# Patient Record
Sex: FEMALE | Employment: UNEMPLOYED | URBAN - METROPOLITAN AREA
[De-identification: names, ages, dates, MRNs, and addresses within clinical notes are randomized per-mention and may not be internally consistent; named-entity substitution may affect disease eponyms.]

---

## 2017-09-08 ENCOUNTER — HOSPITAL ENCOUNTER (INPATIENT)
Age: 57
LOS: 5 days | Discharge: HOME HEALTH | DRG: 673 | End: 2017-09-13
Attending: EMERGENCY MEDICINE | Admitting: INTERNAL MEDICINE
Payer: COMMERCIAL

## 2017-09-08 ENCOUNTER — APPOINTMENT (OUTPATIENT)
Dept: CT IMAGING | Age: 57
DRG: 673 | End: 2017-09-08
Attending: EMERGENCY MEDICINE
Payer: COMMERCIAL

## 2017-09-08 ENCOUNTER — APPOINTMENT (OUTPATIENT)
Dept: GENERAL RADIOLOGY | Age: 57
DRG: 673 | End: 2017-09-08
Attending: EMERGENCY MEDICINE
Payer: COMMERCIAL

## 2017-09-08 DIAGNOSIS — E87.5 ACUTE HYPERKALEMIA: Primary | ICD-10-CM

## 2017-09-08 DIAGNOSIS — N17.9 ACUTE RENAL FAILURE, UNSPECIFIED ACUTE RENAL FAILURE TYPE (HCC): ICD-10-CM

## 2017-09-08 PROBLEM — N19 RENAL FAILURE: Status: ACTIVE | Noted: 2017-09-08

## 2017-09-08 LAB
ANION GAP SERPL CALC-SCNC: 13 MMOL/L (ref 3–18)
ANION GAP SERPL CALC-SCNC: 9 MMOL/L (ref 3–18)
APPEARANCE UR: CLEAR
BACTERIA URNS QL MICRO: NEGATIVE /HPF
BASOPHILS # BLD: 0 K/UL (ref 0–0.06)
BASOPHILS NFR BLD: 0 % (ref 0–2)
BILIRUB UR QL: NEGATIVE
BUN SERPL-MCNC: 113 MG/DL (ref 7–18)
BUN SERPL-MCNC: 117 MG/DL (ref 7–18)
BUN/CREAT SERPL: 8 (ref 12–20)
BUN/CREAT SERPL: 8 (ref 12–20)
CALCIUM SERPL-MCNC: 9 MG/DL (ref 8.5–10.1)
CALCIUM SERPL-MCNC: 9.6 MG/DL (ref 8.5–10.1)
CHLORIDE SERPL-SCNC: 103 MMOL/L (ref 100–108)
CHLORIDE SERPL-SCNC: 106 MMOL/L (ref 100–108)
CO2 SERPL-SCNC: 19 MMOL/L (ref 21–32)
CO2 SERPL-SCNC: 25 MMOL/L (ref 21–32)
COLOR UR: YELLOW
CREAT SERPL-MCNC: 13.6 MG/DL (ref 0.6–1.3)
CREAT SERPL-MCNC: 13.83 MG/DL (ref 0.6–1.3)
DIFFERENTIAL METHOD BLD: ABNORMAL
EOSINOPHIL # BLD: 0 K/UL (ref 0–0.4)
EOSINOPHIL NFR BLD: 0 % (ref 0–5)
EPITH CASTS URNS QL MICRO: NORMAL /LPF (ref 0–5)
ERYTHROCYTE [DISTWIDTH] IN BLOOD BY AUTOMATED COUNT: 13.3 % (ref 11.6–14.5)
GLUCOSE BLD STRIP.AUTO-MCNC: 133 MG/DL (ref 70–110)
GLUCOSE BLD STRIP.AUTO-MCNC: 182 MG/DL (ref 70–110)
GLUCOSE BLD STRIP.AUTO-MCNC: 56 MG/DL (ref 70–110)
GLUCOSE SERPL-MCNC: 193 MG/DL (ref 74–99)
GLUCOSE SERPL-MCNC: 69 MG/DL (ref 74–99)
GLUCOSE UR STRIP.AUTO-MCNC: 100 MG/DL
HCT VFR BLD AUTO: 28.9 % (ref 35–45)
HGB BLD-MCNC: 9.6 G/DL (ref 12–16)
HGB UR QL STRIP: ABNORMAL
KETONES UR QL STRIP.AUTO: NEGATIVE MG/DL
LEUKOCYTE ESTERASE UR QL STRIP.AUTO: NEGATIVE
LYMPHOCYTES # BLD: 1.2 K/UL (ref 0.9–3.6)
LYMPHOCYTES NFR BLD: 21 % (ref 21–52)
MAGNESIUM SERPL-MCNC: 2.9 MG/DL (ref 1.6–2.6)
MCH RBC QN AUTO: 28.2 PG (ref 24–34)
MCHC RBC AUTO-ENTMCNC: 33.2 G/DL (ref 31–37)
MCV RBC AUTO: 84.8 FL (ref 74–97)
MONOCYTES # BLD: 0.5 K/UL (ref 0.05–1.2)
MONOCYTES NFR BLD: 8 % (ref 3–10)
NEUTS SEG # BLD: 3.9 K/UL (ref 1.8–8)
NEUTS SEG NFR BLD: 71 % (ref 40–73)
NITRITE UR QL STRIP.AUTO: NEGATIVE
PH UR STRIP: 7.5 [PH] (ref 5–8)
PLATELET # BLD AUTO: 196 K/UL (ref 135–420)
PMV BLD AUTO: 9.1 FL (ref 9.2–11.8)
POTASSIUM SERPL-SCNC: 5.5 MMOL/L (ref 3.5–5.5)
POTASSIUM SERPL-SCNC: 6.5 MMOL/L (ref 3.5–5.5)
PROT UR STRIP-MCNC: 300 MG/DL
RBC # BLD AUTO: 3.41 M/UL (ref 4.2–5.3)
RBC #/AREA URNS HPF: NORMAL /HPF (ref 0–5)
SODIUM SERPL-SCNC: 135 MMOL/L (ref 136–145)
SODIUM SERPL-SCNC: 140 MMOL/L (ref 136–145)
SP GR UR REFRACTOMETRY: 1.01 (ref 1–1.03)
UROBILINOGEN UR QL STRIP.AUTO: 0.2 EU/DL (ref 0.2–1)
WBC # BLD AUTO: 5.5 K/UL (ref 4.6–13.2)
WBC URNS QL MICRO: NORMAL /HPF (ref 0–4)

## 2017-09-08 PROCEDURE — 96375 TX/PRO/DX INJ NEW DRUG ADDON: CPT

## 2017-09-08 PROCEDURE — 83735 ASSAY OF MAGNESIUM: CPT | Performed by: EMERGENCY MEDICINE

## 2017-09-08 PROCEDURE — 74011636637 HC RX REV CODE- 636/637: Performed by: EMERGENCY MEDICINE

## 2017-09-08 PROCEDURE — 74176 CT ABD & PELVIS W/O CONTRAST: CPT

## 2017-09-08 PROCEDURE — 74011000250 HC RX REV CODE- 250: Performed by: EMERGENCY MEDICINE

## 2017-09-08 PROCEDURE — 85025 COMPLETE CBC W/AUTO DIFF WBC: CPT | Performed by: EMERGENCY MEDICINE

## 2017-09-08 PROCEDURE — 74011250636 HC RX REV CODE- 250/636: Performed by: INTERNAL MEDICINE

## 2017-09-08 PROCEDURE — 74011250636 HC RX REV CODE- 250/636: Performed by: EMERGENCY MEDICINE

## 2017-09-08 PROCEDURE — 81001 URINALYSIS AUTO W/SCOPE: CPT | Performed by: EMERGENCY MEDICINE

## 2017-09-08 PROCEDURE — 99285 EMERGENCY DEPT VISIT HI MDM: CPT

## 2017-09-08 PROCEDURE — 96374 THER/PROPH/DIAG INJ IV PUSH: CPT

## 2017-09-08 PROCEDURE — 80048 BASIC METABOLIC PNL TOTAL CA: CPT | Performed by: EMERGENCY MEDICINE

## 2017-09-08 PROCEDURE — 93005 ELECTROCARDIOGRAM TRACING: CPT

## 2017-09-08 PROCEDURE — 82962 GLUCOSE BLOOD TEST: CPT

## 2017-09-08 PROCEDURE — 74011250637 HC RX REV CODE- 250/637: Performed by: EMERGENCY MEDICINE

## 2017-09-08 PROCEDURE — 65270000029 HC RM PRIVATE

## 2017-09-08 PROCEDURE — 71020 XR CHEST AP LAT: CPT

## 2017-09-08 RX ORDER — SODIUM BICARBONATE 1 MEQ/ML
50 SYRINGE (ML) INTRAVENOUS
Status: COMPLETED | OUTPATIENT
Start: 2017-09-08 | End: 2017-09-08

## 2017-09-08 RX ORDER — MULTIVITAMIN WITH IRON
1 TABLET ORAL DAILY
COMMUNITY
End: 2018-01-01

## 2017-09-08 RX ORDER — CLONIDINE HYDROCHLORIDE 0.1 MG/1
0.3 TABLET ORAL 3 TIMES DAILY
Status: ON HOLD | COMMUNITY
End: 2018-01-01

## 2017-09-08 RX ORDER — SODIUM CHLORIDE 9 MG/ML
40 INJECTION, SOLUTION INTRAVENOUS CONTINUOUS
Status: DISCONTINUED | OUTPATIENT
Start: 2017-09-08 | End: 2017-09-09

## 2017-09-08 RX ORDER — NIFEDIPINE 30 MG/1
20 TABLET, FILM COATED, EXTENDED RELEASE ORAL DAILY
COMMUNITY
End: 2018-01-01

## 2017-09-08 RX ORDER — CALCIUM GLUCONATE 94 MG/ML
1 INJECTION, SOLUTION INTRAVENOUS ONCE
Status: COMPLETED | OUTPATIENT
Start: 2017-09-08 | End: 2017-09-08

## 2017-09-08 RX ORDER — SODIUM POLYSTYRENE SULFONATE 15 G/60ML
15 SUSPENSION ORAL; RECTAL
Status: COMPLETED | OUTPATIENT
Start: 2017-09-08 | End: 2017-09-08

## 2017-09-08 RX ORDER — FUROSEMIDE 40 MG/1
TABLET ORAL DAILY
COMMUNITY
End: 2017-09-13

## 2017-09-08 RX ORDER — DEXTROSE 50 % IN WATER (D50W) INTRAVENOUS SYRINGE
25
Status: COMPLETED | OUTPATIENT
Start: 2017-09-08 | End: 2017-09-08

## 2017-09-08 RX ORDER — HEPARIN SODIUM 5000 [USP'U]/ML
5000 INJECTION, SOLUTION INTRAVENOUS; SUBCUTANEOUS EVERY 8 HOURS
Status: DISCONTINUED | OUTPATIENT
Start: 2017-09-08 | End: 2017-09-12

## 2017-09-08 RX ORDER — PREGABALIN 50 MG/1
CAPSULE ORAL
COMMUNITY
End: 2017-09-13

## 2017-09-08 RX ADMIN — SODIUM BICARBONATE 50 MEQ: 84 INJECTION, SOLUTION INTRAVENOUS at 17:31

## 2017-09-08 RX ADMIN — HEPARIN SODIUM 5000 UNITS: 5000 INJECTION, SOLUTION INTRAVENOUS; SUBCUTANEOUS at 23:09

## 2017-09-08 RX ADMIN — DEXTROSE MONOHYDRATE 25 G: 25 INJECTION, SOLUTION INTRAVENOUS at 18:19

## 2017-09-08 RX ADMIN — CALCIUM GLUCONATE 1 G: 94 INJECTION, SOLUTION INTRAVENOUS at 17:23

## 2017-09-08 RX ADMIN — SODIUM CHLORIDE 100 ML/HR: 900 INJECTION, SOLUTION INTRAVENOUS at 23:07

## 2017-09-08 RX ADMIN — SODIUM POLYSTYRENE SULFONATE 15 G: 15 SUSPENSION ORAL; RECTAL at 17:31

## 2017-09-08 RX ADMIN — INSULIN HUMAN 10 UNITS: 100 INJECTION, SOLUTION PARENTERAL at 17:27

## 2017-09-08 NOTE — ED TRIAGE NOTES
Reports dizziness and bilat leg numbness x2 days. Perr pts son he states pt saw PCP yesterday for yesterday and given medication for dizziness that is \"not working\".

## 2017-09-08 NOTE — ED PROVIDER NOTES
HPI Comments: 4:06 PM Judeen Severs is a 49 y.o. female with a history of kidney disease, ESRD, HTN, and hypercholesteremia who presents to ED for the evaluation of intermittent dizziness that began two days ago. Associated Sx include bilateral numbness to her hands and feet and HA. He also explains that the pt's Sx increase when she is standing up. Pt saw her PCP four or five days ago and received medication but states that it is not working. Pt is a native of [facility] and has been in the [facility] for a little over a year. Pt's son states she was out of the country visiting about a month ago. No other complaints, associated symptoms or modifying factors at this time. PCP: PROVIDER UNKNOWN      History provided by: , son. A  was used. Past Medical History:   Diagnosis Date    Kidney disease        No past surgical history on file. No family history on file. Social History     Social History    Marital status:      Spouse name: N/A    Number of children: N/A    Years of education: N/A     Occupational History    Not on file. Social History Main Topics    Smoking status: Not on file    Smokeless tobacco: Not on file    Alcohol use Not on file    Drug use: Not on file    Sexual activity: Not on file     Other Topics Concern    Not on file     Social History Narrative    No narrative on file         ALLERGIES: Review of patient's allergies indicates not on file. Review of Systems   Neurological: Positive for dizziness, numbness (bilateral) and headaches. Vitals:    09/08/17 1603   Pulse: 95   Resp: (!) 95   Temp: (!) 46.2 °F (7.9 °C)   SpO2: 100%            Physical Exam   Constitutional: She is oriented to person, place, and time. She appears well-developed and well-nourished. HENT:   Head: Normocephalic and atraumatic.    Mouth/Throat: Oropharynx is clear and moist.   Eyes: Conjunctivae are normal. Pupils are equal, round, and reactive to light. No scleral icterus. Neck: Normal range of motion. Neck supple. No JVD present. No tracheal deviation present. Cardiovascular: Normal rate, regular rhythm and normal heart sounds. Pulmonary/Chest: Effort normal and breath sounds normal. No respiratory distress. She has no wheezes. Abdominal: Soft. Bowel sounds are normal.   Musculoskeletal: Normal range of motion. Neurological: She is alert and oriented to person, place, and time. She has normal strength. Gait normal. GCS eye subscore is 4. GCS verbal subscore is 5. GCS motor subscore is 6. Skin: Skin is warm and dry. Psychiatric: She has a normal mood and affect. Nursing note and vitals reviewed. MDM  Number of Diagnoses or Management Options  Acute hyperkalemia:   Acute renal failure, unspecified acute renal failure type [facility]):   Diagnosis management comments: Pt presents with very little past history other than a history of \"kidney problems\". Returned from [address] [place] [address] [address] 6 weeks ago where she was treated for \"kidney problems\", but no further specifics available. Pt saw a \"Dr Baer\" in [place] last week, no blood work done at that time. Pt presents to ED with hyperkalemic renal failure. Have arranged admit to [facility] for further care. Urbano Franco MD  5:44 PM      ED Course       Procedures    Vitals:  Patient Vitals for the past 12 hrs:   Temp Pulse Resp SpO2   09/08/17 1603 (!) 46.2 °F (7.9 °C) 95 (!) 95 100 %         Medications ordered:   Medications - No data to display      Lab findings:  No results found for this or any previous visit (from the past 12 hour(s)). EKG interpretation by ED Physician:  4:05 PM As read by provider, NSR 91 with no acute changes. X-Ray, CT or other radiology findings or impressions:  No results found.     Progress notes, Consult notes or additional Procedure notes:   5:24 PM Consult:  Discussed care with Dr. Reina Castellanos, Hospitalist Standard discussion; including history of patients chief complaint, available diagnostic results, and treatment course. Agrees with admission. 5:36 PM Consult:  Discussed care with Dr. Silvestre Wilkes, Nephrology Standard discussion; including history of patients chief complaint, available diagnostic results, and treatment course. Will consult on patient when she is at [facility]. Disposition:  Diagnosis: No diagnosis found. Disposition: Admitted    Follow-up Information     None           Patient's Medications    No medications on file       Scribe Attestation:   Migue Felder acting as a scribe for and in the presence of Jennifer Logan MD September 08, 2017 at 4:05 PM     Signed by: Jeb Quispe, September 08, 2017 at 4:05 PM     Provider Attestation:   I personally performed the services described in the documentation, reviewed the documentation, as recorded by the scribe in my presence, and it accurately and completely records my words and actions.      Reviewed and signed by:  Jennifer Logan MD      .          Contact info for pt's son, he is able to translate for her: Federico Arina, cell: [phone]

## 2017-09-08 NOTE — ED NOTES
Bedside shift change report given to Danie Aguayo (oncoming nurse) by Adrian Cooley (offgoing nurse). Report included the following information SBAR.

## 2017-09-08 NOTE — PROGRESS NOTES
Discussed with Dr. Stefanie Winter. Pt relatively asymp, he is treating the elevated K and will be repeating in a few hours. . I have asked to get U/A and renal imaging. [facility] Have spoken to pt's son Radha Chang on the phone [phone]. His mom ( she does not speak [place]) was in [facility] for a while and was seeing a nephrologist there who told her she had \"very bad kidneys\" and needs to start treatment in the hospital 2-3 x a week. She came back to the [address] a few weeks ago and has complained of feeling weak. He brought some medical info and list of meds with him which he gave to the ER doctor. I told him I will see his mom in the morning, will arrange for dialysis cath and dialysis tomorrow. He says his mom is agreeable.

## 2017-09-08 NOTE — Clinical Note
Status[de-identified] Inpatient [101] Type of Bed: Remote Telemetry [29] Inpatient Hospitalization Certified Necessary for the Following Reasons: 3. Patient receiving treatment that can only be provided in an inpatient setting (further clarification in H&P documentation) Admitting Diagnosis: Hyperkalemia [935144] Admitting Physician: Colette Zepeda Attending Physician: Colette Zepeda Estimated Length of Stay: 2 Midnights Discharge Plan[de-identified] [facility]

## 2017-09-08 NOTE — IP AVS SNAPSHOT
[address] Patient: Mirlande Berumen MRN: [id number] :1960 You are allergic to the following Allergen Reactions Banana Other (comments) Recent Documentation Height Weight Breastfeeding? BMI Smoking Status 1.575 m 57 kg No 22.97 kg/m2 Never Smoker Unresulted Labs Order Current Status CULTURE, ANAEROBIC Preliminary result CULTURE, BLOOD Preliminary result CULTURE, BODY FLUID W GRAM STAIN Preliminary result TYPE + CROSSMATCH Preliminary result About your hospitalization You were admitted on:  2017 You last received care in the:  [facility] [address] You were discharged on:  2017 Unit phone number:  [phone] Why you were hospitalized Your primary diagnosis was:  Not on File Your diagnoses also included:  Hyperkalemia, Renal Failure, Facundo (Acute Kidney Injury) (Hcc) Providers Seen During Your Hospitalizations Provider Role Specialty Primary office phone Jonathan Plunkett MD Attending Provider Emergency Medicine [phone] Ros Osuna MD Attending Provider Internal Medicine [phone] Dillon Millan MD Attending Provider [facility] [phone] Your Primary Care Physician (PCP) Primary Care Physician Office Phone Office Fax [address] [address] [phone] Follow-up Information Follow up With Details Comments Contact Info Shauna Pruitt MD On 2017 @11:00AM [address] [address] 
[phone] Current Discharge Medication List  
  
START taking these medications Dose & Instructions Dispensing Information Comments Morning Noon Evening Bedtime  
 calcium acetate 667 mg Cap Commonly known as:  PHOSLO Your last dose was: Your next dose is:    
   
   
 Dose:  1 Cap Take 1 Cap by mouth three (3) times daily (with meals). Quantity:  90 Cap Refills:  1  
     
   
   
   
  
 gabapentin 100 mg capsule Commonly known as:  NEURONTIN Your last dose was: Your next dose is:    
   
   
 Dose:  100 mg Take 1 Cap by mouth nightly. Quantity:  30 Cap Refills:  1 CONTINUE these medications which have NOT CHANGED Dose & Instructions Dispensing Information Comments Morning Noon Evening Bedtime  
 cloNIDine HCl 0.1 mg tablet Commonly known as:  CATAPRES Your last dose was: Your next dose is:    
   
   
 Dose:  0.1 mg Take 0.1 mg by mouth two (2) times a day. Refills:  0  
     
   
   
   
  
 multivitamin with iron tablet Your last dose was: Your next dose is:    
   
   
 Dose:  1 Tab Take 1 Tab by mouth daily. Refills:  0  
     
   
   
   
  
 NIFEdipine ER 30 mg ER tablet Commonly known as:  ADALAT CC Your last dose was: Your next dose is:    
   
   
 Dose:  20 mg Take 20 mg by mouth daily. Refills:  0 STOP taking these medications CEFIXIME PO  
   
  
 furosemide 40 mg tablet Commonly known as:  LASIX LYRICA 50 mg capsule Generic drug:  pregabalin OTHER Where to Get Your Medications Information on where to get these meds will be given to you by the nurse or doctor. ! Ask your nurse or doctor about these medications  
  calcium acetate 667 mg Cap  
 gabapentin 100 mg capsule Discharge Instructions Acute Kidney Injury: Care Instructions Your Care Instructions Acute kidney injury is the sudden loss of kidney function that happens when the kidneys stop working over a period of hours, days or, in some cases, weeks. It is also known as acute renal failure. Common causes of acute kidney injury are dehydration, blood loss, and medicines. When acute kidney injury happens, the kidneys cannot remove waste and excess fluids from the body. The waste and fluids build up and become harmful. Kidney function may return to normal if the cause of acute kidney injury is treated quickly. Your chance of a full recovery depends on what caused the problem, how quickly the cause was treated, and what other medical problems you have. A machine may be used to help your kidneys remove waste and fluids for a short period of time. This is called dialysis. Follow-up care is a key part of your treatment and safety. Be sure to make and go to all appointments, and call your doctor if you are having problems. It's also a good idea to know your test results and keep a list of the medicines you take. How can you care for yourself at home? · Talk to your doctor about how much fluid you should drink. · Eat a balanced diet. Talk to your doctor or a dietitian about what type of diet may be best for you. · Follow the instructions and schedule for dialysis that your doctor gives you. · Do not smoke. Smoking can make your condition worse. If you need help quitting, talk to your doctor about stop-smoking programs and medicines. These can increase your chances of quitting for good. · Do not drink alcohol. · Review all of your medicines with your doctor. Do not take any medicines, including nonsteroidal anti-inflammatory drugs (NSAIDs) such as ibuprofen (Advil, Motrin) or naproxen (Aleve), unless your doctor says it is safe for you to do so. · Make sure that anyone treating you for any health problem knows that you have had acute kidney injury. When should you call for help? Call 911 anytime you think you may need emergency care. For example, call if: 
· You passed out (lost consciousness). · You have severe trouble breathing. Call your doctor now or seek immediate medical care if: 
· You have less urine than normal or no urine. · You have trouble urinating or can urinate only very small amounts. · You are confused or have trouble thinking clearly. · You feel weaker or more tired than usual. 
· You are very thirsty, lightheaded, or dizzy. · You have nausea and vomiting. · You have new swelling of your arms or feet, or your swelling is worse. · You have blood in your urine. · Your body weight goes up every day. · You have new or worse trouble breathing. Watch closely for changes in your health, and be sure to contact your doctor if: 
· You do not get better as expected. Where can you learn more? Go to http://cristino-anne.info/. Enter S555 in the search box to learn more about \"Acute Kidney Injury: Care Instructions. \" Current as of: April 3, 2017 Content Version: 11.3 © 0073-1360 [employer]. Care instructions adapted under license by [employer] (which disclaims liability or warranty for this information). If you have questions about a medical condition or this instruction, always ask your healthcare professional. [address] any warranty or liability for your use of this information. Hyperkalemia: Care Instructions Your Care Instructions Hyperkalemia is too much potassium in the blood. Potassium helps keep the right mix of fluids in your body. It also helps your nerves and muscles work as they should. And it keeps your heartbeat in a normal rhythm. Some things can raise potassium levels. These include some health problems, medicines, and kidney problems. (Normally, your kidneys remove extra potassium.) Too much potassium can cause nausea. It also can cause a heartbeat that isn't normal. But you may not have any symptoms. Too much potassium can be dangerous. That's why it's important to treat it.  If you are taking any of the medicines that can raise your levels, your doctor will ask you to stop. You may get medicines to lower your levels. And you may have to limit or not eat foods that have a lot of potassium. Follow-up care is a key part of your treatment and safety. Be sure to make and go to all appointments, and call your doctor if you are having problems. It's also a good idea to know your test results and keep a list of the medicines you take. How can you care for yourself at home? · Take your medicines exactly as prescribed. Call your doctor if you think you are having a problem with your medicine. · Stop taking certain medicines if your doctor asks you to. They may be causing your high potassium levels. If you have concerns about stopping medicine, talk with your doctor. · If you have kidney, heart, or liver disease and have to limit fluids, talk with your doctor before you increase the amount of fluids you drink. If the doctor says it's okay, drink plenty of fluids. This means drinking enough so that your urine is light yellow or clear like water. · Avoid strenuous exercise until your doctor tells you it is okay. · Potassium is in many foods, including vegetables, fruits, and milk products. Foods high in potassium include bananas, cantaloupe, broccoli, milk, potatoes, and tomatoes. · Low potassium foods include blueberries, raspberries, cucumber, white or brown rice, spaghetti, and macaroni. · Do not use a salt substitute without talking to your doctor first. Most of these are very high in potassium. · Be sure to tell your doctor about any prescription, over-the-counter, or herbal medicines you take. Some of these can raise potassium. When should you call for help? Call 911 anytime you think you may need emergency care. For example, call if: 
· You passed out (lost consciousness). · You have trouble breathing. · You have an unusual heartbeat. Your heart may beat fast or skip beats. Call your doctor now or seek immediate medical care if: · You have trouble urinating or can urinate only very small amounts. · Your nausea does not get better. Watch closely for changes in your health, and be sure to contact your doctor if: 
· You do not get better as expected. · You want more help planning meals. Where can you learn more? Go to http://cristino-anne.info/. Enter L705 in the search box to learn more about \"Hyperkalemia: Care Instructions. \" Current as of: 2016 Content Version: 11.3 © 6268-3688 [employer]. Care instructions adapted under license by [employer] (which disclaims liability or warranty for this information). If you have questions about a medical condition or this instruction, always ask your healthcare professional. [address] any warranty or liability for your use of this information. Patient armband removed and shredded MyChart Activation Thank you for requesting access to DGIT. Please follow the instructions below to securely access and download your online medical record. DGIT allows you to send messages to your doctor, view your test results, renew your prescriptions, schedule appointments, and more. How Do I Sign Up? 1. In your internet browser, go to [email or web address] 
2. Click on the First Time User? Click Here link in the Sign In box. You will be redirect to the New Member Sign Up page. 3. Enter your DGIT Access Code exactly as it appears below. You will not need to use this code after youve completed the sign-up process. If you do not sign up before the expiration date, you must request a new code. DGIT Access Code: [email or web address] Expires: 2017  1:43 PM (This is the date your DGIT access code will ) 4. Enter the last four digits of your Social Security Number (xxxx) and Date of Birth (mm/dd/yyyy) as indicated and click Submit. You will be taken to the next sign-up page. 5. Create a Bee [employer]ret ID. This will be your [employer] login ID and cannot be changed, so think of one that is secure and easy to remember. 6. Create a [employer] password. You can change your password at any time. 7. Enter your Password Reset Question and Answer. This can be used at a later time if you forget your password. 8. Enter your e-mail address. You will receive e-mail notification when new information is available in [address] 9. Click Sign Up. You can now view and download portions of your medical record. 10. Click the Download Summary menu link to download a portable copy of your medical information. Additional Information If you have questions, please visit the Frequently Asked Questions section of the [employer] website at [email or web address] Remember, [employer] is NOT to be used for urgent needs. For medical emergencies, dial 911. DISCHARGE SUMMARY from Nurse The following personal items are in your possession at time of discharge: 
 
Dental Appliances: None Visual Aid: None Home Medications: None Jewelry: Earrings, With patient Clothing: Footwear, Pants, Shirt, Undergarments Other Valuables: None PATIENT INSTRUCTIONS: 
 
 
F-face looks uneven A-arms unable to move or move unevenly S-speech slurred or non-existent T-time-call 911 as soon as signs and symptoms begin-DO NOT go Back to bed or wait to see if you get better-TIME IS BRAIN. Warning Signs of HEART ATTACK Call 911 if you have these symptoms: 
? Chest discomfort.  Most heart attacks involve discomfort in the center of the chest that lasts more than a few minutes, or that goes away and comes back. It can feel like uncomfortable pressure, squeezing, fullness, or pain. ? Discomfort in other areas of the upper body. Symptoms can include pain or discomfort in one or both arms, the back, neck, jaw, or stomach. ? Shortness of breath with or without chest discomfort. ? Other signs may include breaking out in a cold sweat, nausea, or lightheadedness. Don't wait more than five minutes to call [address]! Fast action can save your life. Calling 911 is almost always the fastest way to get lifesaving treatment. Emergency Medical Services staff can begin treatment when they arrive  up to an hour sooner than if someone gets to the hospital by car. The discharge information has been reviewed with the patient. The patient verbalized understanding. Discharge medications reviewed with the patient and appropriate educational materials and side effects teaching were provided. Discharge Orders None Introducing [facility] SERVICES! Christian Toth introduces [employer] patient portal. Now you can access parts of your medical record, email your doctor's office, and request medication refills online. 1. In your internet browser, go to [email or web address] 2. Click on the First Time User? Click Here link in the Sign In box. You will see the New Member Sign Up page. 3. Enter your [employer] Access Code exactly as it appears below. You will not need to use this code after youve completed the sign-up process. If you do not sign up before the expiration date, you must request a new code. · [employer] Access Code: [email or web address] Expires: 12/12/2017  1:43 PM 
 
4. Enter the last four digits of your Social Security Number (xxxx) and Date of Birth (mm/dd/yyyy) as indicated and click Submit. You will be taken to the next sign-up page. 5. Create a [employer] ID. This will be your [employer] login ID and cannot be changed, so think of one that is secure and easy to remember. 6. Create a [employer] password. You can change your password at any time. 7. Enter your Password Reset Question and Answer. This can be used at a later time if you forget your password. 8. Enter your e-mail address. You will receive e-mail notification when new information is available in [address] 9. Click Sign Up. You can now view and download portions of your medical record. 10. Click the Download Summary menu link to download a portable copy of your medical information. If you have questions, please visit the Frequently Asked Questions section of the [employer] website. Remember, [employer] is NOT to be used for urgent needs. For medical emergencies, dial 911. Now available from your iPhone and Android! General Information Please provide this summary of care documentation to your next provider. Patient Signature:  ____________________________________________________________ Date:  ____________________________________________________________  
  
Earnstine Edwin Provider Signature:  ____________________________________________________________ Date:  ____________________________________________________________

## 2017-09-08 NOTE — ED NOTES
TRANSFER - OUT REPORT:    Verbal report given to Lloyd Brooks RN (name) on Vivian Griffiths  being transferred to  [facility], [address] [facility], Room 451(unit) for routine progression of care       Report consisted of patients Situation, Background, Assessment and   Recommendations(SBAR). Information from the following report(s) SBAR, Kardex, [facility] and Recent Results was reviewed with the receiving nurse. Lines:   Peripheral IV 09/08/17 Right Wrist (Active)   Site Assessment Clean, dry, & intact 9/8/2017  4:18 PM   Phlebitis Assessment 0 9/8/2017  4:18 PM   Infiltration Assessment 0 9/8/2017  4:18 PM   Dressing Status Clean, dry, & intact 9/8/2017  4:18 PM   Dressing Type Transparent 9/8/2017  4:18 PM   Hub Color/Line Status Blue 9/8/2017  4:18 PM        Opportunity for questions and clarification was provided.       Patient transported with:   Monitor

## 2017-09-08 NOTE — IP AVS SNAPSHOT
Alex Drake 
 
 
 [address] Patient: Trista Orellana MRN: [id number] :1960 Current Discharge Medication List  
  
START taking these medications Dose & Instructions Dispensing Information Comments Morning Noon Evening Bedtime  
 calcium acetate 667 mg Cap Commonly known as:  PHOSLO Your last dose was: Your next dose is:    
   
   
 Dose:  1 Cap Take 1 Cap by mouth three (3) times daily (with meals). Quantity:  90 Cap Refills:  1  
     
   
   
   
  
 gabapentin 100 mg capsule Commonly known as:  NEURONTIN Your last dose was: Your next dose is:    
   
   
 Dose:  100 mg Take 1 Cap by mouth nightly. Quantity:  30 Cap Refills:  1 CONTINUE these medications which have NOT CHANGED Dose & Instructions Dispensing Information Comments Morning Noon Evening Bedtime  
 cloNIDine HCl 0.1 mg tablet Commonly known as:  CATAPRES Your last dose was: Your next dose is:    
   
   
 Dose:  0.1 mg Take 0.1 mg by mouth two (2) times a day. Refills:  0  
     
   
   
   
  
 multivitamin with iron tablet Your last dose was: Your next dose is:    
   
   
 Dose:  1 Tab Take 1 Tab by mouth daily. Refills:  0  
     
   
   
   
  
 NIFEdipine ER 30 mg ER tablet Commonly known as:  ADALAT CC Your last dose was: Your next dose is:    
   
   
 Dose:  20 mg Take 20 mg by mouth daily. Refills:  0 STOP taking these medications CEFIXIME PO  
   
  
 furosemide 40 mg tablet Commonly known as:  LASIX LYRICA 50 mg capsule Generic drug:  pregabalin OTHER Where to Get Your Medications Information on where to get these meds will be given to you by the nurse or doctor. ! Ask your nurse or doctor about these medications calcium acetate 667 mg Cap  
 gabapentin 100 mg capsule

## 2017-09-09 LAB
ALBUMIN SERPL-MCNC: 2.6 G/DL (ref 3.4–5)
ALBUMIN/GLOB SERPL: 0.8 {RATIO} (ref 0.8–1.7)
ALP SERPL-CCNC: 70 U/L (ref 45–117)
ALT SERPL-CCNC: 47 U/L (ref 13–56)
ANION GAP SERPL CALC-SCNC: 9 MMOL/L (ref 3–18)
AST SERPL-CCNC: 38 U/L (ref 15–37)
BILIRUB SERPL-MCNC: 0.2 MG/DL (ref 0.2–1)
BUN SERPL-MCNC: 123 MG/DL (ref 7–18)
BUN/CREAT SERPL: 9 (ref 12–20)
CALCIUM SERPL-MCNC: 8.5 MG/DL (ref 8.5–10.1)
CALCIUM SERPL-MCNC: 8.5 MG/DL (ref 8.5–10.1)
CHLORIDE SERPL-SCNC: 107 MMOL/L (ref 100–108)
CHOLEST SERPL-MCNC: 204 MG/DL
CO2 SERPL-SCNC: 21 MMOL/L (ref 21–32)
CREAT SERPL-MCNC: 13.1 MG/DL (ref 0.6–1.3)
CREAT UR-MCNC: 28.5 MG/DL (ref 30–125)
ERYTHROCYTE [DISTWIDTH] IN BLOOD BY AUTOMATED COUNT: 13.6 % (ref 11.6–14.5)
EST. AVERAGE GLUCOSE BLD GHB EST-MCNC: 97 MG/DL
FERRITIN SERPL-MCNC: 492 NG/ML (ref 8–388)
GLOBULIN SER CALC-MCNC: 3.1 G/DL (ref 2–4)
GLUCOSE SERPL-MCNC: 95 MG/DL (ref 74–99)
HBA1C MFR BLD: 5 % (ref 4.2–5.6)
HBV SURFACE AG SER QL: <0.1 INDEX
HBV SURFACE AG SER QL: NEGATIVE
HCT VFR BLD AUTO: 26 % (ref 35–45)
HDLC SERPL-MCNC: 51 MG/DL (ref 40–60)
HDLC SERPL: 4 {RATIO} (ref 0–5)
HGB BLD-MCNC: 8.8 G/DL (ref 12–16)
INR PPP: 1 (ref 0.8–1.2)
IRON SATN MFR SERPL: 16 %
IRON SERPL-MCNC: 33 UG/DL (ref 50–175)
LDLC SERPL CALC-MCNC: 116.8 MG/DL (ref 0–100)
LIPID PROFILE,FLP: ABNORMAL
MCH RBC QN AUTO: 28.4 PG (ref 24–34)
MCHC RBC AUTO-ENTMCNC: 33.8 G/DL (ref 31–37)
MCV RBC AUTO: 83.9 FL (ref 74–97)
PHOSPHATE SERPL-MCNC: 6.8 MG/DL (ref 2.5–4.9)
PLATELET # BLD AUTO: 173 K/UL (ref 135–420)
PMV BLD AUTO: 9.5 FL (ref 9.2–11.8)
POTASSIUM SERPL-SCNC: 5.7 MMOL/L (ref 3.5–5.5)
PROT SERPL-MCNC: 5.7 G/DL (ref 6.4–8.2)
PROT UR-MCNC: 263 MG/DL
PROT/CREAT UR-RTO: 9.2
PROTHROMBIN TIME: 12.4 SEC (ref 11.5–15.2)
PTH-INTACT SERPL-MCNC: 140.4 PG/ML (ref 14–72)
RBC # BLD AUTO: 3.1 M/UL (ref 4.2–5.3)
SODIUM SERPL-SCNC: 137 MMOL/L (ref 136–145)
TIBC SERPL-MCNC: 204 UG/DL (ref 250–450)
TRIGL SERPL-MCNC: 181 MG/DL (ref ?–150)
URATE SERPL-MCNC: 7.4 MG/DL (ref 2.6–7.2)
VLDLC SERPL CALC-MCNC: 36.2 MG/DL
WBC # BLD AUTO: 6.5 K/UL (ref 4.6–13.2)

## 2017-09-09 PROCEDURE — 85027 COMPLETE CBC AUTOMATED: CPT | Performed by: INTERNAL MEDICINE

## 2017-09-09 PROCEDURE — 84100 ASSAY OF PHOSPHORUS: CPT | Performed by: INTERNAL MEDICINE

## 2017-09-09 PROCEDURE — 80061 LIPID PANEL: CPT | Performed by: INTERNAL MEDICINE

## 2017-09-09 PROCEDURE — 74011250637 HC RX REV CODE- 250/637: Performed by: INTERNAL MEDICINE

## 2017-09-09 PROCEDURE — 74011250637 HC RX REV CODE- 250/637: Performed by: FAMILY MEDICINE

## 2017-09-09 PROCEDURE — 65270000029 HC RM PRIVATE

## 2017-09-09 PROCEDURE — 90935 HEMODIALYSIS ONE EVALUATION: CPT

## 2017-09-09 PROCEDURE — 83036 HEMOGLOBIN GLYCOSYLATED A1C: CPT | Performed by: INTERNAL MEDICINE

## 2017-09-09 PROCEDURE — 74011250636 HC RX REV CODE- 250/636: Performed by: INTERNAL MEDICINE

## 2017-09-09 PROCEDURE — 80053 COMPREHEN METABOLIC PANEL: CPT | Performed by: INTERNAL MEDICINE

## 2017-09-09 PROCEDURE — 74011000258 HC RX REV CODE- 258: Performed by: INTERNAL MEDICINE

## 2017-09-09 PROCEDURE — 86706 HEP B SURFACE ANTIBODY: CPT | Performed by: INTERNAL MEDICINE

## 2017-09-09 PROCEDURE — 83970 ASSAY OF PARATHORMONE: CPT | Performed by: INTERNAL MEDICINE

## 2017-09-09 PROCEDURE — 83540 ASSAY OF IRON: CPT | Performed by: INTERNAL MEDICINE

## 2017-09-09 PROCEDURE — 84550 ASSAY OF BLOOD/URIC ACID: CPT | Performed by: INTERNAL MEDICINE

## 2017-09-09 PROCEDURE — 87340 HEPATITIS B SURFACE AG IA: CPT | Performed by: INTERNAL MEDICINE

## 2017-09-09 PROCEDURE — 85610 PROTHROMBIN TIME: CPT | Performed by: INTERNAL MEDICINE

## 2017-09-09 PROCEDURE — 06HM33Z INSERTION OF INFUSION DEVICE INTO RIGHT FEMORAL VEIN, PERCUTANEOUS APPROACH: ICD-10-PCS | Performed by: SURGERY

## 2017-09-09 PROCEDURE — 5A1D60Z PERFORMANCE OF URINARY FILTRATION, MULTIPLE: ICD-10-PCS | Performed by: INTERNAL MEDICINE

## 2017-09-09 PROCEDURE — 84156 ASSAY OF PROTEIN URINE: CPT | Performed by: INTERNAL MEDICINE

## 2017-09-09 PROCEDURE — 86803 HEPATITIS C AB TEST: CPT | Performed by: INTERNAL MEDICINE

## 2017-09-09 PROCEDURE — 82728 ASSAY OF FERRITIN: CPT | Performed by: INTERNAL MEDICINE

## 2017-09-09 PROCEDURE — 36415 COLL VENOUS BLD VENIPUNCTURE: CPT | Performed by: INTERNAL MEDICINE

## 2017-09-09 PROCEDURE — 86705 HEP B CORE ANTIBODY IGM: CPT | Performed by: INTERNAL MEDICINE

## 2017-09-09 RX ORDER — HEPARIN SODIUM 1000 [USP'U]/ML
1000 INJECTION, SOLUTION INTRAVENOUS; SUBCUTANEOUS
Status: DISCONTINUED | OUTPATIENT
Start: 2017-09-09 | End: 2017-09-12

## 2017-09-09 RX ORDER — OXYCODONE AND ACETAMINOPHEN 5; 325 MG/1; MG/1
1 TABLET ORAL
Status: DISCONTINUED | OUTPATIENT
Start: 2017-09-09 | End: 2017-09-13 | Stop reason: HOSPADM

## 2017-09-09 RX ORDER — NIFEDIPINE 30 MG/1
30 TABLET, EXTENDED RELEASE ORAL DAILY
Status: DISCONTINUED | OUTPATIENT
Start: 2017-09-09 | End: 2017-09-13 | Stop reason: HOSPADM

## 2017-09-09 RX ORDER — CLONIDINE HYDROCHLORIDE 0.1 MG/1
0.1 TABLET ORAL 2 TIMES DAILY
Status: DISCONTINUED | OUTPATIENT
Start: 2017-09-09 | End: 2017-09-13 | Stop reason: HOSPADM

## 2017-09-09 RX ORDER — ALBUMIN HUMAN 250 G/1000ML
12.5 SOLUTION INTRAVENOUS
Status: DISCONTINUED | OUTPATIENT
Start: 2017-09-09 | End: 2017-09-13 | Stop reason: HOSPADM

## 2017-09-09 RX ORDER — SODIUM CHLORIDE 9 MG/ML
100 INJECTION, SOLUTION INTRAVENOUS
Status: DISCONTINUED | OUTPATIENT
Start: 2017-09-09 | End: 2017-09-13 | Stop reason: HOSPADM

## 2017-09-09 RX ORDER — ACETAMINOPHEN 325 MG/1
650 TABLET ORAL ONCE
Status: COMPLETED | OUTPATIENT
Start: 2017-09-09 | End: 2017-09-09

## 2017-09-09 RX ADMIN — IRON SUCROSE 100 MG: 20 INJECTION, SOLUTION INTRAVENOUS at 23:27

## 2017-09-09 RX ADMIN — SODIUM CHLORIDE 40 ML/HR: 900 INJECTION, SOLUTION INTRAVENOUS at 09:04

## 2017-09-09 RX ADMIN — CLONIDINE HYDROCHLORIDE 0.1 MG: 0.1 TABLET ORAL at 18:25

## 2017-09-09 RX ADMIN — ACETAMINOPHEN 650 MG: 325 TABLET ORAL at 12:31

## 2017-09-09 RX ADMIN — OXYCODONE HYDROCHLORIDE AND ACETAMINOPHEN 1 TABLET: 5; 325 TABLET ORAL at 18:29

## 2017-09-09 RX ADMIN — HEPARIN SODIUM 5000 UNITS: 5000 INJECTION, SOLUTION INTRAVENOUS; SUBCUTANEOUS at 23:26

## 2017-09-09 RX ADMIN — NIFEDIPINE 30 MG: 30 TABLET, FILM COATED, EXTENDED RELEASE ORAL at 18:25

## 2017-09-09 NOTE — CONSULTS
Surgery Consult      Patient: Betty Mejia MRN: [id number]  CSN: [id number]      YOB: 1960    Age: 49 y.o. Sex: female      DOA: 9/8/2017       HPI:     Betty Mejia is a 49 y.o. female who presents with ARF now in ESRD. Currently hyperkalemic. Discussion with patient had with family at bedside. No previous dialysis she is very afraid of medical procedures and dialysis. She has been putting this off for a long time. Currently feels very cold but no fever. No previous catheters. Past Medical History:   Diagnosis Date    Chronic kidney disease     [facility]     Hypercholesteremia     Hypertension     Kidney disease     Vitamin D deficiency        History reviewed. No pertinent surgical history. History reviewed. No pertinent family history. Social History     Social History    Marital status:      Spouse name: N/A    Number of children: N/A    Years of education: N/A     Social History Main Topics    Smoking status: Never Smoker    Smokeless tobacco: Never Used    Alcohol use No    Drug use: No    Sexual activity: Not Asked     Other Topics Concern    None     Social History Narrative    None       Prior to Admission medications    Medication Sig Start Date End Date Taking? Authorizing Provider   pregabalin (LYRICA) 50 mg capsule Take  by mouth. Brenda Blackman MD   furosemide (LASIX) 40 mg tablet Take  by mouth daily. Yes Brenda Blackman MD   cloNIDine HCl (CATAPRES) 0.1 mg tablet Take 0.1 mg by mouth two (2) times a day. Yes Brenda Blackman MD   NIFEdipine ER (ADALAT CC) 30 mg ER tablet Take 20 mg by mouth daily. Yes Brenda Blackman MD   multivitamin with iron tablet Take 1 Tab by mouth daily. Yes Brenda Blackman MD   OTHER Take 600 mg by mouth two (2) times a day. Yes Brenda Blackman MD   CEFIXIME PO Take 200 mg by mouth two (2) times a day.    Yes Brenda Blackman MD       Allergies   Allergen Reactions    Banana Other (comments)       Physical Exam: Visit Vitals    BP (!) 185/99 (BP 1 Location: Left arm, BP Patient Position: At rest)    Pulse 81    Temp 98.1 °F (36.7 °C)    Resp 18    Ht 5' 2\" (1.575 m)    Wt 105 lb (47.6 kg)    SpO2 98%    Breastfeeding No    BMI 19.2 kg/m2       GENERAL: alert, cooperative, no distress, appears stated age, THROAT & NECK: normal and no erythema or exudates noted. , LUNG: clear to auscultation bilaterally, HEART: regular rate and rhythm, S1, S2 normal, no murmur, click, rub or gallop, ABDOMEN: soft, non-tender. Bowel sounds normal. No masses,  no organomegaly, EXTREMITIES:  extremities normal, atraumatic, no cyanosis or edema    ROS:  Pertinent items are noted in HPI. Unless otherwise mentioned in the HPI. Data Review:    CBC:   Lab Results   Component Value Date/Time    WBC 6.5 09/09/2017 03:04 AM    RBC 3.10 09/09/2017 03:04 AM    HGB 8.8 09/09/2017 03:04 AM    HCT 26.0 09/09/2017 03:04 AM    PLATELET 977 83/55/0534 03:04 AM      BMP:   Lab Results   Component Value Date/Time    Glucose 95 09/09/2017 03:04 AM    Sodium 137 09/09/2017 03:04 AM    Potassium 5.7 09/09/2017 03:04 AM    Chloride 107 09/09/2017 03:04 AM    CO2 21 09/09/2017 03:04 AM     09/09/2017 03:04 AM    Creatinine 13.10 09/09/2017 03:04 AM    Calcium 8.5 09/09/2017 03:04 AM    Calcium 8.5 09/09/2017 03:04 AM     Coagulation:   Lab Results   Component Value Date/Time    Prothrombin time 12.4 09/09/2017 03:04 AM    INR 1.0 09/09/2017 03:04 AM         Assessment/Plan     50 y/o female now with ESRD    --will place temp cath today given no ability to sedate on the floor  --will place permcath Tuesday. --discussed with family and they are agreeable. --Please call with any further questions or concerns.     Active Problems:    Hyperkalemia (9/8/2017)      Renal failure (9/8/2017)      [facility] (9/8/2017)        Jase Ingram MD  September 9, 2017

## 2017-09-09 NOTE — ED NOTES
Blood sugar: 69 per CHEM, provided patient with 8 ounces of grape juice, patient consumed entire 8 ounces.

## 2017-09-09 NOTE — DIALYSIS
ACUTE HEMODIALYSIS FLOW SHEET    HEMODIALYSIS ORDERS: Physician: Liu Zamarripa. Dialyzer:  Revaclear        Duration: 2 hr  BFR: 250   DFR: 500   Dialysate:  Temp 36-37 K+   2    Ca+  2.5 Na 140 Bicarb 30   Weight:  47.6 kg    Bed Scale []     Unable to Obtain []      Dry weight/UF Goal: 0ml Access CVL  Needle Gauge NA    Heparin []  Bolus      Units    [] Hourly       Units    [x]None     Catheter locking solution Heparin   Pre BP:   209/121    Pulse:     85     Temperature:   98.0  Respirations: 16  Tx: NS       ml/Bolus  Other        [x] N/A   Labs: Pre        Post:        [x] N/A   Additional Orders(medications, blood products, hypotension management):       [x] N/A     [x] Time Out/Safety Check  [x] [facility] Consent Verified     CATHETER ACCESS: []N/A   [x]Right   []Left   []IJ     [x]Fem   [] First use X-ray verified     [x]Tunnel                [] Non Tunneled   [x]No S/S infection  []Redness  []Drainage []Cultured []Swelling []Pain   [x]Medical Aseptic Prep Utilized   []Dressing Changed  [] Biopatch  Date: 09/09/2017      []Clotted   [x]Patent   Flows: [x]Good  []Poor  []Reversed   If access problem,  notified: []Yes    []N/A  Date:           GRAFT/FISTULA ACCESS:  [x]N/A     []Right     []Left     []UE     []LE   []AVG   []AVF        []Buttonhole    []Medical Aseptic Prep Utilized   []No S/S infection  []Redness  []Drainage []Cultured []Swelling []Pain    Bruit:   [] Strong    [] Weak       Thrill :   [] Strong    [] Weak       Needle Gauge:    Length:     If access problem,  notified: []Yes     []N/A  Date:        Please describe access if present and not used:       GENERAL ASSESSMENT:    LUNGS:  Rate 16 SaO2%        [] N/A    [x] Clear  [] Coarse  [] Crackles  [] Wheezing        [] Diminished     Location : []RLL   []LLL    []RUL  []ELÍAS   Cough: []Productive  []Dry  [x]N/A   Respirations:  [x]Easy  []Labored   Therapy:  [x]RA  []NC  l/min    Mask: []NRB []Venti       O2% []Ventilator  []Intubated  [] Trach  [] BiPaP   CARDIAC: [x]Regular      [] Irregular   [] Pericardial Rub  [] JVD        []  Monitored  [] Bedside  [] Remotely monitored [] N/A  Rhythm:    EDEMA: [] None  [x]Generalized  [] Pitting [] 1    [] 2    [] 3    [] 4                 [] Facial  [] Pedal  []  UE  [] LE   SKIN:   [x] Warm  [] Hot     [] Cold   [x] Dry     [] Pale   [] Diaphoretic                  [] Flushed  [] Jaundiced  [] Cyanotic  [] Rash  [] Weeping   LOC:    [x] Alert      [x]Oriented:    [x] Person     [x] Place  [x]Time               [] Confused  [] Lethargic  [] Medicated  [] Non-responsive     GI / ABDOMEN:  [] Flat    [] Distended    [x] Soft    [] Firm   []  Obese                             [] Diarrhea  [x] Bowel Sounds  [] Nausea  [] Vomiting       / URINE ASSESSMENT:[x] Voiding   [] Oliguria  [] Anuria   []  Onofre     [] Incontinent    []  Incontinent Brief      []  Bathroom Privileges     PAIN: [x] 0 []1  []2   []3   []4   []5   []6   []7   []8   []9   []10            Scale 0-10  Action/Follow Up:    MOBILITY:  [] Amb    [] Amb/Assist    [x] Bed    [] Wheelchair  [] Stretcher      All Vitals and Treatment Details on Attached [address]: [facility]          Room # 451     [] 1st Time Acute  [] Stat  [x] Routine  [] Urgent     [x] Acute Room  []  Bedside  [] ICU/CCU  [] ER   Isolation Precautions:  [x] Dialysis   [] Airborne   [] Contact    [] Reverse   Special Considerations:         [] Blood Consent Verified [x]N/A     ALLERGIES:   [] NKA     Banana     Code Status:  [x] Full Code  [] DNR  [] Other           HBsAg ONLY: Date Drawn 09/09/2017         [x]Negative []Positive []Unknown   HBsAb: Date     [] Susceptible   [] [id number] []Not Drawn  [x] Drawn     Current Labs:    Date of Labs: Today [x]        Cut and paste current labs here. Results for Mayra Cantu (MRN [id number]) as of 9/9/2017 17:27   Ref.  Range 9/9/2017 03:04   WBC Latest Ref Range: 4.6 - 13.2 K/uL 6.5   RBC Latest Ref Range: 4.20 - 5.30 M/uL 3.10 (L)   HGB Latest Ref Range: 12.0 - 16.0 g/dL 8.8 (L)   HCT Latest Ref Range: 35.0 - 45.0 % 26.0 (L)   MCV Latest Ref Range: 74.0 - 97.0 FL 83.9   MCH Latest Ref Range: 24.0 - 34.0 PG 28.4   MCHC Latest Ref Range: 31.0 - 37.0 g/dL 33.8   RDW Latest Ref Range: 11.6 - 14.5 % 13.6   PLATELET Latest Ref Range: 135 - 420 K/uL 173   MPV Latest Ref Range: 9.2 - 11.8 FL 9.5   Results for Omega Sanders (MRN [id number]) as of 9/9/2017 17:27   Ref.  Range 9/9/2017 03:04   Sodium Latest Ref Range: 136 - 145 mmol/L 137   Potassium Latest Ref Range: 3.5 - 5.5 mmol/L 5.7 (H)   Chloride Latest Ref Range: 100 - 108 mmol/L 107   CO2 Latest Ref Range: 21 - 32 mmol/L 21   Anion gap Latest Ref Range: 3.0 - 18 mmol/L 9   Glucose Latest Ref Range: 74 - 99 mg/dL 95   BUN Latest Ref Range: 7.0 - 18 MG/ (H)   Creatinine Latest Ref Range: 0.6 - 1.3 MG/DL 13.10 (H)   BUN/Creatinine ratio Latest Ref Range: 12 - 20   9 (L)   Calcium Latest Ref Range: 8.5 - 10.1 MG/DL 8.5   Phosphorus Latest Ref Range: 2.5 - 4.9 MG/DL 6.8 (H)                                                                                                                                   DIET:  [x] Renal    [] Other     [] NPO     []  Diabetic      PRIMARY NURSE REPORT: First initial/Last name/Title      Pre Dialysis: Elder Shelby RN    Time: 80      EDUCATION:    [x] Patient [x] Other     Son    Knowledge Basis: [x]None []Minimal [] Substantial   Barriers to learning Does not speak english []N/A   [] Access Care     [] S&S of infection     [] Fluid Management     []K+     [x]Procedural    []Albumin     [] Medications     [] Tx Options     [] Transplant     [] Diet     [] Other   Teaching Tools:  [x] Explain  [] Demo  [] Handouts [] Video  Patient response:  [x] Verbalized understanding  [] Teach back  [] Return demonstration [x] Requires follow up   Inappropriate due to            [address] Before each treatment:     Machine Number:                   [facility]                                  [x] Unit Machine # 5 with centralized RO                                  [] Portable Machine #1/RO serial # [id number]                                  [] Portable Machine #2/RO serial # [id number]                                  [] Portable Machine #3/RO serial # [id number]                                                                                                       [address]                                  [] Portable Machine #11/RO serial # [id number]                                   [] Portable Machine #12/RO serial # [id number]                                  [] Portable Machine #13/RO serial #  [id number]      Alarm Test:  Pass time 0198         Other:         [x] RO/Machine Log Complete      Temp    36.4            [x]Extracorporeal Circuit Tested for integrity   Dialysate: pH  7.4 Conductivity: Meter   14.0     HD Machine   14.2                  TCD: 13.9  Dialyzer Lot # [id number]            Blood Tubing Lot # [id number]          Saline Lot #  [id number]     CHLORINE TESTING-Before each treatment and every 4 hours    Total Chlorine: [x] less than 0.1 ppm  Time: 1400 4 Hr/2nd Check Time: NA   (if greater than 0.1 ppm from Primary then every 30 minutes from Secondary)     TREATMENT INITIATION  with Dialysis Precautions:   [x] All Connections Secured                 [x] Saline Line Double Clamped   [x] Venous Parameters Set                  [x] Arterial Parameters Set    [x] Prime Given  250 ml                         [x]Air Foam Detector Engaged      Treatment Initiation Note: Pt arrived to unit via transport, accompanied by son as pt does not speak english. Blue telephone present with pt. Verbal consent obtained for dialysis. PT has right femoral TDC patent with good flow, tx initiated without complication.      Medication Dose Volume Route Initials Dialyzer Cleared: [x] Good [] Fair  [] Poor    Blood processed:  26.9 L  UF Removed  0 Ml    Post Wt: 47.6    kg  POst BP:   201/102       Pulse: 90      Respirations: 16  Temperature: 97.9                                   Post Tx Vascular Access: AVF/AVG: Bleeding stopped Art  min. Wei. Min   N/A                                   Catheter: Locking solution: Heparin 1:1000 Art. 1.7ml  Wei.  1.7ml                                   Post Assessment:                                    Skin:  [x] Warm  [x] Dry [] Diaphoretic    [] Flushed  [] Pale [] Cyanotic   [facility] Signatures Title Initials  Time Lungs: [x] Clear    [] Course  [] Crackles  [] Wheezing [] Diminished   Linnell Shoulder RN AP  Cardiac: [x] Regular   [] Irregular   [] Monitor  [] N/A  Rhythm:           Edema:  [] None    [x] General     [] Facial   [] Pedal    [] UE    [] LE       Pain: [x]0  []1  []2   []3  []4   []5   []6   []7   []8   []9   []10         Post Treatment Note: HD tx complete, patient tolerated procedure well, 0ml net UF removed     POST TREATMENT PRIMARY NURSE HANDOFF REPORT:     First initial/Last name/Title         Post Dialysis: Luca Buitrago RN Time:  80     Abbreviations: AVG-arterial venous graft, AVF-arterial venous fistula, IJ-Internal Jugular, Subcl-Subclavian, Fem-Femoral, Tx-treatment, AP/HR-apical heart rate, DFR-dialysate flow rate, BFR-blood flow rate, AP-arterial pressure, -venous pressure, UF-ultrafiltrate, TMP-transmembrane pressure, Wei-Venous, Art-Arterial, RO-Reverse Osmosis

## 2017-09-09 NOTE — ROUTINE PROCESS
Bedside and Verbal shift change report given to [id number] [place] [address] [address], RN (oncoming nurse) by Sinai Pena RN (offgoing nurse). Report included the following information SBAR, Kardex, MAR and Recent Results.     SITUATION:    Code Status: Full Code   Reason for Admission: Hyperkalemia   Hyperkalemia   Renal failure   [facility] ([facility]    [address] [facility] day: 1   Problem List:       Hospital Problems  Never Reviewed          Codes Class Noted POA    Hyperkalemia ICD-10-CM: E87.5  ICD-9-CM: 276.7  9/8/2017 Unknown        Renal failure ICD-10-CM: N19  ICD-9-CM: 674  9/8/2017 Unknown        SCOOTER (acute kidney injury) ([facility] ICD-10-CM: N17.9  ICD-9-CM: 584.9  9/8/2017 Unknown              BACKGROUND:    Past Medical History:   Past Medical History:   Diagnosis Date    Chronic kidney disease     [facility]     Hypercholesteremia     Hypertension     Kidney disease     Vitamin D deficiency          Patient taking anticoagulants yes     ASSESSMENT:    Changes in Assessment Throughout Shift: no     Patient has Central Line: yes Reasons if yes: dialysis TDC rt groin   Patient has Onofre Cath: no Reasons if yes: n/a      Last Vitals:     Vitals:    09/09/17 1630 09/09/17 1700 09/09/17 1705 09/09/17 1818   BP: (!) 192/118 (!) 197/119 (!) 201/102 (!) 180/107   Pulse: 73 81 90 84   Resp: 16 16 16    Temp:   97.9 °F (36.6 °C)    SpO2:       Weight:       Height:            IV and DRAINS (will only show if present)   [REMOVED] Peripheral IV 09/08/17 Right Wrist-Site Assessment: Bleeding, Painful  Peripheral IV 09/09/17 Right Arm-Site Assessment: Clean, dry, & intact     WOUND (if present)   Wound Type:  none   Dressing present     Wound Concerns/Notes:  none     PAIN    Pain Assessment    Pain Intensity 1: 0 (09/09/17 1911)    Pain Location 1: Groin    Pain Intervention(s) 1: Medication (see MAR)    Patient Stated Pain Goal: 0  o Interventions for Pain:  percocet  o Intervention effective: yes  o Time of last intervention: 1829   o Reassessment Completed: yes      Last 3 Weights:  Last 3 Recorded Weights in this Encounter    09/08/17 1603   Weight: 47.6 kg (105 lb)     Weight change:      INTAKE/OUPUT    Current Shift:      Last three shifts: 09/08 0701 - 09/09 1900  In: -   Out: 425 [Urine:425]     LAB RESULTS     Recent Labs      09/09/17   0304  09/08/17   1630   WBC  6.5  5.5   HGB  8.8*  9.6*   HCT  26.0*  28.9*   PLT  173  196        Recent Labs      09/09/17   0304  09/08/17   1940  09/08/17   1630   NA  137  140  135*   K  5.7*  5.5  6.5*   GLU  95  69*  193*   BUN  123*  113*  117*   CREA  13.10*  13.60*  13.83*   CA  8.5  8.5  9.6  9.0   MG   --    --   2.9*   INR  1.0   --    --        RECOMMENDATIONS AND DISCHARGE PLANNING     1. Pending tests/procedures/ Plan of Care or Other Needs: Discharge     2. Discharge plan for patient and Needs/Barriers: Home    3. Estimated Discharge Date: TBD Posted on Whiteboard in Patients Room: yes      4. The patient's care plan was reviewed with the oncoming nurse. \"HEALS\" SAFETY CHECK      Fall Risk    Total Score: 2    Safety Measures: Safety Measures: Bed/Chair alarm on, Bed/Chair-Wheels locked, Bed in low position, Call light within reach, Fall prevention (comment)    A safety check occurred in the patient's room between off going nurse and oncoming nurse listed above.     The safety check included the below items  Area Items   H  High Alert Medications - Verify all high alert medication drips (heparin, PCA, etc.)   E  Equipment - Suction is set up for ALL patients (with yanker)  - Red plugs utilized for all equipment (IV pumps, etc.)  - WOWs wiped down at end of shift.  - Room stocked with oxygen, suction, and other unit-specific supplies   A  Alarms - Bed alarm is set for fall risk patients  - Ensure chair alarm is in place and activated if patient is up in a chair   L  Lines - Check IV for any infiltration  - Onofre bag is empty if patient has a Onofre   - Tubing and IV bags are labeled   S  Safety   - Room is clean, patient is clean, and equipment is clean. - Hallways are clear from equipment besides carts. - Fall bracelet on for fall risk patients  - Ensure room is clear and free of clutter  - Suction is set up for ALL patients (with yanker)  - Hallways are clear from equipment besides carts.    - Isolation precautions followed, supplies available outside room, sign posted     Sinai Pena RN

## 2017-09-09 NOTE — CONSULTS
Consult Note  Consult requested by: Dr. Nahed Perla is a 49 y.o. female [place] who is being seen on consult for Hyperkalemia and ESRD  Chief Complaint   Patient presents with    Dizziness    Numbness     Admission diagnosis: <principal problem not specified>     HPI: 48 yo [place] female admitted for weakness. Son in law [place]) and pt's daughter in the room. Che acting as . Reports she has been sick for almost 35 years. Has HTN for a long time and was not taking medicines until a few years ago. She has been seen by nephrologist in [facility] and labs done in Feb 2017 already showed a crea of 8. She has no known hx of DM CVA or MI. She does have HTN which appears poorly controlled. She still makes some urine. She came back from [facility] a few weeks ago and has been c/o of weakness, poor appetite and numbness in her feet. No N/V/D/Fever or chills. Denies any CP or SOB. She apparently already spoke to her nephrologist in [facility] about dialysis and she is scared but she has agreed to do it. She was told to dec intake of fruits ( probably bec of K load)  No hx of SZ, has anemia but no BT. No major surgery, She does not smoke nor drink alcohol. Med list Paradol 500 mg BID  Cefixime 200 BID  ADALAT 20 MG od  Lasix 40 mg od  maltofer (iron) Ketosteril 600 mg BID   Febuxostat  Clonidine  Procoralan ( ivabradine)    Past Medical History:   Diagnosis Date    Chronic kidney disease     [facility]) [facility] [address]     Hypercholesteremia     Hypertension     Kidney disease     Vitamin D deficiency       History reviewed. No pertinent surgical history. Social History     Social History    Marital status:      Spouse name: N/A    Number of children: N/A    Years of education: N/A     Occupational History    Not on file.      Social History Main Topics    Smoking status: Never Smoker    Smokeless tobacco: Never Used    Alcohol use No    Drug use: No    Sexual activity: Not on file     Other Topics Concern    Not on file     Social History Narrative    No narrative on file       History reviewed. No pertinent family history. Allergies   Allergen Reactions    Banana Other (comments)        Home Medications:     Prior to Admission Medications   Prescriptions Last Dose Informant Patient Reported? Taking? CEFIXIME PO 9/6/2017  Yes Yes   Sig: Take 200 mg by mouth two (2) times a day. NIFEdipine ER (ADALAT CC) 30 mg ER tablet 9/6/2017  Yes Yes   Sig: Take 20 mg by mouth daily. OTHER 9/6/2017  Yes Yes   Sig: Take 600 mg by mouth two (2) times a day. cloNIDine HCl (CATAPRES) 0.1 mg tablet 9/6/2017  Yes Yes   Sig: Take 0.1 mg by mouth two (2) times a day. furosemide (LASIX) 40 mg tablet 9/6/2017  Yes Yes   Sig: Take  by mouth daily. multivitamin with iron tablet 9/6/2017  Yes Yes   Sig: Take 1 Tab by mouth daily. pregabalin (LYRICA) 50 mg capsule 9/6/2017  Yes No   Sig: Take  by mouth. Facility-Administered Medications: None       Current Facility-Administered Medications   Medication Dose Route Frequency    cloNIDine HCl (CATAPRES) tablet 0.1 mg  0.1 mg Oral BID    NIFEdipine ER (PROCARDIA XL) tablet 30 mg  30 mg Oral DAILY    0.9% sodium chloride infusion  100 mL/hr IntraVENous CONTINUOUS    heparin (porcine) injection 5,000 Units  5,000 Units SubCUTAneous Q8H       Review of Systems:   Pertinent items are noted in HPI.   Data Review:    Labs: Results:       Chemistry Recent Labs      09/09/17   0304 09/08/17   1940  09/08/17   1630   GLU  95  69*  193*   NA  137  140  135*   K  5.7*  5.5  6.5*   CL  107  106  103   CO2  21  25  19*   BUN  123*  113*  117*   CREA  13.10*  13.60*  13.83*   CA  8.5  9.6  9.0   AGAP  9  9  13   BUCR  9*  8*  8*   AP  70   --    --    TP  5.7*   --    --    ALB  2.6*   --    --    GLOB  3.1   --    --    AGRAT  0.8   --    --    phos 6.8   CBC w/Diff Recent Labs      09/09/17   0304  09/08/17   1630   WBC  6.5  5.5   RBC 3.10*  3.41*   HGB  8.8*  9.6*   HCT  26.0*  28.9*   PLT  173  196   GRANS   --   71   LYMPH   --   21   EOS   --   0      Coagulation Recent Labs      09/09/17   0304   PTP  12.4   INR  1.0       Iron/Ferritin Recent Labs      09/09/17 0304   IRON  33*      BNP No results for input(s): BNPP in the last 72 hours. Cardiac Enzymes No results for input(s): CPK, CKND1, WOLFGANG in the last 72 hours. No lab exists for component: CKRMB, TROIP   Liver Enzymes Recent Labs      09/09/17   0304   TP  5.7*   ALB  2.6*   AP  70   SGOT  38*      Thyroid Studies No results found for: T4, T3U, TSH, TSHEXT        IMAGES: CT scan abd pelvis small kidneys right 6.4 cm left 7.7 cm, cyst 2.1 cm on the left, no hydro or mass, UB unremarkable  CXR neg for any acute process  U/A + protein and glucose micro benign  Hep C Ab pend  A1C 5 Cl 204, , , HDL 51  Fe 33 sat 16  UA 7.4  Physical Assessment:     Visit Vitals    BP (!) 176/102 (BP 1 Location: Right arm, BP Patient Position: At rest)    Pulse 90    Temp 97.1 °F (36.2 °C)    Resp 20    Ht 5' 2\" (1.575 m)    Wt 47.6 kg (105 lb)    SpO2 98%    Breastfeeding No    BMI 19.2 kg/m2     Last 3 Recorded Weights in this Encounter    09/08/17 1603   Weight: 47.6 kg (105 lb)     No intake or output data in the 24 hours ending 09/09/17 0838    Physial Exam:  General appearance: alert, cooperative, no distress, appears stated age  Skin: normal coloration and turgor, no rashes, no suspicious skin lesions noted. HEENT: non icteric moist mucosa  Neck: No JVD  Lungs: clear to auscultation bilaterally  Heart: regular rate and rhythm, S1, S2 normal, no murmur, click, rub or gallop  Abdomen: soft, non-tender. Bowel sounds normal. No masses,  no organomegaly  Extremities: extremities normal, atraumatic, no cyanosis or edema    IMPRESSION AND PLAN:   ESRD most likely from HTN sive nephrosclerosis. Discussed at length with pt and family ( again [facility] acting as ).  Explained need to start dialysis to clean waste in her blood especially the potassium to avoid risk of cardiac complications. Risk of dialysis including but not limited to hypotension, cardiac arrhythmia and cardiac arrest were discussed. They understand and wants to proceed with dialysis. I have notified Dr. Allen Sanchez for HD cath placement and will proceed with HD today and tomorrow. Will arrange for outpt HD clinic next week. Hyperkalemia from ESRD, should come down with HD. Cont with low K diet  HTN cont with current meds adjust as needed. Dec IVF . ECHO  Anemia, most likely from CKD, low Fe stores  Hyperphosphatemia  Most likely from 2nd HPTH, start phos binder check IPTH  Hyperuricemia from ESRD will monitor on HD and resume XOI if needed. Hyperlipidemia diet and statins if she was already on it  Hypoalbuminemia from poor po intake and proteinuria, check baseline UPCR r/o NS     Thank you very much for your consult. Will follow with you.     Arturo Fischer MD  September 9, 2017

## 2017-09-09 NOTE — ROUTINE PROCESS
Bedside and Verbal shift change report given to Denia Beal RN (oncoming nurse) by Suyapa Vasquez (offgoing nurse). Report included the following information SBAR, Kardex, MAR and Recent Results.     SITUATION:    Code Status: Full Code   Reason for Admission: Hyperkalemia   Hyperkalemia   Renal failure   SCOOTER (acute kidney injury) ([facility] [address]    [address] [facility] day: 1   Problem List:       Hospital Problems  Never Reviewed          Codes Class Noted POA    Hyperkalemia ICD-10-CM: E87.5  ICD-9-CM: 276.7  9/8/2017 Unknown        Renal failure ICD-10-CM: N19  ICD-9-CM: 121  9/8/2017 Unknown        SCOOTER (acute kidney injury) ([facility] [address] ICD-10-CM: N17.9  ICD-9-CM: 584.9  9/8/2017 Unknown              BACKGROUND:    Past Medical History:   Past Medical History:   Diagnosis Date    Chronic kidney disease     ESRD (end stage renal disease) ([facility] [address]     Hypercholesteremia     Hypertension     Kidney disease     Vitamin D deficiency          Patient taking anticoagulants yes     ASSESSMENT:    Changes in Assessment Throughout Shift: no     Patient has Central Line: no Reasons if yes: no   Patient has Onofre Cath: no Reasons if yes: no      Last Vitals:     Vitals:    09/08/17 1930 09/08/17 2119 09/09/17 0000 09/09/17 0352   BP:  (!) 174/94 (!) 177/100 (!) 176/102   Pulse: 98 (!) 105 97 90   Resp: 20 20 21 20   Temp:  97.4 °F (36.3 °C) 96.9 °F (36.1 °C) 97.1 °F (36.2 °C)   SpO2: 97% 99% 95% 98%   Weight:       Height:            IV and DRAINS (will only show if present)   Peripheral IV 09/08/17 Right Wrist-Site Assessment: Clean, dry, & intact     WOUND (if present)   Wound Type:  none   Dressing present     Wound Concerns/Notes:  none     PAIN    Pain Assessment    Pain Intensity 1: 0 (09/09/17 0721)              Patient Stated Pain Goal: 0  o Interventions for Pain:  none  o Intervention effective: no  o Time of last intervention:    o Reassessment Completed: yes      Last 3 Weights:  Last 3 Recorded Weights in this Encounter 09/08/17 1603   Weight: 47.6 kg (105 lb)     Weight change:      INTAKE/OUPUT    Current Shift:      Last three shifts:       LAB RESULTS     Recent Labs      09/09/17   0304  09/08/17   1630   WBC  6.5  5.5   HGB  8.8*  9.6*   HCT  26.0*  28.9*   PLT  173  196        Recent Labs      09/09/17   0304  09/08/17   1940  09/08/17   1630   NA  137  140  135*   K  5.7*  5.5  6.5*   GLU  95  69*  193*   BUN  123*  113*  117*   CREA  13.10*  13.60*  13.83*   CA  8.5  9.6  9.0   MG   --    --   2.9*   INR  1.0   --    --        RECOMMENDATIONS AND DISCHARGE PLANNING     1. Pending tests/procedures/ Plan of Care or Other Needs: Dialysis Catheter insertion and dialysis today     2. Discharge plan for patient and Needs/Barriers: TBD    3. Estimated Discharge Date: TBD Posted on Whiteboard in Patients Room:       4. The patient's care plan was reviewed with the oncoming nurse. \"HEALS\" SAFETY CHECK      Fall Risk    Total Score: 2    Safety Measures: Safety Measures: Bed/Chair alarm on, Bed/Chair-Wheels locked, Bed in low position, Call light within reach, Fall prevention (comment)    A safety check occurred in the patient's room between off going nurse and oncoming nurse listed above. The safety check included the below items  Area Items   H  High Alert Medications - Verify all high alert medication drips (heparin, PCA, etc.)   E  Equipment - Suction is set up for ALL patients (with yanker)  - Red plugs utilized for all equipment (IV pumps, etc.)  - WOWs wiped down at end of shift.  - Room stocked with oxygen, suction, and other unit-specific supplies   A  Alarms - Bed alarm is set for fall risk patients  - Ensure chair alarm is in place and activated if patient is up in a chair   L  Lines - Check IV for any infiltration  - Onofre bag is empty if patient has a Onofre   - Tubing and IV bags are labeled   S  Safety   - Room is clean, patient is clean, and equipment is clean.   - Hallways are clear from equipment besides carts. - Fall bracelet on for fall risk patients  - Ensure room is clear and free of clutter  - Suction is set up for ALL patients (with jenni)  - Hallways are clear from equipment besides carts.    - Isolation precautions followed, supplies available outside room, sign posted     Tamara Vasquez

## 2017-09-09 NOTE — H&P
Hospitalist Admission Note    NAME: Bruno Morin   :  1960   MRN:  [id number]     Date/Time of admission:  2017 9:54 PM    Patient PCP: PROVIDER UNKNOWN  ________________________________________________________________________    My assessment of this patient's clinical condition and my plan of care is as follows. Assessment / Plan:  1. SCOOTER on CKD IV/V  2. Paresthesias likely d/t uremia  3. Hyperkalemia, partially treated  4. Anemia of chronic/renal disease  5. Benign essential htn  6. Mild proteinuria  7. hyperlipidemia    1. Admit to med/surg with need for urgent HD  2. Nephrology aware and appreciated; for access and HD in am  3. Will monitor for need to reassess potassium with am labs  4. Hydrate for now  5. CT abd/pelvis shows small kidneys without obstruction or other significant abnormalities - this appears more chronic than acute  6. For completion purposes, will check renal lytes, cmp, chronic hep labs, uric acid, and Hgb a1c. Doubt biopsy needed at this point, but will defer to nephrology  7. Hold nephrotoxic medications (including lasix) for now; avoid nsaids  8. Hold Ketosteril for now. Monitor calcium (currently normal, but can be elevated with Ketosteril). 9. Will need more interpretation from son when available. Code Status: full  Surrogate Decision Maker: patient through son, due to language barrier    DVT Prophylaxis: sc hep  GI Prophylaxis: not indicated          Subjective:   CHIEF COMPLAINT: fatigue and paresthesias    HISTORY OF PRESENT ILLNESS:     Bruno Morin is a 49 y.o. [place] female who presents with progressive fatigue and paresthesias in hands and feet worsened with exertion. Pt is a native of [facility] [facility] [place] and has moved to [address] [place] [address] over the last year. She was recently back in [place] [facility] [facility] [place] [facility] [place] visiting and had to be seen by a physician there who informed her that her renal function was considerably bad. She returned to the [address] [place] [address],[facility] Floor and has had progressively worsening symptoms.  She presented to [facility] ED on today d/t overall weakness and was found to have a creatinine of 13.8 and K of 6.5. She had no significant ekg abnormalities and her potassium was treated in the ED. Nephrology was called and agreed to see pt once admitted for need of HD. Pt's potassium improved from 6.5 to 5.5 with typical treatment and a CT abd/pelvis was performed which showed significantly small kidneys, but no obstruction or additional renal insults. Per med list, pt has been taking Ketosteril, which is suggested to be used to avoid dialysis. We were asked to admit for work up and evaluation of the above problems. Past Medical History:   Diagnosis Date    Chronic kidney disease     ESRD (end stage renal disease) ([facility] [address]     Hypercholesteremia     Hypertension     Kidney disease     Vitamin D deficiency         History reviewed. No pertinent surgical history. Social History   Substance Use Topics    Smoking status: Never Smoker    Smokeless tobacco: Never Used    Alcohol use No        History reviewed. No pertinent family history. Allergies   Allergen Reactions    Banana Other (comments)        Prior to Admission medications    Medication Sig Start Date End Date Taking? Authorizing Provider   furosemide (LASIX) 40 mg tablet Take  by mouth daily. Yes Brenda Blackman MD   cloNIDine HCl (CATAPRES) 0.1 mg tablet Take 0.1 mg by mouth two (2) times a day. Yes Brenda Blackman MD   NIFEdipine ER (ADALAT CC) 30 mg ER tablet Take 20 mg by mouth daily. Yes Brenda Blackman MD   multivitamin with iron tablet Take 1 Tab by mouth daily. Yes Brenda Blackman MD   OTHER Take 600 mg by mouth two (2) times a day. Yes Brenda Blackman MD   CEFIXIME PO Take 200 mg by mouth two (2) times a day. Yes Brenda Blackman MD   pregabalin (LYRICA) 50 mg capsule Take  by mouth. Brenda Blackman MD       REVIEW OF SYSTEMS:     I am not able to complete the review of systems because:    The patient is intubated and sedated    The patient has altered mental status due to his acute medical problems    The patient has baseline aphasia from prior stroke(s)    The patient has baseline dementia and is not reliable historian    The patient is in acute medical distress and unable to provide information           Total of 12 systems reviewed as follows:       POSITIVE= bolded text  Negative = text not underlined  General:  fever, chills, sweats, generalized weakness, weight loss/gain,      loss of appetite   Eyes:    blurred vision, eye pain, loss of vision, double vision  ENT:    rhinorrhea, pharyngitis   Respiratory:   cough, sputum production, SOB, MANZANO, wheezing, pleuritic pain   Cardiology:   chest pain, palpitations, orthopnea, PND, edema, syncope   Gastrointestinal:  abdominal pain , N/V, diarrhea, dysphagia, constipation, bleeding   Genitourinary:  frequency, urgency, dysuria, hematuria, incontinence   Muskuloskeletal :  arthralgia, myalgia, back pain  Hematology:  easy bruising, nose or gum bleeding, lymphadenopathy   Dermatological: rash, ulceration, pruritis, color change / jaundice  Endocrine:   hot flashes or polydipsia   Neurological:  headache, dizziness, confusion, focal weakness, paresthesia,     Speech difficulties, memory loss, gait difficulty  Psychological: Feelings of anxiety, depression, agitation    Objective:   VITALS:    Visit Vitals    BP (!) 174/94 (BP 1 Location: Right arm, BP Patient Position: At rest)    Pulse (!) 105    Temp 97.4 °F (36.3 °C)    Resp 20    Ht 5' 2\" (1.575 m)    Wt 47.6 kg (105 lb)    SpO2 99%    BMI 19.2 kg/m2       PHYSICAL EXAM:    General:    Alert, cooperative, no distress, appears stated age. HEENT: Atraumatic, anicteric sclerae, pink conjunctivae     No oral ulcers, mucosa moist, throat clear, dentition fair  Neck:  Supple, symmetrical,  thyroid: non tender  Lungs:   Clear to auscultation bilaterally. No Wheezing or Rhonchi. No rales. Chest wall:  No tenderness  No Accessory muscle use.   Heart:   Sinus tachycardia, No  murmur   No edema  Abdomen:   Soft, non-tender. Not distended. Bowel sounds normal  Extremities: No cyanosis. No clubbing,      Skin turgor normal, Capillary refill normal, Radial dial pulse 2+  Skin:     Not pale. Not Jaundiced  No rashes   Psych:  Good insight. Not depressed. Not anxious or agitated. Neurologic: EOMs intact. No facial asymmetry. No aphasia or slurred speech. Symmetrical strength, Sensation grossly intact. Alert and oriented X 4.     _______________________________________________________________________  Care Plan discussed with:    Comments   Patient x    Family  x    RN     Care Manager                    Consultant:      _______________________________________________________________________  Expected  Disposition:   Home with Family    HH/PT/OT/RN x   SNF/LTC    TRACEY    ________________________________________________________________________  TOTAL TIME:  36 Minutes    Critical Care Provided     Minutes non procedure based      Comments    x Reviewed previous records   >50% of visit spent in counseling and coordination of care x Discussion with patient and/or family and questions answered       ________________________________________________________________________      Procedures: see electronic medical records for all procedures/Xrays and details which were not copied into this note but were reviewed prior to creation of Plan.     LAB DATA REVIEWED:    Recent Results (from the past 24 hour(s))   EKG, 12 LEAD, INITIAL    Collection Time: 09/08/17  4:01 PM   Result Value Ref Range    Ventricular Rate 91 BPM    Atrial Rate 91 BPM    P-R Interval 168 ms    QRS Duration 88 ms    Q-T Interval 352 ms    QTC Calculation (Bezet) 432 ms    Calculated P Axis 56 degrees    Calculated R Axis 77 degrees    Calculated T Axis 46 degrees    Diagnosis       Normal sinus rhythm  Normal ECG  No previous ECGs available     GLUCOSE, POC    Collection Time: 09/08/17  4:05 PM   Result Value Ref Range Glucose (POC) 182 (H) 70 - 115 mg/dL   METABOLIC PANEL, BASIC    Collection Time: 09/08/17  4:30 PM   Result Value Ref Range    Sodium 135 (L) 136 - 145 mmol/L    Potassium 6.5 (HH) 3.5 - 5.5 mmol/L    Chloride 103 100 - 108 mmol/L    CO2 19 (L) 21 - 32 mmol/L    Anion gap 13 3.0 - 18 mmol/L    Glucose 193 (H) 74 - 99 mg/dL     (H) 7.0 - 18 MG/DL    Creatinine 13.83 (H) 0.6 - 1.3 MG/DL    BUN/Creatinine ratio 8 (L) 12 - 20      GFR est AA 3 (L) >60 ml/min/1.73m2    GFR est non-AA 3 (L) >60 ml/min/1.73m2    Calcium 9.0 8.5 - 10.1 MG/DL   CBC WITH AUTOMATED DIFF    Collection Time: 09/08/17  4:30 PM   Result Value Ref Range    WBC 5.5 4.6 - 13.2 K/uL    RBC 3.41 (L) 4.20 - 5.30 M/uL    HGB 9.6 (L) 12.0 - 16.0 g/dL    HCT 28.9 (L) 35.0 - 45.0 %    MCV 84.8 74.0 - 97.0 FL    MCH 28.2 24.0 - 34.0 PG    MCHC 33.2 31.0 - 37.0 g/dL    RDW 13.3 11.6 - 14.5 %    PLATELET 518 808 - 157 K/uL    MPV 9.1 (L) 9.2 - 11.8 FL    NEUTROPHILS 71 40 - 73 %    LYMPHOCYTES 21 21 - 52 %    MONOCYTES 8 3 - 10 %    EOSINOPHILS 0 0 - 5 %    BASOPHILS 0 0 - 2 %    ABS. NEUTROPHILS 3.9 1.8 - 8.0 K/UL    ABS. LYMPHOCYTES 1.2 0.9 - 3.6 K/UL    ABS. MONOCYTES 0.5 0.05 - 1.2 K/UL    ABS. EOSINOPHILS 0.0 0.0 - 0.4 K/UL    ABS.  BASOPHILS 0.0 0.0 - 0.06 K/UL    DF AUTOMATED     MAGNESIUM    Collection Time: 09/08/17  4:30 PM   Result Value Ref Range    Magnesium 2.9 (H) 1.6 - 2.6 mg/dL   GLUCOSE, POC    Collection Time: 09/08/17  6:08 PM   Result Value Ref Range    Glucose (POC) 56 (L) 70 - 110 mg/dL   URINALYSIS W/ RFLX MICROSCOPIC    Collection Time: 09/08/17  6:20 PM   Result Value Ref Range    Color YELLOW      Appearance CLEAR      Specific gravity 1.009 1.005 - 1.030      pH (UA) 7.5 5.0 - 8.0      Protein 300 (A) NEG mg/dL    Glucose 100 (A) NEG mg/dL    Ketone NEGATIVE  NEG mg/dL    Bilirubin NEGATIVE  NEG      Blood TRACE (A) NEG      Urobilinogen 0.2 0.2 - 1.0 EU/dL    Nitrites NEGATIVE  NEG      Leukocyte Esterase NEGATIVE  NEG URINE MICROSCOPIC ONLY    Collection Time: 09/08/17  6:20 PM   Result Value Ref Range    WBC 0 to 3 0 - 4 /hpf    RBC 0 to 3 0 - 5 /hpf    Epithelial cells FEW 0 - 5 /lpf    Bacteria NEGATIVE  NEG /hpf   GLUCOSE, POC    Collection Time: 09/08/17  6:50 PM   Result Value Ref Range    Glucose (POC) 133 (H) 70 - 150 mg/dL   METABOLIC PANEL, BASIC    Collection Time: 09/08/17  7:40 PM   Result Value Ref Range    Sodium 140 136 - 145 mmol/L    Potassium 5.5 3.5 - 5.5 mmol/L    Chloride 106 100 - 108 mmol/L    CO2 25 21 - 32 mmol/L    Anion gap 9 3.0 - 18 mmol/L    Glucose 69 (L) 74 - 99 mg/dL     (H) 7.0 - 18 MG/DL    Creatinine 13.60 (H) 0.6 - 1.3 MG/DL    BUN/Creatinine ratio 8 (L) 12 - 20      GFR est AA 3 (L) >60 ml/min/1.73m2    GFR est non-AA 3 (L) >60 ml/min/1.73m2    Calcium 9.6 8.5 - 10.1 MG/DL       Danny Loving MD  Internal Medicine  Hospitalist Division

## 2017-09-09 NOTE — PROGRESS NOTES
Hemodialysis Rounding Note      Patient: Yamila Mahajan               Sex: female          DOA: 2017  3:56 PM        YOB: 1960      Age:  49 y.o.        LOS:  LOS: 1 day     Subjective:     Yamila Mahajan is a 49 y.o.  who presents with Hyperkalemia  Hyperkalemia  Renal failure  SCOOTER (acute kidney injury) ([address] The patient is dialyzing utilizing the following method:Intermittent Hemodialysis        Complaints none. Current Facility-Administered Medications   Medication Dose Route Frequency    cloNIDine HCl (CATAPRES) tablet 0.1 mg  0.1 mg Oral BID    NIFEdipine ER (PROCARDIA XL) tablet 30 mg  30 mg Oral DAILY    0.9% sodium chloride infusion  100 mL/hr IntraVENous DIALYSIS PRN    albumin human 25% (BUMINATE) solution 12.5 g  12.5 g IntraVENous DIALYSIS PRN    alteplase (CATHFLO) 2 mg in sterile water (preservative free) 2 mL injection  2 mg InterCATHeter ONCE PRN    heparin (porcine) 1,000 unit/mL injection 1,000 Units  1,000 Units InterCATHeter DIALYSIS PRN    0.9% sodium chloride infusion  40 mL/hr IntraVENous CONTINUOUS    heparin (porcine) injection 5,000 Units  5,000 Units SubCUTAneous Q8H        0701 -  1900  In: -   Out: 200 [Urine:200]         Objective:     Blood pressure (!) 185/99, pulse 81, temperature 98.1 °F (36.7 °C), resp. rate 18, height 5' 2\" (1.575 m), weight 47.6 kg (105 lb), SpO2 98 %, not currently breastfeeding.   Temp (24hrs), Av.4 °F (36.3 °C), Min:96.9 °F (36.1 °C), Max:98.1 °F (36.7 °C)      Blood Pressure: BP: (!) 185/99  Pulse: Pulse (Heart Rate): 81  Temp:  Temp: 98.1 °F (36.7 °C)    Artificial Kidney     hours     Heparin Bolus    Blood flow rate     Dialysate rate     Arterial Access Pressure    Venous Return Pressure    Ultrafiltration Rate    Fluid Removal    Net Fluid Removal        PHYSICAL EXAM:   HEENT:  Non icteric  NECK:  No JVD  CHEST AND LUNGS:  clear  CVS:  Regular no rub  ABDOMEN:  Soft + bs  EXT:  Right femoral HD cath    DATA REVIEW:    Labs: Results:       Chemistry Recent Labs      09/09/17   0304  09/08/17   1940  09/08/17   1630   GLU  95  69*  193*   NA  137  140  135*   K  5.7*  5.5  6.5*   CL  107  106  103   CO2  21  25  19*   BUN  123*  113*  117*   CREA  13.10*  13.60*  13.83*   CA  8.5  8.5  9.6  9.0   AGAP  9  9  13   BUCR  9*  8*  8*   AP  70   --    --    TP  5.7*   --    --    ALB  2.6*   --    --    GLOB  3.1   --    --    AGRAT  0.8   --    --       CBC w/Diff Recent Labs      09/09/17   0304  09/08/17   1630   WBC  6.5  5.5   RBC  3.10*  3.41*   HGB  8.8*  9.6*   HCT  26.0*  28.9*   PLT  173  196   GRANS   --   71   LYMPH   --   21   EOS   --   0      Coagulation Recent Labs      09/09/17   0304   PTP  12.4   INR  1.0       Iron/Ferritin Recent Labs      09/09/17   0304   IRON  33*      BNP No results for input(s): BNPP in the last 72 hours. Cardiac Enzymes No results for input(s): CPK, CKND1, WOLFGANG in the last 72 hours. No lab exists for component: CKRMB, TROIP   Liver Enzymes Recent Labs      09/09/17   0304   TP  5.7*   ALB  2.6*   AP  70   SGOT  38*      Thyroid Studies No results found for: T4, T3U, TSH, TSHEXT       Images:  XR Results (maximum last 3): Results from Hospital Encounter encounter on 09/08/17   XR CHEST AP LAT   Narrative AP and lateral chest    History: Dizziness, bilateral leg numbness for two days and acute renal failure    Comparison: None    Findings: The cardiomediastinal silhouette is borderline in size considering technique. The pulmonary vasculature is normal. Left lower lobe linear subsegmental  atelectasis. There is no focal consolidation, pneumothorax or pleural effusion. The visualized bony structures are normal.         Impression Impression:     No radiographic evidence of acute cardiopulmonary disease. Thank you for this referral.        CT Results (maximum last 3):     Results from [place] encounter on 09/08/17   CT ABD PELV WO CONT   Narrative PROCEDURE: CT Abdomen, Pelvis without Contrast.    INDICATION:  Renal failure. COMPARISON:  None    TECHNIQUE:  Helical volumetric CT imaging of the abdomen and pelvis is performed  at 5 mm axial sections without IV or oral contrast administration. Coronal and  sagittal multiplanar reconstruction images are generated for improved anatomic  delineation.    - Radiation dose:   mGy-cm.  - All CT scans at this facility are performed using dose optimization technique  as appropriate to the performed exam, to include automated exposure control,  adjustment of the mA and/or kV according to patient's size (Including  appropriate matching for site-specific examinations), or use of iterative  reconstruction technique. FINDINGS:    ABDOMEN    Lung Bases:  Left posterior lung base scarring. Liver:  0.5 cm focal hypodensity in the left lobe of the liver, with fat  attenuation. Suggestive of a hepatic lipoma. Spleen:  Multiple punctate calcifications suggestive of old granulomatous  disease. Adrenal Glands, Gallbladder:  Unremarkable. Pancreas:  Subtle fat stranding around the pancreatic head region. Kidneys: The right kidney appears small in size (6.4 cm in length). The left  kidney appears decreased in size as well (7.7 cm in length). Small cystic  structure in the left kidney measuring 2.1 x 1.8 cm, suggestive of renal cysts. No postobstructive changes. No hydronephrosis. No convincing evidence for  ureteral stone. Gastrointestinal Tract, Abdominal Wall, Mesentery:      - Unremarkable stomach. - No acute findings along the small bowel. No small bowel obstruction.    - Normal appendix.    - No acute diverticulitis or colitis. - No mesenteric adenopathy. Retroperitoneum:  No adenopathy. Vascular:  Non-aneurysmal aorta. PELVIS    Urinary Bladder:  Unremarkable. Rectum:  Unremarkable. Uterus:  Atrophied. Adnexa:  No adnexal mass. Pelvic sidewall:  No adenopathy.   No free fluid. Bones:  No acute osseous findings. Impression IMPRESSION:    1. Fat stranding around the pancreatic head. Query pancreatitis. Recommend  correlation with lipase/amylase level. 2.  Small kidney size bilaterally. Exophytic cyst in the left kidney near the  lower pole. No postobstructive changes. No stones in the ureters. 3.  Hepatic lipoma. IPTH 140    CULTURE:   )No results for input(s): SDES, CULT in the last 72 hours. No results for input(s): CULT in the last 72 hours. Assessment/Plan:     End Stage Renal Disease:  Patient is tolerating dialysis treatment well. .  Additionally the patient has experienced normal dialysis treatment during dialysis. Dry weight   same. At 3:36 PM on 9/9/2017, I saw and examined patient during hemodialysis treatment. The patient was receiving hemodialysis for treatment of end stage renal disease. I have also reviewed vital signs, intake and output, lab results and recent events, and agreed with today's dialysis order. Anemia:  Will start IV  venofer with HD    Renal Metabolic Bone Disease:  Start phos binder                      Hypertension: uncontrolled - medication changes/orders.  Will adjust meds as needed and attempt UF next HD    Access: Temporary dialysis catheter adequate monitoring/no changes         Gopi Thompson MD  9/9/2017

## 2017-09-09 NOTE — PROGRESS NOTES
[facility] Hospitalist Group  Progress Note    Patient: Pratik Rapp Age: 49 y.o. : 1960 MR#: [id number] SSN: [id number]  Date: 2017     Subjective:     Seen with family @ bedside. Has some pain around HD access site. No F/C, N/V, CP, SOB. Assessment/Plan:   1. ESRD c HD - compelted HD today. 2. HyperK+ - repeat BMP in AM.   3. HTN - Procardia, Clonidine. 4. Anemia of chronic dz - noted. Venofer. 5. Dyslipidemia hx - on no meds as outpt. Additional Notes:      Case discussed with:  [x]Patient  [x]Family  []Nursing  []Case Management  DVT Prophylaxis:  []Lovenox  [x]Hep SQ  []SCDs  []Coumadin   []On Heparin gtt    Objective:   VS:   Visit Vitals    BP (!) 180/107    Pulse 84    Temp 97.9 °F (36.6 °C)    Resp 16    Ht 5' 2\" (1.575 m)    Wt 47.6 kg (105 lb)    SpO2 98%    Breastfeeding No    BMI 19.2 kg/m2      Tmax/24hrs: Temp (24hrs), Av.5 °F (36.4 °C), Min:96.9 °F (36.1 °C), Max:98.1 °F (36.7 °C)    Intake/Output Summary (Last 24 hours) at 17 1838  Last data filed at 17 1700   Gross per 24 hour   Intake                0 ml   Output              200 ml   Net             -200 ml       General:  Awake, alert, NAD. Cardiovascular:  RRR. Pulmonary:  CTA B.  GI:  Soft, NT/ND, NABS. Extremities:  No CT or edema.    Additional:      Labs:    Recent Results (from the past 24 hour(s))   GLUCOSE, POC    Collection Time: 17  6:50 PM   Result Value Ref Range    Glucose (POC) 133 (H) 70 - 343 mg/dL   METABOLIC PANEL, BASIC    Collection Time: 17  7:40 PM   Result Value Ref Range    Sodium 140 136 - 145 mmol/L    Potassium 5.5 3.5 - 5.5 mmol/L    Chloride 106 100 - 108 mmol/L    CO2 25 21 - 32 mmol/L    Anion gap 9 3.0 - 18 mmol/L    Glucose 69 (L) 74 - 99 mg/dL     (H) 7.0 - 18 MG/DL    Creatinine 13.60 (H) 0.6 - 1.3 MG/DL    BUN/Creatinine ratio 8 (L) 12 - 20      GFR est AA 3 (L) >60 ml/min/1.73m2    GFR est non-AA 3 (L) >60 ml/min/1.73m2    Calcium 9.6 8.5 - 04.8 MG/DL   METABOLIC PANEL, COMPREHENSIVE    Collection Time: 09/09/17  3:04 AM   Result Value Ref Range    Sodium 137 136 - 145 mmol/L    Potassium 5.7 (H) 3.5 - 5.5 mmol/L    Chloride 107 100 - 108 mmol/L    CO2 21 21 - 32 mmol/L    Anion gap 9 3.0 - 18 mmol/L    Glucose 95 74 - 99 mg/dL     (H) 7.0 - 18 MG/DL    Creatinine 13.10 (H) 0.6 - 1.3 MG/DL    BUN/Creatinine ratio 9 (L) 12 - 20      GFR est AA 4 (L) >60 ml/min/1.73m2    GFR est non-AA 3 (L) >60 ml/min/1.73m2    Calcium 8.5 8.5 - 10.1 MG/DL    Bilirubin, total 0.2 0.2 - 1.0 MG/DL    ALT (SGPT) 47 13 - 56 U/L    AST (SGOT) 38 (H) 15 - 37 U/L    Alk.  phosphatase 70 45 - 117 U/L    Protein, total 5.7 (L) 6.4 - 8.2 g/dL    Albumin 2.6 (L) 3.4 - 5.0 g/dL    Globulin 3.1 2.0 - 4.0 g/dL    A-G Ratio 0.8 0.8 - 1.7     HEMOGLOBIN A1C WITH EAG    Collection Time: 09/09/17  3:04 AM   Result Value Ref Range    Hemoglobin A1c 5.0 4.2 - 5.6 %    Est. average glucose 97 mg/dL   IRON PROFILE    Collection Time: 09/09/17  3:04 AM   Result Value Ref Range    Iron 33 (L) 50 - 175 ug/dL    TIBC 204 (L) 250 - 450 ug/dL    Iron % saturation 16 %   LIPID PANEL    Collection Time: 09/09/17  3:04 AM   Result Value Ref Range    LIPID PROFILE          Cholesterol, total 204 (H) <200 MG/DL    Triglyceride 181 (H) <150 MG/DL    HDL Cholesterol 51 40 - 60 MG/DL    LDL, calculated 116.8 (H) 0 - 100 MG/DL    VLDL, calculated 36.2 MG/DL    CHOL/HDL Ratio 4.0 0 - 5.0     PHOSPHORUS    Collection Time: 09/09/17  3:04 AM   Result Value Ref Range    Phosphorus 6.8 (H) 2.5 - 4.9 MG/DL   CBC W/O DIFF    Collection Time: 09/09/17  3:04 AM   Result Value Ref Range    WBC 6.5 4.6 - 13.2 K/uL    RBC 3.10 (L) 4.20 - 5.30 M/uL    HGB 8.8 (L) 12.0 - 16.0 g/dL    HCT 26.0 (L) 35.0 - 45.0 %    MCV 83.9 74.0 - 97.0 FL    MCH 28.4 24.0 - 34.0 PG    MCHC 33.8 31.0 - 37.0 g/dL    RDW 13.6 11.6 - 14.5 %    PLATELET 347 497 - 065 K/uL    MPV 9.5 9.2 - 11.8 FL PROTHROMBIN TIME + INR    Collection Time: 09/09/17  3:04 AM   Result Value Ref Range    Prothrombin time 12.4 11.5 - 15.2 sec    INR 1.0 0.8 - 1.2     URIC ACID    Collection Time: 09/09/17  3:04 AM   Result Value Ref Range    Uric acid 7.4 (H) 2.6 - 7.2 MG/DL   HEP B SURFACE AG    Collection Time: 09/09/17  3:04 AM   Result Value Ref Range    Hepatitis B surface Ag <0.10 <1.00 Index    Hep B surface Ag Interp. NEGATIVE  NEG     FERRITIN    Collection Time: 09/09/17  3:04 AM   Result Value Ref Range    Ferritin 492 (H) 8 - 388 NG/ML   PTH INTACT    Collection Time: 09/09/17  3:04 AM   Result Value Ref Range    Calcium 8.5 8.5 - 10.1 MG/DL    PTH, Intact 140.4 (H) 14.0 - 72.0 pg/mL   PROTEIN/CREATININE RATIO, URINE    Collection Time: 09/09/17 10:45 AM   Result Value Ref Range    Protein, urine random 263 (H) <11.9 mg/dL    Creatinine, urine 28.50 (L) 30 - 125 mg/dL    Protein/Creat.  urine Ratio 9.2         Signed By: Benjamin Rojas MD     September 9, 2017 6:38 PM

## 2017-09-09 NOTE — OP NOTES
[address]    Name:  Elba Gudino  MR#:  [id number]  :  1960  Account #:  [de-identified]  Date of Adm:  2017  Date of Surgery:  2017      ATTENDING: Meghan Mireles MD.    PREOPERATIVE DIAGNOSIS: Acute renal failure, now with end-stage  renal disease in need of dialysis access. POSTOPERATIVE DIAGNOSIS: Acute renal failure, now with end-  stage renal disease in need of dialysis access. PROCEDURES PERFORMED  1. Ultrasound-guided access of right common femoral vein. 2. Placement of 24 cm Mahurkar catheter through a right groin  approach. CULTURES: None. SPECIMENS REMOVED: None. DRAINS: None. ESTIMATED BLOOD LOSS: Less than 50 mL. INDICATIONS FOR PROCEDURE: The patient is a 62-year-old  female with renal failure in need of dialysis access. The patient was  given risks and benefits of the procedure including but not limited to  bleeding, infection, damage to adjacent structures, MI, stroke and  death as well as loss of lower extremity, DVT and PE. The patient was  understanding of all the risks and underwent the procedure. DESCRIPTION OF PROCEDURE: Ultrasound was used to visualize  the right common femoral vein. A picture was taken and kept with the  patient's chart. We then were able to prep and drape the patient in  normal sterile fashion according to CDC guidelines aseptic technique. Pre-incision timeout was performed prior to any incision; 5 mL of 1%  lidocaine was then used for a local anesthetic and a single wall entry  needle was then guided into the common femoral vein. Once the  needle tip was identified within the vein and there was positive blood  return, a wire was then placed. A small skin incision was created using  11 blade. The tract was dilated x2 and a 24 cm Mahurkar catheter was  placed. All 3 ports were easily flushed and aspirated and capped  appropriately.  A Biopatch and Tegaderm were placed at the catheter  insertion site and both butterfly holes were secured with a 2-0 nylon  suture. The patient tolerated the procedure without any issues. ANESTHESIA: Local anesthetic.         MD Tomi Aguillon / Sulema Laswon  D:  09/09/2017   13:24  T:  09/09/2017   13:43  Job #:  [id number]

## 2017-09-09 NOTE — PROGRESS NOTES
Nephrologist in to see. She is aware of pt's b/p. Pt going for [facility]  Today and HD. She  states to hold b/p meds until after dialysis.

## 2017-09-09 NOTE — PROGRESS NOTES
Influenza vaccine not available at this time. Spoke to Dr Bienvenido Christopher. Flu vaccine needs to be re-ordered by physician when available.

## 2017-09-10 LAB
ANION GAP SERPL CALC-SCNC: 9 MMOL/L (ref 3–18)
ATRIAL RATE: 91 BPM
BUN SERPL-MCNC: 55 MG/DL (ref 7–18)
BUN/CREAT SERPL: 7 (ref 12–20)
CALCIUM SERPL-MCNC: 7.8 MG/DL (ref 8.5–10.1)
CALCULATED P AXIS, ECG09: 56 DEGREES
CALCULATED R AXIS, ECG10: 77 DEGREES
CALCULATED T AXIS, ECG11: 46 DEGREES
CHLORIDE SERPL-SCNC: 107 MMOL/L (ref 100–108)
CO2 SERPL-SCNC: 25 MMOL/L (ref 21–32)
CREAT SERPL-MCNC: 8.28 MG/DL (ref 0.6–1.3)
DIAGNOSIS, 93000: NORMAL
GLUCOSE SERPL-MCNC: 96 MG/DL (ref 74–99)
P-R INTERVAL, ECG05: 168 MS
POTASSIUM SERPL-SCNC: 4.6 MMOL/L (ref 3.5–5.5)
Q-T INTERVAL, ECG07: 352 MS
QRS DURATION, ECG06: 88 MS
QTC CALCULATION (BEZET), ECG08: 432 MS
SODIUM SERPL-SCNC: 141 MMOL/L (ref 136–145)
VENTRICULAR RATE, ECG03: 91 BPM

## 2017-09-10 PROCEDURE — 74011000258 HC RX REV CODE- 258: Performed by: HOSPITALIST

## 2017-09-10 PROCEDURE — 36415 COLL VENOUS BLD VENIPUNCTURE: CPT | Performed by: FAMILY MEDICINE

## 2017-09-10 PROCEDURE — 74011250636 HC RX REV CODE- 250/636: Performed by: INTERNAL MEDICINE

## 2017-09-10 PROCEDURE — 74011250637 HC RX REV CODE- 250/637: Performed by: INTERNAL MEDICINE

## 2017-09-10 PROCEDURE — 74011250636 HC RX REV CODE- 250/636: Performed by: HOSPITALIST

## 2017-09-10 PROCEDURE — 80048 BASIC METABOLIC PNL TOTAL CA: CPT | Performed by: FAMILY MEDICINE

## 2017-09-10 PROCEDURE — 74011000258 HC RX REV CODE- 258: Performed by: INTERNAL MEDICINE

## 2017-09-10 PROCEDURE — 65270000029 HC RM PRIVATE

## 2017-09-10 PROCEDURE — 74011250637 HC RX REV CODE- 250/637: Performed by: FAMILY MEDICINE

## 2017-09-10 RX ORDER — CALCIUM ACETATE 667 MG/1
1 CAPSULE ORAL
Status: DISCONTINUED | OUTPATIENT
Start: 2017-09-10 | End: 2017-09-13 | Stop reason: HOSPADM

## 2017-09-10 RX ORDER — GABAPENTIN 100 MG/1
100 CAPSULE ORAL
Status: DISCONTINUED | OUTPATIENT
Start: 2017-09-10 | End: 2017-09-13 | Stop reason: HOSPADM

## 2017-09-10 RX ORDER — SODIUM CHLORIDE 900 MG/100ML
INJECTION INTRAVENOUS
Status: DISPENSED
Start: 2017-09-10 | End: 2017-09-10

## 2017-09-10 RX ADMIN — IRON SUCROSE 100 MG: 20 INJECTION, SOLUTION INTRAVENOUS at 14:19

## 2017-09-10 RX ADMIN — NIFEDIPINE 30 MG: 30 TABLET, FILM COATED, EXTENDED RELEASE ORAL at 08:16

## 2017-09-10 RX ADMIN — CLONIDINE HYDROCHLORIDE 0.1 MG: 0.1 TABLET ORAL at 18:02

## 2017-09-10 RX ADMIN — OXYCODONE HYDROCHLORIDE AND ACETAMINOPHEN 1 TABLET: 5; 325 TABLET ORAL at 16:04

## 2017-09-10 RX ADMIN — CLONIDINE HYDROCHLORIDE 0.1 MG: 0.1 TABLET ORAL at 08:16

## 2017-09-10 RX ADMIN — HEPARIN SODIUM 5000 UNITS: 5000 INJECTION, SOLUTION INTRAVENOUS; SUBCUTANEOUS at 06:55

## 2017-09-10 RX ADMIN — CALCIUM ACETATE 667 MG: 667 CAPSULE ORAL at 18:02

## 2017-09-10 RX ADMIN — PROMETHAZINE HYDROCHLORIDE 12.5 MG: 25 INJECTION INTRAMUSCULAR; INTRAVENOUS at 07:57

## 2017-09-10 RX ADMIN — HEPARIN SODIUM 5000 UNITS: 5000 INJECTION, SOLUTION INTRAVENOUS; SUBCUTANEOUS at 23:01

## 2017-09-10 RX ADMIN — CALCIUM ACETATE 667 MG: 667 CAPSULE ORAL at 12:15

## 2017-09-10 NOTE — ROUTINE PROCESS
Bedside and Verbal shift change report given to Du Stallworth RN (oncoming nurse) by lAfonzo Valencia RN (offgoing nurse). Report included the following information SBAR, Kardex, MAR and Recent Results.     SITUATION:  Code Status: Full Code  Reason for Admission: Hyperkalemia  Hyperkalemia  Renal failure  SCOOTER (acute kidney injury) [facility])  Hospital day: 2  Problem List:       Hospital Problems  Never Reviewed          Codes Class Noted POA    Hyperkalemia ICD-10-CM: E87.5  ICD-9-CM: 276.7  9/8/2017 Unknown        Renal failure ICD-10-CM: N19  ICD-9-CM: 320  9/8/2017 Unknown        SCOOTER (acute kidney injury) ([facility] ICD-10-CM: N17.9  ICD-9-CM: 584.9  9/8/2017 Unknown              BACKGROUND:   Past Medical History:   Past Medical History:   Diagnosis Date    Chronic kidney disease     ESRD (end stage renal disease) ([facility]     Hypercholesteremia     Hypertension     Kidney disease     Vitamin D deficiency       Patient taking anticoagulants yes    Patient has a defibrillator: no    History of shots YES for example, flu, pneumonia, tetanus   Isolation History NO for example, MRSA, CDiff    ASSESSMENT:  Changes in Assessment Throughout Shift: None  Significant Changes in 24 hours (for example, RR/code, fall)  Patient has Central Line: no Reasons if yes: N/A  Patient has Onofre Cath: no Reasons if yes: N/A   Mobility Issues  PT  IV Patency  OR Checklist  Pending Tests    Last Vitals:  Vitals w/ MEWS Score (last day)     Date/Time MEWS Score Pulse Resp Temp BP Level of Consciousness SpO2    09/10/17 0430 1 89 18 96.8 °F (36 °C) 156/88 Alert 97 %    09/10/17 0007 1 84 20 97.6 °F (36.4 °C) (!)  154/91 Alert 99 %    09/09/17 2100 1 79 16 97.5 °F (36.4 °C) (!)  189/104 Alert 98 %    09/09/17 1818 -- 84 -- -- (!)  180/107 -- --    09/09/17 1705 -- 90 16 97.9 °F (36.6 °C) (!)  201/102 -- --    09/09/17 1700 -- 81 16 -- (!)  197/119 -- --    09/09/17 1630 -- 73 16 -- (!)  192/118 -- --    09/09/17 1600 -- 76 16 -- (!)  192/116 -- --    09/09/17 1530 -- 76 16 -- (!)  194/116 -- --    09/09/17 1501 -- 84 16 -- (!)  207/116 -- --    09/09/17 1458 -- 85 16 98 °F (36.7 °C) (!)  209/121 -- --    09/09/17 1217 1 81 18 98.1 °F (36.7 °C) (!)  185/99 Alert 98 %    09/09/17 0843 1 86 18 97.1 °F (36.2 °C) (!)  192/101 Alert 99 %    09/09/17 0352 1 90 20 97.1 °F (36.2 °C) (!)  176/102 Alert 98 %    09/09/17 0000 2 97 21 96.9 °F (36.1 °C) (!)  177/100 Alert 95 %            PAIN    Pain Assessment    Pain Intensity 1: 0 (09/10/17 0430)    Pain Location 1: Groin    Pain Intervention(s) 1: Medication (see MAR)    Patient Stated Pain Goal: 0  Intervention effective: N/A  Time of last intervention: N/A Reassessment Completed: N/A  Other actions taken for pain: N/A    Last 3 Weights:  Last 3 Recorded Weights in this Encounter    09/08/17 1603   Weight: 47.6 kg (105 lb)   Weight change:     INTAKE/OUPUT    Current Shift:      Last three shifts: 09/08 0701 - 09/09 1900  In: -   Out: 425 [Urine:425]    RECOMMENDATIONS AND DISCHARGE PLANNING  Patient needs and requests: None    Pending tests/procedures: 2D Echo     Discharge plan for patient: TBD    Discharge planning Needs or Barriers: Language barrier    Estimated Discharge Date: TBD Posted on Whiteboard in Patients Room: no       \"HEALS\" SAFETY CHECK  A safety check occurred in the patient's room between off going nurse and oncoming nurse listed above. The safety check included the below items:    H  High Alert Medications Verify all high alert medication drips (heparin, PCA, etc.)  E  Equipment Suction is set up for ALL patients (with yanker)  Red plugs utilized for all equipment (IV pumps, etc.)  WOWs wiped down at end of shift.   Room stocked with oxygen, suction, and other unit-specific supplies  A  Alarms Bed alarm is set for fall risk patients  Ensure chair alarm is in place and activated if patient is up in a chair  L  Lines Check IV for any infiltration  Onofre bag is empty if patient has a Onofre Tubing and IV bags are labeled  S  Safety  Room is clean, patient is clean, and equipment is clean. Hallways are clear from equipment besides carts. Fall bracelet on for fall risk patients  Ensure room is clear and free of clutter  Suction is set up for ALL patients (with jenni)  Hallways are clear from equipment besides carts.    Isolation precautions followed, supplies available outside room, sign posted    Diallo Acevedo RN

## 2017-09-10 NOTE — PROGRESS NOTES
conducted an initial consultation and Spiritual Assessment for Don Perdue, who is a 49 y.o.,female. Patients Primary Language is: [place]. According to the patients EMR Congregational Affiliation is: Spiritism. The reason the Patient came to the hospital is:   Patient Active Problem List    Diagnosis Date Noted    Hyperkalemia 09/08/2017    Renal failure 09/08/2017    [facility] 09/08/2017        The  provided the following Interventions:  Initiated a relationship of care and support. Explored issues of prashanth, belief, spirituality and Rastafari/ritual needs while hospitalized. Listened empathically. Provided chaplaincy education. Provided information about Spiritual Care Services. Offered prayer and assurance of continued prayers on patient's behalf. Chart reviewed. The following outcomes where achieved:  Patient shared limited information about both their medical narrative and spiritual journey/beliefs.  confirmed Patient's Congregational Affiliation. Patient processed feeling about current hospitalization. Patient expressed gratitude for 's visit. Assessment:  Patient does not have any Rastafari/cultural needs that will affect patients preferences in health care. There are no spiritual or Rastafari issues which require intervention at this time. Plan:  Chaplains will continue to follow and will provide pastoral care on an as needed/requested basis.  recommends bedside caregivers page  on duty if patient shows signs of acute spiritual or emotional distress.     [facility]  [phone]

## 2017-09-10 NOTE — PROGRESS NOTES
Pt with ESRD on HD. Currently dialyzing with right groin temp cath. Plan for perm cath placement tomorrow AM.  Keep NPO after midnight.

## 2017-09-10 NOTE — PROGRESS NOTES
RENAL DAILY PROGRESS NOTE    Subjective:   Admitted for weakness, seen for ESRD and hyperkalemia    Complaint: Family at bedside, pt c/o leg numbness, ate better last night but did vomit 1 time before breakfast today.  no CP or SOB, tolerated 1st HD    Current Facility-Administered Medications   Medication Dose Route Frequency    0.9% sodium chloride (MBP/ADV) 0.9 % infusion        cloNIDine HCl (CATAPRES) tablet 0.1 mg  0.1 mg Oral BID    NIFEdipine ER (PROCARDIA XL) tablet 30 mg  30 mg Oral DAILY    0.9% sodium chloride infusion  100 mL/hr IntraVENous DIALYSIS PRN    albumin human 25% (BUMINATE) solution 12.5 g  12.5 g IntraVENous DIALYSIS PRN    alteplase (CATHFLO) 2 mg in sterile water (preservative free) 2 mL injection  2 mg InterCATHeter ONCE PRN    heparin (porcine) 1,000 unit/mL injection 1,000 Units  1,000 Units InterCATHeter DIALYSIS PRN    oxyCODONE-acetaminophen (PERCOCET) 5-325 mg per tablet 1 Tab  1 Tab Oral Q8H PRN    heparin (porcine) injection 5,000 Units  5,000 Units SubCUTAneous Q8H           Objective:   Patient Vitals for the past 24 hrs:   Temp Pulse Resp BP SpO2   09/10/17 0758 97.4 °F (36.3 °C) 92 - 148/85 99 %   09/10/17 0430 96.8 °F (36 °C) 89 18 156/88 97 %   09/10/17 0007 97.6 °F (36.4 °C) 84 20 (!) 154/91 99 %   09/09/17 2100 97.5 °F (36.4 °C) 79 16 (!) 189/104 98 %   09/09/17 1818 - 84 - (!) 180/107 -   09/09/17 1705 97.9 °F (36.6 °C) 90 16 (!) 201/102 -   09/09/17 1700 - 81 16 (!) 197/119 -   09/09/17 1630 - 73 16 (!) 192/118 -   09/09/17 1600 - 76 16 (!) 192/116 -   09/09/17 1530 - 76 16 (!) 194/116 -   09/09/17 1501 - 84 16 (!) 207/116 -   09/09/17 1458 98 °F (36.7 °C) 85 16 (!) 209/121 -   09/09/17 1217 98.1 °F (36.7 °C) 81 18 (!) 185/99 98 %        Weight change:      09/08 1901 - 09/10 0700  In: -   Out: 425 [Urine:425]    Intake/Output Summary (Last 24 hours) at 09/10/17 0931  Last data filed at 09/10/17 0757   Gross per 24 hour   Intake               50 ml   Output 425 ml   Net             -375 ml     Physical Exam:     HEENT: non icteric slightly pale conj   Neck: no JVD  Cardiovascular: regulars  C/L: clear ant/lat  Abdomen: soft + bs  Ext: no edema    Data Review:     LABS:   Hematology: Recent Labs      09/09/17   0304  09/08/17   1630   WBC  6.5  5.5   HGB  8.8*  9.6*   HCT  26.0*  28.9*     Chemistry: Recent Labs      09/10/17   0311  09/09/17   0304  09/08/17   1940  09/08/17   1630   BUN  55*  123*  113*  117*   CREA  8.28*  13.10*  13.60*  13.83*   CA  7.8*  8.5  8.5  9.6  9.0   ALB   --   2.6*   --    --    K  4.6  5.7*  5.5  6.5*   NA  141  137  140  135*   CL  107  107  106  103   CO2  25  21  25  19*   PHOS   --   6.8*   --    --    GLU  96  95  69*  193*    Iron 33 sat 16% raj 492        IMPRESSION AND PLAN:   ESRD most likely from HTN, sched 2nd HD today and hopefully home tomorrow. Will need perm cath and will arrange for outpt HD at [facility]. Also discussed PD. Will get PD nurse contact family in the next few days for education to see if she would prefer doing PD as oppose to HD.   Hyperkalemia resolved  HTN better cont same meds for now  Anemia from CKD and fe def, Venofer with HD today and start MILTON  2nd HPTH start phoslo with meals use Darryl 3 bath  Hypoalbuminemia start Nepro  Leg numbness most likely from neuropathy start low dose neurontin         Korey Cervantes MD  9/10/2017

## 2017-09-10 NOTE — PROGRESS NOTES
Pt  Up to br throwing up. HR up to 152. Called Dr. Vinayak Pugh. New order received for phenergan. Called rx to send. Dr Vinayak Pugh states to watch HR after pt gets over vomiting.

## 2017-09-10 NOTE — PROGRESS NOTES
Spoke with Dr. Gabbie Valdez. She said she spoke with dialysis nurse. Dialysis going be cancelled today and will be done tomorrow after her permacath placed. Made pt and family aware.

## 2017-09-10 NOTE — ROUTINE PROCESS
Bedside and Verbal shift change report given to Alicia Gunter (oncoming nurse) by Olayinka Ibrahim RN (offgoing nurse). Report included the following information SBAR, Kardex, MAR and Recent Results.     SITUATION:    Code Status: Full Code  Reason for Admission: Hyperkalemia  Hyperkalemia  Renal failure   SCOOTER (acute kidney injury) ([facility]    [address] [facility] day: 2   Problem List:       Hospital Problems  Never Reviewed          Codes Class Noted POA    Hyperkalemia ICD-10-CM: E87.5  ICD-9-CM: 276.7  9/8/2017 Unknown        Renal failure ICD-10-CM: N19  ICD-9-CM: 303  9/8/2017 Unknown        SCOOTER (acute kidney injury) ([facility] [address] ICD-10-CM: N17.9  ICD-9-CM: 584.9  9/8/2017 Unknown              BACKGROUND:    Past Medical History:   Past Medical History:   Diagnosis Date    Chronic kidney disease     ESRD (end stage renal disease) ([facility]     Hypercholesteremia     Hypertension     Kidney disease     Vitamin D deficiency          Patient taking anticoagulants yes     ASSESSMENT:    Changes in Assessment Throughout Shift: no     Patient has Central Line: yes Reasons if yes: dialysis TDC rt groin   Patient has Onofre Cath: no Reasons if yes: n/a      Last Vitals:     Vitals:    09/10/17 0723 09/10/17 0758 09/10/17 1158 09/10/17 1604   BP:  148/85 140/87 (!) 157/95   Pulse:  92 80 91   Resp:   18 18   Temp:  97.4 °F (36.3 °C) 98.2 °F (36.8 °C) 98.3 °F (36.8 °C)   SpO2:  99% 97% 96%   Weight: 57 kg (125 lb 9.6 oz)      Height:            IV and DRAINS (will only show if present)   [REMOVED] Peripheral IV 09/08/17 Right Wrist-Site Assessment: Bleeding, Painful  Peripheral IV 09/09/17 Right Arm-Site Assessment: Clean, dry, & intact     WOUND (if present)   Wound Type:  none   Dressing present Dressing Present : No   Wound Concerns/Notes:  none     PAIN    Pain Assessment    Pain Intensity 1: 0 (09/10/17 1705)    Pain Location 1: Head    Pain Intervention(s) 1: Medication (see MAR)    Patient Stated Pain Goal: 0  o Interventions for Pain:  percocet  o Intervention effective: yes  o Time of last intervention: 1605  o Reassessment Completed: yes      Last 3 Weights:  Last 3 Recorded Weights in this Encounter    09/08/17 1603 09/10/17 0723   Weight: 47.6 kg (105 lb) 57 kg (125 lb 9.6 oz)     Weight change:      INTAKE/OUPUT    Current Shift:      Last three shifts: 09/09 0701 - 09/10 1900  In: 150 [I.V.:150]  Out: 425 [Urine:425]     LAB RESULTS     Recent Labs      09/09/17   0304  09/08/17   1630   WBC  6.5  5.5   HGB  8.8*  9.6*   HCT  26.0*  28.9*   PLT  173  196        Recent Labs      09/10/17   0311  09/09/17   0304  09/08/17   1940  09/08/17   1630   NA  141  137  140  135*   K  4.6  5.7*  5.5  6.5*   GLU  96  95  69*  193*   BUN  55*  123*  113*  117*   CREA  8.28*  13.10*  13.60*  13.83*   CA  7.8*  8.5  8.5  9.6  9.0   MG   --    --    --   2.9*   INR   --   1.0   --    --        RECOMMENDATIONS AND DISCHARGE PLANNING     1. Pending tests/procedures/ Plan of Care or Other Needs: For Permacath tomorrow and HD. For 2D echo    2. Discharge plan for patient and Needs/Barriers: Home    3. Estimated Discharge Date: TBD Posted on Whiteboard in Patients Room: yes      4. The patient's care plan was reviewed with the oncoming nurse. \"HEALS\" SAFETY CHECK      Fall Risk    Total Score: 2    Safety Measures: Safety Measures: Bed/Chair-Wheels locked, Bed in low position, Caregiver at bedside, Call light within reach    A safety check occurred in the patient's room between off going nurse and oncoming nurse listed above.     The safety check included the below items  Area Items   H  High Alert Medications - Verify all high alert medication drips (heparin, PCA, etc.)   E  Equipment - Suction is set up for ALL patients (with yanker)  - Red plugs utilized for all equipment (IV pumps, etc.)  - WOWs wiped down at end of shift.  - Room stocked with oxygen, suction, and other unit-specific supplies   A  Alarms - Bed alarm is set for fall risk patients  - Ensure chair alarm is in place and activated if patient is up in a chair   L  Lines - Check IV for any infiltration  - Onofre bag is empty if patient has a Onofre   - Tubing and IV bags are labeled   S  Safety   - Room is clean, patient is clean, and equipment is clean. - Hallways are clear from equipment besides carts. - Fall bracelet on for fall risk patients  - Ensure room is clear and free of clutter  - Suction is set up for ALL patients (with yanker)  - Hallways are clear from equipment besides carts.    - Isolation precautions followed, supplies available outside room, sign posted     Jean Claude Case RN

## 2017-09-11 ENCOUNTER — APPOINTMENT (OUTPATIENT)
Dept: GENERAL RADIOLOGY | Age: 57
DRG: 673 | End: 2017-09-11
Attending: INTERNAL MEDICINE
Payer: COMMERCIAL

## 2017-09-11 LAB
HBV CORE IGM SER QL: NEGATIVE
HBV SURFACE AB SER QL IA: POSITIVE
HBV SURFACE AB SERPL IA-ACNC: 497.64 MIU/ML
HCV AB SER IA-ACNC: 0.06 INDEX
HCV AB SERPL QL IA: NEGATIVE
HCV COMMENT,HCGAC: NORMAL
HEP BS AB COMMENT,HBSAC: NORMAL

## 2017-09-11 PROCEDURE — 90935 HEMODIALYSIS ONE EVALUATION: CPT

## 2017-09-11 PROCEDURE — 74011250637 HC RX REV CODE- 250/637: Performed by: INTERNAL MEDICINE

## 2017-09-11 PROCEDURE — 74011250636 HC RX REV CODE- 250/636: Performed by: INTERNAL MEDICINE

## 2017-09-11 PROCEDURE — 74011000250 HC RX REV CODE- 250: Performed by: SURGERY

## 2017-09-11 PROCEDURE — 77030002933 HC SUT MCRYL J&J -A

## 2017-09-11 PROCEDURE — 02H633Z INSERTION OF INFUSION DEVICE INTO RIGHT ATRIUM, PERCUTANEOUS APPROACH: ICD-10-PCS | Performed by: SURGERY

## 2017-09-11 PROCEDURE — 0JH63XZ INSERTION OF TUNNELED VASCULAR ACCESS DEVICE INTO CHEST SUBCUTANEOUS TISSUE AND FASCIA, PERCUTANEOUS APPROACH: ICD-10-PCS | Performed by: SURGERY

## 2017-09-11 PROCEDURE — 74011250637 HC RX REV CODE- 250/637: Performed by: FAMILY MEDICINE

## 2017-09-11 PROCEDURE — 77030010507 HC ADH SKN DERMBND J&J -B

## 2017-09-11 PROCEDURE — 74011250636 HC RX REV CODE- 250/636: Performed by: SURGERY

## 2017-09-11 PROCEDURE — 76937 US GUIDE VASCULAR ACCESS: CPT

## 2017-09-11 PROCEDURE — 65270000029 HC RM PRIVATE

## 2017-09-11 PROCEDURE — C1750 CATH, HEMODIALYSIS,LONG-TERM: HCPCS

## 2017-09-11 PROCEDURE — 73560 X-RAY EXAM OF KNEE 1 OR 2: CPT

## 2017-09-11 PROCEDURE — 02PY33Z REMOVAL OF INFUSION DEVICE FROM GREAT VESSEL, PERCUTANEOUS APPROACH: ICD-10-PCS | Performed by: SURGERY

## 2017-09-11 PROCEDURE — 36558 INSERT TUNNELED CV CATH: CPT

## 2017-09-11 PROCEDURE — 77030002986 HC SUT PROL J&J -A

## 2017-09-11 PROCEDURE — 77030018719 HC DRSG PTCH ANTIMIC J&J -A

## 2017-09-11 RX ORDER — HEPARIN SODIUM 200 [USP'U]/100ML
500 INJECTION, SOLUTION INTRAVENOUS ONCE
Status: COMPLETED | OUTPATIENT
Start: 2017-09-11 | End: 2017-09-11

## 2017-09-11 RX ORDER — LIDOCAINE HYDROCHLORIDE 10 MG/ML
1-60 INJECTION, SOLUTION EPIDURAL; INFILTRATION; INTRACAUDAL; PERINEURAL
Status: DISCONTINUED | OUTPATIENT
Start: 2017-09-11 | End: 2017-09-11 | Stop reason: HOSPADM

## 2017-09-11 RX ORDER — DIPHENHYDRAMINE HCL 25 MG
25 CAPSULE ORAL
Status: DISCONTINUED | OUTPATIENT
Start: 2017-09-11 | End: 2017-09-13 | Stop reason: HOSPADM

## 2017-09-11 RX ORDER — FENTANYL CITRATE 50 UG/ML
12.5-1 INJECTION, SOLUTION INTRAMUSCULAR; INTRAVENOUS
Status: DISCONTINUED | OUTPATIENT
Start: 2017-09-11 | End: 2017-09-11 | Stop reason: HOSPADM

## 2017-09-11 RX ADMIN — NIFEDIPINE 30 MG: 30 TABLET, FILM COATED, EXTENDED RELEASE ORAL at 10:30

## 2017-09-11 RX ADMIN — DIPHENHYDRAMINE HYDROCHLORIDE 25 MG: 25 CAPSULE ORAL at 18:32

## 2017-09-11 RX ADMIN — FENTANYL CITRATE 25 MCG: 50 INJECTION INTRAMUSCULAR; INTRAVENOUS at 08:23

## 2017-09-11 RX ADMIN — CALCIUM ACETATE 667 MG: 667 CAPSULE ORAL at 17:30

## 2017-09-11 RX ADMIN — HEPARIN SODIUM 5000 UNITS: 5000 INJECTION, SOLUTION INTRAVENOUS; SUBCUTANEOUS at 06:47

## 2017-09-11 RX ADMIN — OXYCODONE HYDROCHLORIDE AND ACETAMINOPHEN 1 TABLET: 5; 325 TABLET ORAL at 18:07

## 2017-09-11 RX ADMIN — ERYTHROPOIETIN 4000 UNITS: 4000 INJECTION, SOLUTION INTRAVENOUS; SUBCUTANEOUS at 16:33

## 2017-09-11 RX ADMIN — CLONIDINE HYDROCHLORIDE 0.1 MG: 0.1 TABLET ORAL at 17:30

## 2017-09-11 RX ADMIN — GABAPENTIN 100 MG: 100 CAPSULE ORAL at 23:33

## 2017-09-11 RX ADMIN — HEPARIN SODIUM 10000 UNITS: 5000 INJECTION, SOLUTION INTRAVENOUS; SUBCUTANEOUS at 08:19

## 2017-09-11 RX ADMIN — CALCIUM ACETATE 667 MG: 667 CAPSULE ORAL at 12:15

## 2017-09-11 RX ADMIN — OXYCODONE HYDROCHLORIDE AND ACETAMINOPHEN 1 TABLET: 5; 325 TABLET ORAL at 10:24

## 2017-09-11 RX ADMIN — HEPARIN SODIUM 1000 UNITS: 200 INJECTION, SOLUTION INTRAVENOUS at 08:17

## 2017-09-11 RX ADMIN — FENTANYL CITRATE 25 MCG: 50 INJECTION INTRAMUSCULAR; INTRAVENOUS at 08:10

## 2017-09-11 RX ADMIN — HEPARIN SODIUM 5000 UNITS: 5000 INJECTION, SOLUTION INTRAVENOUS; SUBCUTANEOUS at 23:34

## 2017-09-11 RX ADMIN — LIDOCAINE HYDROCHLORIDE 10 ML: 10 INJECTION, SOLUTION EPIDURAL; INFILTRATION; INTRACAUDAL; PERINEURAL at 08:18

## 2017-09-11 RX ADMIN — CLONIDINE HYDROCHLORIDE 0.1 MG: 0.1 TABLET ORAL at 10:30

## 2017-09-11 RX ADMIN — FENTANYL CITRATE 25 MCG: 50 INJECTION INTRAMUSCULAR; INTRAVENOUS at 08:17

## 2017-09-11 NOTE — H&P (VIEW-ONLY)
Surgery Consult      Patient: Carissa Wahl MRN: [id number]  CSN: [id number]      YOB: 1960    Age: 49 y.o. Sex: female      DOA: 9/8/2017       HPI:     Carissa Wahl is a 49 y.o. female who presents with ARF now in ESRD. Currently hyperkalemic. Discussion with patient had with family at bedside. No previous dialysis she is very afraid of medical procedures and dialysis. She has been putting this off for a long time. Currently feels very cold but no fever. No previous catheters. Past Medical History:   Diagnosis Date    Chronic kidney disease     [facility]) [facility] [address] [facility]     Hypercholesteremia     Hypertension     Kidney disease     Vitamin D deficiency        History reviewed. No pertinent surgical history. History reviewed. No pertinent family history. Social History     Social History    Marital status:      Spouse name: N/A    Number of children: N/A    Years of education: N/A     Social History Main Topics    Smoking status: Never Smoker    Smokeless tobacco: Never Used    Alcohol use No    Drug use: No    Sexual activity: Not Asked     Other Topics Concern    None     Social History Narrative    None       Prior to Admission medications    Medication Sig Start Date End Date Taking? Authorizing Provider   pregabalin (LYRICA) 50 mg capsule Take  by mouth. Brenda Blackman MD   furosemide (LASIX) 40 mg tablet Take  by mouth daily. Yes Brenda Blackman MD   cloNIDine HCl (CATAPRES) 0.1 mg tablet Take 0.1 mg by mouth two (2) times a day. Yes Brenda Blackman MD   NIFEdipine ER (ADALAT CC) 30 mg ER tablet Take 20 mg by mouth daily. Yes Brenda Blackman MD   multivitamin with iron tablet Take 1 Tab by mouth daily. Yes Brenda Blackman MD   OTHER Take 600 mg by mouth two (2) times a day. Yes Brenda Blackman MD   CEFIXIME PO Take 200 mg by mouth two (2) times a day.    Yes Brenda Blackman MD       Allergies   Allergen Reactions    Banana Other (comments)       Physical Exam: Visit Vitals    BP (!) 185/99 (BP 1 Location: Left arm, BP Patient Position: At rest)    Pulse 81    Temp 98.1 °F (36.7 °C)    Resp 18    Ht 5' 2\" (1.575 m)    Wt 105 lb (47.6 kg)    SpO2 98%    Breastfeeding No    BMI 19.2 kg/m2       GENERAL: alert, cooperative, no distress, appears stated age, THROAT & NECK: normal and no erythema or exudates noted. , LUNG: clear to auscultation bilaterally, HEART: regular rate and rhythm, S1, S2 normal, no murmur, click, rub or gallop, ABDOMEN: soft, non-tender. Bowel sounds normal. No masses,  no organomegaly, EXTREMITIES:  extremities normal, atraumatic, no cyanosis or edema    ROS:  Pertinent items are noted in HPI. Unless otherwise mentioned in the HPI. Data Review:    CBC:   Lab Results   Component Value Date/Time    WBC 6.5 09/09/2017 03:04 AM    RBC 3.10 09/09/2017 03:04 AM    HGB 8.8 09/09/2017 03:04 AM    HCT 26.0 09/09/2017 03:04 AM    PLATELET 565 71/23/9308 03:04 AM      BMP:   Lab Results   Component Value Date/Time    Glucose 95 09/09/2017 03:04 AM    Sodium 137 09/09/2017 03:04 AM    Potassium 5.7 09/09/2017 03:04 AM    Chloride 107 09/09/2017 03:04 AM    CO2 21 09/09/2017 03:04 AM     09/09/2017 03:04 AM    Creatinine 13.10 09/09/2017 03:04 AM    Calcium 8.5 09/09/2017 03:04 AM    Calcium 8.5 09/09/2017 03:04 AM     Coagulation:   Lab Results   Component Value Date/Time    Prothrombin time 12.4 09/09/2017 03:04 AM    INR 1.0 09/09/2017 03:04 AM         Assessment/Plan     48 y/o female now with ESRD    --will place temp cath today given no ability to sedate on the floor  --will place permcath Tuesday. --discussed with family and they are agreeable. --Please call with any further questions or concerns.     Active Problems:    Hyperkalemia (9/8/2017)      Renal failure (9/8/2017)      [facility] (9/8/2017)        Flora De Jesus MD  September 9, 2017

## 2017-09-11 NOTE — ROUTINE PROCESS
NO STATUS STATUS CHANGE;GOWN/LINEN CHANGE;NEW PAD PLACED;DSG CHANGED TO RT GROIN AREA WHERE BLEEDING OCCURRED;FAMILY [email or web address] TREVER W/IN REACH

## 2017-09-11 NOTE — ROUTINE PROCESS
TRANSFER - OUT REPORT:    Verbal report given to Jessa Ash RN(name) on Brian Oswald  being transferred to [address] [place] [address](unit) for routine progression of care       Report consisted of patients Situation, Background, Assessment and   Recommendations(SBAR). Information from the following report(s) SBAR, Procedure Summary and MAR was reviewed with the receiving nurse. Lines:   Peripheral IV 09/09/17 Right Arm (Active)   Site Assessment Clean, dry, & intact 9/10/2017  8:25 PM   Phlebitis Assessment 0 9/10/2017  8:25 PM   Infiltration Assessment 0 9/10/2017  8:25 PM   Dressing Status Clean, dry, & intact 9/10/2017  8:25 PM   Dressing Type Transparent;Tape 9/10/2017  8:25 PM   Hub Color/Line Status Blue;Flushed;Patent 9/10/2017  8:25 PM        Opportunity for questions and clarification was provided.       Patient transported with:   [employer]

## 2017-09-11 NOTE — OP NOTES
[address]    Name:  Andre Pimentel  MR#:  [id number]  :  1960  Account #:  [de-identified]  Date of Adm:  2017  Date of Surgery:  2017      PREOPERATIVE DIAGNOSIS: End-stage renal disease in need of  dialysis access. POSTOPERATIVE DIAGNOSIS: End-stage renal disease in need of  dialysis access. PROCEDURES PERFORMED:  1. Ultrasound-guided access of right internal jugular vein. 2. Permanent dialysis catheter placement tunneled through a right IJ  approach 19 cm Palindrome catheter. 3. Removal of previous right common femoral vein temporary dialysis  catheter. SURGEON: Stephy Hilario MD.    CULTURES: None. SPECIMENS REMOVED: None. DRAINS: None. ESTIMATED BLOOD LOSS: Less than 50 mL. ANESTHESIA: Local anesthetic only. INDICATIONS FOR PROCEDURE: The patient is a 61-year-old  female with end-stage renal disease in need of dialysis access. The  patient was given risks and benefits of the procedure including, but not  limited to bleeding, infection, damage to adjacent structures, MI,  stroke, and death, as well as loss of access. The patient was  understanding of all the risks and underwent the procedure. DESCRIPTION OF PROCEDURE: The patient was correctly identified  in the precath area and taken to the cath lab in stable condition. The  patient had a pre-incision time-out prior to any incision. The patient  was prepped and draped in normal sterile fashion according to CDC  guidelines aseptic technique. We then were able to use an ultrasound  to visualize the right internal jugular vein. A picture was taken and kept  with the patient's chart. We then were able to numb her up  appropriately using 1% lidocaine and take a single wall entry needle  and gain access into the internal jugular vein. Once the needle tip was  identified within the vein and there was positive blood return, a wire  was then placed.  A small skin incision was created using 11 blade and  the tract was then dilated x2 and then a peel-away sheath was then  placed. We then were able to pick out an area that was 2  fingerbreadths below the clavicle, numbed this area up, numbed up  the tract, make a small skin incision on the chest and then a tunneler  Palindrome catheter. Wire and dilator removed. The catheter was then  placed down the peel-away sheath. The peel-away sheath was then  removed. The tip of the catheter was in the sinoatrial tract. There was  good curvature of the catheter without any kinking. No pneumothorax  was identified and the catheter was good for use. We then were able to  easily flush and aspirate both ports and place 1.6 mL hot heparin into  each port. We then were able to secure the catheter with 2-0 Prolene  sutures at both butterfly holes and the catheter insertion site Biopatch  and Tegaderm were placed, 4-0 Monocryl subcuticular stitch was used  at the IJ incision and Dermabond. We then wrapped the ports with a 4  x 4 and tape. We then removed the previous sutures holding in a  temporary catheter within the right groin, removed the catheter in its  entirety and held pressure for 5 minutes with good hemostasis. The  patient tolerated the procedure well without any issues.         MD EVE Mims / Kira Marino  D:  09/11/2017   08:29  T:  09/11/2017   09:02  Job #:  [id number]

## 2017-09-11 NOTE — PROGRESS NOTES
Dr. Svetlana Cohen was informed of the patiet's c/o pain on legs but refuses to take the Gabapentin as ordered and instead wants SCD \"pumps for the legs\". Dr. Svetlana Cohen said the patient is already on Heparin and does not need SCD's. Patient with family was informed  But remains to refuse Gabapentin.

## 2017-09-11 NOTE — PROGRESS NOTES
[facility] Hospitalist Group  Progress Note    Patient: Judeen Severs Age: 49 y.o. : 1960 MR#: [id number] SSN: [id number]  Date: 9/10/2017     Subjective:     Seen with family @ bedside. No complaints. Assessment/Plan:   1. ESRD c HD - compelted HD today. Pt to get perm cath placement tomorrow. 2. HyperK+ resolved. 3. HTN - Procardia, Clonidine. 4. Anemia of chronic dz - noted. Venofer. 5. Dyslipidemia hx - on no meds as outpt. Additional Notes:      Case discussed with:  [x]Patient  [x]Family  [x]Nursing  []Case Management  DVT Prophylaxis:  []Lovenox  [x]Hep SQ  []SCDs  []Coumadin   []On Heparin gtt    Objective:   VS:   Visit Vitals    BP (!) 157/95    Pulse 91    Temp 98.3 °F (36.8 °C)    Resp 18    Ht 5' 2\" (1.575 m)    Wt 57 kg (125 lb 9.6 oz)    SpO2 96%    Breastfeeding No    BMI 22.97 kg/m2      Tmax/24hrs: Temp (24hrs), Av.7 °F (36.5 °C), Min:96.8 °F (36 °C), Max:98.3 °F (36.8 °C)      Intake/Output Summary (Last 24 hours) at 09/10/17 2222  Last data filed at 09/10/17 1419   Gross per 24 hour   Intake              150 ml   Output                0 ml   Net              150 ml       General:  Awake, alert, NAD. Cardiovascular:  RRR. Pulmonary:  CTA B.  GI:  Soft, NT/ND, NABS. Extremities:  No CT or edema.    Additional:      Labs:    Recent Results (from the past 24 hour(s))   METABOLIC PANEL, BASIC    Collection Time: 09/10/17  3:11 AM   Result Value Ref Range    Sodium 141 136 - 145 mmol/L    Potassium 4.6 3.5 - 5.5 mmol/L    Chloride 107 100 - 108 mmol/L    CO2 25 21 - 32 mmol/L    Anion gap 9 3.0 - 18 mmol/L    Glucose 96 74 - 99 mg/dL    BUN 55 (H) 7.0 - 18 MG/DL    Creatinine 8.28 (H) 0.6 - 1.3 MG/DL    BUN/Creatinine ratio 7 (L) 12 - 20      GFR est AA 6 (L) >60 ml/min/1.73m2    GFR est non-AA 5 (L) >60 ml/min/1.73m2    Calcium 7.8 (L) 8.5 - 10.1 MG/DL       Signed By: Justine Rose MD     September 10, 2017 6:38 PM

## 2017-09-11 NOTE — INTERDISCIPLINARY ROUNDS
IDR/SLIDR Summary          Patient: Brooks Simpson MRN: [id number]    Age: 49 y.o. YOB: 1960 Room/Bed: VASC/PL   Admit Diagnosis: Hyperkalemia  Hyperkalemia  Renal failure  SCOOTER (acute kidney injury) ([facility]  Principal Diagnosis: <principal problem not specified>   Goals: Will resolve SCOOTER. Readmission: NO  Quality Measure: Not applicable  VTE Prophylaxis: Chemical  Influenza Vaccine screening completed? NO  Pneumococcal Vaccine screening completed? NO  Mobility needs: Yes   Nutrition plan:Yes  Consults:Case Management    Financial concerns:No  Escalated to CM? YES  RRAT Score: 3   Interventions:Palliative Care   Testing due for pt today?  YES  LOS: 3 days Expected length of stay TBD days  Discharge plan: Home   PCP: PROVIDER UNKNOWN  Transportation needs: No    Days before discharge:discharge pending  Discharge disposition: Home    Signed:     Pratik Tomas RN  9/11/2017  10:22 AM

## 2017-09-11 NOTE — INTERVAL H&P NOTE
H&P Update:  Luis Ureña was seen and examined. History and physical has been reviewed. The patient has been examined.  There have been no significant clinical changes since the completion of the originally dated History and Physical.    Signed By: Cookie Carcamo MD     September 11, 2017 7:37 AM

## 2017-09-11 NOTE — ROUTINE PROCESS
BLEEDING TO RT GROIN OLD DIALYSIS SITE;DSG REENFORCED BY NIGHT SHIFT NURSES;FAMILY [email or web address] TREVER [email or web address]/IN REACH

## 2017-09-11 NOTE — PROGRESS NOTES
Hemodialysis Rounding Note      Patient: Brooks Simpson               Sex: female          DOA: 2017  3:56 PM        YOB: 1960      Age:  49 y.o.        LOS:  LOS: 3 days     Subjective:     Brooks Simpson is a 49 y.o.  who presents with Hyperkalemia  Hyperkalemia  Renal failure  SCOOTER (acute kidney injury) ([address] The patient is dialyzing utilizing the following method:Intermittent Hemodialysis        Complaints: sore from recent HD cath placement. Some bleeding earlier in right groin post removal of temp HD cath. Current Facility-Administered Medications   Medication Dose Route Frequency    diphenhydrAMINE (BENADRYL) capsule 25 mg  25 mg Oral Q6H PRN    gabapentin (NEURONTIN) capsule 100 mg  100 mg Oral QHS    calcium acetate (PHOSLO) capsule 667 mg  1 Cap Oral TID WITH MEALS    epoetin pilar (EPOGEN;PROCRIT) injection 4,000 Units  4,000 Units IntraVENous Q TUE, THU & SAT    cloNIDine HCl (CATAPRES) tablet 0.1 mg  0.1 mg Oral BID    NIFEdipine ER (PROCARDIA XL) tablet 30 mg  30 mg Oral DAILY    0.9% sodium chloride infusion  100 mL/hr IntraVENous DIALYSIS PRN    albumin human 25% (BUMINATE) solution 12.5 g  12.5 g IntraVENous DIALYSIS PRN    alteplase (CATHFLO) 2 mg in sterile water (preservative free) 2 mL injection  2 mg InterCATHeter ONCE PRN    heparin (porcine) 1,000 unit/mL injection 1,000 Units  1,000 Units InterCATHeter DIALYSIS PRN    oxyCODONE-acetaminophen (PERCOCET) 5-325 mg per tablet 1 Tab  1 Tab Oral Q8H PRN    heparin (porcine) injection 5,000 Units  5,000 Units SubCUTAneous Q8H           1901 -  0700  In: 150 [I.V.:150]  Out: -       Objective:     Blood pressure (!) 169/95, pulse 95, temperature 98.4 °F (36.9 °C), resp. rate 18, height 5' 2\" (1.575 m), weight 57 kg (125 lb 9.6 oz), SpO2 96 %, not currently breastfeeding.   Temp (24hrs), Av.2 °F (36.8 °C), Min:97.7 °F (36.5 °C), Max:98.4 °F (36.9 °C)      Blood Pressure: BP: (!) 169/95  Pulse: Pulse (Heart Rate): 95  Temp:  Temp: 98.4 °F (36.9 °C)    Artificial Kidney Dialyzer/Set Up Inspection: Revaclear   hours Duration of Treatment (hours): 2 hours   Heparin Bolus    Blood flow rate Blood Flow Rate (ml/min): 300 ml/min   Dialysate rate     Arterial Access Pressure Arterial Access Pressure (mmHg): -110  Venous Return Pressure Venous Return Pressure (mmHg): 100  Ultrafiltration Rate Goal/Amount of Fluid to Remove (mL): 0 mL  Fluid Removal Fluid Removed (mL): 266  Net Fluid Removal NET Fluid Removed (mL): 0 ml      PHYSICAL EXAM: alert, not in any distress  HEENT:  Non icteric  NECK:  No JVD, right IJ TDC  CHEST AND LUNGS:  Clear ant/lat  CVS:  Regular no rub  ABDOMEN:  Soft + bs  EXT:  No edema,     DATA REVIEW:    Labs: Results:       Chemistry Recent Labs      09/10/17   0311  09/09/17   0304  09/08/17   1940   GLU  96  95  69*   NA  141  137  140   K  4.6  5.7*  5.5   CL  107  107  106   CO2  25  21  25   BUN  55*  123*  113*   CREA  8.28*  13.10*  13.60*   CA  7.8*  8.5  8.5  9.6   AGAP  9  9  9   BUCR  7*  9*  8*   AP   --   70   --    TP   --   5.7*   --    ALB   --   2.6*   --    GLOB   --   3.1   --    AGRAT   --   0.8   --       CBC w/Diff Recent Labs      09/09/17 0304 09/08/17   1630   WBC  6.5  5.5   RBC  3.10*  3.41*   HGB  8.8*  9.6*   HCT  26.0*  28.9*   PLT  173  196   GRANS   --   71   LYMPH   --   21   EOS   --   0      Coagulation Recent Labs      09/09/17 0304   PTP  12.4   INR  1.0       Iron/Ferritin Recent Labs      09/09/17 0304   IRON  33*      BNP No results for input(s): BNPP in the last 72 hours. Cardiac Enzymes No results for input(s): CPK, CKND1, WOLFGANG in the last 72 hours. No lab exists for component: CKRMB, TROIP   Liver Enzymes Recent Labs      09/09/17 0304   TP  5.7*   ALB  2.6*   AP  70   SGOT  38*      Thyroid Studies No results found for: T4, T3U, TSH, TSHEXT, TSHEXT       Images:  XR Results (maximum last 3):     Results from [place] encounter on 09/08/17   XR CHEST AP LAT   Narrative AP and lateral chest    History: Dizziness, bilateral leg numbness for two days and acute renal failure    Comparison: None    Findings: The cardiomediastinal silhouette is borderline in size considering technique. The pulmonary vasculature is normal. Left lower lobe linear subsegmental  atelectasis. There is no focal consolidation, pneumothorax or pleural effusion. The visualized bony structures are normal.         Impression Impression:     No radiographic evidence of acute cardiopulmonary disease. Thank you for this referral.        CT Results (maximum last 3): Results from [place] encounter on 09/08/17   CT ABD PELV WO CONT   Narrative PROCEDURE:  CT Abdomen, Pelvis without Contrast.    INDICATION:  Renal failure. COMPARISON:  None    TECHNIQUE:  Helical volumetric CT imaging of the abdomen and pelvis is performed  at 5 mm axial sections without IV or oral contrast administration. Coronal and  sagittal multiplanar reconstruction images are generated for improved anatomic  delineation.    - Radiation dose:   mGy-cm.  - All CT scans at this facility are performed using dose optimization technique  as appropriate to the performed exam, to include automated exposure control,  adjustment of the mA and/or kV according to patient's size (Including  appropriate matching for site-specific examinations), or use of iterative  reconstruction technique. FINDINGS:    ABDOMEN    Lung Bases:  Left posterior lung base scarring. Liver:  0.5 cm focal hypodensity in the left lobe of the liver, with fat  attenuation. Suggestive of a hepatic lipoma. Spleen:  Multiple punctate calcifications suggestive of old granulomatous  disease. Adrenal Glands, Gallbladder:  Unremarkable. Pancreas:  Subtle fat stranding around the pancreatic head region. Kidneys: The right kidney appears small in size (6.4 cm in length).   The left  kidney appears decreased in size as well (7.7 cm in length). Small cystic  structure in the left kidney measuring 2.1 x 1.8 cm, suggestive of renal cysts. No postobstructive changes. No hydronephrosis. No convincing evidence for  ureteral stone. Gastrointestinal Tract, Abdominal Wall, Mesentery:      - Unremarkable stomach. - No acute findings along the small bowel. No small bowel obstruction.    - Normal appendix.    - No acute diverticulitis or colitis. - No mesenteric adenopathy. Retroperitoneum:  No adenopathy. Vascular:  Non-aneurysmal aorta. PELVIS    Urinary Bladder:  Unremarkable. Rectum:  Unremarkable. Uterus:  Atrophied. Adnexa:  No adnexal mass. Pelvic sidewall:  No adenopathy. No free fluid. Bones:  No acute osseous findings. Impression IMPRESSION:    1. Fat stranding around the pancreatic head. Query pancreatitis. Recommend  correlation with lipase/amylase level. 2.  Small kidney size bilaterally. Exophytic cyst in the left kidney near the  lower pole. No postobstructive changes. No stones in the ureters. 3.  Hepatic lipoma. IPTH 140    CULTURE:   )No results for input(s): SDES, CULT in the last 72 hours. No results for input(s): CULT in the last 72 hours. Assessment/Plan:     End Stage Renal Disease:  Patient is tolerating dialysis treatment well. (. 2ND hd). Additionally the patient has experienced normal dialysis treatment during dialysis. Dry weight   same. Ok to d/c from renal standpoint. Outpt HD [facility] 3rd shift ( 3:30 PM). Patient can start on Thursday. At 4:22  PM on 9/11/2017, I saw and examined patient during hemodialysis treatment. The patient was receiving hemodialysis for treatment of end stage renal disease. I have also reviewed vital signs, intake and output, lab results and recent events, and agreed with today's dialysis order.       Anemia:  IV  EPO    Renal Metabolic Bone Disease:  Cont phos binder                      Hypertension: better    Access: Right IJ perm cath         Preston Brannon MD  9/11/2017

## 2017-09-11 NOTE — ROUTINE PROCESS
Bedside and Verbal shift change report given to Micaela DIAZ (oncoming nurse) by Tomeka Spivey RN (offgoing nurse). Report included the following information SBAR, Kardex, MAR and Recent Results.     SITUATION:  Code Status: Full Code  Reason for Admission: Hyperkalemia  Hyperkalemia  Renal failure  SCOOTER (acute kidney injury) [facility])  Hospital day: 3  Problem List:       Hospital Problems  Never Reviewed          Codes Class Noted POA    Hyperkalemia ICD-10-CM: E87.5  ICD-9-CM: 276.7  9/8/2017 Unknown        Renal failure ICD-10-CM: N19  ICD-9-CM: 248  9/8/2017 Unknown        SCOOTER (acute kidney injury) [facility]) ICD-10-CM: N17.9  ICD-9-CM: 584.9  9/8/2017 Unknown              BACKGROUND:   Past Medical History:   Past Medical History:   Diagnosis Date    Chronic kidney disease     ESRD (end stage renal disease) [address] [address]     Hypercholesteremia     Hypertension     Kidney disease     Vitamin D deficiency       Patient taking anticoagulants yes    Patient has a defibrillator: no    History of shots NO for example, flu, pneumonia, tetanus   Isolation History NO for example, MRSA, CDiff    ASSESSMENT:  Changes in Assessment Throughout Shift: none  Significant Changes in 24 hours (for example, RR/code, fall)  Patient has Central Line: yes (temporary dialysis catheter to right groin) Reasons if yes: dialysis  Patient has Onofre Cath: no   Mobility Issues  PT  IV Patency  OR Checklist  Pending Tests    Last Vitals:  Vitals w/ MEWS Score (last day)     Date/Time MEWS Score Pulse Resp Temp BP Level of Consciousness SpO2    09/11/17 0727 1 99 18 98.1 °F (36.7 °C) (!)  167/96 Alert 96 %    09/11/17 0651 1 99 18 98.3 °F (36.8 °C) (!)  159/95 Alert 95 %    09/11/17 0000 1 93 18 98.2 °F (36.8 °C) 145/84 Alert 96 %    09/10/17 2027 1 84 18 97.7 °F (36.5 °C) 140/87 Alert 94 %    09/10/17 1604 1 91 18 98.3 °F (36.8 °C) (!)  157/95 Alert 96 %    09/10/17 1158 1 80 18 98.2 °F (36.8 °C) 140/87 Alert 97 %    09/10/17 0758 -- 92 -- 97.4 °F (36.3 °C) 148/85 -- 99 %    09/10/17 0430 1 89 18 96.8 °F (36 °C) 156/88 Alert 97 %    09/10/17 0007 1 84 20 97.6 °F (36.4 °C) (!)  154/91 Alert 99 %            PAIN    Pain Assessment    Pain Intensity 1: 10 (09/11/17 0727)    Pain Location 1: Leg    Pain Intervention(s) 1: Refused (Refused to take pain meds)    Patient Stated Pain Goal: 0  Intervention effective: no  Time of last intervention: Patient refused. Reassessment Completed: yes   Other actions taken for pain: Distraction    Last 3 Weights:  Last 3 Recorded Weights in this Encounter    09/08/17 1603 09/10/17 0723   Weight: 47.6 kg (105 lb) 57 kg (125 lb 9.6 oz)   Weight change:     INTAKE/OUPUT    Current Shift:      Last three shifts: 09/09 1901 - 09/11 0700  In: 150 [I.V.:150]  Out: -     RECOMMENDATIONS AND DISCHARGE PLANNING  Patient needs and requests: Pain control    Pending tests/procedures: labs     Discharge plan for patient: Home    Discharge planning Needs or Barriers: none    Estimated Discharge Date: TBD Posted on Whiteboard in Patients Room: yes       \"HEALS\" SAFETY CHECK  A safety check occurred in the patient's room between off going nurse and oncoming nurse listed above. The safety check included the below items:    H  High Alert Medications Verify all high alert medication drips (heparin, PCA, etc.)  E  Equipment Suction is set up for ALL patients (with yanker)  Red plugs utilized for all equipment (IV pumps, etc.)  WOWs wiped down at end of shift. Room stocked with oxygen, suction, and other unit-specific supplies  A  Alarms Bed alarm is set for fall risk patients  Ensure chair alarm is in place and activated if patient is up in a chair  L  Lines Check IV for any infiltration  Onofre bag is empty if patient has a Onofre   Tubing and IV bags are labeled  S  Safety  Room is clean, patient is clean, and equipment is clean. Hallways are clear from equipment besides carts.    Fall bracelet on for fall risk patients  Ensure room is clear and free of clutter  Suction is set up for ALL patients (with jenni)  Hallways are clear from equipment besides carts.    Isolation precautions followed, supplies available outside room, sign posted    Na Good RN

## 2017-09-11 NOTE — ROUTINE PROCESS
UP TO BSC W/ASSIST FROM CNA;ASSIST X2 BACK TO BED;C/O PAIN TO RT KNEE;RT KNEE APPEARS SWOLLEN;MD PAGED FOR NEW PAIN TO KNEE

## 2017-09-11 NOTE — PROGRESS NOTES
Problem: Falls - Risk of  Goal: *Absence of Falls  Document Asha Fall Risk and appropriate interventions in the flowsheet.    Outcome: Progressing Towards Goal  Fall Risk Interventions:  Mobility Interventions: Patient to call before getting OOB           Medication Interventions: Patient to call before getting OOB     Elimination Interventions: Call light in reach, Patient to call for help with toileting needs, Toilet paper/wipes in reach     History of Falls Interventions: Door open when patient unattended, Room close to nurse's station

## 2017-09-11 NOTE — DIALYSIS
ACUTE HEMODIALYSIS FLOW SHEET    HEMODIALYSIS ORDERS: Physician: Sonny Morales      Dialyzer: revaclear         Duration: 2.5 hr  BFR: 300   DFR: 600   Dialysate:  Temp *37 K+   3    Ca+  3 Na 140 Bicarb 35   Weight:  57 kg    Bed Scale [x]     Unable to Obtain []      Dry weight/UF Goal: 0 Access CVL  Needle Gauge     Heparin []  Bolus      Units    [] Hourly       Units    [x]None     Catheter locking solution heparin   Pre BP:   160/95    Pulse:     99     Temperature:   97.4  Respirations: 16  Tx: NS       ml/Bolus  Other        [x] N/A   Labs: Pre        Post:        [x] N/A   Additional Orders(medications, blood products, hypotension management):       [x] N/A     [x] Time Out/Safety Check  [x] [facility] Consent Verified     CATHETER ACCESS: []N/A   [x]Right   []Left   [x]IJ     []Fem   [] First use X-ray verified     [x]Tunnel                [] Non Tunneled   [x]No S/S infection  []Redness  []Drainage []Cultured []Swelling []Pain   [x]Medical Aseptic Prep Utilized   []Dressing Changed  [] Biopatch  Date: 09/11/17      []Clotted   [x]Patent   Flows: [x]Good  []Poor  []Reversed   If access problem,  notified: []Yes    []N/A  Date:           GRAFT/FISTULA ACCESS:  [x]N/A     []Right     []Left     []UE     []LE   []AVG   []AVF        []Buttonhole    []Medical Aseptic Prep Utilized   []No S/S infection  []Redness  []Drainage []Cultured []Swelling []Pain    Bruit:   [] Strong    [] Weak       Thrill :   [] Strong    [] Weak       Needle Gauge:    Length:     If access problem,  notified: []Yes     []N/A  Date:        Please describe access if present and not used:       GENERAL ASSESSMENT:    LUNGS:  Rate  SaO2%        [x] N/A    [x] Clear  [] Coarse  [] Crackles  [] Wheezing        [] Diminished     Location : []RLL   []LLL    []RUL  []ELÍAS   Cough: []Productive  []Dry  [x]N/A   Respirations:  [x]Easy  []Labored   Therapy:  [x]RA  []NC  l/min    Mask: []NRB []Venti       O2%                  []Ventilator []Intubated  [] Trach  [] BiPaP   CARDIAC: [x]Regular      [] Irregular   [] Pericardial Rub  [] JVD        []  Monitored  [] Bedside  [] Remotely monitored [] N/A  Rhythm:    EDEMA: [x] None  []Generalized  [] Pitting [] 1    [] 2    [] 3    [] 4                 [] Facial  [] Pedal  []  UE  [] LE   SKIN:   [x] Warm  [] Hot     [] Cold   [x] Dry     [] Pale   [] Diaphoretic                  [] Flushed  [] Jaundiced  [] Cyanotic  [] Rash  [] Weeping   LOC:    [x] Alert      [x]Oriented:    [] Person     [] Place  []Time               [] Confused  [] Lethargic  [] Medicated  [] Non-responsive     GI / ABDOMEN:  [] Flat    [] Distended    [x] Soft    [] Firm   []  Obese                             [] Diarrhea  [x] Bowel Sounds  [] Nausea  [] Vomiting       / URINE ASSESSMENT:[] Voiding   [x] Oliguria  [] Anuria   []  Onofre     [] Incontinent    []  Incontinent Brief      []  Bathroom Privileges     PAIN: [x] 0 []1  []2   []3   []4   []5   []6   []7   []8   []9   []10            Scale 0-10  Action/Follow Up:    MOBILITY:  [] Amb    [] Amb/Assist    [x] Bed    [] Wheelchair  [] Stretcher      All Vitals and Treatment Details on Attached [address]: [facility]          Room # 451     [] 1st Time Acute  [] Stat  [x] Routine  [] Urgent     [x] Acute Room  []  Bedside  [] ICU/CCU  [] ER   Isolation Precautions:  [x] Dialysis   [] Airborne   [] Contact    [] Reverse   Special Considerations:         [] Blood Consent Verified [x]N/A     ALLERGIES: banana  [] NKA          Code Status:  [x] Full Code  [] DNR  [] Other           HBsAg ONLY: Date Drawn 09/09/17         [x]Negative []Positive []Unknown   HBsAb: Date 09/09/17    [] Susceptible   [x] [id number] []Not Drawn  [] Drawn     Current Labs:    Date of Labs: Today [x]        Cut and paste current labs here.                                                                                                                                   DIET:  [x] Renal    [] Other     [] NPO     []  Diabetic      PRIMARY NURSE REPORT: First initial/Last name/Title      Pre Dialysis: George Casillas RN    Time: 1330      EDUCATION:    [x] Patient [] Other         Knowledge Basis: []None [x]Minimal [] Substantial   Barriers to learning  [x]N/A   [] Access Care     [] S&S of infection     [] Fluid Management     []K+     [x]Procedural    []Albumin     [] Medications     [] Tx Options     [] Transplant     [] Diet     [] Other   Teaching Tools:  [x] Explain  [] Demo  [] Handouts [] Video  Patient response:   [x] Verbalized understanding  [] Teach back  [] Return demonstration [] Requires follow up   Inappropriate due to            [address] Before each treatment:     Machine Number:                   [facility]                                  [x] Unit Machine # 8 with centralized RO                                  [] Portable Machine #1/RO serial # [id number]                                  [] Portable Machine #2/RO serial # [id number]                                  [] Portable Machine #3/RO serial # [id number]                                                                                                       [facility]                                  [] Portable Machine #11/RO serial # [id number]                                   [] Portable Machine #12/RO serial # [id number]                                  [] Portable Machine #13/RO serial #  [id number]      Alarm Test:  Pass time 1400         Other:         [x] RO/Machine Log Complete      Temp    37            [x]Extracorporeal Circuit Tested for integrity   Dialysate: pH  7.4 Conductivity: Meter   14     HD Machine   14                  TCD: 14  Dialyzer Lot # [id number]         Blood Tubing Lot # [id number]      Saline Lot #       CHLORINE TESTING-Before each treatment and every 4 hours    Total Chlorine: [x] less than 0.1 ppm  Time: 1400 4 Hr/2nd Check Time: 1700   (if greater than 0.1 ppm from Primary then every 30 minutes from Secondary)     TREATMENT INITIATION  with Dialysis Precautions:   [x] All Connections Secured                 [x] Saline Line Double Clamped   [x] Venous Parameters Set                  [x] Arterial Parameters Set    [x] Prime Given  250 ml               [x]Air Foam Detector Engaged      Treatment Initiation Note: pt arrived in stable condition via bed CVL accessed and treatment initiated without difficulty. Dr Mayank Greenwood at bedside     Medication Dose Volume Route Initials Dialyzer Cleared: [] Good [x] Fair  [] Poor    Blood processed:  43.0 L  UF Removed  0 Ml    Post Wt: 47    kg  POst BP:   174/83       Pulse: 100      Respirations: 16  Temperature: 98.5        epogen   4000u      1mL                  Post Tx Vascular Access: AVF/AVG: Bleeding stopped Art  min. Wei. Min   N/A                                   Catheter: Locking solution: Heparin 1:1000 Art. 1.6  Wei. 1.6                                   Post Assessment:                                    Skin:  [x] Warm  [x] Dry [] Diaphoretic    [] Flushed  [] Pale [] Cyanotic   [facility] Signatures Title Initials  Time Lungs: [x] Clear    [] Course  [] Crackles  [] Wheezing [] Diminished   Ayala Mcgarry RN    Cardiac: [x] Regular   [] Irregular   [] Monitor  [] N/A  Rhythm:           Edema:  [x] None    [] General     [] Facial   [] Pedal    [] UE    [] LE       Pain: [x]0  []1  []2   []3  []4   []5   []6   []7   []8   []9   []10         Post Treatment Note: HD well tolerated. 0L UF removed. No acute distress noted during or post HD treatment.      POST TREATMENT PRIMARY NURSE HANDOFF REPORT:     First initial/Last name/Title         Post Dialysis: Quinn Nieves RN Time:  1700     Abbreviations: AVG-arterial venous graft, AVF-arterial venous fistula, IJ-Internal Jugular, Subcl-Subclavian, Fem-Femoral, Tx-treatment, AP/HR-apical heart rate, DFR-dialysate flow rate, BFR-blood flow rate, AP-arterial pressure, -venous pressure, UF-ultrafiltrate, TMP-transmembrane pressure, Wei-Venous, Art-Arterial, RO-Reverse Osmosis

## 2017-09-12 LAB
APPEARANCE FLD: ABNORMAL
BASOPHILS # BLD: 0 K/UL (ref 0–0.06)
BASOPHILS NFR BLD: 0 % (ref 0–2)
BODY FLD TYPE: NORMAL
COLOR FLD: YELLOW
CRYSTALS FLD MICRO: NORMAL
DIFFERENTIAL METHOD BLD: ABNORMAL
EOSINOPHIL # BLD: 0 K/UL (ref 0–0.4)
EOSINOPHIL NFR BLD: 0 % (ref 0–5)
EOSINOPHIL NFR FLD MANUAL: 0 %
ERYTHROCYTE [DISTWIDTH] IN BLOOD BY AUTOMATED COUNT: 13.6 % (ref 11.6–14.5)
HCT VFR BLD AUTO: 22.9 % (ref 35–45)
HGB BLD-MCNC: 7.5 G/DL (ref 12–16)
LYMPHOCYTES # BLD: 0.9 K/UL (ref 0.9–3.6)
LYMPHOCYTES NFR BLD: 11 % (ref 21–52)
LYMPHOCYTES NFR FLD: 2 %
MCH RBC QN AUTO: 28.2 PG (ref 24–34)
MCHC RBC AUTO-ENTMCNC: 32.8 G/DL (ref 31–37)
MCV RBC AUTO: 86.1 FL (ref 74–97)
MONOCYTES # BLD: 0.9 K/UL (ref 0.05–1.2)
MONOCYTES NFR BLD: 10 % (ref 3–10)
MONOCYTES NFR FLD: 2 %
NEUTROPHILS NFR FLD: 96 %
NEUTS BAND # FLD: 0 %
NEUTS SEG # BLD: 6.7 K/UL (ref 1.8–8)
NEUTS SEG NFR BLD: 79 % (ref 40–73)
NUC CELL # FLD: 1455 /CU MM
PLATELET # BLD AUTO: 89 K/UL (ref 135–420)
PMV BLD AUTO: 9.3 FL (ref 9.2–11.8)
RBC # BLD AUTO: 2.66 M/UL (ref 4.2–5.3)
RBC # FLD: 240 /CU MM
SPECIMEN SOURCE FLD: ABNORMAL
WBC # BLD AUTO: 8.5 K/UL (ref 4.6–13.2)

## 2017-09-12 PROCEDURE — 87075 CULTR BACTERIA EXCEPT BLOOD: CPT | Performed by: PHYSICIAN ASSISTANT

## 2017-09-12 PROCEDURE — 85025 COMPLETE CBC W/AUTO DIFF WBC: CPT | Performed by: INTERNAL MEDICINE

## 2017-09-12 PROCEDURE — 87040 BLOOD CULTURE FOR BACTERIA: CPT | Performed by: INTERNAL MEDICINE

## 2017-09-12 PROCEDURE — 65270000029 HC RM PRIVATE

## 2017-09-12 PROCEDURE — 87070 CULTURE OTHR SPECIMN AEROBIC: CPT | Performed by: PHYSICIAN ASSISTANT

## 2017-09-12 PROCEDURE — 74011000258 HC RX REV CODE- 258: Performed by: INTERNAL MEDICINE

## 2017-09-12 PROCEDURE — 89051 BODY FLUID CELL COUNT: CPT | Performed by: PHYSICIAN ASSISTANT

## 2017-09-12 PROCEDURE — 74011250636 HC RX REV CODE- 250/636: Performed by: INTERNAL MEDICINE

## 2017-09-12 PROCEDURE — 74011250637 HC RX REV CODE- 250/637: Performed by: INTERNAL MEDICINE

## 2017-09-12 PROCEDURE — 93306 TTE W/DOPPLER COMPLETE: CPT

## 2017-09-12 PROCEDURE — 89060 EXAM SYNOVIAL FLUID CRYSTALS: CPT | Performed by: PHYSICIAN ASSISTANT

## 2017-09-12 PROCEDURE — 74011250637 HC RX REV CODE- 250/637: Performed by: FAMILY MEDICINE

## 2017-09-12 PROCEDURE — 36415 COLL VENOUS BLD VENIPUNCTURE: CPT | Performed by: INTERNAL MEDICINE

## 2017-09-12 RX ORDER — ACETAMINOPHEN 325 MG/1
650 TABLET ORAL
Status: DISCONTINUED | OUTPATIENT
Start: 2017-09-12 | End: 2017-09-13 | Stop reason: HOSPADM

## 2017-09-12 RX ADMIN — CEFTRIAXONE 1 G: 1 INJECTION, POWDER, FOR SOLUTION INTRAMUSCULAR; INTRAVENOUS at 22:35

## 2017-09-12 RX ADMIN — GABAPENTIN 100 MG: 100 CAPSULE ORAL at 22:35

## 2017-09-12 RX ADMIN — CLONIDINE HYDROCHLORIDE 0.1 MG: 0.1 TABLET ORAL at 17:01

## 2017-09-12 RX ADMIN — CALCIUM ACETATE 667 MG: 667 CAPSULE ORAL at 09:23

## 2017-09-12 RX ADMIN — HEPARIN SODIUM 5000 UNITS: 5000 INJECTION, SOLUTION INTRAVENOUS; SUBCUTANEOUS at 07:09

## 2017-09-12 RX ADMIN — OXYCODONE HYDROCHLORIDE AND ACETAMINOPHEN 1 TABLET: 5; 325 TABLET ORAL at 03:00

## 2017-09-12 RX ADMIN — CLONIDINE HYDROCHLORIDE 0.1 MG: 0.1 TABLET ORAL at 09:23

## 2017-09-12 RX ADMIN — ACETAMINOPHEN 650 MG: 325 TABLET ORAL at 11:38

## 2017-09-12 RX ADMIN — CALCIUM ACETATE 667 MG: 667 CAPSULE ORAL at 11:38

## 2017-09-12 RX ADMIN — NIFEDIPINE 30 MG: 30 TABLET, FILM COATED, EXTENDED RELEASE ORAL at 09:22

## 2017-09-12 RX ADMIN — CALCIUM ACETATE 667 MG: 667 CAPSULE ORAL at 17:01

## 2017-09-12 NOTE — PROGRESS NOTES
Bedside and Verbal shift change report given to Tamara (oncoming nurse) by Jacinto Alvarez (offgoing nurse). Report included the following information SBAR, Kardex, MAR and Recent Results. SITUATION:  Code Status: Full Code  Reason for Admission: Hyperkalemia  Hyperkalemia  Renal failure  SCOOTER (acute kidney injury) [facility])  Hospital day: 4  Problem List:       Hospital Problems  Never Reviewed          Codes Class Noted POA    Hyperkalemia ICD-10-CM: E87.5  ICD-9-CM: 276.7  9/8/2017 Unknown        Renal failure ICD-10-CM: N19  ICD-9-CM: 660  9/8/2017 Unknown        SCOOTER (acute kidney injury) ([facility] ICD-10-CM: N17.9  ICD-9-CM: 584.9  9/8/2017 Unknown              BACKGROUND:   Past Medical History:   Past Medical History:   Diagnosis Date    Chronic kidney disease     ESRD (end stage renal disease) ([facility] [address]     Hypercholesteremia     Hypertension     Kidney disease     Vitamin D deficiency       Patient taking anticoagulants yes    Patient has a defibrillator: no     ASSESSMENT:  Changes in Assessment Throughout Shift:   Patient alert and oriented x's 4. Vitals stable, on RA, in NSR/ST, fever this morning (tylenol given). Percocet given for pain. Swollen left knee, fluid sent to lab (probable gout). Eating well. Voiding adequately. No BM today. Plans for dialysis tomorrow. Will continue to monitor.      Significant Changes in 24 hours (for example, RR/code, fall)  Patient has Central Line: yes - dialysis   Patient has Onofre Cath: no   Mobility Issues  PT  IV Patency  OR Checklist  Pending Tests    Last Vitals:  Vitals w/ MEWS Score (last day)     Date/Time MEWS Score Pulse Resp Temp BP Level of Consciousness SpO2    09/12/17 1509 1 98 18 98.6 °F (37 °C) 132/86 Alert 98 %    09/12/17 1121 1 97 18 (!)  100.6 °F (38.1 °C) 149/85 Alert 95 %    09/12/17 0718 2 (!)  103 18 (!)  100.9 °F (38.3 °C) (!)  148/92 Alert 93 %    09/12/17 0351 1 100 18 99.2 °F (37.3 °C) 137/87 Alert 94 %    09/11/17 2000 1 98 16 98.9 °F (37.2 °C) 167/82 Alert --    09/11/17 1700 -- 100 16 99.1 °F (37.3 °C) 174/83 -- --    09/11/17 1630 -- (!)  112 18 -- (!)  166/93 -- --    09/11/17 1600 -- 100 18 -- (!)  170/110 -- --    09/11/17 1530 -- 95 18 -- (!)  169/95 -- --    09/11/17 1500 -- 92 17 -- 151/87 -- --    09/11/17 1430 -- 96 16 -- (!)  163/92 -- --    09/11/17 1415 -- 99 16 98.9 °F (37.2 °C) (!)  160/95 -- --    09/11/17 1128 1 98 18 98.4 °F (36.9 °C) (!)  160/95 Alert 96 %    09/11/17 0727 1 99 18 98.1 °F (36.7 °C) (!)  167/96 Alert 96 %    09/11/17 0651 1 99 18 98.3 °F (36.8 °C) (!)  159/95 Alert 95 %    09/11/17 0000 1 93 18 98.2 °F (36.8 °C) 145/84 Alert 96 %            PAIN    Pain Assessment    Pain Intensity 1: 3 (09/12/17 0351)    Pain Location 1: Knee    Pain Intervention(s) 1: Refused (Refused to take pain meds)    Patient Stated Pain Goal: 0  Intervention effective: yes    Last 3 Weights:  Last 3 Recorded Weights in this Encounter    09/08/17 1603 09/10/17 0723   Weight: 47.6 kg (105 lb) 57 kg (125 lb 9.6 oz)   Weight change:     INTAKE/OUPUT    Current Shift: 09/12 0701 - 09/12 1900  In: 420 [P.O.:420]  Out: -     Last three shifts: 09/10 1901 - 09/12 0700  In: -   Out: 300 [Urine:300]    RECOMMENDATIONS AND DISCHARGE PLANNING  Patient needs and requests: NA    Pending tests/procedures: NA     Discharge plan for patient: to be determined     Discharge planning Needs or Barriers: NA    Estimated Discharge Date: TO BE DETERMINED Posted on Whiteboard in Patients Room: no       \"HEALS\" SAFETY CHECK  A safety check occurred in the patient's room between off going nurse and oncoming nurse listed above. The safety check included the below items:    H  High Alert Medications Verify all high alert medication drips (heparin, PCA, etc.)  E  Equipment Suction is set up for ALL patients (with yanker)  Red plugs utilized for all equipment (IV pumps, etc.)  WOWs wiped down at end of shift.   Room stocked with oxygen, suction, and other unit-specific supplies  A  Alarms Bed alarm is set for fall risk patients  Ensure chair alarm is in place and activated if patient is up in a chair  L  Lines Check IV for any infiltration  Onofre bag is empty if patient has a Onofre   Tubing and IV bags are labeled  S  Safety  Room is clean, patient is clean, and equipment is clean. Hallways are clear from equipment besides carts. Fall bracelet on for fall risk patients  Ensure room is clear and free of clutter  Suction is set up for ALL patients (with jenni)  Hallways are clear from equipment besides carts.    Isolation precautions followed, supplies available outside room, sign posted    Yvette Cuevas

## 2017-09-12 NOTE — PROGRESS NOTES
RENAL DAILY PROGRESS NOTE    Subjective:   Admitted for weakness, seen for ESRD and hyperkalemia    Complaint: Daughter at bedside,  C/O of acute right knee pain since yesterday, no trauma, also bruises noted in her legs. Temp elevation earlier today, no N/V.  No further bleeding from right femoral.    Current Facility-Administered Medications   Medication Dose Route Frequency    acetaminophen (TYLENOL) tablet 650 mg  650 mg Oral Q6H PRN    diphenhydrAMINE (BENADRYL) capsule 25 mg  25 mg Oral Q6H PRN    epoetin pilar (EPOGEN;PROCRIT) injection 4,000 Units  4,000 Units IntraVENous DIALYSIS MON, WED & FRI    gabapentin (NEURONTIN) capsule 100 mg  100 mg Oral QHS    calcium acetate (PHOSLO) capsule 667 mg  1 Cap Oral TID WITH MEALS    cloNIDine HCl (CATAPRES) tablet 0.1 mg  0.1 mg Oral BID    NIFEdipine ER (PROCARDIA XL) tablet 30 mg  30 mg Oral DAILY    0.9% sodium chloride infusion  100 mL/hr IntraVENous DIALYSIS PRN    albumin human 25% (BUMINATE) solution 12.5 g  12.5 g IntraVENous DIALYSIS PRN    alteplase (CATHFLO) 2 mg in sterile water (preservative free) 2 mL injection  2 mg InterCATHeter ONCE PRN    heparin (porcine) 1,000 unit/mL injection 1,000 Units  1,000 Units InterCATHeter DIALYSIS PRN    oxyCODONE-acetaminophen (PERCOCET) 5-325 mg per tablet 1 Tab  1 Tab Oral Q8H PRN    heparin (porcine) injection 5,000 Units  5,000 Units SubCUTAneous Q8H           Objective:     Patient Vitals for the past 24 hrs:   Temp Pulse Resp BP SpO2   09/12/17 0718 (!) 100.9 °F (38.3 °C) (!) 103 18 (!) 148/92 93 %   09/12/17 0351 99.2 °F (37.3 °C) 100 18 137/87 94 %   09/11/17 2000 98.9 °F (37.2 °C) 98 16 167/82 -   09/11/17 1700 99.1 °F (37.3 °C) 100 16 174/83 -   09/11/17 1630 - (!) 112 18 (!) 166/93 -   09/11/17 1600 - 100 18 (!) 170/110 -   09/11/17 1530 - 95 18 (!) 169/95 -   09/11/17 1500 - 92 17 151/87 -   09/11/17 1430 - 96 16 (!) 163/92 -   09/11/17 1415 98.9 °F (37.2 °C) 99 16 (!) 160/95 -   09/11/17 1128 98.4 °F (36.9 °C) 98 18 (!) 160/95 96 %        Weight change:      09/10 1901 - 09/12 0700  In: -   Out: 300 [Urine:300]    Intake/Output Summary (Last 24 hours) at 09/12/17 1049  Last data filed at 09/12/17 0939   Gross per 24 hour   Intake              100 ml   Output              300 ml   Net             -200 ml     Physical Exam:     HEENT: non icteric, slightly pale conj   Neck: no JVD, Right IJ TDC dressing clean, no tenderness or redness  Cardiovascular: regular, no rub  C/L: clear ant/lat  Abdomen: soft + bs  Ext: no edema, right knee swollen warm, acutely tender to touch. Bruises noted right and left legs    Data Review:     LABS:   Hematology: No results for input(s): WBC, HGB, HCT, HGBEXT, HCTEXT, HGBEXT, HCTEXT in the last 72 hours. Chemistry:   Recent Labs      09/10/17   0311   BUN  55*   CREA  8.28*   CA  7.8*   K  4.6   NA  141   CL  107   CO2  25   GLU  96    Iron 33 sat 16% raj 492  UPCR 9.2 grams/day  Xray right knee, mod to large effusion vs soft tissue swelling    IMPRESSION AND PLAN:   ESRD most likely from HTN, sched  HD on Thursday  and hopefully home tomorrow Has outpt HD chair at [facility] TTS 3rd shift. Also discussed PD. PD  Nurse will contact family in the next few days for education to see if she would prefer doing PD as oppose to HD. Acute right knee swelling suspect acute gout but need to r/o infectious cause given fever and recent cath placement. Discussed with Dr. Renee Couch will get ortho eval. If confirmed gout start po prednisone or IA steroids. CBC and 1 set of Blood c/s today.   HTN better cont same meds for now  Anemia from CKD and fe def, Venofer/MILTON with HD  2nd HPTH cont phoslo with meals use Darryl 3 bath  Hypoalbuminemia start Nepro  Leg numbness most likely from neuropathy start low dose neurontin         Ivett Pat MD  9/12/2017

## 2017-09-12 NOTE — PROGRESS NOTES
Pt s/p perm cath placement yesterday. Catheter functioning well. Will need outpt follow up to discuss permanent dialysis access. Please call with questions or concerns. Will remain available as needed.

## 2017-09-12 NOTE — CONSULTS
[facility]    Consult Note    Patient: Salvatore Gonzalez               Sex: female          DOA: 9/8/2017         YOB: 1960      Age:  49 y.o.        LOS:  LOS: 4 days              HPI:     Salvatore Gonzalez is a 49 y.o. female who has been seen for acute right knee pain. States that her knee started hurting yesterday and the pain is now severe. Does have a history of gout. Patient history is limited secondary to language barrier. Denies an injury. Visit translated by family. Past Medical History:   Diagnosis Date    Chronic kidney disease     [facility] (end stage [facility] disease) ([facility] [address] [address]     Hypercholesteremia     Hypertension     Kidney disease     Vitamin D deficiency        History reviewed. No pertinent surgical history. History reviewed. No pertinent family history. Social History     Social History    Marital status:      Spouse name: N/A    Number of children: N/A    Years of education: N/A     Social History Main Topics    Smoking status: Never Smoker    Smokeless tobacco: Never Used    Alcohol use No    Drug use: No    Sexual activity: Not Asked     Other Topics Concern    None     Social History Narrative    None       Prior to Admission medications    Medication Sig Start Date End Date Taking? Authorizing Provider   pregabalin (LYRICA) 50 mg capsule Take  by mouth. Brenda Blackman MD   furosemide (LASIX) 40 mg tablet Take  by mouth daily. Yes Brenda Blackman MD   cloNIDine HCl (CATAPRES) 0.1 mg tablet Take 0.1 mg by mouth two (2) times a day. Yes Brenda Blackman MD   NIFEdipine ER (ADALAT CC) 30 mg ER tablet Take 20 mg by mouth daily. Yes Brenda Blackman MD   multivitamin with iron tablet Take 1 Tab by mouth daily. Yes Brenda Blackman MD   OTHER Take 600 mg by mouth two (2) times a day. Yes Brenda Blackman MD   CEFIXIME PO Take 200 mg by mouth two (2) times a day.    Yes Brenda Blackman MD       Allergies   Allergen Reactions    Banana Other (comments)       Review of Systems  Negative for any fever, chills, sweats, headache, blurred vision, cough, congestion, SOB, abdominal pain, bleeding, bruising, numbness, or tingling. 12 point ROS otherwise negative other than noted in the HPI      Physical Exam:      Visit Vitals    /86 (BP 1 Location: Left arm, BP Patient Position: At rest)    Pulse 98    Temp 98.6 °F (37 °C)    Resp 18    Ht 5' 2\" (1.575 m)    Wt 125 lb 9.6 oz (57 kg)    SpO2 98%    Breastfeeding No    BMI 22.97 kg/m2       Physical Exam:  General: Well developed, well nourished, 49 y.o. female in  NAD   Vitals: Blood pressure 132/86, pulse 98, temperature 98.6 °F (37 °C), resp. rate 18, height 5' 2\" (1.575 m), weight 125 lb 9.6 oz (57 kg), SpO2 98 %, not currently breastfeeding. Skin: No rashs, ulcers, icterus, or other obvious lesions/ lacerations  Eyes: EOM intact, PERRLA   ENM: Without obvious lesions. Nares patent. Moist mucous membranes. Neck: Supple, FROM   Lungs: CTAB   Heart: RRR, normal S1, S2. No murmurs, rubs, or gallops  Abdomen: Soft, NT, bowel sounds present all 4 quadrants   Musculoskeletal:   Inspection of knee  Skin: no erythema  Effusion present  Diffuse ttp  -5 degrees of extension-90 degrees of flexion with extreme pain  No instability  Neuro: AAOx3. Without obvious deficits     Imaging: Two views obtained. The joint spaces are maintained. There is no fracture nor  dislocation. There is focal increased density superior to the patella likely a  moderate to large joint effusion. Mineralization is normal. There is no  chondrocalcinosis.   Labs Reviewed:  BMP: No results found for: NA, K, CL, CO2, AGAP, GLU, BUN, CREA, GFRAA, GFRNA  CMP: No results found for: NA, K, CL, CO2, AGAP, GLU, BUN, CREA, GFRAA, GFRNA, CA, MG, PHOS, ALB, TBIL, TP, ALB, GLOB, AGRAT, SGOT, ALT, GPT  CBC:   Lab Results   Component Value Date/Time    WBC 8.5 09/12/2017 12:44 PM    HGB 7.5 (L) 09/12/2017 12:44 PM    HCT 22.9 (L) 09/12/2017 12:44 PM    PLT 89 (L) 09/12/2017 12:44 PM        Procedure:  After sterile prep, right knee was aspirated. 50 cc of serous fluid with crystalswere obtained. No ingrid pus aspirated. The fluid was sent to lab for culture. After sterile prep, 4 cc of Xylocaine and 1 cc of Kenalog were injected into the right knee. [facility] - PROCEDURE PROGRESS NOTE        Chart reviewed for the following:  Gina DELEON PA-C, have reviewed the History, Physical and updated the Allergic reactions for [address] performed immediately prior to start of procedure:  Georgiana DELEON PA-C, have performed the following reviews on Jing Isaacs prior to the start of the procedure:            * Patient was identified by name and date of birth   * Agreement on procedure being performed was verified  * Risks and Benefits explained to the patient  * Procedure site verified and marked as necessary  * Patient was positioned for comfort  * Consent was signed and verified     Time: 11:14 AM     Date of procedure: 9/12/2017    Procedure performed by:  No name on file. Georgiana Langley PA-C    Provider assisted by: (see medication administration)    How tolerated by patient: tolerated the procedure well with no complications    Comments: none        Assessment/Plan   [unfilled]  Active Problems:    Hyperkalemia (9/8/2017)      Renal failure (9/8/2017)      SCOOTER (acute kidney injury) ([facility] [address] (9/8/2017)        Patient with acute pain in right knee, tmax 100.9 ddx gout flare  1. Knee aspirated today - fluid appears to be gout. Aspirate sent for cell count, crystals, culture with gram stain  2. Injection of cortisone given following aspiration  3. Recommend PT for range of motion. wbat  4. Will follow up on tomorrow to eval labs from aspirate. No surgical indications at this time.      Georgiana Langley PA-C   [facility]   [phone]

## 2017-09-13 VITALS
SYSTOLIC BLOOD PRESSURE: 156 MMHG | OXYGEN SATURATION: 100 % | WEIGHT: 125.6 LBS | HEIGHT: 62 IN | RESPIRATION RATE: 18 BRPM | DIASTOLIC BLOOD PRESSURE: 71 MMHG | BODY MASS INDEX: 23.11 KG/M2 | HEART RATE: 97 BPM | TEMPERATURE: 98.2 F

## 2017-09-13 LAB
ANION GAP SERPL CALC-SCNC: 5 MMOL/L (ref 3–18)
BASOPHILS # BLD: 0 K/UL (ref 0–0.1)
BASOPHILS NFR BLD: 0 % (ref 0–2)
BUN SERPL-MCNC: 44 MG/DL (ref 7–18)
BUN/CREAT SERPL: 5 (ref 12–20)
CALCIUM SERPL-MCNC: 8.5 MG/DL (ref 8.5–10.1)
CHLORIDE SERPL-SCNC: 103 MMOL/L (ref 100–108)
CO2 SERPL-SCNC: 28 MMOL/L (ref 21–32)
CREAT SERPL-MCNC: 8.95 MG/DL (ref 0.6–1.3)
DIFFERENTIAL METHOD BLD: ABNORMAL
EOSINOPHIL # BLD: 0 K/UL (ref 0–0.4)
EOSINOPHIL NFR BLD: 0 % (ref 0–5)
ERYTHROCYTE [DISTWIDTH] IN BLOOD BY AUTOMATED COUNT: 13.4 % (ref 11.6–14.5)
GLUCOSE SERPL-MCNC: 200 MG/DL (ref 74–99)
HCT VFR BLD AUTO: 23.7 % (ref 35–45)
HGB BLD-MCNC: 7.8 G/DL (ref 12–16)
LYMPHOCYTES # BLD: 0.4 K/UL (ref 0.9–3.6)
LYMPHOCYTES NFR BLD: 7 % (ref 21–52)
MCH RBC QN AUTO: 28.1 PG (ref 24–34)
MCHC RBC AUTO-ENTMCNC: 32.9 G/DL (ref 31–37)
MCV RBC AUTO: 85.3 FL (ref 74–97)
MONOCYTES # BLD: 0.3 K/UL (ref 0.05–1.2)
MONOCYTES NFR BLD: 5 % (ref 3–10)
NEUTS SEG # BLD: 5.7 K/UL (ref 1.8–8)
NEUTS SEG NFR BLD: 88 % (ref 40–73)
PHOSPHATE SERPL-MCNC: 2.1 MG/DL (ref 2.5–4.9)
PLATELET # BLD AUTO: 125 K/UL (ref 135–420)
PMV BLD AUTO: 10.2 FL (ref 9.2–11.8)
POTASSIUM SERPL-SCNC: 4.9 MMOL/L (ref 3.5–5.5)
RBC # BLD AUTO: 2.78 M/UL (ref 4.2–5.3)
SODIUM SERPL-SCNC: 136 MMOL/L (ref 136–145)
WBC # BLD AUTO: 6.4 K/UL (ref 4.6–13.2)

## 2017-09-13 PROCEDURE — 90935 HEMODIALYSIS ONE EVALUATION: CPT

## 2017-09-13 PROCEDURE — 84100 ASSAY OF PHOSPHORUS: CPT | Performed by: INTERNAL MEDICINE

## 2017-09-13 PROCEDURE — 74011250636 HC RX REV CODE- 250/636: Performed by: INTERNAL MEDICINE

## 2017-09-13 PROCEDURE — 74011000258 HC RX REV CODE- 258: Performed by: INTERNAL MEDICINE

## 2017-09-13 PROCEDURE — 86900 BLOOD TYPING SEROLOGIC ABO: CPT | Performed by: INTERNAL MEDICINE

## 2017-09-13 PROCEDURE — P9016 RBC LEUKOCYTES REDUCED: HCPCS | Performed by: INTERNAL MEDICINE

## 2017-09-13 PROCEDURE — 85025 COMPLETE CBC W/AUTO DIFF WBC: CPT | Performed by: INTERNAL MEDICINE

## 2017-09-13 PROCEDURE — 86920 COMPATIBILITY TEST SPIN: CPT | Performed by: INTERNAL MEDICINE

## 2017-09-13 PROCEDURE — 74011250637 HC RX REV CODE- 250/637: Performed by: FAMILY MEDICINE

## 2017-09-13 PROCEDURE — 80048 BASIC METABOLIC PNL TOTAL CA: CPT | Performed by: FAMILY MEDICINE

## 2017-09-13 PROCEDURE — 36415 COLL VENOUS BLD VENIPUNCTURE: CPT | Performed by: INTERNAL MEDICINE

## 2017-09-13 PROCEDURE — 86022 PLATELET ANTIBODIES: CPT | Performed by: INTERNAL MEDICINE

## 2017-09-13 PROCEDURE — 74011250637 HC RX REV CODE- 250/637: Performed by: INTERNAL MEDICINE

## 2017-09-13 RX ORDER — SODIUM CHLORIDE 9 MG/ML
250 INJECTION, SOLUTION INTRAVENOUS AS NEEDED
Status: DISCONTINUED | OUTPATIENT
Start: 2017-09-13 | End: 2017-09-13 | Stop reason: HOSPADM

## 2017-09-13 RX ORDER — CALCIUM ACETATE 667 MG/1
1 CAPSULE ORAL
Qty: 90 CAP | Refills: 1 | Status: SHIPPED | OUTPATIENT
Start: 2017-09-13 | End: 2018-01-01

## 2017-09-13 RX ORDER — GABAPENTIN 100 MG/1
100 CAPSULE ORAL
Qty: 30 CAP | Refills: 1 | Status: SHIPPED | OUTPATIENT
Start: 2017-09-13 | End: 2018-01-01 | Stop reason: CLARIF

## 2017-09-13 RX ADMIN — CALCIUM ACETATE 667 MG: 667 CAPSULE ORAL at 08:41

## 2017-09-13 RX ADMIN — OXYCODONE HYDROCHLORIDE AND ACETAMINOPHEN 1 TABLET: 5; 325 TABLET ORAL at 05:02

## 2017-09-13 RX ADMIN — CLONIDINE HYDROCHLORIDE 0.1 MG: 0.1 TABLET ORAL at 17:05

## 2017-09-13 RX ADMIN — ERYTHROPOIETIN 4000 UNITS: 4000 INJECTION, SOLUTION INTRAVENOUS; SUBCUTANEOUS at 14:13

## 2017-09-13 RX ADMIN — IRON SUCROSE 50 MG: 20 INJECTION, SOLUTION INTRAVENOUS at 14:13

## 2017-09-13 RX ADMIN — CLONIDINE HYDROCHLORIDE 0.1 MG: 0.1 TABLET ORAL at 08:41

## 2017-09-13 RX ADMIN — OXYCODONE HYDROCHLORIDE AND ACETAMINOPHEN 1 TABLET: 5; 325 TABLET ORAL at 17:51

## 2017-09-13 RX ADMIN — CALCIUM ACETATE 667 MG: 667 CAPSULE ORAL at 17:05

## 2017-09-13 RX ADMIN — NIFEDIPINE 30 MG: 30 TABLET, FILM COATED, EXTENDED RELEASE ORAL at 08:41

## 2017-09-13 NOTE — PROGRESS NOTES
[facility]  Progress Note    Patient: Mirlande Berumen Age: 49 y.o. : 1960 MR#: [id number] SSN: [id number]  Date: 2017     Subjective:     Seen with family @ bedside. Pt with extreme pain and significant swelling right knee, some pain in left knee. Per family at bedside, she does have h/o gout but has not had gout attack in right knee. Assessment/Plan:     -bilateral Knee pain, s/p ortho eval and aspiration of knee and injection of steroid into knee. Fluid studies show white count of around 14K mostly neutrophils, no crystals seen in fluid. Pt with fever and tachycardia and has skin lesions as described below, concern for septic arthritis. Will give ceftriaxone now, will consult ID. 1. ESRD c HD - s/p perm cath placement yesterday. 2. HyperK+ resolved. 3. HTN - Procardia, Clonidine. 4. Anemia of chronic dz - noted. Venofer. 5. Dyslipidemia hx - on no meds as outpt. Additional Notes:      Case discussed with:  [x]Patient  [x]Family  [x]Nursing  []Case Management  DVT Prophylaxis:  []Lovenox  [x]Hep SQ  []SCDs  []Coumadin   []On Heparin gtt    Objective:   VS:   Visit Vitals    /82 (BP 1 Location: Left arm, BP Patient Position: At rest)    Pulse 93    Temp 99.4 °F (37.4 °C)    Resp 18    Ht 5' 2\" (1.575 m)    Wt 57 kg (125 lb 9.6 oz)    SpO2 95%    Breastfeeding No    BMI 22.97 kg/m2      Tmax/24hrs: Temp (24hrs), Av.7 °F (37.6 °C), Min:98.6 °F (37 °C), Max:100.9 °F (38.3 °C)      Intake/Output Summary (Last 24 hours) at 17  Last data filed at 17 1524   Gross per 24 hour   Intake              420 ml   Output              300 ml   Net              120 ml       General:  Awake, alert, NAD. Cardiovascular:  RRR. Pulmonary:  CTA B.  GI:  Soft, NT/ND, NABS.    Extremities:  Right knee with apparent effusion presence, warm to touch, exquisitely tender, very limited ROM, left knee with no evidence of effusion, full ROM, with some tenderness with motion. Right leg, lateral aspect, proximal to knee there is a purpuric, non palpable, faint purple lesion, small with irregular borders, non tender, smaller area of involvement left leg symmetrically (also lateral aspect), on left arm just distal to antecubital fossa, there is a small similar lesion. Labs:    Recent Results (from the past 24 hour(s))   CBC WITH AUTOMATED DIFF    Collection Time: 09/12/17 12:44 PM   Result Value Ref Range    WBC 8.5 4.6 - 13.2 K/uL    RBC 2.66 (L) 4.20 - 5.30 M/uL    HGB 7.5 (L) 12.0 - 16.0 g/dL    HCT 22.9 (L) 35.0 - 45.0 %    MCV 86.1 74.0 - 97.0 FL    MCH 28.2 24.0 - 34.0 PG    MCHC 32.8 31.0 - 37.0 g/dL    RDW 13.6 11.6 - 14.5 %    PLATELET 89 (L) 283 - 420 K/uL    MPV 9.3 9.2 - 11.8 FL    NEUTROPHILS 79 (H) 40 - 73 %    LYMPHOCYTES 11 (L) 21 - 52 %    MONOCYTES 10 3 - 10 %    EOSINOPHILS 0 0 - 5 %    BASOPHILS 0 0 - 2 %    ABS. NEUTROPHILS 6.7 1.8 - 8.0 K/UL    ABS. LYMPHOCYTES 0.9 0.9 - 3.6 K/UL    ABS. MONOCYTES 0.9 0.05 - 1.2 K/UL    ABS. EOSINOPHILS 0.0 0.0 - 0.4 K/UL    ABS.  BASOPHILS 0.0 0.0 - 0.06 K/UL    DF AUTOMATED     CRYSTALS, SYNOVIAL FLUID    Collection Time: 09/12/17  4:15 PM   Result Value Ref Range    FLUID TYPE(7) KNEE FLUID      Crystals, body fluid NO CRYSTALS SEEN     CULTURE, BODY FLUID W GRAM STAIN    Collection Time: 09/12/17  4:15 PM   Result Value Ref Range    Special Requests: NO SPECIAL REQUESTS      GRAM STAIN MANY WBC'S      GRAM STAIN NO ORGANISMS SEEN      Culture result: PENDING    CELL COUNT AND DIFF, BODY FLUID    Collection Time: 09/12/17  4:15 PM   Result Value Ref Range    BODY FLUID TYPE KNEE FLUID      FLUID COLOR YELLOW      FLUID APPEARANCE TURBID      FLUID RBC COUNT 240 (A) NRRE /cu mm    FLUID NUCLEATED CELLS 1455 (A) NRRE /cu mm    FLD NEUTROPHILS 96 %    FLD BANDS 0 %    FLD LYMPHS 2 %    FLD MONOCYTES 2 %    FLD EOSINS 0 %       Signed By: Donavan Jackson MD     September 12, 2017 6:38 PM

## 2017-09-13 NOTE — PROGRESS NOTES
[facility]    Progress Note      Patient: Jing Isaacs               Sex: female          DOA: 9/8/2017         YOB: 1960      Age:  49 y.o.        LOS:  LOS: 5 days         S/P  * No surgery found *               Subjective:     Pain much better today. States she felt better about one hour after aspiration. Up ambulating with walker coming from restroom this am.     Denies cp, sob, abd pain   Objective:      Blood pressure 131/81, pulse 80, temperature 97.7 °F (36.5 °C), resp. rate 18, height 5' 2\" (1.575 m), weight 125 lb 9.6 oz (57 kg), SpO2 96 %, not currently breastfeeding. Well developed, Well Nourished alert, cooperative, no distress  Small effusion to right knee, no effusion to left knee  Right knee: full extension and flexion to 110 degrees, Left knee: full rom  Sensory is intact   Motor is intact  nv intact  2+distal pulses  Neg calf tenderness    Labs:  Cell count and crystals of aspirate:  Results for Mayra Cantu (MRN [id number]) as of 9/13/2017 11:39   Ref.  Range 9/12/2017 16:15   BODY FLUID TYPE Latest Units:   KNEE FLUID   FLUID APPEARANCE Latest Units:   TURBID   FLUID COLOR Latest Units:   YELLOW   FLUID RBC COUNT Latest Ref Range: NRRE /cu mm 240 (A)   FLUID NUCLEATED CELLS Latest Ref Range: NRRE /cu mm 1455 (A)   FLD NEUTROPHILS Latest Units: % 96   FLD BANDS Latest Units: % 0   FLD LYMPHS Latest Units: % 2   FLD MONOCYTES Latest Units: % 2   FLD EOSINS Latest Units: % 0   FLUID TYPE(7) Latest Units:   KNEE FLUID   Crystals, body fluid Latest Units:   NO CRYSTALS SEEN       Culture aspirate:    Special Requests: NO SPECIAL REQUESTS   Preliminary     GRAM STAIN MANY WBC'S   Preliminary     GRAM STAIN NO ORGANISMS SEEN   Preliminary     Culture result: PENDING         CBC  @  CBC:   Lab Results   Component Value Date/Time    WBC 6.4 09/13/2017 03:15 AM    RBC 2.78 09/13/2017 03:15 AM    HGB 7.8 09/13/2017 03:15 AM    HCT 23.7 09/13/2017 03:15 AM    PLATELET 125 09/13/2017 03:15 AM     BMP:   Lab Results   Component Value Date/Time    Glucose 200 09/13/2017 09:08 AM    Sodium 136 09/13/2017 09:08 AM    Potassium 4.9 09/13/2017 09:08 AM    Chloride 103 09/13/2017 09:08 AM    CO2 28 09/13/2017 09:08 AM    BUN 44 09/13/2017 09:08 AM    Creatinine 8.95 09/13/2017 09:08 AM    Calcium 8.5 09/13/2017 09:08 AM   @  Coagulation  Lab Results   Component Value Date    INR 1.0 09/09/2017      Basic Metabolic Profile  Lab Results   Component Value Date     09/13/2017    CO2 28 09/13/2017    BUN 44 (H) 09/13/2017       Medications Reviewed      Assessment/Plan     Active Problems:    Hyperkalemia (9/8/2017)      Renal failure (9/8/2017)      SCOOTER (acute kidney injury) ([facility] [address] (9/8/2017)        * No surgery found * :     1. PT and/or OT Consults: YES continue PT/OT: oob to chair, gentle rom bilateral knees, wbat                                                2. No signs of septic joint as pain is much improved, patient is afebrile, no growth on cultures  3. Will sign off on patient for now. Will see in outpatient clinic.  Please re-consult if symptoms worsen or change    NO surgical indications at this time         MALL[facility]  [phone]

## 2017-09-13 NOTE — PROGRESS NOTES
1207 - Pt unable to participate in PT due to:  []  Nausea/vomiting  []  Eating  []  Pain  []  Pt lethargic  [x]  Off Unit  Will f/u later as patient's schedule allows. Thank you. Lauren Duque, PT, DPT      2nd Attempt at [phone], pt still off floor at this time. 3rd Attempt at 1403, pt off floor at this time. PT will f/u later as patient's schedule allows. Thank you.  Demetri Jimenez, PT, DPT

## 2017-09-13 NOTE — DIALYSIS
ACUTE HEMODIALYSIS FLOW SHEET    HEMODIALYSIS ORDERS: Physician: Josh Chakraborty     Dialyzer: Revaclear         Duration: 3.5 hr  BFR: 300   DFR: 600   Dialysate:  K+   3    Ca+  3   Weight:    kg    Bed Scale []     Unable to Obtain []      UF Goal:  0       Heparin []  Bolus      Units    [] Hourly       Units    [x]None   Pre BP:   128/80    Pulse:     89        Temperature:   97.6  Respirations: 18  Tx: NS           ml/Bolus  Other        [x] N/A   Labs: Pre   Type and cross     Post:        [] N/A   Additional Orders:    1 unit blood       [x] Time Out/Safety Check  [x] [facility] Consent Verified     CATHETER ACCESS: []N/A   [x]Right   []Left   [x]IJ     []Fem   [] First use X-ray verified     [x]Tunnel                [] Non Tunneled   [x]No S/S infection  []Redness  []Drainage []Cultured []Swelling []Pain   [x]Medical Aseptic Prep Utilized   []Dressing Changed  [] Biopatch  Date:       []Clotted   [x]Patent   Flows: [x]Good  []Poor  []Reversed   If access problem,  notified: []Yes    [x]N/A  Date:           GRAFT/FISTULA ACCESS:  [x]N/A     []Right     []Left     []UE     []LE   []AVG   []AVF        []Buttonhole    []Medical Aseptic Prep Utilized   []No S/S infection  []Redness  []Drainage []Cultured []Swelling []Pain    Bruit:   [] Strong    [] Weak       Thrill :   [] Strong    [] Weak       Needle Gauge:    Length:       If access problem,  notified: []Yes     []N/A  Date:        Please describe access if present and not used:       GENERAL ASSESSMENT:    LUNGS: Sat%           [x] Clear  [] Coarse  [] Crackles  [] Wheezing        [] Diminished     Location : [x]RLL   [x]LLL    [x]RUL  [x]ELÍAS   Cough: []Productive  []Dry  [x]N/A   Respirations:  [x]Easy  []Labored   Therapy:  [x]RA  []NC         l/min    Mask: []NRB []Venti       O2%                  []Ventilator  []Intubated  []Trach   CARDIAC: [x]Regular      [] Irregular   [] Pericardial Rub  [] JVD        []  Monitored  [] N/A  Rhythm: EDEMA: [x] None  []Generalized  [] Pitting [] 1    [] 2    [] 3    [] 4                 [] Facial  [] Pedal  []  UE  [] LE   SKIN:   [x] Warm  [] Hot     [] Cold   [x] Dry     [] Pale   [] Diaphoretic                  [] Flushed  [] Jaundiced  [] Cyanotic  [] Rash  [] Weeping   LOC:    [x] Alert      [x]Oriented   [] Person     [] Place  []Time               [] Confused  [] Lethargic  [] Medicated  [] Non-responsive     GI / ABDOMEN:  [x] Flat    [] Distended    [x] Soft    [] Firm   []  Obese                             [] Diarrhea  [x] Bowel Sounds  [] Nausea  [] Vomiting       / URINE ASSESSMENT:[x] Voiding   [] Oliguria  [] Anuria   []  Onofre     [] Incontinent    []  Incontinent Brief      []  Bathroom Privileges     PAIN: [x] 0 []1  []2   []3   []4   []5   []6   []7   []8   []9   []10            Scale 0-10  Action/Follow Up:         MOBILITY:  [] Amb    [] Amb/Assist    [x] Bed    [] Wheelchair  [] Stretcher      All Vitals and Treatment Details on Attached [address]: [facility]          Room # 451     [] 1st Time Acute  [] Stat  [x] Routine  [] Urgent     [x] Acute Room  []  Bedside  [] ICU/CCU  [] ER   Isolation Precautions:  [x] Dialysis   [] Airborne   [] Contact    [] Reverse   Special Considerations:         [x] Blood Consent Verified []N/A     ALLERGIES: Banana  [] NKA          Code Status:  [x] Full Code  [] DNR  [] Other           HBsAg ONLY: Date Drawn 9/9/17               [x]Negative []Positive []Unknown   HBsAb: Date 9/9/17           [] Susceptible   [x] [id number] []Not Drawn      Current Labs:    Date of Labs: Today [x]     Results for Juaquin Olivia (MRN [id number]) as of 9/13/2017 12:02   Ref.  Range 9/13/2017 03:15   WBC Latest Ref Range: 4.6 - 13.2 K/uL 6.4   RBC Latest Ref Range: 4.20 - 5.30 M/uL 2.78 (L)   HGB Latest Ref Range: 12.0 - 16.0 g/dL 7.8 (L)   HCT Latest Ref Range: 35.0 - 45.0 % 23.7 (L)   MCV Latest Ref Range: 74.0 - 97.0 FL 85.3   MCH Latest Ref Range: 24.0 - 34.0 PG 28.1   MCHC Latest Ref Range: 31.0 - 37.0 g/dL 32.9   RDW Latest Ref Range: 11.6 - 14.5 % 13.4   PLATELET Latest Ref Range: 135 - 420 K/uL 125 (L)   MPV Latest Ref Range: 9.2 - 11.8 FL 10.2   NEUTROPHILS Latest Ref Range: 40 - 73 % 88 (H)   LYMPHOCYTES Latest Ref Range: 21 - 52 % 7 (L)   MONOCYTES Latest Ref Range: 3 - 10 % 5   EOSINOPHILS Latest Ref Range: 0 - 5 % 0   BASOPHILS Latest Ref Range: 0 - 2 % 0   DF Latest Units:   AUTOMATED   ABS. NEUTROPHILS Latest Ref Range: 1.8 - 8.0 K/UL 5.7   ABS. LYMPHOCYTES Latest Ref Range: 0.9 - 3.6 K/UL 0.4 (L)   ABS. MONOCYTES Latest Ref Range: 0.05 - 1.2 K/UL 0.3   ABS. EOSINOPHILS Latest Ref Range: 0.0 - 0.4 K/UL 0.0   ABS. BASOPHILS Latest Ref Range: 0.0 - 0.1 K/UL 0.0     Results for Anayeli Blanco (MRN [id number]) as of 9/13/2017 12:02   Ref.  Range 9/13/2017 09:08   Sodium Latest Ref Range: 136 - 145 mmol/L 136   Potassium Latest Ref Range: 3.5 - 5.5 mmol/L 4.9   Chloride Latest Ref Range: 100 - 108 mmol/L 103   CO2 Latest Ref Range: 21 - 32 mmol/L 28   Anion gap Latest Ref Range: 3.0 - 18 mmol/L 5   Glucose Latest Ref Range: 74 - 99 mg/dL 200 (H)   BUN Latest Ref Range: 7.0 - 18 MG/DL 44 (H)   Creatinine Latest Ref Range: 0.6 - 1.3 MG/DL 8.95 (H)   BUN/Creatinine ratio Latest Ref Range: 12 - 20   5 (L)   Calcium Latest Ref Range: 8.5 - 10.1 MG/DL 8.5   GFR est non-AA Latest Ref Range: >60 ml/min/1.73m2 5 (L)   GFR est AA Latest Ref Range: >60 ml/min/1.73m2 6 (L)                                                                                                                                 DIET:  [x] Renal    [] Other     [] NPO     []  Diabetic      PRIMARY NURSE REPORT: First initial/Last name/Title      Pre Shamika Angel RN     Time: 5470       EDUCATION:    [x] Patient [] Other         Knowledge Basis: []None [x]Minimal []Substantial   [x] Access Care     [] S&S of infection     [] Fluid Management     []K+     [x]Procedural    []Albumin     [] Medications [] Tx Options     [] Transplant     [] Diet     [] Other   Teaching Tools:  [x] Explain  [] Demo  [] Handouts [] Video   Inappropriate due to            [address] Before each treatment:     Machine Number:                   [facility]                                   [x] Unit Machine # 8 with centralized RO                                  [] Portable Machine #1/RO serial # [id number]                                  [] Portable Machine #2/RO serial # [id number]                                  [] Portable Machine #3/RO serial # [id number]                                                                                                       [address] [facility]                                  [] Portable Machine #11/RO serial # [id number]                                   [] Portable Machine #12/RO serial # [id number]                                  [] Portable Machine #13/RO serial #  [id number]      Alarm Test: [x]Pass           Other:         [x] RO/Machine Log Complete      Temp    36.4            [x]Extracorporeal Circuit Tested for integrity   Dialysate: pH  7.4 Conductivity: Meter   14.4     HD Machine   14.5                  TCD: 14.1  Dialyzer Lot # [id number]                             Blood Tubing Lot #[id number]                Saline Lot #  [id number]     CHLORINE TESTING-Before each treatment and every 4 hours    Total Chlorine: [x] less than 0.1 ppm  Time: 1030  4 Hr Check Time: 1430   (if greater than 0.1 ppm from Primary then every 30 minutes from Secondary)     TREATMENT INITIATION  with Dialysis Precautions:   [x] All Connections Secured                 [x] Saline Line Double Clamped   [x] Venous Parameters Set                  [x] Arterial Parameters Set    [x] Prime Given  ml  250             [x]Air Foam Detector Engaged      Treatment Initiation Note: Pt arrived to unit alert and oriented, VSS, in no acute distress.  1 unit of blood to be given on treatment, type and cross drawn pre-treatment. Treatment initiated via [address] CVC without complications. Will continue to monitor throughout treatment. Medication Dose Volume Route Initials Dialyzer Cleared: [] Good [x] Fair  [] Poor    Blood processed: 67   L  UF Removed 0    Ml    Post Wt:     kg  POst BP:   143/76       Pulse: 88      Respirations: 18  Temperature: 98.1     Epogen 4000 units 1 ml IV SN Post Tx Vascular Access: AVF/AVG: Bleeding stopped Art      min. Wei. Min   N/A     Venofer 50 mg 100 ml IV SN Catheter: Locking solution: Sodium citrate Art.   1.7   Wei.  1.7    N/A   PRBC 1 unit 310 ml IV SN Post Assessment:                                    Skin:  [x] Warm  [x] Dry [] Diaphoretic     [] Flushed  [] Pale [] Cyanotic   [facility] Signatures Title Initials  Time Lungs: [x] Clear    [] Course  [] Crackles  [] Wheezing   Alexander Puff RN SN  Cardiac: [x] Regular   [] Irregular   [] Monitor  [] N/A  Rhythm:           Edema:  [x] None    [] General     [] Facial   [] Pedal    [] UE    [] LE       Pain: [x]0  []1  []2   []3  []4   []5   []6   []7   []8   []9   []10         Post Treatment Note: Pt completed treatment without complication. Received 1 unit of blood on treatment today, tolerated well. All blood returned via CVC. VSS and in no acute distress at handoff.           POST TREATMENT PRIMARY NURSE HANDOFF REPORT:     First initial/Last name/Title         Post Dialysis:   Pierce Vivas RN    Time:  3322         Abbreviations: AVG-arterial venous graft, AVF-arterial venous fistula, IJ-Internal Jugular, Subcl-Subclavian, Fem-Femoral, Tx-treatment, AP/HR-apical heart rate, DFR-dialysate flow rate, BFR-blood flow rate, AP-arterial pressure, -venous pressure, UF-ultrafiltrate, TMP-transmembrane pressure, Wei-Venous, Art-Arterial, RO-Reverse Osmosis

## 2017-09-13 NOTE — PROGRESS NOTES
Hemodialysis Rounding Note      Patient: Carlos Fernandez               Sex: female          DOA: 9/8/2017  3:56 PM        YOB: 1960      Age:  49 y.o.        LOS:  LOS: 5 days     Subjective:     Carlos Fernandez is a 49 y.o.  who presents with Hyperkalemia  Hyperkalemia  Renal failure  SCOOTER (acute kidney injury) ([address]   The patient is dialyzing utilizing the following method:Intermittent Hemodialysis        Complaints: seen earlier, family at bedside, knee feels better post aspiration and IA steroid injection,  no CP or SOB,    Current Facility-Administered Medications   Medication Dose Route Frequency    0.9% sodium chloride infusion 250 mL  250 mL IntraVENous PRN    acetaminophen (TYLENOL) tablet 650 mg  650 mg Oral Q6H PRN    [START ON 9/14/2017] epoetin pilar (EPOGEN;PROCRIT) injection 4,000 Units  4,000 Units IntraVENous DIALYSIS TUE, THU & SAT    [START ON 9/14/2017] iron sucrose (VENOFER) 50 mg in 0.9% sodium chloride 100 mL IVPB  50 mg IntraVENous DIALYSIS TUE, THU & SAT    sodium citrate 4 gram/100 mL 120 mg  3 mL InterCATHeter DIALYSIS PRN    cefTRIAXone (ROCEPHIN) 1 g in 0.9% sodium chloride (MBP/ADV) 50 mL MBP  1 g IntraVENous Q24H    diphenhydrAMINE (BENADRYL) capsule 25 mg  25 mg Oral Q6H PRN    gabapentin (NEURONTIN) capsule 100 mg  100 mg Oral QHS    calcium acetate (PHOSLO) capsule 667 mg  1 Cap Oral TID WITH MEALS    cloNIDine HCl (CATAPRES) tablet 0.1 mg  0.1 mg Oral BID    NIFEdipine ER (PROCARDIA XL) tablet 30 mg  30 mg Oral DAILY    0.9% sodium chloride infusion  100 mL/hr IntraVENous DIALYSIS PRN    albumin human 25% (BUMINATE) solution 12.5 g  12.5 g IntraVENous DIALYSIS PRN    alteplase (CATHFLO) 2 mg in sterile water (preservative free) 2 mL injection  2 mg InterCATHeter ONCE PRN    oxyCODONE-acetaminophen (PERCOCET) 5-325 mg per tablet 1 Tab  1 Tab Oral Q8H PRN       09/13 0701 - 09/13 1900  In: 100 [P.O.:100]  Out: -   09/11 1901 - 09/13 0700  In: 420 [P.O.:420]  Out: 300 [Urine:300]      Objective:     Blood pressure 140/83, pulse 83, temperature 97.7 °F (36.5 °C), resp. rate 18, height 5' 2\" (1.575 m), weight 57 kg (125 lb 9.6 oz), SpO2 95 %, not currently breastfeeding. Temp (24hrs), Av.2 °F (36.8 °C), Min:97.6 °F (36.4 °C), Max:99.4 °F (37.4 °C)      Blood Pressure: BP: 140/83  Pulse: Pulse (Heart Rate): 83  Temp:  Temp: 97.7 °F (36.5 °C)    Artificial Kidney Dialyzer/Set Up Inspection: Revaclear   hours Duration of Treatment (hours): 2.5 hours   Heparin Bolus Heparin Bolus (units): 0 units  Blood flow rate Blood Flow Rate (ml/min): 300 ml/min   Dialysate rate     Arterial Access Pressure Arterial Access Pressure (mmHg): -120  Venous Return Pressure Venous Return Pressure (mmHg): 80  Ultrafiltration Rate Goal/Amount of Fluid to Remove (mL): 0 mL  Fluid Removal Fluid Removed (mL): 289  Net Fluid Removal NET Fluid Removed (mL): 0 ml      PHYSICAL EXAM: alert, not in any distress  HEENT:  Non icteric  NECK:  No JVD, right IJ TDC  CHEST AND LUNGS:  Clear ant/lat  CVS:  Regular no rub  ABDOMEN:  Soft + bs  EXT:  No edema, right knee sl swelling but no redness or tenderness, full range of motion. DATA REVIEW:    Labs: Results:       Chemistry Recent Labs      17   0908   GLU  200*   NA  136   K  4.9   CL  103   CO2  28   BUN  44*   CREA  8.95*   CA  8.5   AGAP  5   BUCR  5*      CBC w/Diff Recent Labs      17   0315  17   1244   WBC  6.4  8.5   RBC  2.78*  2.66*   HGB  7.8*  7.5*   HCT  23.7*  22.9*   PLT  125*  89*   GRANS  88*  79*   LYMPH  7*  11*   EOS  0  0      Coagulation No results for input(s): PTP, INR, APTT in the last 72 hours. No lab exists for component: INREXT, INREXT    Iron/Ferritin No results for input(s): IRON in the last 72 hours. No lab exists for component: TIBCCALC   BNP No results for input(s): BNPP in the last 72 hours. Cardiac Enzymes No results for input(s): CPK, CKND1, WOLFGANG in the last 72 hours.     No lab exists for component: CKRMB, TROIP   Liver Enzymes No results for input(s): TP, ALB, TBIL, AP, SGOT, GPT in the last 72 hours. No lab exists for component: DBIL   Thyroid Studies No results found for: T4, T3U, TSH, TSHEXT, TSHEXT       Images:  XR Results (maximum last 3): Results from [place] encounter on 09/08/17   XR KNEE RT MAX 2 VWS   Narrative Right Knee Limited    CPT CODE: 14633    HISTORY: Right knee pain and swelling. COMPARISON: None. FINDINGS:     Two views obtained. The joint spaces are maintained. There is no fracture nor  dislocation. There is focal increased density superior to the patella likely a  moderate to large joint effusion. Mineralization is normal. There is no  chondrocalcinosis. Impression IMPRESSION:    Probable moderate to large joint effusion vs. soft tissue swelling. .      XR CHEST AP LAT   Narrative AP and lateral chest    History: Dizziness, bilateral leg numbness for two days and acute renal failure    Comparison: None    Findings: The cardiomediastinal silhouette is borderline in size considering technique. The pulmonary vasculature is normal. Left lower lobe linear subsegmental  atelectasis. There is no focal consolidation, pneumothorax or pleural effusion. The visualized bony structures are normal.         Impression Impression:     No radiographic evidence of acute cardiopulmonary disease. Thank you for this referral.        CT Results (maximum last 3): Results from [place] encounter on 09/08/17   CT ABD PELV WO CONT   Narrative PROCEDURE:  CT Abdomen, Pelvis without Contrast.    INDICATION:  Renal failure. COMPARISON:  None    TECHNIQUE:  Helical volumetric CT imaging of the abdomen and pelvis is performed  at 5 mm axial sections without IV or oral contrast administration.   Coronal and  sagittal multiplanar reconstruction images are generated for improved anatomic  delineation.    - Radiation dose:   mGy-cm.  - All CT scans at this facility are performed using dose optimization technique  as appropriate to the performed exam, to include automated exposure control,  adjustment of the mA and/or kV according to patient's size (Including  appropriate matching for site-specific examinations), or use of iterative  reconstruction technique. FINDINGS:    ABDOMEN    Lung Bases:  Left posterior lung base scarring. Liver:  0.5 cm focal hypodensity in the left lobe of the liver, with fat  attenuation. Suggestive of a hepatic lipoma. Spleen:  Multiple punctate calcifications suggestive of old granulomatous  disease. Adrenal Glands, Gallbladder:  Unremarkable. Pancreas:  Subtle fat stranding around the pancreatic head region. Kidneys: The right kidney appears small in size (6.4 cm in length). The left  kidney appears decreased in size as well (7.7 cm in length). Small cystic  structure in the left kidney measuring 2.1 x 1.8 cm, suggestive of renal cysts. No postobstructive changes. No hydronephrosis. No convincing evidence for  ureteral stone. Gastrointestinal Tract, Abdominal Wall, Mesentery:      - Unremarkable stomach. - No acute findings along the small bowel. No small bowel obstruction.    - Normal appendix.    - No acute diverticulitis or colitis. - No mesenteric adenopathy. Retroperitoneum:  No adenopathy. Vascular:  Non-aneurysmal aorta. PELVIS    Urinary Bladder:  Unremarkable. Rectum:  Unremarkable. Uterus:  Atrophied. Adnexa:  No adnexal mass. Pelvic sidewall:  No adenopathy. No free fluid. Bones:  No acute osseous findings. Impression IMPRESSION:    1. Fat stranding around the pancreatic head. Query pancreatitis. Recommend  correlation with lipase/amylase level. 2.  Small kidney size bilaterally. Exophytic cyst in the left kidney near the  lower pole. No postobstructive changes. No stones in the ureters. 3.  Hepatic lipoma.     IPTH 140    CULTURE: )  Recent Labs      09/12/17   1615  09/12/17   1244   CULT  NO GROWTH AFTER 19 HOURS  CULTURE IN PROGRESS,FURTHER UPDATES TO FOLLOW  NO GROWTH AFTER 19 HOURS     Recent Labs      09/12/17   1615  09/12/17   1244   CULT  NO GROWTH AFTER 19 HOURS  CULTURE IN PROGRESS,FURTHER UPDATES TO FOLLOW  NO GROWTH AFTER 19 HOURS     Joint fluid no crystals and culture neg so far  HIT panel pending    Assessment/Plan:     End Stage Renal Disease:  Patient is tolerating dialysis treatment well. (. 2ND hd). Additionally the patient has experienced normal dialysis treatmentduring dialysis. Dry weight Estimated Weight: 57 kg (125 lb 10.6 oz) sameOk to d/c from renal standpoint. Outpt [facility] 3rd shift ( 3:30 PM). Patient can start this Friday. At  2:50 PM on 9/13/2017, I saw and examined patient during hemodialysis treatment. The patient was receiving hemodialysis for treatment of end stage renal disease. I have also reviewed vital signs, intake and output, lab results and recent events, and agreed with today's dialysis order. Anemia:  IV  EPO and Venofer. BT 1 unit PRBC    Renal Metabolic Bone Disease:  Cont phos binder                      Hypertension: better    Access: Right IJ perm cath    Low platelets  Off heparin, pl better,  HIT panel to be drawn today before d/c, use citrate lock HD cath pending results. Out pt unit notified. Acute gout right knee, post IA steroid injection. Follow up c/s, resume Uloric, if negative.  Discussed with Dr. Brien Durán MD  9/13/2017

## 2017-09-13 NOTE — PROGRESS NOTES
Care Management Interventions  PCP Verified by CM: Yes  Transition of Care Consult (CM Consult): [address]: No  Reason Outside [place]: Patient already serviced by other home care/hospice agency  Physical Therapy Consult: Yes  Occupational Therapy Consult: Yes  Current Support Network: Lives with Spouse, Other, Family Lives Nearby  Confirm Follow Up Transport: Family  Plan discussed with Pt/Family/Caregiver: Yes  Freedom of Choice Offered: Yes (For home health)  Discharge Location  Discharge Placement: Home with home health     Pt is a 49year old female admitted for hyperkalemia, SCOOTER. Pt is alert in room with her daughter and son in law Jeff Miller [phone]) at her bedside. Pt speaks [place] and her son in law assisted throughout visit with pt. Pt's son in law states that pt resides with him and his wife and plan is for pt to return home. Pt's spouse financially assist her. Pt's son in law indicates that pt is independent with ADLs and reports that she has been unsteady on her feet. DME- front wheel walker has been ordered and information given to DME rep. Pt is new on dialysis and is scheduled to received outpatient treatment at [address] in [place] on T-TH-S with chair time of 4:00pm. Family will be assisting pt with transportation.

## 2017-09-13 NOTE — DISCHARGE INSTRUCTIONS
Acute Kidney Injury: Care Instructions  Your Care Instructions  Acute kidney injury is the sudden loss of kidney function that happens when the kidneys stop working over a period of hours, days or, in some cases, weeks. It is also known as acute renal failure. Common causes of acute kidney injury are dehydration, blood loss, and medicines. When acute kidney injury happens, the kidneys cannot remove waste and excess fluids from the body. The waste and fluids build up and become harmful. Kidney function may return to normal if the cause of acute kidney injury is treated quickly. Your chance of a full recovery depends on what caused the problem, how quickly the cause was treated, and what other medical problems you have. A machine may be used to help your kidneys remove waste and fluids for a short period of time. This is called dialysis. Follow-up care is a key part of your treatment and safety. Be sure to make and go to all appointments, and call your doctor if you are having problems. It's also a good idea to know your test results and keep a list of the medicines you take. How can you care for yourself at home? · Talk to your doctor about how much fluid you should drink. · Eat a balanced diet. Talk to your doctor or a dietitian about what type of diet may be best for you. · Follow the instructions and schedule for dialysis that your doctor gives you. · Do not smoke. Smoking can make your condition worse. If you need help quitting, talk to your doctor about stop-smoking programs and medicines. These can increase your chances of quitting for good. · Do not drink alcohol. · Review all of your medicines with your doctor. Do not take any medicines, including nonsteroidal anti-inflammatory drugs (NSAIDs) such as ibuprofen (Advil, Motrin) or naproxen (Aleve), unless your doctor says it is safe for you to do so.   · Make sure that anyone treating you for any health problem knows that you have had acute kidney injury. When should you call for help? Call 911 anytime you think you may need emergency care. For example, call if:  · You passed out (lost consciousness). · You have severe trouble breathing. Call your doctor now or seek immediate medical care if:  · You have less urine than normal or no urine. · You have trouble urinating or can urinate only very small amounts. · You are confused or have trouble thinking clearly. · You feel weaker or more tired than usual.  · You are very thirsty, lightheaded, or dizzy. · You have nausea and vomiting. · You have new swelling of your arms or feet, or your swelling is worse. · You have blood in your urine. · Your body weight goes up every day. · You have new or worse trouble breathing. Watch closely for changes in your health, and be sure to contact your doctor if:  · You do not get better as expected. Where can you learn more? Go to http://cristinoSoicosanne.info/. Enter W084 in the search box to learn more about \"Acute Kidney Injury: Care Instructions. \"  Current as of: April 3, 2017  Content Version: 11.3  © 7493-6667 [employer]. Care instructions adapted under license by [employer] (which disclaims liability or warranty for this information). If you have questions about a medical condition or this instruction, always ask your healthcare professional. [address] any warranty or liability for your use of this information. Hyperkalemia: Care Instructions  Your Care Instructions  Hyperkalemia is too much potassium in the blood. Potassium helps keep the right mix of fluids in your body. It also helps your nerves and muscles work as they should. And it keeps your heartbeat in a normal rhythm. Some things can raise potassium levels. These include some health problems, medicines, and kidney problems. (Normally, your kidneys remove extra potassium.)  Too much potassium can cause nausea.  It also can cause a heartbeat that isn't normal. But you may not have any symptoms. Too much potassium can be dangerous. That's why it's important to treat it. If you are taking any of the medicines that can raise your levels, your doctor will ask you to stop. You may get medicines to lower your levels. And you may have to limit or not eat foods that have a lot of potassium. Follow-up care is a key part of your treatment and safety. Be sure to make and go to all appointments, and call your doctor if you are having problems. It's also a good idea to know your test results and keep a list of the medicines you take. How can you care for yourself at home? · Take your medicines exactly as prescribed. Call your doctor if you think you are having a problem with your medicine. · Stop taking certain medicines if your doctor asks you to. They may be causing your high potassium levels. If you have concerns about stopping medicine, talk with your doctor. · If you have kidney, heart, or liver disease and have to limit fluids, talk with your doctor before you increase the amount of fluids you drink. If the doctor says it's okay, drink plenty of fluids. This means drinking enough so that your urine is light yellow or clear like water. · Avoid strenuous exercise until your doctor tells you it is okay. · Potassium is in many foods, including vegetables, fruits, and milk products. Foods high in potassium include bananas, cantaloupe, broccoli, milk, potatoes, and tomatoes. · Low potassium foods include blueberries, raspberries, cucumber, white or brown rice, spaghetti, and macaroni. · Do not use a salt substitute without talking to your doctor first. Most of these are very high in potassium. · Be sure to tell your doctor about any prescription, over-the-counter, or herbal medicines you take. Some of these can raise potassium. When should you call for help? Call 911 anytime you think you may need emergency care.  For example, call if:  · You passed out (lost consciousness). · You have trouble breathing. · You have an unusual heartbeat. Your heart may beat fast or skip beats. Call your doctor now or seek immediate medical care if:  · You have trouble urinating or can urinate only very small amounts. · Your nausea does not get better. Watch closely for changes in your health, and be sure to contact your doctor if:  · You do not get better as expected. · You want more help planning meals. Where can you learn more? Go to http://cristino-anne.info/. Enter U816 in the search box to learn more about \"Hyperkalemia: Care Instructions. \"  Current as of: August 8, 2016  Content Version: 11.3  © 6433-9392 [employer]. Care instructions adapted under license by [employer] (which disclaims liability or warranty for this information). If you have questions about a medical condition or this instruction, always ask your healthcare professional. [place] [address] any warranty or liability for your use of this information. Patient armband removed and shredded    MyChart Activation    Thank you for requesting access to [employer]. Please follow the instructions below to securely access and download your online medical record. [employer] allows you to send messages to your doctor, view your test results, renew your prescriptions, schedule appointments, and more. How Do I Sign Up? 1. In your internet browser, go to [email or web address]  2. Click on the First Time User? Click Here link in the Sign In box. You will be redirect to the New Member Sign Up page. 3. Enter your [employer] Access Code exactly as it appears below. You will not need to use this code after youve completed the sign-up process. If you do not sign up before the expiration date, you must request a new code.     [employer] Access Code: [email or web address]  Expires: 12/12/2017  1:43 PM (This is the date your [employer] access code will )    4. Enter the last four digits of your Social Security Number (xxxx) and Date of Birth (mm/dd/yyyy) as indicated and click Submit. You will be taken to the next sign-up page. 5. Create a [employer] ID. This will be your [employer] login ID and cannot be changed, so think of one that is secure and easy to remember. 6. Create a [employer] password. You can change your password at any time. 7. Enter your Password Reset Question and Answer. This can be used at a later time if you forget your password. 8. Enter your e-mail address. You will receive e-mail notification when new information is available in [address] 9. Click Sign Up. You can now view and download portions of your medical record. 10. Click the Download Summary menu link to download a portable copy of your medical information. Additional Information    If you have questions, please visit the Frequently Asked Questions section of the [employer] website at [email or web address] Remember, [employer] is NOT to be used for urgent needs. For medical emergencies, dial 911. DISCHARGE SUMMARY from Nurse    The following personal items are in your possession at time of discharge:    Dental Appliances: None  Visual Aid: None     Home Medications: None  Jewelry: Earrings, With patient  Clothing: Footwear, Pants, Shirt, Undergarments  Other Valuables: None             PATIENT INSTRUCTIONS:    After general anesthesia or intravenous sedation, for 24 hours or while taking prescription Narcotics:  · Limit your activities  · Do not drive and operate hazardous machinery  · Do not make important personal or business decisions  · Do  not drink alcoholic beverages  · If you have not urinated within 8 hours after discharge, please contact your surgeon on call.     Report the following to your surgeon:  · Excessive pain, swelling, redness or odor of or around the surgical area  · Temperature over 100.5  · Nausea and vomiting lasting longer than 4 hours or if unable to take medications  · Any signs of decreased circulation or nerve impairment to extremity: change in color, persistent  numbness, tingling, coldness or increase pain  · Any questions        What to do at Home:  Recommended activity: Activity as tolerated    If you experience any of the following symptoms Nausea, vomiting, diarrhea, fever greater than 100.5, dizziness, severe headache, shortness of breath, chest pain, increased pain, please follow up with PCP. *  Please give a list of your current medications to your Primary Care Provider. *  Please update this list whenever your medications are discontinued, doses are      changed, or new medications (including over-the-counter products) are added. *  Please carry medication information at all times in case of emergency situations. These are general instructions for a healthy lifestyle:    No smoking/ No tobacco products/ Avoid exposure to second hand smoke    Surgeon General's Warning:  Quitting smoking now greatly reduces serious risk to your health. Obesity, smoking, and sedentary lifestyle greatly increases your risk for illness    A healthy diet, regular physical exercise & weight monitoring are important for maintaining a healthy lifestyle    You may be retaining fluid if you have a history of heart failure or if you experience any of the following symptoms:  Weight gain of 3 pounds or more overnight or 5 pounds in a week, increased swelling in our hands or feet or shortness of breath while lying flat in bed. Please call your doctor as soon as you notice any of these symptoms; do not wait until your next office visit. Recognize signs and symptoms of STROKE:    F-face looks uneven    A-arms unable to move or move unevenly    S-speech slurred or non-existent    T-time-call 911 as soon as signs and symptoms begin-DO NOT go       Back to bed or wait to see if you get better-TIME IS BRAIN.     Warning Signs of HEART ATTACK Call 911 if you have these symptoms:   Chest discomfort. Most heart attacks involve discomfort in the center of the chest that lasts more than a few minutes, or that goes away and comes back. It can feel like uncomfortable pressure, squeezing, fullness, or pain.  Discomfort in other areas of the upper body. Symptoms can include pain or discomfort in one or both arms, the back, neck, jaw, or stomach.  Shortness of breath with or without chest discomfort.  Other signs may include breaking out in a cold sweat, nausea, or lightheadedness. Don't wait more than five minutes to call 911 - MINUTES MATTER! Fast action can save your life. Calling 911 is almost always the fastest way to get lifesaving treatment. Emergency Medical Services staff can begin treatment when they arrive -- up to an hour sooner than if someone gets to the hospital by car. The discharge information has been reviewed with the patient. The patient verbalized understanding. Discharge medications reviewed with the patient and appropriate educational materials and side effects teaching were provided.

## 2017-09-13 NOTE — CONSULTS
Infectious Disease Consultation Note    Requested by: dr. Sandra Chance    Reason: evaluate for disseminated gonnorhea    Current abx Prior abx   Ceftriaxone since 9/12/17      Lines:       Assessment :    49 y.o. female with a history of kidney disease, ESRD, HTN, and hypercholesteremia who presents to ED on 9/8/17 for the evaluation of intermittent dizziness that began two days ago    Progression of esrd s/p HD this admission    Acute onset right knee pain    Clinical picture c/w non infectious inflammatory arthritis right knee. No clinical or lab evidence to suggest septic arthritis/gonococcal arthritis. Recommendations:    1. D/c ceftriaxone  2. F/u with ortho surgery for right knee pain    Advance Care planning: full code: discussed  with patient/surrogate decision maker: Zaria Dayhal: [phone]      Thank you for consultation request. Above plan was discussed in details with patient, family and dr Alvino Mello. Please call me if any further questions or concerns. Will continue to participate in the care of this patient. HPI:    49 y.o. female with a history of kidney disease, ESRD, HTN, and hypercholesteremia who presents to ED on 9/8/17 for the evaluation of intermittent dizziness that began two days ago. Associated Sx included bilateral numbness to her hands and feet and HA. Pt is a native of [facility] and has been in the [place] for a little over a year. Patient has been trying to delay hemodialysis for quite some time. This admission she was evaluated by nephrologist and required urgent HD. While in the hospital, she started complaining of bilateral knee pain starting 9/11/7. She was evaluated by orthopedic surgery - had aspiration of right knee on 9/12/17 which revealed 1455 nucleated cells - 96% neutrophils. She received intra articular steroids.  Apparently she had some purpuric, non palpable, faint purple lesion, small with irregular borders, non tender, smaller area of involvement left leg symmetrically (also lateral aspect), on left arm just distal to antecubital fossa. There was concern for gonorrhea. She was started on ceftriaxone iv. I have been consulted for further recommendations. Patient denies fever/chills, no vaginal discharge, no h/o STDs, no abdominal pain. still has some right knee pain. Past Medical History:   Diagnosis Date    Chronic kidney disease     [facility]end stage renal [facility]) [facility] [address]     Hypercholesteremia     Hypertension     Kidney disease     Vitamin D deficiency        History reviewed. No pertinent surgical history. home Medication List    Details   pregabalin (LYRICA) 50 mg capsule Take  by mouth. furosemide (LASIX) 40 mg tablet Take  by mouth daily. cloNIDine HCl (CATAPRES) 0.1 mg tablet Take 0.1 mg by mouth two (2) times a day. NIFEdipine ER (ADALAT CC) 30 mg ER tablet Take 20 mg by mouth daily. multivitamin with iron tablet Take 1 Tab by mouth daily. OTHER Take 600 mg by mouth two (2) times a day. CEFIXIME PO Take 200 mg by mouth two (2) times a day.              Current Facility-Administered Medications   Medication Dose Route Frequency    0.9% sodium chloride infusion 250 mL  250 mL IntraVENous PRN    acetaminophen (TYLENOL) tablet 650 mg  650 mg Oral Q6H PRN    [START ON 9/14/2017] epoetin pilar (EPOGEN;PROCRIT) injection 4,000 Units  4,000 Units IntraVENous DIALYSIS TUE, THU & SAT    [START ON 9/14/2017] iron sucrose (VENOFER) 50 mg in 0.9% sodium chloride 100 mL IVPB  50 mg IntraVENous DIALYSIS TUE, THU & SAT    sodium citrate 4 gram/100 mL 120 mg  3 mL InterCATHeter DIALYSIS PRN    cefTRIAXone (ROCEPHIN) 1 g in 0.9% sodium chloride (MBP/ADV) 50 mL MBP  1 g IntraVENous Q24H    diphenhydrAMINE (BENADRYL) capsule 25 mg  25 mg Oral Q6H PRN    gabapentin (NEURONTIN) capsule 100 mg  100 mg Oral QHS    calcium acetate (PHOSLO) capsule 667 mg  1 Cap Oral TID WITH MEALS    cloNIDine HCl (CATAPRES) tablet 0.1 mg  0.1 mg Oral BID    NIFEdipine ER (PROCARDIA XL) tablet 30 mg  30 mg Oral DAILY    0.9% sodium chloride infusion  100 mL/hr IntraVENous DIALYSIS PRN    albumin human 25% (BUMINATE) solution 12.5 g  12.5 g IntraVENous DIALYSIS PRN    alteplase (CATHFLO) 2 mg in sterile water (preservative free) 2 mL injection  2 mg InterCATHeter ONCE PRN    oxyCODONE-acetaminophen (PERCOCET) 5-325 mg per tablet 1 Tab  1 Tab Oral Q8H PRN       Allergies: Banana    History reviewed. No pertinent family history. Social History     Social History    Marital status:      Spouse name: N/A    Number of children: N/A    Years of education: N/A     Occupational History    Not on file. Social History Main Topics    Smoking status: Never Smoker    Smokeless tobacco: Never Used    Alcohol use No    Drug use: No    Sexual activity: Not on file     Other Topics Concern    Not on file     Social History Narrative    No narrative on file     History   Smoking Status    Never Smoker   Smokeless Tobacco    Never Used        Temp (24hrs), Av.8 °F (37.1 °C), Min:97.7 °F (36.5 °C), Max:100.6 °F (38.1 °C)    Visit Vitals    /81 (BP 1 Location: Left arm, BP Patient Position: At rest)    Pulse 80    Temp 97.7 °F (36.5 °C)    Resp 18    Ht 5' 2\" (1.575 m)    Wt 57 kg (125 lb 9.6 oz)    SpO2 96%    Breastfeeding No    BMI 22.97 kg/m2       ROS: 12 point ROS obtained in details. Pertinent positives as mentioned in HPI,   otherwise negative    Physical Exam:    General: Well developed, well nourished female laying on the bed AAOx3 in no acute distress. General:   awake alert and oriented   HEENT:  Normocephalic, atraumatic, PERRL, EOMI, no scleral icterus or pallor; no conjunctival hemmohage;  nasal and oral mucous are moist and without evidence of lesions. No thrush. Neck supple, no bruits.    Lymph Nodes:   no cervical, axillary or inguinal adenopathy   Lungs:   non-labored, bilaterally clear to auscultation- no crackles wheezes rales or rhonchi   Heart:  RRR, s1 and s2; no murmurs rubs or gallops, no edema, + pedal pulses   Abdomen:  soft, non-distended, active bowel sounds, no hepatomegaly, no splenomegaly. Non-tender   Genitourinary:  deferred   Extremities:   no clubbing, cyanosis; no joint effusions or swelling; ; muscle mass appropriate for age, no significant pain on movements right knee, no erythema right knee   Neurologic:  No gross focal sensory abnormalities; 5/5 muscle strength to upper and lower extremities. Speech appropriate.  Cranial nerves intact                        Skin:  No rash or ulcers noted   Back:  no spinal or paraspinal muscle tenderness or rigidity, no CVA tenderness     Psychiatric:  No suicidal or homicidal ideations, appropriate mood and affect         Labs: Results:   Chemistry Recent Labs      09/13/17   0908   GLU  200*   NA  136   K  4.9   CL  103   CO2  28   BUN  44*   CREA  8.95*   CA  8.5   AGAP  5   BUCR  5*      CBC w/Diff Recent Labs      09/13/17   0315  09/12/17   1244   WBC  6.4  8.5   RBC  2.78*  2.66*   HGB  7.8*  7.5*   HCT  23.7*  22.9*   PLT  125*  89*   GRANS  88*  79*   LYMPH  7*  11*   EOS  0  0      Microbiology Recent Labs      09/12/17   1615  09/12/17   1244   CULT  PENDING  NO GROWTH AFTER 19 HOURS          RADIOLOGY:    All available imaging studies/reports in [facility] for this admission were reviewed    Dr. Akash Moore, Infectious Disease Specialist  [phone]  September 13, 2017  10:46 AM

## 2017-09-13 NOTE — DISCHARGE SUMMARY
[facility]  [facility]       Patient: Samson Tracy Age: 49 y.o. : 1960 MR#: [id number] SSN: [id number]  PCP on record: Trish Disla MD  Admit date: 2017  Discharge date: 2017    Consults:  Reid Ware -vascular surgeon  Eli Andrews.- physician assistant  Donna Cooper. -ID      -   Procedures:17    1. Ultrasound-guided access of right common femoral vein. 2. Placement of 24 cm Mahurkar catheter through a right groin  approach.     17:    1. Ultrasound-guided access of right internal jugular vein. 2. Permanent dialysis catheter placement tunneled through a right IJ  approach 19 cm Palindrome catheter. 3. Removal of previous right common femoral vein temporary dialysis  Catheter.         -     Significant Diagnostic Studies: Abdomen 17:  1. Fat stranding around the pancreatic head. Query pancreatitis. Recommend  correlation with lipase/amylase level.     2.  Small kidney size bilaterally. Exophytic cyst in the left kidney near the  lower pole. No postobstructive changes. No stones in the ureters.     3. Hepatic lipoma.         X-ray 17:  Probable moderate to large joint effusion vs. soft tissue swelling.  -    Discharge Diagnoses:     -Acute on chronic renal failure  -Acute arthritis, right >left knee   --improved    Hyperkalemia-resolved      -Hyperphosphatemia-  On treatment  -ESRD-started on HD                           Patient Active Problem List   Diagnosis Code    Hyperkalemia E87.5    Renal failure N19    SCOOTER (acute kidney injury) [facility]) N17.9       Hospital Course by Problem   49year old female with history of  ESRD not on HD, HTN with poor med compliance came to ED withc/o weakness, decreased appetite and numbness in feet. Noted to have hyperkalemia and ESRD.      1. ESRD  s/p perm cath placement yesterday to continue HD in outpt setting. Discussed w/ son.  He states he is aware of next HD appointment in outpt setting. .  2. HyperK+ resolved. 3. HTN - Procardia, Clonidine. 4. Anemia of chronic dz - noted. Venofer. 5. Dyslipidemia hx - on no meds as outpt. 6-bilateral Knee pain onset  evening acute, s/p ortho eval and aspiration of knee and injection of steroid into knee. Fluid studies show white count of around 14K mostly neutrophils, no crystals seen in fluid. Pt with fever and tachycardia, culture both blood and synovial fluid remain negative. Initially treated with abx secondary to concern for septic arthritis. However, knee pain dramatically improved today, seen by ID who does not recommend abx.         Today's examination of the patient revealed:     Subjective:     Objective:   VS:   Visit Vitals    /76    Pulse 88    Temp 98.1 °F (36.7 °C) (Oral)    Resp 18    Ht 5' 2\" (1.575 m)    Wt 57 kg (125 lb 9.6 oz)    SpO2 95%    Breastfeeding No    BMI 22.97 kg/m2      Tmax/24hrs: Temp (24hrs), Av.1 °F (36.7 °C), Min:97.6 °F (36.4 °C), Max:99.4 °F (37.4 °C)     Input/Output:   Intake/Output Summary (Last 24 hours) at 17 1622  Last data filed at 17 1445   Gross per 24 hour   Intake            451.3 ml   Output                0 ml   Net            451.3 ml       General: alert, awake, in nad   Cardiovascular: rrr, no murmurs   Pulmonary:ctab    GI: soft, nt, nd   Extremities:  No edema  Additional:      Labs:    Recent Results (from the past 24 hour(s))   CBC WITH AUTOMATED DIFF    Collection Time: 17  3:15 AM   Result Value Ref Range    WBC 6.4 4.6 - 13.2 K/uL    RBC 2.78 (L) 4.20 - 5.30 M/uL    HGB 7.8 (L) 12.0 - 16.0 g/dL    HCT 23.7 (L) 35.0 - 45.0 %    MCV 85.3 74.0 - 97.0 FL    MCH 28.1 24.0 - 34.0 PG    MCHC 32.9 31.0 - 37.0 g/dL    RDW 13.4 11.6 - 14.5 %    PLATELET 638 (L) 493 - 420 K/uL    MPV 10.2 9.2 - 11.8 FL    NEUTROPHILS 88 (H) 40 - 73 %    LYMPHOCYTES 7 (L) 21 - 52 %    MONOCYTES 5 3 - 10 %    EOSINOPHILS 0 0 - 5 %    BASOPHILS 0 0 - 2 % ABS. NEUTROPHILS 5.7 1.8 - 8.0 K/UL    ABS. LYMPHOCYTES 0.4 (L) 0.9 - 3.6 K/UL    ABS. MONOCYTES 0.3 0.05 - 1.2 K/UL    ABS. EOSINOPHILS 0.0 0.0 - 0.4 K/UL    ABS. BASOPHILS 0.0 0.0 - 0.1 K/UL    DF AUTOMATED     METABOLIC PANEL, BASIC    Collection Time: 09/13/17  9:08 AM   Result Value Ref Range    Sodium 136 136 - 145 mmol/L    Potassium 4.9 3.5 - 5.5 mmol/L    Chloride 103 100 - 108 mmol/L    CO2 28 21 - 32 mmol/L    Anion gap 5 3.0 - 18 mmol/L    Glucose 200 (H) 74 - 99 mg/dL    BUN 44 (H) 7.0 - 18 MG/DL    Creatinine 8.95 (H) 0.6 - 1.3 MG/DL    BUN/Creatinine ratio 5 (L) 12 - 20      GFR est AA 6 (L) >60 ml/min/1.73m2    GFR est non-AA 5 (L) >60 ml/min/1.73m2    Calcium 8.5 8.5 - 10.1 MG/DL   TYPE + CROSSMATCH    Collection Time: 09/13/17 11:00 AM   Result Value Ref Range    Crossmatch Expiration 09/16/2017     ABO/Rh(D) O POSITIVE     Antibody screen NEG     CALLED TO: [facility] ON 09/13/2017 AT 1346 BY [facility]     Unit number [id number]     Blood component type RC LR AS1     Unit division 00     Status of unit ISSUED     Crossmatch result Compatible      Additional Data Reviewed:     Condition:   Disposition:    []Home   [x]Home with Home Health   []SNF/NH   []Rehab   []Home with family   []Alternate Facility:____________________      Discharge Medications:     Current Discharge Medication List      START taking these medications    Details   calcium acetate (PHOSLO) 667 mg cap Take 1 Cap by mouth three (3) times daily (with meals). Qty: 90 Cap, Refills: 1      gabapentin (NEURONTIN) 100 mg capsule Take 1 Cap by mouth nightly. Qty: 30 Cap, Refills: 1         CONTINUE these medications which have NOT CHANGED    Details   cloNIDine HCl (CATAPRES) 0.1 mg tablet Take 0.1 mg by mouth two (2) times a day. NIFEdipine ER (ADALAT CC) 30 mg ER tablet Take 20 mg by mouth daily. multivitamin with iron tablet Take 1 Tab by mouth daily.          STOP taking these medications       pregabalin (LYRICA) 50 mg capsule Comments:   Reason for Stopping:         furosemide (LASIX) 40 mg tablet Comments:   Reason for Stopping:         OTHER Comments:   Reason for Stopping:         CEFIXIME PO Comments:   Reason for Stopping: Follow-up Appointments:   1. Your PCP: Yanick Son MD, within 7-10days  2. Nephrology per renal recommendations        Please follow-up on tests/labs that are still pendin. Blood and synovial fluid cultures.     >30 minutes spent coordinating this discharge (review instructions/follow-up, prescriptions, preparing report for sign off)    Signed:  Radha Hoskins MD  2017  4:22 PM

## 2017-09-13 NOTE — PROGRESS NOTES
Problem: Falls - Risk of  Goal: *Absence of Falls  Document Asha Fall Risk and appropriate interventions in the flowsheet.    Outcome: Progressing Towards Goal  Fall Risk Interventions:  Mobility Interventions: Patient to call before getting OOB, PT Consult for assist device competence           Medication Interventions: Patient to call before getting OOB, Teach patient to arise slowly     Elimination Interventions: Call light in reach, Patient to call for help with toileting needs     History of Falls Interventions: Consult care management for discharge planning

## 2017-09-13 NOTE — PROGRESS NOTES
Patient discharged to home with family. Discharge instructions and prescriptions given to son and and discussed with patient and son. Denying questions at this time. Home care set up to arrive tomorrow. Was supposed to go home with walker but did not want to wait. Will  later tonight or tomorrow.

## 2017-09-14 LAB
ABO + RH BLD: NORMAL
BLD PROD TYP BPU: NORMAL
BLOOD GROUP ANTIBODIES SERPL: NORMAL
BPU ID: NORMAL
CALLED TO:,BCALL1: NORMAL
CROSSMATCH RESULT,%XM: NORMAL
SPECIMEN EXP DATE BLD: NORMAL
STATUS OF UNIT,%ST: NORMAL
UNIT DIVISION, %UDIV: 0

## 2017-09-15 LAB
HEPARIN AGGREGATION,XHEAGT: NEGATIVE
PLATELET FACTOR 4,XPF4T: NEGATIVE

## 2017-09-17 LAB
BACTERIA SPEC CULT: NORMAL
BACTERIA SPEC CULT: NORMAL
GRAM STN SPEC: NORMAL
GRAM STN SPEC: NORMAL
SERVICE CMNT-IMP: NORMAL
SERVICE CMNT-IMP: NORMAL

## 2017-09-18 LAB
BACTERIA SPEC CULT: NORMAL
SERVICE CMNT-IMP: NORMAL

## 2017-11-05 ENCOUNTER — HOSPITAL ENCOUNTER (EMERGENCY)
Age: 57
Discharge: HOME | End: 2017-11-06
Attending: EMERGENCY MEDICINE
Payer: COMMERCIAL

## 2017-11-05 ENCOUNTER — APPOINTMENT (OUTPATIENT)
Dept: GENERAL RADIOLOGY | Age: 57
End: 2017-11-05
Attending: PHYSICIAN ASSISTANT
Payer: COMMERCIAL

## 2017-11-05 ENCOUNTER — APPOINTMENT (OUTPATIENT)
Dept: CT IMAGING | Age: 57
End: 2017-11-05
Attending: PHYSICIAN ASSISTANT
Payer: COMMERCIAL

## 2017-11-05 DIAGNOSIS — R06.02 SOB (SHORTNESS OF BREATH): ICD-10-CM

## 2017-11-05 DIAGNOSIS — J18.9 COMMUNITY ACQUIRED PNEUMONIA OF RIGHT LUNG, UNSPECIFIED PART OF LUNG: ICD-10-CM

## 2017-11-05 DIAGNOSIS — I10 ESSENTIAL HYPERTENSION: Primary | ICD-10-CM

## 2017-11-05 DIAGNOSIS — R51.9 ACUTE NONINTRACTABLE HEADACHE, UNSPECIFIED HEADACHE TYPE: ICD-10-CM

## 2017-11-05 LAB
ALBUMIN SERPL-MCNC: 3 G/DL (ref 3.4–5)
ALBUMIN/GLOB SERPL: 0.8 {RATIO} (ref 0.8–1.7)
ALP SERPL-CCNC: 168 U/L (ref 45–117)
ALT SERPL-CCNC: 40 U/L (ref 13–56)
ANION GAP SERPL CALC-SCNC: 10 MMOL/L (ref 3–18)
AST SERPL-CCNC: 33 U/L (ref 15–37)
BASOPHILS # BLD: 0 K/UL (ref 0–0.06)
BASOPHILS NFR BLD: 0 % (ref 0–2)
BILIRUB SERPL-MCNC: 0.5 MG/DL (ref 0.2–1)
BUN SERPL-MCNC: 23 MG/DL (ref 7–18)
BUN/CREAT SERPL: 4 (ref 12–20)
CALCIUM SERPL-MCNC: 8.7 MG/DL (ref 8.5–10.1)
CHLORIDE SERPL-SCNC: 98 MMOL/L (ref 100–108)
CK MB CFR SERPL CALC: NORMAL % (ref 0–4)
CK MB SERPL-MCNC: <1 NG/ML (ref 5–25)
CK SERPL-CCNC: 38 U/L (ref 26–192)
CO2 SERPL-SCNC: 26 MMOL/L (ref 21–32)
CREAT SERPL-MCNC: 6.15 MG/DL (ref 0.6–1.3)
DIFFERENTIAL METHOD BLD: ABNORMAL
EOSINOPHIL # BLD: 0.1 K/UL (ref 0–0.4)
EOSINOPHIL NFR BLD: 1 % (ref 0–5)
ERYTHROCYTE [DISTWIDTH] IN BLOOD BY AUTOMATED COUNT: 17 % (ref 11.6–14.5)
GLOBULIN SER CALC-MCNC: 3.9 G/DL (ref 2–4)
GLUCOSE SERPL-MCNC: 98 MG/DL (ref 74–99)
HCT VFR BLD AUTO: 26.6 % (ref 35–45)
HGB BLD-MCNC: 8.5 G/DL (ref 12–16)
LIPASE SERPL-CCNC: 159 U/L (ref 73–393)
LYMPHOCYTES # BLD: 1.3 K/UL (ref 0.9–3.6)
LYMPHOCYTES NFR BLD: 10 % (ref 21–52)
MAGNESIUM SERPL-MCNC: 2.5 MG/DL (ref 1.6–2.6)
MAGNESIUM SERPL-MCNC: 2.5 MG/DL (ref 1.6–2.6)
MCH RBC QN AUTO: 27.8 PG (ref 24–34)
MCHC RBC AUTO-ENTMCNC: 32 G/DL (ref 31–37)
MCV RBC AUTO: 86.9 FL (ref 74–97)
MONOCYTES # BLD: 1.4 K/UL (ref 0.05–1.2)
MONOCYTES NFR BLD: 11 % (ref 3–10)
NEUTS SEG # BLD: 10.5 K/UL (ref 1.8–8)
NEUTS SEG NFR BLD: 78 % (ref 40–73)
PLATELET # BLD AUTO: 195 K/UL (ref 135–420)
PMV BLD AUTO: 10.3 FL (ref 9.2–11.8)
POTASSIUM SERPL-SCNC: 3.7 MMOL/L (ref 3.5–5.5)
PROT SERPL-MCNC: 6.9 G/DL (ref 6.4–8.2)
RBC # BLD AUTO: 3.06 M/UL (ref 4.2–5.3)
SODIUM SERPL-SCNC: 134 MMOL/L (ref 136–145)
TROPONIN I SERPL-MCNC: <0.02 NG/ML (ref 0–0.04)
WBC # BLD AUTO: 13.2 K/UL (ref 4.6–13.2)

## 2017-11-05 PROCEDURE — 85025 COMPLETE CBC W/AUTO DIFF WBC: CPT | Performed by: EMERGENCY MEDICINE

## 2017-11-05 PROCEDURE — 74011250637 HC RX REV CODE- 250/637: Performed by: PHYSICIAN ASSISTANT

## 2017-11-05 PROCEDURE — 93005 ELECTROCARDIOGRAM TRACING: CPT

## 2017-11-05 PROCEDURE — 85379 FIBRIN DEGRADATION QUANT: CPT | Performed by: PHYSICIAN ASSISTANT

## 2017-11-05 PROCEDURE — 74022 RADEX COMPL AQT ABD SERIES: CPT

## 2017-11-05 PROCEDURE — 80053 COMPREHEN METABOLIC PANEL: CPT | Performed by: EMERGENCY MEDICINE

## 2017-11-05 PROCEDURE — 99285 EMERGENCY DEPT VISIT HI MDM: CPT

## 2017-11-05 PROCEDURE — 77030029684 HC NEB SM VOL KT MONA -A

## 2017-11-05 PROCEDURE — 82550 ASSAY OF CK (CPK): CPT | Performed by: PHYSICIAN ASSISTANT

## 2017-11-05 PROCEDURE — 70450 CT HEAD/BRAIN W/O DYE: CPT

## 2017-11-05 PROCEDURE — 94640 AIRWAY INHALATION TREATMENT: CPT

## 2017-11-05 PROCEDURE — 83735 ASSAY OF MAGNESIUM: CPT | Performed by: PHYSICIAN ASSISTANT

## 2017-11-05 PROCEDURE — 83690 ASSAY OF LIPASE: CPT | Performed by: EMERGENCY MEDICINE

## 2017-11-05 PROCEDURE — 83735 ASSAY OF MAGNESIUM: CPT | Performed by: EMERGENCY MEDICINE

## 2017-11-05 RX ORDER — CLONIDINE HYDROCHLORIDE 0.1 MG/1
0.1 TABLET ORAL
Status: COMPLETED | OUTPATIENT
Start: 2017-11-05 | End: 2017-11-05

## 2017-11-05 RX ADMIN — CLONIDINE HYDROCHLORIDE 0.1 MG: 0.1 TABLET ORAL at 23:34

## 2017-11-06 ENCOUNTER — APPOINTMENT (OUTPATIENT)
Dept: NUCLEAR MEDICINE | Age: 57
End: 2017-11-06
Attending: PHYSICIAN ASSISTANT
Payer: COMMERCIAL

## 2017-11-06 VITALS
HEART RATE: 84 BPM | WEIGHT: 110 LBS | DIASTOLIC BLOOD PRESSURE: 93 MMHG | BODY MASS INDEX: 21.6 KG/M2 | OXYGEN SATURATION: 98 % | HEIGHT: 60 IN | SYSTOLIC BLOOD PRESSURE: 191 MMHG | TEMPERATURE: 99.5 F | RESPIRATION RATE: 16 BRPM

## 2017-11-06 LAB
APPEARANCE UR: CLEAR
BACTERIA URNS QL MICRO: NEGATIVE /HPF
BILIRUB UR QL: NEGATIVE
CK MB CFR SERPL CALC: NORMAL % (ref 0–4)
CK MB SERPL-MCNC: <1 NG/ML (ref 5–25)
CK SERPL-CCNC: 34 U/L (ref 26–192)
COLOR UR: YELLOW
D DIMER PPP FEU-MCNC: 1.5 UG/ML(FEU)
EPITH CASTS URNS QL MICRO: NORMAL /LPF (ref 0–5)
GLUCOSE UR STRIP.AUTO-MCNC: 100 MG/DL
HGB UR QL STRIP: ABNORMAL
KETONES UR QL STRIP.AUTO: NEGATIVE MG/DL
LEUKOCYTE ESTERASE UR QL STRIP.AUTO: ABNORMAL
NITRITE UR QL STRIP.AUTO: NEGATIVE
PH UR STRIP: >8.5 [PH] (ref 5–8)
PROT UR STRIP-MCNC: >1000 MG/DL
RBC #/AREA URNS HPF: NORMAL /HPF (ref 0–5)
SP GR UR REFRACTOMETRY: 1.01 (ref 1–1.03)
TROPONIN I SERPL-MCNC: <0.02 NG/ML (ref 0–0.04)
UROBILINOGEN UR QL STRIP.AUTO: 0.2 EU/DL (ref 0.2–1)
WBC URNS QL MICRO: NORMAL /HPF (ref 0–4)

## 2017-11-06 PROCEDURE — 74011000250 HC RX REV CODE- 250: Performed by: PHYSICIAN ASSISTANT

## 2017-11-06 PROCEDURE — 81001 URINALYSIS AUTO W/SCOPE: CPT | Performed by: EMERGENCY MEDICINE

## 2017-11-06 PROCEDURE — 94640 AIRWAY INHALATION TREATMENT: CPT

## 2017-11-06 PROCEDURE — 74011250637 HC RX REV CODE- 250/637: Performed by: PHYSICIAN ASSISTANT

## 2017-11-06 PROCEDURE — 82550 ASSAY OF CK (CPK): CPT | Performed by: PHYSICIAN ASSISTANT

## 2017-11-06 PROCEDURE — 96374 THER/PROPH/DIAG INJ IV PUSH: CPT

## 2017-11-06 PROCEDURE — A9567 TECHNETIUM TC-99M AEROSOL: HCPCS

## 2017-11-06 RX ORDER — ALBUTEROL SULFATE 90 UG/1
2 AEROSOL, METERED RESPIRATORY (INHALATION)
Qty: 1 INHALER | Refills: 0 | Status: ON HOLD | OUTPATIENT
Start: 2017-11-06 | End: 2018-01-01

## 2017-11-06 RX ORDER — LEVOFLOXACIN 750 MG/1
750 TABLET ORAL
Status: COMPLETED | OUTPATIENT
Start: 2017-11-06 | End: 2017-11-06

## 2017-11-06 RX ORDER — FAMOTIDINE 10 MG/ML
20 INJECTION INTRAVENOUS
Status: COMPLETED | OUTPATIENT
Start: 2017-11-06 | End: 2017-11-06

## 2017-11-06 RX ORDER — LEVOFLOXACIN 500 MG/1
500 TABLET, FILM COATED ORAL
Status: DISCONTINUED | OUTPATIENT
Start: 2017-11-08 | End: 2017-11-06 | Stop reason: HOSPADM

## 2017-11-06 RX ORDER — IPRATROPIUM BROMIDE AND ALBUTEROL SULFATE 2.5; .5 MG/3ML; MG/3ML
3 SOLUTION RESPIRATORY (INHALATION)
Status: COMPLETED | OUTPATIENT
Start: 2017-11-06 | End: 2017-11-06

## 2017-11-06 RX ADMIN — IPRATROPIUM BROMIDE AND ALBUTEROL SULFATE 3 ML: .5; 3 SOLUTION RESPIRATORY (INHALATION) at 04:59

## 2017-11-06 RX ADMIN — LEVOFLOXACIN 750 MG: 750 TABLET, FILM COATED ORAL at 04:59

## 2017-11-06 RX ADMIN — FAMOTIDINE 20 MG: 10 INJECTION, SOLUTION INTRAVENOUS at 00:26

## 2017-11-06 NOTE — ED PROVIDER NOTES
HPI Comments: 56 yr old female, hx CKD on dialysis, htn, hypercholesterolemia, and vitamin D deficiency presents to the ED complaining of elevated BP, headaches, light sensitivity, SOB, and heartburn since earlier this evening. Pt states the pain is located in the epigastric region. Denies N/V, chest pain, fever chills, diarrhea, constipation, and dizziness. Denies tx PTA. No other complaints. Patient is a 56 y.o. female presenting with abdominal pain. Abdominal Pain    Associated symptoms include headaches. Pertinent negatives include no fever, no diarrhea, no nausea, no vomiting, no constipation, no dysuria, no frequency, no hematuria, no myalgias, no chest pain and no back pain. Past Medical History:   Diagnosis Date    Chronic kidney disease     [facility]D [facility]end stage renal disease) ([facility] [address]     Hypercholesteremia     Hypertension     Kidney disease     Vitamin D deficiency        No past surgical history on file. No family history on file. Social History     Social History    Marital status:      Spouse name: N/A    Number of children: N/A    Years of education: N/A     Occupational History    Not on file. Social History Main Topics    Smoking status: Never Smoker    Smokeless tobacco: Never Used    Alcohol use No    Drug use: No    Sexual activity: Not on file     Other Topics Concern    Not on file     Social History Narrative    No narrative on file         ALLERGIES: Banana    Review of Systems   Constitutional: Negative. Negative for chills, diaphoresis, fatigue and fever. HENT: Negative. Negative for congestion, ear pain, rhinorrhea and sore throat. Eyes: Positive for photophobia. Negative for pain and redness. Respiratory: Positive for shortness of breath. Negative for cough, wheezing and stridor. Cardiovascular: Negative. Negative for chest pain and leg swelling. Gastrointestinal: Positive for abdominal pain.  Negative for constipation, diarrhea, nausea and vomiting. Endocrine: Negative. Genitourinary: Negative. Negative for dysuria, flank pain, frequency and hematuria. Musculoskeletal: Negative. Negative for back pain, myalgias, neck pain and neck stiffness. Skin: Negative. Negative for rash and wound. Allergic/Immunologic: Negative. Neurological: Positive for headaches. Negative for dizziness, seizures, syncope and weakness. Hematological: Negative. Psychiatric/Behavioral: Negative. All other systems reviewed and are negative. Vitals:    11/05/17 2345 11/06/17 0000 11/06/17 0030 11/06/17 0145   BP: (!) 172/98 (!) 186/97 (!) 196/99 (!) 191/102   Pulse: 91 94     Resp:       Temp:       SpO2: 98% 99% 99% 98%   Weight:       Height:                Physical Exam   Constitutional: She is oriented to person, place, and time. She appears well-developed and well-nourished. She appears distressed. HENT:   Head: Normocephalic and atraumatic. Eyes: Conjunctivae and EOM are normal. Pupils are equal, round, and reactive to light. Right eye exhibits no discharge. Left eye exhibits no discharge. No scleral icterus. Neck: Normal range of motion. Neck supple. Cardiovascular: Normal rate, regular rhythm and normal heart sounds. Exam reveals no gallop and no friction rub. No murmur heard. Pulmonary/Chest: Effort normal and breath sounds normal. No stridor. No respiratory distress. She has no wheezes. She has no rales. Abdominal: Soft. Bowel sounds are normal. She exhibits no distension and no mass. There is tenderness. There is no rebound and no guarding. Mild diffuse TTP noted on deep palpation, increased TTP noted over the epigastric region, no guarding or evidence of peritonitis noted on exam.    Musculoskeletal: Normal range of motion. Neurological: She is alert and oriented to person, place, and time. Coordination normal.   Gait is steady. Able to ambulate without difficulty. Skin: Skin is warm and dry. No rash noted. She is not diaphoretic. No erythema. Psychiatric: She has a normal mood and affect. Her behavior is normal. Thought content normal.   Nursing note and vitals reviewed. MDM  Number of Diagnoses or Management Options  Acute nonintractable headache, unspecified headache type:   Community acquired pneumonia of right lung, unspecified part of lung:   Essential hypertension:   SOB (shortness of breath):   Diagnosis management comments: Impression:  Htn, headache, SOB, CAP     IV inserted 0.1 mg clonidine given     EKG: normal sinus rhythm, rate 92, no STEMI, reviewed by myself and Dr. Maty Yoo     RBC 3.06, hgb 8.5, hct 26.6, RDW 17, grans 78, lymph 10, monos 11, ABG 10.5, ABM 1.4, Na 134, Cl 98, BUN 23, Cr 6.15, BUCR 4, GFRAA 9, , ALB 3, lipase unremarkable, Mg unremarkable,  Cardiac panel negative, d dimer 1.5  CT head: No acute intracranial abnormality.     Mild patchy periventricular white matter hypodensities which could represent  early burden of microvascular disease. Recommend clinical correlation and MR if  clinically indicated. X-ray abd w 1 v chest: Interval development of the normal right basal opacity likely combination of  infiltrate or subsegmental atelectasis with possible tiny effusion. Persistent scarring at the left medial lung base. Mild cardiomegaly. Repeat cardiac panel negative  VQ scan ordered    Progress: VQ scan: Low probability for pulmonary embolus. The perfusion is markedly better than the ventilation.     Neb tx and levaquin given in the ED. Consulted with Dr. Maty Yoo and the pharmacist. [place] and all other quinolones to tx CAP are renally dose. Recommendation per Dr. Maty Yoo is to give levaquin first dose in the ED and recommend the pt see her PCP/nephrologist for the remainder of this medication as we cannot adjust the dosage based on her renal function daily. Patient is stable for discharge at this time. Rx for albuterol given.  Rest and follow-up with PCP this week. Return to the ED immediately for any new or worsening sx.   Christal Langford PA-C 4:13 AM        Amount and/or Complexity of Data Reviewed  Clinical lab tests: reviewed and ordered  Tests in the radiology section of CPT®: ordered and reviewed    Risk of Complications, Morbidity, and/or Mortality  Presenting problems: moderate  Diagnostic procedures: moderate  Management options: moderate    Patient Progress  Patient progress: stable    ED Course       Procedures

## 2017-11-06 NOTE — ROUTINE PROCESS
I have reviewed discharge instructions with the patient. The patient verbalized understanding. Patient armband removed and shredded. Pt transferred to car via wheelchair.

## 2017-11-06 NOTE — DISCHARGE INSTRUCTIONS
Headache: Care Instructions  Your Care Instructions    Headaches have many possible causes. Most headaches aren't a sign of a more serious problem, and they will get better on their own. Home treatment may help you feel better faster. The doctor has checked you carefully, but problems can develop later. If you notice any problems or new symptoms, get medical treatment right away. Follow-up care is a key part of your treatment and safety. Be sure to make and go to all appointments, and call your doctor if you are having problems. It's also a good idea to know your test results and keep a list of the medicines you take. How can you care for yourself at home? · Do not drive if you have taken a prescription pain medicine. · Rest in a quiet, dark room until your headache is gone. Close your eyes and try to relax or go to sleep. Don't watch TV or read. · Put a cold, moist cloth or cold pack on the painful area for 10 to 20 minutes at a time. Put a thin cloth between the cold pack and your skin. · Use a warm, moist towel or a heating pad set on low to relax tight shoulder and neck muscles. · Have someone gently massage your neck and shoulders. · Take pain medicines exactly as directed. ¨ If the doctor gave you a prescription medicine for pain, take it as prescribed. ¨ If you are not taking a prescription pain medicine, ask your doctor if you can take an over-the-counter medicine. · Be careful not to take pain medicine more often than the instructions allow, because you may get worse or more frequent headaches when the medicine wears off. · Do not ignore new symptoms that occur with a headache, such as a fever, weakness or numbness, vision changes, or confusion. These may be signs of a more serious problem. To prevent headaches  · Keep a headache diary so you can figure out what triggers your headaches. Avoiding triggers may help you prevent headaches.  Record when each headache began, how long it lasted, and what the pain was like (throbbing, aching, stabbing, or dull). Write down any other symptoms you had with the headache, such as nausea, flashing lights or dark spots, or sensitivity to bright light or loud noise. Note if the headache occurred near your period. List anything that might have triggered the headache, such as certain foods (chocolate, cheese, wine) or odors, smoke, bright light, stress, or lack of sleep. · Find healthy ways to deal with stress. Headaches are most common during or right after stressful times. Take time to relax before and after you do something that has caused a headache in the past.  · Try to keep your muscles relaxed by keeping good posture. Check your jaw, face, neck, and shoulder muscles for tension, and try relaxing them. When sitting at a desk, change positions often, and stretch for 30 seconds each hour. · Get plenty of sleep and exercise. · Eat regularly and well. Long periods without food can trigger a headache. · Treat yourself to a massage. Some people find that regular massages are very helpful in relieving tension. · Limit caffeine by not drinking too much coffee, tea, or soda. But don't quit caffeine suddenly, because that can also give you headaches. · Reduce eyestrain from computers by blinking frequently and looking away from the computer screen every so often. Make sure you have proper eyewear and that your monitor is set up properly, about an arm's length away. · Seek help if you have depression or anxiety. Your headaches may be linked to these conditions. Treatment can both prevent headaches and help with symptoms of anxiety or depression. When should you call for help? Call 911 anytime you think you may need emergency care. For example, call if:  ? · You have signs of a stroke. These may include:  ¨ Sudden numbness, paralysis, or weakness in your face, arm, or leg, especially on only one side of your body. ¨ Sudden vision changes.   ¨ Sudden trouble speaking. ¨ Sudden confusion or trouble understanding simple statements. ¨ Sudden problems with walking or balance. ¨ A sudden, severe headache that is different from past headaches. ?Call your doctor now or seek immediate medical care if:  ? · You have a new or worse headache. ? · Your headache gets much worse. Where can you learn more? Go to http://cristino-anne.info/. Enter M271 in the search box to learn more about \"Headache: Care Instructions. \"  Current as of: 2016  Content Version: 11.4  © 8940-0353 [employer]. Care instructions adapted under license by [employer] (which disclaims liability or warranty for this information). If you have questions about a medical condition or this instruction, always ask your healthcare professional. [address] any warranty or liability for your use of this information. [employer] Activation    Thank you for requesting access to [employer]. Please follow the instructions below to securely access and download your online medical record. [employer] allows you to send messages to your doctor, view your test results, renew your prescriptions, schedule appointments, and more. How Do I Sign Up? 1. In your internet browser, go to [email or web address]  2. Click on the First Time User? Click Here link in the Sign In box. You will be redirect to the New Member Sign Up page. 3. Enter your [employer] Access Code exactly as it appears below. You will not need to use this code after youve completed the sign-up process. If you do not sign up before the expiration date, you must request a new code. [employer] Access Code: [email or web address]  Expires: 2017 12:43 PM (This is the date your [employer] access code will )    4. Enter the last four digits of your Social Security Number (xxxx) and Date of Birth (mm/dd/yyyy) as indicated and click Submit.  You will be taken to the next sign-up page.  5. Create a [employer] ID. This will be your [employer] login ID and cannot be changed, so think of one that is secure and easy to remember. 6. Create a [employer] password. You can change your password at any time. 7. Enter your Password Reset Question and Answer. This can be used at a later time if you forget your password. 8. Enter your e-mail address. You will receive e-mail notification when new information is available in [address]. 9. Click Sign Up. You can now view and download portions of your medical record. 10. Click the Download Summary menu link to download a portable copy of your medical information. Additional Information    If you have questions, please visit the Frequently Asked Questions section of the [employer] website at [email or web address] Remember, [employer] is NOT to be used for urgent needs. For medical emergencies, dial 911.

## 2017-11-08 LAB
ATRIAL RATE: 92 BPM
CALCULATED P AXIS, ECG09: 62 DEGREES
CALCULATED R AXIS, ECG10: 87 DEGREES
CALCULATED T AXIS, ECG11: 47 DEGREES
DIAGNOSIS, 93000: NORMAL
P-R INTERVAL, ECG05: 168 MS
Q-T INTERVAL, ECG07: 382 MS
QRS DURATION, ECG06: 84 MS
QTC CALCULATION (BEZET), ECG08: 472 MS
VENTRICULAR RATE, ECG03: 92 BPM

## 2017-11-14 ENCOUNTER — ANESTHESIA EVENT (OUTPATIENT)
Dept: SURGERY | Age: 57
End: 2017-11-14
Payer: COMMERCIAL

## 2017-11-15 ENCOUNTER — HOSPITAL ENCOUNTER (OUTPATIENT)
Age: 57
Setting detail: OUTPATIENT SURGERY
Discharge: HOME | End: 2017-11-15
Attending: SURGERY | Admitting: SURGERY
Payer: COMMERCIAL

## 2017-11-15 ENCOUNTER — ANESTHESIA (OUTPATIENT)
Dept: SURGERY | Age: 57
End: 2017-11-15
Payer: COMMERCIAL

## 2017-11-15 VITALS
TEMPERATURE: 98 F | DIASTOLIC BLOOD PRESSURE: 91 MMHG | HEART RATE: 74 BPM | WEIGHT: 107 LBS | SYSTOLIC BLOOD PRESSURE: 179 MMHG | HEIGHT: 61 IN | BODY MASS INDEX: 20.2 KG/M2 | OXYGEN SATURATION: 92 % | RESPIRATION RATE: 16 BRPM

## 2017-11-15 LAB
BUN BLD-MCNC: 19 MG/DL (ref 7–18)
CHLORIDE BLD-SCNC: 93 MMOL/L (ref 100–108)
GLUCOSE BLD STRIP.AUTO-MCNC: 102 MG/DL (ref 74–106)
HCT VFR BLD CALC: 26 % (ref 36–49)
HGB BLD-MCNC: 8.8 G/DL (ref 12–16)
POTASSIUM BLD-SCNC: 4.4 MMOL/L (ref 3.5–5.5)
SODIUM BLD-SCNC: 133 MMOL/L (ref 136–145)

## 2017-11-15 PROCEDURE — 77030018846 HC SOL IRR STRL H20 ICUM -A: Performed by: SURGERY

## 2017-11-15 PROCEDURE — 74011250636 HC RX REV CODE- 250/636: Performed by: SURGERY

## 2017-11-15 PROCEDURE — 74011250636 HC RX REV CODE- 250/636: Performed by: NURSE ANESTHETIST, CERTIFIED REGISTERED

## 2017-11-15 PROCEDURE — 77030031139 HC SUT VCRL2 J&J -A: Performed by: SURGERY

## 2017-11-15 PROCEDURE — 74011000250 HC RX REV CODE- 250: Performed by: ANESTHESIOLOGY

## 2017-11-15 PROCEDURE — 74011250637 HC RX REV CODE- 250/637: Performed by: NURSE ANESTHETIST, CERTIFIED REGISTERED

## 2017-11-15 PROCEDURE — 74011000250 HC RX REV CODE- 250

## 2017-11-15 PROCEDURE — 74011250636 HC RX REV CODE- 250/636

## 2017-11-15 PROCEDURE — 77030032490 HC SLV COMPR SCD KNE COVD -B: Performed by: SURGERY

## 2017-11-15 PROCEDURE — 76010000161 HC OR TIME 1 TO 1.5 HR INTENSV-TIER 1: Performed by: SURGERY

## 2017-11-15 PROCEDURE — 82947 ASSAY GLUCOSE BLOOD QUANT: CPT

## 2017-11-15 PROCEDURE — 77030013079 HC BLNKT BAIR HGGR 3M -A: Performed by: ANESTHESIOLOGY

## 2017-11-15 PROCEDURE — 77030010507 HC ADH SKN DERMBND J&J -B: Performed by: SURGERY

## 2017-11-15 PROCEDURE — 76210000026 HC REC RM PH II 1 TO 1.5 HR: Performed by: SURGERY

## 2017-11-15 PROCEDURE — 77030011640 HC PAD GRND REM COVD -A: Performed by: SURGERY

## 2017-11-15 PROCEDURE — 76210000006 HC OR PH I REC 0.5 TO 1 HR: Performed by: SURGERY

## 2017-11-15 PROCEDURE — 77030002987 HC SUT PROL J&J -B: Performed by: SURGERY

## 2017-11-15 PROCEDURE — 74011250637 HC RX REV CODE- 250/637: Performed by: ANESTHESIOLOGY

## 2017-11-15 PROCEDURE — 76060000033 HC ANESTHESIA 1 TO 1.5 HR: Performed by: SURGERY

## 2017-11-15 PROCEDURE — 77030002996 HC SUT SLK J&J -A: Performed by: SURGERY

## 2017-11-15 PROCEDURE — 77030012510 HC MSK AIRWY LMA TELE -B: Performed by: ANESTHESIOLOGY

## 2017-11-15 PROCEDURE — 77030020256 HC SOL INJ NACL 0.9%  500ML: Performed by: SURGERY

## 2017-11-15 PROCEDURE — 74011000272 HC RX REV CODE- 272: Performed by: SURGERY

## 2017-11-15 PROCEDURE — 77030011265 HC ELECTRD BLD HEX COVD -A: Performed by: SURGERY

## 2017-11-15 PROCEDURE — 77030018836 HC SOL IRR NACL ICUM -A: Performed by: SURGERY

## 2017-11-15 RX ORDER — FENTANYL CITRATE 50 UG/ML
50 INJECTION, SOLUTION INTRAMUSCULAR; INTRAVENOUS AS NEEDED
Status: DISCONTINUED | OUTPATIENT
Start: 2017-11-15 | End: 2017-11-15 | Stop reason: HOSPADM

## 2017-11-15 RX ORDER — HYDRALAZINE HYDROCHLORIDE 20 MG/ML
5 INJECTION INTRAMUSCULAR; INTRAVENOUS
Status: DISCONTINUED | OUTPATIENT
Start: 2017-11-15 | End: 2017-11-15 | Stop reason: HOSPADM

## 2017-11-15 RX ORDER — SODIUM CHLORIDE 9 MG/ML
25 INJECTION, SOLUTION INTRAVENOUS CONTINUOUS
Status: DISCONTINUED | OUTPATIENT
Start: 2017-11-15 | End: 2017-11-15 | Stop reason: HOSPADM

## 2017-11-15 RX ORDER — CEFAZOLIN SODIUM 2 G/50ML
2 SOLUTION INTRAVENOUS
Status: COMPLETED | OUTPATIENT
Start: 2017-11-15 | End: 2017-11-15

## 2017-11-15 RX ORDER — FENTANYL CITRATE 50 UG/ML
INJECTION, SOLUTION INTRAMUSCULAR; INTRAVENOUS AS NEEDED
Status: DISCONTINUED | OUTPATIENT
Start: 2017-11-15 | End: 2017-11-15 | Stop reason: HOSPADM

## 2017-11-15 RX ORDER — LIDOCAINE HYDROCHLORIDE 10 MG/ML
0.1 INJECTION, SOLUTION EPIDURAL; INFILTRATION; INTRACAUDAL; PERINEURAL AS NEEDED
Status: DISCONTINUED | OUTPATIENT
Start: 2017-11-15 | End: 2017-11-15 | Stop reason: HOSPADM

## 2017-11-15 RX ORDER — LABETALOL HYDROCHLORIDE 5 MG/ML
10 INJECTION, SOLUTION INTRAVENOUS
Status: DISCONTINUED | OUTPATIENT
Start: 2017-11-15 | End: 2017-11-15 | Stop reason: HOSPADM

## 2017-11-15 RX ORDER — ONDANSETRON 2 MG/ML
INJECTION INTRAMUSCULAR; INTRAVENOUS AS NEEDED
Status: DISCONTINUED | OUTPATIENT
Start: 2017-11-15 | End: 2017-11-15 | Stop reason: HOSPADM

## 2017-11-15 RX ORDER — MIDAZOLAM HYDROCHLORIDE 1 MG/ML
INJECTION, SOLUTION INTRAMUSCULAR; INTRAVENOUS AS NEEDED
Status: DISCONTINUED | OUTPATIENT
Start: 2017-11-15 | End: 2017-11-15 | Stop reason: HOSPADM

## 2017-11-15 RX ORDER — PROPOFOL 10 MG/ML
INJECTION, EMULSION INTRAVENOUS AS NEEDED
Status: DISCONTINUED | OUTPATIENT
Start: 2017-11-15 | End: 2017-11-15 | Stop reason: HOSPADM

## 2017-11-15 RX ORDER — OXYCODONE AND ACETAMINOPHEN 5; 325 MG/1; MG/1
1 TABLET ORAL
Status: COMPLETED | OUTPATIENT
Start: 2017-11-15 | End: 2017-11-15

## 2017-11-15 RX ORDER — HYDROMORPHONE HYDROCHLORIDE 2 MG/ML
0.5 INJECTION, SOLUTION INTRAMUSCULAR; INTRAVENOUS; SUBCUTANEOUS
Status: DISCONTINUED | OUTPATIENT
Start: 2017-11-15 | End: 2017-11-15 | Stop reason: HOSPADM

## 2017-11-15 RX ORDER — METOPROLOL TARTRATE 5 MG/5ML
INJECTION INTRAVENOUS AS NEEDED
Status: DISCONTINUED | OUTPATIENT
Start: 2017-11-15 | End: 2017-11-15 | Stop reason: HOSPADM

## 2017-11-15 RX ORDER — HYDRALAZINE HYDROCHLORIDE 20 MG/ML
INJECTION INTRAMUSCULAR; INTRAVENOUS
Status: COMPLETED
Start: 2017-11-15 | End: 2017-11-15

## 2017-11-15 RX ORDER — OXYCODONE AND ACETAMINOPHEN 5; 325 MG/1; MG/1
1 TABLET ORAL
Qty: 20 TAB | Refills: 0 | Status: SHIPPED | OUTPATIENT
Start: 2017-11-15 | End: 2017-12-02

## 2017-11-15 RX ORDER — SODIUM CHLORIDE, SODIUM LACTATE, POTASSIUM CHLORIDE, CALCIUM CHLORIDE 600; 310; 30; 20 MG/100ML; MG/100ML; MG/100ML; MG/100ML
125 INJECTION, SOLUTION INTRAVENOUS CONTINUOUS
Status: DISCONTINUED | OUTPATIENT
Start: 2017-11-15 | End: 2017-11-15 | Stop reason: HOSPADM

## 2017-11-15 RX ORDER — FAMOTIDINE 20 MG/1
20 TABLET, FILM COATED ORAL ONCE
Status: COMPLETED | OUTPATIENT
Start: 2017-11-15 | End: 2017-11-15

## 2017-11-15 RX ORDER — LIDOCAINE HYDROCHLORIDE 20 MG/ML
INJECTION, SOLUTION EPIDURAL; INFILTRATION; INTRACAUDAL; PERINEURAL AS NEEDED
Status: DISCONTINUED | OUTPATIENT
Start: 2017-11-15 | End: 2017-11-15 | Stop reason: HOSPADM

## 2017-11-15 RX ADMIN — PROPOFOL 100 MG: 10 INJECTION, EMULSION INTRAVENOUS at 10:21

## 2017-11-15 RX ADMIN — LIDOCAINE HYDROCHLORIDE 50 MG: 20 INJECTION, SOLUTION EPIDURAL; INFILTRATION; INTRACAUDAL; PERINEURAL at 10:21

## 2017-11-15 RX ADMIN — SODIUM CHLORIDE 25 ML/HR: 900 INJECTION, SOLUTION INTRAVENOUS at 08:44

## 2017-11-15 RX ADMIN — FAMOTIDINE 20 MG: 20 TABLET, FILM COATED ORAL at 09:02

## 2017-11-15 RX ADMIN — FENTANYL CITRATE 25 MCG: 50 INJECTION, SOLUTION INTRAMUSCULAR; INTRAVENOUS at 10:55

## 2017-11-15 RX ADMIN — CEFAZOLIN SODIUM 2 G: 2 SOLUTION INTRAVENOUS at 10:13

## 2017-11-15 RX ADMIN — MIDAZOLAM HYDROCHLORIDE 2 MG: 1 INJECTION, SOLUTION INTRAMUSCULAR; INTRAVENOUS at 10:13

## 2017-11-15 RX ADMIN — ONDANSETRON 4 MG: 2 INJECTION INTRAMUSCULAR; INTRAVENOUS at 10:55

## 2017-11-15 RX ADMIN — LABETALOL HYDROCHLORIDE 10 MG: 5 INJECTION, SOLUTION INTRAVENOUS at 09:04

## 2017-11-15 RX ADMIN — FENTANYL CITRATE 50 MCG: 50 INJECTION, SOLUTION INTRAMUSCULAR; INTRAVENOUS at 12:30

## 2017-11-15 RX ADMIN — METOPROLOL TARTRATE 2 MG: 5 INJECTION INTRAVENOUS at 10:25

## 2017-11-15 RX ADMIN — PROPOFOL 30 MG: 10 INJECTION, EMULSION INTRAVENOUS at 10:54

## 2017-11-15 RX ADMIN — FENTANYL CITRATE 25 MCG: 50 INJECTION, SOLUTION INTRAMUSCULAR; INTRAVENOUS at 10:54

## 2017-11-15 RX ADMIN — LABETALOL HYDROCHLORIDE 10 MG: 5 INJECTION, SOLUTION INTRAVENOUS at 09:34

## 2017-11-15 RX ADMIN — OXYCODONE HYDROCHLORIDE AND ACETAMINOPHEN 1 TABLET: 5; 325 TABLET ORAL at 13:12

## 2017-11-15 RX ADMIN — HYDRALAZINE HYDROCHLORIDE 5 MG: 20 INJECTION INTRAMUSCULAR; INTRAVENOUS at 09:56

## 2017-11-15 RX ADMIN — FENTANYL CITRATE 50 MCG: 50 INJECTION, SOLUTION INTRAMUSCULAR; INTRAVENOUS at 10:21

## 2017-11-15 RX ADMIN — LABETALOL HYDROCHLORIDE 10 MG: 5 INJECTION, SOLUTION INTRAVENOUS at 12:30

## 2017-11-15 NOTE — OP NOTES
BASILIC  VEIN A-V FISTULA  OP NOTE    11/15/2017    Hospital: [facility]   Surgeon(s): Kaitlin Sierra MD  Assistant(s): Galen Carranza PA-C  Pre-operative Diagnosis: n18.6  end stage renal disease  Post-operative Diagnosis: n18.6  end stage renal disease  Procedure(s) Performed:  Procedure(s):  left arm first stage basilic vein transporstion  Anesthesia:  general  Findings:  Good thrill  Complications: None  Estimated Blood Loss:  Minimal <100  Tubes and Drains:  none  Specimens: * No specimens in log *    The patient was placed in the supine position. The Left arm was prepped with chlorhexidine and draped in a sterile manner. A skin incision in the upper arm was performed, underlying soft tissue divided and the basilic vein exposed from the axillary fold to the antecubitum. The musculocutaneous nerve was preserved. The basilic vein was ligated and divided at the antecubital level, distended with heparin-saline, the vein orientation marked, and occluded with a bulldog clamp. The brachial artery was exposed at the antecubital level. The basilic vein was tunneled under the skin so as to be positioned over the biceps muscle in the subcutaneous plane. IV heparin was not administered. The brachial artery was clamped, opened with a #11 blade and the arterial-venous anastomosis performed in an end-to-side  manner utilizing 6-0 Prolene. The clamps were then removed in a manner to prevent debris or air embolism. A pulse over the newly created AV fistula was present. A Doppler signal was present in the runoff vein. The radial artery Doppler signal was present and the pulse was palpable. The heparin was not reversed. Suture line bleeding was controlled by digital pressure. The tissues were approximated with 3-0 Vicryl; the skin was closed with 4-0 Monocryl. The skin was dressed with Dermabond. The patient tolerated the procedure well without any complications.     Kaitlin Sierra MD , FACS     11/15/2017

## 2017-11-15 NOTE — H&P
Date of Surgery Update:  Vivian Griffiths was seen and examined. History and physical has been reviewed. The patient has been examined.  There have been no significant clinical changes since the completion of the originally dated History and Physical.    Signed By: Jamaica Pool MD     November 15, 2017 9:46 AM

## 2017-11-15 NOTE — IP AVS SNAPSHOT
[address] [place] [address] [address] 
[phone] Patient: Konrad Sicard MRN: [id number] :1960 About your hospitalization You were admitted on:  November 15, 2017 You last received care in the:  [facility] PACU You were discharged on:  November 15, 2017 Why you were hospitalized Your primary diagnosis was:  Not on File Things You Need To Do (next 8 weeks) Follow up with Naga Guzman MD  
  
Phone:  [phone] Where:  [address], [address] [facility] [address] [facility] Discharge Orders None A check catherine indicates which time of day the medication should be taken. My Medications TAKE these medications as instructed Instructions Each Dose to Equal  
 Morning Noon Evening Bedtime  
 albuterol 90 mcg/actuation inhaler Commonly known as:  PROVENTIL HFA, VENTOLIN HFA, PROAIR HFA Your last dose was: Your next dose is: Take 2 Puffs by inhalation every four (4) hours as needed for Wheezing. 2 Puff  
    
   
   
   
  
 calcium acetate 667 mg Cap Commonly known as:  PHOSLO Your last dose was: Your next dose is: Take 1 Cap by mouth three (3) times daily (with meals). 1 Cap  
    
   
   
   
  
 cloNIDine HCl 0.1 mg tablet Commonly known as:  CATAPRES Your last dose was: Your next dose is: Take 0.1 mg by mouth two (2) times a day. 0.1 mg  
    
   
   
   
  
 gabapentin 100 mg capsule Commonly known as:  NEURONTIN Your last dose was: Your next dose is: Take 1 Cap by mouth nightly. 100 mg  
    
   
   
   
  
 multivitamin with iron tablet Your last dose was: Your next dose is: Take 1 Tab by mouth daily. 1 Tab NIFEdipine ER 30 mg ER tablet Commonly known as:  ADALAT CC Your last dose was: Your next dose is: Take 20 mg by mouth daily. 20 mg  
    
   
   
   
  
 oxyCODONE-acetaminophen 5-325 mg per tablet Commonly known as:  PERCOCET Your last dose was: Your next dose is: Take 1 Tab by mouth every four (4) hours as needed for Pain. Max Daily Amount: 6 Tabs. 1 Tab Where to Get Your Medications Information on where to get these meds will be given to you by the nurse or doctor. ! Ask your nurse or doctor about these medications  
  oxyCODONE-acetaminophen 5-325 mg per tablet Discharge Instructions Your Hemodialysis Access: Care Instructions Your Care Instructions Hemodialysis, or dialysis, is the use of a machine to remove wastes from your blood. You need it if your kidneys are not able to remove wastes on their own. A dialysis access is the place in your arm, or sometimes in your leg, where a doctor creates a blood vessel that carries a large flow of blood. When you have dialysis, two needles are placed in this blood vessel and are connected to the dialysis machine. Your blood flows out of one needle and into the machine to be cleaned. Then your cleaned blood flows back into your body through the other needle. Sometimes, a doctor makes a short-term access through a tube, called a catheter, placed in your neck, upper chest, or groin. Your doctor creates an access during a minor surgery. You need to take care of your access to keep it working and to prevent infection. Follow-up care is a key part of your treatment and safety. Be sure to make and go to all appointments, and call your doctor if you are having problems. It's also a good idea to know your test results and keep a list of the medicines you take. How can you care for yourself at home? · After your doctor creates an access, keep it dry for at least 2 days.  
· Squeeze a soft ball or other object as instructed after the access is placed. This will help blood flow through the access and help prevent blood clots. · After you have dialysis, check to see whether the access bleeds or swells. Let your doctor know if your arm bleeds or swells. · Do not lift anything heavy with the arm that has the access. · Do not bump your arm. · Do not wear tight clothing or jewelry over the access. · Do not sleep with your access arm under your body. · Have blood drawn or blood pressure taken from your other arm. · Keep the access clean and dry. · Do not put cream or lotion on or near the access. When should you call for help? Call your doctor now or seek immediate medical care if: 
? · You have signs of infection, such as: 
¨ Increased pain, swelling, warmth, or redness around the access. ¨ Red streaks leading from the access. ¨ Pus draining from the access. ¨ A fever. ? · You do not feel a pulse in your access. ? Watch closely for changes in your health, and be sure to contact your doctor if: 
? · You do not get better as expected. Where can you learn more? Go to http://cristino-anne.info/. Enter L169 in the search box to learn more about \"Your Hemodialysis Access: Care Instructions. \" Current as of: May 12, 2017 Content Version: 11.4 © 1210-4516 [employer]. Care instructions adapted under license by [employer] (which disclaims liability or warranty for this information). If you have questions about a medical condition or this instruction, always ask your healthcare professional. [place] [address] any warranty or liability for your use of this information. DISCHARGE SUMMARY from Nurse PATIENT INSTRUCTIONS: 
 
After general anesthesia or intravenous sedation, for 24 hours or while taking prescription Narcotics: · Limit your activities · Do not drive and operate hazardous machinery · Do not make important personal or business decisions · Do  not drink alcoholic beverages · If you have not urinated within 8 hours after discharge, please contact your surgeon on call. Report the following to your surgeon: 
· Excessive pain, swelling, redness or odor of or around the surgical area · Temperature over 100.5 · Nausea and vomiting lasting longer than 4 hours or if unable to take medications · Any signs of decreased circulation or nerve impairment to extremity: change in color, persistent  numbness, tingling, coldness or increase pain · Any questions What to do at Home: These are general instructions for a healthy lifestyle: No smoking/ No tobacco products/ Avoid exposure to second hand smoke Surgeon General's Warning:  Quitting smoking now greatly reduces serious risk to your health. Obesity, smoking, and sedentary lifestyle greatly increases your risk for illness A healthy diet, regular physical exercise & weight monitoring are important for maintaining a healthy lifestyle You may be retaining fluid if you have a history of heart failure or if you experience any of the following symptoms:  Weight gain of 3 pounds or more overnight or 5 pounds in a week, increased swelling in our hands or feet or shortness of breath while lying flat in bed. Please call your doctor as soon as you notice any of these symptoms; do not wait until your next office visit. Recognize signs and symptoms of STROKE: 
 
F-face looks uneven A-arms unable to move or move unevenly S-speech slurred or non-existent T-time-call 911 as soon as signs and symptoms begin-DO NOT go Back to bed or wait to see if you get better-TIME IS BRAIN. Warning Signs of HEART ATTACK Call 911 if you have these symptoms: 
? Chest discomfort. Most heart attacks involve discomfort in the center of the chest that lasts more than a few minutes, or that goes away and comes back. It can feel like uncomfortable pressure, squeezing, fullness, or pain. ? Discomfort in other areas of the upper body. Symptoms can include pain or discomfort in one or both arms, the back, neck, jaw, or stomach. ? Shortness of breath with or without chest discomfort. ? Other signs may include breaking out in a cold sweat, nausea, or lightheadedness. Don't wait more than five minutes to call [address]! Fast action can save your life. Calling 911 is almost always the fastest way to get lifesaving treatment. Emergency Medical Services staff can begin treatment when they arrive  up to an hour sooner than if someone gets to the hospital by car. The discharge information has been reviewed with the patient. The patient verbalized understanding. Discharge medications reviewed with the patient and appropriate educational materials and side effects teaching were provided. ___________________________________________________________________________________________________________________________________ Patient armband removed and given to patient to take home. Patient was informed of the privacy risks if armband lost or stolen Introducing [facility]! Osei Galvez introduces [employer] patient portal. Now you can access parts of your medical record, email your doctor's office, and request medication refills online. 1. In your internet browser, go to [email or web address] 2. Click on the First Time User? Click Here link in the Sign In box. You will see the New Member Sign Up page. 3. Enter your [employer] Access Code exactly as it appears below. You will not need to use this code after youve completed the sign-up process. If you do not sign up before the expiration date, you must request a new code. · [employer] Access Code: [email or web address] Expires: 12/12/2017 12:43 PM 
 
4. Enter the last four digits of your Social Security Number (xxxx) and Date of Birth (mm/dd/yyyy) as indicated and click Submit.  You will be taken to the next sign-up page. 5. Create a Handpressions ID. This will be your Handpressions login ID and cannot be changed, so think of one that is secure and easy to remember. 6. Create a Handpressions password. You can change your password at any time. 7. Enter your Password Reset Question and Answer. This can be used at a later time if you forget your password. 8. Enter your e-mail address. You will receive e-mail notification when new information is available in [address]. 9. Click Sign Up. You can now view and download portions of your medical record. 10. Click the Download Summary menu link to download a portable copy of your medical information. If you have questions, please visit the Frequently Asked Questions section of the Handpressions website. Remember, Handpressions is NOT to be used for urgent needs. For medical emergencies, dial 911. Now available from your iPhone and Android! Providers Seen During Your Hospitalization Provider Specialty Primary office phone Frederick Johnston MD Vascular Surgery [phone] Your Primary Care Physician (PCP) Primary Care Physician Office Phone Office Fax [address] [place] [place] [address] [phone] You are allergic to the following Allergen Reactions Banana Other (comments) Recent Documentation Height Weight BMI OB Status Smoking Status 1.549 m 48.5 kg 20.22 kg/m2 Menopause Never Smoker Emergency Contacts Name Discharge Info Relation Home Work Mobile Shanelle Che DISCHARGE CAREGIVER [3] Other Relative [6] [phone] Patient Belongings The following personal items are in your possession at time of discharge: 
  Dental Appliances: None  Visual Aid: Glasses      Home Medications: None   Jewelry: None  Clothing: Socks, Footwear, Jacket/Coat, Undergarments    Other Valuables: None Please provide this summary of care documentation to your next provider. Signatures-by signing, you are acknowledging that this After Visit Summary has been reviewed with you and you have received a copy. Patient Signature:  ____________________________________________________________ Date:  ____________________________________________________________  
  
Aloma Snellen Provider Signature:  ____________________________________________________________ Date:  ____________________________________________________________

## 2017-11-15 NOTE — PERIOP NOTES
Rec'd care of pt from OR via stretcher. Resp even and unlabored. Attached to monitor. VSS. OR, MAR and anesthesia report acknowledged. Will cont to monitor. [phone] with Dr. Tommy Bullard regarding pt's elevated blood pressure despite getting labetalol 10mg and fentanyl 50 mcg for blood pressure and pain. Stated pt was elevated upon admission is at baseline. Let her go home but instruct to take blood pressure medication when she gets home.

## 2017-11-15 NOTE — ANESTHESIA PREPROCEDURE EVALUATION
Anesthetic History   No history of anesthetic complications            Review of Systems / Medical History  Patient summary reviewed and pertinent labs reviewed    Pulmonary            Asthma : well controlled       Neuro/Psych   Within defined limits           Cardiovascular    Hypertension: poorly controlled              Exercise tolerance: >4 METS     GI/Hepatic/Renal         Renal disease: ESRD and dialysis       Endo/Other  Within defined limits           Other Findings   Comments:   Risk Factors for Postoperative nausea/vomiting:       History of postoperative nausea/vomiting? NO       Female? YES       Motion sickness? NO       Intended opioid administration for postoperative analgesia? YES      Smoking Abstinence  Current Smoker? NO  Elective Surgery? YES  Seen preoperatively by anesthesiologist or proxy prior to day of surgery? YES  Pt abstained from smoking 24 hours prior to anesthesia?  N/A         Physical Exam    Airway  Mallampati: III  TM Distance: < 4 cm  Neck ROM: normal range of motion   Mouth opening: Normal     Cardiovascular  Regular rate and rhythm,  S1 and S2 normal,  no murmur, click, rub, or gallop  Rhythm: regular  Rate: normal         Dental  No notable dental hx       Pulmonary  Breath sounds clear to auscultation               Abdominal  GI exam deferred       Other Findings            Anesthetic Plan    ASA: 3  Anesthesia type: general          Induction: Intravenous  Anesthetic plan and risks discussed with: Patient and Son / Daughter      Son helping to translate

## 2017-11-15 NOTE — ANESTHESIA POSTPROCEDURE EVALUATION
Post-Anesthesia Evaluation & Assessment    Visit Vitals    /86    Pulse 73    Temp 36.7 °C (98 °F)    Resp 16    Ht 5' 1\" (1.549 m)    Wt 48.5 kg (107 lb)    SpO2 100%    BMI 20.22 kg/m2       Nausea/Vomiting: no nausea    Post-operative hydration adequate.     Pain score (VAS): 0    Mental status & Level of consciousness: alert and oriented x 3    Neurological status: moves all extremities, sensation grossly intact    Pulmonary status: airway patent, no supplemental oxygen required    Complications related to anesthesia: none    Additional comments:

## 2017-11-15 NOTE — DISCHARGE INSTRUCTIONS
Your Hemodialysis Access: Care Instructions  Your Care Instructions  Hemodialysis, or dialysis, is the use of a machine to remove wastes from your blood. You need it if your kidneys are not able to remove wastes on their own. A dialysis access is the place in your arm, or sometimes in your leg, where a doctor creates a blood vessel that carries a large flow of blood. When you have dialysis, two needles are placed in this blood vessel and are connected to the dialysis machine. Your blood flows out of one needle and into the machine to be cleaned. Then your cleaned blood flows back into your body through the other needle. Sometimes, a doctor makes a short-term access through a tube, called a catheter, placed in your neck, upper chest, or groin. Your doctor creates an access during a minor surgery. You need to take care of your access to keep it working and to prevent infection. Follow-up care is a key part of your treatment and safety. Be sure to make and go to all appointments, and call your doctor if you are having problems. It's also a good idea to know your test results and keep a list of the medicines you take. How can you care for yourself at home? · After your doctor creates an access, keep it dry for at least 2 days. · Squeeze a soft ball or other object as instructed after the access is placed. This will help blood flow through the access and help prevent blood clots. · After you have dialysis, check to see whether the access bleeds or swells. Let your doctor know if your arm bleeds or swells. · Do not lift anything heavy with the arm that has the access. · Do not bump your arm. · Do not wear tight clothing or jewelry over the access. · Do not sleep with your access arm under your body. · Have blood drawn or blood pressure taken from your other arm. · Keep the access clean and dry. · Do not put cream or lotion on or near the access. When should you call for help?   Call your doctor now or seek immediate medical care if:  ? · You have signs of infection, such as:  ¨ Increased pain, swelling, warmth, or redness around the access. ¨ Red streaks leading from the access. ¨ Pus draining from the access. ¨ A fever. ? · You do not feel a pulse in your access. ? Watch closely for changes in your health, and be sure to contact your doctor if:  ? · You do not get better as expected. Where can you learn more? Go to http://cristino-anne.info/. Enter L169 in the search box to learn more about \"Your Hemodialysis Access: Care Instructions. \"  Current as of: May 12, 2017  Content Version: 11.4  © 1994-0901 [employer]. Care instructions adapted under license by [employer] (which disclaims liability or warranty for this information). If you have questions about a medical condition or this instruction, always ask your healthcare professional. [place] [address] any warranty or liability for your use of this information. DISCHARGE SUMMARY from Nurse    PATIENT INSTRUCTIONS:    After general anesthesia or intravenous sedation, for 24 hours or while taking prescription Narcotics:  · Limit your activities  · Do not drive and operate hazardous machinery  · Do not make important personal or business decisions  · Do  not drink alcoholic beverages  · If you have not urinated within 8 hours after discharge, please contact your surgeon on call.     Report the following to your surgeon:  · Excessive pain, swelling, redness or odor of or around the surgical area  · Temperature over 100.5  · Nausea and vomiting lasting longer than 4 hours or if unable to take medications  · Any signs of decreased circulation or nerve impairment to extremity: change in color, persistent  numbness, tingling, coldness or increase pain  · Any questions    What to do at Home:  These are general instructions for a healthy lifestyle:    No smoking/ No tobacco products/ Avoid exposure to second hand smoke  Surgeon General's Warning:  Quitting smoking now greatly reduces serious risk to your health. Obesity, smoking, and sedentary lifestyle greatly increases your risk for illness    A healthy diet, regular physical exercise & weight monitoring are important for maintaining a healthy lifestyle    You may be retaining fluid if you have a history of heart failure or if you experience any of the following symptoms:  Weight gain of 3 pounds or more overnight or 5 pounds in a week, increased swelling in our hands or feet or shortness of breath while lying flat in bed. Please call your doctor as soon as you notice any of these symptoms; do not wait until your next office visit. Recognize signs and symptoms of STROKE:    F-face looks uneven    A-arms unable to move or move unevenly    S-speech slurred or non-existent    T-time-call 911 as soon as signs and symptoms begin-DO NOT go       Back to bed or wait to see if you get better-TIME IS BRAIN. Warning Signs of HEART ATTACK     Call 911 if you have these symptoms:   Chest discomfort. Most heart attacks involve discomfort in the center of the chest that lasts more than a few minutes, or that goes away and comes back. It can feel like uncomfortable pressure, squeezing, fullness, or pain.  Discomfort in other areas of the upper body. Symptoms can include pain or discomfort in one or both arms, the back, neck, jaw, or stomach.  Shortness of breath with or without chest discomfort.  Other signs may include breaking out in a cold sweat, nausea, or lightheadedness. Don't wait more than five minutes to call 911 - MINUTES MATTER! Fast action can save your life. Calling 911 is almost always the fastest way to get lifesaving treatment. Emergency Medical Services staff can begin treatment when they arrive -- up to an hour sooner than if someone gets to the hospital by car. The discharge information has been reviewed with the patient.   The patient verbalized understanding. Discharge medications reviewed with the patient and appropriate educational materials and side effects teaching were provided. ___________________________________________________________________________________________________________________________________  Patient armband removed and given to patient to take home.   Patient was informed of the privacy risks if armband lost or stolen

## 2017-12-02 ENCOUNTER — HOSPITAL ENCOUNTER (EMERGENCY)
Age: 57
Discharge: HOME | End: 2017-12-02
Attending: EMERGENCY MEDICINE
Payer: COMMERCIAL

## 2017-12-02 ENCOUNTER — APPOINTMENT (OUTPATIENT)
Dept: GENERAL RADIOLOGY | Age: 57
End: 2017-12-02
Attending: EMERGENCY MEDICINE
Payer: COMMERCIAL

## 2017-12-02 VITALS
OXYGEN SATURATION: 97 % | DIASTOLIC BLOOD PRESSURE: 112 MMHG | HEART RATE: 82 BPM | RESPIRATION RATE: 23 BRPM | TEMPERATURE: 98.3 F | WEIGHT: 106.92 LBS | SYSTOLIC BLOOD PRESSURE: 236 MMHG | BODY MASS INDEX: 20.19 KG/M2 | HEIGHT: 61 IN

## 2017-12-02 DIAGNOSIS — E87.70 HYPERVOLEMIA, UNSPECIFIED HYPERVOLEMIA TYPE: Primary | ICD-10-CM

## 2017-12-02 DIAGNOSIS — N28.89 HYPERTENSION SECONDARY TO OTHER RENAL DISORDERS: ICD-10-CM

## 2017-12-02 DIAGNOSIS — I15.1 HYPERTENSION SECONDARY TO OTHER RENAL DISORDERS: ICD-10-CM

## 2017-12-02 LAB
ANION GAP SERPL CALC-SCNC: 10 MMOL/L (ref 3–18)
ATRIAL RATE: 82 BPM
BASOPHILS # BLD: 0 K/UL (ref 0–0.06)
BASOPHILS NFR BLD: 0 % (ref 0–2)
BUN SERPL-MCNC: 25 MG/DL (ref 7–18)
BUN/CREAT SERPL: 4 (ref 12–20)
CALCIUM SERPL-MCNC: 9.2 MG/DL (ref 8.5–10.1)
CALCULATED P AXIS, ECG09: 66 DEGREES
CALCULATED R AXIS, ECG10: 79 DEGREES
CALCULATED T AXIS, ECG11: 45 DEGREES
CHLORIDE SERPL-SCNC: 98 MMOL/L (ref 100–108)
CO2 SERPL-SCNC: 30 MMOL/L (ref 21–32)
CREAT SERPL-MCNC: 6.1 MG/DL (ref 0.6–1.3)
DIAGNOSIS, 93000: NORMAL
DIFFERENTIAL METHOD BLD: ABNORMAL
EOSINOPHIL # BLD: 0.1 K/UL (ref 0–0.4)
EOSINOPHIL NFR BLD: 1 % (ref 0–5)
ERYTHROCYTE [DISTWIDTH] IN BLOOD BY AUTOMATED COUNT: 17.1 % (ref 11.6–14.5)
GLUCOSE SERPL-MCNC: 109 MG/DL (ref 74–99)
HCT VFR BLD AUTO: 28.2 % (ref 35–45)
HGB BLD-MCNC: 8.3 G/DL (ref 12–16)
LYMPHOCYTES # BLD: 0.9 K/UL (ref 0.9–3.6)
LYMPHOCYTES NFR BLD: 10 % (ref 21–52)
MCH RBC QN AUTO: 25.9 PG (ref 24–34)
MCHC RBC AUTO-ENTMCNC: 29.4 G/DL (ref 31–37)
MCV RBC AUTO: 88.1 FL (ref 74–97)
MONOCYTES # BLD: 1 K/UL (ref 0.05–1.2)
MONOCYTES NFR BLD: 11 % (ref 3–10)
NEUTS SEG # BLD: 7.3 K/UL (ref 1.8–8)
NEUTS SEG NFR BLD: 78 % (ref 40–73)
P-R INTERVAL, ECG05: 154 MS
PLATELET # BLD AUTO: 151 K/UL (ref 135–420)
PMV BLD AUTO: 11.3 FL (ref 9.2–11.8)
POTASSIUM SERPL-SCNC: 4.4 MMOL/L (ref 3.5–5.5)
Q-T INTERVAL, ECG07: 382 MS
QRS DURATION, ECG06: 78 MS
QTC CALCULATION (BEZET), ECG08: 446 MS
RBC # BLD AUTO: 3.2 M/UL (ref 4.2–5.3)
SODIUM SERPL-SCNC: 138 MMOL/L (ref 136–145)
VENTRICULAR RATE, ECG03: 82 BPM
WBC # BLD AUTO: 9.4 K/UL (ref 4.6–13.2)

## 2017-12-02 PROCEDURE — 80048 BASIC METABOLIC PNL TOTAL CA: CPT | Performed by: EMERGENCY MEDICINE

## 2017-12-02 PROCEDURE — 93005 ELECTROCARDIOGRAM TRACING: CPT

## 2017-12-02 PROCEDURE — 99285 EMERGENCY DEPT VISIT HI MDM: CPT

## 2017-12-02 PROCEDURE — 85025 COMPLETE CBC W/AUTO DIFF WBC: CPT | Performed by: EMERGENCY MEDICINE

## 2017-12-02 PROCEDURE — 74011250637 HC RX REV CODE- 250/637: Performed by: EMERGENCY MEDICINE

## 2017-12-02 PROCEDURE — 71020 XR CHEST AP LAT: CPT

## 2017-12-02 RX ADMIN — NITROGLYCERIN 1 INCH: 20 OINTMENT TOPICAL at 10:32

## 2017-12-02 NOTE — ED NOTES
I have reviewed discharge instructions with the patient and caregiver. The patient and caregiver verbalized understanding.  Patient armband removed and shredded

## 2017-12-02 NOTE — ED TRIAGE NOTES
Pt c/o SOB since last night. Has dialysis appt at 3pm today. Also c/o right foot pain.  Has hx of gout

## 2017-12-02 NOTE — ED PROVIDER NOTES
EMERGENCY DEPARTMENT HISTORY AND PHYSICAL EXAM    9:57 AM      Date: 12/2/2017  Patient Name: Yamila Mahajan    History of Presenting Illness     Chief Complaint   Patient presents with    Shortness of Breath         History Provided By: Patient and Patient's Son    Chief Complaint: Shortness of Breath  Duration:  Days  Timing:  Acute and Constant  Location: Chest  Quality: N/A  Severity: Moderate  Modifying Factors: None  Associated Symptoms: denies any other associated signs or symptoms      Additional History (Context): Yamila Mahajan is a 56 y.o. female with history of CKD (ESRD), HTN and HLD who presents to the ED with son at bedside acting as  c/o shortness of breath. Pt's son reports that pt began c/o worsening SOB last night. Pt's son notes that pt has had similar sx in the past, but not as bad as this episode. Sx are unchanged with exertion or laying still. Pt had dialysis 3 days ago and pt's son notes that pt was able to complete full course of dialysis but had elevated BP. Pt's BP has been elevated for several weeks (200s/190s), despite taking BP medications 3x every day per son. Pt is next scheduled to have dialysis today at 3 PM. Pt also c/o R knee pain that began 3 days ago. Pt denies recent injury, fever/chills, N/V/D or any other acute sx at this time. PCP: Adeola Bosch MD    Current Outpatient Prescriptions   Medication Sig Dispense Refill    albuterol (PROVENTIL HFA, VENTOLIN HFA, PROAIR HFA) 90 mcg/actuation inhaler Take 2 Puffs by inhalation every four (4) hours as needed for Wheezing. 1 Inhaler 0    calcium acetate (PHOSLO) 667 mg cap Take 1 Cap by mouth three (3) times daily (with meals). 90 Cap 1    gabapentin (NEURONTIN) 100 mg capsule Take 1 Cap by mouth nightly. 30 Cap 1    cloNIDine HCl (CATAPRES) 0.1 mg tablet Take 0.1 mg by mouth two (2) times a day.  NIFEdipine ER (ADALAT CC) 30 mg ER tablet Take 20 mg by mouth daily.       multivitamin with iron tablet Take 1 Tab by mouth daily. Past History     Past Medical History:  Past Medical History:   Diagnosis Date    Chronic kidney disease     ESRD (end stage renal disease) ([facility]     TUES-THURS-SAT    Hypercholesteremia     Hypertension     Kidney disease     Vitamin D deficiency        Past Surgical History:  History reviewed. No pertinent surgical history. Family History:  History reviewed. No pertinent family history. Social History:  Social History   Substance Use Topics    Smoking status: Never Smoker    Smokeless tobacco: Never Used    Alcohol use No       Allergies: Allergies   Allergen Reactions    Banana Other (comments)         Review of Systems     Review of Systems   Constitutional: Negative for fever. HENT: Negative for congestion. Respiratory: Positive for shortness of breath. Negative for cough. Cardiovascular: Negative for chest pain. Gastrointestinal: Negative for abdominal pain and vomiting. Musculoskeletal: Positive for arthralgias (R Knee Pain). Negative for back pain. Skin: Negative for rash. Neurological: Negative for light-headedness. All other systems reviewed and are negative. Physical Exam     Visit Vitals    BP (!) 236/112    Pulse 82    Temp 98.3 °F (36.8 °C)    Resp 23    Ht 5' 1\" (1.549 m)    Wt 48.5 kg (106 lb 14.8 oz)    SpO2 97%    BMI 20.2 kg/m2       Physical Exam   Constitutional: She is oriented to person, place, and time. She appears well-developed. HENT:   Head: Normocephalic. Mouth/Throat: Oropharynx is clear and moist.   Eyes: Pupils are equal, round, and reactive to light. Neck: Normal range of motion. Cardiovascular: Normal rate and normal heart sounds. No murmur heard. Pulmonary/Chest: Effort normal. She has no wheezes. She has no rales. Right subclavian dialysis catheter in place. Site is clean. No evidence of Infection. Abdominal: Soft. There is no tenderness. Musculoskeletal: Normal range of motion.  She exhibits no edema.   Right knee is mildly and diffusely sore with ROM. No effusion. No swelling. Neurological: She is alert and oriented to person, place, and time. Skin: Skin is warm and dry. Nursing note and vitals reviewed. Diagnostic Study Results     Labs -  Recent Results (from the past 12 hour(s))   EKG, 12 LEAD, INITIAL    Collection Time: 12/02/17  9:54 AM   Result Value Ref Range    Ventricular Rate 82 BPM    Atrial Rate 82 BPM    P-R Interval 154 ms    QRS Duration 78 ms    Q-T Interval 382 ms    QTC Calculation (Bezet) 446 ms    Calculated P Axis 66 degrees    Calculated R Axis 79 degrees    Calculated T Axis 45 degrees    Diagnosis       Normal sinus rhythm  Normal ECG  When compared with ECG of 05-NOV-2017 22:12,  No significant change was found     METABOLIC PANEL, BASIC    Collection Time: 12/02/17 10:05 AM   Result Value Ref Range    Sodium 138 136 - 145 mmol/L    Potassium 4.4 3.5 - 5.5 mmol/L    Chloride 98 (L) 100 - 108 mmol/L    CO2 30 21 - 32 mmol/L    Anion gap 10 3.0 - 18 mmol/L    Glucose 109 (H) 74 - 99 mg/dL    BUN 25 (H) 7.0 - 18 MG/DL    Creatinine 6.10 (H) 0.6 - 1.3 MG/DL    BUN/Creatinine ratio 4 (L) 12 - 20      GFR est AA 9 (L) >60 ml/min/1.73m2    GFR est non-AA 7 (L) >60 ml/min/1.73m2    Calcium 9.2 8.5 - 10.1 MG/DL   CBC WITH AUTOMATED DIFF    Collection Time: 12/02/17 10:05 AM   Result Value Ref Range    WBC 9.4 4.6 - 13.2 K/uL    RBC 3.20 (L) 4.20 - 5.30 M/uL    HGB 8.3 (L) 12.0 - 16.0 g/dL    HCT 28.2 (L) 35.0 - 45.0 %    MCV 88.1 74.0 - 97.0 FL    MCH 25.9 24.0 - 34.0 PG    MCHC 29.4 (L) 31.0 - 37.0 g/dL    RDW 17.1 (H) 11.6 - 14.5 %    PLATELET 233 174 - 768 K/uL    MPV 11.3 9.2 - 11.8 FL    NEUTROPHILS 78 (H) 40 - 73 %    LYMPHOCYTES 10 (L) 21 - 52 %    MONOCYTES 11 (H) 3 - 10 %    EOSINOPHILS 1 0 - 5 %    BASOPHILS 0 0 - 2 %    ABS. NEUTROPHILS 7.3 1.8 - 8.0 K/UL    ABS. LYMPHOCYTES 0.9 0.9 - 3.6 K/UL    ABS. MONOCYTES 1.0 0.05 - 1.2 K/UL    ABS.  EOSINOPHILS 0.1 0.0 - 0.4 K/UL    ABS. BASOPHILS 0.0 0.0 - 0.06 K/UL    DF AUTOMATED         Radiologic Studies -   XR CHEST AP LAT   Final Result   IMPRESSION:     Moderate cardiomegaly.     Mild congestive heart failure with mild interstitial pulmonary edema in lungs.     Mild atelectatic changes, and/or infiltrates at the lower lung fields  bilaterally.     Small pleural effusion versus pleural thickening at the CP angles bilaterally  but without any obvious posterior pleural effusion. Medical Decision Making   I am the first provider for this patient. I reviewed the vital signs, available nursing notes, past medical history, past surgical history, family history and social history. Vital Signs-Reviewed the patient's vital signs. Pulse Oximetry Analysis -  95% on room air (Normal)    Cardiac Monitor:  Rate: 82  Rhythm:  NSR    EKG: Interpreted by the EP. Time Interpreted: 9:54 AM   Rate: 82   Rhythm: NSR   Interpretation: No acute changes. Comparison: N/A    Records Reviewed: Nursing Notes and Old Medical Records (Time of Review: 9:57 AM)    ED Course: Progress Notes, Reevaluation, and Consults:      Provider Notes (Medical Decision Making): For Hospitalized Patients:    1. Hospitalization Decision Time:  The decision to hospitalize the patient was made by Dr. roland  on 12/2/2017    2. Aspirin:     Diagnosis     Clinical Impression: No diagnosis found. Disposition:     Follow-up Information     None           Patient's Medications   Start Taking    No medications on file   Continue Taking    ALBUTEROL (PROVENTIL HFA, VENTOLIN HFA, PROAIR HFA) 90 MCG/ACTUATION INHALER    Take 2 Puffs by inhalation every four (4) hours as needed for Wheezing. CALCIUM ACETATE (PHOSLO) 667 MG CAP    Take 1 Cap by mouth three (3) times daily (with meals). CLONIDINE HCL (CATAPRES) 0.1 MG TABLET    Take 0.1 mg by mouth two (2) times a day. GABAPENTIN (NEURONTIN) 100 MG CAPSULE    Take 1 Cap by mouth nightly.     MULTIVITAMIN WITH IRON TABLET    Take 1 Tab by mouth daily. NIFEDIPINE ER (ADALAT CC) 30 MG ER TABLET    Take 20 mg by mouth daily. These Medications have changed    No medications on file   Stop Taking    OXYCODONE-ACETAMINOPHEN (PERCOCET) 5-325 MG PER TABLET    Take 1 Tab by mouth every four (4) hours as needed for Pain. Max Daily Amount: 6 Tabs.     _______________________________    Attestations:  Scribe Attestation     Hung Go acting as a scribe for and in the presence of Marianne Sheffiled MD      December 02, 2017 at 9:57 AM       Provider Attestation:      I personally performed the services described in the documentation, reviewed the documentation, as recorded by the scribe in my presence, and it accurately and completely records my words and actions. December 02, 2017 at 9:57 AM - Marianne Sheffield MD    _______________________________  Reviewed results with pt and son. Pt needs to go to dialysis today as scheduled, son agrees to do this. Pt will then F/U with PCP next week for  eval of adjustment to her BP meds.   Marianne Sheffield MD  12:36 PM

## 2017-12-02 NOTE — ED NOTES
Patient BP is 256/124. Patient son in-law states that's normal for her. Patient has an area to the right fore arm raise skin that is [place] with a ink pen. Patient son in-law states that was done in dialysis. Dr. Dalia Higgins is aware of BP.

## 2018-01-01 ENCOUNTER — APPOINTMENT (OUTPATIENT)
Dept: GENERAL RADIOLOGY | Age: 58
DRG: 296 | End: 2018-01-01
Attending: NURSE PRACTITIONER
Payer: COMMERCIAL

## 2018-01-01 ENCOUNTER — HOME CARE VISIT (OUTPATIENT)
Dept: SCHEDULING | Facility: HOME HEALTH | Age: 58
End: 2018-01-01
Payer: COMMERCIAL

## 2018-01-01 ENCOUNTER — HOSPITAL ENCOUNTER (EMERGENCY)
Age: 58
Discharge: STILL ADMITTED OR ADMINISTRATIVE | End: 2018-05-29
Attending: EMERGENCY MEDICINE

## 2018-01-01 ENCOUNTER — APPOINTMENT (OUTPATIENT)
Dept: GENERAL RADIOLOGY | Age: 58
End: 2018-01-01
Attending: EMERGENCY MEDICINE
Payer: COMMERCIAL

## 2018-01-01 ENCOUNTER — HOME CARE VISIT (OUTPATIENT)
Dept: HOME HEALTH SERVICES | Facility: HOME HEALTH | Age: 58
End: 2018-01-01
Payer: COMMERCIAL

## 2018-01-01 ENCOUNTER — HOSPITAL ENCOUNTER (INPATIENT)
Age: 58
LOS: 2 days | DRG: 296 | End: 2019-01-02
Attending: EMERGENCY MEDICINE | Admitting: INTERNAL MEDICINE
Payer: COMMERCIAL

## 2018-01-01 ENCOUNTER — APPOINTMENT (OUTPATIENT)
Dept: GENERAL RADIOLOGY | Age: 58
DRG: 280 | End: 2018-01-01
Attending: NURSE PRACTITIONER
Payer: COMMERCIAL

## 2018-01-01 ENCOUNTER — APPOINTMENT (OUTPATIENT)
Dept: ULTRASOUND IMAGING | Age: 58
End: 2018-01-01
Attending: EMERGENCY MEDICINE
Payer: COMMERCIAL

## 2018-01-01 ENCOUNTER — HOME HEALTH ADMISSION (OUTPATIENT)
Dept: HOME HEALTH SERVICES | Facility: HOME HEALTH | Age: 58
End: 2018-01-01
Payer: COMMERCIAL

## 2018-01-01 ENCOUNTER — APPOINTMENT (OUTPATIENT)
Dept: GENERAL RADIOLOGY | Age: 58
DRG: 314 | End: 2018-01-01
Attending: ANESTHESIOLOGY
Payer: COMMERCIAL

## 2018-01-01 ENCOUNTER — APPOINTMENT (OUTPATIENT)
Dept: GENERAL RADIOLOGY | Age: 58
DRG: 304 | End: 2018-01-01
Attending: PHYSICIAN ASSISTANT
Payer: COMMERCIAL

## 2018-01-01 ENCOUNTER — HOSPITAL ENCOUNTER (INPATIENT)
Age: 58
LOS: 4 days | Discharge: HOME HEALTH | DRG: 304 | End: 2018-10-06
Attending: EMERGENCY MEDICINE | Admitting: INTERNAL MEDICINE
Payer: COMMERCIAL

## 2018-01-01 ENCOUNTER — HOSPITAL ENCOUNTER (OUTPATIENT)
Age: 58
Setting detail: OUTPATIENT SURGERY
Discharge: HOME | End: 2018-01-22
Attending: SURGERY | Admitting: SURGERY
Payer: COMMERCIAL

## 2018-01-01 ENCOUNTER — HOSPITAL ENCOUNTER (INPATIENT)
Age: 58
LOS: 12 days | Discharge: HOME HEALTH | DRG: 280 | End: 2018-03-09
Attending: EMERGENCY MEDICINE | Admitting: HOSPITALIST
Payer: COMMERCIAL

## 2018-01-01 ENCOUNTER — APPOINTMENT (OUTPATIENT)
Dept: GENERAL RADIOLOGY | Age: 58
DRG: 811 | End: 2018-01-01
Attending: EMERGENCY MEDICINE
Payer: COMMERCIAL

## 2018-01-01 ENCOUNTER — TELEPHONE (OUTPATIENT)
Dept: PULMONOLOGY | Age: 58
End: 2018-01-01

## 2018-01-01 ENCOUNTER — HOME CARE VISIT (OUTPATIENT)
Dept: SCHEDULING | Facility: HOME HEALTH | Age: 58
End: 2018-01-01

## 2018-01-01 ENCOUNTER — APPOINTMENT (OUTPATIENT)
Dept: GENERAL RADIOLOGY | Age: 58
DRG: 304 | End: 2018-01-01
Attending: INTERNAL MEDICINE
Payer: COMMERCIAL

## 2018-01-01 ENCOUNTER — APPOINTMENT (OUTPATIENT)
Dept: CT IMAGING | Age: 58
End: 2018-01-01
Attending: EMERGENCY MEDICINE
Payer: COMMERCIAL

## 2018-01-01 ENCOUNTER — APPOINTMENT (OUTPATIENT)
Dept: CT IMAGING | Age: 58
DRG: 811 | End: 2018-01-01
Attending: EMERGENCY MEDICINE
Payer: COMMERCIAL

## 2018-01-01 ENCOUNTER — APPOINTMENT (OUTPATIENT)
Dept: VASCULAR SURGERY | Age: 58
DRG: 314 | End: 2018-01-01
Attending: EMERGENCY MEDICINE
Payer: COMMERCIAL

## 2018-01-01 ENCOUNTER — APPOINTMENT (OUTPATIENT)
Dept: CT IMAGING | Age: 58
DRG: 304 | End: 2018-01-01
Attending: EMERGENCY MEDICINE
Payer: COMMERCIAL

## 2018-01-01 ENCOUNTER — HOSPITAL ENCOUNTER (EMERGENCY)
Age: 58
Discharge: HOME | End: 2018-05-31
Attending: EMERGENCY MEDICINE
Payer: COMMERCIAL

## 2018-01-01 ENCOUNTER — APPOINTMENT (OUTPATIENT)
Dept: GENERAL RADIOLOGY | Age: 58
DRG: 314 | End: 2018-01-01
Attending: SURGERY
Payer: COMMERCIAL

## 2018-01-01 ENCOUNTER — HOSPITAL ENCOUNTER (INPATIENT)
Age: 58
LOS: 5 days | Discharge: HOME HEALTH | DRG: 811 | End: 2018-07-21
Attending: EMERGENCY MEDICINE | Admitting: HOSPITALIST
Payer: COMMERCIAL

## 2018-01-01 ENCOUNTER — APPOINTMENT (OUTPATIENT)
Dept: GENERAL RADIOLOGY | Age: 58
DRG: 280 | End: 2018-01-01
Attending: PHYSICIAN ASSISTANT
Payer: COMMERCIAL

## 2018-01-01 ENCOUNTER — APPOINTMENT (OUTPATIENT)
Dept: CT IMAGING | Age: 58
DRG: 304 | End: 2018-01-01
Attending: PHYSICIAN ASSISTANT
Payer: COMMERCIAL

## 2018-01-01 ENCOUNTER — HOSPITAL ENCOUNTER (INPATIENT)
Age: 58
LOS: 9 days | Discharge: HOME | DRG: 304 | End: 2018-08-31
Attending: EMERGENCY MEDICINE | Admitting: INTERNAL MEDICINE
Payer: COMMERCIAL

## 2018-01-01 ENCOUNTER — APPOINTMENT (OUTPATIENT)
Dept: NON INVASIVE DIAGNOSTICS | Age: 58
DRG: 291 | End: 2018-01-01
Attending: INTERNAL MEDICINE
Payer: COMMERCIAL

## 2018-01-01 ENCOUNTER — APPOINTMENT (OUTPATIENT)
Dept: GENERAL RADIOLOGY | Age: 58
DRG: 296 | End: 2018-01-01
Attending: EMERGENCY MEDICINE
Payer: COMMERCIAL

## 2018-01-01 ENCOUNTER — HOSPITAL ENCOUNTER (EMERGENCY)
Age: 58
Discharge: HOME | End: 2018-06-07
Attending: EMERGENCY MEDICINE
Payer: COMMERCIAL

## 2018-01-01 ENCOUNTER — TELEPHONE (OUTPATIENT)
Dept: VASCULAR SURGERY | Age: 58
End: 2018-01-01

## 2018-01-01 ENCOUNTER — HOSPITAL ENCOUNTER (EMERGENCY)
Age: 58
Discharge: HOME | End: 2018-06-27
Attending: EMERGENCY MEDICINE
Payer: COMMERCIAL

## 2018-01-01 ENCOUNTER — ANESTHESIA (OUTPATIENT)
Dept: CARDIOTHORACIC SURGERY | Age: 58
DRG: 314 | End: 2018-01-01
Payer: COMMERCIAL

## 2018-01-01 ENCOUNTER — HOSPITAL ENCOUNTER (INPATIENT)
Dept: LAB | Age: 58
Discharge: HOME | DRG: 280 | End: 2018-02-25
Payer: COMMERCIAL

## 2018-01-01 ENCOUNTER — HOSPITAL ENCOUNTER (EMERGENCY)
Age: 58
Discharge: HOME | End: 2018-09-11
Attending: EMERGENCY MEDICINE
Payer: COMMERCIAL

## 2018-01-01 ENCOUNTER — HOSPITAL ENCOUNTER (OUTPATIENT)
Dept: LAB | Age: 58
Discharge: HOME | End: 2018-10-09

## 2018-01-01 ENCOUNTER — HOSPITAL ENCOUNTER (EMERGENCY)
Age: 58
Discharge: HOME | End: 2018-02-05
Attending: EMERGENCY MEDICINE
Payer: COMMERCIAL

## 2018-01-01 ENCOUNTER — APPOINTMENT (OUTPATIENT)
Dept: GENERAL RADIOLOGY | Age: 58
DRG: 304 | End: 2018-01-01
Attending: EMERGENCY MEDICINE
Payer: COMMERCIAL

## 2018-01-01 ENCOUNTER — APPOINTMENT (OUTPATIENT)
Dept: GENERAL RADIOLOGY | Age: 58
DRG: 291 | End: 2018-01-01
Attending: INTERNAL MEDICINE
Payer: COMMERCIAL

## 2018-01-01 ENCOUNTER — HOME CARE VISIT (OUTPATIENT)
Dept: HOME HEALTH SERVICES | Facility: HOME HEALTH | Age: 58
End: 2018-01-01

## 2018-01-01 ENCOUNTER — APPOINTMENT (OUTPATIENT)
Dept: ULTRASOUND IMAGING | Age: 58
DRG: 811 | End: 2018-01-01
Attending: PHYSICIAN ASSISTANT
Payer: COMMERCIAL

## 2018-01-01 ENCOUNTER — APPOINTMENT (OUTPATIENT)
Dept: GENERAL RADIOLOGY | Age: 58
DRG: 291 | End: 2018-01-01
Attending: EMERGENCY MEDICINE
Payer: COMMERCIAL

## 2018-01-01 ENCOUNTER — APPOINTMENT (OUTPATIENT)
Dept: VASCULAR SURGERY | Age: 58
DRG: 304 | End: 2018-01-01
Attending: INTERNAL MEDICINE
Payer: COMMERCIAL

## 2018-01-01 ENCOUNTER — ANESTHESIA EVENT (OUTPATIENT)
Dept: SURGERY | Age: 58
End: 2018-01-01
Payer: COMMERCIAL

## 2018-01-01 ENCOUNTER — APPOINTMENT (OUTPATIENT)
Dept: GENERAL RADIOLOGY | Age: 58
DRG: 314 | End: 2018-01-01
Attending: EMERGENCY MEDICINE
Payer: COMMERCIAL

## 2018-01-01 ENCOUNTER — APPOINTMENT (OUTPATIENT)
Dept: CT IMAGING | Age: 58
DRG: 291 | End: 2018-01-01
Attending: INTERNAL MEDICINE
Payer: COMMERCIAL

## 2018-01-01 ENCOUNTER — APPOINTMENT (OUTPATIENT)
Dept: NON INVASIVE DIAGNOSTICS | Age: 58
DRG: 304 | End: 2018-01-01
Attending: INTERNAL MEDICINE
Payer: COMMERCIAL

## 2018-01-01 ENCOUNTER — ANESTHESIA (OUTPATIENT)
Dept: SURGERY | Age: 58
End: 2018-01-01
Payer: COMMERCIAL

## 2018-01-01 ENCOUNTER — HOSPITAL ENCOUNTER (INPATIENT)
Age: 58
LOS: 5 days | Discharge: HOME | DRG: 291 | End: 2018-10-21
Attending: EMERGENCY MEDICINE | Admitting: INTERNAL MEDICINE
Payer: COMMERCIAL

## 2018-01-01 ENCOUNTER — APPOINTMENT (OUTPATIENT)
Dept: CT IMAGING | Age: 58
DRG: 296 | End: 2018-01-01
Attending: NURSE PRACTITIONER
Payer: COMMERCIAL

## 2018-01-01 ENCOUNTER — APPOINTMENT (OUTPATIENT)
Dept: ULTRASOUND IMAGING | Age: 58
DRG: 304 | End: 2018-01-01
Attending: NURSE PRACTITIONER
Payer: COMMERCIAL

## 2018-01-01 ENCOUNTER — ANESTHESIA EVENT (OUTPATIENT)
Dept: CARDIOTHORACIC SURGERY | Age: 58
DRG: 314 | End: 2018-01-01
Payer: COMMERCIAL

## 2018-01-01 ENCOUNTER — APPOINTMENT (OUTPATIENT)
Dept: MRI IMAGING | Age: 58
DRG: 811 | End: 2018-01-01
Attending: PSYCHIATRY & NEUROLOGY
Payer: COMMERCIAL

## 2018-01-01 ENCOUNTER — HOSPITAL ENCOUNTER (INPATIENT)
Age: 58
LOS: 6 days | Discharge: HOME | DRG: 314 | End: 2018-06-26
Attending: EMERGENCY MEDICINE | Admitting: SURGERY
Payer: COMMERCIAL

## 2018-01-01 ENCOUNTER — HOSPITAL ENCOUNTER (EMERGENCY)
Age: 58
Discharge: HOME | End: 2018-08-05
Attending: EMERGENCY MEDICINE
Payer: COMMERCIAL

## 2018-01-01 ENCOUNTER — APPOINTMENT (OUTPATIENT)
Dept: GENERAL RADIOLOGY | Age: 58
DRG: 280 | End: 2018-01-01
Attending: EMERGENCY MEDICINE
Payer: COMMERCIAL

## 2018-01-01 ENCOUNTER — HOSPITAL ENCOUNTER (INPATIENT)
Dept: LAB | Age: 58
DRG: 280 | End: 2018-01-01
Payer: COMMERCIAL

## 2018-01-01 ENCOUNTER — APPOINTMENT (OUTPATIENT)
Dept: CT IMAGING | Age: 58
DRG: 280 | End: 2018-01-01
Attending: EMERGENCY MEDICINE
Payer: COMMERCIAL

## 2018-01-01 ENCOUNTER — APPOINTMENT (OUTPATIENT)
Dept: CT IMAGING | Age: 58
DRG: 314 | End: 2018-01-01
Attending: SURGERY
Payer: COMMERCIAL

## 2018-01-01 ENCOUNTER — HOSPITAL ENCOUNTER (OUTPATIENT)
Dept: LAB | Age: 58
Discharge: HOME | DRG: 280 | End: 2018-02-25
Payer: COMMERCIAL

## 2018-01-01 VITALS
HEART RATE: 74 BPM | RESPIRATION RATE: 16 BRPM | OXYGEN SATURATION: 99 % | TEMPERATURE: 98.3 F | SYSTOLIC BLOOD PRESSURE: 198 MMHG | DIASTOLIC BLOOD PRESSURE: 111 MMHG

## 2018-01-01 VITALS
WEIGHT: 103.62 LBS | HEIGHT: 61 IN | DIASTOLIC BLOOD PRESSURE: 90 MMHG | SYSTOLIC BLOOD PRESSURE: 190 MMHG | TEMPERATURE: 98.4 F | OXYGEN SATURATION: 99 % | RESPIRATION RATE: 25 BRPM | BODY MASS INDEX: 19.56 KG/M2 | HEART RATE: 65 BPM

## 2018-01-01 VITALS
OXYGEN SATURATION: 98 % | HEART RATE: 77 BPM | RESPIRATION RATE: 18 BRPM | TEMPERATURE: 98.3 F | SYSTOLIC BLOOD PRESSURE: 160 MMHG | DIASTOLIC BLOOD PRESSURE: 80 MMHG

## 2018-01-01 VITALS
HEART RATE: 75 BPM | OXYGEN SATURATION: 99 % | TEMPERATURE: 98.4 F | RESPIRATION RATE: 37 BRPM | BODY MASS INDEX: 17.39 KG/M2 | WEIGHT: 92.1 LBS | SYSTOLIC BLOOD PRESSURE: 130 MMHG | DIASTOLIC BLOOD PRESSURE: 80 MMHG | HEIGHT: 61 IN

## 2018-01-01 VITALS
HEART RATE: 92 BPM | SYSTOLIC BLOOD PRESSURE: 180 MMHG | HEIGHT: 61 IN | OXYGEN SATURATION: 97 % | DIASTOLIC BLOOD PRESSURE: 100 MMHG | WEIGHT: 90 LBS | BODY MASS INDEX: 16.99 KG/M2 | RESPIRATION RATE: 20 BRPM | TEMPERATURE: 98.4 F

## 2018-01-01 VITALS
DIASTOLIC BLOOD PRESSURE: 64 MMHG | HEART RATE: 81 BPM | TEMPERATURE: 96.8 F | OXYGEN SATURATION: 100 % | BODY MASS INDEX: 18.73 KG/M2 | SYSTOLIC BLOOD PRESSURE: 141 MMHG | WEIGHT: 99.21 LBS | HEIGHT: 61 IN | RESPIRATION RATE: 25 BRPM

## 2018-01-01 VITALS
OXYGEN SATURATION: 98 % | SYSTOLIC BLOOD PRESSURE: 120 MMHG | DIASTOLIC BLOOD PRESSURE: 70 MMHG | TEMPERATURE: 98.9 F | HEART RATE: 71 BPM

## 2018-01-01 VITALS
RESPIRATION RATE: 16 BRPM | TEMPERATURE: 98.9 F | BODY MASS INDEX: 19.5 KG/M2 | DIASTOLIC BLOOD PRESSURE: 105 MMHG | WEIGHT: 103.19 LBS | SYSTOLIC BLOOD PRESSURE: 189 MMHG | HEART RATE: 83 BPM | OXYGEN SATURATION: 94 %

## 2018-01-01 VITALS
HEIGHT: 61 IN | WEIGHT: 90.39 LBS | TEMPERATURE: 98.2 F | RESPIRATION RATE: 16 BRPM | OXYGEN SATURATION: 95 % | HEART RATE: 70 BPM | BODY MASS INDEX: 17.07 KG/M2 | SYSTOLIC BLOOD PRESSURE: 116 MMHG | DIASTOLIC BLOOD PRESSURE: 66 MMHG

## 2018-01-01 VITALS
DIASTOLIC BLOOD PRESSURE: 84 MMHG | TEMPERATURE: 97.6 F | OXYGEN SATURATION: 99 % | RESPIRATION RATE: 24 BRPM | HEART RATE: 83 BPM | SYSTOLIC BLOOD PRESSURE: 168 MMHG | BODY MASS INDEX: 17.08 KG/M2 | WEIGHT: 90.39 LBS

## 2018-01-01 VITALS
TEMPERATURE: 98 F | HEART RATE: 98 BPM | RESPIRATION RATE: 20 BRPM | DIASTOLIC BLOOD PRESSURE: 96 MMHG | SYSTOLIC BLOOD PRESSURE: 198 MMHG | OXYGEN SATURATION: 100 % | WEIGHT: 112.21 LBS | BODY MASS INDEX: 21.19 KG/M2 | HEIGHT: 61 IN

## 2018-01-01 VITALS
SYSTOLIC BLOOD PRESSURE: 139 MMHG | RESPIRATION RATE: 18 BRPM | BODY MASS INDEX: 16.15 KG/M2 | DIASTOLIC BLOOD PRESSURE: 84 MMHG | WEIGHT: 85.54 LBS | TEMPERATURE: 97.2 F | OXYGEN SATURATION: 98 % | HEIGHT: 61 IN | HEART RATE: 87 BPM

## 2018-01-01 VITALS
SYSTOLIC BLOOD PRESSURE: 168 MMHG | OXYGEN SATURATION: 98 % | RESPIRATION RATE: 18 BRPM | TEMPERATURE: 97.8 F | DIASTOLIC BLOOD PRESSURE: 88 MMHG | HEART RATE: 58 BPM

## 2018-01-01 VITALS
SYSTOLIC BLOOD PRESSURE: 118 MMHG | RESPIRATION RATE: 18 BRPM | TEMPERATURE: 98.6 F | DIASTOLIC BLOOD PRESSURE: 76 MMHG | HEART RATE: 78 BPM | OXYGEN SATURATION: 99 %

## 2018-01-01 VITALS
OXYGEN SATURATION: 98 % | BODY MASS INDEX: 15.9 KG/M2 | DIASTOLIC BLOOD PRESSURE: 92 MMHG | HEIGHT: 61 IN | WEIGHT: 84.2 LBS | RESPIRATION RATE: 18 BRPM | TEMPERATURE: 98.1 F | HEART RATE: 99 BPM | SYSTOLIC BLOOD PRESSURE: 153 MMHG

## 2018-01-01 VITALS
DIASTOLIC BLOOD PRESSURE: 88 MMHG | RESPIRATION RATE: 40 BRPM | BODY MASS INDEX: 17.07 KG/M2 | TEMPERATURE: 97 F | OXYGEN SATURATION: 98 % | HEIGHT: 61 IN | HEART RATE: 106 BPM | WEIGHT: 90.39 LBS | SYSTOLIC BLOOD PRESSURE: 141 MMHG

## 2018-01-01 VITALS
DIASTOLIC BLOOD PRESSURE: 70 MMHG | TEMPERATURE: 99.6 F | SYSTOLIC BLOOD PRESSURE: 119 MMHG | HEART RATE: 72 BPM | OXYGEN SATURATION: 96 %

## 2018-01-01 VITALS
HEART RATE: 70 BPM | SYSTOLIC BLOOD PRESSURE: 111 MMHG | DIASTOLIC BLOOD PRESSURE: 65 MMHG | TEMPERATURE: 98.8 F | OXYGEN SATURATION: 98 %

## 2018-01-01 VITALS
TEMPERATURE: 97.4 F | SYSTOLIC BLOOD PRESSURE: 150 MMHG | DIASTOLIC BLOOD PRESSURE: 70 MMHG | RESPIRATION RATE: 18 BRPM | OXYGEN SATURATION: 98 % | HEART RATE: 69 BPM

## 2018-01-01 VITALS
TEMPERATURE: 99.7 F | HEART RATE: 72 BPM | OXYGEN SATURATION: 98 % | SYSTOLIC BLOOD PRESSURE: 131 MMHG | DIASTOLIC BLOOD PRESSURE: 71 MMHG

## 2018-01-01 VITALS
OXYGEN SATURATION: 97 % | HEART RATE: 71 BPM | RESPIRATION RATE: 18 BRPM | SYSTOLIC BLOOD PRESSURE: 117 MMHG | DIASTOLIC BLOOD PRESSURE: 70 MMHG | TEMPERATURE: 98.6 F

## 2018-01-01 VITALS
SYSTOLIC BLOOD PRESSURE: 107 MMHG | OXYGEN SATURATION: 99 % | DIASTOLIC BLOOD PRESSURE: 62 MMHG | HEART RATE: 73 BPM | TEMPERATURE: 99 F

## 2018-01-01 VITALS
DIASTOLIC BLOOD PRESSURE: 76 MMHG | OXYGEN SATURATION: 98 % | SYSTOLIC BLOOD PRESSURE: 133 MMHG | TEMPERATURE: 97.7 F | HEART RATE: 63 BPM | RESPIRATION RATE: 18 BRPM

## 2018-01-01 VITALS
DIASTOLIC BLOOD PRESSURE: 74 MMHG | TEMPERATURE: 97.8 F | OXYGEN SATURATION: 97 % | SYSTOLIC BLOOD PRESSURE: 145 MMHG | RESPIRATION RATE: 31 BRPM | HEART RATE: 85 BPM

## 2018-01-01 DIAGNOSIS — R56.9 SEIZURE (HCC): ICD-10-CM

## 2018-01-01 DIAGNOSIS — E87.5 ACUTE HYPERKALEMIA: ICD-10-CM

## 2018-01-01 DIAGNOSIS — N18.6 ESRD (END STAGE RENAL DISEASE) (HCC): ICD-10-CM

## 2018-01-01 DIAGNOSIS — A15.9 TB (TUBERCULOSIS): ICD-10-CM

## 2018-01-01 DIAGNOSIS — I10 ACCELERATED HYPERTENSION: Primary | ICD-10-CM

## 2018-01-01 DIAGNOSIS — I15.1 HYPERTENSION SECONDARY TO OTHER RENAL DISORDERS: ICD-10-CM

## 2018-01-01 DIAGNOSIS — N18.6 ESRD (END STAGE RENAL DISEASE) (HCC): Primary | ICD-10-CM

## 2018-01-01 DIAGNOSIS — D64.9 ANEMIA, UNSPECIFIED TYPE: Primary | ICD-10-CM

## 2018-01-01 DIAGNOSIS — I46.9 CARDIOPULMONARY ARREST WITH SUCCESSFUL RESUSCITATION (HCC): Primary | ICD-10-CM

## 2018-01-01 DIAGNOSIS — I10 MALIGNANT HYPERTENSION: Primary | ICD-10-CM

## 2018-01-01 DIAGNOSIS — R06.02 SOB (SHORTNESS OF BREATH): ICD-10-CM

## 2018-01-01 DIAGNOSIS — R06.00 DYSPNEA, UNSPECIFIED TYPE: ICD-10-CM

## 2018-01-01 DIAGNOSIS — R18.8 OTHER ASCITES: Primary | ICD-10-CM

## 2018-01-01 DIAGNOSIS — J18.9 PNEUMONIA OF LOWER LOBE DUE TO INFECTIOUS ORGANISM, UNSPECIFIED LATERALITY: ICD-10-CM

## 2018-01-01 DIAGNOSIS — N18.6 STAGE 5 CHRONIC KIDNEY DISEASE ON CHRONIC DIALYSIS (HCC): Primary | ICD-10-CM

## 2018-01-01 DIAGNOSIS — M79.89 ARM SWELLING: Primary | ICD-10-CM

## 2018-01-01 DIAGNOSIS — E87.1 HYPONATREMIA: ICD-10-CM

## 2018-01-01 DIAGNOSIS — Z99.2 STAGE 5 CHRONIC KIDNEY DISEASE ON CHRONIC DIALYSIS (HCC): Primary | ICD-10-CM

## 2018-01-01 DIAGNOSIS — I16.1 HYPERTENSIVE EMERGENCY: Primary | ICD-10-CM

## 2018-01-01 DIAGNOSIS — I21.4 NSTEMI (NON-ST ELEVATED MYOCARDIAL INFARCTION) (HCC): Primary | ICD-10-CM

## 2018-01-01 DIAGNOSIS — R09.02 HYPOXIA: Primary | ICD-10-CM

## 2018-01-01 DIAGNOSIS — R10.84 ABDOMINAL PAIN, GENERALIZED: ICD-10-CM

## 2018-01-01 DIAGNOSIS — K59.00 CONSTIPATION, UNSPECIFIED CONSTIPATION TYPE: ICD-10-CM

## 2018-01-01 DIAGNOSIS — I46.9 CARDIAC ARREST (HCC): ICD-10-CM

## 2018-01-01 DIAGNOSIS — A15.0 PULMONARY TB: ICD-10-CM

## 2018-01-01 DIAGNOSIS — T82.9XXA COMPLICATION OF VASCULAR DIALYSIS CATHETER, INITIAL ENCOUNTER: Primary | ICD-10-CM

## 2018-01-01 DIAGNOSIS — K59.01 SLOW TRANSIT CONSTIPATION: Primary | ICD-10-CM

## 2018-01-01 DIAGNOSIS — N28.89 HYPERTENSION SECONDARY TO OTHER RENAL DISORDERS: ICD-10-CM

## 2018-01-01 LAB
ABO + RH BLD: NORMAL
ACID FAST STN SPEC: NEGATIVE
AFP-TM SERPL-MCNC: 3.6 NG/ML (ref 0–8.3)
ALBUMIN FLD-MCNC: 1.7 G/DL
ALBUMIN SERPL-MCNC: 1.9 G/DL (ref 3.4–5)
ALBUMIN SERPL-MCNC: 2.1 G/DL (ref 3.4–5)
ALBUMIN SERPL-MCNC: 2.3 G/DL (ref 3.4–5)
ALBUMIN SERPL-MCNC: 2.4 G/DL (ref 3.4–5)
ALBUMIN SERPL-MCNC: 2.5 G/DL (ref 3.4–5)
ALBUMIN SERPL-MCNC: 2.6 G/DL (ref 3.4–5)
ALBUMIN SERPL-MCNC: 2.6 G/DL (ref 3.4–5)
ALBUMIN SERPL-MCNC: 2.7 G/DL (ref 3.4–5)
ALBUMIN SERPL-MCNC: 2.8 G/DL (ref 3.4–5)
ALBUMIN SERPL-MCNC: 2.9 G/DL (ref 3.4–5)
ALBUMIN SERPL-MCNC: 3 G/DL (ref 3.4–5)
ALBUMIN SERPL-MCNC: 3.1 G/DL (ref 3.4–5)
ALBUMIN SERPL-MCNC: 3.2 G/DL (ref 3.4–5)
ALBUMIN SERPL-MCNC: 3.2 G/DL (ref 3.4–5)
ALBUMIN SERPL-MCNC: 3.3 G/DL (ref 3.4–5)
ALBUMIN/GLOB SERPL: 0.5 {RATIO} (ref 0.8–1.7)
ALBUMIN/GLOB SERPL: 0.6 {RATIO} (ref 0.8–1.7)
ALBUMIN/GLOB SERPL: 0.7 {RATIO} (ref 0.8–1.7)
ALBUMIN/GLOB SERPL: 0.8 {RATIO} (ref 0.8–1.7)
ALBUMIN/GLOB SERPL: 0.9 {RATIO} (ref 0.8–1.7)
ALDOST SERPL-MCNC: <1 NG/DL (ref 0–30)
ALP SERPL-CCNC: 101 U/L (ref 45–117)
ALP SERPL-CCNC: 102 U/L (ref 45–117)
ALP SERPL-CCNC: 102 U/L (ref 45–117)
ALP SERPL-CCNC: 103 U/L (ref 45–117)
ALP SERPL-CCNC: 107 U/L (ref 45–117)
ALP SERPL-CCNC: 107 U/L (ref 45–117)
ALP SERPL-CCNC: 109 U/L (ref 45–117)
ALP SERPL-CCNC: 109 U/L (ref 45–117)
ALP SERPL-CCNC: 114 U/L (ref 45–117)
ALP SERPL-CCNC: 116 U/L (ref 45–117)
ALP SERPL-CCNC: 117 U/L (ref 45–117)
ALP SERPL-CCNC: 118 U/L (ref 45–117)
ALP SERPL-CCNC: 121 U/L (ref 45–117)
ALP SERPL-CCNC: 126 U/L (ref 45–117)
ALP SERPL-CCNC: 126 U/L (ref 45–117)
ALP SERPL-CCNC: 129 U/L (ref 45–117)
ALP SERPL-CCNC: 132 U/L (ref 45–117)
ALP SERPL-CCNC: 135 U/L (ref 45–117)
ALP SERPL-CCNC: 141 U/L (ref 45–117)
ALP SERPL-CCNC: 142 U/L (ref 45–117)
ALP SERPL-CCNC: 145 U/L (ref 45–117)
ALP SERPL-CCNC: 145 U/L (ref 45–117)
ALP SERPL-CCNC: 147 U/L (ref 45–117)
ALP SERPL-CCNC: 167 U/L (ref 45–117)
ALP SERPL-CCNC: 173 U/L (ref 45–117)
ALP SERPL-CCNC: 174 U/L (ref 45–117)
ALP SERPL-CCNC: 194 U/L (ref 45–117)
ALP SERPL-CCNC: 208 U/L (ref 45–117)
ALP SERPL-CCNC: 246 U/L (ref 45–117)
ALP SERPL-CCNC: 86 U/L (ref 45–117)
ALP SERPL-CCNC: 87 U/L (ref 45–117)
ALP SERPL-CCNC: 88 U/L (ref 45–117)
ALP SERPL-CCNC: 91 U/L (ref 45–117)
ALP SERPL-CCNC: 93 U/L (ref 45–117)
ALP SERPL-CCNC: 95 U/L (ref 45–117)
ALP SERPL-CCNC: 95 U/L (ref 45–117)
ALP SERPL-CCNC: 98 U/L (ref 45–117)
ALT SERPL-CCNC: 10 U/L (ref 13–56)
ALT SERPL-CCNC: 10 U/L (ref 13–56)
ALT SERPL-CCNC: 11 U/L (ref 13–56)
ALT SERPL-CCNC: 11 U/L (ref 13–56)
ALT SERPL-CCNC: 12 U/L (ref 13–56)
ALT SERPL-CCNC: 13 U/L (ref 13–56)
ALT SERPL-CCNC: 14 U/L (ref 13–56)
ALT SERPL-CCNC: 14 U/L (ref 13–56)
ALT SERPL-CCNC: 16 U/L (ref 13–56)
ALT SERPL-CCNC: 17 U/L (ref 13–56)
ALT SERPL-CCNC: 18 U/L (ref 13–56)
ALT SERPL-CCNC: 19 U/L (ref 13–56)
ALT SERPL-CCNC: 20 U/L (ref 13–56)
ALT SERPL-CCNC: 21 U/L (ref 13–56)
ALT SERPL-CCNC: 23 U/L (ref 13–56)
ALT SERPL-CCNC: 24 U/L (ref 13–56)
ALT SERPL-CCNC: 24 U/L (ref 13–56)
ALT SERPL-CCNC: 26 U/L (ref 13–56)
ALT SERPL-CCNC: 26 U/L (ref 13–56)
ALT SERPL-CCNC: 31 U/L (ref 13–56)
ALT SERPL-CCNC: 7 U/L (ref 13–56)
ALT SERPL-CCNC: 8 U/L (ref 13–56)
ALT SERPL-CCNC: 9 U/L (ref 13–56)
ALT SERPL-CCNC: <6 U/L (ref 13–56)
ALT SERPL-CCNC: <6 U/L (ref 13–56)
AMIKACIN ISLT MIC: ABNORMAL
AMMONIA PLAS-SCNC: 29 UMOL/L (ref 11–32)
ANION GAP SERPL CALC-SCNC: 10 MMOL/L (ref 3–18)
ANION GAP SERPL CALC-SCNC: 11 MMOL/L (ref 3–18)
ANION GAP SERPL CALC-SCNC: 12 MMOL/L (ref 3–18)
ANION GAP SERPL CALC-SCNC: 13 MMOL/L (ref 3–18)
ANION GAP SERPL CALC-SCNC: 14 MMOL/L (ref 3–18)
ANION GAP SERPL CALC-SCNC: 14 MMOL/L (ref 3–18)
ANION GAP SERPL CALC-SCNC: 5 MMOL/L (ref 3–18)
ANION GAP SERPL CALC-SCNC: 6 MMOL/L (ref 3–18)
ANION GAP SERPL CALC-SCNC: 6 MMOL/L (ref 3–18)
ANION GAP SERPL CALC-SCNC: 7 MMOL/L (ref 3–18)
ANION GAP SERPL CALC-SCNC: 8 MMOL/L (ref 3–18)
ANION GAP SERPL CALC-SCNC: 9 MMOL/L (ref 3–18)
APPEARANCE FLD: ABNORMAL
APPEARANCE UR: ABNORMAL
APPEARANCE UR: CLEAR
APPEARANCE UR: CLEAR
APTT PPP: 102.3 SEC (ref 23–36.4)
APTT PPP: 103.3 SEC (ref 23–36.4)
APTT PPP: 24.5 SEC (ref 23–36.4)
APTT PPP: 32.2 SEC (ref 23–36.4)
APTT PPP: 32.2 SEC (ref 23–36.4)
APTT PPP: 33.3 SEC (ref 23–36.4)
APTT PPP: 33.8 SEC (ref 23–36.4)
APTT PPP: 34.5 SEC (ref 23–36.4)
APTT PPP: 35.4 SEC (ref 23–36.4)
APTT PPP: 36.6 SEC (ref 23–36.4)
APTT PPP: 39 SEC (ref 23–36.4)
APTT PPP: 42.2 SEC (ref 23–36.4)
APTT PPP: 44.5 SEC (ref 23–36.4)
APTT PPP: 47.9 SEC (ref 23–36.4)
APTT PPP: 56.8 SEC (ref 23–36.4)
ARTERIAL PATENCY WRIST A: ABNORMAL
ARTERIAL PATENCY WRIST A: ABNORMAL
ARTERIAL PATENCY WRIST A: YES
AST SERPL-CCNC: 108 U/L (ref 15–37)
AST SERPL-CCNC: 18 U/L (ref 15–37)
AST SERPL-CCNC: 19 U/L (ref 15–37)
AST SERPL-CCNC: 20 U/L (ref 15–37)
AST SERPL-CCNC: 21 U/L (ref 15–37)
AST SERPL-CCNC: 22 U/L (ref 15–37)
AST SERPL-CCNC: 22 U/L (ref 15–37)
AST SERPL-CCNC: 23 U/L (ref 15–37)
AST SERPL-CCNC: 23 U/L (ref 15–37)
AST SERPL-CCNC: 25 U/L (ref 15–37)
AST SERPL-CCNC: 25 U/L (ref 15–37)
AST SERPL-CCNC: 26 U/L (ref 15–37)
AST SERPL-CCNC: 28 U/L (ref 15–37)
AST SERPL-CCNC: 29 U/L (ref 15–37)
AST SERPL-CCNC: 29 U/L (ref 15–37)
AST SERPL-CCNC: 30 U/L (ref 15–37)
AST SERPL-CCNC: 317 U/L (ref 15–37)
AST SERPL-CCNC: 33 U/L (ref 15–37)
AST SERPL-CCNC: 34 U/L (ref 15–37)
AST SERPL-CCNC: 34 U/L (ref 15–37)
AST SERPL-CCNC: 35 U/L (ref 15–37)
AST SERPL-CCNC: 36 U/L (ref 15–37)
AST SERPL-CCNC: 38 U/L (ref 15–37)
AST SERPL-CCNC: 40 U/L (ref 15–37)
AST SERPL-CCNC: 42 U/L (ref 15–37)
AST SERPL-CCNC: 42 U/L (ref 15–37)
AST SERPL-CCNC: 44 U/L (ref 15–37)
AST SERPL-CCNC: 46 U/L (ref 15–37)
AST SERPL-CCNC: 47 U/L (ref 15–37)
AST SERPL-CCNC: 50 U/L (ref 15–37)
AST SERPL-CCNC: 52 U/L (ref 15–37)
AST SERPL-CCNC: 65 U/L (ref 15–37)
AST SERPL-CCNC: 68 U/L (ref 15–37)
AST SERPL-CCNC: 82 U/L (ref 15–37)
AST SERPL-CCNC: 95 U/L (ref 15–37)
ATRIAL RATE: 102 BPM
ATRIAL RATE: 105 BPM
ATRIAL RATE: 106 BPM
ATRIAL RATE: 110 BPM
ATRIAL RATE: 114 BPM
ATRIAL RATE: 141 BPM
ATRIAL RATE: 234 BPM
ATRIAL RATE: 67 BPM
ATRIAL RATE: 68 BPM
ATRIAL RATE: 72 BPM
ATRIAL RATE: 75 BPM
ATRIAL RATE: 79 BPM
ATRIAL RATE: 79 BPM
ATRIAL RATE: 83 BPM
ATRIAL RATE: 87 BPM
ATRIAL RATE: 92 BPM
ATRIAL RATE: 94 BPM
ATTENDING PHYSICIAN, CST07: NORMAL
BACTERIA SPEC CULT: ABNORMAL
BACTERIA SPEC CULT: ABNORMAL
BACTERIA SPEC CULT: NORMAL
BACTERIA URNS QL MICRO: ABNORMAL /HPF
BACTERIA URNS QL MICRO: NEGATIVE /HPF
BASE DEFICIT BLD-SCNC: 2 MMOL/L
BASE EXCESS BLD CALC-SCNC: 1 MMOL/L
BASE EXCESS BLD CALC-SCNC: 2 MMOL/L
BASE EXCESS BLD CALC-SCNC: 2 MMOL/L
BASE EXCESS BLD CALC-SCNC: 3 MMOL/L
BASE EXCESS BLD CALC-SCNC: 3 MMOL/L
BASE EXCESS BLD CALC-SCNC: 5 MMOL/L
BASE EXCESS BLD CALC-SCNC: 6 MMOL/L
BASE EXCESS BLD CALC-SCNC: 7 MMOL/L
BASOPHILS # BLD: 0 K/UL (ref 0–0.06)
BASOPHILS # BLD: 0 K/UL (ref 0–0.1)
BASOPHILS # BLD: 0.1 K/UL (ref 0–0.06)
BASOPHILS # BLD: 0.1 K/UL (ref 0–0.1)
BASOPHILS NFR BLD: 0 % (ref 0–2)
BASOPHILS NFR BLD: 0 % (ref 0–3)
BASOPHILS NFR BLD: 1 % (ref 0–2)
BASOPHILS NFR BLD: 1 % (ref 0–3)
BASOPHILS NFR BLD: 1 % (ref 0–3)
BASOPHILS NFR BLD: 2 % (ref 0–2)
BASOPHILS NFR BLD: 2 % (ref 0–3)
BASOPHILS NFR BLD: 3 % (ref 0–2)
BDY SITE: ABNORMAL
BILIRUB DIRECT SERPL-MCNC: 0.1 MG/DL (ref 0–0.2)
BILIRUB DIRECT SERPL-MCNC: <0.1 MG/DL (ref 0–0.2)
BILIRUB SERPL-MCNC: 0.3 MG/DL (ref 0.2–1)
BILIRUB SERPL-MCNC: 0.4 MG/DL (ref 0.2–1)
BILIRUB SERPL-MCNC: 0.5 MG/DL (ref 0.2–1)
BILIRUB SERPL-MCNC: 0.6 MG/DL (ref 0.2–1)
BILIRUB SERPL-MCNC: 0.7 MG/DL (ref 0.2–1)
BILIRUB SERPL-MCNC: 0.7 MG/DL (ref 0.2–1)
BILIRUB SERPL-MCNC: 0.8 MG/DL (ref 0.2–1)
BILIRUB UR QL: NEGATIVE
BLD PROD TYP BPU: NORMAL
BLOOD GROUP ANTIBODIES SERPL: NORMAL
BNP SERPL-MCNC: ABNORMAL PG/ML (ref 0–900)
BODY FLD TYPE: NORMAL
BODY TEMPERATURE: 97.7
BODY TEMPERATURE: 98
BODY TEMPERATURE: 98.6
BPU ID: NORMAL
BUN BLD-MCNC: 24 MG/DL (ref 7–18)
BUN SERPL-MCNC: 11 MG/DL (ref 7–18)
BUN SERPL-MCNC: 12 MG/DL (ref 7–18)
BUN SERPL-MCNC: 12 MG/DL (ref 7–18)
BUN SERPL-MCNC: 13 MG/DL (ref 7–18)
BUN SERPL-MCNC: 17 MG/DL (ref 7–18)
BUN SERPL-MCNC: 18 MG/DL (ref 7–18)
BUN SERPL-MCNC: 18 MG/DL (ref 7–18)
BUN SERPL-MCNC: 19 MG/DL (ref 7–18)
BUN SERPL-MCNC: 20 MG/DL (ref 7–18)
BUN SERPL-MCNC: 20 MG/DL (ref 7–18)
BUN SERPL-MCNC: 21 MG/DL (ref 7–18)
BUN SERPL-MCNC: 22 MG/DL (ref 7–18)
BUN SERPL-MCNC: 22 MG/DL (ref 7–18)
BUN SERPL-MCNC: 23 MG/DL (ref 7–18)
BUN SERPL-MCNC: 24 MG/DL (ref 7–18)
BUN SERPL-MCNC: 25 MG/DL (ref 7–18)
BUN SERPL-MCNC: 25 MG/DL (ref 7–18)
BUN SERPL-MCNC: 28 MG/DL (ref 7–18)
BUN SERPL-MCNC: 29 MG/DL (ref 7–18)
BUN SERPL-MCNC: 30 MG/DL (ref 7–18)
BUN SERPL-MCNC: 31 MG/DL (ref 7–18)
BUN SERPL-MCNC: 32 MG/DL (ref 7–18)
BUN SERPL-MCNC: 33 MG/DL (ref 7–18)
BUN SERPL-MCNC: 33 MG/DL (ref 7–18)
BUN SERPL-MCNC: 34 MG/DL (ref 7–18)
BUN SERPL-MCNC: 36 MG/DL (ref 7–18)
BUN SERPL-MCNC: 37 MG/DL (ref 7–18)
BUN SERPL-MCNC: 38 MG/DL (ref 7–18)
BUN SERPL-MCNC: 39 MG/DL (ref 7–18)
BUN SERPL-MCNC: 40 MG/DL (ref 7–18)
BUN SERPL-MCNC: 41 MG/DL (ref 7–18)
BUN SERPL-MCNC: 43 MG/DL (ref 7–18)
BUN SERPL-MCNC: 45 MG/DL (ref 7–18)
BUN SERPL-MCNC: 46 MG/DL (ref 7–18)
BUN SERPL-MCNC: 46 MG/DL (ref 7–18)
BUN SERPL-MCNC: 48 MG/DL (ref 7–18)
BUN SERPL-MCNC: 53 MG/DL (ref 7–18)
BUN SERPL-MCNC: 53 MG/DL (ref 7–18)
BUN SERPL-MCNC: 58 MG/DL (ref 7–18)
BUN SERPL-MCNC: 59 MG/DL (ref 7–18)
BUN SERPL-MCNC: 71 MG/DL (ref 7–18)
BUN SERPL-MCNC: 72 MG/DL (ref 7–18)
BUN SERPL-MCNC: 78 MG/DL (ref 7–18)
BUN SERPL-MCNC: 78 MG/DL (ref 7–18)
BUN SERPL-MCNC: 94 MG/DL (ref 7–18)
BUN SERPL-MCNC: 98 MG/DL (ref 7–18)
BUN/CREAT SERPL: 10 (ref 12–20)
BUN/CREAT SERPL: 11 (ref 12–20)
BUN/CREAT SERPL: 12 (ref 12–20)
BUN/CREAT SERPL: 12 (ref 12–20)
BUN/CREAT SERPL: 13 (ref 12–20)
BUN/CREAT SERPL: 3 (ref 12–20)
BUN/CREAT SERPL: 4 (ref 12–20)
BUN/CREAT SERPL: 5 (ref 12–20)
BUN/CREAT SERPL: 6 (ref 12–20)
BUN/CREAT SERPL: 7 (ref 12–20)
BUN/CREAT SERPL: 8 (ref 12–20)
BUN/CREAT SERPL: 9 (ref 12–20)
CA-I SERPL-SCNC: 1.08 MMOL/L (ref 1.12–1.32)
CALCIUM SERPL-MCNC: 10 MG/DL (ref 8.5–10.1)
CALCIUM SERPL-MCNC: 10.2 MG/DL (ref 8.5–10.1)
CALCIUM SERPL-MCNC: 10.4 MG/DL (ref 8.5–10.1)
CALCIUM SERPL-MCNC: 10.5 MG/DL (ref 8.5–10.1)
CALCIUM SERPL-MCNC: 10.7 MG/DL (ref 8.5–10.1)
CALCIUM SERPL-MCNC: 10.7 MG/DL (ref 8.5–10.1)
CALCIUM SERPL-MCNC: 12.8 MG/DL (ref 8.5–10.1)
CALCIUM SERPL-MCNC: 12.8 MG/DL (ref 8.5–10.1)
CALCIUM SERPL-MCNC: 7.8 MG/DL (ref 8.5–10.1)
CALCIUM SERPL-MCNC: 7.9 MG/DL (ref 8.5–10.1)
CALCIUM SERPL-MCNC: 8 MG/DL (ref 8.5–10.1)
CALCIUM SERPL-MCNC: 8.1 MG/DL (ref 8.5–10.1)
CALCIUM SERPL-MCNC: 8.2 MG/DL (ref 8.5–10.1)
CALCIUM SERPL-MCNC: 8.2 MG/DL (ref 8.5–10.1)
CALCIUM SERPL-MCNC: 8.3 MG/DL (ref 8.5–10.1)
CALCIUM SERPL-MCNC: 8.4 MG/DL (ref 8.5–10.1)
CALCIUM SERPL-MCNC: 8.5 MG/DL (ref 8.5–10.1)
CALCIUM SERPL-MCNC: 8.6 MG/DL (ref 8.5–10.1)
CALCIUM SERPL-MCNC: 8.7 MG/DL (ref 8.5–10.1)
CALCIUM SERPL-MCNC: 8.8 MG/DL (ref 8.5–10.1)
CALCIUM SERPL-MCNC: 8.9 MG/DL (ref 8.5–10.1)
CALCIUM SERPL-MCNC: 9 MG/DL (ref 8.5–10.1)
CALCIUM SERPL-MCNC: 9 MG/DL (ref 8.5–10.1)
CALCIUM SERPL-MCNC: 9.1 MG/DL (ref 8.5–10.1)
CALCIUM SERPL-MCNC: 9.2 MG/DL (ref 8.5–10.1)
CALCIUM SERPL-MCNC: 9.4 MG/DL (ref 8.5–10.1)
CALCIUM SERPL-MCNC: 9.4 MG/DL (ref 8.5–10.1)
CALCIUM SERPL-MCNC: 9.5 MG/DL (ref 8.5–10.1)
CALCIUM SERPL-MCNC: 9.9 MG/DL (ref 8.5–10.1)
CALCIUM SERPL-MCNC: 9.9 MG/DL (ref 8.5–10.1)
CALCULATED P AXIS, ECG09: 52 DEGREES
CALCULATED P AXIS, ECG09: 54 DEGREES
CALCULATED P AXIS, ECG09: 56 DEGREES
CALCULATED P AXIS, ECG09: 58 DEGREES
CALCULATED P AXIS, ECG09: 61 DEGREES
CALCULATED P AXIS, ECG09: 62 DEGREES
CALCULATED P AXIS, ECG09: 63 DEGREES
CALCULATED P AXIS, ECG09: 63 DEGREES
CALCULATED P AXIS, ECG09: 66 DEGREES
CALCULATED P AXIS, ECG09: 66 DEGREES
CALCULATED P AXIS, ECG09: 72 DEGREES
CALCULATED P AXIS, ECG09: 72 DEGREES
CALCULATED P AXIS, ECG09: 73 DEGREES
CALCULATED P AXIS, ECG09: 74 DEGREES
CALCULATED P AXIS, ECG09: 79 DEGREES
CALCULATED P AXIS, ECG09: 90 DEGREES
CALCULATED R AXIS, ECG10: 100 DEGREES
CALCULATED R AXIS, ECG10: 101 DEGREES
CALCULATED R AXIS, ECG10: 103 DEGREES
CALCULATED R AXIS, ECG10: 113 DEGREES
CALCULATED R AXIS, ECG10: 116 DEGREES
CALCULATED R AXIS, ECG10: 67 DEGREES
CALCULATED R AXIS, ECG10: 72 DEGREES
CALCULATED R AXIS, ECG10: 77 DEGREES
CALCULATED R AXIS, ECG10: 78 DEGREES
CALCULATED R AXIS, ECG10: 79 DEGREES
CALCULATED R AXIS, ECG10: 79 DEGREES
CALCULATED R AXIS, ECG10: 80 DEGREES
CALCULATED R AXIS, ECG10: 88 DEGREES
CALCULATED R AXIS, ECG10: 92 DEGREES
CALCULATED R AXIS, ECG10: 92 DEGREES
CALCULATED T AXIS, ECG11: -90 DEGREES
CALCULATED T AXIS, ECG11: 135 DEGREES
CALCULATED T AXIS, ECG11: 20 DEGREES
CALCULATED T AXIS, ECG11: 27 DEGREES
CALCULATED T AXIS, ECG11: 31 DEGREES
CALCULATED T AXIS, ECG11: 52 DEGREES
CALCULATED T AXIS, ECG11: 52 DEGREES
CALCULATED T AXIS, ECG11: 57 DEGREES
CALCULATED T AXIS, ECG11: 60 DEGREES
CALCULATED T AXIS, ECG11: 63 DEGREES
CALCULATED T AXIS, ECG11: 64 DEGREES
CALCULATED T AXIS, ECG11: 64 DEGREES
CALCULATED T AXIS, ECG11: 72 DEGREES
CALCULATED T AXIS, ECG11: 73 DEGREES
CALCULATED T AXIS, ECG11: 76 DEGREES
CALCULATED T AXIS, ECG11: 81 DEGREES
CALCULATED T AXIS, ECG11: 87 DEGREES
CALLED TO:,BCALL1: NORMAL
CALLED TO:,BCALL1: NORMAL
CALLED TO:,BCALL2: NORMAL
CALLED TO:,BCALL2: NORMAL
CHLORIDE BLD-SCNC: 97 MMOL/L (ref 100–108)
CHLORIDE SERPL-SCNC: 100 MMOL/L (ref 100–108)
CHLORIDE SERPL-SCNC: 101 MMOL/L (ref 100–108)
CHLORIDE SERPL-SCNC: 102 MMOL/L (ref 100–108)
CHLORIDE SERPL-SCNC: 103 MMOL/L (ref 100–108)
CHLORIDE SERPL-SCNC: 104 MMOL/L (ref 100–108)
CHLORIDE SERPL-SCNC: 104 MMOL/L (ref 100–108)
CHLORIDE SERPL-SCNC: 106 MMOL/L (ref 100–108)
CHLORIDE SERPL-SCNC: 92 MMOL/L (ref 100–108)
CHLORIDE SERPL-SCNC: 92 MMOL/L (ref 100–108)
CHLORIDE SERPL-SCNC: 93 MMOL/L (ref 100–108)
CHLORIDE SERPL-SCNC: 93 MMOL/L (ref 100–108)
CHLORIDE SERPL-SCNC: 94 MMOL/L (ref 100–108)
CHLORIDE SERPL-SCNC: 95 MMOL/L (ref 100–108)
CHLORIDE SERPL-SCNC: 96 MMOL/L (ref 100–108)
CHLORIDE SERPL-SCNC: 97 MMOL/L (ref 100–108)
CHLORIDE SERPL-SCNC: 98 MMOL/L (ref 100–108)
CHLORIDE SERPL-SCNC: 99 MMOL/L (ref 100–108)
CHOLEST SERPL-MCNC: 128 MG/DL
CHOLEST SERPL-MCNC: 151 MG/DL
CIPROFLOXACIN ISLT MIC: ABNORMAL
CK MB CFR SERPL CALC: 0.7 % (ref 0–4)
CK MB CFR SERPL CALC: 0.7 % (ref 0–4)
CK MB CFR SERPL CALC: 0.8 % (ref 0–4)
CK MB CFR SERPL CALC: 1.2 % (ref 0–4)
CK MB CFR SERPL CALC: 1.6 % (ref 0–4)
CK MB CFR SERPL CALC: 1.9 % (ref 0–4)
CK MB CFR SERPL CALC: 2.2 % (ref 0–4)
CK MB CFR SERPL CALC: 2.3 % (ref 0–4)
CK MB CFR SERPL CALC: 2.6 % (ref 0–4)
CK MB CFR SERPL CALC: 2.9 % (ref 0–4)
CK MB CFR SERPL CALC: 3.2 % (ref 0–4)
CK MB CFR SERPL CALC: 3.7 % (ref 0–4)
CK MB CFR SERPL CALC: ABNORMAL % (ref 0–4)
CK MB CFR SERPL CALC: ABNORMAL % (ref 0–4)
CK MB CFR SERPL CALC: NORMAL % (ref 0–4)
CK MB SERPL-MCNC: 1 NG/ML (ref 5–25)
CK MB SERPL-MCNC: 1.2 NG/ML (ref 5–25)
CK MB SERPL-MCNC: 1.2 NG/ML (ref 5–25)
CK MB SERPL-MCNC: 1.3 NG/ML (ref 5–25)
CK MB SERPL-MCNC: 1.4 NG/ML (ref 5–25)
CK MB SERPL-MCNC: 11.1 NG/ML (ref 5–25)
CK MB SERPL-MCNC: 2.1 NG/ML (ref 5–25)
CK MB SERPL-MCNC: 2.3 NG/ML (ref 5–25)
CK MB SERPL-MCNC: 2.8 NG/ML (ref 5–25)
CK MB SERPL-MCNC: 3.8 NG/ML (ref 5–25)
CK MB SERPL-MCNC: 6.6 NG/ML (ref 5–25)
CK MB SERPL-MCNC: 8 NG/ML (ref 5–25)
CK MB SERPL-MCNC: <1 NG/ML (ref 5–25)
CK SERPL-CCNC: 14 U/L (ref 26–192)
CK SERPL-CCNC: 144 U/L (ref 26–192)
CK SERPL-CCNC: 147 U/L (ref 26–192)
CK SERPL-CCNC: 16 U/L (ref 26–192)
CK SERPL-CCNC: 184 U/L (ref 26–192)
CK SERPL-CCNC: 241 U/L (ref 26–192)
CK SERPL-CCNC: 245 U/L (ref 26–192)
CK SERPL-CCNC: 27 U/L (ref 26–192)
CK SERPL-CCNC: 276 U/L (ref 26–192)
CK SERPL-CCNC: 306 U/L (ref 26–192)
CK SERPL-CCNC: 432 U/L (ref 26–192)
CK SERPL-CCNC: 60 U/L (ref 26–192)
CK SERPL-CCNC: 63 U/L (ref 26–192)
CK SERPL-CCNC: 66 U/L (ref 26–192)
CK SERPL-CCNC: 70 U/L (ref 26–192)
CLARITHRO ISLT MIC: ABNORMAL
CO2 SERPL-SCNC: 22 MMOL/L (ref 21–32)
CO2 SERPL-SCNC: 22 MMOL/L (ref 21–32)
CO2 SERPL-SCNC: 23 MMOL/L (ref 21–32)
CO2 SERPL-SCNC: 24 MMOL/L (ref 21–32)
CO2 SERPL-SCNC: 25 MMOL/L (ref 21–32)
CO2 SERPL-SCNC: 26 MMOL/L (ref 21–32)
CO2 SERPL-SCNC: 27 MMOL/L (ref 21–32)
CO2 SERPL-SCNC: 28 MMOL/L (ref 21–32)
CO2 SERPL-SCNC: 29 MMOL/L (ref 21–32)
CO2 SERPL-SCNC: 30 MMOL/L (ref 21–32)
CO2 SERPL-SCNC: 31 MMOL/L (ref 21–32)
CO2 SERPL-SCNC: 31 MMOL/L (ref 21–32)
CO2 SERPL-SCNC: 33 MMOL/L (ref 21–32)
COLOR FLD: YELLOW
COLOR UR: YELLOW
CREAT SERPL-MCNC: 1.97 MG/DL (ref 0.6–1.3)
CREAT SERPL-MCNC: 2.01 MG/DL (ref 0.6–1.3)
CREAT SERPL-MCNC: 2.13 MG/DL (ref 0.6–1.3)
CREAT SERPL-MCNC: 2.35 MG/DL (ref 0.6–1.3)
CREAT SERPL-MCNC: 2.36 MG/DL (ref 0.6–1.3)
CREAT SERPL-MCNC: 2.45 MG/DL (ref 0.6–1.3)
CREAT SERPL-MCNC: 2.5 MG/DL (ref 0.6–1.3)
CREAT SERPL-MCNC: 2.53 MG/DL (ref 0.6–1.3)
CREAT SERPL-MCNC: 2.69 MG/DL (ref 0.6–1.3)
CREAT SERPL-MCNC: 2.74 MG/DL (ref 0.6–1.3)
CREAT SERPL-MCNC: 2.77 MG/DL (ref 0.6–1.3)
CREAT SERPL-MCNC: 2.86 MG/DL (ref 0.6–1.3)
CREAT SERPL-MCNC: 2.89 MG/DL (ref 0.6–1.3)
CREAT SERPL-MCNC: 2.96 MG/DL (ref 0.6–1.3)
CREAT SERPL-MCNC: 3.01 MG/DL (ref 0.6–1.3)
CREAT SERPL-MCNC: 3.17 MG/DL (ref 0.6–1.3)
CREAT SERPL-MCNC: 3.61 MG/DL (ref 0.6–1.3)
CREAT SERPL-MCNC: 3.66 MG/DL (ref 0.6–1.3)
CREAT SERPL-MCNC: 3.69 MG/DL (ref 0.6–1.3)
CREAT SERPL-MCNC: 3.86 MG/DL (ref 0.6–1.3)
CREAT SERPL-MCNC: 3.96 MG/DL (ref 0.6–1.3)
CREAT SERPL-MCNC: 3.96 MG/DL (ref 0.6–1.3)
CREAT SERPL-MCNC: 4.02 MG/DL (ref 0.6–1.3)
CREAT SERPL-MCNC: 4.06 MG/DL (ref 0.6–1.3)
CREAT SERPL-MCNC: 4.07 MG/DL (ref 0.6–1.3)
CREAT SERPL-MCNC: 4.09 MG/DL (ref 0.6–1.3)
CREAT SERPL-MCNC: 4.44 MG/DL (ref 0.6–1.3)
CREAT SERPL-MCNC: 4.47 MG/DL (ref 0.6–1.3)
CREAT SERPL-MCNC: 4.57 MG/DL (ref 0.6–1.3)
CREAT SERPL-MCNC: 4.71 MG/DL (ref 0.6–1.3)
CREAT SERPL-MCNC: 4.71 MG/DL (ref 0.6–1.3)
CREAT SERPL-MCNC: 4.8 MG/DL (ref 0.6–1.3)
CREAT SERPL-MCNC: 4.81 MG/DL (ref 0.6–1.3)
CREAT SERPL-MCNC: 4.83 MG/DL (ref 0.6–1.3)
CREAT SERPL-MCNC: 4.99 MG/DL (ref 0.6–1.3)
CREAT SERPL-MCNC: 5.27 MG/DL (ref 0.6–1.3)
CREAT SERPL-MCNC: 5.39 MG/DL (ref 0.6–1.3)
CREAT SERPL-MCNC: 5.56 MG/DL (ref 0.6–1.3)
CREAT SERPL-MCNC: 5.69 MG/DL (ref 0.6–1.3)
CREAT SERPL-MCNC: 5.8 MG/DL (ref 0.6–1.3)
CREAT SERPL-MCNC: 5.88 MG/DL (ref 0.6–1.3)
CREAT SERPL-MCNC: 5.98 MG/DL (ref 0.6–1.3)
CREAT SERPL-MCNC: 6.05 MG/DL (ref 0.6–1.3)
CREAT SERPL-MCNC: 6.23 MG/DL (ref 0.6–1.3)
CREAT SERPL-MCNC: 6.29 MG/DL (ref 0.6–1.3)
CREAT SERPL-MCNC: 6.5 MG/DL (ref 0.6–1.3)
CREAT SERPL-MCNC: 6.58 MG/DL (ref 0.6–1.3)
CREAT SERPL-MCNC: 6.59 MG/DL (ref 0.6–1.3)
CREAT SERPL-MCNC: 6.99 MG/DL (ref 0.6–1.3)
CREAT SERPL-MCNC: 7.32 MG/DL (ref 0.6–1.3)
CREAT SERPL-MCNC: 7.51 MG/DL (ref 0.6–1.3)
CREAT SERPL-MCNC: 7.61 MG/DL (ref 0.6–1.3)
CREAT SERPL-MCNC: 7.67 MG/DL (ref 0.6–1.3)
CREAT SERPL-MCNC: 7.79 MG/DL (ref 0.6–1.3)
CREAT SERPL-MCNC: 7.89 MG/DL (ref 0.6–1.3)
CREAT SERPL-MCNC: 8.24 MG/DL (ref 0.6–1.3)
CREAT SERPL-MCNC: 9.2 MG/DL (ref 0.6–1.3)
CREAT SERPL-MCNC: 9.32 MG/DL (ref 0.6–1.3)
CREAT SERPL-MCNC: 9.7 MG/DL (ref 0.6–1.3)
CROSSMATCH RESULT,%XM: NORMAL
DIAGNOSIS, 93000: NORMAL
DIFFERENTIAL METHOD BLD: ABNORMAL
DUKE TM SCORE RESULT, CST14: NORMAL
DUKE TREADMILL SCORE, CST13: NORMAL
ECG INTERP BEFORE EX, CST11: NORMAL
ECG INTERP DURING EX, CST12: NORMAL
ECHO IVC SNIFF: 1.11 CM
ECHO LV INTERNAL DIMENSION DIASTOLIC: 3.84 CM (ref 3.9–5.3)
ECHO LV INTERNAL DIMENSION DIASTOLIC: 4.25 CM (ref 3.9–5.3)
ECHO LV INTERNAL DIMENSION SYSTOLIC: 2.22 CM
ECHO LV INTERNAL DIMENSION SYSTOLIC: 2.6 CM
ECHO LV IVSD: 1.37 CM (ref 0.6–0.9)
ECHO LV IVSD: 1.51 CM (ref 0.6–0.9)
ECHO LV MASS 2D: 211.3 G (ref 67–162)
ECHO LV MASS 2D: 306.3 G (ref 67–162)
ECHO LV MASS INDEX 2D: 157 G/M2
ECHO LV MASS INDEX 2D: 225.5 G/M2 (ref 43–95)
ECHO LV POSTERIOR WALL DIASTOLIC: 1.28 CM (ref 0.6–0.9)
ECHO LV POSTERIOR WALL DIASTOLIC: 1.51 CM (ref 0.6–0.9)
EOSINOPHIL # BLD: 0 K/UL (ref 0–0.4)
EOSINOPHIL # BLD: 0.1 K/UL (ref 0–0.4)
EOSINOPHIL # BLD: 0.2 K/UL (ref 0–0.4)
EOSINOPHIL # BLD: 0.3 K/UL (ref 0–0.4)
EOSINOPHIL NFR BLD: 0 % (ref 0–5)
EOSINOPHIL NFR BLD: 1 % (ref 0–5)
EOSINOPHIL NFR BLD: 2 % (ref 0–5)
EOSINOPHIL NFR BLD: 3 % (ref 0–5)
EOSINOPHIL NFR BLD: 4 % (ref 0–5)
EOSINOPHIL NFR BLD: 5 % (ref 0–5)
EOSINOPHIL NFR BLD: 5 % (ref 0–5)
EOSINOPHIL NFR BLD: 6 % (ref 0–5)
EOSINOPHIL NFR BLD: 6 % (ref 0–5)
EOSINOPHIL NFR BLD: 7 % (ref 0–5)
EOSINOPHIL NFR BLD: 8 % (ref 0–5)
EOSINOPHIL NFR BLD: 9 % (ref 0–5)
EOSINOPHIL NFR FLD MANUAL: 0 %
EPITH CASTS URNS QL MICRO: ABNORMAL /LPF (ref 0–5)
EPITH CASTS URNS QL MICRO: NORMAL /LPF (ref 0–5)
ERYTHROCYTE [DISTWIDTH] IN BLOOD BY AUTOMATED COUNT: 11.9 % (ref 11.6–14.5)
ERYTHROCYTE [DISTWIDTH] IN BLOOD BY AUTOMATED COUNT: 12.2 % (ref 11.6–14.5)
ERYTHROCYTE [DISTWIDTH] IN BLOOD BY AUTOMATED COUNT: 12.2 % (ref 11.6–14.5)
ERYTHROCYTE [DISTWIDTH] IN BLOOD BY AUTOMATED COUNT: 12.3 % (ref 11.6–14.5)
ERYTHROCYTE [DISTWIDTH] IN BLOOD BY AUTOMATED COUNT: 12.4 % (ref 11.6–14.5)
ERYTHROCYTE [DISTWIDTH] IN BLOOD BY AUTOMATED COUNT: 12.5 % (ref 11.6–14.5)
ERYTHROCYTE [DISTWIDTH] IN BLOOD BY AUTOMATED COUNT: 12.5 % (ref 11.6–14.5)
ERYTHROCYTE [DISTWIDTH] IN BLOOD BY AUTOMATED COUNT: 12.7 % (ref 11.6–14.5)
ERYTHROCYTE [DISTWIDTH] IN BLOOD BY AUTOMATED COUNT: 12.8 % (ref 11.6–14.5)
ERYTHROCYTE [DISTWIDTH] IN BLOOD BY AUTOMATED COUNT: 12.8 % (ref 11.6–14.5)
ERYTHROCYTE [DISTWIDTH] IN BLOOD BY AUTOMATED COUNT: 13 % (ref 11.6–14.5)
ERYTHROCYTE [DISTWIDTH] IN BLOOD BY AUTOMATED COUNT: 13.6 % (ref 11.6–14.5)
ERYTHROCYTE [DISTWIDTH] IN BLOOD BY AUTOMATED COUNT: 13.6 % (ref 11.6–14.5)
ERYTHROCYTE [DISTWIDTH] IN BLOOD BY AUTOMATED COUNT: 13.8 % (ref 11.6–14.5)
ERYTHROCYTE [DISTWIDTH] IN BLOOD BY AUTOMATED COUNT: 13.8 % (ref 11.6–14.5)
ERYTHROCYTE [DISTWIDTH] IN BLOOD BY AUTOMATED COUNT: 14.4 % (ref 11.6–14.5)
ERYTHROCYTE [DISTWIDTH] IN BLOOD BY AUTOMATED COUNT: 14.5 % (ref 11.6–14.5)
ERYTHROCYTE [DISTWIDTH] IN BLOOD BY AUTOMATED COUNT: 14.8 % (ref 11.6–14.5)
ERYTHROCYTE [DISTWIDTH] IN BLOOD BY AUTOMATED COUNT: 14.9 % (ref 11.6–14.5)
ERYTHROCYTE [DISTWIDTH] IN BLOOD BY AUTOMATED COUNT: 15 % (ref 11.6–14.5)
ERYTHROCYTE [DISTWIDTH] IN BLOOD BY AUTOMATED COUNT: 15.1 % (ref 11.6–14.5)
ERYTHROCYTE [DISTWIDTH] IN BLOOD BY AUTOMATED COUNT: 15.2 % (ref 11.6–14.5)
ERYTHROCYTE [DISTWIDTH] IN BLOOD BY AUTOMATED COUNT: 15.3 % (ref 11.6–14.5)
ERYTHROCYTE [DISTWIDTH] IN BLOOD BY AUTOMATED COUNT: 15.4 % (ref 11.6–14.5)
ERYTHROCYTE [DISTWIDTH] IN BLOOD BY AUTOMATED COUNT: 15.6 % (ref 11.6–14.5)
ERYTHROCYTE [DISTWIDTH] IN BLOOD BY AUTOMATED COUNT: 16.1 % (ref 11.6–14.5)
ERYTHROCYTE [DISTWIDTH] IN BLOOD BY AUTOMATED COUNT: 16.3 % (ref 11.6–14.5)
ERYTHROCYTE [DISTWIDTH] IN BLOOD BY AUTOMATED COUNT: 16.4 % (ref 11.6–14.5)
ERYTHROCYTE [DISTWIDTH] IN BLOOD BY AUTOMATED COUNT: 16.4 % (ref 11.6–14.5)
ERYTHROCYTE [DISTWIDTH] IN BLOOD BY AUTOMATED COUNT: 16.5 % (ref 11.6–14.5)
ERYTHROCYTE [DISTWIDTH] IN BLOOD BY AUTOMATED COUNT: 16.9 % (ref 11.6–14.5)
ERYTHROCYTE [DISTWIDTH] IN BLOOD BY AUTOMATED COUNT: 16.9 % (ref 11.6–14.5)
ERYTHROCYTE [DISTWIDTH] IN BLOOD BY AUTOMATED COUNT: 17 % (ref 11.6–14.5)
ERYTHROCYTE [DISTWIDTH] IN BLOOD BY AUTOMATED COUNT: 17.4 % (ref 11.6–14.5)
ERYTHROCYTE [DISTWIDTH] IN BLOOD BY AUTOMATED COUNT: 17.9 % (ref 11.6–14.5)
ERYTHROCYTE [DISTWIDTH] IN BLOOD BY AUTOMATED COUNT: 18.4 % (ref 11.6–14.5)
ERYTHROCYTE [DISTWIDTH] IN BLOOD BY AUTOMATED COUNT: 18.8 % (ref 11.6–14.5)
ERYTHROCYTE [DISTWIDTH] IN BLOOD BY AUTOMATED COUNT: 18.9 % (ref 11.6–14.5)
ERYTHROCYTE [DISTWIDTH] IN BLOOD BY AUTOMATED COUNT: 19 % (ref 11.6–14.5)
ERYTHROCYTE [DISTWIDTH] IN BLOOD BY AUTOMATED COUNT: 19.1 % (ref 11.6–14.5)
EST. AVERAGE GLUCOSE BLD GHB EST-MCNC: ABNORMAL MG/DL
EST. AVERAGE GLUCOSE BLD GHB EST-MCNC: NORMAL MG/DL
ETHAMBUTOL ISLT MIC: ABNORMAL
FERRITIN SERPL-MCNC: 2584 NG/ML (ref 8–388)
FERRITIN SERPL-MCNC: 836 NG/ML (ref 8–388)
FIBRINOGEN PPP-MCNC: 287 MG/DL (ref 210–451)
FLUAV AG NPH QL IA: NEGATIVE
FLUAV RNA SPEC QL NAA+PROBE: NEGATIVE
FLUBV AG NOSE QL IA: NEGATIVE
FLUBV RNA SPEC QL NAA+PROBE: NEGATIVE
FOLATE SERPL-MCNC: 6.6 NG/ML (ref 3.1–17.5)
FUNCTIONAL CAPACITY, CST17: NORMAL
GAS FLOW.O2 O2 DELIVERY SYS: ABNORMAL L/MIN
GAS FLOW.O2 SETTING OXYMISER: 10 BPM
GAS FLOW.O2 SETTING OXYMISER: 10 L/M
GAS FLOW.O2 SETTING OXYMISER: 14 BPM
GAS FLOW.O2 SETTING OXYMISER: 14 BPM
GLOBULIN SER CALC-MCNC: 2.9 G/DL (ref 2–4)
GLOBULIN SER CALC-MCNC: 2.9 G/DL (ref 2–4)
GLOBULIN SER CALC-MCNC: 3 G/DL (ref 2–4)
GLOBULIN SER CALC-MCNC: 3.1 G/DL (ref 2–4)
GLOBULIN SER CALC-MCNC: 3.1 G/DL (ref 2–4)
GLOBULIN SER CALC-MCNC: 3.2 G/DL (ref 2–4)
GLOBULIN SER CALC-MCNC: 3.3 G/DL (ref 2–4)
GLOBULIN SER CALC-MCNC: 3.4 G/DL (ref 2–4)
GLOBULIN SER CALC-MCNC: 3.5 G/DL (ref 2–4)
GLOBULIN SER CALC-MCNC: 3.7 G/DL (ref 2–4)
GLOBULIN SER CALC-MCNC: 3.8 G/DL (ref 2–4)
GLOBULIN SER CALC-MCNC: 3.8 G/DL (ref 2–4)
GLOBULIN SER CALC-MCNC: 3.9 G/DL (ref 2–4)
GLOBULIN SER CALC-MCNC: 3.9 G/DL (ref 2–4)
GLOBULIN SER CALC-MCNC: 4 G/DL (ref 2–4)
GLOBULIN SER CALC-MCNC: 4.5 G/DL (ref 2–4)
GLOBULIN SER CALC-MCNC: 5 G/DL (ref 2–4)
GLUCOSE BLD STRIP.AUTO-MCNC: 100 MG/DL (ref 74–106)
GLUCOSE BLD STRIP.AUTO-MCNC: 101 MG/DL (ref 70–110)
GLUCOSE BLD STRIP.AUTO-MCNC: 102 MG/DL (ref 70–110)
GLUCOSE BLD STRIP.AUTO-MCNC: 104 MG/DL (ref 70–110)
GLUCOSE BLD STRIP.AUTO-MCNC: 105 MG/DL (ref 70–110)
GLUCOSE BLD STRIP.AUTO-MCNC: 107 MG/DL (ref 70–110)
GLUCOSE BLD STRIP.AUTO-MCNC: 107 MG/DL (ref 70–110)
GLUCOSE BLD STRIP.AUTO-MCNC: 108 MG/DL (ref 70–110)
GLUCOSE BLD STRIP.AUTO-MCNC: 109 MG/DL (ref 70–110)
GLUCOSE BLD STRIP.AUTO-MCNC: 110 MG/DL (ref 70–110)
GLUCOSE BLD STRIP.AUTO-MCNC: 111 MG/DL (ref 70–110)
GLUCOSE BLD STRIP.AUTO-MCNC: 112 MG/DL (ref 70–110)
GLUCOSE BLD STRIP.AUTO-MCNC: 113 MG/DL (ref 70–110)
GLUCOSE BLD STRIP.AUTO-MCNC: 115 MG/DL (ref 70–110)
GLUCOSE BLD STRIP.AUTO-MCNC: 117 MG/DL (ref 70–110)
GLUCOSE BLD STRIP.AUTO-MCNC: 120 MG/DL (ref 70–110)
GLUCOSE BLD STRIP.AUTO-MCNC: 121 MG/DL (ref 70–110)
GLUCOSE BLD STRIP.AUTO-MCNC: 121 MG/DL (ref 70–110)
GLUCOSE BLD STRIP.AUTO-MCNC: 123 MG/DL (ref 70–110)
GLUCOSE BLD STRIP.AUTO-MCNC: 123 MG/DL (ref 70–110)
GLUCOSE BLD STRIP.AUTO-MCNC: 124 MG/DL (ref 70–110)
GLUCOSE BLD STRIP.AUTO-MCNC: 125 MG/DL (ref 70–110)
GLUCOSE BLD STRIP.AUTO-MCNC: 130 MG/DL (ref 70–110)
GLUCOSE BLD STRIP.AUTO-MCNC: 131 MG/DL (ref 70–110)
GLUCOSE BLD STRIP.AUTO-MCNC: 134 MG/DL (ref 70–110)
GLUCOSE BLD STRIP.AUTO-MCNC: 135 MG/DL (ref 70–110)
GLUCOSE BLD STRIP.AUTO-MCNC: 136 MG/DL (ref 70–110)
GLUCOSE BLD STRIP.AUTO-MCNC: 137 MG/DL (ref 70–110)
GLUCOSE BLD STRIP.AUTO-MCNC: 139 MG/DL (ref 70–110)
GLUCOSE BLD STRIP.AUTO-MCNC: 141 MG/DL (ref 70–110)
GLUCOSE BLD STRIP.AUTO-MCNC: 147 MG/DL (ref 70–110)
GLUCOSE BLD STRIP.AUTO-MCNC: 147 MG/DL (ref 70–110)
GLUCOSE BLD STRIP.AUTO-MCNC: 152 MG/DL (ref 70–110)
GLUCOSE BLD STRIP.AUTO-MCNC: 153 MG/DL (ref 70–110)
GLUCOSE BLD STRIP.AUTO-MCNC: 156 MG/DL (ref 70–110)
GLUCOSE BLD STRIP.AUTO-MCNC: 158 MG/DL (ref 70–110)
GLUCOSE BLD STRIP.AUTO-MCNC: 159 MG/DL (ref 70–110)
GLUCOSE BLD STRIP.AUTO-MCNC: 165 MG/DL (ref 70–110)
GLUCOSE BLD STRIP.AUTO-MCNC: 166 MG/DL (ref 70–110)
GLUCOSE BLD STRIP.AUTO-MCNC: 167 MG/DL (ref 70–110)
GLUCOSE BLD STRIP.AUTO-MCNC: 183 MG/DL (ref 70–110)
GLUCOSE BLD STRIP.AUTO-MCNC: 216 MG/DL (ref 70–110)
GLUCOSE BLD STRIP.AUTO-MCNC: 218 MG/DL (ref 70–110)
GLUCOSE BLD STRIP.AUTO-MCNC: 72 MG/DL (ref 70–110)
GLUCOSE BLD STRIP.AUTO-MCNC: 77 MG/DL (ref 70–110)
GLUCOSE BLD STRIP.AUTO-MCNC: 78 MG/DL (ref 70–110)
GLUCOSE BLD STRIP.AUTO-MCNC: 80 MG/DL (ref 70–110)
GLUCOSE BLD STRIP.AUTO-MCNC: 80 MG/DL (ref 70–110)
GLUCOSE BLD STRIP.AUTO-MCNC: 87 MG/DL (ref 70–110)
GLUCOSE BLD STRIP.AUTO-MCNC: 88 MG/DL (ref 70–110)
GLUCOSE BLD STRIP.AUTO-MCNC: 88 MG/DL (ref 70–110)
GLUCOSE BLD STRIP.AUTO-MCNC: 90 MG/DL (ref 70–110)
GLUCOSE BLD STRIP.AUTO-MCNC: 92 MG/DL (ref 70–110)
GLUCOSE BLD STRIP.AUTO-MCNC: 93 MG/DL (ref 70–110)
GLUCOSE BLD STRIP.AUTO-MCNC: 94 MG/DL (ref 70–110)
GLUCOSE BLD STRIP.AUTO-MCNC: 94 MG/DL (ref 70–110)
GLUCOSE BLD STRIP.AUTO-MCNC: 95 MG/DL (ref 70–110)
GLUCOSE BLD STRIP.AUTO-MCNC: 95 MG/DL (ref 70–110)
GLUCOSE BLD STRIP.AUTO-MCNC: 96 MG/DL (ref 70–110)
GLUCOSE BLD STRIP.AUTO-MCNC: 97 MG/DL (ref 70–110)
GLUCOSE BLD STRIP.AUTO-MCNC: 98 MG/DL (ref 70–110)
GLUCOSE BLD STRIP.AUTO-MCNC: 99 MG/DL (ref 70–110)
GLUCOSE BLD STRIP.AUTO-MCNC: 99 MG/DL (ref 70–110)
GLUCOSE SERPL-MCNC: 101 MG/DL (ref 74–99)
GLUCOSE SERPL-MCNC: 102 MG/DL (ref 74–99)
GLUCOSE SERPL-MCNC: 103 MG/DL (ref 74–99)
GLUCOSE SERPL-MCNC: 105 MG/DL (ref 74–99)
GLUCOSE SERPL-MCNC: 105 MG/DL (ref 74–99)
GLUCOSE SERPL-MCNC: 106 MG/DL (ref 74–99)
GLUCOSE SERPL-MCNC: 107 MG/DL (ref 74–99)
GLUCOSE SERPL-MCNC: 107 MG/DL (ref 74–99)
GLUCOSE SERPL-MCNC: 108 MG/DL (ref 74–99)
GLUCOSE SERPL-MCNC: 109 MG/DL (ref 74–99)
GLUCOSE SERPL-MCNC: 111 MG/DL (ref 74–99)
GLUCOSE SERPL-MCNC: 111 MG/DL (ref 74–99)
GLUCOSE SERPL-MCNC: 112 MG/DL (ref 74–99)
GLUCOSE SERPL-MCNC: 112 MG/DL (ref 74–99)
GLUCOSE SERPL-MCNC: 114 MG/DL (ref 74–99)
GLUCOSE SERPL-MCNC: 115 MG/DL (ref 74–99)
GLUCOSE SERPL-MCNC: 115 MG/DL (ref 74–99)
GLUCOSE SERPL-MCNC: 120 MG/DL (ref 74–99)
GLUCOSE SERPL-MCNC: 121 MG/DL (ref 74–99)
GLUCOSE SERPL-MCNC: 121 MG/DL (ref 74–99)
GLUCOSE SERPL-MCNC: 122 MG/DL (ref 74–99)
GLUCOSE SERPL-MCNC: 122 MG/DL (ref 74–99)
GLUCOSE SERPL-MCNC: 127 MG/DL (ref 74–99)
GLUCOSE SERPL-MCNC: 130 MG/DL (ref 74–99)
GLUCOSE SERPL-MCNC: 131 MG/DL (ref 74–99)
GLUCOSE SERPL-MCNC: 134 MG/DL (ref 74–99)
GLUCOSE SERPL-MCNC: 138 MG/DL (ref 74–99)
GLUCOSE SERPL-MCNC: 149 MG/DL (ref 74–99)
GLUCOSE SERPL-MCNC: 150 MG/DL (ref 74–99)
GLUCOSE SERPL-MCNC: 155 MG/DL (ref 74–99)
GLUCOSE SERPL-MCNC: 169 MG/DL (ref 74–99)
GLUCOSE SERPL-MCNC: 78 MG/DL (ref 74–99)
GLUCOSE SERPL-MCNC: 81 MG/DL (ref 74–99)
GLUCOSE SERPL-MCNC: 81 MG/DL (ref 74–99)
GLUCOSE SERPL-MCNC: 82 MG/DL (ref 74–99)
GLUCOSE SERPL-MCNC: 84 MG/DL (ref 74–99)
GLUCOSE SERPL-MCNC: 84 MG/DL (ref 74–99)
GLUCOSE SERPL-MCNC: 85 MG/DL (ref 74–99)
GLUCOSE SERPL-MCNC: 87 MG/DL (ref 74–99)
GLUCOSE SERPL-MCNC: 87 MG/DL (ref 74–99)
GLUCOSE SERPL-MCNC: 88 MG/DL (ref 74–99)
GLUCOSE SERPL-MCNC: 89 MG/DL (ref 74–99)
GLUCOSE SERPL-MCNC: 90 MG/DL (ref 74–99)
GLUCOSE SERPL-MCNC: 91 MG/DL (ref 74–99)
GLUCOSE SERPL-MCNC: 92 MG/DL (ref 74–99)
GLUCOSE SERPL-MCNC: 92 MG/DL (ref 74–99)
GLUCOSE SERPL-MCNC: 94 MG/DL (ref 74–99)
GLUCOSE SERPL-MCNC: 94 MG/DL (ref 74–99)
GLUCOSE SERPL-MCNC: 95 MG/DL (ref 74–99)
GLUCOSE SERPL-MCNC: 95 MG/DL (ref 74–99)
GLUCOSE SERPL-MCNC: 96 MG/DL (ref 74–99)
GLUCOSE SERPL-MCNC: 96 MG/DL (ref 74–99)
GLUCOSE SERPL-MCNC: 98 MG/DL (ref 74–99)
GLUCOSE UR STRIP.AUTO-MCNC: 100 MG/DL
GLUCOSE UR STRIP.AUTO-MCNC: 100 MG/DL
GLUCOSE UR STRIP.AUTO-MCNC: NEGATIVE MG/DL
HBA1C MFR BLD: 4.7 % (ref 4.2–5.6)
HBA1C MFR BLD: <3.5 % (ref 4.2–5.6)
HBV SURFACE AB SER QL IA: POSITIVE
HBV SURFACE AB SERPL IA-ACNC: 555.26 MIU/ML
HBV SURFACE AG SER QL: <0.1 INDEX
HBV SURFACE AG SER QL: <0.1 INDEX
HBV SURFACE AG SER QL: NEGATIVE
HBV SURFACE AG SER QL: NEGATIVE
HCG SERPL QL: NEGATIVE
HCO3 BLD-SCNC: 23.8 MMOL/L (ref 22–26)
HCO3 BLD-SCNC: 23.9 MMOL/L (ref 22–26)
HCO3 BLD-SCNC: 24 MMOL/L (ref 22–26)
HCO3 BLD-SCNC: 24.6 MMOL/L (ref 22–26)
HCO3 BLD-SCNC: 25.3 MMOL/L (ref 22–26)
HCO3 BLD-SCNC: 26.2 MMOL/L (ref 22–26)
HCO3 BLD-SCNC: 28.2 MMOL/L (ref 22–26)
HCO3 BLD-SCNC: 30 MMOL/L (ref 22–26)
HCO3 BLD-SCNC: 31 MMOL/L (ref 22–26)
HCT VFR BLD AUTO: 17.1 % (ref 35–45)
HCT VFR BLD AUTO: 19.4 % (ref 35–45)
HCT VFR BLD AUTO: 20.2 % (ref 35–45)
HCT VFR BLD AUTO: 21.1 % (ref 35–45)
HCT VFR BLD AUTO: 21.8 % (ref 35–45)
HCT VFR BLD AUTO: 22.1 % (ref 35–45)
HCT VFR BLD AUTO: 23.3 % (ref 35–45)
HCT VFR BLD AUTO: 24.4 % (ref 35–45)
HCT VFR BLD AUTO: 25.2 % (ref 35–45)
HCT VFR BLD AUTO: 25.3 % (ref 35–45)
HCT VFR BLD AUTO: 25.4 % (ref 35–45)
HCT VFR BLD AUTO: 26 % (ref 35–45)
HCT VFR BLD AUTO: 26.1 % (ref 35–45)
HCT VFR BLD AUTO: 26.4 % (ref 35–45)
HCT VFR BLD AUTO: 27 % (ref 35–45)
HCT VFR BLD AUTO: 27.4 % (ref 35–45)
HCT VFR BLD AUTO: 27.4 % (ref 35–45)
HCT VFR BLD AUTO: 27.9 % (ref 35–45)
HCT VFR BLD AUTO: 28.5 % (ref 35–45)
HCT VFR BLD AUTO: 28.6 % (ref 35–45)
HCT VFR BLD AUTO: 29.3 % (ref 35–45)
HCT VFR BLD AUTO: 29.4 % (ref 35–45)
HCT VFR BLD AUTO: 29.6 % (ref 35–45)
HCT VFR BLD AUTO: 29.7 % (ref 35–45)
HCT VFR BLD AUTO: 29.7 % (ref 35–45)
HCT VFR BLD AUTO: 29.8 % (ref 35–45)
HCT VFR BLD AUTO: 30 % (ref 35–45)
HCT VFR BLD AUTO: 30.3 % (ref 35–45)
HCT VFR BLD AUTO: 30.4 % (ref 35–45)
HCT VFR BLD AUTO: 30.8 % (ref 35–45)
HCT VFR BLD AUTO: 30.8 % (ref 35–45)
HCT VFR BLD AUTO: 30.9 % (ref 35–45)
HCT VFR BLD AUTO: 30.9 % (ref 35–45)
HCT VFR BLD AUTO: 31.4 % (ref 35–45)
HCT VFR BLD AUTO: 31.5 % (ref 35–45)
HCT VFR BLD AUTO: 32.1 % (ref 35–45)
HCT VFR BLD AUTO: 32.3 % (ref 35–45)
HCT VFR BLD AUTO: 32.5 % (ref 35–45)
HCT VFR BLD AUTO: 32.9 % (ref 35–45)
HCT VFR BLD AUTO: 34 % (ref 35–45)
HCT VFR BLD AUTO: 34.3 % (ref 35–45)
HCT VFR BLD AUTO: 34.3 % (ref 35–45)
HCT VFR BLD AUTO: 34.5 % (ref 35–45)
HCT VFR BLD AUTO: 34.8 % (ref 35–45)
HCT VFR BLD AUTO: 35.1 % (ref 35–45)
HCT VFR BLD AUTO: 38.7 % (ref 35–45)
HCT VFR BLD AUTO: 39.8 % (ref 35–45)
HCT VFR BLD AUTO: 39.9 % (ref 35–45)
HCT VFR BLD AUTO: 40 % (ref 35–45)
HCT VFR BLD AUTO: 42 % (ref 35–45)
HCT VFR BLD AUTO: 44.4 % (ref 35–45)
HCT VFR BLD AUTO: 49.7 % (ref 35–45)
HCT VFR BLD CALC: 30 % (ref 36–49)
HCV AB SER IA-ACNC: 0.08 INDEX
HCV AB SERPL QL IA: NEGATIVE
HCV COMMENT,HCGAC: NORMAL
HDLC SERPL-MCNC: 37 MG/DL (ref 40–60)
HDLC SERPL-MCNC: 62 MG/DL (ref 40–60)
HDLC SERPL: 2.1 {RATIO} (ref 0–5)
HDLC SERPL: 4.1 {RATIO} (ref 0–5)
HEP BS AB COMMENT,HBSAC: NORMAL
HEPARIN AGGREGATION,XHEAGT: NEGATIVE
HGB BLD-MCNC: 10.1 G/DL (ref 12–16)
HGB BLD-MCNC: 10.2 G/DL (ref 12–16)
HGB BLD-MCNC: 10.3 G/DL (ref 12–16)
HGB BLD-MCNC: 10.4 G/DL (ref 12–16)
HGB BLD-MCNC: 10.4 G/DL (ref 12–16)
HGB BLD-MCNC: 10.5 G/DL (ref 12–16)
HGB BLD-MCNC: 10.7 G/DL (ref 12–16)
HGB BLD-MCNC: 10.9 G/DL (ref 12–16)
HGB BLD-MCNC: 11 G/DL (ref 12–16)
HGB BLD-MCNC: 11 G/DL (ref 12–16)
HGB BLD-MCNC: 11.1 G/DL (ref 12–16)
HGB BLD-MCNC: 11.2 G/DL (ref 12–16)
HGB BLD-MCNC: 11.3 G/DL (ref 12–16)
HGB BLD-MCNC: 11.3 G/DL (ref 12–16)
HGB BLD-MCNC: 11.5 G/DL (ref 12–16)
HGB BLD-MCNC: 12.6 G/DL (ref 12–16)
HGB BLD-MCNC: 12.9 G/DL (ref 12–16)
HGB BLD-MCNC: 12.9 G/DL (ref 12–16)
HGB BLD-MCNC: 13.7 G/DL (ref 12–16)
HGB BLD-MCNC: 14.1 G/DL (ref 12–16)
HGB BLD-MCNC: 15.8 G/DL (ref 12–16)
HGB BLD-MCNC: 16.1 G/DL (ref 12–16)
HGB BLD-MCNC: 5.4 G/DL (ref 12–16)
HGB BLD-MCNC: 6 G/DL (ref 12–16)
HGB BLD-MCNC: 6.3 G/DL (ref 12–16)
HGB BLD-MCNC: 7.3 G/DL (ref 12–16)
HGB BLD-MCNC: 7.5 G/DL (ref 12–16)
HGB BLD-MCNC: 7.6 G/DL (ref 12–16)
HGB BLD-MCNC: 7.7 G/DL (ref 12–16)
HGB BLD-MCNC: 7.8 G/DL (ref 12–16)
HGB BLD-MCNC: 7.9 G/DL (ref 12–16)
HGB BLD-MCNC: 8 G/DL (ref 12–16)
HGB BLD-MCNC: 8.1 G/DL (ref 12–16)
HGB BLD-MCNC: 8.1 G/DL (ref 12–16)
HGB BLD-MCNC: 8.3 G/DL (ref 12–16)
HGB BLD-MCNC: 8.5 G/DL (ref 12–16)
HGB BLD-MCNC: 8.5 G/DL (ref 12–16)
HGB BLD-MCNC: 8.7 G/DL (ref 12–16)
HGB BLD-MCNC: 9 G/DL (ref 12–16)
HGB BLD-MCNC: 9.1 G/DL (ref 12–16)
HGB BLD-MCNC: 9.2 G/DL (ref 12–16)
HGB BLD-MCNC: 9.3 G/DL (ref 12–16)
HGB BLD-MCNC: 9.4 G/DL (ref 12–16)
HGB BLD-MCNC: 9.5 G/DL (ref 12–16)
HGB BLD-MCNC: 9.5 G/DL (ref 12–16)
HGB BLD-MCNC: 9.6 G/DL (ref 12–16)
HGB BLD-MCNC: 9.6 G/DL (ref 12–16)
HGB BLD-MCNC: 9.7 G/DL (ref 12–16)
HGB BLD-MCNC: 9.7 G/DL (ref 12–16)
HGB BLD-MCNC: 9.8 G/DL (ref 12–16)
HGB BLD-MCNC: 9.9 G/DL (ref 12–16)
HGB UR QL STRIP: ABNORMAL
HGB UR QL STRIP: ABNORMAL
HGB UR QL STRIP: NEGATIVE
HIV 1+2 AB+HIV1 P24 AG SERPL QL IA: NONREACTIVE
HIV1 P24 AG SERPL QL IA: NONREACTIVE
HIV1+2 AB SERPL QL IA: NONREACTIVE
HIV12 RESULT COMMENT, HHIVC: NORMAL
INR PPP: 0.9 (ref 0.8–1.2)
INR PPP: 0.9 (ref 0.8–1.2)
INR PPP: 1 (ref 0.8–1.2)
INR PPP: 1 (ref 0.8–1.2)
INR PPP: 1.1 (ref 0.8–1.2)
INR PPP: 1.1 (ref 0.8–1.2)
INSPIRATION.DURATION SETTING TIME VENT: 0.9 SEC
INSPIRATION.DURATION SETTING TIME VENT: 1 SEC
IRON SATN MFR SERPL: 24 %
IRON SATN MFR SERPL: 40 %
IRON SERPL-MCNC: 28 UG/DL (ref 50–175)
IRON SERPL-MCNC: 49 UG/DL (ref 50–175)
KETONES UR QL STRIP.AUTO: NEGATIVE MG/DL
KNOWN CARDIAC CONDITION, CST08: NORMAL
LACTATE BLD-SCNC: 0.7 MMOL/L (ref 0.4–2)
LACTATE BLD-SCNC: 0.8 MMOL/L (ref 0.4–2)
LACTATE BLD-SCNC: 0.8 MMOL/L (ref 0.4–2)
LACTATE BLD-SCNC: 1.2 MMOL/L (ref 0.4–2)
LACTATE BLD-SCNC: 3.2 MMOL/L (ref 0.4–2)
LACTATE BLD-SCNC: 3.5 MMOL/L (ref 0.4–2)
LACTATE BLD-SCNC: 4 MMOL/L (ref 0.4–2)
LACTATE SERPL-SCNC: 0.5 MMOL/L (ref 0.4–2)
LACTATE SERPL-SCNC: 2 MMOL/L (ref 0.4–2)
LDH FLD L TO P-CCNC: 466 U/L
LDLC SERPL CALC-MCNC: 35 MG/DL (ref 0–100)
LDLC SERPL CALC-MCNC: 95.2 MG/DL (ref 0–100)
LEFT AVF AVG GRAFT NAME: NORMAL
LEFT CCA DIST DIAS: 14.37 CM/S
LEFT CCA DIST SYS: 67.66 CM/S
LEFT CCA MID DIAS: 17.6 CM/S
LEFT CCA MID SYS: 93.51 CM/S
LEFT CCA PROX DIAS: 15.99 CM/S
LEFT CCA PROX SYS: 91.9 CM/S
LEFT ECA DIAS: 9.76 CM/S
LEFT ECA SYS: 61.02 CM/S
LEFT ICA DIST DIAS: 18.67 CM/S
LEFT ICA DIST SYS: 50.47 CM/S
LEFT ICA MID DIAS: 14.56 CM/S
LEFT ICA MID SYS: 43.85 CM/S
LEFT ICA PROX DIAS: 17.6 CM/S
LEFT ICA PROX SYS: 59.59 CM/S
LEFT SUBCLAVIAN SYS: 106 CM/S
LEFT VERTEBRAL DIAS: 8.64 CM/S
LEFT VERTEBRAL SYS: 33.06 CM/S
LEUKOCYTE ESTERASE UR QL STRIP.AUTO: ABNORMAL
LEUKOCYTE ESTERASE UR QL STRIP.AUTO: ABNORMAL
LEUKOCYTE ESTERASE UR QL STRIP.AUTO: NEGATIVE
LINEZOLID ISLT MIC: ABNORMAL
LIPASE SERPL-CCNC: 127 U/L (ref 73–393)
LIPASE SERPL-CCNC: 201 U/L (ref 73–393)
LIPASE SERPL-CCNC: 335 U/L (ref 73–393)
LIPASE SERPL-CCNC: 400 U/L (ref 73–393)
LIPASE SERPL-CCNC: 544 U/L (ref 73–393)
LIPID PROFILE,FLP: ABNORMAL
LIPID PROFILE,FLP: ABNORMAL
LYMPHOCYTES # BLD: 0.2 K/UL (ref 0.9–3.6)
LYMPHOCYTES # BLD: 0.3 K/UL (ref 0.9–3.6)
LYMPHOCYTES # BLD: 0.3 K/UL (ref 0.9–3.6)
LYMPHOCYTES # BLD: 0.4 K/UL (ref 0.9–3.6)
LYMPHOCYTES # BLD: 0.5 K/UL (ref 0.8–3.5)
LYMPHOCYTES # BLD: 0.5 K/UL (ref 0.8–3.5)
LYMPHOCYTES # BLD: 0.5 K/UL (ref 0.9–3.6)
LYMPHOCYTES # BLD: 0.6 K/UL (ref 0.8–3.5)
LYMPHOCYTES # BLD: 0.6 K/UL (ref 0.8–3.5)
LYMPHOCYTES # BLD: 0.6 K/UL (ref 0.9–3.6)
LYMPHOCYTES # BLD: 0.7 K/UL (ref 0.8–3.5)
LYMPHOCYTES # BLD: 0.7 K/UL (ref 0.9–3.6)
LYMPHOCYTES # BLD: 0.8 K/UL (ref 0.8–3.5)
LYMPHOCYTES # BLD: 0.8 K/UL (ref 0.8–3.5)
LYMPHOCYTES # BLD: 0.8 K/UL (ref 0.9–3.6)
LYMPHOCYTES # BLD: 0.8 K/UL (ref 0.9–3.6)
LYMPHOCYTES # BLD: 0.9 K/UL (ref 0.8–3.5)
LYMPHOCYTES # BLD: 0.9 K/UL (ref 0.9–3.6)
LYMPHOCYTES # BLD: 0.9 K/UL (ref 0.9–3.6)
LYMPHOCYTES # BLD: 1 K/UL (ref 0.9–3.6)
LYMPHOCYTES # BLD: 1.1 K/UL (ref 0.9–3.6)
LYMPHOCYTES # BLD: 1.2 K/UL (ref 0.9–3.6)
LYMPHOCYTES # BLD: 1.9 K/UL (ref 0.9–3.6)
LYMPHOCYTES # BLD: 3.4 K/UL (ref 0.9–3.6)
LYMPHOCYTES NFR BLD: 10 % (ref 21–52)
LYMPHOCYTES NFR BLD: 10 % (ref 21–52)
LYMPHOCYTES NFR BLD: 11 % (ref 21–52)
LYMPHOCYTES NFR BLD: 11 % (ref 21–52)
LYMPHOCYTES NFR BLD: 12 % (ref 21–52)
LYMPHOCYTES NFR BLD: 13 % (ref 20–51)
LYMPHOCYTES NFR BLD: 13 % (ref 20–51)
LYMPHOCYTES NFR BLD: 14 % (ref 20–51)
LYMPHOCYTES NFR BLD: 14 % (ref 20–51)
LYMPHOCYTES NFR BLD: 14 % (ref 21–52)
LYMPHOCYTES NFR BLD: 15 % (ref 20–51)
LYMPHOCYTES NFR BLD: 15 % (ref 21–52)
LYMPHOCYTES NFR BLD: 15 % (ref 21–52)
LYMPHOCYTES NFR BLD: 16 % (ref 21–52)
LYMPHOCYTES NFR BLD: 18 % (ref 21–52)
LYMPHOCYTES NFR BLD: 20 % (ref 21–52)
LYMPHOCYTES NFR BLD: 21 % (ref 21–52)
LYMPHOCYTES NFR BLD: 23 % (ref 21–52)
LYMPHOCYTES NFR BLD: 24 % (ref 21–52)
LYMPHOCYTES NFR BLD: 26 % (ref 21–52)
LYMPHOCYTES NFR BLD: 3 % (ref 21–52)
LYMPHOCYTES NFR BLD: 30 % (ref 20–51)
LYMPHOCYTES NFR BLD: 30 % (ref 21–52)
LYMPHOCYTES NFR BLD: 34 % (ref 21–52)
LYMPHOCYTES NFR BLD: 6 % (ref 21–52)
LYMPHOCYTES NFR BLD: 6 % (ref 21–52)
LYMPHOCYTES NFR BLD: 61 % (ref 21–52)
LYMPHOCYTES NFR BLD: 7 % (ref 21–52)
LYMPHOCYTES NFR BLD: 8 % (ref 20–51)
LYMPHOCYTES NFR BLD: 8 % (ref 21–52)
LYMPHOCYTES NFR BLD: 9 % (ref 20–51)
LYMPHOCYTES NFR FLD: 47 %
M AVIUM CMPLX RRNA SPEC QL PROBE: POSITIVE
M AVIUM CMPLX RRNA SPEC QL PROBE: POSITIVE
M GORDONAE RRNA SPEC QL PROBE: ABNORMAL
M GORDONAE RRNA SPEC QL PROBE: ABNORMAL
M KANSASII RRNA SPEC QL PROBE: ABNORMAL
M KANSASII RRNA SPEC QL PROBE: ABNORMAL
M TB CMPLX RRNA SPEC QL PROBE: NEGATIVE
M TB CMPLX RRNA SPEC QL PROBE: NEGATIVE
MAGNESIUM SERPL-MCNC: 2.1 MG/DL (ref 1.6–2.6)
MAGNESIUM SERPL-MCNC: 2.1 MG/DL (ref 1.6–2.6)
MAGNESIUM SERPL-MCNC: 2.2 MG/DL (ref 1.6–2.6)
MAGNESIUM SERPL-MCNC: 2.3 MG/DL (ref 1.6–2.6)
MAGNESIUM SERPL-MCNC: 2.4 MG/DL (ref 1.6–2.6)
MAGNESIUM SERPL-MCNC: 2.5 MG/DL (ref 1.6–2.6)
MAGNESIUM SERPL-MCNC: 2.6 MG/DL (ref 1.6–2.6)
MAGNESIUM SERPL-MCNC: 2.6 MG/DL (ref 1.6–2.6)
MAGNESIUM SERPL-MCNC: 2.7 MG/DL (ref 1.6–2.6)
MAGNESIUM SERPL-MCNC: 2.7 MG/DL (ref 1.6–2.6)
MAGNESIUM SERPL-MCNC: 2.8 MG/DL (ref 1.6–2.6)
MAGNESIUM SERPL-MCNC: 2.8 MG/DL (ref 1.6–2.6)
MAGNESIUM SERPL-MCNC: 2.9 MG/DL (ref 1.6–2.6)
MAGNESIUM SERPL-MCNC: 3 MG/DL (ref 1.6–2.6)
MAGNESIUM SERPL-MCNC: 3.2 MG/DL (ref 1.6–2.6)
MAGNESIUM SERPL-MCNC: 3.2 MG/DL (ref 1.6–2.6)
MAX. DIASTOLIC BP, CST04: 71 MMHG
MAX. HEART RATE, CST05: 97 BPM
MAX. SYSTOLIC BP, CST03: 142 MMHG
MCH RBC QN AUTO: 24.4 PG (ref 24–34)
MCH RBC QN AUTO: 24.6 PG (ref 24–34)
MCH RBC QN AUTO: 24.8 PG (ref 24–34)
MCH RBC QN AUTO: 24.9 PG (ref 24–34)
MCH RBC QN AUTO: 24.9 PG (ref 24–34)
MCH RBC QN AUTO: 25 PG (ref 24–34)
MCH RBC QN AUTO: 25.1 PG (ref 24–34)
MCH RBC QN AUTO: 25.1 PG (ref 24–34)
MCH RBC QN AUTO: 25.2 PG (ref 24–34)
MCH RBC QN AUTO: 25.3 PG (ref 24–34)
MCH RBC QN AUTO: 25.4 PG (ref 24–34)
MCH RBC QN AUTO: 25.4 PG (ref 24–34)
MCH RBC QN AUTO: 25.5 PG (ref 24–34)
MCH RBC QN AUTO: 25.8 PG (ref 24–34)
MCH RBC QN AUTO: 27.2 PG (ref 24–34)
MCH RBC QN AUTO: 27.2 PG (ref 24–34)
MCH RBC QN AUTO: 27.3 PG (ref 24–34)
MCH RBC QN AUTO: 27.4 PG (ref 24–34)
MCH RBC QN AUTO: 27.4 PG (ref 24–34)
MCH RBC QN AUTO: 27.7 PG (ref 24–34)
MCH RBC QN AUTO: 29.7 PG (ref 24–34)
MCH RBC QN AUTO: 29.8 PG (ref 24–34)
MCH RBC QN AUTO: 30 PG (ref 24–34)
MCH RBC QN AUTO: 30.1 PG (ref 24–34)
MCH RBC QN AUTO: 30.3 PG (ref 24–34)
MCH RBC QN AUTO: 30.3 PG (ref 24–34)
MCH RBC QN AUTO: 31.3 PG (ref 24–34)
MCH RBC QN AUTO: 31.4 PG (ref 24–34)
MCH RBC QN AUTO: 31.4 PG (ref 24–34)
MCH RBC QN AUTO: 31.6 PG (ref 24–34)
MCH RBC QN AUTO: 31.7 PG (ref 24–34)
MCH RBC QN AUTO: 31.7 PG (ref 24–34)
MCH RBC QN AUTO: 31.8 PG (ref 24–34)
MCH RBC QN AUTO: 31.9 PG (ref 24–34)
MCH RBC QN AUTO: 32 PG (ref 24–34)
MCH RBC QN AUTO: 32.1 PG (ref 24–34)
MCH RBC QN AUTO: 32.2 PG (ref 24–34)
MCH RBC QN AUTO: 32.4 PG (ref 24–34)
MCH RBC QN AUTO: 32.5 PG (ref 24–34)
MCH RBC QN AUTO: 32.8 PG (ref 24–34)
MCHC RBC AUTO-ENTMCNC: 30.4 G/DL (ref 31–37)
MCHC RBC AUTO-ENTMCNC: 30.5 G/DL (ref 31–37)
MCHC RBC AUTO-ENTMCNC: 30.5 G/DL (ref 31–37)
MCHC RBC AUTO-ENTMCNC: 30.7 G/DL (ref 31–37)
MCHC RBC AUTO-ENTMCNC: 30.8 G/DL (ref 31–37)
MCHC RBC AUTO-ENTMCNC: 30.9 G/DL (ref 31–37)
MCHC RBC AUTO-ENTMCNC: 31 G/DL (ref 31–37)
MCHC RBC AUTO-ENTMCNC: 31 G/DL (ref 31–37)
MCHC RBC AUTO-ENTMCNC: 31.1 G/DL (ref 31–37)
MCHC RBC AUTO-ENTMCNC: 31.2 G/DL (ref 31–37)
MCHC RBC AUTO-ENTMCNC: 31.3 G/DL (ref 31–37)
MCHC RBC AUTO-ENTMCNC: 31.4 G/DL (ref 31–37)
MCHC RBC AUTO-ENTMCNC: 31.5 G/DL (ref 31–37)
MCHC RBC AUTO-ENTMCNC: 31.5 G/DL (ref 31–37)
MCHC RBC AUTO-ENTMCNC: 31.6 G/DL (ref 31–37)
MCHC RBC AUTO-ENTMCNC: 31.7 G/DL (ref 31–37)
MCHC RBC AUTO-ENTMCNC: 31.8 G/DL (ref 31–37)
MCHC RBC AUTO-ENTMCNC: 31.9 G/DL (ref 31–37)
MCHC RBC AUTO-ENTMCNC: 31.9 G/DL (ref 31–37)
MCHC RBC AUTO-ENTMCNC: 32.2 G/DL (ref 31–37)
MCHC RBC AUTO-ENTMCNC: 32.2 G/DL (ref 31–37)
MCHC RBC AUTO-ENTMCNC: 32.3 G/DL (ref 31–37)
MCHC RBC AUTO-ENTMCNC: 32.4 G/DL (ref 31–37)
MCHC RBC AUTO-ENTMCNC: 32.4 G/DL (ref 31–37)
MCHC RBC AUTO-ENTMCNC: 32.6 G/DL (ref 31–37)
MCHC RBC AUTO-ENTMCNC: 32.7 G/DL (ref 31–37)
MCHC RBC AUTO-ENTMCNC: 32.8 G/DL (ref 31–37)
MCHC RBC AUTO-ENTMCNC: 32.9 G/DL (ref 31–37)
MCHC RBC AUTO-ENTMCNC: 33 G/DL (ref 31–37)
MCHC RBC AUTO-ENTMCNC: 33.1 G/DL (ref 31–37)
MCHC RBC AUTO-ENTMCNC: 33.2 G/DL (ref 31–37)
MCHC RBC AUTO-ENTMCNC: 33.2 G/DL (ref 31–37)
MCHC RBC AUTO-ENTMCNC: 33.4 G/DL (ref 31–37)
MCHC RBC AUTO-ENTMCNC: 33.5 G/DL (ref 31–37)
MCHC RBC AUTO-ENTMCNC: 33.7 G/DL (ref 31–37)
MCHC RBC AUTO-ENTMCNC: 33.9 G/DL (ref 31–37)
MCHC RBC AUTO-ENTMCNC: 34 G/DL (ref 31–37)
MCHC RBC AUTO-ENTMCNC: 34 G/DL (ref 31–37)
MCHC RBC AUTO-ENTMCNC: 34.1 G/DL (ref 31–37)
MCHC RBC AUTO-ENTMCNC: 34.3 G/DL (ref 31–37)
MCHC RBC AUTO-ENTMCNC: 34.4 G/DL (ref 31–37)
MCHC RBC AUTO-ENTMCNC: 34.6 G/DL (ref 31–37)
MCHC RBC AUTO-ENTMCNC: 34.8 G/DL (ref 31–37)
MCHC RBC AUTO-ENTMCNC: 34.9 G/DL (ref 31–37)
MCHC RBC AUTO-ENTMCNC: 35.3 G/DL (ref 31–37)
MCHC RBC AUTO-ENTMCNC: 35.3 G/DL (ref 31–37)
MCV RBC AUTO: 100.8 FL (ref 74–97)
MCV RBC AUTO: 79 FL (ref 74–97)
MCV RBC AUTO: 79 FL (ref 74–97)
MCV RBC AUTO: 79.3 FL (ref 74–97)
MCV RBC AUTO: 79.9 FL (ref 74–97)
MCV RBC AUTO: 80 FL (ref 74–97)
MCV RBC AUTO: 80 FL (ref 74–97)
MCV RBC AUTO: 80.2 FL (ref 74–97)
MCV RBC AUTO: 80.3 FL (ref 74–97)
MCV RBC AUTO: 80.4 FL (ref 74–97)
MCV RBC AUTO: 80.5 FL (ref 74–97)
MCV RBC AUTO: 80.7 FL (ref 74–97)
MCV RBC AUTO: 80.8 FL (ref 74–97)
MCV RBC AUTO: 81.7 FL (ref 74–97)
MCV RBC AUTO: 81.8 FL (ref 74–97)
MCV RBC AUTO: 81.9 FL (ref 74–97)
MCV RBC AUTO: 82.1 FL (ref 74–97)
MCV RBC AUTO: 82.6 FL (ref 74–97)
MCV RBC AUTO: 83 FL (ref 74–97)
MCV RBC AUTO: 83.4 FL (ref 74–97)
MCV RBC AUTO: 83.5 FL (ref 74–97)
MCV RBC AUTO: 85.2 FL (ref 74–97)
MCV RBC AUTO: 85.8 FL (ref 74–97)
MCV RBC AUTO: 86 FL (ref 74–97)
MCV RBC AUTO: 86.3 FL (ref 74–97)
MCV RBC AUTO: 87.3 FL (ref 74–97)
MCV RBC AUTO: 90.3 FL (ref 74–97)
MCV RBC AUTO: 91.1 FL (ref 74–97)
MCV RBC AUTO: 91.2 FL (ref 74–97)
MCV RBC AUTO: 91.3 FL (ref 74–97)
MCV RBC AUTO: 91.4 FL (ref 74–97)
MCV RBC AUTO: 91.5 FL (ref 74–97)
MCV RBC AUTO: 91.6 FL (ref 74–97)
MCV RBC AUTO: 91.7 FL (ref 74–97)
MCV RBC AUTO: 91.9 FL (ref 74–97)
MCV RBC AUTO: 92 FL (ref 74–97)
MCV RBC AUTO: 92.8 FL (ref 74–97)
MCV RBC AUTO: 92.9 FL (ref 74–97)
MCV RBC AUTO: 92.9 FL (ref 74–97)
MCV RBC AUTO: 93.1 FL (ref 74–97)
MCV RBC AUTO: 93.3 FL (ref 74–97)
MCV RBC AUTO: 93.5 FL (ref 74–97)
MCV RBC AUTO: 93.9 FL (ref 74–97)
MCV RBC AUTO: 94 FL (ref 74–97)
MCV RBC AUTO: 94.4 FL (ref 74–97)
MCV RBC AUTO: 94.7 FL (ref 74–97)
MCV RBC AUTO: 95.2 FL (ref 74–97)
MCV RBC AUTO: 95.5 FL (ref 74–97)
MCV RBC AUTO: 95.9 FL (ref 74–97)
MCV RBC AUTO: 96.4 FL (ref 74–97)
MCV RBC AUTO: 96.5 FL (ref 74–97)
MCV RBC AUTO: 96.6 FL (ref 74–97)
MCV RBC AUTO: 98.6 FL (ref 74–97)
MCV RBC AUTO: 99.7 FL (ref 74–97)
MICROORGANISM/AGENT SPEC: ABNORMAL
MONOCYTES # BLD: 0.1 K/UL (ref 0.05–1.2)
MONOCYTES # BLD: 0.3 K/UL (ref 0.05–1.2)
MONOCYTES # BLD: 0.4 K/UL (ref 0.05–1.2)
MONOCYTES # BLD: 0.5 K/UL (ref 0.05–1.2)
MONOCYTES # BLD: 0.5 K/UL (ref 0–1)
MONOCYTES # BLD: 0.5 K/UL (ref 0–1)
MONOCYTES # BLD: 0.6 K/UL (ref 0.05–1.2)
MONOCYTES # BLD: 0.6 K/UL (ref 0–1)
MONOCYTES # BLD: 0.6 K/UL (ref 0–1)
MONOCYTES # BLD: 0.7 K/UL (ref 0.05–1.2)
MONOCYTES # BLD: 0.7 K/UL (ref 0–1)
MONOCYTES # BLD: 0.8 K/UL (ref 0.05–1.2)
MONOCYTES # BLD: 0.8 K/UL (ref 0–1)
MONOCYTES # BLD: 0.9 K/UL (ref 0.05–1.2)
MONOCYTES # BLD: 0.9 K/UL (ref 0–1)
MONOCYTES # BLD: 1 K/UL (ref 0.05–1.2)
MONOCYTES # BLD: 1 K/UL (ref 0.05–1.2)
MONOCYTES # BLD: 1.1 K/UL (ref 0.05–1.2)
MONOCYTES # BLD: 1.2 K/UL (ref 0–1)
MONOCYTES NFR BLD: 10 % (ref 2–9)
MONOCYTES NFR BLD: 10 % (ref 3–10)
MONOCYTES NFR BLD: 10 % (ref 3–10)
MONOCYTES NFR BLD: 11 % (ref 2–9)
MONOCYTES NFR BLD: 11 % (ref 3–10)
MONOCYTES NFR BLD: 12 % (ref 2–9)
MONOCYTES NFR BLD: 12 % (ref 3–10)
MONOCYTES NFR BLD: 13 % (ref 3–10)
MONOCYTES NFR BLD: 13 % (ref 3–10)
MONOCYTES NFR BLD: 14 % (ref 2–9)
MONOCYTES NFR BLD: 14 % (ref 3–10)
MONOCYTES NFR BLD: 15 % (ref 2–9)
MONOCYTES NFR BLD: 15 % (ref 2–9)
MONOCYTES NFR BLD: 15 % (ref 3–10)
MONOCYTES NFR BLD: 16 % (ref 3–10)
MONOCYTES NFR BLD: 17 % (ref 2–9)
MONOCYTES NFR BLD: 17 % (ref 3–10)
MONOCYTES NFR BLD: 18 % (ref 3–10)
MONOCYTES NFR BLD: 19 % (ref 3–10)
MONOCYTES NFR BLD: 19 % (ref 3–10)
MONOCYTES NFR BLD: 2 % (ref 3–10)
MONOCYTES NFR BLD: 2 % (ref 3–10)
MONOCYTES NFR BLD: 20 % (ref 3–10)
MONOCYTES NFR BLD: 21 % (ref 3–10)
MONOCYTES NFR BLD: 22 % (ref 3–10)
MONOCYTES NFR BLD: 24 % (ref 3–10)
MONOCYTES NFR BLD: 25 % (ref 3–10)
MONOCYTES NFR BLD: 26 % (ref 2–9)
MONOCYTES NFR BLD: 7 % (ref 3–10)
MONOCYTES NFR BLD: 8 % (ref 3–10)
MONOCYTES NFR BLD: 8 % (ref 3–10)
MONOCYTES NFR BLD: 9 % (ref 3–10)
MONOCYTES NFR FLD: 30 %
MOXIFLOXACIN ISLT MIC: ABNORMAL
MYCOBACTERIUM SPEC QL CULT: NEGATIVE
MYCOBACTERIUM SPEC QL CULT: POSITIVE
MYCOBACTERIUM SPEC QL CULT: POSITIVE
NEUTROPHILS NFR FLD: 23 %
NEUTS BAND # FLD: 0 %
NEUTS BAND NFR BLD MANUAL: 1 % (ref 0–5)
NEUTS BAND NFR BLD MANUAL: 2 % (ref 0–5)
NEUTS BAND NFR BLD MANUAL: 3 % (ref 0–5)
NEUTS BAND NFR BLD MANUAL: 6 % (ref 0–5)
NEUTS BAND NFR BLD MANUAL: 6 % (ref 0–5)
NEUTS SEG # BLD: 0.9 K/UL (ref 1.8–8)
NEUTS SEG # BLD: 1 K/UL (ref 1.8–8)
NEUTS SEG # BLD: 1 K/UL (ref 1.8–8)
NEUTS SEG # BLD: 1.5 K/UL (ref 1.8–8)
NEUTS SEG # BLD: 1.5 K/UL (ref 1.8–8)
NEUTS SEG # BLD: 1.7 K/UL (ref 1.8–8)
NEUTS SEG # BLD: 12.6 K/UL (ref 1.8–8)
NEUTS SEG # BLD: 2 K/UL (ref 1.8–8)
NEUTS SEG # BLD: 2.1 K/UL (ref 1.8–8)
NEUTS SEG # BLD: 2.2 K/UL (ref 1.8–8)
NEUTS SEG # BLD: 2.2 K/UL (ref 1.8–8)
NEUTS SEG # BLD: 2.4 K/UL (ref 1.8–8)
NEUTS SEG # BLD: 2.5 K/UL (ref 1.8–8)
NEUTS SEG # BLD: 2.6 K/UL (ref 1.8–8)
NEUTS SEG # BLD: 2.7 K/UL (ref 1.8–8)
NEUTS SEG # BLD: 2.7 K/UL (ref 1.8–8)
NEUTS SEG # BLD: 2.8 K/UL (ref 1.8–8)
NEUTS SEG # BLD: 2.8 K/UL (ref 1.8–8)
NEUTS SEG # BLD: 2.9 K/UL (ref 1.8–8)
NEUTS SEG # BLD: 3 K/UL (ref 1.8–8)
NEUTS SEG # BLD: 3.1 K/UL (ref 1.8–8)
NEUTS SEG # BLD: 3.4 K/UL (ref 1.8–8)
NEUTS SEG # BLD: 3.5 K/UL (ref 1.8–8)
NEUTS SEG # BLD: 3.8 K/UL (ref 1.8–8)
NEUTS SEG # BLD: 3.8 K/UL (ref 1.8–8)
NEUTS SEG # BLD: 4.1 K/UL (ref 1.8–8)
NEUTS SEG # BLD: 4.2 K/UL (ref 1.8–8)
NEUTS SEG # BLD: 4.3 K/UL (ref 1.8–8)
NEUTS SEG # BLD: 4.5 K/UL (ref 1.8–8)
NEUTS SEG # BLD: 5.1 K/UL (ref 1.8–8)
NEUTS SEG # BLD: 5.2 K/UL (ref 1.8–8)
NEUTS SEG # BLD: 5.3 K/UL (ref 1.8–8)
NEUTS SEG # BLD: 6 K/UL (ref 1.8–8)
NEUTS SEG # BLD: 6.2 K/UL (ref 1.8–8)
NEUTS SEG # BLD: 6.5 K/UL (ref 1.8–8)
NEUTS SEG # BLD: 6.5 K/UL (ref 1.8–8)
NEUTS SEG # BLD: 6.6 K/UL (ref 1.8–8)
NEUTS SEG # BLD: 6.9 K/UL (ref 1.8–8)
NEUTS SEG # BLD: 6.9 K/UL (ref 1.8–8)
NEUTS SEG # BLD: 7.1 K/UL (ref 1.8–8)
NEUTS SEG # BLD: 8.5 K/UL (ref 1.8–8)
NEUTS SEG # BLD: 9.4 K/UL (ref 1.8–8)
NEUTS SEG NFR BLD: 31 % (ref 40–73)
NEUTS SEG NFR BLD: 41 % (ref 40–73)
NEUTS SEG NFR BLD: 42 % (ref 42–75)
NEUTS SEG NFR BLD: 46 % (ref 40–73)
NEUTS SEG NFR BLD: 48 % (ref 40–73)
NEUTS SEG NFR BLD: 49 % (ref 40–73)
NEUTS SEG NFR BLD: 54 % (ref 40–73)
NEUTS SEG NFR BLD: 54 % (ref 40–73)
NEUTS SEG NFR BLD: 57 % (ref 40–73)
NEUTS SEG NFR BLD: 58 % (ref 40–73)
NEUTS SEG NFR BLD: 58 % (ref 42–75)
NEUTS SEG NFR BLD: 59 % (ref 40–73)
NEUTS SEG NFR BLD: 60 % (ref 40–73)
NEUTS SEG NFR BLD: 63 % (ref 40–73)
NEUTS SEG NFR BLD: 63 % (ref 42–75)
NEUTS SEG NFR BLD: 64 % (ref 40–73)
NEUTS SEG NFR BLD: 64 % (ref 42–75)
NEUTS SEG NFR BLD: 66 % (ref 40–73)
NEUTS SEG NFR BLD: 67 % (ref 40–73)
NEUTS SEG NFR BLD: 69 % (ref 42–75)
NEUTS SEG NFR BLD: 70 % (ref 42–75)
NEUTS SEG NFR BLD: 71 % (ref 40–73)
NEUTS SEG NFR BLD: 71 % (ref 40–73)
NEUTS SEG NFR BLD: 72 % (ref 42–75)
NEUTS SEG NFR BLD: 73 % (ref 40–73)
NEUTS SEG NFR BLD: 74 % (ref 40–73)
NEUTS SEG NFR BLD: 75 % (ref 40–73)
NEUTS SEG NFR BLD: 76 % (ref 40–73)
NEUTS SEG NFR BLD: 76 % (ref 42–75)
NEUTS SEG NFR BLD: 78 % (ref 40–73)
NEUTS SEG NFR BLD: 79 % (ref 40–73)
NEUTS SEG NFR BLD: 80 % (ref 40–73)
NEUTS SEG NFR BLD: 80 % (ref 40–73)
NEUTS SEG NFR BLD: 81 % (ref 40–73)
NEUTS SEG NFR BLD: 83 % (ref 40–73)
NEUTS SEG NFR BLD: 85 % (ref 40–73)
NEUTS SEG NFR BLD: 85 % (ref 40–73)
NEUTS SEG NFR BLD: 90 % (ref 40–73)
NEUTS SEG NFR BLD: 95 % (ref 40–73)
NITRITE UR QL STRIP.AUTO: NEGATIVE
NUC CELL # FLD: 1300 /CU MM
O2/TOTAL GAS SETTING VFR VENT: 100 %
O2/TOTAL GAS SETTING VFR VENT: 30 %
O2/TOTAL GAS SETTING VFR VENT: 50 %
O2/TOTAL GAS SETTING VFR VENT: 70 %
OTHER, AFR6: ABNORMAL
OTHER, RAFBI6: ABNORMAL
OVERALL BP RESPONSE TO EXERCISE, CST16: NORMAL
OVERALL HR RESPONSE TO EXERCISE, CST15: NORMAL
P-R INTERVAL, ECG05: 144 MS
P-R INTERVAL, ECG05: 150 MS
P-R INTERVAL, ECG05: 152 MS
P-R INTERVAL, ECG05: 156 MS
P-R INTERVAL, ECG05: 158 MS
P-R INTERVAL, ECG05: 162 MS
P-R INTERVAL, ECG05: 168 MS
P-R INTERVAL, ECG05: 176 MS
P-R INTERVAL, ECG05: 188 MS
PCO2 BLD: 24 MMHG (ref 35–45)
PCO2 BLD: 25.6 MMHG (ref 35–45)
PCO2 BLD: 26.9 MMHG (ref 35–45)
PCO2 BLD: 31.1 MMHG (ref 35–45)
PCO2 BLD: 32.1 MMHG (ref 35–45)
PCO2 BLD: 36.7 MMHG (ref 35–45)
PCO2 BLD: 43.7 MMHG (ref 35–45)
PCO2 BLD: 45.9 MMHG (ref 35–45)
PCO2 BLD: 46.1 MMHG (ref 35–45)
PEAK EX METS, CST10: 1 METS
PEEP RESPIRATORY: 5 CMH2O
PH BLD: 7.35 [PH] (ref 7.35–7.45)
PH BLD: 7.39 [PH] (ref 7.35–7.45)
PH BLD: 7.44 [PH] (ref 7.35–7.45)
PH BLD: 7.49 [PH] (ref 7.35–7.45)
PH BLD: 7.5 [PH] (ref 7.35–7.45)
PH BLD: 7.52 [PH] (ref 7.35–7.45)
PH BLD: 7.55 [PH] (ref 7.35–7.45)
PH BLD: 7.62 [PH] (ref 7.35–7.45)
PH BLD: 7.62 [PH] (ref 7.35–7.45)
PH FLD: 7.5 [PH]
PH UR STRIP: 8.5 [PH] (ref 5–8)
PH UR STRIP: >8.5 [PH] (ref 5–8)
PH UR STRIP: >9 [PH] (ref 5–8)
PHOSPHATE SERPL-MCNC: 1.9 MG/DL (ref 2.5–4.9)
PHOSPHATE SERPL-MCNC: 2 MG/DL (ref 2.5–4.9)
PHOSPHATE SERPL-MCNC: 2.2 MG/DL (ref 2.5–4.9)
PHOSPHATE SERPL-MCNC: 2.3 MG/DL (ref 2.5–4.9)
PHOSPHATE SERPL-MCNC: 2.4 MG/DL (ref 2.5–4.9)
PHOSPHATE SERPL-MCNC: 2.5 MG/DL (ref 2.5–4.9)
PHOSPHATE SERPL-MCNC: 2.6 MG/DL (ref 2.5–4.9)
PHOSPHATE SERPL-MCNC: 2.9 MG/DL (ref 2.5–4.9)
PHOSPHATE SERPL-MCNC: 2.9 MG/DL (ref 2.5–4.9)
PHOSPHATE SERPL-MCNC: 3.1 MG/DL (ref 2.5–4.9)
PHOSPHATE SERPL-MCNC: 3.2 MG/DL (ref 2.5–4.9)
PHOSPHATE SERPL-MCNC: 3.4 MG/DL (ref 2.5–4.9)
PHOSPHATE SERPL-MCNC: 3.8 MG/DL (ref 2.5–4.9)
PHOSPHATE SERPL-MCNC: 3.9 MG/DL (ref 2.5–4.9)
PHOSPHATE SERPL-MCNC: 4.3 MG/DL (ref 2.5–4.9)
PHOSPHATE SERPL-MCNC: 4.3 MG/DL (ref 2.5–4.9)
PHOSPHATE SERPL-MCNC: 4.5 MG/DL (ref 2.5–4.9)
PHOSPHATE SERPL-MCNC: 4.8 MG/DL (ref 2.5–4.9)
PHOSPHATE SERPL-MCNC: 6.2 MG/DL (ref 2.5–4.9)
PHOSPHATE SERPL-MCNC: 6.5 MG/DL (ref 2.5–4.9)
PHOSPHATE SERPL-MCNC: 6.8 MG/DL (ref 2.5–4.9)
PHOSPHATE SERPL-MCNC: 7.8 MG/DL (ref 2.5–4.9)
PHOSPHATE SERPL-MCNC: 9.5 MG/DL (ref 2.5–4.9)
PHOSPHATE SERPL-MCNC: 9.6 MG/DL (ref 2.5–4.9)
PLATELET # BLD AUTO: 103 K/UL (ref 135–420)
PLATELET # BLD AUTO: 103 K/UL (ref 135–420)
PLATELET # BLD AUTO: 105 K/UL (ref 135–420)
PLATELET # BLD AUTO: 112 K/UL (ref 135–420)
PLATELET # BLD AUTO: 122 K/UL (ref 135–420)
PLATELET # BLD AUTO: 130 K/UL (ref 135–420)
PLATELET # BLD AUTO: 146 K/UL (ref 135–420)
PLATELET # BLD AUTO: 147 K/UL (ref 135–420)
PLATELET # BLD AUTO: 151 K/UL (ref 135–420)
PLATELET # BLD AUTO: 158 K/UL (ref 135–420)
PLATELET # BLD AUTO: 166 K/UL (ref 135–420)
PLATELET # BLD AUTO: 170 K/UL (ref 135–420)
PLATELET # BLD AUTO: 170 K/UL (ref 135–420)
PLATELET # BLD AUTO: 173 K/UL (ref 135–420)
PLATELET # BLD AUTO: 175 K/UL (ref 135–420)
PLATELET # BLD AUTO: 180 K/UL (ref 135–420)
PLATELET # BLD AUTO: 180 K/UL (ref 135–420)
PLATELET # BLD AUTO: 181 K/UL (ref 135–420)
PLATELET # BLD AUTO: 193 K/UL (ref 135–420)
PLATELET # BLD AUTO: 197 K/UL (ref 135–420)
PLATELET # BLD AUTO: 198 K/UL (ref 135–420)
PLATELET # BLD AUTO: 203 K/UL (ref 135–420)
PLATELET # BLD AUTO: 203 K/UL (ref 135–420)
PLATELET # BLD AUTO: 207 K/UL (ref 135–420)
PLATELET # BLD AUTO: 210 K/UL (ref 135–420)
PLATELET # BLD AUTO: 213 K/UL (ref 135–420)
PLATELET # BLD AUTO: 215 K/UL (ref 135–420)
PLATELET # BLD AUTO: 216 K/UL (ref 135–420)
PLATELET # BLD AUTO: 218 K/UL (ref 135–420)
PLATELET # BLD AUTO: 229 K/UL (ref 135–420)
PLATELET # BLD AUTO: 229 K/UL (ref 135–420)
PLATELET # BLD AUTO: 233 K/UL (ref 135–420)
PLATELET # BLD AUTO: 234 K/UL (ref 135–420)
PLATELET # BLD AUTO: 236 K/UL (ref 135–420)
PLATELET # BLD AUTO: 240 K/UL (ref 135–420)
PLATELET # BLD AUTO: 246 K/UL (ref 135–420)
PLATELET # BLD AUTO: 255 K/UL (ref 135–420)
PLATELET # BLD AUTO: 269 K/UL (ref 135–420)
PLATELET # BLD AUTO: 271 K/UL (ref 135–420)
PLATELET # BLD AUTO: 277 K/UL (ref 135–420)
PLATELET # BLD AUTO: 282 K/UL (ref 135–420)
PLATELET # BLD AUTO: 287 K/UL (ref 135–420)
PLATELET # BLD AUTO: 297 K/UL (ref 135–420)
PLATELET # BLD AUTO: 302 K/UL (ref 135–420)
PLATELET # BLD AUTO: 374 K/UL (ref 135–420)
PLATELET # BLD AUTO: 76 K/UL (ref 135–420)
PLATELET # BLD AUTO: 77 K/UL (ref 135–420)
PLATELET # BLD AUTO: 87 K/UL (ref 135–420)
PLATELET # BLD AUTO: 88 K/UL (ref 135–420)
PLATELET # BLD AUTO: 89 K/UL (ref 135–420)
PLATELET # BLD AUTO: 89 K/UL (ref 135–420)
PLATELET # BLD AUTO: 91 K/UL (ref 135–420)
PLATELET # BLD AUTO: 94 K/UL (ref 135–420)
PLATELET # BLD AUTO: 98 K/UL (ref 135–420)
PLATELET COMMENTS,PCOM: ABNORMAL
PLATELET FACTOR 4,XPF4T: NEGATIVE
PMV BLD AUTO: 10 FL (ref 9.2–11.8)
PMV BLD AUTO: 10.1 FL (ref 9.2–11.8)
PMV BLD AUTO: 10.2 FL (ref 9.2–11.8)
PMV BLD AUTO: 10.2 FL (ref 9.2–11.8)
PMV BLD AUTO: 10.3 FL (ref 9.2–11.8)
PMV BLD AUTO: 10.4 FL (ref 9.2–11.8)
PMV BLD AUTO: 10.5 FL (ref 9.2–11.8)
PMV BLD AUTO: 10.5 FL (ref 9.2–11.8)
PMV BLD AUTO: 10.6 FL (ref 9.2–11.8)
PMV BLD AUTO: 10.7 FL (ref 9.2–11.8)
PMV BLD AUTO: 10.8 FL (ref 9.2–11.8)
PMV BLD AUTO: 10.8 FL (ref 9.2–11.8)
PMV BLD AUTO: 10.9 FL (ref 9.2–11.8)
PMV BLD AUTO: 10.9 FL (ref 9.2–11.8)
PMV BLD AUTO: 11 FL (ref 9.2–11.8)
PMV BLD AUTO: 11 FL (ref 9.2–11.8)
PMV BLD AUTO: 11.2 FL (ref 9.2–11.8)
PMV BLD AUTO: 11.3 FL (ref 9.2–11.8)
PMV BLD AUTO: 11.4 FL (ref 9.2–11.8)
PMV BLD AUTO: 11.4 FL (ref 9.2–11.8)
PMV BLD AUTO: 11.6 FL (ref 9.2–11.8)
PMV BLD AUTO: 11.7 FL (ref 9.2–11.8)
PMV BLD AUTO: 11.8 FL (ref 9.2–11.8)
PMV BLD AUTO: 8.9 FL (ref 9.2–11.8)
PMV BLD AUTO: 9.1 FL (ref 9.2–11.8)
PMV BLD AUTO: 9.3 FL (ref 9.2–11.8)
PMV BLD AUTO: 9.4 FL (ref 9.2–11.8)
PMV BLD AUTO: 9.5 FL (ref 9.2–11.8)
PMV BLD AUTO: 9.5 FL (ref 9.2–11.8)
PMV BLD AUTO: 9.6 FL (ref 9.2–11.8)
PMV BLD AUTO: 9.6 FL (ref 9.2–11.8)
PMV BLD AUTO: 9.7 FL (ref 9.2–11.8)
PMV BLD AUTO: 9.8 FL (ref 9.2–11.8)
PMV BLD AUTO: 9.9 FL (ref 9.2–11.8)
PO2 BLD: 100 MMHG (ref 80–100)
PO2 BLD: 126 MMHG (ref 80–100)
PO2 BLD: 172 MMHG (ref 80–100)
PO2 BLD: 190 MMHG (ref 80–100)
PO2 BLD: 227 MMHG (ref 80–100)
PO2 BLD: 291 MMHG (ref 80–100)
PO2 BLD: 81 MMHG (ref 80–100)
PO2 BLD: 84 MMHG (ref 80–100)
PO2 BLD: 89 MMHG (ref 80–100)
POTASSIUM BLD-SCNC: 3.5 MMOL/L (ref 3.5–5.5)
POTASSIUM SERPL-SCNC: 3.1 MMOL/L (ref 3.5–5.5)
POTASSIUM SERPL-SCNC: 3.1 MMOL/L (ref 3.5–5.5)
POTASSIUM SERPL-SCNC: 3.2 MMOL/L (ref 3.5–5.5)
POTASSIUM SERPL-SCNC: 3.3 MMOL/L (ref 3.5–5.5)
POTASSIUM SERPL-SCNC: 3.4 MMOL/L (ref 3.5–5.5)
POTASSIUM SERPL-SCNC: 3.5 MMOL/L (ref 3.5–5.5)
POTASSIUM SERPL-SCNC: 3.6 MMOL/L (ref 3.5–5.5)
POTASSIUM SERPL-SCNC: 3.7 MMOL/L (ref 3.5–5.5)
POTASSIUM SERPL-SCNC: 3.8 MMOL/L (ref 3.5–5.5)
POTASSIUM SERPL-SCNC: 3.9 MMOL/L (ref 3.5–5.5)
POTASSIUM SERPL-SCNC: 4 MMOL/L (ref 3.5–5.5)
POTASSIUM SERPL-SCNC: 4 MMOL/L (ref 3.5–5.5)
POTASSIUM SERPL-SCNC: 4.2 MMOL/L (ref 3.5–5.5)
POTASSIUM SERPL-SCNC: 4.3 MMOL/L (ref 3.5–5.5)
POTASSIUM SERPL-SCNC: 4.4 MMOL/L (ref 3.5–5.5)
POTASSIUM SERPL-SCNC: 4.5 MMOL/L (ref 3.5–5.5)
POTASSIUM SERPL-SCNC: 4.6 MMOL/L (ref 3.5–5.5)
POTASSIUM SERPL-SCNC: 4.7 MMOL/L (ref 3.5–5.5)
POTASSIUM SERPL-SCNC: 4.8 MMOL/L (ref 3.5–5.5)
POTASSIUM SERPL-SCNC: 4.9 MMOL/L (ref 3.5–5.5)
POTASSIUM SERPL-SCNC: 5 MMOL/L (ref 3.5–5.5)
POTASSIUM SERPL-SCNC: 5.1 MMOL/L (ref 3.5–5.5)
POTASSIUM SERPL-SCNC: 5.2 MMOL/L (ref 3.5–5.5)
POTASSIUM SERPL-SCNC: 5.6 MMOL/L (ref 3.5–5.5)
POTASSIUM SERPL-SCNC: 5.6 MMOL/L (ref 3.5–5.5)
POTASSIUM SERPL-SCNC: 6 MMOL/L (ref 3.5–5.5)
POTASSIUM SERPL-SCNC: 6 MMOL/L (ref 3.5–5.5)
POTASSIUM SERPL-SCNC: 6.6 MMOL/L (ref 3.5–5.5)
POTASSIUM SERPL-SCNC: 6.6 MMOL/L (ref 3.5–5.5)
POTASSIUM SERPL-SCNC: 7.2 MMOL/L (ref 3.5–5.5)
PROT SERPL-MCNC: 5.2 G/DL (ref 6.4–8.2)
PROT SERPL-MCNC: 5.2 G/DL (ref 6.4–8.2)
PROT SERPL-MCNC: 5.3 G/DL (ref 6.4–8.2)
PROT SERPL-MCNC: 5.4 G/DL (ref 6.4–8.2)
PROT SERPL-MCNC: 5.5 G/DL (ref 6.4–8.2)
PROT SERPL-MCNC: 5.7 G/DL (ref 6.4–8.2)
PROT SERPL-MCNC: 5.7 G/DL (ref 6.4–8.2)
PROT SERPL-MCNC: 5.8 G/DL (ref 6.4–8.2)
PROT SERPL-MCNC: 5.8 G/DL (ref 6.4–8.2)
PROT SERPL-MCNC: 5.9 G/DL (ref 6.4–8.2)
PROT SERPL-MCNC: 6 G/DL (ref 6.4–8.2)
PROT SERPL-MCNC: 6.1 G/DL (ref 6.4–8.2)
PROT SERPL-MCNC: 6.3 G/DL (ref 6.4–8.2)
PROT SERPL-MCNC: 6.3 G/DL (ref 6.4–8.2)
PROT SERPL-MCNC: 6.5 G/DL (ref 6.4–8.2)
PROT SERPL-MCNC: 6.6 G/DL (ref 6.4–8.2)
PROT SERPL-MCNC: 6.6 G/DL (ref 6.4–8.2)
PROT SERPL-MCNC: 6.8 G/DL (ref 6.4–8.2)
PROT SERPL-MCNC: 6.9 G/DL (ref 6.4–8.2)
PROT SERPL-MCNC: 7 G/DL (ref 6.4–8.2)
PROT SERPL-MCNC: 7.1 G/DL (ref 6.4–8.2)
PROT SERPL-MCNC: 7.1 G/DL (ref 6.4–8.2)
PROT SERPL-MCNC: 8.1 G/DL (ref 6.4–8.2)
PROT UR STRIP-MCNC: 300 MG/DL
PROT UR STRIP-MCNC: >1000 MG/DL
PROT UR STRIP-MCNC: >300 MG/DL
PROTHROMBIN TIME: 11.5 SEC (ref 11.5–15.2)
PROTHROMBIN TIME: 11.8 SEC (ref 11.5–15.2)
PROTHROMBIN TIME: 12.8 SEC (ref 11.5–15.2)
PROTHROMBIN TIME: 12.9 SEC (ref 11.5–15.2)
PROTHROMBIN TIME: 13.3 SEC (ref 11.5–15.2)
PROTHROMBIN TIME: 13.7 SEC (ref 11.5–15.2)
PROTOCOL NAME, CST01: NORMAL
PTH-INTACT SERPL-MCNC: 49.3 PG/ML (ref 18.4–88)
PTH-INTACT SERPL-MCNC: <6.3 PG/ML (ref 18.4–88)
Q-T INTERVAL, ECG07: 266 MS
Q-T INTERVAL, ECG07: 314 MS
Q-T INTERVAL, ECG07: 330 MS
Q-T INTERVAL, ECG07: 334 MS
Q-T INTERVAL, ECG07: 354 MS
Q-T INTERVAL, ECG07: 362 MS
Q-T INTERVAL, ECG07: 370 MS
Q-T INTERVAL, ECG07: 374 MS
Q-T INTERVAL, ECG07: 376 MS
Q-T INTERVAL, ECG07: 386 MS
Q-T INTERVAL, ECG07: 414 MS
Q-T INTERVAL, ECG07: 422 MS
Q-T INTERVAL, ECG07: 424 MS
Q-T INTERVAL, ECG07: 480 MS
Q-T INTERVAL, ECG07: 506 MS
Q-T INTERVAL, ECG07: 510 MS
Q-T INTERVAL, ECG07: 518 MS
QRS DURATION, ECG06: 118 MS
QRS DURATION, ECG06: 134 MS
QRS DURATION, ECG06: 78 MS
QRS DURATION, ECG06: 82 MS
QRS DURATION, ECG06: 84 MS
QRS DURATION, ECG06: 86 MS
QRS DURATION, ECG06: 88 MS
QRS DURATION, ECG06: 90 MS
QRS DURATION, ECG06: 90 MS
QRS DURATION, ECG06: 94 MS
QRS DURATION, ECG06: 96 MS
QRS DURATION, ECG06: 96 MS
QTC CALCULATION (BEZET), ECG08: 407 MS
QTC CALCULATION (BEZET), ECG08: 409 MS
QTC CALCULATION (BEZET), ECG08: 413 MS
QTC CALCULATION (BEZET), ECG08: 435 MS
QTC CALCULATION (BEZET), ECG08: 439 MS
QTC CALCULATION (BEZET), ECG08: 441 MS
QTC CALCULATION (BEZET), ECG08: 442 MS
QTC CALCULATION (BEZET), ECG08: 442 MS
QTC CALCULATION (BEZET), ECG08: 454 MS
QTC CALCULATION (BEZET), ECG08: 483 MS
QTC CALCULATION (BEZET), ECG08: 499 MS
QTC CALCULATION (BEZET), ECG08: 507 MS
QTC CALCULATION (BEZET), ECG08: 511 MS
QTC CALCULATION (BEZET), ECG08: 550 MS
QTC CALCULATION (BEZET), ECG08: 554 MS
QTC CALCULATION (BEZET), ECG08: 573 MS
QTC CALCULATION (BEZET), ECG08: 592 MS
RBC # BLD AUTO: 2.12 M/UL (ref 4.2–5.3)
RBC # BLD AUTO: 2.3 M/UL (ref 4.2–5.3)
RBC # BLD AUTO: 2.35 M/UL (ref 4.2–5.3)
RBC # BLD AUTO: 2.37 M/UL (ref 4.2–5.3)
RBC # BLD AUTO: 2.38 M/UL (ref 4.2–5.3)
RBC # BLD AUTO: 2.47 M/UL (ref 4.2–5.3)
RBC # BLD AUTO: 2.55 M/UL (ref 4.2–5.3)
RBC # BLD AUTO: 2.81 M/UL (ref 4.2–5.3)
RBC # BLD AUTO: 2.98 M/UL (ref 4.2–5.3)
RBC # BLD AUTO: 2.99 M/UL (ref 4.2–5.3)
RBC # BLD AUTO: 3.01 M/UL (ref 4.2–5.3)
RBC # BLD AUTO: 3.07 M/UL (ref 4.2–5.3)
RBC # BLD AUTO: 3.1 M/UL (ref 4.2–5.3)
RBC # BLD AUTO: 3.11 M/UL (ref 4.2–5.3)
RBC # BLD AUTO: 3.12 M/UL (ref 4.2–5.3)
RBC # BLD AUTO: 3.13 M/UL (ref 4.2–5.3)
RBC # BLD AUTO: 3.13 M/UL (ref 4.2–5.3)
RBC # BLD AUTO: 3.14 M/UL (ref 4.2–5.3)
RBC # BLD AUTO: 3.15 M/UL (ref 4.2–5.3)
RBC # BLD AUTO: 3.17 M/UL (ref 4.2–5.3)
RBC # BLD AUTO: 3.2 M/UL (ref 4.2–5.3)
RBC # BLD AUTO: 3.2 M/UL (ref 4.2–5.3)
RBC # BLD AUTO: 3.22 M/UL (ref 4.2–5.3)
RBC # BLD AUTO: 3.24 M/UL (ref 4.2–5.3)
RBC # BLD AUTO: 3.25 M/UL (ref 4.2–5.3)
RBC # BLD AUTO: 3.28 M/UL (ref 4.2–5.3)
RBC # BLD AUTO: 3.36 M/UL (ref 4.2–5.3)
RBC # BLD AUTO: 3.42 M/UL (ref 4.2–5.3)
RBC # BLD AUTO: 3.43 M/UL (ref 4.2–5.3)
RBC # BLD AUTO: 3.44 M/UL (ref 4.2–5.3)
RBC # BLD AUTO: 3.49 M/UL (ref 4.2–5.3)
RBC # BLD AUTO: 3.49 M/UL (ref 4.2–5.3)
RBC # BLD AUTO: 3.53 M/UL (ref 4.2–5.3)
RBC # BLD AUTO: 3.56 M/UL (ref 4.2–5.3)
RBC # BLD AUTO: 3.57 M/UL (ref 4.2–5.3)
RBC # BLD AUTO: 3.58 M/UL (ref 4.2–5.3)
RBC # BLD AUTO: 3.62 M/UL (ref 4.2–5.3)
RBC # BLD AUTO: 3.67 M/UL (ref 4.2–5.3)
RBC # BLD AUTO: 3.67 M/UL (ref 4.2–5.3)
RBC # BLD AUTO: 3.7 M/UL (ref 4.2–5.3)
RBC # BLD AUTO: 3.7 M/UL (ref 4.2–5.3)
RBC # BLD AUTO: 3.71 M/UL (ref 4.2–5.3)
RBC # BLD AUTO: 3.75 M/UL (ref 4.2–5.3)
RBC # BLD AUTO: 3.76 M/UL (ref 4.2–5.3)
RBC # BLD AUTO: 3.76 M/UL (ref 4.2–5.3)
RBC # BLD AUTO: 3.79 M/UL (ref 4.2–5.3)
RBC # BLD AUTO: 3.84 M/UL (ref 4.2–5.3)
RBC # BLD AUTO: 3.85 M/UL (ref 4.2–5.3)
RBC # BLD AUTO: 4.12 M/UL (ref 4.2–5.3)
RBC # BLD AUTO: 4.26 M/UL (ref 4.2–5.3)
RBC # BLD AUTO: 4.35 M/UL (ref 4.2–5.3)
RBC # BLD AUTO: 4.38 M/UL (ref 4.2–5.3)
RBC # BLD AUTO: 4.65 M/UL (ref 4.2–5.3)
RBC # BLD AUTO: 5.41 M/UL (ref 4.2–5.3)
RBC # FLD: 1660 /CU MM
RBC #/AREA URNS HPF: ABNORMAL /HPF (ref 0–5)
RBC #/AREA URNS HPF: NORMAL /HPF (ref 0–5)
RBC MORPH BLD: ABNORMAL
RENIN PLAS-CCNC: 1.43 NG/ML/HR (ref 0.17–5.38)
RIFAMPIN ISLT MIC: ABNORMAL
RIGHT CCA DIST DIAS: 14.22 CM/S
RIGHT CCA DIST SYS: 61.17 CM/S
RIGHT CCA MID DIAS: 10.3 CM/S
RIGHT CCA MID SYS: 75.52 CM/S
RIGHT CCA PROX DIAS: 6.39 CM/S
RIGHT CCA PROX SYS: 61.17 CM/S
RIGHT ECA DIAS: 12.91 CM/S
RIGHT ECA SYS: 96.39 CM/S
RIGHT ICA DIST DIAS: 14.24 CM/S
RIGHT ICA DIST SYS: 37.33 CM/S
RIGHT ICA MID DIAS: 23 CM/S
RIGHT ICA MID SYS: 68 CM/S
RIGHT ICA PROX DIAS: 14.74 CM/S
RIGHT ICA PROX SYS: 43.53 CM/S
RIGHT SUBCLAVIAN SYS: 118 CM/S
RIGHT VERTEBRAL DIAS: 10.38 CM/S
RIGHT VERTEBRAL SYS: 50.51 CM/S
SAO2 % BLD: 100 % (ref 92–97)
SAO2 % BLD: 97 % (ref 92–97)
SAO2 % BLD: 97 % (ref 92–97)
SAO2 % BLD: 98 % (ref 92–97)
SAO2 % BLD: 98 % (ref 92–97)
SAO2 % BLD: 99 % (ref 92–97)
SAO2 % BLD: 99 % (ref 92–97)
SENTARA SPECIMEN COL,SENBCF: NORMAL
SERVICE CMNT-IMP: ABNORMAL
SERVICE CMNT-IMP: NORMAL
SODIUM BLD-SCNC: 134 MMOL/L (ref 136–145)
SODIUM SERPL-SCNC: 128 MMOL/L (ref 136–145)
SODIUM SERPL-SCNC: 129 MMOL/L (ref 136–145)
SODIUM SERPL-SCNC: 129 MMOL/L (ref 136–145)
SODIUM SERPL-SCNC: 130 MMOL/L (ref 136–145)
SODIUM SERPL-SCNC: 131 MMOL/L (ref 136–145)
SODIUM SERPL-SCNC: 132 MMOL/L (ref 136–145)
SODIUM SERPL-SCNC: 133 MMOL/L (ref 136–145)
SODIUM SERPL-SCNC: 134 MMOL/L (ref 136–145)
SODIUM SERPL-SCNC: 135 MMOL/L (ref 136–145)
SODIUM SERPL-SCNC: 136 MMOL/L (ref 136–145)
SODIUM SERPL-SCNC: 137 MMOL/L (ref 136–145)
SODIUM SERPL-SCNC: 138 MMOL/L (ref 136–145)
SODIUM SERPL-SCNC: 139 MMOL/L (ref 136–145)
SODIUM SERPL-SCNC: 140 MMOL/L (ref 136–145)
SODIUM SERPL-SCNC: 143 MMOL/L (ref 136–145)
SOURCE, RSRC55: ABNORMAL
SP GR UR REFRACTOMETRY: 1.01 (ref 1–1.03)
SP GR UR REFRACTOMETRY: 1.01 (ref 1–1.03)
SP GR UR REFRACTOMETRY: 1.02 (ref 1–1.03)
SPECIMEN EXP DATE BLD: NORMAL
SPECIMEN PREPARATION: ABNORMAL
SPECIMEN PREPARATION: ABNORMAL
SPECIMEN PREPARATION: NORMAL
SPECIMEN SOURCE FLD: ABNORMAL
SPECIMEN SOURCE FLD: NORMAL
SPECIMEN SOURCE FLD: NORMAL
SPECIMEN SOURCE: 0
SPECIMEN SOURCE: ABNORMAL
SPECIMEN SOURCE: NORMAL
SPECIMEN TYPE: ABNORMAL
STATUS OF UNIT,%ST: NORMAL
STREPTOMYCIN ISLT MIC: ABNORMAL
SUSCEPT TESTING, RAFBI7: ABNORMAL
SUSCEPT TESTING, RAFBI7: ABNORMAL
T3FREE SERPL-MCNC: 1.4 PG/ML (ref 2.18–3.98)
T4 FREE SERPL-MCNC: 1.3 NG/DL (ref 0.7–1.5)
TEST INDICATION, CST09: NORMAL
TIBC SERPL-MCNC: 117 UG/DL (ref 250–450)
TIBC SERPL-MCNC: 124 UG/DL (ref 250–450)
TOTAL CELLS COUNTED SPEC: 50
TOTAL RESP. RATE, ITRR: 10
TOTAL RESP. RATE, ITRR: 10
TOTAL RESP. RATE, ITRR: 12
TOTAL RESP. RATE, ITRR: 12
TOTAL RESP. RATE, ITRR: 15
TOTAL RESP. RATE, ITRR: 16
TOTAL RESP. RATE, ITRR: 23
TRIGL SERPL-MCNC: 155 MG/DL (ref ?–150)
TRIGL SERPL-MCNC: 94 MG/DL (ref ?–150)
TROPONIN I SERPL-MCNC: 0.03 NG/ML (ref 0–0.04)
TROPONIN I SERPL-MCNC: 0.04 NG/ML (ref 0–0.04)
TROPONIN I SERPL-MCNC: 0.05 NG/ML (ref 0–0.04)
TROPONIN I SERPL-MCNC: 0.07 NG/ML (ref 0–0.04)
TROPONIN I SERPL-MCNC: 0.07 NG/ML (ref 0–0.06)
TROPONIN I SERPL-MCNC: 0.08 NG/ML (ref 0–0.04)
TROPONIN I SERPL-MCNC: 0.14 NG/ML (ref 0–0.06)
TROPONIN I SERPL-MCNC: 0.17 NG/ML (ref 0–0.04)
TROPONIN I SERPL-MCNC: 0.17 NG/ML (ref 0–0.04)
TROPONIN I SERPL-MCNC: 0.18 NG/ML (ref 0–0.04)
TROPONIN I SERPL-MCNC: 0.2 NG/ML (ref 0–0.06)
TROPONIN I SERPL-MCNC: 0.25 NG/ML (ref 0–0.04)
TROPONIN I SERPL-MCNC: 0.35 NG/ML (ref 0–0.04)
TROPONIN I SERPL-MCNC: 0.55 NG/ML (ref 0–0.04)
TROPONIN I SERPL-MCNC: 0.73 NG/ML (ref 0–0.04)
TROPONIN I SERPL-MCNC: 2.78 NG/ML (ref 0–0.04)
TROPONIN I SERPL-MCNC: 3.05 NG/ML (ref 0–0.04)
TROPONIN I SERPL-MCNC: 3.33 NG/ML (ref 0–0.04)
TROPONIN I SERPL-MCNC: 3.63 NG/ML (ref 0–0.06)
TROPONIN I SERPL-MCNC: 3.64 NG/ML (ref 0–0.06)
TROPONIN I SERPL-MCNC: <0.02 NG/ML (ref 0–0.04)
TSH SERPL DL<=0.05 MIU/L-ACNC: 5.03 UIU/ML (ref 0.36–3.74)
TSH SERPL DL<=0.05 MIU/L-ACNC: 6.21 UIU/ML (ref 0.36–3.74)
UNIT DIVISION, %UDIV: 0
UROBILINOGEN UR QL STRIP.AUTO: 0.2 EU/DL (ref 0.2–1)
VANCOMYCIN SERPL-MCNC: 13.5 UG/ML (ref 5–40)
VANCOMYCIN SERPL-MCNC: 14.3 UG/ML (ref 5–40)
VANCOMYCIN SERPL-MCNC: 14.4 UG/ML (ref 5–40)
VANCOMYCIN SERPL-MCNC: 18.3 UG/ML (ref 5–40)
VANCOMYCIN SERPL-MCNC: 19.5 UG/ML (ref 5–40)
VANCOMYCIN SERPL-MCNC: <0.8 UG/ML (ref 5–40)
VENTILATION MODE VENT: ABNORMAL
VENTRICULAR RATE, ECG03: 102 BPM
VENTRICULAR RATE, ECG03: 105 BPM
VENTRICULAR RATE, ECG03: 106 BPM
VENTRICULAR RATE, ECG03: 110 BPM
VENTRICULAR RATE, ECG03: 114 BPM
VENTRICULAR RATE, ECG03: 141 BPM
VENTRICULAR RATE, ECG03: 67 BPM
VENTRICULAR RATE, ECG03: 68 BPM
VENTRICULAR RATE, ECG03: 72 BPM
VENTRICULAR RATE, ECG03: 75 BPM
VENTRICULAR RATE, ECG03: 79 BPM
VENTRICULAR RATE, ECG03: 79 BPM
VENTRICULAR RATE, ECG03: 81 BPM
VENTRICULAR RATE, ECG03: 83 BPM
VENTRICULAR RATE, ECG03: 87 BPM
VENTRICULAR RATE, ECG03: 92 BPM
VENTRICULAR RATE, ECG03: 94 BPM
VIT B12 SERPL-MCNC: 1653 PG/ML (ref 211–911)
VLDLC SERPL CALC-MCNC: 18.8 MG/DL
VLDLC SERPL CALC-MCNC: 31 MG/DL
VOLUME CONTROL PLUS IVLCP: YES
VT SETTING VENT: 375 ML
VT SETTING VENT: 400 ML
VT SETTING VENT: 450 ML
WBC # BLD AUTO: 10.2 K/UL (ref 4.6–13.2)
WBC # BLD AUTO: 11.1 K/UL (ref 4.6–13.2)
WBC # BLD AUTO: 13.3 K/UL (ref 4.6–13.2)
WBC # BLD AUTO: 2.2 K/UL (ref 4.6–13.2)
WBC # BLD AUTO: 2.5 K/UL (ref 4.6–13.2)
WBC # BLD AUTO: 2.9 K/UL (ref 4.6–13.2)
WBC # BLD AUTO: 3 K/UL (ref 4.6–13.2)
WBC # BLD AUTO: 3.1 K/UL (ref 4.6–13.2)
WBC # BLD AUTO: 3.1 K/UL (ref 4.6–13.2)
WBC # BLD AUTO: 3.3 K/UL (ref 4.6–13.2)
WBC # BLD AUTO: 3.3 K/UL (ref 4.6–13.2)
WBC # BLD AUTO: 3.4 K/UL (ref 4.6–13.2)
WBC # BLD AUTO: 3.5 K/UL (ref 4.6–13.2)
WBC # BLD AUTO: 3.6 K/UL (ref 4.6–13.2)
WBC # BLD AUTO: 3.6 K/UL (ref 4.6–13.2)
WBC # BLD AUTO: 3.9 K/UL (ref 4.6–13.2)
WBC # BLD AUTO: 4.1 K/UL (ref 4.6–13.2)
WBC # BLD AUTO: 4.2 K/UL (ref 4.6–13.2)
WBC # BLD AUTO: 4.4 K/UL (ref 4.6–13.2)
WBC # BLD AUTO: 4.5 K/UL (ref 4.6–13.2)
WBC # BLD AUTO: 4.6 K/UL (ref 4.6–13.2)
WBC # BLD AUTO: 4.7 K/UL (ref 4.6–13.2)
WBC # BLD AUTO: 4.7 K/UL (ref 4.6–13.2)
WBC # BLD AUTO: 5 K/UL (ref 4.6–13.2)
WBC # BLD AUTO: 5 K/UL (ref 4.6–13.2)
WBC # BLD AUTO: 5.1 K/UL (ref 4.6–13.2)
WBC # BLD AUTO: 5.2 K/UL (ref 4.6–13.2)
WBC # BLD AUTO: 5.3 K/UL (ref 4.6–13.2)
WBC # BLD AUTO: 5.6 K/UL (ref 4.6–13.2)
WBC # BLD AUTO: 5.7 K/UL (ref 4.6–13.2)
WBC # BLD AUTO: 5.7 K/UL (ref 4.6–13.2)
WBC # BLD AUTO: 5.8 K/UL (ref 4.6–13.2)
WBC # BLD AUTO: 6.5 K/UL (ref 4.6–13.2)
WBC # BLD AUTO: 6.8 K/UL (ref 4.6–13.2)
WBC # BLD AUTO: 7 K/UL (ref 4.6–13.2)
WBC # BLD AUTO: 7.6 K/UL (ref 4.6–13.2)
WBC # BLD AUTO: 7.7 K/UL (ref 4.6–13.2)
WBC # BLD AUTO: 7.8 K/UL (ref 4.6–13.2)
WBC # BLD AUTO: 8.4 K/UL (ref 4.6–13.2)
WBC # BLD AUTO: 8.5 K/UL (ref 4.6–13.2)
WBC # BLD AUTO: 8.6 K/UL (ref 4.6–13.2)
WBC # BLD AUTO: 8.7 K/UL (ref 4.6–13.2)
WBC MORPH BLD: ABNORMAL
WBC URNS QL MICRO: ABNORMAL /HPF (ref 0–4)
WBC URNS QL MICRO: NORMAL /HPF (ref 0–4)

## 2018-01-01 PROCEDURE — 84100 ASSAY OF PHOSPHORUS: CPT | Performed by: PHYSICIAN ASSISTANT

## 2018-01-01 PROCEDURE — 74011250636 HC RX REV CODE- 250/636: Performed by: STUDENT IN AN ORGANIZED HEALTH CARE EDUCATION/TRAINING PROGRAM

## 2018-01-01 PROCEDURE — 74011250637 HC RX REV CODE- 250/637: Performed by: INTERNAL MEDICINE

## 2018-01-01 PROCEDURE — 82962 GLUCOSE BLOOD TEST: CPT

## 2018-01-01 PROCEDURE — 99284 EMERGENCY DEPT VISIT MOD MDM: CPT

## 2018-01-01 PROCEDURE — 65610000006 HC RM INTENSIVE CARE

## 2018-01-01 PROCEDURE — 74011250637 HC RX REV CODE- 250/637: Performed by: PHYSICIAN ASSISTANT

## 2018-01-01 PROCEDURE — 71045 X-RAY EXAM CHEST 1 VIEW: CPT

## 2018-01-01 PROCEDURE — 74011250636 HC RX REV CODE- 250/636: Performed by: EMERGENCY MEDICINE

## 2018-01-01 PROCEDURE — 74011000250 HC RX REV CODE- 250: Performed by: PHYSICIAN ASSISTANT

## 2018-01-01 PROCEDURE — 85027 COMPLETE CBC AUTOMATED: CPT | Performed by: STUDENT IN AN ORGANIZED HEALTH CARE EDUCATION/TRAINING PROGRAM

## 2018-01-01 PROCEDURE — 36600 WITHDRAWAL OF ARTERIAL BLOOD: CPT

## 2018-01-01 PROCEDURE — 90935 HEMODIALYSIS ONE EVALUATION: CPT

## 2018-01-01 PROCEDURE — 77030018836 HC SOL IRR NACL ICUM -A: Performed by: SURGERY

## 2018-01-01 PROCEDURE — 93990 DOPPLER FLOW TESTING: CPT

## 2018-01-01 PROCEDURE — 77030010507 HC ADH SKN DERMBND J&J -B: Performed by: SURGERY

## 2018-01-01 PROCEDURE — 85025 COMPLETE CBC W/AUTO DIFF WBC: CPT | Performed by: EMERGENCY MEDICINE

## 2018-01-01 PROCEDURE — 85018 HEMOGLOBIN: CPT

## 2018-01-01 PROCEDURE — 94003 VENT MGMT INPAT SUBQ DAY: CPT

## 2018-01-01 PROCEDURE — 74011636637 HC RX REV CODE- 636/637: Performed by: NURSE PRACTITIONER

## 2018-01-01 PROCEDURE — 80048 BASIC METABOLIC PNL TOTAL CA: CPT | Performed by: INTERNAL MEDICINE

## 2018-01-01 PROCEDURE — 83605 ASSAY OF LACTIC ACID: CPT

## 2018-01-01 PROCEDURE — 36415 COLL VENOUS BLD VENIPUNCTURE: CPT | Performed by: INTERNAL MEDICINE

## 2018-01-01 PROCEDURE — 65660000000 HC RM CCU STEPDOWN

## 2018-01-01 PROCEDURE — 96375 TX/PRO/DX INJ NEW DRUG ADDON: CPT

## 2018-01-01 PROCEDURE — 74011250637 HC RX REV CODE- 250/637: Performed by: NURSE PRACTITIONER

## 2018-01-01 PROCEDURE — 92526 ORAL FUNCTION THERAPY: CPT

## 2018-01-01 PROCEDURE — 65270000029 HC RM PRIVATE

## 2018-01-01 PROCEDURE — 80048 BASIC METABOLIC PNL TOTAL CA: CPT | Performed by: PHYSICIAN ASSISTANT

## 2018-01-01 PROCEDURE — 74011250636 HC RX REV CODE- 250/636: Performed by: SURGERY

## 2018-01-01 PROCEDURE — 74011000258 HC RX REV CODE- 258: Performed by: EMERGENCY MEDICINE

## 2018-01-01 PROCEDURE — 77030036958 HC BRONCHSCOPE ASCOPE3 FLX DISP AMBU -D

## 2018-01-01 PROCEDURE — 80053 COMPREHEN METABOLIC PANEL: CPT | Performed by: EMERGENCY MEDICINE

## 2018-01-01 PROCEDURE — 85025 COMPLETE CBC W/AUTO DIFF WBC: CPT | Performed by: PHYSICIAN ASSISTANT

## 2018-01-01 PROCEDURE — 74011000250 HC RX REV CODE- 250

## 2018-01-01 PROCEDURE — 74011250637 HC RX REV CODE- 250/637: Performed by: STUDENT IN AN ORGANIZED HEALTH CARE EDUCATION/TRAINING PROGRAM

## 2018-01-01 PROCEDURE — 97530 THERAPEUTIC ACTIVITIES: CPT

## 2018-01-01 PROCEDURE — 80048 BASIC METABOLIC PNL TOTAL CA: CPT | Performed by: EMERGENCY MEDICINE

## 2018-01-01 PROCEDURE — 74011250637 HC RX REV CODE- 250/637: Performed by: EMERGENCY MEDICINE

## 2018-01-01 PROCEDURE — 74011250636 HC RX REV CODE- 250/636: Performed by: INTERNAL MEDICINE

## 2018-01-01 PROCEDURE — 71275 CT ANGIOGRAPHY CHEST: CPT

## 2018-01-01 PROCEDURE — 85025 COMPLETE CBC W/AUTO DIFF WBC: CPT | Performed by: INTERNAL MEDICINE

## 2018-01-01 PROCEDURE — 74011250637 HC RX REV CODE- 250/637: Performed by: HOSPITALIST

## 2018-01-01 PROCEDURE — 83690 ASSAY OF LIPASE: CPT | Performed by: EMERGENCY MEDICINE

## 2018-01-01 PROCEDURE — 74011000258 HC RX REV CODE- 258: Performed by: PHYSICIAN ASSISTANT

## 2018-01-01 PROCEDURE — 83735 ASSAY OF MAGNESIUM: CPT

## 2018-01-01 PROCEDURE — 74011250636 HC RX REV CODE- 250/636: Performed by: PHYSICIAN ASSISTANT

## 2018-01-01 PROCEDURE — 74011000250 HC RX REV CODE- 250: Performed by: INTERNAL MEDICINE

## 2018-01-01 PROCEDURE — 93005 ELECTROCARDIOGRAM TRACING: CPT

## 2018-01-01 PROCEDURE — 87116 MYCOBACTERIA CULTURE: CPT | Performed by: SURGERY

## 2018-01-01 PROCEDURE — 65660000004 HC RM CVT STEPDOWN

## 2018-01-01 PROCEDURE — 74011000250 HC RX REV CODE- 250: Performed by: EMERGENCY MEDICINE

## 2018-01-01 PROCEDURE — 36415 COLL VENOUS BLD VENIPUNCTURE: CPT | Performed by: PHYSICIAN ASSISTANT

## 2018-01-01 PROCEDURE — 74018 RADEX ABDOMEN 1 VIEW: CPT

## 2018-01-01 PROCEDURE — 74011636320 HC RX REV CODE- 636/320: Performed by: SURGERY

## 2018-01-01 PROCEDURE — 82803 BLOOD GASES ANY COMBINATION: CPT

## 2018-01-01 PROCEDURE — 74011250636 HC RX REV CODE- 250/636: Performed by: NURSE PRACTITIONER

## 2018-01-01 PROCEDURE — 80076 HEPATIC FUNCTION PANEL: CPT | Performed by: INTERNAL MEDICINE

## 2018-01-01 PROCEDURE — 99285 EMERGENCY DEPT VISIT HI MDM: CPT

## 2018-01-01 PROCEDURE — 74011636320 HC RX REV CODE- 636/320: Performed by: INTERNAL MEDICINE

## 2018-01-01 PROCEDURE — 80053 COMPREHEN METABOLIC PANEL: CPT | Performed by: INTERNAL MEDICINE

## 2018-01-01 PROCEDURE — 80202 ASSAY OF VANCOMYCIN: CPT | Performed by: EMERGENCY MEDICINE

## 2018-01-01 PROCEDURE — 82550 ASSAY OF CK (CPK): CPT | Performed by: INTERNAL MEDICINE

## 2018-01-01 PROCEDURE — 83735 ASSAY OF MAGNESIUM: CPT | Performed by: INTERNAL MEDICINE

## 2018-01-01 PROCEDURE — 74011250636 HC RX REV CODE- 250/636

## 2018-01-01 PROCEDURE — 83735 ASSAY OF MAGNESIUM: CPT | Performed by: FAMILY MEDICINE

## 2018-01-01 PROCEDURE — 84100 ASSAY OF PHOSPHORUS: CPT | Performed by: HOSPITALIST

## 2018-01-01 PROCEDURE — 96365 THER/PROPH/DIAG IV INF INIT: CPT

## 2018-01-01 PROCEDURE — 5A1D70Z PERFORMANCE OF URINARY FILTRATION, INTERMITTENT, LESS THAN 6 HOURS PER DAY: ICD-10-PCS | Performed by: INTERNAL MEDICINE

## 2018-01-01 PROCEDURE — 74011000258 HC RX REV CODE- 258: Performed by: INTERNAL MEDICINE

## 2018-01-01 PROCEDURE — 71260 CT THORAX DX C+: CPT

## 2018-01-01 PROCEDURE — 85730 THROMBOPLASTIN TIME PARTIAL: CPT | Performed by: INTERNAL MEDICINE

## 2018-01-01 PROCEDURE — 83540 ASSAY OF IRON: CPT | Performed by: INTERNAL MEDICINE

## 2018-01-01 PROCEDURE — G0299 HHS/HOSPICE OF RN EA 15 MIN: HCPCS

## 2018-01-01 PROCEDURE — 87086 URINE CULTURE/COLONY COUNT: CPT | Performed by: EMERGENCY MEDICINE

## 2018-01-01 PROCEDURE — 77030011640 HC PAD GRND REM COVD -A: Performed by: SURGERY

## 2018-01-01 PROCEDURE — 74011250637 HC RX REV CODE- 250/637: Performed by: FAMILY MEDICINE

## 2018-01-01 PROCEDURE — 83986 ASSAY PH BODY FLUID NOS: CPT | Performed by: EMERGENCY MEDICINE

## 2018-01-01 PROCEDURE — 80061 LIPID PANEL: CPT | Performed by: INTERNAL MEDICINE

## 2018-01-01 PROCEDURE — 84100 ASSAY OF PHOSPHORUS: CPT | Performed by: INTERNAL MEDICINE

## 2018-01-01 PROCEDURE — 74177 CT ABD & PELVIS W/CONTRAST: CPT

## 2018-01-01 PROCEDURE — 77030037878 HC DRSG MEPILEX >48IN BORD MOLN -B

## 2018-01-01 PROCEDURE — 83615 LACTATE (LD) (LDH) ENZYME: CPT | Performed by: EMERGENCY MEDICINE

## 2018-01-01 PROCEDURE — 80048 BASIC METABOLIC PNL TOTAL CA: CPT

## 2018-01-01 PROCEDURE — 77030018846 HC SOL IRR STRL H20 ICUM -A: Performed by: SURGERY

## 2018-01-01 PROCEDURE — 82550 ASSAY OF CK (CPK): CPT | Performed by: PHYSICIAN ASSISTANT

## 2018-01-01 PROCEDURE — 82947 ASSAY GLUCOSE BLOOD QUANT: CPT

## 2018-01-01 PROCEDURE — 36415 COLL VENOUS BLD VENIPUNCTURE: CPT | Performed by: EMERGENCY MEDICINE

## 2018-01-01 PROCEDURE — A9500 TC99M SESTAMIBI: HCPCS

## 2018-01-01 PROCEDURE — 99283 EMERGENCY DEPT VISIT LOW MDM: CPT

## 2018-01-01 PROCEDURE — 77030011265 HC ELECTRD BLD HEX COVD -A: Performed by: SURGERY

## 2018-01-01 PROCEDURE — P9016 RBC LEUKOCYTES REDUCED: HCPCS | Performed by: EMERGENCY MEDICINE

## 2018-01-01 PROCEDURE — 87502 INFLUENZA DNA AMP PROBE: CPT | Performed by: PHYSICIAN ASSISTANT

## 2018-01-01 PROCEDURE — 77030018836 HC SOL IRR NACL ICUM -A

## 2018-01-01 PROCEDURE — 86706 HEP B SURFACE ANTIBODY: CPT | Performed by: INTERNAL MEDICINE

## 2018-01-01 PROCEDURE — B2111ZZ FLUOROSCOPY OF MULTIPLE CORONARY ARTERIES USING LOW OSMOLAR CONTRAST: ICD-10-PCS | Performed by: INTERNAL MEDICINE

## 2018-01-01 PROCEDURE — 36430 TRANSFUSION BLD/BLD COMPNT: CPT

## 2018-01-01 PROCEDURE — 94640 AIRWAY INHALATION TREATMENT: CPT

## 2018-01-01 PROCEDURE — 80053 COMPREHEN METABOLIC PANEL: CPT | Performed by: PHYSICIAN ASSISTANT

## 2018-01-01 PROCEDURE — 82728 ASSAY OF FERRITIN: CPT | Performed by: INTERNAL MEDICINE

## 2018-01-01 PROCEDURE — 85027 COMPLETE CBC AUTOMATED: CPT | Performed by: INTERNAL MEDICINE

## 2018-01-01 PROCEDURE — 74011250637 HC RX REV CODE- 250/637

## 2018-01-01 PROCEDURE — 5A1D70Z PERFORMANCE OF URINARY FILTRATION, INTERMITTENT, LESS THAN 6 HOURS PER DAY: ICD-10-PCS | Performed by: FAMILY MEDICINE

## 2018-01-01 PROCEDURE — 74011636637 HC RX REV CODE- 636/637: Performed by: FAMILY MEDICINE

## 2018-01-01 PROCEDURE — 85025 COMPLETE CBC W/AUTO DIFF WBC: CPT

## 2018-01-01 PROCEDURE — 83735 ASSAY OF MAGNESIUM: CPT | Performed by: PHYSICIAN ASSISTANT

## 2018-01-01 PROCEDURE — 74011000250 HC RX REV CODE- 250: Performed by: NURSE PRACTITIONER

## 2018-01-01 PROCEDURE — 02HV33Z INSERTION OF INFUSION DEVICE INTO SUPERIOR VENA CAVA, PERCUTANEOUS APPROACH: ICD-10-PCS | Performed by: SURGERY

## 2018-01-01 PROCEDURE — 0BJ08ZZ INSPECTION OF TRACHEOBRONCHIAL TREE, VIA NATURAL OR ARTIFICIAL OPENING ENDOSCOPIC: ICD-10-PCS | Performed by: INTERNAL MEDICINE

## 2018-01-01 PROCEDURE — 77030039266 HC ADH SKN EXOFIN S2SG -A: Performed by: SURGERY

## 2018-01-01 PROCEDURE — 82330 ASSAY OF CALCIUM: CPT

## 2018-01-01 PROCEDURE — 5A1945Z RESPIRATORY VENTILATION, 24-96 CONSECUTIVE HOURS: ICD-10-PCS | Performed by: INTERNAL MEDICINE

## 2018-01-01 PROCEDURE — 97110 THERAPEUTIC EXERCISES: CPT

## 2018-01-01 PROCEDURE — 87040 BLOOD CULTURE FOR BACTERIA: CPT | Performed by: EMERGENCY MEDICINE

## 2018-01-01 PROCEDURE — 0BH17EZ INSERTION OF ENDOTRACHEAL AIRWAY INTO TRACHEA, VIA NATURAL OR ARTIFICIAL OPENING: ICD-10-PCS | Performed by: EMERGENCY MEDICINE

## 2018-01-01 PROCEDURE — 85730 THROMBOPLASTIN TIME PARTIAL: CPT | Performed by: EMERGENCY MEDICINE

## 2018-01-01 PROCEDURE — 82550 ASSAY OF CK (CPK): CPT

## 2018-01-01 PROCEDURE — 80053 COMPREHEN METABOLIC PANEL: CPT | Performed by: NURSE PRACTITIONER

## 2018-01-01 PROCEDURE — 77010033678 HC OXYGEN DAILY

## 2018-01-01 PROCEDURE — 76010000153 HC OR TIME 1.5 TO 2 HR: Performed by: SURGERY

## 2018-01-01 PROCEDURE — 85610 PROTHROMBIN TIME: CPT

## 2018-01-01 PROCEDURE — 83690 ASSAY OF LIPASE: CPT | Performed by: INTERNAL MEDICINE

## 2018-01-01 PROCEDURE — 92610 EVALUATE SWALLOWING FUNCTION: CPT

## 2018-01-01 PROCEDURE — 75810000275 HC EMERGENCY DEPT VISIT NO LEVEL OF CARE

## 2018-01-01 PROCEDURE — 87340 HEPATITIS B SURFACE AG IA: CPT | Performed by: INTERNAL MEDICINE

## 2018-01-01 PROCEDURE — 93306 TTE W/DOPPLER COMPLETE: CPT

## 2018-01-01 PROCEDURE — 70450 CT HEAD/BRAIN W/O DYE: CPT

## 2018-01-01 PROCEDURE — 87340 HEPATITIS B SURFACE AG IA: CPT | Performed by: EMERGENCY MEDICINE

## 2018-01-01 PROCEDURE — 86900 BLOOD TYPING SEROLOGIC ABO: CPT

## 2018-01-01 PROCEDURE — 30233N1 TRANSFUSION OF NONAUTOLOGOUS RED BLOOD CELLS INTO PERIPHERAL VEIN, PERCUTANEOUS APPROACH: ICD-10-PCS | Performed by: INTERNAL MEDICINE

## 2018-01-01 PROCEDURE — 76010000112 HC CV SURG 0.5 TO 1 HR: Performed by: SURGERY

## 2018-01-01 PROCEDURE — 77030018846 HC SOL IRR STRL H20 ICUM -A

## 2018-01-01 PROCEDURE — 80061 LIPID PANEL: CPT | Performed by: NURSE PRACTITIONER

## 2018-01-01 PROCEDURE — 77030010895 HC CIRC BRTH PT SIMS -B

## 2018-01-01 PROCEDURE — 86923 COMPATIBILITY TEST ELECTRIC: CPT

## 2018-01-01 PROCEDURE — 77030011256 HC DRSG MEPILEX <16IN NO BORD MOLN -A

## 2018-01-01 PROCEDURE — 83036 HEMOGLOBIN GLYCOSYLATED A1C: CPT | Performed by: NURSE PRACTITIONER

## 2018-01-01 PROCEDURE — 76010000093 HC SPECIAL PROCEDURE

## 2018-01-01 PROCEDURE — G0157 HHC PT ASSISTANT EA 15: HCPCS

## 2018-01-01 PROCEDURE — 84703 CHORIONIC GONADOTROPIN ASSAY: CPT | Performed by: SURGERY

## 2018-01-01 PROCEDURE — 86920 COMPATIBILITY TEST SPIN: CPT | Performed by: EMERGENCY MEDICINE

## 2018-01-01 PROCEDURE — 85014 HEMATOCRIT: CPT | Performed by: INTERNAL MEDICINE

## 2018-01-01 PROCEDURE — 96374 THER/PROPH/DIAG INJ IV PUSH: CPT

## 2018-01-01 PROCEDURE — 74011000250 HC RX REV CODE- 250: Performed by: SURGERY

## 2018-01-01 PROCEDURE — 36415 COLL VENOUS BLD VENIPUNCTURE: CPT | Performed by: STUDENT IN AN ORGANIZED HEALTH CARE EDUCATION/TRAINING PROGRAM

## 2018-01-01 PROCEDURE — 85018 HEMOGLOBIN: CPT | Performed by: INTERNAL MEDICINE

## 2018-01-01 PROCEDURE — 85610 PROTHROMBIN TIME: CPT | Performed by: EMERGENCY MEDICINE

## 2018-01-01 PROCEDURE — 36415 COLL VENOUS BLD VENIPUNCTURE: CPT

## 2018-01-01 PROCEDURE — 74011000250 HC RX REV CODE- 250: Performed by: STUDENT IN AN ORGANIZED HEALTH CARE EDUCATION/TRAINING PROGRAM

## 2018-01-01 PROCEDURE — 87389 HIV-1 AG W/HIV-1&-2 AB AG IA: CPT | Performed by: EMERGENCY MEDICINE

## 2018-01-01 PROCEDURE — 84484 ASSAY OF TROPONIN QUANT: CPT | Performed by: EMERGENCY MEDICINE

## 2018-01-01 PROCEDURE — 84481 FREE ASSAY (FT-3): CPT | Performed by: PHYSICIAN ASSISTANT

## 2018-01-01 PROCEDURE — 86022 PLATELET ANTIBODIES: CPT | Performed by: NURSE PRACTITIONER

## 2018-01-01 PROCEDURE — 71046 X-RAY EXAM CHEST 2 VIEWS: CPT

## 2018-01-01 PROCEDURE — 99001 SPECIMEN HANDLING PT-LAB: CPT | Performed by: INTERNAL MEDICINE

## 2018-01-01 PROCEDURE — 83735 ASSAY OF MAGNESIUM: CPT | Performed by: EMERGENCY MEDICINE

## 2018-01-01 PROCEDURE — 84100 ASSAY OF PHOSPHORUS: CPT | Performed by: NURSE PRACTITIONER

## 2018-01-01 PROCEDURE — 74174 CTA ABD&PLVS W/CONTRAST: CPT

## 2018-01-01 PROCEDURE — 83880 ASSAY OF NATRIURETIC PEPTIDE: CPT | Performed by: INTERNAL MEDICINE

## 2018-01-01 PROCEDURE — G0300 HHS/HOSPICE OF LPN EA 15 MIN: HCPCS

## 2018-01-01 PROCEDURE — 77030013079 HC BLNKT BAIR HGGR 3M -A: Performed by: ANESTHESIOLOGY

## 2018-01-01 PROCEDURE — 80053 COMPREHEN METABOLIC PANEL: CPT

## 2018-01-01 PROCEDURE — 87186 SC STD MICRODIL/AGAR DIL: CPT | Performed by: STUDENT IN AN ORGANIZED HEALTH CARE EDUCATION/TRAINING PROGRAM

## 2018-01-01 PROCEDURE — 97161 PT EVAL LOW COMPLEX 20 MIN: CPT

## 2018-01-01 PROCEDURE — 81003 URINALYSIS AUTO W/O SCOPE: CPT | Performed by: EMERGENCY MEDICINE

## 2018-01-01 PROCEDURE — 85730 THROMBOPLASTIN TIME PARTIAL: CPT | Performed by: PHYSICIAN ASSISTANT

## 2018-01-01 PROCEDURE — 89220 SPUTUM SPECIMEN COLLECTION: CPT

## 2018-01-01 PROCEDURE — 84484 ASSAY OF TROPONIN QUANT: CPT

## 2018-01-01 PROCEDURE — 74011636320 HC RX REV CODE- 636/320: Performed by: EMERGENCY MEDICINE

## 2018-01-01 PROCEDURE — 97116 GAIT TRAINING THERAPY: CPT

## 2018-01-01 PROCEDURE — 95822 EEG COMA OR SLEEP ONLY: CPT

## 2018-01-01 PROCEDURE — 74011636637 HC RX REV CODE- 636/637: Performed by: STUDENT IN AN ORGANIZED HEALTH CARE EDUCATION/TRAINING PROGRAM

## 2018-01-01 PROCEDURE — 83690 ASSAY OF LIPASE: CPT

## 2018-01-01 PROCEDURE — 77030008771 HC TU NG SALEM SUMP -A

## 2018-01-01 PROCEDURE — 80202 ASSAY OF VANCOMYCIN: CPT | Performed by: INTERNAL MEDICINE

## 2018-01-01 PROCEDURE — 82607 VITAMIN B-12: CPT | Performed by: INTERNAL MEDICINE

## 2018-01-01 PROCEDURE — 76210000016 HC OR PH I REC 1 TO 1.5 HR: Performed by: SURGERY

## 2018-01-01 PROCEDURE — 84443 ASSAY THYROID STIM HORMONE: CPT | Performed by: PHYSICIAN ASSISTANT

## 2018-01-01 PROCEDURE — 74011250637 HC RX REV CODE- 250/637: Performed by: NURSE ANESTHETIST, CERTIFIED REGISTERED

## 2018-01-01 PROCEDURE — 95816 EEG AWAKE AND DROWSY: CPT | Performed by: NURSE PRACTITIONER

## 2018-01-01 PROCEDURE — 89051 BODY FLUID CELL COUNT: CPT | Performed by: EMERGENCY MEDICINE

## 2018-01-01 PROCEDURE — 74011000258 HC RX REV CODE- 258: Performed by: NURSE PRACTITIONER

## 2018-01-01 PROCEDURE — 86803 HEPATITIS C AB TEST: CPT | Performed by: EMERGENCY MEDICINE

## 2018-01-01 PROCEDURE — 93971 EXTREMITY STUDY: CPT

## 2018-01-01 PROCEDURE — 77030004950 HC CATH ENTRL NG COVD -A

## 2018-01-01 PROCEDURE — 76060000032 HC ANESTHESIA 0.5 TO 1 HR: Performed by: SURGERY

## 2018-01-01 PROCEDURE — 86900 BLOOD TYPING SEROLOGIC ABO: CPT | Performed by: EMERGENCY MEDICINE

## 2018-01-01 PROCEDURE — 88112 CYTOPATH CELL ENHANCE TECH: CPT | Performed by: EMERGENCY MEDICINE

## 2018-01-01 PROCEDURE — 83880 ASSAY OF NATRIURETIC PEPTIDE: CPT | Performed by: EMERGENCY MEDICINE

## 2018-01-01 PROCEDURE — 93325 DOPPLER ECHO COLOR FLOW MAPG: CPT

## 2018-01-01 PROCEDURE — 82550 ASSAY OF CK (CPK): CPT | Performed by: EMERGENCY MEDICINE

## 2018-01-01 PROCEDURE — 77030020454 HC TU ET CUF HI/LO COVD -B

## 2018-01-01 PROCEDURE — 80048 BASIC METABOLIC PNL TOTAL CA: CPT | Performed by: FAMILY MEDICINE

## 2018-01-01 PROCEDURE — 77030002996 HC SUT SLK J&J -A: Performed by: SURGERY

## 2018-01-01 PROCEDURE — 93017 CV STRESS TEST TRACING ONLY: CPT | Performed by: INTERNAL MEDICINE

## 2018-01-01 PROCEDURE — 94002 VENT MGMT INPAT INIT DAY: CPT

## 2018-01-01 PROCEDURE — 88305 TISSUE EXAM BY PATHOLOGIST: CPT | Performed by: EMERGENCY MEDICINE

## 2018-01-01 PROCEDURE — 97165 OT EVAL LOW COMPLEX 30 MIN: CPT

## 2018-01-01 PROCEDURE — 87641 MR-STAPH DNA AMP PROBE: CPT | Performed by: NURSE PRACTITIONER

## 2018-01-01 PROCEDURE — 77030020256 HC SOL INJ NACL 0.9%  500ML: Performed by: SURGERY

## 2018-01-01 PROCEDURE — 96366 THER/PROPH/DIAG IV INF ADDON: CPT

## 2018-01-01 PROCEDURE — 97166 OT EVAL MOD COMPLEX 45 MIN: CPT

## 2018-01-01 PROCEDURE — C1750 CATH, HEMODIALYSIS,LONG-TERM: HCPCS | Performed by: SURGERY

## 2018-01-01 PROCEDURE — 86900 BLOOD TYPING SEROLOGIC ABO: CPT | Performed by: INTERNAL MEDICINE

## 2018-01-01 PROCEDURE — 85384 FIBRINOGEN ACTIVITY: CPT

## 2018-01-01 PROCEDURE — 74011000258 HC RX REV CODE- 258: Performed by: STUDENT IN AN ORGANIZED HEALTH CARE EDUCATION/TRAINING PROGRAM

## 2018-01-01 PROCEDURE — 87804 INFLUENZA ASSAY W/OPTIC: CPT | Performed by: PHYSICIAN ASSISTANT

## 2018-01-01 PROCEDURE — 77030031139 HC SUT VCRL2 J&J -A: Performed by: SURGERY

## 2018-01-01 PROCEDURE — 74011000272 HC RX REV CODE- 272: Performed by: SURGERY

## 2018-01-01 PROCEDURE — G0152 HHCP-SERV OF OT,EA 15 MIN: HCPCS

## 2018-01-01 PROCEDURE — 74022 RADEX COMPL AQT ABD SERIES: CPT

## 2018-01-01 PROCEDURE — 83605 ASSAY OF LACTIC ACID: CPT | Performed by: NURSE PRACTITIONER

## 2018-01-01 PROCEDURE — 87040 BLOOD CULTURE FOR BACTERIA: CPT | Performed by: NURSE PRACTITIONER

## 2018-01-01 PROCEDURE — 93880 EXTRACRANIAL BILAT STUDY: CPT

## 2018-01-01 PROCEDURE — 77030020897 US GUIDE PARACENTESIS

## 2018-01-01 PROCEDURE — 74011000258 HC RX REV CODE- 258

## 2018-01-01 PROCEDURE — 49083 ABD PARACENTESIS W/IMAGING: CPT

## 2018-01-01 PROCEDURE — 77030002933 HC SUT MCRYL J&J -A: Performed by: SURGERY

## 2018-01-01 PROCEDURE — G0151 HHCP-SERV OF PT,EA 15 MIN: HCPCS

## 2018-01-01 PROCEDURE — 96360 HYDRATION IV INFUSION INIT: CPT

## 2018-01-01 PROCEDURE — 87040 BLOOD CULTURE FOR BACTERIA: CPT | Performed by: PHYSICIAN ASSISTANT

## 2018-01-01 PROCEDURE — 82105 ALPHA-FETOPROTEIN SERUM: CPT | Performed by: EMERGENCY MEDICINE

## 2018-01-01 PROCEDURE — 70551 MRI BRAIN STEM W/O DYE: CPT

## 2018-01-01 PROCEDURE — 5A1D70Z PERFORMANCE OF URINARY FILTRATION, INTERMITTENT, LESS THAN 6 HOURS PER DAY: ICD-10-PCS | Performed by: EMERGENCY MEDICINE

## 2018-01-01 PROCEDURE — 76060000034 HC ANESTHESIA 1.5 TO 2 HR: Performed by: SURGERY

## 2018-01-01 PROCEDURE — 94762 N-INVAS EAR/PLS OXIMTRY CONT: CPT

## 2018-01-01 PROCEDURE — 77030010512 HC APPL CLP LIG J&J -C: Performed by: SURGERY

## 2018-01-01 PROCEDURE — 84439 ASSAY OF FREE THYROXINE: CPT | Performed by: PHYSICIAN ASSISTANT

## 2018-01-01 PROCEDURE — 4A023N7 MEASUREMENT OF CARDIAC SAMPLING AND PRESSURE, LEFT HEART, PERCUTANEOUS APPROACH: ICD-10-PCS | Performed by: INTERNAL MEDICINE

## 2018-01-01 PROCEDURE — 82140 ASSAY OF AMMONIA: CPT

## 2018-01-01 PROCEDURE — 97162 PT EVAL MOD COMPLEX 30 MIN: CPT

## 2018-01-01 PROCEDURE — 74011250637 HC RX REV CODE- 250/637: Performed by: ANESTHESIOLOGY

## 2018-01-01 PROCEDURE — 51798 US URINE CAPACITY MEASURE: CPT

## 2018-01-01 PROCEDURE — 36415 COLL VENOUS BLD VENIPUNCTURE: CPT | Performed by: NURSE PRACTITIONER

## 2018-01-01 PROCEDURE — 95816 EEG AWAKE AND DROWSY: CPT | Performed by: PSYCHIATRY & NEUROLOGY

## 2018-01-01 PROCEDURE — 74011250636 HC RX REV CODE- 250/636: Performed by: FAMILY MEDICINE

## 2018-01-01 PROCEDURE — 84100 ASSAY OF PHOSPHORUS: CPT

## 2018-01-01 PROCEDURE — 400013 HH SOC

## 2018-01-01 PROCEDURE — 82088 ASSAY OF ALDOSTERONE: CPT | Performed by: PHYSICIAN ASSISTANT

## 2018-01-01 PROCEDURE — 85610 PROTHROMBIN TIME: CPT | Performed by: PHYSICIAN ASSISTANT

## 2018-01-01 PROCEDURE — 81001 URINALYSIS AUTO W/SCOPE: CPT | Performed by: PHYSICIAN ASSISTANT

## 2018-01-01 PROCEDURE — 84100 ASSAY OF PHOSPHORUS: CPT | Performed by: EMERGENCY MEDICINE

## 2018-01-01 PROCEDURE — 84484 ASSAY OF TROPONIN QUANT: CPT | Performed by: FAMILY MEDICINE

## 2018-01-01 PROCEDURE — 82042 OTHER SOURCE ALBUMIN QUAN EA: CPT | Performed by: EMERGENCY MEDICINE

## 2018-01-01 PROCEDURE — 85027 COMPLETE CBC AUTOMATED: CPT | Performed by: PHYSICIAN ASSISTANT

## 2018-01-01 PROCEDURE — 87149 DNA/RNA DIRECT PROBE: CPT | Performed by: STUDENT IN AN ORGANIZED HEALTH CARE EDUCATION/TRAINING PROGRAM

## 2018-01-01 PROCEDURE — 76705 ECHO EXAM OF ABDOMEN: CPT

## 2018-01-01 PROCEDURE — 80076 HEPATIC FUNCTION PANEL: CPT | Performed by: EMERGENCY MEDICINE

## 2018-01-01 PROCEDURE — 5A1D70Z PERFORMANCE OF URINARY FILTRATION, INTERMITTENT, LESS THAN 6 HOURS PER DAY: ICD-10-PCS | Performed by: HOSPITALIST

## 2018-01-01 PROCEDURE — 77030032490 HC SLV COMPR SCD KNE COVD -B: Performed by: SURGERY

## 2018-01-01 PROCEDURE — 83036 HEMOGLOBIN GLYCOSYLATED A1C: CPT | Performed by: INTERNAL MEDICINE

## 2018-01-01 PROCEDURE — 77030012510 HC MSK AIRWY LMA TELE -B: Performed by: ANESTHESIOLOGY

## 2018-01-01 PROCEDURE — 96376 TX/PRO/DX INJ SAME DRUG ADON: CPT

## 2018-01-01 PROCEDURE — 84484 ASSAY OF TROPONIN QUANT: CPT | Performed by: INTERNAL MEDICINE

## 2018-01-01 PROCEDURE — C1894 INTRO/SHEATH, NON-LASER: HCPCS

## 2018-01-01 PROCEDURE — 76210000021 HC REC RM PH II 0.5 TO 1 HR: Performed by: SURGERY

## 2018-01-01 PROCEDURE — 71250 CT THORAX DX C-: CPT

## 2018-01-01 PROCEDURE — 74176 CT ABD & PELVIS W/O CONTRAST: CPT

## 2018-01-01 PROCEDURE — 87149 DNA/RNA DIRECT PROBE: CPT | Performed by: SURGERY

## 2018-01-01 PROCEDURE — 93975 VASCULAR STUDY: CPT

## 2018-01-01 PROCEDURE — 5A1945Z RESPIRATORY VENTILATION, 24-96 CONSECUTIVE HOURS: ICD-10-PCS | Performed by: EMERGENCY MEDICINE

## 2018-01-01 PROCEDURE — 77030021678 HC GLIDESCP STAT DISP VERT -B

## 2018-01-01 PROCEDURE — 83970 ASSAY OF PARATHORMONE: CPT | Performed by: INTERNAL MEDICINE

## 2018-01-01 PROCEDURE — 87116 MYCOBACTERIA CULTURE: CPT | Performed by: STUDENT IN AN ORGANIZED HEALTH CARE EDUCATION/TRAINING PROGRAM

## 2018-01-01 PROCEDURE — 83010 ASSAY OF HAPTOGLOBIN QUANT: CPT

## 2018-01-01 PROCEDURE — 31500 INSERT EMERGENCY AIRWAY: CPT

## 2018-01-01 PROCEDURE — 87641 MR-STAPH DNA AMP PROBE: CPT | Performed by: HOSPITALIST

## 2018-01-01 PROCEDURE — 85610 PROTHROMBIN TIME: CPT | Performed by: NURSE PRACTITIONER

## 2018-01-01 PROCEDURE — 81001 URINALYSIS AUTO W/SCOPE: CPT | Performed by: EMERGENCY MEDICINE

## 2018-01-01 PROCEDURE — 77030002987 HC SUT PROL J&J -B: Performed by: SURGERY

## 2018-01-01 PROCEDURE — 77030027138 HC INCENT SPIROMETER -A

## 2018-01-01 PROCEDURE — 31624 DX BRONCHOSCOPE/LAVAGE: CPT

## 2018-01-01 PROCEDURE — 77030002986 HC SUT PROL J&J -A: Performed by: SURGERY

## 2018-01-01 PROCEDURE — 84132 ASSAY OF SERUM POTASSIUM: CPT | Performed by: INTERNAL MEDICINE

## 2018-01-01 PROCEDURE — 78452 HT MUSCLE IMAGE SPECT MULT: CPT | Performed by: INTERNAL MEDICINE

## 2018-01-01 RX ORDER — ATORVASTATIN CALCIUM 40 MG/1
40 TABLET, FILM COATED ORAL
Status: DISCONTINUED | OUTPATIENT
Start: 2018-01-01 | End: 2018-01-01 | Stop reason: HOSPADM

## 2018-01-01 RX ORDER — ISONIAZID 300 MG/1
300 TABLET ORAL DAILY
Status: ON HOLD | COMMUNITY
End: 2018-01-01

## 2018-01-01 RX ORDER — POTASSIUM CHLORIDE 1.5 G/1.77G
40 POWDER, FOR SOLUTION ORAL EVERY 4 HOURS
Status: ACTIVE | OUTPATIENT
Start: 2018-01-01 | End: 2018-01-01

## 2018-01-01 RX ORDER — GLYCERIN ADULT
1 SUPPOSITORY, RECTAL RECTAL
Qty: 5 SUPPOSITORY | Refills: 0 | Status: SHIPPED | OUTPATIENT
Start: 2018-01-01

## 2018-01-01 RX ORDER — ACETAMINOPHEN 325 MG/1
650 TABLET ORAL
Status: DISCONTINUED | OUTPATIENT
Start: 2018-01-01 | End: 2018-01-01 | Stop reason: HOSPADM

## 2018-01-01 RX ORDER — SODIUM CHLORIDE 0.9 % (FLUSH) 0.9 %
5-10 SYRINGE (ML) INJECTION AS NEEDED
Status: DISCONTINUED | OUTPATIENT
Start: 2018-01-01 | End: 2018-01-01 | Stop reason: HOSPADM

## 2018-01-01 RX ORDER — AMLODIPINE BESYLATE 10 MG/1
10 TABLET ORAL DAILY
Status: DISCONTINUED | OUTPATIENT
Start: 2018-01-01 | End: 2018-01-01

## 2018-01-01 RX ORDER — CLONIDINE HYDROCHLORIDE 0.1 MG/1
0.1 TABLET ORAL
Status: COMPLETED | OUTPATIENT
Start: 2018-01-01 | End: 2018-01-01

## 2018-01-01 RX ORDER — LIDOCAINE HYDROCHLORIDE 20 MG/ML
INJECTION, SOLUTION EPIDURAL; INFILTRATION; INTRACAUDAL; PERINEURAL AS NEEDED
Status: DISCONTINUED | OUTPATIENT
Start: 2018-01-01 | End: 2018-01-01 | Stop reason: HOSPADM

## 2018-01-01 RX ORDER — SODIUM CHLORIDE 9 MG/ML
250 INJECTION, SOLUTION INTRAVENOUS AS NEEDED
Status: DISCONTINUED | OUTPATIENT
Start: 2018-01-01 | End: 2018-01-01 | Stop reason: HOSPADM

## 2018-01-01 RX ORDER — HYDRALAZINE HYDROCHLORIDE 20 MG/ML
10 INJECTION INTRAMUSCULAR; INTRAVENOUS
Status: DISCONTINUED | OUTPATIENT
Start: 2018-01-01 | End: 2018-01-01 | Stop reason: HOSPADM

## 2018-01-01 RX ORDER — ATORVASTATIN CALCIUM 20 MG/1
40 TABLET, FILM COATED ORAL
Status: DISCONTINUED | OUTPATIENT
Start: 2018-01-01 | End: 2018-01-01 | Stop reason: HOSPADM

## 2018-01-01 RX ORDER — HEPARIN SODIUM 5000 [USP'U]/ML
INJECTION, SOLUTION INTRAVENOUS; SUBCUTANEOUS AS NEEDED
Status: DISCONTINUED | OUTPATIENT
Start: 2018-01-01 | End: 2018-01-01 | Stop reason: HOSPADM

## 2018-01-01 RX ORDER — MINOXIDIL 2.5 MG/1
2.5 TABLET ORAL 2 TIMES DAILY
Status: DISCONTINUED | OUTPATIENT
Start: 2018-01-01 | End: 2018-01-01

## 2018-01-01 RX ORDER — OXYCODONE AND ACETAMINOPHEN 5; 325 MG/1; MG/1
1 TABLET ORAL AS NEEDED
Status: COMPLETED | OUTPATIENT
Start: 2018-01-01 | End: 2018-01-01

## 2018-01-01 RX ORDER — MINOXIDIL 2.5 MG/1
2.5 TABLET ORAL 2 TIMES DAILY
Status: DISCONTINUED | OUTPATIENT
Start: 2018-01-01 | End: 2018-01-01 | Stop reason: HOSPADM

## 2018-01-01 RX ORDER — HYDRALAZINE HYDROCHLORIDE 20 MG/ML
10 INJECTION INTRAMUSCULAR; INTRAVENOUS EVERY 6 HOURS
Status: DISCONTINUED | OUTPATIENT
Start: 2018-01-01 | End: 2018-01-01

## 2018-01-01 RX ORDER — PROPOFOL 10 MG/ML
5 VIAL (ML) INTRAVENOUS
Status: DISCONTINUED | OUTPATIENT
Start: 2018-01-01 | End: 2019-01-01

## 2018-01-01 RX ORDER — LANOLIN ALCOHOL/MO/W.PET/CERES
3 CREAM (GRAM) TOPICAL
Status: DISCONTINUED | OUTPATIENT
Start: 2018-01-01 | End: 2018-01-01 | Stop reason: HOSPADM

## 2018-01-01 RX ORDER — PYRAZINAMIDE TABLET 500 MG/1
1000 TABLET ORAL
Status: DISCONTINUED | OUTPATIENT
Start: 2018-01-01 | End: 2018-01-01 | Stop reason: HOSPADM

## 2018-01-01 RX ORDER — CLONIDINE 0.3 MG/24H
1 PATCH, EXTENDED RELEASE TRANSDERMAL
Qty: 4 PATCH | Refills: 0 | Status: ON HOLD | OUTPATIENT
Start: 2018-01-01 | End: 2018-01-01

## 2018-01-01 RX ORDER — LABETALOL HCL 20 MG/4 ML
SYRINGE (ML) INTRAVENOUS
Status: DISPENSED
Start: 2018-01-01 | End: 2018-01-01

## 2018-01-01 RX ORDER — SODIUM,POTASSIUM PHOSPHATES 280-250MG
2 POWDER IN PACKET (EA) ORAL
Status: DISPENSED | OUTPATIENT
Start: 2018-01-01 | End: 2018-01-01

## 2018-01-01 RX ORDER — HYDROCODONE BITARTRATE AND ACETAMINOPHEN 5; 325 MG/1; MG/1
1 TABLET ORAL ONCE
Status: DISCONTINUED | OUTPATIENT
Start: 2018-01-01 | End: 2018-01-01

## 2018-01-01 RX ORDER — HEPARIN SODIUM 1000 [USP'U]/ML
INJECTION, SOLUTION INTRAVENOUS; SUBCUTANEOUS
Status: DISPENSED
Start: 2018-01-01 | End: 2018-01-01

## 2018-01-01 RX ORDER — HEPARIN SODIUM 1000 [USP'U]/ML
INJECTION, SOLUTION INTRAVENOUS; SUBCUTANEOUS
Status: COMPLETED
Start: 2018-01-01 | End: 2018-01-01

## 2018-01-01 RX ORDER — GUAIFENESIN 100 MG/5ML
81 LIQUID (ML) ORAL DAILY
Status: DISCONTINUED | OUTPATIENT
Start: 2018-01-01 | End: 2018-01-01 | Stop reason: HOSPADM

## 2018-01-01 RX ORDER — LOSARTAN POTASSIUM 50 MG/1
100 TABLET ORAL DAILY
Status: DISCONTINUED | OUTPATIENT
Start: 2018-01-01 | End: 2018-01-01 | Stop reason: HOSPADM

## 2018-01-01 RX ORDER — CLONIDINE HYDROCHLORIDE 0.1 MG/1
0.3 TABLET ORAL
Status: COMPLETED | OUTPATIENT
Start: 2018-01-01 | End: 2018-01-01

## 2018-01-01 RX ORDER — SODIUM,POTASSIUM PHOSPHATES 280-250MG
2 POWDER IN PACKET (EA) ORAL ONCE
Status: COMPLETED | OUTPATIENT
Start: 2018-01-01 | End: 2018-01-01

## 2018-01-01 RX ORDER — SODIUM CHLORIDE 9 MG/ML
75 INJECTION, SOLUTION INTRAVENOUS CONTINUOUS
Status: DISCONTINUED | OUTPATIENT
Start: 2018-01-01 | End: 2018-01-01 | Stop reason: HOSPADM

## 2018-01-01 RX ORDER — ONDANSETRON 2 MG/ML
4 INJECTION INTRAMUSCULAR; INTRAVENOUS
Status: DISCONTINUED | OUTPATIENT
Start: 2018-01-01 | End: 2018-01-01 | Stop reason: HOSPADM

## 2018-01-01 RX ORDER — BISACODYL 5 MG
5 TABLET, DELAYED RELEASE (ENTERIC COATED) ORAL DAILY PRN
Status: DISCONTINUED | OUTPATIENT
Start: 2018-01-01 | End: 2018-01-01 | Stop reason: HOSPADM

## 2018-01-01 RX ORDER — DOCUSATE SODIUM 100 MG/1
100 CAPSULE, LIQUID FILLED ORAL
Status: DISCONTINUED | OUTPATIENT
Start: 2018-01-01 | End: 2018-01-01 | Stop reason: HOSPADM

## 2018-01-01 RX ORDER — DICYCLOMINE HYDROCHLORIDE 10 MG/1
20 CAPSULE ORAL
Status: DISCONTINUED | OUTPATIENT
Start: 2018-01-01 | End: 2018-01-01 | Stop reason: HOSPADM

## 2018-01-01 RX ORDER — HYDRALAZINE HYDROCHLORIDE 20 MG/ML
20 INJECTION INTRAMUSCULAR; INTRAVENOUS ONCE
Status: DISCONTINUED | OUTPATIENT
Start: 2018-01-01 | End: 2018-01-01

## 2018-01-01 RX ORDER — RIFAMPIN 300 MG/1
600 CAPSULE ORAL
Status: DISCONTINUED | OUTPATIENT
Start: 2018-01-01 | End: 2018-01-01

## 2018-01-01 RX ORDER — DIPHENHYDRAMINE HYDROCHLORIDE 50 MG/ML
6.25 INJECTION, SOLUTION INTRAMUSCULAR; INTRAVENOUS ONCE
Status: DISCONTINUED | OUTPATIENT
Start: 2018-01-01 | End: 2018-01-01 | Stop reason: RX

## 2018-01-01 RX ORDER — SODIUM CHLORIDE 0.9 % (FLUSH) 0.9 %
5-10 SYRINGE (ML) INJECTION AS NEEDED
Status: DISCONTINUED | OUTPATIENT
Start: 2018-01-01 | End: 2018-01-01 | Stop reason: SDUPTHER

## 2018-01-01 RX ORDER — AMLODIPINE BESYLATE 5 MG/1
5 TABLET ORAL DAILY
Status: DISCONTINUED | OUTPATIENT
Start: 2018-01-01 | End: 2018-01-01

## 2018-01-01 RX ORDER — BISACODYL 5 MG
5 TABLET, DELAYED RELEASE (ENTERIC COATED) ORAL
Status: DISCONTINUED | OUTPATIENT
Start: 2018-01-01 | End: 2018-01-01 | Stop reason: HOSPADM

## 2018-01-01 RX ORDER — TEMAZEPAM 15 MG/1
15 CAPSULE ORAL
Status: DISCONTINUED | OUTPATIENT
Start: 2018-01-01 | End: 2018-01-01 | Stop reason: HOSPADM

## 2018-01-01 RX ORDER — ETHAMBUTOL HYDROCHLORIDE 400 MG/1
800 TABLET, FILM COATED ORAL DAILY
Status: DISCONTINUED | OUTPATIENT
Start: 2018-01-01 | End: 2018-01-01 | Stop reason: DRUGHIGH

## 2018-01-01 RX ORDER — SEVELAMER CARBONATE 800 MG/1
1600 TABLET, FILM COATED ORAL
Status: DISCONTINUED | OUTPATIENT
Start: 2018-01-01 | End: 2018-01-01 | Stop reason: HOSPADM

## 2018-01-01 RX ORDER — CLONIDINE 0.3 MG/24H
1 PATCH, EXTENDED RELEASE TRANSDERMAL
Status: DISCONTINUED | OUTPATIENT
Start: 2018-01-01 | End: 2018-01-01 | Stop reason: HOSPADM

## 2018-01-01 RX ORDER — MIDAZOLAM IN 0.9 % SOD.CHLORID 1 MG/ML
1-5 PLASTIC BAG, INJECTION (ML) INTRAVENOUS
Status: DISCONTINUED | OUTPATIENT
Start: 2018-01-01 | End: 2019-01-01

## 2018-01-01 RX ORDER — ALBUTEROL SULFATE 1.25 MG/3ML
2.5 SOLUTION RESPIRATORY (INHALATION)
Status: DISCONTINUED | OUTPATIENT
Start: 2018-01-01 | End: 2018-01-01 | Stop reason: HOSPADM

## 2018-01-01 RX ORDER — LEVOFLOXACIN 5 MG/ML
750 INJECTION, SOLUTION INTRAVENOUS EVERY 24 HOURS
Status: DISCONTINUED | OUTPATIENT
Start: 2018-01-01 | End: 2018-01-01

## 2018-01-01 RX ORDER — IPRATROPIUM BROMIDE AND ALBUTEROL SULFATE 2.5; .5 MG/3ML; MG/3ML
3 SOLUTION RESPIRATORY (INHALATION) ONCE
Status: COMPLETED | OUTPATIENT
Start: 2018-01-01 | End: 2018-01-01

## 2018-01-01 RX ORDER — HYDRALAZINE HYDROCHLORIDE 50 MG/1
100 TABLET, FILM COATED ORAL 2 TIMES DAILY
Status: DISCONTINUED | OUTPATIENT
Start: 2018-01-01 | End: 2018-01-01

## 2018-01-01 RX ORDER — MAGNESIUM SULFATE 100 %
4 CRYSTALS MISCELLANEOUS AS NEEDED
Status: DISCONTINUED | OUTPATIENT
Start: 2018-01-01 | End: 2018-01-01 | Stop reason: HOSPADM

## 2018-01-01 RX ORDER — CHLORHEXIDINE GLUCONATE 1.2 MG/ML
10 RINSE ORAL EVERY 12 HOURS
Status: DISCONTINUED | OUTPATIENT
Start: 2018-01-01 | End: 2019-01-01

## 2018-01-01 RX ORDER — AMLODIPINE BESYLATE 5 MG/1
5 TABLET ORAL ONCE
Status: ACTIVE | OUTPATIENT
Start: 2018-01-01 | End: 2018-01-01

## 2018-01-01 RX ORDER — CARVEDILOL 25 MG/1
25 TABLET ORAL EVERY 12 HOURS
Status: DISCONTINUED | OUTPATIENT
Start: 2018-01-01 | End: 2018-01-01 | Stop reason: HOSPADM

## 2018-01-01 RX ORDER — FENTANYL CITRATE 50 UG/ML
INJECTION, SOLUTION INTRAMUSCULAR; INTRAVENOUS AS NEEDED
Status: DISCONTINUED | OUTPATIENT
Start: 2018-01-01 | End: 2018-01-01 | Stop reason: HOSPADM

## 2018-01-01 RX ORDER — ETHAMBUTOL HYDROCHLORIDE 400 MG/1
1200 TABLET, FILM COATED ORAL
Status: DISCONTINUED | OUTPATIENT
Start: 2018-01-01 | End: 2018-01-01

## 2018-01-01 RX ORDER — SODIUM CHLORIDE 9 MG/ML
100 INJECTION, SOLUTION INTRAVENOUS
Status: DISCONTINUED | OUTPATIENT
Start: 2018-01-01 | End: 2019-01-01

## 2018-01-01 RX ORDER — CARVEDILOL 25 MG/1
25 TABLET ORAL EVERY 12 HOURS
Qty: 60 TAB | Refills: 0 | Status: ON HOLD | OUTPATIENT
Start: 2018-01-01 | End: 2018-01-01

## 2018-01-01 RX ORDER — FENTANYL CITRATE 50 UG/ML
25 INJECTION, SOLUTION INTRAMUSCULAR; INTRAVENOUS
Status: DISCONTINUED | OUTPATIENT
Start: 2018-01-01 | End: 2018-01-01 | Stop reason: HOSPADM

## 2018-01-01 RX ORDER — CLONIDINE HYDROCHLORIDE 0.3 MG/1
0.3 TABLET ORAL 2 TIMES DAILY
Qty: 60 TAB | Refills: 0 | Status: SHIPPED | OUTPATIENT
Start: 2018-01-01 | End: 2018-01-01

## 2018-01-01 RX ORDER — POLYETHYLENE GLYCOL 3350 17 G/17G
17 POWDER, FOR SOLUTION ORAL DAILY
Status: DISCONTINUED | OUTPATIENT
Start: 2018-01-01 | End: 2018-01-01 | Stop reason: HOSPADM

## 2018-01-01 RX ORDER — ONDANSETRON 2 MG/ML
4 INJECTION INTRAMUSCULAR; INTRAVENOUS
Status: COMPLETED | OUTPATIENT
Start: 2018-01-01 | End: 2018-01-01

## 2018-01-01 RX ORDER — LEVETIRACETAM 15 MG/ML
1500 INJECTION INTRAVASCULAR ONCE
Status: COMPLETED | OUTPATIENT
Start: 2018-01-01 | End: 2018-01-01

## 2018-01-01 RX ORDER — LEVOFLOXACIN 5 MG/ML
500 INJECTION, SOLUTION INTRAVENOUS
Status: DISCONTINUED | OUTPATIENT
Start: 2018-01-01 | End: 2018-01-01 | Stop reason: HOSPADM

## 2018-01-01 RX ORDER — ONDANSETRON 2 MG/ML
INJECTION INTRAMUSCULAR; INTRAVENOUS
Status: DISPENSED
Start: 2018-01-01 | End: 2018-01-01

## 2018-01-01 RX ORDER — AMLODIPINE BESYLATE 10 MG/1
10 TABLET ORAL DAILY
Status: DISCONTINUED | OUTPATIENT
Start: 2018-01-01 | End: 2018-01-01 | Stop reason: HOSPADM

## 2018-01-01 RX ORDER — GLYCERIN ADULT
1 SUPPOSITORY, RECTAL RECTAL ONCE
Status: DISCONTINUED | OUTPATIENT
Start: 2018-01-01 | End: 2018-01-01

## 2018-01-01 RX ORDER — CLONIDINE 0.3 MG/24H
1 PATCH, EXTENDED RELEASE TRANSDERMAL
Status: DISCONTINUED | OUTPATIENT
Start: 2018-01-01 | End: 2018-01-01

## 2018-01-01 RX ORDER — PYRIDOXINE HYDROCHLORIDE 100 MG/ML
100 INJECTION, SOLUTION INTRAMUSCULAR; INTRAVENOUS DAILY
Status: DISCONTINUED | OUTPATIENT
Start: 2018-01-01 | End: 2018-01-01 | Stop reason: CLARIF

## 2018-01-01 RX ORDER — DEXTROSE MONOHYDRATE AND SODIUM CHLORIDE 5; .9 G/100ML; G/100ML
75 INJECTION, SOLUTION INTRAVENOUS CONTINUOUS
Status: DISCONTINUED | OUTPATIENT
Start: 2018-01-01 | End: 2018-01-01

## 2018-01-01 RX ORDER — DEXTROSE 50 % IN WATER (D50W) INTRAVENOUS SYRINGE
25-50 AS NEEDED
Status: DISCONTINUED | OUTPATIENT
Start: 2018-01-01 | End: 2018-01-01 | Stop reason: HOSPADM

## 2018-01-01 RX ORDER — LANOLIN ALCOHOL/MO/W.PET/CERES
3 CREAM (GRAM) TOPICAL
Status: DISCONTINUED | OUTPATIENT
Start: 2018-01-01 | End: 2018-01-01 | Stop reason: SDUPTHER

## 2018-01-01 RX ORDER — SODIUM CHLORIDE 900 MG/100ML
INJECTION INTRAVENOUS
Status: COMPLETED
Start: 2018-01-01 | End: 2018-01-01

## 2018-01-01 RX ORDER — BUPIVACAINE HYDROCHLORIDE AND EPINEPHRINE 2.5; 5 MG/ML; UG/ML
30 INJECTION, SOLUTION EPIDURAL; INFILTRATION; INTRACAUDAL; PERINEURAL ONCE
Status: DISCONTINUED | OUTPATIENT
Start: 2018-01-01 | End: 2018-01-01 | Stop reason: CLARIF

## 2018-01-01 RX ORDER — CLONIDINE HYDROCHLORIDE 0.1 MG/1
0.2 TABLET ORAL 2 TIMES DAILY
Status: DISCONTINUED | OUTPATIENT
Start: 2018-01-01 | End: 2018-01-01

## 2018-01-01 RX ORDER — DEXAMETHASONE SODIUM PHOSPHATE 4 MG/ML
INJECTION, SOLUTION INTRA-ARTICULAR; INTRALESIONAL; INTRAMUSCULAR; INTRAVENOUS; SOFT TISSUE AS NEEDED
Status: DISCONTINUED | OUTPATIENT
Start: 2018-01-01 | End: 2018-01-01 | Stop reason: HOSPADM

## 2018-01-01 RX ORDER — METOCLOPRAMIDE HYDROCHLORIDE 5 MG/5ML
5 SOLUTION ORAL 2 TIMES DAILY
Status: DISCONTINUED | OUTPATIENT
Start: 2018-01-01 | End: 2018-01-01

## 2018-01-01 RX ORDER — ACETAMINOPHEN 650 MG/1
650 SUPPOSITORY RECTAL
Status: DISCONTINUED | OUTPATIENT
Start: 2018-01-01 | End: 2018-01-01

## 2018-01-01 RX ORDER — CLONIDINE HYDROCHLORIDE 0.1 MG/1
0.1 TABLET ORAL
Status: DISCONTINUED | OUTPATIENT
Start: 2018-01-01 | End: 2018-01-01 | Stop reason: HOSPADM

## 2018-01-01 RX ORDER — HYDRALAZINE HYDROCHLORIDE 25 MG/1
25 TABLET, FILM COATED ORAL 2 TIMES DAILY
Status: DISCONTINUED | OUTPATIENT
Start: 2018-01-01 | End: 2018-01-01

## 2018-01-01 RX ORDER — GUAIFENESIN 100 MG/5ML
LIQUID (ML) ORAL
Status: COMPLETED
Start: 2018-01-01 | End: 2018-01-01

## 2018-01-01 RX ORDER — LEVOFLOXACIN 5 MG/ML
750 INJECTION, SOLUTION INTRAVENOUS EVERY 24 HOURS
Status: DISCONTINUED | OUTPATIENT
Start: 2018-01-01 | End: 2018-01-01 | Stop reason: DRUGHIGH

## 2018-01-01 RX ORDER — SODIUM CHLORIDE 9 MG/ML
100 INJECTION, SOLUTION INTRAVENOUS
Status: DISCONTINUED | OUTPATIENT
Start: 2018-01-01 | End: 2018-01-01 | Stop reason: HOSPADM

## 2018-01-01 RX ORDER — ONDANSETRON 2 MG/ML
INJECTION INTRAMUSCULAR; INTRAVENOUS
Status: COMPLETED
Start: 2018-01-01 | End: 2018-01-01

## 2018-01-01 RX ORDER — MORPHINE SULFATE 4 MG/ML
4 INJECTION INTRAVENOUS
Status: COMPLETED | OUTPATIENT
Start: 2018-01-01 | End: 2018-01-01

## 2018-01-01 RX ORDER — DEXTROSE 50 % IN WATER (D50W) INTRAVENOUS SYRINGE
25 ONCE
Status: COMPLETED | OUTPATIENT
Start: 2018-01-01 | End: 2018-01-01

## 2018-01-01 RX ORDER — LOSARTAN POTASSIUM 25 MG/1
50 TABLET ORAL DAILY
Status: DISCONTINUED | OUTPATIENT
Start: 2018-01-01 | End: 2018-01-01 | Stop reason: HOSPADM

## 2018-01-01 RX ORDER — AZITHROMYCIN 250 MG/1
500 TABLET, FILM COATED ORAL
Status: DISCONTINUED | OUTPATIENT
Start: 2018-01-01 | End: 2018-01-01

## 2018-01-01 RX ORDER — NALOXONE HYDROCHLORIDE 0.4 MG/ML
0.1 INJECTION, SOLUTION INTRAMUSCULAR; INTRAVENOUS; SUBCUTANEOUS AS NEEDED
Status: DISCONTINUED | OUTPATIENT
Start: 2018-01-01 | End: 2018-01-01 | Stop reason: HOSPADM

## 2018-01-01 RX ORDER — LANOLIN ALCOHOL/MO/W.PET/CERES
100 CREAM (GRAM) TOPICAL DAILY
Status: DISCONTINUED | OUTPATIENT
Start: 2018-01-01 | End: 2018-01-01 | Stop reason: HOSPADM

## 2018-01-01 RX ORDER — DEXTROSE 50 % IN WATER (D50W) INTRAVENOUS SYRINGE
Status: COMPLETED
Start: 2018-01-01 | End: 2018-01-01

## 2018-01-01 RX ORDER — LOSARTAN POTASSIUM 50 MG/1
100 TABLET ORAL DAILY
Status: DISCONTINUED | OUTPATIENT
Start: 2018-01-01 | End: 2018-01-01

## 2018-01-01 RX ORDER — CLONIDINE HYDROCHLORIDE 0.1 MG/1
0.3 TABLET ORAL 2 TIMES DAILY
Status: DISCONTINUED | OUTPATIENT
Start: 2018-01-01 | End: 2018-01-01

## 2018-01-01 RX ORDER — MORPHINE SULFATE 4 MG/ML
1 INJECTION, SOLUTION INTRAMUSCULAR; INTRAVENOUS
Status: DISCONTINUED | OUTPATIENT
Start: 2018-01-01 | End: 2018-01-01

## 2018-01-01 RX ORDER — AMLODIPINE BESYLATE 10 MG/1
10 TABLET ORAL DAILY
Qty: 30 TAB | Refills: 0 | Status: ON HOLD | OUTPATIENT
Start: 2018-01-01 | End: 2018-01-01

## 2018-01-01 RX ORDER — HYDRALAZINE HYDROCHLORIDE 20 MG/ML
20 INJECTION INTRAMUSCULAR; INTRAVENOUS ONCE
Status: COMPLETED | OUTPATIENT
Start: 2018-01-01 | End: 2018-01-01

## 2018-01-01 RX ORDER — LIDOCAINE HYDROCHLORIDE 10 MG/ML
1-30 INJECTION, SOLUTION EPIDURAL; INFILTRATION; INTRACAUDAL; PERINEURAL ONCE
Status: COMPLETED | OUTPATIENT
Start: 2018-01-01 | End: 2018-01-01

## 2018-01-01 RX ORDER — MORPHINE SULFATE 4 MG/ML
4 INJECTION INTRAVENOUS
Status: DISCONTINUED | OUTPATIENT
Start: 2018-01-01 | End: 2018-01-01 | Stop reason: SDUPTHER

## 2018-01-01 RX ORDER — METOPROLOL TARTRATE 5 MG/5ML
5 INJECTION INTRAVENOUS
Status: DISCONTINUED | OUTPATIENT
Start: 2018-01-01 | End: 2018-01-01 | Stop reason: HOSPADM

## 2018-01-01 RX ORDER — DOCUSATE SODIUM 100 MG/1
100 CAPSULE, LIQUID FILLED ORAL 2 TIMES DAILY
Status: DISCONTINUED | OUTPATIENT
Start: 2018-01-01 | End: 2018-01-01 | Stop reason: HOSPADM

## 2018-01-01 RX ORDER — METOPROLOL TARTRATE 5 MG/5ML
5 INJECTION INTRAVENOUS
Status: DISPENSED | OUTPATIENT
Start: 2018-01-01 | End: 2018-01-01

## 2018-01-01 RX ORDER — LABETALOL HCL 20 MG/4 ML
20 SYRINGE (ML) INTRAVENOUS
Status: COMPLETED | OUTPATIENT
Start: 2018-01-01 | End: 2018-01-01

## 2018-01-01 RX ORDER — HEPARIN SODIUM 5000 [USP'U]/ML
5000 INJECTION, SOLUTION INTRAVENOUS; SUBCUTANEOUS EVERY 8 HOURS
Status: DISCONTINUED | OUTPATIENT
Start: 2018-01-01 | End: 2018-01-01

## 2018-01-01 RX ORDER — LOSARTAN POTASSIUM 50 MG/1
50 TABLET ORAL DAILY
Qty: 30 TAB | Refills: 0 | Status: SHIPPED | OUTPATIENT
Start: 2018-01-01

## 2018-01-01 RX ORDER — METOCLOPRAMIDE HYDROCHLORIDE 5 MG/ML
5 INJECTION INTRAMUSCULAR; INTRAVENOUS 2 TIMES DAILY
Status: DISCONTINUED | OUTPATIENT
Start: 2018-01-01 | End: 2018-01-01 | Stop reason: HOSPADM

## 2018-01-01 RX ORDER — HEPARIN SODIUM 1000 [USP'U]/ML
1000 INJECTION, SOLUTION INTRAVENOUS; SUBCUTANEOUS
Status: DISCONTINUED | OUTPATIENT
Start: 2018-01-01 | End: 2018-01-01 | Stop reason: HOSPADM

## 2018-01-01 RX ORDER — MINOXIDIL 2.5 MG/1
5 TABLET ORAL 2 TIMES DAILY
Status: DISCONTINUED | OUTPATIENT
Start: 2018-01-01 | End: 2018-01-01

## 2018-01-01 RX ORDER — MAGNESIUM CITRATE
SOLUTION, ORAL ORAL
Qty: 1 BOTTLE | Refills: 0 | Status: SHIPPED | OUTPATIENT
Start: 2018-01-01 | End: 2018-01-01

## 2018-01-01 RX ORDER — MIDAZOLAM HYDROCHLORIDE 1 MG/ML
2 INJECTION, SOLUTION INTRAMUSCULAR; INTRAVENOUS ONCE
Status: ACTIVE | OUTPATIENT
Start: 2018-01-01 | End: 2018-01-01

## 2018-01-01 RX ORDER — HYDRALAZINE HYDROCHLORIDE 20 MG/ML
10-20 INJECTION INTRAMUSCULAR; INTRAVENOUS
Status: DISCONTINUED | OUTPATIENT
Start: 2018-01-01 | End: 2018-01-01

## 2018-01-01 RX ORDER — LORAZEPAM 2 MG/ML
INJECTION INTRAMUSCULAR
Status: COMPLETED
Start: 2018-01-01 | End: 2018-01-01

## 2018-01-01 RX ORDER — RIFAMPIN 300 MG/1
600 CAPSULE ORAL EVERY 24 HOURS
Status: DISCONTINUED | OUTPATIENT
Start: 2018-01-01 | End: 2018-01-01

## 2018-01-01 RX ORDER — ALBUTEROL SULFATE 90 UG/1
2 AEROSOL, METERED RESPIRATORY (INHALATION)
Qty: 1 INHALER | Refills: 0 | Status: SHIPPED | OUTPATIENT
Start: 2018-01-01

## 2018-01-01 RX ORDER — OXYCODONE AND ACETAMINOPHEN 5; 325 MG/1; MG/1
1 TABLET ORAL
Status: DISCONTINUED | OUTPATIENT
Start: 2018-01-01 | End: 2018-01-01 | Stop reason: HOSPADM

## 2018-01-01 RX ORDER — LEVOFLOXACIN 5 MG/ML
750 INJECTION, SOLUTION INTRAVENOUS ONCE
Status: DISCONTINUED | OUTPATIENT
Start: 2018-01-01 | End: 2018-01-01

## 2018-01-01 RX ORDER — MIDAZOLAM HYDROCHLORIDE 1 MG/ML
5 INJECTION, SOLUTION INTRAMUSCULAR; INTRAVENOUS ONCE
Status: COMPLETED | OUTPATIENT
Start: 2018-01-01 | End: 2018-01-01

## 2018-01-01 RX ORDER — POTASSIUM CHLORIDE 20 MEQ/1
40 TABLET, EXTENDED RELEASE ORAL
Status: COMPLETED | OUTPATIENT
Start: 2018-01-01 | End: 2018-01-01

## 2018-01-01 RX ORDER — ONDANSETRON 2 MG/ML
6 INJECTION INTRAMUSCULAR; INTRAVENOUS
Status: DISCONTINUED | OUTPATIENT
Start: 2018-01-01 | End: 2018-01-01 | Stop reason: HOSPADM

## 2018-01-01 RX ORDER — PYRAZINAMIDE TABLET 500 MG/1
1000 TABLET ORAL ONCE
Status: COMPLETED | OUTPATIENT
Start: 2018-01-01 | End: 2018-01-01

## 2018-01-01 RX ORDER — ONDANSETRON 2 MG/ML
4 INJECTION INTRAMUSCULAR; INTRAVENOUS ONCE
Status: COMPLETED | OUTPATIENT
Start: 2018-01-01 | End: 2018-01-01

## 2018-01-01 RX ORDER — ISONIAZID 300 MG/1
300 TABLET ORAL DAILY
Status: DISCONTINUED | OUTPATIENT
Start: 2018-01-01 | End: 2018-01-01 | Stop reason: HOSPADM

## 2018-01-01 RX ORDER — HYDROMORPHONE HYDROCHLORIDE 1 MG/ML
0.5 INJECTION, SOLUTION INTRAMUSCULAR; INTRAVENOUS; SUBCUTANEOUS
Status: DISCONTINUED | OUTPATIENT
Start: 2018-01-01 | End: 2018-01-01

## 2018-01-01 RX ORDER — OXYCODONE AND ACETAMINOPHEN 5; 325 MG/1; MG/1
1 TABLET ORAL
Qty: 6 TAB | Refills: 0 | Status: SHIPPED | OUTPATIENT
Start: 2018-01-01 | End: 2018-01-01

## 2018-01-01 RX ORDER — PROPOFOL 10 MG/ML
INJECTION, EMULSION INTRAVENOUS
Status: DISCONTINUED | OUTPATIENT
Start: 2018-01-01 | End: 2018-01-01 | Stop reason: HOSPADM

## 2018-01-01 RX ORDER — ENALAPRILAT 1.25 MG/ML
1.25 INJECTION INTRAVENOUS EVERY 6 HOURS
Status: DISCONTINUED | OUTPATIENT
Start: 2018-01-01 | End: 2018-01-01

## 2018-01-01 RX ORDER — QUETIAPINE FUMARATE 25 MG/1
25 TABLET, FILM COATED ORAL
Status: DISCONTINUED | OUTPATIENT
Start: 2018-01-01 | End: 2018-01-01

## 2018-01-01 RX ORDER — POLYETHYLENE GLYCOL 3350 17 G/17G
17 POWDER, FOR SOLUTION ORAL DAILY
Qty: 30 PACKET | Refills: 0 | Status: SHIPPED | OUTPATIENT
Start: 2018-01-01

## 2018-01-01 RX ORDER — FACIAL-BODY WIPES
10 EACH TOPICAL DAILY PRN
Status: DISCONTINUED | OUTPATIENT
Start: 2018-01-01 | End: 2018-01-01 | Stop reason: HOSPADM

## 2018-01-01 RX ORDER — GUAIFENESIN 100 MG/5ML
162 LIQUID (ML) ORAL
Status: COMPLETED | OUTPATIENT
Start: 2018-01-01 | End: 2018-01-01

## 2018-01-01 RX ORDER — DEXTROSE 50 % IN WATER (D50W) INTRAVENOUS SYRINGE
25 AS NEEDED
Status: DISCONTINUED | OUTPATIENT
Start: 2018-01-01 | End: 2018-01-01

## 2018-01-01 RX ORDER — HEPARIN SODIUM 5000 [USP'U]/ML
5000 INJECTION, SOLUTION INTRAVENOUS; SUBCUTANEOUS EVERY 8 HOURS
Status: DISCONTINUED | OUTPATIENT
Start: 2018-01-01 | End: 2018-01-01 | Stop reason: HOSPADM

## 2018-01-01 RX ORDER — NALOXONE HYDROCHLORIDE 0.4 MG/ML
0.4 INJECTION, SOLUTION INTRAMUSCULAR; INTRAVENOUS; SUBCUTANEOUS AS NEEDED
Status: DISCONTINUED | OUTPATIENT
Start: 2018-01-01 | End: 2018-01-01 | Stop reason: HOSPADM

## 2018-01-01 RX ORDER — HALOPERIDOL 5 MG/ML
2 INJECTION INTRAMUSCULAR ONCE
Status: COMPLETED | OUTPATIENT
Start: 2018-01-01 | End: 2018-01-01

## 2018-01-01 RX ORDER — DOCUSATE SODIUM 100 MG/1
100 CAPSULE, LIQUID FILLED ORAL 2 TIMES DAILY
Qty: 60 CAP | Refills: 0 | Status: SHIPPED | OUTPATIENT
Start: 2018-01-01

## 2018-01-01 RX ORDER — FAMOTIDINE 20 MG/1
20 TABLET, FILM COATED ORAL DAILY
Status: DISCONTINUED | OUTPATIENT
Start: 2018-01-01 | End: 2018-01-01 | Stop reason: HOSPADM

## 2018-01-01 RX ORDER — HEPARIN SODIUM 1000 [USP'U]/ML
1000 INJECTION, SOLUTION INTRAVENOUS; SUBCUTANEOUS
Status: DISCONTINUED | OUTPATIENT
Start: 2018-01-01 | End: 2019-01-01

## 2018-01-01 RX ORDER — ONDANSETRON 2 MG/ML
6 INJECTION INTRAMUSCULAR; INTRAVENOUS
Status: DISCONTINUED | OUTPATIENT
Start: 2018-01-01 | End: 2018-01-01

## 2018-01-01 RX ORDER — SODIUM CHLORIDE 0.9 % (FLUSH) 0.9 %
5-10 SYRINGE (ML) INJECTION AS NEEDED
Status: DISCONTINUED | OUTPATIENT
Start: 2018-01-01 | End: 2019-01-01

## 2018-01-01 RX ORDER — AZITHROMYCIN 500 MG/1
500 TABLET, FILM COATED ORAL
Qty: 12 TAB | Refills: 0 | Status: SHIPPED | OUTPATIENT
Start: 2018-01-01 | End: 2018-01-01

## 2018-01-01 RX ORDER — LABETALOL HCL 20 MG/4 ML
20 SYRINGE (ML) INTRAVENOUS ONCE
Status: COMPLETED | OUTPATIENT
Start: 2018-01-01 | End: 2018-01-01

## 2018-01-01 RX ORDER — NITROGLYCERIN 0.4 MG/1
0.4 TABLET SUBLINGUAL
Status: COMPLETED | OUTPATIENT
Start: 2018-01-01 | End: 2018-01-01

## 2018-01-01 RX ORDER — ONDANSETRON 2 MG/ML
4 INJECTION INTRAMUSCULAR; INTRAVENOUS
Status: DISCONTINUED | OUTPATIENT
Start: 2018-01-01 | End: 2018-01-01

## 2018-01-01 RX ORDER — HEPARIN SODIUM 1000 [USP'U]/ML
1600 INJECTION, SOLUTION INTRAVENOUS; SUBCUTANEOUS ONCE
Status: COMPLETED | OUTPATIENT
Start: 2018-01-01 | End: 2018-01-01

## 2018-01-01 RX ORDER — METOPROLOL TARTRATE 25 MG/1
25 TABLET, FILM COATED ORAL 2 TIMES DAILY
Status: DISCONTINUED | OUTPATIENT
Start: 2018-01-01 | End: 2018-01-01

## 2018-01-01 RX ORDER — MINOXIDIL 2.5 MG/1
2.5 TABLET ORAL EVERY 24 HOURS
Status: DISCONTINUED | OUTPATIENT
Start: 2018-01-01 | End: 2018-01-01

## 2018-01-01 RX ORDER — HEPARIN SODIUM 10000 [USP'U]/100ML
12-25 INJECTION, SOLUTION INTRAVENOUS
Status: DISCONTINUED | OUTPATIENT
Start: 2018-01-01 | End: 2018-01-01

## 2018-01-01 RX ORDER — PYRAZINAMIDE TABLET 500 MG/1
1000 TABLET ORAL
Status: COMPLETED | OUTPATIENT
Start: 2018-01-01 | End: 2018-01-01

## 2018-01-01 RX ORDER — CARVEDILOL 25 MG/1
25 TABLET ORAL EVERY 12 HOURS
Status: DISCONTINUED | OUTPATIENT
Start: 2018-01-01 | End: 2018-01-01

## 2018-01-01 RX ORDER — DEXTROSE MONOHYDRATE 100 MG/ML
30 INJECTION, SOLUTION INTRAVENOUS CONTINUOUS
Status: DISCONTINUED | OUTPATIENT
Start: 2018-01-01 | End: 2018-01-01

## 2018-01-01 RX ORDER — HYDRALAZINE HYDROCHLORIDE 50 MG/1
50 TABLET, FILM COATED ORAL EVERY 12 HOURS
Status: DISCONTINUED | OUTPATIENT
Start: 2018-01-01 | End: 2018-01-01 | Stop reason: HOSPADM

## 2018-01-01 RX ORDER — RIFAMPIN 300 MG/1
600 CAPSULE ORAL
Status: DISCONTINUED | OUTPATIENT
Start: 2018-01-01 | End: 2018-01-01 | Stop reason: HOSPADM

## 2018-01-01 RX ORDER — CARVEDILOL 25 MG/1
25 TABLET ORAL EVERY 12 HOURS
Qty: 60 TAB | Refills: 0 | Status: SHIPPED | OUTPATIENT
Start: 2018-01-01

## 2018-01-01 RX ORDER — ASPIRIN 300 MG/1
300 SUPPOSITORY RECTAL DAILY
Status: DISCONTINUED | OUTPATIENT
Start: 2018-01-01 | End: 2018-01-01

## 2018-01-01 RX ORDER — ONDANSETRON 4 MG/1
4 TABLET, ORALLY DISINTEGRATING ORAL
Qty: 20 TAB | Refills: 0 | Status: SHIPPED | OUTPATIENT
Start: 2018-01-01 | End: 2018-01-01

## 2018-01-01 RX ORDER — RIFAMPIN 300 MG/1
600 CAPSULE ORAL DAILY
Qty: 60 CAP | Refills: 0 | Status: SHIPPED | OUTPATIENT
Start: 2018-01-01

## 2018-01-01 RX ORDER — PROPOFOL 10 MG/ML
INJECTION, EMULSION INTRAVENOUS AS NEEDED
Status: DISCONTINUED | OUTPATIENT
Start: 2018-01-01 | End: 2018-01-01 | Stop reason: HOSPADM

## 2018-01-01 RX ORDER — MORPHINE SULFATE 2 MG/ML
2 INJECTION, SOLUTION INTRAMUSCULAR; INTRAVENOUS
Status: DISCONTINUED | OUTPATIENT
Start: 2018-01-01 | End: 2018-01-01

## 2018-01-01 RX ORDER — LEVOFLOXACIN 500 MG/1
500 TABLET, FILM COATED ORAL
Qty: 2 TAB | Refills: 0 | Status: SHIPPED | OUTPATIENT
Start: 2018-01-01

## 2018-01-01 RX ORDER — HEPARIN SODIUM 1000 [USP'U]/ML
3800 INJECTION, SOLUTION INTRAVENOUS; SUBCUTANEOUS
Status: DISCONTINUED | OUTPATIENT
Start: 2018-01-01 | End: 2018-01-01 | Stop reason: HOSPADM

## 2018-01-01 RX ORDER — HYDRALAZINE HYDROCHLORIDE 50 MG/1
100 TABLET, FILM COATED ORAL 3 TIMES DAILY
Status: DISCONTINUED | OUTPATIENT
Start: 2018-01-01 | End: 2018-01-01 | Stop reason: HOSPADM

## 2018-01-01 RX ORDER — SODIUM CHLORIDE 0.9 % (FLUSH) 0.9 %
5-10 SYRINGE (ML) INJECTION EVERY 8 HOURS
Status: DISCONTINUED | OUTPATIENT
Start: 2018-01-01 | End: 2018-01-01 | Stop reason: HOSPADM

## 2018-01-01 RX ORDER — HEPARIN SODIUM 1000 [USP'U]/ML
INJECTION, SOLUTION INTRAVENOUS; SUBCUTANEOUS AS NEEDED
Status: DISCONTINUED | OUTPATIENT
Start: 2018-01-01 | End: 2018-01-01 | Stop reason: HOSPADM

## 2018-01-01 RX ORDER — ONDANSETRON HYDROCHLORIDE 4 MG/2ML
4 INJECTION, SOLUTION INTRAMUSCULAR; INTRAVENOUS
Status: COMPLETED | OUTPATIENT
Start: 2018-01-01 | End: 2018-01-01

## 2018-01-01 RX ORDER — ISONIAZID 300 MG/1
300 TABLET ORAL ONCE
Status: COMPLETED | OUTPATIENT
Start: 2018-01-01 | End: 2018-01-01

## 2018-01-01 RX ORDER — ONDANSETRON 2 MG/ML
4 INJECTION INTRAMUSCULAR; INTRAVENOUS
Status: DISCONTINUED | OUTPATIENT
Start: 2018-01-01 | End: 2018-01-01 | Stop reason: SDUPTHER

## 2018-01-01 RX ORDER — MIDAZOLAM HYDROCHLORIDE 1 MG/ML
1-2 INJECTION, SOLUTION INTRAMUSCULAR; INTRAVENOUS
Status: DISCONTINUED | OUTPATIENT
Start: 2018-01-01 | End: 2018-01-01

## 2018-01-01 RX ORDER — MORPHINE SULFATE 2 MG/ML
2 INJECTION, SOLUTION INTRAMUSCULAR; INTRAVENOUS
Status: DISPENSED | OUTPATIENT
Start: 2018-01-01 | End: 2018-01-01

## 2018-01-01 RX ORDER — FLUOXETINE 10 MG/1
10 CAPSULE ORAL DAILY
Status: DISCONTINUED | OUTPATIENT
Start: 2018-01-01 | End: 2018-01-01 | Stop reason: HOSPADM

## 2018-01-01 RX ORDER — LORAZEPAM 2 MG/ML
4 INJECTION INTRAMUSCULAR ONCE
Status: COMPLETED | OUTPATIENT
Start: 2018-01-01 | End: 2018-01-01

## 2018-01-01 RX ORDER — ONDANSETRON 4 MG/1
4 TABLET, ORALLY DISINTEGRATING ORAL
Status: DISCONTINUED | OUTPATIENT
Start: 2018-01-01 | End: 2018-01-01

## 2018-01-01 RX ORDER — IPRATROPIUM BROMIDE AND ALBUTEROL SULFATE 2.5; .5 MG/3ML; MG/3ML
3 SOLUTION RESPIRATORY (INHALATION)
Status: COMPLETED | OUTPATIENT
Start: 2018-01-01 | End: 2018-01-01

## 2018-01-01 RX ORDER — LOSARTAN POTASSIUM 25 MG/1
100 TABLET ORAL DAILY
Status: DISCONTINUED | OUTPATIENT
Start: 2018-01-01 | End: 2018-01-01 | Stop reason: HOSPADM

## 2018-01-01 RX ORDER — DEXTROSE MONOHYDRATE 100 MG/ML
30 INJECTION, SOLUTION INTRAVENOUS ONCE
Status: DISCONTINUED | OUTPATIENT
Start: 2018-01-01 | End: 2018-01-01 | Stop reason: SDUPTHER

## 2018-01-01 RX ORDER — ETHAMBUTOL HYDROCHLORIDE 400 MG/1
800 TABLET, FILM COATED ORAL
Status: DISCONTINUED | OUTPATIENT
Start: 2018-01-01 | End: 2018-01-01 | Stop reason: HOSPADM

## 2018-01-01 RX ORDER — AMLODIPINE BESYLATE 5 MG/1
5 TABLET ORAL ONCE
Status: COMPLETED | OUTPATIENT
Start: 2018-01-01 | End: 2018-01-01

## 2018-01-01 RX ORDER — CHLORHEXIDINE GLUCONATE 1.2 MG/ML
10 RINSE ORAL EVERY 12 HOURS
Status: DISCONTINUED | OUTPATIENT
Start: 2018-01-01 | End: 2018-01-01

## 2018-01-01 RX ORDER — MINOXIDIL 2.5 MG/1
10 TABLET ORAL 2 TIMES DAILY
Status: DISCONTINUED | OUTPATIENT
Start: 2018-01-01 | End: 2018-01-01 | Stop reason: HOSPADM

## 2018-01-01 RX ORDER — ISONIAZID 300 MG/1
300 TABLET ORAL DAILY
Status: DISCONTINUED | OUTPATIENT
Start: 2018-01-01 | End: 2018-01-01

## 2018-01-01 RX ORDER — LOSARTAN POTASSIUM 100 MG/1
100 TABLET ORAL DAILY
Status: ON HOLD | COMMUNITY
End: 2018-01-01

## 2018-01-01 RX ORDER — LEVOFLOXACIN 5 MG/ML
500 INJECTION, SOLUTION INTRAVENOUS
Status: DISCONTINUED | OUTPATIENT
Start: 2018-01-01 | End: 2018-01-01

## 2018-01-01 RX ORDER — HYDRALAZINE HYDROCHLORIDE 50 MG/1
50 TABLET, FILM COATED ORAL 2 TIMES DAILY
Status: DISCONTINUED | OUTPATIENT
Start: 2018-01-01 | End: 2018-01-01

## 2018-01-01 RX ORDER — PYRIDOXINE HCL (VITAMIN B6) 100 MG
100 TABLET ORAL DAILY
Qty: 30 TAB | Refills: 0 | Status: SHIPPED | OUTPATIENT
Start: 2018-01-01

## 2018-01-01 RX ORDER — DICYCLOMINE HYDROCHLORIDE 20 MG/1
20 TABLET ORAL EVERY 6 HOURS
Qty: 20 TAB | Refills: 0 | Status: SHIPPED | OUTPATIENT
Start: 2018-01-01 | End: 2018-01-01

## 2018-01-01 RX ORDER — HEPARIN SODIUM 200 [USP'U]/100ML
INJECTION, SOLUTION INTRAVENOUS
Status: DISCONTINUED | OUTPATIENT
Start: 2018-01-01 | End: 2018-01-01 | Stop reason: HOSPADM

## 2018-01-01 RX ORDER — POTASSIUM CHLORIDE 1.5 G/1.77G
20 POWDER, FOR SOLUTION ORAL
Status: COMPLETED | OUTPATIENT
Start: 2018-01-01 | End: 2018-01-01

## 2018-01-01 RX ORDER — CLONIDINE HYDROCHLORIDE 0.1 MG/1
0.2 TABLET ORAL
Status: COMPLETED | OUTPATIENT
Start: 2018-01-01 | End: 2018-01-01

## 2018-01-01 RX ORDER — GLYCERIN ADULT
1 SUPPOSITORY, RECTAL RECTAL ONCE
Status: COMPLETED | OUTPATIENT
Start: 2018-01-01 | End: 2018-01-01

## 2018-01-01 RX ORDER — HYDRALAZINE HYDROCHLORIDE 100 MG/1
100 TABLET, FILM COATED ORAL 3 TIMES DAILY
Qty: 90 TAB | Refills: 0 | Status: SHIPPED | OUTPATIENT
Start: 2018-01-01 | End: 2018-01-01

## 2018-01-01 RX ORDER — PROPOFOL 10 MG/ML
0-50 INJECTION, EMULSION INTRAVENOUS
Status: DISCONTINUED | OUTPATIENT
Start: 2018-01-01 | End: 2018-01-01

## 2018-01-01 RX ORDER — PROPOFOL 10 MG/ML
INJECTION, EMULSION INTRAVENOUS
Status: COMPLETED
Start: 2018-01-01 | End: 2018-01-01

## 2018-01-01 RX ORDER — ETHAMBUTOL HYDROCHLORIDE 400 MG/1
20 TABLET, FILM COATED ORAL
Status: DISCONTINUED | OUTPATIENT
Start: 2018-01-01 | End: 2018-01-01 | Stop reason: HOSPADM

## 2018-01-01 RX ORDER — FENTANYL CITRATE 50 UG/ML
12.5-1 INJECTION, SOLUTION INTRAMUSCULAR; INTRAVENOUS
Status: DISCONTINUED | OUTPATIENT
Start: 2018-01-01 | End: 2018-01-01 | Stop reason: HOSPADM

## 2018-01-01 RX ORDER — AMLODIPINE BESYLATE 10 MG/1
10 TABLET ORAL DAILY
Status: ON HOLD | COMMUNITY
End: 2018-01-01

## 2018-01-01 RX ORDER — MINOXIDIL 2.5 MG/1
2.5 TABLET ORAL DAILY
Status: DISCONTINUED | OUTPATIENT
Start: 2018-01-01 | End: 2018-01-01 | Stop reason: HOSPADM

## 2018-01-01 RX ORDER — HYDRALAZINE HYDROCHLORIDE 50 MG/1
100 TABLET, FILM COATED ORAL 3 TIMES DAILY
Status: DISCONTINUED | OUTPATIENT
Start: 2018-01-01 | End: 2018-01-01

## 2018-01-01 RX ORDER — HEPARIN SODIUM 1000 [USP'U]/ML
INJECTION, SOLUTION INTRAVENOUS; SUBCUTANEOUS
Status: DISCONTINUED
Start: 2018-01-01 | End: 2018-01-01 | Stop reason: HOSPADM

## 2018-01-01 RX ORDER — LORAZEPAM 2 MG/ML
2 INJECTION INTRAMUSCULAR ONCE
Status: COMPLETED | OUTPATIENT
Start: 2018-01-01 | End: 2018-01-01

## 2018-01-01 RX ORDER — HALOPERIDOL 5 MG/ML
INJECTION INTRAMUSCULAR
Status: COMPLETED
Start: 2018-01-01 | End: 2018-01-01

## 2018-01-01 RX ORDER — SUCCINYLCHOLINE CHLORIDE 20 MG/ML
100 INJECTION INTRAMUSCULAR; INTRAVENOUS
Status: COMPLETED | OUTPATIENT
Start: 2018-01-01 | End: 2018-01-01

## 2018-01-01 RX ORDER — ETHAMBUTOL HYDROCHLORIDE 400 MG/1
800 TABLET, FILM COATED ORAL DAILY
COMMUNITY
End: 2018-01-01

## 2018-01-01 RX ORDER — SODIUM CHLORIDE 9 MG/ML
50 INJECTION, SOLUTION INTRAVENOUS CONTINUOUS
Status: DISCONTINUED | OUTPATIENT
Start: 2018-01-01 | End: 2018-01-01

## 2018-01-01 RX ORDER — HEPARIN SODIUM 10000 [USP'U]/ML
3000 INJECTION, SOLUTION INTRAVENOUS; SUBCUTANEOUS ONCE
Status: COMPLETED | OUTPATIENT
Start: 2018-01-01 | End: 2018-01-01

## 2018-01-01 RX ORDER — PROPOFOL 10 MG/ML
INJECTION, EMULSION INTRAVENOUS
Status: DISPENSED
Start: 2018-01-01 | End: 2018-01-01

## 2018-01-01 RX ORDER — FAMOTIDINE 20 MG/1
20 TABLET, FILM COATED ORAL DAILY
Qty: 10 TAB | Refills: 0 | Status: SHIPPED | OUTPATIENT
Start: 2018-01-01 | End: 2018-01-01

## 2018-01-01 RX ORDER — DOCUSATE SODIUM 100 MG/1
100 CAPSULE, LIQUID FILLED ORAL 2 TIMES DAILY
Status: DISCONTINUED | OUTPATIENT
Start: 2018-01-01 | End: 2018-01-01 | Stop reason: SDUPTHER

## 2018-01-01 RX ORDER — POLYETHYLENE GLYCOL 3350, SODIUM SULFATE ANHYDROUS, SODIUM BICARBONATE, SODIUM CHLORIDE, POTASSIUM CHLORIDE 236; 22.74; 6.74; 5.86; 2.97 G/4L; G/4L; G/4L; G/4L; G/4L
500 POWDER, FOR SOLUTION ORAL ONCE
Status: DISPENSED | OUTPATIENT
Start: 2018-01-01 | End: 2018-01-01

## 2018-01-01 RX ORDER — ETOMIDATE 2 MG/ML
20 INJECTION INTRAVENOUS
Status: COMPLETED | OUTPATIENT
Start: 2018-01-01 | End: 2018-01-01

## 2018-01-01 RX ORDER — MINOXIDIL 2.5 MG/1
2.5 TABLET ORAL ONCE
Status: COMPLETED | OUTPATIENT
Start: 2018-01-01 | End: 2018-01-01

## 2018-01-01 RX ORDER — SODIUM CHLORIDE, SODIUM LACTATE, POTASSIUM CHLORIDE, CALCIUM CHLORIDE 600; 310; 30; 20 MG/100ML; MG/100ML; MG/100ML; MG/100ML
50 INJECTION, SOLUTION INTRAVENOUS CONTINUOUS
Status: DISCONTINUED | OUTPATIENT
Start: 2018-01-01 | End: 2018-01-01 | Stop reason: HOSPADM

## 2018-01-01 RX ORDER — LANOLIN ALCOHOL/MO/W.PET/CERES
3 CREAM (GRAM) TOPICAL
Qty: 30 TAB | Refills: 0 | Status: SHIPPED | OUTPATIENT
Start: 2018-01-01

## 2018-01-01 RX ORDER — SODIUM CHLORIDE 0.9 % (FLUSH) 0.9 %
5-10 SYRINGE (ML) INJECTION AS NEEDED
Status: DISCONTINUED | OUTPATIENT
Start: 2018-01-01 | End: 2018-01-01

## 2018-01-01 RX ORDER — LEVOFLOXACIN 5 MG/ML
750 INJECTION, SOLUTION INTRAVENOUS ONCE
Status: COMPLETED | OUTPATIENT
Start: 2018-01-01 | End: 2018-01-01

## 2018-01-01 RX ORDER — SODIUM CHLORIDE 9 MG/ML
100 INJECTION, SOLUTION INTRAVENOUS CONTINUOUS
Status: DISCONTINUED | OUTPATIENT
Start: 2018-01-01 | End: 2018-01-01

## 2018-01-01 RX ORDER — HYDRALAZINE HYDROCHLORIDE 50 MG/1
50 TABLET, FILM COATED ORAL 3 TIMES DAILY
Status: DISCONTINUED | OUTPATIENT
Start: 2018-01-01 | End: 2018-01-01

## 2018-01-01 RX ORDER — RIFAMPIN 300 MG/1
600 CAPSULE ORAL DAILY
Status: ON HOLD | COMMUNITY
End: 2018-01-01

## 2018-01-01 RX ORDER — LOSARTAN POTASSIUM 100 MG/1
100 TABLET ORAL DAILY
Qty: 30 TAB | Refills: 0 | Status: SHIPPED | OUTPATIENT
Start: 2018-01-01 | End: 2018-01-01

## 2018-01-01 RX ORDER — ONDANSETRON 4 MG/1
4 TABLET, ORALLY DISINTEGRATING ORAL
Status: DISCONTINUED | OUTPATIENT
Start: 2018-01-01 | End: 2018-01-01 | Stop reason: HOSPADM

## 2018-01-01 RX ORDER — MIDAZOLAM HYDROCHLORIDE 1 MG/ML
.5-2 INJECTION, SOLUTION INTRAMUSCULAR; INTRAVENOUS
Status: DISCONTINUED | OUTPATIENT
Start: 2018-01-01 | End: 2018-01-01 | Stop reason: HOSPADM

## 2018-01-01 RX ORDER — HYDRALAZINE HYDROCHLORIDE 20 MG/ML
INJECTION INTRAMUSCULAR; INTRAVENOUS AS NEEDED
Status: DISCONTINUED | OUTPATIENT
Start: 2018-01-01 | End: 2018-01-01 | Stop reason: HOSPADM

## 2018-01-01 RX ORDER — METOPROLOL TARTRATE 25 MG/1
25 TABLET, FILM COATED ORAL EVERY 6 HOURS
Status: DISCONTINUED | OUTPATIENT
Start: 2018-01-01 | End: 2018-01-01

## 2018-01-01 RX ORDER — CLONIDINE 0.3 MG/24H
1 PATCH, EXTENDED RELEASE TRANSDERMAL
Qty: 4 PATCH | Refills: 0 | Status: SHIPPED | OUTPATIENT
Start: 2018-01-01 | End: 2018-01-01

## 2018-01-01 RX ORDER — CLONIDINE HYDROCHLORIDE 0.1 MG/1
0.3 TABLET ORAL 3 TIMES DAILY
Status: DISCONTINUED | OUTPATIENT
Start: 2018-01-01 | End: 2018-01-01 | Stop reason: HOSPADM

## 2018-01-01 RX ORDER — HALOPERIDOL 5 MG/ML
2.5 INJECTION INTRAMUSCULAR
Status: DISCONTINUED | OUTPATIENT
Start: 2018-01-01 | End: 2018-01-01 | Stop reason: HOSPADM

## 2018-01-01 RX ORDER — MIDAZOLAM HYDROCHLORIDE 1 MG/ML
INJECTION, SOLUTION INTRAMUSCULAR; INTRAVENOUS
Status: COMPLETED
Start: 2018-01-01 | End: 2018-01-01

## 2018-01-01 RX ORDER — AZITHROMYCIN 250 MG/1
500 TABLET, FILM COATED ORAL
Status: DISCONTINUED | OUTPATIENT
Start: 2018-01-01 | End: 2018-01-01 | Stop reason: HOSPADM

## 2018-01-01 RX ORDER — BISACODYL 5 MG
5 TABLET, DELAYED RELEASE (ENTERIC COATED) ORAL
Qty: 10 TAB | Refills: 0 | Status: SHIPPED | OUTPATIENT
Start: 2018-01-01 | End: 2018-01-01

## 2018-01-01 RX ORDER — CLONIDINE HYDROCHLORIDE 0.1 MG/1
0.3 TABLET ORAL 2 TIMES DAILY
Status: DISCONTINUED | OUTPATIENT
Start: 2018-01-01 | End: 2018-01-01 | Stop reason: HOSPADM

## 2018-01-01 RX ORDER — DIPHENHYDRAMINE HYDROCHLORIDE 50 MG/ML
6.25 INJECTION, SOLUTION INTRAMUSCULAR; INTRAVENOUS ONCE
Status: COMPLETED | OUTPATIENT
Start: 2018-01-01 | End: 2018-01-01

## 2018-01-01 RX ORDER — RIFAMPIN 300 MG/1
600 CAPSULE ORAL DAILY
Status: DISCONTINUED | OUTPATIENT
Start: 2018-01-01 | End: 2018-01-01 | Stop reason: HOSPADM

## 2018-01-01 RX ORDER — HYDROMORPHONE HYDROCHLORIDE 1 MG/ML
0.25 INJECTION, SOLUTION INTRAMUSCULAR; INTRAVENOUS; SUBCUTANEOUS ONCE
Status: COMPLETED | OUTPATIENT
Start: 2018-01-01 | End: 2018-01-01

## 2018-01-01 RX ORDER — RIFAMPIN 300 MG/1
300 CAPSULE ORAL ONCE
Status: COMPLETED | OUTPATIENT
Start: 2018-01-01 | End: 2018-01-01

## 2018-01-01 RX ORDER — INSULIN LISPRO 100 [IU]/ML
INJECTION, SOLUTION INTRAVENOUS; SUBCUTANEOUS EVERY 6 HOURS
Status: DISCONTINUED | OUTPATIENT
Start: 2018-01-01 | End: 2018-01-01

## 2018-01-01 RX ORDER — HYDRALAZINE HYDROCHLORIDE 25 MG/1
25 TABLET, FILM COATED ORAL
Status: DISCONTINUED | OUTPATIENT
Start: 2018-01-01 | End: 2018-01-01 | Stop reason: HOSPADM

## 2018-01-01 RX ORDER — FACIAL-BODY WIPES
10 EACH TOPICAL ONCE
Status: COMPLETED | OUTPATIENT
Start: 2018-01-01 | End: 2018-01-01

## 2018-01-01 RX ORDER — DIPHENHYDRAMINE HYDROCHLORIDE 50 MG/ML
12.5 INJECTION, SOLUTION INTRAMUSCULAR; INTRAVENOUS ONCE
Status: COMPLETED | OUTPATIENT
Start: 2018-01-01 | End: 2018-01-01

## 2018-01-01 RX ORDER — HYDROMORPHONE HYDROCHLORIDE 2 MG/1
0.5 TABLET ORAL ONCE
Status: COMPLETED | OUTPATIENT
Start: 2018-01-01 | End: 2018-01-01

## 2018-01-01 RX ORDER — AMLODIPINE BESYLATE 10 MG/1
10 TABLET ORAL
Status: COMPLETED | OUTPATIENT
Start: 2018-01-01 | End: 2018-01-01

## 2018-01-01 RX ORDER — MINOXIDIL 2.5 MG/1
2.5 TABLET ORAL DAILY
Status: DISCONTINUED | OUTPATIENT
Start: 2018-01-01 | End: 2018-01-01

## 2018-01-01 RX ORDER — FLUOXETINE 10 MG/1
10 CAPSULE ORAL DAILY
Qty: 30 CAP | Refills: 0 | Status: SHIPPED | OUTPATIENT
Start: 2018-01-01

## 2018-01-01 RX ORDER — PYRAZINAMIDE TABLET 500 MG/1
1000 TABLET ORAL
Status: DISCONTINUED | OUTPATIENT
Start: 2018-01-01 | End: 2018-01-01

## 2018-01-01 RX ORDER — PYRAZINAMIDE TABLET 500 MG/1
500 TABLET ORAL DAILY
COMMUNITY
End: 2018-01-01

## 2018-01-01 RX ORDER — GUAIFENESIN 100 MG/5ML
81 LIQUID (ML) ORAL DAILY
Qty: 30 TAB | Refills: 0 | Status: SHIPPED | OUTPATIENT
Start: 2018-01-01

## 2018-01-01 RX ORDER — FAMOTIDINE 20 MG/1
20 TABLET, FILM COATED ORAL ONCE
Status: DISCONTINUED | OUTPATIENT
Start: 2018-01-01 | End: 2018-01-01 | Stop reason: HOSPADM

## 2018-01-01 RX ORDER — LORAZEPAM 2 MG/ML
INJECTION INTRAMUSCULAR
Status: DISPENSED
Start: 2018-01-01 | End: 2019-01-01

## 2018-01-01 RX ORDER — CEFAZOLIN SODIUM 2 G/50ML
2 SOLUTION INTRAVENOUS ONCE
Status: COMPLETED | OUTPATIENT
Start: 2018-01-01 | End: 2018-01-01

## 2018-01-01 RX ORDER — CLONIDINE HYDROCHLORIDE 0.1 MG/1
0.3 TABLET ORAL 3 TIMES DAILY
Status: DISCONTINUED | OUTPATIENT
Start: 2018-01-01 | End: 2018-01-01

## 2018-01-01 RX ORDER — LABETALOL HCL 20 MG/4 ML
10 SYRINGE (ML) INTRAVENOUS
Status: DISCONTINUED | OUTPATIENT
Start: 2018-01-01 | End: 2018-01-01 | Stop reason: HOSPADM

## 2018-01-01 RX ORDER — PYRAZINAMIDE TABLET 500 MG/1
1000 TABLET ORAL
Qty: 24 TAB | Refills: 0 | Status: SHIPPED | OUTPATIENT
Start: 2018-01-01

## 2018-01-01 RX ORDER — MINOXIDIL 2.5 MG/1
2.5 TABLET ORAL 2 TIMES DAILY
Qty: 60 TAB | Refills: 0 | Status: SHIPPED | OUTPATIENT
Start: 2018-01-01 | End: 2018-01-01

## 2018-01-01 RX ORDER — MINOXIDIL 10 MG/1
10 TABLET ORAL 2 TIMES DAILY
Qty: 60 TAB | Refills: 0 | Status: SHIPPED | OUTPATIENT
Start: 2018-01-01

## 2018-01-01 RX ORDER — ADHESIVE BANDAGE
30 BANDAGE TOPICAL
Status: DISCONTINUED | OUTPATIENT
Start: 2018-01-01 | End: 2018-01-01 | Stop reason: HOSPADM

## 2018-01-01 RX ORDER — ACETAMINOPHEN 500 MG
1000 TABLET ORAL ONCE
Status: COMPLETED | OUTPATIENT
Start: 2018-01-01 | End: 2018-01-01

## 2018-01-01 RX ORDER — HYDROMORPHONE HYDROCHLORIDE 2 MG/ML
0.5 INJECTION, SOLUTION INTRAMUSCULAR; INTRAVENOUS; SUBCUTANEOUS
Status: DISCONTINUED | OUTPATIENT
Start: 2018-01-01 | End: 2018-01-01 | Stop reason: HOSPADM

## 2018-01-01 RX ORDER — FAMOTIDINE 20 MG/1
20 TABLET, FILM COATED ORAL EVERY 12 HOURS
Status: DISCONTINUED | OUTPATIENT
Start: 2018-01-01 | End: 2018-01-01

## 2018-01-01 RX ORDER — RIFAMPIN 300 MG/1
600 CAPSULE ORAL EVERY 24 HOURS
Status: DISCONTINUED | OUTPATIENT
Start: 2018-01-01 | End: 2018-01-01 | Stop reason: HOSPADM

## 2018-01-01 RX ORDER — LANOLIN ALCOHOL/MO/W.PET/CERES
50 CREAM (GRAM) TOPICAL DAILY
Status: DISCONTINUED | OUTPATIENT
Start: 2018-01-01 | End: 2018-01-01 | Stop reason: HOSPADM

## 2018-01-01 RX ORDER — LOSARTAN POTASSIUM 100 MG/1
100 TABLET ORAL DAILY
Qty: 30 TAB | Refills: 0 | Status: ON HOLD | OUTPATIENT
Start: 2018-01-01 | End: 2018-01-01

## 2018-01-01 RX ORDER — RIFAMPIN 300 MG/1
600 CAPSULE ORAL DAILY
Status: DISCONTINUED | OUTPATIENT
Start: 2018-01-01 | End: 2018-01-01

## 2018-01-01 RX ORDER — FUROSEMIDE 10 MG/ML
40 INJECTION INTRAMUSCULAR; INTRAVENOUS
Status: COMPLETED | OUTPATIENT
Start: 2018-01-01 | End: 2018-01-01

## 2018-01-01 RX ORDER — LORAZEPAM 2 MG/ML
1 INJECTION INTRAMUSCULAR AS NEEDED
Status: DISCONTINUED | OUTPATIENT
Start: 2018-01-01 | End: 2018-01-01 | Stop reason: HOSPADM

## 2018-01-01 RX ORDER — ISONIAZID 300 MG/1
300 TABLET ORAL DAILY
Qty: 30 TAB | Refills: 0 | Status: SHIPPED | OUTPATIENT
Start: 2018-01-01

## 2018-01-01 RX ORDER — CLONIDINE HYDROCHLORIDE 0.1 MG/1
0.1 TABLET ORAL
Status: DISCONTINUED | OUTPATIENT
Start: 2018-01-01 | End: 2018-01-01

## 2018-01-01 RX ORDER — SODIUM CHLORIDE 0.9 % (FLUSH) 0.9 %
5-10 SYRINGE (ML) INJECTION EVERY 8 HOURS
Status: DISCONTINUED | OUTPATIENT
Start: 2018-01-01 | End: 2018-01-01 | Stop reason: SDUPTHER

## 2018-01-01 RX ORDER — METOPROLOL TARTRATE 5 MG/5ML
5 INJECTION INTRAVENOUS
Status: COMPLETED | OUTPATIENT
Start: 2018-01-01 | End: 2018-01-01

## 2018-01-01 RX ORDER — MORPHINE SULFATE 2 MG/ML
2-4 INJECTION, SOLUTION INTRAMUSCULAR; INTRAVENOUS
Status: DISCONTINUED | OUTPATIENT
Start: 2018-01-01 | End: 2018-01-01 | Stop reason: HOSPADM

## 2018-01-01 RX ORDER — ETHAMBUTOL HYDROCHLORIDE 400 MG/1
800 TABLET, FILM COATED ORAL ONCE
Status: COMPLETED | OUTPATIENT
Start: 2018-01-01 | End: 2018-01-01

## 2018-01-01 RX ORDER — DIPHENHYDRAMINE HYDROCHLORIDE 50 MG/ML
25 INJECTION, SOLUTION INTRAMUSCULAR; INTRAVENOUS
Status: DISCONTINUED | OUTPATIENT
Start: 2018-01-01 | End: 2018-01-01 | Stop reason: HOSPADM

## 2018-01-01 RX ORDER — HEPARIN SODIUM 1000 [USP'U]/ML
4000 INJECTION, SOLUTION INTRAVENOUS; SUBCUTANEOUS
Status: DISCONTINUED | OUTPATIENT
Start: 2018-01-01 | End: 2018-01-01 | Stop reason: HOSPADM

## 2018-01-01 RX ORDER — ESMOLOL HYDROCHLORIDE 10 MG/ML
0-200 INJECTION, SOLUTION INTRAVENOUS
Status: DISCONTINUED | OUTPATIENT
Start: 2018-01-01 | End: 2018-01-01

## 2018-01-01 RX ORDER — SODIUM CHLORIDE 0.9 % (FLUSH) 0.9 %
5-10 SYRINGE (ML) INJECTION EVERY 8 HOURS
Status: DISCONTINUED | OUTPATIENT
Start: 2018-01-01 | End: 2019-01-01

## 2018-01-01 RX ORDER — ATORVASTATIN CALCIUM 40 MG/1
40 TABLET, FILM COATED ORAL
Qty: 30 TAB | Refills: 0 | Status: SHIPPED | OUTPATIENT
Start: 2018-01-01

## 2018-01-01 RX ORDER — SODIUM CHLORIDE 9 MG/ML
10 INJECTION INTRAMUSCULAR; INTRAVENOUS; SUBCUTANEOUS
Status: COMPLETED | OUTPATIENT
Start: 2018-01-01 | End: 2018-01-01

## 2018-01-01 RX ORDER — SODIUM CHLORIDE 9 MG/ML
250 INJECTION, SOLUTION INTRAVENOUS AS NEEDED
Status: DISCONTINUED | OUTPATIENT
Start: 2018-01-01 | End: 2019-01-01

## 2018-01-01 RX ORDER — HEPARIN SODIUM 1000 [USP'U]/ML
10000 INJECTION, SOLUTION INTRAVENOUS; SUBCUTANEOUS AS NEEDED
Status: DISCONTINUED | OUTPATIENT
Start: 2018-01-01 | End: 2018-01-01 | Stop reason: HOSPADM

## 2018-01-01 RX ORDER — GLYCERIN ADULT
1 SUPPOSITORY, RECTAL RECTAL DAILY
Qty: 10 SUPPOSITORY | Refills: 0 | Status: ON HOLD | OUTPATIENT
Start: 2018-01-01 | End: 2018-01-01

## 2018-01-01 RX ORDER — HYDRALAZINE HYDROCHLORIDE 50 MG/1
25 TABLET, FILM COATED ORAL 3 TIMES DAILY
Status: ON HOLD | COMMUNITY
End: 2018-01-01

## 2018-01-01 RX ORDER — RIFAMPIN 300 MG/1
300 CAPSULE ORAL DAILY
Status: DISCONTINUED | OUTPATIENT
Start: 2018-01-01 | End: 2018-01-01 | Stop reason: HOSPADM

## 2018-01-01 RX ORDER — ONDANSETRON 2 MG/ML
INJECTION INTRAMUSCULAR; INTRAVENOUS AS NEEDED
Status: DISCONTINUED | OUTPATIENT
Start: 2018-01-01 | End: 2018-01-01 | Stop reason: HOSPADM

## 2018-01-01 RX ORDER — BISACODYL 5 MG
5 TABLET, DELAYED RELEASE (ENTERIC COATED) ORAL
Qty: 10 TAB | Refills: 0 | Status: SHIPPED | OUTPATIENT
Start: 2018-01-01

## 2018-01-01 RX ORDER — MINOXIDIL 2.5 MG/1
10 TABLET ORAL 2 TIMES DAILY
Status: DISCONTINUED | OUTPATIENT
Start: 2018-01-01 | End: 2018-01-01

## 2018-01-01 RX ORDER — HYDRALAZINE HYDROCHLORIDE 20 MG/ML
10 INJECTION INTRAMUSCULAR; INTRAVENOUS
Status: DISCONTINUED | OUTPATIENT
Start: 2018-01-01 | End: 2018-01-01

## 2018-01-01 RX ORDER — ACETAMINOPHEN 325 MG/1
650 TABLET ORAL
Status: DISCONTINUED | OUTPATIENT
Start: 2018-01-01 | End: 2018-01-01

## 2018-01-01 RX ORDER — ALBUTEROL SULFATE 0.83 MG/ML
2.5 SOLUTION RESPIRATORY (INHALATION)
Status: DISCONTINUED | OUTPATIENT
Start: 2018-01-01 | End: 2018-01-01 | Stop reason: HOSPADM

## 2018-01-01 RX ORDER — HYDRALAZINE HYDROCHLORIDE 25 MG/1
25 TABLET, FILM COATED ORAL ONCE
Status: COMPLETED | OUTPATIENT
Start: 2018-01-01 | End: 2018-01-01

## 2018-01-01 RX ORDER — MIDAZOLAM HYDROCHLORIDE 1 MG/ML
INJECTION, SOLUTION INTRAMUSCULAR; INTRAVENOUS AS NEEDED
Status: DISCONTINUED | OUTPATIENT
Start: 2018-01-01 | End: 2018-01-01 | Stop reason: HOSPADM

## 2018-01-01 RX ORDER — OXYCODONE AND ACETAMINOPHEN 5; 325 MG/1; MG/1
1 TABLET ORAL
Qty: 20 TAB | Refills: 0 | Status: SHIPPED | OUTPATIENT
Start: 2018-01-01 | End: 2018-01-01

## 2018-01-01 RX ORDER — AMLODIPINE BESYLATE 10 MG/1
10 TABLET ORAL DAILY
Qty: 30 TAB | Refills: 0 | Status: SHIPPED | OUTPATIENT
Start: 2018-01-01 | End: 2018-01-01

## 2018-01-01 RX ORDER — MORPHINE SULFATE 2 MG/ML
INJECTION, SOLUTION INTRAMUSCULAR; INTRAVENOUS
Status: COMPLETED
Start: 2018-01-01 | End: 2018-01-01

## 2018-01-01 RX ORDER — ACETAMINOPHEN 650 MG/1
650 SUPPOSITORY RECTAL
Status: DISCONTINUED | OUTPATIENT
Start: 2018-01-01 | End: 2018-01-01 | Stop reason: HOSPADM

## 2018-01-01 RX ORDER — LEVETIRACETAM 5 MG/ML
500 INJECTION INTRAVASCULAR EVERY 24 HOURS
Status: DISCONTINUED | OUTPATIENT
Start: 2018-01-01 | End: 2018-01-01 | Stop reason: DRUGHIGH

## 2018-01-01 RX ORDER — SEVELAMER CARBONATE 800 MG/1
800 TABLET, FILM COATED ORAL
Status: DISCONTINUED | OUTPATIENT
Start: 2018-01-01 | End: 2018-01-01

## 2018-01-01 RX ORDER — CARVEDILOL 25 MG/1
25 TABLET ORAL
Status: COMPLETED | OUTPATIENT
Start: 2018-01-01 | End: 2018-01-01

## 2018-01-01 RX ORDER — MORPHINE SULFATE 4 MG/ML
4 INJECTION, SOLUTION INTRAMUSCULAR; INTRAVENOUS
Status: COMPLETED | OUTPATIENT
Start: 2018-01-01 | End: 2018-01-01

## 2018-01-01 RX ORDER — METOPROLOL TARTRATE 5 MG/5ML
5 INJECTION INTRAVENOUS
Status: DISCONTINUED | OUTPATIENT
Start: 2018-01-01 | End: 2018-01-01

## 2018-01-01 RX ORDER — HYDRALAZINE HYDROCHLORIDE 50 MG/1
100 TABLET, FILM COATED ORAL 2 TIMES DAILY
Status: DISCONTINUED | OUTPATIENT
Start: 2018-01-01 | End: 2018-01-01 | Stop reason: HOSPADM

## 2018-01-01 RX ORDER — CLONIDINE HYDROCHLORIDE 0.3 MG/1
0.3 TABLET ORAL
Status: COMPLETED | OUTPATIENT
Start: 2018-01-01 | End: 2018-01-01

## 2018-01-01 RX ORDER — ALBUTEROL SULFATE 90 UG/1
2 AEROSOL, METERED RESPIRATORY (INHALATION)
Status: DISCONTINUED | OUTPATIENT
Start: 2018-01-01 | End: 2018-01-01 | Stop reason: CLARIF

## 2018-01-01 RX ORDER — MORPHINE SULFATE 2 MG/ML
2 INJECTION, SOLUTION INTRAMUSCULAR; INTRAVENOUS ONCE
Status: COMPLETED | OUTPATIENT
Start: 2018-01-01 | End: 2018-01-01

## 2018-01-01 RX ORDER — ETHAMBUTOL HYDROCHLORIDE 400 MG/1
20 TABLET, FILM COATED ORAL
Status: DISCONTINUED | OUTPATIENT
Start: 2018-01-01 | End: 2018-01-01

## 2018-01-01 RX ORDER — LIDOCAINE HYDROCHLORIDE 10 MG/ML
INJECTION, SOLUTION EPIDURAL; INFILTRATION; INTRACAUDAL; PERINEURAL AS NEEDED
Status: DISCONTINUED | OUTPATIENT
Start: 2018-01-01 | End: 2018-01-01 | Stop reason: HOSPADM

## 2018-01-01 RX ORDER — HYDRALAZINE HYDROCHLORIDE 100 MG/1
25 TABLET, FILM COATED ORAL 2 TIMES DAILY
Qty: 60 TAB | Refills: 0 | Status: SHIPPED | OUTPATIENT
Start: 2018-01-01 | End: 2018-01-01

## 2018-01-01 RX ORDER — MORPHINE SULFATE 4 MG/ML
INJECTION INTRAVENOUS
Status: DISPENSED
Start: 2018-01-01 | End: 2018-01-01

## 2018-01-01 RX ORDER — GUAIFENESIN 100 MG/5ML
162 LIQUID (ML) ORAL DAILY
Status: DISCONTINUED | OUTPATIENT
Start: 2018-01-01 | End: 2018-01-01 | Stop reason: SDUPTHER

## 2018-01-01 RX ORDER — ETHAMBUTOL HYDROCHLORIDE 400 MG/1
800 TABLET, FILM COATED ORAL
Status: DISCONTINUED | OUTPATIENT
Start: 2018-01-01 | End: 2018-01-01

## 2018-01-01 RX ORDER — INSULIN LISPRO 100 [IU]/ML
INJECTION, SOLUTION INTRAVENOUS; SUBCUTANEOUS
Status: DISCONTINUED | OUTPATIENT
Start: 2018-01-01 | End: 2018-01-01 | Stop reason: HOSPADM

## 2018-01-01 RX ORDER — ASPIRIN 325 MG
325 TABLET ORAL
Status: DISCONTINUED | OUTPATIENT
Start: 2018-01-01 | End: 2018-01-01

## 2018-01-01 RX ORDER — ETHAMBUTOL HYDROCHLORIDE 400 MG/1
800 TABLET, FILM COATED ORAL
Qty: 24 TAB | Refills: 0 | Status: SHIPPED | OUTPATIENT
Start: 2018-01-01

## 2018-01-01 RX ORDER — LABETALOL HYDROCHLORIDE 5 MG/ML
INJECTION, SOLUTION INTRAVENOUS
Status: DISPENSED
Start: 2018-01-01 | End: 2018-01-01

## 2018-01-01 RX ORDER — NITROGLYCERIN 0.4 MG/1
0.4 TABLET SUBLINGUAL
Status: DISCONTINUED | OUTPATIENT
Start: 2018-01-01 | End: 2018-01-01 | Stop reason: HOSPADM

## 2018-01-01 RX ORDER — DOCUSATE SODIUM 100 MG/1
100 CAPSULE, LIQUID FILLED ORAL
Status: DISCONTINUED | OUTPATIENT
Start: 2018-01-01 | End: 2018-01-01

## 2018-01-01 RX ORDER — MINOXIDIL 2.5 MG/1
2.5 TABLET ORAL DAILY
Qty: 30 TAB | Refills: 0 | Status: ON HOLD | OUTPATIENT
Start: 2018-01-01 | End: 2018-01-01

## 2018-01-01 RX ORDER — HEPARIN SODIUM 1000 [USP'U]/ML
60 INJECTION, SOLUTION INTRAVENOUS; SUBCUTANEOUS ONCE
Status: COMPLETED | OUTPATIENT
Start: 2018-01-01 | End: 2018-01-01

## 2018-01-01 RX ORDER — MAGNESIUM CITRATE
296 SOLUTION, ORAL ORAL
Status: COMPLETED | OUTPATIENT
Start: 2018-01-01 | End: 2018-01-01

## 2018-01-01 RX ADMIN — DOCUSATE SODIUM 100 MG: 100 CAPSULE, LIQUID FILLED ORAL at 08:48

## 2018-01-01 RX ADMIN — CARVEDILOL 25 MG: 25 TABLET, FILM COATED ORAL at 09:16

## 2018-01-01 RX ADMIN — RIFAMPIN 600 MG: 300 CAPSULE ORAL at 17:41

## 2018-01-01 RX ADMIN — PYRAZINAMIDE 1000 MG: 500 TABLET ORAL at 21:37

## 2018-01-01 RX ADMIN — CARVEDILOL 25 MG: 25 TABLET, FILM COATED ORAL at 22:17

## 2018-01-01 RX ADMIN — DOCUSATE SODIUM 100 MG: 100 CAPSULE, LIQUID FILLED ORAL at 17:38

## 2018-01-01 RX ADMIN — HEPARIN SODIUM 5000 UNITS: 5000 INJECTION, SOLUTION INTRAVENOUS; SUBCUTANEOUS at 05:32

## 2018-01-01 RX ADMIN — MIDAZOLAM HYDROCHLORIDE 2 MG: 1 INJECTION, SOLUTION INTRAMUSCULAR; INTRAVENOUS at 05:58

## 2018-01-01 RX ADMIN — AMLODIPINE BESYLATE 10 MG: 10 TABLET ORAL at 05:26

## 2018-01-01 RX ADMIN — INSULIN LISPRO 2 UNITS: 100 INJECTION, SOLUTION INTRAVENOUS; SUBCUTANEOUS at 21:17

## 2018-01-01 RX ADMIN — ONDANSETRON 4 MG: 2 INJECTION, SOLUTION INTRAMUSCULAR; INTRAVENOUS at 21:41

## 2018-01-01 RX ADMIN — POLYETHYLENE GLYCOL 3350 17 G: 17 POWDER, FOR SOLUTION ORAL at 08:48

## 2018-01-01 RX ADMIN — HYDRALAZINE HYDROCHLORIDE 20 MG: 20 INJECTION INTRAMUSCULAR; INTRAVENOUS at 17:57

## 2018-01-01 RX ADMIN — DOCUSATE SODIUM 100 MG: 100 CAPSULE, LIQUID FILLED ORAL at 20:00

## 2018-01-01 RX ADMIN — CLONIDINE HYDROCHLORIDE 0.3 MG: 0.1 TABLET ORAL at 18:01

## 2018-01-01 RX ADMIN — Medication 10 ML: at 23:06

## 2018-01-01 RX ADMIN — LOSARTAN POTASSIUM 100 MG: 50 TABLET, FILM COATED ORAL at 17:16

## 2018-01-01 RX ADMIN — ASPIRIN 81 MG CHEWABLE TABLET 81 MG: 81 TABLET CHEWABLE at 09:28

## 2018-01-01 RX ADMIN — ETHAMBUTOL HYDROCHLORIDE 800 MG: 400 TABLET, FILM COATED ORAL at 18:06

## 2018-01-01 RX ADMIN — Medication 10 ML: at 05:44

## 2018-01-01 RX ADMIN — SODIUM CHLORIDE 2.25 G: 900 INJECTION, SOLUTION INTRAVENOUS at 09:14

## 2018-01-01 RX ADMIN — HYDRALAZINE HYDROCHLORIDE 10 MG: 20 INJECTION INTRAMUSCULAR; INTRAVENOUS at 12:41

## 2018-01-01 RX ADMIN — HEPARIN SODIUM 2520 UNITS: 1000 INJECTION, SOLUTION INTRAVENOUS; SUBCUTANEOUS at 14:39

## 2018-01-01 RX ADMIN — DOCUSATE SODIUM 100 MG: 100 CAPSULE, LIQUID FILLED ORAL at 18:00

## 2018-01-01 RX ADMIN — ATORVASTATIN CALCIUM 40 MG: 40 TABLET, FILM COATED ORAL at 21:30

## 2018-01-01 RX ADMIN — LORAZEPAM 4 MG: 2 INJECTION INTRAMUSCULAR; INTRAVENOUS at 21:51

## 2018-01-01 RX ADMIN — ETHAMBUTOL HYDROCHLORIDE 800 MG: 400 TABLET, FILM COATED ORAL at 17:08

## 2018-01-01 RX ADMIN — AMLODIPINE BESYLATE 10 MG: 10 TABLET ORAL at 09:15

## 2018-01-01 RX ADMIN — PYRIDOXINE HCL TAB 50 MG 50 MG: 50 TAB at 16:39

## 2018-01-01 RX ADMIN — LOSARTAN POTASSIUM 100 MG: 50 TABLET ORAL at 15:43

## 2018-01-01 RX ADMIN — HEPARIN SODIUM 1600 UNITS: 1000 INJECTION, SOLUTION INTRAVENOUS; SUBCUTANEOUS at 15:37

## 2018-01-01 RX ADMIN — HEPARIN SODIUM 5000 UNITS: 5000 INJECTION, SOLUTION INTRAVENOUS; SUBCUTANEOUS at 16:48

## 2018-01-01 RX ADMIN — MINOXIDIL 2.5 MG: 2.5 TABLET ORAL at 20:17

## 2018-01-01 RX ADMIN — FLUOXETINE HYDROCHLORIDE 10 MG: 10 CAPSULE ORAL at 08:39

## 2018-01-01 RX ADMIN — SODIUM NITROPRUSSIDE 3 MCG/KG/MIN: 25 INJECTION, SOLUTION, CONCENTRATE INTRAVENOUS at 19:21

## 2018-01-01 RX ADMIN — DOCUSATE SODIUM 100 MG: 100 CAPSULE, LIQUID FILLED ORAL at 10:20

## 2018-01-01 RX ADMIN — AMLODIPINE BESYLATE 10 MG: 10 TABLET ORAL at 10:39

## 2018-01-01 RX ADMIN — POLYETHYLENE GLYCOL 3350 17 G: 17 POWDER, FOR SOLUTION ORAL at 08:44

## 2018-01-01 RX ADMIN — ATORVASTATIN CALCIUM 40 MG: 40 TABLET, FILM COATED ORAL at 21:10

## 2018-01-01 RX ADMIN — HYDRALAZINE HYDROCHLORIDE 10 MG: 20 INJECTION INTRAMUSCULAR; INTRAVENOUS at 12:17

## 2018-01-01 RX ADMIN — RIFAMPIN 300 MG: 300 CAPSULE ORAL at 19:36

## 2018-01-01 RX ADMIN — POLYETHYLENE GLYCOL 3350 17 G: 17 POWDER, FOR SOLUTION ORAL at 09:27

## 2018-01-01 RX ADMIN — HEPARIN SODIUM 5000 UNITS: 5000 INJECTION, SOLUTION INTRAVENOUS; SUBCUTANEOUS at 11:29

## 2018-01-01 RX ADMIN — METHYLPREDNISOLONE SODIUM SUCCINATE 60 MG: 125 INJECTION, POWDER, FOR SOLUTION INTRAMUSCULAR; INTRAVENOUS at 23:49

## 2018-01-01 RX ADMIN — Medication 10 ML: at 08:48

## 2018-01-01 RX ADMIN — Medication 2 MG: at 03:38

## 2018-01-01 RX ADMIN — AMLODIPINE BESYLATE 10 MG: 10 TABLET ORAL at 09:30

## 2018-01-01 RX ADMIN — PYRIDOXINE HCL TAB 50 MG 50 MG: 50 TAB at 19:55

## 2018-01-01 RX ADMIN — DOCUSATE SODIUM 100 MG: 100 CAPSULE, LIQUID FILLED ORAL at 09:00

## 2018-01-01 RX ADMIN — ISONIAZID 300 MG: 300 TABLET ORAL at 16:39

## 2018-01-01 RX ADMIN — HYDRALAZINE HYDROCHLORIDE 100 MG: 50 TABLET, FILM COATED ORAL at 20:58

## 2018-01-01 RX ADMIN — NICARDIPINE HYDROCHLORIDE 2.5 MG/HR: 2.5 INJECTION INTRAVENOUS at 23:18

## 2018-01-01 RX ADMIN — Medication 10 ML: at 22:28

## 2018-01-01 RX ADMIN — DOCUSATE SODIUM 100 MG: 100 CAPSULE, LIQUID FILLED ORAL at 08:44

## 2018-01-01 RX ADMIN — Medication 10 ML: at 21:38

## 2018-01-01 RX ADMIN — HYDRALAZINE HYDROCHLORIDE 100 MG: 50 TABLET, FILM COATED ORAL at 21:30

## 2018-01-01 RX ADMIN — NICARDIPINE HYDROCHLORIDE 5 MG/HR: 2.5 INJECTION INTRAVENOUS at 02:25

## 2018-01-01 RX ADMIN — ATORVASTATIN CALCIUM 40 MG: 40 TABLET, FILM COATED ORAL at 21:47

## 2018-01-01 RX ADMIN — PYRIDOXINE HCL TAB 50 MG 100 MG: 50 TAB at 08:53

## 2018-01-01 RX ADMIN — AMLODIPINE BESYLATE 5 MG: 5 TABLET ORAL at 11:55

## 2018-01-01 RX ADMIN — PYRIDOXINE HCL TAB 50 MG 100 MG: 50 TAB at 09:29

## 2018-01-01 RX ADMIN — AMLODIPINE BESYLATE 10 MG: 10 TABLET ORAL at 09:24

## 2018-01-01 RX ADMIN — PYRAZINAMIDE 1000 MG: 500 TABLET ORAL at 18:12

## 2018-01-01 RX ADMIN — ONDANSETRON 4 MG: 2 INJECTION INTRAMUSCULAR; INTRAVENOUS at 11:54

## 2018-01-01 RX ADMIN — Medication 10 ML: at 05:50

## 2018-01-01 RX ADMIN — LOSARTAN POTASSIUM 100 MG: 50 TABLET ORAL at 08:30

## 2018-01-01 RX ADMIN — HEPARIN SODIUM 5000 UNITS: 5000 INJECTION, SOLUTION INTRAVENOUS; SUBCUTANEOUS at 05:10

## 2018-01-01 RX ADMIN — MINOXIDIL 2.5 MG: 2.5 TABLET ORAL at 11:10

## 2018-01-01 RX ADMIN — HYDRALAZINE HYDROCHLORIDE 10 MG: 20 INJECTION INTRAMUSCULAR; INTRAVENOUS at 03:15

## 2018-01-01 RX ADMIN — CARVEDILOL 25 MG: 25 TABLET, FILM COATED ORAL at 22:15

## 2018-01-01 RX ADMIN — LOSARTAN POTASSIUM 100 MG: 50 TABLET ORAL at 10:40

## 2018-01-01 RX ADMIN — INSULIN LISPRO 2 UNITS: 100 INJECTION, SOLUTION INTRAVENOUS; SUBCUTANEOUS at 21:30

## 2018-01-01 RX ADMIN — LOSARTAN POTASSIUM 100 MG: 50 TABLET, FILM COATED ORAL at 08:55

## 2018-01-01 RX ADMIN — Medication 10 ML: at 14:24

## 2018-01-01 RX ADMIN — NICARDIPINE HYDROCHLORIDE 2.5 MG/HR: 2.5 INJECTION INTRAVENOUS at 02:22

## 2018-01-01 RX ADMIN — DIPHENHYDRAMINE HYDROCHLORIDE 6.25 MG: 50 INJECTION, SOLUTION INTRAMUSCULAR; INTRAVENOUS at 22:01

## 2018-01-01 RX ADMIN — DEXTROSE MONOHYDRATE 12.5 G: 25 INJECTION, SOLUTION INTRAVENOUS at 00:01

## 2018-01-01 RX ADMIN — POTASSIUM & SODIUM PHOSPHATES POWDER PACK 280-160-250 MG 2 PACKET: 280-160-250 PACK at 10:01

## 2018-01-01 RX ADMIN — CARVEDILOL 25 MG: 25 TABLET, FILM COATED ORAL at 21:15

## 2018-01-01 RX ADMIN — Medication 10 ML: at 22:00

## 2018-01-01 RX ADMIN — DOCUSATE SODIUM 100 MG: 100 CAPSULE, LIQUID FILLED ORAL at 17:12

## 2018-01-01 RX ADMIN — CLONIDINE HYDROCHLORIDE 0.3 MG: 0.1 TABLET ORAL at 09:50

## 2018-01-01 RX ADMIN — AMLODIPINE BESYLATE 10 MG: 10 TABLET ORAL at 12:00

## 2018-01-01 RX ADMIN — Medication 10 ML: at 05:10

## 2018-01-01 RX ADMIN — CARVEDILOL 25 MG: 25 TABLET, FILM COATED ORAL at 17:41

## 2018-01-01 RX ADMIN — CLONIDINE HYDROCHLORIDE 0.3 MG: 0.1 TABLET ORAL at 18:12

## 2018-01-01 RX ADMIN — HEPARIN SODIUM 5000 UNITS: 5000 INJECTION, SOLUTION INTRAVENOUS; SUBCUTANEOUS at 03:03

## 2018-01-01 RX ADMIN — SODIUM CHLORIDE 10 ML: 9 INJECTION, SOLUTION INTRAMUSCULAR; INTRAVENOUS; SUBCUTANEOUS at 10:20

## 2018-01-01 RX ADMIN — POLYETHYLENE GLYCOL 3350 17 G: 17 POWDER, FOR SOLUTION ORAL at 09:00

## 2018-01-01 RX ADMIN — HEPARIN SODIUM 5000 UNITS: 5000 INJECTION, SOLUTION INTRAVENOUS; SUBCUTANEOUS at 17:11

## 2018-01-01 RX ADMIN — FENTANYL CITRATE 25 MCG: 50 INJECTION, SOLUTION INTRAMUSCULAR; INTRAVENOUS at 12:20

## 2018-01-01 RX ADMIN — HEPARIN SODIUM 5000 UNITS: 5000 INJECTION, SOLUTION INTRAVENOUS; SUBCUTANEOUS at 22:19

## 2018-01-01 RX ADMIN — LIDOCAINE HYDROCHLORIDE: 10 INJECTION, SOLUTION EPIDURAL; INFILTRATION; INTRACAUDAL; PERINEURAL at 11:00

## 2018-01-01 RX ADMIN — SODIUM CHLORIDE 2.25 G: 900 INJECTION, SOLUTION INTRAVENOUS at 18:02

## 2018-01-01 RX ADMIN — HEPARIN SODIUM 4000 UNITS: 1000 INJECTION, SOLUTION INTRAVENOUS; SUBCUTANEOUS at 17:49

## 2018-01-01 RX ADMIN — NICARDIPINE HYDROCHLORIDE 1 MG/HR: 2.5 INJECTION INTRAVENOUS at 03:30

## 2018-01-01 RX ADMIN — ISONIAZID 300 MG: 300 TABLET ORAL at 17:17

## 2018-01-01 RX ADMIN — RIFAMPIN 600 MG: 300 CAPSULE ORAL at 21:14

## 2018-01-01 RX ADMIN — CLONIDINE HYDROCHLORIDE 0.1 MG: 0.1 TABLET ORAL at 00:49

## 2018-01-01 RX ADMIN — ONDANSETRON HYDROCHLORIDE 4 MG: 2 SOLUTION INTRAMUSCULAR; INTRAVENOUS at 21:18

## 2018-01-01 RX ADMIN — CLONIDINE HYDROCHLORIDE 0.3 MG: 0.1 TABLET ORAL at 09:27

## 2018-01-01 RX ADMIN — HEPARIN SODIUM 3800 UNITS: 1000 INJECTION, SOLUTION INTRAVENOUS; SUBCUTANEOUS at 14:21

## 2018-01-01 RX ADMIN — CARVEDILOL 25 MG: 25 TABLET, FILM COATED ORAL at 09:43

## 2018-01-01 RX ADMIN — CLONIDINE HYDROCHLORIDE 0.2 MG: 0.1 TABLET ORAL at 12:00

## 2018-01-01 RX ADMIN — HEPARIN SODIUM 5000 UNITS: 5000 INJECTION, SOLUTION INTRAVENOUS; SUBCUTANEOUS at 21:30

## 2018-01-01 RX ADMIN — ASPIRIN 81 MG CHEWABLE TABLET 81 MG: 81 TABLET CHEWABLE at 09:18

## 2018-01-01 RX ADMIN — CLONIDINE HYDROCHLORIDE 0.3 MG: 0.1 TABLET ORAL at 20:58

## 2018-01-01 RX ADMIN — LEVETIRACETAM 1500 MG: 15 INJECTION INTRAVENOUS at 13:40

## 2018-01-01 RX ADMIN — HYDRALAZINE HYDROCHLORIDE 100 MG: 50 TABLET, FILM COATED ORAL at 21:32

## 2018-01-01 RX ADMIN — METOPROLOL TARTRATE 25 MG: 25 TABLET ORAL at 13:00

## 2018-01-01 RX ADMIN — PYRIDOXINE HCL TAB 50 MG 100 MG: 50 TAB at 14:34

## 2018-01-01 RX ADMIN — ACETAMINOPHEN 650 MG: 650 SUPPOSITORY RECTAL at 08:17

## 2018-01-01 RX ADMIN — ISONIAZID 300 MG: 300 TABLET ORAL at 08:00

## 2018-01-01 RX ADMIN — SEVELAMER CARBONATE 1600 MG: 800 TABLET, FILM COATED ORAL at 16:25

## 2018-01-01 RX ADMIN — RIFAMPIN 300 MG: 300 CAPSULE ORAL at 21:40

## 2018-01-01 RX ADMIN — ONDANSETRON 4 MG: 2 INJECTION INTRAMUSCULAR; INTRAVENOUS at 09:56

## 2018-01-01 RX ADMIN — LABETALOL HYDROCHLORIDE 1 MG/MIN: 5 INJECTION, SOLUTION INTRAVENOUS at 00:36

## 2018-01-01 RX ADMIN — RIFAMPIN 600 MG: 300 CAPSULE ORAL at 21:27

## 2018-01-01 RX ADMIN — HEPARIN SODIUM 5000 UNITS: 5000 INJECTION, SOLUTION INTRAVENOUS; SUBCUTANEOUS at 03:24

## 2018-01-01 RX ADMIN — CARVEDILOL 25 MG: 25 TABLET, FILM COATED ORAL at 08:02

## 2018-01-01 RX ADMIN — ASPIRIN 81 MG 81 MG: 81 TABLET ORAL at 12:00

## 2018-01-01 RX ADMIN — Medication 150 MCG/HR: at 19:48

## 2018-01-01 RX ADMIN — Medication 10 ML: at 21:36

## 2018-01-01 RX ADMIN — MIDAZOLAM HYDROCHLORIDE 2 MG: 1 INJECTION, SOLUTION INTRAMUSCULAR; INTRAVENOUS at 23:04

## 2018-01-01 RX ADMIN — AMLODIPINE BESYLATE 10 MG: 10 TABLET ORAL at 08:44

## 2018-01-01 RX ADMIN — Medication 10 ML: at 05:08

## 2018-01-01 RX ADMIN — CARVEDILOL 25 MG: 25 TABLET, FILM COATED ORAL at 08:53

## 2018-01-01 RX ADMIN — MELATONIN TAB 3 MG 3 MG: 3 TAB at 21:06

## 2018-01-01 RX ADMIN — METOCLOPRAMIDE 5 MG: 5 INJECTION, SOLUTION INTRAMUSCULAR; INTRAVENOUS at 17:34

## 2018-01-01 RX ADMIN — SEVELAMER CARBONATE 1600 MG: 800 TABLET, FILM COATED ORAL at 13:42

## 2018-01-01 RX ADMIN — PYRIDOXINE HCL TAB 50 MG 100 MG: 50 TAB at 08:49

## 2018-01-01 RX ADMIN — AMLODIPINE BESYLATE 5 MG: 5 TABLET ORAL at 09:00

## 2018-01-01 RX ADMIN — INSULIN LISPRO 4 UNITS: 100 INJECTION, SOLUTION INTRAVENOUS; SUBCUTANEOUS at 00:08

## 2018-01-01 RX ADMIN — ISONIAZID 300 MG: 300 TABLET ORAL at 21:05

## 2018-01-01 RX ADMIN — LOSARTAN POTASSIUM 100 MG: 50 TABLET ORAL at 09:06

## 2018-01-01 RX ADMIN — DOCUSATE SODIUM 100 MG: 100 CAPSULE, LIQUID FILLED ORAL at 18:27

## 2018-01-01 RX ADMIN — SODIUM CHLORIDE 2.25 G: 900 INJECTION, SOLUTION INTRAVENOUS at 18:12

## 2018-01-01 RX ADMIN — CHLORHEXIDINE GLUCONATE 10 ML: 1.2 RINSE ORAL at 08:49

## 2018-01-01 RX ADMIN — FLUOXETINE 10 MG: 10 CAPSULE ORAL at 08:55

## 2018-01-01 RX ADMIN — FAMOTIDINE 20 MG: 20 TABLET, FILM COATED ORAL at 08:47

## 2018-01-01 RX ADMIN — CARVEDILOL 25 MG: 25 TABLET, FILM COATED ORAL at 15:44

## 2018-01-01 RX ADMIN — ERYTHROPOIETIN 4000 UNITS: 4000 INJECTION, SOLUTION INTRAVENOUS; SUBCUTANEOUS at 19:02

## 2018-01-01 RX ADMIN — HEPARIN SODIUM 5000 UNITS: 5000 INJECTION, SOLUTION INTRAVENOUS; SUBCUTANEOUS at 22:08

## 2018-01-01 RX ADMIN — CARVEDILOL 25 MG: 25 TABLET, FILM COATED ORAL at 09:46

## 2018-01-01 RX ADMIN — LABETALOL 20 MG/4 ML (5 MG/ML) INTRAVENOUS SYRINGE 20 MG: at 12:27

## 2018-01-01 RX ADMIN — LORAZEPAM 0.5 MG: 2 INJECTION INTRAMUSCULAR; INTRAVENOUS at 13:36

## 2018-01-01 RX ADMIN — DOCUSATE SODIUM 100 MG: 100 CAPSULE, LIQUID FILLED ORAL at 17:34

## 2018-01-01 RX ADMIN — ETHAMBUTOL HYDROCHLORIDE 800 MG: 400 TABLET, FILM COATED ORAL at 18:12

## 2018-01-01 RX ADMIN — NITROGLYCERIN 0.4 MG: 0.4 TABLET SUBLINGUAL at 12:07

## 2018-01-01 RX ADMIN — HYDRALAZINE HYDROCHLORIDE 100 MG: 50 TABLET, FILM COATED ORAL at 16:23

## 2018-01-01 RX ADMIN — ASPIRIN 81 MG 81 MG: 81 TABLET ORAL at 09:05

## 2018-01-01 RX ADMIN — RIFAMPIN 600 MG: 300 CAPSULE ORAL at 18:12

## 2018-01-01 RX ADMIN — HYDRALAZINE HYDROCHLORIDE 50 MG: 50 TABLET ORAL at 11:13

## 2018-01-01 RX ADMIN — ONDANSETRON 4 MG: 2 INJECTION INTRAMUSCULAR; INTRAVENOUS at 01:56

## 2018-01-01 RX ADMIN — Medication 10 ML: at 21:35

## 2018-01-01 RX ADMIN — ASPIRIN 81 MG CHEWABLE TABLET 81 MG: 81 TABLET CHEWABLE at 08:02

## 2018-01-01 RX ADMIN — AMLODIPINE BESYLATE 10 MG: 10 TABLET ORAL at 09:35

## 2018-01-01 RX ADMIN — PYRAZINAMIDE 1000 MG: 500 TABLET ORAL at 00:12

## 2018-01-01 RX ADMIN — SODIUM CHLORIDE 1000 MG: 900 INJECTION, SOLUTION INTRAVENOUS at 04:42

## 2018-01-01 RX ADMIN — PYRIDOXINE HCL TAB 50 MG 100 MG: 50 TAB at 09:13

## 2018-01-01 RX ADMIN — PYRIDOXINE HCL TAB 50 MG 100 MG: 50 TAB at 09:27

## 2018-01-01 RX ADMIN — RIFAMPIN 300 MG: 300 CAPSULE ORAL at 22:07

## 2018-01-01 RX ADMIN — HYDRALAZINE HYDROCHLORIDE 50 MG: 50 TABLET, FILM COATED ORAL at 22:08

## 2018-01-01 RX ADMIN — Medication 10 ML: at 06:37

## 2018-01-01 RX ADMIN — METOCLOPRAMIDE 5 MG: 5 INJECTION, SOLUTION INTRAMUSCULAR; INTRAVENOUS at 17:38

## 2018-01-01 RX ADMIN — LABETALOL 20 MG/4 ML (5 MG/ML) INTRAVENOUS SYRINGE 20 MG: at 05:25

## 2018-01-01 RX ADMIN — SODIUM CHLORIDE 75 ML/HR: 900 INJECTION, SOLUTION INTRAVENOUS at 14:17

## 2018-01-01 RX ADMIN — FENTANYL CITRATE 50 MCG: 50 INJECTION, SOLUTION INTRAMUSCULAR; INTRAVENOUS at 11:44

## 2018-01-01 RX ADMIN — SODIUM CHLORIDE 2.25 G: 900 INJECTION, SOLUTION INTRAVENOUS at 09:34

## 2018-01-01 RX ADMIN — HEPARIN SODIUM 5000 UNITS: 5000 INJECTION, SOLUTION INTRAVENOUS; SUBCUTANEOUS at 17:09

## 2018-01-01 RX ADMIN — ETHAMBUTOL HYDROCHLORIDE 800 MG: 400 TABLET, FILM COATED ORAL at 19:45

## 2018-01-01 RX ADMIN — CLONIDINE HYDROCHLORIDE 0.3 MG: 0.1 TABLET ORAL at 17:56

## 2018-01-01 RX ADMIN — OXYCODONE HYDROCHLORIDE AND ACETAMINOPHEN 1 TABLET: 5; 325 TABLET ORAL at 15:44

## 2018-01-01 RX ADMIN — HYDRALAZINE HYDROCHLORIDE 10 MG: 20 INJECTION INTRAMUSCULAR; INTRAVENOUS at 16:31

## 2018-01-01 RX ADMIN — ERYTHROPOIETIN 4000 UNITS: 4000 INJECTION, SOLUTION INTRAVENOUS; SUBCUTANEOUS at 11:25

## 2018-01-01 RX ADMIN — HEPARIN SODIUM 5000 UNITS: 5000 INJECTION, SOLUTION INTRAVENOUS; SUBCUTANEOUS at 14:22

## 2018-01-01 RX ADMIN — ONDANSETRON 4 MG: 2 INJECTION INTRAMUSCULAR; INTRAVENOUS at 03:02

## 2018-01-01 RX ADMIN — CLONIDINE HYDROCHLORIDE 0.3 MG: 0.1 TABLET ORAL at 18:59

## 2018-01-01 RX ADMIN — RIFAMPIN 600 MG: 300 CAPSULE ORAL at 18:02

## 2018-01-01 RX ADMIN — CLONIDINE HYDROCHLORIDE 0.3 MG: 0.1 TABLET ORAL at 10:07

## 2018-01-01 RX ADMIN — HEPARIN SODIUM 5000 UNITS: 5000 INJECTION, SOLUTION INTRAVENOUS; SUBCUTANEOUS at 19:19

## 2018-01-01 RX ADMIN — ASPIRIN 81 MG CHEWABLE TABLET 81 MG: 81 TABLET CHEWABLE at 09:26

## 2018-01-01 RX ADMIN — CLONIDINE HYDROCHLORIDE 0.3 MG: 0.1 TABLET ORAL at 08:30

## 2018-01-01 RX ADMIN — FAMOTIDINE 20 MG: 10 INJECTION, SOLUTION INTRAVENOUS at 08:58

## 2018-01-01 RX ADMIN — Medication 10 ML: at 17:06

## 2018-01-01 RX ADMIN — ONDANSETRON 4 MG: 2 INJECTION, SOLUTION INTRAMUSCULAR; INTRAVENOUS at 05:21

## 2018-01-01 RX ADMIN — Medication 10 ML: at 16:50

## 2018-01-01 RX ADMIN — ATORVASTATIN CALCIUM 40 MG: 40 TABLET, FILM COATED ORAL at 21:17

## 2018-01-01 RX ADMIN — ETHAMBUTOL HYDROCHLORIDE 800 MG: 400 TABLET, FILM COATED ORAL at 08:15

## 2018-01-01 RX ADMIN — FAMOTIDINE 20 MG: 20 TABLET, FILM COATED ORAL at 10:39

## 2018-01-01 RX ADMIN — LABETALOL 20 MG/4 ML (5 MG/ML) INTRAVENOUS SYRINGE 10 MG: at 21:15

## 2018-01-01 RX ADMIN — DEXAMETHASONE SODIUM PHOSPHATE 4 MG: 4 INJECTION, SOLUTION INTRA-ARTICULAR; INTRALESIONAL; INTRAMUSCULAR; INTRAVENOUS; SOFT TISSUE at 11:44

## 2018-01-01 RX ADMIN — MELATONIN TAB 3 MG 3 MG: 3 TAB at 22:45

## 2018-01-01 RX ADMIN — METHYLPREDNISOLONE SODIUM SUCCINATE 60 MG: 125 INJECTION, POWDER, FOR SOLUTION INTRAMUSCULAR; INTRAVENOUS at 18:01

## 2018-01-01 RX ADMIN — PYRAZINAMIDE 1000 MG: 500 TABLET ORAL at 18:06

## 2018-01-01 RX ADMIN — LEVOFLOXACIN 500 MG: 5 INJECTION, SOLUTION INTRAVENOUS at 22:46

## 2018-01-01 RX ADMIN — SODIUM CHLORIDE 500 MG: 900 INJECTION, SOLUTION INTRAVENOUS at 12:38

## 2018-01-01 RX ADMIN — SODIUM NITROPRUSSIDE 2 MCG/KG/MIN: 25 INJECTION, SOLUTION, CONCENTRATE INTRAVENOUS at 08:13

## 2018-01-01 RX ADMIN — HEPARIN SODIUM 5000 UNITS: 5000 INJECTION, SOLUTION INTRAVENOUS; SUBCUTANEOUS at 18:11

## 2018-01-01 RX ADMIN — SODIUM CHLORIDE 1000 MG: 900 INJECTION, SOLUTION INTRAVENOUS at 08:57

## 2018-01-01 RX ADMIN — HYDRALAZINE HYDROCHLORIDE 100 MG: 50 TABLET, FILM COATED ORAL at 09:35

## 2018-01-01 RX ADMIN — ONDANSETRON 4 MG: 2 INJECTION, SOLUTION INTRAMUSCULAR; INTRAVENOUS at 22:53

## 2018-01-01 RX ADMIN — HYDRALAZINE HYDROCHLORIDE 100 MG: 50 TABLET ORAL at 09:27

## 2018-01-01 RX ADMIN — OXYCODONE HYDROCHLORIDE AND ACETAMINOPHEN 1 TABLET: 5; 325 TABLET ORAL at 15:37

## 2018-01-01 RX ADMIN — MORPHINE SULFATE 4 MG: 4 INJECTION, SOLUTION INTRAMUSCULAR; INTRAVENOUS at 18:29

## 2018-01-01 RX ADMIN — HEPARIN SODIUM 4000 UNITS: 1000 INJECTION, SOLUTION INTRAVENOUS; SUBCUTANEOUS at 15:26

## 2018-01-01 RX ADMIN — ONDANSETRON 4 MG: 4 TABLET, ORALLY DISINTEGRATING ORAL at 15:28

## 2018-01-01 RX ADMIN — CARVEDILOL 25 MG: 25 TABLET, FILM COATED ORAL at 05:05

## 2018-01-01 RX ADMIN — ATORVASTATIN CALCIUM 40 MG: 40 TABLET, FILM COATED ORAL at 22:10

## 2018-01-01 RX ADMIN — POLYETHYLENE GLYCOL 3350 17 G: 17 POWDER, FOR SOLUTION ORAL at 08:26

## 2018-01-01 RX ADMIN — METOCLOPRAMIDE 5 MG: 5 INJECTION, SOLUTION INTRAMUSCULAR; INTRAVENOUS at 18:00

## 2018-01-01 RX ADMIN — ATORVASTATIN CALCIUM 40 MG: 40 TABLET, FILM COATED ORAL at 22:15

## 2018-01-01 RX ADMIN — SODIUM CHLORIDE 1000 ML: 900 INJECTION, SOLUTION INTRAVENOUS at 00:42

## 2018-01-01 RX ADMIN — PYRIDOXINE HCL TAB 50 MG 100 MG: 50 TAB at 08:55

## 2018-01-01 RX ADMIN — ASPIRIN 81 MG CHEWABLE TABLET 81 MG: 81 TABLET CHEWABLE at 09:16

## 2018-01-01 RX ADMIN — HEPARIN SODIUM 5000 UNITS: 5000 INJECTION, SOLUTION INTRAVENOUS; SUBCUTANEOUS at 09:00

## 2018-01-01 RX ADMIN — ASPIRIN 81 MG CHEWABLE TABLET 81 MG: 81 TABLET CHEWABLE at 14:25

## 2018-01-01 RX ADMIN — NITROGLYCERIN 0.4 MG: 0.4 TABLET SUBLINGUAL at 12:39

## 2018-01-01 RX ADMIN — ISONIAZID 300 MG: 300 TABLET ORAL at 18:12

## 2018-01-01 RX ADMIN — HYDRALAZINE HYDROCHLORIDE 100 MG: 50 TABLET, FILM COATED ORAL at 21:39

## 2018-01-01 RX ADMIN — ATORVASTATIN CALCIUM 40 MG: 40 TABLET, FILM COATED ORAL at 22:34

## 2018-01-01 RX ADMIN — FAMOTIDINE 20 MG: 10 INJECTION, SOLUTION INTRAVENOUS at 12:02

## 2018-01-01 RX ADMIN — HYDRALAZINE HYDROCHLORIDE 10 MG: 20 INJECTION INTRAMUSCULAR; INTRAVENOUS at 04:37

## 2018-01-01 RX ADMIN — ISONIAZID 300 MG: 300 TABLET ORAL at 09:06

## 2018-01-01 RX ADMIN — HYDRALAZINE HYDROCHLORIDE 5 MG: 20 INJECTION INTRAMUSCULAR; INTRAVENOUS at 13:11

## 2018-01-01 RX ADMIN — OXYCODONE HYDROCHLORIDE AND ACETAMINOPHEN 1 TABLET: 5; 325 TABLET ORAL at 15:27

## 2018-01-01 RX ADMIN — MIDAZOLAM HYDROCHLORIDE 2 MG: 1 INJECTION, SOLUTION INTRAMUSCULAR; INTRAVENOUS at 09:44

## 2018-01-01 RX ADMIN — HEPARIN SODIUM 5000 UNITS: 5000 INJECTION, SOLUTION INTRAVENOUS; SUBCUTANEOUS at 22:11

## 2018-01-01 RX ADMIN — HYDRALAZINE HYDROCHLORIDE 50 MG: 50 TABLET ORAL at 09:07

## 2018-01-01 RX ADMIN — POLYETHYLENE GLYCOL 3350 17 G: 17 POWDER, FOR SOLUTION ORAL at 08:55

## 2018-01-01 RX ADMIN — ETHAMBUTOL HYDROCHLORIDE 800 MG: 400 TABLET, FILM COATED ORAL at 16:26

## 2018-01-01 RX ADMIN — CHLORHEXIDINE GLUCONATE 10 ML: 1.2 RINSE ORAL at 21:18

## 2018-01-01 RX ADMIN — POLYETHYLENE GLYCOL 3350 17 G: 17 POWDER, FOR SOLUTION ORAL at 09:42

## 2018-01-01 RX ADMIN — HEPARIN SODIUM 5000 UNITS: 5000 INJECTION, SOLUTION INTRAVENOUS; SUBCUTANEOUS at 02:16

## 2018-01-01 RX ADMIN — NICARDIPINE HYDROCHLORIDE 4.5 MG/HR: 2.5 INJECTION INTRAVENOUS at 16:34

## 2018-01-01 RX ADMIN — ONDANSETRON 4 MG: 2 INJECTION, SOLUTION INTRAMUSCULAR; INTRAVENOUS at 04:14

## 2018-01-01 RX ADMIN — SODIUM CHLORIDE 5 MG/HR: 900 INJECTION, SOLUTION INTRAVENOUS at 21:14

## 2018-01-01 RX ADMIN — HEPARIN SODIUM 5000 UNITS: 5000 INJECTION, SOLUTION INTRAVENOUS; SUBCUTANEOUS at 05:05

## 2018-01-01 RX ADMIN — ONDANSETRON 4 MG: 2 INJECTION, SOLUTION INTRAMUSCULAR; INTRAVENOUS at 09:10

## 2018-01-01 RX ADMIN — AMLODIPINE BESYLATE 10 MG: 10 TABLET ORAL at 09:18

## 2018-01-01 RX ADMIN — OXYCODONE HYDROCHLORIDE AND ACETAMINOPHEN 1 TABLET: 5; 325 TABLET ORAL at 15:53

## 2018-01-01 RX ADMIN — ASPIRIN 81 MG CHEWABLE TABLET 81 MG: 81 TABLET CHEWABLE at 08:54

## 2018-01-01 RX ADMIN — BISACODYL 5 MG: 5 TABLET, COATED ORAL at 21:40

## 2018-01-01 RX ADMIN — DOCUSATE SODIUM 100 MG: 100 CAPSULE, LIQUID FILLED ORAL at 17:26

## 2018-01-01 RX ADMIN — FAMOTIDINE 20 MG: 20 TABLET, FILM COATED ORAL at 10:07

## 2018-01-01 RX ADMIN — LIDOCAINE HYDROCHLORIDE: 10 INJECTION, SOLUTION EPIDURAL; INFILTRATION; INTRACAUDAL; PERINEURAL at 12:41

## 2018-01-01 RX ADMIN — Medication 10 ML: at 16:23

## 2018-01-01 RX ADMIN — NICARDIPINE HYDROCHLORIDE 5 MG/HR: 2.5 INJECTION INTRAVENOUS at 14:45

## 2018-01-01 RX ADMIN — METOCLOPRAMIDE 5 MG: 5 INJECTION, SOLUTION INTRAMUSCULAR; INTRAVENOUS at 08:25

## 2018-01-01 RX ADMIN — METOCLOPRAMIDE 5 MG: 5 INJECTION, SOLUTION INTRAMUSCULAR; INTRAVENOUS at 17:12

## 2018-01-01 RX ADMIN — SODIUM PHOSPHATE, DIBASIC AND SODIUM PHOSPHATE, MONOBASIC 118 ML: 7; 19 ENEMA RECTAL at 00:20

## 2018-01-01 RX ADMIN — HEPARIN SODIUM 5000 UNITS: 5000 INJECTION, SOLUTION INTRAVENOUS; SUBCUTANEOUS at 01:10

## 2018-01-01 RX ADMIN — PYRIDOXINE HCL TAB 50 MG 100 MG: 50 TAB at 09:15

## 2018-01-01 RX ADMIN — RIFAMPIN 600 MG: 300 CAPSULE ORAL at 17:17

## 2018-01-01 RX ADMIN — FLUOXETINE 10 MG: 10 CAPSULE ORAL at 14:25

## 2018-01-01 RX ADMIN — HYDRALAZINE HYDROCHLORIDE 100 MG: 50 TABLET ORAL at 08:54

## 2018-01-01 RX ADMIN — ETHAMBUTOL HYDROCHLORIDE 800 MG: 400 TABLET, FILM COATED ORAL at 19:39

## 2018-01-01 RX ADMIN — LIDOCAINE HYDROCHLORIDE 60 MG: 20 INJECTION, SOLUTION EPIDURAL; INFILTRATION; INTRACAUDAL; PERINEURAL at 11:44

## 2018-01-01 RX ADMIN — CARVEDILOL 25 MG: 25 TABLET, FILM COATED ORAL at 21:04

## 2018-01-01 RX ADMIN — DIPHENHYDRAMINE HYDROCHLORIDE 12.5 MG: 50 INJECTION, SOLUTION INTRAMUSCULAR; INTRAVENOUS at 22:43

## 2018-01-01 RX ADMIN — ATORVASTATIN CALCIUM 40 MG: 40 TABLET, FILM COATED ORAL at 22:27

## 2018-01-01 RX ADMIN — HYDRALAZINE HYDROCHLORIDE 10 MG: 20 INJECTION INTRAMUSCULAR; INTRAVENOUS at 13:53

## 2018-01-01 RX ADMIN — OXYCODONE HYDROCHLORIDE AND ACETAMINOPHEN 1 TABLET: 5; 325 TABLET ORAL at 09:46

## 2018-01-01 RX ADMIN — HEPARIN SODIUM 5000 UNITS: 5000 INJECTION, SOLUTION INTRAVENOUS; SUBCUTANEOUS at 12:02

## 2018-01-01 RX ADMIN — HYDRALAZINE HYDROCHLORIDE 100 MG: 50 TABLET, FILM COATED ORAL at 10:07

## 2018-01-01 RX ADMIN — Medication 10 ML: at 22:22

## 2018-01-01 RX ADMIN — HYDRALAZINE HYDROCHLORIDE 100 MG: 50 TABLET, FILM COATED ORAL at 08:53

## 2018-01-01 RX ADMIN — HYDRALAZINE HYDROCHLORIDE 25 MG: 25 TABLET ORAL at 09:33

## 2018-01-01 RX ADMIN — HYDRALAZINE HYDROCHLORIDE 50 MG: 50 TABLET ORAL at 18:01

## 2018-01-01 RX ADMIN — NICARDIPINE HYDROCHLORIDE 2.5 MG/HR: 2.5 INJECTION INTRAVENOUS at 16:35

## 2018-01-01 RX ADMIN — SODIUM CHLORIDE 2.25 G: 900 INJECTION, SOLUTION INTRAVENOUS at 17:18

## 2018-01-01 RX ADMIN — AMLODIPINE BESYLATE 10 MG: 10 TABLET ORAL at 08:53

## 2018-01-01 RX ADMIN — LOSARTAN POTASSIUM 100 MG: 50 TABLET, FILM COATED ORAL at 08:50

## 2018-01-01 RX ADMIN — HEPARIN SODIUM 5000 UNITS: 5000 INJECTION, SOLUTION INTRAVENOUS; SUBCUTANEOUS at 14:35

## 2018-01-01 RX ADMIN — ONDANSETRON 4 MG: 2 INJECTION INTRAMUSCULAR; INTRAVENOUS at 11:58

## 2018-01-01 RX ADMIN — DOCUSATE SODIUM 100 MG: 100 CAPSULE, LIQUID FILLED ORAL at 08:24

## 2018-01-01 RX ADMIN — CLONIDINE HYDROCHLORIDE 0.2 MG: 0.1 TABLET ORAL at 23:08

## 2018-01-01 RX ADMIN — ETOMIDATE 100 MG: 2 INJECTION, SOLUTION INTRAVENOUS at 19:06

## 2018-01-01 RX ADMIN — NICARDIPINE HYDROCHLORIDE 2.5 MG/HR: 2.5 INJECTION INTRAVENOUS at 10:13

## 2018-01-01 RX ADMIN — ISONIAZID 300 MG: 300 TABLET ORAL at 16:26

## 2018-01-01 RX ADMIN — HYDRALAZINE HYDROCHLORIDE 100 MG: 50 TABLET ORAL at 16:00

## 2018-01-01 RX ADMIN — PROMETHAZINE HYDROCHLORIDE 12.5 MG: 25 INJECTION, SOLUTION INTRAMUSCULAR; INTRAVENOUS at 01:09

## 2018-01-01 RX ADMIN — MINOXIDIL 2.5 MG: 2.5 TABLET ORAL at 17:03

## 2018-01-01 RX ADMIN — HEPARIN SODIUM 5000 UNITS: 5000 INJECTION, SOLUTION INTRAVENOUS; SUBCUTANEOUS at 01:37

## 2018-01-01 RX ADMIN — FLUOXETINE HYDROCHLORIDE 10 MG: 10 CAPSULE ORAL at 09:43

## 2018-01-01 RX ADMIN — CLONIDINE HYDROCHLORIDE 0.3 MG: 0.3 TABLET ORAL at 17:57

## 2018-01-01 RX ADMIN — HYDRALAZINE HYDROCHLORIDE 100 MG: 50 TABLET, FILM COATED ORAL at 22:27

## 2018-01-01 RX ADMIN — SODIUM BICARBONATE 650 MG TABLET 1 DOSE: at 23:44

## 2018-01-01 RX ADMIN — ONDANSETRON 4 MG: 2 INJECTION INTRAMUSCULAR; INTRAVENOUS at 12:57

## 2018-01-01 RX ADMIN — CARVEDILOL 25 MG: 25 TABLET, FILM COATED ORAL at 17:03

## 2018-01-01 RX ADMIN — ONDANSETRON HYDROCHLORIDE 4 MG: 2 SOLUTION INTRAMUSCULAR; INTRAVENOUS at 23:51

## 2018-01-01 RX ADMIN — HYDRALAZINE HYDROCHLORIDE 50 MG: 50 TABLET ORAL at 21:16

## 2018-01-01 RX ADMIN — HEPARIN SODIUM 5000 UNITS: 5000 INJECTION, SOLUTION INTRAVENOUS; SUBCUTANEOUS at 17:34

## 2018-01-01 RX ADMIN — HEPARIN SODIUM: 1000 INJECTION, SOLUTION INTRAVENOUS; SUBCUTANEOUS at 13:54

## 2018-01-01 RX ADMIN — POLYETHYLENE GLYCOL 3350 17 G: 17 POWDER, FOR SOLUTION ORAL at 09:19

## 2018-01-01 RX ADMIN — ASPIRIN 81 MG 162 MG: 81 TABLET ORAL at 16:37

## 2018-01-01 RX ADMIN — LORAZEPAM 4 MG: 2 INJECTION INTRAMUSCULAR at 21:51

## 2018-01-01 RX ADMIN — CARVEDILOL 25 MG: 25 TABLET, FILM COATED ORAL at 21:21

## 2018-01-01 RX ADMIN — HEPARIN SODIUM 5000 UNITS: 5000 INJECTION, SOLUTION INTRAVENOUS; SUBCUTANEOUS at 16:59

## 2018-01-01 RX ADMIN — OXYCODONE HYDROCHLORIDE AND ACETAMINOPHEN 1 TABLET: 5; 325 TABLET ORAL at 21:24

## 2018-01-01 RX ADMIN — RIFAMPIN 600 MG: 300 CAPSULE ORAL at 17:03

## 2018-01-01 RX ADMIN — CARVEDILOL 25 MG: 25 TABLET, FILM COATED ORAL at 09:06

## 2018-01-01 RX ADMIN — MINOXIDIL 10 MG: 2.5 TABLET ORAL at 17:26

## 2018-01-01 RX ADMIN — CHLORHEXIDINE GLUCONATE 10 ML: 1.2 RINSE ORAL at 21:10

## 2018-01-01 RX ADMIN — CARVEDILOL 25 MG: 25 TABLET, FILM COATED ORAL at 09:13

## 2018-01-01 RX ADMIN — CARVEDILOL 25 MG: 25 TABLET, FILM COATED ORAL at 14:17

## 2018-01-01 RX ADMIN — FENTANYL CITRATE 25 MCG: 50 INJECTION INTRAMUSCULAR; INTRAVENOUS at 11:03

## 2018-01-01 RX ADMIN — CLONIDINE HYDROCHLORIDE 0.3 MG: 0.1 TABLET ORAL at 14:22

## 2018-01-01 RX ADMIN — SODIUM CHLORIDE 50 ML/HR: 900 INJECTION, SOLUTION INTRAVENOUS at 02:12

## 2018-01-01 RX ADMIN — RIFAMPIN 600 MG: 300 CAPSULE ORAL at 17:10

## 2018-01-01 RX ADMIN — HEPARIN SODIUM 5000 UNITS: 5000 INJECTION, SOLUTION INTRAVENOUS; SUBCUTANEOUS at 21:34

## 2018-01-01 RX ADMIN — ASPIRIN 81 MG 81 MG: 81 TABLET ORAL at 10:39

## 2018-01-01 RX ADMIN — CARVEDILOL 25 MG: 25 TABLET, FILM COATED ORAL at 22:08

## 2018-01-01 RX ADMIN — SODIUM CHLORIDE 2.25 G: 900 INJECTION, SOLUTION INTRAVENOUS at 16:30

## 2018-01-01 RX ADMIN — LIDOCAINE HYDROCHLORIDE 4 ML: 10 INJECTION, SOLUTION EPIDURAL; INFILTRATION; INTRACAUDAL; PERINEURAL at 11:04

## 2018-01-01 RX ADMIN — WATER 2 G: 1 INJECTION INTRAMUSCULAR; INTRAVENOUS; SUBCUTANEOUS at 22:29

## 2018-01-01 RX ADMIN — HEPARIN SODIUM 5000 UNITS: 5000 INJECTION, SOLUTION INTRAVENOUS; SUBCUTANEOUS at 00:29

## 2018-01-01 RX ADMIN — AMLODIPINE BESYLATE 5 MG: 5 TABLET ORAL at 09:27

## 2018-01-01 RX ADMIN — Medication 10 ML: at 12:32

## 2018-01-01 RX ADMIN — LABETALOL 20 MG/4 ML (5 MG/ML) INTRAVENOUS SYRINGE 20 MG: at 21:53

## 2018-01-01 RX ADMIN — ERYTHROPOIETIN 5000 UNITS: 3000 INJECTION, SOLUTION INTRAVENOUS; SUBCUTANEOUS at 12:48

## 2018-01-01 RX ADMIN — CARVEDILOL 25 MG: 25 TABLET, FILM COATED ORAL at 21:34

## 2018-01-01 RX ADMIN — HYDRALAZINE HYDROCHLORIDE 100 MG: 50 TABLET, FILM COATED ORAL at 17:00

## 2018-01-01 RX ADMIN — Medication 10 ML: at 17:56

## 2018-01-01 RX ADMIN — LOSARTAN POTASSIUM 50 MG: 25 TABLET, FILM COATED ORAL at 08:48

## 2018-01-01 RX ADMIN — LOSARTAN POTASSIUM 100 MG: 50 TABLET ORAL at 12:01

## 2018-01-01 RX ADMIN — ONDANSETRON 4 MG: 2 INJECTION, SOLUTION INTRAMUSCULAR; INTRAVENOUS at 14:35

## 2018-01-01 RX ADMIN — HYDRALAZINE HYDROCHLORIDE 100 MG: 50 TABLET ORAL at 18:59

## 2018-01-01 RX ADMIN — ISONIAZID 300 MG: 300 TABLET ORAL at 18:03

## 2018-01-01 RX ADMIN — MINOXIDIL 2.5 MG: 2.5 TABLET ORAL at 14:43

## 2018-01-01 RX ADMIN — HEPARIN SODIUM 5000 UNITS: 5000 INJECTION, SOLUTION INTRAVENOUS; SUBCUTANEOUS at 09:57

## 2018-01-01 RX ADMIN — FAMOTIDINE 20 MG: 20 TABLET, FILM COATED ORAL at 08:30

## 2018-01-01 RX ADMIN — ISONIAZID 300 MG: 300 TABLET ORAL at 17:00

## 2018-01-01 RX ADMIN — NICARDIPINE HYDROCHLORIDE 5 MG/HR: 2.5 INJECTION INTRAVENOUS at 03:10

## 2018-01-01 RX ADMIN — SEVELAMER CARBONATE 1600 MG: 800 TABLET, FILM COATED ORAL at 08:58

## 2018-01-01 RX ADMIN — CARVEDILOL 25 MG: 25 TABLET, FILM COATED ORAL at 20:17

## 2018-01-01 RX ADMIN — HYDROMORPHONE HYDROCHLORIDE 0.5 MG: 2 TABLET ORAL at 23:50

## 2018-01-01 RX ADMIN — RIFAMPIN 600 MG: 300 CAPSULE ORAL at 08:50

## 2018-01-01 RX ADMIN — FLUOXETINE 10 MG: 10 CAPSULE ORAL at 15:44

## 2018-01-01 RX ADMIN — Medication 10 ML: at 14:00

## 2018-01-01 RX ADMIN — MORPHINE SULFATE 4 MG: 4 INJECTION INTRAVENOUS at 01:38

## 2018-01-01 RX ADMIN — METOCLOPRAMIDE 5 MG: 5 INJECTION, SOLUTION INTRAMUSCULAR; INTRAVENOUS at 09:56

## 2018-01-01 RX ADMIN — MELATONIN TAB 3 MG 3 MG: 3 TAB at 09:16

## 2018-01-01 RX ADMIN — CLONIDINE HYDROCHLORIDE 0.3 MG: 0.1 TABLET ORAL at 22:34

## 2018-01-01 RX ADMIN — HEPARIN SODIUM 5000 UNITS: 5000 INJECTION, SOLUTION INTRAVENOUS; SUBCUTANEOUS at 17:38

## 2018-01-01 RX ADMIN — ISONIAZID 300 MG: 300 TABLET ORAL at 21:31

## 2018-01-01 RX ADMIN — Medication 10 ML: at 06:00

## 2018-01-01 RX ADMIN — PYRAZINAMIDE 1000 MG: 500 TABLET ORAL at 22:06

## 2018-01-01 RX ADMIN — Medication 2 MG: at 23:16

## 2018-01-01 RX ADMIN — HYDRALAZINE HYDROCHLORIDE 25 MG: 50 TABLET ORAL at 06:33

## 2018-01-01 RX ADMIN — LACTULOSE 10 G: 20 SOLUTION ORAL at 15:10

## 2018-01-01 RX ADMIN — HEPARIN SODIUM 5000 UNITS: 5000 INJECTION, SOLUTION INTRAVENOUS; SUBCUTANEOUS at 12:50

## 2018-01-01 RX ADMIN — HEPARIN SODIUM 5000 UNITS: 5000 INJECTION, SOLUTION INTRAVENOUS; SUBCUTANEOUS at 00:14

## 2018-01-01 RX ADMIN — ATORVASTATIN CALCIUM 40 MG: 40 TABLET, FILM COATED ORAL at 21:24

## 2018-01-01 RX ADMIN — MINOXIDIL 5 MG: 2.5 TABLET ORAL at 08:02

## 2018-01-01 RX ADMIN — AMLODIPINE BESYLATE 10 MG: 10 TABLET ORAL at 08:50

## 2018-01-01 RX ADMIN — MELATONIN TAB 3 MG 3 MG: 3 TAB at 22:33

## 2018-01-01 RX ADMIN — Medication 296 ML: at 23:43

## 2018-01-01 RX ADMIN — DOCUSATE SODIUM 100 MG: 100 CAPSULE, LIQUID FILLED ORAL at 09:27

## 2018-01-01 RX ADMIN — FAMOTIDINE 20 MG: 20 TABLET ORAL at 15:43

## 2018-01-01 RX ADMIN — HYDRALAZINE HYDROCHLORIDE 100 MG: 50 TABLET, FILM COATED ORAL at 08:59

## 2018-01-01 RX ADMIN — LOSARTAN POTASSIUM 100 MG: 50 TABLET, FILM COATED ORAL at 14:34

## 2018-01-01 RX ADMIN — FAMOTIDINE 20 MG: 10 INJECTION, SOLUTION INTRAVENOUS at 08:17

## 2018-01-01 RX ADMIN — HEPARIN SODIUM 12 UNITS/KG/HR: 10000 INJECTION, SOLUTION INTRAVENOUS at 14:42

## 2018-01-01 RX ADMIN — DOCUSATE SODIUM 100 MG: 100 CAPSULE, LIQUID FILLED ORAL at 18:12

## 2018-01-01 RX ADMIN — HYDRALAZINE HYDROCHLORIDE 10 MG: 20 INJECTION INTRAMUSCULAR; INTRAVENOUS at 08:49

## 2018-01-01 RX ADMIN — HYDRALAZINE HYDROCHLORIDE 50 MG: 50 TABLET, FILM COATED ORAL at 10:19

## 2018-01-01 RX ADMIN — RIFAMPIN 600 MG: 300 CAPSULE ORAL at 22:21

## 2018-01-01 RX ADMIN — METOPROLOL TARTRATE 5 MG: 5 INJECTION, SOLUTION INTRAVENOUS at 22:46

## 2018-01-01 RX ADMIN — ASPIRIN 300 MG: 300 SUPPOSITORY RECTAL at 11:30

## 2018-01-01 RX ADMIN — CLONIDINE HYDROCHLORIDE 0.3 MG: 0.1 TABLET ORAL at 13:00

## 2018-01-01 RX ADMIN — LOSARTAN POTASSIUM 50 MG: 25 TABLET, FILM COATED ORAL at 09:14

## 2018-01-01 RX ADMIN — ISONIAZID 300 MG: 300 TABLET ORAL at 15:43

## 2018-01-01 RX ADMIN — OXYCODONE HYDROCHLORIDE AND ACETAMINOPHEN 1 TABLET: 5; 325 TABLET ORAL at 00:54

## 2018-01-01 RX ADMIN — DICYCLOMINE HYDROCHLORIDE 20 MG: 10 CAPSULE ORAL at 22:19

## 2018-01-01 RX ADMIN — METOPROLOL TARTRATE 25 MG: 25 TABLET ORAL at 18:10

## 2018-01-01 RX ADMIN — ASPIRIN 81 MG CHEWABLE TABLET 81 MG: 81 TABLET CHEWABLE at 15:09

## 2018-01-01 RX ADMIN — IOPAMIDOL 80 ML: 612 INJECTION, SOLUTION INTRAVENOUS at 16:00

## 2018-01-01 RX ADMIN — CALCIUM GLUCONATE 1 G: 98 INJECTION, SOLUTION INTRAVENOUS at 18:00

## 2018-01-01 RX ADMIN — SODIUM CHLORIDE 2.25 G: 900 INJECTION, SOLUTION INTRAVENOUS at 23:39

## 2018-01-01 RX ADMIN — MINOXIDIL 2.5 MG: 2.5 TABLET ORAL at 17:41

## 2018-01-01 RX ADMIN — CLONIDINE HYDROCHLORIDE 0.3 MG: 0.1 TABLET ORAL at 09:43

## 2018-01-01 RX ADMIN — HEPARIN SODIUM 3000 UNITS: 1000 INJECTION, SOLUTION INTRAVENOUS; SUBCUTANEOUS at 12:37

## 2018-01-01 RX ADMIN — REGADENOSON 0.4 MG: 0.08 INJECTION, SOLUTION INTRAVENOUS at 10:30

## 2018-01-01 RX ADMIN — CLONIDINE HYDROCHLORIDE 0.3 MG: 0.1 TABLET ORAL at 17:12

## 2018-01-01 RX ADMIN — ATORVASTATIN CALCIUM 40 MG: 40 TABLET, FILM COATED ORAL at 21:21

## 2018-01-01 RX ADMIN — METOPROLOL TARTRATE 5 MG: 5 INJECTION INTRAVENOUS at 11:37

## 2018-01-01 RX ADMIN — Medication 10 ML: at 21:06

## 2018-01-01 RX ADMIN — DOCUSATE SODIUM 100 MG: 100 CAPSULE, LIQUID FILLED ORAL at 11:29

## 2018-01-01 RX ADMIN — Medication 10 ML: at 21:48

## 2018-01-01 RX ADMIN — HYDRALAZINE HYDROCHLORIDE 100 MG: 50 TABLET, FILM COATED ORAL at 09:29

## 2018-01-01 RX ADMIN — LABETALOL 20 MG/4 ML (5 MG/ML) INTRAVENOUS SYRINGE 10 MG: at 23:22

## 2018-01-01 RX ADMIN — ISONIAZID 300 MG: 300 TABLET ORAL at 21:15

## 2018-01-01 RX ADMIN — LABETALOL 20 MG/4 ML (5 MG/ML) INTRAVENOUS SYRINGE 10 MG: at 03:54

## 2018-01-01 RX ADMIN — AMLODIPINE BESYLATE 10 MG: 10 TABLET ORAL at 21:15

## 2018-01-01 RX ADMIN — HEPARIN SODIUM 5000 UNITS: 5000 INJECTION, SOLUTION INTRAVENOUS; SUBCUTANEOUS at 08:48

## 2018-01-01 RX ADMIN — HEPARIN SODIUM 5000 UNITS: 5000 INJECTION, SOLUTION INTRAVENOUS; SUBCUTANEOUS at 11:44

## 2018-01-01 RX ADMIN — METOPROLOL TARTRATE 5 MG: 5 INJECTION, SOLUTION INTRAVENOUS at 14:22

## 2018-01-01 RX ADMIN — METOPROLOL TARTRATE 25 MG: 25 TABLET ORAL at 05:36

## 2018-01-01 RX ADMIN — HYDRALAZINE HYDROCHLORIDE 100 MG: 50 TABLET ORAL at 21:25

## 2018-01-01 RX ADMIN — MINOXIDIL 2.5 MG: 2.5 TABLET ORAL at 09:17

## 2018-01-01 RX ADMIN — ONDANSETRON 6 MG: 2 INJECTION, SOLUTION INTRAMUSCULAR; INTRAVENOUS at 21:15

## 2018-01-01 RX ADMIN — MORPHINE SULFATE 2 MG: 2 INJECTION, SOLUTION INTRAMUSCULAR; INTRAVENOUS at 04:36

## 2018-01-01 RX ADMIN — FAMOTIDINE 20 MG: 20 TABLET, FILM COATED ORAL at 08:54

## 2018-01-01 RX ADMIN — POTASSIUM CHLORIDE 20 MEQ: 1.5 POWDER, FOR SOLUTION ORAL at 04:26

## 2018-01-01 RX ADMIN — RIFAMPIN 600 MG: 300 CAPSULE ORAL at 15:39

## 2018-01-01 RX ADMIN — ASPIRIN 81 MG CHEWABLE TABLET 81 MG: 81 TABLET CHEWABLE at 14:44

## 2018-01-01 RX ADMIN — CARVEDILOL 25 MG: 25 TABLET, FILM COATED ORAL at 12:40

## 2018-01-01 RX ADMIN — CARVEDILOL 25 MG: 25 TABLET, FILM COATED ORAL at 08:30

## 2018-01-01 RX ADMIN — SODIUM CHLORIDE 50 ML/HR: 900 INJECTION, SOLUTION INTRAVENOUS at 00:27

## 2018-01-01 RX ADMIN — CARVEDILOL 25 MG: 25 TABLET, FILM COATED ORAL at 09:27

## 2018-01-01 RX ADMIN — FLUOXETINE HYDROCHLORIDE 10 MG: 10 CAPSULE ORAL at 08:02

## 2018-01-01 RX ADMIN — LOSARTAN POTASSIUM 100 MG: 50 TABLET, FILM COATED ORAL at 09:59

## 2018-01-01 RX ADMIN — RIFAMPIN 600 MG: 300 CAPSULE ORAL at 07:30

## 2018-01-01 RX ADMIN — ISONIAZID 300 MG: 300 TABLET ORAL at 09:27

## 2018-01-01 RX ADMIN — ATORVASTATIN CALCIUM 40 MG: 40 TABLET, FILM COATED ORAL at 21:41

## 2018-01-01 RX ADMIN — Medication 2 MG: at 21:06

## 2018-01-01 RX ADMIN — AMLODIPINE BESYLATE 10 MG: 10 TABLET ORAL at 08:37

## 2018-01-01 RX ADMIN — MELATONIN TAB 3 MG 3 MG: 3 TAB at 21:33

## 2018-01-01 RX ADMIN — ATORVASTATIN CALCIUM 40 MG: 40 TABLET, FILM COATED ORAL at 21:34

## 2018-01-01 RX ADMIN — HYDRALAZINE HYDROCHLORIDE 10 MG: 20 INJECTION INTRAMUSCULAR; INTRAVENOUS at 11:11

## 2018-01-01 RX ADMIN — AMLODIPINE BESYLATE 10 MG: 10 TABLET ORAL at 08:58

## 2018-01-01 RX ADMIN — CARVEDILOL 25 MG: 25 TABLET, FILM COATED ORAL at 09:24

## 2018-01-01 RX ADMIN — LOSARTAN POTASSIUM 100 MG: 50 TABLET ORAL at 09:42

## 2018-01-01 RX ADMIN — ATORVASTATIN CALCIUM 40 MG: 20 TABLET, FILM COATED ORAL at 21:41

## 2018-01-01 RX ADMIN — POLYETHYLENE GLYCOL 3350 17 G: 17 POWDER, FOR SOLUTION ORAL at 09:50

## 2018-01-01 RX ADMIN — SODIUM CHLORIDE 1000 MG: 900 INJECTION, SOLUTION INTRAVENOUS at 21:47

## 2018-01-01 RX ADMIN — SODIUM CHLORIDE 2.25 G: 900 INJECTION, SOLUTION INTRAVENOUS at 08:04

## 2018-01-01 RX ADMIN — HALOPERIDOL 2 MG: 5 INJECTION INTRAMUSCULAR at 06:06

## 2018-01-01 RX ADMIN — LOSARTAN POTASSIUM 100 MG: 50 TABLET, FILM COATED ORAL at 08:53

## 2018-01-01 RX ADMIN — SODIUM CHLORIDE 2.25 G: 900 INJECTION, SOLUTION INTRAVENOUS at 00:00

## 2018-01-01 RX ADMIN — CARVEDILOL 25 MG: 25 TABLET, FILM COATED ORAL at 14:25

## 2018-01-01 RX ADMIN — AMLODIPINE BESYLATE 5 MG: 5 TABLET ORAL at 08:58

## 2018-01-01 RX ADMIN — HEPARIN SODIUM 3000 UNITS: 10000 INJECTION, SOLUTION INTRAVENOUS; SUBCUTANEOUS at 05:12

## 2018-01-01 RX ADMIN — RIFAMPIN 300 MG: 300 CAPSULE ORAL at 19:55

## 2018-01-01 RX ADMIN — AMLODIPINE BESYLATE 10 MG: 10 TABLET ORAL at 08:24

## 2018-01-01 RX ADMIN — ASPIRIN 81 MG 81 MG: 81 TABLET ORAL at 09:24

## 2018-01-01 RX ADMIN — HEPARIN SODIUM 5000 UNITS: 5000 INJECTION, SOLUTION INTRAVENOUS; SUBCUTANEOUS at 16:24

## 2018-01-01 RX ADMIN — MINOXIDIL 2.5 MG: 2.5 TABLET ORAL at 09:43

## 2018-01-01 RX ADMIN — CARVEDILOL 25 MG: 25 TABLET, FILM COATED ORAL at 21:28

## 2018-01-01 RX ADMIN — ATORVASTATIN CALCIUM 40 MG: 40 TABLET, FILM COATED ORAL at 21:15

## 2018-01-01 RX ADMIN — ERYTHROPOIETIN 10000 UNITS: 10000 INJECTION, SOLUTION INTRAVENOUS; SUBCUTANEOUS at 16:37

## 2018-01-01 RX ADMIN — SODIUM CHLORIDE 2.25 G: 900 INJECTION, SOLUTION INTRAVENOUS at 21:34

## 2018-01-01 RX ADMIN — SODIUM CHLORIDE 2.25 G: 900 INJECTION, SOLUTION INTRAVENOUS at 09:46

## 2018-01-01 RX ADMIN — FLUOXETINE 10 MG: 10 CAPSULE ORAL at 09:06

## 2018-01-01 RX ADMIN — INSULIN LISPRO 2 UNITS: 100 INJECTION, SOLUTION INTRAVENOUS; SUBCUTANEOUS at 23:41

## 2018-01-01 RX ADMIN — DOCUSATE SODIUM 100 MG: 100 CAPSULE, LIQUID FILLED ORAL at 08:39

## 2018-01-01 RX ADMIN — PROPOFOL 100 MG: 10 INJECTION, EMULSION INTRAVENOUS at 11:44

## 2018-01-01 RX ADMIN — CLONIDINE HYDROCHLORIDE 0.3 MG: 0.1 TABLET ORAL at 08:58

## 2018-01-01 RX ADMIN — SEVELAMER CARBONATE 1600 MG: 800 TABLET, FILM COATED ORAL at 10:18

## 2018-01-01 RX ADMIN — CLONIDINE HYDROCHLORIDE 0.1 MG: 0.1 TABLET ORAL at 08:23

## 2018-01-01 RX ADMIN — ATORVASTATIN CALCIUM 40 MG: 20 TABLET, FILM COATED ORAL at 22:27

## 2018-01-01 RX ADMIN — PYRIDOXINE HYDROCHLORIDE 100 MG: 100 INJECTION, SOLUTION INTRAMUSCULAR; INTRAVENOUS at 08:17

## 2018-01-01 RX ADMIN — DOCUSATE SODIUM 100 MG: 100 CAPSULE, LIQUID FILLED ORAL at 09:35

## 2018-01-01 RX ADMIN — RIFAMPIN 300 MG: 300 CAPSULE ORAL at 22:37

## 2018-01-01 RX ADMIN — Medication 10 ML: at 17:09

## 2018-01-01 RX ADMIN — SUCCINYLCHOLINE CHLORIDE 100 MG: 20 INJECTION, SOLUTION INTRAMUSCULAR; INTRAVENOUS at 19:19

## 2018-01-01 RX ADMIN — HYDRALAZINE HYDROCHLORIDE 100 MG: 50 TABLET, FILM COATED ORAL at 17:34

## 2018-01-01 RX ADMIN — IOPAMIDOL 70 ML: 612 INJECTION, SOLUTION INTRAVENOUS at 11:23

## 2018-01-01 RX ADMIN — HYDRALAZINE HYDROCHLORIDE 100 MG: 50 TABLET ORAL at 16:28

## 2018-01-01 RX ADMIN — CLONIDINE HYDROCHLORIDE 0.3 MG: 0.1 TABLET ORAL at 12:20

## 2018-01-01 RX ADMIN — METOPROLOL TARTRATE 25 MG: 25 TABLET ORAL at 09:05

## 2018-01-01 RX ADMIN — CARVEDILOL 25 MG: 25 TABLET, FILM COATED ORAL at 20:42

## 2018-01-01 RX ADMIN — AMLODIPINE BESYLATE 10 MG: 10 TABLET ORAL at 08:16

## 2018-01-01 RX ADMIN — ASPIRIN 81 MG 81 MG: 81 TABLET ORAL at 09:00

## 2018-01-01 RX ADMIN — CARVEDILOL 25 MG: 25 TABLET, FILM COATED ORAL at 08:50

## 2018-01-01 RX ADMIN — BISACODYL 10 MG: 10 SUPPOSITORY RECTAL at 21:41

## 2018-01-01 RX ADMIN — CLONIDINE HYDROCHLORIDE 0.3 MG: 0.1 TABLET ORAL at 08:02

## 2018-01-01 RX ADMIN — RIFAMPIN 600 MG: 300 CAPSULE ORAL at 11:28

## 2018-01-01 RX ADMIN — ATORVASTATIN CALCIUM 40 MG: 40 TABLET, FILM COATED ORAL at 22:18

## 2018-01-01 RX ADMIN — HYDRALAZINE HYDROCHLORIDE 100 MG: 50 TABLET ORAL at 22:14

## 2018-01-01 RX ADMIN — DOCUSATE SODIUM 100 MG: 100 CAPSULE, LIQUID FILLED ORAL at 08:25

## 2018-01-01 RX ADMIN — Medication 10 ML: at 23:16

## 2018-01-01 RX ADMIN — IPRATROPIUM BROMIDE AND ALBUTEROL SULFATE 3 ML: .5; 3 SOLUTION RESPIRATORY (INHALATION) at 02:50

## 2018-01-01 RX ADMIN — PROMETHAZINE HYDROCHLORIDE 6.25 MG: 25 INJECTION INTRAMUSCULAR; INTRAVENOUS at 16:42

## 2018-01-01 RX ADMIN — PYRAZINAMIDE 1000 MG: 500 TABLET ORAL at 22:38

## 2018-01-01 RX ADMIN — HYDRALAZINE HYDROCHLORIDE 50 MG: 50 TABLET, FILM COATED ORAL at 21:33

## 2018-01-01 RX ADMIN — CARVEDILOL 25 MG: 25 TABLET, FILM COATED ORAL at 22:27

## 2018-01-01 RX ADMIN — NITROGLYCERIN 0.4 MG: 0.4 TABLET SUBLINGUAL at 09:36

## 2018-01-01 RX ADMIN — HEPARIN SODIUM 5000 UNITS: 5000 INJECTION, SOLUTION INTRAVENOUS; SUBCUTANEOUS at 01:36

## 2018-01-01 RX ADMIN — NICARDIPINE HYDROCHLORIDE 20 MG/HR: 2.5 INJECTION INTRAVENOUS at 10:55

## 2018-01-01 RX ADMIN — POLYETHYLENE GLYCOL 3350 17 G: 17 POWDER, FOR SOLUTION ORAL at 11:32

## 2018-01-01 RX ADMIN — ASPIRIN 81 MG CHEWABLE TABLET 81 MG: 81 TABLET CHEWABLE at 08:49

## 2018-01-01 RX ADMIN — ASPIRIN 81 MG CHEWABLE TABLET 81 MG: 81 TABLET CHEWABLE at 15:43

## 2018-01-01 RX ADMIN — HEPARIN SODIUM 5000 UNITS: 5000 INJECTION, SOLUTION INTRAVENOUS; SUBCUTANEOUS at 14:16

## 2018-01-01 RX ADMIN — HYDRALAZINE HYDROCHLORIDE 100 MG: 50 TABLET, FILM COATED ORAL at 11:29

## 2018-01-01 RX ADMIN — ATORVASTATIN CALCIUM 40 MG: 20 TABLET, FILM COATED ORAL at 22:22

## 2018-01-01 RX ADMIN — DOCUSATE SODIUM 100 MG: 100 CAPSULE, LIQUID FILLED ORAL at 16:42

## 2018-01-01 RX ADMIN — Medication 2 MG: at 05:20

## 2018-01-01 RX ADMIN — METOCLOPRAMIDE 5 MG: 5 INJECTION, SOLUTION INTRAMUSCULAR; INTRAVENOUS at 09:00

## 2018-01-01 RX ADMIN — HEPARIN SODIUM 5000 UNITS: 5000 INJECTION, SOLUTION INTRAVENOUS; SUBCUTANEOUS at 18:06

## 2018-01-01 RX ADMIN — IOPAMIDOL 100 ML: 755 INJECTION, SOLUTION INTRAVENOUS at 14:43

## 2018-01-01 RX ADMIN — OXYCODONE HYDROCHLORIDE AND ACETAMINOPHEN 1 TABLET: 5; 325 TABLET ORAL at 05:15

## 2018-01-01 RX ADMIN — METOPROLOL TARTRATE 5 MG: 5 INJECTION INTRAVENOUS at 01:03

## 2018-01-01 RX ADMIN — SODIUM CHLORIDE 2.25 G: 900 INJECTION, SOLUTION INTRAVENOUS at 21:38

## 2018-01-01 RX ADMIN — NITROGLYCERIN 0.4 MG: 0.4 TABLET SUBLINGUAL at 12:50

## 2018-01-01 RX ADMIN — FLUOXETINE HYDROCHLORIDE 10 MG: 10 CAPSULE ORAL at 09:47

## 2018-01-01 RX ADMIN — CARVEDILOL 25 MG: 25 TABLET, FILM COATED ORAL at 22:11

## 2018-01-01 RX ADMIN — ATORVASTATIN CALCIUM 40 MG: 40 TABLET, FILM COATED ORAL at 21:38

## 2018-01-01 RX ADMIN — HEPARIN SODIUM 5000 UNITS: 5000 INJECTION, SOLUTION INTRAVENOUS; SUBCUTANEOUS at 02:22

## 2018-01-01 RX ADMIN — METOROPROLOL TARTRATE 5 MG: 5 INJECTION, SOLUTION INTRAVENOUS at 16:37

## 2018-01-01 RX ADMIN — HYDRALAZINE HYDROCHLORIDE 100 MG: 50 TABLET, FILM COATED ORAL at 21:06

## 2018-01-01 RX ADMIN — ASPIRIN 81 MG 81 MG: 81 TABLET ORAL at 14:17

## 2018-01-01 RX ADMIN — PYRIDOXINE HCL TAB 50 MG 100 MG: 50 TAB at 08:02

## 2018-01-01 RX ADMIN — AMLODIPINE BESYLATE 10 MG: 10 TABLET ORAL at 09:26

## 2018-01-01 RX ADMIN — ASPIRIN 81 MG CHEWABLE TABLET 81 MG: 81 TABLET CHEWABLE at 09:43

## 2018-01-01 RX ADMIN — AMLODIPINE BESYLATE 10 MG: 10 TABLET ORAL at 08:55

## 2018-01-01 RX ADMIN — METOPROLOL TARTRATE 5 MG: 5 INJECTION, SOLUTION INTRAVENOUS at 08:13

## 2018-01-01 RX ADMIN — HALOPERIDOL LACTATE 2.5 MG: 5 INJECTION, SOLUTION INTRAMUSCULAR at 19:34

## 2018-01-01 RX ADMIN — METOCLOPRAMIDE 5 MG: 5 INJECTION, SOLUTION INTRAMUSCULAR; INTRAVENOUS at 09:35

## 2018-01-01 RX ADMIN — MINOXIDIL 2.5 MG: 2.5 TABLET ORAL at 10:00

## 2018-01-01 RX ADMIN — LIDOCAINE HYDROCHLORIDE: 10 INJECTION, SOLUTION EPIDURAL; INFILTRATION; INTRACAUDAL; PERINEURAL at 08:57

## 2018-01-01 RX ADMIN — ATORVASTATIN CALCIUM 40 MG: 20 TABLET, FILM COATED ORAL at 21:28

## 2018-01-01 RX ADMIN — IPRATROPIUM BROMIDE AND ALBUTEROL SULFATE 3 ML: .5; 3 SOLUTION RESPIRATORY (INHALATION) at 02:38

## 2018-01-01 RX ADMIN — ISONIAZID 300 MG: 300 TABLET ORAL at 17:41

## 2018-01-01 RX ADMIN — OXYCODONE HYDROCHLORIDE AND ACETAMINOPHEN 1 TABLET: 5; 325 TABLET ORAL at 08:18

## 2018-01-01 RX ADMIN — IOPAMIDOL 60 ML: 612 INJECTION, SOLUTION INTRAVENOUS at 15:05

## 2018-01-01 RX ADMIN — LABETALOL 20 MG/4 ML (5 MG/ML) INTRAVENOUS SYRINGE 20 MG: at 03:04

## 2018-01-01 RX ADMIN — FLUOXETINE 10 MG: 10 CAPSULE ORAL at 09:18

## 2018-01-01 RX ADMIN — ETHAMBUTOL HYDROCHLORIDE 800 MG: 400 TABLET, FILM COATED ORAL at 22:08

## 2018-01-01 RX ADMIN — POLYETHYLENE GLYCOL 3350 17 G: 17 POWDER, FOR SOLUTION ORAL at 08:58

## 2018-01-01 RX ADMIN — ISONIAZID 300 MG: 300 TABLET ORAL at 16:25

## 2018-01-01 RX ADMIN — HEPARIN SODIUM: 1000 INJECTION, SOLUTION INTRAVENOUS; SUBCUTANEOUS at 23:35

## 2018-01-01 RX ADMIN — HYDRALAZINE HYDROCHLORIDE 100 MG: 50 TABLET ORAL at 10:39

## 2018-01-01 RX ADMIN — SEVELAMER CARBONATE 1600 MG: 800 TABLET, FILM COATED ORAL at 17:49

## 2018-01-01 RX ADMIN — CLONIDINE HYDROCHLORIDE 0.3 MG: 0.1 TABLET ORAL at 17:30

## 2018-01-01 RX ADMIN — HYDRALAZINE HYDROCHLORIDE 100 MG: 50 TABLET ORAL at 17:24

## 2018-01-01 RX ADMIN — DOCUSATE SODIUM 100 MG: 100 CAPSULE, LIQUID FILLED ORAL at 08:02

## 2018-01-01 RX ADMIN — Medication 10 ML: at 05:15

## 2018-01-01 RX ADMIN — CLONIDINE HYDROCHLORIDE 0.3 MG: 0.1 TABLET ORAL at 09:46

## 2018-01-01 RX ADMIN — HYDRALAZINE HYDROCHLORIDE 100 MG: 50 TABLET ORAL at 08:22

## 2018-01-01 RX ADMIN — SODIUM CHLORIDE 2.25 G: 900 INJECTION, SOLUTION INTRAVENOUS at 01:27

## 2018-01-01 RX ADMIN — CLONIDINE HYDROCHLORIDE 0.3 MG: 0.1 TABLET ORAL at 09:08

## 2018-01-01 RX ADMIN — MELATONIN TAB 3 MG 3 MG: 3 TAB at 21:41

## 2018-01-01 RX ADMIN — HEPARIN SODIUM 5000 UNITS: 5000 INJECTION, SOLUTION INTRAVENOUS; SUBCUTANEOUS at 21:36

## 2018-01-01 RX ADMIN — SODIUM CHLORIDE 2.25 G: 900 INJECTION, SOLUTION INTRAVENOUS at 12:32

## 2018-01-01 RX ADMIN — HEPARIN SODIUM 2520 UNITS: 1000 INJECTION, SOLUTION INTRAVENOUS; SUBCUTANEOUS at 06:00

## 2018-01-01 RX ADMIN — BISACODYL 5 MG: 5 TABLET, COATED ORAL at 09:35

## 2018-01-01 RX ADMIN — MIDAZOLAM HYDROCHLORIDE 5 MG: 1 INJECTION, SOLUTION INTRAMUSCULAR; INTRAVENOUS at 20:03

## 2018-01-01 RX ADMIN — ACETAMINOPHEN 650 MG: 650 SUPPOSITORY RECTAL at 14:50

## 2018-01-01 RX ADMIN — LOSARTAN POTASSIUM 100 MG: 50 TABLET, FILM COATED ORAL at 08:49

## 2018-01-01 RX ADMIN — INSULIN LISPRO 4 UNITS: 100 INJECTION, SOLUTION INTRAVENOUS; SUBCUTANEOUS at 07:00

## 2018-01-01 RX ADMIN — ETHAMBUTOL HYDROCHLORIDE 800 MG: 400 TABLET, FILM COATED ORAL at 20:30

## 2018-01-01 RX ADMIN — CARVEDILOL 25 MG: 25 TABLET, FILM COATED ORAL at 21:51

## 2018-01-01 RX ADMIN — HYDRALAZINE HYDROCHLORIDE 50 MG: 50 TABLET ORAL at 21:46

## 2018-01-01 RX ADMIN — Medication 10 ML: at 21:22

## 2018-01-01 RX ADMIN — PYRAZINAMIDE 1000 MG: 500 TABLET ORAL at 19:35

## 2018-01-01 RX ADMIN — POTASSIUM & SODIUM PHOSPHATES POWDER PACK 280-160-250 MG 2 PACKET: 280-160-250 PACK at 16:48

## 2018-01-01 RX ADMIN — ENALAPRILAT 1.25 MG: 1.25 INJECTION INTRAVENOUS at 11:20

## 2018-01-01 RX ADMIN — DEXTROSE 50 % IN WATER (D50W) INTRAVENOUS SYRINGE 12.5 G: at 00:01

## 2018-01-01 RX ADMIN — CHLORHEXIDINE GLUCONATE 10 ML: 1.2 RINSE ORAL at 22:27

## 2018-01-01 RX ADMIN — SEVELAMER CARBONATE 800 MG: 800 TABLET, FILM COATED ORAL at 12:00

## 2018-01-01 RX ADMIN — ASPIRIN 81 MG CHEWABLE TABLET 81 MG: 81 TABLET CHEWABLE at 09:12

## 2018-01-01 RX ADMIN — CLONIDINE HYDROCHLORIDE 0.3 MG: 0.1 TABLET ORAL at 19:38

## 2018-01-01 RX ADMIN — HYDRALAZINE HYDROCHLORIDE 25 MG: 50 TABLET ORAL at 11:42

## 2018-01-01 RX ADMIN — PROMETHAZINE HYDROCHLORIDE 12.5 MG: 25 INJECTION, SOLUTION INTRAMUSCULAR; INTRAVENOUS at 05:42

## 2018-01-01 RX ADMIN — RIFAMPIN 600 MG: 300 CAPSULE ORAL at 09:06

## 2018-01-01 RX ADMIN — CLONIDINE HYDROCHLORIDE 0.3 MG: 0.1 TABLET ORAL at 08:44

## 2018-01-01 RX ADMIN — HYDRALAZINE HYDROCHLORIDE 25 MG: 25 TABLET ORAL at 18:00

## 2018-01-01 RX ADMIN — CARVEDILOL 25 MG: 25 TABLET, FILM COATED ORAL at 21:33

## 2018-01-01 RX ADMIN — ISONIAZID 300 MG: 300 TABLET ORAL at 17:12

## 2018-01-01 RX ADMIN — FAMOTIDINE 20 MG: 10 INJECTION, SOLUTION INTRAVENOUS at 09:05

## 2018-01-01 RX ADMIN — Medication 10 ML: at 18:01

## 2018-01-01 RX ADMIN — NICARDIPINE HYDROCHLORIDE 2.5 MG/HR: 2.5 INJECTION INTRAVENOUS at 06:21

## 2018-01-01 RX ADMIN — LOSARTAN POTASSIUM 100 MG: 50 TABLET ORAL at 08:16

## 2018-01-01 RX ADMIN — CARVEDILOL 25 MG: 25 TABLET, FILM COATED ORAL at 09:36

## 2018-01-01 RX ADMIN — ASPIRIN 300 MG: 300 SUPPOSITORY RECTAL at 10:02

## 2018-01-01 RX ADMIN — LEVOFLOXACIN 750 MG: 5 INJECTION, SOLUTION INTRAVENOUS at 00:36

## 2018-01-01 RX ADMIN — ASPIRIN 81 MG CHEWABLE TABLET 81 MG: 81 TABLET CHEWABLE at 09:46

## 2018-01-01 RX ADMIN — HEPARIN SODIUM 5000 UNITS: 5000 INJECTION, SOLUTION INTRAVENOUS; SUBCUTANEOUS at 16:22

## 2018-01-01 RX ADMIN — HEPARIN SODIUM 5000 UNITS: 5000 INJECTION, SOLUTION INTRAVENOUS; SUBCUTANEOUS at 03:17

## 2018-01-01 RX ADMIN — POTASSIUM CHLORIDE 20 MEQ: 1.5 POWDER, FOR SOLUTION ORAL at 02:42

## 2018-01-01 RX ADMIN — OXYCODONE HYDROCHLORIDE AND ACETAMINOPHEN 1 TABLET: 5; 325 TABLET ORAL at 10:34

## 2018-01-01 RX ADMIN — INSULIN LISPRO 2 UNITS: 100 INJECTION, SOLUTION INTRAVENOUS; SUBCUTANEOUS at 17:31

## 2018-01-01 RX ADMIN — Medication 10 ML: at 05:35

## 2018-01-01 RX ADMIN — HEPARIN SODIUM 5000 UNITS: 5000 INJECTION, SOLUTION INTRAVENOUS; SUBCUTANEOUS at 20:02

## 2018-01-01 RX ADMIN — PYRIDOXINE HCL TAB 50 MG 50 MG: 50 TAB at 19:38

## 2018-01-01 RX ADMIN — DEXMEDETOMIDINE HYDROCHLORIDE 0.4 MCG/KG/HR: 100 INJECTION, SOLUTION INTRAVENOUS at 20:30

## 2018-01-01 RX ADMIN — LACTULOSE 20 G: 20 SOLUTION ORAL at 10:44

## 2018-01-01 RX ADMIN — ETHAMBUTOL HYDROCHLORIDE 800 MG: 400 TABLET, FILM COATED ORAL at 19:37

## 2018-01-01 RX ADMIN — DOCUSATE SODIUM 100 MG: 100 CAPSULE, LIQUID FILLED ORAL at 09:28

## 2018-01-01 RX ADMIN — ETHAMBUTOL HYDROCHLORIDE 800 MG: 400 TABLET, FILM COATED ORAL at 16:24

## 2018-01-01 RX ADMIN — HEPARIN SODIUM 5000 UNITS: 5000 INJECTION, SOLUTION INTRAVENOUS; SUBCUTANEOUS at 09:33

## 2018-01-01 RX ADMIN — PYRIDOXINE HCL TAB 50 MG 100 MG: 50 TAB at 09:58

## 2018-01-01 RX ADMIN — HEPARIN SODIUM 5000 UNITS: 5000 INJECTION, SOLUTION INTRAVENOUS; SUBCUTANEOUS at 08:17

## 2018-01-01 RX ADMIN — CLONIDINE HYDROCHLORIDE 0.2 MG: 0.1 TABLET ORAL at 09:05

## 2018-01-01 RX ADMIN — ONDANSETRON HYDROCHLORIDE 4 MG: 2 SOLUTION INTRAMUSCULAR; INTRAVENOUS at 01:24

## 2018-01-01 RX ADMIN — CARVEDILOL 25 MG: 25 TABLET, FILM COATED ORAL at 22:22

## 2018-01-01 RX ADMIN — CHLORHEXIDINE GLUCONATE 0.12% ORAL RINSE 10 ML: 1.2 LIQUID ORAL at 21:22

## 2018-01-01 RX ADMIN — HEPARIN SODIUM 5000 UNITS: 5000 INJECTION, SOLUTION INTRAVENOUS; SUBCUTANEOUS at 06:29

## 2018-01-01 RX ADMIN — MINOXIDIL 5 MG: 2.5 TABLET ORAL at 18:03

## 2018-01-01 RX ADMIN — AMLODIPINE BESYLATE 10 MG: 10 TABLET ORAL at 12:44

## 2018-01-01 RX ADMIN — PROMETHAZINE HYDROCHLORIDE 12.5 MG: 25 INJECTION INTRAMUSCULAR; INTRAVENOUS at 06:43

## 2018-01-01 RX ADMIN — RIFAMPIN 600 MG: 300 CAPSULE ORAL at 14:44

## 2018-01-01 RX ADMIN — SEVELAMER CARBONATE 1600 MG: 800 TABLET, FILM COATED ORAL at 17:12

## 2018-01-01 RX ADMIN — LABETALOL 20 MG/4 ML (5 MG/ML) INTRAVENOUS SYRINGE 10 MG: at 17:11

## 2018-01-01 RX ADMIN — SODIUM CHLORIDE 4.5 G: 900 INJECTION, SOLUTION INTRAVENOUS at 05:41

## 2018-01-01 RX ADMIN — SODIUM CHLORIDE 2.25 G: 900 INJECTION, SOLUTION INTRAVENOUS at 05:25

## 2018-01-01 RX ADMIN — OXYCODONE HYDROCHLORIDE AND ACETAMINOPHEN 1 TABLET: 5; 325 TABLET ORAL at 02:35

## 2018-01-01 RX ADMIN — DOCUSATE SODIUM 100 MG: 100 CAPSULE, LIQUID FILLED ORAL at 21:11

## 2018-01-01 RX ADMIN — CLONIDINE HYDROCHLORIDE 0.3 MG: 0.1 TABLET ORAL at 08:42

## 2018-01-01 RX ADMIN — DOCUSATE SODIUM 100 MG: 100 CAPSULE, LIQUID FILLED ORAL at 08:58

## 2018-01-01 RX ADMIN — SODIUM CHLORIDE 1000 MG: 900 INJECTION, SOLUTION INTRAVENOUS at 03:21

## 2018-01-01 RX ADMIN — HYDRALAZINE HYDROCHLORIDE 25 MG: 50 TABLET ORAL at 03:40

## 2018-01-01 RX ADMIN — ISONIAZID 300 MG: 300 TABLET ORAL at 17:03

## 2018-01-01 RX ADMIN — ONDANSETRON 4 MG: 2 INJECTION INTRAMUSCULAR; INTRAVENOUS at 00:54

## 2018-01-01 RX ADMIN — HYDRALAZINE HYDROCHLORIDE 10 MG: 20 INJECTION INTRAMUSCULAR; INTRAVENOUS at 12:44

## 2018-01-01 RX ADMIN — PYRIDOXINE HCL TAB 50 MG 100 MG: 50 TAB at 11:28

## 2018-01-01 RX ADMIN — HEPARIN SODIUM 5000 UNITS: 5000 INJECTION, SOLUTION INTRAVENOUS; SUBCUTANEOUS at 08:24

## 2018-01-01 RX ADMIN — HYDRALAZINE HYDROCHLORIDE 100 MG: 50 TABLET, FILM COATED ORAL at 08:36

## 2018-01-01 RX ADMIN — HEPARIN SODIUM 5000 UNITS: 5000 INJECTION, SOLUTION INTRAVENOUS; SUBCUTANEOUS at 20:59

## 2018-01-01 RX ADMIN — DOCUSATE SODIUM 100 MG: 100 CAPSULE, LIQUID FILLED ORAL at 09:40

## 2018-01-01 RX ADMIN — ATORVASTATIN CALCIUM 40 MG: 40 TABLET, FILM COATED ORAL at 21:37

## 2018-01-01 RX ADMIN — HEPARIN SODIUM 5000 UNITS: 5000 INJECTION, SOLUTION INTRAVENOUS; SUBCUTANEOUS at 01:31

## 2018-01-01 RX ADMIN — HEPARIN SODIUM 5000 UNITS: 5000 INJECTION, SOLUTION INTRAVENOUS; SUBCUTANEOUS at 05:49

## 2018-01-01 RX ADMIN — ATORVASTATIN CALCIUM 40 MG: 20 TABLET, FILM COATED ORAL at 21:14

## 2018-01-01 RX ADMIN — NICARDIPINE HYDROCHLORIDE 2.5 MG/HR: 2.5 INJECTION INTRAVENOUS at 00:00

## 2018-01-01 RX ADMIN — ETHAMBUTOL HYDROCHLORIDE 800 MG: 400 TABLET, FILM COATED ORAL at 17:11

## 2018-01-01 RX ADMIN — DOCUSATE SODIUM 100 MG: 100 CAPSULE, LIQUID FILLED ORAL at 19:39

## 2018-01-01 RX ADMIN — ISONIAZID 300 MG: 300 TABLET ORAL at 19:55

## 2018-01-01 RX ADMIN — POLYETHYLENE GLYCOL 3350 17 G: 17 POWDER, FOR SOLUTION ORAL at 10:18

## 2018-01-01 RX ADMIN — HALOPERIDOL LACTATE 2 MG: 5 INJECTION, SOLUTION INTRAMUSCULAR at 06:06

## 2018-01-01 RX ADMIN — Medication 10 ML: at 21:39

## 2018-01-01 RX ADMIN — ISONIAZID 300 MG: 300 TABLET ORAL at 19:38

## 2018-01-01 RX ADMIN — FLUOXETINE 10 MG: 10 CAPSULE ORAL at 09:15

## 2018-01-01 RX ADMIN — ONDANSETRON HYDROCHLORIDE 4 MG: 2 INJECTION, SOLUTION INTRAMUSCULAR; INTRAVENOUS at 18:33

## 2018-01-01 RX ADMIN — DOCUSATE SODIUM 100 MG: 100 CAPSULE, LIQUID FILLED ORAL at 09:47

## 2018-01-01 RX ADMIN — HALOPERIDOL LACTATE 2.5 MG: 5 INJECTION, SOLUTION INTRAMUSCULAR at 06:10

## 2018-01-01 RX ADMIN — HYDRALAZINE HYDROCHLORIDE 100 MG: 50 TABLET, FILM COATED ORAL at 14:22

## 2018-01-01 RX ADMIN — RIFAMPIN 600 MG: 300 CAPSULE ORAL at 09:16

## 2018-01-01 RX ADMIN — HEPARIN SODIUM 5000 UNITS: 5000 INJECTION, SOLUTION INTRAVENOUS; SUBCUTANEOUS at 00:32

## 2018-01-01 RX ADMIN — LOSARTAN POTASSIUM 100 MG: 50 TABLET ORAL at 12:21

## 2018-01-01 RX ADMIN — NICARDIPINE HYDROCHLORIDE 5 MG/HR: 2.5 INJECTION INTRAVENOUS at 04:30

## 2018-01-01 RX ADMIN — MINOXIDIL 10 MG: 2.5 TABLET ORAL at 09:46

## 2018-01-01 RX ADMIN — CLONIDINE HYDROCHLORIDE 0.3 MG: 0.1 TABLET ORAL at 19:57

## 2018-01-01 RX ADMIN — Medication 10 ML: at 22:31

## 2018-01-01 RX ADMIN — HEPARIN SODIUM 5000 UNITS: 5000 INJECTION, SOLUTION INTRAVENOUS; SUBCUTANEOUS at 06:01

## 2018-01-01 RX ADMIN — ASPIRIN 81 MG CHEWABLE TABLET 81 MG: 81 TABLET CHEWABLE at 09:15

## 2018-01-01 RX ADMIN — AMLODIPINE BESYLATE 10 MG: 10 TABLET ORAL at 08:42

## 2018-01-01 RX ADMIN — RIFAMPIN 600 MG: 300 CAPSULE ORAL at 18:07

## 2018-01-01 RX ADMIN — Medication 2 MG: at 08:29

## 2018-01-01 RX ADMIN — RIFAMPIN 600 MG: 300 CAPSULE ORAL at 17:27

## 2018-01-01 RX ADMIN — SEVELAMER CARBONATE 1600 MG: 800 TABLET, FILM COATED ORAL at 16:39

## 2018-01-01 RX ADMIN — SODIUM CHLORIDE 2.25 G: 900 INJECTION, SOLUTION INTRAVENOUS at 02:09

## 2018-01-01 RX ADMIN — ATORVASTATIN CALCIUM 40 MG: 20 TABLET, FILM COATED ORAL at 21:33

## 2018-01-01 RX ADMIN — LOSARTAN POTASSIUM 50 MG: 25 TABLET, FILM COATED ORAL at 09:27

## 2018-01-01 RX ADMIN — MINOXIDIL 2.5 MG: 2.5 TABLET ORAL at 11:54

## 2018-01-01 RX ADMIN — NICARDIPINE HYDROCHLORIDE 4 MG/HR: 2.5 INJECTION INTRAVENOUS at 08:15

## 2018-01-01 RX ADMIN — MINOXIDIL 2.5 MG: 2.5 TABLET ORAL at 08:25

## 2018-01-01 RX ADMIN — POLYETHYLENE GLYCOL 3350 17 G: 17 POWDER, FOR SOLUTION ORAL at 09:46

## 2018-01-01 RX ADMIN — DOCUSATE SODIUM 100 MG: 100 CAPSULE, LIQUID FILLED ORAL at 09:57

## 2018-01-01 RX ADMIN — ONDANSETRON 4 MG: 2 INJECTION, SOLUTION INTRAMUSCULAR; INTRAVENOUS at 08:16

## 2018-01-01 RX ADMIN — SODIUM CHLORIDE 2.25 G: 900 INJECTION, SOLUTION INTRAVENOUS at 03:17

## 2018-01-01 RX ADMIN — RIFAMPIN 300 MG: 300 CAPSULE ORAL at 21:04

## 2018-01-01 RX ADMIN — PYRAZINAMIDE 1000 MG: 500 TABLET ORAL at 16:27

## 2018-01-01 RX ADMIN — PYRAZINAMIDE 1000 MG: 500 TABLET ORAL at 15:47

## 2018-01-01 RX ADMIN — RIFAMPIN 600 MG: 300 CAPSULE ORAL at 09:27

## 2018-01-01 RX ADMIN — PYRIDOXINE HYDROCHLORIDE 100 MG: 100 INJECTION, SOLUTION INTRAMUSCULAR; INTRAVENOUS at 09:15

## 2018-01-01 RX ADMIN — ASPIRIN 81 MG CHEWABLE TABLET 81 MG: 81 TABLET CHEWABLE at 09:06

## 2018-01-01 RX ADMIN — LOSARTAN POTASSIUM 100 MG: 50 TABLET, FILM COATED ORAL at 09:18

## 2018-01-01 RX ADMIN — RIFAMPIN 300 MG: 300 CAPSULE ORAL at 20:31

## 2018-01-01 RX ADMIN — PROPOFOL 100 MCG/KG/MIN: 10 INJECTION, EMULSION INTRAVENOUS at 20:41

## 2018-01-01 RX ADMIN — FLUOXETINE 10 MG: 10 CAPSULE ORAL at 09:27

## 2018-01-01 RX ADMIN — HYDRALAZINE HYDROCHLORIDE 100 MG: 50 TABLET, FILM COATED ORAL at 21:05

## 2018-01-01 RX ADMIN — CARVEDILOL 25 MG: 25 TABLET, FILM COATED ORAL at 21:30

## 2018-01-01 RX ADMIN — SODIUM CHLORIDE 2.25 G: 900 INJECTION, SOLUTION INTRAVENOUS at 09:53

## 2018-01-01 RX ADMIN — ISONIAZID 300 MG: 300 TABLET ORAL at 09:43

## 2018-01-01 RX ADMIN — HEPARIN SODIUM 5000 UNITS: 5000 INJECTION, SOLUTION INTRAVENOUS; SUBCUTANEOUS at 22:46

## 2018-01-01 RX ADMIN — RIFAMPIN 600 MG: 300 CAPSULE ORAL at 15:43

## 2018-01-01 RX ADMIN — SEVELAMER CARBONATE 1600 MG: 800 TABLET, FILM COATED ORAL at 19:55

## 2018-01-01 RX ADMIN — MINOXIDIL 5 MG: 2.5 TABLET ORAL at 18:12

## 2018-01-01 RX ADMIN — HYDRALAZINE HYDROCHLORIDE 100 MG: 50 TABLET, FILM COATED ORAL at 22:22

## 2018-01-01 RX ADMIN — NICARDIPINE HYDROCHLORIDE 3 MG/HR: 2.5 INJECTION INTRAVENOUS at 17:45

## 2018-01-01 RX ADMIN — MINOXIDIL 2.5 MG: 2.5 TABLET ORAL at 08:53

## 2018-01-01 RX ADMIN — POLYETHYLENE GLYCOL 3350 17 G: 17 POWDER, FOR SOLUTION ORAL at 09:58

## 2018-01-01 RX ADMIN — NICARDIPINE HYDROCHLORIDE 2 MG/HR: 2.5 INJECTION INTRAVENOUS at 07:23

## 2018-01-01 RX ADMIN — ACETAMINOPHEN 1000 MG: 500 TABLET, FILM COATED ORAL at 09:36

## 2018-01-01 RX ADMIN — HEPARIN SODIUM 5000 UNITS: 5000 INJECTION, SOLUTION INTRAVENOUS; SUBCUTANEOUS at 11:28

## 2018-01-01 RX ADMIN — LOSARTAN POTASSIUM 100 MG: 25 TABLET, FILM COATED ORAL at 09:35

## 2018-01-01 RX ADMIN — HEPARIN SODIUM 5000 UNITS: 5000 INJECTION, SOLUTION INTRAVENOUS; SUBCUTANEOUS at 20:42

## 2018-01-01 RX ADMIN — HYDRALAZINE HYDROCHLORIDE 100 MG: 50 TABLET, FILM COATED ORAL at 16:22

## 2018-01-01 RX ADMIN — PYRIDOXINE HCL TAB 50 MG 50 MG: 50 TAB at 17:49

## 2018-01-01 RX ADMIN — FAMOTIDINE 20 MG: 10 INJECTION, SOLUTION INTRAVENOUS at 09:15

## 2018-01-01 RX ADMIN — AMLODIPINE BESYLATE 10 MG: 10 TABLET ORAL at 08:30

## 2018-01-01 RX ADMIN — AMLODIPINE BESYLATE 10 MG: 10 TABLET ORAL at 08:23

## 2018-01-01 RX ADMIN — PYRIDOXINE HCL TAB 50 MG 100 MG: 50 TAB at 09:18

## 2018-01-01 RX ADMIN — AMLODIPINE BESYLATE 10 MG: 10 TABLET ORAL at 19:13

## 2018-01-01 RX ADMIN — SODIUM CHLORIDE 6 MG: 900 INJECTION, SOLUTION INTRAVENOUS at 17:16

## 2018-01-01 RX ADMIN — CHLORHEXIDINE GLUCONATE 10 ML: 1.2 RINSE ORAL at 08:58

## 2018-01-01 RX ADMIN — MIDAZOLAM HYDROCHLORIDE 2 MG: 1 INJECTION, SOLUTION INTRAMUSCULAR; INTRAVENOUS at 12:18

## 2018-01-01 RX ADMIN — ATORVASTATIN CALCIUM 40 MG: 20 TABLET, FILM COATED ORAL at 21:27

## 2018-01-01 RX ADMIN — SODIUM NITROPRUSSIDE 2 MCG/KG/MIN: 25 INJECTION, SOLUTION, CONCENTRATE INTRAVENOUS at 11:42

## 2018-01-01 RX ADMIN — HYDROMORPHONE HYDROCHLORIDE 0.25 MG: 1 INJECTION, SOLUTION INTRAMUSCULAR; INTRAVENOUS; SUBCUTANEOUS at 06:52

## 2018-01-01 RX ADMIN — HEPARIN SODIUM 5000 UNITS: 5000 INJECTION, SOLUTION INTRAVENOUS; SUBCUTANEOUS at 04:31

## 2018-01-01 RX ADMIN — CLONIDINE HYDROCHLORIDE 0.2 MG: 0.1 TABLET ORAL at 08:58

## 2018-01-01 RX ADMIN — FUROSEMIDE 40 MG: 10 INJECTION, SOLUTION INTRAMUSCULAR; INTRAVENOUS at 13:29

## 2018-01-01 RX ADMIN — ETHAMBUTOL HYDROCHLORIDE 1200 MG: 400 TABLET, FILM COATED ORAL at 16:28

## 2018-01-01 RX ADMIN — ONDANSETRON 4 MG: 2 INJECTION, SOLUTION INTRAMUSCULAR; INTRAVENOUS at 04:02

## 2018-01-01 RX ADMIN — PYRIDOXINE HCL TAB 50 MG 100 MG: 50 TAB at 09:46

## 2018-01-01 RX ADMIN — METHYLPREDNISOLONE SODIUM SUCCINATE 60 MG: 125 INJECTION, POWDER, FOR SOLUTION INTRAMUSCULAR; INTRAVENOUS at 12:42

## 2018-01-01 RX ADMIN — ISONIAZID 300 MG: 300 TABLET ORAL at 17:27

## 2018-01-01 RX ADMIN — ETHAMBUTOL HYDROCHLORIDE 800 MG: 400 TABLET, FILM COATED ORAL at 16:48

## 2018-01-01 RX ADMIN — ONDANSETRON 4 MG: 2 INJECTION INTRAMUSCULAR; INTRAVENOUS at 12:16

## 2018-01-01 RX ADMIN — PYRAZINAMIDE 1000 MG: 500 TABLET ORAL at 17:08

## 2018-01-01 RX ADMIN — HYDRALAZINE HYDROCHLORIDE 50 MG: 50 TABLET ORAL at 09:24

## 2018-01-01 RX ADMIN — FAMOTIDINE 20 MG: 10 INJECTION, SOLUTION INTRAVENOUS at 09:24

## 2018-01-01 RX ADMIN — Medication 10 ML: at 23:43

## 2018-01-01 RX ADMIN — ISONIAZID 300 MG: 300 TABLET ORAL at 19:37

## 2018-01-01 RX ADMIN — DOCUSATE SODIUM 100 MG: 100 CAPSULE, LIQUID FILLED ORAL at 09:13

## 2018-01-01 RX ADMIN — CLONIDINE HYDROCHLORIDE 0.3 MG: 0.1 TABLET ORAL at 21:51

## 2018-01-01 RX ADMIN — CARVEDILOL 25 MG: 25 TABLET, FILM COATED ORAL at 13:00

## 2018-01-01 RX ADMIN — CLONIDINE HYDROCHLORIDE 0.1 MG: 0.1 TABLET ORAL at 03:27

## 2018-01-01 RX ADMIN — LOSARTAN POTASSIUM 50 MG: 25 TABLET, FILM COATED ORAL at 09:46

## 2018-01-01 RX ADMIN — CLONIDINE HYDROCHLORIDE 0.3 MG: 0.1 TABLET ORAL at 21:38

## 2018-01-01 RX ADMIN — MIDAZOLAM HYDROCHLORIDE 0.5 MG: 1 INJECTION, SOLUTION INTRAMUSCULAR; INTRAVENOUS at 11:03

## 2018-01-01 RX ADMIN — CLONIDINE HYDROCHLORIDE 0.3 MG: 0.1 TABLET ORAL at 18:00

## 2018-01-01 RX ADMIN — CARVEDILOL 25 MG: 25 TABLET, FILM COATED ORAL at 08:16

## 2018-01-01 RX ADMIN — ACETAMINOPHEN 650 MG: 650 SOLUTION ORAL at 21:37

## 2018-01-01 RX ADMIN — CARVEDILOL 25 MG: 25 TABLET, FILM COATED ORAL at 08:36

## 2018-01-01 RX ADMIN — MINOXIDIL 2.5 MG: 2.5 TABLET ORAL at 08:55

## 2018-01-01 RX ADMIN — ASPIRIN 81 MG CHEWABLE TABLET 81 MG: 81 TABLET CHEWABLE at 10:00

## 2018-01-01 RX ADMIN — LABETALOL 20 MG/4 ML (5 MG/ML) INTRAVENOUS SYRINGE 10 MG: at 08:47

## 2018-01-01 RX ADMIN — POTASSIUM CHLORIDE 40 MEQ: 20 TABLET, EXTENDED RELEASE ORAL at 10:38

## 2018-01-01 RX ADMIN — HYDRALAZINE HYDROCHLORIDE 100 MG: 50 TABLET, FILM COATED ORAL at 21:29

## 2018-01-01 RX ADMIN — CLONIDINE HYDROCHLORIDE 0.3 MG: 0.1 TABLET ORAL at 17:16

## 2018-01-01 RX ADMIN — CEFAZOLIN SODIUM 2 G: 2 SOLUTION INTRAVENOUS at 20:25

## 2018-01-01 RX ADMIN — CLONIDINE HYDROCHLORIDE 0.3 MG: 0.1 TABLET ORAL at 22:17

## 2018-01-01 RX ADMIN — Medication 10 ML: at 21:50

## 2018-01-01 RX ADMIN — PROPOFOL 25 MCG/KG/MIN: 10 INJECTION, EMULSION INTRAVENOUS at 15:00

## 2018-01-01 RX ADMIN — CARVEDILOL 25 MG: 25 TABLET, FILM COATED ORAL at 10:39

## 2018-01-01 RX ADMIN — Medication 10 ML: at 00:04

## 2018-01-01 RX ADMIN — ASPIRIN 300 MG: 300 SUPPOSITORY RECTAL at 16:59

## 2018-01-01 RX ADMIN — ONDANSETRON HYDROCHLORIDE 4 MG: 2 INJECTION INTRAMUSCULAR; INTRAVENOUS at 21:53

## 2018-01-01 RX ADMIN — HYDRALAZINE HYDROCHLORIDE 50 MG: 25 TABLET, FILM COATED ORAL at 08:16

## 2018-01-01 RX ADMIN — LOSARTAN POTASSIUM 100 MG: 50 TABLET, FILM COATED ORAL at 08:24

## 2018-01-01 RX ADMIN — PYRAZINAMIDE 1000 MG: 500 TABLET ORAL at 18:46

## 2018-01-01 RX ADMIN — FAMOTIDINE 20 MG: 20 TABLET, FILM COATED ORAL at 09:00

## 2018-01-01 RX ADMIN — MINOXIDIL 5 MG: 2.5 TABLET ORAL at 08:42

## 2018-01-01 RX ADMIN — CLONIDINE HYDROCHLORIDE 0.3 MG: 0.1 TABLET ORAL at 10:19

## 2018-01-01 RX ADMIN — PYRAZINAMIDE 1000 MG: 500 TABLET ORAL at 19:57

## 2018-01-01 RX ADMIN — AMLODIPINE BESYLATE 10 MG: 10 TABLET ORAL at 09:00

## 2018-01-01 RX ADMIN — Medication 10 ML: at 05:48

## 2018-01-01 RX ADMIN — CLONIDINE HYDROCHLORIDE 0.3 MG: 0.1 TABLET ORAL at 22:08

## 2018-01-01 RX ADMIN — MIDAZOLAM 2 MG/HR: 5 INJECTION INTRAMUSCULAR; INTRAVENOUS at 15:16

## 2018-01-01 RX ADMIN — ONDANSETRON 4 MG: 2 INJECTION, SOLUTION INTRAMUSCULAR; INTRAVENOUS at 02:08

## 2018-01-01 RX ADMIN — MINOXIDIL 5 MG: 2.5 TABLET ORAL at 09:28

## 2018-01-01 RX ADMIN — CLONIDINE HYDROCHLORIDE 0.3 MG: 0.1 TABLET ORAL at 09:24

## 2018-01-01 RX ADMIN — ONDANSETRON 4 MG: 2 INJECTION, SOLUTION INTRAMUSCULAR; INTRAVENOUS at 05:40

## 2018-01-01 RX ADMIN — OXYCODONE HYDROCHLORIDE AND ACETAMINOPHEN 1 TABLET: 5; 325 TABLET ORAL at 00:31

## 2018-01-01 RX ADMIN — FAMOTIDINE 20 MG: 20 TABLET ORAL at 09:27

## 2018-01-01 RX ADMIN — HYDRALAZINE HYDROCHLORIDE 10 MG: 20 INJECTION INTRAMUSCULAR; INTRAVENOUS at 08:57

## 2018-01-01 RX ADMIN — LIDOCAINE HYDROCHLORIDE: 10 INJECTION, SOLUTION EPIDURAL; INFILTRATION; INTRACAUDAL; PERINEURAL at 10:00

## 2018-01-01 RX ADMIN — CLONIDINE HYDROCHLORIDE 0.3 MG: 0.1 TABLET ORAL at 17:23

## 2018-01-01 RX ADMIN — METOCLOPRAMIDE 5 MG: 5 INJECTION, SOLUTION INTRAMUSCULAR; INTRAVENOUS at 09:01

## 2018-01-01 RX ADMIN — PYRIDOXINE HCL TAB 50 MG 50 MG: 50 TAB at 17:12

## 2018-01-01 RX ADMIN — CARVEDILOL 25 MG: 25 TABLET, FILM COATED ORAL at 08:55

## 2018-01-01 RX ADMIN — INSULIN LISPRO 2 UNITS: 100 INJECTION, SOLUTION INTRAVENOUS; SUBCUTANEOUS at 12:26

## 2018-01-01 RX ADMIN — HYDRALAZINE HYDROCHLORIDE 50 MG: 50 TABLET ORAL at 17:16

## 2018-01-01 RX ADMIN — Medication 10 ML: at 21:11

## 2018-01-01 RX ADMIN — WATER 1 G: 1 INJECTION INTRAMUSCULAR; INTRAVENOUS; SUBCUTANEOUS at 11:55

## 2018-01-01 RX ADMIN — SEVELAMER CARBONATE 1600 MG: 800 TABLET, FILM COATED ORAL at 12:15

## 2018-01-01 RX ADMIN — HEPARIN SODIUM 5000 UNITS: 5000 INJECTION, SOLUTION INTRAVENOUS; SUBCUTANEOUS at 21:11

## 2018-01-01 RX ADMIN — HYDRALAZINE HYDROCHLORIDE 25 MG: 50 TABLET ORAL at 22:29

## 2018-01-01 RX ADMIN — AMLODIPINE BESYLATE 10 MG: 10 TABLET ORAL at 09:06

## 2018-01-01 RX ADMIN — CARVEDILOL 25 MG: 25 TABLET, FILM COATED ORAL at 21:24

## 2018-01-01 RX ADMIN — PROMETHAZINE HYDROCHLORIDE 6.25 MG: 25 INJECTION INTRAMUSCULAR; INTRAVENOUS at 10:25

## 2018-01-01 RX ADMIN — DOCUSATE SODIUM 100 MG: 100 CAPSULE, LIQUID FILLED ORAL at 17:49

## 2018-01-01 RX ADMIN — LORAZEPAM 2 MG: 2 INJECTION INTRAMUSCULAR; INTRAVENOUS at 13:07

## 2018-01-01 RX ADMIN — AMLODIPINE BESYLATE 10 MG: 10 TABLET ORAL at 14:18

## 2018-01-01 RX ADMIN — ASPIRIN 81 MG CHEWABLE TABLET 81 MG: 81 TABLET CHEWABLE at 08:39

## 2018-01-01 RX ADMIN — HYDRALAZINE HYDROCHLORIDE 100 MG: 50 TABLET ORAL at 15:42

## 2018-01-01 RX ADMIN — PROMETHAZINE HYDROCHLORIDE 12.5 MG: 25 INJECTION, SOLUTION INTRAMUSCULAR; INTRAVENOUS at 05:53

## 2018-01-01 RX ADMIN — PROPOFOL 20 MCG/KG/MIN: 10 INJECTION, EMULSION INTRAVENOUS at 19:15

## 2018-01-01 RX ADMIN — MINOXIDIL 2.5 MG: 2.5 TABLET ORAL at 13:53

## 2018-01-01 RX ADMIN — ISONIAZID 300 MG: 300 TABLET ORAL at 21:33

## 2018-01-01 RX ADMIN — DOCUSATE SODIUM 100 MG: 100 CAPSULE, LIQUID FILLED ORAL at 17:16

## 2018-01-01 RX ADMIN — ENALAPRILAT 1.25 MG: 1.25 INJECTION INTRAVENOUS at 05:21

## 2018-01-01 RX ADMIN — METOCLOPRAMIDE 5 MG: 5 INJECTION, SOLUTION INTRAMUSCULAR; INTRAVENOUS at 16:43

## 2018-01-01 RX ADMIN — Medication 10 ML: at 05:23

## 2018-01-01 RX ADMIN — HEPARIN SODIUM 5000 UNITS: 5000 INJECTION, SOLUTION INTRAVENOUS; SUBCUTANEOUS at 17:00

## 2018-01-01 RX ADMIN — WATER 2 G: 1 INJECTION INTRAMUSCULAR; INTRAVENOUS; SUBCUTANEOUS at 23:03

## 2018-01-01 RX ADMIN — FAMOTIDINE 20 MG: 10 INJECTION, SOLUTION INTRAVENOUS at 08:49

## 2018-01-01 RX ADMIN — FLUOXETINE 10 MG: 10 CAPSULE ORAL at 08:16

## 2018-01-01 RX ADMIN — CLONIDINE HYDROCHLORIDE 0.3 MG: 0.1 TABLET ORAL at 17:49

## 2018-01-01 RX ADMIN — AMLODIPINE BESYLATE 10 MG: 10 TABLET ORAL at 14:25

## 2018-01-01 RX ADMIN — CARVEDILOL 25 MG: 25 TABLET, FILM COATED ORAL at 08:39

## 2018-01-01 RX ADMIN — SODIUM CHLORIDE 2.25 G: 900 INJECTION, SOLUTION INTRAVENOUS at 01:52

## 2018-01-01 RX ADMIN — Medication 10 ML: at 17:35

## 2018-01-01 RX ADMIN — ATORVASTATIN CALCIUM 40 MG: 40 TABLET, FILM COATED ORAL at 21:51

## 2018-01-01 RX ADMIN — ISONIAZID 300 MG: 300 TABLET ORAL at 11:29

## 2018-01-01 RX ADMIN — Medication 10 ML: at 07:06

## 2018-01-01 RX ADMIN — MELATONIN TAB 3 MG 3 MG: 3 TAB at 21:27

## 2018-01-01 RX ADMIN — RIFAMPIN 600 MG: 300 CAPSULE ORAL at 16:26

## 2018-01-01 RX ADMIN — Medication 10 ML: at 07:03

## 2018-01-01 RX ADMIN — IPRATROPIUM BROMIDE AND ALBUTEROL SULFATE 3 ML: .5; 3 SOLUTION RESPIRATORY (INHALATION) at 18:23

## 2018-01-01 RX ADMIN — HYDRALAZINE HYDROCHLORIDE 50 MG: 50 TABLET ORAL at 22:30

## 2018-01-01 RX ADMIN — METHYLPREDNISOLONE SODIUM SUCCINATE 60 MG: 125 INJECTION, POWDER, FOR SOLUTION INTRAMUSCULAR; INTRAVENOUS at 07:03

## 2018-01-01 RX ADMIN — ONDANSETRON 4 MG: 2 INJECTION, SOLUTION INTRAMUSCULAR; INTRAVENOUS at 22:37

## 2018-01-01 RX ADMIN — CHLORHEXIDINE GLUCONATE 10 ML: 1.2 RINSE ORAL at 09:06

## 2018-01-01 RX ADMIN — ISONIAZID 300 MG: 300 TABLET ORAL at 17:49

## 2018-01-01 RX ADMIN — AMLODIPINE BESYLATE 5 MG: 5 TABLET ORAL at 21:33

## 2018-01-01 RX ADMIN — CARVEDILOL 25 MG: 25 TABLET, FILM COATED ORAL at 21:05

## 2018-01-01 RX ADMIN — HEPARIN SODIUM 5000 UNITS: 5000 INJECTION, SOLUTION INTRAVENOUS; SUBCUTANEOUS at 04:50

## 2018-01-01 RX ADMIN — Medication 10 ML: at 22:11

## 2018-01-01 RX ADMIN — ONDANSETRON 4 MG: 2 INJECTION, SOLUTION INTRAMUSCULAR; INTRAVENOUS at 21:33

## 2018-01-01 RX ADMIN — VANCOMYCIN HYDROCHLORIDE 250 MG: 500 INJECTION, POWDER, LYOPHILIZED, FOR SOLUTION INTRAVENOUS at 00:50

## 2018-01-01 RX ADMIN — ASPIRIN 81 MG 81 MG: 81 TABLET ORAL at 08:58

## 2018-01-01 RX ADMIN — DEXTROSE MONOHYDRATE 25 G: 25 INJECTION, SOLUTION INTRAVENOUS at 14:23

## 2018-01-01 RX ADMIN — PYRIDOXINE HCL TAB 50 MG 100 MG: 50 TAB at 15:44

## 2018-01-01 RX ADMIN — AMLODIPINE BESYLATE 10 MG: 10 TABLET ORAL at 14:17

## 2018-01-01 RX ADMIN — HEPARIN SODIUM 5000 UNITS: 5000 INJECTION, SOLUTION INTRAVENOUS; SUBCUTANEOUS at 18:12

## 2018-01-01 RX ADMIN — HEPARIN SODIUM 5000 UNITS: 5000 INJECTION, SOLUTION INTRAVENOUS; SUBCUTANEOUS at 05:29

## 2018-01-01 RX ADMIN — HYDRALAZINE HYDROCHLORIDE 25 MG: 25 TABLET, FILM COATED ORAL at 15:30

## 2018-01-01 RX ADMIN — DEXTROSE MONOHYDRATE AND SODIUM CHLORIDE 75 ML/HR: 5; .9 INJECTION, SOLUTION INTRAVENOUS at 17:48

## 2018-01-01 RX ADMIN — AMLODIPINE BESYLATE 5 MG: 5 TABLET ORAL at 09:50

## 2018-01-01 RX ADMIN — Medication 25 MCG/HR: at 23:42

## 2018-01-01 RX ADMIN — CARVEDILOL 25 MG: 25 TABLET, FILM COATED ORAL at 08:24

## 2018-01-01 RX ADMIN — FLUOXETINE 10 MG: 10 CAPSULE ORAL at 09:16

## 2018-01-01 RX ADMIN — LOSARTAN POTASSIUM 100 MG: 50 TABLET, FILM COATED ORAL at 09:15

## 2018-01-01 RX ADMIN — HYDRALAZINE HYDROCHLORIDE 100 MG: 50 TABLET ORAL at 08:30

## 2018-01-01 RX ADMIN — METOPROLOL TARTRATE 25 MG: 25 TABLET ORAL at 23:05

## 2018-01-01 RX ADMIN — Medication 10 ML: at 05:19

## 2018-01-01 RX ADMIN — ETHAMBUTOL HYDROCHLORIDE 800 MG: 400 TABLET, FILM COATED ORAL at 17:17

## 2018-01-01 RX ADMIN — SODIUM CHLORIDE: 900 INJECTION INTRAVENOUS at 15:00

## 2018-01-01 RX ADMIN — FENTANYL CITRATE 25 MCG: 50 INJECTION, SOLUTION INTRAMUSCULAR; INTRAVENOUS at 11:55

## 2018-01-01 RX ADMIN — SODIUM NITROPRUSSIDE 0.3 MCG/KG/MIN: 25 INJECTION, SOLUTION, CONCENTRATE INTRAVENOUS at 13:00

## 2018-01-01 RX ADMIN — PYRIDOXINE HCL TAB 50 MG 50 MG: 50 TAB at 16:25

## 2018-01-01 RX ADMIN — FLUOXETINE HYDROCHLORIDE 10 MG: 10 CAPSULE ORAL at 09:27

## 2018-01-01 RX ADMIN — Medication 10 ML: at 05:36

## 2018-01-01 RX ADMIN — MELATONIN TAB 3 MG 3 MG: 3 TAB at 21:31

## 2018-01-01 RX ADMIN — CARVEDILOL 25 MG: 25 TABLET, FILM COATED ORAL at 09:30

## 2018-01-01 RX ADMIN — HEPARIN SODIUM 5000 UNITS: 5000 INJECTION, SOLUTION INTRAVENOUS; SUBCUTANEOUS at 11:08

## 2018-01-01 RX ADMIN — SODIUM CHLORIDE 2.25 G: 900 INJECTION, SOLUTION INTRAVENOUS at 17:28

## 2018-01-01 RX ADMIN — BISACODYL 5 MG: 5 TABLET, COATED ORAL at 22:25

## 2018-01-01 RX ADMIN — Medication 2 MG: at 04:36

## 2018-01-01 RX ADMIN — ASPIRIN 81 MG 81 MG: 81 TABLET ORAL at 12:20

## 2018-01-01 RX ADMIN — HEPARIN SODIUM 5000 UNITS: 5000 INJECTION, SOLUTION INTRAVENOUS; SUBCUTANEOUS at 09:28

## 2018-01-01 RX ADMIN — Medication 10 ML: at 08:51

## 2018-01-01 RX ADMIN — CARVEDILOL 25 MG: 25 TABLET, FILM COATED ORAL at 22:18

## 2018-01-01 RX ADMIN — MINOXIDIL 2.5 MG: 2.5 TABLET ORAL at 17:08

## 2018-01-01 RX ADMIN — PROMETHAZINE HYDROCHLORIDE 12.5 MG: 25 INJECTION, SOLUTION INTRAMUSCULAR; INTRAVENOUS at 22:25

## 2018-01-01 RX ADMIN — MELATONIN TAB 3 MG 3 MG: 3 TAB at 21:38

## 2018-01-01 RX ADMIN — PYRAZINAMIDE 1000 MG: 500 TABLET ORAL at 17:11

## 2018-01-01 RX ADMIN — INSULIN HUMAN 8 UNITS: 100 INJECTION, SOLUTION PARENTERAL at 14:23

## 2018-01-01 RX ADMIN — AMLODIPINE BESYLATE 10 MG: 10 TABLET ORAL at 10:20

## 2018-01-01 RX ADMIN — HEPARIN SODIUM 5000 UNITS: 5000 INJECTION, SOLUTION INTRAVENOUS; SUBCUTANEOUS at 10:34

## 2018-01-01 RX ADMIN — ONDANSETRON 4 MG: 2 INJECTION INTRAMUSCULAR; INTRAVENOUS at 09:10

## 2018-01-01 RX ADMIN — CARVEDILOL 25 MG: 25 TABLET, FILM COATED ORAL at 09:00

## 2018-01-01 RX ADMIN — PYRIDOXINE HCL TAB 50 MG 50 MG: 50 TAB at 17:00

## 2018-01-01 RX ADMIN — HEPARIN SODIUM 5000 UNITS: 5000 INJECTION, SOLUTION INTRAVENOUS; SUBCUTANEOUS at 08:55

## 2018-01-01 RX ADMIN — ONDANSETRON 4 MG: 2 INJECTION, SOLUTION INTRAMUSCULAR; INTRAVENOUS at 12:43

## 2018-01-01 RX ADMIN — CHLORHEXIDINE GLUCONATE 10 ML: 1.2 RINSE ORAL at 21:38

## 2018-01-01 RX ADMIN — SODIUM CHLORIDE 2.25 G: 900 INJECTION, SOLUTION INTRAVENOUS at 14:10

## 2018-01-01 RX ADMIN — HALOPERIDOL LACTATE 2.5 MG: 5 INJECTION, SOLUTION INTRAMUSCULAR at 12:03

## 2018-01-01 RX ADMIN — ISONIAZID 300 MG: 300 TABLET ORAL at 21:26

## 2018-01-01 RX ADMIN — Medication 10 ML: at 14:29

## 2018-01-01 RX ADMIN — HYDRALAZINE HYDROCHLORIDE 50 MG: 50 TABLET, FILM COATED ORAL at 08:44

## 2018-01-01 RX ADMIN — WATER 2 G: 1 INJECTION INTRAMUSCULAR; INTRAVENOUS; SUBCUTANEOUS at 23:20

## 2018-01-01 RX ADMIN — CLONIDINE HYDROCHLORIDE 0.3 MG: 0.1 TABLET ORAL at 09:13

## 2018-01-01 RX ADMIN — FLUOXETINE 10 MG: 10 CAPSULE ORAL at 08:49

## 2018-01-01 RX ADMIN — SODIUM CHLORIDE 2.25 G: 900 INJECTION, SOLUTION INTRAVENOUS at 05:05

## 2018-01-01 RX ADMIN — LABETALOL HYDROCHLORIDE 2 MG/MIN: 5 INJECTION, SOLUTION INTRAVENOUS at 04:42

## 2018-01-01 RX ADMIN — MELATONIN TAB 3 MG 3 MG: 3 TAB at 23:03

## 2018-01-01 RX ADMIN — CARVEDILOL 25 MG: 25 TABLET, FILM COATED ORAL at 21:40

## 2018-01-01 RX ADMIN — PYRIDOXINE HCL TAB 50 MG 100 MG: 50 TAB at 08:39

## 2018-01-01 RX ADMIN — ATORVASTATIN CALCIUM 40 MG: 40 TABLET, FILM COATED ORAL at 21:06

## 2018-01-01 RX ADMIN — SEVELAMER CARBONATE 1600 MG: 800 TABLET, FILM COATED ORAL at 08:44

## 2018-01-01 RX ADMIN — HYDRALAZINE HYDROCHLORIDE 100 MG: 50 TABLET, FILM COATED ORAL at 08:55

## 2018-01-01 RX ADMIN — ERYTHROPOIETIN 8000 UNITS: 4000 INJECTION, SOLUTION INTRAVENOUS; SUBCUTANEOUS at 13:22

## 2018-01-01 RX ADMIN — CARVEDILOL 25 MG: 25 TABLET, FILM COATED ORAL at 20:40

## 2018-01-01 RX ADMIN — SEVELAMER CARBONATE 1600 MG: 800 TABLET, FILM COATED ORAL at 13:05

## 2018-01-01 RX ADMIN — LOSARTAN POTASSIUM 100 MG: 50 TABLET, FILM COATED ORAL at 08:37

## 2018-01-01 RX ADMIN — DOCUSATE SODIUM 100 MG: 100 CAPSULE, LIQUID FILLED ORAL at 18:07

## 2018-01-01 RX ADMIN — ISONIAZID 300 MG: 300 TABLET ORAL at 22:21

## 2018-01-01 RX ADMIN — PYRAZINAMIDE 1000 MG: 500 TABLET ORAL at 16:24

## 2018-01-01 RX ADMIN — DOCUSATE SODIUM 100 MG: 100 CAPSULE, LIQUID FILLED ORAL at 18:03

## 2018-01-01 RX ADMIN — Medication 10 ML: at 22:34

## 2018-01-01 RX ADMIN — HYDRALAZINE HYDROCHLORIDE 100 MG: 50 TABLET ORAL at 15:53

## 2018-01-01 RX ADMIN — LOSARTAN POTASSIUM 50 MG: 25 TABLET, FILM COATED ORAL at 08:42

## 2018-01-01 RX ADMIN — CARVEDILOL 25 MG: 25 TABLET, FILM COATED ORAL at 21:47

## 2018-01-01 RX ADMIN — HEPARIN SODIUM 5000 UNITS: 5000 INJECTION, SOLUTION INTRAVENOUS; SUBCUTANEOUS at 12:22

## 2018-01-01 RX ADMIN — Medication 25 MCG/HR: at 22:41

## 2018-01-01 RX ADMIN — HYDRALAZINE HYDROCHLORIDE 100 MG: 50 TABLET, FILM COATED ORAL at 17:11

## 2018-01-01 RX ADMIN — HYDRALAZINE HYDROCHLORIDE 100 MG: 50 TABLET, FILM COATED ORAL at 22:18

## 2018-01-01 RX ADMIN — ENALAPRILAT 1.25 MG: 1.25 INJECTION INTRAVENOUS at 23:15

## 2018-01-01 RX ADMIN — HEPARIN SODIUM 5000 UNITS: 5000 INJECTION, SOLUTION INTRAVENOUS; SUBCUTANEOUS at 13:01

## 2018-01-01 RX ADMIN — ASPIRIN 81 MG 81 MG: 81 TABLET ORAL at 08:54

## 2018-01-01 RX ADMIN — POTASSIUM CHLORIDE 40 MEQ: 20 TABLET, EXTENDED RELEASE ORAL at 11:05

## 2018-01-01 RX ADMIN — ASPIRIN 81 MG CHEWABLE TABLET 81 MG: 81 TABLET CHEWABLE at 08:24

## 2018-01-01 RX ADMIN — CHLORHEXIDINE GLUCONATE 10 ML: 1.2 RINSE ORAL at 09:24

## 2018-01-01 RX ADMIN — ASPIRIN 81 MG 81 MG: 81 TABLET ORAL at 08:53

## 2018-01-01 RX ADMIN — ETHAMBUTOL HYDROCHLORIDE 800 MG: 400 TABLET, FILM COATED ORAL at 22:37

## 2018-01-01 RX ADMIN — ONDANSETRON 4 MG: 2 INJECTION INTRAMUSCULAR; INTRAVENOUS at 17:15

## 2018-01-01 RX ADMIN — PYRAZINAMIDE 1000 MG: 500 TABLET ORAL at 16:49

## 2018-01-01 RX ADMIN — ONDANSETRON 4 MG: 2 INJECTION INTRAMUSCULAR; INTRAVENOUS at 09:15

## 2018-01-01 RX ADMIN — RIFAMPIN 300 MG: 300 CAPSULE ORAL at 19:39

## 2018-01-01 RX ADMIN — FAMOTIDINE 20 MG: 20 TABLET ORAL at 11:07

## 2018-01-01 RX ADMIN — DEXTROSE MONOHYDRATE 30 ML/HR: 10 INJECTION, SOLUTION INTRAVENOUS at 00:00

## 2018-01-01 RX ADMIN — NICARDIPINE HYDROCHLORIDE 5 MG/HR: 2.5 INJECTION INTRAVENOUS at 07:29

## 2018-01-01 RX ADMIN — SODIUM CHLORIDE 5 MG/HR: 900 INJECTION, SOLUTION INTRAVENOUS at 15:02

## 2018-01-01 RX ADMIN — Medication 10 ML: at 21:30

## 2018-01-01 RX ADMIN — CARVEDILOL 25 MG: 25 TABLET, FILM COATED ORAL at 05:57

## 2018-01-01 RX ADMIN — PROPOFOL 5 MCG/KG/MIN: 10 INJECTION, EMULSION INTRAVENOUS at 10:23

## 2018-01-01 RX ADMIN — SODIUM NITROPRUSSIDE 3 MCG/KG/MIN: 25 INJECTION, SOLUTION, CONCENTRATE INTRAVENOUS at 04:11

## 2018-01-01 RX ADMIN — PYRAZINAMIDE 1000 MG: 500 TABLET ORAL at 17:17

## 2018-01-01 RX ADMIN — ATORVASTATIN CALCIUM 40 MG: 40 TABLET, FILM COATED ORAL at 21:00

## 2018-01-01 RX ADMIN — RIFAMPIN 600 MG: 300 CAPSULE ORAL at 06:35

## 2018-01-01 RX ADMIN — HEPARIN SODIUM 5000 UNITS: 5000 INJECTION, SOLUTION INTRAVENOUS; SUBCUTANEOUS at 19:00

## 2018-01-01 RX ADMIN — HYDRALAZINE HYDROCHLORIDE 50 MG: 50 TABLET ORAL at 15:27

## 2018-01-01 RX ADMIN — CLONIDINE HYDROCHLORIDE 0.3 MG: 0.1 TABLET ORAL at 16:46

## 2018-01-01 RX ADMIN — LOSARTAN POTASSIUM 100 MG: 50 TABLET ORAL at 14:17

## 2018-01-01 RX ADMIN — HEPARIN SODIUM 5000 UNITS: 5000 INJECTION, SOLUTION INTRAVENOUS; SUBCUTANEOUS at 03:29

## 2018-01-01 RX ADMIN — AMLODIPINE BESYLATE 10 MG: 10 TABLET ORAL at 09:59

## 2018-01-01 RX ADMIN — ASPIRIN 81 MG CHEWABLE TABLET 81 MG: 81 TABLET CHEWABLE at 08:56

## 2018-01-01 RX ADMIN — ASPIRIN 81 MG 81 MG: 81 TABLET ORAL at 10:07

## 2018-01-01 RX ADMIN — IOPAMIDOL 75 ML: 755 INJECTION, SOLUTION INTRAVENOUS at 16:39

## 2018-01-01 RX ADMIN — HYDRALAZINE HYDROCHLORIDE 100 MG: 50 TABLET, FILM COATED ORAL at 22:08

## 2018-01-01 RX ADMIN — CARVEDILOL 25 MG: 25 TABLET, FILM COATED ORAL at 20:58

## 2018-01-01 RX ADMIN — INSULIN LISPRO 2 UNITS: 100 INJECTION, SOLUTION INTRAVENOUS; SUBCUTANEOUS at 05:34

## 2018-01-01 RX ADMIN — HYDRALAZINE HYDROCHLORIDE 100 MG: 50 TABLET, FILM COATED ORAL at 15:39

## 2018-01-01 RX ADMIN — ASPIRIN 81 MG 81 MG: 81 TABLET ORAL at 08:30

## 2018-01-01 RX ADMIN — CLONIDINE HYDROCHLORIDE 0.3 MG: 0.1 TABLET ORAL at 09:34

## 2018-01-01 RX ADMIN — POLYETHYLENE GLYCOL 3350 17 G: 17 POWDER, FOR SOLUTION ORAL at 09:35

## 2018-01-01 RX ADMIN — LEVOFLOXACIN 500 MG: 5 INJECTION, SOLUTION INTRAVENOUS at 00:04

## 2018-01-01 RX ADMIN — SEVELAMER CARBONATE 800 MG: 800 TABLET, FILM COATED ORAL at 19:39

## 2018-01-01 RX ADMIN — FLUOXETINE HYDROCHLORIDE 10 MG: 10 CAPSULE ORAL at 09:14

## 2018-01-01 RX ADMIN — MINOXIDIL 10 MG: 2.5 TABLET ORAL at 10:35

## 2018-01-01 RX ADMIN — LOSARTAN POTASSIUM 100 MG: 50 TABLET ORAL at 09:16

## 2018-01-01 RX ADMIN — MELATONIN TAB 3 MG 3 MG: 3 TAB at 21:17

## 2018-01-01 RX ADMIN — DOCUSATE SODIUM 100 MG: 100 CAPSULE, LIQUID FILLED ORAL at 09:43

## 2018-01-01 RX ADMIN — PYRIDOXINE HCL TAB 50 MG 100 MG: 50 TAB at 14:45

## 2018-01-01 RX ADMIN — PYRIDOXINE HCL TAB 50 MG 100 MG: 50 TAB at 15:10

## 2018-01-01 RX ADMIN — NICARDIPINE HYDROCHLORIDE 2 MG/HR: 2.5 INJECTION INTRAVENOUS at 01:05

## 2018-01-01 RX ADMIN — Medication 10 ML: at 05:24

## 2018-01-01 RX ADMIN — CLONIDINE HYDROCHLORIDE 0.3 MG: 0.1 TABLET ORAL at 22:20

## 2018-01-01 RX ADMIN — METOCLOPRAMIDE 5 MG: 5 INJECTION, SOLUTION INTRAMUSCULAR; INTRAVENOUS at 18:22

## 2018-01-01 RX ADMIN — CHLORHEXIDINE GLUCONATE 0.12% ORAL RINSE 10 ML: 1.2 LIQUID ORAL at 18:00

## 2018-01-01 RX ADMIN — CLONIDINE HYDROCHLORIDE 0.3 MG: 0.1 TABLET ORAL at 06:48

## 2018-01-01 RX ADMIN — HEPARIN SODIUM 5000 UNITS: 5000 INJECTION, SOLUTION INTRAVENOUS; SUBCUTANEOUS at 01:30

## 2018-01-01 RX ADMIN — HEPARIN SODIUM 5000 UNITS: 5000 INJECTION, SOLUTION INTRAVENOUS; SUBCUTANEOUS at 23:32

## 2018-01-01 RX ADMIN — PYRIDOXINE HCL TAB 50 MG 100 MG: 50 TAB at 08:25

## 2018-01-01 RX ADMIN — ASPIRIN 81 MG CHEWABLE TABLET 81 MG: 81 TABLET CHEWABLE at 08:53

## 2018-01-01 RX ADMIN — LOSARTAN POTASSIUM 100 MG: 50 TABLET ORAL at 10:07

## 2018-01-01 RX ADMIN — CARVEDILOL 25 MG: 25 TABLET, FILM COATED ORAL at 05:26

## 2018-01-01 RX ADMIN — DOCUSATE SODIUM 100 MG: 100 CAPSULE, LIQUID FILLED ORAL at 17:30

## 2018-01-01 RX ADMIN — HEPARIN SODIUM 5000 UNITS: 5000 INJECTION, SOLUTION INTRAVENOUS; SUBCUTANEOUS at 15:30

## 2018-01-01 RX ADMIN — ASPIRIN 81 MG CHEWABLE TABLET 81 MG: 81 TABLET CHEWABLE at 08:16

## 2018-01-01 RX ADMIN — HEPARIN SODIUM 5000 UNITS: 5000 INJECTION, SOLUTION INTRAVENOUS; SUBCUTANEOUS at 17:23

## 2018-01-01 RX ADMIN — SEVELAMER CARBONATE 1600 MG: 800 TABLET, FILM COATED ORAL at 09:50

## 2018-01-01 RX ADMIN — CARVEDILOL 25 MG: 25 TABLET, FILM COATED ORAL at 21:17

## 2018-01-01 RX ADMIN — MIDAZOLAM HYDROCHLORIDE 2 MG: 1 INJECTION, SOLUTION INTRAMUSCULAR; INTRAVENOUS at 11:34

## 2018-01-01 RX ADMIN — Medication 10 ML: at 05:37

## 2018-01-01 RX ADMIN — MELATONIN TAB 3 MG 3 MG: 3 TAB at 21:14

## 2018-01-01 RX ADMIN — DOCUSATE SODIUM 100 MG: 100 CAPSULE, LIQUID FILLED ORAL at 17:56

## 2018-01-01 RX ADMIN — HYDRALAZINE HYDROCHLORIDE 10 MG: 20 INJECTION INTRAMUSCULAR; INTRAVENOUS at 09:05

## 2018-01-01 RX ADMIN — CARVEDILOL 25 MG: 25 TABLET, FILM COATED ORAL at 06:47

## 2018-01-01 RX ADMIN — NICARDIPINE HYDROCHLORIDE 2.5 MG/HR: 2.5 INJECTION INTRAVENOUS at 03:03

## 2018-01-01 RX ADMIN — ATORVASTATIN CALCIUM 40 MG: 20 TABLET, FILM COATED ORAL at 21:05

## 2018-01-01 RX ADMIN — AMLODIPINE BESYLATE 10 MG: 10 TABLET ORAL at 12:20

## 2018-01-01 RX ADMIN — HYDRALAZINE HYDROCHLORIDE 100 MG: 50 TABLET, FILM COATED ORAL at 08:50

## 2018-01-01 RX ADMIN — AMLODIPINE BESYLATE 10 MG: 10 TABLET ORAL at 09:42

## 2018-01-01 RX ADMIN — DOCUSATE SODIUM 100 MG: 100 CAPSULE, LIQUID FILLED ORAL at 09:50

## 2018-01-01 RX ADMIN — IOPAMIDOL 99 ML: 612 INJECTION, SOLUTION INTRAVENOUS at 20:47

## 2018-01-01 RX ADMIN — PROMETHAZINE HYDROCHLORIDE 12.5 MG: 25 INJECTION, SOLUTION INTRAMUSCULAR; INTRAVENOUS at 01:51

## 2018-01-01 RX ADMIN — METOPROLOL TARTRATE 25 MG: 25 TABLET ORAL at 11:13

## 2018-01-01 RX ADMIN — LEVOFLOXACIN 750 MG: 5 INJECTION, SOLUTION INTRAVENOUS at 06:22

## 2018-01-01 RX ADMIN — IOPAMIDOL 80 ML: 612 INJECTION, SOLUTION INTRAVENOUS at 02:02

## 2018-01-01 RX ADMIN — RIFAMPIN 300 MG: 300 CAPSULE ORAL at 19:45

## 2018-01-01 RX ADMIN — PYRIDOXINE HCL TAB 50 MG 50 MG: 50 TAB at 22:38

## 2018-01-01 RX ADMIN — POLYETHYLENE GLYCOL 3350 17 G: 17 POWDER, FOR SOLUTION ORAL at 09:15

## 2018-01-01 RX ADMIN — METOCLOPRAMIDE 5 MG: 5 INJECTION, SOLUTION INTRAMUSCULAR; INTRAVENOUS at 15:43

## 2018-01-01 RX ADMIN — Medication 10 ML: at 22:20

## 2018-01-01 RX ADMIN — HEPARIN SODIUM 5000 UNITS: 5000 INJECTION, SOLUTION INTRAVENOUS; SUBCUTANEOUS at 12:01

## 2018-01-01 RX ADMIN — CARVEDILOL 25 MG: 25 TABLET, FILM COATED ORAL at 10:07

## 2018-01-01 RX ADMIN — Medication 10 ML: at 20:58

## 2018-01-01 RX ADMIN — CARVEDILOL 25 MG: 25 TABLET, FILM COATED ORAL at 09:58

## 2018-01-01 RX ADMIN — ONDANSETRON 4 MG: 2 INJECTION, SOLUTION INTRAMUSCULAR; INTRAVENOUS at 01:56

## 2018-01-01 RX ADMIN — Medication 100 MCG/HR: at 23:24

## 2018-01-01 RX ADMIN — SODIUM CHLORIDE 15 MG/HR: 900 INJECTION, SOLUTION INTRAVENOUS at 13:20

## 2018-01-01 RX ADMIN — CARVEDILOL 25 MG: 25 TABLET, FILM COATED ORAL at 11:29

## 2018-01-01 RX ADMIN — LOSARTAN POTASSIUM 100 MG: 50 TABLET ORAL at 08:59

## 2018-01-01 RX ADMIN — METOPROLOL TARTRATE 5 MG: 5 INJECTION, SOLUTION INTRAVENOUS at 15:15

## 2018-01-01 RX ADMIN — AMLODIPINE BESYLATE 10 MG: 10 TABLET ORAL at 11:29

## 2018-01-01 RX ADMIN — ATORVASTATIN CALCIUM 40 MG: 40 TABLET, FILM COATED ORAL at 22:08

## 2018-01-01 RX ADMIN — CLONIDINE HYDROCHLORIDE 0.3 MG: 0.1 TABLET ORAL at 10:39

## 2018-01-01 RX ADMIN — LOSARTAN POTASSIUM 100 MG: 50 TABLET ORAL at 09:27

## 2018-01-01 RX ADMIN — GLYCERIN 1 SUPPOSITORY: 2.1 SUPPOSITORY RECTAL at 06:07

## 2018-01-01 RX ADMIN — HEPARIN SODIUM 5000 UNITS: 5000 INJECTION, SOLUTION INTRAVENOUS; SUBCUTANEOUS at 05:21

## 2018-01-01 RX ADMIN — AMLODIPINE BESYLATE 10 MG: 10 TABLET ORAL at 10:07

## 2018-01-01 RX ADMIN — CLONIDINE HYDROCHLORIDE 0.3 MG: 0.1 TABLET ORAL at 16:00

## 2018-01-22 NOTE — ANESTHESIA POSTPROCEDURE EVALUATION
Post-Anesthesia Evaluation and Assessment    Patient: Rina Phalen MRN: [id number]  SSN: [id number]    YOB: 1960  Age: 56 y.o. Sex: female       Cardiovascular Function/Vital Signs  Visit Vitals    BP (!) 194/101    Pulse 73    Temp 37.2 °C (98.9 °F)    Resp 17    Wt 46.8 kg (103 lb 3 oz)    SpO2 94%    BMI 19.5 kg/m2       Patient is status post general anesthesia for Procedure(s):  left arm second stage basilic vein transporstion. Nausea/Vomiting: None    Postoperative hydration reviewed and adequate. Pain:  Pain Scale 1: Numeric (0 - 10) (01/22/18 1106)  Pain Intensity 1: 4 (01/22/18 1106)   Managed    Neurological Status:   Neuro (WDL): Within Defined Limits (01/22/18 1126)   At baseline    Mental Status and Level of Consciousness: Arousable    Pulmonary Status:   O2 Device: Oxygen mask (01/22/18 1320)   Adequate oxygenation and airway patent    Complications related to anesthesia: None    Post-anesthesia assessment completed.  No concerns    Signed By: Jasmine Karimi MD     January 22, 2018

## 2018-01-22 NOTE — PERIOP NOTES
Rec'd care of pt from OR via stretcher. Resp even and unlabored. Attached to monitor. Pt's VS at base line. OR, MAR and anesthesia report acknowledged. Will cont to monitor.

## 2018-01-22 NOTE — DISCHARGE INSTRUCTIONS
DISCHARGE SUMMARY from Nurse    PATIENT INSTRUCTIONS:    After general anesthesia or intravenous sedation, for 24 hours or while taking prescription Narcotics:  · Limit your activities  · Do not drive and operate hazardous machinery  · Do not make important personal or business decisions  · Do  not drink alcoholic beverages  · If you have not urinated within 8 hours after discharge, please contact your surgeon on call. Report the following to your surgeon:  · Excessive pain, swelling, redness or odor of or around the surgical area  · Temperature over 100.5  · Nausea and vomiting lasting longer than 4 hours or if unable to take medications  · Any signs of decreased circulation or nerve impairment to extremity: change in color, persistent  numbness, tingling, coldness or increase pain  · Any questions    What to do at Home:  These are general instructions for a healthy lifestyle:    No smoking/ No tobacco products/ Avoid exposure to second hand smoke  Surgeon General's Warning:  Quitting smoking now greatly reduces serious risk to your health. Obesity, smoking, and sedentary lifestyle greatly increases your risk for illness    A healthy diet, regular physical exercise & weight monitoring are important for maintaining a healthy lifestyle    You may be retaining fluid if you have a history of heart failure or if you experience any of the following symptoms:  Weight gain of 3 pounds or more overnight or 5 pounds in a week, increased swelling in our hands or feet or shortness of breath while lying flat in bed. Please call your doctor as soon as you notice any of these symptoms; do not wait until your next office visit. Recognize signs and symptoms of STROKE:    F-face looks uneven    A-arms unable to move or move unevenly    S-speech slurred or non-existent    T-time-call 911 as soon as signs and symptoms begin-DO NOT go       Back to bed or wait to see if you get better-TIME IS BRAIN.     Warning Signs of HEART ATTACK     Call 911 if you have these symptoms:   Chest discomfort. Most heart attacks involve discomfort in the center of the chest that lasts more than a few minutes, or that goes away and comes back. It can feel like uncomfortable pressure, squeezing, fullness, or pain.  Discomfort in other areas of the upper body. Symptoms can include pain or discomfort in one or both arms, the back, neck, jaw, or stomach.  Shortness of breath with or without chest discomfort.  Other signs may include breaking out in a cold sweat, nausea, or lightheadedness. Don't wait more than five minutes to call 911 - MINUTES MATTER! Fast action can save your life. Calling 911 is almost always the fastest way to get lifesaving treatment. Emergency Medical Services staff can begin treatment when they arrive -- up to an hour sooner than if someone gets to the hospital by car. The discharge information has been reviewed with the patient. The patient verbalized understanding. Discharge medications reviewed with the patient and appropriate educational materials and side effects teaching were provided. ___________________________________________________________________________________________________________________________________Patient armband removed and given to patient to take home.   Patient was informed of the privacy risks if armband lost or stolen

## 2018-01-22 NOTE — OP NOTES
BASILIC  VEIN A-V FISTULA  OP NOTE    1/22/2018    Hospital: [facility]   Surgeon(s): Camilla Vazquez MD  Assistant(s): Maribel Stapleton  Procedure(s) Performed:  Procedure(s):  left arm second stage basilic vein transporstion  Anesthesia:  general  Findings:  Excellent thrill  Complications: None  Estimated Blood Loss:  Minimal <100  Tubes and Drains:  none  Specimens: * No specimens in log *    The patient was placed in the supine position. The Left arm was prepped with chlorhexidine and draped in a sterile manner. A skin incision in the upper arm was performed, underlying soft tissue divided and the basilic vein exposed from the axillary fold to the antecubitum. The musculocutaneous nerve was preserved. The basilic vein was ligated and divided at the antecubital level, distended with heparin-saline, the vein orientation marked, and occluded with a bulldog clamp. The brachial artery was exposed at the antecubital level. The basilic vein was tunneled under the skin so as to be positioned over the biceps muscle in the subcutaneous plane. IV heparin was administered at a dose of  3 thousand units. The brachial artery was clamped, opened with a #11 blade and the arterial-venous anastomosis performed in an end-to-side  manner utilizing 6-0 Prolene. The clamps were then removed in a manner to prevent debris or air embolism. A pulse over the newly created AV fistula was present. A Doppler signal was present in the runoff vein. The radial artery Doppler signal was present and the pulse was palpable. The heparin was not reversed. Suture line bleeding was controlled by digital pressure. The tissues were approximated with 3-0 Vicryl; the skin was closed with 4-0 Monocryl. The skin was dressed with Dermabond. The patient tolerated the procedure well without any complications.     Camilla Vazquez MD ,FACS   1/22/2018

## 2018-01-22 NOTE — IP AVS SNAPSHOT
[address] [place] [address] [address] 
[phone] Patient: Tamela Maldonado MRN: [id number] :1960 About your hospitalization You were admitted on:  2018 You last received care in the:  [facility] PACU You were discharged on:  2018 Why you were hospitalized Your primary diagnosis was:  Not on File Follow-up Information Follow up With Details Comments Contact Info Cathie Sue MD   [address] [facility]  
[phone] Discharge Orders None A check catherine indicates which time of day the medication should be taken. My Medications START taking these medications Instructions Each Dose to Equal  
 Morning Noon Evening Bedtime  
 oxyCODONE-acetaminophen 5-325 mg per tablet Commonly known as:  PERCOCET Your last dose was: Your next dose is: Take 1 Tab by mouth every four (4) hours as needed for Pain. Max Daily Amount: 6 Tabs. 1 Tab CONTINUE taking these medications Instructions Each Dose to Equal  
 Morning Noon Evening Bedtime  
 albuterol 90 mcg/actuation inhaler Commonly known as:  PROVENTIL HFA, VENTOLIN HFA, PROAIR HFA Your last dose was: Your next dose is: Take 2 Puffs by inhalation every four (4) hours as needed for Wheezing. 2 Puff  
    
   
   
   
  
 calcium acetate 667 mg Cap Commonly known as:  PHOSLO Your last dose was: Your next dose is: Take 1 Cap by mouth three (3) times daily (with meals). 1 Cap  
    
   
   
   
  
 cloNIDine HCl 0.1 mg tablet Commonly known as:  CATAPRES Your last dose was: Your next dose is: Take 0.1 mg by mouth two (2) times a day. 0.1 mg  
    
   
   
   
  
 gabapentin 100 mg capsule Commonly known as:  NEURONTIN Your last dose was: Your next dose is: Take 1 Cap by mouth nightly. 100 mg  
    
   
   
   
  
 multivitamin with iron tablet Your last dose was: Your next dose is: Take 1 Tab by mouth daily. 1 Tab NIFEdipine ER 30 mg ER tablet Commonly known as:  ADALAT CC Your last dose was: Your next dose is: Take 20 mg by mouth daily. 20 mg Where to Get Your Medications Information on where to get these meds will be given to you by the nurse or doctor. ! Ask your nurse or doctor about these medications  
  oxyCODONE-acetaminophen 5-325 mg per tablet Discharge Instructions DISCHARGE SUMMARY from Nurse PATIENT INSTRUCTIONS: 
 
After general anesthesia or intravenous sedation, for 24 hours or while taking prescription Narcotics: · Limit your activities · Do not drive and operate hazardous machinery · Do not make important personal or business decisions · Do  not drink alcoholic beverages · If you have not urinated within 8 hours after discharge, please contact your surgeon on call. Report the following to your surgeon: 
· Excessive pain, swelling, redness or odor of or around the surgical area · Temperature over 100.5 · Nausea and vomiting lasting longer than 4 hours or if unable to take medications · Any signs of decreased circulation or nerve impairment to extremity: change in color, persistent  numbness, tingling, coldness or increase pain · Any questions What to do at Home: These are general instructions for a healthy lifestyle: No smoking/ No tobacco products/ Avoid exposure to second hand smoke Surgeon General's Warning:  Quitting smoking now greatly reduces serious risk to your health. Obesity, smoking, and sedentary lifestyle greatly increases your risk for illness A healthy diet, regular physical exercise & weight monitoring are important for maintaining a healthy lifestyle You may be retaining fluid if you have a history of heart failure or if you experience any of the following symptoms:  Weight gain of 3 pounds or more overnight or 5 pounds in a week, increased swelling in our hands or feet or shortness of breath while lying flat in bed. Please call your doctor as soon as you notice any of these symptoms; do not wait until your next office visit. Recognize signs and symptoms of STROKE: 
 
F-face looks uneven A-arms unable to move or move unevenly S-speech slurred or non-existent T-time-call 911 as soon as signs and symptoms begin-DO NOT go Back to bed or wait to see if you get better-TIME IS BRAIN. Warning Signs of HEART ATTACK Call 911 if you have these symptoms: 
? Chest discomfort. Most heart attacks involve discomfort in the center of the chest that lasts more than a few minutes, or that goes away and comes back. It can feel like uncomfortable pressure, squeezing, fullness, or pain. ? Discomfort in other areas of the upper body. Symptoms can include pain or discomfort in one or both arms, the back, neck, jaw, or stomach. ? Shortness of breath with or without chest discomfort. ? Other signs may include breaking out in a cold sweat, nausea, or lightheadedness. Don't wait more than five minutes to call [address]! Fast action can save your life. Calling 911 is almost always the fastest way to get lifesaving treatment. Emergency Medical Services staff can begin treatment when they arrive  up to an hour sooner than if someone gets to the hospital by car. The discharge information has been reviewed with the patient. The patient verbalized understanding. Discharge medications reviewed with the patient and appropriate educational materials and side effects teaching were provided.  
___________________________________________________________________________ ________________________________________________________Patient armband removed and given to patient to take home. Patient was informed of the privacy risks if armband lost or stolen Introducing [facility]! Kalpana Billy introduces [employer] patient portal. Now you can access parts of your medical record, email your doctor's office, and request medication refills online. 1. In your internet browser, go to [email or web address] 2. Click on the First Time User? Click Here link in the Sign In box. You will see the New Member Sign Up page. 3. Enter your [employer] Access Code exactly as it appears below. You will not need to use this code after youve completed the sign-up process. If you do not sign up before the expiration date, you must request a new code. · [employer] Access Code: [address] Expires: 4/22/2018  2:25 PM 
 
4. Enter the last four digits of your Social Security Number (xxxx) and Date of Birth (mm/dd/yyyy) as indicated and click Submit. You will be taken to the next sign-up page. 5. Create a [employer] ID. This will be your [employer] login ID and cannot be changed, so think of one that is secure and easy to remember. 6. Create a [employer] password. You can change your password at any time. 7. Enter your Password Reset Question and Answer. This can be used at a later time if you forget your password. 8. Enter your e-mail address. You will receive e-mail notification when new information is available in [address] 9. Click Sign Up. You can now view and download portions of your medical record. 10. Click the Download Summary menu link to download a portable copy of your medical information. If you have questions, please visit the Frequently Asked Questions section of the [employer] website. Remember, [employer] is NOT to be used for urgent needs. For medical emergencies, dial 911. Now available from your iPhone and Android! Providers Seen During Your Hospitalization Provider Specialty Primary office phone Nita Jones MD Vascular Surgery [phone] Your Primary Care Physician (PCP) Primary Care Physician Office Phone Office Fax [address] [address] [phone] You are allergic to the following Allergen Reactions Banana Other (comments) Recent Documentation Weight BMI OB Status Smoking Status 46.8 kg 19.5 kg/m2 Menopause Never Smoker Emergency Contacts Name Discharge Info Relation Home Work Mobile YaneShanelle DISCHARGE CAREGIVER [3] Other Relative [6] [phone] Patient Belongings The following personal items are in your possession at time of discharge: 
  Dental Appliances: None         Home Medications: None   Jewelry: None  Clothing: Undergarments, Footwear, Pants, Shirt    Other Valuables: None Please provide this summary of care documentation to your next provider. Signatures-by signing, you are acknowledging that this After Visit Summary has been reviewed with you and you have received a copy. Patient Signature:  ____________________________________________________________ Date:  ____________________________________________________________  
  
Shea Moritz Provider Signature:  ____________________________________________________________ Date:  ____________________________________________________________

## 2018-01-22 NOTE — ANESTHESIA PREPROCEDURE EVALUATION
Anesthetic History   No history of anesthetic complications            Review of Systems / Medical History  Patient summary reviewed and pertinent labs reviewed    Pulmonary  Within defined limits                 Neuro/Psych   Within defined limits           Cardiovascular    Hypertension: poorly controlled              Exercise tolerance: <4 METS     GI/Hepatic/Renal         Renal disease: ESRD and dialysis       Endo/Other  Within defined limits           Other Findings   Comments: Documentation of current medication  Current medications obtained, documented and obtained? YES      Risk Factors for Postoperative nausea/vomiting:       History of postoperative nausea/vomiting? NO       Female? YES       Motion sickness? NO       Intended opioid administration for postoperative analgesia? YES      Smoking Abstinence:  Current Smoker? NO  Elective Surgery? YES  Seen preoperatively by anesthesiologist or proxy prior to day of surgery? YES  Pt abstained from smoking 24 hours prior to anesthesia?  N/A    Preventive care/screening for High Blood Pressure:  Aged 18 years and older: YES  Screened for high blood pressure: YES  Patients with high blood pressure referred to primary care provider   for BP management: YES                 Physical Exam    Airway  Mallampati: III  TM Distance: 4 - 6 cm  Neck ROM: normal range of motion   Mouth opening: Diminished (comment)     Cardiovascular    Rhythm: regular  Rate: normal         Dental  No notable dental hx       Pulmonary  Breath sounds clear to auscultation               Abdominal  GI exam deferred       Other Findings            Anesthetic Plan    ASA: 3  Anesthesia type: general          Induction: Intravenous  Anesthetic plan and risks discussed with: Patient, Healthcare power of  and Son / Daughter

## 2018-02-05 NOTE — PROCEDURES
Jessica 1  *** FINAL REPORT ***    Name: Suly Munoz  MRN: [id number]  : 15 Oct 1960  HIS Order #: [id number]  [address] [facility] Visit #: [id number]  Date: 2018    TYPE OF TEST: Peripheral Venous Testing    REASON FOR TEST  Limb swelling    Left Arm:-  Deep venous thrombosis:           No  Superficial venous thrombosis:    No      INTERPRETATION/FINDINGS  Left arm :  1. Deep vein(s) visualized include the internal jugular, subclavian,  axillary and brachial veins. 2. No evidence of deep venous thrombosis detected in the veins  visualized. 3. No evidence of deep vein thrombosis in the contralateral internal  jugular and subclavian veins. 4. Superficial vein(s) visualized include the cephalic (upper arm)  vein. 5. No evidence of superficial thrombosis detected. ADDITIONAL COMMENTS  Patent fistula noted in the left arm. Multiple collection noted from proximal arm to antecubital fossa. Basilic vein not visualize and cephalic vein forearm. I have personally reviewed the data relevant to the interpretation of  this  study. TECHNOLOGIST: Elizabeth Tafoya, [facility], RVT/  Signed: 2018 03:03 PM    PHYSICIAN: Simona Johnson D.O.   Signed: 2018 10:05 AM

## 2018-02-05 NOTE — ED TRIAGE NOTES
Pt  Co constipation for 3 weeks, also noted left arm, swelling and pain with movement pt is s/p dialysis cather placement in that arm x 2 weeks

## 2018-02-05 NOTE — ED PROVIDER NOTES
HPI Comments: Pt has had no BM for >3 weeks, + occasional vomiting, clear and watery, but has been able to eat    No chest pain, no shortness of breath    No abdominal pain, no change in urination    Has recently had fistula placed, has some swelling, but was told by PCP this was normal    Patient is a 56 y.o. female presenting with constipation and arm pain. The history is provided by the patient. Constipation    The current episode started more than 1 week ago. The stool is described as blood tinged. Associated symptoms include flatus, nausea, vomiting and constipation. Pertinent negatives include no abdominal pain and no abdominal distention. Arm Pain           Past Medical History:   Diagnosis Date    Chronic kidney disease     [facility]     TUES-THURS-SAT    Hypercholesteremia     Hypertension     Kidney disease     Vitamin D deficiency        Past Surgical History:   Procedure Laterality Date    VASCULAR SURGERY PROCEDURE UNLIST           History reviewed. No pertinent family history. Social History     Social History    Marital status:      Spouse name: N/A    Number of children: N/A    Years of education: N/A     Occupational History    Not on file. Social History Main Topics    Smoking status: Never Smoker    Smokeless tobacco: Never Used    Alcohol use No    Drug use: No    Sexual activity: Not on file     Other Topics Concern    Not on file     Social History Narrative         ALLERGIES: Banana    Review of Systems   Reason unable to perform ROS: limited due to language barrier- offered translaor and declined. Gastrointestinal: Positive for constipation, flatus, nausea and vomiting. Negative for abdominal distention and abdominal pain. Vitals:    02/05/18 1326   BP: (!) 206/101   Pulse: 74   Resp: 16   Temp: 98.3 °F (36.8 °C)   SpO2: 97%            Physical Exam   Constitutional: She is oriented to person, place, and time.  She appears well-developed and well-nourished. HENT:   Head: Normocephalic and atraumatic. Nose: Nose normal.   Eyes: Conjunctivae are normal.   Neck: Neck supple. No tracheal deviation present. Cardiovascular: Normal rate, regular rhythm and normal heart sounds. Pulmonary/Chest: Effort normal and breath sounds normal. No respiratory distress. Abdominal: Soft. She exhibits no distension. There is no tenderness. Musculoskeletal: Normal range of motion. She exhibits edema. swelling of the upper arm, minimal warmth   Lymphadenopathy:     She has no cervical adenopathy. Neurological: She is alert and oriented to person, place, and time. No cranial nerve deficit. Grossly intact   Skin: Skin is warm and dry. Psychiatric: She has a normal mood and affect. Vitals reviewed. [facility]      ED Course   Comment By Time   Offered - pt and family declined Micah Roy MD  1329   Unsuccessful enema Micah Roy MD 1531   Name: Fay Denise  MRN: [id number]  : 15 Oct 1960  HIS Order #: [id number]  [id number] [facility] Visit #: [id number]  Date: 2018     TYPE OF TEST: Peripheral Venous Testing     REASON FOR TEST  Limb swelling     Left Arm:-  Deep venous thrombosis:           No  Superficial venous thrombosis:    No        INTERPRETATION/FINDINGS  Left arm :  1. Deep vein(s) visualized include the internal jugular, subclavian,  axillary and brachial veins. 2. No evidence of deep venous thrombosis detected in the veins  visualized. 3. No evidence of deep vein thrombosis in the contralateral internal  jugular and subclavian veins. 4. Superficial vein(s) visualized include the cephalic (upper arm)  vein. 5. No evidence of superficial thrombosis detected.  Micah Roy MD  153   Pt unable to urinate, already went today Micah Roy MD  1542       Procedures

## 2018-02-05 NOTE — DISCHARGE INSTRUCTIONS
Táo bón: H???ng d?n ch?m so?c - [ Constipation: Care Instructions ]  H??ng d?n ch?m sóc  Táo bón ngh?a là quý v? khó ?i ??i ti?n (?i ngoài). M?i ng??i ?i ??i ti?n t? 3 l?n m?t ngày ? ?n 3 ngày m?t l?n. ?i?u này khác nhau tùy thu?c vào t?ng ng??i. Táo bón có th? x?y ra kèm brittany hi?n t??ng ?au tr?c tràng và cabrera?t rút. Ch? ?au có th? n?ng h?n khi quý v? r?n. ?ôi khi, có m?t l??ng nh? máu ?? t??i trên gi?y v? sinh ho?c trên b? m?t phân do các m?ch máu b? giãn ra ? g?n tr?c tràng (b?nh tr?). M?t s? thay ??i mercedez ch? ?? ?n và l?i s?ng c?a quý v? có th? giúp tránh táo bón. Bác s? c?ng có th? kê ??n thu?c ?? giúp phân c?a quý v? b?t r?n. M?t s? thu?c (ch?ng h?n nh? thu?c gi?m ?au ho?c thu?c ch?ng tirso nh??c) có th? gây ra táo bón. Cho bác s? bi?t t?t c? các lo?i thu?c quý v? ?ang dùng. Bác s? có th? th?c hi?n thay ??i thu?c ?? gi?m các tri?u ch?ng c?a quý v?. Ch?m sóc brittany dõi ti?p là m?t ph?n purvi tr?ng mercedez vi?c ?i?u tr? và ??m b?o an toàn cho quý v?. Hãy thu x?p và th?c hi?n t?t c? các cu?c h?n và g?i ?i?n cho bác s? n?u quý v? có v?n ?? Micah How v? c?ng nên bi?t k?t qu? xét freddy?m c?a mình và gi? l?i ismael sách các lo?i thu?c mà quý v? dùng. Quý v? t? ch?m sóc t?i nhà th? nào?  · U?ng opal?u ch?t l?ng, ?? ?? n??c ti?u có màu vàng nh?t ho?c mercedez nh? n??c. N?u quý v? có b?nh th?n, marco a, ho?c kelly và c?n ph?i h?n ch? dùng ch?t l?ng, hãy h?i ý ki?n bác s? tr??c khi t?ng l??ng ch?t l?ng mà quý v? u?ng. · Art g?m th?c ?n opal?u ch?t x?, ch?ng h?n nh? brenda qu?, dennis c?, ??u và th?c ph?m nguyên h?t, mercedez ch? ?? ?n hàng ngày c?a quý v?.  · Dành ít nh?t 30 t?p th? d?c h?u h?t các ngày mercedez tu?n. ? i b? Wilhelmena Fink ch?n thích h?p. Quý v? c?ng có th? th?c hi?n các ho?t ??ng khác, nh? ch?y bô?, b?i l?i, ?i xe ??p ho?c ch?i tennis hay các môn th? jerad ??ng ??i.  · Dùng thu?c b? có ch?a ch?t x?, ch?ng h?n nh? Citrucel ho?c Metamucil, hàng ngày.  B?t ??u abbey morrow?u th?p và t?ng d?n mercedez m?t ruslan house?c h?n.  · Praveen holden?kenroy gibson?kenroy chauhan ?? ?i ngoài. Leelee Hoot hàng ngày có th? h?u ích. Dành th?i breezy ?i ngoài. · ??t chân lên m?t chi?c gh? ??u nh? khi quý v? ng?i trên b?n c?u. ?i?u này giúp hông c?a quý v? almita l?i và ??t khung x? ?ng ch?u ? v? trí ng?i x?m.  · Bác s? có th? ?? ngh? dùng thu?c tylor?n tràng không c?n toa ?? gi?m hi?n t??ng táo bón. Ví d? là S?a Magnesia và MiraLax. ??c và làm amado t?t c? ch? d?n trên nhãn s?n ph?m và không s? d?ng thu?c tylor?n tràng mercedez m?t th?i breezy dài. Khi nào quý v? nên g?i tr? giúp? G?i cho bác s? ngay ho?c tìm n?i ch?m sóc y t? kh?n c?p n?u:  · Quý v? ?au b?ng tr? l?i hay ?au n?ng h?n.  · Quý v? bu?n nôn ho?c ói m?a tr? l?i hay n?ng h?n.  · Có máu mercedez phân c?a quý v?.  Amado dõi k? h?n các thay ??i v? s?c kh?e, và nh? liên l?c v?i bác s? n?u:  · Tình tr?ng táo bón tr? nên n?ng h?n.  · Quý v? không bình ph?c nh? rojas ??i. Quý v? có th? tìm hi?u thêm ? ?âu? Vào [email or web address]/. Enter P343 tìm hi?u thêm mercedez hô?p ti?m kiê?m \"Táo bón: H???ng d?n ch?m so?c - [ Constipation: Care Instructions ]. \"  Stephon Salazar hi?u l?c k? t?: [address],   Phiên b?n n?i efrain.4  © 5422-9861 [employer]. H??ng d?n ch?m sóc ? ??c s?a ??i cho phù h?p amado gi?y phép c?a chuyên my ch?m sóc y t?. N?u quý v? có th??c m??c v? các ?i?u ki?n y t? ho?c h??ng d?n này, garo sami lòng h?i chuyên my ch?m so?c y t?. Tâ?p ?oa?n Healthwise kh??c t? m?i ??m b?o ho?c trách opal?m v? vi?c s? d?ng thông tin na?y.

## 2018-02-05 NOTE — ED NOTES
I have reviewed discharge instructions with the caregiver. The caregiver verbalized understanding. Current Discharge Medication List      START taking these medications    Details   glycerin, adult, (FLEET GLYCERIN, ADULT,) suppository Insert 1 Suppository into rectum daily.  Indications: BOWEL EVACUATION  Qty: 10 Suppository, Refills: 0         Patient armband removed and shredded

## 2018-02-25 PROBLEM — J96.90 RESPIRATORY FAILURE (HCC): Status: ACTIVE | Noted: 2018-01-01

## 2018-02-25 PROBLEM — N39.0 ACUTE UTI: Status: ACTIVE | Noted: 2018-01-01

## 2018-02-25 PROBLEM — R09.02 HYPOXIA: Status: ACTIVE | Noted: 2018-01-01

## 2018-02-25 PROBLEM — I21.4 NSTEMI (NON-ST ELEVATED MYOCARDIAL INFARCTION) (HCC): Status: ACTIVE | Noted: 2018-01-01

## 2018-02-25 PROBLEM — E87.1 HYPONATREMIA: Status: ACTIVE | Noted: 2018-01-01

## 2018-02-25 PROBLEM — N18.6 ESRD (END STAGE RENAL DISEASE) (HCC): Status: ACTIVE | Noted: 2018-01-01

## 2018-02-25 NOTE — IP AVS SNAPSHOT
[address] [address] [address] Patient: Ernestine Paris MRN: [id number] :1960 A check catherine indicates which time of day the medication should be taken. My Medications START taking these medications Instructions Each Dose to Equal  
 Morning Noon Evening Bedtime  
 aspirin 81 mg chewable tablet Your last dose was: Your next dose is: Take 1 Tab by mouth daily. 81 mg  
    
   
   
   
  
 atorvastatin 40 mg tablet Commonly known as:  LIPITOR Your last dose was: Your next dose is: Take 1 Tab by mouth nightly. 40 mg  
    
   
   
   
  
 carvedilol 25 mg tablet Commonly known as:  Geni García Your last dose was: Your next dose is: Take 1 Tab by mouth every twelve (12) hours. 25 mg FLUoxetine 10 mg capsule Commonly known as:  PROzac Your last dose was: Your next dose is: Take 1 Cap by mouth daily. 10 mg  
    
   
   
   
  
 melatonin 3 mg tablet Your last dose was: Your next dose is: Take 1 Tab by mouth nightly as needed. 3 mg CHANGE how you take these medications Instructions Each Dose to Equal  
 Morning Noon Evening Bedtime  
 cloNIDine HCl 0.3 mg tablet Commonly known as:  CATAPRES What changed:   
- medication strength - when to take this Your last dose was: Your next dose is: Take 1 Tab by mouth two (2) times a day. 0.3 mg  
    
   
   
   
  
 hydrALAZINE 100 mg tablet Commonly known as:  APRESOLINE What changed:   
- medication strength 
- how much to take - when to take this Your last dose was: Your next dose is: Take 0.5 Tabs by mouth two (2) times a day. 25 mg CONTINUE taking these medications Instructions Each Dose to Equal  
 Morning Noon Evening Bedtime  
 albuterol 90 mcg/actuation inhaler Commonly known as:  PROVENTIL HFA, VENTOLIN HFA, PROAIR HFA Your last dose was: Your next dose is: Take 2 Puffs by inhalation every four (4) hours as needed for Wheezing. 2 Puff  
    
   
   
   
  
 amLODIPine 10 mg tablet Commonly known as:  Stephy Weeks Your last dose was: Your next dose is: Take 1 Tab by mouth daily. 10 mg  
    
   
   
   
  
 glycerin (adult) suppository Commonly known as:  FLEET GLYCERIN (ADULT) Your last dose was: Your next dose is: Insert 1 Suppository into rectum daily. Indications: BOWEL EVACUATION  
 1 Suppository  
    
   
   
   
  
 losartan 100 mg tablet Commonly known as:  COZAAR Your last dose was: Your next dose is: Take 1 Tab by mouth daily. 100 mg  
    
   
   
   
  
  
STOP taking these medications   
 calcium acetate 667 mg Cap Commonly known as:  PHOSLO  
   
  
 multivitamin with iron tablet NIFEdipine ER 30 mg ER tablet Commonly known as:  ADALAT CC  
   
  
 oxyCODONE-acetaminophen 5-325 mg per tablet Commonly known as:  PERCOCET Where to Get Your Medications Information on where to get these meds will be given to you by the nurse or doctor. ! Ask your nurse or doctor about these medications  
  amLODIPine 10 mg tablet  
 aspirin 81 mg chewable tablet  
 atorvastatin 40 mg tablet  
 carvedilol 25 mg tablet  
 cloNIDine HCl 0.3 mg tablet FLUoxetine 10 mg capsule  
 hydrALAZINE 100 mg tablet  
 losartan 100 mg tablet  
 melatonin 3 mg tablet

## 2018-02-25 NOTE — Clinical Note
Status[de-identified] Inpatient [101] Type of Bed: Telemetry [19] Inpatient Hospitalization Certified Necessary for the Following Reasons: 3. Patient receiving treatment that can only be provided in an inpatient setting (further clarification in H&P documentation) Admitting Diagnosis: NSTEMI (non-ST elevated myocardial infarction) ([facility].) [[id number]] Admitting Diagnosis: ESRD (end stage renal disease) ([facility].) [[id number]] Admitting Diagnosis: Hyponatremia [015653] Admitting Physician: Sasha Wise [[id number]] Attending Physician: Sasha Wise [[id number]] Estimated Length of Stay: 2 Midnights Discharge Plan[de-identified] Home with Office Follow-up

## 2018-02-25 NOTE — ED PROVIDER NOTES
EMERGENCY DEPARTMENT HISTORY AND PHYSICAL EXAM    1:07 PM      Date: 2/25/2018  Patient Name: Luis Caballero    History of Presenting Illness     Chief Complaint   Patient presents with    Shortness of Breath    Chest Pain         History Provided By: Patient via  (Son-in-law)    Chief Complaint: SOB  Duration: 3-4 Days  Timing:  Constant  Location:   Quality:   Severity: Moderate  Modifying Factors: Denies sickly contact, Dialysis M/W/F (last 2 days ago)  Associated Symptoms: cough, nausea, emesis, dysuria, decreased appetite. Denies fever or diarrhea      Additional History (Context): Luis Caballero is a 56 y.o. [place] female with hx of end stage renal disease, HTN, and Hypercholestermia who presents with c/o constant moderate SOB onset 3-4 days. Associated sx of productive cough (with white sputum), nausea, emesis, dysuria, and decreased appetite. Denies fever or diarrhea. Denies sickly contact. Pt has dialysis M/W/F with last visit on Friday (2 days ago) with pt reporting not feeling better afterward. Son-in-law states pt takes 4 different HTN medications and took 2 PTA today. Denies hx of asthma, COPD, or DM. Pt is non-smoker and denies ETOH/drug use. Pt followed by Dr. Quiana Stevens (Nephrology)    Son-in-law translating at bedside. PCP: Mariano Diaz MD    Current Facility-Administered Medications   Medication Dose Route Frequency Provider Last Rate Last Dose    heparin 25,000 units in D5W 250 ml infusion  12-25 Units/kg/hr IntraVENous TITRATE Beaufarmen Ray Hunter, DO 5 mL/hr at 02/25/18 1442 12 Units/kg/hr at 02/25/18 1442    metoprolol (LOPRESSOR) injection 5 mg  5 mg IntraVENous NOW Befredrick Ray Hunter, DO        aspirin chewable tablet 162 mg  162 mg Oral NOW Chandler Harrison, DO         Current Outpatient Prescriptions   Medication Sig Dispense Refill    hydrALAZINE (APRESOLINE) 50 mg tablet Take 25 mg by mouth three (3) times daily.       losartan (COZAAR) 100 mg tablet Take 100 mg by mouth daily.      amLODIPine (NORVASC) 10 mg tablet Take 10 mg by mouth daily.  glycerin, adult, (FLEET GLYCERIN, ADULT,) suppository Insert 1 Suppository into rectum daily. Indications: BOWEL EVACUATION 10 Suppository 0    oxyCODONE-acetaminophen (PERCOCET) 5-325 mg per tablet Take 1 Tab by mouth every four (4) hours as needed for Pain. Max Daily Amount: 6 Tabs. 20 Tab 0    albuterol (PROVENTIL HFA, VENTOLIN HFA, PROAIR HFA) 90 mcg/actuation inhaler Take 2 Puffs by inhalation every four (4) hours as needed for Wheezing. 1 Inhaler 0    calcium acetate (PHOSLO) 667 mg cap Take 1 Cap by mouth three (3) times daily (with meals). 90 Cap 1    cloNIDine HCl (CATAPRES) 0.1 mg tablet Take 0.3 mg by mouth three (3) times daily.  NIFEdipine ER (ADALAT CC) 30 mg ER tablet Take 20 mg by mouth daily.  multivitamin with iron tablet Take 1 Tab by mouth daily. Past History     Past Medical History:  Past Medical History:   Diagnosis Date    Chronic kidney disease     [facility]     TUES-THURS-SAT    Hypercholesteremia     Hypertension     Kidney disease     Vitamin D deficiency        Past Surgical History:  Past Surgical History:   Procedure Laterality Date    VASCULAR SURGERY PROCEDURE UNLIST         Family History:  History reviewed. No pertinent family history. Social History:  Social History   Substance Use Topics    Smoking status: Never Smoker    Smokeless tobacco: Never Used    Alcohol use No       Allergies: Allergies   Allergen Reactions    Banana Other (comments)         Review of Systems       Review of Systems   Constitutional: Positive for appetite change. Negative for chills, diaphoresis and fever. HENT: Negative. Negative for congestion, rhinorrhea and sore throat. Eyes: Negative. Negative for pain, discharge and redness. Respiratory: Positive for cough and shortness of breath. Negative for chest tightness and wheezing.     Cardiovascular: Negative. Negative for chest pain. Gastrointestinal: Positive for abdominal pain, nausea and vomiting. Negative for constipation and diarrhea. Genitourinary: Positive for dysuria. Negative for flank pain, frequency, hematuria and urgency. Musculoskeletal: Negative. Negative for back pain and neck pain. Skin: Negative. Negative for rash. Neurological: Negative. Negative for syncope, weakness, numbness and headaches. Psychiatric/Behavioral: Negative. All other systems reviewed and are negative. Physical Exam     Visit Vitals    BP (!) 205/140    Pulse (!) 108    Temp 98.4 °F (36.9 °C)    Resp 12    Ht 5' 1\" (1.549 m)    Wt 42 kg (92 lb 9.5 oz)    SpO2 93%    BMI 17.5 kg/m2         Physical Exam   Constitutional: She appears well-developed and well-nourished. Non-toxic appearance. She does not have a sickly appearance. She does not appear ill. No distress. HENT:   Head: Normocephalic and atraumatic. Mouth/Throat: Oropharynx is clear and moist. No oropharyngeal exudate. Eyes: Conjunctivae and EOM are normal. Pupils are equal, round, and reactive to light. No scleral icterus. Neck: Normal range of motion. Neck supple. No hepatojugular reflux and no JVD present. No tracheal deviation present. No thyromegaly present. Cardiovascular: Regular rhythm, S1 normal, S2 normal, normal heart sounds, intact distal pulses and normal pulses. Tachycardia present. Exam reveals no gallop, no S3 and no S4. No murmur heard. Pulses:       Radial pulses are 2+ on the right side, and 2+ on the left side. Dorsalis pedis pulses are 2+ on the right side, and 2+ on the left side. Pulmonary/Chest: Effort normal and breath sounds normal. No respiratory distress. She has no decreased breath sounds. She has no wheezes. She has no rhonchi. She has no rales. Abdominal: Soft. Normal appearance and bowel sounds are normal. She exhibits no distension and no mass.  There is no hepatosplenomegaly. There is no tenderness. There is no rigidity, no rebound, no guarding, no CVA tenderness, no tenderness at McBurney's point and negative Vaughn's sign. Musculoskeletal: Normal range of motion. Strength 4/5 throughout    Lymphadenopathy:        Head (right side): No submental, no submandibular, no preauricular and no occipital adenopathy present. Head (left side): No submental, no submandibular, no preauricular and no occipital adenopathy present. She has no cervical adenopathy. Right: No supraclavicular adenopathy present. Left: No supraclavicular adenopathy present. Neurological: She is alert. She has normal strength and normal reflexes. She is not disoriented. No cranial nerve deficit or sensory deficit. Coordination and gait normal. GCS eye subscore is 4. GCS verbal subscore is 5. GCS motor subscore is 6. Grossly intact    Skin: Skin is warm, dry and intact. No rash noted. She is not diaphoretic. Psychiatric: She has a normal mood and affect. Her speech is normal and behavior is normal. Judgment and thought content normal. Cognition and memory are normal.   Nursing note and vitals reviewed.         Diagnostic Study Results     Labs -  Recent Results (from the past 12 hour(s))   EKG, 12 LEAD, INITIAL    Collection Time: 02/25/18 12:57 PM   Result Value Ref Range    Ventricular Rate 105 BPM    Atrial Rate 105 BPM    P-R Interval 158 ms    QRS Duration 82 ms    Q-T Interval 334 ms    QTC Calculation (Bezet) 441 ms    Calculated P Axis 63 degrees    Calculated R Axis 79 degrees    Calculated T Axis 27 degrees    Diagnosis       Sinus tachycardia  Left atrial enlargement  Anterior infarct , age undetermined  Abnormal ECG  When compared with ECG of 02-DEC-2017 09:54,  ST no longer depressed in Lateral leads  T wave inversion now evident in Lateral leads     CBC WITH AUTOMATED DIFF    Collection Time: 02/25/18  1:38 PM   Result Value Ref Range    WBC 7.7 4.6 - 13.2 K/uL    RBC 3.75 (L) 4.20 - 5.30 M/uL    HGB 9.4 (L) 12.0 - 16.0 g/dL    HCT 30.8 (L) 35.0 - 45.0 %    MCV 82.1 74.0 - 97.0 FL    MCH 25.1 24.0 - 34.0 PG    MCHC 30.5 (L) 31.0 - 37.0 g/dL    RDW 15.3 (H) 11.6 - 14.5 %    PLATELET 649 372 - 823 K/uL    MPV 10.9 9.2 - 11.8 FL    NEUTROPHILS 81 (H) 40 - 73 %    LYMPHOCYTES 7 (L) 21 - 52 %    MONOCYTES 12 (H) 3 - 10 %    EOSINOPHILS 0 0 - 5 %    BASOPHILS 0 0 - 2 %    ABS. NEUTROPHILS 6.2 1.8 - 8.0 K/UL    ABS. LYMPHOCYTES 0.6 (L) 0.9 - 3.6 K/UL    ABS. MONOCYTES 0.9 0.05 - 1.2 K/UL    ABS. EOSINOPHILS 0.0 0.0 - 0.4 K/UL    ABS. BASOPHILS 0.0 0.0 - 0.06 K/UL    DF AUTOMATED     METABOLIC PANEL, COMPREHENSIVE    Collection Time: 02/25/18  1:38 PM   Result Value Ref Range    Sodium 129 (L) 136 - 145 mmol/L    Potassium 4.0 3.5 - 5.5 mmol/L    Chloride 92 (L) 100 - 108 mmol/L    CO2 27 21 - 32 mmol/L    Anion gap 10 3.0 - 18 mmol/L    Glucose 122 (H) 74 - 99 mg/dL    BUN 18 7.0 - 18 MG/DL    Creatinine 5.80 (H) 0.6 - 1.3 MG/DL    BUN/Creatinine ratio 3 (L) 12 - 20      GFR est AA 9 (L) >60 ml/min/1.73m2    GFR est non-AA 8 (L) >60 ml/min/1.73m2    Calcium 9.4 8.5 - 10.1 MG/DL    Bilirubin, total 0.7 0.2 - 1.0 MG/DL    ALT (SGPT) 13 13 - 56 U/L    AST (SGOT) 38 (H) 15 - 37 U/L    Alk.  phosphatase 118 (H) 45 - 117 U/L    Protein, total 7.1 6.4 - 8.2 g/dL    Albumin 3.2 (L) 3.4 - 5.0 g/dL    Globulin 3.9 2.0 - 4.0 g/dL    A-G Ratio 0.8 0.8 - 1.7     CARDIAC PANEL,(CK, CKMB & TROPONIN)    Collection Time: 02/25/18  1:38 PM   Result Value Ref Range    CK 70 26 - 192 U/L    CK - MB 1.3 <3.6 ng/ml    CK-MB Index 1.9 0.0 - 4.0 %    Troponin-I, Qt. 3.64 (HH) 0.00 - 0.06 NG/ML   NT-PRO BNP    Collection Time: 02/25/18  1:38 PM   Result Value Ref Range    NT pro-BNP >93230 (H) 0 - 900 PG/ML   PTT    Collection Time: 02/25/18  1:38 PM   Result Value Ref Range    aPTT 35.4 23.0 - 36.4 SEC       Radiologic Studies -   XR CHEST PORT   Final Result   FINDINGS: A portable AP radiograph of the chest was obtained at 1326 hours. Right-sided central venous line is unchanged. Stable enlargement of the cardiac  silhouette. Mildly prominent pulmonary vasculature. Bilateral lung base small  pleural effusions and overlying hazy opacities, may represent developing edema  or atelectasis/infiltrate. Osseous structures are stable.     IMPRESSION  IMPRESSION: Vascular congestion with small effusions and bilateral lung base  small hazy opacities as above. CTA Chest:   IMPRESSION:     1. No pulmonary embolus identified. Smaller segmental and subsegmental pulmonary  arteries less well evaluated due to motion artifact.     2. Moderate-sized bilateral pleural effusions, and moderate volume pericardial  effusion. Borderline ectatic ascending aorta.     -Probable compressive atelectasis in bilateral lungs. Superimposed infiltrate  difficult to exclude.     -Suspected left hilar and aortopulmonary window adenopathy, poorly delineated,  nonspecific. Medical Decision Making   I am the first provider for this patient. I reviewed the vital signs, available nursing notes, past medical history, past surgical history, family history and social history. Vital Signs-Reviewed the patient's vital signs. Records Reviewed: Nursing Notes (Time of Review: 1:18 PM)    ED Course: Progress Notes, Reevaluation, and Consults:    Labs essentially normal with the exception of Hemoglobin 9.4 (Chronic). Sodium 129. Elevated BUN and Creatinine (Chronic). Troponin of 3.64. BNP >70,000. Chest X-Ray showed vascular congestion. EKG showed Sinus Tachycardiac with rate of 105 bpm. With no ST elevations or depression and non specific T wave changes. 2:52 PM  2/25/2018    3:06 PM - Cardiology and Hospitalist paged    3:12 PM Consult: I discussed care with Dr. Fred Maradiaga (Cardiology). It was a standard discussion including patient history, chief complaint, available diagnostic results, and predicted treatment course. Recommended CTA to rule out PE as well as ASA and Statin. 3:32 PM - Hospitalist paged again    3:48 PM Consult: I discussed care with Dr. Juan Segura (Hospitalist). It was a standard discussion including patient history, chief complaint, available diagnostic results, and predicted treatment course. Agrees with admit to Telemetry. Would like consult with Nephrology. Provider Notes (Medical Decision Making):  MDM  Number of Diagnoses or Management Options  ESRD (end stage renal disease) ([facility]): Hyponatremia:   NSTEMI (non-ST elevated myocardial infarction) [facility]):   Diagnosis management comments: Shortness of breath etiologies include chronic obstructive pulmonary disease (COPD), acute asthma exacerbation, congestive heart failure, pneumonia, acute bronchitis, pulmonary embolism, upper respiratory infection, cardiac event to include acute coronary syndrome, acute myocardial infarction or a combination of the above (ex URI on top of COPD thus causing respiratory distress). For Hospitalized Patients:    1. Hospitalization Decision Time:  The decision to hospitalize the patient was made by Dr. Vane Garcia at 3:06 PM on 2/25/2018    2. Aspirin: Aspirin was given    Diagnosis       Clinical Impression:   1. NSTEMI (non-ST elevated myocardial infarction) ([facility]    2. [facility]    3. Hyponatremia        Patient was awaiting transfer to [place] and became hypoxic and anxious. O2 sats dropped to 80 % on non rebreather. Decision was made to intubate.       Disposition: Admit      Intubation  Date/Time: 2/25/2018 6:43 PM  Performed by: Julian Arguelles  Authorized by: Julian Arguelles     Consent:     Consent obtained:  Emergent situation    Risks discussed:  Hypoxia  Pre-procedure details:     Mallampati score:  III    Pretreatment medications:  None    Paralytics:  Succinylcholine  Procedure details:     Preoxygenation:  Bag valve mask    CPR in progress: no      Intubation method:  Oral    Oral intubation technique:  Direct and video-assisted    Laryngoscope blade: Mac 3    Tube size (mm):  7.5    Tube type:  Cuffed    Number of attempts:  1    Ventilation between attempts: no      Cricoid pressure: no      Tube visualized through cords: yes    Placement assessment:     ETT to lip:  21    Tube secured with:  ETT terrell    Breath sounds:  Equal and absent over the epigastrium    Placement verification: chest rise, direct visualization, equal breath sounds and ETCO2 detector    Post-procedure details:     Patient tolerance of procedure: Tolerated well, no immediate complications      Consult:  Discussed care with Dr Morgan To (Intensivist). Standard discussion; including history of patients chief complaint, available diagnostic results, and treatment course. Agrees to admit. And is here in ED.     7:00 PM    Patient intubated with no complications. Discussed with Dr Zandra Grant. Will transfer to ICU.    _______________________________    Attestations:  Jeb Álvarez. acting as a scribe for and in the presence of Vishal Benson,       February 25, 2018 at 1:18 PM       Provider Attestation:      I personally performed the services described in the documentation, reviewed the documentation, as recorded by the scribe in my presence, and it accurately and completely records my words and actions.  February 25, 2018 at 1:18 PM - Gina Harrison DO    _______________________________

## 2018-02-25 NOTE — ED TRIAGE NOTES
Reports to ER w/ son c/o SOB and intermittent Chest pain for several days. Denies cough.   Denies fever

## 2018-02-25 NOTE — ED NOTES
MD aware of patient's longstanding history of HTN despite medication use. No further medications to be administered for HTN control at this time due to constant elevation of BP.

## 2018-02-25 NOTE — PROGRESS NOTES
Pt. systolic BP in 873'V. Spoke with Dr. Isma Montgomery who states he has made ED physician that we will not take patient unless systolic BP is around 939'K/435'G.

## 2018-02-25 NOTE — IP AVS SNAPSHOT
[address] [facility] 41 [address] Solis Patient: Tamela Maldonado MRN: [id number] :1960 About your hospitalization You were admitted on:  2018 You last received care in the:  [address] [place] [address] You were discharged on:  2018 Why you were hospitalized Your primary diagnosis was:  Respiratory Failure (Hcc) Your diagnoses also included:  Hyponatremia, Esrd (End Stage Renal Disease) (Hcc), Nstemi (Non-St Elevated Myocardial Infarction) (Hcc), Hypoxia, Acute Uti, Anemia Follow-up Information Follow up With Details Comments Contact Info Cathie Sue MD On 3/16/2018 at [address] [facility] [address] [facility] [address] 
[phone] Makayla Smith MD On 3/27/2018 at 2:15PM [address] [facility] [address] [facility] [address] [phone] Your Scheduled Appointments 2018  2:15 PM EDT Office Visit with David Chung MD  
[facility]) [address] [facility] 102 [facility] [address]  
[phone] Discharge Orders None A check catherine indicates which time of day the medication should be taken. My Medications START taking these medications Instructions Each Dose to Equal  
 Morning Noon Evening Bedtime  
 aspirin 81 mg chewable tablet Your last dose was: Your next dose is: Take 1 Tab by mouth daily. 81 mg  
    
   
   
   
  
 atorvastatin 40 mg tablet Commonly known as:  LIPITOR Your last dose was: Your next dose is: Take 1 Tab by mouth nightly. 40 mg  
    
   
   
   
  
 carvedilol 25 mg tablet Commonly known as:  Trevon Si Your last dose was: Your next dose is: Take 1 Tab by mouth every twelve (12) hours. 25 mg FLUoxetine 10 mg capsule Commonly known as:  PROzac Your last dose was: Your next dose is: Take 1 Cap by mouth daily. 10 mg  
    
   
   
   
  
 melatonin 3 mg tablet Your last dose was: Your next dose is: Take 1 Tab by mouth nightly as needed. 3 mg CHANGE how you take these medications Instructions Each Dose to Equal  
 Morning Noon Evening Bedtime  
 cloNIDine HCl 0.3 mg tablet Commonly known as:  CATAPRES What changed:   
- medication strength - when to take this Your last dose was: Your next dose is: Take 1 Tab by mouth two (2) times a day. 0.3 mg  
    
   
   
   
  
 hydrALAZINE 100 mg tablet Commonly known as:  APRESOLINE What changed:   
- medication strength 
- how much to take - when to take this Your last dose was: Your next dose is: Take 0.5 Tabs by mouth two (2) times a day. 25 mg CONTINUE taking these medications Instructions Each Dose to Equal  
 Morning Noon Evening Bedtime  
 albuterol 90 mcg/actuation inhaler Commonly known as:  PROVENTIL HFA, VENTOLIN HFA, PROAIR HFA Your last dose was: Your next dose is: Take 2 Puffs by inhalation every four (4) hours as needed for Wheezing. 2 Puff  
    
   
   
   
  
 amLODIPine 10 mg tablet Commonly known as:  Serg Gilman Your last dose was: Your next dose is: Take 1 Tab by mouth daily. 10 mg  
    
   
   
   
  
 glycerin (adult) suppository Commonly known as:  FLEET GLYCERIN (ADULT) Your last dose was: Your next dose is: Insert 1 Suppository into rectum daily. Indications: BOWEL EVACUATION  
 1 Suppository  
    
   
   
   
  
 losartan 100 mg tablet Commonly known as:  COZAAR Your last dose was: Your next dose is: Take 1 Tab by mouth daily.   
 100 mg  
    
   
   
   
 STOP taking these medications   
 calcium acetate 667 mg Cap Commonly known as:  PHOSLO  
   
  
 multivitamin with iron tablet NIFEdipine ER 30 mg ER tablet Commonly known as:  ADALAT CC  
   
  
 oxyCODONE-acetaminophen 5-325 mg per tablet Commonly known as:  PERCOCET Where to Get Your Medications Information on where to get these meds will be given to you by the nurse or doctor. ! Ask your nurse or doctor about these medications  
  amLODIPine 10 mg tablet  
 aspirin 81 mg chewable tablet  
 atorvastatin 40 mg tablet  
 carvedilol 25 mg tablet  
 cloNIDine HCl 0.3 mg tablet FLUoxetine 10 mg capsule  
 hydrALAZINE 100 mg tablet  
 losartan 100 mg tablet  
 melatonin 3 mg tablet Discharge Instructions Pulmonary Edema: Care Instructions Your Care Instructions Pulmonary edema is the buildup of fluid in the lungs. It usually occurs when the heart does not pump blood through the body properly. Pulmonary edema can also be caused by another disease, such as liver or kidney failure. It can also happen at high altitudes, from a poisoning, or as a result of a near-drowning. If you have fluid in your lungs, you may have trouble breathing, be restless, have a fast heart rate, or cough up foamy pink fluid. Breathing problems may be worse when you lie down. Follow-up care is a key part of your treatment and safety. Be sure to make and go to all appointments, and call your doctor if you are having problems. It's also a good idea to know your test results and keep a list of the medicines you take. How can you care for yourself at home? Medicines ? · Take your medicines exactly as prescribed. Call your doctor if you think you are having a problem with your medicine. ? · Review all of your regular medicines with your doctor. Do not take any vitamins, over-the-counter medicines, or herbal products without talking to your doctor first.  
Diet ? · Eat a balanced diet. Make an appointment with a dietitian if you have questions about what type of diet might be best for you. ? · Do not eat more than 2,000 milligrams (mg) of sodium each day. That is less than 1 teaspoon of salt a day, including all the salt you eat in prepared or packaged foods. ¨ Do not add salt while you are cooking or at the table. Flavor with garlic, lemon juice, onion, vinegar, herbs, and spices instead of salt. ¨ Eat fewer processed foods and foods from restaurants, including fast food. ¨ Use fresh or frozen foods instead of canned. ¨ Count and record how much sodium you eat each day. Check food labels for sodium. ¨ Ask your doctor before using salt substitutes that have potassium, such as Lite Salt. ? Lifestyle ? · Stay out of air pollution; smog; cold, dry air; hot, humid air; and high altitudes. ? · Learn breathing methods that help the airflow in and out of your lungs. ? · Take rest breaks often. Schedule short rest breaks when doing housework and other activities. An occupational or physical therapist can help you find ways to do everyday activities with less effort. ? · Start light exercise if your doctor says it is okay. Try to stay as active as possible. If you have not exercised in the past, start out slowly. Walking is a good way to start. ? · Get enough rest at night. Sleeping with 1 or 2 pillows under your upper body and head may help you breathe easier at night. ? · Discuss rehabilitation with your doctor. Find out what programs are available in your area. ? · Do not smoke or use other tobacco products. Smoking can make your condition worse. If you need help quitting, talk to your doctor about stop-smoking programs and medicines. These can increase your chances of quitting for good. ? · Do not use alcohol or illegal drugs. When should you call for help? Call 911 anytime you think you may need emergency care. For example, call if: ? · You have severe trouble breathing. ? · You passed out (lost consciousness). ? · You have symptoms of a heart attack. These may include: ¨ Chest pain or pressure, or a strange feeling in the chest. 
¨ Sweating. ¨ Shortness of breath. ¨ Nausea or vomiting. ¨ Pain, pressure, or a strange feeling in the back, neck, jaw, or upper belly or in one or both shoulders or arms. ¨ Lightheadedness or sudden weakness. ¨ A fast or irregular heartbeat. Pain that spreads from the chest to the neck, jaw, or one or both shoulders or arms. ? After you call 911, the  may tell you to chew 1 adult-strength or 2 to 4 low-dose aspirin. Wait for an ambulance. Do not try to drive yourself. ?Call your doctor now or seek immediate medical care if: 
? · You have trouble breathing or have wheezing that is getting worse. ? · You are coughing more deeply or more often. ? · You cough up blood. ? · You get a fever. ? · You have more swelling in your legs or belly. ? · Your symptoms are getting worse. ? Watch closely for changes in your health, and be sure to contact your doctor if you have any problems. Where can you learn more? Go to http://cristino-anne.info/. Enter E479 in the search box to learn more about \"Pulmonary Edema: Care Instructions. \" Current as of: May 12, 2017 Content Version: 11.4 © 8666-4273 QBE. Care instructions adapted under license by [employer] (which disclaims liability or warranty for this information). If you have questions about a medical condition or this instruction, always ask your healthcare professional. [place] [address] any warranty or liability for your use of this information. Heart Attack: Care Instructions Your Care Instructions A heart attack (myocardial infarction, or MI) occurs when one or more of the coronary arteries, which supply the heart with oxygen-rich blood, is blocked. A blockage usually occurs when plaque inside the artery breaks open and a blood clot forms in the artery. After a heart attack, you may be worried about your future. Over the next several weeks, your heart will start to heal. Though it can be hard to break old habits, you can prevent another heart attack by making some lifestyle changes and by taking medicines. You may use this information for ideas about what to do at home to speed your recovery. Follow-up care is a key part of your treatment and safety. Be sure to make and go to all appointments, and call your doctor if you are having problems. It's also a good idea to know your test results and keep a list of the medicines you take. How can you care for yourself at home? Activity ? · Until your doctor says it is okay, do not do strenuous exercise. And do not lift, pull, or push anything heavy. Ask your doctor what types of activities are safe for you. ? · If your doctor has not set you up with a cardiac rehabilitation (rehab) program, talk to him or her about whether that is right for you. Cardiac rehab includes supervised exercise. It also includes help with diet and lifestyle changes and emotional support. It may reduce your risk of future heart problems. ? · Increase your activities slowly. Take short rest breaks when you get tired. ? · If your doctor recommends it, get more exercise. Walking is a good choice. Bit by bit, increase the amount you walk every day. Try for at least 30 minutes on most days of the week. You also may want to swim, bike, or do other activities. Talk with your doctor before you start an exercise program to make sure it is safe for you. ? · Ask your doctor when you can drive, go back to work, and do other daily activities again. ? · You can have sex as soon as you feel ready for it. Often this means when you can easily walk around or climb stairs.  Talk with your doctor if you have any concerns. If you are taking nitroglycerin, do not take erection-enhancing medicine such as sildenafil (Viagra), tadalafil (Cialis), or vardenafil (Levitra) . ? Lifestyle changes ? · Do not smoke. Smoking increases your risk of another heart attack. If you need help quitting, talk to your doctor about stop-smoking programs and medicines. These can increase your chances of quitting for good. ? · Eat a heart-healthy diet that is low in saturated fat and salt, and is full of fruits, vegetables and whole-grains. Eat at least two servings of fish each week. You may get more details about how to eat healthy. But these tips can help you get started. ? · Stay at a healthy weight, or lose weight if you need to. Medicines ? · Be safe with medicines. Take your medicines exactly as prescribed. Call your doctor if you think you are having a problem with your medicine. You will get more details on the specific medicines your doctor prescribes. Do not stop taking your medicine unless your doctor tells you to. Not taking your medicine might raise your risk of having another heart attack. ? · You may need several medicines to help lower your risk of another heart attack. These include: ¨ Blood pressure medicines such as angiotensin-converting enzyme (ACE) inhibitors, ARBs (angiotensin II receptor blockers), and beta-blockers. ¨ Cholesterol medicine called statins. ¨ Aspirin and other blood thinners. These prevent blood clots that can cause a heart attack. ? · If your doctor has given you nitroglycerin, keep it with you at all times. If you have angina symptoms, such as chest pain or pressure, sit down and rest. Take the first dose of nitroglycerin as directed. If symptoms get worse or are not getting better within 5 minutes, call 911 right away. Stay on the phone. The emergency  will tell you what to do.   
? · Do not take any over-the-counter medicines, vitamins, or herbal products without talking to your doctor first. ?Staying healthy ? · Manage other health conditions such as high blood pressure and diabetes. ? · Avoid colds and flu. Get a pneumococcal vaccine shot. If you have had one before, ask your doctor whether you need another dose. Get the flu vaccine every year. If you must be around people with colds or flu, wash your hands often. ? · Be sure to tell your doctor about any angina symptoms you have had, even if they went away. Pay attention to your angina symptoms. Know what is typical for you and learn how to control it. Know when to call for help. ? · Talk to your family, friends, or a counselor about your feelings. It is normal to feel frightened, angry, hopeless, helpless, and even guilty. Talking openly about bad feelings can help you cope. If you have symptoms of depression, talk to your doctor. When should you call for help? Call 911 anytime you think you may need emergency care. For example, call if: 
? · You have symptoms of a heart attack. These may include: ¨ Chest pain or pressure, or a strange feeling in the chest. 
¨ Sweating. ¨ Shortness of breath. ¨ Nausea or vomiting. ¨ Pain, pressure, or a strange feeling in the back, neck, jaw, or upper belly or in one or both shoulders or arms. ¨ Lightheadedness or sudden weakness. ¨ A fast or irregular heartbeat. After you call 911, the  may tell you to chew 1 adult-strength or 2 to 4 low-dose aspirin. Wait for an ambulance. Do not try to drive yourself. ? · You have angina symptoms (such as chest pain or pressure) that do not go away with rest or are not getting better within 5 minutes after you take a dose of nitroglycerin. ? · You passed out (lost consciousness). ? · You feel like you are having another heart attack. ?Call your doctor now or seek immediate medical care if: 
? · You are having angina symptoms, such as chest pain or pressure, more often than usual, or the symptoms are different or worse than usual.  
? · You have new or increased shortness of breath. ? · You are dizzy or lightheaded, or you feel like you may faint. ? Watch closely for changes in your health, and be sure to contact your doctor if you have any problems. Where can you learn more? Go to http://cristino-anne.info/. Enter [id number] in the search box to learn more about \"Heart Attack: Care Instructions. \" Current as of: September 21, 2016 Content Version: 11.4 © 8856-5636 [employer]GSystems. Care instructions adapted under license by [employer] (which disclaims liability or warranty for this information). If you have questions about a medical condition or this instruction, always ask your healthcare professional. [address] any warranty or liability for your use of this information. Learning About Fluid Overload What is fluid overload? Fluid overload means that your body has too much water. The extra fluid in your body can raise your blood pressure and force your heart to work harder. It can also make it hard for you to breathe. Most of your body is made up of water. The body uses minerals like sodium and potassium to help organs such as your heart, kidneys, and liver balance how much water you need. For example, the heart pumps blood to move water around the body. And the kidneys work to get rid of the water that the body doesn't need. Health conditions like kidney disease, heart failure, and cirrhosis can cause fluid overload. Other things can cause extra fluid to build up. IV fluids, some medicines, too much salt (sodium) from food, and certain medical treatments can sometimes cause this fluid increase. What are the symptoms? Some of the most common symptoms are: 
· Gaining weight over a short period of time. · Swelling in the ankles or legs. · Shortness of breath. How is it treated? The goal of treatment is to remove the extra fluid in your body. Your treatment will depend on the cause. Your doctor may: · Give you medicines, such as diuretics (also called \"water pills\"). They help your body get rid of the extra fluid. · Restrict your fluid or salt intake. Follow-up care is a key part of your treatment and safety. Be sure to make and go to all appointments, and call your doctor if you are having problems. It's also a good idea to know your test results and keep a list of the medicines you take. Where can you learn more? Go to http://cristinoHookedanne.info/. Enter O110 in the search box to learn more about \"Learning About Fluid Overload. \" Current as of: September 21, 2016 Content Version: 11.4 © 0592-0219 [employer]. Care instructions adapted under license by [employer] (which disclaims liability or warranty for this information). If you have questions about a medical condition or this instruction, always ask your healthcare professional. [place] [address] any warranty or liability for your use of this information. Anemia: Care Instructions Your Care Instructions Anemia is a low level of red blood cells, which carry oxygen throughout your body. Many things can cause anemia. Lack of iron is one of the most common causes. Your body needs iron to make hemoglobin, a substance in red blood cells that carries oxygen from the lungs to your body's cells. Without enough iron, the body produces fewer and smaller red blood cells. As a result, your body's cells do not get enough oxygen, and you feel tired and weak. And you may have trouble concentrating. Bleeding is the most common cause of a lack of iron. You may have heavy menstrual bleeding or bleeding caused by conditions such as ulcers, hemorrhoids, or cancer. Regular use of aspirin or other anti-inflammatory medicines (such as ibuprofen) also can cause bleeding in some people.  A lack of iron in your diet also can cause anemia, especially at times when the body needs more iron, such as during pregnancy, infancy, and the teen years. Your doctor may have prescribed iron pills. It may take several months of treatment for your iron levels to return to normal. Your doctor also may suggest that you eat foods that are rich in iron, such as meat and beans. There are many other causes of anemia. It is not always due to a lack of iron. Finding the specific cause of your anemia will help your doctor find the right treatment for you. Follow-up care is a key part of your treatment and safety. Be sure to make and go to all appointments, and call your doctor if you are having problems. It's also a good idea to know your test results and keep a list of the medicines you take. How can you care for yourself at home? · Take your medicines exactly as prescribed. Call your doctor if you think you are having a problem with your medicine. · If your doctor recommends iron pills, take them as directed: ¨ Try to take the pills on an empty stomach about 1 hour before or 2 hours after meals. But you may need to take iron with food to avoid an upset stomach. ¨ Do not take antacids or drink milk or caffeine drinks (such as coffee, tea, or cola) at the same time or within 2 hours of the time that you take your iron. They can make it hard for your body to absorb the iron. ¨ Vitamin C (from food or supplements) helps your body absorb iron. Try taking iron pills with a glass of orange juice or some other food that is high in vitamin C, such as citrus fruits. ¨ Iron pills may cause stomach problems, such as heartburn, nausea, diarrhea, constipation, and cramps. Be sure to drink plenty of fluids, and include fruits, vegetables, and fiber in your diet each day. Iron pills often make your bowel movements dark or green.  
¨ If you forget to take an iron pill, do not take a double dose of iron the next time you take a pill. ¨ Keep iron pills out of the reach of small children. An overdose of iron can be very dangerous. · Follow your doctor's advice about eating iron-rich foods. These include red meat, shellfish, poultry, eggs, beans, raisins, whole-grain bread, and leafy green vegetables. · Steam vegetables to help them keep their iron content. When should you call for help? Call 911 anytime you think you may need emergency care. For example, call if: 
? · You have symptoms of a heart attack. These may include: ¨ Chest pain or pressure, or a strange feeling in the chest. 
¨ Sweating. ¨ Shortness of breath. ¨ Nausea or vomiting. ¨ Pain, pressure, or a strange feeling in the back, neck, jaw, or upper belly or in one or both shoulders or arms. ¨ Lightheadedness or sudden weakness. ¨ A fast or irregular heartbeat. After you call 911, the  may tell you to chew 1 adult-strength or 2 to 4 low-dose aspirin. Wait for an ambulance. Do not try to drive yourself. ? · You passed out (lost consciousness). ?Call your doctor now or seek immediate medical care if: 
? · You have new or increased shortness of breath. ? · You are dizzy or lightheaded, or you feel like you may faint. ? · Your fatigue and weakness continue or get worse. ? · You have any abnormal bleeding, such as: 
¨ Nosebleeds. ¨ Vaginal bleeding that is different (heavier, more frequent, at a different time of the month) than what you are used to. ¨ Bloody or black stools, or rectal bleeding. ¨ Bloody or pink urine. ? Watch closely for changes in your health, and be sure to contact your doctor if: 
? · You do not get better as expected. Where can you learn more? Go to http://cristino-anne.info/. Enter R301 in the search box to learn more about \"Anemia: Care Instructions. \" Current as of: October 13, 2016 Content Version: 11.4 © 5700-3677 Healthwise, [employer]timeBoard.  Care instructions adapted under license by [address] (which disclaims liability or warranty for this information). If you have questions about a medical condition or this instruction, always ask your healthcare professional. [address] any warranty or liability for your use of this information. Patient armband removed and shredded DISCHARGE SUMMARY from Nurse PATIENT INSTRUCTIONS: 
 
 
F-face looks uneven A-arms unable to move or move unevenly S-speech slurred or non-existent T-time-call 911 as soon as signs and symptoms begin-DO NOT go Back to bed or wait to see if you get better-TIME IS BRAIN. Warning Signs of HEART ATTACK Call 911 if you have these symptoms: 
? Chest discomfort. Most heart attacks involve discomfort in the center of the chest that lasts more than a few minutes, or that goes away and comes back. It can feel like uncomfortable pressure, squeezing, fullness, or pain. ? Discomfort in other areas of the upper body. Symptoms can include pain or discomfort in one or both arms, the back, neck, jaw, or stomach. ? Shortness of breath with or without chest discomfort. ? Other signs may include breaking out in a cold sweat, nausea, or lightheadedness. Don't wait more than five minutes to call [address]! Fast action can save your life. Calling 911 is almost always the fastest way to get lifesaving treatment. Emergency Medical Services staff can begin treatment when they arrive  up to an hour sooner than if someone gets to the hospital by car. The discharge information has been reviewed with the patient.   The patient verbalized understanding. Discharge medications reviewed with the patient and appropriate educational materials and side effects teaching were provided. ___________________________________________________________________________________________________________________________________ Introducing [facility]! Azul Aponte introduces [employer] patient portal. Now you can access parts of your medical record, email your doctor's office, and request medication refills online. 1. In your internet browser, go to [email or web address] 2. Click on the First Time User? Click Here link in the Sign In box. You will see the New Member Sign Up page. 3. Enter your [employer] Access Code exactly as it appears below. You will not need to use this code after youve completed the sign-up process. If you do not sign up before the expiration date, you must request a new code. · [employer] Access Code: [address] Expires: 4/22/2018  2:25 PM 
 
4. Enter the last four digits of your Social Security Number (xxxx) and Date of Birth (mm/dd/yyyy) as indicated and click Submit. You will be taken to the next sign-up page. 5. Create a Portfoliat ID. This will be your [employer] login ID and cannot be changed, so think of one that is secure and easy to remember. 6. Create a [employer] password. You can change your password at any time. 7. Enter your Password Reset Question and Answer. This can be used at a later time if you forget your password. 8. Enter your e-mail address. You will receive e-mail notification when new information is available in [address] 9. Click Sign Up. You can now view and download portions of your medical record. 10. Click the Download Summary menu link to download a portable copy of your medical information. If you have questions, please visit the Frequently Asked Questions section of the [employer] website.  Remember, [employer] is NOT to be used for urgent needs. For medical emergencies, dial 911. Now available from your iPhone and Android! Unresulted Labs-Please follow up with your PCP about these lab tests Order Current Status NUCLEAR STRESS TEST Preliminary result Providers Seen During Your Hospitalization Provider Specialty Primary office phone Ray Lang DO Emergency Medicine [phone] Cedric Mason MD Internal Medicine [phone] Bhavna Morris MD Family Practice [phone] Your Primary Care Physician (PCP) Primary Care Physician Office Phone Office Fax [phone] [phone] You are allergic to the following Allergen Reactions Banana Other (comments) Recent Documentation Height Weight Breastfeeding? BMI OB Status Smoking Status 1.549 m 38.2 kg No 15.91 kg/m2 Menopause Never Smoker Emergency Contacts Name Discharge Info Relation Home Work Mobile Shanelle Che (Son-In-Law) DISCHARGE CAREGIVER [3] Other Relative [6] [phone] Patient Belongings The following personal items are in your possession at time of discharge: 
  Dental Appliances: None  Visual Aid: None      Home Medications: None   Jewelry: None  Clothing: None    Other Valuables: None Discharge Instructions Attachments/References AMLODIPINE (BY MOUTH) (ENGLISH) ASPIRIN (BY MOUTH) (ENGLISH) ATORVASTATIN (BY MOUTH) (ENGLISH) CARVEDILOL (BY MOUTH) (ENGLISH) CLONIDINE (BY MOUTH) (ENGLISH) FLUOXETINE (BY MOUTH) (ENGLISH) HYDRALAZINE (BY MOUTH) (ENGLISH) LOSARTAN (BY MOUTH) (ENGLISH) MELATONIN (BY MOUTH) (ENGLISH) Patient Handouts Amlodipine (By mouth) Amlodipine (kt-OMZ-aj-peen) Treats high blood pressure and angina (chest pain). This medicine is a calcium channel blocker. Brand Name(s): Norvasc There may be other brand names for this medicine. When This Medicine Should Not Be Used: This medicine is not right for everyone. Do not use it if you had an allergic reaction to amlodipine. How to Use This Medicine:  
Tablet, Dissolving Tablet · Take your medicine as directed. Your dose may need to be changed several times to find what works best for you. Take this medicine at the same time each day. · Read and follow the patient instructions that come with this medicine. Talk to your doctor or pharmacist if you have any questions. · Missed dose: Take a dose as soon as you remember. If it has been more than 12 hours since you were supposed to take your dose, skip the missed dose and take your next regular dose at the regular time. · Store the medicine in a closed container at room temperature, away from heat, moisture, and direct light. Drugs and Foods to Avoid: Ask your doctor or pharmacist before using any other medicine, including over-the-counter medicines, vitamins, and herbal products. · Some medicines can affect how amlodipine works. Tell your doctor if you are also using any of the following: ¨ Clarithromycin, cyclosporine, diltiazem, itraconazole, ritonavir, sildenafil, simvastatin, tacrolimus Warnings While Using This Medicine: · Tell your doctor if you are pregnant or breastfeeding, or if you have liver disease, heart disease, coronary artery disease, or aortic stenosis. · This medicine could lower your blood pressure too much, especially when you first use it or if you are dehydrated. Stand or sit up slowly if you feel lightheaded or dizzy. · Your doctor will check your progress and the effects of this medicine at regular visits. Keep all appointments. · Do not stop using this medicine without asking your doctor, even if you feel well. This medicine will not cure high blood pressure, but it will help keep it in normal range. You may have to take blood pressure medicine for the rest of your life. · Keep all medicine out of the reach of children.  Never share your medicine with anyone. Possible Side Effects While Using This Medicine:  
Call your doctor right away if you notice any of these side effects: · Allergic reaction: Itching or hives, swelling in your face or hands, swelling or tingling in your mouth or throat, chest tightness, trouble breathing · Lightheadedness, dizziness · New or worsening chest pain · Swelling in your hands, ankles, or legs · Trouble breathing, nausea, unusual sweating, fainting If you notice other side effects that you think are caused by this medicine, tell your doctor. Call your doctor for medical advice about side effects. You may report side effects to FDA at 6-279-ZHW-1896 © 2017 [facility] Information is for End User's use only and may not be sold, redistributed or otherwise used for commercial purposes. The above information is an  only. It is not intended as medical advice for individual conditions or treatments. Talk to your doctor, nurse or pharmacist before following any medical regimen to see if it is safe and effective for you. Aspirin (By mouth) Aspirin (AS-pir-in) Treats pain, fever, and inflammation. May lower risk of heart attack and stroke. Brand Name(s): Ascriptin Regular Strength, Aspergum, Aspir Low, Aspirin Adult Low Dose, Aspirin Low Dose, Jessica Aspirin Children's, Jessica Aspirin Regimen, Jessica Extra Strength, Jessica Genuine Aspirin, Jessica Low Dose, Bufferin, Bufferin Low Dose, Durlaza, Ecotrin, Ecpirin There may be other brand names for this medicine. When This Medicine Should Not Be Used: This medicine is not right for everyone. Do not use it if you had an allergic reaction to aspirin or other NSAIDs, or if you have a history of asthma with nasal polyps and rhinitis. How to Use This Medicine:  
Delayed Release Capsule, Long Acting Capsule, Gum, Tablet, Chewable Tablet, Fizzy Tablet, Coated Tablet, Long Acting Tablet, 24 Hour Capsule · Your doctor will tell you how much medicine to use. Do not use more than directed. · It is best to take this medicine with food or milk. · Capsule, tablet, or coated tablet: Swallow whole. Do not crush, break, or chew it. · Chewable tablet: You may chew it completely or swallow it whole. · Gum: Chew completely to make sure you get as much medicine as possible. Drink a full glass (8 ounces) of water after chewing the gum. · Swallow the extended-release capsule whole. Do not crush, break, or chew it. Take the capsule with a full glass of water at the same time each day. · Follow the instructions on the medicine label if you are using this medicine without a prescription. · Missed dose: If you miss a dose of Durlaza, skip the missed dose and go back to your regular dosing schedule. Do not take extra medicine to make up for a missed dose. · Store the medicine in a closed container at room temperature, away from heat, moisture, and direct light. Drugs and Foods to Avoid: Ask your doctor or pharmacist before using any other medicine, including over-the-counter medicines, vitamins, and herbal products. · Some foods and medicines can affect how aspirin works. Tell your doctor if you are using any of the following: ¨ Dipyridamole, methotrexate, probenecid, sulfinpyrazone, ticlopidine ¨ Blood thinner (including clopidogrel, prasugrel, ticagrelor, warfarin) ¨ Blood pressure medicine ¨ Medicine to treat seizures (including phenytoin, valproic acid) ¨ NSAID pain or arthritis medicine (including celecoxib, diclofenac, ibuprofen, naproxen) ¨ Steroid medicine (including dexamethasone, hydrocortisone, methylprednisolone, prednisolone, prednisone) · Do not take Durlaza 2 hours before or 1 hour after you drink alcohol or take medicines that contain alcohol. Warnings While Using This Medicine: · Tell your doctor if you are pregnant or breastfeeding.  Do not use this medicine during the later part of a pregnancy unless your doctor tells you to. · Tell your doctor if you have kidney disease, liver disease, high blood pressure, heart disease, or a history of stomach bleeding or ulcers. · This medicine may increase your risk for bleeding, including stomach ulcers. · Do not give aspirin to a child or teenager who has chickenpox or flu symptoms, unless the doctor says it is okay. Aspirin can cause a life-threatening reaction called Reye syndrome. · Tell any doctor or dentist who treats you that you are using this medicine. This medicine may affect certain medical test results. · Keep all medicine out of the reach of children. Never share your medicine with anyone. Possible Side Effects While Using This Medicine:  
Call your doctor right away if you notice any of these side effects: · Allergic reaction: Itching or hives, swelling in your face or hands, swelling or tingling in your mouth or throat, chest tightness, trouble breathing · Bloody or black stools, bloody vomit or vomit that looks like coffee grounds · Chest tightness, wheezing · Ringing in the ears · Severe stomach pain · Unusual bleeding, bruising, or weakness If you notice other side effects that you think are caused by this medicine, tell your doctor. Call your doctor for medical advice about side effects. You may report side effects to FDA at 6-081-FDA-4056 © 2017 [facility] Information is for End User's use only and may not be sold, redistributed or otherwise used for commercial purposes. The above information is an  only. It is not intended as medical advice for individual conditions or treatments. Talk to your doctor, nurse or pharmacist before following any medical regimen to see if it is safe and effective for you. Atorvastatin (By mouth) Atorvastatin (a-tor-va-STAT-in) Treats high cholesterol and triglyceride levels.  Reduces the risk of angina, stroke, heart attack, or certain heart and blood vessel problems. This medicine is a statin. Brand Name(s): Lipitor There may be other brand names for this medicine. When This Medicine Should Not Be Used: This medicine is not right for everyone. Do not use it if you had an allergic reaction to atorvastatin, if you have active liver disease, or if you are pregnant or breastfeeding. How to Use This Medicine:  
Tablet · Take your medicine as directed. Your dose may need to be changed several times to find what works best for you. · Take this medicine at the same time each day. · Swallow the tablet whole. Do not break it. · Missed dose: Take a dose as soon as you remember. If it is less than 12 hours until your next dose, skip the missed dose and take the next dose at the regular time. Do not take 2 doses of this medicine within 12 hours. · Read and follow the patient instructions that come with this medicine. Talk to your doctor or pharmacist if you have any questions. · Store the medicine in a closed container at room temperature, away from heat, moisture, and direct light. Drugs and Foods to Avoid: Ask your doctor or pharmacist before using any other medicine, including over-the-counter medicines, vitamins, and herbal products. · Some medicines can affect how atorvastatin works. Tell your doctor if you also use birth control pills, boceprevir, cimetidine, colchicine, cyclosporine, digoxin, niacin, rifampin, spironolactone, telaprevir, medicine to treat an infection, or medicine to treat HIV/AIDS. Warnings While Using This Medicine: · It is not safe to take this medicine during pregnancy. It could harm an unborn baby. Tell your doctor right away if you become pregnant. · Tell your doctor if you have kidney disease, diabetes, muscle pain or weakness, thyroid problems, have recently had a stroke or TIA (transient ischemic attack), or have a history of liver disease.  Tell your doctor if you drink grapefruit juice or drink alcohol regularly. · This medicine can cause muscle problems, which can lead to kidney problems. · Tell any doctor or dentist who treats you that you use this medicine. You may need to stop using it if you have surgery, have an injury, or develop serious health problems. · Your doctor will do lab tests at regular visits to check on the effects of this medicine. Keep all appointments. · Keep all medicine out of the reach of children. Never share your medicine with anyone. Possible Side Effects While Using This Medicine:  
Call your doctor right away if you notice any of these side effects: · Allergic reaction: Itching or hives, swelling in your face or hands, swelling or tingling in your mouth or throat, chest tightness, trouble breathing · Blistering, peeling, red skin rash · Change in how much or how often you urinate · Dark urine or pale stools, nausea, vomiting, loss of appetite, stomach pain, yellow skin or eyes · Fever · Muscle pain, tenderness, or weakness · Unusual tiredness If you notice these less serious side effects, talk with your doctor: · Diarrhea · Joint pain If you notice other side effects that you think are caused by this medicine, tell your doctor. Call your doctor for medical advice about side effects. You may report side effects to FDA at 5-170-FDA-9387 © 2017 [address] Information is for End User's use only and may not be sold, redistributed or otherwise used for commercial purposes. The above information is an  only. It is not intended as medical advice for individual conditions or treatments. Talk to your doctor, nurse or pharmacist before following any medical regimen to see if it is safe and effective for you. Carvedilol (By mouth) Carvedilol (dzc-DI-ske-ol) Treats high blood pressure and heart failure. Also reduces the risk of death after a heart attack. This medicine is a beta-blocker. Brand Name(s): Jennifer Rodriguez There may be other brand names for this medicine. When This Medicine Should Not Be Used: This medicine is not right for everyone. Do not use it if you had an allergic reaction to carvedilol, or if you have asthma, severe liver disease, or certain heart problems. Ask your doctor about these heart problems. How to Use This Medicine:  
Long Acting Capsule, Tablet · Take your medicine as directed. Your dose may need to be changed several times to find what works best for you. · It is best to take this medicine with food or milk. · Extended-release capsule instructions: ¨ Take the capsule in the morning with food. ¨ Swallow the capsule whole. Do not crush or chew it. ¨ If you cannot swallow the capsule, you may open it and sprinkle the medicine over a spoonful of applesauce. Swallow the applesauce right away. · Read and follow the patient instructions that come with this medicine. Talk to your doctor or pharmacist if you have any questions. · Store the medicine in a closed container at room temperature, away from heat, moisture, and direct light. · Missed dose: Take a dose as soon as you remember. If it is almost time for your next dose, wait until then and take a regular dose. Do not take extra medicine to make up for a missed dose. Drugs and Foods to Avoid: Ask your doctor or pharmacist before using any other medicine, including over-the-counter medicines, vitamins, and herbal products. · Some medicines can affect how carvedilol works. Tell your doctor if you are using amiodarone, clonidine, diltiazem, cyclosporine, digoxin, fluconazole, reserpine, rifampin, verapamil, or an MAO inhibitor (MAOI). Warnings While Using This Medicine: · Tell your doctor if you are pregnant or breastfeeding, or if you have kidney disease, liver disease, bradycardia (slow heartbeat), coronary artery disease, circulation problems, edema (fluid retention or swelling), heart or blood vessel problems, low blood pressure, lung problems (such as bronchitis or emphysema), an overactive thyroid, pheochromocytoma, or frequent chest pains. Tell your doctor if you have a history of severe allergic reactions or if you are scheduled to have surgery. · This medicine may cause the following problems:  
¨ Changes to your blood sugar level (if you have diabetes, report any blood sugar level changes to your doctor) ¨ Fewer tears than usual in contact lens wearers ¨ An eye problem called Intraoperative Floppy Iris Syndrome during cataract surgery · This medicine may make you dizzy or drowsy. Do not drive, use machines, or do anything else that could be dangerous if you are not alert. · Do not stop using this medicine suddenly. Your doctor will need to slowly decrease your dose before you stop it completely. · Keep all medicine out of the reach of children. Never share your medicine with anyone. Possible Side Effects While Using This Medicine:  
Call your doctor right away if you notice any of these side effects: · Allergic reaction: Itching or hives, swelling in your face or hands, swelling or tingling in your mouth or throat, chest tightness, trouble breathing · Change in how much or how often you urinate · Chest pain that may spread to your arms, jaw, back, or neck, trouble breathing, nausea, unusual sweating, faintness · Leg pain when you walk, legs and feet that feel cold or numb · Lightheadedness, dizziness, or fainting · Rapid weight gain, swelling in your hands, ankles, or feet · Shaking, trembling, sweating, hunger, confusion · Slow, fast, or uneven heartbeat · Unusual bleeding or bruising · Wheezing or trouble breathing If you notice these less serious side effects, talk with your doctor: · Diarrhea · Trouble having sex · Unusual tiredness or weakness If you notice other side effects that you think are caused by this medicine, tell your doctor. Call your doctor for medical advice about side effects. You may report side effects to FDA at 0-224-MJZ-9233 © 2017 [facility] Information is for End User's use only and may not be sold, redistributed or otherwise used for commercial purposes. The above information is an  only. It is not intended as medical advice for individual conditions or treatments. Talk to your doctor, nurse or pharmacist before following any medical regimen to see if it is safe and effective for you. Clonidine (By mouth) Clonidine (Lindbergh Pacini) Treats high blood pressure. Also treats ADHD. Brand Name(s): Catapres, Casey Alt There may be other brand names for this medicine. When This Medicine Should Not Be Used: This medicine is not right for everyone. Do not use it if you had an allergic reaction to clonidine. How to Use This Medicine:  
Long Acting Suspension, Tablet, Long Acting Tablet · Take your medicine as directed. Your dose may need to be changed several times to find what works best for you. · Swallow the extended-release tablet whole. Do not crush, break, or chew it. · Clonidine extended-release tablets work differently than clonidine immediate-release tablets. Do not switch from the extended-release tablets to the immediate-release tablets unless your doctor tells you to. · Measure the oral liquid medicine with a marked measuring spoon, oral syringe, or medicine cup. Shake the bottle well before each use. · Read and follow the patient instructions that come with this medicine. Talk to your doctor or pharmacist if you have any questions. · Missed dose: Take a dose as soon as you remember. If it is almost time for your next dose, wait until then and take a regular dose. Do not take extra medicine to make up for a missed dose. If you miss more than 1 dose of clonidine tablets, check with your doctor right away. · Store the medicine in a closed container at room temperature, away from heat, moisture, and direct light. Drugs and Foods to Avoid: Ask your doctor or pharmacist before using any other medicine, including over-the-counter medicines, vitamins, and herbal products. · Do not use this medicine together with other products that contain clonidine. · Some medicines can affect how clonidine works. Tell your doctor if you are taking depression medicine. · Tell your doctor if you use anything else that makes you sleepy. Some examples are allergy medicine, narcotic pain medicine, and alcohol. Warnings While Using This Medicine: · Tell your doctor if you are pregnant, breastfeeding, or have kidney disease, heart or blood vessel disease, heart rhythm problems, stomach or bowel problems, or a history of heart attack or stroke. · This medicine may make you drowsy, dizzy, or lightheaded. Do not drive or do anything that could be dangerous until you know how this medicine affects you. · This medicine may cause dryness of the eyes. Talk to your doctor about how to treat the dryness. · Do not stop using this medicine suddenly. Your doctor will need to slowly decrease your dose before you stop it completely. · Tell any doctor or dentist who treats you that you are using this medicine. You may need to stop using this medicine several days before you have surgery or medical tests. · Even if you feel well, do not stop using the medicine without asking your doctor first. This medicine will not cure your high blood pressure, but it will help keep it in the normal range. You may have to take blood pressure medicine for the rest of your life. · Your doctor will check your progress and the effects of this medicine at regular visits. Keep all appointments. · Keep all medicine out of the reach of children. Never share your medicine with anyone. Possible Side Effects While Using This Medicine: Call your doctor right away if you notice any of these side effects: · Allergic reaction: Itching or hives, swelling in your face or hands, swelling or tingling in your mouth or throat, chest tightness, trouble breathing · Fast, slow, pounding, or uneven heartbeat · Lightheadedness, dizziness, fainting · Swelling in your hands, ankles, or feet · Unusual tiredness or weakness If you notice these less serious side effects, talk with your doctor: · Constipation, stomach pain · Dry eyes, mouth, or throat · Mild skin rash · New or worsening headache If you notice other side effects that you think are caused by this medicine, tell your doctor. Call your doctor for medical advice about side effects. You may report side effects to FDA at 3-789-FDA-9786 © 2017 [address] Information is for End User's use only and may not be sold, redistributed or otherwise used for commercial purposes. The above information is an  only. It is not intended as medical advice for individual conditions or treatments. Talk to your doctor, nurse or pharmacist before following any medical regimen to see if it is safe and effective for you. Fluoxetine (By mouth) Fluoxetine (jjsf-GW-y-teen) Treats depression, obsessive-compulsive disorder (OCD), bulimia nervosa, and panic disorder. This medicine is an SSRI. Brand Name(s): FLUoxetine HCl, PROzac, PROzac Weekly, Sarafem There may be other brand names for this medicine. When This Medicine Should Not Be Used: This medicine is not right for everyone. Do not use it if you had an allergic reaction to fluoxetine. How to Use This Medicine:  
Capsule, Delayed Release Capsule, Liquid, Tablet · Take your medicine as directed. Your dose may need to be changed several times to find what works best for you. Take your medicine at the same time each day.  
· You may need to take this medicine for a month or longer before you feel better. If you feel that the medicine is not working well, tell your doctor right away. Do not take more than your normal dose. · Measure the oral liquid medicine with a marked measuring spoon, oral syringe, or medicine cup. · Delayed-release capsule: Swallow whole. Do not crush, break, or chew it. · This medicine should come with a Medication Guide. Ask your pharmacist for a copy if you do not have one. · Missed dose:  
¨ Every day dose (Prozac® or Sarafem®): If you miss a dose or forget to take your medicine, take it as soon as you can. If it is almost time for your next dose, wait until then to take the medicine and skip the missed dose. Do not use extra medicine to make up for a missed dose. ¨ Once-a-week dose [facility]): If you miss a dose or forget to take your medicine, take it as soon as you can. Then go back to your regular schedule the next week. Do not use extra medicine to make up for a missed dose. · Store the medicine in a closed container at room temperature, away from heat, moisture, and direct light. Drugs and Foods to Avoid: Ask your doctor or pharmacist before using any other medicine, including over-the-counter medicines, vitamins, and herbal products. · Do not use this medicine together with pimozide or thioridazine. Do not use this medicine within 14 days of using an MAO inhibitor, and do not start an MAOI for at least 5 weeks after you stop using fluoxetine. · Some medicines can affect how fluoxetine works. Tell your doctor if you are using any of the following: 
¨ Buspirone, carbamazepine, dolasetron, erythromycin, fentanyl, gatifloxacin, lithium, mefloquine, methadone, moxifloxacin, pentamidine, phenytoin, probucol, Aris's wort, tacrolimus, tramadol, tryptophan supplement, or vinblastine ¨ Amphetamines ¨ Blood thinner (including warfarin) ¨ Diuretic (water pill) ¨ Medicine for heart rhythm problem ¨ Medicine to treat mental illness (including chlorpromazine, droperidol, iloperidone, ziprasidone) ¨ NSAID pain or arthritis medicine (including aspirin, celecoxib, diclofenac, ibuprofen, naproxen) ¨ Phenothiazine medicine ¨ Triptan medicine to treat migraine headache · Do not drink alcohol while you are using this medicine. · Tell your doctor if you use anything else that makes you sleepy. Some examples are allergy medicine, narcotic pain medicine, and alcohol. Warnings While Using This Medicine: · Tell your doctor if you are pregnant or breastfeeding, or if you have kidney disease, liver disease, bleeding problems, diabetes, glaucoma, or a history of seizures. Tell your doctor if you have had heart disease, a heart rhythm problem (such as QT prolongation), heart attack, heart failure, low blood pressure, or a stroke. · For some children, teenagers, and young adults, this medicine may increase mental or emotional problems. This may lead to thoughts of suicide and violence. Talk with your doctor right away if you have any thoughts or behavior changes that concern you. Tell your doctor if you or anyone in your family has a history of bipolar disorder or suicide attempts. · This medicine may cause the following problems: 
¨ Serotonin syndrome (may be life-threatening when used with certain other medicines) ¨ Higher risk of bleeding ¨ Low sodium levels in the blood ¨ Heart rhythm changes · Do not stop using this medicine suddenly. Your doctor will need to slowly decrease your dose before you stop it completely. · This medicine may make you dizzy or drowsy. Do not drive or do anything else that could be dangerous until you know how this medicine affects you. · Keep all medicine out of the reach of children. Never share your medicine with anyone. Possible Side Effects While Using This Medicine:  
Call your doctor right away if you notice any of these side effects: · Allergic reaction: Itching or hives, swelling in your face or hands, swelling or tingling in your mouth or throat, chest tightness, trouble breathing · Anxiety, restlessness, fever, sweating, muscle spasms, nausea, vomiting, diarrhea, seeing or hearing things that are not there · Confusion, weakness, and muscle twitching · Eye pain, trouble seeing, blurry vision · Fast, pounding, or uneven heartbeat, dizziness · Seizures · Skin rash, blisters, peeling, or redness · Trouble breathing · Unusual behavior, thoughts of hurting yourself or others, feeling more excited or energetic than usual, trouble sleeping · Unusual bleeding or bruising If you notice these less serious side effects, talk with your doctor: · Diarrhea, changes in appetite, weight gain or loss · Dry mouth · Sleepiness or unusual drowsiness, unusual dreams · Sexual problems If you notice other side effects that you think are caused by this medicine, tell your doctor. Call your doctor for medical advice about side effects. You may report side effects to FDA at 0-055-FDA-0861 © 2017 [facility] Information is for End User's use only and may not be sold, redistributed or otherwise used for commercial purposes. The above information is an  only. It is not intended as medical advice for individual conditions or treatments. Talk to your doctor, nurse or pharmacist before following any medical regimen to see if it is safe and effective for you. Hydralazine (By mouth) Hydralazine Hydrochloride (ahw-TFCQ-a-zeen yahir-droe-KLOR-leny) Treats high blood pressure. Brand Name(s):  
There may be other brand names for this medicine. When This Medicine Should Not Be Used: This medicine is not right for everyone. Do not use it if you had an allergic reaction to hydralazine, or if you have coronary artery disease or rheumatic heart disease. How to Use This Medicine:  
Tablet · Take your medicine as directed. Your dose may need to be changed several times to find what works best for you. · Missed dose: Take a dose as soon as you remember. If it is almost time for your next dose, wait until then and take a regular dose. Do not take extra medicine to make up for a missed dose. · Store the medicine in a closed container at room temperature, away from heat, moisture, and direct light. Drugs and Foods to Avoid: Ask your doctor or pharmacist before using any other medicine, including over-the-counter medicines, vitamins, and herbal products. · Some foods and medicines can affect how hydralazine works. Tell your doctor if you are using diazoxide or an MAO inhibitor. Warnings While Using This Medicine: · Tell your doctor if you are pregnant or breastfeeding, or if you have kidney disease, heart or blood vessel disease, heart rhythm problems, lupus, or if you had a heart attack or stroke. · This medicine may cause the following problems: 
¨ Lupus-like syndrome ¨ Changes in heart rhythm ¨ Nerve problems · This medicine may lower your blood pressure too much and cause you to feel dizzy. Do not drive or do anything else that could be dangerous until you know how this medicine affects you. · Your doctor will do lab tests at regular visits to check on the effects of this medicine. Keep all appointments. · Keep all medicine out of the reach of children. Never share your medicine with anyone. Possible Side Effects While Using This Medicine:  
Call your doctor right away if you notice any of these side effects: · Allergic reaction: Itching or hives, swelling in your face or hands, swelling or tingling in your mouth or throat, chest tightness, trouble breathing · Change in how much or how often you urinate · Chest pain that may spread to your arms, jaw, back, or neck, trouble breathing, unusual sweating, faintness · Fast, pounding, or uneven heartbeat · Fever, chills, cough, sore throat, and body aches · Lightheadedness, dizziness, or fainting · Numbness, tingling, or burning pain in your hands, arms, legs, or feet · Unusual bleeding, bruising, or weakness If you notice these less serious side effects, talk with your doctor: · Diarrhea, nausea, vomiting, loss of appetite · Headache · Stuffy nose or watery eyes If you notice other side effects that you think are caused by this medicine, tell your doctor. Call your doctor for medical advice about side effects. You may report side effects to FDA at 5-032-FDA-4390 © 2017 [address] Information is for End User's use only and may not be sold, redistributed or otherwise used for commercial purposes. The above information is an  only. It is not intended as medical advice for individual conditions or treatments. Talk to your doctor, nurse or pharmacist before following any medical regimen to see if it is safe and effective for you. Losartan (By mouth) Losartan (raymond-LEOLA-tan) Treats high blood pressure. Reduces the risk of stroke in patients with high blood pressure and an enlarged heart. Treats kidney disease in patients with diabetes. This medicine is an angiotensin receptor blocker (ARB). Brand Name(s): Cozaar There may be other brand names for this medicine. When This Medicine Should Not Be Used: This medicine is not right for everyone. Do not use it if you had an allergic reaction to losartan, or if you are pregnant. Do not use this medicine together with aliskiren if you have diabetes. How to Use This Medicine:  
Tablet · Take your medicine as directed. Your dose may need to be changed several times to find what works best for you. · Drink plenty of fluids if you exercise, sweat more than usual, or have diarrhea or vomiting. · Read and follow the patient instructions that come with this medicine. Talk to your doctor or pharmacist if you have any questions. · Missed dose: Take a dose as soon as you remember. If it is almost time for your next dose, wait until then and take a regular dose. Do not take extra medicine to make up for a missed dose. · Store the medicine in a closed container at room temperature, away from heat, moisture, and direct light. Drugs and Foods to Avoid: Ask your doctor or pharmacist before using any other medicine, including over-the-counter medicines, vitamins, and herbal products. · Some medicines can affect how losartan works. Tell your doctor if you are using any of the following: ¨ Lithium ¨ Rifampin ¨ A diuretic (water pill), such as spironolactone, triamterene, or amiloride ¨ NSAID pain or arthritis medicine, such as aspirin, diclofenac, ibuprofen, or naproxen ¨ Another blood pressure medicine, such as aliskiren, benazepril, enalapril, or lisinopril · Ask your doctor before you use any medicine, supplement, or salt substitute that contains potassium. Warnings While Using This Medicine: · It is not safe to take this medicine during pregnancy. It could harm an unborn baby. Tell your doctor right away if you become pregnant. · Tell your doctor if you are breastfeeding, or if you have kidney disease, liver disease, congestive heart failure, or diabetes. Tell your doctor if you have had angioedema that was caused by a blood pressure medicine. · This medicine could lower your blood pressure too much, especially when you first use it or if you are dehydrated. Stand or sit up slowly if you feel lightheaded or dizzy. · Your doctor will do lab tests at regular visits to check on the effects of this medicine. Keep all appointments. · Keep all medicine out of the reach of children. Never share your medicine with anyone. Possible Side Effects While Using This Medicine:  
Call your doctor right away if you notice any of these side effects: · Allergic reaction: Itching or hives, swelling in your face or hands, swelling or tingling in your mouth or throat, chest tightness, trouble breathing · Change in how much or how often you urinate · Confusion, weakness, uneven heartbeat, trouble breathing, numbness or tingling in your hands, feet, or lips · Lightheadedness, dizziness, fainting · Rapid weight gain, swelling in your hands, ankles, or feet If you notice these less serious side effects, talk with your doctor: · Diarrhea · Tiredness If you notice other side effects that you think are caused by this medicine, tell your doctor. Call your doctor for medical advice about side effects. You may report side effects to FDA at 5-826-FDA-8316 © 2017 [address] Information is for End User's use only and may not be sold, redistributed or otherwise used for commercial purposes. The above information is an  only. It is not intended as medical advice for individual conditions or treatments. Talk to your doctor, nurse or pharmacist before following any medical regimen to see if it is safe and effective for you. Melatonin (By mouth) Melatonin (stephen-a-TOE-james) Treats insomnia. Brand Name(s): [employer] Melatonin, [employer] Melatonin, [employer] Melatonin, [facility] Melatonin, [employer] Melatonin, Optimum Melatonin, [employer] Melatonin, [employer] Melatonin, [employer] Melatonin There may be other brand names for this medicine. When This Medicine Should Not Be Used: You should not use this medicine if you have had an allergic reaction to melatonin. How to Use This Medicine:  
Capsule, Long Acting Capsule, Liquid, Tablet, Long Acting Tablet · Your doctor will tell you how much medicine to use. Do not use more than directed. · Follow the instructions on the medicine label if you are using this medicine without a prescription. · Take your dose 20 minutes before your bedtime. You may take this medicine with or without food. · The liquid may be taken directly or combined with water or juice. If a dose is missed: · If you miss a dose or forget to use your medicine, call your doctor or pharmacist for instructions. How to Store and Dispose of This Medicine: · Store the medicine in a closed container at room temperature, away from heat, moisture, and direct light. · Keep all medicine out of the reach of children. Never share your medicine with anyone. · Ask your pharmacist, doctor, or health caregiver about the best way to dispose of any outdated medicine or medicine no longer needed. Drugs and Foods to Avoid: Ask your doctor or pharmacist before using any other medicine, including over-the-counter medicines, vitamins, and herbal products. · Make sure your doctor knows if you are also using any tranquilizer medicines, or if you are also using any sedative medicines. Warnings While Using This Medicine: · Make sure your doctor knows if you are pregnant or breast feeding, or if you have an autoimmune condition. Make sure your doctor knows if you are feeling sad or depressed. · This medicine may make you drowsy. Avoid driving, using machines, or doing anything else that might be dangerous if you are not alert. Possible Side Effects While Using This Medicine: If you notice these less serious side effects, talk with your doctor: · Feeling sluggish or tired in the morning. · Headache. If you notice other side effects that you think are caused by this medicine, tell your doctor. Call your doctor for medical advice about side effects. You may report side effects to FDA at 1-557-FDA-2682 © 2017 [facility] Information is for End User's use only and may not be sold, redistributed or otherwise used for commercial purposes. The above information is an  only.  It is not intended as medical advice for individual conditions or treatments. Talk to your doctor, nurse or pharmacist before following any medical regimen to see if it is safe and effective for you. Please provide this summary of care documentation to your next provider. Signatures-by signing, you are acknowledging that this After Visit Summary has been reviewed with you and you have received a copy. Patient Signature:  ____________________________________________________________ Date:  ____________________________________________________________  
  
Emma Keto Provider Signature:  ____________________________________________________________ Date:  ____________________________________________________________

## 2018-02-26 PROBLEM — D64.9 ANEMIA: Status: ACTIVE | Noted: 2018-01-01

## 2018-02-26 NOTE — DIALYSIS
ACUTE HEMODIALYSIS FLOW SHEET    HEMODIALYSIS ORDERS: Physician: Alisson Line: pattie         [id number]  BFR:350  DFR: 600   Dialysate:  Temp 37.3  K+   2    Ca+2.5 Na 140 Bicarb 35   Weight:45.8kg    Bed Scale []     Unable to Obtain []      Dry weight/UF Goal 3500  Access R IJ  Needle Gauge     Heparin []  Bolus      Units    [] Hourly       Units    []None     Catheter locking solution    Pre BP:   133/78    Pulse:     74     Temperature:   99.7  Respirations: 18  Tx: NS       ml/Bolus  Other        [] N/A   Labs: Pre        Post:        [] N/A   Additional Orders(medications, blood products, hypotension management):       [] N/A     [x] Time Out/Safety Check  [x] [facility] Consent Verified     CATHETER ACCESS: []N/A   [x]Right   []Left   [x]IJ     []Fem   [] First use X-ray verified     [x]Tunnel                [] Non Tunneled   [x]No S/S infection  []Redness  []Drainage []Cultured []Swelling []Pain   [x]Medical Aseptic Prep Utilized   []Dressing Changed  [] Biopatch  Date:       []Clotted   [x]Patent   Flows: [x]Good  []Poor  []Reversed   If access problem,  notified: []Yes    []N/A  Date:           GRAFT/FISTULA ACCESS:  []N/A     []Right     [x]Left (not being used)     []UE     []LE   []AVG   [x]AVF        []Buttonhole    []Medical Aseptic Prep Utilized   []No S/S infection  []Redness  []Drainage []Cultured []Swelling []Pain    Bruit:   [] Strong    [] Weak       Thrill :   [] Strong    [] Weak       Needle Gauge:  Length:     If access problem,  notified: []Yes     []N/A  Date:        Please describe access if present and not used:       GENERAL ASSESSMENT:   LUNGS:  Rate  20  SaO2%    99     [] N/A    [] Clear  [] Coarse  [] Crackles  [] Wheezing        [] Diminished     Location : []RLL   []LLL    []RUL  []ELÍAS   Cough: []Productive  []Dry  []N/A   Respirations:  []Easy  []Labored   Therapy:  []RA  []NC  l/min    Mask: []NRB []Venti       O2%                  [x]Ventilator [x]Intubated  [] Trach  [] BiPaP   CARDIAC: []Regular      [x] Irregular   [] Pericardial Rub  [] JVD        []  Monitored  [] Bedside  [] Remotely monitored [] N/A  Rhythm:    EDEMA: [x] None  []Generalized  [] Pitting [] 1    [] 2    [] 3    [] 4                 [] Facial  [] Pedal  []  UE  [] LE   SKIN:   [x] Warm  [] Hot     [] Cold   [x] Dry     [] Pale   [] Diaphoretic                  [] Flushed  [] Jaundiced  [] Cyanotic  [] Rash  [] Weeping   LOC:    [x] Alert      []Oriented:    [] Person     [] Place  []Time               [] Confused  [] Lethargic  [x] Medicated  [] Non-responsive     GI / ABDOMEN:  [] Flat    [] Distended    [x] Soft    [] Firm   []  Obese                             [] Diarrhea  [] Bowel Sounds  [] Nausea  [] Vomiting       / URINE ASSESSMENT: [] Voiding   [] Oliguria  [x] Anuria   []  Onofre     [] Incontinent    []  Incontinent Brief      []  Bathroom Privileges     PAIN: [x] 0 []1  []2   []3   []4   []5   []6   []7   []8   []9   []10            Scale 0-10  Action/Follow Up:    MOBILITY:[] Amb    [] Amb/Assist    [x] Bed    [] Wheelchair  [] Stretcher      All Vitals and Treatment Details on Attached [facility]: [address] [place] [address] [address]     [] 1st Time Acute  [] Stat  [] Routine  [x] Urgent     [] Acute Room  []  Bedside  [x] ICU/CCU  [] ER   Isolation Precautions:  [x] Dialysis   [] Airborne   [x] Contact    [] Reverse   Special Considerations:    MRSA (nares)     [] Blood Consent Verified []N/A     ALLERGIES:  [] NKA    banana      Code Status:  [x] Full Code  [] DNR  [] Other           HBsAg ONLY: Date Drawn 9-9-17     [x]Negative []Positive []Unknown   HBsAb: Date 9-9-17    [] Susceptible   [] [id number] []Not Drawn  [] Drawn     Current Labs:    Date of Labs: Today [x]                   Results for Taniya Pulliam (MRN [id number]) as of 2/26/2018 12:42   Ref.  Range 2/26/2018 04:10   WBC Latest Ref Range: 4.6 - 13.2 K/uL 8.6   RBC Latest Ref Range: 4.20 - 5.30 M/uL 3.36 (L)   HGB Latest Ref Range: 12.0 - 16.0 g/dL 8.5 (L)   HCT Latest Ref Range: 35.0 - 45.0 % 27.0 (L)   MCV Latest Ref Range: 74.0 - 97.0 FL 80.4   MCH Latest Ref Range: 24.0 - 34.0 PG 25.3   MCHC Latest Ref Range: 31.0 - 37.0 g/dL 31.5   RDW Latest Ref Range: 11.6 - 14.5 % 15.3 (H)   PLATELET Latest Ref Range: 135 - 420 K/uL 198   MPV Latest Ref Range: 9.2 - 11.8 FL 10.7   NEUTROPHILS Latest Ref Range: 40 - 73 % 81 (H)   LYMPHOCYTES Latest Ref Range: 21 - 52 % 12 (L)   MONOCYTES Latest Ref Range: 3 - 10 % 7   EOSINOPHILS Latest Ref Range: 0 - 5 % 0   BASOPHILS Latest Ref Range: 0 - 2 % 0   DF Latest Units:   AUTOMATED   ABS. NEUTROPHILS Latest Ref Range: 1.8 - 8.0 K/UL 6.9   ABS. LYMPHOCYTES Latest Ref Range: 0.9 - 3.6 K/UL 1.1   ABS. MONOCYTES Latest Ref Range: 0.05 - 1.2 K/UL 0.6   ABS. EOSINOPHILS Latest Ref Range: 0.0 - 0.4 K/UL 0.0   ABS.  BASOPHILS Latest Ref Range: 0.0 - 0.1 K/UL 0.0   aPTT Latest Ref Range: 23.0 - 36.4 SEC 47.9 (H)   Sodium Latest Ref Range: 136 - 145 mmol/L 129 (L)   Potassium Latest Ref Range: 3.5 - 5.5 mmol/L 4.0   Chloride Latest Ref Range: 100 - 108 mmol/L 94 (L)   CO2 Latest Ref Range: 21 - 32 mmol/L 22   Anion gap Latest Ref Range: 3.0 - 18 mmol/L 13   Glucose Latest Ref Range: 74 - 99 mg/dL 81   BUN Latest Ref Range: 7.0 - 18 MG/DL 22 (H)   Creatinine Latest Ref Range: 0.6 - 1.3 MG/DL 6.59 (H)   BUN/Creatinine ratio Latest Ref Range: 12 - 20   3 (L)   Calcium Latest Ref Range: 8.5 - 10.1 MG/DL 8.3 (L)   GFR est non-AA Latest Ref Range: >60 ml/min/1.73m2 7 (L)   GFR est AA Latest Ref Range: >60 ml/min/1.73m2 8 (L)   Bilirubin, total Latest Ref Range: 0.2 - 1.0 MG/DL 0.4   Bilirubin, direct Latest Ref Range: 0.0 - 0.2 MG/DL 0.1   Protein, total Latest Ref Range: 6.4 - 8.2 g/dL 5.8 (L)   Albumin Latest Ref Range: 3.4 - 5.0 g/dL 2.5 (L)   Globulin Latest Ref Range: 2.0 - 4.0 g/dL 3.3   A-G Ratio Latest Ref Range: 0.8 - 1.7   0.8   ALT (SGPT) Latest Ref Range: 13 - 56 U/L 10 (L) AST Latest Ref Range: 15 - 37 U/L 29   Alk. phosphatase Latest Ref Range: 45 - 117 U/L 103   CK Latest Ref Range: 26 - 192 U/L 245 (H)   CK-MB Index Latest Ref Range: 0.0 - 4.0 % 1.6   CK - MB Latest Ref Range: <3.6 ng/ml 3.8 (H)   Troponin-I, Qt.  Latest Ref Range: 0.0 - 0.045 NG/ML 3.33 (HH)   NT pro-BNP Latest Ref Range: 0 - 900 PG/ML >582557 (H)   Hemoglobin A1c, (calculated) Latest Ref Range: 4.2 - 5.6 % 4.7   Est. average glucose Latest Units: mg/dL Cannot be calculated   CULTURE, BLOOD Unknown Rpt                                                                                                              DIET: [] Renal    [x] Other     [] NPO     []  Diabetic      PRIMARY NURSE REPORT: First initial/Last name/Title      Pre Dialysis: Brissa Davis RN  Time: 1100     EDUCATION:    [x] Patient [] Other         Knowledge Basis: []None []Minimal [] Substantial   Barriers to learning pt nonverbal, doesn't speak english []N/A   [] Access Care     [] S&S of infection     [] Fluid Management     []K+     []Procedural    []Albumin     [] Medications     [] Tx Options     [] Transplant     [] Diet     [] Other   Teaching Tools:  [] Explain  [] Demo  [] Handouts [] Video  Patient response:   [] Verbalized understanding  [] Teach back  [] Return demonstration [x] Requires follow up   Inappropriate due to            [address] Before each treatment:     Machine Number:                   [facility]                                   [] Unit Machine #  with centralized RO                                  [] Portable Machine #1/RO serial # [id number]                                  [] Portable Machine #2/RO serial # [id number]                                  [x] Portable Machine #3/RO serial # [id number]                                                                                                       [facility]                                  [] Portable Machine #11/RO serial # [id number]                                   [] Portable Machine #12/RO serial # [id number]                                  [] Portable Machine #13/RO serial #  [id number]      Alarm Test:  Pass time 1100      Other:         [x] RO/Machine Log Complete      Temp    37.5           [x]Extracorporeal Circuit Tested for integrity   Dialysate: pH  7.4 Conductivity: Meter        HD Machine   13.8                  TCD: 13.9  Dialyzer Lot # [id number]        Blood Tubing Lot #[id number]         Saline Lot #  -[id number]     CHLORINE TESTING-Before each treatment and every 4 hours    Total Chlorine:[x] less than 0.1 ppm  Time: 1100 4 Hr/2nd Check Time: 1400   (if greater than 0.1 ppm from Primary then every 30 minutes from Secondary)     TREATMENT INITIATION  with Dialysis Precautions:   [x] All Connections Secured                 [x] Saline Line Double Clamped   [x] Venous Parameters Set                  [x] Arterial Parameters Set    [x] Prime Given  ml               [x]Air Foam Detector Engaged      Treatment Initiation Note: at pt bedside, pt eyes closed, pt on ventilator and intubated. Pt has RIJ Catheter. Arterial port does not aspirate but does flush, venous port aspirates and flushes well. Hemodialysis now started. Will continue to monitor pt and vital signs. Medication Dose Volume Route Initials Dialyzer Cleared: [x] Good [] Fair  [] Poor    Blood processed: 73.2  L  UF Removed 3200   Ml    Post Wt:  43.1 kg  POst BP:  142/77      Pulse:  106 Respirations:28  Temperature: 99.5                                   Post Tx Vascular Access: AVF/AVG: Bleeding stopped Art min. Wei.   Min   N/A                                   Catheter: Locking solution: Heparin 1:1000 Art 1.6.   Wei. 1.6  N/A                                 Post Assessment:                                    Skin:  [x] Warm  [] Dry [] Diaphoretic    [] Flushed  [] Pale [] Cyanotic   [facility] Signatures Title Initials  Time Lungs: [x] Clear    [] Course  [] Crackles  [] Wheezing [] Diminished   Randi Braun RN RL 1303 Cardiac: [] Regular   [x] Irregular   [] Monitor  [] N/A  Rhythm:           Edema:  [x] None    [] General     [] Facial   [] Pedal    [] UE    [] LE       Pain: [x]0  []1  []2   []3  []4   []5   []6   []7   []8   []9   []10         Post Treatment Note: hemodialysis ended 15 mins early due to drop in blood pressure, md notified. Blood pressure now stable 142/77. 3.2 liters removed. Pt and  VSS, pt RIJ cath flushed with ns and heparin. Pt remains in icu.  Primary nurse at bedside     POST TREATMENT PRIMARY NURSE HANDOFF REPORT:     First initial/Last name/Title         Post Dialysis: JULIANA Bloom RN Time:  3809     Abbreviations: AVG-arterial venous graft, AVF-arterial venous fistula, IJ-Internal Jugular, Subcl-Subclavian, Fem-Femoral, Tx-treatment, AP/HR-apical heart rate, DFR-dialysate flow rate, BFR-blood flow rate, AP-arterial pressure, -venous pressure, UF-ultrafiltrate, TMP-transmembrane pressure, Wei-Venous, Art-Arterial, RO-Reverse Osmosis

## 2018-02-26 NOTE — ED NOTES
Bedside shift change report given to Esteban Orellana [phone] (oncoming nurse) by Paxton Croft RN (offgoing nurse). Report included the following information SBAR and Kardex.

## 2018-02-26 NOTE — CDMP QUERY
Patient is noted to have a BMI of 18.0 . Please clarify if this patient is:     =>Underweight  =>Cachexia  =>Failure to Thrive  =>Other explanation of clinical findings  =>Unable to determine (no explanation for clinical findings)    Presentation: 5' 1\" ,    95  lbs = BMI   18.0;     Please clarify and document your clinical opinion in the progress notes and discharge summary, including the definitive and or presumptive diagnosis, (suspected or probable), related to the above clinical findings. Please include clinical findings supporting your diagnosis.      Thank you,   Gauri Blanco RN   CCDS   x [phone]

## 2018-02-26 NOTE — PROGRESS NOTES
attended the interdisciplinary rounds for Wolf Alas, who is a 56 y.o.,female. Patients Primary Language is: [place]. According to the patients EMR Quaker Affiliation is: Mormon. The reason the Patient came to the hospital is:   Patient Active Problem List    Diagnosis Date Noted    Hyponatremia 02/25/2018    ESRD (end stage renal disease) ([facility] 02/25/2018    NSTEMI (non-ST elevated myocardial infarction) ([facility] 02/25/2018    Respiratory failure ([facility] 02/25/2018    Hypoxia 02/25/2018    Acute UTI 02/25/2018    Hyperkalemia 09/08/2017    Renal failure 09/08/2017    SCOOTER (acute kidney injury) ([facility] 09/08/2017      Plan:  Chaplains will continue to follow and will provide pastoral care on an as needed/requested basis.  recommends bedside caregivers page  on duty if patient shows signs of acute spiritual or emotional distress.     [address]  Board Certified [address] [place] [address]   [phone]

## 2018-02-26 NOTE — H&P
History & Physical    Patient: Adam Pack MRN: [id number]  CSN: [id number]    YOB: 1960  Age: 56 y.o. Sex: female      DOA: 2/25/2018    Chief Complaint:   Chief Complaint   Patient presents with    Shortness of Breath    Chest Pain          HPI:     Adam Pack is a 56 y.o.  female who has PMH of ESRD on HD, poorly controlled HTN, HLD originally presented to [facility] ER with a CC of progressively worsening SOB, on and off CP and productive cough with white sputum, generalized weakness and dysuria. On initial work up, pt had Highly +ve troponin w/out chest pain in Er  and was started on Hepari driip for a Dx of NSTEMI and was on her way to be admitted. Pt BP was noticed to be highly elevated at that time and Pt developed flash pulmonary edema and respiratory distress that deteriorated to Acute respiratory failure and was intubated in ER. Pt is admitted to ICU for ARF 2ry to Pulmonary edema and hypertensive urgency and fluid overload. Pt has Pleural and pericardial effusing on CTA . Hx is collected from ER notes as Pt is sedated and intubated. Past Medical History:   Diagnosis Date    Chronic kidney disease     ESRD (end stage renal disease) ([facility] [address]     TUES-THURS-SAT    Hypercholesteremia     Hypertension     Kidney disease     Vitamin D deficiency        Past Surgical History:   Procedure Laterality Date    VASCULAR SURGERY PROCEDURE UNLIST         History reviewed. No pertinent family history. Social History     Social History    Marital status:      Spouse name: N/A    Number of children: N/A    Years of education: N/A     Social History Main Topics    Smoking status: Never Smoker    Smokeless tobacco: Never Used    Alcohol use No    Drug use: No    Sexual activity: Not Asked     Other Topics Concern    None     Social History Narrative       Prior to Admission medications    Medication Sig Start Date End Date Taking?  Authorizing Provider   hydrALAZINE (APRESOLINE) 50 mg tablet Take 25 mg by mouth three (3) times daily. Brenda Blackman MD   losartan (COZAAR) 100 mg tablet Take 100 mg by mouth daily. Brenda Blackman MD   amLODIPine (NORVASC) 10 mg tablet Take 10 mg by mouth daily. Brenda Blackman MD   glycerin, adult, (FLEET GLYCERIN, ADULT,) suppository Insert 1 Suppository into rectum daily. Indications: BOWEL EVACUATION 18   Franck Mcgarry MD   oxyCODONE-acetaminophen (PERCOCET) 5-325 mg per tablet Take 1 Tab by mouth every four (4) hours as needed for Pain. Max Daily Amount: 6 Tabs. 18   Jostin King MD   albuterol (PROVENTIL HFA, VENTOLIN HFA, PROAIR HFA) 90 mcg/actuation inhaler Take 2 Puffs by inhalation every four (4) hours as needed for Wheezing. 17 RACHELLE Langford PA-C   calcium acetate (PHOSLO) 667 mg cap Take 1 Cap by mouth three (3) times daily (with meals). 17   Dorothy Lainez MD   cloNIDine HCl (CATAPRES) 0.1 mg tablet Take 0.3 mg by mouth three (3) times daily. Brenda Blackman MD   NIFEdipine ER (ADALAT CC) 30 mg ER tablet Take 20 mg by mouth daily. Brenda Blackman MD   multivitamin with iron tablet Take 1 Tab by mouth daily. Brenda Blackman MD       Allergies   Allergen Reactions    Banana Other (comments)         Review of Systems  Unable to perform . Pt is sedated and intubated      Physical Exam:     Physical Exam:  Visit Vitals    BP (!) 152/94    Pulse 77    Temp 97.9 °F (36.6 °C)    Resp 14    Ht 5' 1\" (1.549 m)    Wt 43.3 kg (95 lb 7.4 oz)    SpO2 100%    Breastfeeding No    BMI 18.04 kg/m2      O2 Device: Endotracheal tube, Ventilator    Temp (24hrs), Av.2 °F (36.8 °C), Min:97.9 °F (36.6 °C), Max:98.4 °F (36.9 °C)     1901 -  0700  In: 20 [I.V.:20]  Out: -         General:  Pt is sedated and intubated              Head: Normocephalic, without obvious abnormality, atraumatic. Eyes:  Conjunctivae/corneas clear. PERRL, EOMs intact. Nose: Nares normal. No drainage or sinus tenderness.    Neck: Supple, symmetrical, trachea midline, no adenopathy, thyroid: no enlargement, no carotid bruit and no JVD. Lungs:   Intubated. +ve cracles   Heart:  Regular rate and rhythm, S1, S2 tachy at times. +ve murmur     Abdomen: Soft, non-tender. Bowel sounds normal.    Extremities: Extremities normal, atraumatic, no cyanosis . LE +1 edema. Pulses: 2+ and symmetric all extremities. Skin:  No rashes or lesions   Neurologic: sedated       Labs Reviewed: All lab results for the last 24 hours reviewed. CXR and EKG    Procedures/imaging: see electronic medical records for all procedures/Xrays and details which were not copied into this note but were reviewed prior to creation of Plan      Assessment/Plan     Principal Problem:    Respiratory failure ([facility] [address] [facility] (2/25/2018)    Active Problems:    Hyponatremia (2/25/2018)      ESRD (end stage renal disease) ([facility] [address] (2/25/2018)      NSTEMI (non-ST elevated myocardial infarction) ([facility] [address] (2/25/2018)      Hypoxia (2/25/2018)      Acute UTI (2/25/2018)       Pt will be admitted to ICU for acute respiratory failure with hypoxia mostly 2 ry to Volume overload induced by Hypertensive urgency with PMH of ESRD on HD  Pt is also diagnosed with NSTEMI on initial presentation prior to developing respiratory failure.     Continue Supportive care in ICU  Vent management as per ICU team >> consult and hhelp appreciated     NSTEMI >> continue heparin drip  Series of cardiac enzymes  EKGs  Cardiology consult is called    Hypertensive Urgency >> Holding Home meds  Starting Vasotec, BB IV and Hydralazine with holding parameters    ESRD on HD and acute UTI >> Nephrology consult for emergent HD if needed  Ceftriaxone IV and Urine Cx      DVT/GI Prophylaxis: Heparin IV    Plan of care is discussed in details ICU team    Tonya Lopez MD  2/25/2018 8:38 PM

## 2018-02-26 NOTE — PROGRESS NOTES
RENAL DAILY PROGRESS NOTE      51y F with ESRD , hypertensive, ,respir failure, required intubation. Impression & Plan:   IMPRESSION:   · ESRD, MWF,   · Access; tunnel catheter in right IJ, left arm fistula   · HTN, uncontrolled  ·  respiratory failure, hypoxic , intubated  · Anemia   · Hyponatremia   · Alkalosis on blood gas, respiratory    PLAN:    on 2018, I saw and examined patient during hemodialysis treatment. The patient was receiving hemodialysis for treatment of  renal failure. I have also reviewed vital signs, intake and output, lab results and recent events, and agreed with today's dialysis order.             HD rounding    Blood pressure (!) 159/96, pulse (!) 121, temperature 100.3 °F (37.9 °C), resp. rate 22, height 5' 1\" (1.549 m), weight 43.3 kg (95 lb 7.4 oz), SpO2 100 %, not currently breastfeeding.   Temp (24hrs), Av.2 °F (37.3 °C), Min:97.9 °F (36.6 °C), Max:100.3 °F (37.9 °C)      Blood Pressure: BP: (!) 159/96 (eyes closed)  Pulse: Pulse (Heart Rate): (!) 121  Temp:  Temp: 100.3 °F (37.9 °C)    Artificial Kidney Dialyzer/Set Up Inspection: [employer]   hours     Heparin Bolus    Blood flow rate Blood Flow Rate (ml/min): 350 ml/min   Dialysate rate     Arterial Access Pressure Arterial Access Pressure (mmHg): -170  Venous Return Pressure Venous Return Pressure (mmHg): 130  Ultrafiltration Rate Goal/Amount of Fluid to Remove (mL): 3500 mL  Fluid Removal Fluid Removed (mL): 2376  Net Fluid Removal                    Subjective:       Complaint:   Overnight events noted  Intubated     Current Facility-Administered Medications   Medication Dose Route Frequency    insulin lispro (HUMALOG) injection   SubCUTAneous Q6H    glucose chewable tablet 16 g  4 Tab Oral PRN    glucagon (GLUCAGEN) injection 1 mg  1 mg IntraMUSCular PRN    dextrose (D50W) injection syrg 12.5-25 g  25-50 mL IntraVENous PRN    albuterol (ACCUNEB) nebulizer solution 2.5 mg  2.5 mg Nebulization Q6H PRN    [START ON 2/27/2018] amLODIPine (NORVASC) tablet 5 mg  5 mg Oral DAILY    cloNIDine HCl (CATAPRES) tablet 0.2 mg  0.2 mg Oral BID    aspirin chewable tablet 81 mg  81 mg Oral DAILY    metoprolol tartrate (LOPRESSOR) tablet 25 mg  25 mg Oral BID    heparin 25,000 units in D5W 250 ml infusion  12-25 Units/kg/hr IntraVENous TITRATE    sodium chloride (NS) flush 5-10 mL  5-10 mL IntraVENous Q8H    sodium chloride (NS) flush 5-10 mL  5-10 mL IntraVENous PRN    acetaminophen (TYLENOL) suppository 650 mg  650 mg Rectal Q6H PRN    morphine injection 1 mg  1 mg IntraVENous Q4H PRN    naloxone (NARCAN) injection 0.1 mg  0.1 mg IntraVENous PRN    ondansetron (ZOFRAN) injection 4 mg  4 mg IntraVENous Q6H PRN    enalaprilat (VASOTEC) injection 1.25 mg  1.25 mg IntraVENous Q6H    hydrALAZINE (APRESOLINE) 20 mg/mL injection 10 mg  10 mg IntraVENous Q6H PRN    fentaNYL (PF) 10 mcg/mL infusion  0-200 mcg/hr IntraVENous TITRATE    midazolam (VERSED) injection 1-2 mg  1-2 mg IntraVENous Q4H PRN    famotidine (PF) (PEPCID) 20 mg in sodium chloride 10 mL injection  20 mg IntraVENous DAILY    chlorhexidine (PERIDEX) 0.12 % mouthwash 10 mL  10 mL Oral Q12H    cefTRIAXone (ROCEPHIN) 2 g in sterile water (preservative free) 20 mL IV syringe  2 g IntraVENous Q24H    0.9% sodium chloride infusion  50 mL/hr IntraVENous CONTINUOUS       Review of Symptoms: as above   Objective:   Patient Vitals for the past 24 hrs:   Temp Pulse Resp BP SpO2   02/26/18 1330 - (!) 121 22 (!) 159/96 100 %   02/26/18 1315 - (!) 108 21 119/81 100 %   02/26/18 1300 - 99 14 101/67 100 %   02/26/18 1245 - 97 11 105/74 100 %   02/26/18 1230 - 100 23 107/70 99 %   02/26/18 1215 - (!) 114 22 159/89 99 %   02/26/18 1200 - (!) 110 13 (!) 240/134 99 %   02/26/18 1145 - (!) 129 17 (!) 247/157 100 %   02/26/18 1130 - 81 13 (!) 197/100 100 %   02/26/18 1115 - 88 12 (!) 191/105 100 %   02/26/18 1110 - 91 13 (!) 181/99 100 %   02/26/18 1100 - (!) 108 15 (!) 192/102 100 %   02/26/18 1045 - 74 18 133/78 100 %   02/26/18 1030 - 76 19 131/77 100 %   02/26/18 1015 - 78 15 150/83 100 %   02/26/18 1000 - 82 14 152/90 100 %   02/26/18 0945 - 77 22 142/84 100 %   02/26/18 0930 - 80 20 157/87 100 %   02/26/18 0915 - 100 21 (!) 198/112 100 %   02/26/18 0900 - 76 17 (!) 177/106 100 %   02/26/18 0845 - 82 15 (!) 197/105 100 %   02/26/18 0830 - 72 17 (!) 170/113 100 %   02/26/18 0815 - 69 18 (!) 168/102 100 %   02/26/18 0800 - 70 19 (!) 169/99 100 %   02/26/18 0745 - 71 19 (!) 170/98 100 %   02/26/18 0730 - 69 17 (!) 168/102 100 %   02/26/18 0715 - 72 16 (!) 175/96 100 %   02/26/18 0700 - 70 14 (!) 156/97 100 %   02/26/18 0645 - - - (!) 163/93 -   02/26/18 0630 - - - (!) 159/93 -   02/26/18 0615 - - - (!) 157/91 -   02/26/18 0600 - 73 15 (!) 162/94 100 %   02/26/18 0545 - - - (!) 177/98 -   02/26/18 0530 - - - (!) 173/99 -   02/26/18 0521 - 73 - (!) 166/93 -   02/26/18 0515 - - - (!) 166/93 -   02/26/18 0500 - 70 15 159/79 100 %   02/26/18 0445 - - - (!) 160/94 -   02/26/18 0430 - - - (!) 160/91 -   02/26/18 0415 - - - (!) 173/98 -   02/26/18 0400 100.3 °F (37.9 °C) 70 17 (!) 166/94 100 %   02/26/18 0345 - - - (!) 171/95 -   02/26/18 0331 - - - (!) 176/95 -   02/26/18 0318 - 73 16 - 100 %   02/26/18 0315 - - - (!) 171/97 -   02/26/18 0300 - 72 14 (!) 166/99 100 %   02/26/18 0245 - - - (!) 166/100 -   02/26/18 0230 - - - (!) 169/99 -   02/26/18 0215 - - - (!) 168/98 -   02/26/18 0200 - 74 14 (!) 170/99 100 %   02/26/18 0145 - - - (!) 171/98 -   02/26/18 0130 - - - (!) 174/100 -   02/26/18 0115 - - - (!) 170/99 -   02/26/18 0103 - 77 - (!) 181/102 -   02/26/18 0100 - 76 15 (!) 181/102 100 %   02/26/18 0045 - - - (!) 152/115 -   02/26/18 0030 - - - (!) 161/97 -   02/26/18 0015 - - - (!) 160/101 -   02/26/18 0000 100.2 °F (37.9 °C) 76 14 (!) 146/95 100 %   02/25/18 2345 - - - (!) 148/91 -   02/25/18 2330 - - - (!) 152/94 -   02/25/18 2319 - 77 14 - 100 %   02/25/18 2315 - 79 - (!) 186/106 -   02/25/18 2314 - - - (!) 199/113 -   02/25/18 2300 - 86 24 (!) 201/127 100 %   02/25/18 2245 - - - (!) 153/96 -   02/25/18 2230 - - - (!) 157/101 -   02/25/18 2215 - - - (!) 187/104 -   02/25/18 2200 - 74 15 (!) 165/102 100 %   02/25/18 2145 - - - (!) 144/92 -   02/25/18 2130 97.9 °F (36.6 °C) 79 14 (!) 142/91 100 %   02/25/18 2051 - 80 16 - 100 %   02/25/18 2010 - 79 - 117/75 100 %   02/25/18 2002 98.2 °F (36.8 °C) 92 - (!) 174/93 100 %   02/25/18 1950 - 75 - 99/61 100 %   02/25/18 1945 - 77 - (!) 81/62 100 %   02/25/18 1935 - 81 - (!) 86/53 100 %   02/25/18 1900 - (!) 105 - (!) 194/109 100 %   02/25/18 1845 - (!) 135 22 - 100 %   02/25/18 1845 - (!) 126 14 (!) 220/129 100 %   02/25/18 1830 - (!) 112 (!) 36 (!) 252/164 (!) 84 %   02/25/18 1818 - (!) 124 25 (!) 262/138 (!) 85 %   02/25/18 1800 - - - (!) 225/145 (!) 88 %   02/25/18 1757 - 100 - (!) 223/154 -   02/25/18 1730 - - - (!) 210/136 94 %   02/25/18 1720 - - - - 93 %   02/25/18 1717 - - - (!) 205/140 -   02/25/18 1545 - (!) 108 - (!) 212/134 98 %   02/25/18 1530 - (!) 109 - (!) 231/121 97 %   02/25/18 1515 - (!) 124 - (!) 242/133 98 %   02/25/18 1500 - 99 - (!) 216/119 99 %   02/25/18 1446 - (!) 106 - (!) 220/122 99 %        Weight change:      02/24 1901 - 02/26 0700  In: 524.6 [I.V.:434.6]  Out: 0     Intake/Output Summary (Last 24 hours) at 02/26/18 1347  Last data filed at 02/26/18 0700   Gross per 24 hour   Intake            524.6 ml   Output                0 ml   Net            524.6 ml         Physical Exam  General: intubated   HEENT  no thyromegaly  CVS: S1S2 heard,  no rub  RS: + air entry b/l,   Abd: Soft,   Extrm: no edema, no cyanosis, clubbing   Skin: no visible  Rash  Musculoskeletal: No gross joints or bone deformities   Access;  Tunnel catheter on right ij, left arm fistula + thrill   Procedures/imaging: see electronic medical records for all procedures, Xrays and details which were not copied into this note but were reviewed prior to creation of Plan           Data Review:     LABS:   Hematology: Recent Labs      02/26/18   0410  02/25/18   1338   WBC  8.6  7.7   HGB  8.5*  9.4*   HCT  27.0*  30.8*     Chemistry: Recent Labs      02/26/18   0410  02/25/18   2234  02/25/18   1338   BUN  22*  19*  18   CREA  6.59*  6.23*  5.80*   CA  8.3*  8.4*  9.4   ALB  2.5*   --   3.2*   K  4.0  4.3  4.0   NA  129*  128*  129*   CL  94*  93*  92*   CO2  22  22  27   PHOS   --   3.9   --    GLU  81  87  122*              Procedures/imaging: see electronic medical records for all procedures, Xrays and details which were not copied into this note but were reviewed prior to creation of Plan          Assessment & Plan:     As above         Roscoe Becker MD  2/26/2018  1:47 PM

## 2018-02-26 NOTE — PROGRESS NOTES
NUTRITION    Nursing Referral: [facility]  Nutrition Consult: Management of Tube Feeding      RECOMMENDATIONS / PLAN:     - Start tube feeding of Nepro at 20 mL/hr and advance as tolerated by 10 mL q 4 hours to goal rate of 40 mL/hr with 50 mL q 4 hour water flushes.   - Continue RD inpatient monitoring and evaluation. Goal Regimen: Nepro at 40 mL/hr + 50 mL q 4 hour water flushes to provide: 1728 kcal, 78 gm protein, 92 gm fat, 160 gm CHO, 15 gm fiber, 696 mL free water, 996 mL total water, 100% RDIs     NUTRITION INTERVENTIONS & DIAGNOSIS:     [x] Enteral nutrition: initiate   [x] Collaboration and referral of nutrition care: interdisciplinary rounds, discussed starting feeds with MD and RN    Nutrition Diagnosis: Unintentional weight loss related to inadequate energy intake as evidenced by 30 lb, 24% weight loss x 5 months. Inadequate oral intake related to inability to tolerate po due to respiratory status as evidenced by pt NPO, intubated. ASSESSMENT:     Pt intubated and NGT clamped. Average po intake adequate to meet patients estimated nutritional needs:   [] Yes     [x] No   [] Unable to determine at this time    Diet: DIET NPO      Food Allergies: banana   Current Appetite:   [] Good     [] Fair     [] Poor     [x] Other: NPO  Appetite/meal intake prior to admission:   [] Good     [] Fair     [x] Poor, decreased appetite and nausea/vomiting x 3-4 days PTA     [] Other:  Feeding Limitations:  [] Swallowing difficulty    [] Chewing difficulty    [x] Other: respiratory status   Current Meal Intake: No data found.     Anuric   BM: PTA   Skin Integrity: WDL  Edema: 1+ pitting LEs  Pertinent Medications: Reviewed: NS at 50 mL/hr    Recent Labs      02/26/18   0410  02/25/18   2234  02/25/18   1338   NA  129*  128*  129*   K  4.0  4.3  4.0   CL  94*  93*  92*   CO2  22  22  27   GLU  81  87  122*   BUN  22*  19*  18   CREA  6.59*  6.23*  5.80*   CA  8.3*  8.4*  9.4   MG   --   2.3   --    PHOS   --   3.9   -- ALB  2.5*   --   3.2*   SGOT  29   --   38*   ALT  10*   --   13       Intake/Output Summary (Last 24 hours) at 02/26/18 0918  Last data filed at 02/26/18 0700   Gross per 24 hour   Intake            524.6 ml   Output                0 ml   Net            524.6 ml       Anthropometrics:  Ht Readings from Last 1 Encounters:   02/25/18 5' 1\" (1.549 m)     Last 3 Recorded Weights in this Encounter    02/25/18 1303 02/25/18 2130   Weight: 42 kg (92 lb 9.5 oz) 43.3 kg (95 lb 7.4 oz)     Body mass index is 18.04 kg/(m^2). Underweight        Weight History: 30 lb, 24% weight loss x 5 months PTA per chart     Weight Metrics 2/25/2018 1/22/2018 12/2/2017 11/15/2017 11/5/2017 9/10/2017 9/8/2017   Weight 95 lb 7.4 oz 103 lb 3 oz 106 lb 14.8 oz 107 lb 110 lb 125 lb 9.6 oz -   BMI 18.04 kg/m2 19.5 kg/m2 20.2 kg/m2 20.22 kg/m2 21.48 kg/m2 - 22.97 kg/m2        Admitting Diagnosis: NSTEMI (non-ST elevated myocardial infarction) ([facility]  ESRD (end stage renal disease) ([facility] [address]  Hyponatremia  Respiratory failure (HCC)  NSTEMI (non-ST elevated myocardial infarction) ([facility] [address]  [facility]  Hypoxia  Pertinent PMHx: ESRD on HD, HTN, HLD     Education Needs:        [x] None identified  [] Identified - Not appropriate at this time  []  Identified and addressed - refer to education log  Learning Limitations:   [] None identified  [x] Identified: intubated, does not speak [place]      Cultural, Caodaism & ethnic food preferences:  [x] None identified    [] Identified and addressed     ESTIMATED NUTRITION NEEDS:     Calories: 6905-4718 kcal ([id number]) based on  [x] Actual BW 43 kg     [] IBW   Protein: 52-86 gm (1.2-2 gm/kg) based on  [x] Actual BW      [] IBW   Fluid: 4637-7142 mL     MONITORING & EVALUATION:     Nutrition Goal(s):   1. Nutritional needs will be met through adequate oral intake or nutrition support within the next 7 days.   Outcome:  [] Met/Ongoing    []  Not Met    [x] New/Initial Goal Monitoring:   [] Food and beverage intake   [] Diet order   [x] Nutrition-focused physical findings   [x] Treatment/therapy   [] Weight   [x] Enteral nutrition intake        Previous Recommendations (for follow-up assessments only):     []   Implemented       []   Not Implemented (RD to address)      [] No Longer Appropriate     [] No Recommendation Made     Discharge Planning: pending ability to tolerate po and goals of care    [x] Participated in care planning, discharge planning, & interdisciplinary rounds as appropriate      Anjum Ponce, [address], [address] [place] [address]    Pager: [phone]

## 2018-02-26 NOTE — CONSULTS
Consult requested by: Dr. Chloe Ash is a 56 y.o. female PATIENT REFUSED who is being seen on consult for ESRD, volume overload  Chief Complaint   Patient presents with    Shortness of Breath    Chest Pain     Admission diagnosis: Respiratory failure [facility])   Patient is intubated , not able to provide any history , major part of history obtained by chart review and discussion with primary team.   HPI:  Ms. Anibal Wood has PMH HTN, ESRD, presented to ER with uncotnrolled HTN, short of breath. She has not been eating well and had nausea and vomiting per family. She goes to [facility] dialysis [place] on MWF schedule. Her last HD was on Friday. She was intubated in ER for hypoxia. Examined in ICU, remain intubated, tachypnic. Past Medical History:   Diagnosis Date    Chronic kidney disease     ESRD (end stage renal disease) ([facility] [address]     TUES-THURS-SAT    Hypercholesteremia     Hypertension     Kidney disease     Vitamin D deficiency       Past Surgical History:   Procedure Laterality Date    VASCULAR SURGERY PROCEDURE UNLIST         Social History     Social History    Marital status:      Spouse name: N/A    Number of children: N/A    Years of education: N/A     Occupational History    Not on file. Social History Main Topics    Smoking status: Never Smoker    Smokeless tobacco: Never Used    Alcohol use No    Drug use: No    Sexual activity: Not on file     Other Topics Concern    Not on file     Social History Narrative       History reviewed. No pertinent family history. Allergies   Allergen Reactions    Banana Other (comments)        Home Medications:     Prior to Admission Medications   Prescriptions Last Dose Informant Patient Reported? Taking? NIFEdipine ER (ADALAT CC) 30 mg ER tablet   Yes No   Sig: Take 20 mg by mouth daily.    albuterol (PROVENTIL HFA, VENTOLIN HFA, PROAIR HFA) 90 mcg/actuation inhaler   No No   Sig: Take 2 Puffs by inhalation every four (4) hours as needed for Wheezing. amLODIPine (NORVASC) 10 mg tablet   Yes No   Sig: Take 10 mg by mouth daily. calcium acetate (PHOSLO) 667 mg cap   No No   Sig: Take 1 Cap by mouth three (3) times daily (with meals). cloNIDine HCl (CATAPRES) 0.1 mg tablet   Yes No   Sig: Take 0.3 mg by mouth three (3) times daily. glycerin, adult, (FLEET GLYCERIN, ADULT,) suppository   No No   Sig: Insert 1 Suppository into rectum daily. Indications: BOWEL EVACUATION   hydrALAZINE (APRESOLINE) 50 mg tablet   Yes No   Sig: Take 25 mg by mouth three (3) times daily. losartan (COZAAR) 100 mg tablet   Yes No   Sig: Take 100 mg by mouth daily. multivitamin with iron tablet   Yes No   Sig: Take 1 Tab by mouth daily. oxyCODONE-acetaminophen (PERCOCET) 5-325 mg per tablet   No No   Sig: Take 1 Tab by mouth every four (4) hours as needed for Pain. Max Daily Amount: 6 Tabs.       Facility-Administered Medications: None       Current Facility-Administered Medications   Medication Dose Route Frequency    insulin lispro (HUMALOG) injection   SubCUTAneous Q6H    glucose chewable tablet 16 g  4 Tab Oral PRN    glucagon (GLUCAGEN) injection 1 mg  1 mg IntraMUSCular PRN    dextrose (D50W) injection syrg 12.5-25 g  25-50 mL IntraVENous PRN    albuterol (ACCUNEB) nebulizer solution 2.5 mg  2.5 mg Nebulization Q6H PRN    [START ON 2/27/2018] amLODIPine (NORVASC) tablet 5 mg  5 mg Oral DAILY    cloNIDine HCl (CATAPRES) tablet 0.2 mg  0.2 mg Oral BID    aspirin chewable tablet 81 mg  81 mg Oral DAILY    metoprolol tartrate (LOPRESSOR) tablet 25 mg  25 mg Oral BID    heparin 25,000 units in D5W 250 ml infusion  12-25 Units/kg/hr IntraVENous TITRATE    sodium chloride (NS) flush 5-10 mL  5-10 mL IntraVENous Q8H    sodium chloride (NS) flush 5-10 mL  5-10 mL IntraVENous PRN    acetaminophen (TYLENOL) suppository 650 mg  650 mg Rectal Q6H PRN    morphine injection 1 mg  1 mg IntraVENous Q4H PRN    naloxone (NARCAN) injection 0.1 mg  0.1 mg IntraVENous PRN    ondansetron (ZOFRAN) injection 4 mg  4 mg IntraVENous Q6H PRN    enalaprilat (VASOTEC) injection 1.25 mg  1.25 mg IntraVENous Q6H    hydrALAZINE (APRESOLINE) 20 mg/mL injection 10 mg  10 mg IntraVENous Q6H PRN    fentaNYL (PF) 10 mcg/mL infusion  0-200 mcg/hr IntraVENous TITRATE    midazolam (VERSED) injection 1-2 mg  1-2 mg IntraVENous Q4H PRN    famotidine (PF) (PEPCID) 20 mg in sodium chloride 10 mL injection  20 mg IntraVENous DAILY    chlorhexidine (PERIDEX) 0.12 % mouthwash 10 mL  10 mL Oral Q12H    cefTRIAXone (ROCEPHIN) 2 g in sterile water (preservative free) 20 mL IV syringe  2 g IntraVENous Q24H    0.9% sodium chloride infusion  50 mL/hr IntraVENous CONTINUOUS       Review of Systems:   Intubated   Data Review:    Labs: Results:       Chemistry Recent Labs      02/26/18   0410  02/25/18   2234  02/25/18   1338   GLU  81  87  122*   NA  129*  128*  129*   K  4.0  4.3  4.0   CL  94*  93*  92*   CO2  22  22  27   BUN  22*  19*  18   CREA  6.59*  6.23*  5.80*   CA  8.3*  8.4*  9.4   AGAP  13  13  10   BUCR  3*  3*  3*   AP  103   --   118*   TP  5.8*   --   7.1   ALB  2.5*   --   3.2*   GLOB  3.3   --   3.9   AGRAT  0.8   --   0.8      CBC w/Diff Recent Labs      02/26/18   0410  02/25/18   1338   WBC  8.6  7.7   RBC  3.36*  3.75*   HGB  8.5*  9.4*   HCT  27.0*  30.8*   PLT  198  246   GRANS  81*  81*   LYMPH  12*  7*   EOS  0  0      Coagulation Recent Labs      02/26/18   1002  02/26/18   0410   APTT  102.3*  47.9*       Iron/Ferritin No results for input(s): IRON in the last 72 hours. No lab exists for component: TIBCCALC   BNP No results for input(s): BNPP in the last 72 hours.    Cardiac Enzymes Recent Labs      02/26/18   1002  02/26/18   0410   CPK  241*  245*   CKND1  1.2  1.6      Liver Enzymes Recent Labs      02/26/18   0410   TP  5.8*   ALB  2.5*   AP  103   SGOT  29      Thyroid Studies No results found for: T4, T3U, TSH, TSHEXT      EKG: sinus     Physical Assessment:     Visit Vitals    /81    Pulse (!) 108    Temp 100.3 °F (37.9 °C)    Resp 21    Ht 5' 1\" (1.549 m)    Wt 43.3 kg (95 lb 7.4 oz)    SpO2 100%    Breastfeeding No    BMI 18.04 kg/m2     Weight change:     Intake/Output Summary (Last 24 hours) at 02/26/18 1334  Last data filed at 02/26/18 0700   Gross per 24 hour   Intake            524.6 ml   Output                0 ml   Net            524.6 ml     Physical Exam  General: intubated   HEENT  no thyromegaly  CVS: S1S2 heard,  no rub  RS: + air entry b/l,   Abd: Soft,   Extrm: no edema, no cyanosis, clubbing   Skin: no visible  Rash  Musculoskeletal: No gross joints or bone deformities   Access; Tunnel catheter on right ij, left arm fistula + thrill   Procedures/imaging: see electronic medical records for all procedures, Xrays and details which were not copied into this note but were reviewed prior to creation of Plan    57y F with ESRD , hypertensive, ,respir failure, required intubation. Impression & Plan:   IMPRESSION:   ESRD, MWF,   Access; tunnel catheter in right IJ, left arm fistula   HTN, uncontrolled   respiratory failure, hypoxic , intubated  Anemia   Hyponatremia   Alkalosis on blood gas, respiratory    PLAN:   Arrange emergent dialysis for volume management  Vent management per ICU team.         If patient need IV contrast for CT imaging, need to be coordinated with renal team.   Avoid Gadolinium due to its association with nephrogenic systemic fibrosis in a patients with severe ARF and ESRD.    Please dose all medications for creatinine clearance <15/dialysis.    Avoid blood pressure checks, blood draws, peripheral iv's on arm with access. AvoidPICC lines on either arm in order to preserve veins for dialysis access creation.       Discussed with Dr. Virginie Cabral  Thank you very much for allowing me to participate in the care of this patient.     We will continue to monitor with you and make recommendations as needed.  Jamaal Terry MD  February 26, 2018  [facility]  Office [phone]

## 2018-02-26 NOTE — CONSULTS
[facility] - Consult Note    Date of  Admission: 2/25/2018 12:50 PM     Primary Care Physician:  Sonia Dubon MD     Plan:     1. Acute respiratory failure- intubated and sedated possible related to Acute decompensated CHF in the setting of uncontrolled Hypertension  2. NSTEMI-no previous CAD history per records. patient with positive troponin and multiple risk factors for CAD. Continue with heparin drip, added asa and metoprolol 25 mg bid. Follow lipid panel. will obtain Echo to assess lvf, rvf or any wma. Will need Cardiac w/u for CAD. Possible Cardiac cath when stable   3. CHF decompensated- has elevated pro BNP  likely to poorly controlled HTN ( Per family member she was on multiple medications for BP control)  and NSTEMI. Fluid management per renal on HD   4. Uncontrolled hypertension resume home medications and monitor  5. ESRD- on HD renal following. She is MWF HD schedule   6. Hyponatremia  7. UTI  On antibiotics managed per [facility]      Patient with acute respiratory distress- now intubated  Continue aspirin/ bb and statin  Will consider C once she is stable. Continue heparin drip for NSTEMI  Echo to assess LV function       Assessment:     Hospital Problems  Date Reviewed: 11/15/2017          Codes Class Noted POA    Anemia ICD-10-CM: D64.9  ICD-9-CM: 285.9  2/26/2018 Unknown        Hyponatremia ICD-10-CM: E87.1  ICD-9-CM: 276.1  2/25/2018 Unknown        ESRD (end stage renal disease) ([facility] ICD-10-CM: N18.6  ICD-9-CM: 585.6  2/25/2018 Unknown        NSTEMI (non-ST elevated myocardial infarction) ([facility] ICD-10-CM: I21.4  ICD-9-CM: 410.70  2/25/2018 Unknown        * (Principal)Respiratory failure ([facility] ICD-10-CM: J96.90  ICD-9-CM: 518.81  2/25/2018 Unknown        Hypoxia ICD-10-CM: R09.02  ICD-9-CM: 799.02  2/25/2018 Unknown        Acute UTI ICD-10-CM: N39.0  ICD-9-CM: 599.0  2/25/2018 Unknown                   History of Present Illness:            This is a 56 y.o. female admitted for NSTEMI (non-ST elevated myocardial infarction) ([facility] ESRD (end stage renal disease) ([facility] Hyponatremia. Respiratory failure ([facility] NSTEMI (non-ST elevated myocardial infarction) ([facility] [place] patient with PMHx of hypertension, ESRD on HD, hyperlipidemia and hyponatremia. Patient is critical ill and unable to provide any detail history. Reviewing medical records patient  originally presented to [facility] ER complaining of  progressively worsening SOB, on and off CP and productive cough with white sputum, generalized weakness and dysuria. In the ED patient SBP  was greater than 200's. Patient was intubated in the ED for hypoxia. Patient was sating 80% on a non-re breather and became anxious per ED note. Patient Troponins are elevated she was  Started on Heparin also Lasix was given for pulmonary edema  Patient was admitted to ICU for ARF 2ry to Pulmonary edema and hypertensive urgency and fluid overload. Past Medical History:     Past Medical History:   Diagnosis Date    Chronic kidney disease     ESRD (end stage renal disease) ([facility]     TUES-THURS-SAT    Hypercholesteremia     Hypertension     Kidney disease     Vitamin D deficiency          Social History:     Social History     Social History    Marital status:      Spouse name: N/A    Number of children: N/A    Years of education: N/A     Social History Main Topics    Smoking status: Never Smoker    Smokeless tobacco: Never Used    Alcohol use No    Drug use: No    Sexual activity: Not Asked     Other Topics Concern    None     Social History Narrative        Family History:   History reviewed. No pertinent family history. Medications:      Allergies   Allergen Reactions    Banana Other (comments)        Current Facility-Administered Medications   Medication Dose Route Frequency    insulin lispro (HUMALOG) injection   SubCUTAneous Q6H    glucose chewable tablet 16 g  4 Tab Oral PRN    glucagon (GLUCAGEN) injection 1 mg  1 mg IntraMUSCular PRN  dextrose (D50W) injection syrg 12.5-25 g  25-50 mL IntraVENous PRN    albuterol (ACCUNEB) nebulizer solution 2.5 mg  2.5 mg Nebulization Q6H PRN    [START ON 2/27/2018] amLODIPine (NORVASC) tablet 5 mg  5 mg Oral DAILY    cloNIDine HCl (CATAPRES) tablet 0.2 mg  0.2 mg Oral BID    aspirin chewable tablet 81 mg  81 mg Oral DAILY    metoprolol tartrate (LOPRESSOR) tablet 25 mg  25 mg Oral BID    heparin 25,000 units in D5W 250 ml infusion  12-25 Units/kg/hr IntraVENous TITRATE    sodium chloride (NS) flush 5-10 mL  5-10 mL IntraVENous Q8H    sodium chloride (NS) flush 5-10 mL  5-10 mL IntraVENous PRN    acetaminophen (TYLENOL) suppository 650 mg  650 mg Rectal Q6H PRN    morphine injection 1 mg  1 mg IntraVENous Q4H PRN    naloxone (NARCAN) injection 0.1 mg  0.1 mg IntraVENous PRN    ondansetron (ZOFRAN) injection 4 mg  4 mg IntraVENous Q6H PRN    enalaprilat (VASOTEC) injection 1.25 mg  1.25 mg IntraVENous Q6H    hydrALAZINE (APRESOLINE) 20 mg/mL injection 10 mg  10 mg IntraVENous Q6H PRN    fentaNYL (PF) 10 mcg/mL infusion  0-200 mcg/hr IntraVENous TITRATE    midazolam (VERSED) injection 1-2 mg  1-2 mg IntraVENous Q4H PRN    famotidine (PF) (PEPCID) 20 mg in sodium chloride 10 mL injection  20 mg IntraVENous DAILY    chlorhexidine (PERIDEX) 0.12 % mouthwash 10 mL  10 mL Oral Q12H    cefTRIAXone (ROCEPHIN) 2 g in sterile water (preservative free) 20 mL IV syringe  2 g IntraVENous Q24H    0.9% sodium chloride infusion  50 mL/hr IntraVENous CONTINUOUS        Review Of Systems:     Intubated unable to obtain ROS      Physical Exam:     Visit Vitals    /74    Pulse 97    Temp 100.3 °F (37.9 °C)    Resp 11    Ht 5' 1\" (1.549 m)    Wt 43.3 kg (95 lb 7.4 oz)    SpO2 100%    Breastfeeding No    BMI 18.04 kg/m2     BP Readings from Last 3 Encounters:   02/26/18 105/74   02/05/18 (!) 198/111   01/22/18 (!) 189/105     Pulse Readings from Last 3 Encounters:   02/26/18 97   02/05/18 74 01/22/18 83     Wt Readings from Last 3 Encounters:   02/25/18 43.3 kg (95 lb 7.4 oz)   01/22/18 46.8 kg (103 lb 3 oz)   12/02/17 48.5 kg (106 lb 14.8 oz)       General:  appears stated age  Skin: Warm and dry, acyanotic, normal color. Head: Normocephalic, atraumatic. Eyes: Sclerae anicteric, conjunctivae without injection. Neck:  nontender, no nuchal rigidity, no masses, no stridor, no carotid bruit, + JVD  Lungs:  Rales bilaterally. Heart:  regular rate and rhythm, S1, S2 normal, no S3 or S4, no click, no rub  Abdomen:  abdomen is soft without significant tenderness, masses, organomegaly or guarding  Extremities:  extremities normal, atraumatic, no cyanosis or edema  Neurological: unable to assess due to patient condition.   Psychiatric Affect:  Unable to assess due to patient condition    Data Review:     Recent Results (from the past 48 hour(s))   EKG, 12 LEAD, INITIAL    Collection Time: 02/25/18 12:57 PM   Result Value Ref Range    Ventricular Rate 105 BPM    Atrial Rate 105 BPM    P-R Interval 158 ms    QRS Duration 82 ms    Q-T Interval 334 ms    QTC Calculation (Bezet) 441 ms    Calculated P Axis 63 degrees    Calculated R Axis 79 degrees    Calculated T Axis 27 degrees    Diagnosis       Sinus tachycardia  Left atrial enlargement  Anterior infarct , age undetermined  Abnormal ECG  When compared with ECG of 02-DEC-2017 09:54,  ST no longer depressed in Lateral leads  T wave inversion now evident in Lateral leads  Confirmed by nOeida Jacobson [phone]) on 2/26/2018 11:13:22 AM     CBC WITH AUTOMATED DIFF    Collection Time: 02/25/18  1:38 PM   Result Value Ref Range    WBC 7.7 4.6 - 13.2 K/uL    RBC 3.75 (L) 4.20 - 5.30 M/uL    HGB 9.4 (L) 12.0 - 16.0 g/dL    HCT 30.8 (L) 35.0 - 45.0 %    MCV 82.1 74.0 - 97.0 FL    MCH 25.1 24.0 - 34.0 PG    MCHC 30.5 (L) 31.0 - 37.0 g/dL    RDW 15.3 (H) 11.6 - 14.5 %    PLATELET 737 470 - 009 K/uL    MPV 10.9 9.2 - 11.8 FL    NEUTROPHILS 81 (H) 40 - 73 % LYMPHOCYTES 7 (L) 21 - 52 %    MONOCYTES 12 (H) 3 - 10 %    EOSINOPHILS 0 0 - 5 %    BASOPHILS 0 0 - 2 %    ABS. NEUTROPHILS 6.2 1.8 - 8.0 K/UL    ABS. LYMPHOCYTES 0.6 (L) 0.9 - 3.6 K/UL    ABS. MONOCYTES 0.9 0.05 - 1.2 K/UL    ABS. EOSINOPHILS 0.0 0.0 - 0.4 K/UL    ABS. BASOPHILS 0.0 0.0 - 0.06 K/UL    DF AUTOMATED     METABOLIC PANEL, COMPREHENSIVE    Collection Time: 02/25/18  1:38 PM   Result Value Ref Range    Sodium 129 (L) 136 - 145 mmol/L    Potassium 4.0 3.5 - 5.5 mmol/L    Chloride 92 (L) 100 - 108 mmol/L    CO2 27 21 - 32 mmol/L    Anion gap 10 3.0 - 18 mmol/L    Glucose 122 (H) 74 - 99 mg/dL    BUN 18 7.0 - 18 MG/DL    Creatinine 5.80 (H) 0.6 - 1.3 MG/DL    BUN/Creatinine ratio 3 (L) 12 - 20      GFR est AA 9 (L) >60 ml/min/1.73m2    GFR est non-AA 8 (L) >60 ml/min/1.73m2    Calcium 9.4 8.5 - 10.1 MG/DL    Bilirubin, total 0.7 0.2 - 1.0 MG/DL    ALT (SGPT) 13 13 - 56 U/L    AST (SGOT) 38 (H) 15 - 37 U/L    Alk.  phosphatase 118 (H) 45 - 117 U/L    Protein, total 7.1 6.4 - 8.2 g/dL    Albumin 3.2 (L) 3.4 - 5.0 g/dL    Globulin 3.9 2.0 - 4.0 g/dL    A-G Ratio 0.8 0.8 - 1.7     CARDIAC PANEL,(CK, CKMB & TROPONIN)    Collection Time: 02/25/18  1:38 PM   Result Value Ref Range    CK 70 26 - 192 U/L    CK - MB 1.3 <3.6 ng/ml    CK-MB Index 1.9 0.0 - 4.0 %    Troponin-I, Qt. 3.64 (HH) 0.00 - 0.06 NG/ML   NT-PRO BNP    Collection Time: 02/25/18  1:38 PM   Result Value Ref Range    NT pro-BNP >84126 (H) 0 - 900 PG/ML   PTT    Collection Time: 02/25/18  1:38 PM   Result Value Ref Range    aPTT 35.4 23.0 - 36.4 SEC   URINALYSIS W/ RFLX MICROSCOPIC    Collection Time: 02/25/18  4:40 PM   Result Value Ref Range    Color YELLOW      Appearance CLEAR      Specific gravity 1.012 1.005 - 1.030      pH (UA) >8.5 (H) 5.0 - 8.0    Protein >1000 (A) NEG mg/dL    Glucose 100 (A) NEG mg/dL    Ketone NEGATIVE  NEG mg/dL    Bilirubin NEGATIVE  NEG      Blood NEGATIVE  NEG      Urobilinogen 0.2 0.2 - 1.0 EU/dL    Nitrites NEGATIVE NEG      Leukocyte Esterase MODERATE (A) NEG     URINE MICROSCOPIC ONLY    Collection Time: 02/25/18  4:40 PM   Result Value Ref Range    WBC 4 to 10 0 - 4 /hpf    RBC NONE 0 - 5 /hpf    Epithelial cells 4+ 0 - 5 /lpf    Bacteria NEGATIVE  NEG /hpf   TROPONIN I    Collection Time: 02/25/18  5:50 PM   Result Value Ref Range    Troponin-I, Qt. 3.63 (HH) 0.00 - 0.06 NG/ML   EKG, 12 LEAD, SUBSEQUENT    Collection Time: 02/25/18  6:32 PM   Result Value Ref Range    Ventricular Rate 141 BPM    Atrial Rate 141 BPM    P-R Interval 150 ms    QRS Duration 78 ms    Q-T Interval 266 ms    QTC Calculation (Bezet) 407 ms    Calculated P Axis 54 degrees    Calculated R Axis 72 degrees    Calculated T Axis 20 degrees    Diagnosis       Sinus tachycardia  Possible Left atrial enlargement  Anterior infarct (cited on or before 25-FEB-2018)  Abnormal ECG  When compared with ECG of 25-FEB-2018 12:57,  No significant change was found  Confirmed by Deepak David MD, --- ([id number]) on 2/26/2018 11:05:38 AM     POC G3    Collection Time: 02/25/18  7:42 PM   Result Value Ref Range    Device: VENT      FIO2 (POC) 100 %    pH (POC) 7.553 (HH) 7.35 - 7.45      pCO2 (POC) 26.9 (L) 35.0 - 45.0 MMHG    pO2 (POC) 291 (H) 80 - 100 MMHG    HCO3 (POC) 23.8 22 - 26 MMOL/L    sO2 (POC) 100 (H) 92 - 97 %    Base excess (POC) 2 mmol/L    Mode ASSIST CONTROL      Set Rate 10 bpm    PEEP/CPAP (POC) 5 cmH2O    Allens test (POC) YES      Total resp. rate 10      Site RIGHT RADIAL      Patient temp.  97.7      Specimen type (POC) ARTERIAL      Performed by Kimber Chahal    PTT    Collection Time: 02/25/18 10:34 PM   Result Value Ref Range    aPTT 24.5 23.0 - 38.0 SEC   METABOLIC PANEL, BASIC    Collection Time: 02/25/18 10:34 PM   Result Value Ref Range    Sodium 128 (L) 136 - 145 mmol/L    Potassium 4.3 3.5 - 5.5 mmol/L    Chloride 93 (L) 100 - 108 mmol/L    CO2 22 21 - 32 mmol/L    Anion gap 13 3.0 - 18 mmol/L    Glucose 87 74 - 99 mg/dL    BUN 19 (H) 7.0 - 18 MG/DL    Creatinine 6.23 (H) 0.6 - 1.3 MG/DL    BUN/Creatinine ratio 3 (L) 12 - 20      GFR est AA 8 (L) >60 ml/min/1.73m2    GFR est non-AA 7 (L) >60 ml/min/1.73m2    Calcium 8.4 (L) 8.5 - 10.1 MG/DL   MAGNESIUM    Collection Time: 02/25/18 10:34 PM   Result Value Ref Range    Magnesium 2.3 1.6 - 2.6 mg/dL   PHOSPHORUS    Collection Time: 02/25/18 10:34 PM   Result Value Ref Range    Phosphorus 3.9 2.5 - 4.9 MG/DL   TYPE & SCREEN    Collection Time: 02/25/18 10:34 PM   Result Value Ref Range    Crossmatch Expiration 02/28/2018     ABO/Rh(D) Shamar Gong POSITIVE     Antibody screen NEG    CARDIAC PANEL,(CK, CKMB & TROPONIN)    Collection Time: 02/25/18 10:34 PM   Result Value Ref Range     (H) 26 - 192 U/L    CK - MB 6.6 (H) <3.6 ng/ml    CK-MB Index 2.2 0.0 - 4.0 %    Troponin-I, Qt. 3.05 (HH) 0.0 - 0.045 NG/ML   HEP B SURFACE AG    Collection Time: 02/25/18 10:55 PM   Result Value Ref Range    Hepatitis B surface Ag <0.10 <1.00 Index    Hep B surface Ag Interp. NEGATIVE  NEG     HEPATITIS C AB    Collection Time: 02/25/18 10:55 PM   Result Value Ref Range    Hepatitis C virus Ab 0.08 <0.80 Index    Hep C  virus Ab Interp. NEGATIVE  NEG      Hep C  virus Ab comment         HIV 1/2 AG/AB, 4TH GENERATION,W RFLX CONFIRM    Collection Time: 02/25/18 10:55 PM   Result Value Ref Range    HIV 1/2 Interpretation NONREACTIVE NR      HIV 1/2 result comment SEE NOTE     HIV-1,2 P24 AG/AB SCREEN    Collection Time: 02/25/18 10:55 PM   Result Value Ref Range    p24 Antigen NONREACTIVE      HIV-1,2 Ab NONREACTIVE     GLUCOSE, POC    Collection Time: 02/26/18 12:21 AM   Result Value Ref Range    Glucose (POC) 93 70 - 110 mg/dL   MRSA SCREEN - PCR (NASAL)    Collection Time: 02/26/18  4:05 AM   Result Value Ref Range    Special Requests: NO SPECIAL REQUESTS      Culture result: (A)       MRSA target DNA is detected (presumptive positive for MRSA colonization).     Culture result:        MRSA CALLED TO AND CORRECTLY REPEATED BY: Toshia Workman, ICU AT [id number] 2/26/2018 TO      PTT    Collection Time: 02/26/18  4:10 AM   Result Value Ref Range    aPTT 47.9 (H) 23.0 - 36.4 SEC   CARDIAC PANEL,(CK, CKMB & TROPONIN)    Collection Time: 02/26/18  4:10 AM   Result Value Ref Range     (H) 26 - 192 U/L    CK - MB 3.8 (H) <3.6 ng/ml    CK-MB Index 1.6 0.0 - 4.0 %    Troponin-I, Qt. 3.33 (HH) 0.0 - 0.045 NG/ML   CBC WITH AUTOMATED DIFF    Collection Time: 02/26/18  4:10 AM   Result Value Ref Range    WBC 8.6 4.6 - 13.2 K/uL    RBC 3.36 (L) 4.20 - 5.30 M/uL    HGB 8.5 (L) 12.0 - 16.0 g/dL    HCT 27.0 (L) 35.0 - 45.0 %    MCV 80.4 74.0 - 97.0 FL    MCH 25.3 24.0 - 34.0 PG    MCHC 31.5 31.0 - 37.0 g/dL    RDW 15.3 (H) 11.6 - 14.5 %    PLATELET 658 383 - 588 K/uL    MPV 10.7 9.2 - 11.8 FL    NEUTROPHILS 81 (H) 40 - 73 %    LYMPHOCYTES 12 (L) 21 - 52 %    MONOCYTES 7 3 - 10 %    EOSINOPHILS 0 0 - 5 %    BASOPHILS 0 0 - 2 %    ABS. NEUTROPHILS 6.9 1.8 - 8.0 K/UL    ABS. LYMPHOCYTES 1.1 0.9 - 3.6 K/UL    ABS. MONOCYTES 0.6 0.05 - 1.2 K/UL    ABS. EOSINOPHILS 0.0 0.0 - 0.4 K/UL    ABS. BASOPHILS 0.0 0.0 - 0.1 K/UL    DF AUTOMATED     METABOLIC PANEL, BASIC    Collection Time: 02/26/18  4:10 AM   Result Value Ref Range    Sodium 129 (L) 136 - 145 mmol/L    Potassium 4.0 3.5 - 5.5 mmol/L    Chloride 94 (L) 100 - 108 mmol/L    CO2 22 21 - 32 mmol/L    Anion gap 13 3.0 - 18 mmol/L    Glucose 81 74 - 99 mg/dL    BUN 22 (H) 7.0 - 18 MG/DL    Creatinine 6.59 (H) 0.6 - 1.3 MG/DL    BUN/Creatinine ratio 3 (L) 12 - 20      GFR est AA 8 (L) >60 ml/min/1.73m2    GFR est non-AA 7 (L) >60 ml/min/1.73m2    Calcium 8.3 (L) 8.5 - 10.1 MG/DL   HEPATIC FUNCTION PANEL    Collection Time: 02/26/18  4:10 AM   Result Value Ref Range    Protein, total 5.8 (L) 6.4 - 8.2 g/dL    Albumin 2.5 (L) 3.4 - 5.0 g/dL    Globulin 3.3 2.0 - 4.0 g/dL    A-G Ratio 0.8 0.8 - 1.7      Bilirubin, total 0.4 0.2 - 1.0 MG/DL    Bilirubin, direct 0.1 0.0 - 0.2 MG/DL    Alk.  phosphatase 103 45 - 117 U/L    AST (SGOT) 29 15 - 37 U/L    ALT (SGPT) 10 (L) 13 - 56 U/L   NT-PRO BNP    Collection Time: 02/26/18  4:10 AM   Result Value Ref Range    NT pro-BNP >262535 (H) 0 - 900 PG/ML   HEMOGLOBIN A1C WITH EAG    Collection Time: 02/26/18  4:10 AM   Result Value Ref Range    Hemoglobin A1c 4.7 4.2 - 5.6 %    Est. average glucose Cannot be calculated mg/dL   GLUCOSE, POC    Collection Time: 02/26/18  5:20 AM   Result Value Ref Range    Glucose (POC) 87 70 - 110 mg/dL   POC G3    Collection Time: 02/26/18  5:48 AM   Result Value Ref Range    Device: VENT      FIO2 (POC) 50 %    pH (POC) 7.618 (HH) 7.35 - 7.45      pCO2 (POC) 25.6 (L) 35.0 - 45.0 MMHG    pO2 (POC) 190 (H) 80 - 100 MMHG    HCO3 (POC) 26.2 (H) 22 - 26 MMOL/L    sO2 (POC) 100 (H) 92 - 97 %    Base excess (POC) 5 mmol/L    Mode ASSIST CONTROL      Tidal volume 400 ml    Set Rate 14 bpm    PEEP/CPAP (POC) 5 cmH2O    Allens test (POC) YES      Inspiratory Time 1 sec    Total resp. rate 16      Site RIGHT RADIAL      Patient temp. 98.6      Specimen type (POC) ARTERIAL      Performed by Maida Swift     Volume control plus YES     EKG, 12 LEAD, SUBSEQUENT    Collection Time: 02/26/18  7:17 AM   Result Value Ref Range    Ventricular Rate 72 BPM    Atrial Rate 72 BPM    P-R Interval 158 ms    QRS Duration 84 ms    Q-T Interval 506 ms    QTC Calculation (Bezet) 554 ms    Calculated P Axis 74 degrees    Calculated R Axis 80 degrees    Calculated T Axis 76 degrees    Diagnosis       Normal sinus rhythm  Possible Left atrial enlargement  Left ventricular hypertrophy  Nonspecific ST abnormality  Prolonged QT  Abnormal ECG  When compared with ECG of 25-FEB-2018 18:32,  Vent.  rate has decreased BY  69 BPM  ST now depressed in Lateral leads  T wave inversion no longer evident in Inferior leads  T wave amplitude has decreased in Lateral leads     PTT    Collection Time: 02/26/18 10:02 AM   Result Value Ref Range    aPTT 102.3 (H) 23.0 - 36.4 SEC   CARDIAC PANEL,(CK, CKMB & TROPONIN)    Collection Time: 02/26/18 10:02 AM   Result Value Ref Range     (H) 26 - 192 U/L    CK - MB 2.8 <3.6 ng/ml    CK-MB Index 1.2 0.0 - 4.0 %    Troponin-I, Qt. 2.78 (HH) 0.0 - 0.045 NG/ML   POC G3    Collection Time: 02/26/18 10:11 AM   Result Value Ref Range    Device: VENT      FIO2 (POC) 30 %    pH (POC) 7.493 (H) 7.35 - 7.45      pCO2 (POC) 31.1 (L) 35.0 - 45.0 MMHG    pO2 (POC) 100 80 - 100 MMHG    HCO3 (POC) 23.9 22 - 26 MMOL/L    sO2 (POC) 98 (H) 92 - 97 %    Base excess (POC) 1 mmol/L    Mode ASSIST CONTROL      Tidal volume 375 ml    Set Rate 10 bpm    PEEP/CPAP (POC) 5 cmH2O    Allens test (POC) YES      Site LEFT RADIAL      Specimen type (POC) ARTERIAL      Performed by Veronica Bauer     Volume control plus YES     INFLUENZA A & B AG (RAPID TEST)    Collection Time: 02/26/18 10:13 AM   Result Value Ref Range    Influenza A Antigen NEGATIVE  NEG      Influenza B Antigen NEGATIVE  NEG     GLUCOSE, POC    Collection Time: 02/26/18 12:25 PM   Result Value Ref Range    Glucose (POC) 99 70 - 110 mg/dL         Intake/Output Summary (Last 24 hours) at 02/26/18 1304  Last data filed at 02/26/18 0700   Gross per 24 hour   Intake            524.6 ml   Output                0 ml   Net            524.6 ml       Cardiographics:     ECG: Normal sinus rhythm. Left ventricular hypertrophy. Nonspecific ST abnormality . Prolonged QT. ST depressed in Lateral leads   Echocardiogram: ordered       Signed By: Jesus Manuel Pinedo NP supervised    February 26, 2018      I have independently evaluated and examined the patient. All relevant labs and testing data's are reviewed. Care plan discussed and updated after review.     Juve Maza MD

## 2018-02-26 NOTE — ED NOTES
TRANSFER - OUT REPORT:    telehpone/Verbal report given to Noreen Cogan RN(name) on Wolf Alas  being transferred to [facility]-(unit) for routine progression of care       Report consisted of patients Situation, Background, Assessment and   Recommendations(SBAR). Information from the following report(s) SBAR, Kardex, ED Summary, Procedure Summary, Intake/Output, MAR, Accordion and Recent Results was reviewed with the receiving nurse. Opportunity for questions and clarification was provided.       Patient transported with:   Monitor  Tech   Vent

## 2018-02-26 NOTE — PROGRESS NOTES
[facility] Hospitalist Group  Progress Note    Patient: Maico Fournier Age: 56 y.o. : 1960 MR#: [id number] SSN: [id number]  Date/Time: 2018     Subjective: pt AA on vent, follows commands      Assessment/Plan:   1. Acute hypoxic resp failure: cont vent per ICU team   2. Acute flash pul edema vs fluid over load: better post HD  3. NSTEMI: continue heparin drip, plan per Cardiology. 4. Hypertensive Urgency: BP lower side. cont Vasotec, BB and clonidine with holding parameters. D/c Norvasc   5. ESRD on HD  6. Acute UTI cont Ceftriaxone IV and Urine Cx pending  7. Hyponatremia: will monitor   8. Non compliance   9. Underweight: nutrition eval   Full code   D/w pt's daughter and son in law at bedside    D/w ICU team Dr. Chuck Arreola      I spent 30 minutes with the patient in face-to-face consultation, of which greater than 50% was spent in counseling and coordination of care as described above. Case discussed with:  []Patient  [x]Family  [x]Nursing  []Case Management  DVT Prophylaxis:  []Lovenox  []Hep SQ  []SCDs  []Coumadin   [x]On Heparin gtt    Objective:   VS:   Visit Vitals    /82    Pulse 100    Temp 99.5 °F (37.5 °C) (Axillary)    Resp 20    Ht 5' 1\" (1.549 m)    Wt 43.3 kg (95 lb 7.4 oz)    SpO2 100%    Breastfeeding No    BMI 18.04 kg/m2      Tmax/24hrs: Temp (24hrs), Av.3 °F (37.4 °C), Min:97.9 °F (36.6 °C), Max:100.3 °F (37.9 °C)  IOBRIEF  Intake/Output Summary (Last 24 hours) at 18 1649  Last data filed at 18 1445   Gross per 24 hour   Intake           818.02 ml   Output             3200 ml   Net         -2381.98 ml       General:  Alert, NAD    Pulmonary:  CTA Bilaterally. Cardiovascular: Regular rate and Rhythm. GI:  Soft, Non distended, Non tender. + Bowel sounds. Extremities:  No edema, cyanosis, clubbing. Neurologic: AA.  Follows commands       Medications:   Current Facility-Administered Medications   Medication Dose Route Frequency  insulin lispro (HUMALOG) injection   SubCUTAneous Q6H    glucose chewable tablet 16 g  4 Tab Oral PRN    glucagon (GLUCAGEN) injection 1 mg  1 mg IntraMUSCular PRN    dextrose (D50W) injection syrg 12.5-25 g  25-50 mL IntraVENous PRN    albuterol (ACCUNEB) nebulizer solution 2.5 mg  2.5 mg Nebulization Q6H PRN    [START ON 2/27/2018] amLODIPine (NORVASC) tablet 5 mg  5 mg Oral DAILY    cloNIDine HCl (CATAPRES) tablet 0.2 mg  0.2 mg Oral BID    aspirin chewable tablet 81 mg  81 mg Oral DAILY    metoprolol tartrate (LOPRESSOR) tablet 25 mg  25 mg Oral BID    midazolam (VERSED) injection 2 mg  2 mg IntraVENous ONCE    midazolam (VERSED) 100 mg in 0.9% sodium chloride 100 mL infusion  0-10 mg/hr IntraVENous TITRATE    0.9% sodium chloride (MBP/ADV) infusion        atorvastatin (LIPITOR) tablet 40 mg  40 mg Oral QHS    heparin 25,000 units in D5W 250 ml infusion  12-25 Units/kg/hr IntraVENous TITRATE    sodium chloride (NS) flush 5-10 mL  5-10 mL IntraVENous Q8H    sodium chloride (NS) flush 5-10 mL  5-10 mL IntraVENous PRN    acetaminophen (TYLENOL) suppository 650 mg  650 mg Rectal Q6H PRN    naloxone (NARCAN) injection 0.1 mg  0.1 mg IntraVENous PRN    ondansetron (ZOFRAN) injection 4 mg  4 mg IntraVENous Q6H PRN    enalaprilat (VASOTEC) injection 1.25 mg  1.25 mg IntraVENous Q6H    hydrALAZINE (APRESOLINE) 20 mg/mL injection 10 mg  10 mg IntraVENous Q6H PRN    fentaNYL (PF) 10 mcg/mL infusion  0-200 mcg/hr IntraVENous TITRATE    midazolam (VERSED) injection 1-2 mg  1-2 mg IntraVENous Q4H PRN    famotidine (PF) (PEPCID) 20 mg in sodium chloride 10 mL injection  20 mg IntraVENous DAILY    chlorhexidine (PERIDEX) 0.12 % mouthwash 10 mL  10 mL Oral Q12H    cefTRIAXone (ROCEPHIN) 2 g in sterile water (preservative free) 20 mL IV syringe  2 g IntraVENous Q24H    0.9% sodium chloride infusion  50 mL/hr IntraVENous CONTINUOUS       Labs:    Recent Results (from the past 24 hour(s))   TROPONIN I Collection Time: 02/25/18  5:50 PM   Result Value Ref Range    Troponin-I, Qt. 3.63 (HH) 0.00 - 0.06 NG/ML   EKG, 12 LEAD, SUBSEQUENT    Collection Time: 02/25/18  6:32 PM   Result Value Ref Range    Ventricular Rate 141 BPM    Atrial Rate 141 BPM    P-R Interval 150 ms    QRS Duration 78 ms    Q-T Interval 266 ms    QTC Calculation (Bezet) 407 ms    Calculated P Axis 54 degrees    Calculated R Axis 72 degrees    Calculated T Axis 20 degrees    Diagnosis       Sinus tachycardia  Possible Left atrial enlargement  Anterior infarct (cited on or before 25-FEB-2018)  Abnormal ECG  When compared with ECG of 25-FEB-2018 12:57,  No significant change was found  Confirmed by Eva Monroe MD, --- ([id number]) on 2/26/2018 11:05:38 AM     POC G3    Collection Time: 02/25/18  7:42 PM   Result Value Ref Range    Device: VENT      FIO2 (POC) 100 %    pH (POC) 7.553 (HH) 7.35 - 7.45      pCO2 (POC) 26.9 (L) 35.0 - 45.0 MMHG    pO2 (POC) 291 (H) 80 - 100 MMHG    HCO3 (POC) 23.8 22 - 26 MMOL/L    sO2 (POC) 100 (H) 92 - 97 %    Base excess (POC) 2 mmol/L    Mode ASSIST CONTROL      Set Rate 10 bpm    PEEP/CPAP (POC) 5 cmH2O    Allens test (POC) YES      Total resp. rate 10      Site RIGHT RADIAL      Patient temp.  97.7      Specimen type (POC) ARTERIAL      Performed by Daniella Ahn    PTT    Collection Time: 02/25/18 10:34 PM   Result Value Ref Range    aPTT 24.5 23.0 - 87.8 SEC   METABOLIC PANEL, BASIC    Collection Time: 02/25/18 10:34 PM   Result Value Ref Range    Sodium 128 (L) 136 - 145 mmol/L    Potassium 4.3 3.5 - 5.5 mmol/L    Chloride 93 (L) 100 - 108 mmol/L    CO2 22 21 - 32 mmol/L    Anion gap 13 3.0 - 18 mmol/L    Glucose 87 74 - 99 mg/dL    BUN 19 (H) 7.0 - 18 MG/DL    Creatinine 6.23 (H) 0.6 - 1.3 MG/DL    BUN/Creatinine ratio 3 (L) 12 - 20      GFR est AA 8 (L) >60 ml/min/1.73m2    GFR est non-AA 7 (L) >60 ml/min/1.73m2    Calcium 8.4 (L) 8.5 - 10.1 MG/DL   MAGNESIUM    Collection Time: 02/25/18 10:34 PM   Result Value Ref Range    Magnesium 2.3 1.6 - 2.6 mg/dL   PHOSPHORUS    Collection Time: 02/25/18 10:34 PM   Result Value Ref Range    Phosphorus 3.9 2.5 - 4.9 MG/DL   TYPE & SCREEN    Collection Time: 02/25/18 10:34 PM   Result Value Ref Range    Crossmatch Expiration 02/28/2018     ABO/Rh(D) O POSITIVE     Antibody screen NEG    CARDIAC PANEL,(CK, CKMB & TROPONIN)    Collection Time: 02/25/18 10:34 PM   Result Value Ref Range     (H) 26 - 192 U/L    CK - MB 6.6 (H) <3.6 ng/ml    CK-MB Index 2.2 0.0 - 4.0 %    Troponin-I, Qt. 3.05 (HH) 0.0 - 0.045 NG/ML   HEP B SURFACE AG    Collection Time: 02/25/18 10:55 PM   Result Value Ref Range    Hepatitis B surface Ag <0.10 <1.00 Index    Hep B surface Ag Interp. NEGATIVE  NEG     HEPATITIS C AB    Collection Time: 02/25/18 10:55 PM   Result Value Ref Range    Hepatitis C virus Ab 0.08 <0.80 Index    Hep C  virus Ab Interp. NEGATIVE  NEG      Hep C  virus Ab comment         HIV 1/2 AG/AB, 4TH GENERATION,W RFLX CONFIRM    Collection Time: 02/25/18 10:55 PM   Result Value Ref Range    HIV 1/2 Interpretation NONREACTIVE NR      HIV 1/2 result comment SEE NOTE     HIV-1,2 P24 AG/AB SCREEN    Collection Time: 02/25/18 10:55 PM   Result Value Ref Range    p24 Antigen NONREACTIVE      HIV-1,2 Ab NONREACTIVE     GLUCOSE, POC    Collection Time: 02/26/18 12:21 AM   Result Value Ref Range    Glucose (POC) 93 70 - 110 mg/dL   MRSA SCREEN - PCR (NASAL)    Collection Time: 02/26/18  4:05 AM   Result Value Ref Range    Special Requests: NO SPECIAL REQUESTS      Culture result: (A)       MRSA target DNA is detected (presumptive positive for MRSA colonization). Culture result:        MRSA CALLED TO AND CORRECTLY REPEATED BY:  MIMA Hansen RN, ICU AT [id number] 2/26/2018 TO [place]     PTT    Collection Time: 02/26/18  4:10 AM   Result Value Ref Range    aPTT 47.9 (H) 23.0 - 36.4 SEC   CARDIAC PANEL,(CK, CKMB & TROPONIN)    Collection Time: 02/26/18  4:10 AM   Result Value Ref Range     (H) 26 - 192 U/L    CK - MB 3.8 (H) <3.6 ng/ml    CK-MB Index 1.6 0.0 - 4.0 %    Troponin-I, Qt. 3.33 (HH) 0.0 - 0.045 NG/ML   CBC WITH AUTOMATED DIFF    Collection Time: 02/26/18  4:10 AM   Result Value Ref Range    WBC 8.6 4.6 - 13.2 K/uL    RBC 3.36 (L) 4.20 - 5.30 M/uL    HGB 8.5 (L) 12.0 - 16.0 g/dL    HCT 27.0 (L) 35.0 - 45.0 %    MCV 80.4 74.0 - 97.0 FL    MCH 25.3 24.0 - 34.0 PG    MCHC 31.5 31.0 - 37.0 g/dL    RDW 15.3 (H) 11.6 - 14.5 %    PLATELET 495 987 - 531 K/uL    MPV 10.7 9.2 - 11.8 FL    NEUTROPHILS 81 (H) 40 - 73 %    LYMPHOCYTES 12 (L) 21 - 52 %    MONOCYTES 7 3 - 10 %    EOSINOPHILS 0 0 - 5 %    BASOPHILS 0 0 - 2 %    ABS. NEUTROPHILS 6.9 1.8 - 8.0 K/UL    ABS. LYMPHOCYTES 1.1 0.9 - 3.6 K/UL    ABS. MONOCYTES 0.6 0.05 - 1.2 K/UL    ABS. EOSINOPHILS 0.0 0.0 - 0.4 K/UL    ABS. BASOPHILS 0.0 0.0 - 0.1 K/UL    DF AUTOMATED     METABOLIC PANEL, BASIC    Collection Time: 02/26/18  4:10 AM   Result Value Ref Range    Sodium 129 (L) 136 - 145 mmol/L    Potassium 4.0 3.5 - 5.5 mmol/L    Chloride 94 (L) 100 - 108 mmol/L    CO2 22 21 - 32 mmol/L    Anion gap 13 3.0 - 18 mmol/L    Glucose 81 74 - 99 mg/dL    BUN 22 (H) 7.0 - 18 MG/DL    Creatinine 6.59 (H) 0.6 - 1.3 MG/DL    BUN/Creatinine ratio 3 (L) 12 - 20      GFR est AA 8 (L) >60 ml/min/1.73m2    GFR est non-AA 7 (L) >60 ml/min/1.73m2    Calcium 8.3 (L) 8.5 - 10.1 MG/DL   HEPATIC FUNCTION PANEL    Collection Time: 02/26/18  4:10 AM   Result Value Ref Range    Protein, total 5.8 (L) 6.4 - 8.2 g/dL    Albumin 2.5 (L) 3.4 - 5.0 g/dL    Globulin 3.3 2.0 - 4.0 g/dL    A-G Ratio 0.8 0.8 - 1.7      Bilirubin, total 0.4 0.2 - 1.0 MG/DL    Bilirubin, direct 0.1 0.0 - 0.2 MG/DL    Alk.  phosphatase 103 45 - 117 U/L    AST (SGOT) 29 15 - 37 U/L    ALT (SGPT) 10 (L) 13 - 56 U/L   NT-PRO BNP    Collection Time: 02/26/18  4:10 AM   Result Value Ref Range    NT pro-BNP >243917 (H) 0 - 900 PG/ML   HEMOGLOBIN A1C WITH EAG    Collection Time: 02/26/18  4:10 AM   Result Value Ref Range    Hemoglobin A1c 4.7 4.2 - 5.6 %    Est. average glucose Cannot be calculated mg/dL   GLUCOSE, POC    Collection Time: 02/26/18  5:20 AM   Result Value Ref Range    Glucose (POC) 87 70 - 110 mg/dL   POC G3    Collection Time: 02/26/18  5:48 AM   Result Value Ref Range    Device: VENT      FIO2 (POC) 50 %    pH (POC) 7.618 (HH) 7.35 - 7.45      pCO2 (POC) 25.6 (L) 35.0 - 45.0 MMHG    pO2 (POC) 190 (H) 80 - 100 MMHG    HCO3 (POC) 26.2 (H) 22 - 26 MMOL/L    sO2 (POC) 100 (H) 92 - 97 %    Base excess (POC) 5 mmol/L    Mode ASSIST CONTROL      Tidal volume 400 ml    Set Rate 14 bpm    PEEP/CPAP (POC) 5 cmH2O    Allens test (POC) YES      Inspiratory Time 1 sec    Total resp. rate 16      Site RIGHT RADIAL      Patient temp. 98.6      Specimen type (POC) ARTERIAL      Performed by Lenin Pavon     Volume control plus YES     EKG, 12 LEAD, SUBSEQUENT    Collection Time: 02/26/18  7:17 AM   Result Value Ref Range    Ventricular Rate 72 BPM    Atrial Rate 72 BPM    P-R Interval 158 ms    QRS Duration 84 ms    Q-T Interval 506 ms    QTC Calculation (Bezet) 554 ms    Calculated P Axis 74 degrees    Calculated R Axis 80 degrees    Calculated T Axis 76 degrees    Diagnosis       Normal sinus rhythm  Possible Left atrial enlargement  Left ventricular hypertrophy  Nonspecific ST abnormality  Prolonged QT  Abnormal ECG  When compared with ECG of 25-FEB-2018 18:32,  Vent.  rate has decreased BY  69 BPM  ST now depressed in Lateral leads  T wave inversion no longer evident in Inferior leads  T wave amplitude has decreased in Lateral leads  Confirmed by Melvi Lizarraga MD, --- ([id number]) on 2/26/2018 2:11:20 PM     PTT    Collection Time: 02/26/18 10:02 AM   Result Value Ref Range    aPTT 102.3 (H) 23.0 - 36.4 SEC   CARDIAC PANEL,(CK, CKMB & TROPONIN)    Collection Time: 02/26/18 10:02 AM   Result Value Ref Range     (H) 26 - 192 U/L    CK - MB 2.8 <3.6 ng/ml    CK-MB Index 1.2 0.0 - 4.0 %    Troponin-I, Qt. 2.78 (HH) 0.0 - 0.045 NG/ML   LIPID PANEL    Collection Time: 02/26/18 10:02 AM   Result Value Ref Range    LIPID PROFILE          Cholesterol, total 151 <200 MG/DL    Triglyceride 94 <150 MG/DL    HDL Cholesterol 37 (L) 40 - 60 MG/DL    LDL, calculated 95.2 0 - 100 MG/DL    VLDL, calculated 18.8 MG/DL    CHOL/HDL Ratio 4.1 0 - 5.0     POC G3    Collection Time: 02/26/18 10:11 AM   Result Value Ref Range    Device: VENT      FIO2 (POC) 30 %    pH (POC) 7.493 (H) 7.35 - 7.45      pCO2 (POC) 31.1 (L) 35.0 - 45.0 MMHG    pO2 (POC) 100 80 - 100 MMHG    HCO3 (POC) 23.9 22 - 26 MMOL/L    sO2 (POC) 98 (H) 92 - 97 %    Base excess (POC) 1 mmol/L    Mode ASSIST CONTROL      Tidal volume 375 ml    Set Rate 10 bpm    PEEP/CPAP (POC) 5 cmH2O    Allens test (POC) YES      Site LEFT RADIAL      Specimen type (POC) ARTERIAL      Performed by Leia Martinez     Volume control plus YES     INFLUENZA A & B AG (RAPID TEST)    Collection Time: 02/26/18 10:13 AM   Result Value Ref Range    Influenza A Antigen NEGATIVE  NEG      Influenza B Antigen NEGATIVE  NEG     GLUCOSE, POC    Collection Time: 02/26/18 12:25 PM   Result Value Ref Range    Glucose (POC) 99 70 - 110 mg/dL       Signed By: Jairo Christopher MD     February 26, 2018

## 2018-02-26 NOTE — CONSULTS
[facility]  Pulmonary, Critical Care, and Sleep Medicine    Name: Ross Chopra MRN: [id number]   : 1960 Hospital: [address] [place] [address] Dr   Date: 2018        Pulmonary Critical Care Initial Patient Consult                                              Reason for CC Consult: Vent Management    Subjective/History:     Ross Chopra has been seen and evaluated at the request of Dr. Edmund Cook for Vent Management, Hypertensive Urgency. Patient is a 56 y.o. Vietamese female with a hx of end stage renal disease, HTN, and Hypercholestermia who presented to [facility] ED with c/o constant moderate SOB onset 3-4 days. Associated sx of productive cough (with white sputum), nausea, emesis, dysuria, and decreased appetite. Pt has dialysis M/W/F with last visit on Friday, 18 with pt reporting not feeling better afterwards. Son-in-law states that pt takes 4 different HTN medications and took 2 prior to arrival to ED today. Sb/p was greater than 200's. Patient was intubated in the ED for hypoxia. Patient was sating 80% on a non-re breather and became anxious per ED note. Patient was given metoprolol and started on propofol for sedation which drop her pressure per nursing. Will sedate with fentanyl and PRN versed. Troponins were elevated and Cardiology was consulted. Started on Heparin gtt and ASA was ordered. Lasix was given for pulmonary edema. CTA was completed. Reported: no pulmonary embolism identified. Mod sized bilateral pleural effusions, and mod volume pericardial effusion, Atelectasis vs infiltrate in bilateral lungs, and left hilar and aortopulmonary window adenopathy. Patient was transferred to [facility] ICU for continued care. Family is not currently in room at this time. Per chart patient denied hx of asthma, COPD, or DM. Pt is non-smoker and denies ETOH/drug use.       The patient is critically ill and can not provide additional history due to Ventilated.        [x]The patient is unable to give any meaningful history or review of systems because the patient is:  []Intubated [x]Sedated   []Unresponsive [x]Ventilated     [x]The patient is critically ill on      [x]Mechanical ventilation []Pressors   []BiPAP []              Review of Systems:  Review of systems not obtained due to patient factors. Past Medical History:  Past Medical History:   Diagnosis Date    Chronic kidney disease     [facility]     TUES-THURS-SAT    Hypercholesteremia     Hypertension     Kidney disease     Vitamin D deficiency         Past Surgical History:  Past Surgical History:   Procedure Laterality Date    VASCULAR SURGERY PROCEDURE UNLIST          Medications:  Prior to Admission medications    Medication Sig Start Date End Date Taking? Authorizing Provider   hydrALAZINE (APRESOLINE) 50 mg tablet Take 25 mg by mouth three (3) times daily. Brenda Blackman MD   losartan (COZAAR) 100 mg tablet Take 100 mg by mouth daily. Brenda Blackman MD   amLODIPine (NORVASC) 10 mg tablet Take 10 mg by mouth daily. Brenda Blackman MD   glycerin, adult, (FLEET GLYCERIN, ADULT,) suppository Insert 1 Suppository into rectum daily. Indications: BOWEL EVACUATION 2/5/18   Abhi Calles MD   oxyCODONE-acetaminophen (PERCOCET) 5-325 mg per tablet Take 1 Tab by mouth every four (4) hours as needed for Pain. Max Daily Amount: 6 Tabs. 1/22/18   Savanna Patel MD   albuterol (PROVENTIL HFA, VENTOLIN HFA, PROAIR HFA) 90 mcg/actuation inhaler Take 2 Puffs by inhalation every four (4) hours as needed for Wheezing. 11/6/17   Christal Langford PA-C   calcium acetate (PHOSLO) 667 mg cap Take 1 Cap by mouth three (3) times daily (with meals). 9/13/17   Delvin Alejandre MD   cloNIDine HCl (CATAPRES) 0.1 mg tablet Take 0.3 mg by mouth three (3) times daily. Brenda Blackman MD   NIFEdipine ER (ADALAT CC) 30 mg ER tablet Take 20 mg by mouth daily. Brenda Blackman MD   multivitamin with iron tablet Take 1 Tab by mouth daily.     Brenda MD Hiral       Current Facility-Administered Medications   Medication Dose Route Frequency    heparin 25,000 units in D5W 250 ml infusion  12-25 Units/kg/hr IntraVENous TITRATE    metoprolol (LOPRESSOR) injection 5 mg  5 mg IntraVENous NOW    fentaNYL (PF) 10 mcg/mL infusion   mcg/hr IntraVENous TITRATE    [START ON 2018] famotidine (PF) (PEPCID) 20 mg in sodium chloride 10 mL injection  20 mg IntraVENous DAILY    chlorhexidine (PERIDEX) 0.12 % mouthwash 10 mL  10 mL Oral Q12H       Allergy:  Allergies   Allergen Reactions    Banana Other (comments)        Social History:  Social History   Substance Use Topics    Smoking status: Never Smoker    Smokeless tobacco: Never Used    Alcohol use No        Family History:  History reviewed. No pertinent family history.        Objective:   Vital Signs:    Visit Vitals    /75    Pulse 80    Temp 98.2 °F (36.8 °C)    Resp 16    Ht 5' 1\" (1.549 m)    Wt 42 kg (92 lb 9.5 oz)    SpO2 100%    BMI 17.5 kg/m2       O2 Device: Endotracheal tube       Temp (24hrs), Av.3 °F (36.8 °C), Min:98.2 °F (36.8 °C), Max:98.4 °F (36.9 °C)     Patient Vitals for the past 8 hrs:   Temp Pulse Resp BP SpO2   18 -  16 - 100 %   18 -  - 117/75 100 %   18 98.2 °F (36.8 °C) 92 - (!) 174/93 100 %   18 - 75 - 99/61 100 %   18 1945 - 77 - (!) 81/62 100 %   18 1935 - 81 - (!) 86/53 100 %   18 1900 - (!) 105 - (!) 194/109 100 %   18 - (!) 135 22 - 100 %   18 - (!) 126 14 (!) 220/129 100 %   18 1830 - (!) 112 (!) 36 (!) 252/164 (!) 84 %   18 1818 - (!) 124 25 (!) 262/138 (!) 85 %   18 1800 - - - (!) 225/145 (!) 88 %   18 1757 - 100 - (!) 223/154 -   18 1730 - - - (!) 210/136 94 %   18 1720 - - - - 93 %   18 1717 - - - (!) 205/140 -   18 1545 - (!) 108 - (!) 212/134 98 %   18 1530 - (!) 109 - (!) 231/121 97 %   18 1515 - (!) 124 - (!) 242/133 98 %   02/25/18 1500 - 99 - (!) 216/119 99 %   02/25/18 1446 - (!) 106 - (!) 220/122 99 %     Intake/Output:   Last shift:         Last 3 shifts:    No intake or output data in the 24 hours ending 02/25/18 2140    Ventilator Settings:  Mode Rate Tidal Volume Pressure FiO2 PEEP   Assist control, VC+   400 ml    100 % 5 cm H20     Peak airway pressure: 24 cm H2O    Minute ventilation: 8.02 l/min      ARDS network Guidelines: Lung protective strategy, Pl pressure goals less than or equal to 30. Physical Exam:  Physical Examination: General appearance - Intubated and Sedated  Mental status - Unable to assess at this time  Eyes - pupils equal and reactive, extraocular eye movements intact, sclera anicteric  Mouth - mucous membranes moist, pharynx normal without lesions  Neck - supple, no significant adenopathy  Chest - no tachypnea, retractions or cyanosis, bilateral lower lobe crackles noted, no wheezing or rhonchi. Equal chest excursion.   Heart - normal rate, regular rhythm, normal S1, S2, Holosystolic murmur auscultated at the fourth left sternal border, no rubs, clicks or gallops  Abdomen - soft, nontender, nondistended, no masses or organomegaly  Neurological - limited, sedated, non-focal, moves all extremities  Musculoskeletal - no joint tenderness, deformity or swelling  Extremities - peripheral pulses normal, no pedal edema, no clubbing or cyanosis  Skin - normal coloration and turgor, no rashes, no suspicious skin lesions noted    Data:     Recent Results (from the past 24 hour(s))   EKG, 12 LEAD, INITIAL    Collection Time: 02/25/18 12:57 PM   Result Value Ref Range    Ventricular Rate 105 BPM    Atrial Rate 105 BPM    P-R Interval 158 ms    QRS Duration 82 ms    Q-T Interval 334 ms    QTC Calculation (Bezet) 441 ms    Calculated P Axis 63 degrees    Calculated R Axis 79 degrees    Calculated T Axis 27 degrees    Diagnosis       Sinus tachycardia  Left atrial enlargement  Anterior infarct , age undetermined  Abnormal ECG  When compared with ECG of 02-DEC-2017 09:54,  ST no longer depressed in Lateral leads  T wave inversion now evident in Lateral leads     CBC WITH AUTOMATED DIFF    Collection Time: 02/25/18  1:38 PM   Result Value Ref Range    WBC 7.7 4.6 - 13.2 K/uL    RBC 3.75 (L) 4.20 - 5.30 M/uL    HGB 9.4 (L) 12.0 - 16.0 g/dL    HCT 30.8 (L) 35.0 - 45.0 %    MCV 82.1 74.0 - 97.0 FL    MCH 25.1 24.0 - 34.0 PG    MCHC 30.5 (L) 31.0 - 37.0 g/dL    RDW 15.3 (H) 11.6 - 14.5 %    PLATELET 717 167 - 576 K/uL    MPV 10.9 9.2 - 11.8 FL    NEUTROPHILS 81 (H) 40 - 73 %    LYMPHOCYTES 7 (L) 21 - 52 %    MONOCYTES 12 (H) 3 - 10 %    EOSINOPHILS 0 0 - 5 %    BASOPHILS 0 0 - 2 %    ABS. NEUTROPHILS 6.2 1.8 - 8.0 K/UL    ABS. LYMPHOCYTES 0.6 (L) 0.9 - 3.6 K/UL    ABS. MONOCYTES 0.9 0.05 - 1.2 K/UL    ABS. EOSINOPHILS 0.0 0.0 - 0.4 K/UL    ABS. BASOPHILS 0.0 0.0 - 0.06 K/UL    DF AUTOMATED     METABOLIC PANEL, COMPREHENSIVE    Collection Time: 02/25/18  1:38 PM   Result Value Ref Range    Sodium 129 (L) 136 - 145 mmol/L    Potassium 4.0 3.5 - 5.5 mmol/L    Chloride 92 (L) 100 - 108 mmol/L    CO2 27 21 - 32 mmol/L    Anion gap 10 3.0 - 18 mmol/L    Glucose 122 (H) 74 - 99 mg/dL    BUN 18 7.0 - 18 MG/DL    Creatinine 5.80 (H) 0.6 - 1.3 MG/DL    BUN/Creatinine ratio 3 (L) 12 - 20      GFR est AA 9 (L) >60 ml/min/1.73m2    GFR est non-AA 8 (L) >60 ml/min/1.73m2    Calcium 9.4 8.5 - 10.1 MG/DL    Bilirubin, total 0.7 0.2 - 1.0 MG/DL    ALT (SGPT) 13 13 - 56 U/L    AST (SGOT) 38 (H) 15 - 37 U/L    Alk.  phosphatase 118 (H) 45 - 117 U/L    Protein, total 7.1 6.4 - 8.2 g/dL    Albumin 3.2 (L) 3.4 - 5.0 g/dL    Globulin 3.9 2.0 - 4.0 g/dL    A-G Ratio 0.8 0.8 - 1.7     CARDIAC PANEL,(CK, CKMB & TROPONIN)    Collection Time: 02/25/18  1:38 PM   Result Value Ref Range    CK 70 26 - 192 U/L    CK - MB 1.3 <3.6 ng/ml    CK-MB Index 1.9 0.0 - 4.0 %    Troponin-I, Qt. 3.64 (HH) 0.00 - 0.06 NG/ML   NT-PRO BNP    Collection Time: 02/25/18  1:38 PM   Result Value Ref Range    NT pro-BNP >13912 (H) 0 - 900 PG/ML   PTT    Collection Time: 02/25/18  1:38 PM   Result Value Ref Range    aPTT 35.4 23.0 - 36.4 SEC   URINALYSIS W/ RFLX MICROSCOPIC    Collection Time: 02/25/18  4:40 PM   Result Value Ref Range    Color YELLOW      Appearance CLEAR      Specific gravity 1.012 1.005 - 1.030      pH (UA) >8.5 (H) 5.0 - 8.0    Protein >1000 (A) NEG mg/dL    Glucose 100 (A) NEG mg/dL    Ketone NEGATIVE  NEG mg/dL    Bilirubin NEGATIVE  NEG      Blood NEGATIVE  NEG      Urobilinogen 0.2 0.2 - 1.0 EU/dL    Nitrites NEGATIVE  NEG      Leukocyte Esterase MODERATE (A) NEG     URINE MICROSCOPIC ONLY    Collection Time: 02/25/18  4:40 PM   Result Value Ref Range    WBC 4 to 10 0 - 4 /hpf    RBC NONE 0 - 5 /hpf    Epithelial cells 4+ 0 - 5 /lpf    Bacteria NEGATIVE  NEG /hpf   TROPONIN I    Collection Time: 02/25/18  5:50 PM   Result Value Ref Range    Troponin-I, Qt. 3.63 (HH) 0.00 - 0.06 NG/ML   EKG, 12 LEAD, SUBSEQUENT    Collection Time: 02/25/18  6:32 PM   Result Value Ref Range    Ventricular Rate 141 BPM    Atrial Rate 141 BPM    P-R Interval 150 ms    QRS Duration 78 ms    Q-T Interval 266 ms    QTC Calculation (Bezet) 407 ms    Calculated P Axis 54 degrees    Calculated R Axis 72 degrees    Calculated T Axis 20 degrees    Diagnosis       Sinus tachycardia  Possible Left atrial enlargement  Anterior infarct (cited on or before 25-FEB-2018)  Abnormal ECG  When compared with ECG of 25-FEB-2018 12:57,  No significant change was found     POC G3    Collection Time: 02/25/18  7:42 PM   Result Value Ref Range    Device: VENT      FIO2 (POC) 100 %    pH (POC) 7.553 (HH) 7.35 - 7.45      pCO2 (POC) 26.9 (L) 35.0 - 45.0 MMHG    pO2 (POC) 291 (H) 80 - 100 MMHG    HCO3 (POC) 23.8 22 - 26 MMOL/L    sO2 (POC) 100 (H) 92 - 97 %    Base excess (POC) 2 mmol/L    Mode ASSIST CONTROL      Set Rate 10 bpm    PEEP/CPAP (POC) 5 cmH2O    Allens test (POC) YES      Total resp. rate 10      Site RIGHT RADIAL      Patient temp. 97.7      Specimen type (POC) ARTERIAL      Performed by Donte Hirsch            Recent Labs      02/25/18   1942   FIO2I  100   HCO3I  23.8   PCO2I  26.9*   PHI  7.553*   PO2I  291*       Special Requests:   Date Value Ref Range Status   09/12/2017 NO SPECIAL REQUESTS   Final   09/12/2017 KNEE FLUID  Final     Culture result:   Date Value Ref Range Status   09/12/2017 NO GROWTH 5 DAYS   Final   09/12/2017 NO ANAEROBES ISOLATED 5 DAYS   Final       Telemetry: normal sinus rhythm    ECHO 9/12/17     SUMMARY:  Left ventricle: Systolic function was normal. Ejection fraction was estimated   in the range of 60 % to 65 %. No obvious  wall motion abnormalities identified in the views obtained. Wall thickness   was mildly increased. Doppler parameters  were consistent with abnormal left ventricular relaxation (grade 1 diastolic   dysfunction). Left atrium: The atrium was mildly dilated. Pericardium: A trivial pericardial effusion was identified circumferential to   the heart. There was no evidence of  hemodynamic compromise. Imaging:  [x]I have personally reviewed the patients chest radiographs images and report     CXR Results  (Last 48 hours)               02/25/18 1348  XR CHEST PORT Final result    Impression:  IMPRESSION: Vascular congestion with small effusions and bilateral lung base   small hazy opacities as above. Narrative:  INDICATION:  Shortness of breath. COMPARISON:  12/17       FINDINGS: A portable AP radiograph of the chest was obtained at 1326 hours. Right-sided central venous line is unchanged. Stable enlargement of the cardiac   silhouette. Mildly prominent pulmonary vasculature. Bilateral lung base small   pleural effusions and overlying hazy opacities, may represent developing edema   or atelectasis/infiltrate. Osseous structures are stable.                     Results from [place] encounter on 02/25/18   XR CHEST PORT   Narrative INDICATION:  Shortness of breath. COMPARISON:  12/17    FINDINGS: A portable AP radiograph of the chest was obtained at 1326 hours. Right-sided central venous line is unchanged. Stable enlargement of the cardiac  silhouette. Mildly prominent pulmonary vasculature. Bilateral lung base small  pleural effusions and overlying hazy opacities, may represent developing edema  or atelectasis/infiltrate. Osseous structures are stable. Impression IMPRESSION: Vascular congestion with small effusions and bilateral lung base  small hazy opacities as above. Results from [place] encounter on 02/25/18   CTA CHEST W OR W WO CONT   Narrative EXAMINATION: CTA chest pulmonary    INDICATION: Acute chest pain, shortness of breath    COMPARISON: None    TECHNIQUE: CT angiography of the pulmonary arteries performed following 75 cc IV  Isovue 370, with 3-D MIP multiplanar reformations. All CT scans at this facility  are performed using dose optimization technique as appropriate to a performed  exam, to include automated exposure control, adjustment of the mA and/or kV  according to patient size (including appropriate matching first site specific  examinations), or use of iterative reconstruction technique. FINDINGS:    Cardiovascular: No pulmonary arterial filling defects identified to suggest  pulmonary embolism. Motion artifact limits evaluation of smaller segmental and  subsegmental arteries. Ascending thoracic aorta is borderline ectatic measuring  up to 3.5 cm diameter. Heart size within normal limits. Right jugular catheter  tip at right atrium level. Moderate-sized area of cartilage fusion. Mediastinum: Possible enlarged aortopulmonary node and left hilar nodes, poorly  delineated. Left hilar anita calcification noted. Thyroid slightly  heterogeneous. Esophagus is nondistended. Pleura: Moderate volume bilateral pleural effusions. No evidence of  pneumothorax.     Lungs: Probable compressive atelectasis due to pleural effusions bilaterally. Right major fissure nodule measuring 4 mm, likely pleural-based node. Upper abdomen: Unremarkable. Miscellaneous: Superficial soft tissues unremarkable. Bones: No acute osseous findings. Probable bone island in T11 posterior body. Impression IMPRESSION:    1. No pulmonary embolus identified. Smaller segmental and subsegmental pulmonary  arteries less well evaluated due to motion artifact. 2. Moderate-sized bilateral pleural effusions, and moderate volume pericardial  effusion. Borderline ectatic ascending aorta. -Probable compressive atelectasis in bilateral lungs. Superimposed infiltrate  difficult to exclude.    -Suspected left hilar and aortopulmonary window adenopathy, poorly delineated,  nonspecific. IMPRESSION:   · Acute Hypoxic respiratory failure 2' to Fluid Overload vs Pulmonary Edema now intubated on mechanical ventilation. · Hyponatremia  · ESRD (end stage renal disease) requires hemodialysis (MWF)  · NSTEMI (non-ST elevated myocardial infarction)  · Mildly elevated Transimines  · Hypertensive Urgency   RECOMMENDATIONS:   Resp: Mech. Ventilated patients- aim to keep peak plateau pressure less than or equal to 30cm H2O. Titrate FiO2 for goal SPO2> 90%,VAP prevention bundle, head of the bed at 30' all times. Daily sedation holiday and assessment for weaning with SBT as tolerated  Bronchodilators PRN, pulmonary hygiene care, Aspiration precautions, Keep HOB >30 degrees  Repeat chest xray post transfer. Daily ABG and Chxr while intubated. ID: Aleukocytosis and afebrile. Will defer abx at this time as no infection is suspected. Likely fluid overload, will culture if patient spikes a fever or leukocytosis occurs. Heme/Onc: Aleukocytosis, H/H, and platelets are stable, trend CBC.   CVS: Hypertension is improved, PRN Hydralazine for Sb/p >170, Trend cardiac panel, NSTEMI - continue heparin gtt- Cardiology is following. Fluids: Repeat BMP, may start low volume NS IVF if Hyponatremic. Renal: Anuric, Dialysis patient, Right chest wall HD catheter, Will consult Nephrology in the am. Follows with Dr. Fisher (Nephrology) will consult tonight,Trend Renal indices. GI: SUP, Trend LFTs, Zofran PRN for N/V   Metabolic: Glycemic control -SSI PRN  Neuro/Sedation/Pain: Fentanyl gtt for sedation, PRN Versed, (Propofol sensitive - dropped pressure per ED)  Stress ulcer prophylaxis: Pepcid  DVT prophylaxis:  Heparin gtt  AM labs: Daily CBC BMP Mag and Phos, ABG and chest Xray in the am.   Diet: Keep NPO  Lines/Devices: ETT 02/25, PIVs  Vent Bundle Followed, Vent Day 01    Further recommendations will be based on the patient's response to recommended treatment and results of the investigation ordered. [x]See my orders for details    My assessment, plan of care, findings, medications, side effects etc were discussed with:  [x]nursing []PT/OT    [x]respiratory therapy [x]Dr. Peter, Dr. Marisol Cruz   []family []Patient     [x]Total critical care time exclusive of procedures 50 minutes with complex decision making performed and > 50% time spent in face to face evaluation.     Guevara Smith NP

## 2018-02-26 NOTE — ROUTINE PROCESS
Bedside and Verbal shift change report given to KRISTIN Strong (oncoming nurse) by Cris Fuentes RN (offgoing nurse).  Report included the following information SBAR, Kardex, ED Summary, Intake/Output, MAR, Recent Results and Cardiac Rhythm SR.

## 2018-02-27 NOTE — PROGRESS NOTES
attended the interdisciplinary rounds for Oneida Chau, who is a 56 y.o.,female. Patients Primary Language is: [place]. According to the patients EMR Oriental orthodox Affiliation is: Yarsanism. The reason the Patient came to the hospital is:   Patient Active Problem List    Diagnosis Date Noted    Anemia 02/26/2018    Hyponatremia 02/25/2018    [facility] 02/25/2018    [facility] 02/25/2018    Respiratory failure ([facility] 02/25/2018    Hypoxia 02/25/2018    Acute UTI 02/25/2018    Hyperkalemia 09/08/2017    Renal failure 09/08/2017    SCOOTER (acute kidney injury) ([facility] 09/08/2017      Not able to assess the patient due to medical condition. No family at bedside. Plan:  Chaplains will continue to follow and will provide pastoral care on an as needed/requested basis.  recommends bedside caregivers page  on duty if patient shows signs of acute spiritual or emotional distress.     [address] [facility]  Board Certified [facility]   [phone]

## 2018-02-27 NOTE — ROUTINE PROCESS
Bedside and Verbal shift change report given to KRISTIN GARCÍA (oncoming nurse) by Joann Miller RN (offgoing nurse).  Report included the following information SBAR, Kardex, ED Summary, Intake/Output, MAR, Recent Results and Cardiac Rhythm SR.

## 2018-02-27 NOTE — PROGRESS NOTES
02/27/18 1244   Weaning Parameters   Spontaneous Breathing Trial Complete Yes   Resp Rate Observed 18   Ve 5.8      RSBI 53   Terminated wean per DrEsvin again due to high blood pressure. Patient family states BP is always at 200 even while on BP meds. Will attempt again tomorrow.

## 2018-02-27 NOTE — PROGRESS NOTES
[facility]      CARDIOLOGY PROGRESS NOTE  RECS:    1. Acute respiratory failure- intubated and sedated possible related to Acute decompensated CHF in the setting of uncontrolled Hypertension  2. NSTEMI-Continue with heparin drip,  asa and incr metoprolol. Avoiding plavix as Hb low and downtrending. Echo revealed preserved EF% grade 1 diastolic dysfunction). Will need Cardiac w/u for CAD. Possible Cardiac cath when stable. D/w family  3. Acute Diastolic Herat failure- Compensated now. Had emergency HD yesterday 3000 Lt removed   4. Uncontrolled hypertension- improving on Cardine drip per [address]  5. ESRD- on HD renal following. She is MWF HD schedule   6. Hyponatremia: improving  7. UTI  On antibiotics managed per [address]            ECHO 02/26/18  Left ventricle: Systolic function was normal. Ejection fraction was estimated   in the range of 60 % to 65 %. No obvious  wall motion abnormalities identified in the views obtained. Wall thickness   was mildly increased. Doppler parameters  were consistent with abnormal left ventricular relaxation (grade 1 diastolic   dysfunction). Left atrium: The atrium was mildly dilated. Pericardium: A trivial pericardial effusion was identified circumferential to   the heart. There was no evidence of  hemodynamic compromise. INDICATIONS: Hypertension. HISTORY: Prior history: Risk factors: hypertension and dialysis-treated renal   failure.   ASSESSMENT:  Hospital Problems  Date Reviewed: 11/15/2017          Codes Class Noted POA    Anemia ICD-10-CM: D64.9  ICD-9-CM: 285.9  2/26/2018 Unknown        Hyponatremia ICD-10-CM: E87.1  ICD-9-CM: 276.1  2/25/2018 Unknown        ESRD (end stage renal disease) ([email or web address] 75.) ICD-10-CM: N18.6  ICD-9-CM: 585.6  2/25/2018 Unknown        NSTEMI (non-ST elevated myocardial infarction) ([facility] [email or web address]] 75.) ICD-10-CM: I21.4  ICD-9-CM: 410.70  2/25/2018 Unknown        * (Principal)Respiratory failure ([facility] [email or web address] 75.) ICD-10-CM: J96.90  ICD-9-CM: 518.81  2/25/2018 Unknown        Hypoxia ICD-10-CM: R09.02  ICD-9-CM: 799.02  2/25/2018 Unknown        Acute UTI ICD-10-CM: N39.0  ICD-9-CM: 599.0  2/25/2018 Unknown                SUBJECTIVE:    Intubated and alert     OBJECTIVE:    VS:   Visit Vitals    /88    Pulse (!) 107    Temp 99.1 °F (37.3 °C)    Resp 13    Ht 5' 1\" (1.549 m)    Wt 44 kg (97 lb)    SpO2 100%    Breastfeeding No    BMI 18.33 kg/m2         Intake/Output Summary (Last 24 hours) at 02/27/18 1407  Last data filed at 02/27/18 1200   Gross per 24 hour   Intake          1238.96 ml   Output             3200 ml   Net         -1961.04 ml     TELE: normal sinus rhythm    General: well developed and stated age, intubated  HENT: Normocephalic, atraumatic. Normal external eye.   Neck :  no JVD  Cardiac:  regular rate and rhythm, tachycardic, no S3 or S4, no click, no rub  Lungs: clear to auscultation bilaterally  Abdomen: Soft, nontender, no masses  Extremities:  No c/c/e, peripheral pulses present      Labs: Results:       Chemistry Recent Labs      02/27/18   0515  02/27/18   0114  02/26/18   0410  02/25/18   2234  02/25/18   1338   GLU   --   109*  81  87  122*   NA   --   132*  129*  128*  129*   K  3.6  3.2*  4.0  4.3  4.0   CL   --   96*  94*  93*  92*   CO2   --   30  22  22  27   BUN   --   17  22*  19*  18   CREA   --   4.06*  6.59*  6.23*  5.80*   CA   --   7.8*  8.3*  8.4*  9.4   AGAP   --   6  13  13  10   BUCR   --   4*  3*  3*  3*   AP   --    --   103   --   118*   TP   --    --   5.8*   --   7.1   ALB   --    --   2.5*   --   3.2*   GLOB   --    --   3.3   --   3.9   AGRAT   --    --   0.8   --   0.8      CBC w/Diff Recent Labs      02/27/18   0114  02/26/18   0410  02/25/18   1338   WBC  8.5  8.6  7.7   RBC  3.28*  3.36*  3.75*   HGB  8.3*  8.5*  9.4*   HCT  26.4*  27.0*  30.8*   PLT  213  198  246   GRANS  78*  81*  81*   LYMPH  11*  12*  7*   EOS  0  0  0      Cardiac Enzymes Recent Labs      02/26/18   1002  02/26/18   0410   CPK  241* 245*   CKND1  1.2  1.6      Coagulation Recent Labs      02/27/18   0114  02/26/18   1630   APTT  56.8*  103.3*       Lipid Panel Lab Results   Component Value Date/Time    Cholesterol, total 151 02/26/2018 10:02 AM    HDL Cholesterol 37 (L) 02/26/2018 10:02 AM    LDL, calculated 95.2 02/26/2018 10:02 AM    VLDL, calculated 18.8 02/26/2018 10:02 AM    Triglyceride 94 02/26/2018 10:02 AM    CHOL/HDL Ratio 4.1 02/26/2018 10:02 AM      BNP No results for input(s): BNPP in the last 72 hours. Liver Enzymes Recent Labs      02/26/18   0410   TP  5.8*   ALB  2.5*   AP  103   SGOT  29      Thyroid Studies No results found for: T4, T3U, TSH, TSHEXT           Estee Heck Faina:[phone]  Patient seen independently  Discussed the details with NP and patient.  Please see orders & recommendations  To Schulte MD

## 2018-02-27 NOTE — PROGRESS NOTES
RENAL DAILY PROGRESS NOTE      51y F with ESRD , hypertensive, ,respir failure, required intubation. Impression & Plan:   IMPRESSION:   · ESRD, MWF,   · Access; tunnel catheter in right IJ, left arm fistula   · HTN, uncontrolled  ·  respiratory failure, hypoxic , intubated  · Anemia   · Hyponatremia   · Alkalosis on blood gas, respiratory    PLAN:   ·  She tolerated HD well yesterday , her BP remain on higher side, plan to start on iv drip per ICU. · No plan for HD today, HD tomorrow per her routine schedule.            Discussed with ICU team and family.                      Subjective:       Complaint:   Overnight events noted  Intubated   Tolerated HD well,   Family bedside,     Current Facility-Administered Medications   Medication Dose Route Frequency    amLODIPine (NORVASC) tablet 10 mg  10 mg Oral DAILY    insulin lispro (HUMALOG) injection   SubCUTAneous Q6H    glucose chewable tablet 16 g  4 Tab Oral PRN    glucagon (GLUCAGEN) injection 1 mg  1 mg IntraMUSCular PRN    dextrose (D50W) injection syrg 12.5-25 g  25-50 mL IntraVENous PRN    albuterol (ACCUNEB) nebulizer solution 2.5 mg  2.5 mg Nebulization Q6H PRN    cloNIDine HCl (CATAPRES) tablet 0.2 mg  0.2 mg Oral BID    aspirin chewable tablet 81 mg  81 mg Oral DAILY    metoprolol tartrate (LOPRESSOR) tablet 25 mg  25 mg Oral BID    atorvastatin (LIPITOR) tablet 40 mg  40 mg Oral QHS    acetaminophen (TYLENOL) solution 650 mg  650 mg Oral Q6H PRN    VANCOMYCIN INFORMATION NOTE   Other CONTINUOUS    heparin 25,000 units in D5W 250 ml infusion  12-25 Units/kg/hr IntraVENous TITRATE    sodium chloride (NS) flush 5-10 mL  5-10 mL IntraVENous Q8H    sodium chloride (NS) flush 5-10 mL  5-10 mL IntraVENous PRN    acetaminophen (TYLENOL) suppository 650 mg  650 mg Rectal Q6H PRN    naloxone (NARCAN) injection 0.1 mg  0.1 mg IntraVENous PRN    ondansetron (ZOFRAN) injection 4 mg  4 mg IntraVENous Q6H PRN    hydrALAZINE (APRESOLINE) 20 mg/mL injection 10 mg  10 mg IntraVENous Q6H PRN    fentaNYL (PF) 10 mcg/mL infusion  0-200 mcg/hr IntraVENous TITRATE    midazolam (VERSED) injection 1-2 mg  1-2 mg IntraVENous Q4H PRN    famotidine (PF) (PEPCID) 20 mg in sodium chloride 10 mL injection  20 mg IntraVENous DAILY    chlorhexidine (PERIDEX) 0.12 % mouthwash 10 mL  10 mL Oral Q12H    cefTRIAXone (ROCEPHIN) 2 g in sterile water (preservative free) 20 mL IV syringe  2 g IntraVENous Q24H       Review of Symptoms: as above   Objective:     Patient Vitals for the past 24 hrs:   Temp Pulse Resp BP SpO2   02/27/18 1113 99.1 °F (37.3 °C) - - - -   02/27/18 1000 - (!) 108 23 151/78 99 %   02/27/18 0930 - (!) 115 15 185/84 100 %   02/27/18 0928 - (!) 118 11 - 100 %   02/27/18 0926 - (!) 117 15 - 100 %   02/27/18 0900 - (!) 122 23 (!) 216/115 100 %   02/27/18 0836 - (!) 105 11 - 100 %   02/27/18 0830 - 100 11 (!) 190/110 100 %   02/27/18 0800 - 100 10 (!) 191/105 100 %   02/27/18 0744 - (!) 105 13 - 94 %   02/27/18 0736 99.1 °F (37.3 °C) - - - -   02/27/18 0700 - 93 18 169/87 100 %   02/27/18 0645 - 84 13 116/72 99 %   02/27/18 0630 - - - 109/64 -   02/27/18 0615 - - - (!) 150/95 -   02/27/18 0600 - 79 11 109/68 95 %   02/27/18 0545 - - - 108/68 -   02/27/18 0530 - - - 125/80 -   02/27/18 0515 - - - 150/88 -   02/27/18 0500 - 68 16 102/65 100 %   02/27/18 0445 - - - 95/68 -   02/27/18 0430 - - - 97/65 -   02/27/18 0415 - - - (!) 181/94 -   02/27/18 0400 98.8 °F (37.1 °C) 83 15 164/89 100 %   02/27/18 0345 - - - 110/74 -   02/27/18 0336 - 79 10 - 100 %   02/27/18 0330 - - - 122/74 -   02/27/18 0315 - - - 138/88 -   02/27/18 0300 - 87 12 154/82 95 %   02/27/18 0245 - - - 146/79 -   02/27/18 0230 - - - 120/74 -   02/27/18 0215 - - - 136/75 -   02/27/18 0200 - 80 11 117/66 100 %   02/27/18 0145 - - - 129/78 -   02/27/18 0130 - - - 149/90 -   02/27/18 0115 - - - 158/86 -   02/27/18 0100 - 82 10 147/84 100 %   02/27/18 0045 - - - 139/82 -   02/27/18 0030 - - - 149/86 -   02/27/18 0015 - - - (!) 173/95 -   02/27/18 0000 100 °F (37.8 °C) 91 11 (!) 161/91 100 %   02/26/18 2345 - - - 153/88 -   02/26/18 2334 - 87 15 - 100 %   02/26/18 2330 - - - 157/87 -   02/26/18 2315 - - - (!) 168/93 -   02/26/18 2300 - 83 10 (!) 161/91 100 %   02/26/18 2245 - - - 114/66 -   02/26/18 2240 99.2 °F (37.3 °C) - - - -   02/26/18 2230 - - - 123/73 -   02/26/18 2215 - - - 106/64 -   02/26/18 2200 - 77 23 101/60 100 %   02/26/18 2145 - - - 100/61 -   02/26/18 2130 - - - 101/62 -   02/26/18 2115 - - - 124/73 -   02/26/18 2100 - 82 24 103/65 100 %   02/26/18 2045 - - - 116/70 -   02/26/18 2030 - - - 127/74 -   02/26/18 2015 99.7 °F (37.6 °C) - - 150/81 -   02/26/18 2000 (!) 101.2 °F (38.4 °C) 86 20 112/82 100 %   02/26/18 1945 - - - 115/68 -   02/26/18 1941 - 86 10 - 100 %   02/26/18 1930 - - - 118/71 -   02/26/18 1915 - - - 104/65 -   02/26/18 1900 - 83 16 105/66 100 %   02/26/18 1845 - 81 20 98/62 100 %   02/26/18 1830 - 81 27 97/62 100 %   02/26/18 1815 - 82 26 96/58 100 %   02/26/18 1800 - 83 21 98/62 100 %   02/26/18 1745 - 82 18 107/64 100 %   02/26/18 1730 - 78 23 101/62 100 %   02/26/18 1715 - 78 21 95/61 100 %   02/26/18 1700 - 81 16 113/69 100 %   02/26/18 1645 - 80 10 90/60 100 %   02/26/18 1630 - 84 10 117/73 100 %   02/26/18 1615 - 84 10 91/52 100 %   02/26/18 1600 99 °F (37.2 °C) 89 15 103/70 100 %   02/26/18 1545 - 87 10 (!) 87/55 100 %   02/26/18 1530 - 94 23 113/66 100 %   02/26/18 1515 - 100 20 133/82 100 %   02/26/18 1510 - - - 142/77 -   02/26/18 1500 - (!) 106 18 145/86 100 %   02/26/18 1445 99.5 °F (37.5 °C) (!) 115 15 (!) 87/72 100 %   02/26/18 1430 - (!) 113 13 101/69 100 %   02/26/18 1415 - (!) 123 13 113/81 100 %   02/26/18 1400 - (!) 120 20 117/78 99 %   02/26/18 1345 - (!) 126 25 (!) 148/99 100 %   02/26/18 1330 - (!) 121 19 (!) 159/96 99 %   02/26/18 1315 - (!) 107 20 119/81 100 %   02/26/18 1300 - 98 11 133/82 100 %   02/26/18 1245 - 100 17 105/74 100 %   02/26/18 1230 - 100 23 107/70 99 %   02/26/18 1215 - (!) 114 22 159/89 99 %   02/26/18 1200 98.7 °F (37.1 °C) (!) 110 13 (!) 240/134 99 %   02/26/18 1145 - (!) 129 17 (!) 247/157 100 %   02/26/18 1130 - 81 13 (!) 197/100 100 %        Weight change: 2 kg (4 lb 6.6 oz)     02/25 1901 - 02/27 0700  In: 2017 [I.V.:777]  Out: 3200     Intake/Output Summary (Last 24 hours) at 02/27/18 1119  Last data filed at 02/27/18 0800   Gross per 24 hour   Intake          1394.88 ml   Output             3200 ml   Net         -1805.12 ml         Physical Exam  General: intubated   HEENT  no thyromegaly  CVS: S1S2 heard,  no rub  RS: + air entry b/l,   Abd: Soft,   Extrm: no edema, no cyanosis, clubbing   Skin: no visible  Rash  Musculoskeletal: No gross joints or bone deformities   Access;  Tunnel catheter on right ij, left arm fistula + thrill   Procedures/imaging: see electronic medical records for all procedures, Xrays and details which were not copied into this note but were reviewed prior to creation of Plan           Data Review:     LABS:   Hematology:   Recent Labs      02/27/18   0114  02/26/18   0410  02/25/18   1338   WBC  8.5  8.6  7.7   HGB  8.3*  8.5*  9.4*   HCT  26.4*  27.0*  30.8*     Chemistry:   Recent Labs      02/27/18   0515  02/27/18   0114  02/26/18   0410  02/25/18   2234  02/25/18   1338   BUN   --   17  22*  19*  18   CREA   --   4.06*  6.59*  6.23*  5.80*   CA   --   7.8*  8.3*  8.4*  9.4   ALB   --    --   2.5*   --   3.2*   K  3.6  3.2*  4.0  4.3  4.0   NA   --   132*  129*  128*  129*   CL   --   96*  94*  93*  92*   CO2   --   30  22  22  27   PHOS  3.8  3.4   --   3.9   --    GLU   --   109*  81  87  122*              Procedures/imaging: see electronic medical records for all procedures, Xrays and details which were not copied into this note but were reviewed prior to creation of Plan          Assessment & Plan:     As above         Prince Rhodes MD  2/27/2018  1:47 PM

## 2018-02-27 NOTE — PROGRESS NOTES
02/27/18 0926   Weaning Parameters   Spontaneous Breathing Trial Complete Yes   Resp Rate Observed 15   Ve 4.7      RSBI 82   PSV wean terminated at  request due to high blood pressure.

## 2018-02-27 NOTE — PROGRESS NOTES
[facility]  Progress Note    Patient: Rina Phalen Age: 56 y.o. : 1960 MR#: [id number] SSN: [id number]  Date/Time: 2018     Subjective: pt AA on vent, follows commands  BP very labile, yesterday was in low 100's but today high in 200's. Assessment/Plan:   1. Acute hypoxic resp failure: cont vent per ICU team   2. Acute flash pul edema vs fluid over load: better post HD  3. NSTEMI: continue heparin drip, plan per Cardiology. 4. Hypertensive Urgency: BP high. cont Vasotec, BB and clonidine, agree Norvasc and d/w ICU plan to start nicardine drip   5. ESRD on HD  6. Acute UTI cont Ceftriaxone IV and Urine Cx not sent from ED, will order   7. Hyponatremia: will monitor   8. Non compliance    9. Underweight: nutrition eval   10. Hypokalemia: replace   Full code   D/w pt's daughter and son in law at bedside    D/w ICU team Dr. Sudarshan Reyes      Case discussed with:  []Patient  [x]Family  [x]Nursing  []Case Management  DVT Prophylaxis:  []Lovenox  []Hep SQ  []SCDs  []Coumadin   [x]On Heparin gtt    Objective:   VS:   Visit Vitals    /88    Pulse (!) 107    Temp 99.1 °F (37.3 °C)    Resp 13    Ht 5' 1\" (1.549 m)    Wt 44 kg (97 lb)    SpO2 100%    Breastfeeding No    BMI 18.33 kg/m2      Tmax/24hrs: Temp (24hrs), Av.5 °F (37.5 °C), Min:98.8 °F (37.1 °C), Max:101.2 °F (38.4 °C)  IOBRIEF    Intake/Output Summary (Last 24 hours) at 18 1509  Last data filed at 18 1200   Gross per 24 hour   Intake          1208.96 ml   Output                0 ml   Net          1208.96 ml       General:  Alert, NAD    Pulmonary:  CTA Bilaterally. Cardiovascular: Regular rate and Rhythm. GI:  Soft, Non distended, Non tender. + Bowel sounds. Extremities:  No edema, cyanosis, clubbing. Neurologic: AA.  Follows commands       Medications:   Current Facility-Administered Medications   Medication Dose Route Frequency    amLODIPine (NORVASC) tablet 10 mg  10 mg Oral DAILY    niCARdipine (CARDENE) 25 mg in 0.9% sodium chloride 250 mL infusion  0-15 mg/hr IntraVENous TITRATE    insulin lispro (HUMALOG) injection   SubCUTAneous Q6H    glucose chewable tablet 16 g  4 Tab Oral PRN    glucagon (GLUCAGEN) injection 1 mg  1 mg IntraMUSCular PRN    dextrose (D50W) injection syrg 12.5-25 g  25-50 mL IntraVENous PRN    albuterol (ACCUNEB) nebulizer solution 2.5 mg  2.5 mg Nebulization Q6H PRN    cloNIDine HCl (CATAPRES) tablet 0.2 mg  0.2 mg Oral BID    aspirin chewable tablet 81 mg  81 mg Oral DAILY    metoprolol tartrate (LOPRESSOR) tablet 25 mg  25 mg Oral BID    atorvastatin (LIPITOR) tablet 40 mg  40 mg Oral QHS    acetaminophen (TYLENOL) solution 650 mg  650 mg Oral Q6H PRN    VANCOMYCIN INFORMATION NOTE   Other CONTINUOUS    heparin 25,000 units in D5W 250 ml infusion  12-25 Units/kg/hr IntraVENous TITRATE    sodium chloride (NS) flush 5-10 mL  5-10 mL IntraVENous Q8H    sodium chloride (NS) flush 5-10 mL  5-10 mL IntraVENous PRN    acetaminophen (TYLENOL) suppository 650 mg  650 mg Rectal Q6H PRN    naloxone (NARCAN) injection 0.1 mg  0.1 mg IntraVENous PRN    ondansetron (ZOFRAN) injection 4 mg  4 mg IntraVENous Q6H PRN    hydrALAZINE (APRESOLINE) 20 mg/mL injection 10 mg  10 mg IntraVENous Q6H PRN    fentaNYL (PF) 10 mcg/mL infusion  0-200 mcg/hr IntraVENous TITRATE    midazolam (VERSED) injection 1-2 mg  1-2 mg IntraVENous Q4H PRN    famotidine (PF) (PEPCID) 20 mg in sodium chloride 10 mL injection  20 mg IntraVENous DAILY    chlorhexidine (PERIDEX) 0.12 % mouthwash 10 mL  10 mL Oral Q12H    cefTRIAXone (ROCEPHIN) 2 g in sterile water (preservative free) 20 mL IV syringe  2 g IntraVENous Q24H       Labs:    Recent Results (from the past 24 hour(s))   PTT    Collection Time: 02/26/18  4:30 PM   Result Value Ref Range    aPTT 103.3 (H) 23.0 - 36.4 SEC   GLUCOSE, POC    Collection Time: 02/26/18  5:33 PM   Result Value Ref Range    Glucose (POC) 95 70 - 110 mg/dL   GLUCOSE, POC    Collection Time: 02/26/18 11:45 PM   Result Value Ref Range    Glucose (POC) 108 70 - 110 mg/dL   PTT    Collection Time: 02/27/18  1:14 AM   Result Value Ref Range    aPTT 56.8 (H) 23.0 - 51.7 SEC   METABOLIC PANEL, BASIC    Collection Time: 02/27/18  1:14 AM   Result Value Ref Range    Sodium 132 (L) 136 - 145 mmol/L    Potassium 3.2 (L) 3.5 - 5.5 mmol/L    Chloride 96 (L) 100 - 108 mmol/L    CO2 30 21 - 32 mmol/L    Anion gap 6 3.0 - 18 mmol/L    Glucose 109 (H) 74 - 99 mg/dL    BUN 17 7.0 - 18 MG/DL    Creatinine 4.06 (H) 0.6 - 1.3 MG/DL    BUN/Creatinine ratio 4 (L) 12 - 20      GFR est AA 14 (L) >60 ml/min/1.73m2    GFR est non-AA 11 (L) >60 ml/min/1.73m2    Calcium 7.8 (L) 8.5 - 10.1 MG/DL   CBC WITH AUTOMATED DIFF    Collection Time: 02/27/18  1:14 AM   Result Value Ref Range    WBC 8.5 4.6 - 13.2 K/uL    RBC 3.28 (L) 4.20 - 5.30 M/uL    HGB 8.3 (L) 12.0 - 16.0 g/dL    HCT 26.4 (L) 35.0 - 45.0 %    MCV 80.5 74.0 - 97.0 FL    MCH 25.3 24.0 - 34.0 PG    MCHC 31.4 31.0 - 37.0 g/dL    RDW 15.2 (H) 11.6 - 14.5 %    PLATELET 656 007 - 333 K/uL    MPV 10.7 9.2 - 11.8 FL    NEUTROPHILS 78 (H) 40 - 73 %    LYMPHOCYTES 11 (L) 21 - 52 %    MONOCYTES 11 (H) 3 - 10 %    EOSINOPHILS 0 0 - 5 %    BASOPHILS 0 0 - 2 %    ABS. NEUTROPHILS 6.6 1.8 - 8.0 K/UL    ABS. LYMPHOCYTES 0.9 0.9 - 3.6 K/UL    ABS. MONOCYTES 1.0 0.05 - 1.2 K/UL    ABS. EOSINOPHILS 0.0 0.0 - 0.4 K/UL    ABS.  BASOPHILS 0.0 0.0 - 0.1 K/UL    DF AUTOMATED     PHOSPHORUS    Collection Time: 02/27/18  1:14 AM   Result Value Ref Range    Phosphorus 3.4 2.5 - 4.9 MG/DL   POC G3    Collection Time: 02/27/18  4:13 AM   Result Value Ref Range    Device: VENT      FIO2 (POC) 30 %    pH (POC) 7.521 (H) 7.35 - 7.45      pCO2 (POC) 36.7 35.0 - 45.0 MMHG    pO2 (POC) 126 (H) 80 - 100 MMHG    HCO3 (POC) 30.0 (H) 22 - 26 MMOL/L    sO2 (POC) 99 (H) 92 - 97 %    Base excess (POC) 7 mmol/L    Mode ASSIST CONTROL      Tidal volume 375 ml    Set Rate 10 bpm    PEEP/CPAP (POC) 5 cmH2O    Allens test (POC) YES      Inspiratory Time 1 sec    Total resp. rate 10      Site RIGHT RADIAL      Patient temp.  98.6      Specimen type (POC) ARTERIAL      Performed by Shy Macdonald     Volume control plus YES     POTASSIUM    Collection Time: 02/27/18  5:15 AM   Result Value Ref Range    Potassium 3.6 3.5 - 5.5 mmol/L   PHOSPHORUS    Collection Time: 02/27/18  5:15 AM   Result Value Ref Range    Phosphorus 3.8 2.5 - 4.9 MG/DL   GLUCOSE, POC    Collection Time: 02/27/18  5:18 AM   Result Value Ref Range    Glucose (POC) 120 (H) 70 - 110 mg/dL   GLUCOSE, POC    Collection Time: 02/27/18 11:11 AM   Result Value Ref Range    Glucose (POC) 102 70 - 110 mg/dL       Signed By: Karley Mir MD     February 27, 2018

## 2018-02-27 NOTE — PROGRESS NOTES
NUTRITION    Nursing Referral: [facility]  Nutrition Consult: Management of Tube Feeding      RECOMMENDATIONS / PLAN:     - Continue tube feeding of Nepro at goal rate of 40 mL/hr with 50 mL q 4 hour water flushes.   - Evaluate ability to tolerate po and start diet as medically appropriate following extubation.   - Continue RD inpatient monitoring and evaluation. Goal Regimen: Nepro at 40 mL/hr + 50 mL q 4 hour water flushes to provide: 1728 kcal, 78 gm protein, 92 gm fat, 160 gm CHO, 15 gm fiber, 696 mL free water, 996 mL total water, 100% RDIs     NUTRITION INTERVENTIONS & DIAGNOSIS:     [x] Enteral nutrition: continue   [x] Collaboration and referral of nutrition care: interdisciplinary rounds    Nutrition Diagnosis: Unintentional weight loss related to inadequate energy intake as evidenced by 30 lb, 24% weight loss x 5 months. Inadequate oral intake related to inability to tolerate po due to respiratory status as evidenced by pt NPO, intubated. ASSESSMENT:     2/27: Tolerating feeds at goal, held this morning for SBT and possible extubation today. Hyponatremia improved some, IV fluid held and now discontinued, s/p HD yesterday and 3.2 L removed. 2/26: Pt intubated and NGT clamped. EN infusion adequate to meet patients estimated nutritional needs:   [x] Yes     [] No   [] Unable to determine at this time    Tube Feeding: Nepro at 40 mL/hr via NGT  Water Flushes: 50 mL q 4 hours   Residuals: 0 mL     Diet: DIET NPO  DIET TUBE FEEDING      Food Allergies: banana   Current Appetite:   [] Good     [] Fair     [] Poor     [x] Other: NPO  Appetite/meal intake prior to admission:   [] Good     [] Fair     [x] Poor, decreased appetite and nausea/vomiting x 3-4 days PTA     [] Other:  Feeding Limitations:  [] Swallowing difficulty    [] Chewing difficulty    [x] Other: respiratory status   Current Meal Intake: No data found.     Anuric   BM: PTA   Skin Integrity: WDL  Edema: none   Pertinent Medications: Reviewed: 20 mEq KCl, SSI    Recent Labs      02/27/18   0515  02/27/18   0114  02/26/18   0410  02/25/18   2234  02/25/18   1338   NA   --   132*  129*  128*  129*   K  3.6  3.2*  4.0  4.3  4.0   CL   --   96*  94*  93*  92*   CO2   --   30  22  22  27   GLU   --   109*  81  87  122*   BUN   --   17  22*  19*  18   CREA   --   4.06*  6.59*  6.23*  5.80*   CA   --   7.8*  8.3*  8.4*  9.4   MG   --    --    --   2.3   --    PHOS  3.8  3.4   --   3.9   --    ALB   --    --   2.5*   --   3.2*   SGOT   --    --   29   --   38*   ALT   --    --   10*   --   13       Intake/Output Summary (Last 24 hours) at 02/27/18 1003  Last data filed at 02/27/18 0700   Gross per 24 hour   Intake          1364.88 ml   Output             3200 ml   Net         -1835.12 ml       Anthropometrics:  Ht Readings from Last 1 Encounters:   02/25/18 5' 1\" (1.549 m)     Last 3 Recorded Weights in this Encounter    02/25/18 1303 02/25/18 2130 02/27/18 0400   Weight: 42 kg (92 lb 9.5 oz) 43.3 kg (95 lb 7.4 oz) 44 kg (97 lb)     Body mass index is 18.33 kg/(m^2).    Underweight        Weight History: 30 lb, 24% weight loss x 5 months PTA per chart     Weight Metrics 2/27/2018 1/22/2018 12/2/2017 11/15/2017 11/5/2017 9/10/2017 9/8/2017   Weight 97 lb 103 lb 3 oz 106 lb 14.8 oz 107 lb 110 lb 125 lb 9.6 oz -   BMI 18.33 kg/m2 19.5 kg/m2 20.2 kg/m2 20.22 kg/m2 21.48 kg/m2 - 22.97 kg/m2        Admitting Diagnosis: NSTEMI (non-ST elevated myocardial infarction) (HCC)  ESRD (end stage renal disease) ([facility]  Hyponatremia  Respiratory failure (HCC)  NSTEMI (non-ST elevated myocardial infarction) ([facility]  ESRD (end stage renal disease) ([facility]  Hypoxia  Pertinent PMHx: ESRD on HD, HTN, HLD     Education Needs:        [x] None identified  [] Identified - Not appropriate at this time  []  Identified and addressed - refer to education log  Learning Limitations:   [] None identified  [x] Identified: intubated, does not speak [place], speaks [place]      Cultural, Yarsanism & ethnic food preferences:  [x] None identified    [] Identified and addressed     ESTIMATED NUTRITION NEEDS:     Calories: 7876-5167 kcal ([id number]) based on  [x] Actual BW 43 kg     [] IBW   Protein: 52-86 gm (1.2-2 gm/kg) based on  [x] Actual BW      [] IBW   Fluid: 6673-7785 mL     MONITORING & EVALUATION:     Nutrition Goal(s):   1. Nutritional needs will be met through adequate oral intake or nutrition support within the next 7 days.   Outcome:  [x] Met/Ongoing    []  Not Met    [] New/Initial Goal     Monitoring:   [] Food and beverage intake   [] Diet order   [x] Nutrition-focused physical findings   [x] Treatment/therapy   [] Weight   [x] Enteral nutrition intake        Previous Recommendations (for follow-up assessments only):     [x]   Implemented       []   Not Implemented (RD to address)      [] No Longer Appropriate     [] No Recommendation Made     Discharge Planning: pending ability to tolerate po and goals of care    [x] Participated in care planning, discharge planning, & interdisciplinary rounds as appropriate      Mando Cecilia, [address], [address] [place] [address]    Pager: [phone]

## 2018-02-27 NOTE — ROUTINE PROCESS
Bedside and Verbal shift change report given to [id number] (oncoming nurse) by Rich Gerber   (offgoing nurse). Report included the following information SBAR, MAR and Recent Results.

## 2018-02-27 NOTE — PROGRESS NOTES
Kinetic Dosing- Initial Progress Note    Pharmacy Consult ordered by ARABELLA Lugo NP     Indication: sepsis    Patient clinical status and labs ordered/reviewed. Pt Weight Weight: 43.3 kg (95 lb 7.4 oz)   Serum Creatinine Lab Results   Component Value Date/Time    Creatinine 6.59 (H) 02/26/2018 04:10 AM       Creatinine Clearance Estimated Creatinine Clearance: 6.6 mL/min (based on Cr of 6.59). BUN Lab Results   Component Value Date/Time    BUN 22 (H) 02/26/2018 04:10 AM    BUN, POC 24 (H) 01/22/2018 11:27 AM       WBC Lab Results   Component Value Date/Time    WBC 8.6 02/26/2018 04:10 AM      Temperature Temp: (!) 101.2 °F (38.4 °C)     HR Pulse (Heart Rate): 86     BP BP: 112/82               Drug Levels:   Vancomycin    No results for input(s): VANCP, VANCT, VANCR, VANRA in the last 72 hours. Gentamicin   No results for input(s): GENP, GENT in the last 72 hours. No lab exists for component:  GENR   Tobramycin   No results for input(s): TOBP, TOBT, TOBR in the last 72 hours. Amikacin   No results for input(s): Grant Dial[place] in the last 72 hours.     No lab exists for component:  TRENTON Schrader DAMIKR     Dose for naïve patient was initiated at: vancomycin 1000mg x1, further dosing pending levels/HD schedule     Continue to monitor    Sign: STEPHEN Law [facility]  Date: 2/26/2018  Time: 8:33 PM

## 2018-02-27 NOTE — PROGRESS NOTES
[facility]  Pulmonary, Critical Care, and Sleep Medicine    Name: Smooth Baird MRN: [id number]   : 1960 Hospital: [address] [place] [address] Dr   Date: 2018        Pulmonary Critical Care Progress Note                                                Subjective/History:   Patient is a 56 y.o. [place] female with a hx of end stage renal disease, HTN, and Hypercholestermia who presented to [facility] ED with c/o constant moderate SOB onset 3-4 days. Associated sx of productive cough (with white sputum), nausea, emesis, dysuria, and decreased appetite. Pt has dialysis M/W/F with last visit on Friday, 18 with pt reporting not feeling better afterwards. Patient was intubated in the [facility] ED for hypoxia. Troponins were elevated and Cardiology was consulted. Started on Heparin gtt and ASA was ordered. Nephrology consulted for ESRD/Dialysis. Patient was transferred to [facility] ICU.       18   Intubated and sedated. Patient does not speak [place], speaks [place], cannot assess if following commands. Opens eyes to words, moves all extremities  Hemodynamically stable, Afebrile   Dialysis completed yesterday 3.5L removed. The patient is critically ill and can not provide additional history due to Ventilated. [x]The patient is unable to give any meaningful history or review of systems because the patient is:  [x]Intubated [x]Sedated   []Unresponsive [x]Ventilated     [x]The patient is critically ill on      [x]Mechanical ventilation []Pressors   []BiPAP []              Review of Systems:  Review of systems not obtained due to patient factors.     Past Medical History:  Past Medical History:   Diagnosis Date    Chronic kidney disease     [facility]     TUES-THURS-SAT    Hypercholesteremia     Hypertension     Kidney disease     Vitamin D deficiency         Past Surgical History:  Past Surgical History:   Procedure Laterality Date    VASCULAR SURGERY PROCEDURE UNLIST          Medications:  Prior to Admission medications    Medication Sig Start Date End Date Taking? Authorizing Provider   hydrALAZINE (APRESOLINE) 50 mg tablet Take 25 mg by mouth three (3) times daily. Brenda Blackman MD   losartan (COZAAR) 100 mg tablet Take 100 mg by mouth daily. Brenda Blackman MD   amLODIPine (NORVASC) 10 mg tablet Take 10 mg by mouth daily. Brenda Blackman MD   glycerin, adult, (FLEET GLYCERIN, ADULT,) suppository Insert 1 Suppository into rectum daily. Indications: BOWEL EVACUATION 2/5/18   Christiano Ron MD   oxyCODONE-acetaminophen (PERCOCET) 5-325 mg per tablet Take 1 Tab by mouth every four (4) hours as needed for Pain. Max Daily Amount: 6 Tabs. 1/22/18   Chandrika Reese MD   albuterol (PROVENTIL HFA, VENTOLIN HFA, PROAIR HFA) 90 mcg/actuation inhaler Take 2 Puffs by inhalation every four (4) hours as needed for Wheezing. 11/6/17   Christal Langford PA-C   calcium acetate (PHOSLO) 667 mg cap Take 1 Cap by mouth three (3) times daily (with meals). 9/13/17   Panda Garcia MD   cloNIDine HCl (CATAPRES) 0.1 mg tablet Take 0.3 mg by mouth three (3) times daily. Brenda Blackman MD   NIFEdipine ER (ADALAT CC) 30 mg ER tablet Take 20 mg by mouth daily. Brenda Blackman MD   multivitamin with iron tablet Take 1 Tab by mouth daily.     Brenda Blackman MD       Current Facility-Administered Medications   Medication Dose Route Frequency    insulin lispro (HUMALOG) injection   SubCUTAneous Q6H    cloNIDine HCl (CATAPRES) tablet 0.2 mg  0.2 mg Oral BID    aspirin chewable tablet 81 mg  81 mg Oral DAILY    metoprolol tartrate (LOPRESSOR) tablet 25 mg  25 mg Oral BID    atorvastatin (LIPITOR) tablet 40 mg  40 mg Oral QHS    VANCOMYCIN INFORMATION NOTE   Other CONTINUOUS    heparin 25,000 units in D5W 250 ml infusion  12-25 Units/kg/hr IntraVENous TITRATE    sodium chloride (NS) flush 5-10 mL  5-10 mL IntraVENous Q8H    fentaNYL (PF) 10 mcg/mL infusion  0-200 mcg/hr IntraVENous TITRATE    famotidine (PF) (PEPCID) 20 mg in sodium chloride 10 mL injection  20 mg IntraVENous DAILY    chlorhexidine (PERIDEX) 0.12 % mouthwash 10 mL  10 mL Oral Q12H    cefTRIAXone (ROCEPHIN) 2 g in sterile water (preservative free) 20 mL IV syringe  2 g IntraVENous Q24H    0.9% sodium chloride infusion  50 mL/hr IntraVENous CONTINUOUS       Allergy:  Allergies   Allergen Reactions    Banana Other (comments)        Social History:  Social History   Substance Use Topics    Smoking status: Never Smoker    Smokeless tobacco: Never Used    Alcohol use No        Family History:  History reviewed. No pertinent family history.        Objective:   Vital Signs:    Visit Vitals    /89    Pulse 83    Temp 98.8 °F (37.1 °C)    Resp 15    Ht 5' 1\" (1.549 m)    Wt 44 kg (97 lb)    SpO2 100%    Breastfeeding No    BMI 18.33 kg/m2       O2 Device: Ventilator, Endotracheal tube       Temp (24hrs), Av.6 °F (37.6 °C), Min:98.7 °F (37.1 °C), Max:101.2 °F (38.4 °C)       Patient Vitals for the past 8 hrs:   Temp Pulse Resp BP SpO2   18 0400 98.8 °F (37.1 °C) 83 15 164/89 100 %   18 0345 - - - 110/74 -   18 0336 - 79 10 - 100 %   18 0330 - - - 122/74 -   18 0315 - - - 138/88 -   18 0300 - 87 12 154/82 95 %   18 0245 - - - 146/79 -   18 0230 - - - 120/74 -   18 0215 - - - 136/75 -   18 0200 - 80 11 117/66 100 %   18 0145 - - - 129/78 -   18 0130 - - - 149/90 -   18 0115 - - - 158/86 -   18 0100 - 82 10 147/84 100 %   18 0045 - - - 139/82 -   18 0030 - - - 149/86 -   18 0015 - - - (!) 173/95 -   18 0000 100 °F (37.8 °C) 91 11 (!) 161 100 %   18 2345 - - - 153/88 -   18 2334 - 87 15 - 100 %   18 2330 - - - 157/87 -   18 2315 - - - (!) 168/93 -   18 2300 - 83 10 (!) 161 100 %   18 2245 - - - 114/66 -   18 2240 99.2 °F (37.3 °C) - - - -   18 2230 - - - 123/73 -   02/26/18 2215 - - - 106/64 -   02/26/18 2200 - 77 23 101/60 100 %   02/26/18 2145 - - - 100/61 -   02/26/18 2130 - - - 101/62 -   02/26/18 2115 - - - 124/73 -     Intake/Output:   Last shift:      02/26 1901 - 02/27 0700  In: 632.1 [I.V.:92.1]  Out: -   Last 3 shifts: 02/25 0701 - 02/26 1900  In: 1275.9 [I.V.:665.9]  Out: 3200     Intake/Output Summary (Last 24 hours) at 02/27/18 0513  Last data filed at 02/27/18 0400   Gross per 24 hour   Intake          1574.65 ml   Output             3200 ml   Net         -1625.35 ml       Ventilator Settings:  Mode Rate Tidal Volume Pressure FiO2 PEEP   Assist control, VC+   375 ml    30 % 5 cm H20     Peak airway pressure: 21 cm H2O    Minute ventilation: 4.57 l/min      ARDS network Guidelines: Lung protective strategy, Pl pressure goals less than or equal to 30. Physical Exam:  General appearance - Intubated and Sedated  Mental status - Unable to assess at this time  Eyes - pupils equal and reactive, extraocular eye movements intact, sclera anicteric  Mouth - mucous membranes moist, pharynx normal without lesions  Neck - supple, no significant adenopathy  Chest - no tachypnea, retractions or cyanosis, bilateral lower lobe crackles noted, no wheezing or rhonchi. Equal chest excursion.   Heart - normal rate, regular rhythm, normal S1, S2, Holosystolic murmur auscultated at the fourth left sternal border, no rubs, clicks or gallops  Abdomen - soft, nontender, nondistended, no masses or organomegaly  Neurological - limited, sedated, non-focal, moves all extremities  Musculoskeletal - no joint tenderness, deformity or swelling  Extremities - peripheral pulses normal, no pedal edema, no clubbing or cyanosis  Skin - normal coloration and turgor, no rashes, no suspicious skin lesions noted    Data:     Recent Results (from the past 24 hour(s))   GLUCOSE, POC    Collection Time: 02/26/18  5:20 AM   Result Value Ref Range    Glucose (POC) 87 70 - 110 mg/dL   POC G3    Collection Time: 02/26/18  5:48 AM   Result Value Ref Range    Device: VENT      FIO2 (POC) 50 %    pH (POC) 7.618 (HH) 7.35 - 7.45      pCO2 (POC) 25.6 (L) 35.0 - 45.0 MMHG    pO2 (POC) 190 (H) 80 - 100 MMHG    HCO3 (POC) 26.2 (H) 22 - 26 MMOL/L    sO2 (POC) 100 (H) 92 - 97 %    Base excess (POC) 5 mmol/L    Mode ASSIST CONTROL      Tidal volume 400 ml    Set Rate 14 bpm    PEEP/CPAP (POC) 5 cmH2O    Allens test (POC) YES      Inspiratory Time 1 sec    Total resp. rate 16      Site RIGHT RADIAL      Patient temp. 98.6      Specimen type (POC) ARTERIAL      Performed by Ruddy Dexter     Volume control plus YES     EKG, 12 LEAD, SUBSEQUENT    Collection Time: 02/26/18  7:17 AM   Result Value Ref Range    Ventricular Rate 72 BPM    Atrial Rate 72 BPM    P-R Interval 158 ms    QRS Duration 84 ms    Q-T Interval 506 ms    QTC Calculation (Bezet) 554 ms    Calculated P Axis 74 degrees    Calculated R Axis 80 degrees    Calculated T Axis 76 degrees    Diagnosis       Normal sinus rhythm  Possible Left atrial enlargement  Left ventricular hypertrophy  Nonspecific ST abnormality  Prolonged QT  Abnormal ECG  When compared with ECG of 25-FEB-2018 18:32,  Vent.  rate has decreased BY  69 BPM  ST now depressed in Lateral leads  T wave inversion no longer evident in Inferior leads  T wave amplitude has decreased in Lateral leads  Confirmed by Shonna Sicard MD, --- ([id number]) on 2/26/2018 2:11:20 PM     PTT    Collection Time: 02/26/18 10:02 AM   Result Value Ref Range    aPTT 102.3 (H) 23.0 - 36.4 SEC   CARDIAC PANEL,(CK, CKMB & TROPONIN)    Collection Time: 02/26/18 10:02 AM   Result Value Ref Range     (H) 26 - 192 U/L    CK - MB 2.8 <3.6 ng/ml    CK-MB Index 1.2 0.0 - 4.0 %    Troponin-I, Qt. 2.78 (HH) 0.0 - 0.045 NG/ML   LIPID PANEL    Collection Time: 02/26/18 10:02 AM   Result Value Ref Range    LIPID PROFILE          Cholesterol, total 151 <200 MG/DL    Triglyceride 94 <150 MG/DL    HDL Cholesterol 37 (L) 40 - 60 MG/DL    LDL, calculated 95.2 0 - 100 MG/DL    VLDL, calculated 18.8 MG/DL    CHOL/HDL Ratio 4.1 0 - 5.0     POC G3    Collection Time: 02/26/18 10:11 AM   Result Value Ref Range    Device: VENT      FIO2 (POC) 30 %    pH (POC) 7.493 (H) 7.35 - 7.45      pCO2 (POC) 31.1 (L) 35.0 - 45.0 MMHG    pO2 (POC) 100 80 - 100 MMHG    HCO3 (POC) 23.9 22 - 26 MMOL/L    sO2 (POC) 98 (H) 92 - 97 %    Base excess (POC) 1 mmol/L    Mode ASSIST CONTROL      Tidal volume 375 ml    Set Rate 10 bpm    PEEP/CPAP (POC) 5 cmH2O    Allens test (POC) YES      Site LEFT RADIAL      Specimen type (POC) ARTERIAL      Performed by Blade Clark     Volume control plus YES     INFLUENZA A & B AG (RAPID TEST)    Collection Time: 02/26/18 10:13 AM   Result Value Ref Range    Influenza A Antigen NEGATIVE  NEG      Influenza B Antigen NEGATIVE  NEG     GLUCOSE, POC    Collection Time: 02/26/18 12:25 PM   Result Value Ref Range    Glucose (POC) 99 70 - 110 mg/dL   PTT    Collection Time: 02/26/18  4:30 PM   Result Value Ref Range    aPTT 103.3 (H) 23.0 - 36.4 SEC   GLUCOSE, POC    Collection Time: 02/26/18  5:33 PM   Result Value Ref Range    Glucose (POC) 95 70 - 110 mg/dL   GLUCOSE, POC    Collection Time: 02/26/18 11:45 PM   Result Value Ref Range    Glucose (POC) 108 70 - 110 mg/dL   PTT    Collection Time: 02/27/18  1:14 AM   Result Value Ref Range    aPTT 56.8 (H) 23.0 - 57.6 SEC   METABOLIC PANEL, BASIC    Collection Time: 02/27/18  1:14 AM   Result Value Ref Range    Sodium 132 (L) 136 - 145 mmol/L    Potassium 3.2 (L) 3.5 - 5.5 mmol/L    Chloride 96 (L) 100 - 108 mmol/L    CO2 30 21 - 32 mmol/L    Anion gap 6 3.0 - 18 mmol/L    Glucose 109 (H) 74 - 99 mg/dL    BUN 17 7.0 - 18 MG/DL    Creatinine 4.06 (H) 0.6 - 1.3 MG/DL    BUN/Creatinine ratio 4 (L) 12 - 20      GFR est AA 14 (L) >60 ml/min/1.73m2    GFR est non-AA 11 (L) >60 ml/min/1.73m2    Calcium 7.8 (L) 8.5 - 10.1 MG/DL   CBC WITH AUTOMATED DIFF    Collection Time: 02/27/18  1:14 AM Result Value Ref Range    WBC 8.5 4.6 - 13.2 K/uL    RBC 3.28 (L) 4.20 - 5.30 M/uL    HGB 8.3 (L) 12.0 - 16.0 g/dL    HCT 26.4 (L) 35.0 - 45.0 %    MCV 80.5 74.0 - 97.0 FL    MCH 25.3 24.0 - 34.0 PG    MCHC 31.4 31.0 - 37.0 g/dL    RDW 15.2 (H) 11.6 - 14.5 %    PLATELET 510 461 - 006 K/uL    MPV 10.7 9.2 - 11.8 FL    NEUTROPHILS 78 (H) 40 - 73 %    LYMPHOCYTES 11 (L) 21 - 52 %    MONOCYTES 11 (H) 3 - 10 %    EOSINOPHILS 0 0 - 5 %    BASOPHILS 0 0 - 2 %    ABS. NEUTROPHILS 6.6 1.8 - 8.0 K/UL    ABS. LYMPHOCYTES 0.9 0.9 - 3.6 K/UL    ABS. MONOCYTES 1.0 0.05 - 1.2 K/UL    ABS. EOSINOPHILS 0.0 0.0 - 0.4 K/UL    ABS. BASOPHILS 0.0 0.0 - 0.1 K/UL    DF AUTOMATED     PHOSPHORUS    Collection Time: 02/27/18  1:14 AM   Result Value Ref Range    Phosphorus 3.4 2.5 - 4.9 MG/DL   POC G3    Collection Time: 02/27/18  4:13 AM   Result Value Ref Range    Device: VENT      FIO2 (POC) 30 %    pH (POC) 7.521 (H) 7.35 - 7.45      pCO2 (POC) 36.7 35.0 - 45.0 MMHG    pO2 (POC) 126 (H) 80 - 100 MMHG    HCO3 (POC) 30.0 (H) 22 - 26 MMOL/L    sO2 (POC) 99 (H) 92 - 97 %    Base excess (POC) 7 mmol/L    Mode ASSIST CONTROL      Tidal volume 375 ml    Set Rate 10 bpm    PEEP/CPAP (POC) 5 cmH2O    Allens test (POC) YES      Inspiratory Time 1 sec    Total resp. rate 10      Site RIGHT RADIAL      Patient temp. 98.6      Specimen type (POC) ARTERIAL      Performed by Alex Gipson     Volume control plus YES             Recent Labs      02/27/18   0413  02/26/18   1011  02/26/18   0548   FIO2I  30  30  50   HCO3I  30.0*  23.9  26.2*   PCO2I  36.7  31.1*  25.6*   PHI  7.521*  7.493*  7.618*   PO2I  126*  100  190*       Special Requests:   Date Value Ref Range Status   02/26/2018 NO SPECIAL REQUESTS   Final     Culture result:   Date Value Ref Range Status   02/26/2018 (A)   Final    MRSA target DNA is detected (presumptive positive for MRSA colonization). 02/26/2018    Final    MRSA CALLED TO AND CORRECTLY REPEATED BY:  MIMA Irwin RN, ICU AT 0912 2/26/2018 TO [place]         Telemetry: normal sinus rhythm    ECHO 9/12/17     SUMMARY:  Left ventricle: Systolic function was normal. Ejection fraction was estimated   in the range of 60 % to 65 %. No obvious  wall motion abnormalities identified in the views obtained. Wall thickness   was mildly increased. Doppler parameters  were consistent with abnormal left ventricular relaxation (grade 1 diastolic   dysfunction). Left atrium: The atrium was mildly dilated. Pericardium: A trivial pericardial effusion was identified circumferential to   the heart. There was no evidence of  hemodynamic compromise. Imaging:  [x]I have personally reviewed the patients chest radiographs images and report     CXR Results  (Last 48 hours)               02/26/18 0614  XR CHEST PORT Final result    Impression:  IMPRESSION:       The ET tube is 5.8 cm above miquel. CHF has improved. No overt pulmonary edema at this time. Atelectatic changes, and/or infiltrates in mid and lower lungs is bilaterally,   right more than left with bilateral basilar pleural effusion, right more than   left as described. Narrative:  Portable chest x-ray, semierect AP view, time 0445 hours on 2/20/2018:               INDICATION:       Respiratory failure. The patient has been on ventilation. Follow-up evaluation. History of CHF, pleural effusion and pulmonary infiltrates. COMPARISON STUDY: Portable chest x-ray on 2/25/2018, 2/5/2018. FINDINGS:       The ET tube is 5.8 cm above miquel. NG tube extends into mid stomach or beyond. Stable position of right IJ central venous catheter. No evidence of pneumothorax, or pneumomediastinum. There is mild to moderate cardiomegaly. Pulmonary vascularity is minimally   engorged and decreased to some extent as compared to previous study. There is no   overt pulmonary edema.        In the left lung base in the retrocardiac area there are mild-to-moderate atelectatic changes, with without infiltrates with minimal left basilar pleural   effusion with blunted left CP angle. In right perihilar area and right lower lung field there are moderate   atelectatic changes, and/or infiltrates, which are mildly decreased. There is   right basilar pleural effusion on, probably increased to some extent, now   present at the lateral aspect of basal right lung. 02/25/18 2308  XR CHEST PORT Final result    Impression:  IMPRESSION:       ET tube is 5.7 cm above miquel. Congestive heart failure is improved as described. Increased atelectatic changes, and/or infiltrates in right midlung and lower   lung fields with small right pleural effusion. There are some residual infiltrates in left lung as described. Narrative:  Portable chest x-ray, semierect AP view, time 2243 hours on 2/25/2018:               INDICATION:       Respiratory failure. Patient has been on ventilation. CHF and pulmonary infiltrates. COMPARISON STUDY: Portable chest x-ray on 2/25/2018, 2/5/2018. FINDINGS:       The ET tube is 5.7 cm above miquel. NG tube extends into stomach as before. Stabilization of right IJ central venous catheter, extended to right atrium. No evidence of pneumothorax. There is mild to moderate cardiomegaly. Pulmonary vascularity is mildly engorged   and less so as compared to previous study. Interstitial markings are less hazy   but not completely resolved, indicating residual minimal interstitial pulmonary   edema. In the left lower perihilar area and left lower lung field there are mildly   inhomogeneous infiltrates. In the left retrocardiac area there are also   atelectatic changes, and/or infiltrates. Left CP angle is fairly sharp. In right perihilar areas and right lower lung field there are increased moderate   heterogeneous infiltrates, with or without atelectatic changes.  Right CP angle   is blunted, indicating images of   Pleural effusion. 02/25/18 1920  XR CHEST PORT Final result    Impression:  IMPRESSION:       ET and NG tubes, as above. No pneumothorax. Increased central venous congestion. Increased right infrahilar opacity could represent subsegmental atelectasis   given short interval time development versus aspiration. Prior pleural effusions no longer visualized. Improved aeration left lung base. Narrative:  PORTABLE CHEST RADIOGRAPH        CPT CODE: 93350        INDICATION: ET and NG tube placement. COMPARISON: 2/25/2018 at 1333 hours. FINDINGS:       Frontal view of the chest obtained at 1858 hours. Interval placement of an   endotracheal tube with tip approximately 5 cm proximal to the miquel. NG tube   tip courses below the diaphragm and tip is outside of the field of view. Similar   right IJ central line with tip near the cavoatrial junction. Cardiomediastinal   silhouette is unchanged. There is increased central venous congestion. Improved   aeration at the left lung base. Prior small pleural effusions no longer   visualized. Right infrahilar opacity is new. No pneumothorax. 02/25/18 1348  XR CHEST PORT Final result    Impression:  IMPRESSION: Vascular congestion with small effusions and bilateral lung base   small hazy opacities as above. Narrative:  INDICATION:  Shortness of breath. COMPARISON:  12/17       FINDINGS: A portable AP radiograph of the chest was obtained at 1326 hours. Right-sided central venous line is unchanged. Stable enlargement of the cardiac   silhouette. Mildly prominent pulmonary vasculature. Bilateral lung base small   pleural effusions and overlying hazy opacities, may represent developing edema   or atelectasis/infiltrate. Osseous structures are stable.                     Results from [place] encounter on 02/25/18   XR CHEST PORT   Narrative Portable chest x-ray, semierect AP view, time 0445 hours on 2/20/2018:        INDICATION:    Respiratory failure. The patient has been on ventilation. Follow-up evaluation. History of CHF, pleural effusion and pulmonary infiltrates. COMPARISON STUDY: Portable chest x-ray on 2/25/2018, 2/5/2018. FINDINGS:    The ET tube is 5.8 cm above miquel. NG tube extends into mid stomach or beyond. Stable position of right IJ central venous catheter. No evidence of pneumothorax, or pneumomediastinum. There is mild to moderate cardiomegaly. Pulmonary vascularity is minimally  engorged and decreased to some extent as compared to previous study. There is no  overt pulmonary edema. In the left lung base in the retrocardiac area there are mild-to-moderate  atelectatic changes, with without infiltrates with minimal left basilar pleural  effusion with blunted left CP angle. In right perihilar area and right lower lung field there are moderate  atelectatic changes, and/or infiltrates, which are mildly decreased. There is  right basilar pleural effusion on, probably increased to some extent, now  present at the lateral aspect of basal right lung. Impression IMPRESSION:    The ET tube is 5.8 cm above miquel. CHF has improved. No overt pulmonary edema at this time. Atelectatic changes, and/or infiltrates in mid and lower lungs is bilaterally,  right more than left with bilateral basilar pleural effusion, right more than  left as described. Results from [place] encounter on 02/25/18   CTA CHEST W OR W WO CONT   Narrative EXAMINATION: CTA chest pulmonary    INDICATION: Acute chest pain, shortness of breath    COMPARISON: None    TECHNIQUE: CT angiography of the pulmonary arteries performed following 75 cc IV  Isovue 370, with 3-D MIP multiplanar reformations.  All CT scans at this facility  are performed using dose optimization technique as appropriate to a performed  exam, to include automated exposure control, adjustment of the mA and/or kV  according to patient size (including appropriate matching first site specific  examinations), or use of iterative reconstruction technique. FINDINGS:    Cardiovascular: No pulmonary arterial filling defects identified to suggest  pulmonary embolism. Motion artifact limits evaluation of smaller segmental and  subsegmental arteries. Ascending thoracic aorta is borderline ectatic measuring  up to 3.5 cm diameter. Heart size within normal limits. Right jugular catheter  tip at right atrium level. Moderate-sized area of cartilage fusion. Mediastinum: Possible enlarged aortopulmonary node and left hilar nodes, poorly  delineated. Left hilar anita calcification noted. Thyroid slightly  heterogeneous. Esophagus is nondistended. Pleura: Moderate volume bilateral pleural effusions. No evidence of  pneumothorax. Lungs: Probable compressive atelectasis due to pleural effusions bilaterally. Right major fissure nodule measuring 4 mm, likely pleural-based node. Upper abdomen: Unremarkable. Miscellaneous: Superficial soft tissues unremarkable. Bones: No acute osseous findings. Probable bone island in T11 posterior body. Impression IMPRESSION:    1. No pulmonary embolus identified. Smaller segmental and subsegmental pulmonary  arteries less well evaluated due to motion artifact. 2. Moderate-sized bilateral pleural effusions, and moderate volume pericardial  effusion. Borderline ectatic ascending aorta. -Probable compressive atelectasis in bilateral lungs. Superimposed infiltrate  difficult to exclude.    -Suspected left hilar and aortopulmonary window adenopathy, poorly delineated,  nonspecific. IMPRESSION:   · Acute Hypoxic respiratory failure 2' to Fluid Overload now intubated on mechanical ventilation.   · Bilateral pleural effusions  · Possible left hilar enlargement (reactive, volume overload?)  · Hyponatremia- 132  · ESRD (end stage renal disease) requires hemodialysis (MWF)  · NSTEMI (non-ST elevated myocardial infarction)  · Mildly elevated Transimines  · Hypertensive Urgency  · + MRSA screen- Nares: On contact isolation- no signs of active infection at this time     RECOMMENDATIONS:   Resp: Mech. Ventilated patients- aim to keep peak plateau pressure less than or equal to 30cm H2O. Titrate FiO2 for goal SPO2> 90%,VAP prevention bundle, head of the bed at 30' all times. Daily sedation holiday and assessment for weaning with SBT as tolerated  Bronchodilators PRN, pulmonary hygiene care, Aspiration precautions, Keep HOB >30 degrees  Daily ABG and Chxr while intubated. ID: Aleukocytosis and now Febrile. Continue Rocephin and add Vancomycin, MRSA nasal swab +, f/o on blood and sputum cultures. Possible UTI, anuric no urine to test.   Heme/Onc: Aleukocytosis, H/H, and platelets are stable, trend CBC. CVS: Hypertension is improved, PRN Hydralazine for hypertension, Trend cardiac panel, NSTEMI - continue heparin gtt- troponins are trending down. Cardiology is following. Fluids: NS @ 50ml/hr   Renal: Anuric, Dialysis patient, Right chest wall HD catheter, Nephrology following Dialysis per their recommendations. Trend Renal indices. GI: SUP, Trend LFTs, Zofran PRN for N/V   Metabolic: Glycemic control -SSI PRN, Replace E-lytes  Neuro/Sedation/Pain: Fentanyl and Versed gtt for sedation  Stress ulcer prophylaxis: Pepcid IV  DVT prophylaxis:  Heparin gtt  AM labs: Daily CBC BMP Mag and Phos. Diet: tube feeds per nutritionist recommendaions  Lines/Devices: ETT 02/25, PIVs  Vent Bundle Followed, Vent Day 02         [x]See my orders for details    My assessment, plan of care, findings, medications, side effects etc were discussed with:  [x]nursing []PT/OT    [x]respiratory therapy [x]Dr. Saul Vázquez   []family []Patient     [x]Total critical care time exclusive of procedures   minutes with complex decision making performed and > 50% time spent in face to face evaluation.     Ti Adorno Adeola [facility]     Pulmonary, Critical Care Medicine  [facility]      [facility] Staff Addendum     I have independently evaluated the patient and reviewed the patient's chart. I have discussed the findings and care plan with ICU care team.  Case discussed on ICU rounds. I agree with the above evaluation, assessment and recommendations along with the following comments and observations. Patient with persistent hypertension, inhibiting weaning from ventilator. Cardene drip added today.   Continuing with supportive care      Clinical Care and time spent coordinating care, minus procedure time: 45 min    Melly Wilcox DO, [facility]  Pulmonary, Sleep and Critical Care Medicine  6:29 PM

## 2018-02-28 NOTE — PROGRESS NOTES
attended the interdisciplinary rounds for Wolf Alas, who is a 56 y.o.,female. Patients Primary Language is: [place]. According to the patients EMR Moravian Affiliation is: Adventism. The reason the Patient came to the hospital is:   Patient Active Problem List    Diagnosis Date Noted    Anemia 02/26/2018    Hyponatremia 02/25/2018    ESRD (end stage renal disease) ([facility] [address] 02/25/2018    NSTEMI (non-ST elevated myocardial infarction) ([facility] [address] 02/25/2018    Respiratory failure ([facility] [address] 02/25/2018    Hypoxia 02/25/2018    Acute UTI 02/25/2018    Hyperkalemia 09/08/2017    Renal failure 09/08/2017    SCOOTER (acute kidney injury) ([facility] [address] 09/08/2017      Plan:  Chaplains will continue to follow and will provide pastoral care on an as needed/requested basis.  recommends bedside caregivers page  on duty if patient shows signs of acute spiritual or emotional distress.     [address] [facility]  Board Certified [address] [facility]   [phone]

## 2018-02-28 NOTE — CDMP QUERY
Please clarify if this patient is being treated/managed for:    =>   Hypokalemia in setting of   NSTEMI/ resp failure  with KCL  supplement    =>Other Explanation of clinical findings  =>Unable to Determine (no explanation of clinical findings)    The medical record reflects the following:    Risk:   NSTEMI;   acute resp failure; uncontrolled HTN    Clinical Indicators:  K+  4.3 on admit  ;  2/26-   K+ 4.0;   2/27-  K+  3.2; Treatment: KLor- con   20 meq po x 1 dose       If you DECLINE this query or would like to communicate with [facility], please utilize the \"[employer] message box\" at the TOP of the Progress Note on the right.       Thank you,   Alek Smith RN   CCDS   x [phone]

## 2018-02-28 NOTE — PROGRESS NOTES
Pt seen at bedside. On SBT. Looks more comfortable. BP better. F/Vt down to 50s-60s. Unfortunately no cuff leak. Will hold extubation and start solu-medrol.

## 2018-02-28 NOTE — PROGRESS NOTES
[facility]      CARDIOLOGY PROGRESS NOTE  RECS:    1. Acute respiratory failure- intubated and sedated possible related to Acute decompensated CHF in the setting of uncontrolled Hypertension  2. NSTEMI-Continue with heparin drip and asa. Avoiding plavix as Hb low and downtrending. Echo revealed EF% 45-50%, mild hypokinesis apical wall, LVH is present. Will need Cardiac w/u for CAD. Possible Cardiac cath when stable. D/w family  3. Acute Diastolic Heart failure- Compensated now. Had emergency HD yesterday 3000 Lt removed   4. Uncontrolled hypertension-elevated off Cardine  drip. Change metoprolol to coreg. Added hydralazine 50 mg TID  5. ESRD- on HD renal following. She is MWF HD schedule   6. Ane,ubaldo- slightly dropping H&H  monitor   7. Hyponatremia: stable   8. UTI  On antibiotics managed per [facility]            ECHO 02/26/18  Left ventricle: Size was normal. Systolic function was mildly reduced by   visual assessment. Ejection fraction was  estimated in the range of 45 % to 50 %. There was mild hypokinesis of the   apical wall(s). Wall thickness was moderately  increased. Left atrium: The atrium was mildly dilated. Atrial septum: There was increased thickness of the septum, consistent with   lipomatous hypertrophy. Pericardium: A small pericardial effusion was identified circumferential to   the heart. There was no evidence of  hemodynamic compromise. COMPARISONS:  Comparison was made with the previous study of 17-Sep-2017. LV overall   function has decreased from 65 % to 45 %. There  was worsening in the periapical LV wall motion. Pericardial effusion   appearance has not changed. Otherwise no  significant change.       ASSESSMENT:  Hospital Problems  Date Reviewed: 11/15/2017          Codes Class Noted POA    Anemia ICD-10-CM: D64.9  ICD-9-CM: 285.9  2/26/2018 Unknown        Hyponatremia ICD-10-CM: E87.1  ICD-9-CM: 276.1  2/25/2018 Unknown        ESRD (end stage renal disease) ([facility] [address] ICD-10-CM: N18.6  ICD-9-CM: 585.6  2/25/2018 Unknown        NSTEMI (non-ST elevated myocardial infarction) [facility]) ICD-10-CM: I21.4  ICD-9-CM: 410.70  2/25/2018 Unknown        * (Principal)Respiratory failure ([address] ICD-10-CM: J96.90  ICD-9-CM: 518.81  2/25/2018 Unknown        Hypoxia ICD-10-CM: R09.02  ICD-9-CM: 799.02  2/25/2018 Unknown        Acute UTI ICD-10-CM: N39.0  ICD-9-CM: 599.0  2/25/2018 Unknown                SUBJECTIVE:    Intubated and alert     OBJECTIVE:    VS:   Visit Vitals    /76    Pulse (!) 105    Temp 98 °F (36.7 °C)    Resp 20    Ht 5' 1\" (1.549 m)    Wt 42.9 kg (94 lb 9.2 oz)    SpO2 100%    Breastfeeding No    BMI 17.87 kg/m2         Intake/Output Summary (Last 24 hours) at 02/28/18 0901  Last data filed at 02/28/18 8422   Gross per 24 hour   Intake           518.92 ml   Output              400 ml   Net           118.92 ml     TELE: normal sinus rhythm with intermittent sinus tachycardia     General: well developed and stated age, intubated  HENT: Normocephalic, atraumatic. Normal external eye.   Neck :  no JVD  Cardiac:  regular rate and rhythm, tachycardic, no S3 or S4, no click, no rub  Lungs: clear to auscultation bilaterally  Abdomen: Soft, nontender, no masses  Extremities:  No c/c/e, peripheral pulses present      Labs: Results:       Chemistry Recent Labs      02/28/18   0445  02/27/18   0515  02/27/18   0114  02/26/18   0410   02/25/18   1338   GLU  84   --   109*  81   < >  122*   NA  132*   --   132*  129*   < >  129*   K  3.5  3.6  3.2*  4.0   < >  4.0   CL  97*   --   96*  94*   < >  92*   CO2  23   --   30  22   < >  27   BUN  33*   --   17  22*   < >  18   CREA  6.05*   --   4.06*  6.59*   < >  5.80*   CA  8.7   --   7.8*  8.3*   < >  9.4   AGAP  12   --   6  13   < >  10   BUCR  5*   --   4*  3*   < >  3*   AP  117   --    --   103   --   118*   TP  6.0*   --    --   5.8*   --   7.1   ALB  2.3*   --    --   2.5*   --   3.2*   GLOB  3.7   --    --   3.3   --   3.9 AGRAT  0.6*   --    --   0.8   --   0.8    < > = values in this interval not displayed. CBC w/Diff Recent Labs      02/28/18 0445 02/27/18   0114 02/26/18 0410   WBC  10.2  8.5  8.6   RBC  3.24*  3.28*  3.36*   HGB  8.1*  8.3*  8.5*   HCT  26.0*  26.4*  27.0*   PLT  216  213  198   GRANS  83*  78*  81*   LYMPH  6*  11*  12*   EOS  0  0  0      Cardiac Enzymes Recent Labs      02/26/18   1002  02/26/18 0410   CPK  241*  245*   CKND1  1.2  1.6      Coagulation Recent Labs      02/28/18 0445 02/27/18 0114   APTT  44.5*  56.8*       Lipid Panel Lab Results   Component Value Date/Time    Cholesterol, total 151 02/26/2018 10:02 AM    HDL Cholesterol 37 (L) 02/26/2018 10:02 AM    LDL, calculated 95.2 02/26/2018 10:02 AM    VLDL, calculated 18.8 02/26/2018 10:02 AM    Triglyceride 94 02/26/2018 10:02 AM    CHOL/HDL Ratio 4.1 02/26/2018 10:02 AM      BNP No results for input(s): BNPP in the last 72 hours. Liver Enzymes Recent Labs      02/28/18 0445   TP  6.0*   ALB  2.3*   AP  117   SGOT  47*      Thyroid Studies No results found for: T4, T3U, TSH, TSHEXT, TSHEXT           Estee Heck Faina:[phone]  Patient seen independently  Discussed the details with NP and patient.  Please see orders & recommendations  Echo Mitchell MD

## 2018-02-28 NOTE — ROUTINE PROCESS
Bedside, Verbal and Written shift change report given to Malena Mccauley (oncoming nurse) by Eric Coker RN   (offgoing nurse). Report included the following information SBAR, Kardex, Intake/Output, MAR and Recent Results.

## 2018-02-28 NOTE — PROGRESS NOTES
[facility] Hospitalist Group  Progress Note    Patient: Kimberley Mello Age: 56 y.o. : 1960 MR#: [id number] SSN: [id number]  Date/Time: 2018  11:34 AM    Subjective/24-hour events:     Remains intubated but is awake. Family present at bedside - state that patient has been complaining of sore throat. Assessment:   Acute hypoxic respiratory failure  NSTEMI  Malignant HTN  Acute diastolic CHF  ESRD  Nasal MRSA colonization     Plan:  Wean vent as able, continue pulmonary hygiene. Antibiotic therapy as ordered, follow cultures. Cardene infusion with adjustments as necessary. Resume PO therapy as soon as able. HD per nephrology. Following. Case discussed with:  []Patient  [x]Family  []Nursing  []Case Management  DVT Prophylaxis:  []Lovenox  []Hep SQ  []SCDs  []Coumadin   [x]On Heparin gtt    Objective:   VS:   Visit Vitals    /89    Pulse 95    Temp 98.9 °F (37.2 °C)    Resp 10    Ht 5' 1\" (1.549 m)    Wt 42.9 kg (94 lb 9.2 oz)    SpO2 100%    Breastfeeding No    BMI 17.87 kg/m2      Tmax/24hrs: Temp (24hrs), Av.6 °F (37 °C), Min:98 °F (36.7 °C), Max:99.3 °F (37.4 °C)    Intake/Output Summary (Last 24 hours) at 18 1318  Last data filed at 18 0800   Gross per 24 hour   Intake           528.92 ml   Output              450 ml   Net            78.92 ml       General:  In NAD. Cardiovascular: RRR. Pulmonary:  Lungs clear anteriorly. Effort nonlabored. GI:  Abdomen soft, NTTP. Extremities:  Warm, no ischemia. Neuro:  Awake. Moves extremities spontaneously.     Labs:    Recent Results (from the past 24 hour(s))   GLUCOSE, POC    Collection Time: 18  7:46 PM   Result Value Ref Range    Glucose (POC) 115 (H) 70 - 110 mg/dL   GLUCOSE, POC    Collection Time: 18 11:15 PM   Result Value Ref Range    Glucose (POC) 90 70 - 110 mg/dL   POC G3    Collection Time: 18  4:39 AM   Result Value Ref Range    Device: VENT      FIO2 (POC) 30 % pH (POC) 7.504 (H) 7.35 - 7.45      pCO2 (POC) 32.1 (L) 35.0 - 45.0 MMHG    pO2 (POC) 84 80 - 100 MMHG    HCO3 (POC) 25.3 22 - 26 MMOL/L    sO2 (POC) 97 92 - 97 %    Base excess (POC) 2 mmol/L    Mode ASSIST CONTROL      Tidal volume 375 ml    Set Rate 10 bpm    PEEP/CPAP (POC) 5 cmH2O    Allens test (POC) N/A      Inspiratory Time 1 sec    Total resp. rate 12      Site RIGHT BRACHIAL      Patient temp. 98.0      Specimen type (POC) ARTERIAL      Performed by Mojgan Oneil     Volume control plus YES     PTT    Collection Time: 02/28/18  4:45 AM   Result Value Ref Range    aPTT 44.5 (H) 23.0 - 04.3 SEC   METABOLIC PANEL, BASIC    Collection Time: 02/28/18  4:45 AM   Result Value Ref Range    Sodium 132 (L) 136 - 145 mmol/L    Potassium 3.5 3.5 - 5.5 mmol/L    Chloride 97 (L) 100 - 108 mmol/L    CO2 23 21 - 32 mmol/L    Anion gap 12 3.0 - 18 mmol/L    Glucose 84 74 - 99 mg/dL    BUN 33 (H) 7.0 - 18 MG/DL    Creatinine 6.05 (H) 0.6 - 1.3 MG/DL    BUN/Creatinine ratio 5 (L) 12 - 20      GFR est AA 9 (L) >60 ml/min/1.73m2    GFR est non-AA 7 (L) >60 ml/min/1.73m2    Calcium 8.7 8.5 - 10.1 MG/DL   CBC WITH AUTOMATED DIFF    Collection Time: 02/28/18  4:45 AM   Result Value Ref Range    WBC 10.2 4.6 - 13.2 K/uL    RBC 3.24 (L) 4.20 - 5.30 M/uL    HGB 8.1 (L) 12.0 - 16.0 g/dL    HCT 26.0 (L) 35.0 - 45.0 %    MCV 80.2 74.0 - 97.0 FL    MCH 25.0 24.0 - 34.0 PG    MCHC 31.2 31.0 - 37.0 g/dL    RDW 15.2 (H) 11.6 - 14.5 %    PLATELET 414 387 - 534 K/uL    MPV 10.9 9.2 - 11.8 FL    NEUTROPHILS 83 (H) 40 - 73 %    LYMPHOCYTES 6 (L) 21 - 52 %    MONOCYTES 11 (H) 3 - 10 %    EOSINOPHILS 0 0 - 5 %    BASOPHILS 0 0 - 2 %    ABS. NEUTROPHILS 8.5 (H) 1.8 - 8.0 K/UL    ABS. LYMPHOCYTES 0.6 (L) 0.9 - 3.6 K/UL    ABS. MONOCYTES 1.1 0.05 - 1.2 K/UL    ABS. EOSINOPHILS 0.0 0.0 - 0.4 K/UL    ABS.  BASOPHILS 0.0 0.0 - 0.1 K/UL    DF AUTOMATED     VANCOMYCIN, RANDOM    Collection Time: 02/28/18  4:45 AM   Result Value Ref Range    Vancomycin, random <0.8 (L) 5.0 - 40.0 UG/ML   HEPATIC FUNCTION PANEL    Collection Time: 02/28/18  4:45 AM   Result Value Ref Range    Protein, total 6.0 (L) 6.4 - 8.2 g/dL    Albumin 2.3 (L) 3.4 - 5.0 g/dL    Globulin 3.7 2.0 - 4.0 g/dL    A-G Ratio 0.6 (L) 0.8 - 1.7      Bilirubin, total 0.4 0.2 - 1.0 MG/DL    Bilirubin, direct <0.1 0.0 - 0.2 MG/DL    Alk.  phosphatase 117 45 - 117 U/L    AST (SGOT) 47 (H) 15 - 37 U/L    ALT (SGPT) 19 13 - 56 U/L   PHOSPHORUS    Collection Time: 02/28/18  4:45 AM   Result Value Ref Range    Phosphorus 4.3 2.5 - 4.9 MG/DL   GLUCOSE, POC    Collection Time: 02/28/18  5:34 AM   Result Value Ref Range    Glucose (POC) 80 70 - 110 mg/dL   PTT    Collection Time: 02/28/18 10:35 AM   Result Value Ref Range    aPTT 42.2 (H) 23.0 - 36.4 SEC   GLUCOSE, POC    Collection Time: 02/28/18 11:14 AM   Result Value Ref Range    Glucose (POC) 97 70 - 110 mg/dL       Signed By: Denisha Villagran MD     February 28, 2018 11:34 AM

## 2018-02-28 NOTE — DIALYSIS
ACUTE HEMODIALYSIS FLOW SHEET    HEMODIALYSIS ORDERS: Physician: Yazan Lopes     Dialyzer: revaclear         Duration: 4 hr  BFR: 350   DFR: 600   Dialysate:  Temp 37.3 K+   3    Ca+  2.5 Na 140 Bicarb 35   Weight:  47.2 kg    Bed Scale []     Unable to Obtain []      Dry weight/UF Goal: 2500 Access RIJ  Needle Gauge       Heparin   []  Bolus      Units    [] Hourly       Units    []None     Catheter locking solution  heparin   Pre BP:  168/96  Pulse:     95     Temperature:   98.2  Respirations: 15  Tx: NS       ml/Bolus  Other        [] N/A   Labs: Pre        Post:        [x] N/A   Additional Orders(medications, blood products, hypotension management):       [x] N/A     [x] Time Out/Safety Check  [x] [facility] Consent Verified     CATHETER ACCESS: []N/A   [x]Right   []Left   [x]IJ     []Fem   [] First use X-ray verified     [x]Tunnel                [] Non Tunneled   [x]No S/S infection  []Redness  []Drainage []Cultured []Swelling []Pain   [x]Medical Aseptic Prep Utilized   [x]Dressing Changed  [x] Biopatch  Date: 02/26/18     []Clotted   [x]Patent   Flows: [x]Good  []Poor  []Reversed   If access problem,  notified: []Yes    []N/A  Date:           GRAFT/FISTULA ACCESS:  []N/A     []Right     [x]Left (not being used for this procedure)    []UE     []LE   []AVG   [x]AVF        []Buttonhole    []Medical Aseptic Prep Utilized   []No S/S infection  []Redness  []Drainage []Cultured []Swelling []Pain    Bruit:   [] Strong    [] Weak       Thrill :   [] Strong    [] Weak       Needle Gauge:    Length:     If access problem,  notified: []Yes     []N/A  Date:        Please describe access if present and not used:       GENERAL ASSESSMENT:    LUNGS:  Rate18 SaO2%    100   [] N/A    [x] Clear  [] Coarse  [] Crackles  [] Wheezing        [] Diminished     Location : []RLL   []LLL    [x]RUL  [x]ELÍAS   Cough: []Productive  []Dry  [x]N/A   Respirations:  [x]Easy  []Labored   Therapy:  []RA  []NC  l/min    Mask: []NRB []Venti O2%                  [x]Ventilator  [x]Intubated  [] Trach  [] BiPaP   CARDIAC: []Regular      [x] Irregular   [] Pericardial Rub  [] JVD        []  Monitored  [] Bedside  [] Remotely monitored [] N/A  Rhythm:    EDEMA: [x] None  []Generalized  [] Pitting [] 1    [] 2    [] 3    [] 4                 [] Facial  [] Pedal  []  UE  [] LE   SKIN:   [x] Warm  [] Hot     [] Cold   [x] Dry     [] Pale   [] Diaphoretic                  [] Flushed  [] Jaundiced  [] Cyanotic  [] Rash  [] Weeping   LOC:    [x] Alert      []Oriented:    [x] Person     [] Place  []Time               [] Confused  [x] Lethargic  [] Medicated  [] Non-responsive     GI / ABDOMEN:  [] Flat    [] Distended    [x] Soft    [] Firm   []  Obese                             [] Diarrhea  [x] Bowel Sounds  [] Nausea  [] Vomiting       / URINE ASSESSMENT[] Voiding   [] Oliguria  [x] Anuria   []  Onofre     [] Incontinent    []  Incontinent Brief      []  Bathroom Privileges     PAIN: [x] 0 []1  []2   []3   []4   []5   []6   []7   []8   []9   []10            Scale 0-10  Action/Follow Up:    MOBILITY:  [] Amb    [] Amb/Assist    [x] Bed    [] Wheelchair  [] Stretcher      All Vitals and Treatment Details on Attached [address]: [address] [facility] [address] icu   [] 1st Time Acute  [] Stat  [x] Routine  [] Urgent     [] Acute Room  []  Bedside  [x] ICU/CCU  [] ER   Isolation Precautions:  [x] Dialysis   [] Airborne   [] Contact    [] Reverse   Special Considerations:   MRSA      [] Blood Consent Verified []N/A     ALLERGIES:  [] NKA     banana     Code Status:  [x] Full Code  [] DNR  [] Other           HBsAg ONLY: Date Drawn 9/9/17         [x]Negative []Positive []Unknown   HBsAb: Date 9/9/17    [] Susceptible   [x] [id number] []Not Drawn  [] Drawn     Current Labs:    Date of Labs: Today [x]        Results for Zoey Bryson (MRN [id number]) as of 2/28/2018 13:54   Ref.  Range 2/28/2018 04:45   WBC Latest Ref Range: 4.6 - 13.2 K/uL 10.2   RBC Latest Ref Range: 4.20 - 5.30 M/uL 3.24 (L)   HGB Latest Ref Range: 12.0 - 16.0 g/dL 8.1 (L)   HCT Latest Ref Range: 35.0 - 45.0 % 26.0 (L)   MCV Latest Ref Range: 74.0 - 97.0 FL 80.2   MCH Latest Ref Range: 24.0 - 34.0 PG 25.0   MCHC Latest Ref Range: 31.0 - 37.0 g/dL 31.2   RDW Latest Ref Range: 11.6 - 14.5 % 15.2 (H)   PLATELET Latest Ref Range: 135 - 420 K/uL 216   MPV Latest Ref Range: 9.2 - 11.8 FL 10.9   NEUTROPHILS Latest Ref Range: 40 - 73 % 83 (H)   LYMPHOCYTES Latest Ref Range: 21 - 52 % 6 (L)   MONOCYTES Latest Ref Range: 3 - 10 % 11 (H)   EOSINOPHILS Latest Ref Range: 0 - 5 % 0   BASOPHILS Latest Ref Range: 0 - 2 % 0   DF Latest Units:   AUTOMATED   ABS. NEUTROPHILS Latest Ref Range: 1.8 - 8.0 K/UL 8.5 (H)   ABS. LYMPHOCYTES Latest Ref Range: 0.9 - 3.6 K/UL 0.6 (L)   ABS. MONOCYTES Latest Ref Range: 0.05 - 1.2 K/UL 1.1   ABS. EOSINOPHILS Latest Ref Range: 0.0 - 0.4 K/UL 0.0   ABS.  BASOPHILS Latest Ref Range: 0.0 - 0.1 K/UL 0.0   aPTT Latest Ref Range: 23.0 - 36.4 SEC 44.5 (H)   Sodium Latest Ref Range: 136 - 145 mmol/L 132 (L)   Potassium Latest Ref Range: 3.5 - 5.5 mmol/L 3.5   Chloride Latest Ref Range: 100 - 108 mmol/L 97 (L)   CO2 Latest Ref Range: 21 - 32 mmol/L 23   Anion gap Latest Ref Range: 3.0 - 18 mmol/L 12   Glucose Latest Ref Range: 74 - 99 mg/dL 84   BUN Latest Ref Range: 7.0 - 18 MG/DL 33 (H)   Creatinine Latest Ref Range: 0.6 - 1.3 MG/DL 6.05 (H)   BUN/Creatinine ratio Latest Ref Range: 12 - 20   5 (L)   Calcium Latest Ref Range: 8.5 - 10.1 MG/DL 8.7   Phosphorus Latest Ref Range: 2.5 - 4.9 MG/DL 4.3   GFR est non-AA Latest Ref Range: >60 ml/min/1.73m2 7 (L)   GFR est AA Latest Ref Range: >60 ml/min/1.73m2 9 (L)   Bilirubin, total Latest Ref Range: 0.2 - 1.0 MG/DL 0.4   Bilirubin, direct Latest Ref Range: 0.0 - 0.2 MG/DL <0.1   Protein, total Latest Ref Range: 6.4 - 8.2 g/dL 6.0 (L)   Albumin Latest Ref Range: 3.4 - 5.0 g/dL 2.3 (L)   Globulin Latest Ref Range: 2.0 - 4.0 g/dL 3.7   A-G Ratio Latest Ref Range: 0.8 - 1.7   0.6 (L)   ALT (SGPT) Latest Ref Range: 13 - 56 U/L 19   AST Latest Ref Range: 15 - 37 U/L 47 (H)   Alk.  phosphatase Latest Ref Range: 45 - 117 U/L 117   Vancomycin, random Latest Ref Range: 5.0 - 40.0 UG/ML <0.8 (L)                                                                                                                                 DIET:  [] Renal    [x] Other     [] NPO     []  Diabetic      PRIMARY NURSE REPORT: First initial/Last name/Title      Pre Dialysis: Ryan Max RN    Time: 8078      EDUCATION:    [x] Patient [] Other         Knowledge Basis: [x]None []Minimal [] Substantial   Barriers to learning nonverbal, does not speak english []N/A   [] Access Care     [] S&S of infection     [] Fluid Management     []K+     []Procedural    []Albumin     [] Medications     [] Tx Options     [] Transplant     [] Diet     [] Other   Teaching Tools:  [] Explain  [] Demo  [] Handouts [] Video  Patient response:  [] Verbalized understanding  [] Teach back  [] Return demonstration [x] Requires follow up   Inappropriate due to            [address] Before each treatment:     Machine Number:                   [address] [facility]                                   [] Unit Machine #  with centralized RO                                  [] Portable Machine #1/RO serial # [id number]                                  [] Portable Machine #2/RO serial # [id number]                                  [x] Portable Machine #3/RO serial # [id number]                                                                                                       [address] [place] [address]                                  [] Portable Machine #11/RO serial # [id number]                                   [] Portable Machine #12/RO serial # [id number]                                  [] Portable Machine #13/RO serial #  [id number]      Alarm Test:  Pass [id number]       Other:         [x] RO/Machine Log Complete      Temp 37.3           [x]Extracorporeal Circuit Tested for integrity   Dialysate: pH 7.4 Conductivity: Meter    HD Machine 14.1                TCD: 13.9  Dialyzer Lot # [id number]          Blood Tubing Lot # [id number]         Saline Lot #  [id number]     CHLORINE TESTING-Before each treatment and every 4 hours    Total Chlorine: [x] less than 0.1 ppm  Time:1245 Hr/2nd Check Time: 4057   (if greater than 0.1 ppm from Primary then every 30 minutes from Secondary)     TREATMENT INITIATION  with Dialysis Precautions:   [x] All Connections Secured                 [x] Saline Line Double Clamped   [x] Venous Parameters Set                  [x] Arterial Parameters Set    [x] Prime Given   ml               [x]Air Foam Detector Engaged      Treatment Initiation Note: at pt bedside in the icu. Pt intubated and on ventilator. Pt has RIJ catheter. arterial and venous ports aspirates and flushes well. Hemodialysis now started, will continue to monitor patient and vital signs. Medication Dose Volume Route Initials Dialyzer Cleared:[x] Good [] Fair  [] Poor    Blood processed: 77.3  L  UF Removed 1700 Ml    Post Wt: 44.7   kg  POst BP:174/86         Pulse:  84   [id number]  Temperature 98.6                                 Post Tx Vascular Access: AVF/AVG: Bleeding stopped Art min. Wei. Min   N/A                                   Catheter: Locking solution: Heparin 1:1000 Art 1. 6.Wei. 1.6  N/A                                 Post Assessment:                                    Skin: [x] Warm  [x] Dry [] Diaphoretic    [] Flushed  [] Pale [] Cyanotic   [facility] Signatures Title Initials  Time Lungs: [x] Clear    [] Course  [] Crackles  [] Wheezing [] Diminished   Randi Aparna RN RL 1504 Cardiac: [] Regular   [x] Irregular   [] Monitor  [] N/A  Rhythm:           Edema: [x] None    [] General     [] Facial   [] Pedal    [] UE    [] LE       Pain: [x]0  []1  []2   []3  []4   []5   []6   []7   []8   []9   []10 Post Treatment Note: hemodialysis completed. Pt and vss (bp high, primary nurse aware). 1.7 liters removed. R IJ catheter flushed with normal saline and heparin. Pt remains in icu. Report given to primary nurse.      POST TREATMENT PRIMARY NURSE HANDOFF REPORT:     First initial/Last name/Title         Post Dialysis: MUNA Can RN  Time:  1800     Abbreviations: AVG-arterial venous graft, AVF-arterial venous fistula, IJ-Internal Jugular, Subcl-Subclavian, Fem-Femoral, Tx-treatment, AP/HR-apical heart rate, DFR-dialysate flow rate, BFR-blood flow rate, AP-arterial pressure, -venous pressure, UF-ultrafiltrate, TMP-transmembrane pressure, Wei-Venous, Art-Arterial, RO-Reverse Osmosis

## 2018-02-28 NOTE — PROGRESS NOTES
02/28/18 1204   Weaning Parameters   Spontaneous Breathing Trial Complete Yes   Resp Rate Observed 20   Ve 6.4      RSBI 63   Patient did well on PSV8 wean. Ready for extubation but unable to proceed due to lack of cuff leak. Will receive steroids and attempt again tomorrow.

## 2018-02-28 NOTE — PROGRESS NOTES
RENAL DAILY PROGRESS NOTE      51y F with ESRD , hypertensive, ,respir failure, required intubation. Impression & Plan:   IMPRESSION:   · ESRD, MWF,   · Access; tunnel catheter in right IJ, left arm fistula + thrill   · HTN, uncontrolled, on cardene drip,  ·  respiratory failure, hypoxic , intubated  · Anemia   · Hyponatremia   · Alkalosis on blood gas, respiratory    PLAN:   · Due for HD today, BP remain on higher side being manage per ICU team.   · Her Hb is low but unable to give epogen as her BP is higher. · Plan for UF 2-2.5L , 3K bath   · Adjust all meds per ESRD status           Discussed with ICU team and family. At 4:53 PM on 2018, I saw and examined patient during hemodialysis treatment. The patient was receiving hemodialysis for treatment of  renal failure. I have also reviewed vital signs, intake and output, lab results and recent events, and agreed with today's dialysis order. HD rounding    Blood pressure 102/58, pulse 60, temperature 98.2 °F (36.8 °C), temperature source Axillary, resp. rate 10, height 5' 1\" (1.549 m), weight 42.9 kg (94 lb 9.2 oz), SpO2 100 %, not currently breastfeeding.   Temp (24hrs), Av.3 °F (36.8 °C), Min:98 °F (36.7 °C), Max:98.9 °F (37.2 °C)      Blood Pressure: BP: 102/58 (eyes closed, dr Jamie Kelley at bedside.)  Pulse: Pulse (Heart Rate): 60  Temp:  Temp: 98.2 °F (36.8 °C)    Artificial Kidney Dialyzer/Set Up Inspection: Revaclear   hours Duration of Treatment (hours): 3.45 hours   Heparin Bolus    Blood flow rate Blood Flow Rate (ml/min): 350 ml/min   Dialysate rate     Arterial Access Pressure Arterial Access Pressure (mmHg): -140  Venous Return Pressure Venous Return Pressure (mmHg): 90  Ultrafiltration Rate Goal/Amount of Fluid to Remove (mL): 2500 mL  Fluid Removal Fluid Removed (mL):   Net Fluid Removal NET Fluid Removed (mL): 3200 ml                      Subjective:       Complaint:   Overnight events noted  Remain intubated   Family bedside,     Current Facility-Administered Medications   Medication Dose Route Frequency    potassium chloride 10 mEq, lidocaine (PF) (XYLOCAINE) 10 mg/mL (1 %) 1 mL in 0.9% sodium chloride 100 mL IVPB   IntraVENous Q1H    hydrALAZINE (APRESOLINE) tablet 50 mg  50 mg Oral TID    cloNIDine HCl (CATAPRES) tablet 0.3 mg  0.3 mg Oral BID    dexmedeTOMidine (PRECEDEX) 600 mcg in 0.9% sodium chloride 150 mL infusion  0.2-0.7 mcg/kg/hr IntraVENous TITRATE    amLODIPine (NORVASC) tablet 10 mg  10 mg Oral DAILY    niCARdipine (CARDENE) 25 mg in 0.9% sodium chloride 250 mL infusion  0-15 mg/hr IntraVENous TITRATE    metoprolol tartrate (LOPRESSOR) tablet 25 mg  25 mg Oral Q6H    insulin lispro (HUMALOG) injection   SubCUTAneous Q6H    glucose chewable tablet 16 g  4 Tab Oral PRN    glucagon (GLUCAGEN) injection 1 mg  1 mg IntraMUSCular PRN    dextrose (D50W) injection syrg 12.5-25 g  25-50 mL IntraVENous PRN    albuterol (ACCUNEB) nebulizer solution 2.5 mg  2.5 mg Nebulization Q6H PRN    aspirin chewable tablet 81 mg  81 mg Oral DAILY    atorvastatin (LIPITOR) tablet 40 mg  40 mg Oral QHS    acetaminophen (TYLENOL) solution 650 mg  650 mg Oral Q6H PRN    heparin 25,000 units in D5W 250 ml infusion  12-25 Units/kg/hr IntraVENous TITRATE    sodium chloride (NS) flush 5-10 mL  5-10 mL IntraVENous Q8H    sodium chloride (NS) flush 5-10 mL  5-10 mL IntraVENous PRN    acetaminophen (TYLENOL) suppository 650 mg  650 mg Rectal Q6H PRN    naloxone (NARCAN) injection 0.1 mg  0.1 mg IntraVENous PRN    ondansetron (ZOFRAN) injection 4 mg  4 mg IntraVENous Q6H PRN    hydrALAZINE (APRESOLINE) 20 mg/mL injection 10 mg  10 mg IntraVENous Q6H PRN    fentaNYL (PF) 10 mcg/mL infusion  0-200 mcg/hr IntraVENous TITRATE    midazolam (VERSED) injection 1-2 mg  1-2 mg IntraVENous Q4H PRN    famotidine (PF) (PEPCID) 20 mg in sodium chloride 10 mL injection  20 mg IntraVENous DAILY    chlorhexidine (PERIDEX) 0.12 % mouthwash 10 mL  10 mL Oral Q12H       Review of Symptoms: as above   Objective:     Patient Vitals for the past 24 hrs:   Temp Pulse Resp BP SpO2   02/28/18 1000 - (!) 109 18 194/89 100 %   02/28/18 0900 - 98 15 185/86 100 %   02/28/18 0848 - (!) 105 20 - 100 %   02/28/18 0836 - 99 10 - 100 %   02/28/18 0800 98.9 °F (37.2 °C) 91 14 (!) 172/97 100 %   02/28/18 0700 - 73 11 121/76 100 %   02/28/18 0600 - 78 10 124/74 100 %   02/28/18 0536 - 78 - 115/73 -   02/28/18 0500 - 89 11 160/86 100 %   02/28/18 0421 - 76 11 - 100 %   02/28/18 0400 - 77 10 129/77 100 %   02/28/18 0332 98 °F (36.7 °C) - - - -   02/28/18 0300 - 100 16 (!) 196/96 100 %   02/28/18 0200 - 75 10 123/76 100 %   02/28/18 0100 - 73 10 116/74 100 %   02/28/18 0057 - 75 12 - 100 %   02/28/18 0000 - 74 11 109/70 100 %   02/27/18 2320 98.5 °F (36.9 °C) - - - -   02/27/18 2305 - (!) 104 - 137/74 -   02/27/18 2300 - 89 10 137/74 100 %   02/27/18 2200 - 97 10 152/75 100 %   02/27/18 2100 - (!) 103 10 166/90 100 %   02/27/18 2000 - (!) 107 12 169/87 100 %   02/27/18 1959 - - - - 100 %   02/27/18 1953 98.2 °F (36.8 °C) - - - -   02/27/18 1900 - (!) 101 10 145/81 100 %   02/27/18 1800 - 86 10 107/61 100 %   02/27/18 1700 - (!) 102 10 127/69 100 %   02/27/18 1600 - 83 10 140/75 100 %   02/27/18 1555 - (!) 106 10 - 100 %   02/27/18 1530 99.3 °F (37.4 °C) - - - -   02/27/18 1500 - (!) 110 16 154/74 100 %   02/27/18 1400 - (!) 115 16 144/75 100 %   02/27/18 1300 - (!) 107 13 163/88 100 %   02/27/18 1246 - (!) 135 16 - 100 %   02/27/18 1244 - (!) 113 15 - 100 %   02/27/18 1230 - (!) 116 17 (!) 200/100 100 %   02/27/18 1213 - (!) 105 18 - 100 %   02/27/18 1211 - (!) 102 11 - 100 %   02/27/18 1200 - 98 16 148/84 100 %        Weight change: -1.1 kg (-2 lb 6.8 oz)     02/26 1901 - 02/28 0700  In: 1298.8 [I.V.:263.8]  Out: 490     Intake/Output Summary (Last 24 hours) at 02/28/18 1116  Last data filed at 02/28/18 0800   Gross per 24 hour   Intake           578.92 ml   Output 450 ml   Net           128.92 ml         Physical Exam  General: intubated   HEENT  no thyromegaly  CVS: S1S2 heard,  no rub  RS: + air entry b/l,   Abd: Soft,   Extrm: no edema, no cyanosis, clubbing   Skin: no visible  Rash  Musculoskeletal: No gross joints or bone deformities   Access;  Tunnel catheter on right ij, left arm fistula + thrill   Procedures/imaging: see electronic medical records for all procedures, Xrays and details which were not copied into this note but were reviewed prior to creation of Plan           Data Review:     LABS:   Hematology:   Recent Labs      02/28/18 0445  02/27/18   0114  02/26/18   0410  02/25/18   1338   WBC  10.2  8.5  8.6  7.7   HGB  8.1*  8.3*  8.5*  9.4*   HCT  26.0*  26.4*  27.0*  30.8*     Chemistry:   Recent Labs      02/28/18   0445  02/27/18   0515  02/27/18   0114  02/26/18   0410  02/25/18   2234  02/25/18   1338   BUN  33*   --   17  22*  19*  18   CREA  6.05*   --   4.06*  6.59*  6.23*  5.80*   CA  8.7   --   7.8*  8.3*  8.4*  9.4   ALB  2.3*   --    --   2.5*   --   3.2*   K  3.5  3.6  3.2*  4.0  4.3  4.0   NA  132*   --   132*  129*  128*  129*   CL  97*   --   96*  94*  93*  92*   CO2  23   --   30  22  22  27   PHOS  4.3  3.8  3.4   --   3.9   --    GLU  84   --   109*  81  87  122*              Procedures/imaging: see electronic medical records for all procedures, Xrays and details which were not copied into this note but were reviewed prior to creation of Plan          Assessment & Plan:     As above         Justina Allison MD  2/28/2018  1:47 PM

## 2018-02-28 NOTE — ROUTINE PROCESS
Patient did well last night. Family at the bedside till late this am. Versed 2mg given last night per family request. Patient slept well after. HR converted to SR.  Cooperative with am bath this am.

## 2018-02-28 NOTE — PROGRESS NOTES
[facility]  Pulmonary, Critical Care, and Sleep Medicine    Name: Liza Carrillo MRN: [id number]   : 1960 Hospital: [address] [place] [address]   Date: 2018        Pulmonary Critical Care Progress Note                                                Subjective/History:   Patient is a 56 y.o. Vietamese female with a hx of end stage renal disease, HTN, and Hypercholestermia who presented to [facility] ED with c/o constant moderate SOB onset 3-4 days. Associated sx of productive cough (with white sputum), nausea, emesis, dysuria, and decreased appetite. Pt has dialysis M/W/F with last visit on Friday, 18 with pt reporting not feeling better afterwards. Patient was intubated in the [facility] ED for hypoxia. Troponins were elevated and Cardiology was consulted. Started on Heparin gtt and ASA was ordered. Nephrology consulted for ESRD/Dialysis. Patient was transferred to [facility] ICU.       18   Intubated and sedated. SBT with possible extubation today. Patient does not speak [place], speaks [place]. Opens eyes to words, moves all extremities. Hemodynamically stable, Afebrile   Dialysis per renal.      The patient is critically ill and can not provide additional history due to ventilated. [x]The patient is unable to give any meaningful history or review of systems because the patient is:  [x]Intubated [x]Sedated   []Unresponsive [x]Ventilated     [x]The patient is critically ill on      [x]Mechanical ventilation []Pressors   []BiPAP []              Review of Systems:  Review of systems not obtained due to patient factors.     Past Medical History:  Past Medical History:   Diagnosis Date    Chronic kidney disease     ESRD (end stage renal disease) ([facility] [address]     TUES-THURS-SAT    Hypercholesteremia     Hypertension     Kidney disease     Vitamin D deficiency         Past Surgical History:  Past Surgical History:   Procedure Laterality Date    VASCULAR SURGERY PROCEDURE UNLIST Medications:  Prior to Admission medications    Medication Sig Start Date End Date Taking? Authorizing Provider   hydrALAZINE (APRESOLINE) 50 mg tablet Take 25 mg by mouth three (3) times daily. Brenda Blackman MD   losartan (COZAAR) 100 mg tablet Take 100 mg by mouth daily. Brenda Blackman MD   amLODIPine (NORVASC) 10 mg tablet Take 10 mg by mouth daily. Brenda Blackman MD   glycerin, adult, (FLEET GLYCERIN, ADULT,) suppository Insert 1 Suppository into rectum daily. Indications: BOWEL EVACUATION 2/5/18   Omer Miller MD   oxyCODONE-acetaminophen (PERCOCET) 5-325 mg per tablet Take 1 Tab by mouth every four (4) hours as needed for Pain. Max Daily Amount: 6 Tabs. 1/22/18   Renea Casas MD   albuterol (PROVENTIL HFA, VENTOLIN HFA, PROAIR HFA) 90 mcg/actuation inhaler Take 2 Puffs by inhalation every four (4) hours as needed for Wheezing. 11/6/17   Christal Langford PA-C   calcium acetate (PHOSLO) 667 mg cap Take 1 Cap by mouth three (3) times daily (with meals). 9/13/17   Jonathon Lopez MD   cloNIDine HCl (CATAPRES) 0.1 mg tablet Take 0.3 mg by mouth three (3) times daily. Brenda Blackman MD   NIFEdipine ER (ADALAT CC) 30 mg ER tablet Take 20 mg by mouth daily. Brenda Blackman MD   multivitamin with iron tablet Take 1 Tab by mouth daily.     Brenda Blackman MD       Current Facility-Administered Medications   Medication Dose Route Frequency    potassium chloride 10 mEq, lidocaine (PF) (XYLOCAINE) 10 mg/mL (1 %) 1 mL in 0.9% sodium chloride 100 mL IVPB   IntraVENous Q1H    vancomycin (VANCOCIN) 1,000 mg in 0.9% sodium chloride (MBP/ADV) 250 mL adv  1,000 mg IntraVENous ONCE    amLODIPine (NORVASC) tablet 10 mg  10 mg Oral DAILY    niCARdipine (CARDENE) 25 mg in 0.9% sodium chloride 250 mL infusion  0-15 mg/hr IntraVENous TITRATE    metoprolol tartrate (LOPRESSOR) tablet 25 mg  25 mg Oral Q6H    insulin lispro (HUMALOG) injection   SubCUTAneous Q6H    cloNIDine HCl (CATAPRES) tablet 0.2 mg  0.2 mg Oral BID    aspirin chewable tablet 81 mg  81 mg Oral DAILY    atorvastatin (LIPITOR) tablet 40 mg  40 mg Oral QHS    VANCOMYCIN INFORMATION NOTE   Other CONTINUOUS    heparin 25,000 units in D5W 250 ml infusion  12-25 Units/kg/hr IntraVENous TITRATE    sodium chloride (NS) flush 5-10 mL  5-10 mL IntraVENous Q8H    fentaNYL (PF) 10 mcg/mL infusion  0-200 mcg/hr IntraVENous TITRATE    famotidine (PF) (PEPCID) 20 mg in sodium chloride 10 mL injection  20 mg IntraVENous DAILY    chlorhexidine (PERIDEX) 0.12 % mouthwash 10 mL  10 mL Oral Q12H    cefTRIAXone (ROCEPHIN) 2 g in sterile water (preservative free) 20 mL IV syringe  2 g IntraVENous Q24H       Allergy:  Allergies   Allergen Reactions    Banana Other (comments)        Social History:  Social History   Substance Use Topics    Smoking status: Never Smoker    Smokeless tobacco: Never Used    Alcohol use No        Family History:  History reviewed. No pertinent family history.        Objective:   Vital Signs:    Visit Vitals    /74    Pulse 78    Temp 98 °F (36.7 °C)    Resp 10    Ht 5' 1\" (1.549 m)    Wt 42.9 kg (94 lb 9.2 oz)    SpO2 100%    Breastfeeding No    BMI 17.87 kg/m2       O2 Device: Endotracheal tube, Ventilator       Temp (24hrs), Av.6 °F (37 °C), Min:98 °F (36.7 °C), Max:99.3 °F (37.4 °C)       Patient Vitals for the past 8 hrs:   Temp Pulse Resp BP SpO2   18 0600 - 78 10 124/74 100 %   18 0536 - 78 - 115/73 -   18 0500 - 89 11 160/86 100 %   18 0421 - 76 11 - 100 %   18 0400 - 77 10 129/77 100 %   18 0332 98 °F (36.7 °C) - - - -   18 0300 - 100 16 (!) 196/96 100 %   18 0200 - 75 10 123/76 100 %   18 0100 - 73 10 116/74 100 %   18 0057 - 75 12 - 100 %     Intake/Output:   Last shift:       0701 - 02/28 1900  In: 11.2 [I.V.:11.2]  Out: -   Last 3 shifts: 1901 -  0700  In: 1298.8 [I.V.:263.8]  Out: 490     Intake/Output Summary (Last 24 hours) at 02/28/18 0811  Last data filed at 02/28/18 0707   Gross per 24 hour   Intake           518.92 ml   Output              400 ml   Net           118.92 ml       Ventilator Settings:  Mode Rate Tidal Volume Pressure FiO2 PEEP   VC+   375 ml  8 cm H2O 30 % 5 cm H20     Peak airway pressure: 22 cm H2O    Minute ventilation: 5.3 l/min      ARDS network Guidelines: Lung protective strategy, Pl pressure goals less than or equal to 30. Physical Exam:  General appearance - Intubated and Sedated  Mental status - unable to assess at this time  Eyes - pupils equal and reactive, extraocular eye movements intact, sclera anicteric  Mouth - mucous membranes moist, pharynx normal without lesions  Neck - supple, no significant adenopathy  Chest - no tachypnea, retractions or cyanosis, bilateral lower lobe crackles noted, no wheezing or rhonchi. Equal chest excursion.   Heart - normal rate, regular rhythm, normal S1, S2, grade 2/6 holosystolic murmur auscultated at the fourth left sternal border, no rubs, clicks or gallops  Abdomen - soft, nontender, nondistended, no masses or organomegaly  Neurological - limited, sedated, non-focal, moves all extremities, responds to verbal stimuli  Musculoskeletal - no joint tenderness, deformity or swelling  Extremities - peripheral pulses normal, no pedal edema, no clubbing or cyanosis  Skin - normal coloration and turgor, no rashes, no suspicious skin lesions noted    Data:     Recent Results (from the past 24 hour(s))   GLUCOSE, POC    Collection Time: 02/27/18 11:11 AM   Result Value Ref Range    Glucose (POC) 102 70 - 110 mg/dL   GLUCOSE, POC    Collection Time: 02/27/18  7:46 PM   Result Value Ref Range    Glucose (POC) 115 (H) 70 - 110 mg/dL   GLUCOSE, POC    Collection Time: 02/27/18 11:15 PM   Result Value Ref Range    Glucose (POC) 90 70 - 110 mg/dL   POC G3    Collection Time: 02/28/18  4:39 AM   Result Value Ref Range    Device: VENT      FIO2 (POC) 30 %    pH (POC) 7.504 (H) 7.35 - 7.45      pCO2 (POC) 32.1 (L) 35.0 - 45.0 MMHG    pO2 (POC) 84 80 - 100 MMHG    HCO3 (POC) 25.3 22 - 26 MMOL/L    sO2 (POC) 97 92 - 97 %    Base excess (POC) 2 mmol/L    Mode ASSIST CONTROL      Tidal volume 375 ml    Set Rate 10 bpm    PEEP/CPAP (POC) 5 cmH2O    Allens test (POC) N/A      Inspiratory Time 1 sec    Total resp. rate 12      Site RIGHT BRACHIAL      Patient temp. 98.0      Specimen type (POC) ARTERIAL      Performed by Suellen Mosquera     Volume control plus YES     PTT    Collection Time: 02/28/18  4:45 AM   Result Value Ref Range    aPTT 44.5 (H) 23.0 - 34.5 SEC   METABOLIC PANEL, BASIC    Collection Time: 02/28/18  4:45 AM   Result Value Ref Range    Sodium 132 (L) 136 - 145 mmol/L    Potassium 3.5 3.5 - 5.5 mmol/L    Chloride 97 (L) 100 - 108 mmol/L    CO2 23 21 - 32 mmol/L    Anion gap 12 3.0 - 18 mmol/L    Glucose 84 74 - 99 mg/dL    BUN 33 (H) 7.0 - 18 MG/DL    Creatinine 6.05 (H) 0.6 - 1.3 MG/DL    BUN/Creatinine ratio 5 (L) 12 - 20      GFR est AA 9 (L) >60 ml/min/1.73m2    GFR est non-AA 7 (L) >60 ml/min/1.73m2    Calcium 8.7 8.5 - 10.1 MG/DL   CBC WITH AUTOMATED DIFF    Collection Time: 02/28/18  4:45 AM   Result Value Ref Range    WBC 10.2 4.6 - 13.2 K/uL    RBC 3.24 (L) 4.20 - 5.30 M/uL    HGB 8.1 (L) 12.0 - 16.0 g/dL    HCT 26.0 (L) 35.0 - 45.0 %    MCV 80.2 74.0 - 97.0 FL    MCH 25.0 24.0 - 34.0 PG    MCHC 31.2 31.0 - 37.0 g/dL    RDW 15.2 (H) 11.6 - 14.5 %    PLATELET 137 131 - 850 K/uL    MPV 10.9 9.2 - 11.8 FL    NEUTROPHILS 83 (H) 40 - 73 %    LYMPHOCYTES 6 (L) 21 - 52 %    MONOCYTES 11 (H) 3 - 10 %    EOSINOPHILS 0 0 - 5 %    BASOPHILS 0 0 - 2 %    ABS. NEUTROPHILS 8.5 (H) 1.8 - 8.0 K/UL    ABS. LYMPHOCYTES 0.6 (L) 0.9 - 3.6 K/UL    ABS. MONOCYTES 1.1 0.05 - 1.2 K/UL    ABS. EOSINOPHILS 0.0 0.0 - 0.4 K/UL    ABS.  BASOPHILS 0.0 0.0 - 0.1 K/UL    DF AUTOMATED     VANCOMYCIN, RANDOM    Collection Time: 02/28/18  4:45 AM   Result Value Ref Range    Vancomycin, random <0.8 (L) 5.0 - 40.0 UG/ML   HEPATIC FUNCTION PANEL    Collection Time: 02/28/18  4:45 AM   Result Value Ref Range    Protein, total 6.0 (L) 6.4 - 8.2 g/dL    Albumin 2.3 (L) 3.4 - 5.0 g/dL    Globulin 3.7 2.0 - 4.0 g/dL    A-G Ratio 0.6 (L) 0.8 - 1.7      Bilirubin, total 0.4 0.2 - 1.0 MG/DL    Bilirubin, direct <0.1 0.0 - 0.2 MG/DL    Alk. phosphatase 117 45 - 117 U/L    AST (SGOT) 47 (H) 15 - 37 U/L    ALT (SGPT) 19 13 - 56 U/L   PHOSPHORUS    Collection Time: 02/28/18  4:45 AM   Result Value Ref Range    Phosphorus 4.3 2.5 - 4.9 MG/DL   GLUCOSE, POC    Collection Time: 02/28/18  5:34 AM   Result Value Ref Range    Glucose (POC) 80 70 - 110 mg/dL           Recent Labs      02/28/18   0439  02/27/18   0413  02/26/18   1011   FIO2I  30  30  30   HCO3I  25.3  30.0*  23.9   PCO2I  32.1*  36.7  31.1*   PHI  7.504*  7.521*  7.493*   PO2I  84  126*  100       Special Requests:   Date Value Ref Range Status   02/26/2018 NO SPECIAL REQUESTS   Preliminary     Culture result:   Date Value Ref Range Status   02/26/2018 NO GROWTH AFTER 23 HOURS   Preliminary       Telemetry: normal sinus rhythm    ECHO 9/12/17     SUMMARY:  Left ventricle: Systolic function was normal. Ejection fraction was estimated   in the range of 60 % to 65 %. No obvious  wall motion abnormalities identified in the views obtained. Wall thickness   was mildly increased. Doppler parameters  were consistent with abnormal left ventricular relaxation (grade 1 diastolic   dysfunction). Left atrium: The atrium was mildly dilated. Pericardium: A trivial pericardial effusion was identified circumferential to   the heart. There was no evidence of  hemodynamic compromise.     Imaging:  [x]I have personally reviewed the patients chest radiographs images and report     CXR Results  (Last 48 hours)               02/27/18 0606  XR CHEST PORT Final result    Impression:  Impression:       Cardiomegaly, central vascular congestion, interstitial edema and pleural   effusions with associated basilar consolidations favored to represent   atelectasis. Lines and tubes, as above. Thank you for this referral.       Narrative:  Portable chest       History: Intubation. Respiratory failure. Comparison: Chest radiograph 2/26/2018: CTA chest 2/25/2018       Findings:         -Endotracheal tube tip is 5.5 cm from the miquel.   -Enteric tube tip is subdiaphragmatic in the proximal gastric body, sidehole in   the gastric fundus.   -Right internal jugular CVL tip is at the right atrium. The cardiomediastinal silhouette is enlarged, which is unchanged. The pulmonary   vasculature is prominent. Interstitial markings are increased. There is hazy   opacity at the right lung base with a basilar gradient. The costophrenic angles   are blunted. Focal consolidation of the left lower lobe with silhouetting of the   hemidiaphragm. There is focal consolidation of the medial right lower lobe with   silhouetting of the medial hemidiaphragm. No pneumothorax. No acute osseous   abnormality. Results from Hospital Encounter encounter on 02/25/18   XR CHEST PORT   Narrative Portable chest    History: Intubation. Respiratory failure. Comparison: Chest radiograph 2/26/2018: CTA chest 2/25/2018    Findings:      -Endotracheal tube tip is 5.5 cm from the miquel.  -Enteric tube tip is subdiaphragmatic in the proximal gastric body, sidehole in  the gastric fundus.  -Right internal jugular CVL tip is at the right atrium. The cardiomediastinal silhouette is enlarged, which is unchanged. The pulmonary  vasculature is prominent. Interstitial markings are increased. There is hazy  opacity at the right lung base with a basilar gradient. The costophrenic angles  are blunted. Focal consolidation of the left lower lobe with silhouetting of the  hemidiaphragm. There is focal consolidation of the medial right lower lobe with  silhouetting of the medial hemidiaphragm. No pneumothorax.   No acute osseous  abnormality. Impression Impression:    Cardiomegaly, central vascular congestion, interstitial edema and pleural  effusions with associated basilar consolidations favored to represent  atelectasis. Lines and tubes, as above. Thank you for this referral.          Results from [facility] encounter on 02/25/18   CTA CHEST W OR W WO CONT   Narrative EXAMINATION: CTA chest pulmonary    INDICATION: Acute chest pain, shortness of breath    COMPARISON: None    TECHNIQUE: CT angiography of the pulmonary arteries performed following 75 cc IV  Isovue 370, with 3-D MIP multiplanar reformations. All CT scans at this facility  are performed using dose optimization technique as appropriate to a performed  exam, to include automated exposure control, adjustment of the mA and/or kV  according to patient size (including appropriate matching first site specific  examinations), or use of iterative reconstruction technique. FINDINGS:    Cardiovascular: No pulmonary arterial filling defects identified to suggest  pulmonary embolism. Motion artifact limits evaluation of smaller segmental and  subsegmental arteries. Ascending thoracic aorta is borderline ectatic measuring  up to 3.5 cm diameter. Heart size within normal limits. Right jugular catheter  tip at right atrium level. Moderate-sized area of cartilage fusion. Mediastinum: Possible enlarged aortopulmonary node and left hilar nodes, poorly  delineated. Left hilar anita calcification noted. Thyroid slightly  heterogeneous. Esophagus is nondistended. Pleura: Moderate volume bilateral pleural effusions. No evidence of  pneumothorax. Lungs: Probable compressive atelectasis due to pleural effusions bilaterally. Right major fissure nodule measuring 4 mm, likely pleural-based node. Upper abdomen: Unremarkable. Miscellaneous: Superficial soft tissues unremarkable. Bones: No acute osseous findings.  Probable bone island in T11 posterior body.         Impression IMPRESSION:    1. No pulmonary embolus identified. Smaller segmental and subsegmental pulmonary  arteries less well evaluated due to motion artifact. 2. Moderate-sized bilateral pleural effusions, and moderate volume pericardial  effusion. Borderline ectatic ascending aorta. -Probable compressive atelectasis in bilateral lungs. Superimposed infiltrate  difficult to exclude.    -Suspected left hilar and aortopulmonary window adenopathy, poorly delineated,  nonspecific. IMPRESSION:   · Acute Hypoxic respiratory failure 2' to fluid overload now intubated on mechanical ventilation. · Bilateral pleural effusions  · Possible left hilar enlargement (reactive, volume overload?)  · Hyponatremia - 132  · ESRD (end stage renal disease) requires hemodialysis (MWF)  · NSTEMI (non-ST elevated myocardial infarction)  · Mildly elevated Transimines  · Hypertensive Urgency  · + MRSA screen- Nares: On contact isolation- no signs of active infection at this time     RECOMMENDATIONS:   Resp: Mech. Ventilated patients- aim to keep peak plateau pressure less than or equal to 30cm H2O. Titrate FiO2 for goal SPO2> 90%,VAP prevention bundle, head of the bed at 30' all times. Daily sedation holiday and assessment for weaning with SBT as tolerated  Bronchodilators PRN, pulmonary hygiene care, Aspiration precautions, Keep HOB >30 degrees  Daily ABG and Chxr while intubated. ID: Aleukocytosis and now Febrile. Continue Rocephin and Vancomycin, MRSA nasal swab +, f/o on blood and sputum cultures. Possible UTI, anuric no urine to test.   Heme/Onc: Aleukocytosis, H/H, and platelets are stable, trend CBC. CVS: Started on Cardene for hypertension, Trend cardiac panel, NSTEMI - heparin gtt stopped for epistaxis- Cardiology is following. Renal: Anuric, Dialysis patient, Right chest wall HD catheter, Nephrology following Dialysis per their recommendations. Trend Renal indices.    GI: SUP, Trend LFTs, Zofran PRN for N/V   Metabolic: Glycemic control - SSI PRN, Replace E-lytes per nephrology  Neuro/Sedation/Pain: Fentanyl and Versed gtt for sedation  Stress ulcer prophylaxis: Pepcid IV   DVT prophylaxis: Hold Heparin gtt, SCDs  AM labs: Daily CBC BMP Mag and Phos. Diet: tube feeds per nutritionist recommendaions  Lines/Devices: ETT 02/25, PIVs  Vent Bundle Followed, Vent Day 02         [x]See my orders for details    My assessment, plan of care, findings, medications, side effects etc were discussed with:  [x]nursing []PT/OT    [x]respiratory therapy [x]Dr. Pratik España. Perello   []family []Patient     [x]Total critical care time exclusive of procedures   minutes with complex decision making performed and > 50% time spent in face to face evaluation. Athena [facility]     Pulmonary, Critical Care Medicine  [facility]      [facility] Staff Addendum     I have independently evaluated the patient and reviewed the patient's chart. I have discussed the findings and care plan with ICU Care Team.  Discussed on Multi-D rounds    I agree with the above evaluation, assessment and recommendations along with the following comments and observations. Hypertension remains an issue. Requires intermittent Cardene drip. Did well on SBT but no cuff leak. Started on steroids today. Will continue with supportive ventilation and reassess tomorrow. Continuing with supportive care. Daughter and son-in-law have been coming daily and are involved in patient care. Discussed above with patient's family.        Clinical Care and time spent coordinating care, minus procedure time: 60  min    Asher Rodrigues DO, [facility]  Pulmonary, Sleep and Critical Care Medicine  4:39 PM

## 2018-03-01 NOTE — PROGRESS NOTES
[facility]  Progress Note    Patient: Taylor Gupta Age: 56 y.o. : 1960 MR#: [id number] SSN: [id number]  Date/Time: 3/1/2018  12:03 AM    Subjective/24-hour events:     Remains intubated. Family at bedside. Assessment:   Acute hypoxic respiratory failure  NSTEMI  Malignant HTN  Acute diastolic CHF  ESRD  Nasal MRSA colonization     Plan:  Possible extubation if tolerates SBT today. Continue medical management for NSTEMI but would probably benefit from cath when more stable. Cardiology continues to follow. Monitor BPs, adjust PO therapy as necessary. HD per nephrology. H/H a bit low but stable. Monitor. Remains on Vancomycin. Cultures negative. D/C when ok with ICU team.  Following. Case discussed with:  []Patient  [x]Family  []Nursing  []Case Management  DVT Prophylaxis:  []Lovenox  []Hep SQ  []SCDs  []Coumadin   [x]On Heparin gtt    Objective:   VS:   Visit Vitals    /70    Pulse 63    Temp 98.8 °F (37.1 °C)    Resp 10    Ht 5' 1\" (1.549 m)    Wt 43 kg (94 lb 12.8 oz)    SpO2 100%    Breastfeeding No    BMI 17.91 kg/m2      Tmax/24hrs: Temp (24hrs), Av.4 °F (36.9 °C), Min:97.9 °F (36.6 °C), Max:98.8 °F (37.1 °C)      Intake/Output Summary (Last 24 hours) at 18 1203  Last data filed at 18 0800   Gross per 24 hour   Intake           490.17 ml   Output             1700 ml   Net         -1209.83 ml       General:  In NAD. Cardiovascular: RRR. Pulmonary:  Lungs clear anteriorly. Effort nonlabored. GI:  Abdomen soft, NTTP. Extremities:  Warm, no ischemia. Neuro:  Awake. Moves extremities spontaneously.     Labs:    Recent Results (from the past 24 hour(s))   GLUCOSE, POC    Collection Time: 18  6:13 PM   Result Value Ref Range    Glucose (POC) 94 70 - 110 mg/dL   GLUCOSE, POC    Collection Time: 18 11:57 PM   Result Value Ref Range    Glucose (POC) 125 (H) 70 - 110 mg/dL   PTT    Collection Time: 18  3:40 AM   Result Value Ref Range    aPTT 39.0 (H) 23.0 - 36.4 SEC   CBC WITH AUTOMATED DIFF    Collection Time: 03/01/18  3:40 AM   Result Value Ref Range    WBC 3.1 (L) 4.6 - 13.2 K/uL    RBC 3.15 (L) 4.20 - 5.30 M/uL    HGB 8.0 (L) 12.0 - 16.0 g/dL    HCT 25.2 (L) 35.0 - 45.0 %    MCV 80.0 74.0 - 97.0 FL    MCH 25.4 24.0 - 34.0 PG    MCHC 31.7 31.0 - 37.0 g/dL    RDW 15.0 (H) 11.6 - 14.5 %    PLATELET 125 949 - 632 K/uL    MPV 10.7 9.2 - 11.8 FL    NEUTROPHILS 90 (H) 40 - 73 %    LYMPHOCYTES 8 (L) 21 - 52 %    MONOCYTES 2 (L) 3 - 10 %    EOSINOPHILS 0 0 - 5 %    BASOPHILS 0 0 - 2 %    ABS. NEUTROPHILS 2.8 1.8 - 8.0 K/UL    ABS. LYMPHOCYTES 0.2 (L) 0.9 - 3.6 K/UL    ABS. MONOCYTES 0.1 0.05 - 1.2 K/UL    ABS. EOSINOPHILS 0.0 0.0 - 0.4 K/UL    ABS. BASOPHILS 0.0 0.0 - 0.1 K/UL    DF AUTOMATED     METABOLIC PANEL, COMPREHENSIVE    Collection Time: 03/01/18  3:40 AM   Result Value Ref Range    Sodium 136 136 - 145 mmol/L    Potassium 4.5 3.5 - 5.5 mmol/L    Chloride 100 100 - 108 mmol/L    CO2 27 21 - 32 mmol/L    Anion gap 9 3.0 - 18 mmol/L    Glucose 149 (H) 74 - 99 mg/dL    BUN 22 (H) 7.0 - 18 MG/DL    Creatinine 2.77 (H) 0.6 - 1.3 MG/DL    BUN/Creatinine ratio 8 (L) 12 - 20      GFR est AA 22 (L) >60 ml/min/1.73m2    GFR est non-AA 18 (L) >60 ml/min/1.73m2    Calcium 8.5 8.5 - 10.1 MG/DL    Bilirubin, total 0.6 0.2 - 1.0 MG/DL    ALT (SGPT) 20 13 - 56 U/L    AST (SGOT) 40 (H) 15 - 37 U/L    Alk.  phosphatase 109 45 - 117 U/L    Protein, total 5.3 (L) 6.4 - 8.2 g/dL    Albumin 2.3 (L) 3.4 - 5.0 g/dL    Globulin 3.0 2.0 - 4.0 g/dL    A-G Ratio 0.8 0.8 - 1.7     PHOSPHORUS    Collection Time: 03/01/18  3:40 AM   Result Value Ref Range    Phosphorus 4.3 2.5 - 4.9 MG/DL   POC G3    Collection Time: 03/01/18  4:23 AM   Result Value Ref Range    Device: VENT      FIO2 (POC) 30 %    pH (POC) 7.618 (HH) 7.35 - 7.45      pCO2 (POC) 24.0 (L) 35.0 - 45.0 MMHG    pO2 (POC) 81 80 - 100 MMHG    HCO3 (POC) 24.6 22 - 26 MMOL/L    sO2 (POC) 98 (H) 92 - 97 %    Base excess (POC) 3 mmol/L    Mode ASSIST CONTROL      Tidal volume 375 ml    Set Rate 10 bpm    PEEP/CPAP (POC) 5 cmH2O    Allens test (POC) YES      Inspiratory Time 1 sec    Total resp. rate 12      Site RIGHT RADIAL      Patient temp.  98.6      Specimen type (POC) ARTERIAL      Performed by [place]     Volume control plus YES     GLUCOSE, POC    Collection Time: 03/01/18  6:56 AM   Result Value Ref Range    Glucose (POC) 216 (H) 70 - 110 mg/dL   GLUCOSE, POC    Collection Time: 03/01/18  8:04 AM   Result Value Ref Range    Glucose (POC) 77 70 - 110 mg/dL   GLUCOSE, POC    Collection Time: 03/01/18 11:11 AM   Result Value Ref Range    Glucose (POC) 167 (H) 70 - 110 mg/dL       Signed By: Edwin Negron MD     March 1, 2018 12:03 PM

## 2018-03-01 NOTE — PROGRESS NOTES
[employer] [facility]  Pulmonary, Critical Care, and Sleep Medicine    Name: Smooth Baird MRN: [id number]   : 1960 Hospital: [address] [place] [address] Dr   Date: 3/1/2018        Pulmonary Critical Care Progress Note                                                Subjective/History:   Patient is a 56 y.o. Vietamese female with a hx of end stage renal disease, HTN, and Hypercholestermia who presented to [facility] ED with c/o constant moderate SOB onset 3-4 days. Associated sx of productive cough (with white sputum), nausea, emesis, dysuria, and decreased appetite. Pt has dialysis M/W/F with last visit on Friday, 18 with pt reporting not feeling better afterwards. Patient was intubated in the [facility] ED for hypoxia. Troponins were elevated and Cardiology was consulted. Started on Heparin gtt and ASA was ordered. Nephrology consulted for ESRD/Dialysis. Patient was transferred to [facility] ICU.       18   Intubated and sedated. Not extubated due to no cuff leak and started on steroids yesterday. Will repeat SBT today and check if cuff leak is present, possible extubation  Hypertension is better controlled. Opens eyes to words, moves all extremities. Hemodynamically stable, Afebrile   Dialysis per renal.      The patient is critically ill and can not provide additional history due to ventilated. [x]The patient is unable to give any meaningful history or review of systems because the patient is:  [x]Intubated [x]Sedated   []Unresponsive [x]Ventilated     [x]The patient is critically ill on      [x]Mechanical ventilation []Pressors   []BiPAP []              Review of Systems:  Review of systems not obtained due to patient factors.     Past Medical History:  Past Medical History:   Diagnosis Date    Chronic kidney disease     ESRD (end stage renal disease) ([facility] [address]     TUES-THURS-SAT    Hypercholesteremia     Hypertension     Kidney disease     Vitamin D deficiency         Past Surgical History:  Past Surgical History:   Procedure Laterality Date    VASCULAR SURGERY PROCEDURE UNLIST          Medications:  Prior to Admission medications    Medication Sig Start Date End Date Taking? Authorizing Provider   hydrALAZINE (APRESOLINE) 50 mg tablet Take 25 mg by mouth three (3) times daily. Brenda Blackman MD   losartan (COZAAR) 100 mg tablet Take 100 mg by mouth daily. Brenda Blackman MD   amLODIPine (NORVASC) 10 mg tablet Take 10 mg by mouth daily. Brenda Blackman MD   glycerin, adult, (FLEET GLYCERIN, ADULT,) suppository Insert 1 Suppository into rectum daily. Indications: BOWEL EVACUATION 2/5/18   Jhony Khoury MD   oxyCODONE-acetaminophen (PERCOCET) 5-325 mg per tablet Take 1 Tab by mouth every four (4) hours as needed for Pain. Max Daily Amount: 6 Tabs. 1/22/18   Cecelia Bob MD   albuterol (PROVENTIL HFA, VENTOLIN HFA, PROAIR HFA) 90 mcg/actuation inhaler Take 2 Puffs by inhalation every four (4) hours as needed for Wheezing. 11/6/17 April RACHELLE Langford PA-C   calcium acetate (PHOSLO) 667 mg cap Take 1 Cap by mouth three (3) times daily (with meals). 9/13/17   Zo Hdez MD   cloNIDine HCl (CATAPRES) 0.1 mg tablet Take 0.3 mg by mouth three (3) times daily. Brenda Blackman MD   NIFEdipine ER (ADALAT CC) 30 mg ER tablet Take 20 mg by mouth daily. Brenda Blackman MD   multivitamin with iron tablet Take 1 Tab by mouth daily.     Brenda Blackman MD       Current Facility-Administered Medications   Medication Dose Route Frequency    hydrALAZINE (APRESOLINE) tablet 50 mg  50 mg Oral TID    cloNIDine HCl (CATAPRES) tablet 0.3 mg  0.3 mg Oral BID    dexmedeTOMidine (PRECEDEX) 600 mcg in 0.9% sodium chloride 150 mL infusion  0.2-0.7 mcg/kg/hr IntraVENous TITRATE    methylPREDNISolone (PF) (SOLU-MEDROL) injection 60 mg  60 mg IntraVENous Q6H    carvedilol (COREG) tablet 25 mg  25 mg Oral Q12H    amLODIPine (NORVASC) tablet 10 mg  10 mg Oral DAILY    niCARdipine (CARDENE) 25 mg in 0.9% sodium chloride 250 mL infusion  0-15 mg/hr IntraVENous TITRATE    insulin lispro (HUMALOG) injection   SubCUTAneous Q6H    aspirin chewable tablet 81 mg  81 mg Oral DAILY    atorvastatin (LIPITOR) tablet 40 mg  40 mg Oral QHS    heparin 25,000 units in D5W 250 ml infusion  12-25 Units/kg/hr IntraVENous TITRATE    sodium chloride (NS) flush 5-10 mL  5-10 mL IntraVENous Q8H    fentaNYL (PF) 10 mcg/mL infusion  0-200 mcg/hr IntraVENous TITRATE    famotidine (PF) (PEPCID) 20 mg in sodium chloride 10 mL injection  20 mg IntraVENous DAILY    chlorhexidine (PERIDEX) 0.12 % mouthwash 10 mL  10 mL Oral Q12H       Allergy:  Allergies   Allergen Reactions    Banana Other (comments)        Social History:  Social History   Substance Use Topics    Smoking status: Never Smoker    Smokeless tobacco: Never Used    Alcohol use No        Family History:  History reviewed. No pertinent family history.        Objective:   Vital Signs:    Visit Vitals    /82 (BP 1 Location: Right arm, BP Patient Position: At rest)    Pulse 89    Temp 98.1 °F (36.7 °C)    Resp 27    Ht 5' 1\" (1.549 m)    Wt 43 kg (94 lb 12.8 oz)    SpO2 100%    Breastfeeding No    BMI 17.91 kg/m2       O2 Device: Endotracheal tube, Ventilator       Temp (24hrs), Av.4 °F (36.9 °C), Min:97.9 °F (36.6 °C), Max:98.9 °F (37.2 °C)       Patient Vitals for the past 8 hrs:   Temp Pulse Resp BP SpO2   18 0400 98.1 °F (36.7 °C) 89 27 152/82 100 %   18 0328 - 65 10 - 100 %   18 0100 - 67 18 111/64 100 %   18 0000 97.9 °F (36.6 °C) 67 16 109/61 100 %   18 -  19 106/62 100 %   18 -  10 - 100 %   18 - 65 14 103/58 100 %     Intake/Output:   Last shift:       1901 -  0700  In: 320.2 [I.V.:160.2]  Out: -   Last 3 shifts:  0701 -  1900  In: 738.9 [I.V.:163.9]  Out: 2150     Intake/Output Summary (Last 24 hours) at 18 0653  Last data filed at 18 2239   Gross per 24 hour Intake           501.34 ml   Output             1750 ml   Net         -1248.66 ml       Ventilator Settings:  Mode Rate Tidal Volume Pressure FiO2 PEEP   Assist control, VC+   375 ml  8 cm H2O 30 % 5 cm H20     Peak airway pressure: 17 cm H2O    Minute ventilation: 4.45 l/min      ARDS network Guidelines: Lung protective strategy, Pl pressure goals less than or equal to 30. Physical Exam:  General appearance - Intubated and Sedated  Mental status - unable to assess at this time  Eyes - pupils equal and reactive, extraocular eye movements intact, sclera anicteric  Mouth - mucous membranes moist, pharynx normal without lesions  Neck - supple, no significant adenopathy  Chest - no tachypnea, retractions or cyanosis, bilateral lower lobe crackles noted, no wheezing or rhonchi. Equal chest excursion.   Heart - normal rate, regular rhythm, normal S1, S2, grade 2/6 holosystolic murmur auscultated at the fourth left sternal border, no rubs, clicks or gallops  Abdomen - soft, nontender, nondistended, no masses or organomegaly  Neurological - limited, sedated, non-focal, moves all extremities, responds to verbal stimuli  Musculoskeletal - no joint tenderness, deformity or swelling  Extremities - peripheral pulses normal, no pedal edema, no clubbing or cyanosis  Skin - normal coloration and turgor, no rashes, no suspicious skin lesions noted    Data:     Recent Results (from the past 24 hour(s))   PTT    Collection Time: 02/28/18 10:35 AM   Result Value Ref Range    aPTT 42.2 (H) 23.0 - 36.4 SEC   GLUCOSE, POC    Collection Time: 02/28/18 11:14 AM   Result Value Ref Range    Glucose (POC) 97 70 - 110 mg/dL   GLUCOSE, POC    Collection Time: 02/28/18  6:13 PM   Result Value Ref Range    Glucose (POC) 94 70 - 110 mg/dL   GLUCOSE, POC    Collection Time: 02/28/18 11:57 PM   Result Value Ref Range    Glucose (POC) 125 (H) 70 - 110 mg/dL   PTT    Collection Time: 03/01/18  3:40 AM   Result Value Ref Range    aPTT 39.0 (H) 23.0 - 36.4 SEC   CBC WITH AUTOMATED DIFF    Collection Time: 03/01/18  3:40 AM   Result Value Ref Range    WBC 3.1 (L) 4.6 - 13.2 K/uL    RBC 3.15 (L) 4.20 - 5.30 M/uL    HGB 8.0 (L) 12.0 - 16.0 g/dL    HCT 25.2 (L) 35.0 - 45.0 %    MCV 80.0 74.0 - 97.0 FL    MCH 25.4 24.0 - 34.0 PG    MCHC 31.7 31.0 - 37.0 g/dL    RDW 15.0 (H) 11.6 - 14.5 %    PLATELET 447 636 - 472 K/uL    MPV 10.7 9.2 - 11.8 FL    NEUTROPHILS 90 (H) 40 - 73 %    LYMPHOCYTES 8 (L) 21 - 52 %    MONOCYTES 2 (L) 3 - 10 %    EOSINOPHILS 0 0 - 5 %    BASOPHILS 0 0 - 2 %    ABS. NEUTROPHILS 2.8 1.8 - 8.0 K/UL    ABS. LYMPHOCYTES 0.2 (L) 0.9 - 3.6 K/UL    ABS. MONOCYTES 0.1 0.05 - 1.2 K/UL    ABS. EOSINOPHILS 0.0 0.0 - 0.4 K/UL    ABS. BASOPHILS 0.0 0.0 - 0.1 K/UL    DF AUTOMATED     METABOLIC PANEL, COMPREHENSIVE    Collection Time: 03/01/18  3:40 AM   Result Value Ref Range    Sodium 136 136 - 145 mmol/L    Potassium 4.5 3.5 - 5.5 mmol/L    Chloride 100 100 - 108 mmol/L    CO2 27 21 - 32 mmol/L    Anion gap 9 3.0 - 18 mmol/L    Glucose 149 (H) 74 - 99 mg/dL    BUN 22 (H) 7.0 - 18 MG/DL    Creatinine 2.77 (H) 0.6 - 1.3 MG/DL    BUN/Creatinine ratio 8 (L) 12 - 20      GFR est AA 22 (L) >60 ml/min/1.73m2    GFR est non-AA 18 (L) >60 ml/min/1.73m2    Calcium 8.5 8.5 - 10.1 MG/DL    Bilirubin, total 0.6 0.2 - 1.0 MG/DL    ALT (SGPT) 20 13 - 56 U/L    AST (SGOT) 40 (H) 15 - 37 U/L    Alk.  phosphatase 109 45 - 117 U/L    Protein, total 5.3 (L) 6.4 - 8.2 g/dL    Albumin 2.3 (L) 3.4 - 5.0 g/dL    Globulin 3.0 2.0 - 4.0 g/dL    A-G Ratio 0.8 0.8 - 1.7     PHOSPHORUS    Collection Time: 03/01/18  3:40 AM   Result Value Ref Range    Phosphorus 4.3 2.5 - 4.9 MG/DL   POC G3    Collection Time: 03/01/18  4:23 AM   Result Value Ref Range    Device: VENT      FIO2 (POC) 30 %    pH (POC) 7.618 (HH) 7.35 - 7.45      pCO2 (POC) 24.0 (L) 35.0 - 45.0 MMHG    pO2 (POC) 81 80 - 100 MMHG    HCO3 (POC) 24.6 22 - 26 MMOL/L    sO2 (POC) 98 (H) 92 - 97 %    Base excess (POC) 3 mmol/L Mode ASSIST CONTROL      Tidal volume 375 ml    Set Rate 10 bpm    PEEP/CPAP (POC) 5 cmH2O    Allens test (POC) YES      Inspiratory Time 1 sec    Total resp. rate 12      Site RIGHT RADIAL      Patient temp. 98.6      Specimen type (POC) ARTERIAL      Performed by Jax Rojas     Volume control plus YES             Recent Labs      03/01/18   0423  02/28/18   0439  02/27/18   0413   FIO2I  30  30  30   HCO3I  24.6  25.3  30.0*   PCO2I  24.0*  32.1*  36.7   PHI  7.618*  7.504*  7.521*   PO2I  81  84  126*       Special Requests:   Date Value Ref Range Status   02/26/2018 NO SPECIAL REQUESTS   Preliminary     Culture result:   Date Value Ref Range Status   02/26/2018 NO GROWTH 2 DAYS   Preliminary       Telemetry: normal sinus rhythm    ECHO 9/12/17     SUMMARY:  Left ventricle: Systolic function was normal. Ejection fraction was estimated   in the range of 60 % to 65 %. No obvious  wall motion abnormalities identified in the views obtained. Wall thickness   was mildly increased. Doppler parameters  were consistent with abnormal left ventricular relaxation (grade 1 diastolic   dysfunction). Left atrium: The atrium was mildly dilated. Pericardium: A trivial pericardial effusion was identified circumferential to   the heart. There was no evidence of  hemodynamic compromise. Imaging:  [x]I have personally reviewed the patients chest radiographs images and report     CXR Results  (Last 48 hours)               02/28/18 0618  XR CHEST PORT Final result    Impression:  IMPRESSION: No significant interval change. Narrative:  INDICATION:  See indication above. COMPARISON:  2/27/2018       FINDINGS: A portable AP radiograph of the chest was obtained at 0443 hours. Lines and tubes are stable. No pneumothorax. Stable mild enlargement of the   cardiac silhouette. Subtle prominence of the pulmonary vascularity. Similar   minimal bilateral lung base haziness, may represent atelectasis. 02/27/18 1925  XR CHEST PORT Final result    Impression:  IMPRESSION:   1. Tubes and lines in good position. 2.  Bibasilar opacities similar to prior imaging. Narrative:  EXAM: Chest X-Ray       INDICATION: Evaluate OG tube placement       TECHNIQUE: AP view of the chest       COMPARISON: 2/27/2018, 2/26/2018, 2/25/2018       FINDINGS:    Tubes and Lines: The patient is intubated. It is 5 cm above the miquel. An   enteric tube is present with the tip below the field of view. A dialysis   catheter which is unchanged. Pleura: No pneumothorax appreciated. No effusions appreciated. Lungs:  Bibasilar opacities are noted similar to prior imaging. Cardiac silhouette: It is unremarkable       Pulmonary Vascularity: The pulmonary vasculature is unremarkable. Osseous Structures: Unremarkable                    Results from Hospital Encounter encounter on 02/25/18   XR CHEST PORT   Narrative EXAM: Chest X-Ray    INDICATION: Evaluate OG tube placement    TECHNIQUE: AP view of the chest    COMPARISON: 2/27/2018, 2/26/2018, 2/25/2018    FINDINGS:   Tubes and Lines: The patient is intubated. It is 5 cm above the miquel. An  enteric tube is present with the tip below the field of view. A dialysis  catheter which is unchanged. Pleura: No pneumothorax appreciated. No effusions appreciated. Lungs:  Bibasilar opacities are noted similar to prior imaging. Cardiac silhouette: It is unremarkable    Pulmonary Vascularity: The pulmonary vasculature is unremarkable. Osseous Structures: Unremarkable         Impression IMPRESSION:  1. Tubes and lines in good position. 2.  Bibasilar opacities similar to prior imaging.           Results from [place] encounter on 02/25/18   CTA CHEST W OR W WO CONT   Narrative EXAMINATION: CTA chest pulmonary    INDICATION: Acute chest pain, shortness of breath    COMPARISON: None    TECHNIQUE: CT angiography of the pulmonary arteries performed following 75 cc IV  Isovue 370, with 3-D MIP multiplanar reformations. All CT scans at this facility  are performed using dose optimization technique as appropriate to a performed  exam, to include automated exposure control, adjustment of the mA and/or kV  according to patient size (including appropriate matching first site specific  examinations), or use of iterative reconstruction technique. FINDINGS:    Cardiovascular: No pulmonary arterial filling defects identified to suggest  pulmonary embolism. Motion artifact limits evaluation of smaller segmental and  subsegmental arteries. Ascending thoracic aorta is borderline ectatic measuring  up to 3.5 cm diameter. Heart size within normal limits. Right jugular catheter  tip at right atrium level. Moderate-sized area of cartilage fusion. Mediastinum: Possible enlarged aortopulmonary node and left hilar nodes, poorly  delineated. Left hilar anita calcification noted. Thyroid slightly  heterogeneous. Esophagus is nondistended. Pleura: Moderate volume bilateral pleural effusions. No evidence of  pneumothorax. Lungs: Probable compressive atelectasis due to pleural effusions bilaterally. Right major fissure nodule measuring 4 mm, likely pleural-based node. Upper abdomen: Unremarkable. Miscellaneous: Superficial soft tissues unremarkable. Bones: No acute osseous findings. Probable bone island in T11 posterior body. Impression IMPRESSION:    1. No pulmonary embolus identified. Smaller segmental and subsegmental pulmonary  arteries less well evaluated due to motion artifact. 2. Moderate-sized bilateral pleural effusions, and moderate volume pericardial  effusion. Borderline ectatic ascending aorta. -Probable compressive atelectasis in bilateral lungs. Superimposed infiltrate  difficult to exclude.    -Suspected left hilar and aortopulmonary window adenopathy, poorly delineated,  nonspecific.           IMPRESSION:   · Acute Hypoxic respiratory failure 2' to fluid overload now intubated on mechanical ventilation. · Bilateral pleural effusions  · Possible left hilar enlargement (reactive, volume overload?)  · Hyponatremia - 132  · ESRD (end stage renal disease) requires hemodialysis (MWF)  · NSTEMI (non-ST elevated myocardial infarction)  · Mildly elevated Transimines  · Hypertensive Urgency  · + MRSA screen- Nares: On contact isolation- no signs of active infection at this time     RECOMMENDATIONS:   Resp: Mech. Ventilated patients- aim to keep peak plateau pressure less than or equal to 30cm H2O. Titrate FiO2 for goal SPO2> 90%,VAP prevention bundle, head of the bed at 30' all times. Daily sedation holiday and assessment for weaning with SBT as tolerated  Bronchodilators PRN, pulmonary hygiene care, Aspiration precautions, Keep HOB >30 degrees  Daily ABG and Chxr while intubated. - Steroids x4 doses, no cuff leak on test.   ID: Aleukocytosis and now afebile. Blood cx NGTD, Rocephin d/c'd, Will likely d/c Vancomycin with clinical improvement and if continue no fevers, MRSA nasal PCR +,  Possible UTI, anuric. Heme/Onc: Aleukocytosis, H/H, and platelets are stable, trend CBC. CVS: B/P home medications restarted, Cardene gtt PRN for uncontrolled hypertension, Trend cardiac panel, NSTEMI - heparin gtt stopped for epistaxis- Cardiology is following. Renal: Anuric, Dialysis patient, Right chest wall HD catheter, Nephrology following Dialysis per their recommendations. Trend Renal indices. GI: SUP, Zofran PRN for N/V , Will need swallow eval post extubation  Metabolic: Glycemic control - SSI PRN, Replace E-lytes per nephrology  Neuro/Sedation/Pain: Fentanyl and Precedex gtt for sedation  Stress ulcer prophylaxis: Pepcid IV   DVT prophylaxis: Hold Heparin gtt, SCDs   AM labs: Daily CBC BMP Mag and Phos.   Diet: tube feeds per nutritionist recommendaions  Lines/Devices: ETT 02/25, PIVs  Vent Bundle Followed, Vent Day 03          [x]See my orders for details    My assessment, plan of care, findings, medications, side effects etc were discussed with:  [x]nursing []PT/OT    [x]respiratory therapy [x]Dr. Johan Bullock. [place]   []family []Patient     [x]Total critical care time exclusive of procedures   minutes with complex decision making performed and > 50% time spent in face to face evaluation. Stew[facility]     Pulmonary, Critical Care Medicine  [facility]       [facility] Staff Addendum     I have independently evaluated the patient and reviewed the patient's chart. I have discussed the findings and care plan with ICU Care Team. Case discussed on ICU multi-D rounds. Patient with cuff leak today. Did good on SBT. Extubated this afternoon. Currently doing well. BP still difficult to control. Back on Cardene drip. Continue to adjust BP meds. Will discuss BP control further with nephrology. Continuing with supportive care    I agree with the above evaluation, assessment and recommendations along with the following comments and observations.       Clinical Care and time spent coordinating care, minus procedure time: 30 min    Meagan Martinez DO, [facility]  Pulmonary, Sleep and Critical Care Medicine  4:23 PM

## 2018-03-01 NOTE — PROGRESS NOTES
made a follow-up visit to patient. He was joined by Bhavya Rick and daughter of patient. Patient is intubated and responsive, at times agitated and frustrated because unable to communicate her wishes.  suggested she write what she wants to say. Patient attempted to write something, but family realized that she could not see (according to son-in-law, due to elevated blood pressure).  offered prayer and assurance of prayer to patient.  also gave a rosary; son-in-law stated that she prayed the rosary daily.  was also informed by son-in-law that Fr. Woodward, a  from their [employer], [address] [place] [address] in [place], came last Monday and gave patient the Nida Lara 34. Chaplains are to continue to make follow-up visits as needed or requested. The Rev.  [facility], [address] [place] [address] [address] [facility] 159  [facility] [phone] / [facility] [phone]

## 2018-03-01 NOTE — DIABETES MGMT
Glycemic Control: pt discussed in interdisciplinary rounds. Blood glucose trended up above targets yesterday (likely related to steroids, no history of diabetes). Pt receiving correctional Lispro insulin coverage. Continue inpatient monitoring and intervention.      Ace Pérez RD, CDE

## 2018-03-01 NOTE — ROUTINE PROCESS
Patient was restless last night specially when the family 2 the bedside.  Precedex started and started weaning from Fentanyl for Holiday Sedations and possible extubation in am.

## 2018-03-01 NOTE — PROGRESS NOTES
attended the interdisciplinary rounds for Bob Jones, who is a 56 y.o.,female. Patients Primary Language is: [place]. According to the patients EMR Restoration Affiliation is: [employer]ism. The reason the Patient came to the hospital is:   Patient Active Problem List    Diagnosis Date Noted    Anemia 02/26/2018    Hyponatremia 02/25/2018    ESRD (end stage renal disease) ([facility] [address] 02/25/2018    NSTEMI (non-ST elevated myocardial infarction) ([facility] [address] 02/25/2018    Respiratory failure ([facility] [address] 02/25/2018    Hypoxia 02/25/2018    Acute UTI 02/25/2018    Hyperkalemia 09/08/2017    Renal failure 09/08/2017    SCOOTER (acute kidney injury) ([facility] [address] 09/08/2017      Plan:  Chaplains will continue to follow and will provide pastoral care on an as needed/requested basis.  recommends bedside caregivers page  on duty if patient shows signs of acute spiritual or emotional distress.     [address] [facility]  Board Certified [facility]   [phone]

## 2018-03-01 NOTE — PROGRESS NOTES
RENAL DAILY PROGRESS NOTE      51y F with ESRD , hypertensive, ,respir failure, required intubation. Impression & Plan:   IMPRESSION:   · ESRD, MWF,   · Access; tunnel catheter in right IJ, left arm fistula + thrill   · HTN, uncontrolled, on cardene drip,  ·  respiratory failure,intubated  · Anemia , unable to give epogen because of HTN  · Hyponatremia   · Alkalosis on blood gas, respiratory   · NSTEMI, off heparin because of epistaxis. PLAN:   · Tolerated HD well yesterday , about 1700 UF. · Check iron profile, ferritin, Ordered epo during HD, hold epo for SBP > 180  · Plan for extubation noted, no plan for HD today . · Adjust all meds per ESRD status     Discussed with ICU team and family.                          Subjective:       Complaint:   Overnight events noted  Remain intubated   Family bedside,     Current Facility-Administered Medications   Medication Dose Route Frequency    hydrALAZINE (APRESOLINE) tablet 50 mg  50 mg Oral TID    cloNIDine HCl (CATAPRES) tablet 0.3 mg  0.3 mg Oral BID    dexmedeTOMidine (PRECEDEX) 600 mcg in 0.9% sodium chloride 150 mL infusion  0.2-0.7 mcg/kg/hr IntraVENous TITRATE    carvedilol (COREG) tablet 25 mg  25 mg Oral Q12H    amLODIPine (NORVASC) tablet 10 mg  10 mg Oral DAILY    niCARdipine (CARDENE) 25 mg in 0.9% sodium chloride 250 mL infusion  0-15 mg/hr IntraVENous TITRATE    insulin lispro (HUMALOG) injection   SubCUTAneous Q6H    glucose chewable tablet 16 g  4 Tab Oral PRN    glucagon (GLUCAGEN) injection 1 mg  1 mg IntraMUSCular PRN    dextrose (D50W) injection syrg 12.5-25 g  25-50 mL IntraVENous PRN    albuterol (ACCUNEB) nebulizer solution 2.5 mg  2.5 mg Nebulization Q6H PRN    aspirin chewable tablet 81 mg  81 mg Oral DAILY    atorvastatin (LIPITOR) tablet 40 mg  40 mg Oral QHS    acetaminophen (TYLENOL) solution 650 mg  650 mg Oral Q6H PRN    heparin 25,000 units in D5W 250 ml infusion  12-25 Units/kg/hr IntraVENous TITRATE    sodium chloride (NS) flush 5-10 mL  5-10 mL IntraVENous Q8H    sodium chloride (NS) flush 5-10 mL  5-10 mL IntraVENous PRN    acetaminophen (TYLENOL) suppository 650 mg  650 mg Rectal Q6H PRN    naloxone (NARCAN) injection 0.1 mg  0.1 mg IntraVENous PRN    ondansetron (ZOFRAN) injection 4 mg  4 mg IntraVENous Q6H PRN    hydrALAZINE (APRESOLINE) 20 mg/mL injection 10 mg  10 mg IntraVENous Q6H PRN    fentaNYL (PF) 10 mcg/mL infusion  0-200 mcg/hr IntraVENous TITRATE    midazolam (VERSED) injection 1-2 mg  1-2 mg IntraVENous Q4H PRN    famotidine (PF) (PEPCID) 20 mg in sodium chloride 10 mL injection  20 mg IntraVENous DAILY    chlorhexidine (PERIDEX) 0.12 % mouthwash 10 mL  10 mL Oral Q12H       Review of Symptoms: as above   Objective:     Patient Vitals for the past 24 hrs:   Temp Pulse Resp BP SpO2   03/01/18 1231 - 70 15 - 100 %   03/01/18 1230 - 73 21 141/81 100 %   03/01/18 1200 - 62 10 125/71 100 %   03/01/18 1130 - 63 10 129/70 100 %   03/01/18 1111 98.8 °F (37.1 °C) - - - -   03/01/18 1100 - 63 11 127/71 100 %   03/01/18 1030 - 63 10 126/70 100 %   03/01/18 1000 - 66 10 134/74 100 %   03/01/18 0930 - 94 29 (!) 189/118 100 %   03/01/18 0900 - 77 21 165/90 97 %   03/01/18 0830 - 83 25 (!) 177/100 100 %   03/01/18 0800 - 61 10 115/69 92 %   03/01/18 0728 98.2 °F (36.8 °C) - - - -   03/01/18 0700 - 85 26 (!) 165/91 100 %   03/01/18 0600 - 64 10 120/69 100 %   03/01/18 0500 - 69 14 137/78 100 %   03/01/18 0400 98.1 °F (36.7 °C) 89 27 152/82 100 %   03/01/18 0328 - 65 10 - 100 %   03/01/18 0300 - 65 19 112/68 100 %   03/01/18 0200 - 66 17 108/64 100 %   03/01/18 0100 - 67 18 111/64 100 %   03/01/18 0000 97.9 °F (36.6 °C) 67 16 109/61 100 %   02/28/18 2330 - 66 19 106/62 100 %   02/28/18 2320 - 66 10 - 100 %   02/28/18 2300 - 65 14 103/58 100 %   02/28/18 2230 - 68 11 102/56 100 %   02/28/18 2200 - 94 22 136/76 100 %   02/28/18 2130 - 95 27 (!) 160/97 100 %   02/28/18 2116 - 92 - 160/83 -   02/28/18 2100 - 92 13 160/83 100 %   02/28/18 2030 - 92 13 (!) 156/94 100 %   02/28/18 2000 98.7 °F (37.1 °C) 97 14 (!) 171/92 100 %   02/28/18 1944 - (!) 105 18 - 100 %   02/28/18 1930 - 97 17 160/90 100 %   02/28/18 1900 - (!) 101 22 (!) 178/102 100 %   02/28/18 1800 - 89 11 157/82 100 %   02/28/18 1754 - - - 174/86 -   02/28/18 1745 98.6 °F (37 °C) 85 10 105/75 100 %   02/28/18 1730 - 60 14 104/61 100 %   02/28/18 1715 - 60 18 107/61 100 %   02/28/18 1700 - 60 12 105/60 100 %   02/28/18 1646 - 60 10 - 100 %   02/28/18 1645 - 60 12 102/58 100 %   02/28/18 1630 - 60 16 104/58 98 %   02/28/18 1615 - (!) 59 15 95/56 97 %   02/28/18 1600 - 60 16 98/57 95 %   02/28/18 1545 - 63 13 106/62 95 %   02/28/18 1530 - 65 20 113/66 96 %   02/28/18 1515 - 84 20 (!) 160/91 100 %   02/28/18 1500 - 76 9 144/86 97 %        Weight change: 0.1 kg (3.5 oz)     02/27 1901 - 03/01 0700  In: 746.3 [I.V.:281.3]  Out: 1950     Intake/Output Summary (Last 24 hours) at 03/01/18 1447  Last data filed at 03/01/18 1200   Gross per 24 hour   Intake           520.17 ml   Output             1700 ml   Net         -1179.83 ml         Physical Exam  General: intubated   HEENT  no thyromegaly  CVS: S1S2 heard,  no rub  RS: + air entry b/l,   Abd: Soft,   Extrm: no edema, no cyanosis, clubbing   Skin: no visible  Rash  Musculoskeletal: No gross joints or bone deformities   Access;  Tunnel catheter on right ij, left arm fistula + thrill   Procedures/imaging: see electronic medical records for all procedures, Xrays and details which were not copied into this note but were reviewed prior to creation of Plan           Data Review:     LABS:   Hematology:   Recent Labs      03/01/18 0340 02/28/18   0445  02/27/18   0114   WBC  3.1*  10.2  8.5   HGB  8.0*  8.1*  8.3*   HCT  25.2*  26.0*  26.4*     Chemistry:   Recent Labs      03/01/18 0340 02/28/18   0445  02/27/18   0515  02/27/18   0114   BUN  22*  33*   --   17   CREA  2.77*  6.05*   --   4.06*   CA  8.5  8.7   -- 7.8*   ALB  2.3*  2.3*   --    --    K  4.5  3.5  3.6  3.2*   NA  136  132*   --   132*   CL  100  97*   --   96*   CO2  27  23   --   30   PHOS  4.3  4.3  3.8  3.4   GLU  149*  84   --   109*              Procedures/imaging: see electronic medical records for all procedures, Xrays and details which were not copied into this note but were reviewed prior to creation of Plan          Assessment & Plan:     As above         Kendall Stahl MD  3/1/2018  1:47 PM

## 2018-03-01 NOTE — PROGRESS NOTES
Pt extubated to 4 LPM humidified NC following successful SBT and positive cuff leak test. Pt awake and follows all commands with interpretation assistance by family in room. Suctioned airway pre and post procedure. No complications. Will continue to monitor.

## 2018-03-01 NOTE — PROGRESS NOTES
SBT initiated, PS 7, PEEP +5, FiO2 30%. Pt awake and alert, followed all commands. 03/01/18 1231   Weaning Parameters   Spontaneous Breathing Trial Complete Yes   Resp Rate Observed 0  (Pt apneic > 1 40 seconds. Resumed AC/VC+ settings)     Pt became apneic after 1 minute for > 45 seconds. Resumed previous vent settings, will re attempt.

## 2018-03-01 NOTE — PROGRESS NOTES
[facility]      CARDIOLOGY PROGRESS NOTE  RECS:    1. Acute respiratory failure- intubated and sedated possible related to Acute decompensated CHF in the setting of uncontrolled Hypertension. Plans for possible extubation today. 2. NSTEMI-off heparin drip due to epistaxis. Continue asa and bb for now. Echo revealed EF% 45-50%, mild hypokinesis apical wall, LVH is present. Will need Cardiac w/u for CAD. Possible Cardiac cath when stable. D/w family  3. Acute Diastolic Heart failure- Compensated now. On HD volume status per nephrology    4. Uncontrolled hypertension-better controlled  Cardine drip prn for elevated bp. Continue bb, CCB and hydralazine. 5. ESRD- on HD renal following. She is MWF HD schedule   6. Anemia- slightly dropping H&H  monitor   7. Hyponatremia: stable   8. UTI  On antibiotics managed per [facility]      if BP remains high- start and continue cardene drip- till she takes po meds  Discussed plan with patient's family      ECHO 02/26/18  Left ventricle: Size was normal. Systolic function was mildly reduced by   visual assessment. Ejection fraction was  estimated in the range of 45 % to 50 %. There was mild hypokinesis of the   apical wall(s). Wall thickness was moderately  increased. Left atrium: The atrium was mildly dilated. Atrial septum: There was increased thickness of the septum, consistent with   lipomatous hypertrophy. Pericardium: A small pericardial effusion was identified circumferential to   the heart. There was no evidence of  hemodynamic compromise. COMPARISONS:  Comparison was made with the previous study of 17-Sep-2017. LV overall   function has decreased from 65 % to 45 %. There  was worsening in the periapical LV wall motion. Pericardial effusion   appearance has not changed. Otherwise no  significant change.       ASSESSMENT:  Hospital Problems  Date Reviewed: 11/15/2017          Codes Class Noted POA    Anemia ICD-10-CM: D64.9  ICD-9-CM: 285.9 2/26/2018 Unknown        Hyponatremia ICD-10-CM: E87.1  ICD-9-CM: 276.1  2/25/2018 Unknown        ESRD (end stage renal disease) ([facility] ICD-10-CM: N18.6  ICD-9-CM: 585.6  2/25/2018 Unknown        NSTEMI (non-ST elevated myocardial infarction) [facility]) ICD-10-CM: I21.4  ICD-9-CM: 410.70  2/25/2018 Unknown        * (Principal)Respiratory failure ([facility] ICD-10-CM: J96.90  ICD-9-CM: 518.81  2/25/2018 Unknown        Hypoxia ICD-10-CM: R09.02  ICD-9-CM: 799.02  2/25/2018 Unknown        Acute UTI ICD-10-CM: N39.0  ICD-9-CM: 599.0  2/25/2018 Unknown                SUBJECTIVE:    Intubated and alert     OBJECTIVE:    VS:   Visit Vitals    /70    Pulse 63    Temp 98.8 °F (37.1 °C)    Resp 10    Ht 5' 1\" (1.549 m)    Wt 43 kg (94 lb 12.8 oz)    SpO2 100%    Breastfeeding No    BMI 17.91 kg/m2         Intake/Output Summary (Last 24 hours) at 03/01/18 1122  Last data filed at 03/01/18 0800   Gross per 24 hour   Intake           490.17 ml   Output             1700 ml   Net         -1209.83 ml     TELE: normal sinus rhythm with intermittent sinus tachycardia     General:  intubated  HENT: Normocephalic, atraumatic. Normal external eye.   Neck :  no JVD  Cardiac:  regular rate and rhythm, tachycardic, no S3 or S4, no click, no rub  Lungs: clear to auscultation bilaterally  Abdomen: Soft, nontender, no masses  Extremities:  No c/c/e, peripheral pulses present      Labs: Results:       Chemistry Recent Labs      03/01/18   0340  02/28/18   0445  02/27/18   0515  02/27/18   0114   GLU  149*  84   --   109*   NA  136  132*   --   132*   K  4.5  3.5  3.6  3.2*   CL  100  97*   --   96*   CO2  27  23   --   30   BUN  22*  33*   --   17   CREA  2.77*  6.05*   --   4.06*   CA  8.5  8.7   --   7.8*   AGAP  9  12   --   6   BUCR  8*  5*   --   4*   AP  109  117   --    --    TP  5.3*  6.0*   --    --    ALB  2.3*  2.3*   --    --    GLOB  3.0  3.7   --    --    AGRAT  0.8  0.6*   --    --       CBC w/Diff Recent Labs 03/01/18   0340  02/28/18   0445  02/27/18   0114   WBC  3.1*  10.2  8.5   RBC  3.15*  3.24*  3.28*   HGB  8.0*  8.1*  8.3*   HCT  25.2*  26.0*  26.4*   PLT  197  216  213   GRANS  90*  83*  78*   LYMPH  8*  6*  11*   EOS  0  0  0      Cardiac Enzymes No results for input(s): CPK, CKND1, WOLFGANG in the last 72 hours. No lab exists for component: CKRMB, TROIP   Coagulation Recent Labs      03/01/18   0340  02/28/18   1035   APTT  39.0*  42.2*       Lipid Panel Lab Results   Component Value Date/Time    Cholesterol, total 151 02/26/2018 10:02 AM    HDL Cholesterol 37 (L) 02/26/2018 10:02 AM    LDL, calculated 95.2 02/26/2018 10:02 AM    VLDL, calculated 18.8 02/26/2018 10:02 AM    Triglyceride 94 02/26/2018 10:02 AM    CHOL/HDL Ratio 4.1 02/26/2018 10:02 AM      BNP No results for input(s): BNPP in the last 72 hours. Liver Enzymes Recent Labs      03/01/18   0340   TP  5.3*   ALB  2.3*   AP  109   SGOT  40*      Thyroid Studies No results found for: T4, T3U, TSH, TSHEXT, TSHEXT           Estee Heck Faina:[phone] supervised    I have independently evaluated and examined the patient. All relevant labs and testing data's are reviewed. Care plan discussed and updated after review.     Qasim Elizabeth MD

## 2018-03-01 NOTE — ROUTINE PROCESS
Bedside, Verbal and Written shift change report given to Malena Mccauley (oncoming nurse) by Amada Dockery RN   (offgoing nurse). Report included the following information SBAR, Kardex, Intake/Output, MAR and Recent Results.

## 2018-03-02 NOTE — PROGRESS NOTES
[facility] Hospitalist Group  Progress Note    Patient: Martell Weinstein Age: 56 y.o. : 1960 MR#: [id number] SSN: [id number]  Date/Time: 3/2/2018  11:38 AM    Subjective/24-hour events:     Extubated yesterday, no acute respiratory issues overnight. HD in progress currently. Assessment:   Acute hypoxic respiratory failure  NSTEMI  Malignant HTN  Acute diastolic CHF  ESRD  Nasal MRSA colonization     Plan:  Continue to monitor respiratory status s/p extubation. Has been stable. Continue medical management for NSTEMI but would benefit from heart cath when overall condition more stable. Vancomycin continued, cultures remain negative. Monitor BPs, adjust PO therapy as necessary. HD per nephrology. H/H a bit low but stable. Monitor. Case discussed with:  []Patient  [x]Family  []Nursing  []Case Management  DVT Prophylaxis:  []Lovenox  []Hep SQ  []SCDs  []Coumadin   [x]On Heparin gtt    Objective:   VS:   Visit Vitals    BP (!) 181/97    Pulse 100    Temp 98.6 °F (37 °C) (Oral)    Resp 20    Ht 5' 1\" (1.549 m)    Wt 43.7 kg (96 lb 5.5 oz)    SpO2 100%    Breastfeeding No    BMI 18.2 kg/m2      Tmax/24hrs: Temp (24hrs), Av.1 °F (36.7 °C), Min:96.5 °F (35.8 °C), Max:98.9 °F (37.2 °C)      Intake/Output Summary (Last 24 hours) at 18 1359  Last data filed at 18 1315   Gross per 24 hour   Intake              240 ml   Output             2900 ml   Net            -2660 ml       General:  In NAD. Cardiovascular: RRR. Pulmonary:  Lungs clear anteriorly. Effort nonlabored. GI:  Abdomen soft, NTTP. Extremities:  Warm, no ischemia. Neuro:  Awake. Moves extremities spontaneously.     Labs:    Recent Results (from the past 24 hour(s))   GLUCOSE, POC    Collection Time: 18  5:20 PM   Result Value Ref Range    Glucose (POC) 183 (H) 70 - 110 mg/dL   IRON PROFILE    Collection Time: 18  6:06 PM   Result Value Ref Range    Iron 49 (L) 50 - 175 ug/dL    TIBC 124 (L) 250 - 450 ug/dL    Iron % saturation 40 %   FERRITIN    Collection Time: 03/01/18  6:06 PM   Result Value Ref Range    Ferritin 2584 (H) 8 - 388 NG/ML   GLUCOSE, POC    Collection Time: 03/02/18 12:04 AM   Result Value Ref Range    Glucose (POC) 218 (H) 70 - 110 mg/dL   PTT    Collection Time: 03/02/18  3:40 AM   Result Value Ref Range    aPTT 33.8 23.0 - 46.0 SEC   METABOLIC PANEL, BASIC    Collection Time: 03/02/18  3:40 AM   Result Value Ref Range    Sodium 131 (L) 136 - 145 mmol/L    Potassium 4.7 3.5 - 5.5 mmol/L    Chloride 95 (L) 100 - 108 mmol/L    CO2 24 21 - 32 mmol/L    Anion gap 12 3.0 - 18 mmol/L    Glucose 155 (H) 74 - 99 mg/dL    BUN 48 (H) 7.0 - 18 MG/DL    Creatinine 4.57 (H) 0.6 - 1.3 MG/DL    BUN/Creatinine ratio 11 (L) 12 - 20      GFR est AA 12 (L) >60 ml/min/1.73m2    GFR est non-AA 10 (L) >60 ml/min/1.73m2    Calcium 8.9 8.5 - 10.1 MG/DL   CBC WITH AUTOMATED DIFF    Collection Time: 03/02/18  3:40 AM   Result Value Ref Range    WBC 13.3 (H) 4.6 - 13.2 K/uL    RBC 3.62 (L) 4.20 - 5.30 M/uL    HGB 9.0 (L) 12.0 - 16.0 g/dL    HCT 28.6 (L) 35.0 - 45.0 %    MCV 79.0 74.0 - 97.0 FL    MCH 24.9 24.0 - 34.0 PG    MCHC 31.5 31.0 - 37.0 g/dL    RDW 14.8 (H) 11.6 - 14.5 %    PLATELET 686 831 - 000 K/uL    MPV 11.0 9.2 - 11.8 FL    NEUTROPHILS 95 (H) 40 - 73 %    LYMPHOCYTES 3 (L) 21 - 52 %    MONOCYTES 2 (L) 3 - 10 %    EOSINOPHILS 0 0 - 5 %    BASOPHILS 0 0 - 2 %    ABS. NEUTROPHILS 12.6 (H) 1.8 - 8.0 K/UL    ABS. LYMPHOCYTES 0.4 (L) 0.9 - 3.6 K/UL    ABS. MONOCYTES 0.3 0.05 - 1.2 K/UL    ABS. EOSINOPHILS 0.0 0.0 - 0.4 K/UL    ABS. BASOPHILS 0.0 0.0 - 0.1 K/UL    DF AUTOMATED     HEPATIC FUNCTION PANEL    Collection Time: 03/02/18  3:40 AM   Result Value Ref Range    Protein, total 6.8 6.4 - 8.2 g/dL    Albumin 2.8 (L) 3.4 - 5.0 g/dL    Globulin 4.0 2.0 - 4.0 g/dL    A-G Ratio 0.7 (L) 0.8 - 1.7      Bilirubin, total 0.6 0.2 - 1.0 MG/DL    Bilirubin, direct 0.1 0.0 - 0.2 MG/DL    Alk.  phosphatase 141 (H) 45 - 117 U/L    AST (SGOT) 44 (H) 15 - 37 U/L    ALT (SGPT) 24 13 - 56 U/L   GLUCOSE, POC    Collection Time: 03/02/18  5:18 AM   Result Value Ref Range    Glucose (POC) 159 (H) 70 - 110 mg/dL   GLUCOSE, POC    Collection Time: 03/02/18  1:15 PM   Result Value Ref Range    Glucose (POC) 117 (H) 70 - 110 mg/dL       Signed By: Krysta Benjamin MD     March 2, 2018 11:38 AM

## 2018-03-02 NOTE — PROGRESS NOTES
Problem: Dysphagia (Adult)  Goal: *Acute Goals and Plan of Care (Insert Text)  Patient will:  1. Tolerate PO trials with 0 s/s overt distress in 4/5 trials  2. Utilize compensatory swallow strategies/maneuvers (decrease bite/sip, size/rate, alt. liq/sol) with min cues in 4/5 trials  3. Perform oral-motor/laryngeal exercises to increase oropharyngeal swallow function with min cues  4. Complete an objective swallow study (i.e., MBSS) to assess swallow integrity, r/o aspiration, and determine of safest LRD, min A    Recommend:   NPO with NG  Strict aspiration precautions (HOB >30 degrees at all times, Oral care TID)      Speech LAnguage Pathology bedside swallow evaluation/treatment    Patient: Marcelo Lundy (24 y.o. female)  Date: 3/2/2018  Primary Diagnosis: NSTEMI (non-ST elevated myocardial infarction) ([facility]  ESRD (end stage renal disease) ([facility]  Hyponatremia  Respiratory failure ([address]  NSTEMI (non-ST elevated myocardial infarction) ([address]  ESRD (end stage renal disease) ([address]  Hypoxia        Precautions: aspiration       ASSESSMENT :  Based on the objective data described below, the patient presents with mod oral and sev pharyngeal dysphagia c/b throat clear, decrease in O2 to mid 70's, watering eyes, and altered vocal quality post all PO presentations. Pt cleared for PO prior to evaluation by MD (of note, pt had just began dialysis treatment ~5 min prior to SLP eval). Family present for interpretation secondary to language barrier. Pt with oral expulsion of ice chip trials and appeared confused throughout evaluation, unable to follow commands for oral mech exam. Laryngeal elevation was delayed and weak to palpation. Pt currently at high risk for aspiration, malnutrition, and dehydration with PO. Rec NPO with NG feeds, aspiration precautions, meds via TF/IV, and oral care TID. ST will continue to follow. Patient will benefit from skilled intervention to address the above impairments.   Patients rehabilitation potential is considered to be Fair  Factors which may influence rehabilitation potential include:   [x]            Mental ability/status  [x]            Medical condition     PLAN :  Recommendations and Planned Interventions: See above  Frequency/Duration: Patient will be followed by speech-language pathology 1-2 times per day/3-5 days per week to address goals. Discharge Recommendations: Jesu Murillo and To Be Determined     SUBJECTIVE:   Patient stated I don't want it. OBJECTIVE:     Past Medical History:   Diagnosis Date    Chronic kidney disease     ESRD (end stage renal disease) ([facility]     TUES-THURS-SAT    Hypercholesteremia     Hypertension     Kidney disease     Vitamin D deficiency      Past Surgical History:   Procedure Laterality Date    VASCULAR SURGERY PROCEDURE UNLIST       Prior Level of Function/Home Situation: private residence  Home Situation  Home Environment: Private residence  One/Two Story Residence: Two story  Living Alone: No  Support Systems: Child(gomez), Family member(s), Spouse/Significant Other/Partner  Patient Expects to be Discharged to[de-identified] Private residence  Current DME Used/Available at Home: Blood pressure cuff  Diet prior to admission: reg with thin per family  Current Diet:  NPO   Cognitive and Communication Status:  Neurologic State: Alert  Orientation Level: Oriented to person  Cognition: Decreased command following  Oral Assessment:  Oral Assessment  Labial: No impairment  Dentition: Natural;Intact  Oral Hygiene: Adequate  Lingual: Decreased rate;Decreased strength  Velum: Unable to visualize  Mandible: No impairment  P.O. Trials:  Patient Position: 60 at [facility]  Vocal quality prior to P.O.: No impairment  Consistency Presented: Thin liquid; Nectar thick liquid;Honey thick liquid; Ice chips  How Presented: SLP-fed/presented;Spoon;Cup/sip  Bolus Acceptance: Impaired  Bolus Formation/Control: Impaired  Type of Impairment: Anterior;Delayed;Poor;Posterior  Propulsion: Delayed (# of seconds)  Oral Residue: None  Initiation of Swallow: Delayed (# of seconds)  Laryngeal Elevation: Weak;Decreased  Aspiration Signs/Symptoms: Weak cough; Change vocal quality;Clear throat;Decrease in O2 saturations; Watery eyes  Pharyngeal Phase Characteristics: Poor endurance; Suspected pharyngeal residue;Multiple swallows; Feeling of discomfort  Effective Modifications: None  Cues for Modifications: Moderate     Oral Phase Severity: Moderate  Pharyngeal Phase Severity : Severe     Treatment Post Evaluation:  Treatment provided post evaluation with pt's family including education of oropharyngeal anatomy/physiology, bedside swallow exam results, risk of aspiration with PO, and recs of NPO with NG. Family verbalized understanding and explained results to pt. GCODESwallowing:  Swallow Current Status CN= 100%   Swallow Goal Status CM= 80-99%    The severity rating is based on the following outcomes:    Clinical Judgment    Pain:  Pt c/o 0/10 pain prior to evaluation. Pt c/o 0/10 pain post evaluation. After treatment:   []            Patient left in no apparent distress sitting up in chair  [x]            Patient left in no apparent distress in bed  [x]            Call bell left within reach  [x]            Nursing notified  []            Caregiver present  []            Bed alarm activated    COMMUNICATION/EDUCATION:   [x]            See above  [x]            Patient/family have participated as able in goal setting and plan of care.     Thank you for this referral.    Daya Ayala M.S. CCC-SLP/L  Speech-Language Pathologist

## 2018-03-02 NOTE — DIABETES MGMT
Glycemic Control: pt discussed in interdisciplinary rounds. Blood glucose yesterday ranged from 149 to 216 mg/dL and pt received 8 units of correctional Lispro insulin coverage. Noted steroids have been discontinued. Continue inpatient monitoring and intervention.      Morris Friedman RD, CDE

## 2018-03-02 NOTE — PROGRESS NOTES
HEMODIALYSIS ROUNDING NOTE      Patient: Cony Medeiros               Sex: female          DOA: 2/25/2018 12:50 PM        YOB: 1960      Age:  56 y.o.        LOS:  LOS: 5 days     Subjective:     Cony Medeiros is a 56 y.o.  who presents with NSTEMI (non-ST elevated myocardial infarction) ([facility] [address]  [facility]) [facility]  Hyponatremia  Respiratory failure (HCC)  NSTEMI (non-ST elevated myocardial infarction) [facility]  ESRD (end stage renal disease) ([facility]  Hypoxia. The patient is dialyzing utilizing the following method:Intermittent Hemodialysis    Chief Complains: Patient was seen on dialysis, denies nausea / vomiting / headache / dizziness / SOB / chest pain.   - Reviewed last 24 hrs events     Current Facility-Administered Medications   Medication Dose Route Frequency    epoetin pilar (EPOGEN;PROCRIT) injection 4,000 Units  4,000 Units IntraVENous Q MON, WED & FRI    hydrALAZINE (APRESOLINE) tablet 50 mg  50 mg Oral TID    cloNIDine HCl (CATAPRES) tablet 0.3 mg  0.3 mg Oral BID    dexmedeTOMidine (PRECEDEX) 600 mcg in 0.9% sodium chloride 150 mL infusion  0.2-0.7 mcg/kg/hr IntraVENous TITRATE    carvedilol (COREG) tablet 25 mg  25 mg Oral Q12H    amLODIPine (NORVASC) tablet 10 mg  10 mg Oral DAILY    niCARdipine (CARDENE) 25 mg in 0.9% sodium chloride 250 mL infusion  0-15 mg/hr IntraVENous TITRATE    insulin lispro (HUMALOG) injection   SubCUTAneous Q6H    glucose chewable tablet 16 g  4 Tab Oral PRN    glucagon (GLUCAGEN) injection 1 mg  1 mg IntraMUSCular PRN    dextrose (D50W) injection syrg 12.5-25 g  25-50 mL IntraVENous PRN    albuterol (ACCUNEB) nebulizer solution 2.5 mg  2.5 mg Nebulization Q6H PRN    aspirin chewable tablet 81 mg  81 mg Oral DAILY    atorvastatin (LIPITOR) tablet 40 mg  40 mg Oral QHS    acetaminophen (TYLENOL) solution 650 mg  650 mg Oral Q6H PRN    heparin 25,000 units in D5W 250 ml infusion  12-25 Units/kg/hr IntraVENous TITRATE    sodium chloride (NS) flush 5-10 mL  5-10 mL IntraVENous Q8H    sodium chloride (NS) flush 5-10 mL  5-10 mL IntraVENous PRN    acetaminophen (TYLENOL) suppository 650 mg  650 mg Rectal Q6H PRN    naloxone (NARCAN) injection 0.1 mg  0.1 mg IntraVENous PRN    ondansetron (ZOFRAN) injection 4 mg  4 mg IntraVENous Q6H PRN    hydrALAZINE (APRESOLINE) 20 mg/mL injection 10 mg  10 mg IntraVENous Q6H PRN    famotidine (PF) (PEPCID) 20 mg in sodium chloride 10 mL injection  20 mg IntraVENous DAILY       Objective:     Visit Vitals    BP (!) 176/96    Pulse 88    Temp 96.5 °F (35.8 °C) (Oral)    Resp 18    Ht 5' 1\" (1.549 m)    Wt 43.7 kg (96 lb 5.5 oz)    SpO2 100%    Breastfeeding No    BMI 18.2 kg/m2       Intake/Output Summary (Last 24 hours) at 03/02/18 1114  Last data filed at 03/02/18 0400   Gross per 24 hour   Intake              270 ml   Output                0 ml   Net              270 ml       Physical Examination:    GEN:confused  RS: Chest is bilateral equal, no wheezing / rales / crackles  CVS: S1-S2 heard, RRR  Abdomen: Soft, Non tender, Not distended, Positive bowel sounds  Extremities: No edema, no cyanosis, skin is warm on touch  HEENT: Head is atraumatic, PERRLA, conjunctiva pink & non icteric. No JVD or carotid bruit      Data Review:      Labs:     Hematology: Recent Labs      03/02/18 0340 03/01/18 0340 02/28/18   0445   WBC  13.3*  3.1*  10.2   HGB  9.0*  8.0*  8.1*   HCT  28.6*  25.2*  26.0*     Chemistry: Recent Labs      03/02/18 0340 03/01/18   0340  02/28/18   0445   BUN  48*  22*  33*   CREA  4.57*  2.77*  6.05*   CA  8.9  8.5  8.7   ALB  2.8*  2.3*  2.3*   K  4.7  4.5  3.5   NA  131*  136  132*   CL  95*  100  97*   CO2  24  27  23   PHOS   --   4.3  4.3   GLU  155*  149*  84        Images:     XR (Most Recent).  CXR reviewed by me and compared with previous CXR   Results from Hospital Encounter encounter on 02/25/18   XR CHEST PORT Narrative EXAM: Chest X-Ray    INDICATION: Evaluate OG tube placement    TECHNIQUE: AP view of the chest    COMPARISON: 2/27/2018, 2/26/2018, 2/25/2018    FINDINGS:   Tubes and Lines: The patient is intubated. It is 5 cm above the miquel. An  enteric tube is present with the tip below the field of view. A dialysis  catheter which is unchanged. Pleura: No pneumothorax appreciated. No effusions appreciated. Lungs:  Bibasilar opacities are noted similar to prior imaging. Cardiac silhouette: It is unremarkable    Pulmonary Vascularity: The pulmonary vasculature is unremarkable. Osseous Structures: Unremarkable         Impression IMPRESSION:  1. Tubes and lines in good position. 2.  Bibasilar opacities similar to prior imaging. CT (Most Recent)   Results from Hospital Encounter encounter on 02/25/18   CTA CHEST W OR W WO CONT   Narrative EXAMINATION: CTA chest pulmonary    INDICATION: Acute chest pain, shortness of breath    COMPARISON: None    TECHNIQUE: CT angiography of the pulmonary arteries performed following 75 cc IV  Isovue 370, with 3-D MIP multiplanar reformations. All CT scans at this facility  are performed using dose optimization technique as appropriate to a performed  exam, to include automated exposure control, adjustment of the mA and/or kV  according to patient size (including appropriate matching first site specific  examinations), or use of iterative reconstruction technique. FINDINGS:    Cardiovascular: No pulmonary arterial filling defects identified to suggest  pulmonary embolism. Motion artifact limits evaluation of smaller segmental and  subsegmental arteries. Ascending thoracic aorta is borderline ectatic measuring  up to 3.5 cm diameter. Heart size within normal limits. Right jugular catheter  tip at right atrium level. Moderate-sized area of cartilage fusion. Mediastinum: Possible enlarged aortopulmonary node and left hilar nodes, poorly  delineated.  Left hilar anita calcification noted. Thyroid slightly  heterogeneous. Esophagus is nondistended. Pleura: Moderate volume bilateral pleural effusions. No evidence of  pneumothorax. Lungs: Probable compressive atelectasis due to pleural effusions bilaterally. Right major fissure nodule measuring 4 mm, likely pleural-based node. Upper abdomen: Unremarkable. Miscellaneous: Superficial soft tissues unremarkable. Bones: No acute osseous findings. Probable bone island in T11 posterior body. Impression IMPRESSION:    1. No pulmonary embolus identified. Smaller segmental and subsegmental pulmonary  arteries less well evaluated due to motion artifact. 2. Moderate-sized bilateral pleural effusions, and moderate volume pericardial  effusion. Borderline ectatic ascending aorta. -Probable compressive atelectasis in bilateral lungs. Superimposed infiltrate  difficult to exclude.    -Suspected left hilar and aortopulmonary window adenopathy, poorly delineated,  nonspecific. Plan / Recommendation:         End Stage Renal Disease:  Plan HD today    At 11:14 AM on 3/2/2018, I saw and examined patient during hemodialysis treatment. The patient was receiving hemodialysis for treatment of end stage renal disease. I have also reviewed vital signs, intake and output, lab results and recent events, and agreed with today's dialysis order. Access: No issue    Anemia: epo   Htn,increase hydralazine,add cozaar. check renal arteries when out of icu. check renin,aldosterone  Peter Espinosa MD  Nephrology  3/2/2018

## 2018-03-02 NOTE — DIALYSIS
ACUTE HEMODIALYSIS FLOW SHEET    HEMODIALYSIS ORDERS: Physician:Erasmo     Dialyzer: revaclear        Duration 4 hr  BFR: 350  DFR: 600   Dialysate:  Temp 97.5 K 2    Ca+ 2.5 Na 140  Bicarb 35   Weight:44.4 kg    Bed Scale []     Unable to Obtain []      Dry weight/UF Goal 3000 Access RIJ  Needle Gauge     Heparin []  Bolus      Units    [] Hourly       Units    [x]None     Catheter locking solution heparin   Pre BP:   178/89   Pulse:   94    Temperature:   97.5  Respirations 15  Tx: NS   ml/Bolus  Other        [x] N/A   Labs: Pre        Post:        [x] N/A   Additional Orders(medications, blood products, hypotension management): epogen   [] N/A     [x]Time Out/Safety Check  [x] [facility] Consent Verified     CATHETER ACCESS: []N/A   [x]Right   []Left   [x]IJ     []Fem   [] First use X-ray verified     []Tunnel                [] Non Tunneled   [x]No S/S infection  []Redness  []Drainage []Cultured []Swelling []Pain   [x]Medical Aseptic Prep Utilized   [x]Dressing Changed  [x] Biopatch  Date: 2/26/18   []Clotted   [x]Patent   Flows: [x]Good  []Poor  []Reversed   If access problem,  notified: []Yes    [x]N/A  Date:           GRAFT/FISTULA ACCESS:  [x]N/A (not being used for this procedure)    [x]Right     []Left     []UE     []LE   []AVG   [x]AVF        []Buttonhole    []Medical Aseptic Prep Utilized   []No S/S infection  []Redness  []Drainage []Cultured []Swelling []Pain    Bruit:   [] Strong    [] Weak       Thrill :   [] Strong    [] Weak       Needle Gauge:    Length:     If access problem,  notified: []Yes     [x]N/A  Date:        Please describe access if present and not used:       GENERAL ASSESSMENT:    LUNGS:  16 SaO2%100  [] N/A    [x] Clear  [] Coarse  [] Crackles  [] Wheezing        [] Diminished     Location : []RLL   []LLL    [x]RUL  []ELÍAS   Cough: []Productive  [x]Dry  []N/A   Respirations:  [x]Easy  []Labored   Therapy:  []RA  [x]NC4 l/min    Mask: []NRB []Venti       O2% []Ventilator  []Intubated  [] Trach  [] BiPaP   CARDIAC: []Regular      [x] Irregular   [] Pericardial Rub  [] JVD        []  Monitored  [] Bedside  [] Remotely monitored [] N/A  Rhythm:   EDEMA: [x] None  []Generalized  [] Pitting [] 1    [] 2    [] 3    [] 4                 [] Facial  [] Pedal  []  UE  [] LE   SKIN:   [x] Warm  [] Hot     [] Cold   [x] Dry     [] Pale   [] Diaphoretic                  [] Flushed  [] Jaundiced  [] Cyanotic  [] Rash  [] Weeping   LOC:    [x] Alert      []Oriented:    [] Person     [] Place  []Time               [x] Confused  [] Lethargic  [] Medicated  [] Non-responsive     GI / ABDOMEN:[x] Flat    [] Distended    [x] Soft    [] Firm   []  Obese                             [] Diarrhea  [x] Bowel Sounds  [] Nausea  [] Vomiting       / URINE ASSESSMENT:[] Voiding   [] Oliguria  [x] Anuria   []  Onofre     [] Incontinent    []  Incontinent Brief      []  Bathroom Privileges     PAIN: [x] 0 []1  []2   []3   []4   []5   []6   []7   []8   []9   []10            Scale 0-10  Action/Follow Up:    MOBILITY [] Amb    [] Amb/Assist    [x] Bed    [] Wheelchair  [] Stretcher      All Vitals and Treatment Details on Attached [address]: [address]     [] 1st Time Acute  [] Stat  [x] Routine  [] Urgent     [] Acute Room  []  Bedside  [x] ICU/CCU  [] ER   Isolation Precautions:  [x] Dialysis   [] Airborne   [] Contact    [] Reverse   Special Considerations: MRSA       [] Blood Consent Verified []N/A     ALLERGIES:  [] NKA  Banana       Code Status:[x] Full Code  [] DNR  [] Other           HBsAg ONLY: Date Drawn  9/9/17      [x]Negative []Positive []Unknown   HBsAb: Date 9/9/17  [x] Susceptible   [] [id number] []Not Drawn  [] Drawn     Current Labs:    Date of Labs: Today [x]       Results for Eladia Farmer (MRN [id number]) as of 3/2/2018 10:00   Ref.  Range 3/2/2018 03:40   WBC Latest Ref Range: 4.6 - 13.2 K/uL 13.3 (H)   RBC Latest Ref Range: 4.20 - 5.30 M/uL 3.62 (L)   HGB Latest Ref Range: 12.0 - 16.0 g/dL 9.0 (L)   HCT Latest Ref Range: 35.0 - 45.0 % 28.6 (L)   MCV Latest Ref Range: 74.0 - 97.0 FL 79.0   MCH Latest Ref Range: 24.0 - 34.0 PG 24.9   MCHC Latest Ref Range: 31.0 - 37.0 g/dL 31.5   RDW Latest Ref Range: 11.6 - 14.5 % 14.8 (H)   PLATELET Latest Ref Range: 135 - 420 K/uL 374   MPV Latest Ref Range: 9.2 - 11.8 FL 11.0   NEUTROPHILS Latest Ref Range: 40 - 73 % 95 (H)   LYMPHOCYTES Latest Ref Range: 21 - 52 % 3 (L)   MONOCYTES Latest Ref Range: 3 - 10 % 2 (L)   EOSINOPHILS Latest Ref Range: 0 - 5 % 0   BASOPHILS Latest Ref Range: 0 - 2 % 0   DF Latest Units:   AUTOMATED   ABS. NEUTROPHILS Latest Ref Range: 1.8 - 8.0 K/UL 12.6 (H)   ABS. LYMPHOCYTES Latest Ref Range: 0.9 - 3.6 K/UL 0.4 (L)   ABS. MONOCYTES Latest Ref Range: 0.05 - 1.2 K/UL 0.3   ABS. EOSINOPHILS Latest Ref Range: 0.0 - 0.4 K/UL 0.0   ABS.  BASOPHILS Latest Ref Range: 0.0 - 0.1 K/UL 0.0   aPTT Latest Ref Range: 23.0 - 36.4 SEC 33.8   Sodium Latest Ref Range: 136 - 145 mmol/L 131 (L)   Potassium Latest Ref Range: 3.5 - 5.5 mmol/L 4.7   Chloride Latest Ref Range: 100 - 108 mmol/L 95 (L)   CO2 Latest Ref Range: 21 - 32 mmol/L 24   Anion gap Latest Ref Range: 3.0 - 18 mmol/L 12   Glucose Latest Ref Range: 74 - 99 mg/dL 155 (H)   BUN Latest Ref Range: 7.0 - 18 MG/DL 48 (H)   Creatinine Latest Ref Range: 0.6 - 1.3 MG/DL 4.57 (H)   BUN/Creatinine ratio Latest Ref Range: 12 - 20   11 (L)   Calcium Latest Ref Range: 8.5 - 10.1 MG/DL 8.9   GFR est non-AA Latest Ref Range: >60 ml/min/1.73m2 10 (L)   GFR est AA Latest Ref Range: >60 ml/min/1.73m2 12 (L)   Bilirubin, total Latest Ref Range: 0.2 - 1.0 MG/DL 0.6   Bilirubin, direct Latest Ref Range: 0.0 - 0.2 MG/DL 0.1   Protein, total Latest Ref Range: 6.4 - 8.2 g/dL 6.8   Albumin Latest Ref Range: 3.4 - 5.0 g/dL 2.8 (L)   Globulin Latest Ref Range: 2.0 - 4.0 g/dL 4.0   A-G Ratio Latest Ref Range: 0.8 - 1.7   0.7 (L)   ALT (SGPT) Latest Ref Range: 13 - 56 U/L 24 AST Latest Ref Range: 15 - 37 U/L 44 (H)   Alk. phosphatase Latest Ref Range: 45 - 117 U/L 141 (H)                                                                                                                                 DIET:  [] Renal    [x] Other     [] NPO     []  Diabetic      PRIMARY NURSE REPORT: First initial/Last name/Title      Pre Dialysis ROYAL Gay RN   Time: 0900     EDUCATION:    [x] Patient [] Other         Knowledge Basis: [x]None []Minimal [] Substantial   Barriers to learning pt does not speak english[]N/A   [] Access Care     [] S&S of infection     [] Fluid Management     []K+     []Procedural    []Albumin     [] Medications     [] Tx Options     [] Transplant     [] Diet     [] Other   Teaching Tools:  [] Explain  [] Demo  [] Handouts [] Video  Patient response:  [] Verbalized understanding  [] Teach back  [] Return demonstration [x] Requires follow up   Inappropriate due to            [address] Before each treatment:     Machine Number:                   [facility]                                  [] Unit Machine #  with centralized RO                                  [] Portable Machine #1/RO serial # [id number]                                  [] Portable Machine #2/RO serial # [id number]                                  [x] Portable Machine #3/RO serial # [id number]                                                                                                       [facility]                                  [] Portable Machine #11/RO serial # [id number]                                   [] Portable Machine #12/RO serial # [id number]                                  [] Portable Machine #13/RO serial #  [id number]      Alarm Test:  Pass time 0900         Other:         [x] RO/Machine Log Complete      Temp   37.2           [x]Extracorporeal Circuit Tested for integrity   Dialysate: pH  7.4 Conductivity: Meter    HD Machine   14.0 TCD: 13.9  Dialyzer Lot # [id number]           Blood Tubing Lot #  [id number]         Saline Lot # -[id number]      CHLORINE TESTING-Before each treatment and every 4 hours    Total Chlorine: [x] less than 0.1 ppm  Time 0900: 4 Hr/2nd Check Time:    (if greater than 0.1 ppm from Primary then every 30 minutes from Secondary)     TREATMENT INITIATION  with Dialysis Precautions:   [x] All Connections Secured                 [x] Saline Line Double Clamped   [x] Venous Parameters Set                  [x] Arterial Parameters Set    [x] Prime Given 200ml               [x]Air Foam Detector Engaged      Treatment Initiation Note: at pt bedside in icu. Pt alert and awake. Pt agitated. (shaking side rails, pushed me when I tried to access catheter. Pt has R IJ catheter, arterial and venous  Ports aspirates and flushes well. Hemodialysis started will continue to monitor pt and vital signs. Medication Dose Volume Route Initials Dialyzer Cleared [x] Good [] Fair  [] Poor    Blood processed: 71.7 L  UF Removed 3000 Ml    Post Wt 40.1  kg  POst BP  181/97     Pulse:   98  Respirations: 20 Temperature:98.6       epogen     4000  units        ml   iv         rl   Post Tx Vascular Access: AVF/AVG: Bleeding stopped Art min. Wei. min   N/A                                   Catheter: Locking solution: Heparin 1:1000 Art. 1.6  Wei. 1.6  N/A                                 Post Assessment:                                    Skin:  [x] Warm  [] Dry [] Diaphoretic    [] Flushed  [] Pale [] Cyanotic   [facility] Signatures Title Initials  Time Lungs: [x] Clear    [] Course  [] Crackles  [] Wheezing [] Diminished   R[facility] rn rl [id number] Cardiac: [] Regular   [x] Irregular   [] Monitor  [] N/A  Rhythm:           Edema:  [x] None    [] General     [] Facial   [] Pedal    [] UE    [] LE       Pain [x]0  []1  []2   []3  []4   []5   []6   []7   []8   []9   []10         Post Treatment Note: hemodialysis completed. 2.9 liters removed.  Pt eyes closed. Blood pressure high (primary nurse at bedside administering blood pressure meds). RI J catheter flushed with normal saline and heparin.  Pt remains in icu, report giving to primary nurse     POST TREATMENT PRIMARY NURSE HANDOFF REPORT:     First initial/Last name/Title         Post Dialysis: KATELYN Gay RN  Time: 7102        Abbreviations: AVG-arterial venous graft, AVF-arterial venous fistula, IJ-Internal Jugular, Subcl-Subclavian, Fem-Femoral, Tx-treatment, AP/HR-apical heart rate, DFR-dialysate flow rate, BFR-blood flow rate, AP-arterial pressure, -venous pressure, UF-ultrafiltrate, TMP-transmembrane pressure, Wei-Venous, Art-Arterial, RO-Reverse Osmosis

## 2018-03-02 NOTE — PROGRESS NOTES
NUTRITION    Nursing Referral: [facility]  Nutrition Consult: Management of Tube Feeding      RECOMMENDATIONS / PLAN:     - Continue tube feeding of Nepro at goal rate of 40 mL/hr with 50 mL q 4 hour water flushes.   - Diet as tolerated per SLP recommendations. - Continue RD inpatient monitoring and evaluation. Goal Regimen: Nepro at 40 mL/hr + 50 mL q 4 hour water flushes to provide: 1728 kcal, 78 gm protein, 92 gm fat, 160 gm CHO, 15 gm fiber, 696 mL free water, 996 mL total water, 100% RDIs     NUTRITION INTERVENTIONS & DIAGNOSIS:     [x] Enteral nutrition: continue   [x] Collaboration and referral of nutrition care: interdisciplinary rounds    Nutrition Diagnosis: Unintentional weight loss related to inadequate energy intake as evidenced by 30 lb, 24% weight loss x 5 months. Inadequate oral intake related to inability to tolerate po due to altered mentation and dysphagia as evidenced by pt NPO per SLP after swallow evaluation. ASSESSMENT:     3/2: Extubated to NC on 3/1. Pt confused and combative overnight. Remains NPO per SLP s/p swallow evaluation this morning with NGT for feeding. Tolerating at goal. HD today. Pt c/o throat soreness, follow swallow evaluation in a day or 2 to re-evaluate. 2/27: Tolerating feeds at goal, held this morning for SBT and possible extubation today. Hyponatremia improved some, IV fluid held and now discontinued, s/p HD yesterday and 3.2 L removed. 2/26: Pt intubated and NGT clamped.      EN infusion adequate to meet patients estimated nutritional needs:   [x] Yes     [] No   [] Unable to determine at this time    Tube Feeding: Nepro at 40 mL/hr via NGT  Water Flushes: 50 mL q 4 hours   Residuals: 0 mL     Diet: DIET NPO  DIET TUBE FEEDING      Food Allergies: banana   Current Appetite:   [] Good     [] Fair     [] Poor     [x] Other: NPO  Appetite/meal intake prior to admission:   [] Good     [] Fair     [x] Poor, decreased appetite and nausea/vomiting x 3-4 days PTA     [] Other:  Feeding Limitations:  [x] Swallowing difficulty: SLP following    [] Chewing difficulty    [x] Other: mentation   Current Meal Intake: No data found. Anuric   BM: PTA   Skin Integrity: WDL  Edema: none   Pertinent Medications: Reviewed: SSI, zofran    Recent Labs      03/02/18   0340  03/01/18   0340  02/28/18   0445   NA  131*  136  132*   K  4.7  4.5  3.5   CL  95*  100  97*   CO2  24  27  23   GLU  155*  149*  84   BUN  48*  22*  33*   CREA  4.57*  2.77*  6.05*   CA  8.9  8.5  8.7   PHOS   --   4.3  4.3   ALB  2.8*  2.3*  2.3*   SGOT  44*  40*  47*   ALT  24  20  19       Intake/Output Summary (Last 24 hours) at 03/02/18 1103  Last data filed at 03/02/18 0400   Gross per 24 hour   Intake              270 ml   Output                0 ml   Net              270 ml       Anthropometrics:  Ht Readings from Last 1 Encounters:   02/25/18 5' 1\" (1.549 m)     Last 3 Recorded Weights in this Encounter    02/28/18 0300 03/01/18 0400 03/02/18 0457   Weight: 42.9 kg (94 lb 9.2 oz) 43 kg (94 lb 12.8 oz) 43.7 kg (96 lb 5.5 oz)     Body mass index is 18.2 kg/(m^2).    Underweight        Weight History: 30 lb, 24% weight loss x 5 months PTA per chart     Weight Metrics 3/2/2018 1/22/2018 12/2/2017 11/15/2017 11/5/2017 9/10/2017 9/8/2017   Weight 96 lb 5.5 oz 103 lb 3 oz 106 lb 14.8 oz 107 lb 110 lb 125 lb 9.6 oz -   BMI 18.2 kg/m2 19.5 kg/m2 20.2 kg/m2 20.22 kg/m2 21.48 kg/m2 - 22.97 kg/m2        Admitting Diagnosis: NSTEMI (non-ST elevated myocardial infarction) ([facility]  [facility]  Hyponatremia  Respiratory failure (HCC)  NSTEMI (non-ST elevated myocardial infarction) ([address]  ESRD (end stage renal disease) ([facility]  Hypoxia  Pertinent PMHx: ESRD on HD, HTN, HLD     Education Needs:        [x] None identified  [] Identified - Not appropriate at this time  []  Identified and addressed - refer to education log  Learning Limitations:   [] None identified  [x] Identified: AMS; does not speak [place], speakaric [place]      Cultural, Yarsani & ethnic food preferences:  [x] None identified    [] Identified and addressed     ESTIMATED NUTRITION NEEDS:     Calories: 5821-7066 kcal (30-35 kcal/kg) based on  [] Actual BW     [x] SBW 56 kg  Protein: 67-84 gm (1.2-1.5 gm/kg) based on  [] Actual BW      [x] SBW   Fluid: 1227-9064 mL     MONITORING & EVALUATION:     Nutrition Goal(s):   1. Nutritional needs will be met through adequate oral intake or nutrition support within the next 7 days.   Outcome:  [x] Met/Ongoing    []  Not Met    [] New/Initial Goal     Monitoring:   [] Food and beverage intake   [] Diet order   [x] Nutrition-focused physical findings   [x] Treatment/therapy   [] Weight   [x] Enteral nutrition intake        Previous Recommendations (for follow-up assessments only):     [x]   Implemented       []   Not Implemented (RD to address)      [] No Longer Appropriate     [] No Recommendation Made     Discharge Planning: pending ability to tolerate po and goals of care    [x] Participated in care planning, discharge planning, & interdisciplinary rounds as appropriate      Maribel Long, [address], [address] [place] [address]    Pager: [phone]

## 2018-03-02 NOTE — PROGRESS NOTES
[facility]      CARDIOLOGY PROGRESS NOTE  RECS:    1. Acute respiratory failure-  possiblly related to Acute decompensated CHF in the setting of uncontrolled Hypertension. Patient now extubated  2. NSTEMI-off heparin drip due to epistaxis. Continue asa and bb for now. Echo revealed EF% 45-50%, mild hypokinesis apical wall, LVH is present. Will need Cardiac w/u for CAD. Possible Cardiac cath when stable. D/w family  3. Acute Diastolic Heart failure- Compensated now. On HD volume status per nephrology    4. Uncontrolled hypertension-better controlled  Cardine drip prn for elevated bp. Continue bb, CCB and hydralazine. 5. ESRD- on HD renal following. She is MWF HD schedule   6. Anemia- slightly dropping H&H  monitor   7. Hyponatremia: stable   8. UTI  On antibiotics managed per [facility]            ECHO 02/26/18  Left ventricle: Size was normal. Systolic function was mildly reduced by   visual assessment. Ejection fraction was  estimated in the range of 45 % to 50 %. There was mild hypokinesis of the   apical wall(s). Wall thickness was moderately  increased. Left atrium: The atrium was mildly dilated. Atrial septum: There was increased thickness of the septum, consistent with   lipomatous hypertrophy. Pericardium: A small pericardial effusion was identified circumferential to   the heart. There was no evidence of  hemodynamic compromise. COMPARISONS:  Comparison was made with the previous study of 17-Sep-2017. LV overall   function has decreased from 65 % to 45 %. There  was worsening in the periapical LV wall motion. Pericardial effusion   appearance has not changed. Otherwise no  significant change.       ASSESSMENT:  Hospital Problems  Date Reviewed: 11/15/2017          Codes Class Noted POA    Anemia ICD-10-CM: D64.9  ICD-9-CM: 285.9  2/26/2018 Unknown        Hyponatremia ICD-10-CM: E87.1  ICD-9-CM: 276.1  2/25/2018 Unknown        ESRD (end stage renal disease) [facility] ICD-10-CM: N18.6  ICD-9-CM: 585.6  2/25/2018 Unknown        NSTEMI (non-ST elevated myocardial infarction) [facility]) ICD-10-CM: I21.4  ICD-9-CM: 410.70  2/25/2018 Unknown        * (Principal)Respiratory failure ([address] ICD-10-CM: J96.90  ICD-9-CM: 518.81  2/25/2018 Unknown        Hypoxia ICD-10-CM: R09.02  ICD-9-CM: 799.02  2/25/2018 Unknown        Acute UTI ICD-10-CM: N39.0  ICD-9-CM: 599.0  2/25/2018 Unknown                SUBJECTIVE:    Alert, in no apparent distress    OBJECTIVE:    VS:   Visit Vitals    BP (!) 166/93    Pulse 80    Temp 96.5 °F (35.8 °C) (Oral)    Resp 16    Ht 5' 1\" (1.549 m)    Wt 43.7 kg (96 lb 5.5 oz)    SpO2 100%    Breastfeeding No    BMI 18.2 kg/m2         Intake/Output Summary (Last 24 hours) at 03/02/18 1301  Last data filed at 03/02/18 0400   Gross per 24 hour   Intake              240 ml   Output                0 ml   Net              240 ml     TELE: normal sinus rhythm with intermittent sinus tachycardia     General:  Extubated, on restraints for consfusion  HENT: Normocephalic, atraumatic. Normal external eye.   Neck :  no JVD  Cardiac:  regular rate and rhythm, tachycardic, no S3 or S4, no click, no rub  Lungs: clear to auscultation bilaterally  Abdomen: Soft, nontender, no masses  Extremities:  No edema      Labs: Results:       Chemistry Recent Labs      03/02/18   0340  03/01/18   0340  02/28/18   0445   GLU  155*  149*  84   NA  131*  136  132*   K  4.7  4.5  3.5   CL  95*  100  97*   CO2  24  27  23   BUN  48*  22*  33*   CREA  4.57*  2.77*  6.05*   CA  8.9  8.5  8.7   AGAP  12  9  12   BUCR  11*  8*  5*   AP  141*  109  117   TP  6.8  5.3*  6.0*   ALB  2.8*  2.3*  2.3*   GLOB  4.0  3.0  3.7   AGRAT  0.7*  0.8  0.6*      CBC w/Diff Recent Labs      03/02/18   0340  03/01/18   0340  02/28/18   0445   WBC  13.3*  3.1*  10.2   RBC  3.62*  3.15*  3.24*   HGB  9.0*  8.0*  8.1*   HCT  28.6*  25.2*  26.0*   PLT  374  197  216   GRANS  95*  90*  83*   LYMPH  3*  8*  6*   EOS  0  0  0 Cardiac Enzymes No results for input(s): CPK, CKND1, WOFLGANG in the last 72 hours. No lab exists for component: CKRMB, TROIP   Coagulation Recent Labs      03/02/18 0340 03/01/18 0340   APTT  33.8  39.0*       Lipid Panel Lab Results   Component Value Date/Time    Cholesterol, total 151 02/26/2018 10:02 AM    HDL Cholesterol 37 (L) 02/26/2018 10:02 AM    LDL, calculated 95.2 02/26/2018 10:02 AM    VLDL, calculated 18.8 02/26/2018 10:02 AM    Triglyceride 94 02/26/2018 10:02 AM    CHOL/HDL Ratio 4.1 02/26/2018 10:02 AM      BNP No results for input(s): BNPP in the last 72 hours.    Liver Enzymes Recent Labs      03/02/18 0340   TP  6.8   ALB  2.8*   AP  141*   SGOT  44*      Thyroid Studies No results found for: T4, T3U, TSH, TSHEXT, TSHEXT           Edelmira Cabrera MD

## 2018-03-02 NOTE — ROUTINE PROCESS
1915 Bedside verbal shift change report received from KRSITIN GARCÍA(offgoing nurse) given to [address], RN(oncoming nurse). Report included SBAR,MAR,KARDEX,TELE,I/O    2643 Pt attempted to climb out of bed. Pulled telemetry leads and IV out. Several nurses and PA at bedside attempting to calm patient and get her back in bed. Pt very combative. Haldol 2mg IV given. New orders noted. 0630 Spk with Pharmacy. Precedex still not available.    0700 Called son [email or web address] no answer/no answering machine picked up

## 2018-03-02 NOTE — PROGRESS NOTES
[facility] Follow-up    0520: Pt was noted to be on the side of the bed trying to get up and leave; all cords have been taken off by patient. Pt became combative when asked to lay down. She started kicking, pulling hair, swaying her arms across her body and bit one of the nurses' finger. There were 8 of us (5 RNs, 1 RT, 2 PAs) in the room attempting to restrain her from hurting herself and staff. Earlier pt had expressed that \"God was punishing her and hitting her chest.\"    Acute encephalopathy -possible delirium  · Haldol 2 mg IV now  · Physical restraint to b/l wrists and legs until pt is less combative. Serial reassessment for restraint need. · Restart precedex infusion at lowest rate and titrate as needed for agitation/ delirium  · Monitor mentation closely.  Recommend haldol prn if Qtc < 500    January-Marlin Mckeon PA-C  5:49 AM      ICU Staff  - Note reviewed and addressed in  Daily ICU Progress Note  Rufus Crouch DO, [facility]  Pulmonary, Sleep, Critical Care Medicine

## 2018-03-02 NOTE — PROGRESS NOTES
attended the interdisciplinary rounds for Debra Borden, who is a 56 y.o.,female. Patients Primary Language is: [place]. According to the patients EMR Jain Affiliation is: Mormonism. The reason the Patient came to the hospital is:   Patient Active Problem List    Diagnosis Date Noted    Anemia 02/26/2018    Hyponatremia 02/25/2018    [facility]D (end stage [facility] disease) [facility] 02/25/2018    N[facility]non[facility]ST elevated [facility] 02/25/2018    Res[facility] failure [facility] 02/25/2018    Hypoxia 02/25/2018    Acute UTI 02/25/2018    Hyperkalemia 09/08/2017    Renal failure 09/08/2017    SCOOTER (acute kidney injury) ([facility] 09/08/2017      Plan:  Chaplains will continue to follow and will provide pastoral care on an as needed/requested basis.  recommends bedside caregivers page  on duty if patient shows signs of acute spiritual or emotional distress.     [address] [facility]  Board Certified [address] [facility]   [phone]

## 2018-03-03 NOTE — ROUTINE PROCESS
Bedside, Verbal and Written shift change report given to Ms. Magdalene Chris RN (oncoming nurse) by Ms. Victor Hugo FOSTER (offgoing nurse). Report included the following information SBAR, Kardex, Procedure Summary, Intake/Output, MAR, Recent Results and Med Rec Status.

## 2018-03-03 NOTE — PROGRESS NOTES
[facility]      CARDIOLOGY PROGRESS NOTE  RECS:    1. Acute respiratory failure- Patient now extubated   2. NSTEMI-off heparin drip due to epistaxis. Continue asa and bb for now. Echo revealed EF% 45-50%, mild hypokinesis apical wall, LVH is present. Will need Cardiac w/u for CAD. Possible Cardiac cath when stable. D/w family  3. Acute Diastolic Heart failure- Compensated now. On HD volume status per nephrology    4. Uncontrolled hypertension-better controlled    5. ESRD- on HD renal following. She is MWF HD schedule   6. Anemia- slightly dropping H&H  monitor   7. Hyponatremia: stable   8. UTI  On antibiotics managed per [facility]            ECHO 02/26/18  Left ventricle: Size was normal. Systolic function was mildly reduced by   visual assessment. Ejection fraction was  estimated in the range of 45 % to 50 %. There was mild hypokinesis of the   apical wall(s). Wall thickness was moderately  increased. Left atrium: The atrium was mildly dilated. Atrial septum: There was increased thickness of the septum, consistent with   lipomatous hypertrophy. Pericardium: A small pericardial effusion was identified circumferential to   the heart. There was no evidence of  hemodynamic compromise. COMPARISONS:  Comparison was made with the previous study of 17-Sep-2017. LV overall   function has decreased from 65 % to 45 %. There  was worsening in the periapical LV wall motion. Pericardial effusion   appearance has not changed. Otherwise no  significant change.       ASSESSMENT:  Hospital Problems  Date Reviewed: 11/15/2017          Codes Class Noted POA    Anemia ICD-10-CM: D64.9  ICD-9-CM: 285.9  2/26/2018 Unknown        Hyponatremia ICD-10-CM: E87.1  ICD-9-CM: 276.1  2/25/2018 Unknown        ESRD (end stage renal disease) ([facility] ICD-10-CM: N18.6  ICD-9-CM: 585.6  2/25/2018 Unknown        NSTEMI (non-ST elevated myocardial infarction) ([facility] ICD-10-CM: I21.4  ICD-9-CM: 410.70  2/25/2018 Unknown        * (Principal)Respiratory failure [facility]) ICD-10-CM: J96.90  ICD-9-CM: 518.81  2/25/2018 Unknown        Hypoxia ICD-10-CM: R09.02  ICD-9-CM: 799.02  2/25/2018 Unknown        Acute UTI ICD-10-CM: N39.0  ICD-9-CM: 599.0  2/25/2018 Unknown                SUBJECTIVE:    Alert, in no apparent distress    OBJECTIVE:    VS:   Visit Vitals    /79    Pulse 87    Temp 98.2 °F (36.8 °C)    Resp 20    Ht 5' 1\" (1.549 m)    Wt 39.7 kg (87 lb 8.4 oz)    SpO2 100%    Breastfeeding No    BMI 16.54 kg/m2         Intake/Output Summary (Last 24 hours) at 03/03/18 1133  Last data filed at 03/02/18 2000   Gross per 24 hour   Intake              540 ml   Output             2900 ml   Net            -2360 ml     TELE: normal sinus rhythm     General:  aeake,on restraints for consfusion  HENT: Normocephalic, atraumatic. Normal external eye. Neck :  no JVD  Cardiac:  regular rate and rhythm, tachycardic, no S3 or S4, no click, no rub  Lungs: clear to auscultation bilaterally  Abdomen: Soft, nontender, no masses  Extremities:  No edema      Labs: Results:       Chemistry Recent Labs      03/03/18 0301 03/02/18   0340 03/01/18   0340   GLU  127*  155*  149*   NA  134*  131*  136   K  3.6  4.7  4.5   CL  97*  95*  100   CO2  28  24  27   BUN  28*  48*  22*   CREA  2.69*  4.57*  2.77*   CA  8.5  8.9  8.5   AGAP  9  12  9   BUCR  10*  11*  8*   AP  129*  141*  109   TP  6.1*  6.8  5.3*   ALB  2.7*  2.8*  2.3*   GLOB  3.4  4.0  3.0   AGRAT  0.8  0.7*  0.8      CBC w/Diff Recent Labs      03/03/18   0301 03/02/18   0340  03/01/18   0340   WBC  8.5  13.3*  3.1*   RBC  3.43*  3.62*  3.15*   HGB  8.5*  9.0*  8.0*   HCT  27.4*  28.6*  25.2*   PLT  302  374  197   GRANS  85*  95*  90*   LYMPH  7*  3*  8*   EOS  0  0  0      Cardiac Enzymes No results for input(s): CPK, CKND1, WOLFGANG in the last 72 hours.     No lab exists for component: CKRMB, TROIP   Coagulation Recent Labs      03/03/18   0301  03/02/18   0340   APTT  36.6*  33.8 Lipid Panel Lab Results   Component Value Date/Time    Cholesterol, total 151 02/26/2018 10:02 AM    HDL Cholesterol 37 (L) 02/26/2018 10:02 AM    LDL, calculated 95.2 02/26/2018 10:02 AM    VLDL, calculated 18.8 02/26/2018 10:02 AM    Triglyceride 94 02/26/2018 10:02 AM    CHOL/HDL Ratio 4.1 02/26/2018 10:02 AM      BNP No results for input(s): BNPP in the last 72 hours.    Liver Enzymes Recent Labs      03/03/18   0301   TP  6.1*   ALB  2.7*   AP  129*   SGOT  34      Thyroid Studies No results found for: T4, T3U, TSH, TSHEXT, TSHEXT           Rafiq Roche MD

## 2018-03-03 NOTE — ROUTINE PROCESS
Bedside, Verbal and Written shift change report given to Sergio Arthur (oncoming nurse) by Sonny Pruitt RN   (offgoing nurse). Report included the following information SBAR, Kardex, Procedure Summary, Intake/Output, MAR and Recent Results.

## 2018-03-03 NOTE — PROGRESS NOTES
[facility]  Progress Note    Patient: Tamela Maldonado Age: 56 y.o. : 1960 MR#: [id number] SSN: [id number]  Date/Time: 3/3/2018  12:03 AM    Subjective/24-hour events:     Respiratory status remains stable, no acute issues overnight. Had HD yesterday. Assessment:   Acute hypoxic respiratory failure  NSTEMI  Malignant HTN  Acute diastolic CHF  ESRD  Nasal MRSA colonization     Plan:  HD per usual schedule. Continue heparin infusion, monitor H/H. Antihypertensives as ordered, adjust as necessary. Pressures in acceptable control currently. Cath - timing per cardiology. Following. Can transfer to floor from medical perspective. Case discussed with:  []Patient  [x]Family  []Nursing  []Case Management  DVT Prophylaxis:  []Lovenox  []Hep SQ  []SCDs  []Coumadin   [x]On Heparin gtt    Objective:   VS:   Visit Vitals    /79    Pulse 87    Temp 98.2 °F (36.8 °C)    Resp 20    Ht 5' 1\" (1.549 m)    Wt 39.7 kg (87 lb 8.4 oz)    SpO2 100%    Breastfeeding No    BMI 16.54 kg/m2      Tmax/24hrs: Temp (24hrs), Av.4 °F (36.9 °C), Min:98.1 °F (36.7 °C), Max:98.6 °F (37 °C)      Intake/Output Summary (Last 24 hours) at 18 1134  Last data filed at 18 2000   Gross per 24 hour   Intake              540 ml   Output             2900 ml   Net            -2360 ml       General:  In NAD. Cardiovascular: RRR. Pulmonary:  Lungs clear anteriorly. Effort nonlabored. GI:  Abdomen soft, NTTP. Extremities:  Warm, no ischemia. Neuro:  Awake. Moves extremities spontaneously.     Labs:    Recent Results (from the past 24 hour(s))   GLUCOSE, POC    Collection Time: 18  1:15 PM   Result Value Ref Range    Glucose (POC) 117 (H) 70 - 110 mg/dL   GLUCOSE, POC    Collection Time: 18  7:22 PM   Result Value Ref Range    Glucose (POC) 134 (H) 70 - 110 mg/dL   GLUCOSE, POC    Collection Time: 18 11:39 PM   Result Value Ref Range    Glucose (POC) 165 (H) 70 - 110 mg/dL   PTT    Collection Time: 03/03/18  3:01 AM   Result Value Ref Range    aPTT 36.6 (H) 23.0 - 50.2 SEC   METABOLIC PANEL, BASIC    Collection Time: 03/03/18  3:01 AM   Result Value Ref Range    Sodium 134 (L) 136 - 145 mmol/L    Potassium 3.6 3.5 - 5.5 mmol/L    Chloride 97 (L) 100 - 108 mmol/L    CO2 28 21 - 32 mmol/L    Anion gap 9 3.0 - 18 mmol/L    Glucose 127 (H) 74 - 99 mg/dL    BUN 28 (H) 7.0 - 18 MG/DL    Creatinine 2.69 (H) 0.6 - 1.3 MG/DL    BUN/Creatinine ratio 10 (L) 12 - 20      GFR est AA 22 (L) >60 ml/min/1.73m2    GFR est non-AA 18 (L) >60 ml/min/1.73m2    Calcium 8.5 8.5 - 10.1 MG/DL   CBC WITH AUTOMATED DIFF    Collection Time: 03/03/18  3:01 AM   Result Value Ref Range    WBC 8.5 4.6 - 13.2 K/uL    RBC 3.43 (L) 4.20 - 5.30 M/uL    HGB 8.5 (L) 12.0 - 16.0 g/dL    HCT 27.4 (L) 35.0 - 45.0 %    MCV 79.9 74.0 - 97.0 FL    MCH 24.8 24.0 - 34.0 PG    MCHC 31.0 31.0 - 37.0 g/dL    RDW 14.9 (H) 11.6 - 14.5 %    PLATELET 333 895 - 995 K/uL    MPV 11.4 9.2 - 11.8 FL    NEUTROPHILS 85 (H) 40 - 73 %    LYMPHOCYTES 7 (L) 21 - 52 %    MONOCYTES 8 3 - 10 %    EOSINOPHILS 0 0 - 5 %    BASOPHILS 0 0 - 2 %    ABS. NEUTROPHILS 7.1 1.8 - 8.0 K/UL    ABS. LYMPHOCYTES 0.6 (L) 0.9 - 3.6 K/UL    ABS. MONOCYTES 0.7 0.05 - 1.2 K/UL    ABS. EOSINOPHILS 0.0 0.0 - 0.4 K/UL    ABS. BASOPHILS 0.0 0.0 - 0.1 K/UL    DF AUTOMATED     HEPATIC FUNCTION PANEL    Collection Time: 03/03/18  3:01 AM   Result Value Ref Range    Protein, total 6.1 (L) 6.4 - 8.2 g/dL    Albumin 2.7 (L) 3.4 - 5.0 g/dL    Globulin 3.4 2.0 - 4.0 g/dL    A-G Ratio 0.8 0.8 - 1.7      Bilirubin, total 0.4 0.2 - 1.0 MG/DL    Bilirubin, direct 0.1 0.0 - 0.2 MG/DL    Alk.  phosphatase 129 (H) 45 - 117 U/L    AST (SGOT) 34 15 - 37 U/L    ALT (SGPT) 21 13 - 56 U/L   GLUCOSE, POC    Collection Time: 03/03/18  5:33 AM   Result Value Ref Range    Glucose (POC) 166 (H) 70 - 110 mg/dL       Signed By: Kyle Pond MD     March 3, 2018 12:03 PM

## 2018-03-03 NOTE — PROGRESS NOTES
[facility]  Pulmonary, Critical Care, and Sleep Medicine    Name: Kavitha Orozco MRN: [id number]   : 1960 Hospital: [facility]   Date: 3/3/2018        Pulmonary Critical Care Progress Note                                                Subjective/History:   Patient is a 56 y.o. Vietamese female with a hx of end stage renal disease, HTN, and Hypercholestermia who presented to [facility] ED with c/o constant moderate SOB onset 3-4 days. Associated sx of productive cough (with white sputum), nausea, emesis, dysuria, and decreased appetite. Pt has dialysis M/W/F with last visit on Friday, 18 with pt reporting not feeling better afterwards. Patient was intubated in the [facility] ED for hypoxia. Troponins were elevated and Cardiology was consulted. Started on Heparin gtt and ASA was ordered. Nephrology consulted for ESRD/Dialysis. Patient was transferred to [facility] ICU.       18   Liberated from vent 2 days ago - respiratory status stable. Hypertension is better controlled. Speaks Slovenian, unable to assess orientation but is communicating well with family members in the room  Hemodynamically stable, Afebrile   Dialysis per renal.      The patient is critically ill and can not provide additional history due to ventilated. [x]The patient is unable to give any meaningful history or review of systems because the patient is:  []Intubated []Sedated   []Unresponsive [x] Comunication barrier        Review of Systems:  Review of systems not obtained due to patient factors.     Past Medical History:  Past Medical History:   Diagnosis Date    Chronic kidney disease     ESRD (end stage renal disease) ([facility] [address]     TUES-THURS-SAT    Hypercholesteremia     Hypertension     Kidney disease     Vitamin D deficiency         Past Surgical History:  Past Surgical History:   Procedure Laterality Date    VASCULAR SURGERY PROCEDURE UNLIST          Medications:  Prior to Admission medications    Medication Sig Start Date End Date Taking? Authorizing Provider   hydrALAZINE (APRESOLINE) 50 mg tablet Take 25 mg by mouth three (3) times daily. Brenda Blackman MD   losartan (COZAAR) 100 mg tablet Take 100 mg by mouth daily. Brenda Blackman MD   amLODIPine (NORVASC) 10 mg tablet Take 10 mg by mouth daily. Brenda Blackman MD   glycerin, adult, (FLEET GLYCERIN, ADULT,) suppository Insert 1 Suppository into rectum daily. Indications: BOWEL EVACUATION 2/5/18   Mervat Chavarria MD   oxyCODONE-acetaminophen (PERCOCET) 5-325 mg per tablet Take 1 Tab by mouth every four (4) hours as needed for Pain. Max Daily Amount: 6 Tabs. 1/22/18   Tate Garcia MD   albuterol (PROVENTIL HFA, VENTOLIN HFA, PROAIR HFA) 90 mcg/actuation inhaler Take 2 Puffs by inhalation every four (4) hours as needed for Wheezing. 11/6/17   Christal Langford PA-C   calcium acetate (PHOSLO) 667 mg cap Take 1 Cap by mouth three (3) times daily (with meals). 9/13/17   Dodie Cuevas MD   cloNIDine HCl (CATAPRES) 0.1 mg tablet Take 0.3 mg by mouth three (3) times daily. Brenda Blackman MD   NIFEdipine ER (ADALAT CC) 30 mg ER tablet Take 20 mg by mouth daily. Brenda Blackman MD   multivitamin with iron tablet Take 1 Tab by mouth daily.     Brenda Blackman MD       Current Facility-Administered Medications   Medication Dose Route Frequency    hydrALAZINE (APRESOLINE) tablet 100 mg  100 mg Oral BID    losartan (COZAAR) tablet 100 mg  100 mg Oral DAILY    heparin (porcine) injection 5,000 Units  5,000 Units SubCUTAneous Q8H    famotidine (PEPCID) tablet 20 mg  20 mg Oral DAILY    epoetin pilar (EPOGEN;PROCRIT) injection 4,000 Units  4,000 Units IntraVENous Q MON, WED & FRI    cloNIDine HCl (CATAPRES) tablet 0.3 mg  0.3 mg Oral BID    carvedilol (COREG) tablet 25 mg  25 mg Oral Q12H    amLODIPine (NORVASC) tablet 10 mg  10 mg Oral DAILY    insulin lispro (HUMALOG) injection   SubCUTAneous Q6H    aspirin chewable tablet 81 mg  81 mg Oral DAILY  atorvastatin (LIPITOR) tablet 40 mg  40 mg Oral QHS    sodium chloride (NS) flush 5-10 mL  5-10 mL IntraVENous Q8H       Allergy:  Allergies   Allergen Reactions    Banana Other (comments)        Social History:  Social History   Substance Use Topics    Smoking status: Never Smoker    Smokeless tobacco: Never Used    Alcohol use No        Family History:  History reviewed. No pertinent family history.        Objective:   Vital Signs:    Visit Vitals    BP (!) 170/92    Pulse 92    Temp 98.2 °F (36.8 °C)    Resp 19    Ht 5' 1\" (1.549 m)    Wt 39.7 kg (87 lb 8.4 oz)    SpO2 100%    Breastfeeding No    BMI 16.54 kg/m2       O2 Device: Room air   O2 Flow Rate (L/min): 4 l/min   Temp (24hrs), Av.4 °F (36.9 °C), Min:98.1 °F (36.7 °C), Max:98.6 °F (37 °C)       Patient Vitals for the past 8 hrs:   Temp Pulse Resp BP SpO2   18 0854 -  - (!) 170/92 -   18 0853  89 - (!) 170/91 -   18 08 - 89 19 151/86 100 %   18 0756 98.2 °F (36.8 °C) - - - -   18 0745 - 89 18 168/85 100 %   18 0730 - 77 16 143/84 100 %   18 0715 - 82 20 154/79 100 %   18 0700 - 91 21 (!) 189/92 100 %   18 0645 - 69 15 109/64 100 %   18 0600 - 83 21 154/83 100 %   18 0500 - 74 15 141/84 100 %   18 0400 - 74 17 129/71 100 %   18 0341 98.1 °F (36.7 °C) - - - -   18 0330 - 83 21 153/87 100 %   18 0315 - 88 23 (!) 163/106 100 %   18 0300 - 84 21 165/90 100 %   18 0245 - 76 17 132/78 100 %     Intake/Output:   Last shift:         Last 3 shifts:  1901 -  0700  In: 850   Out: 2900     Intake/Output Summary (Last 24 hours) at 18 1031  Last data filed at 18 2000   Gross per 24 hour   Intake              540 ml   Output             2900 ml   Net            -2360 ml       Ventilator Settings:  Mode Rate Tidal Volume Pressure FiO2 PEEP   Assist control, VC+   375 ml  8 cm H2O 30 % 5 cm H20     Peak airway pressure: 24 cm H2O    Minute ventilation: 9.49 l/min      ARDS network Guidelines: Lung protective strategy, Pl pressure goals less than or equal to 30. Physical Exam:  General appearance - Awake and alert, unable to assess orientation  Mental status - follows commands with family. Eyes - pupils equal and reactive, extraocular eye movements intact, sclera anicteric  Mouth - mucous membranes moist, pharynx normal without lesions  Neck - supple, no significant adenopathy  Chest - no tachypnea, retractions or cyanosis, bilateral lower lobes are diminshished, no wheezing or rhonchi. Equal chest excursion.   Heart - normal rate, regular rhythm, normal S1, S2, grade 2/6 holosystolic murmur auscultated at the fourth left sternal border, no rubs, clicks or gallops  Abdomen - soft, nontender, nondistended, no masses or organomegaly  Neurological -  non-focal, moves all extremities  Musculoskeletal - no joint tenderness, deformity or swelling  Extremities - peripheral pulses normal, no pedal edema, no clubbing or cyanosis  Skin - normal coloration and turgor, no rashes, no suspicious skin lesions noted    Data:     Recent Results (from the past 24 hour(s))   GLUCOSE, POC    Collection Time: 03/02/18  1:15 PM   Result Value Ref Range    Glucose (POC) 117 (H) 70 - 110 mg/dL   GLUCOSE, POC    Collection Time: 03/02/18  7:22 PM   Result Value Ref Range    Glucose (POC) 134 (H) 70 - 110 mg/dL   GLUCOSE, POC    Collection Time: 03/02/18 11:39 PM   Result Value Ref Range    Glucose (POC) 165 (H) 70 - 110 mg/dL   PTT    Collection Time: 03/03/18  3:01 AM   Result Value Ref Range    aPTT 36.6 (H) 23.0 - 22.6 SEC   METABOLIC PANEL, BASIC    Collection Time: 03/03/18  3:01 AM   Result Value Ref Range    Sodium 134 (L) 136 - 145 mmol/L    Potassium 3.6 3.5 - 5.5 mmol/L    Chloride 97 (L) 100 - 108 mmol/L    CO2 28 21 - 32 mmol/L    Anion gap 9 3.0 - 18 mmol/L    Glucose 127 (H) 74 - 99 mg/dL    BUN 28 (H) 7.0 - 18 MG/DL    Creatinine 2.69 (H) 0.6 - 1.3 MG/DL    BUN/Creatinine ratio 10 (L) 12 - 20      GFR est AA 22 (L) >60 ml/min/1.73m2    GFR est non-AA 18 (L) >60 ml/min/1.73m2    Calcium 8.5 8.5 - 10.1 MG/DL   CBC WITH AUTOMATED DIFF    Collection Time: 03/03/18  3:01 AM   Result Value Ref Range    WBC 8.5 4.6 - 13.2 K/uL    RBC 3.43 (L) 4.20 - 5.30 M/uL    HGB 8.5 (L) 12.0 - 16.0 g/dL    HCT 27.4 (L) 35.0 - 45.0 %    MCV 79.9 74.0 - 97.0 FL    MCH 24.8 24.0 - 34.0 PG    MCHC 31.0 31.0 - 37.0 g/dL    RDW 14.9 (H) 11.6 - 14.5 %    PLATELET 059 385 - 454 K/uL    MPV 11.4 9.2 - 11.8 FL    NEUTROPHILS 85 (H) 40 - 73 %    LYMPHOCYTES 7 (L) 21 - 52 %    MONOCYTES 8 3 - 10 %    EOSINOPHILS 0 0 - 5 %    BASOPHILS 0 0 - 2 %    ABS. NEUTROPHILS 7.1 1.8 - 8.0 K/UL    ABS. LYMPHOCYTES 0.6 (L) 0.9 - 3.6 K/UL    ABS. MONOCYTES 0.7 0.05 - 1.2 K/UL    ABS. EOSINOPHILS 0.0 0.0 - 0.4 K/UL    ABS. BASOPHILS 0.0 0.0 - 0.1 K/UL    DF AUTOMATED     HEPATIC FUNCTION PANEL    Collection Time: 03/03/18  3:01 AM   Result Value Ref Range    Protein, total 6.1 (L) 6.4 - 8.2 g/dL    Albumin 2.7 (L) 3.4 - 5.0 g/dL    Globulin 3.4 2.0 - 4.0 g/dL    A-G Ratio 0.8 0.8 - 1.7      Bilirubin, total 0.4 0.2 - 1.0 MG/DL    Bilirubin, direct 0.1 0.0 - 0.2 MG/DL    Alk. phosphatase 129 (H) 45 - 117 U/L    AST (SGOT) 34 15 - 37 U/L    ALT (SGPT) 21 13 - 56 U/L   GLUCOSE, POC    Collection Time: 03/03/18  5:33 AM   Result Value Ref Range    Glucose (POC) 166 (H) 70 - 110 mg/dL           Recent Labs      03/01/18   0423   FIO2I  30   HCO3I  24.6   PCO2I  24.0*   PHI  7.618*   PO2I  81       Special Requests:   Date Value Ref Range Status   02/26/2018 NO SPECIAL REQUESTS   Preliminary     Culture result:   Date Value Ref Range Status   02/26/2018 NO GROWTH 5 DAYS   Preliminary       Telemetry: normal sinus rhythm    ECHO 9/12/17     SUMMARY:  Left ventricle: Systolic function was normal. Ejection fraction was estimated   in the range of 60 % to 65 %.  No obvious  wall motion abnormalities identified in the views obtained. Wall thickness   was mildly increased. Doppler parameters  were consistent with abnormal left ventricular relaxation (grade 1 diastolic   dysfunction). Left atrium: The atrium was mildly dilated. Pericardium: A trivial pericardial effusion was identified circumferential to   the heart. There was no evidence of  hemodynamic compromise. Imaging:  [x]I have personally reviewed the patients chest radiographs images and report     CXR Results  (Last 48 hours)               03/03/18 0137  XR CHEST PORT Final result    Impression:  IMPRESSION:       Patient has been extubated. Parenchymal opacity in the left lower lobe is unchanged. Narrative:  CHEST PORTABLE 0119 hours       CPT CODE: 03311       COMPARISON: 0436 hours. INDICATIONS: Respiratory failure,. FINDINGS:        Portable single view chest demonstrates:       Lungs: Confluent opacification of the left lower lobe unchanged. Depth of   inspiration is shallow. Cardiac Silhouette And Mediastinal Contours: The cardiac and mediastinal   contours are unremarkable. Pleural Spaces: Unremarkable. Bones And Soft Tissues: Unremarkable for age. Right IJ dialysis catheter is present with the tip in the right atrium                    Results from Hospital Encounter encounter on 02/25/18   XR ABD (KUB)   Narrative ABDOMEN ONE VIEW    HISTORY: Dobbhoff feeding tube placement. COMPARISON: Abdomen flat and upright with chest radiograph 2/5/2018, chest  radiograph 2/28/2018    FINDINGS:     Portable supine view of the lower chest and abdomen. Portions of the left  lateral abdominal wall are not included on the image. A Dobbhoff feeding tube has been inserted. The tip of the tube is beyond the EG  junction, in the proximal to mid stomach. No loops in the tube. Nonobstructive bowel gas pattern. Heart size appears enlarged, stable retrocardiac opacity.          Impression IMPRESSION:    Dobbhoff feeding tube is below the diaphragms, the tip in the proximal to mid  stomach. Results from [facility] encounter on 02/25/18   CTA CHEST W OR W WO CONT   Narrative EXAMINATION: CTA chest pulmonary    INDICATION: Acute chest pain, shortness of breath    COMPARISON: None    TECHNIQUE: CT angiography of the pulmonary arteries performed following 75 cc IV  Isovue 370, with 3-D MIP multiplanar reformations. All CT scans at this facility  are performed using dose optimization technique as appropriate to a performed  exam, to include automated exposure control, adjustment of the mA and/or kV  according to patient size (including appropriate matching first site specific  examinations), or use of iterative reconstruction technique. FINDINGS:    Cardiovascular: No pulmonary arterial filling defects identified to suggest  pulmonary embolism. Motion artifact limits evaluation of smaller segmental and  subsegmental arteries. Ascending thoracic aorta is borderline ectatic measuring  up to 3.5 cm diameter. Heart size within normal limits. Right jugular catheter  tip at right atrium level. Moderate-sized area of cartilage fusion. Mediastinum: Possible enlarged aortopulmonary node and left hilar nodes, poorly  delineated. Left hilar anita calcification noted. Thyroid slightly  heterogeneous. Esophagus is nondistended. Pleura: Moderate volume bilateral pleural effusions. No evidence of  pneumothorax. Lungs: Probable compressive atelectasis due to pleural effusions bilaterally. Right major fissure nodule measuring 4 mm, likely pleural-based node. Upper abdomen: Unremarkable. Miscellaneous: Superficial soft tissues unremarkable. Bones: No acute osseous findings. Probable bone island in T11 posterior body. Impression IMPRESSION:    1. No pulmonary embolus identified. Smaller segmental and subsegmental pulmonary  arteries less well evaluated due to motion artifact.     2. Moderate-sized bilateral pleural effusions, and moderate volume pericardial  effusion. Borderline ectatic ascending aorta. -Probable compressive atelectasis in bilateral lungs. Superimposed infiltrate  difficult to exclude.    -Suspected left hilar and aortopulmonary window adenopathy, poorly delineated,  nonspecific. IMPRESSION:   · Language barrier- [place] speaking only  · Dysphagia- failed ST- bedside swallowing study- 3/2/18- NPO, continue with NGT  · Acute Hypoxic respiratory failure 2' to fluid overload - Extubated on 03/01/18  · Bilateral pleural effusions are improved. · Possible left hilar enlargement (reactive, volume overload?)  · Hyponatremia - 131  · ESRD (end stage renal disease) requires hemodialysis (MWF)  · NSTEMI (non-ST elevated myocardial infarction)- off IV heparin due to epistaxis  · Mildly elevated Transimines  · Hypertensive Urgency- resolved  · Hypertension- improved  · + MRSA screen- Nares: On contact isolation- no signs of active infection at this time  · ICU psychosis- 3/1/18  · Chronic depression/anxiety? ?     RECOMMENDATIONS:   Resp: Continue Supplemental O2 NC as needed to keep goal saturations greater than 92%  Bronchodilators PRN, pulmonary hygiene care, Aspiration precautions, Keep HOB >30 degrees  D/C'd steroids  ID: Aleukocytosis and afebile. Blood cx NGTD, d/c Vancomycin. Heme/Onc: Aleukocytosis, H/H, and platelets are stable, trend CBC. CVS: B/P home medications restarted. HTN better controlled off of drips. NSTEMI - heparin gtt stopped for epistaxis - Cardiology is following. Renal: Anuric, Dialysis patient, Right chest wall HD catheter, Nephrology following Dialysis per their recommendations. Trend Renal indices. GI: SUP, Zofran PRN for N/V,   Metabolic: Glycemic control - SSI PRN, Replace E-lytes per nephrology  Neuro/Sedation/Pain: Mental status appears to be improving. Likely due to steroids and ICU delirium.   D/C retraints  Stress ulcer prophylaxis: Pepcid po  DVT prophylaxis: Heparin SC started  AM labs: Daily CBC BMP Mag and Phos   Diet: NGT with tube feeds per nutritionist recommendations. Failed swallow evaluation - Speech cont to follow. Plan to transfer to tele floor. [x]See my orders for details      Lamount Degree.  Ramona Jordan MD, [facility]  Pulmonary/Critical Care/Sleep Medicine

## 2018-03-03 NOTE — PROGRESS NOTES
RENAL DAILY PROGRESS NOTE    Patient: Kiera Fang               Sex: female          DOA: 2/25/2018 12:50 PM        YOB: 1960      Age:  56 y.o.        LOS:  LOS: 6 days     Subjective:     Kiera Fang is a 56 y.o.  who presents with NSTEMI (non-ST elevated myocardial infarction) ([facility] [address]  ESRD (end stage [facility] disease) ([facility]  Hyponatremia  Respiratory failure (HCC)  NSTEMI (non-ST elevated myocardial infarction) ([facility]  ESRD (end stage renal disease) ([facility]  Hypoxia.    Asked to evaluate for esrd,admitted with severe htn,pulm edema,mi  Chief complains: Patient denies nausea, vomiting, chest pain, dizziness, shortness of breath or headache.  - Reviewed last 24 hrs events     Current Facility-Administered Medications   Medication Dose Route Frequency    hydrALAZINE (APRESOLINE) tablet 100 mg  100 mg Oral BID    losartan (COZAAR) tablet 100 mg  100 mg Oral DAILY    phenol throat spray (CHLORASEPTIC) 1 Spray  1 Spray Oral PRN    haloperidol lactate (HALDOL) injection 2.5 mg  2.5 mg IntraVENous Q6H PRN    melatonin tablet 3 mg  3 mg Oral QHS PRN    heparin (porcine) injection 5,000 Units  5,000 Units SubCUTAneous Q8H    famotidine (PEPCID) tablet 20 mg  20 mg Oral DAILY    epoetin pilar (EPOGEN;PROCRIT) injection 4,000 Units  4,000 Units IntraVENous Q MON, WED & FRI    cloNIDine HCl (CATAPRES) tablet 0.3 mg  0.3 mg Oral BID    carvedilol (COREG) tablet 25 mg  25 mg Oral Q12H    amLODIPine (NORVASC) tablet 10 mg  10 mg Oral DAILY    insulin lispro (HUMALOG) injection   SubCUTAneous Q6H    glucose chewable tablet 16 g  4 Tab Oral PRN    glucagon (GLUCAGEN) injection 1 mg  1 mg IntraMUSCular PRN    dextrose (D50W) injection syrg 12.5-25 g  25-50 mL IntraVENous PRN    albuterol (ACCUNEB) nebulizer solution 2.5 mg  2.5 mg Nebulization Q6H PRN    aspirin chewable tablet 81 mg  81 mg Oral DAILY    atorvastatin (LIPITOR) tablet 40 mg  40 mg Oral QHS    acetaminophen (TYLENOL) solution 650 mg  650 mg Oral Q6H PRN    sodium chloride (NS) flush 5-10 mL  5-10 mL IntraVENous Q8H    sodium chloride (NS) flush 5-10 mL  5-10 mL IntraVENous PRN    acetaminophen (TYLENOL) suppository 650 mg  650 mg Rectal Q6H PRN    naloxone (NARCAN) injection 0.1 mg  0.1 mg IntraVENous PRN    ondansetron (ZOFRAN) injection 4 mg  4 mg IntraVENous Q6H PRN    hydrALAZINE (APRESOLINE) 20 mg/mL injection 10 mg  10 mg IntraVENous Q6H PRN       Objective:     Visit Vitals    /70    Pulse 70    Temp 98.7 °F (37.1 °C)    Resp 20    Ht 5' 1\" (1.549 m)    Wt 39.7 kg (87 lb 8.4 oz)    SpO2 100%    Breastfeeding No    BMI 16.54 kg/m2       Intake/Output Summary (Last 24 hours) at 03/03/18 1252  Last data filed at 03/02/18 2000   Gross per 24 hour   Intake              390 ml   Output             2900 ml   Net            -2510 ml       Physical Examination:     GEN: AAO X 3, NAD  RS: Chest is bilateral equal, no wheezing / rales / crackles  CVS: S1-S2 heard, RRR, No S3 / murmur  Abdomen: Soft, Non tender, Not distended, Positive bowel sounds, no organomegaly, no CVA / supra pubic tenderness  Extremities: No edema, no cyanosis, skin is warm on touch  CNS: Awake & follows commands, CN II-XII are grossly intact. HEENT: Head is atraumatic, PERRLA, conjunctiva pink & non icteric. No JVD or carotid bruit   Psychiatric: Normal mood, affect, judgement & memory    Musculoskeletal: No gross joints or bone deformities   Lymph Node: No palpable cervical, axillary or groin lymphadenopathy.       Data Review:      Labs:     Hematology: Recent Labs      03/03/18   0301  03/02/18   0340  03/01/18   0340   WBC  8.5  13.3*  3.1*   HGB  8.5*  9.0*  8.0*   HCT  27.4*  28.6*  25.2*     Chemistry: Recent Labs      03/03/18   0301  03/02/18   0340  03/01/18   0340   BUN  28*  48*  22*   CREA  2.69*  4.57*  2.77*   CA  8.5  8.9  8.5   ALB  2.7*  2.8*  2.3*   K  3.6  4.7  4.5   NA  134*  131*  136   CL  97*  95*  100   CO2  28  24  27   PHOS   --    -- 4.3   GLU  127*  155*  149*        Images:    XR (Most Recent). CXR reviewed by me and compared with previous CXR   Results from [place] encounter on 02/25/18   XR ABD (KUB)   Narrative ABDOMEN ONE VIEW    HISTORY: Dobbhoff feeding tube placement. COMPARISON: Abdomen flat and upright with chest radiograph 2/5/2018, chest  radiograph 2/28/2018    FINDINGS:     Portable supine view of the lower chest and abdomen. Portions of the left  lateral abdominal wall are not included on the image. A Dobbhoff feeding tube has been inserted. The tip of the tube is beyond the EG  junction, in the proximal to mid stomach. No loops in the tube. Nonobstructive bowel gas pattern. Heart size appears enlarged, stable retrocardiac opacity. Impression IMPRESSION:    Dobbhoff feeding tube is below the diaphragms, the tip in the proximal to mid  stomach. CT (Most Recent)   Results from Hospital Encounter encounter on 02/25/18   CTA CHEST W OR W WO CONT   Narrative EXAMINATION: CTA chest pulmonary    INDICATION: Acute chest pain, shortness of breath    COMPARISON: None    TECHNIQUE: CT angiography of the pulmonary arteries performed following 75 cc IV  Isovue 370, with 3-D MIP multiplanar reformations. All CT scans at this facility  are performed using dose optimization technique as appropriate to a performed  exam, to include automated exposure control, adjustment of the mA and/or kV  according to patient size (including appropriate matching first site specific  examinations), or use of iterative reconstruction technique. FINDINGS:    Cardiovascular: No pulmonary arterial filling defects identified to suggest  pulmonary embolism. Motion artifact limits evaluation of smaller segmental and  subsegmental arteries. Ascending thoracic aorta is borderline ectatic measuring  up to 3.5 cm diameter. Heart size within normal limits. Right jugular catheter  tip at right atrium level.  Moderate-sized area of cartilage fusion. Mediastinum: Possible enlarged aortopulmonary node and left hilar nodes, poorly  delineated. Left hilar anita calcification noted. Thyroid slightly  heterogeneous. Esophagus is nondistended. Pleura: Moderate volume bilateral pleural effusions. No evidence of  pneumothorax. Lungs: Probable compressive atelectasis due to pleural effusions bilaterally. Right major fissure nodule measuring 4 mm, likely pleural-based node. Upper abdomen: Unremarkable. Miscellaneous: Superficial soft tissues unremarkable. Bones: No acute osseous findings. Probable bone island in T11 posterior body. Impression IMPRESSION:    1. No pulmonary embolus identified. Smaller segmental and subsegmental pulmonary  arteries less well evaluated due to motion artifact. 2. Moderate-sized bilateral pleural effusions, and moderate volume pericardial  effusion. Borderline ectatic ascending aorta. -Probable compressive atelectasis in bilateral lungs. Superimposed infiltrate  difficult to exclude.    -Suspected left hilar and aortopulmonary window adenopathy, poorly delineated,  nonspecific. EKG No results found for this or any previous visit. I have personally reviewed the old medical records and patient's labs    Plan / Recommendation:      1. Esrd,continue dialysis mon wed Friday. discussed with daughter  2.htn,under better control. told nurse to space medications throughout the day. renin/aldosterone pending. plan renal dopplers on Monday  3.anemia,resumed low dose epo with dialysis    D/w Dr. Mulu Arellano MD  Nephrology  3/3/2018

## 2018-03-03 NOTE — ROUTINE PROCESS
1400: TRANSFER - IN REPORT:    Verbal report received from Aliyah(name) on Tamela Maldonado  being received from MICU(unit) for routine progression of care      Report consisted of patients Situation, Background, Assessment and   Recommendations(SBAR). Information from the following report(s) SBAR was reviewed with the receiving nurse. Opportunity for questions and clarification was provided. Assessment completed upon patients arrival to unit and care assumed.        1905: Report given to oncoming RN

## 2018-03-04 NOTE — PROGRESS NOTES
[facility]      CARDIOLOGY PROGRESS NOTE  RECS:    1. Acute respiratory failure- Patient now extubated   2. NSTEMI-off heparin drip due to epistaxis. Continue asa and bb for now. Echo revealed EF% 45-50%, mild hypokinesis apical wall, LVH is present. Will need Cardiac w/u for CAD. Possible Cardiac cath when stable. D/w family  3. Acute Diastolic Heart failure- Compensated now. On HD volume status per nephrology    4. Uncontrolled hypertension-better controlled    5. ESRD- on HD renal following. She is MWF HD schedule   6. Anemia- slightly dropping H&H  monitor   7. Hyponatremia: stable             ECHO 02/26/18  Left ventricle: Size was normal. Systolic function was mildly reduced by   visual assessment. Ejection fraction was  estimated in the range of 45 % to 50 %. There was mild hypokinesis of the   apical wall(s). Wall thickness was moderately  increased. Left atrium: The atrium was mildly dilated. Atrial septum: There was increased thickness of the septum, consistent with   lipomatous hypertrophy. Pericardium: A small pericardial effusion was identified circumferential to   the heart. There was no evidence of  hemodynamic compromise. COMPARISONS:  Comparison was made with the previous study of 17-Sep-2017. LV overall   function has decreased from 65 % to 45 %. There  was worsening in the periapical LV wall motion. Pericardial effusion   appearance has not changed. Otherwise no  significant change.       ASSESSMENT:  Hospital Problems  Date Reviewed: 11/15/2017          Codes Class Noted POA    Anemia ICD-10-CM: D64.9  ICD-9-CM: 285.9  2/26/2018 Unknown        Hyponatremia ICD-10-CM: E87.1  ICD-9-CM: 276.1  2/25/2018 Unknown        ESRD (end stage renal disease) ([facility] ICD-10-CM: N18.6  ICD-9-CM: 585.6  2/25/2018 Unknown        NSTEMI (non-ST elevated myocardial infarction) [facility]) ICD-10-CM: I21.4  ICD-9-CM: 410.70  2/25/2018 Unknown        * (Principal)Respiratory failure ([facility] ICD-10-CM: J96.90  ICD-9-CM: 518.81  2/25/2018 Unknown        Hypoxia ICD-10-CM: R09.02  ICD-9-CM: 799.02  2/25/2018 Unknown        Acute UTI ICD-10-CM: N39.0  ICD-9-CM: 599.0  2/25/2018 Unknown                SUBJECTIVE:    Alert, in no apparent distress    OBJECTIVE:    VS:   Visit Vitals    /86    Pulse 85    Temp 98.4 °F (36.9 °C)    Resp 18    Ht 5' 1\" (1.549 m)    Wt 40 kg (88 lb 2.9 oz)    SpO2 98%    Breastfeeding No    BMI 16.66 kg/m2         Intake/Output Summary (Last 24 hours) at 03/04/18 0959  Last data filed at 03/04/18 0920   Gross per 24 hour   Intake              680 ml   Output                0 ml   Net              680 ml     TELE: normal sinus rhythm     General:  aeake,on restraints for consfusion  HENT: Normocephalic, atraumatic. Normal external eye. Neck :  no JVD  Cardiac:  regular rate and rhythm, tachycardic, no S3 or S4, no click, no rub  Lungs: clear to auscultation bilaterally  Abdomen: Soft, nontender, no masses  Extremities:  No edema      Labs: Results:       Chemistry Recent Labs      03/04/18 0430 03/03/18   0301 03/02/18   0340   GLU  134*  138*  127*  155*   NA  131*  131*  134*  131*   K  3.3*  3.3*  3.6  4.7   CL  94*  94*  97*  95*   CO2  28  28  28  24   BUN  59*  58*  28*  48*   CREA  4.71*  4.71*  2.69*  4.57*   CA  8.6  8.8  8.5  8.9   AGAP  9  9  9  12   BUCR  13  12  10*  11*   AP  174*  129*  141*   TP  6.5  6.1*  6.8   ALB  2.8*  2.7*  2.8*   GLOB  3.7  3.4  4.0   AGRAT  0.8  0.8  0.7*      CBC w/Diff Recent Labs      03/04/18   0430  03/03/18   0301  03/02/18   0340   WBC  5.6  8.5  13.3*   RBC  3.85*  3.43*  3.62*   HGB  9.4*  8.5*  9.0*   HCT  30.9*  27.4*  28.6*   PLT  282  302  374   GRANS  75*  85*  95*   LYMPH  15*  7*  3*   EOS  0  0  0      Cardiac Enzymes No results for input(s): CPK, CKND1, WOLFGANG in the last 72 hours.     No lab exists for component: CKRMB, TROIP   Coagulation Recent Labs      03/04/18   0430  03/03/18   0309 APTT  32.2  36.6*       Lipid Panel Lab Results   Component Value Date/Time    Cholesterol, total 151 02/26/2018 10:02 AM    HDL Cholesterol 37 (L) 02/26/2018 10:02 AM    LDL, calculated 95.2 02/26/2018 10:02 AM    VLDL, calculated 18.8 02/26/2018 10:02 AM    Triglyceride 94 02/26/2018 10:02 AM    CHOL/HDL Ratio 4.1 02/26/2018 10:02 AM      BNP No results for input(s): BNPP in the last 72 hours.    Liver Enzymes Recent Labs      03/04/18   0430   TP  6.5   ALB  2.8*   AP  174*   SGOT  29      Thyroid Studies No results found for: T4, T3U, TSH, TSHEXT, TSHEXT           Marlyn Cole MD

## 2018-03-04 NOTE — PROGRESS NOTES
RENAL DAILY PROGRESS NOTE    Patient: Vinod He               Sex: female          DOA: 2/25/2018 12:50 PM        YOB: 1960      Age:  56 y.o.        LOS:  LOS: 7 days     Subjective:     Vinod He is a 56 y.o.  who presents with NSTEMI (non-ST elevated myocardial infarction) ([facility]  ESRD (end stage renal disease) ([facility]  Hyponatremia  Respiratory failure (HCC)  NSTEMI (non-ST elevated myocardial infarction) ([facility]  ESRD (end stage renal disease) ([facility]  Hypoxia.    Asked to evaluate for esrd,admitted with severe htn,pulm edema,mi  Chief complains: Patient denies nausea, vomiting, chest pain, dizziness, shortness of breath or headache.  - Reviewed last 24 hrs events     Current Facility-Administered Medications   Medication Dose Route Frequency    hydrALAZINE (APRESOLINE) tablet 100 mg  100 mg Oral BID    losartan (COZAAR) tablet 100 mg  100 mg Oral DAILY    phenol throat spray (CHLORASEPTIC) 1 Spray  1 Spray Oral PRN    haloperidol lactate (HALDOL) injection 2.5 mg  2.5 mg IntraVENous Q6H PRN    melatonin tablet 3 mg  3 mg Oral QHS PRN    heparin (porcine) injection 5,000 Units  5,000 Units SubCUTAneous Q8H    famotidine (PEPCID) tablet 20 mg  20 mg Oral DAILY    epoetin pilar (EPOGEN;PROCRIT) injection 4,000 Units  4,000 Units IntraVENous Q MON, WED & FRI    cloNIDine HCl (CATAPRES) tablet 0.3 mg  0.3 mg Oral BID    carvedilol (COREG) tablet 25 mg  25 mg Oral Q12H    amLODIPine (NORVASC) tablet 10 mg  10 mg Oral DAILY    insulin lispro (HUMALOG) injection   SubCUTAneous Q6H    glucose chewable tablet 16 g  4 Tab Oral PRN    glucagon (GLUCAGEN) injection 1 mg  1 mg IntraMUSCular PRN    dextrose (D50W) injection syrg 12.5-25 g  25-50 mL IntraVENous PRN    albuterol (ACCUNEB) nebulizer solution 2.5 mg  2.5 mg Nebulization Q6H PRN    aspirin chewable tablet 81 mg  81 mg Oral DAILY    atorvastatin (LIPITOR) tablet 40 mg  40 mg Oral QHS    acetaminophen (TYLENOL) solution 650 mg  650 mg Oral Q6H PRN    sodium chloride (NS) flush 5-10 mL  5-10 mL IntraVENous Q8H    sodium chloride (NS) flush 5-10 mL  5-10 mL IntraVENous PRN    acetaminophen (TYLENOL) suppository 650 mg  650 mg Rectal Q6H PRN    naloxone (NARCAN) injection 0.1 mg  0.1 mg IntraVENous PRN    ondansetron (ZOFRAN) injection 4 mg  4 mg IntraVENous Q6H PRN    hydrALAZINE (APRESOLINE) 20 mg/mL injection 10 mg  10 mg IntraVENous Q6H PRN       Objective:     Visit Vitals    /69 (BP 1 Location: Left arm, BP Patient Position: At rest)    Pulse 79    Temp 99.4 °F (37.4 °C)    Resp 18    Ht 5' 1\" (1.549 m)    Wt 40 kg (88 lb 2.9 oz)    SpO2 98%    Breastfeeding No    BMI 16.66 kg/m2       Intake/Output Summary (Last 24 hours) at 03/04/18 1436  Last data filed at 03/04/18 0920   Gross per 24 hour   Intake              680 ml   Output                0 ml   Net              680 ml       Physical Examination:     GEN: AAO X 3, NAD  RS: Chest is bilateral equal, no wheezing / rales / crackles  CVS: S1-S2 heard, RRR, No S3 / murmur  Abdomen: Soft, Non tender, Not distended, Positive bowel sounds, no organomegaly, no CVA / supra pubic tenderness  Extremities: No edema, no cyanosis, skin is warm on touch  CNS: Awake & follows commands, CN II-XII are grossly intact. HEENT: Head is atraumatic, PERRLA, conjunctiva pink & non icteric. No JVD or carotid bruit   Psychiatric: Normal mood, affect, judgement & memory    Musculoskeletal: No gross joints or bone deformities   Lymph Node: No palpable cervical, axillary or groin lymphadenopathy.       Data Review:      Labs:     Hematology:   Recent Labs      03/04/18   0430  03/03/18   0301  03/02/18   0340   WBC  5.6  8.5  13.3*   HGB  9.4*  8.5*  9.0*   HCT  30.9*  27.4*  28.6*     Chemistry:   Recent Labs      03/04/18   0430  03/03/18   0301  03/02/18   0340   BUN  59*  58*  28*  48*   CREA  4.71*  4.71*  2.69*  4.57*   CA  8.6  8.8  8.5  8.9   ALB  2.8*  2.7*  2.8*   K  3.3*  3.3*  3.6 4.7   NA  131*  131*  134*  131*   CL  94*  94*  97*  95*   CO2  28  28  28  24   GLU  134*  138*  127*  155*        Images:    XR (Most Recent). CXR reviewed by me and compared with previous CXR   Results from [place] encounter on 02/25/18   XR ABD (KUB)   Narrative ABDOMEN ONE VIEW    HISTORY: Dobbhoff feeding tube placement. COMPARISON: Abdomen flat and upright with chest radiograph 2/5/2018, chest  radiograph 2/28/2018    FINDINGS:     Portable supine view of the lower chest and abdomen. Portions of the left  lateral abdominal wall are not included on the image. A Dobbhoff feeding tube has been inserted. The tip of the tube is beyond the EG  junction, in the proximal to mid stomach. No loops in the tube. Nonobstructive bowel gas pattern. Heart size appears enlarged, stable retrocardiac opacity. Impression IMPRESSION:    Dobbhoff feeding tube is below the diaphragms, the tip in the proximal to mid  stomach. CT (Most Recent)   Results from Hospital Encounter encounter on 02/25/18   CTA CHEST W OR W WO CONT   Narrative EXAMINATION: CTA chest pulmonary    INDICATION: Acute chest pain, shortness of breath    COMPARISON: None    TECHNIQUE: CT angiography of the pulmonary arteries performed following 75 cc IV  Isovue 370, with 3-D MIP multiplanar reformations. All CT scans at this facility  are performed using dose optimization technique as appropriate to a performed  exam, to include automated exposure control, adjustment of the mA and/or kV  according to patient size (including appropriate matching first site specific  examinations), or use of iterative reconstruction technique. FINDINGS:    Cardiovascular: No pulmonary arterial filling defects identified to suggest  pulmonary embolism. Motion artifact limits evaluation of smaller segmental and  subsegmental arteries. Ascending thoracic aorta is borderline ectatic measuring  up to 3.5 cm diameter.  Heart size within normal limits. Right jugular catheter  tip at right atrium level. Moderate-sized area of cartilage fusion. Mediastinum: Possible enlarged aortopulmonary node and left hilar nodes, poorly  delineated. Left hilar anita calcification noted. Thyroid slightly  heterogeneous. Esophagus is nondistended. Pleura: Moderate volume bilateral pleural effusions. No evidence of  pneumothorax. Lungs: Probable compressive atelectasis due to pleural effusions bilaterally. Right major fissure nodule measuring 4 mm, likely pleural-based node. Upper abdomen: Unremarkable. Miscellaneous: Superficial soft tissues unremarkable. Bones: No acute osseous findings. Probable bone island in T11 posterior body. Impression IMPRESSION:    1. No pulmonary embolus identified. Smaller segmental and subsegmental pulmonary  arteries less well evaluated due to motion artifact. 2. Moderate-sized bilateral pleural effusions, and moderate volume pericardial  effusion. Borderline ectatic ascending aorta. -Probable compressive atelectasis in bilateral lungs. Superimposed infiltrate  difficult to exclude.    -Suspected left hilar and aortopulmonary window adenopathy, poorly delineated,  nonspecific. EKG No results found for this or any previous visit. I have personally reviewed the old medical records and patient's labs    Plan / Recommendation:      1. Esrd,continue dialysis mon wed Friday. discussed with daughter  2.htn,under better control. told nurse to space medications throughout the day. renin/aldosterone pending. plan renal dopplers tomorrow  3.anemia,resumed low dose epo with dialysis    D/w Dr. Leandra Oneil MD  Nephrology  3/4/2018

## 2018-03-04 NOTE — PROGRESS NOTES
[facility] Hospitalist Group  Progress Note    Patient: Marcelo Lundy Age: 56 y.o. : 1960 MR#: [id number] SSN: [id number]  Date/Time: 3/4/2018  10:31 AM    Subjective/24-hour events:     Transferred out of ICU overnight. Respiratory status remains stable, no acute issues overnight. Assessment:   Acute hypoxic respiratory failure  NSTEMI  Malignant HTN  Acute diastolic CHF  ESRD  Nasal MRSA colonization     Plan:  Await cardiology plan re: possible cardiac cath. Monitor H/H - stable. HD per usual schedule. Disposition pending cardiology plan for further w/u. If no plan for cath and medical management to be continued for now, may be able to discharge soon. Case discussed with:  []Patient  [x]Family  []Nursing  []Case Management  DVT Prophylaxis:  []Lovenox  [x]Hep SQ  []SCDs  []Coumadin   [x]On Heparin gtt    Objective:   VS:   Visit Vitals    /86    Pulse 85    Temp 98.4 °F (36.9 °C)    Resp 18    Ht 5' 1\" (1.549 m)    Wt 40 kg (88 lb 2.9 oz)    SpO2 98%    Breastfeeding No    BMI 16.66 kg/m2      Tmax/24hrs: Temp (24hrs), Av.4 °F (36.9 °C), Min:97.9 °F (36.6 °C), Max:99 °F (37.2 °C)      Intake/Output Summary (Last 24 hours) at 18 1031  Last data filed at 18 0920   Gross per 24 hour   Intake              680 ml   Output                0 ml   Net              680 ml       General:  In NAD. Cardiovascular: RRR. Pulmonary:  Lungs clear anteriorly. Effort nonlabored. GI:  Abdomen soft, NTTP. Extremities:  Warm, no ischemia. Neuro:  Awake. Moves extremities spontaneously.     Labs:    Recent Results (from the past 24 hour(s))   GLUCOSE, POC    Collection Time: 18 12:08 PM   Result Value Ref Range    Glucose (POC) 124 (H) 70 - 110 mg/dL   GLUCOSE, POC    Collection Time: 18  6:11 PM   Result Value Ref Range    Glucose (POC) 139 (H) 70 - 110 mg/dL   GLUCOSE, POC    Collection Time: 18 11:38 PM   Result Value Ref Range    Glucose (POC) 141 (H) 70 - 110 mg/dL   PTT    Collection Time: 03/04/18  4:30 AM   Result Value Ref Range    aPTT 32.2 23.0 - 08.9 SEC   METABOLIC PANEL, BASIC    Collection Time: 03/04/18  4:30 AM   Result Value Ref Range    Sodium 131 (L) 136 - 145 mmol/L    Potassium 3.3 (L) 3.5 - 5.5 mmol/L    Chloride 94 (L) 100 - 108 mmol/L    CO2 28 21 - 32 mmol/L    Anion gap 9 3.0 - 18 mmol/L    Glucose 138 (H) 74 - 99 mg/dL    BUN 58 (H) 7.0 - 18 MG/DL    Creatinine 4.71 (H) 0.6 - 1.3 MG/DL    BUN/Creatinine ratio 12 12 - 20      GFR est AA 12 (L) >60 ml/min/1.73m2    GFR est non-AA 10 (L) >60 ml/min/1.73m2    Calcium 8.8 8.5 - 10.1 MG/DL   CBC WITH AUTOMATED DIFF    Collection Time: 03/04/18  4:30 AM   Result Value Ref Range    WBC 5.6 4.6 - 13.2 K/uL    RBC 3.85 (L) 4.20 - 5.30 M/uL    HGB 9.4 (L) 12.0 - 16.0 g/dL    HCT 30.9 (L) 35.0 - 45.0 %    MCV 80.3 74.0 - 97.0 FL    MCH 24.4 24.0 - 34.0 PG    MCHC 30.4 (L) 31.0 - 37.0 g/dL    RDW 14.5 11.6 - 14.5 %    PLATELET 278 743 - 208 K/uL    MPV 10.5 9.2 - 11.8 FL    NEUTROPHILS 75 (H) 40 - 73 %    LYMPHOCYTES 15 (L) 21 - 52 %    MONOCYTES 10 3 - 10 %    EOSINOPHILS 0 0 - 5 %    BASOPHILS 0 0 - 2 %    ABS. NEUTROPHILS 4.2 1.8 - 8.0 K/UL    ABS. LYMPHOCYTES 0.8 (L) 0.9 - 3.6 K/UL    ABS. MONOCYTES 0.6 0.05 - 1.2 K/UL    ABS. EOSINOPHILS 0.0 0.0 - 0.4 K/UL    ABS.  BASOPHILS 0.0 0.0 - 0.1 K/UL    DF AUTOMATED     METABOLIC PANEL, COMPREHENSIVE    Collection Time: 03/04/18  4:30 AM   Result Value Ref Range    Sodium 131 (L) 136 - 145 mmol/L    Potassium 3.3 (L) 3.5 - 5.5 mmol/L    Chloride 94 (L) 100 - 108 mmol/L    CO2 28 21 - 32 mmol/L    Anion gap 9 3.0 - 18 mmol/L    Glucose 134 (H) 74 - 99 mg/dL    BUN 59 (H) 7.0 - 18 MG/DL    Creatinine 4.71 (H) 0.6 - 1.3 MG/DL    BUN/Creatinine ratio 13 12 - 20      GFR est AA 12 (L) >60 ml/min/1.73m2    GFR est non-AA 10 (L) >60 ml/min/1.73m2    Calcium 8.6 8.5 - 10.1 MG/DL    Bilirubin, total 0.4 0.2 - 1.0 MG/DL    ALT (SGPT) 26 13 - 56 U/L AST (SGOT) 29 15 - 37 U/L    Alk.  phosphatase 174 (H) 45 - 117 U/L    Protein, total 6.5 6.4 - 8.2 g/dL    Albumin 2.8 (L) 3.4 - 5.0 g/dL    Globulin 3.7 2.0 - 4.0 g/dL    A-G Ratio 0.8 0.8 - 1.7     GLUCOSE, POC    Collection Time: 03/04/18  5:47 AM   Result Value Ref Range    Glucose (POC) 147 (H) 70 - 110 mg/dL   GLUCOSE, POC    Collection Time: 03/04/18  7:21 AM   Result Value Ref Range    Glucose (POC) 123 (H) 70 - 110 mg/dL       Signed By: Soha Rain MD     March 4, 2018 10:31 AM

## 2018-03-04 NOTE — PROGRESS NOTES
Called to request patient be NPO past midnight. Pt on feeding tube. Nurse will call doctor about prep for abdominal US.

## 2018-03-04 NOTE — PROGRESS NOTES
Received  In bed awake and alert. In no acute distress. Vital signs stable. Family at bedside.  Tele=SR without ectopics

## 2018-03-04 NOTE — ROUTINE PROCESS
Bedside and Verbal shift change report given to [address] (oncoming nurse) by Luis Fernandez RN (offgoing nurse). Report included the following information SBAR, Kardex and MAR.

## 2018-03-05 NOTE — PROGRESS NOTES
[facility]      CARDIOLOGY PROGRESS NOTE  RECS:    1. Acute respiratory failure-resolved   Extubated and alert. currently on room air    2. NSTEMI-off heparin drip due to epistaxis. Continue asa and bb for now. Echo revealed EF% 45-50%, mild hypokinesis apical wall, LVH is present. Plans for Cardiac w/u when stable. D/w family  3. Acute Diastolic Heart failure- Compensated now. On HD volume status per nephrology    4. Uncontrolled hypertension-better controlled    5. ESRD- on HD renal following. She is MWF HD schedule   6. Anemia- slightly dropping H&H  monitor   7. Hyponatremia: stable   8. Hypokalemia- replacement per renal.            ECHO 02/26/18  Left ventricle: Size was normal. Systolic function was mildly reduced by   visual assessment. Ejection fraction was  estimated in the range of 45 % to 50 %. There was mild hypokinesis of the   apical wall(s). Wall thickness was moderately  increased. Left atrium: The atrium was mildly dilated. Atrial septum: There was increased thickness of the septum, consistent with   lipomatous hypertrophy. Pericardium: A small pericardial effusion was identified circumferential to   the heart. There was no evidence of  hemodynamic compromise. COMPARISONS:  Comparison was made with the previous study of 17-Sep-2017. LV overall   function has decreased from 65 % to 45 %. There  was worsening in the periapical LV wall motion. Pericardial effusion   appearance has not changed. Otherwise no  significant change.       ASSESSMENT:  Hospital Problems  Date Reviewed: 11/15/2017          Codes Class Noted POA    Anemia ICD-10-CM: D64.9  ICD-9-CM: 285.9  2/26/2018 Unknown        Hyponatremia ICD-10-CM: E87.1  ICD-9-CM: 276.1  2/25/2018 Unknown        ES[facility]) [facility] ICD-10-CM: N18.6  ICD-9-CM: 585.6  2/25/2018 Unknown        NSTEMI (non-ST elevated myocardial infarction) ([facility] ICD-10-CM: I21.4  ICD-9-CM: 410.70  2/25/2018 Unknown        * (Principal)Respiratory failure [facility]) ICD-10-CM: J96.90  ICD-9-CM: 518.81  2/25/2018 Unknown        Hypoxia ICD-10-CM: R09.02  ICD-9-CM: 799.02  2/25/2018 Unknown        Acute UTI ICD-10-CM: N39.0  ICD-9-CM: 599.0  2/25/2018 Unknown                SUBJECTIVE:    Alert, in no apparent distress    OBJECTIVE:    VS:   Visit Vitals    /76 (BP 1 Location: Right arm, BP Patient Position: Supine)    Pulse 65    Temp 97.9 °F (36.6 °C)    Resp 15    Ht 5' 1\" (1.549 m)    Wt 46 kg (101 lb 8 oz)    SpO2 100%    Breastfeeding No    BMI 19.18 kg/m2         Intake/Output Summary (Last 24 hours) at 03/05/18 1324  Last data filed at 03/05/18 1132   Gross per 24 hour   Intake              360 ml   Output                0 ml   Net              360 ml     TELE: normal sinus rhythm     General:  aeake,on restraints for consfusion  HENT: Normocephalic, atraumatic. Normal external eye. Neck :  no JVD  Cardiac:  regular rate and rhythm, tachycardic, no S3 or S4, no click, no rub  Lungs: clear to auscultation bilaterally  Abdomen: Soft, nontender, no masses  Extremities:  No edema      Labs: Results:       Chemistry Recent Labs      03/05/18 0422 03/04/18   0430  03/03/18   0301   GLU  108*  134*  138*  127*   NA  133*  131*  131*  134*   K  3.2*  3.3*  3.3*  3.6   CL  94*  94*  94*  97*   CO2  28  28  28  28   BUN  78*  59*  58*  28*   CREA  6.29*  4.71*  4.71*  2.69*   CA  8.7  8.8  8.6  8.8  8.5   AGAP  11  9  9  9   BUCR  12  13  12  10*   AP  135*  174*  129*   TP  6.1*  6.5  6.1*   ALB  2.8*  2.8*  2.7*   GLOB  3.3  3.7  3.4   AGRAT  0.8  0.8  0.8      CBC w/Diff Recent Labs      03/05/18 0422 03/04/18   0430  03/03/18   0301   WBC  6.5  5.6  8.5   RBC  3.53*  3.85*  3.43*   HGB  8.7*  9.4*  8.5*   HCT  27.9*  30.9*  27.4*   PLT  271  282  302   GRANS  78*  75*  85*   LYMPH  7*  15*  7*   EOS  0  0  0      Cardiac Enzymes No results for input(s): CPK, CKND1, WOLFGANG in the last 72 hours.     No lab exists for component: CKRMB, TROIP   Coagulation Recent Labs      03/05/18 0422 03/04/18 0430   APTT  32.2  32.2       Lipid Panel Lab Results   Component Value Date/Time    Cholesterol, total 151 02/26/2018 10:02 AM    HDL Cholesterol 37 (L) 02/26/2018 10:02 AM    LDL, calculated 95.2 02/26/2018 10:02 AM    VLDL, calculated 18.8 02/26/2018 10:02 AM    Triglyceride 94 02/26/2018 10:02 AM    CHOL/HDL Ratio 4.1 02/26/2018 10:02 AM      BNP No results for input(s): BNPP in the last 72 hours.    Liver Enzymes Recent Labs      03/05/18 0422   TP  6.1*   ALB  2.8*   AP  135*   SGOT  30      Thyroid Studies No results found for: T4, T3U, TSH, TSHEXT, TSHEXT           Estee Heck NP-C

## 2018-03-05 NOTE — PROGRESS NOTES
[facility]  Pulmonary, Critical Care, and Sleep Medicine    Name: Rina Phalen MRN: [id number]   : 1960 Hospital: [facility]   Date: 3/5/2018        Pulmonary Critical Care Progress Note                                                Subjective/History:     Patient is a 56 y.o. [place]amese female with a hx of end stage renal disease, HTN, and Hypercholestermia who presented to [facility] ED with c/o constant moderate SOB onset 3-4 days. Associated sx of productive cough (with white sputum), nausea, emesis, dysuria, and decreased appetite. Pt has dialysis M/W/F with last visit on Friday, 18 with pt reporting not feeling better afterwards. Patient was intubated in the [facility] ED for hypoxia. Troponins were elevated and Cardiology was consulted. Started on Heparin gtt and ASA was ordered. Nephrology consulted for ESRD/Dialysis. Patient was transferred to [facility] ICU.       18   Liberated from vent and respiratory status stable. Hypertension is better controlled. Denies nay new complaints. Awaiting cardiology decision on ? cath  Hemodynamically stable, Afebrile   Dialysis per renal.    Son at bedside- able to interprete     Review of Systems:  Review of systems not obtained due to patient factors.     Past Medical History:  Past Medical History:   Diagnosis Date    Chronic kidney disease     ESRD (end stage renal disease) ([facility] [address]     TU-THURS-SAT    Hypercholesteremia     Hypertension     Kidney disease     Vitamin D deficiency       Past Surgical History:  Past Surgical History:   Procedure Laterality Date    VASCULAR SURGERY PROCEDURE UNLIST        Medications:    Current Facility-Administered Medications   Medication Dose Route Frequency    epoetin pilar (EPOGEN;PROCRIT) injection 4,000 Units  4,000 Units IntraVENous DIALYSIS MON, WED & FRI    potassium chloride (KLOR-CON) packet 40 mEq  40 mEq Oral Q4H    cloNIDine HCl (CATAPRES) tablet 0.3 mg  0.3 mg Oral BID    hydrALAZINE (APRESOLINE) tablet 100 mg  100 mg Oral BID    insulin lispro (HUMALOG) injection   SubCUTAneous AC&HS    losartan (COZAAR) tablet 100 mg  100 mg Oral DAILY    heparin (porcine) injection 5,000 Units  5,000 Units SubCUTAneous Q8H    famotidine (PEPCID) tablet 20 mg  20 mg Oral DAILY    carvedilol (COREG) tablet 25 mg  25 mg Oral Q12H    amLODIPine (NORVASC) tablet 10 mg  10 mg Oral DAILY    aspirin chewable tablet 81 mg  81 mg Oral DAILY    atorvastatin (LIPITOR) tablet 40 mg  40 mg Oral QHS    sodium chloride (NS) flush 5-10 mL  5-10 mL IntraVENous Q8H     Allergy:  Allergies   Allergen Reactions    Banana Other (comments)     Objective:   Vital Signs:    Visit Vitals    /76 (BP 1 Location: Right arm, BP Patient Position: Supine)    Pulse 65    Temp 97.9 °F (36.6 °C)    Resp 15    Ht 5' 1\" (1.549 m)    Wt 46 kg (101 lb 8 oz)    SpO2 100%    Breastfeeding No    BMI 19.18 kg/m2       O2 Device: Room air   O2 Flow Rate (L/min): 2 l/min   Temp (24hrs), Av.9 °F (36.6 °C), Min:97.1 °F (36.2 °C), Max:98.2 °F (36.8 °C)       Patient Vitals for the past 8 hrs:   Temp Pulse Resp BP SpO2   18 1100 97.9 °F (36.6 °C) 65 15 143/76 100 %     Intake/Output:   Last shift:      701 - 1900  In: 240 [P.O.:240]  Out: -   Last 3 shifts: 1901 -  07  In: 800   Out: 0     Intake/Output Summary (Last 24 hours) at 18 1341  Last data filed at 18 1132   Gross per 24 hour   Intake              360 ml   Output                0 ml   Net              360 ml     Physical Exam:  General appearance - Awake and alert,  Mental status - follows commands with family.   Eyes - pupils equal and reactive, extraocular eye movements intact, sclera anicteric  Mouth - mucous membranes moist, pharynx normal without lesions  Neck - supple, no significant adenopathy  Chest - no tachypnea, retractions or cyanosis, bilateral lower lobes are diminshished, no wheezing or rhonchi. Equal chest excursion. Heart - normal rate, regular rhythm, normal S1, S2, grade 2/6 holosystolic murmur auscultated at the fourth left sternal border, no rubs, clicks or gallops  Abdomen - soft, nontender, nondistended, no masses or organomegaly  Neurological -  non-focal, moves all extremities  Musculoskeletal - no joint tenderness, deformity or swelling  Extremities - peripheral pulses normal, no pedal edema, no clubbing or cyanosis  Skin - normal coloration and turgor, no rashes, no suspicious skin lesions noted    Data:     Recent Results (from the past 24 hour(s))   GLUCOSE, POC    Collection Time: 03/04/18  3:50 PM   Result Value Ref Range    Glucose (POC) 131 (H) 70 - 110 mg/dL   GLUCOSE, POC    Collection Time: 03/04/18  6:04 PM   Result Value Ref Range    Glucose (POC) 121 (H) 70 - 110 mg/dL   GLUCOSE, POC    Collection Time: 03/05/18 12:48 AM   Result Value Ref Range    Glucose (POC) 108 70 - 110 mg/dL   PTT    Collection Time: 03/05/18  4:22 AM   Result Value Ref Range    aPTT 32.2 23.0 - 47.8 SEC   METABOLIC PANEL, COMPREHENSIVE    Collection Time: 03/05/18  4:22 AM   Result Value Ref Range    Sodium 133 (L) 136 - 145 mmol/L    Potassium 3.2 (L) 3.5 - 5.5 mmol/L    Chloride 94 (L) 100 - 108 mmol/L    CO2 28 21 - 32 mmol/L    Anion gap 11 3.0 - 18 mmol/L    Glucose 108 (H) 74 - 99 mg/dL    BUN 78 (H) 7.0 - 18 MG/DL    Creatinine 6.29 (H) 0.6 - 1.3 MG/DL    BUN/Creatinine ratio 12 12 - 20      GFR est AA 8 (L) >60 ml/min/1.73m2    GFR est non-AA 7 (L) >60 ml/min/1.73m2    Calcium 8.8 8.5 - 10.1 MG/DL    Bilirubin, total 0.4 0.2 - 1.0 MG/DL    ALT (SGPT) 24 13 - 56 U/L    AST (SGOT) 30 15 - 37 U/L    Alk.  phosphatase 135 (H) 45 - 117 U/L    Protein, total 6.1 (L) 6.4 - 8.2 g/dL    Albumin 2.8 (L) 3.4 - 5.0 g/dL    Globulin 3.3 2.0 - 4.0 g/dL    A-G Ratio 0.8 0.8 - 1.7     CBC WITH AUTOMATED DIFF    Collection Time: 03/05/18  4:22 AM   Result Value Ref Range    WBC 6.5 4.6 - 13.2 K/uL    RBC 3.53 (L) 4.20 - 5.30 M/uL    HGB 8.7 (L) 12.0 - 16.0 g/dL    HCT 27.9 (L) 35.0 - 45.0 %    MCV 79.0 74.0 - 97.0 FL    MCH 24.6 24.0 - 34.0 PG    MCHC 31.2 31.0 - 37.0 g/dL    RDW 14.5 11.6 - 14.5 %    PLATELET 300 170 - 619 K/uL    MPV 10.2 9.2 - 11.8 FL    NEUTROPHILS 78 (H) 40 - 73 %    LYMPHOCYTES 7 (L) 21 - 52 %    MONOCYTES 15 (H) 3 - 10 %    EOSINOPHILS 0 0 - 5 %    BASOPHILS 0 0 - 2 %    ABS. NEUTROPHILS 5.1 1.8 - 8.0 K/UL    ABS. LYMPHOCYTES 0.5 (L) 0.9 - 3.6 K/UL    ABS. MONOCYTES 1.0 0.05 - 1.2 K/UL    ABS. EOSINOPHILS 0.0 0.0 - 0.4 K/UL    ABS. BASOPHILS 0.0 0.0 - 0.1 K/UL    DF AUTOMATED     MAGNESIUM    Collection Time: 03/05/18  4:22 AM   Result Value Ref Range    Magnesium 3.0 (H) 1.6 - 2.6 mg/dL   PHOSPHORUS    Collection Time: 03/05/18  4:22 AM   Result Value Ref Range    Phosphorus 1.9 (L) 2.5 - 4.9 MG/DL   PTH INTACT    Collection Time: 03/05/18  4:22 AM   Result Value Ref Range    Calcium 8.7 8.5 - 10.1 MG/DL    PTH, Intact 49.3 18.4 - 88.0 pg/mL   GLUCOSE, POC    Collection Time: 03/05/18  7:04 AM   Result Value Ref Range    Glucose (POC) 105 70 - 110 mg/dL   GLUCOSE, POC    Collection Time: 03/05/18 11:32 AM   Result Value Ref Range    Glucose (POC) 112 (H) 70 - 110 mg/dL           No results for input(s): FIO2I, IFO2, HCO3I, IHCO3, HCOPOC, PCO2I, PCOPOC, IPHI, PHI, PHPOC, PO2I, PO2POC in the last 72 hours. No lab exists for component: IPOC2    Special Requests:   Date Value Ref Range Status   02/26/2018 NO SPECIAL REQUESTS   Final     Culture result:   Date Value Ref Range Status   02/26/2018 NO GROWTH 6 DAYS   Final       Telemetry: normal sinus rhythm    ECHO 9/12/17     SUMMARY:  Left ventricle: Systolic function was normal. Ejection fraction was estimated   in the range of 60 % to 65 %. No obvious  wall motion abnormalities identified in the views obtained. Wall thickness   was mildly increased.  Doppler parameters  were consistent with abnormal left ventricular relaxation (grade 1 diastolic dysfunction). Left atrium: The atrium was mildly dilated. Pericardium: A trivial pericardial effusion was identified circumferential to   the heart. There was no evidence of  hemodynamic compromise. Imaging:  [x]I have personally reviewed the patients chest radiographs images and report     CXR Results  (Last 48 hours)    None           Results from Hospital Encounter encounter on 02/25/18   XR ABD (KUB)   Narrative ABDOMEN ONE VIEW    HISTORY: Dobbhoff feeding tube placement. COMPARISON: Abdomen flat and upright with chest radiograph 2/5/2018, chest  radiograph 2/28/2018    FINDINGS:     Portable supine view of the lower chest and abdomen. Portions of the left  lateral abdominal wall are not included on the image. A Dobbhoff feeding tube has been inserted. The tip of the tube is beyond the EG  junction, in the proximal to mid stomach. No loops in the tube. Nonobstructive bowel gas pattern. Heart size appears enlarged, stable retrocardiac opacity. Impression IMPRESSION:    Dobbhoff feeding tube is below the diaphragms, the tip in the proximal to mid  stomach. Results from [place] encounter on 02/25/18   CTA CHEST W OR W WO CONT   Narrative EXAMINATION: CTA chest pulmonary    INDICATION: Acute chest pain, shortness of breath    COMPARISON: None    TECHNIQUE: CT angiography of the pulmonary arteries performed following 75 cc IV  Isovue 370, with 3-D MIP multiplanar reformations. All CT scans at this facility  are performed using dose optimization technique as appropriate to a performed  exam, to include automated exposure control, adjustment of the mA and/or kV  according to patient size (including appropriate matching first site specific  examinations), or use of iterative reconstruction technique. FINDINGS:    Cardiovascular: No pulmonary arterial filling defects identified to suggest  pulmonary embolism.  Motion artifact limits evaluation of smaller segmental and  subsegmental arteries. Ascending thoracic aorta is borderline ectatic measuring  up to 3.5 cm diameter. Heart size within normal limits. Right jugular catheter  tip at right atrium level. Moderate-sized area of cartilage fusion. Mediastinum: Possible enlarged aortopulmonary node and left hilar nodes, poorly  delineated. Left hilar anita calcification noted. Thyroid slightly  heterogeneous. Esophagus is nondistended. Pleura: Moderate volume bilateral pleural effusions. No evidence of  pneumothorax. Lungs: Probable compressive atelectasis due to pleural effusions bilaterally. Right major fissure nodule measuring 4 mm, likely pleural-based node. Upper abdomen: Unremarkable. Miscellaneous: Superficial soft tissues unremarkable. Bones: No acute osseous findings. Probable bone island in T11 posterior body. Impression IMPRESSION:    1. No pulmonary embolus identified. Smaller segmental and subsegmental pulmonary  arteries less well evaluated due to motion artifact. 2. Moderate-sized bilateral pleural effusions, and moderate volume pericardial  effusion. Borderline ectatic ascending aorta. -Probable compressive atelectasis in bilateral lungs. Superimposed infiltrate  difficult to exclude.    -Suspected left hilar and aortopulmonary window adenopathy, poorly delineated,  nonspecific. IMPRESSION:     · S/p Acute Hypoxic respiratory failure 2' to fluid overload - Extubated on 03/01/18. Now recovered- on room air. · Bilateral pleural effusions are improved. · Possible left hilar enlargement (reactive, volume overload?)  · Hyponatremia - 131  · ESRD (end stage renal disease) requires hemodialysis (MWF)  · NSTEMI (non-ST elevated myocardial infarction)- off IV heparin due to epistaxis  · Mildly elevated Transimines  · Hypertensive Urgency- resolved  · Hypertension- improved  · + MRSA screen- Nares:  On contact isolation- no signs of active infection at this time  · ICU psychosis- 3/1/18- resolved  · Chronic depression/anxiety? ?     RECOMMENDATIONS:   Resp: Continue O2 titration. NC as needed to keep goal saturations greater than 92%  Bronchodilators PRN, pulmonary hygiene care, Aspiration precautions, Keep HOB >30 degrees  ID: Aleukocytosis and afebile. Blood cx NGTD  CVS: B/P home medications restarted. HTN better controlled off of drips. NSTEMI - heparin gtt stopped for epistaxis - Cardiology is following. Renal: Nephrology following Dialysis per their recommendations. Will sign off and be available as needed.            Macho Arriola MD

## 2018-03-05 NOTE — PROGRESS NOTES
Care Management Interventions  PCP Verified by CM: Yes (Valerie Rocio)  Mode of Transport at Discharge: Other (see comment) (Family member to provide transportation home)  Transition of Care Consult (CM Consult): Discharge Planning  Speech Therapy Consult: Yes  Current Support Network: Lives with Spouse, Other (Extended family reside together)  Confirm Follow Up Transport: Family  Plan discussed with Pt/Family/Caregiver: Yes  Discharge Location  Discharge Placement: Home with home health     Son  [facility] - [phone]    Pt admitted here after NSTEMI. Transferred out of ICU 3/4. Possible cardiac cath tomorrow. Pt and extended family live together. Pt does not speak [place]. Speaks [place]. Son, Anni Loomis, present in room and is her . Reports pt is independent at home except for transportation. Whoever in the family that is available provides transportation for her. She receives HD TX's M[facility]W-F at [facility]. Spoke with Dr Casey Narayanan regarding PT/OT orders and [facility] orders. Dr Casey Narayanan to review.

## 2018-03-05 NOTE — DIALYSIS
ACUTE HEMODIALYSIS FLOW SHEET    HEMODIALYSIS ORDERS: Physician: maria del rosario      Dialyzer: revaclear         Duration: 4 hr  BFR: 350   DFR: 600   Dialysate:  Temp *37 K+   3    Ca+  2.5 Na 140 Bicarb 35   Weight:  46 kg    Bed Scale [x]     Unable to Obtain []      Dry weight/UF Goal: 2500 Access CVL  Needle Gauge     Heparin []  Bolus      Units    [] Hourly       Units    [x]None     Catheter locking solution heparin   Pre BP:   154/96    Pulse:     70     Temperature:   97.6  Respirations: 16  Tx: NS       ml/Bolus  Other        [x] N/A   Labs: Pre        Post:        [x] N/A   Additional Orders(medications, blood products, hypotension management):       [x] N/A     [x] Time Out/Safety Check  [x] [facility] Consent Verified     CATHETER ACCESS: []N/A   [x]Right   []Left   [x]IJ     []Fem   [] First use X-ray verified     [x]Tunnel                [] Non Tunneled   [x]No S/S infection  []Redness  []Drainage []Cultured []Swelling []Pain   [x]Medical Aseptic Prep Utilized   [x]Dressing Changed  [] Biopatch  Date:       []Clotted   [x]Patent   Flows: [x]Good  []Poor  []Reversed   If access problem,  notified: []Yes    []N/A  Date:           GRAFT/FISTULA ACCESS:  [x]N/A     []Right     []Left     []UE     []LE   []AVG   []AVF        []Buttonhole    []Medical Aseptic Prep Utilized   []No S/S infection  []Redness  []Drainage []Cultured []Swelling []Pain    Bruit:   [] Strong    [] Weak       Thrill :   [] Strong    [] Weak       Needle Gauge:    Length:     If access problem,  notified: []Yes     []N/A  Date:        Please describe access if present and not used:       GENERAL ASSESSMENT:    LUNGS:  Rate  SaO2%        [x] N/A    [x] Clear  [] Coarse  [] Crackles  [] Wheezing        [] Diminished     Location : []RLL   []LLL    []RUL  []ELÍAS   Cough: []Productive  []Dry  [x]N/A   Respirations:  [x]Easy  []Labored   Therapy:  [x]RA  []NC  l/min    Mask: []NRB []Venti       O2%                  []Ventilator []Intubated  [] Trach  [] BiPaP   CARDIAC: [x]Regular      [] Irregular   [] Pericardial Rub  [] JVD        []  Monitored  [] Bedside  [] Remotely monitored [] N/A  Rhythm:    EDEMA: [x] None  []Generalized  [] Pitting [] 1    [] 2    [] 3    [] 4                 [] Facial  [] Pedal  []  UE  [] LE   SKIN:   [x] Warm  [] Hot     [] Cold   [x] Dry     [] Pale   [] Diaphoretic                  [] Flushed  [] Jaundiced  [] Cyanotic  [] Rash  [] Weeping   LOC:    [x] Alert      [x]Oriented:    [x] Person     [x] Place  [x]Time               [] Confused  [] Lethargic  [] Medicated  [] Non-responsive     GI / ABDOMEN:  [] Flat    [] Distended    [x] Soft    [] Firm   []  Obese                             [] Diarrhea  [x] Bowel Sounds  [] Nausea  [] Vomiting       / URINE ASSESSMENT:[] Voiding   [x] Oliguria  [] Anuria   []  Onofre     [] Incontinent    []  Incontinent Brief      []  Bathroom Privileges     PAIN: [x] 0 []1  []2   []3   []4   []5   []6   []7   []8   []9   []10            Scale 0-10  Action/Follow Up:    MOBILITY:  [] Amb    [] Amb/Assist    [x] Bed    [] Wheelchair  [] Stretcher      All Vitals and Treatment Details on Attached [address]: [facility]          Room # 354     [] 1st Time Acute  [] Stat  [x] Routine  [] Urgent     [x] Acute Room  []  Bedside  [] ICU/CCU  [] ER   Isolation Precautions:  [x] Dialysis   [] Airborne   [x] Contact    [] Reverse   Special Considerations:         [] Blood Consent Verified [x]N/A     ALLERGIES: banana  [] NKA          Code Status:  [x] Full Code  [] DNR  [] Other           HBsAg ONLY: Date Drawn   09/09/17       [x]Negative []Positive []Unknown   HBsAb: Date 09/09/17    [] Susceptible   [x] [id number] []Not Drawn  [] Drawn     Current Labs:    Date of Labs: Today [x]        Cut and paste current labs here.                                                                                                                                   DIET:  [x] Renal [] Other     [] NPO     []  Diabetic      PRIMARY NURSE REPORT: First initial/Last name/Title      Pre Dialysis: MOJGAN Zaman RN    Time: 1500      EDUCATION:    [x] Patient [] Other         Knowledge Basis: []None [x]Minimal [] Substantial   Barriers to learning pt does not speak english []N/A   [] Access Care     [] S&S of infection     [] Fluid Management     []K+     [x]Procedural    []Albumin     [] Medications     [] Tx Options     [] Transplant     [] Diet     [] Other   Teaching Tools:  [x] Explain  [] Demo  [] Handouts [] Video  Patient response:   [] Verbalized understanding  [] Teach back  [] Return demonstration [x] Requires follow up   Inappropriate due to            [address] Before each treatment:     Machine Number:                   [facility]                                  [x] Unit Machine # 7 with centralized RO                                  [] Portable Machine #1/RO serial # [id number]                                  [] Portable Machine #2/RO serial # [id number]                                  [] Portable Machine #3/RO serial # [id number]                                                                                                       [facility]                                  [] Portable Machine #11/RO serial # [id number]                                   [] Portable Machine #12/RO serial # [id number]                                  [] Portable Machine #13/RO serial #  [id number]      Alarm Test:  Pass time 1500         Other:         [x] RO/Machine Log Complete      Temp    37            [x]Extracorporeal Circuit Tested for integrity   Dialysate: pH  7.4 Conductivity: Meter   14     HD Machine   14                  TCD: 14  Dialyzer Lot # [id number]        Blood Tubing Lot # [id number]     Saline Lot #       CHLORINE TESTING-Before each treatment and every 4 hours    Total Chlorine: [x] less than 0.1 ppm  Time: 1400 4 Hr/2nd Check Time: 1700 (if greater than 0.1 ppm from Primary then every 30 minutes from Secondary)     TREATMENT INITIATION  with Dialysis Precautions:   [x] All Connections Secured                 [x] Saline Line Double Clamped   [x] Venous Parameters Set                  [x] Arterial Parameters Set    [x] Prime Given  250 ml               [x]Air Foam Detector Engaged      Treatment Initiation Note: pt arrived in stable condition via bed CVL accessed and treatment initiated without difficulty. Dr Tamara Goodpasture at bedside     Medication Dose Volume Route Initials Dialyzer Cleared: [] Good [x] Fair  [] Poor    Blood processed:  74.8 L  UF Removed  2500 Ml    Post Wt: 43.5    kg  POst BP:   165/92       Pulse: 80      Respirations: 16  Temperature: 97.6     epogen 4000u 1ml                  Post Tx Vascular Access: AVF/AVG: Bleeding stopped Art  min. Wei. Min   N/A                                   Catheter: Locking solution: Heparin 1:1000 Art. 1.6  Wei. 1.6                                  Post Assessment:                                    Skin:  [x] Warm  [x] Dry [] Diaphoretic    [] Flushed  [] Pale [] Cyanotic   [facility] Signatures Title Initials  Time Lungs: [x] Clear    [] Course  [] Crackles  [] Wheezing [] Diminished   Pradeep Meza RN    Cardiac: [x] Regular   [] Irregular   [] Monitor  [] N/A  Rhythm:           Edema:  [x] None    [] General     [] Facial   [] Pedal    [] UE    [] LE       Pain: [x]0  []1  []2   []3  []4   []5   []6   []7   []8   []9   []10         Post Treatment Note: HD well tolerated. 2.5L UF removed. No acute distress noted during or post HD treatment.      POST TREATMENT PRIMARY NURSE HANDOFF REPORT:     First initial/Last name/Title         Post Dialysis: MUNA Zaman RN Time:  1900     Abbreviations: AVG-arterial venous graft, AVF-arterial venous fistula, IJ-Internal Jugular, Subcl-Subclavian, Fem-Femoral, Tx-treatment, AP/HR-apical heart rate, DFR-dialysate flow rate, BFR-blood flow rate, AP-arterial pressure, -venous pressure, UF-ultrafiltrate, TMP-transmembrane pressure, Wei-Venous, Art-Arterial, RO-Reverse Osmosis

## 2018-03-05 NOTE — PROGRESS NOTES
Speech re-eval completed with recs of soft solid diet and thin liquids, NO STRAWS, meds as tolerated. Full report to follow.      Thank you for this referral.    Tricia Cohen M.S. CCC-SLP/L  Speech-Language Pathologist

## 2018-03-05 NOTE — PROGRESS NOTES
NUTRITION    Nursing Referral: [facility]  Nutrition Consult: Management of Tube Feeding      RECOMMENDATIONS / PLAN:     - Continue current diet order; consistency per SLP  - Monitor diet tolerance and encourage meal intake. - Continue RD inpatient monitoring and evaluation. NUTRITION INTERVENTIONS & DIAGNOSIS:     [x] Meals/Snacks: modified composition    Nutrition Diagnosis: Unintentional weight loss related to inadequate energy intake as evidenced by 30 lb, 24% weight loss x 5 months. ASSESSMENT:     3/5: Tube feedings held since yesterday for renal doppler study this am. Pt passed SLP evaluation and diet started today. NGT now to be removed. Hypokalemia, replacement ordered. 3/2: Extubated to NC on 3/1. Pt confused and combative overnight. Remains NPO per SLP s/p swallow evaluation this morning with NGT for feeding. Tolerating at goal. HD today. Pt c/o throat soreness, follow swallow evaluation in a day or 2 to re-evaluate. 2/27: Tolerating feeds at goal, held this morning for SBT and possible extubation today. Hyponatremia improved some, IV fluid held and now discontinued, s/p HD yesterday and 3.2 L removed. 2/26: Pt intubated and NGT clamped. Average po intake adequate to meet patients estimated nutritional needs:   [] Yes     [x] No   [] Unable to determine at this time    Tube Feeding: Nepro at 40 mL/hr via NGT (stopped 3/4)  Water Flushes: 50 mL q 4 hours (stopped)  Residuals: non-applicable     Diet: DIET RENAL Soft Solids      Food Allergies: banana   Current Appetite:   [] Good     [] Fair     [] Poor     [x] Other: unknown, diet just started  Appetite/meal intake prior to admission:   [] Good     [] Fair     [x] Poor, decreased appetite and nausea/vomiting x 3-4 days PTA     [] Other:  Feeding Limitations:  [x] Swallowing difficulty: SLP following    [] Chewing difficulty    [x] Other: mentation   Current Meal Intake: No data found.     Anuric   BM: unknown   Skin Integrity: WDL  Edema: none Pertinent Medications: Reviewed: SSI, zofran, KCL    Recent Labs      03/05/18   0422  03/04/18   0430  03/03/18   0301   NA  133*  131*  131*  134*   K  3.2*  3.3*  3.3*  3.6   CL  94*  94*  94*  97*   CO2  28  28  28  28   GLU  108*  134*  138*  127*   BUN  78*  59*  58*  28*   CREA  6.29*  4.71*  4.71*  2.69*   CA  8.7  8.8  8.6  8.8  8.5   MG  3.0*   --    --    PHOS  1.9*   --    --    ALB  2.8*  2.8*  2.7*   SGOT  30  29  34   ALT  24  26  21       Intake/Output Summary (Last 24 hours) at 03/05/18 1143  Last data filed at 03/05/18 1132   Gross per 24 hour   Intake              360 ml   Output                0 ml   Net              360 ml       Anthropometrics:  Ht Readings from Last 1 Encounters:   02/25/18 5' 1\" (1.549 m)     Last 3 Recorded Weights in this Encounter    03/03/18 0341 03/04/18 0400 03/05/18 0518   Weight: 39.7 kg (87 lb 8.4 oz) 40 kg (88 lb 2.9 oz) 46 kg (101 lb 8 oz)     Body mass index is 19.18 kg/(m^2).    Underweight        Weight History: 30 lb, 24% weight loss x 5 months PTA per chart     Weight Metrics 3/5/2018 1/22/2018 12/2/2017 11/15/2017 11/5/2017 9/10/2017 9/8/2017   Weight 101 lb 8 oz 103 lb 3 oz 106 lb 14.8 oz 107 lb 110 lb 125 lb 9.6 oz -   BMI 19.18 kg/m2 19.5 kg/m2 20.2 kg/m2 20.22 kg/m2 21.48 kg/m2 - 22.97 kg/m2        Admitting Diagnosis: NSTEMI (non-ST elevated myocardial infarction) ([facility]  ESRD (end stage renal disease) ([facility]  Hyponatremia  Respiratory failure (HCC)  NSTEMI (non-ST elevated myocardial infarction) ([facility]  ESRD (end stage renal disease) ([address]  Hypoxia  Pertinent PMHx: ESRD on HD, HTN, HLD     Education Needs:        [x] None identified  [] Identified - Not appropriate at this time  []  Identified and addressed - refer to education log  Learning Limitations:   [] None identified  [x] Identified: AMS; does not speak [place], speaks [place]      Cultural, Religion & ethnic food preferences:  [x] None identified    [] Identified and addressed ESTIMATED NUTRITION NEEDS:     Calories: 5443-8719 kcal (30-35 kcal/kg) based on  [] Actual BW     [x] SBW 56 kg  Protein: 67-84 gm (1.2-1.5 gm/kg) based on  [] Actual BW      [x] SBW   Fluid: 2477-9307 mL     MONITORING & EVALUATION:     Nutrition Goal(s):   1. Nutritional needs will be met through adequate oral intake or nutrition support within the next 7 days.   Outcome:  [x] Met/Ongoing    []  Not Met    [] New/Initial Goal    Monitoring:   [x] Food and beverage intake   [x] Diet order   [x] Nutrition-focused physical findings   [x] Treatment/therapy   [] Weight   [] Enteral nutrition intake     Previous Recommendations (for follow-up assessments only):     []   Implemented       []   Not Implemented (RD to address)      [x] No Longer Appropriate     [] No Recommendation Made     Discharge Planning: renal diet; consistency per SLP  [x] Participated in care planning, discharge planning, & interdisciplinary rounds as appropriate      Lucita Fonseca RD  Pager: [phone]

## 2018-03-05 NOTE — ROUTINE PROCESS
Received report from [facility]. Pt AAOx3, NAD, breathing non labored, on room air, HOB up.tube feeding going via NGT per order. Family members at the bedside. Pt's for renal doppler study in the morning. Bed at the lowest level on lock position, call bell w/i reach. 0006 - stopped tube feeding for renal doppler study in the morning. Bedside and Verbal shift change report given to KRISTIN Miller (oncoming nurse) by me (offgoing nurse).  Report included the following information SBAR, Kardex, Intake/Output, MAR, Recent Results and Cardiac Rhythm SR.

## 2018-03-05 NOTE — PROGRESS NOTES
[facility] Hospitalist Group  Progress Note    Patient: Dimitrios Wong Age: 56 y.o. : 1960 MR#: [id number] SSN: [id number]  Date/Time: 3/5/2018  11:18 AM    Subjective/24-hour events:     Passed swallowing evaluation. No new medical issues overnight. Assessment:   Acute hypoxic respiratory failure  NSTEMI  Malignant HTN  Acute diastolic CHF  ESRD  Hypokalemia   Nasal MRSA colonization     Plan:  OK to remove NGT. Diet per SLP recs - soft with thing liquids. Await US results. Await further cardiology recs re: plan for cath. If medical management to be continued will obtain PT/OT evals and begin to mobilize. Replace potassium. HD per usual schedule. Follow H/H PRN. No acute bleeding issues. Disposition TBD. Case discussed with:  []Patient  []Family  [x]Nursing  [x]Case Management  DVT Prophylaxis:  []Lovenox  [x]Hep SQ  []SCDs  []Coumadin   [x]On Heparin gtt    Objective:   VS:   Visit Vitals    /85    Pulse 74    Temp 98.2 °F (36.8 °C)    Resp 14    Ht 5' 1\" (1.549 m)    Wt 46 kg (101 lb 8 oz)    SpO2 96%    Breastfeeding No    BMI 19.18 kg/m2      Tmax/24hrs: Temp (24hrs), Av.2 °F (36.8 °C), Min:97.1 °F (36.2 °C), Max:99.4 °F (37.4 °C)      Intake/Output Summary (Last 24 hours) at 18 1118  Last data filed at 18 0659   Gross per 24 hour   Intake              120 ml   Output                0 ml   Net              120 ml       General:  In NAD. Cardiovascular: RRR. Pulmonary:  Lungs clear anteriorly. Effort nonlabored. GI:  Abdomen soft, NTTP. Extremities:  Warm, no ischemia. Neuro:  Awake. Moves extremities spontaneously.     Labs:    Recent Results (from the past 24 hour(s))   GLUCOSE, POC    Collection Time: 18 12:24 PM   Result Value Ref Range    Glucose (POC) 137 (H) 70 - 110 mg/dL   GLUCOSE, POC    Collection Time: 18  3:50 PM   Result Value Ref Range    Glucose (POC) 131 (H) 70 - 110 mg/dL   GLUCOSE, POC    Collection Time: 03/04/18  6:04 PM   Result Value Ref Range    Glucose (POC) 121 (H) 70 - 110 mg/dL   GLUCOSE, POC    Collection Time: 03/05/18 12:48 AM   Result Value Ref Range    Glucose (POC) 108 70 - 110 mg/dL   PTT    Collection Time: 03/05/18  4:22 AM   Result Value Ref Range    aPTT 32.2 23.0 - 46.0 SEC   METABOLIC PANEL, COMPREHENSIVE    Collection Time: 03/05/18  4:22 AM   Result Value Ref Range    Sodium 133 (L) 136 - 145 mmol/L    Potassium 3.2 (L) 3.5 - 5.5 mmol/L    Chloride 94 (L) 100 - 108 mmol/L    CO2 28 21 - 32 mmol/L    Anion gap 11 3.0 - 18 mmol/L    Glucose 108 (H) 74 - 99 mg/dL    BUN 78 (H) 7.0 - 18 MG/DL    Creatinine 6.29 (H) 0.6 - 1.3 MG/DL    BUN/Creatinine ratio 12 12 - 20      GFR est AA 8 (L) >60 ml/min/1.73m2    GFR est non-AA 7 (L) >60 ml/min/1.73m2    Calcium 8.8 8.5 - 10.1 MG/DL    Bilirubin, total 0.4 0.2 - 1.0 MG/DL    ALT (SGPT) 24 13 - 56 U/L    AST (SGOT) 30 15 - 37 U/L    Alk. phosphatase 135 (H) 45 - 117 U/L    Protein, total 6.1 (L) 6.4 - 8.2 g/dL    Albumin 2.8 (L) 3.4 - 5.0 g/dL    Globulin 3.3 2.0 - 4.0 g/dL    A-G Ratio 0.8 0.8 - 1.7     CBC WITH AUTOMATED DIFF    Collection Time: 03/05/18  4:22 AM   Result Value Ref Range    WBC 6.5 4.6 - 13.2 K/uL    RBC 3.53 (L) 4.20 - 5.30 M/uL    HGB 8.7 (L) 12.0 - 16.0 g/dL    HCT 27.9 (L) 35.0 - 45.0 %    MCV 79.0 74.0 - 97.0 FL    MCH 24.6 24.0 - 34.0 PG    MCHC 31.2 31.0 - 37.0 g/dL    RDW 14.5 11.6 - 14.5 %    PLATELET 804 967 - 331 K/uL    MPV 10.2 9.2 - 11.8 FL    NEUTROPHILS 78 (H) 40 - 73 %    LYMPHOCYTES 7 (L) 21 - 52 %    MONOCYTES 15 (H) 3 - 10 %    EOSINOPHILS 0 0 - 5 %    BASOPHILS 0 0 - 2 %    ABS. NEUTROPHILS 5.1 1.8 - 8.0 K/UL    ABS. LYMPHOCYTES 0.5 (L) 0.9 - 3.6 K/UL    ABS. MONOCYTES 1.0 0.05 - 1.2 K/UL    ABS. EOSINOPHILS 0.0 0.0 - 0.4 K/UL    ABS.  BASOPHILS 0.0 0.0 - 0.1 K/UL    DF AUTOMATED     MAGNESIUM    Collection Time: 03/05/18  4:22 AM   Result Value Ref Range    Magnesium 3.0 (H) 1.6 - 2.6 mg/dL   PHOSPHORUS Collection Time: 03/05/18  4:22 AM   Result Value Ref Range    Phosphorus 1.9 (L) 2.5 - 4.9 MG/DL   PTH INTACT    Collection Time: 03/05/18  4:22 AM   Result Value Ref Range    Calcium 8.7 8.5 - 10.1 MG/DL    PTH, Intact 49.3 18.4 - 88.0 pg/mL   GLUCOSE, POC    Collection Time: 03/05/18  7:04 AM   Result Value Ref Range    Glucose (POC) 105 70 - 110 mg/dL       Signed By: Danitza Martínez MD     March 5, 2018 11:18 AM

## 2018-03-05 NOTE — PROCEDURES
[facility]  *** FINAL REPORT ***    Name: Deonte Thomas  MRN: [id number]    Inpatient  : 15 Oct 1960  HIS Order #: [id number]  [id number] [facility] Visit #: [id number]  Date: 05 Mar 2018    TYPE OF TEST: Visceral Arterial Duplex    REASON FOR TEST  Eval for renal vascular HTN, Chronic Renal Failure    Aortic PSV: 107.0 cm/s  Diameter AP: 1.4 cm   TV: 1.4 cm                   Right          Left  Renal Artery:- -------------  -------------  Proximal  PSV:  46.0           25.0  Mid       PSV:  31.0           36.0  Distal    PSV:  38.0  Aortic ratio :   0.4            0.3    Medullary PSV:  19.0           28.0            EDV:   5.0            8.0            EDR:   0.3            0.3            SDR:   3.8            3.5    Cortical  PSV:  10.0           11.0            EDV:   5.0            3.0            EDR:   0.5            0.3            SDR:   2.0            3.7  Stenosis:      Normal         Normal  Kidney size:    9.7 cm         9.8 cm               x  3.7 cm      x  4.2 cm    Hilar:-        Right          Left  Acc. Time  AT:     secs           secs  Acc. Index AI:             RI: 0.75           0.72    INTERPRETATION/FINDINGS  Duplex images were obtained using 2-D gray scale, color flow, and  spectral Doppler analysis. 1. Note: High aortic PSV invalidates the use of RAR as an indicator of   renal stenosis. RENAL:  1. No significant renal artery stenosis identified, both renal  arteries visualized. The distal left renal artery velocity could not  be obtained. 2. The right kidney measures 9.7 cm.  3. The left kidney measures 9.8 cm.  4. Intrinsic/medical disease identified in the right kidney. 5. No significant plaquing, focal stenosis or aneurysm noted in the  abdominal aorta. ADDITIONAL COMMENTS    I have personally reviewed the data relevant to the interpretation of  this  study. TECHNOLOGIST: LUIS Barcenas RVS  Signed: 2018 08:48 AM    PHYSICIAN: Masoud Sanchez.  Marni Espinosa MD  Signed: 2018 02:18 PM

## 2018-03-05 NOTE — ROUTINE PROCESS
0940: Dr. Noemy Hollins notified of new orders from SLP. NGT can be removed at this time. 1539: Report given to ERROL.     1915: Report given to oncoming RN.

## 2018-03-05 NOTE — PROGRESS NOTES
Problem: Dysphagia (Adult)  Goal: *Acute Goals and Plan of Care (Insert Text)  Patient will:  1. Tolerate PO trials with 0 s/s overt distress in 4/5 trials  2. Utilize compensatory swallow strategies/maneuvers (decrease bite/sip, size/rate, alt. liq/sol) with min cues in 4/5 trials  3. Perform oral-motor/laryngeal exercises to increase oropharyngeal swallow function with min cues  4. Complete an objective swallow study (i.e., MBSS) to assess swallow integrity, r/o aspiration, and determine of safest LRD, min A    Rec:     Soft solid diet with thin liquids -- NO STRAWS  Aspiration precautions  HOB >45 during po intake, remain >30 for 30-45 minutes after po   Small bites/sips; alternate liquid/solid with slow feeding rate   Oral care TID  Meds per pt preference               Outcome: Progressing Towards Goal  Speech LAnguage Pathology bedside swallow re-evaluation    Patient: Sam Garces (60 y.o. female)  Date: 3/5/2018  Primary Diagnosis: NSTEMI (non-ST elevated myocardial infarction) ([address]  ESRD (end stage renal disease) ([address]  Hyponatremia  Respiratory failure (HCC)  NSTEMI (non-ST elevated myocardial infarction) ([address]  ESRD (end stage renal disease) ([address]  Hypoxia        Precautions: aspiration       ASSESSMENT :  Based on the objective data described below, the patient presents with mild oropharyngeal dysphagia. Pt with NG in place upon SLP arrival, sitting up in chair. Pt tolerated reg solid, puree, nectar-thick, and thin liquid (no straw) trials without any overt s/sx of aspiration and positive oral clearance. Pt with immediate overt cough and watering eyes post thin liquid trials with straw. Mildly increased mastication time noted with reg solids. Laryngeal elevation appeared functional to palpation. Pt safe for initiation of soft solid diet with thin liquids, NO STRAWS, aspiration precautions, oral care TID, and meds as tolerated. ST will continue to follow to ensure safety of PO.      Patient will benefit from skilled intervention to address the above impairments. Patients rehabilitation potential is considered to be Good  Factors which may influence rehabilitation potential include:   [x]            Medical condition     PLAN :  Recommendations and Planned Interventions: See above  Frequency/Duration: Patient will be followed by speech-language pathology 1-2 times per day/3-5 days per week to address goals. Discharge Recommendations: Home Health, Outpatient and To Be Determined     SUBJECTIVE:   Patient stated Milad Mayer. OBJECTIVE:     Past Medical History:   Diagnosis Date    Chronic kidney disease     [facility]) [facility]     TUES-THURS-SAT    Hypercholesteremia     Hypertension     Kidney disease     Vitamin D deficiency      Past Surgical History:   Procedure Laterality Date    VASCULAR SURGERY PROCEDURE UNLIST       Prior Level of Function/Home Situation: private residence  Home Situation  Home Environment: Private residence  One/Two Story Residence: Two story  Living Alone: No  Support Systems: Child(gomez), Family member(s), Spouse/Significant Other/Partner  Patient Expects to be Discharged to[de-identified] Private residence  Current DME Used/Available at Home: Blood pressure cuff  Diet prior to admission: reg with thin per family from previous eval  Current Diet:  Soft solid with thin, no straws   Cognitive and Communication Status:  Neurologic State: Alert  Orientation Level: Unable to verbalize  Cognition: Follows commands  Oral Assessment:  Oral Assessment  Labial: No impairment  Dentition: Natural;Intact  Oral Hygiene: Adequate  Lingual: No impairment  Velum: No impairment  Mandible: No impairment  P.O. Trials:  Patient Position: 90 in chair  Vocal quality prior to P.O.: No impairment  Consistency Presented: Thin liquid; Solid;Puree;Nectar thick liquid  How Presented: Self-fed/presented;Cup/sip;Spoon;Straw  Bolus Acceptance: No impairment  Bolus Formation/Control: Impaired  Type of Impairment: Mastication  Propulsion: Delayed (# of seconds)  Oral Residue: None  Initiation of Swallow: No impairment  Laryngeal Elevation: Functional  Aspiration Signs/Symptoms: Runny nose;Strong cough (with straw only)  Pharyngeal Phase Characteristics: No impairment, issues, or problems   Effective Modifications:  (no straw)  Cues for Modifications: Moderate   Oral Phase Severity: Mild  Pharyngeal Phase Severity : Mild    GCODESwallowing:  Swallow Current Status CI= 1-19%   Swallow Goal Status CH= 0%    The severity rating is based on the following outcomes:    Clinical Judgment    Pain:  Pt c/o 0/10 pain prior to evaluation. Pt c/o 0/10 pain post evaluation. After treatment:   []            Patient left in no apparent distress sitting up in chair  [x]            Patient left in no apparent distress in bed  [x]            Call bell left within reach  [x]            Nursing notified  []            Caregiver present  []            Bed alarm activated    COMMUNICATION/EDUCATION:   [x]            SLP educated pt with regard to compensatory swallow strategies and       aspiration/reflux precautions including: small bites/sips,       alternate liquids/solids, decrease feeding rate, HOB > 45 with all po, and         upright in bed at 30 degrees after po for at least 45 minutes. Suspect poor carryover. [x]            Patient is unable to participate in goal setting and plan of care.     Thank you for this referral.    Florian Escalera M.S. CCC-SLP/L  Speech-Language Pathologist

## 2018-03-06 NOTE — PROGRESS NOTES
Received  From dialysis back into room. In no acute distress. Vital signs stable,b/p high and given b/p meds.  Denies discomfort according to family

## 2018-03-06 NOTE — PROGRESS NOTES
[facility] Hospitalist Group  Progress Note    Patient: Tristen Fox Age: 56 y.o. : 1960 MR#: [id number] SSN: [id number]  Date/Time: 3/6/2018  10:56 AM    Subjective/24-hour events:     Had HD yesterday. Patient without any new complaints this AM.  Family present at bedside. Assessment:   Acute hypoxic respiratory failure  NSTEMI  Malignant HTN  Acute diastolic CHF  ESRD  Hypokalemia   Nasal MRSA colonization     Plan:  Potassium up today s/p replacement yesterday. Monitor. Continue medical management as ordered. May benefit from SSRI. Disposition soon if no plan for cardiac intervention. D/W son at bedside. Case discussed with:  []Patient  [x]Family  [x]Nursing  [x]Case Management  DVT Prophylaxis:  []Lovenox  [x]Hep SQ  []SCDs  []Coumadin   [x]On Heparin gtt    Objective:   VS:   Visit Vitals    /83 (BP 1 Location: Right arm, BP Patient Position: At rest)    Pulse 76    Temp 98.9 °F (37.2 °C)    Resp 18    Ht 5' 1\" (1.549 m)    Wt 45 kg (99 lb 3.3 oz)    SpO2 99%    Breastfeeding No    BMI 18.74 kg/m2      Tmax/24hrs: Temp (24hrs), Av.8 °F (36.6 °C), Min:97.2 °F (36.2 °C), Max:98.9 °F (37.2 °C)      Intake/Output Summary (Last 24 hours) at 18 1056  Last data filed at 18 1002   Gross per 24 hour   Intake              600 ml   Output             2500 ml   Net            -1900 ml       General:  In NAD. Cardiovascular: RRR. Pulmonary:  Lungs clear anteriorly. Effort nonlabored. GI:  Abdomen soft, NTTP. Extremities:  Warm, no ischemia. Neuro:  Awake. Moves extremities spontaneously. Depressed affect.     Labs:    Recent Results (from the past 24 hour(s))   GLUCOSE, POC    Collection Time: 18 11:32 AM   Result Value Ref Range    Glucose (POC) 112 (H) 70 - 110 mg/dL   GLUCOSE, POC    Collection Time: 18  8:34 PM   Result Value Ref Range    Glucose (POC) 92 70 - 110 mg/dL   CBC WITH AUTOMATED DIFF    Collection Time: 18 12:35 AM   Result Value Ref Range    WBC 5.7 4.6 - 13.2 K/uL    RBC 3.76 (L) 4.20 - 5.30 M/uL    HGB 9.5 (L) 12.0 - 16.0 g/dL    HCT 29.8 (L) 35.0 - 45.0 %    MCV 79.3 74.0 - 97.0 FL    MCH 25.3 24.0 - 34.0 PG    MCHC 31.9 31.0 - 37.0 g/dL    RDW 14.5 11.6 - 14.5 %    PLATELET 345 981 - 547 K/uL    MPV 11.4 9.2 - 11.8 FL    NEUTROPHILS 80 (H) 40 - 73 %    LYMPHOCYTES 7 (L) 21 - 52 %    MONOCYTES 13 (H) 3 - 10 %    EOSINOPHILS 0 0 - 5 %    BASOPHILS 0 0 - 2 %    ABS. NEUTROPHILS 4.5 1.8 - 8.0 K/UL    ABS. LYMPHOCYTES 0.4 (L) 0.9 - 3.6 K/UL    ABS. MONOCYTES 0.7 0.05 - 1.2 K/UL    ABS. EOSINOPHILS 0.0 0.0 - 0.4 K/UL    ABS. BASOPHILS 0.0 0.0 - 0.1 K/UL    DF AUTOMATED     METABOLIC PANEL, COMPREHENSIVE    Collection Time: 03/06/18 12:35 AM   Result Value Ref Range    Sodium 136 136 - 145 mmol/L    Potassium 3.6 3.5 - 5.5 mmol/L    Chloride 99 (L) 100 - 108 mmol/L    CO2 30 21 - 32 mmol/L    Anion gap 7 3.0 - 18 mmol/L    Glucose 81 74 - 99 mg/dL    BUN 17 7.0 - 18 MG/DL    Creatinine 2.50 (H) 0.6 - 1.3 MG/DL    BUN/Creatinine ratio 7 (L) 12 - 20      GFR est AA 24 (L) >60 ml/min/1.73m2    GFR est non-AA 20 (L) >60 ml/min/1.73m2    Calcium 9.0 8.5 - 10.1 MG/DL    Bilirubin, total 0.5 0.2 - 1.0 MG/DL    ALT (SGPT) 26 13 - 56 U/L    AST (SGOT) 35 15 - 37 U/L    Alk.  phosphatase 116 45 - 117 U/L    Protein, total 7.0 6.4 - 8.2 g/dL    Albumin 3.2 (L) 3.4 - 5.0 g/dL    Globulin 3.8 2.0 - 4.0 g/dL    A-G Ratio 0.8 0.8 - 1.7     GLUCOSE, POC    Collection Time: 03/06/18  8:20 AM   Result Value Ref Range    Glucose (POC) 110 70 - 110 mg/dL       Signed By: Apurva Rose MD     March 6, 2018 10:56 AM

## 2018-03-06 NOTE — PROGRESS NOTES
[facility] [place].      CARDIOLOGY PROGRESS NOTE  RECS:    1. Acute respiratory failure-resolved   Extubated and alert. currently on room air    2. NSTEMI-off heparin drip due to epistaxis. Continue asa and bb for now. Echo revealed EF% 45-50%, mild hypokinesis apical wall, LVH is present. Plans for Cardiac cath tomorrow. Npo after midnight. Discussed with patient's son Rickie Oliver     The benefits and risks of cardiac catheterization have been discussed in detail with the patient and son present at the time. Patient understands  risk of potential cath complications including but not limited to bleeding, infection, difficulty healing the arteriotomy access site(2 chances in 100) which may require surgical repair, potential thromboembolic complications which could result in stroke, myocardial infarction, loss of limb or organ function and/or death( 1 chance in 1000)and potential allergic reaction to contrast dye or other medication used during the procedure. Patient is also aware of the therapeutic implications for medical management vs coronary artery bypass surgery vs percutaneous coronary intervention in treatment of coronary artery disease. The additional risks for percutaneous coronary artery intervention include the need for emergent bypass surgery at 1 chance in 100 and for restenosis which may range from 35% for plain balloon angioplasty, 15% for bare metal stent, and 5% for drug eluting stent implants. The need for mandatory uninterrupted dual antiplatelet therapy with lifelong Aspirin combined with Plavix for up to 12 months following drug eluting stents, and minimum 1 month following bare metal stents to prevent stent thrombosis which is the equivalent of acute heart attack has been reviewed in detail. No contraindications to use of drug eluting stents has been discovered. Tentatively for 1030 am tomorrow. 3. Acute Diastolic Heart failure- Compensated now.  On HD volume status per nephrology    4. Uncontrolled hypertension-better controlled    5. ESRD- on HD renal following. She is MWF HD schedule   6. Anemia-  H&H stable so far. 7. Hyponatremia: resolved   8. Hypokalemia- replacement per renal.            ECHO 02/26/18  Left ventricle: Size was normal. Systolic function was mildly reduced by   visual assessment. Ejection fraction was  estimated in the range of 45 % to 50 %. There was mild hypokinesis of the   apical wall(s). Wall thickness was moderately  increased. Left atrium: The atrium was mildly dilated. Atrial septum: There was increased thickness of the septum, consistent with   lipomatous hypertrophy. Pericardium: A small pericardial effusion was identified circumferential to   the heart. There was no evidence of  hemodynamic compromise. COMPARISONS:  Comparison was made with the previous study of 17-Sep-2017. LV overall   function has decreased from 65 % to 45 %. There  was worsening in the periapical LV wall motion. Pericardial effusion   appearance has not changed. Otherwise no  significant change. ASSESSMENT:  Hospital Problems  Date Reviewed: 11/15/2017          Codes Class Noted POA    Anemia ICD-10-CM: D64.9  ICD-9-CM: 285.9  2/26/2018 Unknown        Hyponatremia ICD-10-CM: E87.1  ICD-9-CM: 276.1  2/25/2018 Unknown        ESRD (end stage renal disease) ([facility] ICD-10-CM: N18.6  ICD-9-CM: 585.6  2/25/2018 Unknown        NSTEMI (non-ST elevated myocardial infarction) ([facility] ICD-10-CM: I21.4  ICD-9-CM: 410.70  2/25/2018 Unknown        * (Principal)Respiratory failure ([facility] ICD-10-CM: J96.90  ICD-9-CM: 518.81  2/25/2018 Unknown        Hypoxia ICD-10-CM: R09.02  ICD-9-CM: 799.02  2/25/2018 Unknown        Acute UTI ICD-10-CM: N39.0  ICD-9-CM: 599.0  2/25/2018 Unknown                SUBJECTIVE:    Alert, in no apparent distress.  Per son Christopher Fenton no chest pain or chest or chest pressure     OBJECTIVE:    VS:   Visit Vitals    /76 (BP 1 Location: Right arm, BP Patient Position: At rest)    Pulse 85    Temp 98.2 °F (36.8 °C)    Resp 18    Ht 5' 1\" (1.549 m)    Wt 45 kg (99 lb 3.3 oz)    SpO2 99%    Breastfeeding No    BMI 18.74 kg/m2         Intake/Output Summary (Last 24 hours) at 03/06/18 1410  Last data filed at 03/06/18 1313   Gross per 24 hour   Intake              600 ml   Output             2500 ml   Net            -1900 ml     TELE: normal sinus rhythm     General:alert and no distress noted   HENT: Normocephalic, atraumatic. Normal external eye. Neck :  no JVD  Cardiac:  regular rate and rhythm, tachycardic, no S3 or S4, no click, no rub. Lungs: clear to auscultation bilaterally  Abdomen: Soft, nontender, no masses  Extremities:  No edema      Labs: Results:       Chemistry Recent Labs      03/06/18 0035 03/05/18 0422 03/04/18   0430   GLU  81  108*  134*  138*   NA  136  133*  131*  131*   K  3.6  3.2*  3.3*  3.3*   CL  99*  94*  94*  94*   CO2  30  28  28  28   BUN  17  78*  59*  58*   CREA  2.50*  6.29*  4.71*  4.71*   CA  9.0  8.7  8.8  8.6  8.8   AGAP  7  11  9  9   BUCR  7*  12  13  12   AP  116  135*  174*   TP  7.0  6.1*  6.5   ALB  3.2*  2.8*  2.8*   GLOB  3.8  3.3  3.7   AGRAT  0.8  0.8  0.8      CBC w/Diff Recent Labs      03/06/18 0035 03/05/18 0422 03/04/18   0430   WBC  5.7  6.5  5.6   RBC  3.76*  3.53*  3.85*   HGB  9.5*  8.7*  9.4*   HCT  29.8*  27.9*  30.9*   PLT  277  271  282   GRANS  80*  78*  75*   LYMPH  7*  7*  15*   EOS  0  0  0      Cardiac Enzymes No results for input(s): CPK, CKND1, WOLFGANG in the last 72 hours.     No lab exists for component: CKRMB, TROIP   Coagulation Recent Labs      03/05/18 0422 03/04/18   0430   APTT  32.2  32.2       Lipid Panel Lab Results   Component Value Date/Time    Cholesterol, total 151 02/26/2018 10:02 AM    HDL Cholesterol 37 (L) 02/26/2018 10:02 AM    LDL, calculated 95.2 02/26/2018 10:02 AM    VLDL, calculated 18.8 02/26/2018 10:02 AM    Triglyceride 94 02/26/2018 10:02 AM CHOL/HDL Ratio 4.1 02/26/2018 10:02 AM      BNP No results for input(s): BNPP in the last 72 hours. Liver Enzymes Recent Labs      03/06/18   0035   TP  7.0   ALB  3.2*   AP  116   SGOT  35      Thyroid Studies No results found for: T4, T3U, TSH, TSHEXT, TSHEXT           Estee Heck NP-C  Patient seen independently  Discussed the details with NP and patient.  Please see orders & recommendations  Tiffanie Parker MD

## 2018-03-06 NOTE — PROGRESS NOTES
RENAL DAILY PROGRESS NOTE    Patient: Vinod He               Sex: female          DOA: 2/25/2018 12:50 PM        YOB: 1960      Age:  56 y.o.        LOS:  LOS: 9 days     Subjective:     Vinod He is a 56 y.o.  who presents with NSTEMI (non-ST elevated myocardial infarction) ([facility]  ESRD (end stage renal disease) ([facility]  Hyponatremia  Respiratory failure (HCC)  NSTEMI (non-ST elevated myocardial infarction) ([facility]  ESRD (end stage renal disease) ([facility]  Hypoxia.    Asked to evaluate for esrd,admitted with severe htn,pulm edema,mi  Chief complains: Patient denies nausea, vomiting, chest pain, dizziness, shortness of breath or headache.  - Reviewed last 24 hrs events     Current Facility-Administered Medications   Medication Dose Route Frequency    epoetin pilar (EPOGEN;PROCRIT) injection 4,000 Units  4,000 Units IntraVENous DIALYSIS MON, WED & FRI    cloNIDine HCl (CATAPRES) tablet 0.3 mg  0.3 mg Oral BID    hydrALAZINE (APRESOLINE) tablet 100 mg  100 mg Oral BID    insulin lispro (HUMALOG) injection   SubCUTAneous AC&HS    losartan (COZAAR) tablet 100 mg  100 mg Oral DAILY    phenol throat spray (CHLORASEPTIC) 1 Spray  1 Spray Oral PRN    haloperidol lactate (HALDOL) injection 2.5 mg  2.5 mg IntraVENous Q6H PRN    melatonin tablet 3 mg  3 mg Oral QHS PRN    heparin (porcine) injection 5,000 Units  5,000 Units SubCUTAneous Q8H    famotidine (PEPCID) tablet 20 mg  20 mg Oral DAILY    carvedilol (COREG) tablet 25 mg  25 mg Oral Q12H    amLODIPine (NORVASC) tablet 10 mg  10 mg Oral DAILY    glucose chewable tablet 16 g  4 Tab Oral PRN    glucagon (GLUCAGEN) injection 1 mg  1 mg IntraMUSCular PRN    dextrose (D50W) injection syrg 12.5-25 g  25-50 mL IntraVENous PRN    albuterol (ACCUNEB) nebulizer solution 2.5 mg  2.5 mg Nebulization Q6H PRN    aspirin chewable tablet 81 mg  81 mg Oral DAILY    atorvastatin (LIPITOR) tablet 40 mg  40 mg Oral QHS    acetaminophen (TYLENOL) solution 650 mg 650 mg Oral Q6H PRN    sodium chloride (NS) flush 5-10 mL  5-10 mL IntraVENous Q8H    sodium chloride (NS) flush 5-10 mL  5-10 mL IntraVENous PRN    acetaminophen (TYLENOL) suppository 650 mg  650 mg Rectal Q6H PRN    naloxone (NARCAN) injection 0.1 mg  0.1 mg IntraVENous PRN    ondansetron (ZOFRAN) injection 4 mg  4 mg IntraVENous Q6H PRN    hydrALAZINE (APRESOLINE) 20 mg/mL injection 10 mg  10 mg IntraVENous Q6H PRN       Objective:     Visit Vitals    /77 (BP 1 Location: Right arm, BP Patient Position: At rest)    Pulse 69    Temp 98.2 °F (36.8 °C)    Resp 18    Ht 5' 1\" (1.549 m)    Wt 45 kg (99 lb 3.3 oz)    SpO2 96%    Breastfeeding No    BMI 18.74 kg/m2       Intake/Output Summary (Last 24 hours) at 03/06/18 1719  Last data filed at 03/06/18 1433   Gross per 24 hour   Intake              840 ml   Output             2500 ml   Net            -1660 ml       Physical Examination:     GEN: AAO X 3, NAD  RS: Chest is bilateral equal, no wheezing / rales / crackles  CVS: S1-S2 heard, RRR, No S3 / murmur  Abdomen: Soft, Non tender, Not distended, Positive bowel sounds, no organomegaly, no CVA / supra pubic tenderness  Extremities: No edema, no cyanosis, skin is warm on touch  CNS: Awake & follows commands, CN II-XII are grossly intact. HEENT: Head is atraumatic, PERRLA, conjunctiva pink & non icteric. No JVD or carotid bruit   Psychiatric: Normal mood, affect, judgement & memory    Musculoskeletal: No gross joints or bone deformities   Lymph Node: No palpable cervical, axillary or groin lymphadenopathy.       Data Review:      Labs:     Hematology:   Recent Labs      03/06/18 0035 03/05/18 0422 03/04/18   0430   WBC  5.7  6.5  5.6   HGB  9.5*  8.7*  9.4*   HCT  29.8*  27.9*  30.9*     Chemistry:   Recent Labs      03/06/18 0035 03/05/18 0422 03/04/18   0430   BUN  17  78*  59*  58*   CREA  2.50*  6.29*  4.71*  4.71*   CA  9.0  8.7  8.8  8.6  8.8   ALB  3.2*  2.8*  2.8*   K  3.6 3. 2*  3.3*  3.3*   NA  136  133*  131*  131*   CL  99*  94*  94*  94*   CO2  30  28  28  28   PHOS   --   1.9*   --    GLU  81  108*  134*  138*        Images:    XR (Most Recent). CXR reviewed by me and compared with previous CXR   Results from [place] encounter on 02/25/18   XR ABD (KUB)   Narrative ABDOMEN ONE VIEW    HISTORY: Dobbhoff feeding tube placement. COMPARISON: Abdomen flat and upright with chest radiograph 2/5/2018, chest  radiograph 2/28/2018    FINDINGS:     Portable supine view of the lower chest and abdomen. Portions of the left  lateral abdominal wall are not included on the image. A Dobbhoff feeding tube has been inserted. The tip of the tube is beyond the EG  junction, in the proximal to mid stomach. No loops in the tube. Nonobstructive bowel gas pattern. Heart size appears enlarged, stable retrocardiac opacity. Impression IMPRESSION:    Dobbhoff feeding tube is below the diaphragms, the tip in the proximal to mid  stomach. CT (Most Recent)   Results from Hospital Encounter encounter on 02/25/18   CTA CHEST W OR W WO CONT   Narrative EXAMINATION: CTA chest pulmonary    INDICATION: Acute chest pain, shortness of breath    COMPARISON: None    TECHNIQUE: CT angiography of the pulmonary arteries performed following 75 cc IV  Isovue 370, with 3-D MIP multiplanar reformations. All CT scans at this facility  are performed using dose optimization technique as appropriate to a performed  exam, to include automated exposure control, adjustment of the mA and/or kV  according to patient size (including appropriate matching first site specific  examinations), or use of iterative reconstruction technique. FINDINGS:    Cardiovascular: No pulmonary arterial filling defects identified to suggest  pulmonary embolism. Motion artifact limits evaluation of smaller segmental and  subsegmental arteries.  Ascending thoracic aorta is borderline ectatic measuring  up to 3.5 cm diameter. Heart size within normal limits. Right jugular catheter  tip at right atrium level. Moderate-sized area of cartilage fusion. Mediastinum: Possible enlarged aortopulmonary node and left hilar nodes, poorly  delineated. Left hilar anita calcification noted. Thyroid slightly  heterogeneous. Esophagus is nondistended. Pleura: Moderate volume bilateral pleural effusions. No evidence of  pneumothorax. Lungs: Probable compressive atelectasis due to pleural effusions bilaterally. Right major fissure nodule measuring 4 mm, likely pleural-based node. Upper abdomen: Unremarkable. Miscellaneous: Superficial soft tissues unremarkable. Bones: No acute osseous findings. Probable bone island in T11 posterior body. Impression IMPRESSION:    1. No pulmonary embolus identified. Smaller segmental and subsegmental pulmonary  arteries less well evaluated due to motion artifact. 2. Moderate-sized bilateral pleural effusions, and moderate volume pericardial  effusion. Borderline ectatic ascending aorta. -Probable compressive atelectasis in bilateral lungs. Superimposed infiltrate  difficult to exclude.    -Suspected left hilar and aortopulmonary window adenopathy, poorly delineated,  nonspecific. EKG No results found for this or any previous visit. I have personally reviewed the old medical records and patient's labs    Plan / Recommendation:      1. Esrd,continue dialysis mon wed Friday. discussed with daughter. dialyze tomorrow after cardiac cath  2.htn,under better control. told nurse to space medications throughout the day. renin pending. aldosterone low. renal dopplers. no stacey  3.anemia,resumed low dose epo with dialysis    D/w Dr. Sonu Ayala MD  Nephrology  3/6/2018

## 2018-03-06 NOTE — ROUTINE PROCESS
Bedside and Verbal shift change report given to [address] Dakota Aguilar (oncoming nurse) by Lokesh Win RN (offgoing nurse). Report included the following information SBAR, Kardex and MAR.

## 2018-03-07 NOTE — PROGRESS NOTES
[facility] Hospitalist Group  Progress Note    Patient:  Param Age: 56 y.o. : 1960 MR#: [id number] SSN: [id number]  Date/Time: 3/7/2018  9:37 AM    Subjective/24-hour events:     Night uneventful - nothing new/acute. Assessment:   Acute hypoxic respiratory failure  NSTEMI  Malignant HTN  Acute diastolic CHF  ESRD  Anemia   Hypokalemia   Nasal MRSA colonization     Plan:  Cath today. Further recs pending results. Monitor H/H. Consider transfusing on HD if necessary. Continue medical management as ordered in interim. Consider SSRI at discharge. Case discussed with:  []Patient  [x]Family  [x]Nursing  [x]Case Management  DVT Prophylaxis:  []Lovenox  [x]Hep SQ  []SCDs  []Coumadin   [x]On Heparin gtt    Objective:   VS:   Visit Vitals    /77 (BP 1 Location: Right arm, BP Patient Position: At rest;Head of bed elevated (Comment degrees))    Pulse 62    Temp 98.3 °F (36.8 °C)    Resp 16    Ht 5' 1\" (1.549 m)    Wt 39.6 kg (87 lb 3.2 oz)    SpO2 97%    Breastfeeding No    BMI 16.48 kg/m2      Tmax/24hrs: Temp (24hrs), Av.2 °F (36.8 °C), Min:98.1 °F (36.7 °C), Max:98.5 °F (36.9 °C)      Intake/Output Summary (Last 24 hours) at 18 0937  Last data filed at 18 1843   Gross per 24 hour   Intake             1200 ml   Output                0 ml   Net             1200 ml       General:  In NAD. Cardiovascular: RRR. Pulmonary:  Lungs clear anteriorly. Effort nonlabored. GI:  Abdomen soft, NTTP. Extremities:  Warm, no ischemia. Neuro:  Awake. Moves extremities spontaneously. Depressed affect.     Labs:    Recent Results (from the past 24 hour(s))   GLUCOSE, POC    Collection Time: 18 12:02 PM   Result Value Ref Range    Glucose (POC) 97 70 - 110 mg/dL   GLUCOSE, POC    Collection Time: 18  4:49 PM   Result Value Ref Range    Glucose (POC) 107 70 - 110 mg/dL   GLUCOSE, POC    Collection Time: 18  8:55 PM   Result Value Ref Range Glucose (POC) 153 (H) 70 - 110 mg/dL   CBC WITH AUTOMATED DIFF    Collection Time: 03/07/18  2:46 AM   Result Value Ref Range    WBC 4.7 4.6 - 13.2 K/uL    RBC 3.13 (L) 4.20 - 5.30 M/uL    HGB 7.8 (L) 12.0 - 16.0 g/dL    HCT 25.3 (L) 35.0 - 45.0 %    MCV 80.8 74.0 - 97.0 FL    MCH 24.9 24.0 - 34.0 PG    MCHC 30.8 (L) 31.0 - 37.0 g/dL    RDW 15.1 (H) 11.6 - 14.5 %    PLATELET 446 101 - 484 K/uL    MPV 10.4 9.2 - 11.8 FL    NEUTROPHILS 72 42 - 75 %    BAND NEUTROPHILS 2 0 - 5 %    LYMPHOCYTES 13 (L) 20 - 51 %    MONOCYTES 11 (H) 2 - 9 %    EOSINOPHILS 2 0 - 5 %    BASOPHILS 0 0 - 3 %    ABS. NEUTROPHILS 3.5 1.8 - 8.0 K/UL    ABS. LYMPHOCYTES 0.6 (L) 0.8 - 3.5 K/UL    ABS. MONOCYTES 0.5 0 - 1.0 K/UL    ABS. EOSINOPHILS 0.1 0.0 - 0.4 K/UL    ABS. BASOPHILS 0.0 0.0 - 0.06 K/UL    DF AUTOMATED      PLATELET COMMENTS ADEQUATE PLATELETS      RBC COMMENTS ANISOCYTOSIS  1+       METABOLIC PANEL, COMPREHENSIVE    Collection Time: 03/07/18  2:46 AM   Result Value Ref Range    Sodium 133 (L) 136 - 145 mmol/L    Potassium 3.4 (L) 3.5 - 5.5 mmol/L    Chloride 97 (L) 100 - 108 mmol/L    CO2 29 21 - 32 mmol/L    Anion gap 7 3.0 - 18 mmol/L    Glucose 107 (H) 74 - 99 mg/dL    BUN 34 (H) 7.0 - 18 MG/DL    Creatinine 4.80 (H) 0.6 - 1.3 MG/DL    BUN/Creatinine ratio 7 (L) 12 - 20      GFR est AA 11 (L) >60 ml/min/1.73m2    GFR est non-AA 9 (L) >60 ml/min/1.73m2    Calcium 8.3 (L) 8.5 - 10.1 MG/DL    Bilirubin, total 0.4 0.2 - 1.0 MG/DL    ALT (SGPT) 18 13 - 56 U/L    AST (SGOT) 20 15 - 37 U/L    Alk.  phosphatase 95 45 - 117 U/L    Protein, total 5.8 (L) 6.4 - 8.2 g/dL    Albumin 2.7 (L) 3.4 - 5.0 g/dL    Globulin 3.1 2.0 - 4.0 g/dL    A-G Ratio 0.9 0.8 - 1.7     GLUCOSE, POC    Collection Time: 03/07/18  8:34 AM   Result Value Ref Range    Glucose (POC) 104 70 - 110 mg/dL       Signed By: Gian Mishra MD     March 7, 2018 9:37 AM

## 2018-03-07 NOTE — ROUTINE PROCESS
TRANSFER - OUT REPORT:    Verbal report given to Aren Oates RN (name) on Jessica Contreras  being transferred to [facility] ([facility]) for routine progression of care       Report consisted of patients Situation, Background, Assessment and   Recommendations(SBAR). Information from the following report(s) SBAR, Kardex, Procedure Summary and MAR was reviewed with the receiving nurse. Lines:   Peripheral IV 03/07/18 Right Wrist (Active)        Opportunity for questions and clarification was provided.       Patient transported with:   [employer]

## 2018-03-07 NOTE — PROGRESS NOTES
[facility]      CARDIOLOGY PROGRESS NOTE  RECS:    1. Acute respiratory failure-resolved   Extubated and alert. currently on room air    2. NSTEMI-off heparin drip due to epistaxis. Continue asa and bb for now. Echo revealed EF% 45-50%, mild hypokinesis apical wall, LVH is present. Cardiac Cath today   3. Acute Diastolic Heart failure- Compensated now. On HD volume status per nephrology    4. Uncontrolled hypertension-better controlled    5. ESRD- on HD renal following. She is MWF HD schedule   6. Anemia-  H&H dropping today. D/w Dr Farzana Bullard. He thinks it is due holding epogen and he does not want to transfuse today at this level. Will proceed with diagnostic cath including renal angio as per Dr Farzana Bullard. 7. Hyponatremia: monitor   8. Hypokalemia- replacement per renal.      The benefits and risks of cardiac catheterization have been discussed in detail with the patient and son present at the time. Patient understands  risk of potential cath complications including but not limited to bleeding, infection, difficulty healing the arteriotomy access site(2 chances in 100) which may require surgical repair, potential thromboembolic complications which could result in stroke, myocardial infarction, loss of limb or organ function and/or death( 1 chance in 1000)and potential allergic reaction to contrast dye or other medication used during the procedure. Patient is also aware of the therapeutic implications for medical management vs coronary artery bypass surgery vs percutaneous coronary intervention in treatment of coronary artery disease. The additional risks for percutaneous coronary artery intervention include the need for emergent bypass surgery at 1 chance in 100 and for restenosis which may range from 35% for plain balloon angioplasty, 15% for bare metal stent, and 5% for drug eluting stent implants.  The need for mandatory uninterrupted dual antiplatelet therapy with lifelong Aspirin combined with Plavix for up to 12 months following drug eluting stents, and minimum 1 month following bare metal stents to prevent stent thrombosis which is the equivalent of acute heart attack has been reviewed in detail. No contraindications to use of drug eluting stents has been discovered. Tentatively for 1030 am tomorrow. ECHO 02/26/18  Left ventricle: Size was normal. Systolic function was mildly reduced by   visual assessment. Ejection fraction was  estimated in the range of 45 % to 50 %. There was mild hypokinesis of the   apical wall(s). Wall thickness was moderately  increased. Left atrium: The atrium was mildly dilated. Atrial septum: There was increased thickness of the septum, consistent with   lipomatous hypertrophy. Pericardium: A small pericardial effusion was identified circumferential to   the heart. There was no evidence of  hemodynamic compromise. COMPARISONS:  Comparison was made with the previous study of 17-Sep-2017. LV overall   function has decreased from 65 % to 45 %. There  was worsening in the periapical LV wall motion. Pericardial effusion   appearance has not changed. Otherwise no  significant change. ASSESSMENT:  Hospital Problems  Date Reviewed: 11/15/2017          Codes Class Noted POA    Anemia ICD-10-CM: D64.9  ICD-9-CM: 285.9  2/26/2018 Unknown        Hyponatremia ICD-10-CM: E87.1  ICD-9-CM: 276.1  2/25/2018 Unknown        ESRD (end stage renal disease) ([facility] ICD-10-CM: N18.6  ICD-9-CM: 585.6  2/25/2018 Unknown        NSTEMI (non-ST elevated myocardial infarction) ([facility] ICD-10-CM: I21.4  ICD-9-CM: 410.70  2/25/2018 Unknown        * (Principal)Respiratory failure ([facility] ICD-10-CM: J96.90  ICD-9-CM: 518.81  2/25/2018 Unknown        Hypoxia ICD-10-CM: R09.02  ICD-9-CM: 799.02  2/25/2018 Unknown        Acute UTI ICD-10-CM: N39.0  ICD-9-CM: 599.0  2/25/2018 Unknown                SUBJECTIVE:    Alert, in no apparent distress.  no chest pain or chest or chest pressure OBJECTIVE:    VS:   Visit Vitals    /77 (BP 1 Location: Right arm, BP Patient Position: At rest;Head of bed elevated (Comment degrees))    Pulse 62    Temp 98.3 °F (36.8 °C)    Resp 16    Ht 5' 1\" (1.549 m)    Wt 39.6 kg (87 lb 3.2 oz)    SpO2 97%    Breastfeeding No    BMI 16.48 kg/m2         Intake/Output Summary (Last 24 hours) at 03/07/18 0912  Last data filed at 03/06/18 1843   Gross per 24 hour   Intake             1200 ml   Output                0 ml   Net             1200 ml     TELE: normal sinus rhythm     General:alert and no distress noted   HENT: Normocephalic, atraumatic. Normal external eye. Neck :  no JVD  Cardiac:  regular rate and rhythm, tachycardic, no S3 or S4, no click, no rub. Lungs: clear to auscultation bilaterally  Abdomen: Soft, nontender, no masses  Extremities:  No edema      Labs: Results:       Chemistry Recent Labs      03/07/18   0246  03/06/18   0035  03/05/18   0422   GLU  107*  81  108*   NA  133*  136  133*   K  3.4*  3.6  3.2*   CL  97*  99*  94*   CO2  29  30  28   BUN  34*  17  78*   CREA  4.80*  2.50*  6.29*   CA  8.3*  9.0  8.7  8.8   AGAP  7  7  11   BUCR  7*  7*  12   AP  95  116  135*   TP  5.8*  7.0  6.1*   ALB  2.7*  3.2*  2.8*   GLOB  3.1  3.8  3.3   AGRAT  0.9  0.8  0.8      CBC w/Diff Recent Labs      03/07/18   0246  03/06/18   0035  03/05/18   0422   WBC  4.7  5.7  6.5   RBC  3.13*  3.76*  3.53*   HGB  7.8*  9.5*  8.7*   HCT  25.3*  29.8*  27.9*   PLT  255  277  271   GRANS  72  80*  78*   LYMPH  13*  7*  7*   EOS  2  0  0      Cardiac Enzymes No results for input(s): CPK, CKND1, WOLFGANG in the last 72 hours.     No lab exists for component: CKRMB, TROIP   Coagulation Recent Labs      03/05/18   0422   APTT  32.2       Lipid Panel Lab Results   Component Value Date/Time    Cholesterol, total 151 02/26/2018 10:02 AM    HDL Cholesterol 37 (L) 02/26/2018 10:02 AM    LDL, calculated 95.2 02/26/2018 10:02 AM    VLDL, calculated 18.8 02/26/2018 10:02 AM    Triglyceride 94 02/26/2018 10:02 AM    CHOL/HDL Ratio 4.1 02/26/2018 10:02 AM      BNP No results for input(s): BNPP in the last 72 hours. Liver Enzymes Recent Labs      03/07/18   0246   TP  5.8*   ALB  2.7*   AP  95   SGOT  20      Thyroid Studies No results found for: T4, T3U, TSH, TSHEXT, TSHEXT           Estee Heck, NP-C  Patient seen independently  Discussed the details with NP and patient.  Please see orders & recommendations  Miguel Castellon MD

## 2018-03-07 NOTE — PROCEDURES
[address] [place] [address]   PROCEDURE NOTE    Name:JOSE RODRIGUEZ  MR#: [id number]  : 1960  ACCOUNT #: [de-identified]   DATE OF SERVICE: 2018    PROCEDURE PERFORMED:  Cardiac catheterization. INDICATIONS:  Non-ST elevation MI, difficult to control hypertension, and end-stage renal disease. PROCEDURE NOTE:  The patient was brought from her room to cath holding and then to catheterization laboratory, draped and prepared in the usual aseptic precautions. The right femoral artery was cannulated without any complications. Using 4-Kosovan catheters, bilateral coronary and bilateral renal angiography was done. At the end, all the catheters were removed. As the tech was trying to cover with bioclusive dressing, the patient apparently moved and the sheath came out of the artery. Fortunately, this was noted very quickly and manual pressure was applied for hemostasis. There is no significant hematoma at present, but the patient needs to be watched very closely. ANESTHESIA USED:  Moderate sedation was given using IV Versed and IV fentanyl. The patient was continuously monitored during the procedure under my supervision for a total of 20 minutes. HEMODYNAMICS:  Aortic pressure was 120/70 and was stable through the procedure. ANGIOGRAPHIC FINDINGS    Left main:  Left main is short, large and normal.    LAD artery:  There is a proximal heavy calcification noted. It is a large LAD artery with multiple septal perforators and 2 diagonals. It appears that there is a spontaneous dissection in the proximal part of the LAD artery. It is a long dissection and it appears that the true lumen supplies the native LAD and through the false lumen relatively slower flow is going to the first diagonal artery. Since there is significant calcification in this area of dissection, it is possible that it is a chronic healed dissection. Left circumflex artery:  Arises from the left main.   It is a large vessel, gives rise to large OM1, which bifurcates into two branches and moderate to large OM2 and OM3 vessels, and distally 2 small posterolateral branches. Left circumflex and the branches are normal angiographically. Right coronary artery:  Arises from the right coronary ostium. It is a dominant vessel. Giving residual branches, it divides into 2 posterior descending arteries and a small posterolateral branch. The right coronary and the branches are normal angiographically. Right renal angiography:  Done due to very difficult to control hypertension at the request of the nephrology service. Both the renal arteries were cannulated selectively using JR4 catheter. Left renal artery:  Arises from the abdominal aorta. It is a large vessel and divides into 5 branches in the renal pelvis area. The smaller branches seem to be almost occluded. There is evidence of calcification in the parenchyma part of the kidney shadow. No occlusive disease seen in the large renal artery or its branches. Right renal angiography:  Reveals a patent proximal vessel, which divides into 2 branches, which are further intraparenchymal branches. Again, the parenchymal flow is extremely slow with calcification seen in the kidney. IMPRESSION  1. 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 Through the true lumen, the flow seems to go to the native LAD and through the false lumen, it seems to go to the first diagonal artery. Calcification in this dissected area suggests that this may be a chronic healed dissection. 2.  No significant stenotic lesions in either of the renal arteries, but severe parenchymal disease expected with very slow flow in the parenchyma.     DISCUSSION AND RECOMMENDATION:  No intervention was attempted today and since this may be a chronic healed dissection, I think a stress test should be done to evaluate any ischemia and if there is any significant ischemia seen in the native LAD area, the patient may benefit from bypass surgery. I think if we try to do a percutaneous intervention in the native LAD, the first diagonal, which is a large vessel, will most likely close as it is getting its flow through the false lumen. In the meantime, aggressive risk factor modification should be continued.       MD PAYAL Greene / [place]  D: 03/07/2018 11:45     T: 03/07/2018 13:10  JOB #: [id number]

## 2018-03-07 NOTE — ROUTINE PROCESS
Bedside and Verbal shift change report given to Janeann Cogan, RN   (oncoming nurse) by Zoie Schilling RN (offgoing nurse). Report included the following information SBAR, Kardex, Intake/Output, MAR and Recent Results. Family at bedside. Call bell in reach. Encouraged to call for assist as needed. [id number] Bedside and Verbal shift change report given to [address] (oncoming nurse) by Janeann Cogan, RN   (offgoing nurse). Report included the following information SBAR, Kardex, Intake/Output, MAR and Recent Results.

## 2018-03-07 NOTE — ROUTINE PROCESS
TRANSFER - IN REPORT:    Verbal report received from JANE MCCARTHY RN(name) on Brady Lea  being received from [facility](unit) for ordered procedure      Report consisted of patients Situation, Background, Assessment and   Recommendations(SBAR). Information from the following report(s) SBAR, Intake/Output, MAR and Recent Results was reviewed with the receiving nurse. Opportunity for questions and clarification was provided. Assessment completed upon patients arrival to unit and care assumed.

## 2018-03-07 NOTE — PROGRESS NOTES
IV site not in place upon assessment. Dressing to [facility] not in place. Dressing replaced to [facility] in right chest.  Attempted to place new IV site. Unsuccessful. Supervisor call for new IV.    0530 In room to prep patient for cath today. Patient requested to prep herself.  with blue phone assisted in explaining procedure for prep to patient. Patient verbalized understanding.

## 2018-03-07 NOTE — PROGRESS NOTES
RENAL DAILY PROGRESS NOTE    Patient: Marcelo Lundy               Sex: female          DOA: 2/25/2018 12:50 PM        YOB: 1960      Age:  56 y.o.        LOS:  LOS: 10 days     Subjective:     Marcelo Lundy is a 56 y.o.  who presents with NSTEMI (non-ST elevated myocardial infarction) ([facility]  ESRD (end stage renal disease) ([facility]  Hyponatremia  Respiratory failure (HCC)  NSTEMI (non-ST elevated myocardial infarction) ([facility]  ESRD (end stage renal disease) ([facility]  Hypoxia.    Asked to evaluate for esrd,admitted with severe htn,pulm edema,mi  Chief complains: Patient denies nausea, vomiting, chest pain, dizziness, shortness of breath or headache.  - Reviewed last 24 hrs events     Current Facility-Administered Medications   Medication Dose Route Frequency    fentaNYL citrate (PF) injection 12.5-100 mcg  12.5-100 mcg IntraVENous Multiple    heparin (porcine) 1,000 unit/mL injection 10,000 Units  10,000 Units IntraVENous PRN    midazolam (VERSED) injection 0.5-2 mg  0.5-2 mg IntraVENous Multiple    lidocaine (PF) (XYLOCAINE) 10 mg/mL (1 %) injection 1-30 mL  1-30 mL SubCUTAneous ONCE    iopamidol (ISOVUE 300) 61 % contrast injection  mL   mL IntraVENous Multiple    nitroglycerine compounded injection 100-200 mcg  100-200 mcg IntraCORONary Multiple    epoetin pilar (EPOGEN;PROCRIT) injection 4,000 Units  4,000 Units IntraVENous DIALYSIS MON, WED & FRI    cloNIDine HCl (CATAPRES) tablet 0.3 mg  0.3 mg Oral BID    hydrALAZINE (APRESOLINE) tablet 100 mg  100 mg Oral BID    insulin lispro (HUMALOG) injection   SubCUTAneous AC&HS    losartan (COZAAR) tablet 100 mg  100 mg Oral DAILY    phenol throat spray (CHLORASEPTIC) 1 Spray  1 Spray Oral PRN    haloperidol lactate (HALDOL) injection 2.5 mg  2.5 mg IntraVENous Q6H PRN    melatonin tablet 3 mg  3 mg Oral QHS PRN    heparin (porcine) injection 5,000 Units  5,000 Units SubCUTAneous Q8H    famotidine (PEPCID) tablet 20 mg  20 mg Oral DAILY    carvedilol (COREG) tablet 25 mg  25 mg Oral Q12H    amLODIPine (NORVASC) tablet 10 mg  10 mg Oral DAILY    glucose chewable tablet 16 g  4 Tab Oral PRN    glucagon (GLUCAGEN) injection 1 mg  1 mg IntraMUSCular PRN    dextrose (D50W) injection syrg 12.5-25 g  25-50 mL IntraVENous PRN    albuterol (ACCUNEB) nebulizer solution 2.5 mg  2.5 mg Nebulization Q6H PRN    aspirin chewable tablet 81 mg  81 mg Oral DAILY    atorvastatin (LIPITOR) tablet 40 mg  40 mg Oral QHS    acetaminophen (TYLENOL) solution 650 mg  650 mg Oral Q6H PRN    sodium chloride (NS) flush 5-10 mL  5-10 mL IntraVENous Q8H    sodium chloride (NS) flush 5-10 mL  5-10 mL IntraVENous PRN    acetaminophen (TYLENOL) suppository 650 mg  650 mg Rectal Q6H PRN    naloxone (NARCAN) injection 0.1 mg  0.1 mg IntraVENous PRN    ondansetron (ZOFRAN) injection 4 mg  4 mg IntraVENous Q6H PRN    hydrALAZINE (APRESOLINE) 20 mg/mL injection 10 mg  10 mg IntraVENous Q6H PRN       Objective:     Visit Vitals    /70 (BP 1 Location: Right arm)    Pulse 62    Temp 98.3 °F (36.8 °C)    Resp 16    Ht 5' 1\" (1.549 m)    Wt 39.6 kg (87 lb 3.2 oz)    SpO2 97%    Breastfeeding No    BMI 16.48 kg/m2       Intake/Output Summary (Last 24 hours) at 03/07/18 1050  Last data filed at 03/06/18 1843   Gross per 24 hour   Intake              960 ml   Output                0 ml   Net              960 ml       Physical Examination:     GEN: AAO X 3, NAD  RS: Chest is bilateral equal, no wheezing / rales / crackles  CVS: S1-S2 heard, RRR, No S3 / murmur  Abdomen: Soft, Non tender, Not distended, Positive bowel sounds, no organomegaly, no CVA / supra pubic tenderness  Extremities: No edema, no cyanosis, skin is warm on touch  CNS: Awake & follows commands, CN II-XII are grossly intact. HEENT: Head is atraumatic, PERRLA, conjunctiva pink & non icteric.  No JVD or carotid bruit   Psychiatric: Normal mood, affect, judgement & memory    Musculoskeletal: No gross joints or bone deformities   Lymph Node: No palpable cervical, axillary or groin lymphadenopathy. Data Review:      Labs:     Hematology:   Recent Labs      03/07/18   0246  03/06/18   0035  03/05/18   0422   WBC  4.7  5.7  6.5   HGB  7.8*  9.5*  8.7*   HCT  25.3*  29.8*  27.9*     Chemistry:   Recent Labs      03/07/18   0246  03/06/18   0035  03/05/18   0422   BUN  34*  17  78*   CREA  4.80*  2.50*  6.29*   CA  8.3*  9.0  8.7  8.8   ALB  2.7*  3.2*  2.8*   K  3.4*  3.6  3.2*   NA  133*  136  133*   CL  97*  99*  94*   CO2  29  30  28   PHOS   --    --   1.9*   GLU  107*  81  108*        Images:    XR (Most Recent). CXR reviewed by me and compared with previous CXR   Results from [place] encounter on 02/25/18   XR ABD (KUB)   Narrative ABDOMEN ONE VIEW    HISTORY: Dobbhoff feeding tube placement. COMPARISON: Abdomen flat and upright with chest radiograph 2/5/2018, chest  radiograph 2/28/2018    FINDINGS:     Portable supine view of the lower chest and abdomen. Portions of the left  lateral abdominal wall are not included on the image. A Dobbhoff feeding tube has been inserted. The tip of the tube is beyond the EG  junction, in the proximal to mid stomach. No loops in the tube. Nonobstructive bowel gas pattern. Heart size appears enlarged, stable retrocardiac opacity. Impression IMPRESSION:    Dobbhoff feeding tube is below the diaphragms, the tip in the proximal to mid  stomach. CT (Most Recent)   Results from Hospital Encounter encounter on 02/25/18   CTA CHEST W OR W WO CONT   Narrative EXAMINATION: CTA chest pulmonary    INDICATION: Acute chest pain, shortness of breath    COMPARISON: None    TECHNIQUE: CT angiography of the pulmonary arteries performed following 75 cc IV  Isovue 370, with 3-D MIP multiplanar reformations.  All CT scans at this facility  are performed using dose optimization technique as appropriate to a performed  exam, to include automated exposure control, adjustment of the mA and/or kV  according to patient size (including appropriate matching first site specific  examinations), or use of iterative reconstruction technique. FINDINGS:    Cardiovascular: No pulmonary arterial filling defects identified to suggest  pulmonary embolism. Motion artifact limits evaluation of smaller segmental and  subsegmental arteries. Ascending thoracic aorta is borderline ectatic measuring  up to 3.5 cm diameter. Heart size within normal limits. Right jugular catheter  tip at right atrium level. Moderate-sized area of cartilage fusion. Mediastinum: Possible enlarged aortopulmonary node and left hilar nodes, poorly  delineated. Left hilar anita calcification noted. Thyroid slightly  heterogeneous. Esophagus is nondistended. Pleura: Moderate volume bilateral pleural effusions. No evidence of  pneumothorax. Lungs: Probable compressive atelectasis due to pleural effusions bilaterally. Right major fissure nodule measuring 4 mm, likely pleural-based node. Upper abdomen: Unremarkable. Miscellaneous: Superficial soft tissues unremarkable. Bones: No acute osseous findings. Probable bone island in T11 posterior body. Impression IMPRESSION:    1. No pulmonary embolus identified. Smaller segmental and subsegmental pulmonary  arteries less well evaluated due to motion artifact. 2. Moderate-sized bilateral pleural effusions, and moderate volume pericardial  effusion. Borderline ectatic ascending aorta. -Probable compressive atelectasis in bilateral lungs. Superimposed infiltrate  difficult to exclude.    -Suspected left hilar and aortopulmonary window adenopathy, poorly delineated,  nonspecific. EKG No results found for this or any previous visit. I have personally reviewed the old medical records and patient's labs    Plan / Recommendation:      1. Esrd,continue dialysis mon wed Friday. discussed with daughter. dialyze today after cardiac cath  2.htn,under better control. told nurse to space medications throughout the day. renin pending. aldosterone low. renal dopplers. no stacey. asked dr Robi Kaur to do renal angiogram with cardiac cath  3.anemia,increase  epo with dialysis    D/w Dr. Tristen Beard MD  Nephrology  3/7/2018

## 2018-03-07 NOTE — BRIEF OP NOTE
Cardiac Cathetherization Note:    PreOpDiagnosis: NSTEMI, HTN-difficult    Anesthesia used : Moderate Sedation: Used IV fentanyl and IV versed. Details of doses are as documented in nursing flow sheet. Patient continuously monitored during the procedure. Total time was  20 minutes. Findings/PostOp Diagnosis:  1. spont dissection in prox LAD- long, healed, likely chronic (calcified). True lumen flow to LAD, false lumen flow to D1. Other arteries normal.    Plan:  1. Stress test to evaluate ischemia in LAD/Diag area. If larege ischemia, CABG and if small area diagnal only ischemia, med Rx. Closure: manual 4F RFA  Estimated Blood Loss: Minimal  Specimens: None  Assistants: Per [facility]  Complications: None    Other coronary anatomy and procedural details as per full dictated note. #[id number]    Kaylee Dunn MD, MD  3/7/2018, 11:37 AM

## 2018-03-07 NOTE — PROGRESS NOTES
1030: Pt brought to [facility] via transport. Pt placed in bed 1 and connected to monitor. Baseline vitals assessed. PIV flushed without difficulty. Dr. Benedicto Barry at beside speaking with son regarding consent. Pt prepped and ready for ordered procedure. 1042: Pt taken to cath lab for ordered procedure via bed by Scottie Reis, KRISTIN and Renee Quiroz, [id number] REPORT:    Verbal report given to Kalpana Pulliam (name) on Luis Caballero  being transferred to Cath Lab (unit) for ordered procedure       Report consisted of patients Situation, Background, Assessment and   Recommendations(SBAR). Information from the following report(s) SBAR and MAR was reviewed with the receiving nurse. Lines:   Peripheral IV 03/07/18 Right Wrist (Active)        Opportunity for questions and clarification was provided.       Patient transported with:   Registered Nurse  Tech

## 2018-03-08 NOTE — PROGRESS NOTES
[facility]      CARDIOLOGY PROGRESS NOTE  RECS:    1. Acute respiratory failure-resolved     2. NSTEMI- stable no chest pain or chest pressure. Continue asa and bb Echo revealed EF% 45-50%, mild hypokinesis apical wall, LVH is present. 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 Nuclear-apical inferior ischemia-no lateral wall ischemia-will manage medically  3. Acute Diastolic Heart failure- Compensated now. On HD volume status per nephrology    4. Uncontrolled hypertension-better controlled    5. ESRD- on HD renal following. She is MWF HD schedule   6. Anemia-  H&H dropping today. 7. Hyponatremia: monitor   8. Hypokalemia- replacement per renal.         Cardiac cath 3/7/18  1. 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 Through the true lumen, the flow seems to go to the native LAD and through the false lumen, it seems to go to the first diagonal artery. Calcification in this dissected area suggests that this may be a chronic healed dissection. 2.  No significant stenotic lesions in either of the renal arteries, but severe parenchymal disease expected with very slow flow in the parenchyma.     DISCUSSION AND RECOMMENDATION:  No intervention was attempted today and since this may be a chronic healed dissection, I think a stress test should be done to evaluate any ischemia and if there is any significant ischemia seen in the native LAD area, the patient may benefit from bypass surgery. I think if we try to do a percutaneous intervention in the native LAD, the first diagonal, which is a large vessel, will most likely close as it is getting its flow through the false lumen. In the meantime, aggressive risk factor modification should be continued.     ECHO 02/26/18  Left ventricle: Size was normal. Systolic function was mildly reduced by   visual assessment. Ejection fraction was  estimated in the range of 45 % to 50 %. There was mild hypokinesis of the   apical wall(s). Wall thickness was moderately  increased. Left atrium: The atrium was mildly dilated. Atrial septum: There was increased thickness of the septum, consistent with   lipomatous hypertrophy. Pericardium: A small pericardial effusion was identified circumferential to   the heart. There was no evidence of  hemodynamic compromise. COMPARISONS:  Comparison was made with the previous study of 17-Sep-2017. LV overall   function has decreased from 65 % to 45 %. There  was worsening in the periapical LV wall motion. Pericardial effusion   appearance has not changed. Otherwise no  significant change. ASSESSMENT:  Hospital Problems  Date Reviewed: 11/15/2017          Codes Class Noted POA    Anemia ICD-10-CM: D64.9  ICD-9-CM: 285.9  2/26/2018 Unknown        Hyponatremia ICD-10-CM: E87.1  ICD-9-CM: 276.1  2/25/2018 Unknown        ESRD (end stage renal disease) ([facility] ICD-10-CM: N18.6  ICD-9-CM: 585.6  2/25/2018 Unknown        NSTEMI (non-ST elevated myocardial infarction) ([facility] ICD-10-CM: I21.4  ICD-9-CM: 410.70  2/25/2018 Unknown        * (Principal)Respiratory failure ([facility] ICD-10-CM: J96.90  ICD-9-CM: 518.81  2/25/2018 Unknown        Hypoxia ICD-10-CM: R09.02  ICD-9-CM: 799.02  2/25/2018 Unknown        Acute UTI ICD-10-CM: N39.0  ICD-9-CM: 599.0  2/25/2018 Unknown                SUBJECTIVE:    Alert, in no apparent distress.  no chest pain or chest or chest pressure     OBJECTIVE:    VS:   Visit Vitals    /70 (BP 1 Location: Left arm, BP Patient Position: At rest)    Pulse 68    Temp 98.2 °F (36.8 °C)    Resp 18    Ht 5' 1\" (1.549 m)    Wt 39.5 kg (87 lb)    SpO2 98%    Breastfeeding No    BMI 16.44 kg/m2         Intake/Output Summary (Last 24 hours) at 03/08/18 1149  Last data filed at 03/07/18 9260   Gross per 24 hour   Intake                0 ml   Output 2500 ml   Net            -2500 ml     TELE: normal sinus rhythm     General:alert and no distress noted   HENT: Normocephalic, atraumatic. Normal external eye. Neck :  no JVD  Cardiac:  regular rate and rhythm, tachycardic, no S3 or S4, no click, no rub. Lungs: clear to auscultation bilaterally  Abdomen: Soft, nontender, no masses  Extremities:  No edema      Labs: Results:       Chemistry Recent Labs      03/08/18 0238 03/07/18   0246  03/06/18   0035   GLU  82  107*  81   NA  136  133*  136   K  3.2*  3.4*  3.6   CL  100  97*  99*   CO2  29  29  30   BUN  11  34*  17   CREA  2.53*  4.80*  2.50*   CA  8.2*  8.3*  9.0   AGAP  7  7  7   BUCR  4*  7*  7*   AP  95  95  116   TP  6.1*  5.8*  7.0   ALB  2.8*  2.7*  3.2*   GLOB  3.3  3.1  3.8   AGRAT  0.8  0.9  0.8      CBC w/Diff Recent Labs      03/08/18 0238 03/07/18 0246  03/06/18   0035   WBC  7.0  4.7  5.7   RBC  3.11*  3.13*  3.76*   HGB  7.9*  7.8*  9.5*   HCT  25.4*  25.3*  29.8*   PLT  297  255  277   GRANS  70  72  80*   LYMPH  13*  13*  7*   EOS  2  2  0      Cardiac Enzymes No results for input(s): CPK, CKND1, WOLFGANG in the last 72 hours. No lab exists for component: CKRMB, TROIP   Coagulation No results for input(s): PTP, INR, APTT in the last 72 hours. No lab exists for component: INREXT, INREXT    Lipid Panel Lab Results   Component Value Date/Time    Cholesterol, total 151 02/26/2018 10:02 AM    HDL Cholesterol 37 (L) 02/26/2018 10:02 AM    LDL, calculated 95.2 02/26/2018 10:02 AM    VLDL, calculated 18.8 02/26/2018 10:02 AM    Triglyceride 94 02/26/2018 10:02 AM    CHOL/HDL Ratio 4.1 02/26/2018 10:02 AM      BNP No results for input(s): BNPP in the last 72 hours. Liver Enzymes Recent Labs      03/08/18   0238   TP  6.1*   ALB  2.8*   AP  95   SGOT  19      Thyroid Studies No results found for: T4, T3U, TSH, TSHEXT, TSHEXT           FELICITAS Pyle  I have independently evaluated taken history and examined the patient.   All relevant labs and testing data's are reviewed. Care plan discussed and updated after review.   Santi Yuan MD

## 2018-03-08 NOTE — PROGRESS NOTES
Unable to complete f/u dysphagia management as pt is currently at stress test. Will f/u at a later date/time or as medical condition permits.      Thank you for this referral.    Jo Ann Srivastava M.S. CCC-SLP/L  Speech-Language Pathologist

## 2018-03-08 NOTE — PROGRESS NOTES
[facility] Hospitalist Group  Progress Note    Patient: Kiera Fang Age: 56 y.o. : 1960 MR#: [id number] SSN: [id number]  Date/Time: 3/8/2018  9:39 AM    Subjective/24-hour events:     Cath done yesterday, results noted. No new issues overnight. Son present at bedside. Assessment:   Acute hypoxic respiratory failure  NSTEMI  Malignant HTN  Acute diastolic CHF  ESRD  Anemia   Hypokalemia   Nasal MRSA colonization     Plan:  NST done this AM.  Await further recommendations on further cardiac management. D/W cardiology NP on unit. H/H stable. Continue to monitor. HD per nephrology. Consider SSRI. Case discussed with:  []Patient  [x]Family  []Nursing  [x]Case Management  DVT Prophylaxis:  []Lovenox  [x]Hep SQ  []SCDs  []Coumadin   [x]On Heparin gtt    Objective:   VS:   Visit Vitals    /70 (BP 1 Location: Left arm, BP Patient Position: At rest)    Pulse 68    Temp 98.2 °F (36.8 °C)    Resp 18    Ht 5' 1\" (1.549 m)    Wt 39.5 kg (87 lb)    SpO2 98%    Breastfeeding No    BMI 16.44 kg/m2      Tmax/24hrs: Temp (24hrs), Av °F (36.7 °C), Min:97.6 °F (36.4 °C), Max:98.2 °F (36.8 °C)      Intake/Output Summary (Last 24 hours) at 18 0940  Last data filed at 18 1913   Gross per 24 hour   Intake               25 ml   Output             2500 ml   Net            -2475 ml       General:  In NAD. Cardiovascular: RRR. Pulmonary:  Lungs clear anteriorly. Effort nonlabored. GI:  Abdomen soft, NTTP. Extremities:  Warm, no ischemia. Neuro:  Awake. Moves extremities spontaneously. Depressed affect.     Labs:    Recent Results (from the past 24 hour(s))   GLUCOSE, POC    Collection Time: 18  9:08 PM   Result Value Ref Range    Glucose (POC) 98 70 - 110 mg/dL   CBC WITH AUTOMATED DIFF    Collection Time: 18  2:38 AM   Result Value Ref Range    WBC 7.0 4.6 - 13.2 K/uL    RBC 3.11 (L) 4.20 - 5.30 M/uL    HGB 7.9 (L) 12.0 - 16.0 g/dL    HCT 25.4 (L) 35.0 - 45.0 %    MCV 81.7 74.0 - 97.0 FL    MCH 25.4 24.0 - 34.0 PG    MCHC 31.1 31.0 - 37.0 g/dL    RDW 15.6 (H) 11.6 - 14.5 %    PLATELET 817 627 - 310 K/uL    MPV 11.7 9.2 - 11.8 FL    NEUTROPHILS 70 42 - 75 %    BAND NEUTROPHILS 3 0 - 5 %    LYMPHOCYTES 13 (L) 20 - 51 %    MONOCYTES 12 (H) 2 - 9 %    EOSINOPHILS 2 0 - 5 %    BASOPHILS 0 0 - 3 %    ABS. NEUTROPHILS 5.2 1.8 - 8.0 K/UL    ABS. LYMPHOCYTES 0.9 0.8 - 3.5 K/UL    ABS. MONOCYTES 0.8 0 - 1.0 K/UL    ABS. EOSINOPHILS 0.1 0.0 - 0.4 K/UL    ABS. BASOPHILS 0.0 0.0 - 0.06 K/UL    DF MANUAL      PLATELET COMMENTS ADEQUATE PLATELETS      RBC COMMENTS ANISOCYTOSIS  1+       METABOLIC PANEL, COMPREHENSIVE    Collection Time: 03/08/18  2:38 AM   Result Value Ref Range    Sodium 136 136 - 145 mmol/L    Potassium 3.2 (L) 3.5 - 5.5 mmol/L    Chloride 100 100 - 108 mmol/L    CO2 29 21 - 32 mmol/L    Anion gap 7 3.0 - 18 mmol/L    Glucose 82 74 - 99 mg/dL    BUN 11 7.0 - 18 MG/DL    Creatinine 2.53 (H) 0.6 - 1.3 MG/DL    BUN/Creatinine ratio 4 (L) 12 - 20      GFR est AA 24 (L) >60 ml/min/1.73m2    GFR est non-AA 20 (L) >60 ml/min/1.73m2    Calcium 8.2 (L) 8.5 - 10.1 MG/DL    Bilirubin, total 0.4 0.2 - 1.0 MG/DL    ALT (SGPT) 18 13 - 56 U/L    AST (SGOT) 19 15 - 37 U/L    Alk.  phosphatase 95 45 - 117 U/L    Protein, total 6.1 (L) 6.4 - 8.2 g/dL    Albumin 2.8 (L) 3.4 - 5.0 g/dL    Globulin 3.3 2.0 - 4.0 g/dL    A-G Ratio 0.8 0.8 - 1.7     GLUCOSE, POC    Collection Time: 03/08/18  7:44 AM   Result Value Ref Range    Glucose (POC) 109 70 - 110 mg/dL       Signed By: Alisa Blackman MD     March 8, 2018 9:39 AM

## 2018-03-08 NOTE — PROGRESS NOTES
RENAL DAILY PROGRESS NOTE    Patient: Stephanie López               Sex: female          DOA: 2/25/2018 12:50 PM        YOB: 1960      Age:  56 y.o.        LOS:  LOS: 11 days     Subjective:     Stephanie López is a 56 y.o.  who presents with NSTEMI (non-ST elevated myocardial infarction) ([facility]  ESRD (end stage renal disease) ([facility]  Hyponatremia  Respiratory failure (HCC)  NSTEMI (non-ST elevated myocardial infarction) ([facility]  ESRD (end stage renal disease) ([facility]  Hypoxia.    Asked to evaluate for esrd,admitted with severe htn,pulm edema,mi  Chief complains: Patient denies nausea, vomiting, chest pain, dizziness, shortness of breath or headache.  - Reviewed last 24 hrs events     Current Facility-Administered Medications   Medication Dose Route Frequency    [START ON 3/9/2018] epoetin pilar (EPOGEN;PROCRIT) injection 6,000 Units  6,000 Units IntraVENous DIALYSIS MON, WED & FRI    cloNIDine HCl (CATAPRES) tablet 0.3 mg  0.3 mg Oral BID    hydrALAZINE (APRESOLINE) tablet 100 mg  100 mg Oral BID    insulin lispro (HUMALOG) injection   SubCUTAneous AC&HS    losartan (COZAAR) tablet 100 mg  100 mg Oral DAILY    phenol throat spray (CHLORASEPTIC) 1 Spray  1 Spray Oral PRN    haloperidol lactate (HALDOL) injection 2.5 mg  2.5 mg IntraVENous Q6H PRN    melatonin tablet 3 mg  3 mg Oral QHS PRN    heparin (porcine) injection 5,000 Units  5,000 Units SubCUTAneous Q8H    famotidine (PEPCID) tablet 20 mg  20 mg Oral DAILY    carvedilol (COREG) tablet 25 mg  25 mg Oral Q12H    amLODIPine (NORVASC) tablet 10 mg  10 mg Oral DAILY    glucose chewable tablet 16 g  4 Tab Oral PRN    glucagon (GLUCAGEN) injection 1 mg  1 mg IntraMUSCular PRN    dextrose (D50W) injection syrg 12.5-25 g  25-50 mL IntraVENous PRN    albuterol (ACCUNEB) nebulizer solution 2.5 mg  2.5 mg Nebulization Q6H PRN    aspirin chewable tablet 81 mg  81 mg Oral DAILY    atorvastatin (LIPITOR) tablet 40 mg  40 mg Oral QHS    acetaminophen (TYLENOL) solution 650 mg  650 mg Oral Q6H PRN    sodium chloride (NS) flush 5-10 mL  5-10 mL IntraVENous Q8H    sodium chloride (NS) flush 5-10 mL  5-10 mL IntraVENous PRN    acetaminophen (TYLENOL) suppository 650 mg  650 mg Rectal Q6H PRN    naloxone (NARCAN) injection 0.1 mg  0.1 mg IntraVENous PRN    ondansetron (ZOFRAN) injection 4 mg  4 mg IntraVENous Q6H PRN    hydrALAZINE (APRESOLINE) 20 mg/mL injection 10 mg  10 mg IntraVENous Q6H PRN       Objective:     Visit Vitals    /70 (BP 1 Location: Left arm, BP Patient Position: At rest)    Pulse 68    Temp 98.2 °F (36.8 °C)    Resp 18    Ht 5' 1\" (1.549 m)    Wt 39.5 kg (87 lb)    SpO2 98%    Breastfeeding No    BMI 16.44 kg/m2       Intake/Output Summary (Last 24 hours) at 03/08/18 1218  Last data filed at 03/07/18 1913   Gross per 24 hour   Intake                0 ml   Output             2500 ml   Net            -2500 ml       Physical Examination:     GEN: AAO X 3, NAD  RS: Chest is bilateral equal, no wheezing / rales / crackles  CVS: S1-S2 heard, RRR, No S3 / murmur  Abdomen: Soft, Non tender, Not distended, Positive bowel sounds, no organomegaly, no CVA / supra pubic tenderness  Extremities: No edema, no cyanosis, skin is warm on touch  CNS: Awake & follows commands, CN II-XII are grossly intact. HEENT: Head is atraumatic, PERRLA, conjunctiva pink & non icteric. No JVD or carotid bruit   Psychiatric: Normal mood, affect, judgement & memory    Musculoskeletal: No gross joints or bone deformities   Lymph Node: No palpable cervical, axillary or groin lymphadenopathy.       Data Review:      Labs:     Hematology:   Recent Labs      03/08/18 0238 03/07/18   0246  03/06/18   0035   WBC  7.0  4.7  5.7   HGB  7.9*  7.8*  9.5*   HCT  25.4*  25.3*  29.8*     Chemistry:   Recent Labs      03/08/18 0238 03/07/18   0246  03/06/18   0035   BUN  11  34*  17   CREA  2.53*  4.80*  2.50*   CA  8.2*  8.3*  9.0   ALB  2.8*  2.7*  3.2*   K  3.2*  3.4* 3.6   NA  136  133*  136   CL  100  97*  99*   CO2  29  29  30   GLU  82  107*  81        Images:    XR (Most Recent). CXR reviewed by me and compared with previous CXR   Results from [place] encounter on 02/25/18   XR ABD (KUB)   Narrative ABDOMEN ONE VIEW    HISTORY: Dobbhoff feeding tube placement. COMPARISON: Abdomen flat and upright with chest radiograph 2/5/2018, chest  radiograph 2/28/2018    FINDINGS:     Portable supine view of the lower chest and abdomen. Portions of the left  lateral abdominal wall are not included on the image. A Dobbhoff feeding tube has been inserted. The tip of the tube is beyond the EG  junction, in the proximal to mid stomach. No loops in the tube. Nonobstructive bowel gas pattern. Heart size appears enlarged, stable retrocardiac opacity. Impression IMPRESSION:    Dobbhoff feeding tube is below the diaphragms, the tip in the proximal to mid  stomach. CT (Most Recent)   Results from Hospital Encounter encounter on 02/25/18   CTA CHEST W OR W WO CONT   Narrative EXAMINATION: CTA chest pulmonary    INDICATION: Acute chest pain, shortness of breath    COMPARISON: None    TECHNIQUE: CT angiography of the pulmonary arteries performed following 75 cc IV  Isovue 370, with 3-D MIP multiplanar reformations. All CT scans at this facility  are performed using dose optimization technique as appropriate to a performed  exam, to include automated exposure control, adjustment of the mA and/or kV  according to patient size (including appropriate matching first site specific  examinations), or use of iterative reconstruction technique. FINDINGS:    Cardiovascular: No pulmonary arterial filling defects identified to suggest  pulmonary embolism. Motion artifact limits evaluation of smaller segmental and  subsegmental arteries. Ascending thoracic aorta is borderline ectatic measuring  up to 3.5 cm diameter. Heart size within normal limits.  Right jugular catheter  tip at right atrium level. Moderate-sized area of cartilage fusion. Mediastinum: Possible enlarged aortopulmonary node and left hilar nodes, poorly  delineated. Left hilar anita calcification noted. Thyroid slightly  heterogeneous. Esophagus is nondistended. Pleura: Moderate volume bilateral pleural effusions. No evidence of  pneumothorax. Lungs: Probable compressive atelectasis due to pleural effusions bilaterally. Right major fissure nodule measuring 4 mm, likely pleural-based node. Upper abdomen: Unremarkable. Miscellaneous: Superficial soft tissues unremarkable. Bones: No acute osseous findings. Probable bone island in T11 posterior body. Impression IMPRESSION:    1. No pulmonary embolus identified. Smaller segmental and subsegmental pulmonary  arteries less well evaluated due to motion artifact. 2. Moderate-sized bilateral pleural effusions, and moderate volume pericardial  effusion. Borderline ectatic ascending aorta. -Probable compressive atelectasis in bilateral lungs. Superimposed infiltrate  difficult to exclude.    -Suspected left hilar and aortopulmonary window adenopathy, poorly delineated,  nonspecific. EKG No results found for this or any previous visit. I have personally reviewed the old medical records and patient's labs    Plan / Recommendation:      1. Esrd,continue dialysis mon wed Friday. discussed with son in law  2.htn,under good  Control. no stacey,low aldosterone. doubt compliance with bp meds at home,discussed with son in law  3.anemia,increased  epo with dialysis  4.ami,results of cardiac cath noted . for stress test today  5.hypokalemia,give po potassium  D/w Dr. Jemima Todd MD  Nephrology  3/8/2018

## 2018-03-08 NOTE — ROUTINE PROCESS
Bedside and Verbal shift change report given to Andrei Thornton RN   (oncoming nurse) by Michel Finn RN (offgoing nurse). Report included the following information SBAR, Kardex, Intake/Output, MAR and Recent Results. 0715 Bedside and Verbal shift change report given to Michel Finn RN (oncoming nurse) by Andrei Thornton RN   (offgoing nurse). Report included the following information SBAR, Kardex, Intake/Output, MAR and Recent Results.

## 2018-03-08 NOTE — DIALYSIS
ACUTE HEMODIALYSIS FLOW SHEET    HEMODIALYSIS ORDERS: Physician: John Pearce     Dialyzer: revaclear   Duration: 4 hr  BFR: 350   DFR: 600   Dialysate:  Temp 37 K+   3    Ca+  2.5 Na 140 Bicarb 35   Weight:  39.6 kg    Bed Scale [x]     Unable to Obtain []      Dry weight/UF Goal: 2500ml  Access CVC  Needle Gauge NA    Heparin []  Bolus      Units    [] Hourly       Units    [x]None      Catheter locking solution Heparin   Pre BP:   105/65    Pulse:     63     Temperature:   98.1  Respirations: 16  Tx: NS       ml/Bolus  Other        [x] N/A   Labs: Pre        Post:        [x] N/A   Additional Orders(medications, blood products, hypotension management):   epo    [x] N/A     [x] [facility] Consent Verified     CATHETER ACCESS: []N/A   [x]Right   []Left   [x]IJ     []Fem   [] First use X-ray verified     [x]Tunnel                [] Non Tunneled   []No S/S infection  []Redness  []Drainage []Cultured []Swelling []Pain   []Medical Aseptic Prep Utilized   []Dressing Changed  [] Biopatch  Date: 3/5/2018   []Clotted   [x]Patent   Flows: [x]Good  []Poor  []Reversed   If access problem,  notified: []Yes    [x]N/A  Date:           GRAFT/FISTULA ACCESS:  [x]N/A     []Right     []Left     []UE     []LE   []AVG   []AVF        []Buttonhole    []Medical Aseptic Prep Utilized   []No S/S infection  []Redness  []Drainage []Cultured []Swelling []Pain    Bruit:   [] Strong    [] Weak       Thrill :   [] Strong    [] Weak       Needle Gauge:    Length:     If access problem,  notified: []Yes     []N/A  Date:        Please describe access if present and not used:       GENERAL ASSESSMENT:    LUNGS:  Rate 16 SaO2%        [] N/A    [x] Clear  [] Coarse  [] Crackles  [] Wheezing        [] Diminished     Location : []RLL   []LLL    []RUL  []ELÍAS   Cough: []Productive  []Dry  [x]N/A   Respirations:  [x]Easy  []Labored   Therapy:  [x]RA  []NC  l/min    Mask: []NRB []Venti       O2%                  []Ventilator  []Intubated  [] Trach  [] BiPaP CARDIAC: [x]Regular      [] Irregular   [] Pericardial Rub  [] JVD        []  Monitored  [] Bedside  [] Remotely monitored [] N/A  Rhythm:    EDEMA: [x] None  []Generalized  [] Pitting [] 1    [] 2    [] 3    [] 4                 [] Facial  [] Pedal  []  UE  [] LE   SKIN:   [x] Warm  [] Hot     [] Cold   [x] Dry     [] Pale   [] Diaphoretic                  [] Flushed  [] Jaundiced  [] Cyanotic  [] Rash  [] Weeping   LOC:    [x] Alert      [x]Oriented:    [x] Person     [x] Place  [x]Time               [] Confused  [] Lethargic  [] Medicated  [] Non-responsive     GI / ABDOMEN:  [x] Flat    [] Distended    [x] Soft    [] Firm   []  Obese                             [] Diarrhea  [x] Bowel Sounds  [] Nausea  [] Vomiting       / URINE ASSESSMENT:[x] Voiding   [] Oliguria  [] Anuria   []  Onofre     [] Incontinent    []  Incontinent Brief      [x]  Bathroom Privileges     PAIN: [x] 0 []1  []2   []3   []4   []5   []6   []7   []8   []9   []10            Scale 0-10  Action/Follow Up:    MOBILITY:  [x] Amb    [] Amb/Assist    [x] Bed    [] Wheelchair  [] Stretcher      All Vitals and Treatment Details on Attached [address]: [facility]          Room # 354     [] 1st Time Acute  [] Stat  [x] Routine  [] Urgent     [] Acute Room  [x]  Bedside  [] ICU/CCU  [] ER   Isolation Precautions:  [] Dialysis   [] Airborne   [x] Contact    [] Reverse   Special Considerations:         [] Blood Consent Verified [x]N/A     ALLERGIES:   [] NKA  Banana   Code Status:  [x] Full Code  [] DNR  [] Other           HBsAg ONLY: Date Drawn 9/9/2017         [x]Negative []Positive []Unknown   HBsAb: Date  9/9/2017   [] Susceptible   [x] [id number] []Not Drawn  [] Drawn     Current Labs:    Date of Labs: Today [x]      Results for Jerry Phelan (MRN [id number]) as of 3/7/2018 20:02   Ref.  Range 3/7/2018 02:46   WBC Latest Ref Range: 4.6 - 13.2 K/uL 4.7   RBC Latest Ref Range: 4.20 - 5.30 M/uL 3.13 (L)   HGB Latest Ref Range: 12.0 - 16.0 g/dL 7.8 (L)   HCT Latest Ref Range: 35.0 - 45.0 % 25.3 (L)   MCV Latest Ref Range: 74.0 - 97.0 FL 80.8   MCH Latest Ref Range: 24.0 - 34.0 PG 24.9   MCHC Latest Ref Range: 31.0 - 37.0 g/dL 30.8 (L)   RDW Latest Ref Range: 11.6 - 14.5 % 15.1 (H)   PLATELET Latest Ref Range: 135 - 420 K/uL 255   MPV Latest Ref Range: 9.2 - 11.8 FL 10.4   NEUTROPHILS Latest Ref Range: 42 - 75 % 72   BAND NEUTROPHILS Latest Ref Range: 0 - 5 % 2   LYMPHOCYTES Latest Ref Range: 20 - 51 % 13 (L)   MONOCYTES Latest Ref Range: 2 - 9 % 11 (H)   EOSINOPHILS Latest Ref Range: 0 - 5 % 2   BASOPHILS Latest Ref Range: 0 - 3 % 0   DF Latest Units:   AUTOMATED   ABS. NEUTROPHILS Latest Ref Range: 1.8 - 8.0 K/UL 3.5   ABS. LYMPHOCYTES Latest Ref Range: 0.8 - 3.5 K/UL 0.6 (L)   ABS. MONOCYTES Latest Ref Range: 0 - 1.0 K/UL 0.5   ABS. EOSINOPHILS Latest Ref Range: 0.0 - 0.4 K/UL 0.1   ABS. BASOPHILS Latest Ref Range: 0.0 - 0.06 K/UL 0.0   RBC COMMENTS Latest Units:   ANISOCYTOSIS. ..    PLATELET COMMENTS Latest Units:   ADEQUATE PLATELETS   Sodium Latest Ref Range: 136 - 145 mmol/L 133 (L)   Potassium Latest Ref Range: 3.5 - 5.5 mmol/L 3.4 (L)   Chloride Latest Ref Range: 100 - 108 mmol/L 97 (L)   CO2 Latest Ref Range: 21 - 32 mmol/L 29   Anion gap Latest Ref Range: 3.0 - 18 mmol/L 7   Glucose Latest Ref Range: 74 - 99 mg/dL 107 (H)   BUN Latest Ref Range: 7.0 - 18 MG/DL 34 (H)   Creatinine Latest Ref Range: 0.6 - 1.3 MG/DL 4.80 (H)   BUN/Creatinine ratio Latest Ref Range: 12 - 20   7 (L)   Calcium Latest Ref Range: 8.5 - 10.1 MG/DL 8.3 (L)   GFR est non-AA Latest Ref Range: >60 ml/min/1.73m2 9 (L)   GFR est AA Latest Ref Range: >60 ml/min/1.73m2 11 (L)   Bilirubin, total Latest Ref Range: 0.2 - 1.0 MG/DL 0.4   Protein, total Latest Ref Range: 6.4 - 8.2 g/dL 5.8 (L)   Albumin Latest Ref Range: 3.4 - 5.0 g/dL 2.7 (L)   Globulin Latest Ref Range: 2.0 - 4.0 g/dL 3.1   A-G Ratio Latest Ref Range: 0.8 - 1.7   0.9   ALT (SGPT) Latest Ref Range: 13 - 56 U/L 18 AST Latest Ref Range: 15 - 37 U/L 20   Alk.  phosphatase Latest Ref Range: 45 - 117 U/L 95                                                                                DIET:  [x] Renal    [] Other     [] NPO     []  Diabetic      PRIMARY NURSE REPORT: First initial/Last name/Title      Pre Dialysis: Wisam Sandra RN   Time: 1430      EDUCATION:    [x] Patient [] Other         Knowledge Basis: []None []Minimal [] Substantial   Barriers to learning  [x]N/A   [] Access Care     [] S&S of infection     [] Fluid Management     []K+     [x]Procedural    []Albumin     [] Medications     [] Tx Options     [] Transplant     [] Diet     [] Other   Teaching Tools:  [x] Explain  [] Demo  [] Handouts [] Video  Patient response:   [x] Verbalized understanding  [] Teach back  [] Return demonstration [x] Requires follow up   Inappropriate due to            [x] Time Out/Safety Check  [x]Extracorporeal Circuit Tested for integrity       RO/HEMODIALYSIS MACHINE SAFETY CHECKS  Before each treatment:     Machine Number:                   [facility]                                  [x] Unit Machine # [id number] centralized RO        Alarm Test:  Pass time 1331         Other:         [x] RO/Machine Log Complete      Temp    37             Dialysate: pH  7.4 Conductivity: Meter   NA     HD Machine   14                  TCD: 13.9  Dialyzer Lot # [id number]            Blood Tubing Lot # [id number]          Saline Lot #  [id number]     CHLORINE TESTING-Before each treatment and every 4 hours    Total Chlorine: [x] less than 0.1 ppm  Time: 1330 4 Hr/2nd Check Time: 1630   (if greater than 0.1 ppm from Primary then every 30 minutes from Secondary)     TREATMENT INITIATION  with Dialysis Precautions:   [x] All Connections Secured                 [x] Saline Line Double Clamped   [x] Venous Parameters Set                  [x] Arterial Parameters Set    [x] Prime Given  250ml                          [x]Air Foam Detector Engaged Treatment Initiation Note:  pt arrive via transport in bed, pt is A&O, no S/S of distress, VSS. tx started with out complications. CVC no S/S of complications. .          Medication Dose Volume Route Initials Dialyzer Cleared: [x] Good [] Fair  [] Poor    Blood processed:  79.8 L  UF Removed  3500 Ml    Post Wt: NA    kg  POst BP:   152/83       Pulse: 68      Respirations: 16  Temperature: 98.4     epo 4000 units 1ml iv LH Post Tx Vascular Access: AVF/AVG:   N/A                                   Catheter: Locking solution: Heparin 1:1000 Art. 1.6  Wei. 1.6                                   Post Assessment:                                    Skin:  [x] Warm  [x] Dry [] Diaphoretic    [] Flushed  [] Pale [] Cyanotic   [facility] Signatures Title Initials  Time Lungs: [x] Clear    [] Course  [] Crackles  [] Wheezing [] Diminished    RN   Cardiac: [x] Regular   [] Irregular   [] Monitor  [] N/A  Rhythm:           Edema:  [x] None    [] General     [] Facial   [] Pedal    [] UE    [] LE       Pain: [x]0  []1  []2   []3  []4   []5   []6   []7   []8   []9   []10     Post Treatment Note:  pt leaving via transport. Pt tolerated tx well. No S/S of distress.         POST TREATMENT PRIMARY NURSE HANDOFF REPORT:     First initial/Last name/Title         Post Dialysis: Deann Johnson RN Time:  1928     Abbreviations: AVG-arterial venous graft, AVF-arterial venous fistula, IJ-Internal Jugular, Subcl-Subclavian, Fem-Femoral, Tx-treatment, AP/HR-apical heart rate, DFR-dialysate flow rate, BFR-blood flow rate, AP-arterial pressure, -venous pressure, UF-ultrafiltrate, TMP-transmembrane pressure, Wei-Venous, Art-Arterial, RO-Reverse Osmosis

## 2018-03-08 NOTE — PROGRESS NOTES
NUTRITION    Nursing Referral: [facility]  Nutrition Consult: Management of Tube Feeding      RECOMMENDATIONS / PLAN:     - Add supplements: Ensure Pudding, TID.  - Modify diet order to include a yogurt at each meal.  - Monitor diet tolerance and encourage meal intake. - Continue RD inpatient monitoring and evaluation. NUTRITION INTERVENTIONS & DIAGNOSIS:     [x] Meals/Snacks: modified composition  [x] Medical food supplement therapy: initiate    Nutrition Diagnosis: Unintentional weight loss related to inadequate energy intake as evidenced by 30 lb, 24% weight loss x 5 months. ASSESSMENT:     3/8: NPO for procedure this am, diet resumed. Spoke with pt's family today. Pt with fair appetite and around 50% meal consumption of meals from outside of the hospital. Family has been bringing pt all meals as pt dislikes hospital meals. Discussed food preferences, pt with good consumption of yogurts/puddings. Family agreeable to supplements. 3/5: Tube feedings held since yesterday for renal doppler study this am. Pt passed SLP evaluation and diet started today. NGT now to be removed. Hypokalemia, replacement ordered. 3/2: Extubated to NC on 3/1. Pt confused and combative overnight. Remains NPO per SLP s/p swallow evaluation this morning with NGT for feeding. Tolerating at goal. HD today. Pt c/o throat soreness, follow swallow evaluation in a day or 2 to re-evaluate. 2/27: Tolerating feeds at goal, held this morning for SBT and possible extubation today. Hyponatremia improved some, IV fluid held and now discontinued, s/p HD yesterday and 3.2 L removed. 2/26: Pt intubated and NGT clamped.      Average po intake adequate to meet patients estimated nutritional needs:   [] Yes     [] No   [x] Unable to determine at this time    Diet: DIET CARDIAC Regular; Consistent Carb 1500-1600kcal      Food Allergies: banana   Current Appetite:   [] Good     [x] Fair     [] Poor     [] Other  Appetite/meal intake prior to admission: [] Good     [] Fair     [x] Poor, decreased appetite and nausea/vomiting x 3-4 days PTA     [] Other:  Feeding Limitations:  [x] Swallowing difficulty: SLP following    [] Chewing difficulty    [] Other  Current Meal Intake:   Patient Vitals for the past 100 hrs:   % Diet Eaten   03/08/18 1300 0 %   03/06/18 1843 0 %   03/06/18 1313 50 %   03/06/18 1002 0 %     Anuric   BM: 3/6   Skin Integrity: Intact; right groin puncture site  Edema: 1+ LLE  Pertinent Medications: Reviewed: SSI, zofran    Recent Labs      03/08/18   0238  03/07/18   0246  03/06/18   0035   NA  136  133*  136   K  3.2*  3.4*  3.6   CL  100  97*  99*   CO2  29  29  30   GLU  82  107*  81   BUN  11  34*  17   CREA  2.53*  4.80*  2.50*   CA  8.2*  8.3*  9.0   ALB  2.8*  2.7*  3.2*   SGOT  19  20  35   ALT  18  18  26       Intake/Output Summary (Last 24 hours) at 03/08/18 1643  Last data filed at 03/08/18 1300   Gross per 24 hour   Intake              240 ml   Output             2500 ml   Net            -2260 ml       Anthropometrics:  Ht Readings from Last 1 Encounters:   02/25/18 5' 1\" (1.549 m)     Last 3 Recorded Weights in this Encounter    03/06/18 0400 03/07/18 0652 03/08/18 0308   Weight: 45 kg (99 lb 3.3 oz) 39.6 kg (87 lb 3.2 oz) 39.5 kg (87 lb)     Body mass index is 16.44 kg/(m^2).    Underweight        Weight History: 30 lb, 24% weight loss x 5 months PTA per chart     Weight Metrics 3/8/2018 1/22/2018 12/2/2017 11/15/2017 11/5/2017 9/10/2017 9/8/2017   Weight 87 lb 103 lb 3 oz 106 lb 14.8 oz 107 lb 110 lb 125 lb 9.6 oz -   BMI 16.44 kg/m2 19.5 kg/m2 20.2 kg/m2 20.22 kg/m2 21.48 kg/m2 - 22.97 kg/m2        Admitting Diagnosis: NSTEMI (non-ST elevated myocardial infarction) ([facility] [address]  ESRD (end stage renal disease) ([facility] [address]  Hyponatremia  Respiratory failure (HCC)  NSTEMI (non-ST elevated myocardial infarction) ([facility] [address]  ESRD (end stage renal disease) ([facility] [address]  Hypoxia  Pertinent PMHx: ESRD on HD, HTN, HLD     Education Needs:        [x] None identified  [] Identified - Not appropriate at this time  []  Identified and addressed - refer to education log  Learning Limitations:   [] None identified  [x] Identified: does not speak [place], speaks [place]      Cultural, [place] & ethnic food preferences:  [x] None identified    [] Identified and addressed     ESTIMATED NUTRITION NEEDS:     Calories: 8297-2588 kcal (30-35 kcal/kg) based on  [] Actual BW     [x] SBW 56 kg  Protein: 67-84 gm (1.2-1.5 gm/kg) based on  [] Actual BW      [x] SBW   Fluid: 7710-2819 mL     MONITORING & EVALUATION:     Nutrition Goal(s):   1. Nutritional needs will be met through adequate oral intake or nutrition support within the next 7 days.   Outcome:  [x] Met/Ongoing    []  Not Met    [] New/Initial Goal    Monitoring:   [x] Food and beverage intake   [x] Diet order   [x] Nutrition-focused physical findings   [x] Treatment/therapy   [] Weight   [] Enteral nutrition intake     Previous Recommendations (for follow-up assessments only):     [x]   Implemented       []   Not Implemented (RD to address)      [] No Longer Appropriate     [] No Recommendation Made     Discharge Planning: renal diet; consistency per SLP  [x] Participated in care planning, discharge planning, & interdisciplinary rounds as appropriate      Ally Trent RD  Pager: [phone]

## 2018-03-08 NOTE — PROGRESS NOTES
Problem: Falls - Risk of  Goal: *Absence of Falls  Document Asha Fall Risk and appropriate interventions in the flowsheet.    Outcome: Progressing Towards Goal  Fall Risk Interventions:  Mobility Interventions: Patient to call before getting OOB, PT Consult for mobility concerns    Mentation Interventions: Adequate sleep, hydration, pain control, Family/sitter at bedside, Update white board    Medication Interventions: Bed/chair exit alarm, Patient to call before getting OOB, Teach patient to arise slowly    Elimination Interventions: Call light in reach, Bed/chair exit alarm, Patient to call for help with toileting needs, Toileting schedule/hourly rounds

## 2018-03-08 NOTE — HOME CARE
[facility] received referral for SN/TH - no discharge orders. Patient does not speak [place] but son does and this nurse was able to verify needed information. [facility] liaison nurses will continue to follow for home care needs thru discharge - ROYAL Pittman LPN

## 2018-03-08 NOTE — PROGRESS NOTES
Discussed HH with son, Tere Logan, via phone. Verbal FOC for [facility] given over phone. Referral submitted to [facility] and KATELYN Nolan made aware.

## 2018-03-08 NOTE — PROGRESS NOTES
Patient was given 10.5 mCi of Sestamibi for the Resting pictures. Patient received 0.4 mg of Lexiscan for the exercise portion of the Stress test. Patient was then given 33 mCi of Sestamibi for the Stress pictures. Patient went back to room with armband still on. Cardiology Ampi NP was notified that imaging was done and to inform the reading doc to read ASAP.

## 2018-03-08 NOTE — PROCEDURES
[address]  MR#: [id number]  : 1960  ACCOUNT #: [de-identified]   DATE OF SERVICE: 2018    PROCEDURE:  EKG portion of Lexiscan stress test.      Baseline to baseline cardiogram showed sinus rhythm, LVH, inferolateral ST-T changes suggestive of ischemia. Heart rate 67, blood pressure 142/71. PROCEDURE:  The patient was injected 0.4 mg of IV Lexiscan. Patient had no significant symptom. Heart rate increased from baseline to a peak heart rate of 92. Blood pressure decreased to 121/63. Electrocardiogram showed baseline ST-T changes. DIAGNOSES:    1. Nondiagnostic EKG portion of Lexiscan stress test.  2.  Nuclear report to follow. NUCLEAR IMAGING REPORT      DESCRIPTION OF PROCEDURE:  Following IV Lexiscan stress via right wrist vein, 33 mCi of technetium sestamibi was injected and post-stress images were obtained. Resting images were obtained with 10.5 mCi of technetium sestamibi, images are compared. IMPRESSION:  1. Post-stress imaging in short axis, horizontal and vertical long axis view reveals a small apical inferior defect in perfusion with a medium intensity. 2.  Resting images show homogenous isotope uptake in all areas. 3.  Gated images show normal left ventricular size, wall motion, and systolic function appears normal.  Ejection fraction is 58%. DIAGNOSES:    1. Abnormal scan. 2.  Evidence of a small reversible apical inferior defect noted from these nuclear study suggestive of ischemia in the area. This is involving either the mid or distal left anterior descending or right coronary artery. 3.  Medium risk scan. Thank you for this referral.      Anton Bass.  MD KIERA Vela / CINTIA  D: 2018 12:51     T: 2018 13:07  JOB #: [id number]

## 2018-03-09 NOTE — HOME CARE
Discharge ordered for after HD. This nurse received orders for PT/OT eval/treat. Referral called to central intake for completion and visit scheduling - ROYAL Ponce LPN

## 2018-03-09 NOTE — PROGRESS NOTES
Problem: Dysphagia (Adult)  Goal: *Acute Goals and Plan of Care (Insert Text)  Patient will:  1. Tolerate PO trials with 0 s/s overt distress in 4/5 trials  2. Utilize compensatory swallow strategies/maneuvers (decrease bite/sip, size/rate, alt. liq/sol) with min cues in 4/5 trials  3. Perform oral-motor/laryngeal exercises to increase oropharyngeal swallow function with min cues  4. Complete an objective swallow study (i.e., MBSS) to assess swallow integrity, r/o aspiration, and determine of safest LRD, min A    Rec:     Soft solid diet with thin liquids -- NO STRAWS  Aspiration precautions  HOB >45 during po intake, remain >30 for 30-45 minutes after po   Small bites/sips; alternate liquid/solid with slow feeding rate   Oral care TID  Meds per pt preference                Outcome: Progressing Towards Goal  Speech language pathology dysphagia treatment    Patient: Cony Medeiros (22 y.o. female)  Date: 3/9/2018  Diagnosis: NSTEMI (non-ST elevated myocardial infarction) ([address]  ESRD (end stage renal disease) ([address]  Hyponatremia  Respiratory failure (HCC)  NSTEMI (non-ST elevated myocardial infarction) ([address]  ESRD (end stage renal disease) ([address]  Hypoxia Respiratory failure ([address]       Precautions: aspiration       ASSESSMENT:  Pt seen b/s for dysphagia tx, tolerance of thin liquids. Pt's  and son present during session, thin liquids with straw on b/s table. Pt resting, awoke to voice. Pt accepted serial straw sips thin with immediate weak cough response, elevated HOB to 60*, weak cough following serial and single straw sips continued. Pt continued to demo weak cough response with large cup sip, eliminated with small cup sips and effortful swallow. Educated pt's son to previous with strong recs of NO STRAWS due to possibility of aspiration which could lead to a PNA. Pt's son verbalized understanding. Compensatory swallow strategy written on pt's wipe board in room.   Rec: continue diet as ordered, SLP will continue to follow. Progression toward goals:  []         Improving appropriately and progressing toward goals  [x]         Improving slowly and progressing toward goals  []         Not making progress toward goals and plan of care will be adjusted     PLAN:  Recommendations and Planned Interventions:   continue diet as ordered, SLP will continue to follow. Patient continues to benefit from skilled intervention to address the above impairments. Continue treatment per established plan of care. Discharge Recommendations:  Home Health     SUBJECTIVE:   Patient stated hi. OBJECTIVE:   Cognitive and Communication Status:  Neurologic State: Alert  Orientation Level: Unable to verbalize  Cognition: Follows commands (when directions given in native language)  Perception: Appears intact  Perseveration: No perseveration noted  Safety/Judgement: Fall prevention, Lack of insight into deficits  Dysphagia Treatment:  Oral Assessment:  Oral Assessment  Labial: No impairment  Dentition: Natural  Oral Hygiene: fair  Lingual: No impairment  Velum: Unable to visualize  Mandible: No impairment  P.O. Trials:   Patient Position: HOB 60*   Vocal quality prior to P.O.: No impairment   Consistency Presented:  Thin liquid   How Presented: Self-fed/presented, Cup/sip, Cup/gulp, Straw, Successive swallows       Bolus Acceptance: No impairment   Bolus Formation/Control: Impaired   Type of Impairment: Delayed   Propulsion: No impairment   Oral Residue: None   Initiation of Swallow: Delayed (# of seconds)   Laryngeal Elevation: Weak   Aspiration Signs/Symptoms: Weak cough   Pharyngeal Phase Characteristics: Altered vocal quality, Audible swallow, Double swallow   Effective Modifications: Cup/sip, Small sips and bites, Other (comment) (effortful swallow)   Cues for Modifications: Maximal         Oral Phase Severity: Mild-moderate   Pharyngeal Phase Severity : Mild        PAIN:  Start of Tx: 0  End of Tx: 0     After treatment:   [] Patient left in no apparent distress sitting up in chair  [x]              Patient left in no apparent distress in bed  [x]              Call bell left within reach  [x]              Nursing notified  []              Family present  []              Caregiver present  []              Bed alarm activated      COMMUNICATION/EDUCATION:   [x] Aspiration precautions; swallow safety; compensatory techniques  [x]        Patient unable to participate in education; education ongoing with staff  [x]  Posted safety precautions in patient's room.   [] Oral-motor/laryngeal strengthening exercises      Kimberlee Hodges MS, CCC/SLP  Time Calculation: 17 mins

## 2018-03-09 NOTE — PROGRESS NOTES
[facility]  Progress Note    Patient: Jessica Contreras Age: 56 y.o. : 1960 MR#: [id number] SSN: [id number]  Date/Time: 3/9/2018  10:18 AM    Subjective/24-hour events:     Clinically stable overnight - nothing new/acute. On HD. Assessment:   Acute hypoxic respiratory failure  NSTEMI  Malignant HTN  Acute diastolic CHF  ESRD  Anemia   Hypokalemia   Nasal MRSA colonization     Plan:  Anticipate discharge home following dialysis today.     Case discussed with:  []Patient  [x]Family  []Nursing  [x]Case Management  DVT Prophylaxis:  []Lovenox  [x]Hep SQ  []SCDs  []Coumadin   [x]On Heparin gtt    Objective:   VS:   Visit Vitals    /81 (BP 1 Location: Left arm, BP Patient Position: At rest)    Pulse 77    Temp 98.4 °F (36.9 °C)    Resp 18    Ht 5' 1\" (1.549 m)    Wt 38.2 kg (84 lb 3.2 oz)    SpO2 98%    Breastfeeding No    BMI 15.91 kg/m2      Tmax/24hrs: Temp (24hrs), Av.5 °F (36.9 °C), Min:97.9 °F (36.6 °C), Max:98.7 °F (37.1 °C)      Intake/Output Summary (Last 24 hours) at 18 1018  Last data filed at 18 0617   Gross per 24 hour   Intake              580 ml   Output                0 ml   Net              580 ml       Labs:    Recent Results (from the past 24 hour(s))   GLUCOSE, POC    Collection Time: 18 12:19 PM   Result Value Ref Range    Glucose (POC) 158 (H) 70 - 110 mg/dL   GLUCOSE, POC    Collection Time: 18  4:50 PM   Result Value Ref Range    Glucose (POC) 121 (H) 70 - 110 mg/dL   FERRITIN    Collection Time: 18  6:48 PM   Result Value Ref Range    Ferritin 836 (H) 8 - 388 NG/ML   IRON PROFILE    Collection Time: 18  6:48 PM   Result Value Ref Range    Iron 28 (L) 50 - 175 ug/dL    TIBC 117 (L) 250 - 450 ug/dL    Iron % saturation 24 %   GLUCOSE, POC    Collection Time: 03/08/18  8:31 PM   Result Value Ref Range    Glucose (POC) 156 (H) 70 - 110 mg/dL   CBC WITH AUTOMATED DIFF    Collection Time: 18  2:33 AM   Result Value Ref Range    WBC 8.7 4.6 - 13.2 K/uL    RBC 2.98 (L) 4.20 - 5.30 M/uL    HGB 7.5 (L) 12.0 - 16.0 g/dL    HCT 24.4 (L) 35.0 - 45.0 %    MCV 81.9 74.0 - 97.0 FL    MCH 25.2 24.0 - 34.0 PG    MCHC 30.7 (L) 31.0 - 37.0 g/dL    RDW 16.4 (H) 11.6 - 14.5 %    PLATELET 106 910 - 039 K/uL    MPV 11.3 9.2 - 11.8 FL    NEUTROPHILS 69 42 - 75 %    BAND NEUTROPHILS 6 (H) 0 - 5 %    LYMPHOCYTES 9 (L) 20 - 51 %    MONOCYTES 14 (H) 2 - 9 %    EOSINOPHILS 2 0 - 5 %    BASOPHILS 0 0 - 3 %    ABS. NEUTROPHILS 6.5 1.8 - 8.0 K/UL    ABS. LYMPHOCYTES 0.8 0.8 - 3.5 K/UL    ABS. MONOCYTES 1.2 (H) 0 - 1.0 K/UL    ABS. EOSINOPHILS 0.2 0.0 - 0.4 K/UL    ABS. BASOPHILS 0.0 0.0 - 0.06 K/UL    DF MANUAL      PLATELET COMMENTS ADEQUATE PLATELETS      RBC COMMENTS ANISOCYTOSIS  1+        RBC COMMENTS HYPOCHROMIA  1+       METABOLIC PANEL, COMPREHENSIVE    Collection Time: 03/09/18  2:33 AM   Result Value Ref Range    Sodium 136 136 - 145 mmol/L    Potassium 3.2 (L) 3.5 - 5.5 mmol/L    Chloride 101 100 - 108 mmol/L    CO2 25 21 - 32 mmol/L    Anion gap 10 3.0 - 18 mmol/L    Glucose 105 (H) 74 - 99 mg/dL    BUN 23 (H) 7.0 - 18 MG/DL    Creatinine 4.83 (H) 0.6 - 1.3 MG/DL    BUN/Creatinine ratio 5 (L) 12 - 20      GFR est AA 11 (L) >60 ml/min/1.73m2    GFR est non-AA 9 (L) >60 ml/min/1.73m2    Calcium 8.4 (L) 8.5 - 10.1 MG/DL    Bilirubin, total 0.3 0.2 - 1.0 MG/DL    ALT (SGPT) 17 13 - 56 U/L    AST (SGOT) 21 15 - 37 U/L    Alk.  phosphatase 101 45 - 117 U/L    Protein, total 6.1 (L) 6.4 - 8.2 g/dL    Albumin 2.9 (L) 3.4 - 5.0 g/dL    Globulin 3.2 2.0 - 4.0 g/dL    A-G Ratio 0.9 0.8 - 1.7     GLUCOSE, POC    Collection Time: 03/09/18  7:32 AM   Result Value Ref Range    Glucose (POC) 108 70 - 110 mg/dL       Signed By: Soha Rain MD     March 9, 2018 10:18 AM

## 2018-03-09 NOTE — ROUTINE PROCESS
Received report from [facility]. Pt AAOx3, NAD, breathing non labored, on room air, HOB up.family member at the beside. Bed at the lowest level on lock position, call bell w/i reach. Bedside and Verbal shift change report given to KRISTIN Kebede (oncoming nurse) by me (offgoing nurse). Report included the following information SBAR, Kardex, Procedure Summary, Intake/Output, MAR and Recent Results.

## 2018-03-09 NOTE — PROGRESS NOTES
[facility]      CARDIOLOGY PROGRESS NOTE  RECS:    1. Acute respiratory failure-resolved     2. NSTEMI- stable no chest pain or chest pressure. Continue asa and bb Echo revealed EF% 45-50%, mild hypokinesis apical wall, LVH is present. 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 Nuclear-apical inferior ischemia-no lateral wall ischemia-will manage medically  3. Acute Diastolic Heart failure- Compensated now. On HD volume status per nephrology    4. Uncontrolled hypertension-elevated this am will recheck again. 5. ESRD- on HD renal following. She is MWF HD schedule   6. Anemia-  H&H stable. 7. Hyponatremia: monitor   8. Hypokalemia- replacement per renal.     OK to d/c from cardiology if BP is controlled follow with Dr Moses Ozuna in 1-2 weeks     Cardiac cath 3/7/18  1. 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 Through the true lumen, the flow seems to go to the native LAD and through the false lumen, it seems to go to the first diagonal artery. Calcification in this dissected area suggests that this may be a chronic healed dissection. 2.  No significant stenotic lesions in either of the renal arteries, but severe parenchymal disease expected with very slow flow in the parenchyma.     DISCUSSION AND RECOMMENDATION:  No intervention was attempted today and since this may be a chronic healed dissection, I think a stress test should be done to evaluate any ischemia and if there is any significant ischemia seen in the native LAD area, the patient may benefit from bypass surgery. I think if we try to do a percutaneous intervention in the native LAD, the first diagonal, which is a large vessel, will most likely close as it is getting its flow through the false lumen.   In the meantime, aggressive risk factor modification should be continued. ECHO 02/26/18  Left ventricle: Size was normal. Systolic function was mildly reduced by   visual assessment. Ejection fraction was  estimated in the range of 45 % to 50 %. There was mild hypokinesis of the   apical wall(s). Wall thickness was moderately  increased. Left atrium: The atrium was mildly dilated. Atrial septum: There was increased thickness of the septum, consistent with   lipomatous hypertrophy. Pericardium: A small pericardial effusion was identified circumferential to   the heart. There was no evidence of  hemodynamic compromise. COMPARISONS:  Comparison was made with the previous study of 17-Sep-2017. LV overall   function has decreased from 65 % to 45 %. There  was worsening in the periapical LV wall motion. Pericardial effusion   appearance has not changed. Otherwise no  significant change. ASSESSMENT:  Hospital Problems  Date Reviewed: 11/15/2017          Codes Class Noted POA    Anemia ICD-10-CM: D64.9  ICD-9-CM: 285.9  2/26/2018 Unknown        Hyponatremia ICD-10-CM: E87.1  ICD-9-CM: 276.1  2/25/2018 Unknown        ESRD (end stage renal disease) ([facility] ICD-10-CM: N18.6  ICD-9-CM: 585.6  2/25/2018 Unknown        NSTEMI (non-ST elevated myocardial infarction) ([facility] ICD-10-CM: I21.4  ICD-9-CM: 410.70  2/25/2018 Unknown        * (Principal)Respiratory failure ([facility] ICD-10-CM: J96.90  ICD-9-CM: 518.81  2/25/2018 Unknown        Hypoxia ICD-10-CM: R09.02  ICD-9-CM: 799.02  2/25/2018 Unknown        Acute UTI ICD-10-CM: N39.0  ICD-9-CM: 599.0  2/25/2018 Unknown                SUBJECTIVE:    Alert, in no apparent distress.  no chest pain or chest or chest pressure     OBJECTIVE:    VS:   Visit Vitals    BP (!) 153/92    Pulse 99    Temp 98.1 °F (36.7 °C) (Oral)    Resp 18    Ht 5' 1\" (1.549 m)    Wt 38.2 kg (84 lb 3.2 oz)    SpO2 98%    Breastfeeding No    BMI 15.91 kg/m2         Intake/Output Summary (Last 24 hours) at 03/09/18 1452  Last data filed at 03/09/18 5661   Gross per 24 hour   Intake              340 ml   Output                0 ml   Net              340 ml     TELE: normal sinus rhythm     General:alert and no distress noted   HENT: Normocephalic, atraumatic. Normal external eye. Neck :  no JVD  Cardiac:  regular rate and rhythm, tachycardic, no S3 or S4, no click, no rub. Lungs: clear to auscultation bilaterally  Abdomen: Soft, nontender, no masses  Extremities:  No edema      Labs: Results:       Chemistry Recent Labs      03/09/18 0233 03/08/18 0238 03/07/18   0246   GLU  105*  82  107*   NA  136  136  133*   K  3.2*  3.2*  3.4*   CL  101  100  97*   CO2  25  29  29   BUN  23*  11  34*   CREA  4.83*  2.53*  4.80*   CA  8.4*  8.2*  8.3*   AGAP  10  7  7   BUCR  5*  4*  7*   AP  101  95  95   TP  6.1*  6.1*  5.8*   ALB  2.9*  2.8*  2.7*   GLOB  3.2  3.3  3.1   AGRAT  0.9  0.8  0.9      CBC w/Diff Recent Labs      03/09/18 0233 03/08/18 0238 03/07/18   0246   WBC  8.7  7.0  4.7   RBC  2.98*  3.11*  3.13*   HGB  7.5*  7.9*  7.8*   HCT  24.4*  25.4*  25.3*   PLT  287  297  255   GRANS  69  70  72   LYMPH  9*  13*  13*   EOS  2  2  2      Cardiac Enzymes No results for input(s): CPK, CKND1, WOLFGANG in the last 72 hours. No lab exists for component: CKRMB, TROIP   Coagulation No results for input(s): PTP, INR, APTT in the last 72 hours. No lab exists for component: INREXT, INREXT    Lipid Panel Lab Results   Component Value Date/Time    Cholesterol, total 151 02/26/2018 10:02 AM    HDL Cholesterol 37 (L) 02/26/2018 10:02 AM    LDL, calculated 95.2 02/26/2018 10:02 AM    VLDL, calculated 18.8 02/26/2018 10:02 AM    Triglyceride 94 02/26/2018 10:02 AM    CHOL/HDL Ratio 4.1 02/26/2018 10:02 AM      BNP No results for input(s): BNPP in the last 72 hours.    Liver Enzymes Recent Labs      03/09/18   0233   TP  6.1*   ALB  2.9*   AP  101   SGOT  21      Thyroid Studies No results found for: T4, T3U, TSH, TSHEXT, TSHEXT           FELICITAS Pyle  I have independently evaluated taken history and examined the patient. All relevant labs and testing data's are reviewed. Care plan discussed and updated after review.   Galen Lewis MD

## 2018-03-09 NOTE — PROGRESS NOTES
Hemodialysis Rounding Note      Patient: Adam Pack               Sex: female          DOA: 2/25/2018 12:50 PM        YOB: 1960      Age:  56 y.o.        LOS:  LOS: 12 days     Subjective:     Adam Pack is a 56 y.o.  who presents with NSTEMI (non-ST elevated myocardial infarction) ([facility]  ESRD (end stage renal disease) ([facility]  Hyponatremia  Respiratory failure (HCC)  NSTEMI (non-ST elevated myocardial infarction) [facility]  ESRD (end stage renal disease) ([facility]  Hypoxia. The patient is dialyzing utilizing the following method:Intermittent Hemodialysis        Complaints none.      Current Facility-Administered Medications   Medication Dose Route Frequency    0.9% sodium chloride infusion  75 mL/hr IntraVENous CONTINUOUS    sodium chloride (NS) flush 5-10 mL  5-10 mL IntraVENous Q8H    sodium chloride (NS) flush 5-10 mL  5-10 mL IntraVENous PRN    magnesium hydroxide (MILK OF MAGNESIA) 400 mg/5 mL oral suspension 30 mL  30 mL Oral QHS PRN    temazepam (RESTORIL) capsule 15 mg  15 mg Oral QHS PRN    nitroglycerin (NITROSTAT) tablet 0.4 mg  0.4 mg SubLINGual Q5MIN PRN    epoetin pilar (EPOGEN;PROCRIT) injection 6,000 Units  6,000 Units IntraVENous DIALYSIS MON, WED & FRI    cloNIDine HCl (CATAPRES) tablet 0.3 mg  0.3 mg Oral BID    hydrALAZINE (APRESOLINE) tablet 100 mg  100 mg Oral BID    insulin lispro (HUMALOG) injection   SubCUTAneous AC&HS    losartan (COZAAR) tablet 100 mg  100 mg Oral DAILY    phenol throat spray (CHLORASEPTIC) 1 Spray  1 Spray Oral PRN    haloperidol lactate (HALDOL) injection 2.5 mg  2.5 mg IntraVENous Q6H PRN    heparin (porcine) injection 5,000 Units  5,000 Units SubCUTAneous Q8H    famotidine (PEPCID) tablet 20 mg  20 mg Oral DAILY    carvedilol (COREG) tablet 25 mg  25 mg Oral Q12H    amLODIPine (NORVASC) tablet 10 mg  10 mg Oral DAILY    glucose chewable tablet 16 g  4 Tab Oral PRN    glucagon (GLUCAGEN) injection 1 mg  1 mg IntraMUSCular PRN    dextrose (D50W) injection syrg 12.5-25 g  25-50 mL IntraVENous PRN    albuterol (ACCUNEB) nebulizer solution 2.5 mg  2.5 mg Nebulization Q6H PRN    aspirin chewable tablet 81 mg  81 mg Oral DAILY    atorvastatin (LIPITOR) tablet 40 mg  40 mg Oral QHS    acetaminophen (TYLENOL) solution 650 mg  650 mg Oral Q6H PRN    sodium chloride (NS) flush 5-10 mL  5-10 mL IntraVENous Q8H    sodium chloride (NS) flush 5-10 mL  5-10 mL IntraVENous PRN    acetaminophen (TYLENOL) suppository 650 mg  650 mg Rectal Q6H PRN    naloxone (NARCAN) injection 0.1 mg  0.1 mg IntraVENous PRN    ondansetron (ZOFRAN) injection 4 mg  4 mg IntraVENous Q6H PRN    hydrALAZINE (APRESOLINE) 20 mg/mL injection 10 mg  10 mg IntraVENous Q6H PRN           1901 -  0700  In: 580 [P.O.:580]  Out: 2500       Objective:     Blood pressure (!) 155/93, pulse 85, temperature 98.1 °F (36.7 °C), temperature source Oral, resp. rate 18, height 5' 1\" (1.549 m), weight 38.2 kg (84 lb 3.2 oz), SpO2 98 %, not currently breastfeeding.   Temp (24hrs), Av.4 °F (36.9 °C), Min:97.9 °F (36.6 °C), Max:98.7 °F (37.1 °C)      Blood Pressure: BP: (!) 155/93 (Repositioning herself in the bed again.)  Pulse: Pulse (Heart Rate): 85  Temp:  Temp: 98.1 °F (36.7 °C)    Artificial Kidney Dialyzer/Set Up Inspection: Revaclear   hours Duration of Treatment (hours): 4 hours   Heparin Bolus    Blood flow rate Blood Flow Rate (ml/min): 350 ml/min   Dialysate rate     Arterial Access Pressure Arterial Access Pressure (mmHg): -150  Venous Return Pressure Venous Return Pressure (mmHg): 120  Ultrafiltration Rate Goal/Amount of Fluid to Remove (mL): 2500 mL  Fluid Removal Fluid Removed (mL): 3000  Net Fluid Removal NET Fluid Removed (mL): 2500 ml      PHYSICAL EXAM:   HEENT:  Non icteric  NECK:  Right IJ TDC  CHEST AND LUNGS:  clear  CVS:  Regular no rub  ABDOMEN:  Soft + BS  EXT:  No edema, left upper arm AVF + bruit    DATA REVIEW:    Labs: Results:       Chemistry Recent Labs 03/09/18 0233 03/08/18 0238  03/07/18   0246   GLU  105*  82  107*   NA  136  136  133*   K  3.2*  3.2*  3.4*   CL  101  100  97*   CO2  25  29  29   BUN  23*  11  34*   CREA  4.83*  2.53*  4.80*   CA  8.4*  8.2*  8.3*   AGAP  10  7  7   BUCR  5*  4*  7*   AP  101  95  95   TP  6.1*  6.1*  5.8*   ALB  2.9*  2.8*  2.7*   GLOB  3.2  3.3  3.1   AGRAT  0.9  0.8  0.9      CBC w/Diff Recent Labs      03/09/18 0233 03/08/18 0238 03/07/18   0246   WBC  8.7  7.0  4.7   RBC  2.98*  3.11*  3.13*   HGB  7.5*  7.9*  7.8*   HCT  24.4*  25.4*  25.3*   PLT  287  297  255   GRANS  69  70  72   LYMPH  9*  13*  13*   EOS  2  2  2      Coagulation No results for input(s): PTP, INR, APTT in the last 72 hours. No lab exists for component: INREXT    Iron/Ferritin Recent Labs      03/08/18   1848   IRON  28*      BNP No results for input(s): BNPP in the last 72 hours. Cardiac Enzymes No results for input(s): CPK, CKND1, WOLFGANG in the last 72 hours. No lab exists for component: CKRMB, TROIP   Liver Enzymes Recent Labs      03/09/18 0233   TP  6.1*   ALB  2.9*   AP  101   SGOT  21      Thyroid Studies No results found for: T4, T3U, TSH, TSHEXT     Fe 28 sat 24 Fabien 836  Images:  XR Results (maximum last 3): Results from [place] encounter on 02/25/18   XR ABD (KUB)   Narrative ABDOMEN ONE VIEW    HISTORY: Dobbhoff feeding tube placement. COMPARISON: Abdomen flat and upright with chest radiograph 2/5/2018, chest  radiograph 2/28/2018    FINDINGS:     Portable supine view of the lower chest and abdomen. Portions of the left  lateral abdominal wall are not included on the image. A Dobbhoff feeding tube has been inserted. The tip of the tube is beyond the EG  junction, in the proximal to mid stomach. No loops in the tube. Nonobstructive bowel gas pattern. Heart size appears enlarged, stable retrocardiac opacity.          Impression IMPRESSION:    Dobbhoff feeding tube is below the diaphragms, the tip in the proximal to mid  stomach. XR CHEST PORT   Narrative CHEST PORTABLE 0119 hours    CPT CODE: 85076    COMPARISON: 0436 hours. INDICATIONS: Respiratory failure,. FINDINGS:     Portable single view chest demonstrates:    Lungs: Confluent opacification of the left lower lobe unchanged. Depth of  inspiration is shallow. Cardiac Silhouette And Mediastinal Contours: The cardiac and mediastinal  contours are unremarkable. Pleural Spaces: Unremarkable. Bones And Soft Tissues: Unremarkable for age. Right IJ dialysis catheter is present with the tip in the right atrium         Impression IMPRESSION:    Patient has been extubated. Parenchymal opacity in the left lower lobe is unchanged. XR CHEST PORT   Narrative EXAM: Chest X-Ray    INDICATION: Evaluate OG tube placement    TECHNIQUE: AP view of the chest    COMPARISON: 2/27/2018, 2/26/2018, 2/25/2018    FINDINGS:   Tubes and Lines: The patient is intubated. It is 5 cm above the miquel. An  enteric tube is present with the tip below the field of view. A dialysis  catheter which is unchanged. Pleura: No pneumothorax appreciated. No effusions appreciated. Lungs:  Bibasilar opacities are noted similar to prior imaging. Cardiac silhouette: It is unremarkable    Pulmonary Vascularity: The pulmonary vasculature is unremarkable. Osseous Structures: Unremarkable         Impression IMPRESSION:  1. Tubes and lines in good position. 2.  Bibasilar opacities similar to prior imaging. CT Results (maximum last 3): Results from [place] encounter on 02/25/18   CTA CHEST W OR W WO CONT   Narrative EXAMINATION: CTA chest pulmonary    INDICATION: Acute chest pain, shortness of breath    COMPARISON: None    TECHNIQUE: CT angiography of the pulmonary arteries performed following 75 cc IV  Isovue 370, with 3-D MIP multiplanar reformations.  All CT scans at this facility  are performed using dose optimization technique as appropriate to a performed  exam, to include automated exposure control, adjustment of the mA and/or kV  according to patient size (including appropriate matching first site specific  examinations), or use of iterative reconstruction technique. FINDINGS:    Cardiovascular: No pulmonary arterial filling defects identified to suggest  pulmonary embolism. Motion artifact limits evaluation of smaller segmental and  subsegmental arteries. Ascending thoracic aorta is borderline ectatic measuring  up to 3.5 cm diameter. Heart size within normal limits. Right jugular catheter  tip at right atrium level. Moderate-sized area of cartilage fusion. Mediastinum: Possible enlarged aortopulmonary node and left hilar nodes, poorly  delineated. Left hilar anita calcification noted. Thyroid slightly  heterogeneous. Esophagus is nondistended. Pleura: Moderate volume bilateral pleural effusions. No evidence of  pneumothorax. Lungs: Probable compressive atelectasis due to pleural effusions bilaterally. Right major fissure nodule measuring 4 mm, likely pleural-based node. Upper abdomen: Unremarkable. Miscellaneous: Superficial soft tissues unremarkable. Bones: No acute osseous findings. Probable bone island in T11 posterior body. Impression IMPRESSION:    1. No pulmonary embolus identified. Smaller segmental and subsegmental pulmonary  arteries less well evaluated due to motion artifact. 2. Moderate-sized bilateral pleural effusions, and moderate volume pericardial  effusion. Borderline ectatic ascending aorta. -Probable compressive atelectasis in bilateral lungs. Superimposed infiltrate  difficult to exclude.    -Suspected left hilar and aortopulmonary window adenopathy, poorly delineated,  nonspecific. Results from [place] encounter on 11/05/17   CT HEAD WO CONT   Narrative CT of the head without contrast    CT CODE:  98868    HISTORY: Severe headache    COMPARISON: None.     TECHNIQUE: Helical axial scan to the head was performed from the skull base to  the vertex without IV contrast administration. All CT scans at this facility performed using dose optimization techniques as  appreciated to a performed exam, to include automated exposure control,  adjustment of the mA and or KU according to patient size (including appropriate  matching for site specific examination), or use of iterative reconstruction  technique. FINDINGS:    Mild patchy periventricular white matter hypodensities identified. No  significant cerebral atrophy. The brain parenchyma, ventricles and calvarium  appear otherwise unremarkable. There is no evidence of acute intracranial  hemorrhage or mass effect identified. No skull fracture or extra axial fluid  collections identified. Visualized sinuses and mastoid air cells appear  unremarkable. Impression IMPRESSION:    No acute intracranial abnormality. Mild patchy periventricular white matter hypodensities which could represent  early burden of microvascular disease. Recommend clinical correlation and MR if  clinically indicated. Thank you for your referral.       Results from [place] encounter on 09/08/17   CT ABD PELV WO CONT   Narrative PROCEDURE:  CT Abdomen, Pelvis without Contrast.    INDICATION:  Renal failure. COMPARISON:  None    TECHNIQUE:  Helical volumetric CT imaging of the abdomen and pelvis is performed  at 5 mm axial sections without IV or oral contrast administration.   Coronal and  sagittal multiplanar reconstruction images are generated for improved anatomic  delineation.    - Radiation dose:   mGy-cm.  - All CT scans at this facility are performed using dose optimization technique  as appropriate to the performed exam, to include automated exposure control,  adjustment of the mA and/or kV according to patient's size (Including  appropriate matching for site-specific examinations), or use of iterative  reconstruction technique. FINDINGS:    ABDOMEN    Lung Bases:  Left posterior lung base scarring. Liver:  0.5 cm focal hypodensity in the left lobe of the liver, with fat  attenuation. Suggestive of a hepatic lipoma. Spleen:  Multiple punctate calcifications suggestive of old granulomatous  disease. Adrenal Glands, Gallbladder:  Unremarkable. Pancreas:  Subtle fat stranding around the pancreatic head region. Kidneys: The right kidney appears small in size (6.4 cm in length). The left  kidney appears decreased in size as well (7.7 cm in length). Small cystic  structure in the left kidney measuring 2.1 x 1.8 cm, suggestive of renal cysts. No postobstructive changes. No hydronephrosis. No convincing evidence for  ureteral stone. Gastrointestinal Tract, Abdominal Wall, Mesentery:      - Unremarkable stomach. - No acute findings along the small bowel. No small bowel obstruction.    - Normal appendix.    - No acute diverticulitis or colitis. - No mesenteric adenopathy. Retroperitoneum:  No adenopathy. Vascular:  Non-aneurysmal aorta. PELVIS    Urinary Bladder:  Unremarkable. Rectum:  Unremarkable. Uterus:  Atrophied. Adnexa:  No adnexal mass. Pelvic sidewall:  No adenopathy. No free fluid. Bones:  No acute osseous findings. Impression IMPRESSION:    1. Fat stranding around the pancreatic head. Query pancreatitis. Recommend  correlation with lipase/amylase level. 2.  Small kidney size bilaterally. Exophytic cyst in the left kidney near the  lower pole. No postobstructive changes. No stones in the ureters. 3.  Hepatic lipoma. CULTURE:   )No results for input(s): SDES, CULT in the last 72 hours. No results for input(s): CULT in the last 72 hours. Assessment/Plan:     End Stage Renal Disease:  Patient is tolerating dialysis treatment well. .  Additionally the patient has experienced normal dialysis treatment during dialysis.   Dry weight Estimated Weight: 39.6 kg (87 lb 4.8 oz) same. K3 bath     At 11:56 AM on 3/9/2018, I saw and examined patient during hemodialysis treatment. The patient was receiving hemodialysis for treatment of end stage renal disease. I have also reviewed vital signs, intake and output, lab results and recent events, and agreed with today's dialysis order. Anemia:  Cont EPO/Venofer with HD                      Hypertension: controlled    Access:  Tunnel cuff catheter and Fistula adequate monitoring/no changes, will need to check with surgery for clearance to use as outpt         Larisa Lee MD  3/9/2018

## 2018-03-09 NOTE — PROGRESS NOTES
Bedside shift change report given to this RN (oncoming nurse) by Ashley Lowery (offgoing nurse). Report included the following information SBAR and Kardex.

## 2018-03-09 NOTE — PROGRESS NOTES
Bedside and Verbal shift change report given to Kevin Abraham RN (oncoming nurse) by Kiara Garcia RN (offgoing nurse). Report given with Jf MYERS and MAR.

## 2018-03-09 NOTE — DISCHARGE INSTRUCTIONS
Pulmonary Edema: Care Instructions  Your Care Instructions    Pulmonary edema is the buildup of fluid in the lungs. It usually occurs when the heart does not pump blood through the body properly. Pulmonary edema can also be caused by another disease, such as liver or kidney failure. It can also happen at high altitudes, from a poisoning, or as a result of a near-drowning. If you have fluid in your lungs, you may have trouble breathing, be restless, have a fast heart rate, or cough up foamy pink fluid. Breathing problems may be worse when you lie down. Follow-up care is a key part of your treatment and safety. Be sure to make and go to all appointments, and call your doctor if you are having problems. It's also a good idea to know your test results and keep a list of the medicines you take. How can you care for yourself at home? Medicines  ? · Take your medicines exactly as prescribed. Call your doctor if you think you are having a problem with your medicine. ? · Review all of your regular medicines with your doctor. Do not take any vitamins, over-the-counter medicines, or herbal products without talking to your doctor first.   Diet  ? · Eat a balanced diet. Make an appointment with a dietitian if you have questions about what type of diet might be best for you. ? · Do not eat more than 2,000 milligrams (mg) of sodium each day. That is less than 1 teaspoon of salt a day, including all the salt you eat in prepared or packaged foods. ¨ Do not add salt while you are cooking or at the table. Flavor with garlic, lemon juice, onion, vinegar, herbs, and spices instead of salt. ¨ Eat fewer processed foods and foods from restaurants, including fast food. ¨ Use fresh or frozen foods instead of canned. ¨ Count and record how much sodium you eat each day. Check food labels for sodium. ¨ Ask your doctor before using salt substitutes that have potassium, such as Lite Salt. ? Lifestyle  ?  · Stay out of air pollution; smog; cold, dry air; hot, humid air; and high altitudes. ? · Learn breathing methods that help the airflow in and out of your lungs. ? · Take rest breaks often. Schedule short rest breaks when doing housework and other activities. An occupational or physical therapist can help you find ways to do everyday activities with less effort. ? · Start light exercise if your doctor says it is okay. Try to stay as active as possible. If you have not exercised in the past, start out slowly. Walking is a good way to start. ? · Get enough rest at night. Sleeping with 1 or 2 pillows under your upper body and head may help you breathe easier at night. ? · Discuss rehabilitation with your doctor. Find out what programs are available in your area. ? · Do not smoke or use other tobacco products. Smoking can make your condition worse. If you need help quitting, talk to your doctor about stop-smoking programs and medicines. These can increase your chances of quitting for good. ? · Do not use alcohol or illegal drugs. When should you call for help? Call 911 anytime you think you may need emergency care. For example, call if:  ? · You have severe trouble breathing. ? · You passed out (lost consciousness). ? · You have symptoms of a heart attack. These may include:  ¨ Chest pain or pressure, or a strange feeling in the chest.  ¨ Sweating. ¨ Shortness of breath. ¨ Nausea or vomiting. ¨ Pain, pressure, or a strange feeling in the back, neck, jaw, or upper belly or in one or both shoulders or arms. ¨ Lightheadedness or sudden weakness. ¨ A fast or irregular heartbeat. Pain that spreads from the chest to the neck, jaw, or one or both shoulders or arms. ? After you call 911, the  may tell you to chew 1 adult-strength or 2 to 4 low-dose aspirin. Wait for an ambulance. Do not try to drive yourself.   ?Call your doctor now or seek immediate medical care if:  ? · You have trouble breathing or have wheezing that is getting worse. ? · You are coughing more deeply or more often. ? · You cough up blood. ? · You get a fever. ? · You have more swelling in your legs or belly. ? · Your symptoms are getting worse. ? Watch closely for changes in your health, and be sure to contact your doctor if you have any problems. Where can you learn more? Go to http://cristino-anne.info/. Enter Z729 in the search box to learn more about \"Pulmonary Edema: Care Instructions. \"  Current as of: May 12, 2017  Content Version: 11.4  © 1050-6576 [employer]. Care instructions adapted under license by [employer] (which disclaims liability or warranty for this information). If you have questions about a medical condition or this instruction, always ask your healthcare professional. [address] any warranty or liability for your use of this information. Heart Attack: Care Instructions  Your Care Instructions    A heart attack (myocardial infarction, or MI) occurs when one or more of the coronary arteries, which supply the heart with oxygen-rich blood, is blocked. A blockage usually occurs when plaque inside the artery breaks open and a blood clot forms in the artery. After a heart attack, you may be worried about your future. Over the next several weeks, your heart will start to heal. Though it can be hard to break old habits, you can prevent another heart attack by making some lifestyle changes and by taking medicines. You may use this information for ideas about what to do at home to speed your recovery. Follow-up care is a key part of your treatment and safety. Be sure to make and go to all appointments, and call your doctor if you are having problems. It's also a good idea to know your test results and keep a list of the medicines you take. How can you care for yourself at home? Activity  ? · Until your doctor says it is okay, do not do strenuous exercise.  And do not lift, pull, or push anything heavy. Ask your doctor what types of activities are safe for you. ? · If your doctor has not set you up with a cardiac rehabilitation (rehab) program, talk to him or her about whether that is right for you. Cardiac rehab includes supervised exercise. It also includes help with diet and lifestyle changes and emotional support. It may reduce your risk of future heart problems. ? · Increase your activities slowly. Take short rest breaks when you get tired. ? · If your doctor recommends it, get more exercise. Walking is a good choice. Bit by bit, increase the amount you walk every day. Try for at least 30 minutes on most days of the week. You also may want to swim, bike, or do other activities. Talk with your doctor before you start an exercise program to make sure it is safe for you. ? · Ask your doctor when you can drive, go back to work, and do other daily activities again. ? · You can have sex as soon as you feel ready for it. Often this means when you can easily walk around or climb stairs. Talk with your doctor if you have any concerns. If you are taking nitroglycerin, do not take erection-enhancing medicine such as sildenafil (Viagra), tadalafil (Cialis), or vardenafil (Levitra) . ? Lifestyle changes  ? · Do not smoke. Smoking increases your risk of another heart attack. If you need help quitting, talk to your doctor about stop-smoking programs and medicines. These can increase your chances of quitting for good. ? · Eat a heart-healthy diet that is low in saturated fat and salt, and is full of fruits, vegetables and whole-grains. Eat at least two servings of fish each week. You may get more details about how to eat healthy. But these tips can help you get started. ? · Stay at a healthy weight, or lose weight if you need to. Medicines  ? · Be safe with medicines. Take your medicines exactly as prescribed.  Call your doctor if you think you are having a problem with your medicine. You will get more details on the specific medicines your doctor prescribes. Do not stop taking your medicine unless your doctor tells you to. Not taking your medicine might raise your risk of having another heart attack. ? · You may need several medicines to help lower your risk of another heart attack. These include:  ¨ Blood pressure medicines such as angiotensin-converting enzyme (ACE) inhibitors, ARBs (angiotensin II receptor blockers), and beta-blockers. ¨ Cholesterol medicine called statins. ¨ Aspirin and other blood thinners. These prevent blood clots that can cause a heart attack. ? · If your doctor has given you nitroglycerin, keep it with you at all times. If you have angina symptoms, such as chest pain or pressure, sit down and rest. Take the first dose of nitroglycerin as directed. If symptoms get worse or are not getting better within 5 minutes, call 911 right away. Stay on the phone. The emergency  will tell you what to do. ? · Do not take any over-the-counter medicines, vitamins, or herbal products without talking to your doctor first.   ?Staying healthy  ? · Manage other health conditions such as high blood pressure and diabetes. ? · Avoid colds and flu. Get a pneumococcal vaccine shot. If you have had one before, ask your doctor whether you need another dose. Get the flu vaccine every year. If you must be around people with colds or flu, wash your hands often. ? · Be sure to tell your doctor about any angina symptoms you have had, even if they went away. Pay attention to your angina symptoms. Know what is typical for you and learn how to control it. Know when to call for help. ? · Talk to your family, friends, or a counselor about your feelings. It is normal to feel frightened, angry, hopeless, helpless, and even guilty. Talking openly about bad feelings can help you cope. If you have symptoms of depression, talk to your doctor. When should you call for help?   Call 911 anytime you think you may need emergency care. For example, call if:  ? · You have symptoms of a heart attack. These may include:  ¨ Chest pain or pressure, or a strange feeling in the chest.  ¨ Sweating. ¨ Shortness of breath. ¨ Nausea or vomiting. ¨ Pain, pressure, or a strange feeling in the back, neck, jaw, or upper belly or in one or both shoulders or arms. ¨ Lightheadedness or sudden weakness. ¨ A fast or irregular heartbeat. After you call 911, the  may tell you to chew 1 adult-strength or 2 to 4 low-dose aspirin. Wait for an ambulance. Do not try to drive yourself. ? · You have angina symptoms (such as chest pain or pressure) that do not go away with rest or are not getting better within 5 minutes after you take a dose of nitroglycerin. ? · You passed out (lost consciousness). ? · You feel like you are having another heart attack. ?Call your doctor now or seek immediate medical care if:  ? · You are having angina symptoms, such as chest pain or pressure, more often than usual, or the symptoms are different or worse than usual.   ? · You have new or increased shortness of breath. ? · You are dizzy or lightheaded, or you feel like you may faint. ? Watch closely for changes in your health, and be sure to contact your doctor if you have any problems. Where can you learn more? Go to http://cristino-anne.info/. Enter [id number] in the search box to learn more about \"Heart Attack: Care Instructions. \"  Current as of: September 21, 2016  Content Version: 11.4  © 4433-2984 [employer]. Care instructions adapted under license by [employer] (which disclaims liability or warranty for this information). If you have questions about a medical condition or this instruction, always ask your healthcare professional. [place] [address] any warranty or liability for your use of this information.     Learning About Fluid Overload  What is fluid overload? Fluid overload means that your body has too much water. The extra fluid in your body can raise your blood pressure and force your heart to work harder. It can also make it hard for you to breathe. Most of your body is made up of water. The body uses minerals like sodium and potassium to help organs such as your heart, kidneys, and liver balance how much water you need. For example, the heart pumps blood to move water around the body. And the kidneys work to get rid of the water that the body doesn't need. Health conditions like kidney disease, heart failure, and cirrhosis can cause fluid overload. Other things can cause extra fluid to build up. IV fluids, some medicines, too much salt (sodium) from food, and certain medical treatments can sometimes cause this fluid increase. What are the symptoms? Some of the most common symptoms are:  · Gaining weight over a short period of time. · Swelling in the ankles or legs. · Shortness of breath. How is it treated? The goal of treatment is to remove the extra fluid in your body. Your treatment will depend on the cause. Your doctor may:  · Give you medicines, such as diuretics (also called \"water pills\"). They help your body get rid of the extra fluid. · Restrict your fluid or salt intake. Follow-up care is a key part of your treatment and safety. Be sure to make and go to all appointments, and call your doctor if you are having problems. It's also a good idea to know your test results and keep a list of the medicines you take. Where can you learn more? Go to http://cristino-nane.info/. Enter O110 in the search box to learn more about \"Learning About Fluid Overload. \"  Current as of: September 21, 2016  Content Version: 11.4  © 8367-3880 [employer]. Care instructions adapted under license by [employer] (which disclaims liability or warranty for this information).  If you have questions about a medical condition or this instruction, always ask your healthcare professional. [address] any warranty or liability for your use of this information. Anemia: Care Instructions  Your Care Instructions    Anemia is a low level of red blood cells, which carry oxygen throughout your body. Many things can cause anemia. Lack of iron is one of the most common causes. Your body needs iron to make hemoglobin, a substance in red blood cells that carries oxygen from the lungs to your body's cells. Without enough iron, the body produces fewer and smaller red blood cells. As a result, your body's cells do not get enough oxygen, and you feel tired and weak. And you may have trouble concentrating. Bleeding is the most common cause of a lack of iron. You may have heavy menstrual bleeding or bleeding caused by conditions such as ulcers, hemorrhoids, or cancer. Regular use of aspirin or other anti-inflammatory medicines (such as ibuprofen) also can cause bleeding in some people. A lack of iron in your diet also can cause anemia, especially at times when the body needs more iron, such as during pregnancy, infancy, and the teen years. Your doctor may have prescribed iron pills. It may take several months of treatment for your iron levels to return to normal. Your doctor also may suggest that you eat foods that are rich in iron, such as meat and beans. There are many other causes of anemia. It is not always due to a lack of iron. Finding the specific cause of your anemia will help your doctor find the right treatment for you. Follow-up care is a key part of your treatment and safety. Be sure to make and go to all appointments, and call your doctor if you are having problems. It's also a good idea to know your test results and keep a list of the medicines you take. How can you care for yourself at home? · Take your medicines exactly as prescribed.  Call your doctor if you think you are having a problem with your medicine. · If your doctor recommends iron pills, take them as directed:  ¨ Try to take the pills on an empty stomach about 1 hour before or 2 hours after meals. But you may need to take iron with food to avoid an upset stomach. ¨ Do not take antacids or drink milk or caffeine drinks (such as coffee, tea, or cola) at the same time or within 2 hours of the time that you take your iron. They can make it hard for your body to absorb the iron. ¨ Vitamin C (from food or supplements) helps your body absorb iron. Try taking iron pills with a glass of orange juice or some other food that is high in vitamin C, such as citrus fruits. ¨ Iron pills may cause stomach problems, such as heartburn, nausea, diarrhea, constipation, and cramps. Be sure to drink plenty of fluids, and include fruits, vegetables, and fiber in your diet each day. Iron pills often make your bowel movements dark or green. ¨ If you forget to take an iron pill, do not take a double dose of iron the next time you take a pill. ¨ Keep iron pills out of the reach of small children. An overdose of iron can be very dangerous. · Follow your doctor's advice about eating iron-rich foods. These include red meat, shellfish, poultry, eggs, beans, raisins, whole-grain bread, and leafy green vegetables. · Steam vegetables to help them keep their iron content. When should you call for help? Call 911 anytime you think you may need emergency care. For example, call if:  ? · You have symptoms of a heart attack. These may include:  ¨ Chest pain or pressure, or a strange feeling in the chest.  ¨ Sweating. ¨ Shortness of breath. ¨ Nausea or vomiting. ¨ Pain, pressure, or a strange feeling in the back, neck, jaw, or upper belly or in one or both shoulders or arms. ¨ Lightheadedness or sudden weakness. ¨ A fast or irregular heartbeat. After you call 911, the  may tell you to chew 1 adult-strength or 2 to 4 low-dose aspirin. Wait for an ambulance.  Do not try to drive yourself. ? · You passed out (lost consciousness). ?Call your doctor now or seek immediate medical care if:  ? · You have new or increased shortness of breath. ? · You are dizzy or lightheaded, or you feel like you may faint. ? · Your fatigue and weakness continue or get worse. ? · You have any abnormal bleeding, such as:  ¨ Nosebleeds. ¨ Vaginal bleeding that is different (heavier, more frequent, at a different time of the month) than what you are used to. ¨ Bloody or black stools, or rectal bleeding. ¨ Bloody or pink urine. ? Watch closely for changes in your health, and be sure to contact your doctor if:  ? · You do not get better as expected. Where can you learn more? Go to http://cristino-anne.info/. Enter R301 in the search box to learn more about \"Anemia: Care Instructions. \"  Current as of: October 13, 2016  Content Version: 11.4  © 2155-4491 [employer]. Care instructions adapted under license by [employer] (which disclaims liability or warranty for this information). If you have questions about a medical condition or this instruction, always ask your healthcare professional. [place] [address] any warranty or liability for your use of this information. Patient armband removed and shredded        DISCHARGE SUMMARY from Nurse    PATIENT INSTRUCTIONS:    After general anesthesia or intravenous sedation, for 24 hours or while taking prescription Narcotics:  · Limit your activities  · Do not drive and operate hazardous machinery  · Do not make important personal or business decisions  · Do  not drink alcoholic beverages  · If you have not urinated within 8 hours after discharge, please contact your surgeon on call.     Report the following to your surgeon:  · Excessive pain, swelling, redness or odor of or around the surgical area  · Temperature over 100.5  · Nausea and vomiting lasting longer than 4 hours or if unable to take medications  · Any signs of decreased circulation or nerve impairment to extremity: change in color, persistent  numbness, tingling, coldness or increase pain  · Any questions    What to do at Home:  Recommended activity: Activity as tolerated    If you experience any of the following symptoms Nausea, vomiting, diarrhea, fever greater than 100.5, dizziness, severe headache, shortness of breath, chest pain, increased pain, please follow up with PCP. *  Please give a list of your current medications to your Primary Care Provider. *  Please update this list whenever your medications are discontinued, doses are      changed, or new medications (including over-the-counter products) are added. *  Please carry medication information at all times in case of emergency situations. These are general instructions for a healthy lifestyle:    No smoking/ No tobacco products/ Avoid exposure to second hand smoke  Surgeon General's Warning:  Quitting smoking now greatly reduces serious risk to your health. Obesity, smoking, and sedentary lifestyle greatly increases your risk for illness    A healthy diet, regular physical exercise & weight monitoring are important for maintaining a healthy lifestyle    You may be retaining fluid if you have a history of heart failure or if you experience any of the following symptoms:  Weight gain of 3 pounds or more overnight or 5 pounds in a week, increased swelling in our hands or feet or shortness of breath while lying flat in bed. Please call your doctor as soon as you notice any of these symptoms; do not wait until your next office visit. Recognize signs and symptoms of STROKE:    F-face looks uneven    A-arms unable to move or move unevenly    S-speech slurred or non-existent    T-time-call 911 as soon as signs and symptoms begin-DO NOT go       Back to bed or wait to see if you get better-TIME IS BRAIN.     Warning Signs of HEART ATTACK     Call 911 if you have these symptoms:   Chest discomfort. Most heart attacks involve discomfort in the center of the chest that lasts more than a few minutes, or that goes away and comes back. It can feel like uncomfortable pressure, squeezing, fullness, or pain.  Discomfort in other areas of the upper body. Symptoms can include pain or discomfort in one or both arms, the back, neck, jaw, or stomach.  Shortness of breath with or without chest discomfort.  Other signs may include breaking out in a cold sweat, nausea, or lightheadedness. Don't wait more than five minutes to call 911 - MINUTES MATTER! Fast action can save your life. Calling 911 is almost always the fastest way to get lifesaving treatment. Emergency Medical Services staff can begin treatment when they arrive -- up to an hour sooner than if someone gets to the hospital by car. The discharge information has been reviewed with the patient. The patient verbalized understanding. Discharge medications reviewed with the patient and appropriate educational materials and side effects teaching were provided.   ___________________________________________________________________________________________________________________________________

## 2018-03-09 NOTE — PROGRESS NOTES
Received report on pt.from off going RN. Resting quietly in bed on rounds. Denies c/o pain or SOB at this time according to son who interprets for pt who speaks [place]. No acute distress noted.  and son at bedside. NPO for stress test .Will cont to monitor for any changes in status. 0900 To stress test per w/c accompanied by son and . 1200 returned to room. Pt fed food from home. Denies c/o pain or SOB as told by son. 1400 sleeping without noted distress. Son and  remain at bedside.

## 2018-03-09 NOTE — PROGRESS NOTES
Problem: Falls - Risk of  Goal: *Absence of Falls  Document Asha Fall Risk and appropriate interventions in the flowsheet.    Outcome: Progressing Towards Goal  Fall Risk Interventions:  Mobility Interventions: Assess mobility with egress test, Bed/chair exit alarm, Communicate number of staff needed for ambulation/transfer, Patient to call before getting OOB, PT Consult for mobility concerns, PT Consult for assist device competence, Strengthening exercises (ROM-active/passive), Utilize walker, cane, or other assitive device    Mentation Interventions: Adequate sleep, hydration, pain control, Evaluate medications/consider consulting pharmacy, Familiar objects from home, Family/sitter at bedside, Increase mobility, More frequent rounding, Toileting rounds, Update white board    Medication Interventions: Assess postural VS orthostatic hypotension, Bed/chair exit alarm, Evaluate medications/consider consulting pharmacy, Patient to call before getting OOB, Teach patient to arise slowly    Elimination Interventions: Bed/chair exit alarm, Call light in reach, Elevated toilet seat, Patient to call for help with toileting needs, Toilet paper/wipes in reach, Toileting schedule/hourly rounds

## 2018-03-09 NOTE — PROGRESS NOTES
0730 Report received from night nurse. No acute distress noted.  at bedside. Pt sleeping quietly at this time. Will monitor. 1500 Patient returned from dialysis. No acute distress noted. Son notified that patient is back and is discharged at this time. 1630 Discharge instructions, printed education and prescriptions given to patient and patient's son. IV site removed cannula intact. Patient denies pain at this time. No acute distress noted. Son here to drive patient home. Assisted to car via W/C.

## 2018-03-25 NOTE — DISCHARGE SUMMARY
Discharge Summary    Patient: Tristen Fox MRN: [id number]  CSN: [id number]    YOB: 1960  Age: 56 y.o. Sex: female    DOA: 2/25/2018 LOS:  LOS: 12 days   Discharge Date: 3/9/2018     Admission Diagnoses:   NSTEMI    Discharge Diagnoses:    Acute hypoxic respiratory failure  NSTEMI  Malignant HTN  Acute diastolic CHF  ESRD  Anemia   Hypokalemia   Nasal MRSA colonization     Discharge Condition: Stable    PHYSICAL EXAM  Visit Vitals    BP (!) 153/92    Pulse 99    Temp 98.1 °F (36.7 °C) (Oral)    Resp 18    Ht 5' 1\" (1.549 m)    Wt 38.2 kg (84 lb 3.2 oz)    SpO2 98%    Breastfeeding No    BMI 15.91 kg/m2       General: In NAD. Nontoxic-appearing.   HEENT: NCAT. Sclerae anicteric, EOMI. Lungs:  Clear, no wheezes. Heart:  RRR. Abdomen: Soft, NTTP. Extremities: Warm, no ischemia. Psych:   Affect somewhat depressed. No agitation. Neurologic:  Awake and alert, moves extremities spontaneously. Hospital Course:   See admission H&P for full details of HPI. Patient admitted to ICU from ED for acute management of respiratory failure and hypertensive urgency. Cardiology and nephrology evaluations were also obtained for assistance with management of NSTEMI and ESRD, respectively. BP control was obtained with IV medications but patient required ventilatory support for several days. She was ultimately able to be extubated on 3/1/18 and respiratory status remained stable thereafter. Consideration was given to cardiac catheterization, but it was eveutally decided to not pursue this given patient's previous clinical course concern for overall stability. 2D echo and nuclear stress testing were performed with results as documented below. Patient will need further outpatient cardiology follow up for consideration of additional workup/testing as deemed appropriate and as clinical condition permits.   She has continued to tolerate hemdodialysis treatments without difficulty and BPs have remained in acceptable range. Patient is medically stable for discharge home with outpatient follow up as advised. Consults:   [facility]  Cardiology  Nephrology    Significant Diagnostic Studies:   CTA chest:  IMPRESSION:     1. No pulmonary embolus identified. Smaller segmental and subsegmental pulmonary  arteries less well evaluated due to motion artifact.     2. Moderate-sized bilateral pleural effusions, and moderate volume pericardial  effusion. Borderline ectatic ascending aorta.     -Probable compressive atelectasis in bilateral lungs. Superimposed infiltrate  difficult to exclude.     -Suspected left hilar and aortopulmonary window adenopathy, poorly delineated,  nonspecific. Renal PVL:  INTERPRETATION/FINDINGS  Duplex images were obtained using 2-D gray scale, color flow, and  spectral Doppler analysis. 1. Note: High aortic PSV invalidates the use of RAR as an indicator of   renal stenosis. RENAL:  1. No significant renal artery stenosis identified, both renal  arteries visualized. The distal left renal artery velocity could not  be obtained. 2. The right kidney measures 9.7 cm.  3. The left kidney measures 9.8 cm.  4. Intrinsic/medical disease identified in the right kidney. 5. No significant plaquing, focal stenosis or aneurysm noted in the  abdominal aorta. Nuclear stress test:  IMPRESSION:  1. Post-stress imaging in short axis, horizontal and vertical long axis view reveals a small apical inferior defect in perfusion with a medium intensity. 2.  Resting images show homogenous isotope uptake in all areas. 3.  Gated images show normal left ventricular size, wall motion, and systolic function appears normal.  Ejection fraction is 58%.       DIAGNOSES:    1. Abnormal scan. 2.  Evidence of a small reversible apical inferior defect noted from these nuclear study suggestive of ischemia in the area.   This is involving either the mid or distal left anterior descending or right coronary artery. 3.  Medium risk scan. 2D echo:  SUMMARY:  Left ventricle: Size was normal. Systolic function was mildly reduced by   visual assessment. Ejection fraction was  estimated in the range of 45 % to 50 %. There was mild hypokinesis of the   apical wall(s). Wall thickness was moderately  increased. Left atrium: The atrium was mildly dilated. Atrial septum: There was increased thickness of the septum, consistent with   lipomatous hypertrophy. Pericardium: A small pericardial effusion was identified circumferential to   the heart. There was no evidence of  hemodynamic compromise. COMPARISONS:  Comparison was made with the previous study of 17-Sep-2017. LV overall   function has decreased from 65 % to 45 %. There  was worsening in the periapical LV wall motion. Pericardial effusion   appearance has not changed. Otherwise no  significant change. CXR:  IMPRESSION:     Patient has been extubated. Parenchymal opacity in the left lower lobe is unchanged. Discharge Medications:     Discharge Medication List as of 3/9/2018  3:13 PM      START taking these medications    Details   aspirin 81 mg chewable tablet Take 1 Tab by mouth daily. , Print, Disp-30 Tab, R-0      atorvastatin (LIPITOR) 40 mg tablet Take 1 Tab by mouth nightly. , Print, Disp-30 Tab, R-0      carvedilol (COREG) 25 mg tablet Take 1 Tab by mouth every twelve (12) hours. , Print, Disp-60 Tab, R-0      melatonin 3 mg tablet Take 1 Tab by mouth nightly as needed. , Print, Disp-30 Tab, R-0      FLUoxetine (PROZAC) 10 mg capsule Take 1 Cap by mouth daily. , Print, Disp-30 Cap, R-0         CONTINUE these medications which have CHANGED    Details   amLODIPine (NORVASC) 10 mg tablet Take 1 Tab by mouth daily. , Print, Disp-30 Tab, R-0      cloNIDine HCl (CATAPRES) 0.3 mg tablet Take 1 Tab by mouth two (2) times a day., Print, Disp-60 Tab, R-0      losartan (COZAAR) 100 mg tablet Take 1 Tab by mouth daily. , Print, Disp-30 Tab, R-0 hydrALAZINE (APRESOLINE) 100 mg tablet Take 0.5 Tabs by mouth two (2) times a day., Print, Disp-60 Tab, R-0         CONTINUE these medications which have NOT CHANGED    Details   glycerin, adult, (FLEET GLYCERIN, ADULT,) suppository Insert 1 Suppository into rectum daily. Indications: BOWEL EVACUATION, Print, Disp-10 Suppository, R-0      albuterol (PROVENTIL HFA, VENTOLIN HFA, PROAIR HFA) 90 mcg/actuation inhaler Take 2 Puffs by inhalation every four (4) hours as needed for Wheezing., Print, Disp-1 Inhaler, R-0         STOP taking these medications       oxyCODONE-acetaminophen (PERCOCET) 5-325 mg per tablet Comments:   Reason for Stopping:         calcium acetate (PHOSLO) 667 mg cap Comments:   Reason for Stopping:         NIFEdipine ER (ADALAT CC) 30 mg ER tablet Comments:   Reason for Stopping:         multivitamin with iron tablet Comments:   Reason for Stopping:                 Activity: As tolerated    Diet: Cardiac, renal.    Follow-up: with PCP, Jessica Perez MD in 1 week and cardiology as directed. Minutes spent on discharge: >30 minutes spent coordinating this discharge. Marissa Singer.  Noemy Hollins MD   [facility]

## 2018-05-29 NOTE — ED NOTES
Pt's son was told by PCP to send patient to ED for blood transfusion, informed patient and relative that we don't transfuse blood, but we could send patient to [facility] for the procedure after our provider will get to medically evaluate and screen patient, pt and relative decided to go straight to [facility], patient awake alert, not in any acute distress.

## 2018-05-31 NOTE — ED PROVIDER NOTES
EMERGENCY DEPARTMENT HISTORY AND PHYSICAL EXAM    8:01 AM      Date: 5/31/2018  Patient Name: Hawa Underwood    History of Presenting Illness     Chief Complaint   Patient presents with    Anemia         History Provided By: patient and son    Chief Complaint: sent for blood transfusion  Duration:  2 days  Timing:  constant  Location: none  Quality: none   Severity: none  Modifying Factors: none  Associated Symptoms: none      Additional History (Context): Hawa Underwood is a 56 y.o. female presents with ESRD sent by nephrologist Uli for blood transfusion. Denies bleeding, lightheadedness and weakness. No recent illness, vomiting or diarrhea. Quiana Castillo PCP: Rafa Vann MD    Current Facility-Administered Medications   Medication Dose Route Frequency Provider Last Rate Last Dose    0.9% sodium chloride infusion 250 mL  250 mL IntraVENous PRN Bruce Og MD        0.9% sodium chloride infusion 250 mL  250 mL IntraVENous PRN Bruce Og MD         Current Outpatient Prescriptions   Medication Sig Dispense Refill    amLODIPine (NORVASC) 10 mg tablet Take 1 Tab by mouth daily. 30 Tab 0    aspirin 81 mg chewable tablet Take 1 Tab by mouth daily. 30 Tab 0    atorvastatin (LIPITOR) 40 mg tablet Take 1 Tab by mouth nightly. 30 Tab 0    carvedilol (COREG) 25 mg tablet Take 1 Tab by mouth every twelve (12) hours. 60 Tab 0    cloNIDine HCl (CATAPRES) 0.3 mg tablet Take 1 Tab by mouth two (2) times a day. 60 Tab 0    losartan (COZAAR) 100 mg tablet Take 1 Tab by mouth daily. 30 Tab 0    hydrALAZINE (APRESOLINE) 100 mg tablet Take 0.5 Tabs by mouth two (2) times a day. 60 Tab 0    melatonin 3 mg tablet Take 1 Tab by mouth nightly as needed. (Patient taking differently: Take 1 Tab by mouth nightly as needed (insomnia). ) 30 Tab 0    FLUoxetine (PROZAC) 10 mg capsule Take 1 Cap by mouth daily. 30 Cap 0    glycerin, adult, (FLEET GLYCERIN, ADULT,) suppository Insert 1 Suppository into rectum daily.  Indications: BOWEL EVACUATION 10 Suppository 0    albuterol (PROVENTIL HFA, VENTOLIN HFA, PROAIR HFA) 90 mcg/actuation inhaler Take 2 Puffs by inhalation every four (4) hours as needed for Wheezing. 1 Inhaler 0       Past History     Past Medical History:  Past Medical History:   Diagnosis Date    Chronic kidney disease     ESRD (end stage renal disease) ([facility] [address]     TUES-THURS-SAT    Hypercholesteremia     Hypertension     Kidney disease     Vitamin D deficiency        Past Surgical History:  Past Surgical History:   Procedure Laterality Date    VASCULAR SURGERY PROCEDURE UNLIST         Family History:  No family history on file. Social History:  Social History   Substance Use Topics    Smoking status: Never Smoker    Smokeless tobacco: Never Used    Alcohol use No       Allergies: Allergies   Allergen Reactions    Banana Other (comments)         Review of Systems     Review of Systems   Constitutional: Negative for diaphoresis and fever. HENT: Negative for congestion and sore throat. Eyes: Negative for pain and itching. Respiratory: Negative for cough and shortness of breath. Cardiovascular: Negative for chest pain and palpitations. Gastrointestinal: Negative for abdominal pain and diarrhea. Endocrine: Negative for polydipsia and polyuria. Genitourinary: Negative for dysuria and hematuria. Musculoskeletal: Negative for arthralgias and myalgias. Skin: Negative for rash and wound. Neurological: Negative for seizures and syncope. Hematological: Does not bruise/bleed easily. Psychiatric/Behavioral: Negative for agitation and hallucinations. All other systems reviewed and are negative. Physical Exam     Visit Vitals    /84 (BP 1 Location: Left arm, BP Patient Position: At rest)    Pulse 83    Temp 97.6 °F (36.4 °C)    Resp 24    Wt 41 kg (90 lb 6.2 oz)    SpO2 99%    BMI 17.08 kg/m2       Physical Exam   Constitutional: She appears well-developed and well-nourished. HENT:   Head: Normocephalic and atraumatic. Eyes: Conjunctivae are normal. No scleral icterus. Neck: Normal range of motion. Neck supple. No JVD present. Cardiovascular: Normal rate, regular rhythm and normal heart sounds. Mildly tachycardic   Pulmonary/Chest: Effort normal and breath sounds normal.   Abdominal: Soft. She exhibits no mass. There is no tenderness. Musculoskeletal: Normal range of motion. Left arm palpable thrill in fistula no rash or wamth   Lymphadenopathy:     She has no cervical adenopathy. Neurological: She is alert. Skin: Skin is warm and dry. Nursing note and vitals reviewed. Diagnostic Study Results     Labs -  Recent Results (from the past 12 hour(s))   CBC WITH AUTOMATED DIFF    Collection Time: 05/31/18  8:00 AM   Result Value Ref Range    WBC 3.5 (L) 4.6 - 13.2 K/uL    RBC 2.35 (L) 4.20 - 5.30 M/uL    HGB 6.0 (L) 12.0 - 16.0 g/dL    HCT 19.4 (L) 35.0 - 45.0 %    MCV 82.6 74.0 - 97.0 FL    MCH 25.5 24.0 - 34.0 PG    MCHC 30.9 (L) 31.0 - 37.0 g/dL    RDW 16.3 (H) 11.6 - 14.5 %    PLATELET 192 587 - 472 K/uL    MPV 8.9 (L) 9.2 - 11.8 FL    NEUTROPHILS 73 40 - 73 %    LYMPHOCYTES 10 (L) 21 - 52 %    MONOCYTES 16 (H) 3 - 10 %    EOSINOPHILS 1 0 - 5 %    BASOPHILS 0 0 - 2 %    ABS. NEUTROPHILS 2.6 1.8 - 8.0 K/UL    ABS. LYMPHOCYTES 0.4 (L) 0.9 - 3.6 K/UL    ABS. MONOCYTES 0.6 0.05 - 1.2 K/UL    ABS. EOSINOPHILS 0.0 0.0 - 0.4 K/UL    ABS.  BASOPHILS 0.0 0.0 - 0.1 K/UL    DF AUTOMATED     METABOLIC PANEL, BASIC    Collection Time: 05/31/18  8:00 AM   Result Value Ref Range    Sodium 132 (L) 136 - 145 mmol/L    Potassium 4.5 3.5 - 5.5 mmol/L    Chloride 99 (L) 100 - 108 mmol/L    CO2 26 21 - 32 mmol/L    Anion gap 7 3.0 - 18 mmol/L    Glucose 101 (H) 74 - 99 mg/dL    BUN 19 (H) 7.0 - 18 MG/DL    Creatinine 4.81 (H) 0.6 - 1.3 MG/DL    BUN/Creatinine ratio 4 (L) 12 - 20      GFR est AA 11 (L) >60 ml/min/1.73m2    GFR est non-AA 9 (L) >60 ml/min/1.73m2    Calcium 8.7 8.5 - 10.1 MG/DL   TYPE + CROSSMATCH    Collection Time: 05/31/18  8:00 AM   Result Value Ref Range    Crossmatch Expiration 06/03/2018     ABO/Rh(D) O POSITIVE     Antibody screen NEG     CALLED TO: DAVID CORRAL ON [id number] AT [id number] BY ANG     CALLED TO: DAVID Mon ON [id number] AT 7746 BY ANG     Unit number [id number]     Blood component type RC LR     Unit division 00     Status of unit ISSUED     Crossmatch result Compatible     Unit number [id number]     Blood component type RC LR,1     Unit division 00     Status of unit ISSUED     Crossmatch result Compatible        Medical Decision Making   Initial Medical Decision Making and DDx:  Consistent with anemia due to Chronic Renal Disease. Do not suspect acute blood loss or malignancy. She will receive 2 units here and be discharged. ED Course: Progress Notes, Reevaluation, and Consults:  3:57 PM Reevaluation: Lung sounds clear, pt feeling better, and she is smiling. Pt is nonfebrile and ready for discharge. Low grade temp she had initially has resolved and no leukocytosis. No complaint to further focus exam or make me suspicious for infection. I am the first provider for this patient. I reviewed the vital signs, available nursing notes, past medical history, past surgical history, family history and social history. Vital Signs-Reviewed the patient's vital signs. Pulse Oximetry Analysis - Nonhypoxic    Cardiac Monitor:  Rate/Rhythm:  83 NSR    Records Reviewed: Nursing Notes and Old Medical Records (Time of Review: 8:01 AM)    Diagnosis     Clinical Impression:   1. Anemia, unspecified type    2.  [facility]        Disposition: Discharged    Follow-up Information     Follow up With Details Comments Contact Info    Dau Jodine Fabry, MD In 2 days  [facility] [address]  [phone]             Patient's Medications   Start Taking    No medications on file   Continue Taking    ALBUTEROL (PROVENTIL HFA, VENTOLIN HFA, PROAIR HFA) 90 MCG/ACTUATION INHALER    Take 2 Puffs by inhalation every four (4) hours as needed for Wheezing. AMLODIPINE (NORVASC) 10 MG TABLET    Take 1 Tab by mouth daily. ASPIRIN 81 MG CHEWABLE TABLET    Take 1 Tab by mouth daily. ATORVASTATIN (LIPITOR) 40 MG TABLET    Take 1 Tab by mouth nightly. CARVEDILOL (COREG) 25 MG TABLET    Take 1 Tab by mouth every twelve (12) hours. CLONIDINE HCL (CATAPRES) 0.3 MG TABLET    Take 1 Tab by mouth two (2) times a day. FLUOXETINE (PROZAC) 10 MG CAPSULE    Take 1 Cap by mouth daily. GLYCERIN, ADULT, (FLEET GLYCERIN, ADULT,) SUPPOSITORY    Insert 1 Suppository into rectum daily. Indications: BOWEL EVACUATION    HYDRALAZINE (APRESOLINE) 100 MG TABLET    Take 0.5 Tabs by mouth two (2) times a day. LOSARTAN (COZAAR) 100 MG TABLET    Take 1 Tab by mouth daily. MELATONIN 3 MG TABLET    Take 1 Tab by mouth nightly as needed. These Medications have changed    No medications on file   Stop Taking    No medications on file     _______________________________    Attestations:  [address] Zachariah Carota acting as a scribe for and in the presence of Fabian Fountain MD      May 31, 2018 at 8:01 AM       Provider Attestation:      I personally performed the services described in the documentation, reviewed the documentation, as recorded by the scribe in my presence, and it accurately and completely records my words and actions. May 31, 2018 at 8:01 AM - Fabian Fountain MD      Scribe Attestation     Angella Dubon acting as a scribe for and in the presence of Fabian Fountain MD      May 31, 2018 at 4:09 PM       Provider Attestation:      I personally performed the services described in the documentation, reviewed the documentation, as recorded by the scribe in my presence, and it accurately and completely records my words and actions.  May 31, 2018 at 4:09 PM - Fabian Fountain MD _______________________________

## 2018-05-31 NOTE — ED NOTES
Resting on stretcher with eyes closed. Respirations regular and unlabored. Son at bedside. Will continue to monitor.

## 2018-05-31 NOTE — ED NOTES
Assumed care of patient at this time. Patient is currently receiving blood call bell within reach, lunch tray present at the bedside.

## 2018-05-31 NOTE — ED NOTES
I have reviewed discharge instructions with the adult child. The adult child verbalized understanding. Patient gait steady patient denies pain and does not have any complaints at this time. Patient d/c in wheelchair with son present upon discharge.

## 2018-05-31 NOTE — ED NOTES
Patient's transfusion complete at this time. Patient has tolerated well and is aware that a second unit is needed.

## 2018-05-31 NOTE — ED TRIAGE NOTES
Patient alert, Sent to Ed by nephrologist for blood transfusion. As per patient's son, \"the kidney doctor sent her here because her blood count is low\".

## 2018-05-31 NOTE — PROGRESS NOTES
Seen in the ER, was sent by the HD unit for low Hgb, Pt currently receiving 1st unit. Spoke with her son in law ( she only speaks Gaudencio). No obvious blood lost and she is not on any NSAID or ASA. She had recent workup in [facility] for pleural effusion and and liver mass had thoracentesis done and liver bx . He reports all test were negative. V/S noted mild temp elevation prior to BT   C/L clear  [facility] regular no rub, Left arm avf + bruit no redness or warmth. Discussed with Dr. Jacey Reid. Recommend 2 units PRBC. Ok to D/C post BT if stable. Follow up HD tomorrow. She will be referred to hematologist for workup of anemia. Son in law is agreeable.

## 2018-05-31 NOTE — ED NOTES
Patient removed BP cuff and would not allow bp to be retaken while eating during the administration of second set of blood. Patient condition is stable Vitals placed as soon as patient was done eating.

## 2018-06-07 NOTE — PROCEDURES
RADIOLOGY POST PARACENTESIS NOTE     June 7, 2018       11:05 AM     Preoperative Diagnosis:   Ascites    Postoperative Diagnosis:  Same. :  Ruth Lundy PA-C    Assistant:  None. Type of Anesthesia: 1% plain lidocaine    Procedure/Description:  US-Guided paracentesis    Findings:  Using ultrasound guidance the largest pocket of peritoneal fluid was localized and marked at the right lower quadrant. The patient was prepped and draped in the usual fashion. 1% Lidocaine was infiltrated locally. A 5 Korean over the needle catheter was advanced into the peritoneal cavity and clear yellow colored fluid was aspirated. Once fluid was easily aspirated, the needle was removed leaving the catheter in place. The catheter was connected to vacuum containers and 2.5 liters of ascitic fluid was removed.     Estimated blood Loss:  Minimal    Specimen Removed:   Yes    Complications: None    Condition: Stable    Discharge Plan:  continue present therapy    Farheen Renee

## 2018-06-07 NOTE — ED NOTES
Assumed care of pt. Per pts son in law pt reports to ED with c/o abdominal pain, bloating and sob. Pts abdomen \"was drained out\" 2-3 months ago. Pts abdomen noted to be distended and tender. Pt A&Ox4. Will continue to monitor.

## 2018-06-07 NOTE — H&P
OUTPATIENT HISTORY AND PHYSICAL      Today 6/7/2018     Indication/Symptoms:   Luis Enrique Duran is a 56 y.o. female with recurrent ascites who presents for an US-guided paracentesis. Current Meds:    Prior to Admission medications    Medication Sig Start Date End Date Taking? Authorizing Provider   amLODIPine (NORVASC) 10 mg tablet Take 1 Tab by mouth daily. 3/8/18   Latoya Montes MD   aspirin 81 mg chewable tablet Take 1 Tab by mouth daily. 3/8/18   Latoya Montes MD   atorvastatin (LIPITOR) 40 mg tablet Take 1 Tab by mouth nightly. 3/8/18   Latoya Montes MD   carvedilol (COREG) 25 mg tablet Take 1 Tab by mouth every twelve (12) hours. 3/8/18   Latoya Montes MD   cloNIDine HCl (CATAPRES) 0.3 mg tablet Take 1 Tab by mouth two (2) times a day. 3/8/18   Latoya Montes MD   losartan (COZAAR) 100 mg tablet Take 1 Tab by mouth daily. 3/8/18   Latoya Montes MD   hydrALAZINE (APRESOLINE) 100 mg tablet Take 0.5 Tabs by mouth two (2) times a day. 3/8/18   Latoya Montes MD   melatonin 3 mg tablet Take 1 Tab by mouth nightly as needed. Patient taking differently: Take 1 Tab by mouth nightly as needed (insomnia). 3/8/18   Latoya Montes MD   FLUoxetine (PROZAC) 10 mg capsule Take 1 Cap by mouth daily. 3/8/18   Latoya Montes MD   glycerin, adult, (FLEET GLYCERIN, ADULT,) suppository Insert 1 Suppository into rectum daily. Indications: BOWEL EVACUATION 2/5/18   Abram Meneses MD   albuterol (PROVENTIL HFA, VENTOLIN HFA, PROAIR HFA) 90 mcg/actuation inhaler Take 2 Puffs by inhalation every four (4) hours as needed for Wheezing. 11/6/17 April RACHELLE Langford PA-C       Allergies:       Allergies   Allergen Reactions    Banana Other (comments)       Comorbid Conditions:    Past Medical History:   Diagnosis Date    Chronic kidney disease     [facility]D [facility]end stage renal disease) ([facility] [address]     TUES-THURS-SAT    Hypercholesteremia     Hypertension     Kidney disease     Vitamin D deficiency           Past Surgical History:   Procedure Laterality Date    VASCULAR SURGERY PROCEDURE UNLIST       Data:    Visit Vitals    /79    Pulse 84    Temp 97.8 °F (36.6 °C)    Resp 27    SpO2 98%   :  Recent Labs      06/07/18   0800   PLT  103*     Recent Labs      06/07/18   0851   INR  1.0       The H & P and/or progress notes and any available imaging were reviewed. The risks, indications and possible alternatives to the procedure, including doing nothing, were discussed and informed consent was obtained. Physical Exam:      Mental status:   Alert and oriented. Heart:   Regular rate. Lungs:  Normal respiratory effort. The patient is an appropriate candidate to undergo the planned procedure and sedation.     [facility], [id number]

## 2018-06-07 NOTE — DISCHARGE INSTRUCTIONS
Ascites: Care Instructions  Your Care Instructions  Ascites (say \"uh-SY-teez\") is a buildup of extra fluid in the belly. It can cause your belly to swell. It can also make it hard for you to breathe. Many diseases can cause ascites. But most people who get it have a liver problem. When the liver gets damaged, it can cause fluid to back up from the liver or from blood vessels. Then this fluid builds up in the belly. Your doctor may take a sample of the fluid in your belly with a thin needle. The sample is then tested to help find out the cause of the ascites. Treatment may include medicine. It may also include changing the way you eat so you don't eat a lot of salt. If your ascites is very bad and hard to treat, your doctor may need to use a needle to take out the fluid. Follow-up care is a key part of your treatment and safety. Be sure to make and go to all appointments, and call your doctor if you are having problems. It's also a good idea to know your test results and keep a list of the medicines you take. How can you care for yourself at home? · Be safe with medicines. Take your medicines exactly as prescribed. Call your doctor if you have any problems with your medicine. You will get more details on the specific medicines your doctor prescribes. · Eat low-salt foods, and don't add salt to your food. If you eat a lot of salt, it's harder to get rid of the extra fluid. Salt is in many prepared foods. These include marinelli, canned foods, snack foods, sauces, and soups. Look for products that are low-sodium or have reduced salt. · Do not drink any alcohol until you are all better. Alcohol will damage the liver more. If your liver disease is caused by drinking alcohol, do not drink alcohol at all. Tell your doctor if you need help to quit. Counseling, support groups, and sometimes medicines can help you stay sober. · Talk to your doctor before you take any other medicines.  These include over-the-counter medicines, vitamins, and herbal products. · Make sure your doctor knows all the medicines you take. Some medicines, such as acetaminophen (Tylenol), can make liver problems worse. When should you call for help? Call 911 anytime you think you may need emergency care. For example, call if:  ? · You have trouble breathing. ? · You vomit blood or what looks like coffee grounds. ?Call your doctor now or seek immediate medical care if:  ? · You have new or worse belly pain. ? · You have a fever. ? Watch closely for changes in your health, and be sure to contact your doctor if:  ? · You have any problems. ? · Your belly is getting bigger. ? · You are gaining weight. Where can you learn more? Go to http://cristino-anne.info/. Enter B970 in the search box to learn more about \"Ascites: Care Instructions. \"  Current as of: May 12, 2017  Content Version: 11.4  © 8301-8701 [employer]. Care instructions adapted under license by [employer] (which disclaims liability or warranty for this information). If you have questions about a medical condition or this instruction, always ask your healthcare professional. [address] any warranty or liability for your use of this information.

## 2018-06-07 NOTE — ED PROVIDER NOTES
EMERGENCY DEPARTMENT HISTORY AND PHYSICAL EXAM    7:34 AM      Date: 6/7/2018  Patient Name: Hawa Underwood    History of Presenting Illness     Chief Complaint   Patient presents with    Shortness of Breath    Other         History Provided By: Patient    Chief Complaint: abdominal distention, shortness of breath  Duration: several Days  Timing:  Acute  Location: generalized abdomen  Quality: n/a  Severity: Mild  Modifying Factors: better when laying down  Associated Symptoms: constipation      Additional History (Context): Hawa Underwood is a 56 y.o. female with hypertension, hyperlipidemia, myocardial infarction and ESRD who presents with several days of acute onset mild generalized abdominal distention and shortness of breath that is better when laying down along with constipation. Pt had a normal run of dialysis yesterday but was noted to have a build up of fluid in her abdomen that was not improved by the dialysis. Pt has past history of abdominal distention that needed to be drained. Pt denies hx of liver disease. Pt has been having constipation and is being treated by her PCP for it. No other concerns or symptoms at this time. PCP: Rafa Vann MD    Current Outpatient Prescriptions   Medication Sig Dispense Refill    amLODIPine (NORVASC) 10 mg tablet Take 1 Tab by mouth daily. 30 Tab 0    aspirin 81 mg chewable tablet Take 1 Tab by mouth daily. 30 Tab 0    atorvastatin (LIPITOR) 40 mg tablet Take 1 Tab by mouth nightly. 30 Tab 0    carvedilol (COREG) 25 mg tablet Take 1 Tab by mouth every twelve (12) hours. 60 Tab 0    cloNIDine HCl (CATAPRES) 0.3 mg tablet Take 1 Tab by mouth two (2) times a day. 60 Tab 0    losartan (COZAAR) 100 mg tablet Take 1 Tab by mouth daily. 30 Tab 0    hydrALAZINE (APRESOLINE) 100 mg tablet Take 0.5 Tabs by mouth two (2) times a day. 60 Tab 0    melatonin 3 mg tablet Take 1 Tab by mouth nightly as needed.  (Patient taking differently: Take 1 Tab by mouth nightly as needed (insomnia). ) 30 Tab 0    FLUoxetine (PROZAC) 10 mg capsule Take 1 Cap by mouth daily. 30 Cap 0    glycerin, adult, (FLEET GLYCERIN, ADULT,) suppository Insert 1 Suppository into rectum daily. Indications: BOWEL EVACUATION 10 Suppository 0    albuterol (PROVENTIL HFA, VENTOLIN HFA, PROAIR HFA) 90 mcg/actuation inhaler Take 2 Puffs by inhalation every four (4) hours as needed for Wheezing. 1 Inhaler 0       Past History     Past Medical History:  Past Medical History:   Diagnosis Date    Chronic kidney disease     [facility]     TUES-THURS-SAT    Hypercholesteremia     Hypertension     Kidney disease     Vitamin D deficiency        Past Surgical History:  Past Surgical History:   Procedure Laterality Date    VASCULAR SURGERY PROCEDURE UNLIST         Family History:  No family history on file. Social History:  Social History   Substance Use Topics    Smoking status: Never Smoker    Smokeless tobacco: Never Used    Alcohol use No       Allergies: Allergies   Allergen Reactions    Banana Other (comments)         Review of Systems       Review of Systems   Constitutional: Negative for fever. Respiratory: Positive for shortness of breath. Gastrointestinal: Positive for abdominal distention and constipation. Negative for diarrhea, nausea and vomiting. All other systems reviewed and are negative. Physical Exam     Visit Vitals    /74    Pulse 85    Temp 97.8 °F (36.6 °C)    Resp (!) 31    SpO2 97%         Physical Exam   Constitutional: She is oriented to person, place, and time. Appears chronically ill   HENT:   Head: Normocephalic and atraumatic. Neck: Neck supple. No JVD present. Cardiovascular: Normal rate and regular rhythm. Dialysis graft Left upper arm, good thrill   Pulmonary/Chest: Effort normal and breath sounds normal. No respiratory distress. Abdominal: Soft. She exhibits distension (mild). There is no tenderness.  There is no rebound and no guarding. Musculoskeletal: She exhibits no edema. Neurological: She is alert and oriented to person, place, and time. Skin: Skin is warm and dry. No erythema. Diagnostic Study Results     Labs -  Recent Results (from the past 12 hour(s))   CBC WITH AUTOMATED DIFF    Collection Time: 06/07/18  8:00 AM   Result Value Ref Range    WBC 3.5 (L) 4.6 - 13.2 K/uL    RBC 3.56 (L) 4.20 - 5.30 M/uL    HGB 9.2 (L) 12.0 - 16.0 g/dL    HCT 29.7 (L) 35.0 - 45.0 %    MCV 83.4 74.0 - 97.0 FL    MCH 25.8 24.0 - 34.0 PG    MCHC 31.0 31.0 - 37.0 g/dL    RDW 16.1 (H) 11.6 - 14.5 %    PLATELET 387 (L) 729 - 420 K/uL    MPV 10.1 9.2 - 11.8 FL    NEUTROPHILS 78 (H) 40 - 73 %    LYMPHOCYTES 8 (L) 21 - 52 %    MONOCYTES 13 (H) 3 - 10 %    EOSINOPHILS 0 0 - 5 %    BASOPHILS 1 0 - 2 %    ABS. NEUTROPHILS 2.7 1.8 - 8.0 K/UL    ABS. LYMPHOCYTES 0.3 (L) 0.9 - 3.6 K/UL    ABS. MONOCYTES 0.4 0.05 - 1.2 K/UL    ABS. EOSINOPHILS 0.0 0.0 - 0.4 K/UL    ABS. BASOPHILS 0.0 0.0 - 0.06 K/UL    DF AUTOMATED     METABOLIC PANEL, COMPREHENSIVE    Collection Time: 06/07/18  8:00 AM   Result Value Ref Range    Sodium 132 (L) 136 - 145 mmol/L    Potassium 5.1 3.5 - 5.5 mmol/L    Chloride 96 (L) 100 - 108 mmol/L    CO2 28 21 - 32 mmol/L    Anion gap 8 3.0 - 18 mmol/L    Glucose 111 (H) 74 - 99 mg/dL    BUN 31 (H) 7.0 - 18 MG/DL    Creatinine 6.50 (H) 0.6 - 1.3 MG/DL    BUN/Creatinine ratio 5 (L) 12 - 20      GFR est AA 8 (L) >60 ml/min/1.73m2    GFR est non-AA 7 (L) >60 ml/min/1.73m2    Calcium 9.2 8.5 - 10.1 MG/DL    Bilirubin, total 0.4 0.2 - 1.0 MG/DL    ALT (SGPT) 18 13 - 56 U/L    AST (SGOT) 35 15 - 37 U/L    Alk.  phosphatase 132 (H) 45 - 117 U/L    Protein, total 5.4 (L) 6.4 - 8.2 g/dL    Albumin 2.1 (L) 3.4 - 5.0 g/dL    Globulin 3.3 2.0 - 4.0 g/dL    A-G Ratio 0.6 (L) 0.8 - 1.7     CARDIAC PANEL,(CK, CKMB & TROPONIN)    Collection Time: 06/07/18  8:00 AM   Result Value Ref Range    CK 27 26 - 192 U/L    CK - MB <1.0 <3.6 ng/ml CK-MB Index  0.0 - 4.0 %     CALCULATION NOT PERFORMED WHEN RESULT IS BELOW LINEAR LIMIT    Troponin-I, Qt. <0.02 0.0 - 0.045 NG/ML   EKG, 12 LEAD, INITIAL    Collection Time: 06/07/18  8:37 AM   Result Value Ref Range    Ventricular Rate 79 BPM    Atrial Rate 79 BPM    P-R Interval 168 ms    QRS Duration 84 ms    Q-T Interval 386 ms    QTC Calculation (Bezet) 442 ms    Calculated P Axis 62 degrees    Calculated R Axis 101 degrees    Calculated T Axis 73 degrees    Diagnosis       Normal sinus rhythm  Possible Left atrial enlargement  Rightward axis  Nonspecific ST abnormality  Abnormal ECG  When compared with ECG of 08-MAR-2018 09:19,  Non-specific change in ST segment in Inferior leads  T wave inversion no longer evident in Inferior leads  T wave inversion less evident in Anterolateral leads  QT has shortened     PROTHROMBIN TIME + INR    Collection Time: 06/07/18  8:51 AM   Result Value Ref Range    Prothrombin time 12.9 11.5 - 15.2 sec    INR 1.0 0.8 - 1.2     ALBUMIN, FLUID    Collection Time: 06/07/18  9:45 AM   Result Value Ref Range    Fluid Type: ABDOMINAL FLUID      Albumin, body fld. 1.7 g/dL   CELL COUNT AND DIFF, BODY FLUID    Collection Time: 06/07/18  9:45 AM   Result Value Ref Range    BODY FLUID TYPE ABDOMINAL FLUID      FLUID COLOR YELLOW      FLUID APPEARANCE HAZY      FLUID RBC CT. 1660 (A) NRRE /cu mm    FLUID NUCLEATED CELLS 1300 (A) NRRE /cu mm    FLD NEUTROPHILS PENDING %    FLD BANDS PENDING %    FLD LYMPHS PENDING %    FLD MONOCYTES PENDING %    FLD EOSINS PENDING %       Radiologic Studies -   CT ABD PELV WO CONT   Final Result      XR CHEST PORT   Final Result      US GUIDE PARACENTESIS    (Results Pending)     Stable cardiomegaly. Interval decrease in left lower lobe infiltrate or atelectasis and pleural  effusion. Tiny right pleural effusion.   Subsegmental atelectasis in the right perihilar region.         CT  Small to moderate right pleural effusion with atelectasis/infiltrates in basal  right lung.     Mild atelectasis and/or fibrosis with or without active infiltrates in left lung  base.     Small to moderate pericardial effusion.     Large amount of ascites distending abdomen and pelvis.     Large amount of retained stool in proximal colon. Moderate gas in transverse  colon and upper sigmoid colon.     There is prominent appendix containing gas and some stool-like material but  there is no evidence of any abnormal thickening or appendicular wall. No  definite finding to suspect acute appendicitis. Medical Decision Making   I am the first provider for this patient. I reviewed the vital signs, available nursing notes, past medical history, past surgical history, family history and social history. Vital Signs-Reviewed the patient's vital signs. Pulse Oximetry Analysis -  98 on room air (Interpretation)nl     Cardiac Monitor:  Rate: 84  Rhythm:  Normal Sinus Rhythm     EKG: Interpreted by the EP. Time Interpreted: 837   Rate: 79   Rhythm: Normal Sinus Rhythm    Interpretation: right axis, normal intervals, trace ST depression V6   Comparison: prior 3/8/18, diffuse t wave inversion has resolved. Records Reviewed: Nursing Notes (Time of Review: 7:34 AM)    ED Course: Progress Notes, Reevaluation, and Consults:    Provider Notes (Medical Decision Making): 55 y/o female presents with sob and abd distention, will eval for acs, overload, doubt pna as pt afebrile. Doubt obstruction as no vomiting. No hx of ascites. Did have liver mass, underwent biopsy. 4/26/18 CT C/A/P  1. Marked decreased size of mildly heterogeneous small residual left pleural effusion with associated mild pleural thickening which may reflect chronicity, infection, malignancy is not excluded but no focal nodularity. 2.  Mild soft tissue the left renal hilum which does not narrow the bronchi is favored to represent either infection or subsegmental atelectasis attention on followup.   3.  A single peripherally enhancing lesion in the liver is suspicious for metastasis.  Lesion would be atypical for other etiology.  It may be too small to better characterize by MR but may consider to see if there are other occult lesions.  Lesion may be too small to visualize by ultrasound for biopsy, not documented on recent ultrasound but could repeat ultrasound to see if lesion can be seen. 4.  Trace pelvic fluid is nonspecific. 5.  Right sacroiliitis and possible left may reflect gout, psoriasis/reactive arthritis, osteoarthritis or other etiology. 9:16 AM  Discussed results with family, plan for US paracentesis. Pt had paracentesis, 2.5L removed. 10:46 AM pt is feeling much better, shortness of breath resolved. Discussed plan for GI follow up      11:10 AM  Consult: Discussed with ALIYAH Andrews. Agrees with outpatient management and will follow up in office. Diagnosis     Clinical Impression:   1. Other ascites    2. Dyspnea, unspecified type        Disposition: discharged    Follow-up Information     Follow up With Details Comments Contact Info    Nilam Us MD Call today with Dr. Marisa Weiss or [id number] [facility] [place] [address] [facility] EMERGENCY DEPT  As needed, If symptoms worsen [address] [place] [address]  [phone]           Patient's Medications   Start Taking    No medications on file   Continue Taking    ALBUTEROL (PROVENTIL HFA, VENTOLIN HFA, PROAIR HFA) 90 MCG/ACTUATION INHALER    Take 2 Puffs by inhalation every four (4) hours as needed for Wheezing. AMLODIPINE (NORVASC) 10 MG TABLET    Take 1 Tab by mouth daily. ASPIRIN 81 MG CHEWABLE TABLET    Take 1 Tab by mouth daily. ATORVASTATIN (LIPITOR) 40 MG TABLET    Take 1 Tab by mouth nightly. CARVEDILOL (COREG) 25 MG TABLET    Take 1 Tab by mouth every twelve (12) hours. CLONIDINE HCL (CATAPRES) 0.3 MG TABLET    Take 1 Tab by mouth two (2) times a day.     FLUOXETINE (PROZAC) 10 MG CAPSULE    Take 1 Cap by mouth daily. GLYCERIN, ADULT, (FLEET GLYCERIN, ADULT,) SUPPOSITORY    Insert 1 Suppository into rectum daily. Indications: BOWEL EVACUATION    HYDRALAZINE (APRESOLINE) 100 MG TABLET    Take 0.5 Tabs by mouth two (2) times a day. LOSARTAN (COZAAR) 100 MG TABLET    Take 1 Tab by mouth daily. MELATONIN 3 MG TABLET    Take 1 Tab by mouth nightly as needed. These Medications have changed    No medications on file   Stop Taking    No medications on file     _______________________________    Attestations:  [address] [place] [address] acting as a scribe for and in the presence of Lolita Meckel, DO      June 07, 2018 at 7:34 AM       Provider Attestation:      I personally performed the services described in the documentation, reviewed the documentation, as recorded by the scribe in my presence, and it accurately and completely records my words and actions.  June 07, 2018 at 7:34 AM - Lolita Meckel, DO    _______________________________

## 2018-06-07 NOTE — ED NOTES
Pt returned from [address], per family pt had 2.5 L drained from abdomen. Pt reports relief, denies SOB at this time. Will continue to monitor.

## 2018-06-18 PROBLEM — T82.9XXA COMPLICATION OF VASCULAR DIALYSIS CATHETER, INITIAL ENCOUNTER: Status: ACTIVE | Noted: 2018-01-01

## 2018-06-18 NOTE — ED NOTES
Pt required bedside commode for urination. RN assisted pt to bedside commode. Pt very unsteady on feet. Pt produced very small amount of urine. Pt required being picked up to get back in bed as she was unable to get up in the bed.

## 2018-06-18 NOTE — PROGRESS NOTES
Pt for same day surg regarding clotted access left arm  Now admitted for new treatment of TB  Will have to transfer to medical admission  Hd access to follow

## 2018-06-18 NOTE — ED NOTES
TRANSFER - OUT REPORT:    Verbal report given to [facility] [address] on Joey Kitten  being transferred to  (unit) for routine progression of care       Report consisted of patients Situation, Background, Assessment and   Recommendations(SBAR). Information from the following report(s) SBAR, ED Summary, [facility] and Recent Results was reviewed with the receiving nurse. Lines:   Peripheral IV 06/18/18 Right Wrist (Active)   Site Assessment Clean, dry, & intact 6/18/2018  1:05 AM   Phlebitis Assessment 0 6/18/2018  1:05 AM   Infiltration Assessment 0 6/18/2018  1:05 AM   Dressing Status Clean, dry, & intact 6/18/2018  1:05 AM   Dressing Type Transparent 6/18/2018  1:05 AM   Hub Color/Line Status Blue;Flushed 6/18/2018  1:05 AM   Action Taken Blood drawn 6/18/2018  1:05 AM   Alcohol Cap Used Yes 6/18/2018  1:05 AM        Opportunity for questions and clarification was provided.       Patient transported with:  Transportation

## 2018-06-18 NOTE — ED NOTES
Patient turned over to me at 0700 with concerns for hemorrhage from dialysis access graft. Patient had reportedly rapidly expanding hematoma that was controlled with pressure and coban dressing. On my examination, coban was in place with ecchymoses and no palpable thrill. Duplex study confirmed thrombosis of graft and Dr. Amada Alvarez of Vascular Surgery was consulted. He agreed with need for surgical intervention to reestablish graft. Dr. Lorna Fonseca of Nephrology was also consulted and saw the patient in the ED to help establish follow-up dialysis. Family was at bedside and agreed with this plan.

## 2018-06-18 NOTE — CONSULTS
Consult Note  Consult requested by: dr Tom Bingham is a 56 y.o. female OTHER who is being seen on consult for esrd  Chief Complaint   Patient presents with    Shunt Problem     Admission diagnosis: <principal problem not specified>     HPI: 56 y o  female admitted with clotted avg.pt has hx of esrd,on dialysis,hx of htn. recent hospitalization at [facility] for pleural effusion,s/p thoracentesis,not diagnostic. also had paracentesis  last month. pt is unable to give hx. hx obtained from son in law  Past Medical History:   Diagnosis Date    Chronic kidney disease     ESRD (end stage renal disease) ([facility] [address]     TUES-THURS-SAT    Hypercholesteremia     Hypertension     Kidney disease     Vitamin D deficiency       Past Surgical History:   Procedure Laterality Date    VASCULAR SURGERY PROCEDURE UNLIST         Social History     Social History    Marital status:      Spouse name: N/A    Number of children: N/A    Years of education: N/A     Occupational History    Not on file. Social History Main Topics    Smoking status: Never Smoker    Smokeless tobacco: Never Used    Alcohol use No    Drug use: No    Sexual activity: Not on file     Other Topics Concern    Not on file     Social History Narrative       History reviewed. No pertinent family history. Allergies   Allergen Reactions    Banana Other (comments)        Home Medications:     Prior to Admission Medications   Prescriptions Last Dose Informant Patient Reported? Taking? FLUoxetine (PROZAC) 10 mg capsule   No No   Sig: Take 1 Cap by mouth daily. albuterol (PROVENTIL HFA, VENTOLIN HFA, PROAIR HFA) 90 mcg/actuation inhaler   No No   Sig: Take 2 Puffs by inhalation every four (4) hours as needed for Wheezing. amLODIPine (NORVASC) 10 mg tablet   No No   Sig: Take 1 Tab by mouth daily. aspirin 81 mg chewable tablet   No No   Sig: Take 1 Tab by mouth daily.    atorvastatin (LIPITOR) 40 mg tablet   No No   Sig: Take 1 Tab by mouth nightly. carvedilol (COREG) 25 mg tablet   No No   Sig: Take 1 Tab by mouth every twelve (12) hours. cloNIDine HCl (CATAPRES) 0.3 mg tablet   No No   Sig: Take 1 Tab by mouth two (2) times a day. glycerin, adult, (FLEET GLYCERIN, ADULT,) suppository   No No   Sig: Insert 1 Suppository into rectum daily. Indications: BOWEL EVACUATION   hydrALAZINE (APRESOLINE) 100 mg tablet   No No   Sig: Take 0.5 Tabs by mouth two (2) times a day. losartan (COZAAR) 100 mg tablet   No No   Sig: Take 1 Tab by mouth daily. melatonin 3 mg tablet   No No   Sig: Take 1 Tab by mouth nightly as needed. Patient taking differently: Take 1 Tab by mouth nightly as needed (insomnia). Facility-Administered Medications: None       No current facility-administered medications for this encounter. Current Outpatient Prescriptions   Medication Sig    amLODIPine (NORVASC) 10 mg tablet Take 1 Tab by mouth daily.  aspirin 81 mg chewable tablet Take 1 Tab by mouth daily.  atorvastatin (LIPITOR) 40 mg tablet Take 1 Tab by mouth nightly.  carvedilol (COREG) 25 mg tablet Take 1 Tab by mouth every twelve (12) hours.  cloNIDine HCl (CATAPRES) 0.3 mg tablet Take 1 Tab by mouth two (2) times a day.  losartan (COZAAR) 100 mg tablet Take 1 Tab by mouth daily.  hydrALAZINE (APRESOLINE) 100 mg tablet Take 0.5 Tabs by mouth two (2) times a day.  melatonin 3 mg tablet Take 1 Tab by mouth nightly as needed. (Patient taking differently: Take 1 Tab by mouth nightly as needed (insomnia). )    FLUoxetine (PROZAC) 10 mg capsule Take 1 Cap by mouth daily.  glycerin, adult, (FLEET GLYCERIN, ADULT,) suppository Insert 1 Suppository into rectum daily. Indications: BOWEL EVACUATION    albuterol (PROVENTIL HFA, VENTOLIN HFA, PROAIR HFA) 90 mcg/actuation inhaler Take 2 Puffs by inhalation every four (4) hours as needed for Wheezing. Review of Systems:   Review of systems not obtained due to patient factors.   Data Review:    Labs: Results:       Chemistry Recent Labs      06/18/18 0100   GLU  111*   NA  133*   K  5.0   CL  95*   CO2  30   BUN  53*   CREA  8.24*   CA  12.8*   AGAP  8   BUCR  6*      PTH  Lab Results   Component Value Date/Time    Calcium 12.8 (H) 06/18/2018 01:00 AM    Phosphorus 1.9 (L) 03/05/2018 04:22 AM    PTH, Intact 49.3 03/05/2018 04:22 AM      CBC w/Diff Recent Labs      06/18/18 0100   WBC  6.8   RBC  3.70*   HGB  9.3*   HCT  29.6*   PLT  218   GRANS  78*   LYMPH  7*   EOS  1      Coagulation Recent Labs      06/18/18 0100   PTP  13.3   INR  1.1   APTT  34.5       Iron/Ferritin No results for input(s): IRON in the last 72 hours. No lab exists for component: TIBCCALC   BNP No results for input(s): BNPP in the last 72 hours. Cardiac Enzymes No results for input(s): CPK, CKND1, WOLFGANG in the last 72 hours. No lab exists for component: CKRMB, TROIP   Liver Enzymes No results for input(s): TP, ALB, TBIL, AP, SGOT, GPT in the last 72 hours. No lab exists for component: DBIL   Thyroid Studies No results found for: T4, T3U, TSH, TSHEXT     Urinalysis Lab Results   Component Value Date/Time    Color YELLOW 02/25/2018 04:40 PM    Appearance CLEAR 02/25/2018 04:40 PM    Specific gravity 1.012 02/25/2018 04:40 PM    pH (UA) >8.5 (H) 02/25/2018 04:40 PM    Protein >1000 (A) 02/25/2018 04:40 PM    Glucose 100 (A) 02/25/2018 04:40 PM    Ketone NEGATIVE  02/25/2018 04:40 PM    Bilirubin NEGATIVE  02/25/2018 04:40 PM    Urobilinogen 0.2 02/25/2018 04:40 PM    Nitrites NEGATIVE  02/25/2018 04:40 PM    Leukocyte Esterase MODERATE (A) 02/25/2018 04:40 PM    Epithelial cells 4+ 02/25/2018 04:40 PM    Bacteria NEGATIVE  02/25/2018 04:40 PM    WBC 4 to 10 02/25/2018 04:40 PM    RBC NONE 02/25/2018 04:40 PM         IMAGES:   XR Results (maximum last 3):     Results from Hospital Encounter encounter on 06/18/18   XR CHEST PORT   Narrative AP CHEST, PORTABLE    INDICATION: End-stage renal disease    COMPARISON: Prior chest x-rays, most recent 6/7/2018    FINDINGS:  EKG leads overlie the patient. Stable enlargement of the cardiac silhouette. The pulmonary vasculature is  unremarkable. Small bilateral pleural effusions with overlying atelectasis. No  evidence for pneumothorax. No acute osseous abnormalities are identified. Impression IMPRESSION:     Small bilateral pleural effusions with overlying atelectasis. Superimposed  infiltrate or edema not excluded. Stable enlargement of the cardiac silhouette. Results from Hospital Encounter encounter on 06/07/18   XR CHEST PORT   Narrative Portable Chest 0802 hours    CPT CODE:88376    HISTORY:  Shortness of breath, generalized abdominal pain and bloating, history of  ascites,   sob. COMPARISON: Chest x-ray May 3, 2018, February 27, 2018. FINDINGS:     The cardiac silhouette remains mild to moderately enlarged. There is continued  blunting of the left inferior lateral pleural space. Band of increased density  is seen in the right perihilar region. There is minimal blunting of the right  costophrenic angle. Pulmonary vascularity is normal.         Impression IMPRESSION:    Stable cardiomegaly. Interval decrease in left lower lobe infiltrate or atelectasis and pleural  effusion. Tiny right pleural effusion. Subsegmental atelectasis in the right perihilar region. Results from [place] encounter on 02/25/18   XR ABD (KUB)   Narrative ABDOMEN ONE VIEW    HISTORY: Dobbhoff feeding tube placement. COMPARISON: Abdomen flat and upright with chest radiograph 2/5/2018, chest  radiograph 2/28/2018    FINDINGS:     Portable supine view of the lower chest and abdomen. Portions of the left  lateral abdominal wall are not included on the image. A Dobbhoff feeding tube has been inserted. The tip of the tube is beyond the EG  junction, in the proximal to mid stomach. No loops in the tube. Nonobstructive bowel gas pattern.   Heart size appears enlarged, stable retrocardiac opacity. Impression IMPRESSION:    Dobbhoff feeding tube is below the diaphragms, the tip in the proximal to mid  stomach. CT Results (maximum last 3): Results from [facility] encounter on 06/07/18   CT ABD PELV WO CONT   Narrative CT scan of abdomen and pelvis, without intravenous contrast:        INDICATION:    Acute abdominal pain. History of end-stage renal disease and dialysis. History of hypertension and hypercholesterolemia. TECHNIQUE:    Multidetector CT scan with 5 mm size thickness, without intravenous contrast,  without oral contrast, including abdomen and pelvis. Coronal and sagittal images reformatted. All CT scans at this facility are performed using dose optimization technique as  appropriate to a  performed  examination, to include automated exposer control,  adjustment mA and / or  KV according to patient size (including appropriate  matching  for site specific examination), or use  of iterative  reconstruction  technique. COMPARISON STUDY: CT scan of abdomen and pelvis without intravenous contrast on  09/08/2017. FINDINGS:        CT ABDOMEN:    The bases of lungs: There is small to moderate right-sided pleural effusion. Posterior to right lung  base thickness all right pleural effusion is 2.7 cm. There are evidence of pericardial effusion, with a thickness of 1.6 cm. Evidence of moderate atelectatic changes, with without infiltrates in the  posterior basilar segment of right lower lobe. Mild atelectatic changes, with or  without minimal infiltrates in left lung base. There is moderate cardiomegaly. Pneumoperitoneum: Not demonstrated. Liver:  No definable focal abnormality in liver. Spleen:  Spleen is of normal size with a few calcified granulomas. Pancreas:  No definable focal lesion in pancreas. Mildly atrophic pancreas.     Gallbladder:  Gallbladder is markedly contracted and poorly defined due to presence of  ascites. Abdominal aorta: Abdominal aorta is of normal size with mild to moderate  calcified plaques. Abdominal lymph nodes:  No evidence of pathologic lymphadenopathy in periaortic, mesenteric or  splanchnic areas. Ascites: The study demonstrated large ascites in abdomen and pelvis. Bilateral kidneys, ureters and bladder:  Bilateral kidneys are small, atrophic, consistent with end-stage renal disease. At the posterior aspect of lower pole of left kidney there is a mass, which is  hypodense, likely to be cyst, measuring 2.0 cm in diameter, as before. There is  no hydronephrosis in either side. Ureters are not abnormally dilated. No  evidence of urinary calculus. Bladder appears to be small, contracted. Pelvic cavity is mostly filled with  ascites fluid. GI tract:    Stomach is contracted. Small amount of nonspecific gas is scattered in small  bowel. Large amount of stool and small amount of gas are present in right colon, and  hepatic flexural colon. Medially transverse colon contains moderate gas. Descending colon contains minimal stool and gas. Upper sigmoid colon contains  moderate gas. Distal sigmoid colon and rectum contains small amount of stool and  gas. No obvious colonic diverticulosis    There is prominent appendix containing some stool-like material, located at the  inferomedial aspect of lower cecum. No definite finding to suspect appendicitis  as the wall of appendix is not obviously thickened. On previous study there was  moderately prominent appendix, but currently appendix is larger due to presence  of gas and some stool-like material in the appendix. The area surrounding  appendix is obscured by ascites. CT PELVIS:        Pelvic cavity is mostly filled with large amount of ascites fluid. Uterus is grossly unremarkable. Bladder is contracted. No obvious mass or  pathologic lymphadenopathy in pelvis.     No evidence of inguinal, femoral or ventral hernia. Bony structures:  No definable focal lesion in the bony structures of pelvis and lumbar spine. Mild multilevel spondylosis in lumbar spine. IMPRESSIONS:        Small to moderate right pleural effusion with atelectasis/infiltrates in basal  right lung. Mild atelectasis and/or fibrosis with or without active infiltrates in left lung  base. Small to moderate pericardial effusion. Large amount of ascites distending abdomen and pelvis. Large amount of retained stool in proximal colon. Moderate gas in transverse  colon and upper sigmoid colon. There is prominent appendix containing gas and some stool-like material but  there is no evidence of any abnormal thickening or appendicular wall. No  definite finding to suspect acute appendicitis. Results from [place] encounter on 02/25/18   CTA CHEST W OR W WO CONT   Narrative EXAMINATION: CTA chest pulmonary    INDICATION: Acute chest pain, shortness of breath    COMPARISON: None    TECHNIQUE: CT angiography of the pulmonary arteries performed following 75 cc IV  Isovue 370, with 3-D MIP multiplanar reformations. All CT scans at this facility  are performed using dose optimization technique as appropriate to a performed  exam, to include automated exposure control, adjustment of the mA and/or kV  according to patient size (including appropriate matching first site specific  examinations), or use of iterative reconstruction technique. FINDINGS:    Cardiovascular: No pulmonary arterial filling defects identified to suggest  pulmonary embolism. Motion artifact limits evaluation of smaller segmental and  subsegmental arteries. Ascending thoracic aorta is borderline ectatic measuring  up to 3.5 cm diameter. Heart size within normal limits. Right jugular catheter  tip at right atrium level. Moderate-sized area of cartilage fusion. Mediastinum: Possible enlarged aortopulmonary node and left hilar nodes, poorly  delineated.  Left hilar anita calcification noted. Thyroid slightly  heterogeneous. Esophagus is nondistended. Pleura: Moderate volume bilateral pleural effusions. No evidence of  pneumothorax. Lungs: Probable compressive atelectasis due to pleural effusions bilaterally. Right major fissure nodule measuring 4 mm, likely pleural-based node. Upper abdomen: Unremarkable. Miscellaneous: Superficial soft tissues unremarkable. Bones: No acute osseous findings. Probable bone island in T11 posterior body. Impression IMPRESSION:    1. No pulmonary embolus identified. Smaller segmental and subsegmental pulmonary  arteries less well evaluated due to motion artifact. 2. Moderate-sized bilateral pleural effusions, and moderate volume pericardial  effusion. Borderline ectatic ascending aorta. -Probable compressive atelectasis in bilateral lungs. Superimposed infiltrate  difficult to exclude.    -Suspected left hilar and aortopulmonary window adenopathy, poorly delineated,  nonspecific. Results from [place] encounter on 11/05/17   CT HEAD WO CONT   Narrative CT of the head without contrast    CT CODE:  [id number]    HISTORY: Severe headache    COMPARISON: None. TECHNIQUE: Helical axial scan to the head was performed from the skull base to  the vertex without IV contrast administration. All CT scans at this facility performed using dose optimization techniques as  appreciated to a performed exam, to include automated exposure control,  adjustment of the mA and or KU according to patient size (including appropriate  matching for site specific examination), or use of iterative reconstruction  technique. FINDINGS:    Mild patchy periventricular white matter hypodensities identified. No  significant cerebral atrophy. The brain parenchyma, ventricles and calvarium  appear otherwise unremarkable. There is no evidence of acute intracranial  hemorrhage or mass effect identified.  No skull fracture or extra axial fluid  collections identified. Visualized sinuses and mastoid air cells appear  unremarkable. Impression IMPRESSION:    No acute intracranial abnormality. Mild patchy periventricular white matter hypodensities which could represent  early burden of microvascular disease. Recommend clinical correlation and MR if  clinically indicated. Thank you for your referral.         MRI Results (maximum last 3): No results found for this or any previous visit. Nuclear Medicine Results (maximum last 3): Results from [place] encounter on 11/05/17   NM LUNG PERFUSION W VENT   Narrative LUNG SCAN VENTILATION AND PERFUSION    CPT CODE: 61673    HISTORY: Shortness of breath. Elevated d-dimer, high blood pressure    COMPARISON: Chest x-ray 11/5/2017. TECHNIQUE: 72.8 millicuries of technetium 99m DTPA was delivered via aerosol. Planar images were obtained in AP, PA, lateral, and oblique projections. 4.6  millicuries of technetium 99m MAA was intravenously injected into the right  wrist IV and multiple images were obtained in the same projections. FINDINGS:    Large amount of retained isotope within the mouth. .    Mild central clumped isotope on ventilation imaging. There are multiple  bilateral peripheral wedge-shaped ventilation defects . The perfusion is  markedly better than the ventilation. There are no mismatched perfusion defects. CXR demonstrates cardiomegaly with bibasal infiltrates. Impression IMPRESSION:    Low probability for pulmonary embolus. The perfusion is markedly better than the ventilation. Report provided to the emergency department at 0400 hrs. .        US Results (maximum last 3): Results from [place] encounter on 06/07/18   US GUIDE PARACENTESIS   Narrative This procedure was performed in its entirety by a Physician Assistant. Procedure: Ultrasound-guided paracentesis.     : Millie Chen PA-C    Attending Physician:  Sag    Assistant: None    Specimen: Ascites    EBL: Minimal    Indications: Ascites    Anesthesia: 1% lidocaine locally    Implants: None    Complications: None    Technique: Using ultrasound guidance a large pocket of peritoneal fluid was  localized and marked in the right lower quadrant. The patient was prepped and  draped in the usual sterile fashion and sterile barrier technique was utilized. 1% lidocaine was infiltrated locally. A 5 Kiswahili over-the-needle catheter was  advanced into the peritoneal cavity and clear yellow color fluid was aspirated. Once the fluid was easily aspirated the needle was removed leaving the catheter  in place. The catheter was connected to vacuum containers and 2.5 L was removed. Ultrasound images were saved for documentation. The patient tolerated the procedure well. Impression Impression:      1. Successful paracentesis of 2.5 liters of ascites          DEXA Results (maximum last 3): No results found for this or any previous visit. AVERY Results (maximum last 3): No results found for this or any previous visit. IR Results (maximum last 3): No results found for this or any previous visit. VAS/US Results (maximum last 3): Results from Hospital Encounter encounter on 02/25/18   DUPLEX RENAL ART/GIULIA BILATERAL  Results from [place] encounter on 02/05/18   DUPLEX UPPER EXT VENOUS LEFT    PET Results (maximum last 3): No results found for this or any previous visit. No results found for this or any previous visit. [id number]    CULTURE:   )No results for input(s): SDES, CULT in the last 72 hours. No results for input(s): CULT in the last 72 hours. Physical Assessment:     Visit Vitals    /77    Pulse 86    Temp 97.5 °F (36.4 °C)    Resp 19    SpO2 97%     There were no vitals filed for this visit.   No intake or output data in the 24 hours ending 06/18/18 1336    Physial Exam:  General appearance: no distress, appears stated age  Skin: normal coloration and turgor, no rashes, no suspicious skin lesions noted. HEENT: Head; normocephalic, atraumatic. MATEO. ENT- ENT exam normal, no neck nodes or sinus tenderness. Lungs: diminished breath sounds bases  Heart: regular rate and rhythm, S1, S2 normal, no murmur, click, rub or gallop  Abdomen: ascites  Extremities: edema +    PLAN / RECOMMENDATION:    Esrd,admitted with clotted avg,for thrombectomy today . dialysis tomorrow  Htn,bp had mary low lately . asked her son in law to hold norvasc and hydralazine. continue cozaar,clonidine and coreg  Hypercalcemia,stop hectorol,dialysis with low ca bath     Thank you for the consultation to participate in patient's care. I have personally discussed my plan with the referring physician.      Kaitlin Garcia MD  June 18, 2018

## 2018-06-18 NOTE — PROGRESS NOTES
Was called by ER to see pt for Positive AFB culture   Dr Asa Rodriguez from Nephrology called the ER to inform of her + Dx -- he mentions the dialysis center called him for the positive Dx    Chart reviewed pt was seen at [facility] on 6/6 - with + dx - I paged Dr Juany Ascencio fellow & discussed pt - who is familiar to her   Per Dr Walter Flores - pt was seen in the ID clinic & is currently on Rx by the [facility] - she is getting her meds & has been on meds for 1 week   She is not contagious since 3 AFB smears were negative & she is only culture positive   Informed Dr Asa Rodriguez of the same - ok to go home post procedure today & will get HD as planned

## 2018-06-18 NOTE — IP AVS SNAPSHOT
Juanita Garcia 
 
 
 [address] [facility] [address] [place] [address] Patient: David Melton MRN: [id number] :1960 A check catherine indicates which time of day the medication should be taken. My Medications CHANGE how you take these medications Instructions Each Dose to Equal  
 Morning Noon Evening Bedtime  
 melatonin 3 mg tablet What changed:  reasons to take this Your last dose was: Your next dose is: Take 1 Tab by mouth nightly as needed. 3 mg CONTINUE taking these medications Instructions Each Dose to Equal  
 Morning Noon Evening Bedtime  
 albuterol 90 mcg/actuation inhaler Commonly known as:  PROVENTIL HFA, VENTOLIN HFA, PROAIR HFA Your last dose was: Your next dose is: Take 2 Puffs by inhalation every four (4) hours as needed for Wheezing. 2 Puff  
    
   
   
   
  
 amLODIPine 10 mg tablet Commonly known as:  Gabriela Shield Your last dose was: Your next dose is: Take 1 Tab by mouth daily. 10 mg  
    
   
   
   
  
 aspirin 81 mg chewable tablet Your last dose was: Your next dose is: Take 1 Tab by mouth daily. 81 mg  
    
   
   
   
  
 atorvastatin 40 mg tablet Commonly known as:  LIPITOR Your last dose was: Your next dose is: Take 1 Tab by mouth nightly. 40 mg  
    
   
   
   
  
 carvedilol 25 mg tablet Commonly known as:  Indu Alstrom Your last dose was: Your next dose is: Take 1 Tab by mouth every twelve (12) hours. 25 mg  
    
   
   
   
  
 cloNIDine HCl 0.3 mg tablet Commonly known as:  CATAPRES Your last dose was: Your next dose is: Take 1 Tab by mouth two (2) times a day. 0.3 mg FLUoxetine 10 mg capsule Commonly known as:  PROzac Your last dose was: Your next dose is: Take 1 Cap by mouth daily. 10 mg  
    
   
   
   
  
 glycerin (adult) suppository Commonly known as:  FLEET GLYCERIN (ADULT) Your last dose was: Your next dose is: Insert 1 Suppository into rectum daily. Indications: BOWEL EVACUATION  
 1 Suppository  
    
   
   
   
  
 hydrALAZINE 100 mg tablet Commonly known as:  APRESOLINE Your last dose was: Your next dose is: Take 0.5 Tabs by mouth two (2) times a day. 25 mg  
    
   
   
   
  
 losartan 100 mg tablet Commonly known as:  COZAAR Your last dose was: Your next dose is: Take 1 Tab by mouth daily.   
 100 mg

## 2018-06-18 NOTE — CONSULTS
[facility]  Pulmonary, Critical Care, and Sleep Medicine    Name: Angel Greene MRN: [id number]   : 1960 Hospital: [address] [place] [address]   Date: 2018        Pulmonary Medicine Consult      IMPRESSION:   Active pulmonary tuberculosis, primary  ESRD on dialysis  Clotted AVG, awaiting declotting today by vascular surgery  Liver mass, s/p percutaneous biopsy at [facility]  History of NSTEMI, respiratory failure on mechanical ventilation in . Patient Active Problem List   Diagnosis Code    Hyperkalemia E87.5    Renal failure N19    SCOOTER (acute kidney injury) ([facility] N17.9    Hyponatremia E87.1    ESRD (end stage renal disease) ([facility] N18.6    NSTEMI (non-ST elevated myocardial infarction) ([facility] I21.4    Respiratory failure ([facility] J96.90    Hypoxia R09.02    Acute UTI N39.0    Anemia Q02.3    Complication of vascular dialysis catheter, initial encounter T82. 9XXA        RECOMMENDATIONS:   · Initiate AFB treatment today; ordered after checking doses with [facility] ID fellow and pharmacy. · Remainder of AFB treatment per public health department  · Follow up in [facility] system, where patient has established care  · Patient may be contagious; although she has had >3 negative AFB smears; that was in April, and she has been untreated since then. Her diagnosis was on bronchoscopy and pleural fluid cultures which resulted from [facility] ~18. · Contact tracking and testing to proceed per usual public health protocols. · There is no need for inhaled bronchodilators in this never-smoker. Her cough is due to TB, not bronchospasm. · Airborne precautions. Contact precautions are not necessary for TB. · Thoracentesis is not necessary; TB effusions resolve themselves with treatment.   · ID consult; please coordinate with public health in the AM.   · The infection control service at [facility] should become involved with possible contact testing, since patient was here, intubated, in Feb 2018.  This is probably not reactivation TB; the presentation, effusions and lower lobe infiltrates are more consistent with primary TB.  · Outpatient follow up of liver mass  · Re-start usual cardiac medications, may need lower doses due to patient's overall debility  · Nutrition consult,     Subjective/History: This patient has been seen and evaluated at the request of Dr. Mancil Crigler for positive AFB results. 06/18/18    Patient is a 56 y.o. female [place] never-smoker was seen in [facility] system in April for fevers, dyspnea worse on R side. Denied cough or sick contacts. Case was discussed with Dr. Nayeli Person of [facility] pulmonology, and Dr. Chantel Darby of [facility] ID, and Dr. Albino Liriano [phone], [facility] ID fellow who was able to supply medications and doses. Notes from Care Everywhere were reviewed. The patient was seen by pulmonary for abnormal CXR, weight loss and fevers, and noted to have pulmonary infiltrates and exudative lymphocytic pleural effusions on L thoracentesis 4/14 and 4/16. Respiratory culture 4/21 returned MRSA negative, she completed antibiotics. Bronchoscopy was done, with smears of pleural fluid and bronchoscopy 4/24/18 specimens all negative, but quantiferon was positive. AFB culture results returned ~6/6 as all positive for AFB, to be further characterized for species and sensitivities. CT chest, abd pelvis 4/26 showed decreased L pleural effusion with pleural thickening, L hilar soft tissue and single liver lesion. IR liver biopsy  was equivocal.   She was scheduled for outpatient ID clinic, and it was arranged for her to be seen by [facility] health. Her son in law stated today that she was visited by the public health nurse at home last week, and is due to start DOT for primary TB tomorrow at home. She was to have had her dialysis AVG declotted, and be discharged home.     PMH: admitted 2/25-3/9/18 for NSTEMI and CHF with respiratory failure, intubated through 3/1, cardiac cath not done.  Histoyr of renal failure x 35 years post partum, with ESRD on dialysis x 9 months. Patient is here for declotting of her dialysis AVG. The patient can not provide additional history due to language barrier (Gaudencio; son in law translates reasonably well)     Past Medical History:   Diagnosis Date    Chronic kidney disease     ESRD (end stage renal disease) ([facility]     TUES-THURS-SAT    Hypercholesteremia     Hypertension     Kidney disease     Vitamin D deficiency       Past Surgical History:   Procedure Laterality Date    VASCULAR SURGERY PROCEDURE UNLIST        Prior to Admission medications    Medication Sig Start Date End Date Taking? Authorizing Provider   amLODIPine (NORVASC) 10 mg tablet Take 1 Tab by mouth daily. 3/8/18   Mora Corbett MD   aspirin 81 mg chewable tablet Take 1 Tab by mouth daily. 3/8/18   Mora Corbett MD   atorvastatin (LIPITOR) 40 mg tablet Take 1 Tab by mouth nightly. 3/8/18   Mora Corbett MD   carvedilol (COREG) 25 mg tablet Take 1 Tab by mouth every twelve (12) hours. 3/8/18   Mora Corbett MD   cloNIDine HCl (CATAPRES) 0.3 mg tablet Take 1 Tab by mouth two (2) times a day. 3/8/18   Mora Corbett MD   losartan (COZAAR) 100 mg tablet Take 1 Tab by mouth daily. 3/8/18   Mora Corbett MD   hydrALAZINE (APRESOLINE) 100 mg tablet Take 0.5 Tabs by mouth two (2) times a day. 3/8/18   Mora Corbett MD   melatonin 3 mg tablet Take 1 Tab by mouth nightly as needed. Patient taking differently: Take 1 Tab by mouth nightly as needed (insomnia). 3/8/18   Mora Corbett MD   FLUoxetine (PROZAC) 10 mg capsule Take 1 Cap by mouth daily. 3/8/18   Mora Corbett MD   glycerin, adult, (FLEET GLYCERIN, ADULT,) suppository Insert 1 Suppository into rectum daily.  Indications: BOWEL EVACUATION 2/5/18   Abbie Rosario MD   albuterol (PROVENTIL HFA, VENTOLIN HFA, PROAIR HFA) 90 mcg/actuation inhaler Take 2 Puffs by inhalation every four (4) hours as needed for Wheezing. 17 RACHELLE Langford PA-C     Allergies   Allergen Reactions    Banana Other (comments)      Social History   Substance Use Topics    Smoking status: Never Smoker    Smokeless tobacco: Never Used    Alcohol use No      History reviewed. No pertinent family history. No sick family members. Lives with daughter, son in law and their children. Children sleep in the same room as the patient. [facility]. Review of Systems:  Review of systems not obtained due to patient factors. Current Facility-Administered Medications   Medication Dose Route Frequency    rifAMPin (RIFADIN) capsule 300 mg  300 mg Oral DAILY    ethambutol (MYAMBUTOL) tablet 800 mg  800 mg Oral Q MON, WED & FRI    pyrazinamide tablet 1,000 mg  1,000 mg Oral ONCE    pyridoxine (vitamin B6) (VITAMIN B-6) tablet 50 mg  50 mg Oral DAILY    isoniazid (NYDRAZID) tablet 300 mg  300 mg Oral DAILY    [START ON 2018] pyrazinamide tablet 1,000 mg  1,000 mg Oral Q TUE, THU & SAT    ceFAZolin (ANCEF) 2g IVPB in 50 mL D5W  2 g IntraVENous ONCE     Objective:   Vital Signs:    Visit Vitals    /87 (BP 1 Location: Right arm, BP Patient Position: At rest)    Pulse 85    Temp 97.1 °F (36.2 °C)    Resp 14    Wt 40 kg (88 lb 2.9 oz)    SpO2 92%    BMI 16.66 kg/m2       O2 Device: Room air       Temp (24hrs), Av.3 °F (36.3 °C), Min:97.1 °F (36.2 °C), Max:97.5 °F (36.4 °C)       Intake/Output:   Last shift:         Last 3 shifts:    No intake or output data in the 24 hours ending 18 8865    Physical Exam:     General:  Alert, cachectic, cooperative, no distress. Head:  Normocephalic, without obvious abnormality, atraumatic. Eyes:  Conjunctivae/corneas clear. Anicteric. Nose: Nares normal. Septum midline. Mucosa normal. No drainage or sinus tenderness. Throat: mmm   Neck: Supple, symmetrical, trachea midline   Back:   Symmetric, no curvature.  ROM normal.   Lungs:   Clear to auscultation bilaterally. No cough, rales or wheezes, no ronchi. Chest wall:  No tenderness or deformity. Heart:  Regular rate and rhythm, S1, S2 normal, no murmur, click, rub or gallop. Distant   Abdomen:   Soft, non-tender. Nondistended. Extremities: Extremities normal, atraumatic, no cyanosis or edema. Pulses:    Skin: Skin color, texture, turgor normal. No rashes or lesions. AVG in place. Lymph nodes:         Neurologic: Grossly nonfocal         Data:     Recent Results (from the past 24 hour(s))   CBC WITH AUTOMATED DIFF    Collection Time: 06/18/18  1:00 AM   Result Value Ref Range    WBC 6.8 4.6 - 13.2 K/uL    RBC 3.70 (L) 4.20 - 5.30 M/uL    HGB 9.3 (L) 12.0 - 16.0 g/dL    HCT 29.6 (L) 35.0 - 45.0 %    MCV 80.0 74.0 - 97.0 FL    MCH 25.1 24.0 - 34.0 PG    MCHC 31.4 31.0 - 37.0 g/dL    RDW 16.3 (H) 11.6 - 14.5 %    PLATELET 433 031 - 604 K/uL    MPV 9.8 9.2 - 11.8 FL    NEUTROPHILS 78 (H) 40 - 73 %    LYMPHOCYTES 7 (L) 21 - 52 %    MONOCYTES 14 (H) 3 - 10 %    EOSINOPHILS 1 0 - 5 %    BASOPHILS 0 0 - 2 %    ABS. NEUTROPHILS 5.3 1.8 - 8.0 K/UL    ABS. LYMPHOCYTES 0.5 (L) 0.9 - 3.6 K/UL    ABS. MONOCYTES 0.9 0.05 - 1.2 K/UL    ABS. EOSINOPHILS 0.1 0.0 - 0.4 K/UL    ABS.  BASOPHILS 0.0 0.0 - 0.1 K/UL    DF AUTOMATED     METABOLIC PANEL, BASIC    Collection Time: 06/18/18  1:00 AM   Result Value Ref Range    Sodium 133 (L) 136 - 145 mmol/L    Potassium 5.0 3.5 - 5.5 mmol/L    Chloride 95 (L) 100 - 108 mmol/L    CO2 30 21 - 32 mmol/L    Anion gap 8 3.0 - 18 mmol/L    Glucose 111 (H) 74 - 99 mg/dL    BUN 53 (H) 7.0 - 18 MG/DL    Creatinine 8.24 (H) 0.6 - 1.3 MG/DL    BUN/Creatinine ratio 6 (L) 12 - 20      GFR est AA 6 (L) >60 ml/min/1.73m2    GFR est non-AA 5 (L) >60 ml/min/1.73m2    Calcium 12.8 (H) 8.5 - 10.1 MG/DL   PROTHROMBIN TIME + INR    Collection Time: 06/18/18  1:00 AM   Result Value Ref Range    Prothrombin time 13.3 11.5 - 15.2 sec    INR 1.1 0.8 - 1.2     PTT    Collection Time: 06/18/18 1:00 AM   Result Value Ref Range    aPTT 34.5 23.0 - 36.4 SEC   PTH INTACT    Collection Time: 06/18/18  1:00 AM   Result Value Ref Range    Calcium 12.8 (H) 8.5 - 10.1 MG/DL    PTH, Intact <6.3 (L) 18.4 - 88.0 pg/mL   DUPLEX HEMODIALYSIS ACCESS LEFT    Collection Time: 06/18/18  8:37 AM   Result Value Ref Range    Left AVF AVG Graft Name Brachial to basilic transposition    TYPE & SCREEN    Collection Time: 06/18/18  1:52 PM   Result Value Ref Range    Crossmatch Expiration 06/21/2018     ABO/Rh(D) Benny Hiwot POSITIVE     Antibody screen NEG            Imaging:  I have personally reviewed the patients radiographs and have reviewed the reports:  CXR Results  (Last 48 hours)               06/18/18 0116  XR CHEST PORT Final result    Impression:  IMPRESSION:         Small bilateral pleural effusions with overlying atelectasis. Superimposed   infiltrate or edema not excluded. Stable enlargement of the cardiac silhouette. Narrative:  AP CHEST, PORTABLE       INDICATION: End-stage renal disease       COMPARISON: Prior chest x-rays, most recent 6/7/2018       FINDINGS:  EKG leads overlie the patient. Stable enlargement of the cardiac silhouette. The pulmonary vasculature is   unremarkable. Small bilateral pleural effusions with overlying atelectasis. No   evidence for pneumothorax. No acute osseous abnormalities are identified. Total of  70 min time spent at bedside and extensive research and discussions with [facility] colleagues, review of [address] records during the course of care providing evaluation,management and care decisions and ordering appropriate treatment related to problems exclusive of procedures.   The reason for providing this level of medical care for this ill patient was due illness that impaired one or more vital organ systems such that there was a moderate probability of imminent or life threatening deterioration in the patients condition. This care involved high complexity decision making to assess, manipulate, and support vital system functions, to treat this degree vital organ system failure and to prevent further life threatening and infectious disease deterioration.     Purnima Cali MD   Pulmonary, critical care and sleep medicine  6/18/2018  6:04 PM

## 2018-06-18 NOTE — ED TRIAGE NOTES
Pt does not speak or understand english, according to family she had dialysis yesterday, 2 hours ago her fistula \"blew up\" has large swelling at vascular access site, thrill still present in fistula, pt c/o pain

## 2018-06-18 NOTE — IP AVS SNAPSHOT
[address] [facility] 
 
 
 [address] [facility] [address] [place] [address] S Patient: Gita Bajwa MRN: [id number] :1960 About your hospitalization You were admitted on:  2018 You last received care in the:  [facility] [address] [facility] You were discharged on:  2018 Why you were hospitalized Your primary diagnosis was:  Not on File Your diagnoses also included:  Complication Of Vascular Dialysis Catheter, Initial Encounter Follow-up Information Follow up With Details Comments Contact Info Rubia Light MD  Office requested that Patient's son should carlos and make appointment after discharge. [address] [facility] [address] 
[phone] Tessy Wong MD  Office will review patient and schedule follow-up appointment as soon as possible. [address]  [address] [facility] [address] 
[phone] Your Scheduled Appointments 2018  2:00 PM EDT HOSPITAL DISCHARGE with RENETTA Lubin [facility] ([facility]) 23[address] [facility] 154 200 [facility]  
[phone] Discharge Orders None A check catherine indicates which time of day the medication should be taken. My Medications CHANGE how you take these medications Instructions Each Dose to Equal  
 Morning Noon Evening Bedtime  
 melatonin 3 mg tablet What changed:  reasons to take this Your last dose was: Your next dose is: Take 1 Tab by mouth nightly as needed. 3 mg CONTINUE taking these medications Instructions Each Dose to Equal  
 Morning Noon Evening Bedtime  
 albuterol 90 mcg/actuation inhaler Commonly known as:  PROVENTIL HFA, VENTOLIN HFA, PROAIR HFA Your last dose was: Your next dose is: Take 2 Puffs by inhalation every four (4) hours as needed for Wheezing. 2 Puff  
    
   
   
   
  
 amLODIPine 10 mg tablet Commonly known as:  Johnathanmeg Latah Your last dose was: Your next dose is: Take 1 Tab by mouth daily. 10 mg  
    
   
   
   
  
 aspirin 81 mg chewable tablet Your last dose was: Your next dose is: Take 1 Tab by mouth daily. 81 mg  
    
   
   
   
  
 atorvastatin 40 mg tablet Commonly known as:  LIPITOR Your last dose was: Your next dose is: Take 1 Tab by mouth nightly. 40 mg  
    
   
   
   
  
 carvedilol 25 mg tablet Commonly known as:  Corin Solid Your last dose was: Your next dose is: Take 1 Tab by mouth every twelve (12) hours. 25 mg  
    
   
   
   
  
 cloNIDine HCl 0.3 mg tablet Commonly known as:  CATAPRES Your last dose was: Your next dose is: Take 1 Tab by mouth two (2) times a day. 0.3 mg FLUoxetine 10 mg capsule Commonly known as:  PROzac Your last dose was: Your next dose is: Take 1 Cap by mouth daily. 10 mg  
    
   
   
   
  
 glycerin (adult) suppository Commonly known as:  FLEET GLYCERIN (ADULT) Your last dose was: Your next dose is: Insert 1 Suppository into rectum daily. Indications: BOWEL EVACUATION  
 1 Suppository  
    
   
   
   
  
 hydrALAZINE 100 mg tablet Commonly known as:  APRESOLINE Your last dose was: Your next dose is: Take 0.5 Tabs by mouth two (2) times a day. 25 mg  
    
   
   
   
  
 losartan 100 mg tablet Commonly known as:  COZAAR Your last dose was: Your next dose is: Take 1 Tab by mouth daily. 100 mg Discharge Instructions None MyChart Announcement We are excited to announce that we are making your provider's discharge notes available to you in digitalbox. You will see these notes when they are completed and signed by the physician that discharged you from your recent hospital stay. If you have any questions or concerns about any information you see in digitalbox, please call the Health Information Department where you were seen or reach out to your Primary Care Provider for more information about your plan of care. Introducing [facility]! Georgiana Kelley introduces digitalbox patient portal. Now you can access parts of your medical record, email your doctor's office, and request medication refills online. 1. In your internet browser, go to [email or web address] 2. Click on the First Time User? Click Here link in the Sign In box. You will see the New Member Sign Up page. 3. Enter your digitalbox Access Code exactly as it appears below. You will not need to use this code after youve completed the sign-up process. If you do not sign up before the expiration date, you must request a new code. · digitalbox Access Code: [email or web address] Expires: 8/27/2018  4:41 PM 
 
4. Enter the last four digits of your Social Security Number (xxxx) and Date of Birth (mm/dd/yyyy) as indicated and click Submit. You will be taken to the next sign-up page. 5. Create a digitalbox ID. This will be your digitalbox login ID and cannot be changed, so think of one that is secure and easy to remember. 6. Create a digitalbox password. You can change your password at any time. 7. Enter your Password Reset Question and Answer. This can be used at a later time if you forget your password. 8. Enter your e-mail address. You will receive e-mail notification when new information is available in [address] 9. Click Sign Up. You can now view and download portions of your medical record.  
10. Click the Download Summary menu link to download a portable copy of your medical information. If you have questions, please visit the Frequently Asked Questions section of the [email or web address] website. Remember, [employer] is NOT to be used for urgent needs. For medical emergencies, dial 911. Now available from your iPhone and Android! Introducing Eyal Leonard As a [facility] [employer] [facility] patient, I wanted to make you aware of our electronic visit tool called Eyal Leonard. McginnisGingerd allows you to connect within minutes with a medical provider 24 hours a day, seven days a week via a mobile device or tablet or logging into a secure website from your computer. You can access Eyal Leonard from anywhere in the [place]. A virtual visit might be right for you when you have a simple condition and feel like you just dont want to get out of bed, or cant get away from work for an appointment, when your regular [facility] provider is not available (evenings, weekends or holidays), or when youre out of town and need minor care. Electronic visits cost only $49 and if the McginnisFloat: [place]/[employer] provider determines a prescription is needed to treat your condition, one can be electronically transmitted to a nearby pharmacy*. Please take a moment to enroll today if you have not already done so. The enrollment process is free and takes just a few minutes. To enroll, please download the [employer] gordy to your tablet or phone, or visit [email or web address] to enroll on your computer. And, as an [address] [place] [address] patient with a [employer] account, the results of your visits will be scanned into your electronic medical record and your primary care provider will be able to view the scanned results. We urge you to continue to see your regular [facility] provider for your ongoing medical care.   And while your primary care provider may not be the one available when you seek a Eyal Leonard virtual visit, the peace of mind you get from getting a real diagnosis real time can be priceless. For more information on Yeal Frantzalmafin, view our Frequently Asked Questions (FAQs) at [email or web address]. Sincerely, 
 
Thelma Bowers MD 
Chief Medical Officer Romana Davis *:  certain medications cannot be prescribed via Eyal Leonard Unresulted tests-please follow up with your PCP on these results Procedure/Test Authorizing Provider  AFB CULTURE + SMEAR W/RFLX ID FROM CULTURE Erlinda Willmas MD  
 AFB CULTURE + SMEAR W/RFLX ID FROM CULTURE Shilpi Robert MD  
 AFB CULTURE + SMEAR W/RFLX PCR AND ID Erlinda Willams MD  
 CARDIAC PANEL,(CK, CKMB & TROPONIN) Aditya Hawkins PA-C  
 CARDIAC PANEL,(CK, CKMB & TROPONIN) Collette Sicks, MD  
 CBC W/O DIFF Shilpi Robert, MD  
 CBC W/O DIFF Kimberlyn Johnson MD  
 CBC WITH AUTOMATED DIFF Kurt Cid MD  
 CBC WITH AUTOMATED DIFF Marc Alejandro MD  
 CBC WITH AUTOMATED DIFF Collette Sicks, MD  
 CT CHEST Monisha Strickland MD  
 EKG, 12 LEAD, INITIAL Kurt Cid MD  
 [facility] MD Ericka Álvarez MD Consuello Master, MD  
 HEMATOCRIT MD Marga Vazquez MD  
 [facility], MD  
 [address] [facility] MD Julio C  
 [facility]  
 MAGNESIUM Aditya Hawkins PA-C  
 MAGNESIUM Marc Alejandro MD  
 MAGNESIUM Collette Sicks, MD  
 MAGNESIUM Chuck Rhodes PA-C  
 METABOLIC PANEL, BASIC Brennan Reich MD  
 METABOLIC PANEL, BASIC Aditya Hawkins PA-C  
 METABOLIC PANEL, Palma Resendiz MD  
 METABOLIC PANEL, Montserrat Andres MD  
 METABOLIC PANEL, BASIC Tesfaye Arellano PA-C  
 METABOLIC PANEL, BASIC Chester Lerner PA-C  
 METABOLIC PANEL, COMPREHENSIVE Ana Cristina Alfaro MD  
 [place] XR TECHNOLOGIST Natalia Griffin MD  
 PHOSPHORUS Cathleen Weaver PA-C  
 PHOSPHORUS Ana Cristina Alfaro MD  
 PHOSPHORUS Chester Lerner PA-C  
 PHOSPHORUS Chester Lerner, [place]  
 PROTHROMBIN TIME + INR Caitlyn Reed MD  
 PTH INTACT [place] MD Lior  
 PTT Caitlyn Reed MD  
 XR CHEST PORT Namita Olson MD  
 XR Makayla Eli MD  
  
Providers Seen During Your Hospitalization Provider Specialty Primary office phone Caitlyn Reed MD Emergency Medicine [phone] Chelsey Fletcher MD Emergency Medicine [phone] Apple Fuentes MD Vascular Surgery [phone] Deidre Walker MD Internal Medicine [phone] Markie Rose MD Internal Medicine [phone] Your Primary Care Physician (PCP) Primary Care Physician Office Phone Office Fax [address] [place] [address] [phone] You are allergic to the following Allergen Reactions Banana Other (comments) Recent Documentation Height Weight Breastfeeding? BMI OB Status Smoking Status 1.549 m 38.8 kg No 16.16 kg/m2 Menopause Never Smoker Emergency Contacts Name Discharge Info Relation Home Work Mobile Shanelle Che DISCHARGE CAREGIVER [3] Spouse [3] [phone] Patient Belongings The following personal items are in your possession at time of discharge: 
  Dental Appliances: None  Visual Aid: At bedside, Glasses Please provide this summary of care documentation to your next provider. Signatures-by signing, you are acknowledging that this After Visit Summary has been reviewed with you and you have received a copy. Patient Signature:  ____________________________________________________________ Date:  ____________________________________________________________  
  
Raymundo Cart  Provider Signature: ____________________________________________________________ Date:  ____________________________________________________________

## 2018-06-18 NOTE — ED NOTES
Pt transported via stretcher to 5S accompanied by transport. Pt appears in no distress upon leaving ER.

## 2018-06-18 NOTE — ED PROVIDER NOTES
EMERGENCY DEPARTMENT HISTORY AND PHYSICAL EXAM    12:50 AM      Date: 6/18/2018  Patient Name: Mark Ahumada    History of Presenting Illness     Chief Complaint   Patient presents with    Shunt Problem         History Provided By: Patient and Patient's Son    Chief Complaint: left arm pain and swelling  Duration:  Hours  Timing:  Acute  Location: left arm(fistula)  Quality: Aching  Severity: Severe  Modifying Factors: none  Associated Symptoms: denies any other associated signs or symptoms      Additional History (Context): Mark Ahumada is a 56 y.o. female with ESRD (Tues/thurs/Sat), and HTN who only speaks Welsh presents with her son in law who is acting as her  c/o left arm swelling and pain that has been severe and constant for the past couple hours. The pt has dialysis fistula in her left arm. Gets dialysis Tuesday, Thursday, and Saturday; last run was Saturday and she had full treatment. This has never happened to her before in the past. Her nephrologist is Nelson. Denies CP, SOB, weakness, numbness, or any other associated sx. Family member was used as      PCP: Poncho Lowery MD    Current Facility-Administered Medications   Medication Dose Route Frequency Provider Last Rate Last Dose    morphine injection 4 mg  4 mg IntraVENous NOW Bard Mariano MD         Current Outpatient Prescriptions   Medication Sig Dispense Refill    amLODIPine (NORVASC) 10 mg tablet Take 1 Tab by mouth daily. 30 Tab 0    aspirin 81 mg chewable tablet Take 1 Tab by mouth daily. 30 Tab 0    atorvastatin (LIPITOR) 40 mg tablet Take 1 Tab by mouth nightly. 30 Tab 0    carvedilol (COREG) 25 mg tablet Take 1 Tab by mouth every twelve (12) hours. 60 Tab 0    cloNIDine HCl (CATAPRES) 0.3 mg tablet Take 1 Tab by mouth two (2) times a day. 60 Tab 0    losartan (COZAAR) 100 mg tablet Take 1 Tab by mouth daily. 30 Tab 0    hydrALAZINE (APRESOLINE) 100 mg tablet Take 0.5 Tabs by mouth two (2) times a day.  60 Tab 0  melatonin 3 mg tablet Take 1 Tab by mouth nightly as needed. (Patient taking differently: Take 1 Tab by mouth nightly as needed (insomnia). ) 30 Tab 0    FLUoxetine (PROZAC) 10 mg capsule Take 1 Cap by mouth daily. 30 Cap 0    glycerin, adult, (FLEET GLYCERIN, ADULT,) suppository Insert 1 Suppository into rectum daily. Indications: BOWEL EVACUATION 10 Suppository 0    albuterol (PROVENTIL HFA, VENTOLIN HFA, PROAIR HFA) 90 mcg/actuation inhaler Take 2 Puffs by inhalation every four (4) hours as needed for Wheezing. 1 Inhaler 0       Past History     Past Medical History:  Past Medical History:   Diagnosis Date    Chronic kidney disease     ESRD (end stage renal disease) ([address]     TUES-THURS-SAT    Hypercholesteremia     Hypertension     Kidney disease     Vitamin D deficiency        Past Surgical History:  Past Surgical History:   Procedure Laterality Date    VASCULAR SURGERY PROCEDURE UNLIST         Family History:  History reviewed. No pertinent family history. Social History:  Social History   Substance Use Topics    Smoking status: Never Smoker    Smokeless tobacco: Never Used    Alcohol use No       Allergies: Allergies   Allergen Reactions    Banana Other (comments)         Review of Systems       Review of Systems   Reason unable to perform ROS: Family memeber was used as . Constitutional: Negative for chills and fever. Respiratory: Negative for shortness of breath. Cardiovascular: Negative for chest pain. Gastrointestinal: Negative for diarrhea, nausea and vomiting. Musculoskeletal: Positive for myalgias. Left arm pain and swelling. All other systems reviewed and are negative. Visit Vitals    /71    Pulse 89    Temp 97.5 °F (36.4 °C)    Resp 25    SpO2 97%           Physical Exam / Medical Decision Making   I am the first provider for this patient.     I reviewed the vital signs, available nursing notes, past medical history, past surgical history, family history and social history. Vital Signs-Reviewed the patient's vital signs. Physical exam:  General:  Well-developed, well-nourished, uncomfortable-appearing  Head:  Normocephalic atraumatic. Eyes:  Pupils midrange extraocular movements intact. No pallor or conjunctival injection. Nose:  No rhinorrhea, inspection grossly normal.    Ears:  Grossly normal to inspection, no discharge. Mouth:  Mucous membranes moist, no appreciable intraoral lesion. Neck/Back:  Trachea midline, no asymmetry. Chest:  Grossly normal inspection, symmetric chest rise. Pulmonary:  Clear to auscultation bilaterally no wheezes rhonchi or rales. Cardiovascular:  S1-S2 no murmurs rubs or gallops. Abdomen: Minimally distended nontender to palpation  Extremities:  Grossly normal to inspection, peripheral pulses intact  LEFT upper extremity AV graft with palpable thrill, 3 inch diameter pulsatile hematoma on the basilic side  Neurologic:  Alert and oriented no appreciable focal neurologic deficit     Skin:  Warm and dry  Psychiatric:  Grossly normal mood and affect  Nursing note reviewed, vital signs reviewed. ED course:  Patient with a recently accessed dialysis graft with LEFT arm swelling, increasing pain no numbness tingling or weakness of the hand, primary concern for a active bleed, initially reported that this was accessed on Friday but then clarifies that was on Saturday 0146   Consult:  Discussed care with Dr. Jyothi Rodas. Standard discussion; including history of patients chief complaint, available diagnostic results, and treatment course. Recommends holding pressure, no concern for compartment syndrome if she has intact sensation and pulses    Reevaluated at 0300 hrs.:  Resting comfortably in stretcher, peripheral pulses are palpable, no expanding hematoma    It is now the end of my shift, I am still awaiting vascular duplex.   Patient will be signed out to the oncoming physician Dr. Tyesha Barillas at 0700. Disposition:    Pending      Portions of this chart were created with Dragon medical speech to text program.   Unrecognized errors may be present. Diagnostic Study Results     Labs -  Recent Results (from the past 12 hour(s))   CBC WITH AUTOMATED DIFF    Collection Time: 06/18/18  1:00 AM   Result Value Ref Range    WBC 6.8 4.6 - 13.2 K/uL    RBC 3.70 (L) 4.20 - 5.30 M/uL    HGB 9.3 (L) 12.0 - 16.0 g/dL    HCT 29.6 (L) 35.0 - 45.0 %    MCV 80.0 74.0 - 97.0 FL    MCH 25.1 24.0 - 34.0 PG    MCHC 31.4 31.0 - 37.0 g/dL    RDW 16.3 (H) 11.6 - 14.5 %    PLATELET 324 470 - 027 K/uL    MPV 9.8 9.2 - 11.8 FL    NEUTROPHILS 78 (H) 40 - 73 %    LYMPHOCYTES 7 (L) 21 - 52 %    MONOCYTES 14 (H) 3 - 10 %    EOSINOPHILS 1 0 - 5 %    BASOPHILS 0 0 - 2 %    ABS. NEUTROPHILS 5.3 1.8 - 8.0 K/UL    ABS. LYMPHOCYTES 0.5 (L) 0.9 - 3.6 K/UL    ABS. MONOCYTES 0.9 0.05 - 1.2 K/UL    ABS. EOSINOPHILS 0.1 0.0 - 0.4 K/UL    ABS. BASOPHILS 0.0 0.0 - 0.1 K/UL    DF AUTOMATED     METABOLIC PANEL, BASIC    Collection Time: 06/18/18  1:00 AM   Result Value Ref Range    Sodium 133 (L) 136 - 145 mmol/L    Potassium 5.0 3.5 - 5.5 mmol/L    Chloride 95 (L) 100 - 108 mmol/L    CO2 30 21 - 32 mmol/L    Anion gap 8 3.0 - 18 mmol/L    Glucose 111 (H) 74 - 99 mg/dL    BUN 53 (H) 7.0 - 18 MG/DL    Creatinine 8.24 (H) 0.6 - 1.3 MG/DL    BUN/Creatinine ratio 6 (L) 12 - 20      GFR est AA 6 (L) >60 ml/min/1.73m2    GFR est non-AA 5 (L) >60 ml/min/1.73m2    Calcium 12.8 (H) 8.5 - 10.1 MG/DL   PROTHROMBIN TIME + INR    Collection Time: 06/18/18  1:00 AM   Result Value Ref Range    Prothrombin time 13.3 11.5 - 15.2 sec    INR 1.1 0.8 - 1.2     PTT    Collection Time: 06/18/18  1:00 AM   Result Value Ref Range    aPTT 34.5 23.0 - 36.4 SEC       Radiologic Studies -   XR CHEST PORT   Final Result          Diagnosis     Clinical Impression: No diagnosis found.         Follow-up Information     None           Patient's Medications Start Taking    No medications on file   Continue Taking    ALBUTEROL (PROVENTIL HFA, VENTOLIN HFA, PROAIR HFA) 90 MCG/ACTUATION INHALER    Take 2 Puffs by inhalation every four (4) hours as needed for Wheezing. AMLODIPINE (NORVASC) 10 MG TABLET    Take 1 Tab by mouth daily. ASPIRIN 81 MG CHEWABLE TABLET    Take 1 Tab by mouth daily. ATORVASTATIN (LIPITOR) 40 MG TABLET    Take 1 Tab by mouth nightly. CARVEDILOL (COREG) 25 MG TABLET    Take 1 Tab by mouth every twelve (12) hours. CLONIDINE HCL (CATAPRES) 0.3 MG TABLET    Take 1 Tab by mouth two (2) times a day. FLUOXETINE (PROZAC) 10 MG CAPSULE    Take 1 Cap by mouth daily. GLYCERIN, ADULT, (FLEET GLYCERIN, ADULT,) SUPPOSITORY    Insert 1 Suppository into rectum daily. Indications: BOWEL EVACUATION    HYDRALAZINE (APRESOLINE) 100 MG TABLET    Take 0.5 Tabs by mouth two (2) times a day. LOSARTAN (COZAAR) 100 MG TABLET    Take 1 Tab by mouth daily. MELATONIN 3 MG TABLET    Take 1 Tab by mouth nightly as needed. These Medications have changed    No medications on file   Stop Taking    No medications on file     _______________________________    Attestations:  [address] acting as a scribe for and in the presence of Grey Silva MD      June 18, 2018 at 12:50 AM       Provider Attestation:      I personally performed the services described in the documentation, reviewed the documentation, as recorded by the scribe in my presence, and it accurately and completely records my words and actions.  June 18, 2018 at 12:50 AM - Grey Silva MD    _______________________________

## 2018-06-18 NOTE — PROGRESS NOTES
Dialysis unit received a call from health dept that pleural fluid is growing tb.change to isolation room,lamar muro notified. consulted pulmonay,dr mcdonald

## 2018-06-18 NOTE — ANESTHESIA PREPROCEDURE EVALUATION
Anesthetic History   No history of anesthetic complications            Review of Systems / Medical History  Patient summary reviewed and pertinent labs reviewed    Pulmonary  Within defined limits                 Neuro/Psych   Within defined limits           Cardiovascular    Hypertension: poorly controlled          CAD    Exercise tolerance: <4 METS     GI/Hepatic/Renal         Renal disease: ESRD and dialysis       Endo/Other  Within defined limits           Other Findings   Comments: Documentation of current medication  Current medications obtained, documented and obtained? YES      Risk Factors for Postoperative nausea/vomiting:       History of postoperative nausea/vomiting? NO       Female? YES       Motion sickness? NO       Intended opioid administration for postoperative analgesia? YES      Smoking Abstinence:  Current Smoker? NO  Elective Surgery? YES  Seen preoperatively by anesthesiologist or proxy prior to day of surgery? YES  Pt abstained from smoking 24 hours prior to anesthesia?  N/A    Preventive care/screening for High Blood Pressure:  Aged 18 years and older: YES  Screened for high blood pressure: YES  Patients with high blood pressure referred to primary care provider   for BP management: YES                 Physical Exam    Airway  Mallampati: III  TM Distance: 4 - 6 cm  Neck ROM: normal range of motion   Mouth opening: Diminished (comment)     Cardiovascular    Rhythm: regular  Rate: normal         Dental  No notable dental hx       Pulmonary  Breath sounds clear to auscultation               Abdominal  GI exam deferred       Other Findings            Anesthetic Plan    ASA: 3  Anesthesia type: MAC          Induction: Intravenous  Anesthetic plan and risks discussed with: Patient, Healthcare power of  and Son / Daughter

## 2018-06-19 NOTE — PROGRESS NOTES
HEMODIALYSIS ROUNDING NOTE      Patient: Marcella June               Sex: female          DOA: 6/18/2018 12:25 AM        YOB: 1960      Age:  56 y.o.        LOS:  LOS: 0 days     Subjective:     Marcella June is a 56 y.o.  who presents with DX  Complication of vascular dialysis catheter, initial encounter. The patient is dialyzing utilizing the following method:Intermittent Hemodialysis    Chief Complains: Patient was seen on dialysis, denies nausea / vomiting / headache / dizziness / SOB / chest pain.   - Reviewed last 24 hrs events     Current Facility-Administered Medications   Medication Dose Route Frequency    0.9% sodium chloride infusion 250 mL  250 mL IntraVENous PRN    ondansetron (ZOFRAN) injection 4 mg  4 mg IntraVENous Q4H PRN    oxyCODONE-acetaminophen (PERCOCET) 5-325 mg per tablet 1 Tab  1 Tab Oral Q4H PRN    amLODIPine (NORVASC) tablet 10 mg  10 mg Oral DAILY    morphine injection 2-4 mg  2-4 mg IntraVENous Q3H PRN    rifAMPin (RIFADIN) capsule 300 mg  300 mg Oral DAILY    ethambutol (MYAMBUTOL) tablet 800 mg  800 mg Oral Q MON, WED & FRI    pyridoxine (vitamin B6) (VITAMIN B-6) tablet 50 mg  50 mg Oral DAILY    isoniazid (NYDRAZID) tablet 300 mg  300 mg Oral DAILY    pyrazinamide tablet 1,000 mg  1,000 mg Oral Q TUE, THU & SAT       Objective:     Visit Vitals    BP (!) 157/95    Pulse 89    Temp 97.8 °F (36.6 °C) (Oral)    Resp 17    Ht 5' 1\" (1.549 m)    Wt 40 kg (88 lb 2.9 oz)    SpO2 97%    Breastfeeding No    BMI 16.66 kg/m2       Intake/Output Summary (Last 24 hours) at 06/19/18 1034  Last data filed at 06/19/18 0493   Gross per 24 hour   Intake              410 ml   Output                5 ml   Net              405 ml       Physical Examination:    GEN: Awake  RS: Chest is bilateral  rales / crackles  CVS: S1-S2 heard, RRR  Abdomen: Soft, Non tender, Not distended, Positive bowel sounds  Extremities: + edema, no cyanosis, skin is warm on touch  HEENT: Head is atraumatic, PERRLA, conjunctiva pink & non icteric. No JVD or carotid bruit      Data Review:      Labs:     Hematology: Recent Labs      06/18/18 2320  06/18/18 1930  06/18/18   0100   WBC  5.1  5.3  6.8   HGB  5.4*  6.3*  9.3*   HCT  17.1*  20.2*  29.6*     Chemistry: Recent Labs      06/18/18 2320 06/18/18 1930  06/18/18   0100   BUN  72*  71*  53*   CREA  9.32*  9.20*  8.24*   CA  10.4*  10.7*  12.8*  12.8*   ALB   --   1.9*   --    K  6.0*  6.0*  5.0   NA  134*  134*  133*   CL  95*  96*  95*   CO2  28  26  30   PHOS   --   9.5*   --    GLU  114*  78  111*        Images:     XR (Most Recent). CXR reviewed by me and compared with previous CXR   Results from Hospital Encounter encounter on 06/18/18   XR CHEST PORT   Narrative Portable Chest 2202 hours    CPT CODE:22911    HISTORY:  End-stage renal disease, clotted AV fistula left arm graft, active TB,   Post   op Perm Hemodialysis placement. COMPARISON: Chest x-ray June 18, 2018 at 0116 hours, June 7, 2018. FINDINGS:     Interval placement of a large caliber double-lumen right jugular approach  central catheter terminating at the right atrium. There is no pneumothorax. Hazy  density at the right mid to lower hemithorax. Persistent minimal blunting of the  right costophrenic angle. Increased opacification in the lower third of the left  hemithorax with obscuration of the hemidiaphragm and blunting of the  costophrenic angle. Stable mild cardiomegaly. Normal pulmonary vascularity. No  pneumothorax. .         Impression IMPRESSION:    No pneumothorax. Double-lumen right IJ approach central catheter in expected position. Vague haziness overlying the right lower lung likely layering pleural effusion. Increased opacification in the lower left hemithorax consistent with combination  of increasing left pleural effusion and left lower lung infiltrate or segmental  atelectasis.   Stable cardiomegaly CT (Most Recent)   Results from Hospital Encounter encounter on 06/18/18   CT CHEST W CONT   Narrative EXAMINATION: CT thorax with IV contrast    INDICATION: Evaluate for hematoma, dialysis catheter placement    COMPARISON: Chest radiograph 6/18/2018    TECHNIQUE: CT of the thorax performed following 70 cc IV Isovue-300 with  multiplanar reformations. All CT scans at this facility are performed using dose  optimization technique as appropriate to a performed exam, to include automated  exposure control, adjustment of the mA and/or kV according to patient size  (including appropriate matching first site specific examinations), or use of  iterative reconstruction technique. FINDINGS:    Cardiovascular: Large right jugular catheter, tip at cavoatrial junction level. Major vessels in the thorax are unremarkable. Borderline prominent heart size. Small pericardial effusion. Mediastinum: Imaged thyroid unremarkable. No mediastinal adenopathy by size  criteria. Esophagus nondistended. Trachea and central bronchi unremarkable. Pleura: Moderate right and small left pleural effusions. No pneumothorax. Lungs: Probable bilateral lower lobe compressive atelectasis. Additional streaky  densities also likely atelectasis. No additional suspicious parenchymal  opacities identified. Upper abdomen: Moderate to large volume of ascites, incompletely imaged with  some ill-defined areas of possible peripheral enhancement particularly in the  region of Tucker's pouch. Bilateral renal atrophy with probable incompletely  imaged left renal cyst.    Miscellaneous: Left greater than right chest wall edema/stranding, including  masslike density associated with the left pectoralis musculature, suspicious for  hematoma, measuring roughly 8.5 x 2.5 cm on axial image 28, with surrounding  edema/stranding. Bones: No acute osseous findings. Probable hemangioma at T4. Impression IMPRESSION:    1.  Evidence of hematoma within or insinuating between left pectoralis  musculature as above, with adjacent chest wall edema. 2. Moderate right and small left pleural effusion with probable compressive  atelectasis in addition to scattered streaky atelectasis. Superimposed  infiltrate difficult to exclude.  -Small pericardial effusion.  -Right jugular catheter. 3. Moderate to large volume ascites, incompletely evaluated. Subtle peripheral  enhancement and more since protrusion. Correlate clinically for possible  infection/peritonitis. -Renal atrophy.  -See additional details above. Plan / Recommendation:         End Stage Renal Disease:  Plan HD today    At 10:34 AM on 6/19/2018, I saw and examined patient during hemodialysis treatment. The patient was receiving hemodialysis for treatment of end stage renal disease. I have also reviewed vital signs, intake and output, lab results and recent events, and agreed with today's dialysis order.     Access: No issue    Anemia: transfuse 2 units prbc    Kam Johnson MD  Nephrology  6/19/2018

## 2018-06-19 NOTE — H&P
Surgery History and Physical    Subjective:      Nelida Trujillo is a 56 y.o.  female who presents with clotted avf left arm. Just initiated with treatment for AFB. Gets care at [facility] and health department. Patient Active Problem List    Diagnosis Date Noted    Complication of vascular dialysis catheter, initial encounter 06/18/2018    Anemia 02/26/2018    Hyponatremia 02/25/2018    ESRD (end stage renal disease) ([facility] 02/25/2018    NSTEMI (non-ST elevated myocardial infarction) ([facility] 02/25/2018    Respiratory failure ([facility] 02/25/2018    Hypoxia 02/25/2018    Acute UTI 02/25/2018    Hyperkalemia 09/08/2017    Renal failure 09/08/2017    SCOOTER (acute kidney injury) ([facility] 09/08/2017     Past Medical History:   Diagnosis Date    Chronic kidney disease     ESRD (end stage renal disease) ([facility]     TUES-THURS-SAT    Hypercholesteremia     Hypertension     Kidney disease     Vitamin D deficiency       Past Surgical History:   Procedure Laterality Date    VASCULAR SURGERY PROCEDURE UNLIST        Social History   Substance Use Topics    Smoking status: Never Smoker    Smokeless tobacco: Never Used    Alcohol use No      History reviewed. No pertinent family history. Prior to Admission medications    Medication Sig Start Date End Date Taking? Authorizing Provider   amLODIPine (NORVASC) 10 mg tablet Take 1 Tab by mouth daily. 3/8/18  Yes Laura Bashir MD   aspirin 81 mg chewable tablet Take 1 Tab by mouth daily. 3/8/18  Yes Laura Bashir MD   atorvastatin (LIPITOR) 40 mg tablet Take 1 Tab by mouth nightly. 3/8/18  Yes Laura Bashir MD   carvedilol (COREG) 25 mg tablet Take 1 Tab by mouth every twelve (12) hours. 3/8/18  Yes Laura Bashir MD   cloNIDine HCl (CATAPRES) 0.3 mg tablet Take 1 Tab by mouth two (2) times a day. 3/8/18  Yes Laura Bashir MD   losartan (COZAAR) 100 mg tablet Take 1 Tab by mouth daily.  3/8/18  Yes Laura Bashir MD   hydrALAZINE (APRESOLINE) 100 mg tablet Take 0.5 Tabs by mouth two (2) times a day. 3/8/18  Yes Braden Leblanc MD   melatonin 3 mg tablet Take 1 Tab by mouth nightly as needed. Patient taking differently: Take 1 Tab by mouth nightly as needed (insomnia). 3/8/18  Yes Braden Leblanc MD   FLUoxetine (PROZAC) 10 mg capsule Take 1 Cap by mouth daily. 3/8/18  Yes Braden Leblanc MD   glycerin, adult, (FLEET GLYCERIN, ADULT,) suppository Insert 1 Suppository into rectum daily. Indications: BOWEL EVACUATION 18  Yes Martinez Baldwin MD   albuterol (PROVENTIL HFA, VENTOLIN HFA, PROAIR HFA) 90 mcg/actuation inhaler Take 2 Puffs by inhalation every four (4) hours as needed for Wheezing. 17 RACHELLE Langford PA-C     Allergies   Allergen Reactions    Banana Other (comments)         Review of Systems:    A comprehensive review of systems was negative except for that written in the History of Present Illness. Objective:     Patient Vitals for the past 8 hrs:   BP Temp Pulse Resp SpO2 Weight   18 1724 - - - - - 88 lb 2.9 oz (40 kg)   18 1640 144/87 97.1 °F (36.2 °C) 85 14 92 % -   18 1445 125/82 - 83 20 95 % -   18 1415 120/77 - - - 96 % -   18 1400 - - - 20 - -       Temp (24hrs), Av.3 °F (36.3 °C), Min:97.1 °F (36.2 °C), Max:97.5 °F (36.4 °C)      Physical Exam:  LUNG: clear to auscultation bilaterally, HEART: S1, S2 normal, ABDOMEN: soft, non-tender.  Bowel sounds normal. No masses,  no organomegaly, EXTREMITIES:  extremities normal, atraumatic, no cyanosis or edema    Labs:   Recent Results (from the past 24 hour(s))   CBC WITH AUTOMATED DIFF    Collection Time: 18  1:00 AM   Result Value Ref Range    WBC 6.8 4.6 - 13.2 K/uL    RBC 3.70 (L) 4.20 - 5.30 M/uL    HGB 9.3 (L) 12.0 - 16.0 g/dL    HCT 29.6 (L) 35.0 - 45.0 %    MCV 80.0 74.0 - 97.0 FL    MCH 25.1 24.0 - 34.0 PG    MCHC 31.4 31.0 - 37.0 g/dL    RDW 16.3 (H) 11.6 - 14.5 %    PLATELET 913 013 - 542 K/uL    MPV 9.8 9.2 - 11.8 FL    NEUTROPHILS 78 (H) 40 - 73 % LYMPHOCYTES 7 (L) 21 - 52 %    MONOCYTES 14 (H) 3 - 10 %    EOSINOPHILS 1 0 - 5 %    BASOPHILS 0 0 - 2 %    ABS. NEUTROPHILS 5.3 1.8 - 8.0 K/UL    ABS. LYMPHOCYTES 0.5 (L) 0.9 - 3.6 K/UL    ABS. MONOCYTES 0.9 0.05 - 1.2 K/UL    ABS. EOSINOPHILS 0.1 0.0 - 0.4 K/UL    ABS.  BASOPHILS 0.0 0.0 - 0.1 K/UL    DF AUTOMATED     METABOLIC PANEL, BASIC    Collection Time: 06/18/18  1:00 AM   Result Value Ref Range    Sodium 133 (L) 136 - 145 mmol/L    Potassium 5.0 3.5 - 5.5 mmol/L    Chloride 95 (L) 100 - 108 mmol/L    CO2 30 21 - 32 mmol/L    Anion gap 8 3.0 - 18 mmol/L    Glucose 111 (H) 74 - 99 mg/dL    BUN 53 (H) 7.0 - 18 MG/DL    Creatinine 8.24 (H) 0.6 - 1.3 MG/DL    BUN/Creatinine ratio 6 (L) 12 - 20      GFR est AA 6 (L) >60 ml/min/1.73m2    GFR est non-AA 5 (L) >60 ml/min/1.73m2    Calcium 12.8 (H) 8.5 - 10.1 MG/DL   PROTHROMBIN TIME + INR    Collection Time: 06/18/18  1:00 AM   Result Value Ref Range    Prothrombin time 13.3 11.5 - 15.2 sec    INR 1.1 0.8 - 1.2     PTT    Collection Time: 06/18/18  1:00 AM   Result Value Ref Range    aPTT 34.5 23.0 - 36.4 SEC   PTH INTACT    Collection Time: 06/18/18  1:00 AM   Result Value Ref Range    Calcium 12.8 (H) 8.5 - 10.1 MG/DL    PTH, Intact <6.3 (L) 18.4 - 88.0 pg/mL   HCG QL SERUM    Collection Time: 06/18/18  1:00 AM   Result Value Ref Range    HCG, Ql. NEGATIVE  NEG     DUPLEX HEMODIALYSIS ACCESS LEFT    Collection Time: 06/18/18  8:37 AM   Result Value Ref Range    Left AVF AVG Graft Name Brachial to basilic transposition    TYPE & SCREEN    Collection Time: 06/18/18  1:52 PM   Result Value Ref Range    Crossmatch Expiration 06/21/2018     ABO/Rh(D) Beryle Crown POSITIVE     Antibody screen NEG        Data Review:    BMP:   Lab Results   Component Value Date/Time    Glucose 111 (H) 06/18/2018 01:00 AM    Sodium 133 (L) 06/18/2018 01:00 AM    Potassium 5.0 06/18/2018 01:00 AM    Chloride 95 (L) 06/18/2018 01:00 AM    CO2 30 06/18/2018 01:00 AM    BUN 53 (H) 06/18/2018 01:00 AM Creatinine 8.24 (H) 06/18/2018 01:00 AM    Calcium 12.8 (H) 06/18/2018 01:00 AM    Calcium 12.8 (H) 06/18/2018 01:00 AM       Assessment:     Active Problems:    Complication of vascular dialysis catheter, initial encounter (6/18/2018)        Plan:     Place permcath  Discussed with HD unit  Will dc and goto HD tomorrow  Will follow up with primary vasc surgeon    Signed By: Landon Norton MD     June 18, 2018

## 2018-06-19 NOTE — PERIOP NOTES
TRANSFER - OUT REPORT:    Verbal report given to [facility] RN on Tomi Ram  being transferred to [facility] [phone]  for routine post - op       Report consisted of patients Situation, Background, Assessment and   Recommendations(SBAR). Information from the following report(s) SBAR was reviewed with the receiving nurse. Lines:   Peripheral IV 06/18/18 Right Wrist (Active)   Site Assessment Clean, dry, & intact 6/18/2018  5:00 PM   Phlebitis Assessment 0 6/18/2018  5:00 PM   Infiltration Assessment 0 6/18/2018  5:00 PM   Dressing Status Clean, dry, & intact 6/18/2018  5:00 PM   Dressing Type Transparent;Tape 6/18/2018  5:00 PM   Hub Color/Line Status Blue 6/18/2018  5:00 PM   Action Taken Blood drawn 6/18/2018  1:05 AM   Alcohol Cap Used Yes 6/18/2018  1:05 AM        Opportunity for questions and clarification was provided.       Patient transported with:   Monitor  O2 @ 4 liters  Registered Nurse

## 2018-06-19 NOTE — TELEPHONE ENCOUNTER
Babita Underwood from [facility] called and wanted to get an order for admission for this patient. Per Babita Underwood, Dr. Patricia Prieto is the attending physician. She was suppose to be an outpatient but ended up in the ICU.  Telephone contact for Bbaita Underwood is [phone]

## 2018-06-19 NOTE — ADDENDUM NOTE
Addendum  created 06/18/18 7219 by Ricardo Casey CRNA    Anesthesia Event edited, Anesthesia Intra Flowsheets edited, Procedure Event Log accessed
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
## 2018-06-19 NOTE — ROUTINE PROCESS
Received pt in bed with eyes closed. Pt easily aroused. Used  phone to communicate. Family member at bedside but does not speak english. Left arm tender to touch. Palpation of left arm skin soft but tender to touch offer pain and pt refused by  phon. Call bell within reach. Bed low and locked. Will continue to monitor. 2050 via  phone explain all medication. Pt refused medication. Pt finely agreed to take. Questioned pt about pain via  phone. Pt refused. 2300  Pt vomiting clear subtances. Another family member able to speak english. Explain to family she will  Not take pain meds and and did not want to take meds for treatment but did. Explain will get meds for pain and nausea and the important of taking meds for treatment. 0205 doppler to left arm,  pulses to radia/ ,ulnar and brachial detected    Bedside and Verbal shift change report given to Nate Bryant RN oncoming nurse) by Pasquale Brunson RN   (offgoing nurse). Report included the following information SBAR, Kardex, Intake/Output and MAR.

## 2018-06-19 NOTE — ROUTINE PROCESS
TRANSFER - OUT REPORT:    Verbal report given to Johanna Sandoval(name) on Tomi Price  being transferred to ICU(unit) for change in patient condition(Low H&H)       Report consisted of patients Situation, Background, Assessment and   Recommendations(SBAR). Information from the following report(s) SBAR, Kardex, Procedure Summary, Intake/Output, MAR and Recent Results was reviewed with the receiving nurse. Lines:   Peripheral IV 06/18/18 Right Wrist (Active)   Site Assessment Clean, dry, & intact 6/18/2018  5:00 PM   Phlebitis Assessment 0 6/18/2018  5:00 PM   Infiltration Assessment 0 6/18/2018  5:00 PM   Dressing Status Clean, dry, & intact 6/18/2018  5:00 PM   Dressing Type Transparent;Tape 6/18/2018  5:00 PM   Hub Color/Line Status Blue 6/18/2018  5:00 PM   Action Taken Blood drawn 6/18/2018  1:05 AM   Alcohol Cap Used Yes 6/18/2018  1:05 AM        Opportunity for questions and clarification was provided.       Patient transported with:   Registered Nurse

## 2018-06-19 NOTE — PROGRESS NOTES
Rapid Response Note  [facility]    Patient: Nelly Koyanagi 56 y.o. female  [id number]  1960      Admit Date: 6/18/2018   Admission Diagnosis: DX  Complication of vascular dialysis catheter, initial encounter    RAPID RESPONSE     Rapid response called for Chest pain. Pt has ESRD and had permacath placed this PM. Pt was complaining of pain around surgical site, had not received pain meds. Medications Reviewed    Review of Systems   Unable to perform ROS: Language    Pt reports pain on R side of chest near permacath site, denies palp, SOB. OBJECTIVE     Visit Vitals    BP (!) 154/91 (BP 1 Location: Left arm, BP Patient Position: At rest)  Comment: RN is aware of it.  Pulse 80    Temp 97.1 °F (36.2 °C)    Resp 17    Wt 40 kg (88 lb 2.9 oz)    SpO2 95%    Breastfeeding No    BMI 16.66 kg/m2       Physical Exam   Constitutional: She appears well-developed and well-nourished. No distress. HENT:   Head: Normocephalic and atraumatic. Cardiovascular: Normal rate and regular rhythm. Pulmonary/Chest: Effort normal and breath sounds normal. No respiratory distress. She exhibits tenderness. Neurological: She is alert. Skin: Skin is warm and dry. She is not diaphoretic. Medications administered: 0.5mg Dilaudid    EKG: NSR    Labs: CBC, BMP, Cardiac enzymes, Mg    ASSESSMENT, PLAN & DISPOSITION   Nelly Koyanagi is a 56y.o. year old female admitted for DX  Complication of vascular dialysis catheter, initial encounter. Rapid response called for chest pain. Patient condition currently: stable. VSS, pain likely d/t procedure. Recommend 1 time dose of dilaudid for surgical pain in ESRD patient. Will order as well as 1 time dose of Norco 5-325 in case dilaudid is not available. Recommend transfusion of 1u pRBC for hgb 6.3. Disposition: Maintaining current level of care    Attending Dr. Uriah Sparks notified of rapid response. In agreement with plan. Primary team resuming care. Lanny Arambula MD, PGY-1  [facility]    06/18/18 11:02 PM

## 2018-06-19 NOTE — PROGRESS NOTES
[facility]  ICU Progress Note    Name: Tomi Price   : 1960   MRN: [id number]   Date: 2018 5:55 AM     [x]I have reviewed the flowsheet and previous days notes. Events overnight reviewed and discussed with nursing staff. Vital signs and records reviewed. Subjective:  Patient is a 56 y.o. female [place] never-smoker presented to the [facility] for a clotted AVF left arm graft on 19. Considering pt's active TB, permacath was placed instead. Pt's hemoglobin dropped from 9.3 to 5.4 post procedure. Pt was transferred to the ICU due to the low hemoglobin. CT Chest was obtained showing large right jugular catheter, tip at cavoatrial junction level. Major vessels in the thorax are unremarkable. With the source of bleeding likely hematoma between left pectoralis musculature as above, with adjacent chest wall edema on CT Chest.     18  - Pt was transferred to the ICU early this AM with permacath in place without obvious bleeding.  - HD stable with HR 80s. Afebrile. - Pt currently being transfused blood via PIV in foot because no other access was attainable at the time pending final CT Chest read to evaluate for overt bleeding. 3 Unit PRBCs ordered. - Pt Gaudencio speaking, per  phone, pt having pain in her chest and around catheter site. - Pt anuric, has not received dialysis. ROS:Review of systems not obtained due to patient factors.     Medication Review:  · Pressors - None  · Sedation - None  · Antibiotics - None  · Pain - PRN  · GI/ DVT -  Not Indicated/SCDs  · Others (other gtts)    Vital Signs:    Visit Vitals    /84    Pulse 84    Temp 98.4 °F (36.9 °C)    Resp 24    Wt 40 kg (88 lb 2.9 oz)    SpO2 92%    Breastfeeding No    BMI 16.66 kg/m2       O2 Device: Room air       Temp (24hrs), Av °F (36.7 °C), Min:97.1 °F (36.2 °C), Max:98.4 °F (36.9 °C)       Intake/Output:   Last shift:       1901 -  0700  In: 100 [I.V.:100]  Out: 5   Last 3 shifts:      Intake/Output Summary (Last 24 hours) at 06/19/18 0579  Last data filed at 06/18/18 2112   Gross per 24 hour   Intake              100 ml   Output                5 ml   Net               95 ml     Physical Exam:  General: A&O x 4 per  phone. Cachectic. HEENT:  Anicteric sclerae; pink palpebral conjunctivae; mucosa moist  Resp:  Chest wall tender. Right permacath in place and not accessed.  Symmetrical chest expansion, no accessory muscle use; good airway entry; no rales/ wheezing/ rhonchi noted  CV:  S1, S2 present; regular rate and rhythm  GI:  Abdomen soft, non-tender; (+) active bowel sounds  Extremities:  +2 pulses on all extremities, right arm swollen with ecymosis due to multiple IV sticks, fistula left forearm without thrill  Skin:  Warm; no rashes/ lesions noted  Neurologic:  Non-focal  Devices:  permacath right, unaccessed     DATA:   Current Facility-Administered Medications   Medication Dose Route Frequency    0.9% sodium chloride infusion 250 mL  250 mL IntraVENous PRN    HYDROmorphone (DILAUDID) syringe 0.5 mg  0.5 mg IntraVENous Q8H PRN    ondansetron (ZOFRAN) injection 4 mg  4 mg IntraVENous Q4H PRN    rifAMPin (RIFADIN) capsule 300 mg  300 mg Oral DAILY    ethambutol (MYAMBUTOL) tablet 800 mg  800 mg Oral Q MON, WED & FRI    pyridoxine (vitamin B6) (VITAMIN B-6) tablet 50 mg  50 mg Oral DAILY    isoniazid (NYDRAZID) tablet 300 mg  300 mg Oral DAILY    pyrazinamide tablet 1,000 mg  1,000 mg Oral Q TUE, THU & SAT       Labs: Results:       Chemistry Recent Labs      06/18/18   2320  06/18/18   1930  06/18/18   0100   GLU  114*  78  111*   NA  134*  134*  133*   K  6.0*  6.0*  5.0   CL  95*  96*  95*   CO2  28  26  30   BUN  72*  71*  53*   CREA  9.32*  9.20*  8.24*   CA  10.4*  10.7*  12.8*  12.8*   AGAP  11  12  8   BUCR  8*  8*  6*   AP   --   107   --    TP   --   5.3*   --    ALB   --   1.9*   --    GLOB   --   3.4   --    AGRAT   -- 0.6*   --       CBC w/Diff Recent Labs      06/18/18   2320  06/18/18   1930  06/18/18   0100   WBC  5.1  5.3  6.8   RBC  2.12*  2.47*  3.70*   HGB  5.4*  6.3*  9.3*   HCT  17.1*  20.2*  29.6*   PLT  236  229  218   GRANS  80*  81*  78*   LYMPH  8*  10*  7*   EOS  0  0  1      Coagulation Recent Labs      06/18/18   0100   PTP  13.3   INR  1.1   APTT  34.5       Liver Enzymes Recent Labs      06/18/18   1930   TP  5.3*   ALB  1.9*   AP  107   SGOT  22      ABG No results found for: PH, PHI, PCO2, PCO2I, PO2, PO2I, HCO3, HCO3I, FIO2, FIO2I   Microbiology No results for input(s): CULT in the last 72 hours. Telemetry: [x]Sinus []A-flutter []Paced    []A-fib []Multiple PVCs                  Imaging:  CTA Chest 06/19/18:    1. Evidence of hematoma within or insinuating between left pectoralis  musculature as above, with adjacent chest wall edema.   2. Moderate right and small left pleural effusion with probable compressive  atelectasis in addition to scattered streaky atelectasis. Superimposed  infiltrate difficult to exclude.  -Small pericardial effusion.  -Right jugular catheter.   3. Moderate to large volume ascites, incompletely evaluated. Subtle peripheral  enhancement and more since protrusion. Correlate clinically for possible  infection/peritonitis. -Renal atrophy.  -See additional details above. IMPRESSION:   · Acute Anemia secondary to hematoma within left pectoralis musculature post permacath placement on 06/18/18- transfusing 3 units PRBCs  · Active Pulmonary Tuberculosis, primary, diagnosis was on April bronchoscopy and pleural fluid cultures which resulted from [facility] ~6/6/18, treatment was initiated on 06/18/18   · ESRD on dialysis  · Hyperkalemia   · Clotted AVG s/p permacath placement on 06/18/18  · Liver mass, s/p percutaneous biopsy at [facility]  · History of NSTEMI, respiratory failure on mechanical ventilation in 2/18. PLAN:   · Resp -  Titrate Oxygen to Maintain oxygen saturation > 93%. Aspiration Precautions. HOB > 30 degrees. · ID - Trend Temp and WBC Curve. AFB Treatment per public health department. Continue ethambutol, isoniazid, pyrazinamide, and rifampin. Supplemental Vit B6. [place] Precautions. John ID consulted for current treatment plan. ID Consulted. · CVS - Monitor hemodynamics. Trend Cardiac Enzymes x 2. Vascular Following, will speak with them about further plans. · Heme/onc - Trend H&H w 6 hrs. 3 Units PRBCs ordered. INR-normal.    · Metabolic - Trend lytes and replace per protocol. BMP, Mg, Phos. · Renal - Nephrology Following, dialysis per Nephro. · Endocrine - Monitor for hypoglycemia. BS q  6 hrs while NPO. · Neuro/ Pain/ Sedation - PRN pain meds. · GI - NPO for now. PRN Zofran for Nausea. · Prophylaxis - DVT(SCDs), GI  · Discussed in interdisciplinary rounds        The patient is: [x] acutely ill Risk of deterioration: [x] moderate    [] critically ill  [] high     [x]See my orders for details    My assessment/plan was discussed with:  [x]nursing []PT/OT    []respiratory therapy [x]Dr. Moe Guadarrama   []family []     [x]Total critical care time exclusive of procedures 40 minutes. Signed By: Lubna Verdin PA-C     June 19, 2018 6:02 AM           [facility] Staff Addendum     I have independently evaluated the patient and reviewed the patient's chart. I have discussed the findings and care plan with ICU Care Team. Discussed on ICU rounds. I agree with the above evaluation, assessment and recommendations along with the following comments and observations. - Patient with emesis this morning- may of vomited am meds   - Called and discussed case with ID. Continue with contact isolation for now while awaiting from recommendations from ID Staff and Infection Control  - Limited IV access- adequate for now  - Transfuse 3 UPRBC with dialysis. - Further recommendations pending clinical course.       Critical Care and time spent coordinating care, minus procedure time: 45 min    Cj DO Leisa, [id number]  Pulmonary, Sleep and Critical Care Medicine  3:10 PM

## 2018-06-19 NOTE — ROUTINE PROCESS
2240 Patient in bed complaining chest pain, nausea and vomited 200ml of clear liquid no respiratory distress noted ,OH=759/84,RR=24, HR=84 and O2 sat 92% in RA. Called RRT.

## 2018-06-19 NOTE — PROGRESS NOTES
conducted a Follow-up consultation and Spiritual Assessment for Donte Morrison, who is a 56 y.o.,female. Patients Primary Language is: [place]. According to the patients EMR Jew Affiliation is: Scientology. The reason the Patient came to the hospital is:   Patient Active Problem List    Diagnosis Date Noted    Complication of vascular dialysis catheter, initial encounter 06/18/2018    Anemia 02/26/2018    Hyponatremia 02/25/2018    ESRD (end stage renal disease) ([facility] 02/25/2018    NSTEMI (non-ST elevated myocardial infarction) ([facility] 02/25/2018    Respiratory failure ([facility] 02/25/2018    Hypoxia 02/25/2018    Acute UTI 02/25/2018    Hyperkalemia 09/08/2017    Renal failure 09/08/2017    [facility] 09/08/2017        Patient is [place] and does not speak English at all, but she made the sign of the cross which indicated that she is [employer]ology.  offered the Sacrament of the Anointing of the Sick and showed the oil container. It seemed that patient understood and consented by nodding her head. Patient received the sacrament.  also indicated that he would continue to pray for her. She nodded again. Chaplains are to make follow-up visits as requested or needed.  recommends bedside caregivers page  on duty if patient shows signs of acute spiritual or emotional distress. The Rev.  [address] [facility] [place],   [address]  [facility] [phone] / [facility] [phone]

## 2018-06-19 NOTE — PROGRESS NOTES
PCCM Follow-up    Notified by RN of high BP recordings on patient post-dialysis. Scheduled amlodipine held earlier for normal BP during dialysis. Pt has a history of hypertension and is on multiple anti-HTN medications to include amlodipine,   Coreg, clonidine, hydralazine, losartan. Patient Vitals for the past 4 hrs:   Temp Pulse Resp BP SpO2   06/19/18 1800 - 100 (!) 33 (!) 168/92 97 %   06/19/18 1700 - 99 (!) 34 162/90 97 %   06/19/18 1600 97.9 °F (36.6 °C) 95 19 155/90 97 %   06/19/18 1500 - 94 18 144/86 96 %         Hypertension   · Start amlodipine 10 mg PO and clonidine 0.3 mg PO  · Hydralazine prn for SBP > 160  · ICU and/or hospitalist team to review anti-HTN meds anthony am and restart accordingly  · Monitor hemodynamics closely    Chey Evans PA-C  6:49 PM      [facility] Staff Addendum     I have independently evaluated the patient and reviewed the patient's chart. I have discussed the findings and care plan with ICU Care Team. Case Discussed on multi-D rounds.  See my prior note and comments    Celina Ragland DO, FCCP  Pulmonary, Sleep, Critical Care Medicine

## 2018-06-19 NOTE — PROGRESS NOTES
Shift Summary: Pt arrived from ED at 1515. Accompanied by son and daughter. Pt immediately placed on Contact precautions. Pt speaks [place] - son translating. L arm fistula site swollen and tender. Son states that pt has needed help with mobilizing d/t weakness. 1630: Call from Dr. Remy Lindsey to place pt on airborne precautions. Maintenance requested to have room 501 made negative pressure. 1700: Pt being worked up by pre-op KRISTIN Winters RN. Several MDs rounding. Shift Summary: Dr. Remy Lindsey d/c'd airbone precautions however Kalyn ROBERSON note recommends continuing. Airborne and contact precautions maintained. Pt has rested quietly in room in no acute distress.

## 2018-06-19 NOTE — ANESTHESIA POSTPROCEDURE EVALUATION
Post-Anesthesia Evaluation and Assessment    Patient: Donavan Waggoner MRN: [id number]  SSN: [id number]    YOB: 1960  Age: 56 y.o. Sex: female      Data from PACU flowsheet    Cardiovascular Function/Vital Signs  Visit Vitals    BP 92/57    Pulse 81    Temp 36.8 °C (98.3 °F)    Resp 16    Wt 40 kg (88 lb 2.9 oz)    SpO2 99%    Breastfeeding No    BMI 16.66 kg/m2       Patient is status post MAC anesthesia for Procedure(s):  RIGHT NECK PERMANENT CATHETER/ C-ARM. Nausea/Vomiting: controlled    Postoperative hydration reviewed and adequate. Pain:  Pain Scale 1: Visual (06/18/18 1849)  Pain Intensity 1: 0 (06/18/18 1849)   Managed      Mental Status and Level of Consciousness: Alert and oriented     Pulmonary Status:   O2 Device: Room air (06/18/18 2112)   Adequate oxygenation and airway patent    Complications related to anesthesia: None    Post-anesthesia assessment completed.  No concerns    Signed By: Michelle Johnson MD     June 18, 2018

## 2018-06-19 NOTE — ROUTINE PROCESS
Bedside shift change report given to DEVANTE English (oncoming nurse) by Jaquan Jacques. Jareth Bo RN (offgoing nurse). Report included the following information SBAR, Kardex, MAR and Recent Results.

## 2018-06-19 NOTE — PROGRESS NOTES
NUTRITION    Nutrition Screen      RECOMMENDATIONS / PLAN:     - Start renal diet. - Add supplements: Nepro BID.  - Continue RD inpatient monitoring and evaluation. NUTRITION INTERVENTIONS & DIAGNOSIS:     [x] Meals/snacks: modified composition, initiate   [x] Medical food supplement therapy: initiate   [x] Collaboration and referral of nutrition care: interdisciplinary rounds, discussed starting renal diet with PA    Nutrition Diagnosis: Unintended weight loss related to inadequate energy intake as evidenced by 18 lb, 17% weight loss x 6 months. ASSESSMENT:     Pt with nausea/vomiting, now dry-heaving. Plan for dialysis today and start renal diet.     Average po intake adequate to meet patients estimated nutritional needs:   [] Yes     [x] No   [] Unable to determine at this time    Diet: DIET NPO      Food Allergies: banana   Current Appetite:   [] Good     [] Fair     [] Poor     [x] Other: NPO, nausea/vomiting   Appetite/meal intake prior to admission:   [] Good     [] Fair     [] Poor     [x] Other: unknown   Feeding Limitations:  [] Swallowing difficulty    [] Chewing difficulty    [] Other:  Current Meal Intake: Patient Vitals for the past 100 hrs:   % Diet Eaten   06/19/18 0248 0 %     Anuric   BM:  PTA  Skin Integrity: neck catheter site   Edema: non-pitting LUE  Pertinent Medications: Reviewed: zofran, pyridoxine     Recent Labs      06/18/18   2320  06/18/18   1930  06/18/18   0100   NA  134*  134*  133*   K  6.0*  6.0*  5.0   CL  95*  96*  95*   CO2  28  26  30   GLU  114*  78  111*   BUN  72*  71*  53*   CREA  9.32*  9.20*  8.24*   CA  10.4*  10.7*  12.8*  12.8*   MG  2.6  2.7*   --    PHOS   --   9.5*   --    ALB   --   1.9*   --    SGOT   --   22   --    ALT   --   14   --        Intake/Output Summary (Last 24 hours) at 06/19/18 1011  Last data filed at 06/19/18 8874   Gross per 24 hour   Intake              410 ml   Output                5 ml   Net              405 ml Anthropometrics:  Ht Readings from Last 1 Encounters:   06/18/18 5' 1\" (1.549 m)     Last 3 Recorded Weights in this Encounter    06/18/18 1724   Weight: 40 kg (88 lb 2.9 oz)     Body mass index is 16.66 kg/(m^2). Underweight     Weight History: 37 lb, 30% weight loss x 9 months and 18 lb, 17% weight loss x 6 months PTA per chart history review     Weight Metrics 6/18/2018 5/31/2018 3/9/2018 1/22/2018 12/2/2017 11/15/2017 11/5/2017   Weight 88 lb 2.9 oz 90 lb 6.2 oz 84 lb 3.2 oz 103 lb 3 oz 106 lb 14.8 oz 107 lb 110 lb   BMI 16.66 kg/m2 17.08 kg/m2 15.91 kg/m2 19.5 kg/m2 20.2 kg/m2 20.22 kg/m2 21.48 kg/m2        Admitting Diagnosis: DX  Complication of vascular dialysis catheter, initial encounter  Pertinent PMHx: ESRD on HD, HTN, HLD     Education Needs:        [x] None identified  [] Identified - Not appropriate at this time  []  Identified and addressed - refer to education log  Learning Limitations:   [] None identified  [x] Identified: does not speak [place], speaks [facility]  Cultural, Zoroastrian & ethnic food preferences:  [x] None identified    [] Identified and addressed     ESTIMATED NUTRITION NEEDS:     Calories: 4205-1570 kcal (30-35 kcal/kg) based on  [] Actual BW      [x] SBW 56 kg   Protein: 67-84 gm (1.2-1.5 gm/kg) based on  [] Actual BW      [x] SBW   Fluid: 3316-0866 mL     MONITORING & EVALUATION:     Nutrition Goal(s):   1. Po intake of meals will meet >75% of patient estimated nutritional needs within the next 7 days.   Outcome:  [] Met/Ongoing    []  Not Met    [x] New/Initial Goal     Monitoring:   [x] Food and beverage intake   [x] Diet order   [x] Nutrition-focused physical findings   [x] Treatment/therapy   [] Weight   [] Enteral nutrition intake        Previous Recommendations (for follow-up assessments only):     []   Implemented       []   Not Implemented (RD to address)      [] No Longer Appropriate     [] No Recommendation Made     Discharge Planning: renal diet as tolerated [x] Participated in care planning, discharge planning, & interdisciplinary rounds as appropriate      Kayla De Luna, [address] [place] [address], [address] [place] [address]    Pager: [phone]

## 2018-06-19 NOTE — PROGRESS NOTES
Problem: Falls - Risk of  Goal: *Absence of Falls  Document Asha Fall Risk and appropriate interventions in the flowsheet.   Outcome: Progressing Towards Goal  Fall Risk Interventions:  Mobility Interventions: Patient to call before getting OOB, Strengthening exercises (ROM-active/passive)              Elimination Interventions: Call light in reach, Patient to call for help with toileting needs, Toilet paper/wipes in reach, Toileting schedule/hourly rounds

## 2018-06-19 NOTE — DIALYSIS
ACUTE HEMODIALYSIS FLOW SHEET    HEMODIALYSIS ORDERS: Physician:Erasmo     Dialyzer: revaclear        Duration: 3 hr  BFR: 350   DFR: 600   Dialysate:  Temp 36.0 K+   2    Ca+  2 Na 140 Bicarb 30   Weight:  44.7 kg    Bed Scale []     Unable to Obtain []      Dry weight/UF Goal: 2000 Access RIJ  Needle Gauge     Heparin []  Bolus      Units    [] Hourly       Units    [x]None     Catheter locking solution heparin   Pre BP:   156/89   Pulse:     83     Temperature:   97.8  Respirations: 17  Tx: NS       ml/Bolus  Other        [x] N/A   Labs: Pre   Yes       Post:        [] N/A   Additional Orders(medications, blood products, hypotension management): 2 units prbcs [] N/A     [x]Time Out/Safety Check  [x] [facility] Consent Verified     CATHETER ACCESS: []N/A   [x]Right   []Left   [x]IJ     []Fem   [] First use X-ray verified     [x]Tunnel                [] Non Tunneled   [x]No S/S infection  []Redness  []Drainage []Cultured []Swelling []Pain   [x]Medical Aseptic Prep Utilized   [x]Dressing Changed  [x] Biopatch  Date: 6/19/18   []Clotted   []Patent   Flows: [x]Good  []Poor  []Reversed   If access problem,  notified: []Yes    [x]N/A  Date:           GRAFT/FISTULA ACCESS:  []N/A     []Right     [x]Left     []UE     []LE   []AVG   []AVF        []Buttonhole    []Medical Aseptic Prep Utilized   []No S/S infection  []Redness  []Drainage []Cultured []Swelling []Pain    Bruit:   [] Strong    [] Weak       Thrill :   [] Strong    [] Weak       Needle Gauge:    Length:     If access problem,  notified: []Yes     []N/A  Date:        Please describe access if present and not used:clotted       GENERAL ASSESSMENT:   LUNGS:  Rate 17 SaO2%  97    [] N/A    [x] Clear  [] Coarse  [] Crackles  [] Wheezing        [] Diminished     Location : []RLL   []LLL    [x]RUL  [x]ELÍAS   Cough: []Productive  []Dry  [x]N/A   Respirations:  [x]Easy  []Labored   Therapy:  [x]RA  []NC l/min    Mask: []NRB []Venti       O2% []Ventilator  []Intubated  [] Trach  [] BiPaP   CARDIAC: []Regular      [x] Irregular   [] Pericardial Rub  [] JVD        []  Monitored  [] Bedside  [] Remotely monitored [] N/A  Rhythm:    EDEMA: [x] None  []Generalized  [] Pitting [] 1    [] 2    [] 3    [] 4                 [] Facial  [] Pedal  []  UE  [] LE   SKIN:   [x] Warm  [] Hot     [] Cold   [x] Dry     [] Pale   [] Diaphoretic                  [] Flushed  [] Jaundiced  [] Cyanotic  [] Rash  [] Weeping   LOC:    [x] Alert      [x]Oriented:    [x] Person     [x] Place  [x]Time               [] Confused  [] Lethargic  [] Medicated  [] Non-responsive     GI / ABDOMEN: [] Flat    [] Distended    [x] Soft    [] Firm   []  Obese                             [] Diarrhea  [x] Bowel Sounds  [] Nausea  [] Vomiting       / URINE ASSESSMENT:[] Voiding   [] Oliguria  [x] Anuria   []  Onofre     [] Incontinent    []  Incontinent Brief      []  Bathroom Privileges     PAIN:[x] 0 []1  []2   []3   []4   []5   []6   []7   []8   []9   []10            Scale 0-10  Action/Follow Up:    MOBILITY: [] Amb    [] Amb/Assist    [x] Bed    [] Wheelchair  [] Stretcher      All Vitals and Treatment Details on Attached [address]: [address] icu     [] 1st Time Acute  [] Stat  [x] Routine  [] Urgent     [] Acute Room  []  Bedside  [x] ICU/CCU  [] ER   Isolation Precautions:  [x] Dialysis   [x] Airborne   [x] Contact    [] Reverse   Special Considerations:    Airborne precaution (tb +) , MRSA    [] Blood Consent Verified []N/A     ALLERGIES: [] NKA     BANANA     Code Status:  [] Full Code  [] DNR  [x] Other      Not on file     HBsAg ONLY: Date Drawn 9/9/17     [x]Negative []Positive []Unknown   HBsAb: Date 9/9/17  [] Susceptible   [x] [id number] []Not Drawn  [] Drawn     Current Labs:    Date of Labs: Today [x]     Results for Kendra Patel (MRN [id number]) as of 6/19/2018 10:34   Ref.  Range 6/18/2018 23:20   WBC Latest Ref Range: 4.6 - 13.2 K/uL 5.1 RBC Latest Ref Range: 4.20 - 5.30 M/uL 2.12 (L)   HGB Latest Ref Range: 12.0 - 16.0 g/dL 5.4 (LL)   HCT Latest Ref Range: 35.0 - 45.0 % 17.1 (LL)   MCV Latest Ref Range: 74.0 - 97.0 FL 80.7   MCH Latest Ref Range: 24.0 - 34.0 PG 25.5   MCHC Latest Ref Range: 31.0 - 37.0 g/dL 31.6   RDW Latest Ref Range: 11.6 - 14.5 % 16.9 (H)   PLATELET Latest Ref Range: 135 - 420 K/uL 236   MPV Latest Ref Range: 9.2 - 11.8 FL 9.6   NEUTROPHILS Latest Ref Range: 40 - 73 % 80 (H)   LYMPHOCYTES Latest Ref Range: 21 - 52 % 8 (L)   MONOCYTES Latest Ref Range: 3 - 10 % 12 (H)   EOSINOPHILS Latest Ref Range: 0 - 5 % 0   BASOPHILS Latest Ref Range: 0 - 2 % 0   DF Latest Units:   AUTOMATED   ABS. NEUTROPHILS Latest Ref Range: 1.8 - 8.0 K/UL 4.1   ABS. LYMPHOCYTES Latest Ref Range: 0.9 - 3.6 K/UL 0.4 (L)   ABS. MONOCYTES Latest Ref Range: 0.05 - 1.2 K/UL 0.6   ABS. EOSINOPHILS Latest Ref Range: 0.0 - 0.4 K/UL 0.0   ABS. BASOPHILS Latest Ref Range: 0.0 - 0.1 K/UL 0.0   Sodium Latest Ref Range: 136 - 145 mmol/L 134 (L)   Potassium Latest Ref Range: 3.5 - 5.5 mmol/L 6.0 (H)   Chloride Latest Ref Range: 100 - 108 mmol/L 95 (L)   CO2 Latest Ref Range: 21 - 32 mmol/L 28   Anion gap Latest Ref Range: 3.0 - 18 mmol/L 11   Glucose Latest Ref Range: 74 - 99 mg/dL 114 (H)   BUN Latest Ref Range: 7.0 - 18 MG/DL 72 (H)   Creatinine Latest Ref Range: 0.6 - 1.3 MG/DL 9.32 (H)   BUN/Creatinine ratio Latest Ref Range: 12 - 20   8 (L)   Calcium Latest Ref Range: 8.5 - 10.1 MG/DL 10.4 (H)   Magnesium Latest Ref Range: 1.6 - 2.6 mg/dL 2.6   GFR est non-AA Latest Ref Range: >60 ml/min/1.73m2 4 (L)   GFR est AA Latest Ref Range: >60 ml/min/1.73m2 5 (L)   CK Latest Ref Range: 26 - 192 U/L 16 (L)   CK-MB Index Latest Ref Range: 0.0 - 4.0 % CALCULATION NOT P... CK - MB Latest Ref Range: <3.6 ng/ml <1.0   Troponin-I, Qt.  Latest Ref Range: 0.0 - 0.045 NG/ML 0.04 DIET  [x] Renal    [] Other     [] NPO     []  Diabetic      PRIMARY NURSE REPORT: First initial/Last name/Title      Pre Dialysis: Leland Evans RN    Time: 0945     EDUCATION:   [x] Patient [] Other         Knowledge Basis: [x]None []Minimal [] Substantial   Barriers to learning does not speak english []N/A   [] Access Care     [] S&S of infection     [] Fluid Management     []K+     []Procedural    []Albumin     [] Medications     [] Tx Options     [] Transplant     [] Diet     [] Other   Teaching Tools:  [] Explain  [] Demo  [] Handouts [] Video  Patient response: [] Verbalized understanding  [] Teach back  [] Return demonstration [x] Requires follow up   Inappropriate due to            [address] Before each treatment:     Machine Number:                   [facility]                                  [] Unit Machine # with centralized RO                                  [x] Portable Machine #1/RO serial # [id number]                                  [] Portable Machine #2/RO serial # [id number]                                  [] Portable Machine #3/RO serial # [id number]                                                                                                       [facility]                                  [] Portable Machine #11/RO serial # [id number]                                   [] Portable Machine #12/RO serial # [id number]                                  [] Portable Machine #13/RO serial #  [id number]      Alarm Test:  Pass time 0945        Other:         [x]RO/Machine Log Complete      Temp    36.0           [x]Extracorporeal Circuit Tested for integrity   Dialysate: pH  7.4Conductivity: Meter     HD Machine   14.0                 TCD: 14.0  Dialyzer Lot # [id number]            Blood Tubing Lot # [id number]        Saline Lot #-10       CHLORINE TESTING-Before each treatment and every 4 hours    Total Chlorine: [x] less than 0.1 ppm [id number]  4 Hr/2nd Check Time:    (if greater than 0.1 ppm from Primary then every 30 minutes from Secondary)     TREATMENT INITIATION  with Dialysis Precautions:   [x] All Connections Secured                 [x] Saline Line Double Clamped   [x] Venous Parameters Set                  [x] Arterial Parameters Set    [x] Prime Given  200 ml               [x]Air Foam Detector Engaged      Treatment Initiation Note: At pt bedside in icu. Pt and vital sign are stable. R IJ catheter aspirates and flushes well. Hemodialysis now started, will continue to monitor patient and vss     Medication Dose Volume Route Initials Dialyzer Cleared: [x] Good [] Fair  [] Poor    Blood processed: 50.2  L  UF Removed  580  Ml    Post Wt:  43.7 kg  POst BP:  163/90      Pulse: 98      Respirations: 22 Temperature: 97.4        Packed red blood cells    2 units       ml      iv       rl   Post Tx Vascular Access: AVF/AVG: Bleeding stopped Art  min. Wei. Min   N/A                                   Catheter: Locking solution: Heparin 1:1000 Art. 1.9   Wei.  1.9 N/A                                 Post Assessment:                                    Skin: [x] Warm  [] Dry [] Diaphoretic    [] Flushed  [] Pale [] Cyanotic   [facility] Signatures Title Initials  Time Lungs: [x] Clear    [] Course  [] Crackles  [] Wheezing [] Diminished   Randi Aparna RN RL 6681 Cardiac: [] Regular   [x] Irregular   [] Monitor  [] N/A  Rhythm:           Edema: [x] None    [] General     [] Facial   [] Pedal    [] UE    [] LE       Pain: [x]0  []1  []2   []3  []4   []5   []6   []7   []8   []9   []10         Post Treatment Note: hemodialysis ended 25 early due to high venous pressure. (DR Zimmerman aware). Pt and vss. R IJ catheter flushed with normal saline and heparin. R IJ catheter dressing changed and biopatch applied. Pt remains in icu. Report given to primary nurse.      POST TREATMENT PRIMARY NURSE HANDOFF REPORT:     First initial/Last name/Title         Post Dialysis: Oskar Jaramillo RNTime:   9818       Abbreviations: AVG-arterial venous graft, AVF-arterial venous fistula, IJ-Internal Jugular, Subcl-Subclavian, Fem-Femoral, Tx-treatment, AP/HR-apical heart rate, DFR-dialysate flow rate, BFR-blood flow rate, AP-arterial pressure, -venous pressure, UF-ultrafiltrate, TMP-transmembrane pressure, Wei-Venous, Art-Arterial, RO-Reverse Osmosis

## 2018-06-19 NOTE — PROGRESS NOTES
attended the interdisciplinary rounds for Kojo Luke, who is a 56 y.o.,female. Patients Primary Language is: [place]. According to the patients EMR Christian Affiliation is: Orthodoxy. The reason the Patient came to the hospital is:   Patient Active Problem List    Diagnosis Date Noted    Complication of vascular dialysis catheter, initial encounter 06/18/2018    Anemia 02/26/2018    Hyponatremia 02/25/2018    ESRD (end stage renal disease) ([facility] 02/25/2018    NSTEMI (non-ST elevated myocardial infarction) ([facility] 02/25/2018    Respiratory failure ([facility] 02/25/2018    Hypoxia 02/25/2018    Acute UTI 02/25/2018    Hyperkalemia 09/08/2017    Renal failure 09/08/2017    SCOOTER (acute kidney injury) ([facility] 09/08/2017          Plan:  Chaplains will continue to follow and will provide pastoral care on an as needed/requested basis.  recommends bedside caregivers page  on duty if patient shows signs of acute spiritual or emotional distress.     [address] [facility]  Board Certified [address] [facility]   [phone]

## 2018-06-20 NOTE — ROUTINE PROCESS
Received patient from ICU by bed, accompanied with daughter in law. Patient is non english speaking ,family will interpret for non-legal things needed for the patient. Vitals signs checked and recorded. Telemetry on. Bed alarm on. Patient is sleeping. Kept patient on airborne isolation precaution,on negative pressure room. No problems noted at this time. Will continue care.

## 2018-06-20 NOTE — PROGRESS NOTES
RENAL DAILY PROGRESS NOTE    Patient: Kojo Luke               Sex: female          DOA: 6/18/2018 12:25 AM        YOB: 1960      Age:  56 y.o.        LOS:  LOS: 0 days     Subjective:     Kojo Luke is a 56 y.o.  who presents with DX  Complication of vascular dialysis catheter, initial encounter.    Asked to evaluate for esrd,admitted with clotted avg,newly diagnosed tb  Chief complains:  - Reviewed last 24 hrs events     Current Facility-Administered Medications   Medication Dose Route Frequency    sevelamer carbonate (RENVELA) tab 800 mg  800 mg Oral TID WITH MEALS    [START ON 6/21/2018] amLODIPine (NORVASC) tablet 5 mg  5 mg Oral DAILY    0.9% sodium chloride infusion 250 mL  250 mL IntraVENous PRN    ondansetron (ZOFRAN) injection 4 mg  4 mg IntraVENous Q4H PRN    oxyCODONE-acetaminophen (PERCOCET) 5-325 mg per tablet 1 Tab  1 Tab Oral Q4H PRN    morphine injection 2-4 mg  2-4 mg IntraVENous Q3H PRN    ondansetron (ZOFRAN ODT) tablet 4 mg  4 mg Oral Q8H PRN    hydrALAZINE (APRESOLINE) 20 mg/mL injection 10 mg  10 mg IntraVENous Q6H PRN    cloNIDine HCl (CATAPRES) tablet 0.3 mg  0.3 mg Oral BID    docusate sodium (COLACE) capsule 100 mg  100 mg Oral BID    polyethylene glycol (MIRALAX) packet 17 g  17 g Oral DAILY    rifAMPin (RIFADIN) capsule 300 mg  300 mg Oral DAILY    ethambutol (MYAMBUTOL) tablet 800 mg  800 mg Oral Q MON, WED & FRI    pyridoxine (vitamin B6) (VITAMIN B-6) tablet 50 mg  50 mg Oral DAILY    isoniazid (NYDRAZID) tablet 300 mg  300 mg Oral DAILY    pyrazinamide tablet 1,000 mg  1,000 mg Oral Q TUE, THU & SAT       Objective:     Visit Vitals    /72    Pulse 82    Temp 98.9 °F (37.2 °C)    Resp 18    Ht 5' 1\" (1.549 m)    Wt 40 kg (88 lb 2.9 oz)    SpO2 97%    Breastfeeding No    BMI 16.66 kg/m2       Intake/Output Summary (Last 24 hours) at 06/20/18 0953  Last data filed at 06/19/18 1800   Gross per 24 hour   Intake              620 ml   Output 580 ml   Net               40 ml       Physical Examination:     GEN:  NAD  RS: Chest is bilateral equal, no wheezing / rales / crackles  CVS: S1-S2 heard, RRR, No S3 / murmur  Abdomen: Soft, Non tender, Not distended, Positive bowel sounds, no organomegaly, no CVA / supra pubic tenderness  Extremities: No edema, no cyanosis, skin is warm on touch  CNS: Awake   HEENT: Head is atraumatic, PERRLA, conjunctiva pink & non icteric. No JVD or carotid bruit   Musculoskeletal: No gross joints or bone deformities   Lymph Node: No palpable cervical, axillary or groin lymphadenopathy. Data Review:      Labs:     Hematology: Recent Labs      06/20/18   0530  06/19/18   1245  06/18/18   2320  06/18/18 1930 06/18/18   0100   WBC   --    --   5.1  5.3  6.8   HGB  10.7*  11.1*  5.4*  6.3*  9.3*   HCT  32.9*  34.0*  17.1*  20.2*  29.6*     Chemistry: Recent Labs      06/20/18   0530  06/19/18   1010  06/18/18   2320  06/18/18 1930 06/18/18   0100   BUN  39*  78*  72*  71*  53*   CREA  5.98*  9.70*  9.32*  9.20*  8.24*   CA  8.9  10.5*  10.4*  10.7*  12.8*  12.8*   ALB   --    --    --   1.9*   --    K  5.2  6.6*  6.0*  6.0*  5.0   NA  133*  132*  134*  134*  133*   CL  100  93*  95*  96*  95*   CO2  26  28  28  26  30   PHOS  6.2*  9.6*   --   9.5*   --    GLU  91  85  114*  78  111*        Images:    XR (Most Recent). CXR reviewed by me and compared with previous CXR   Results from Hospital Encounter encounter on 06/18/18   XR CHEST PORT   Narrative Portable Chest 2202 hours    CPT CODE:19419    HISTORY:  End-stage renal disease, clotted AV fistula left arm graft, active TB,   Post   op Perm Hemodialysis placement. COMPARISON: Chest x-ray June 18, 2018 at 0116 hours, June 7, 2018. FINDINGS:     Interval placement of a large caliber double-lumen right jugular approach  central catheter terminating at the right atrium. There is no pneumothorax. Hazy  density at the right mid to lower hemithorax. Persistent minimal blunting of the  right costophrenic angle. Increased opacification in the lower third of the left  hemithorax with obscuration of the hemidiaphragm and blunting of the  costophrenic angle. Stable mild cardiomegaly. Normal pulmonary vascularity. No  pneumothorax. .         Impression IMPRESSION:    No pneumothorax. Double-lumen right IJ approach central catheter in expected position. Vague haziness overlying the right lower lung likely layering pleural effusion. Increased opacification in the lower left hemithorax consistent with combination  of increasing left pleural effusion and left lower lung infiltrate or segmental  atelectasis. Stable cardiomegaly         CT (Most Recent)   Results from Hospital Encounter encounter on 06/18/18   CT CHEST W CONT   Narrative EXAMINATION: CT thorax with IV contrast    INDICATION: Evaluate for hematoma, dialysis catheter placement    COMPARISON: Chest radiograph 6/18/2018    TECHNIQUE: CT of the thorax performed following 70 cc IV Isovue-300 with  multiplanar reformations. All CT scans at this facility are performed using dose  optimization technique as appropriate to a performed exam, to include automated  exposure control, adjustment of the mA and/or kV according to patient size  (including appropriate matching first site specific examinations), or use of  iterative reconstruction technique. FINDINGS:    Cardiovascular: Large right jugular catheter, tip at cavoatrial junction level. Major vessels in the thorax are unremarkable. Borderline prominent heart size. Small pericardial effusion. Mediastinum: Imaged thyroid unremarkable. No mediastinal adenopathy by size  criteria. Esophagus nondistended. Trachea and central bronchi unremarkable. Pleura: Moderate right and small left pleural effusions. No pneumothorax. Lungs: Probable bilateral lower lobe compressive atelectasis. Additional streaky  densities also likely atelectasis.  No additional suspicious parenchymal  opacities identified. Upper abdomen: Moderate to large volume of ascites, incompletely imaged with  some ill-defined areas of possible peripheral enhancement particularly in the  region of Tucker's pouch. Bilateral renal atrophy with probable incompletely  imaged left renal cyst.    Miscellaneous: Left greater than right chest wall edema/stranding, including  masslike density associated with the left pectoralis musculature, suspicious for  hematoma, measuring roughly 8.5 x 2.5 cm on axial image 28, with surrounding  edema/stranding. Bones: No acute osseous findings. Probable hemangioma at T4. Impression IMPRESSION:    1. Evidence of hematoma within or insinuating between left pectoralis  musculature as above, with adjacent chest wall edema. 2. Moderate right and small left pleural effusion with probable compressive  atelectasis in addition to scattered streaky atelectasis. Superimposed  infiltrate difficult to exclude.  -Small pericardial effusion.  -Right jugular catheter. 3. Moderate to large volume ascites, incompletely evaluated. Subtle peripheral  enhancement and more since protrusion. Correlate clinically for possible  infection/peritonitis. -Renal atrophy.  -See additional details above. EKG No results found for this or any previous visit. I have personally reviewed the old medical records and patient's labs    Plan / Recommendation:      1. Esrd,on dialysis mon wed Friday,off regular schduele. dialyzed yesterday. plan dialysis thurs and sat this week. discussed with son in law  2.anemia,related to bleeding from insertion of dialysis cath.stable post transfusion  3.newly diagnosed tb from pleural effusion. needs 3 neg sputums before she can return to her outpatient dialysis unit.started treatment on monday    D/w Dr. Leslie Louis MD  Nephrology  6/20/2018

## 2018-06-20 NOTE — PROGRESS NOTES
attended the interdisciplinary rounds for Tristian Peralta, who is a 56 y.o.,female. Patients Primary Language is: [place]. According to the patients EMR Mandaeism Affiliation is: [employer]. The reason the Patient came to the hospital is:   Patient Active Problem List    Diagnosis Date Noted    Complication of vascular dialysis catheter, initial encounter 06/18/2018    Anemia 02/26/2018    Hyponatremia 02/25/2018    ESRD (end stage renal disease) ([facility] 02/25/2018    NSTEMI (non-ST elevated myocardial infarction) ([facility] 02/25/2018    Respiratory failure ([facility] 02/25/2018    Hypoxia 02/25/2018    Acute UTI 02/25/2018    Hyperkalemia 09/08/2017    Renal failure 09/08/2017    SCOOTER (acute kidney injury) ([facility] 09/08/2017      Plan:  Chaplains will continue to follow and will provide pastoral care on an as needed/requested basis.  recommends bedside caregivers page  on duty if patient shows signs of acute spiritual or emotional distress.     [address] [facility]  Board Certified [address] [facility]   [phone]

## 2018-06-20 NOTE — PROGRESS NOTES
[facility]  ICU Progress Note    Name: Jose Carlos Castro   : 1960   MRN: [id number]   Date: 2018     [x]I have reviewed the flowsheet and previous days notes. Events overnight reviewed and discussed with nursing staff. Vital signs and records reviewed. Subjective:  Patient is a 55 yo female [place] never-smoker [facility] left arm graft on 19. Considering pt's active TB, permacath was placed instead. Pt's hemoglobin dropped from 9.3 to 5.4 post procedure. Pt was transferred to the ICU due to the low hemoglobin. CT Chest was obtained showing large right jugular catheter, tip at cavoatrial junction level. Major vessels in the thorax are unremarkable. With the source of bleeding likely hematoma between left pectoralis musculature as above, with adjacent chest wall edema on CT Chest.     18  No acute issues overnight. No complaints from patient. ROS:Review of systems not obtained due to patient factors. Medication Review:  · Pressors - None  · Sedation - None  · Antibiotics - None  · Pain - PRN  · GI/ DVT -  Not Indicated/SCDs  · Others (other gtts)    Vital Signs:    Visit Vitals    /73    Pulse 78    Temp 98.9 °F (37.2 °C)    Resp 20    Ht 5' 1\" (1.549 m)    Wt 40 kg (88 lb 2.9 oz)    SpO2 97%    Breastfeeding No    BMI 16.66 kg/m2       O2 Device: Room air       Temp (24hrs), Av.2 °F (36.8 °C), Min:97.5 °F (36.4 °C), Max:98.9 °F (37.2 °C)       Intake/Output:   Last shift:         Last 3 shifts:  1901 -  0700  In: 1030 [I.V.:100]  Out: 585     Intake/Output Summary (Last 24 hours) at 18 1359  Last data filed at 18 1800   Gross per 24 hour   Intake                0 ml   Output                0 ml   Net                0 ml     Physical Exam:  General: alert, cachectic   HEENT:  anicteric sclerae; pink palpebral conjunctivae; mucosa moist     Resp:  chest wall tender.  Right permacath in place and not accessed. Symmetrical chest expansion, no accessory muscle use; good airway entry; no rales/wheezing/rhonchi noted.   CV:  S1, S2 present; regular rate and rhythm  GI:  Abdomen soft, non-tender; (+) active bowel sounds  Extremities:  +2 pulses on all extremities, right arm swollen with ecymosis due to multiple IV sticks, fistula left forearm without thrill   Skin:  Warm; no rashes/lesions noted  Neurologic:  non-focal  Devices:  permacath right     DATA:   Current Facility-Administered Medications   Medication Dose Route Frequency    sevelamer carbonate (RENVELA) tab 800 mg  800 mg Oral TID WITH MEALS    [START ON 6/21/2018] amLODIPine (NORVASC) tablet 5 mg  5 mg Oral DAILY    0.9% sodium chloride infusion 250 mL  250 mL IntraVENous PRN    ondansetron (ZOFRAN) injection 4 mg  4 mg IntraVENous Q4H PRN    oxyCODONE-acetaminophen (PERCOCET) 5-325 mg per tablet 1 Tab  1 Tab Oral Q4H PRN    morphine injection 2-4 mg  2-4 mg IntraVENous Q3H PRN    ondansetron (ZOFRAN ODT) tablet 4 mg  4 mg Oral Q8H PRN    hydrALAZINE (APRESOLINE) 20 mg/mL injection 10 mg  10 mg IntraVENous Q6H PRN    cloNIDine HCl (CATAPRES) tablet 0.3 mg  0.3 mg Oral BID    docusate sodium (COLACE) capsule 100 mg  100 mg Oral BID    polyethylene glycol (MIRALAX) packet 17 g  17 g Oral DAILY    rifAMPin (RIFADIN) capsule 300 mg  300 mg Oral DAILY    ethambutol (MYAMBUTOL) tablet 800 mg  800 mg Oral Q MON, WED & FRI    pyridoxine (vitamin B6) (VITAMIN B-6) tablet 50 mg  50 mg Oral DAILY    isoniazid (NYDRAZID) tablet 300 mg  300 mg Oral DAILY    pyrazinamide tablet 1,000 mg  1,000 mg Oral Q TUE, THU & SAT       Labs: Results:       Chemistry Recent Labs      06/20/18   0530  06/19/18   1010  06/18/18   2320  06/18/18   1930   GLU  91  85  114*  78   NA  133*  132*  134*  134*   K  5.2  6.6*  6.0*  6.0*   CL  100  93*  95*  96*   CO2  26  28  28  26   BUN  39*  78*  72*  71*   CREA  5.98*  9.70*  9.32*  9.20*   CA 8. 9  10.5*  10.4*  10.7*   AGAP  7  11  11  12   BUCR  7*  8*  8*  8*   AP   --    --    --   107   TP   --    --    --   5.3*   ALB   --    --    --   1.9*   GLOB   --    --    --   3.4   AGRAT   --    --    --   0.6*      CBC w/Diff Recent Labs      06/20/18   1027  06/20/18   0530  06/19/18   1245  06/18/18   2320  06/18/18   1930  06/18/18   0100   WBC   --    --    --   5.1  5.3  6.8   RBC   --    --    --   2.12*  2.47*  3.70*   HGB  10.5*  10.7*  11.1*  5.4*  6.3*  9.3*   HCT  32.3*  32.9*  34.0*  17.1*  20.2*  29.6*   PLT   --    --    --   236  229  218   GRANS   --    --    --   80*  81*  78*   LYMPH   --    --    --   8*  10*  7*   EOS   --    --    --   0  0  1      Coagulation Recent Labs      06/18/18   0100   PTP  13.3   INR  1.1   APTT  34.5       Liver Enzymes Recent Labs      06/18/18   1930   TP  5.3*   ALB  1.9*   AP  107   SGOT  22      ABG No results found for: PH, PHI, PCO2, PCO2I, PO2, PO2I, HCO3, HCO3I, FIO2, FIO2I   Microbiology No results for input(s): CULT in the last 72 hours. Telemetry: [x]Sinus []A-flutter []Paced    []A-fib []Multiple PVCs                  Imaging:  No new    IMPRESSION:   · Acute anemia secondary to hematoma within left pectoralis musculature post permacath placement on 06/18/18 -- Hgb stable at 10  · Active Pulmonary Tuberculosis, primary, diagnosis was on April bronchoscopy and pleural fluid cultures which resulted from [facility] ~6/6/18, treatment was initiated on 06/18/18   · ESRD on dialysis  · Hyperkalemia -- improved after dialysis  · Clotted AVG s/p permacath placement on 06/18/18  · Liver mass, s/p percutaneous biopsy at [facility]  · History of NSTEMI, respiratory failure on mechanical ventilation in 2/18  · Poor PO intake / protein calorie malnutrition / BMI 16      PLAN:   · Resp -  On room air  · ID - Trend Temp and WBC Curve. AFB Treatment per public health department. Continue ethambutol, isoniazid, pyrazinamide, and rifampin. Supplemental Vit B6. [place] Precautions. John ID consulted for current treatment plan. ID Consulted. · CVS - vascular following, discuss further plans for dialysis access prior to DC   · Heme/onc - get another Hgb at 4p then resume daily H&H  · Metabolic - trend lytes and replace per protocol. BMP, Mg, Phos. · Renal - Nephrology Following, dialysis per Nephro. · Endocrine - renal diet  · Neuro/Pain/Sedation - PRN pain meds. · GI - PRN zofran for nausea. · Prophylaxis - DVT(SCDs), GI  · Discussed in interdisciplinary rounds  · TRANSFER to telemetry with negative pressure        The patient is: [x] acutely ill Risk of deterioration: [x] moderate    [] critically ill  [] high     [x]See my orders for details    My assessment/plan was discussed with:  [x]nursing []PT/OT    []respiratory therapy [x]Dr. Rico Cabrera   [x]family []     [x]Total critical care time exclusive of procedures 40 minutes. Signed By: Jacy Dugan MD     June 20, 2018 6:02 AM         [employer] [facility] Staff Addendum     I agree with the above evaluation, assessment and recommendations along with the following comments and observations. Hemodynamically stable. Does not require ongoing ICU care at this time. Remains on 4 drug therapy for TB. Plan to transfer out of ICU later today.  Pulmonary to follow on wards      Clinical Care and time spent coordinating care, minus procedure time: 30 min    Rena Pino DO, [facility]  Pulmonary, Sleep and Critical Care Medicine

## 2018-06-20 NOTE — CDMP QUERY
Please clarify if this patient is being treated/managed for:    => Moderate Protein Calorie Malnutrition  =>Other Explanation of clinical findings  =>Unable to Determine (no explanation of clinical findings)    The medical record reflects the following:    Ris Start renal diet. - Add supplements: Nepro BID.  - Continue RD inpatient monitoring and evaluation.      NUTRITION INTERVENTIONS & DIAGNOSIS:      Meals/snacks: modified composition, initiate    Medical food supplement therapy: initiate    Collaboration and referral of nutrition care: interdisciplinary rounds, discussed starting renal diet with PA     Nutrition Diagnosis: Unintended weight loss related to inadequate energy intake as evidenced by 18 lb, 17% weight loss x 6 months.    k:      Please clarify and document your clinical opinion in the progress notes and discharge summary including the definitive and/or presumptive diagnosis, (suspected or probable), related to the above clinical findings. Please include clinical findings supporting your diagnosis. If you DECLINE this query or would like to communicate with [facility], please utilize the \"[employer] message box\" at the TOP of the Progress Note on the right.       Thank Hakeem Quiñones RN ext [phone]

## 2018-06-20 NOTE — PROGRESS NOTES
Stable after transfusion  Hematoma from left arm access site, now with no bleed, stable  No bleed from hd cath  Pt admitted to me by ED with multiple medical problems, should be on medical service, discussed with hospitalist  Will only follow peripherally

## 2018-06-21 NOTE — DIALYSIS
ACUTE HEMODIALYSIS FLOW SHEET    HEMODIALYSIS ORDERS: Physician: Иван Hollins     Dialyzer: revaclear         Duration: 3 hr  BFR: 350   DFR: 600   Dialysate:  Temp 36.0 K+   2    Ca+  2.0 Na 140 Bicarb 30   Weight:  kg    Bed Scale []     Unable to Obtain [x]      Dry weight/UF Goal: 2000 Access RIJ  Needle Gauge    Heparin []  Bolus      Units    [] Hourly       Units    -xNone     Catheter locking solution heparin   Pre BP:   147/82   Pulse: 76     Temperature: 97.6  Respirations: 18  Tx: NS    ml/Bolus  Other        [] N/A   Labs: Pre        Post:        [x] N/A   Additional Orders(medications, blood products, hypotension management):    epogen   [] N/A     [x] Time Out/Safety Check  [x] [facility] Consent Verified     CATHETER ACCESS: []N/A   [x]Right   []Left   [x]IJ     []Fem   [] First use X-ray verified     [x]Tunnel                [] Non Tunneled   [x]No S/S infection  []Redness  []Drainage []Cultured []Swelling []Pain   [x]Medical Aseptic Prep Utilized   [x]Dressing Changed  [x] Biopatch  Date: 6/19/18   []Clotted   []Patent   Flows: [x]Good  []Poor  []Reversed   If access problem,  notified: []Yes    [x]N/A  Date:           GRAFT/FISTULA ACCESS:  []N/A     []Right     [x]Left     []UE     []LE   []AVG   [x]AVF        []Buttonhole    []Medical Aseptic Prep Utilized   []No S/S infection  []Redness  []Drainage []Cultured []Swelling []Pain    Bruit:   [] Strong    [] Weak       Thrill :   [] Strong    [] Weak       Needle Gauge:    Length:     If access problem,  notified: []Yes     []N/A  Date:        Please describe access if present and not used:clotted       GENERAL ASSESSMENT:    LUNGS:  Rate 18 SaO2%       [x] N/A    [x] Clear  [] Coarse  [] Crackles  [] Wheezing        [] Diminished     Location : []RLL   []LLL    [x]RUL  [x]ELÍAS   Cough: []Productive  []Dry  [x]N/A   Respirations:  [x]Easy  []Labored   Therapy:  [x]RA  []NC  l/min    Mask: []NRB []Venti       O2%                  []Ventilator []Intubated  [] Trach  [] BiPaP   CARDIAC: [x]Regular      [] Irregular   [] Pericardial Rub  [] JVD        []  Monitored  [] Bedside  [] Remotely monitored [] N/A  Rhythm:    EDEMA: [x] None  []Generalized  [] Pitting [] 1    [] 2    [] 3    [] 4                 [] Facial  [] Pedal  []  UE  [] LE   SKIN:   [x] Warm  [] Hot     [] Cold   [x] Dry     [] Pale   [] Diaphoretic                  [] Flushed  [] Jaundiced  [] Cyanotic  [] Rash  [] Weeping   LOC:    [x] Alert      [x]Oriented:    [x] Person     [] Place  []Time               [] Confused  [] Lethargic  [] Medicated  [] Non-responsive     GI / ABDOMEN: [] Flat    [] Distended    [x] Soft    [] Firm   []  Obese                             [] Diarrhea  [x] Bowel Sounds  [] Nausea  [] Vomiting       / URINE ASSESSMENT:[x] Voiding   [] Oliguria  [] Anuria   []  Onofre     [] Incontinent    []  Incontinent Brief      []  Bathroom Privileges     PAIN [x] 0 []1  []2   []3   []4   []5   []6   []7   []8   []9   []10            Scale 0-10  Action/Follow Up:    MOBILITY:  [] Amb    [] Amb/Assist    [x] Bed    [] Wheelchair  [] Stretcher      All Vitals and Treatment Details on Attached Flowsheet       Hospital:[facility]  [id number]     [] 1st Time Acute  [] Stat  [x] Routine  [] Urgent     [] Acute Room  [x]  Bedside  [] ICU/CCU  [] ER   Isolation Precautions:  [x] Dialysis   [x] Airborne   [x] Contact    [] Reverse   Special Considerations:   MRSA       [] Blood Consent Verified []N/A     ALLERGIES:  [] NKA    Banana      Code Status:  [] Full Code  [] DNR  [x] Other           HBsAg ONLY: Date Drawn  2/25/18      [x]Negative []Positive []Unknown   HBsAb: Date 9/9/18   [] Susceptible   [x] [id number] []Not Drawn  [] Drawn     Current Labs:    Date of Labs: Today [x]     Results for Liam Foy (MRN [id number]) as of 6/21/2018 16:22   Ref.  Range 6/21/2018 03:30   WBC Latest Ref Range: 4.6 - 13.2 K/uL 8.4   RBC Latest Ref Range: 4.20 - 5.30 M/uL 3.57 (L) HGB Latest Ref Range: 12.0 - 16.0 g/dL 9.7 (L)   HCT Latest Ref Range: 35.0 - 45.0 % 29.8 (L)   MCV Latest Ref Range: 74.0 - 97.0 FL 83.5   MCH Latest Ref Range: 24.0 - 34.0 PG 27.2   MCHC Latest Ref Range: 31.0 - 37.0 g/dL 32.6   RDW Latest Ref Range: 11.6 - 14.5 % 16.5 (H)   PLATELET Latest Ref Range: 135 - 420 K/uL 166   MPV Latest Ref Range: 9.2 - 11.8 FL 9.3   Sodium Latest Ref Range: 136 - 145 mmol/L 132 (L)   Potassium Latest Ref Range: 3.5 - 5.5 mmol/L 5.6 (H)   Chloride Latest Ref Range: 100 - 108 mmol/L 98 (L)   CO2 Latest Ref Range: 21 - 32 mmol/L 25   Anion gap Latest Ref Range: 3.0 - 18 mmol/L 9   Glucose Latest Ref Range: 74 - 99 mg/dL 90   BUN Latest Ref Range: 7.0 - 18 MG/DL 53 (H)   Creatinine Latest Ref Range: 0.6 - 1.3 MG/DL 7.61 (H)   BUN/Creatinine ratio Latest Ref Range: 12 - 20   7 (L)   Calcium Latest Ref Range: 8.5 - 10.1 MG/DL 9.1   Phosphorus Latest Ref Range: 2.5 - 4.9 MG/DL 6.8 (H)   Magnesium Latest Ref Range: 1.6 - 2.6 mg/dL 2.5   GFR est non-AA Latest Ref Range: >60 ml/min/1.73m2 6 (L)   GFR est AA Latest Ref Range: >60 ml/min/1.73m2 7 (L)                                                                                                                                 DIET:  [x] Renal    [] Other     [] NPO     []  Diabetic      PRIMARY NURSE REPORT: First initial/Last name/Title      Pre Dialysis: Jim Isaacs RN    Time: 3692      EDUCATION:    [x] Patient [] Other         Knowledge Basis: []None [x]Minimal [] Substantial   Barriers to learning  Pt does not speak english[]N/A   [] Access Care     [] S&S of infection     [] Fluid Management     []K+     []Procedural    []Albumin     [] Medications     [] Tx Options     [] Transplant     [] Diet     [] Other   Teaching Tools:  [] Explain  [] Demo  [] Handouts [] Video  Patient response:   [] Verbalized understanding  [] Teach back  [] Return demonstration [] Requires follow up   Inappropriate due to    Pt not speaking english RO/HEMODIALYSIS MACHINE SAFETY CHECKS  Before each treatment:     Machine Number:                   [facility]                                   [] Unit Machine #  with centralized RO                                  [] Portable Machine #1/RO serial # [id number]                                  [] Portable Machine #2/RO serial # [id number]                                  [x] Portable Machine #3/RO serial # [id number]                                                                                                       [facility]                                  [] Portable Machine #11/RO serial # [id number]                                   [] Portable Machine #12/RO serial # [id number]                                  [] Portable Machine #13/RO serial #  [id number]      Alarm Test:  Pass time          Other:         [x]*RO/Machine Log Complete      Temp  36.0           [x]Extracorporeal Circuit Tested for integrity   Dialysate: pH  7.4 Conductivity: Meter   na    HD Machine   13.7                  TCD: 13.8  Dialyzer Lot # [id number]            Blood Tubing Lot #[id number]          Saline Lot #  [id number]     CHLORINE TESTING-Before each treatment and every 4 hours    Total Chlorine: [x] less than 0.1 ppm  Time: 1545 4 Hr/2nd Check Time:    (if greater than 0.1 ppm from Primary then every 30 minutes from Secondary)     TREATMENT INITIATION  with Dialysis Precautions:   [x] All Connections Secured                 [x] Saline Line Double Clamped   [x] Venous Parameters Set                  [x] Arterial Parameters Set    [x] Prime Given  200 ml               [x]Air Foam Detector Engaged      Treatment Initiation Note: at pt bedside in room, pt on airborne and contact precautions. Pt has RIJ catheter. Arterial and venous site aspirates and flushes well. Hemodialysis now started, will continue to monitor patient and vital signs.      Medication Dose Volume Route Initials Dialyzer Cleared [x] Good [] Fair  [] Poor    Blood processed: 56.8 L  UF Removed 1900  Ml    Post Wt: matthew    kg  POst BP: 168/76      Pulse:92     Respirations:18  Temperature:96.1       epogen     [id number]        ml      iv      rl   Post Tx Vascular Access: AVF/AVG: Bleeding stopped Art  min. VenMin   N/a                                   Catheter: Locking solution: Heparin 1:1000 Art. 1.9  Wei. 1.9  N/A                                 Post Assessment:                                    Skin:  [] Warm  [] Dry [] Diaphoretic    [] Flushed  [] Pale [] Cyanotic   [facility] Signatures Title Initials  Time Lungs [x] Clear    [] Course  [] Crackles  [] Wheezing [] Diminished   Randi Aparna RN RL [id number] Cardiac [x] Regular   [] Irregular   [] Monitor  [] N/A  Rhythm:           Edema:  [x] None    [] General     [] Facial   [] Pedal    [] UE    [] LE       Pain:[x]0  []1  []2   []3  []4   []5   []6   []7   []8   []9   []10         Post Treatment Note: hemodialysis completed, 1.9 liters removed. Pt and vss. R iJ catheter flushed with normal saline and heparin. Pt remains in room.  Report given to primary nurse,     POST TREATMENT PRIMARY NURSE HANDOFF REPORT:     First initial/Last name/Title         Post Dialysis:   Dinesh Sethi RN Time:  1722     Abbreviations: AVG-arterial venous graft, AVF-arterial venous fistula, IJ-Internal Jugular, Subcl-Subclavian, Fem-Femoral, Tx-treatment, AP/HR-apical heart rate, DFR-dialysate flow rate, BFR-blood flow rate, AP-arterial pressure, -venous pressure, UF-ultrafiltrate, TMP-transmembrane pressure, Wei-Venous, Art-Arterial, RO-Reverse Osmosis

## 2018-06-21 NOTE — PROGRESS NOTES
RENAL DAILY PROGRESS NOTE    Patient: Sandra Gilman               Sex: female          DOA: 6/18/2018 12:25 AM        YOB: 1960      Age:  56 y.o.        LOS:  LOS: 1 day     Subjective:     Sandra Gilman is a 56 y.o.  who presents with DX  Complication of vascular dialysis catheter, initial encounter  Complication of vascular dialysis catheter, initial encounter.    Asked to evaluate for esrd,admitted with clotted avg,newly diagnosed tb  Chief complains:  - Reviewed last 24 hrs events     Current Facility-Administered Medications   Medication Dose Route Frequency    sevelamer carbonate (RENVELA) tab 800 mg  800 mg Oral TID WITH MEALS    amLODIPine (NORVASC) tablet 5 mg  5 mg Oral DAILY    0.9% sodium chloride infusion 250 mL  250 mL IntraVENous PRN    ondansetron (ZOFRAN) injection 4 mg  4 mg IntraVENous Q4H PRN    oxyCODONE-acetaminophen (PERCOCET) 5-325 mg per tablet 1 Tab  1 Tab Oral Q4H PRN    morphine injection 2-4 mg  2-4 mg IntraVENous Q3H PRN    ondansetron (ZOFRAN ODT) tablet 4 mg  4 mg Oral Q8H PRN    hydrALAZINE (APRESOLINE) 20 mg/mL injection 10 mg  10 mg IntraVENous Q6H PRN    cloNIDine HCl (CATAPRES) tablet 0.3 mg  0.3 mg Oral BID    docusate sodium (COLACE) capsule 100 mg  100 mg Oral BID    polyethylene glycol (MIRALAX) packet 17 g  17 g Oral DAILY    rifAMPin (RIFADIN) capsule 300 mg  300 mg Oral DAILY    ethambutol (MYAMBUTOL) tablet 800 mg  800 mg Oral Q MON, WED & FRI    pyridoxine (vitamin B6) (VITAMIN B-6) tablet 50 mg  50 mg Oral DAILY    isoniazid (NYDRAZID) tablet 300 mg  300 mg Oral DAILY    pyrazinamide tablet 1,000 mg  1,000 mg Oral Q TUE, THU & SAT       Objective:     Visit Vitals    /77 (BP 1 Location: Right arm, BP Patient Position: At rest)    Pulse 77    Temp 98.3 °F (36.8 °C)    Resp 16    Ht 5' 1\" (1.549 m)    Wt 40 kg (88 lb 2.9 oz)    SpO2 93%    Breastfeeding No    BMI 16.66 kg/m2       Intake/Output Summary (Last 24 hours) at 06/21/18 [facility] filed at 06/21/18 0915   Gross per 24 hour   Intake              310 ml   Output              300 ml   Net               10 ml       Physical Examination:     GEN:  NAD  RS: Chest is bilateral equal, no wheezing / rales / crackles  CVS: S1-S2 heard, RRR, No S3 / murmur  Abdomen: Soft, Non tender, Not distended, Positive bowel sounds, no organomegaly, no CVA / supra pubic tenderness  Extremities: No edema, no cyanosis, skin is warm on touch  CNS: Awake   HEENT: Head is atraumatic, PERRLA, conjunctiva pink & non icteric. No JVD or carotid bruit   Musculoskeletal: No gross joints or bone deformities   Lymph Node: No palpable cervical, axillary or groin lymphadenopathy. Data Review:      Labs:     Hematology:   Recent Labs      06/21/18   0330  06/20/18   1950  06/20/18   1027  06/20/18   0530  06/19/18   1245  06/18/18   2320  06/18/18   1930   WBC  8.4   --    --    --    --   5.1  5.3   HGB  9.7*  9.8*  10.5*  10.7*  11.1*  5.4*  6.3*   HCT  29.8*  30.0*  32.3*  32.9*  34.0*  17.1*  20.2*     Chemistry:   Recent Labs      06/21/18   0330  06/20/18   0530  06/19/18   1010  06/18/18   2320  06/18/18   1930   BUN  53*  39*  78*  72*  71*   CREA  7.61*  5.98*  9.70*  9.32*  9.20*   CA  9.1  8.9  10.5*  10.4*  10.7*   ALB   --    --    --    --   1.9*   K  5.6*  5.2  6.6*  6.0*  6.0*   NA  132*  133*  132*  134*  134*   CL  98*  100  93*  95*  96*   CO2  25  26  28  28  26   PHOS  6.8*  6.2*  9.6*   --   9.5*   GLU  90  91  85  114*  78        Images:    XR (Most Recent). CXR reviewed by me and compared with previous CXR   Results from Hospital Encounter encounter on 06/18/18   XR CHEST PORT   Narrative Portable Chest 2202 hours    CPT CODE:52131    HISTORY:  End-stage renal disease, clotted AV fistula left arm graft, active TB,   Post   op Perm Hemodialysis placement. COMPARISON: Chest x-ray June 18, 2018 at 0116 hours, June 7, 2018.     FINDINGS:     Interval placement of a large caliber double-lumen right jugular approach  central catheter terminating at the right atrium. There is no pneumothorax. Hazy  density at the right mid to lower hemithorax. Persistent minimal blunting of the  right costophrenic angle. Increased opacification in the lower third of the left  hemithorax with obscuration of the hemidiaphragm and blunting of the  costophrenic angle. Stable mild cardiomegaly. Normal pulmonary vascularity. No  pneumothorax. .         Impression IMPRESSION:    No pneumothorax. Double-lumen right IJ approach central catheter in expected position. Vague haziness overlying the right lower lung likely layering pleural effusion. Increased opacification in the lower left hemithorax consistent with combination  of increasing left pleural effusion and left lower lung infiltrate or segmental  atelectasis. Stable cardiomegaly         CT (Most Recent)   Results from Hospital Encounter encounter on 06/18/18   CT CHEST W CONT   Narrative EXAMINATION: CT thorax with IV contrast    INDICATION: Evaluate for hematoma, dialysis catheter placement    COMPARISON: Chest radiograph 6/18/2018    TECHNIQUE: CT of the thorax performed following 70 cc IV Isovue-300 with  multiplanar reformations. All CT scans at this facility are performed using dose  optimization technique as appropriate to a performed exam, to include automated  exposure control, adjustment of the mA and/or kV according to patient size  (including appropriate matching first site specific examinations), or use of  iterative reconstruction technique. FINDINGS:    Cardiovascular: Large right jugular catheter, tip at cavoatrial junction level. Major vessels in the thorax are unremarkable. Borderline prominent heart size. Small pericardial effusion. Mediastinum: Imaged thyroid unremarkable. No mediastinal adenopathy by size  criteria. Esophagus nondistended. Trachea and central bronchi unremarkable. Pleura:  Moderate right and small left pleural effusions. No pneumothorax. Lungs: Probable bilateral lower lobe compressive atelectasis. Additional streaky  densities also likely atelectasis. No additional suspicious parenchymal  opacities identified. Upper abdomen: Moderate to large volume of ascites, incompletely imaged with  some ill-defined areas of possible peripheral enhancement particularly in the  region of Tucker's pouch. Bilateral renal atrophy with probable incompletely  imaged left renal cyst.    Miscellaneous: Left greater than right chest wall edema/stranding, including  masslike density associated with the left pectoralis musculature, suspicious for  hematoma, measuring roughly 8.5 x 2.5 cm on axial image 28, with surrounding  edema/stranding. Bones: No acute osseous findings. Probable hemangioma at T4. Impression IMPRESSION:    1. Evidence of hematoma within or insinuating between left pectoralis  musculature as above, with adjacent chest wall edema. 2. Moderate right and small left pleural effusion with probable compressive  atelectasis in addition to scattered streaky atelectasis. Superimposed  infiltrate difficult to exclude.  -Small pericardial effusion.  -Right jugular catheter. 3. Moderate to large volume ascites, incompletely evaluated. Subtle peripheral  enhancement and more since protrusion. Correlate clinically for possible  infection/peritonitis. -Renal atrophy.  -See additional details above. EKG No results found for this or any previous visit. I have personally reviewed the old medical records and patient's labs    Plan / Recommendation:      1. Esrd,on dialysis mon wed Friday,off regular schduele. plan dialysis today. discussed with son in law  2.anemia,related to bleeding ,stable post transfusion. resume epo  3.newly diagnosed tb from pleural effusion. needs 3 neg sputums before she can return to her outpatient dialysis unit.started treatment on Monday  4.bp ,controlled    D/w Dr. Alli Chou Jose Szymanski MD  Nephrology  6/21/2018

## 2018-06-21 NOTE — PROGRESS NOTES
Problem: Falls - Risk of  Goal: *Absence of Falls  Document Asha Fall Risk and appropriate interventions in the flowsheet. Outcome: Progressing Towards Goal  Fall Risk Interventions:  Mobility Interventions: Bed/chair exit alarm, Patient to call before getting OOB         Medication Interventions: Bed/chair exit alarm, Patient to call before getting OOB    Elimination Interventions: Bed/chair exit alarm, Call light in reach, Patient to call for help with toileting needs, Toileting schedule/hourly rounds, Toilet paper/wipes in reach             Problem: Pressure Injury - Risk of  Goal: *Prevention of pressure injury  Document Derek Scale and appropriate interventions in the flowsheet. Outcome: Progressing Towards Goal  Pressure Injury Interventions:             Activity Interventions: Pressure redistribution bed/mattress(bed type)    Mobility Interventions: HOB 30 degrees or less, Pressure redistribution bed/mattress (bed type)    Nutrition Interventions: Document food/fluid/supplement intake    Friction and Shear Interventions: HOB 30 degrees or less, Minimize layers

## 2018-06-21 NOTE — ROUTINE PROCESS
Bedside and Verbal shift change report given to Be Gupta (oncoming nurse) by Naomi Mon RN (offgoing nurse). Report included the following information SBAR, Kardex, MAR and Recent Results. SITUATION:  Code Status: Prior  Reason for Admission: DX  Complication of vascular dialysis catheter, initial encounter  Complication of vascular dialysis catheter, initial encounter  Hospital day: 1  Problem List:       Hospital Problems  Date Reviewed: 11/15/2017          Codes Class Noted POA    Complication of vascular dialysis catheter, initial encounter ICD-10-CM: T82. 9XXA  ICD-9-CM: 996.1  6/18/2018 Unknown              BACKGROUND:   Past Medical History:   Past Medical History:   Diagnosis Date    Chronic kidney disease     ESRD (end stage renal disease) ([facility] [address]     TUES-THURS-SAT    Hypercholesteremia     Hypertension     Kidney disease     Vitamin D deficiency       Patient taking anticoagulants no    Patient has a defibrillator: no    History of shots NO for example, flu, pneumonia, tetanus   Isolation History YES for example, MRSA, CDiff    ASSESSMENT:  Changes in Assessment Throughout Shift: none  Significant Changes in 24 hours (for example, RR/code, fall)  Patient has Central Line: yes Reasons if yes: Hemodialysis  Patient has Onofre Cath: no   PT  IV Patency  OR Checklist  Pending Tests    Last Vitals:  Vitals w/ MEWS Score (last day)     Date/Time MEWS Score Pulse Resp Temp BP Level of Consciousness SpO2    06/21/18 0027 1 84 18 97.6 °F (36.4 °C) 128/77 Alert 93 %    06/20/18 2000 1 68 18 98.3 °F (36.8 °C) 113/68 Alert 94 %    06/20/18 1801 1 81 17 97.8 °F (36.6 °C) 117/73 Alert 95 %    06/20/18 1500 -- 81 19 -- 107/67 -- 98 %    06/20/18 1400 -- 78 19 -- 98/59 -- 98 %    06/20/18 1300 -- 78 20 -- 114/73 -- 97 %    06/20/18 1200 -- 75 14 -- 128/80 -- 98 %    06/20/18 1100 -- 79 15 -- 110/67 -- 98 %    06/20/18 1000 -- 82 19 -- 120/73 -- 97 %    06/20/18 0943 -- 82 -- -- 124/72 -- --    06/20/18 0900 -- 86 21 -- 124/72 -- 96 %    06/20/18 0800 -- 83 17 -- 114/69 -- 97 %    06/20/18 0700 -- 85 16 -- 121/75 -- 97 %    06/20/18 0600 -- 83 18 -- 109/67 -- 97 %    06/20/18 0500 -- 82 18 -- 104/63 -- 97 %    06/20/18 0400 1 84 18 98.9 °F (37.2 °C) 106/66 Alert 97 %    06/20/18 0300 -- 86 17 -- 105/64 -- 97 %    06/20/18 0200 -- 90 18 -- 124/78 -- 97 %    06/20/18 0100 -- 96 19 -- 138/84 -- 97 %    06/20/18 0000 2 99 21 97.5 °F (36.4 °C) 150/89 Alert 96 %            PAIN    Pain Assessment    Pain Intensity 1: 0 (06/21/18 0010)    Pain Location 1: Arm    Pain Intervention(s) 1: Medication (see MAR)    Patient Stated Pain Goal: 0  Intervention effective: yes  Time of last intervention: 0338 Reassessment Completed: yes   Other actions taken for pain: Distraction    Last 3 Weights:  Last 3 Recorded Weights in this Encounter    06/18/18 1724 06/20/18 1104   Weight: 40 kg (88 lb 2.9 oz) 40 kg (88 lb 2.9 oz)   Weight change:     INTAKE/OUPUT    Current Shift:      Last three shifts: 06/19 0701 - 06/20 1900  In: 620   Out: 580     RECOMMENDATIONS AND DISCHARGE PLANNING  Patient needs and requests: Pain Control    Pending tests/procedures: labs     Discharge plan for patient: Home    Discharge planning Needs or Barriers: none    Estimated Discharge Date: 6/28/2018 Posted on Whiteboard in Patients Room: yes       \"HEALS\" SAFETY CHECK  A safety check occurred in the patient's room between off going nurse and oncoming nurse listed above. The safety check included the below items:    H  High Alert Medications Verify all high alert medication drips (heparin, PCA, etc.)  E  Equipment Suction is set up for ALL patients (with yanker)  Red plugs utilized for all equipment (IV pumps, etc.)  WOWs wiped down at end of shift.   Room stocked with oxygen, suction, and other unit-specific supplies  A  Alarms Bed alarm is set for fall risk patients  Ensure chair alarm is in place and activated if patient is up in a chair  L  Lines Check IV for any infiltration  Onofre bag is empty if patient has a Onofre   Tubing and IV bags are labeled  S  Safety  Room is clean, patient is clean, and equipment is clean. Hallways are clear from equipment besides carts. Fall bracelet on for fall risk patients  Ensure room is clear and free of clutter  Suction is set up for ALL patients (with gabrielaker)  Hallways are clear from equipment besides carts.    Isolation precautions followed, supplies available outside room, sign posted    Louie Goldstein RN

## 2018-06-21 NOTE — INTERDISCIPLINARY ROUNDS
Interdisciplinary Round Note   Patient Information:   Gita Bajwa   329/81   Reason for Admission: DX  Complication of vascular dialysis catheter, initial encounter  Complication of vascular dialysis catheter, initial encounter   Attending Provider:   Darrell Ramos MD  Primary Care Physician:       Rubia Light MD       [phone]   Estimated discharge date:  6/28/2018   Hospital day: 1  [unfilled]  4d 19h  RRAT Score: Low Risk            12       Total Score        3 Has Seen PCP in Last 6 Months (Yes=3, No=0)    4 IP Visits Last 12 Months (1-3=4, 4=9, >4=11)    5 Charlson Comorbidity Score (Age + Comorbid Conditions)        Criteria that do not apply:    . Living with Significant Other. Assisted Living. LTAC. SNF. or   Rehab    Patient Length of Stay (>5 days = 3)    Pt. Coverage (Medicare=5 , Medicaid, or Self-Pay=4)       normal sinus rhythm     No         Not needed      Lines, Drains, & Airways  HemodialysisCatheter       IV Antibiotics:    Current Antimicrobial Therapy (168h ago through future)    Ordered     Start Stop    06/18/18 1754  pyrazinamide tablet 1,000 mg  1,000 mg,   Oral,   3 TIMES A WEEK(TUES,THUR, & SAT)      06/19/18 1900 --    06/18/18 1729  rifAMPin (RIFADIN) capsule 300 mg  300 mg,   Oral,   DAILY      06/18/18 1900 --    06/18/18 1729  ethambutol (MYAMBUTOL) tablet 800 mg  800 mg,   Oral,   3 TIMES A WEEK (MON,WED & FRI)      06/18/18 1900 --    06/18/18 1729  isoniazid (NYDRAZID) tablet 300 mg  300 mg,   Oral,   DAILY      06/18/18 1800 --        GI Prophylaxis: GI Prophylaxis: no   Type:       Recent Glucose Results:   Lab Results   Component Value Date/Time    GLU 91 06/20/2018 05:30 AM      Activity Level:   Activity Level: Bed Rest    Needs assistance with ADLs: no       Goals for Today: Pain Free   Recommendations:   Discharge Disposition: Home Independent  CM  Follow up phone call to assess:   medication knowledge and compliance, follow up physician appointments and ongoing needs assessment    Needs for Discharge:  IDR Team:   Recommendations from IDR team:     Other Notes:

## 2018-06-22 NOTE — PROGRESS NOTES
[facility]  Progress Note    Name: Bola Raya   : 1960   MRN: [id number]   Date: 2018     [x]I have reviewed the flowsheet and previous days notes. Events overnight reviewed and discussed with nursing staff. Vital signs and records reviewed. Subjective:  Patient is a 55 yo female [place] never-smoker presented to the [facility] for a clotted AVF left arm graft on 19. Considering pt's active TB, permacath was placed instead. Pt's hemoglobin dropped from 9.3 to 5.4 post procedure. Pt was transferred to the ICU due to the low hemoglobin. CT Chest was obtained showing large right jugular catheter, tip at cavoatrial junction level. Major vessels in the thorax are unremarkable. With the source of bleeding likely hematoma between left pectoralis musculature as above, with adjacent chest wall edema on CT Chest.     18  No acute issues overnight. No complaints from patient except some pain at catheter site. Daughter at bedside              ROS:Review of systems not obtained due to patient factors. Vital Signs:    Visit Vitals    /85 (BP 1 Location: Left arm, BP Patient Position: At rest)    Pulse 75    Temp 97.1 °F (36.2 °C)    Resp 18    Ht 5' 1\" (1.549 m)    Wt 40 kg (88 lb 2.9 oz)    SpO2 96%    Breastfeeding No    BMI 16.66 kg/m2       O2 Device: Room air       Temp (24hrs), Av.4 °F (36.3 °C), Min:96.1 °F (35.6 °C), Max:98.1 °F (36.7 °C)       Intake/Output:   Last shift:         Last 3 shifts: 1901 -  0700  In: 430 [P.O.:430]  Out: 2550 [Urine:650]    Intake/Output Summary (Last 24 hours) at 18 0943  Last data filed at 18 1900   Gross per 24 hour   Intake              120 ml   Output             2250 ml   Net            -2130 ml     Physical Exam:  General: alert, cachectic   HEENT:  anicteric sclerae; pink palpebral conjunctivae; mucosa moist     Resp:  chest wall tender. Right permacath in place and not accessed. Symmetrical chest expansion, no accessory muscle use; good airway entry; no rales/wheezing/rhonchi noted.   CV:  S1, S2 present; regular rate and rhythm  GI:  Abdomen soft, non-tender; (+) active bowel sounds  Extremities:  +2 pulses on all extremities, right arm swollen with ecymosis due to multiple IV sticks, fistula left forearm without thrill   Skin:  Warm; no rashes/lesions noted  Neurologic:  non-focal  Devices:  permacath right     DATA:   Current Facility-Administered Medications   Medication Dose Route Frequency    epoetin pilar (EPOGEN;PROCRIT) injection 10,000 Units  10,000 Units IntraVENous DIALYSIS TUE, THU & SAT    sevelamer carbonate (RENVELA) tab 1,600 mg  1,600 mg Oral TID WITH MEALS    amLODIPine (NORVASC) tablet 5 mg  5 mg Oral DAILY    0.9% sodium chloride infusion 250 mL  250 mL IntraVENous PRN    ondansetron (ZOFRAN) injection 4 mg  4 mg IntraVENous Q4H PRN    oxyCODONE-acetaminophen (PERCOCET) 5-325 mg per tablet 1 Tab  1 Tab Oral Q4H PRN    morphine injection 2-4 mg  2-4 mg IntraVENous Q3H PRN    ondansetron (ZOFRAN ODT) tablet 4 mg  4 mg Oral Q8H PRN    hydrALAZINE (APRESOLINE) 20 mg/mL injection 10 mg  10 mg IntraVENous Q6H PRN    cloNIDine HCl (CATAPRES) tablet 0.3 mg  0.3 mg Oral BID    docusate sodium (COLACE) capsule 100 mg  100 mg Oral BID    polyethylene glycol (MIRALAX) packet 17 g  17 g Oral DAILY    rifAMPin (RIFADIN) capsule 300 mg  300 mg Oral DAILY    ethambutol (MYAMBUTOL) tablet 800 mg  800 mg Oral Q MON, WED & FRI    pyridoxine (vitamin B6) (VITAMIN B-6) tablet 50 mg  50 mg Oral DAILY    isoniazid (NYDRAZID) tablet 300 mg  300 mg Oral DAILY    pyrazinamide tablet 1,000 mg  1,000 mg Oral Q TUE, THU & SAT       Labs: Results:       Chemistry Recent Labs      06/21/18   0330  06/20/18   0530  06/19/18   1010   GLU  90  91  85   NA  132*  133*  132*   K  5.6*  5.2  6.6*   CL  98*  100  93*   CO2  25  26  28   BUN  53*  39*  78*   CREA  7.61*  5.98*  9.70*   CA  9.1 8. 9  10.5*   AGAP  9  7  11   BUCR  7*  7*  8*      CBC w/Diff Recent Labs      06/22/18   0416  06/21/18   0330  06/20/18   1950   WBC  5.1  8.4   --    RBC  3.58*  3.57*   --    HGB  9.8*  9.7*  9.8*   HCT  30.9*  29.8*  30.0*   PLT  147  166   --       Coagulation No results for input(s): PTP, INR, APTT in the last 72 hours. No lab exists for component: INREXT, INREXT    Liver Enzymes No results for input(s): TP, ALB, TBIL, AP, SGOT, GPT in the last 72 hours. No lab exists for component: DBIL   ABG No results found for: PH, PHI, PCO2, PCO2I, PO2, PO2I, HCO3, HCO3I, FIO2, FIO2I   Microbiology No results for input(s): CULT in the last 72 hours. Imaging:  No new    IMPRESSION:     · Active Pulmonary Tuberculosis, primary, diagnosis was on April bronchoscopy and pleural fluid cultures which resulted from [facility] ~6/6/18, treatment was initiated on 06/18/18   · Acute anemia secondary to hematoma within left pectoralis musculature post permacath placement on 06/18/18 -- Hgb stable at 10  · ESRD on dialysis  · Hyperkalemia -- improved after dialysis  · Clotted AVG s/p permacath placement on 06/18/18  · Liver mass, s/p percutaneous biopsy at [facility]  · History of NSTEMI, respiratory failure on mechanical ventilation in 2/18  · Poor PO intake / protein calorie malnutrition / BMI 16      PLAN:   · Resp -  On room air  · ID - Trend Temp and WBC Curve. AFB Treatment per public health department. Continue ethambutol, isoniazid, pyrazinamide, and rifampin. Supplemental Vit B6. [place] Precautions. John SINGER consulted for current treatment plan. ID Consulted. · CVS - vascular following, discuss further plans for dialysis access prior to DC   · Heme/onc - monitor H/h  · Renal - Nephrology Following, dialysis per Nephro.      · Prophylaxis - DVT(SCDs), GI          The patient is: [x] acutely ill Risk of deterioration: [x] moderate    [] critically ill  [] high     [x]See my orders for details    My assessment/plan was discussed with:  [x]nursing []PT/OT    []respiratory therapy    [x]family []       Signed By: Nevaeh Lira MD     June 22, 2018

## 2018-06-22 NOTE — PROGRESS NOTES
0900 - Introduced self as RN to patient, son at bedside. Alert on assessment, respirations regular and unlabored, Skin is warm and dry. Denies any acute needs, will continue to monitor. 1400 - Per previous note from vascular surgery discussed possibility of hospitalist team coming on board for this patient. Hospitalist agreeable, requires doctor-doctor consult. Called and spoke with MD Braydon Vallejo - vascular, stated will contact Hospitalist on call today to discuss. Patient resting in bed, family at bedside, denies any acute needs, Will continue to monitor    1750 - Patient resting in bed with eyes open, tolerated medications well, daughter at bedside to assist. Patient has specimen cup at bedside for sputum culture, daughter and patient aware we are still requiring sample. Denies any acute needs at this time. Confirmed that hospitalist team is now on board for this patient. Will continue to monitor    1910 - Report given to [facility].

## 2018-06-22 NOTE — PROGRESS NOTES
RENAL DAILY PROGRESS NOTE            51y F with ESRD, admitted with clotted access, had large hematoma post   Subjective:       Complaint:   Overnight events noted  Looks comfortable  Daughter in law at bedside  Spoke with her son over phone   Explain current situation. IMPRESSION:   ESRD, MWF schedule outpatinet, running TTS schedule here  Access; clotted left arm access, has right side tunnel catheter  Anemia, stable   HTN  Active pulmonary TB  Hyperkalemia, post HD yesterday , no available lab today. PLAN:   Plan HD tomorrow and switch her to MWF next week. She needs three negative AFB and being on anti TB treatment to restart her outpatient HD.    Continue current BP meds  Adjust all meds per ESRD satus           Current Facility-Administered Medications   Medication Dose Route Frequency    epoetin pilar (EPOGEN;PROCRIT) injection 10,000 Units  10,000 Units IntraVENous DIALYSIS TUE, THU & SAT    sevelamer carbonate (RENVELA) tab 1,600 mg  1,600 mg Oral TID WITH MEALS    amLODIPine (NORVASC) tablet 5 mg  5 mg Oral DAILY    0.9% sodium chloride infusion 250 mL  250 mL IntraVENous PRN    ondansetron (ZOFRAN) injection 4 mg  4 mg IntraVENous Q4H PRN    oxyCODONE-acetaminophen (PERCOCET) 5-325 mg per tablet 1 Tab  1 Tab Oral Q4H PRN    morphine injection 2-4 mg  2-4 mg IntraVENous Q3H PRN    ondansetron (ZOFRAN ODT) tablet 4 mg  4 mg Oral Q8H PRN    hydrALAZINE (APRESOLINE) 20 mg/mL injection 10 mg  10 mg IntraVENous Q6H PRN    cloNIDine HCl (CATAPRES) tablet 0.3 mg  0.3 mg Oral BID    docusate sodium (COLACE) capsule 100 mg  100 mg Oral BID    polyethylene glycol (MIRALAX) packet 17 g  17 g Oral DAILY    rifAMPin (RIFADIN) capsule 300 mg  300 mg Oral DAILY    ethambutol (MYAMBUTOL) tablet 800 mg  800 mg Oral Q MON, WED & FRI    pyridoxine (vitamin B6) (VITAMIN B-6) tablet 50 mg  50 mg Oral DAILY    isoniazid (NYDRAZID) tablet 300 mg  300 mg Oral DAILY    pyrazinamide tablet 1,000 mg  1,000 mg Oral Q TUE, THU & SAT       Review of Symptoms: comprehensive ROS negative except above.    Objective:   Patient Vitals for the past 24 hrs:   Temp Pulse Resp BP SpO2   06/22/18 1227 97.1 °F (36.2 °C) 71 17 143/86 98 %   06/22/18 0945 96.8 °F (36 °C) 83 17 (!) 175/92 96 %   06/22/18 0827 97.1 °F (36.2 °C) 75 18 153/85 96 %   06/22/18 0415 97.3 °F (36.3 °C) 78 18 137/79 95 %   06/22/18 0019 98.1 °F (36.7 °C) 87 18 134/81 95 %   06/21/18 2000 98.1 °F (36.7 °C) 81 16 154/64 96 %   06/21/18 1915 - - - 168/76 -   06/21/18 1900 96.1 °F (35.6 °C) 94 18 150/89 -   06/21/18 1830 - 95 18 139/84 -   06/21/18 1800 - 88 18 152/85 -   06/21/18 1730 - 83 18 131/84 -   06/21/18 1700 - 84 18 115/77 -   06/21/18 1630 - 78 18 125/81 -   06/21/18 1600 97.6 °F (36.4 °C) 76 18 147/82 -   06/21/18 1539 98.1 °F (36.7 °C) 68 18 123/77 94 %        Weight change:      06/20 1901 - 06/22 0700  In: 430 [P.O.:430]  Out: 2550 [Urine:650]    Intake/Output Summary (Last 24 hours) at 06/22/18 1413  Last data filed at 06/21/18 1900   Gross per 24 hour   Intake                0 ml   Output             1900 ml   Net            -1900 ml     Physical Exam:   General: comfortable, no acute distress   HEENT sclera anicteric, supple neck, no thyromegaly  CVS: S1S2 heard,  no rub  RS: + air entry b/l,   Abd: Soft, Non tender,  Neuro: non focal, awake, alert , CN II-XII are grossly intact  Extrm: no edema, no cyanosis, clubbing   Skin: no visible  Rash  Access; left arm access; hematoma   Right side TDC        Data Review:     LABS:   Hematology: Recent Labs      06/22/18   0416  06/21/18   0330  06/20/18   1950  06/20/18   1027  06/20/18   0530   WBC  5.1  8.4   --    --    --    HGB  9.8*  9.7*  9.8*  10.5*  10.7*   HCT  30.9*  29.8*  30.0*  32.3*  32.9*     Chemistry: Recent Labs      06/21/18   0330  06/20/18   0530   BUN  53*  39*   CREA  7.61*  5.98*   CA  9.1  8.9   K  5.6*  5.2   NA  132*  133*   CL  98*  100   CO2  25  26   PHOS 6. 8*  6.2*   GLU  90  91            Procedures/imaging: see electronic medical records for all procedures, Xrays and details which were not copied into this note but were reviewed prior to creation of Plan          Assessment & Plan:     As above       Rona Serra MD  6/22/2018  2:13 PM

## 2018-06-22 NOTE — H&P
Hospitalist Admission Note    NAME: Lucas Esquivel   :  1960   MRN:  [id number]     Date/Time of admission:  2018 2:16 PM    Patient PCP: Gaudencio Montes MD  ________________________________________________________________________    My assessment of this patient's clinical condition and my plan of care is as follows. Assessment / Plan:  1. Dialysis fistula malfunction  2. Hemodialysis permacath hemorrhage  3. Acute on chronic anemia d/t #2/4 and renal disease  4. Left pectoralis hematoma with chest wall edema  5. ESRD, on HD   6. Bilateral pleural effusions r>l  7. Large volume ascites with concern for acute mycobacterial peritonitis  8. Active Tuberculosis diagnosed from ascitic fluid evaluation  9. Mild hyponatremia/hyperkalemia    1. Pt is already admitted and we will take over primary care  2. Vascular, Renal and Pulmonary much appreciated  3. Pulmonary treating TB  4. Will need assistance with interpretation    Code Status: full for now - will place order  Surrogate Decision Maker: tbd    DVT Prophylaxis: scd's  GI Prophylaxis: not indicated          Subjective:   CHIEF COMPLAINT: hemodialysis access malfunction    HISTORY OF PRESENT ILLNESS:     Lucas Esquivel is a 56 y.o.  female who presents with HD access malfunction. Pt has the pmhx of ESRD and active TB diagnosed in the [facility] from ascitic fluid several months ago. Pt has had serial repeat cultures negative for such and treatment has been started in efforts to assure pt's treatment in setting of requiring HD. Per chart review, pt had a clotted AVF on 18 and a permacath was placed (in the setting of active TB) to continue HD. The permacath insertion site began to bleed and pt was admitted for such on 18 and it was noted that pt's hgb had dropped from mid 9's to mid 5's. Pt was transferred to the ICU for her anemia and admitted to the Vascular service. Pt has been transfused and bleeding has been stabilized.  Pulmonary has been consulted to manage the TB and Renal has been on board for HD. Of note, pt does not speak any [place]. We were asked to take over primary care as of today for continued treatment of the above-mentioned problems. Past Medical History:   Diagnosis Date    Chronic kidney disease     ESRD (end stage renal disease) ([facility] [address]     TUES-THURS-SAT    Hypercholesteremia     Hypertension     Kidney disease     Vitamin D deficiency         Past Surgical History:   Procedure Laterality Date    VASCULAR SURGERY PROCEDURE UNLIST         Social History   Substance Use Topics    Smoking status: Never Smoker    Smokeless tobacco: Never Used    Alcohol use No        History reviewed. No pertinent family history. Allergies   Allergen Reactions    Banana Other (comments)        Prior to Admission medications    Medication Sig Start Date End Date Taking? Authorizing Provider   amLODIPine (NORVASC) 10 mg tablet Take 1 Tab by mouth daily. 3/8/18  Yes Olamide Cleaning MD   aspirin 81 mg chewable tablet Take 1 Tab by mouth daily. 3/8/18  Yes Olamide Cleaning MD   atorvastatin (LIPITOR) 40 mg tablet Take 1 Tab by mouth nightly. 3/8/18  Yes Olamide Cleaning MD   carvedilol (COREG) 25 mg tablet Take 1 Tab by mouth every twelve (12) hours. 3/8/18  Yes Olamide Cleaning MD   cloNIDine HCl (CATAPRES) 0.3 mg tablet Take 1 Tab by mouth two (2) times a day. 3/8/18  Yes Olamide Cleaning MD   losartan (COZAAR) 100 mg tablet Take 1 Tab by mouth daily. 3/8/18  Yes Olamide Cleaning MD   hydrALAZINE (APRESOLINE) 100 mg tablet Take 0.5 Tabs by mouth two (2) times a day. 3/8/18  Yes Olamide Cleaning MD   melatonin 3 mg tablet Take 1 Tab by mouth nightly as needed. Patient taking differently: Take 1 Tab by mouth nightly as needed (insomnia). 3/8/18  Yes Olamide Cleaning MD   FLUoxetine (PROZAC) 10 mg capsule Take 1 Cap by mouth daily.  3/8/18  Yes Olamide Cleaning MD   glycerin, adult, (FLEET GLYCERIN, ADULT,) suppository Insert 1 Suppository into rectum daily. Indications: BOWEL EVACUATION 2/5/18  Yes Ligia Syed MD   albuterol (PROVENTIL HFA, VENTOLIN HFA, PROAIR HFA) 90 mcg/actuation inhaler Take 2 Puffs by inhalation every four (4) hours as needed for Wheezing. 11/6/17 April RACHELLE Langford PA-C       REVIEW OF SYSTEMS:     I am not able to complete the review of systems because:    The patient is intubated and sedated    The patient has altered mental status due to his acute medical problems    The patient has baseline aphasia from prior stroke(s)    The patient has baseline dementia and is not reliable historian    The patient is in acute medical distress and unable to provide information   x Language barrier       Total of 12 systems reviewed as follows:       POSITIVE= bolded text  Negative = text not underlined  General:  fever, chills, sweats, generalized weakness, weight loss/gain,      loss of appetite   Eyes:    blurred vision, eye pain, loss of vision, double vision  ENT:    rhinorrhea, pharyngitis   Respiratory:   cough, sputum production, SOB, MANZANO, wheezing, pleuritic pain   Cardiology:   chest pain, palpitations, orthopnea, PND, edema, syncope   Gastrointestinal:  abdominal pain , N/V, diarrhea, dysphagia, constipation, bleeding   Genitourinary:  frequency, urgency, dysuria, hematuria, incontinence   Muskuloskeletal :  arthralgia, myalgia, back pain  Hematology:  easy bruising, nose or gum bleeding, lymphadenopathy   Dermatological: rash, ulceration, pruritis, color change / jaundice  Endocrine:   hot flashes or polydipsia   Neurological:  headache, dizziness, confusion, focal weakness, paresthesia,     Speech difficulties, memory loss, gait difficulty  Psychological: Feelings of anxiety, depression, agitation    Objective:   VITALS:    Visit Vitals    /86 (BP 1 Location: Right arm, BP Patient Position: At rest)    Pulse 71    Temp 97.1 °F (36.2 °C)    Resp 17    Ht 5' 1\" (1.549 m)    Wt 40 kg (88 lb 2.9 oz)    SpO2 98%    Breastfeeding No    BMI 16.66 kg/m2       PHYSICAL EXAM:    General:    Alert, cooperative, no distress, appears stated age, unable to answer questions d/t language barrier. HEENT: Atraumatic, anicteric sclerae, pink conjunctivae     No oral ulcers, mucosa moist, throat clear, dentition fair  Neck:  Supple, symmetrical,  thyroid: non tender  Lungs:   Clear to auscultation bilaterally. No Wheezing or Rhonchi. No rales. Chest wall:  No tenderness  No Accessory muscle use. Heart:   Regular  rhythm,  No  murmur   No edema  Abdomen:   Soft, non-tender. Mild/moderate ascites. Bowel sounds normal  Extremities: No cyanosis. No clubbing,      Skin turgor normal, Capillary refill normal, Radial dial pulse 2+  Skin:     Not pale. Not Jaundiced  No rashes   Psych:  Unable to assess. Neurologic: EOMs intact. No facial asymmetry. Symmetrical strength  _______________________________________________________________________  Care Plan discussed with:    Comments   Patient x    Family      RN     Care Manager                    Consultant:      _______________________________________________________________________  Expected  Disposition:   Home with Family    HH/PT/OT/RN x   SNF/LTC    TRACEY    ________________________________________________________________________  TOTAL TIME:  39 Minutes    Critical Care Provided     Minutes non procedure based      Comments    x Reviewed previous records   >50% of visit spent in counseling and coordination of care x Discussion with patient and/or family and questions answered       ________________________________________________________________________      Procedures: see electronic medical records for all procedures/Xrays and details which were not copied into this note but were reviewed prior to creation of Plan.     LAB DATA REVIEWED:    Recent Results (from the past 24 hour(s))   CBC W/O DIFF    Collection Time: 06/22/18  4:16 AM   Result Value Ref Range    WBC 5.1 4.6 - 13.2 K/uL    RBC 3.58 (L) 4.20 - 5.30 M/uL    HGB 9.8 (L) 12.0 - 16.0 g/dL    HCT 30.9 (L) 35.0 - 45.0 %    MCV 86.3 74.0 - 97.0 FL    MCH 27.4 24.0 - 34.0 PG    MCHC 31.7 31.0 - 37.0 g/dL    RDW 16.3 (H) 11.6 - 14.5 %    PLATELET 155 184 - 269 K/uL    MPV 9.5 9.2 - 11.8 FL       Winnie Gleason MD  Internal Medicine  Hospitalist Division

## 2018-06-22 NOTE — PROGRESS NOTES
NUTRITION    Nutrition Screen      RECOMMENDATIONS / PLAN:     - Continue current nutrition interventions  - Family okay to provide food if pt desires, to encouraged meal intake   - Continue RD inpatient monitoring and evaluation. NUTRITION INTERVENTIONS & DIAGNOSIS:     [x] Meals/snacks: modified composition   [x] Medical food supplement therapy: Nepro BID. Nutrition Diagnosis: Unintended weight loss related to inadequate energy intake as evidenced by 18 lb, 17% weight loss x 6 months. ASSESSMENT:     6/22: Pt has poor intake of in-house meals per nursing, but eats food provided by family. 6/19: Pt with nausea/vomiting, now dry-heaving. Plan for dialysis today and start renal diet. Average po intake adequate to meet patients estimated nutritional needs:   [] Yes     [x] No   [] Unable to determine at this time    Diet: DIET RENAL Regular  DIET NUTRITIONAL SUPPLEMENTS Lunch, Dinner; [employer]      Food Allergies: banana   Current Appetite:   [] Good     [] Omelia Beady     [] Poor     [x] Other: unknown   Appetite/meal intake prior to admission:   [] Good     [] Fair     [] Poor     [x] Other: unknown   Feeding Limitations:  [] Swallowing difficulty    [] Chewing difficulty    [] Other:  Current Meal Intake:   Patient Vitals for the past 100 hrs:   % Diet Eaten   06/21/18 1336 25 %   06/21/18 0915 0 %   06/19/18 1600 0 %   06/19/18 1200 0 %   06/19/18 0800 0 %   06/19/18 0248 0 %     Anuric   BM: 6/20  Skin Integrity: neck catheter site   Edema: swelling at fistula site (LUE).   1+ LLE  Pertinent Medications: Reviewed: zofran, pyridoxine     Recent Labs      06/21/18   0330  06/20/18   0530   NA  132*  133*   K  5.6*  5.2   CL  98*  100   CO2  25  26   GLU  90  91   BUN  53*  39*   CREA  7.61*  5.98*   CA  9.1  8.9   MG  2.5  2.5   PHOS  6.8*  6.2*       Intake/Output Summary (Last 24 hours) at 06/22/18 1516  Last data filed at 06/21/18 1900   Gross per 24 hour   Intake                0 ml   Output 1900 ml   Net            -1900 ml       Anthropometrics:  Ht Readings from Last 1 Encounters:   06/18/18 5' 1\" (1.549 m)     Last 3 Recorded Weights in this Encounter    06/18/18 1724 06/20/18 1104   Weight: 40 kg (88 lb 2.9 oz) 40 kg (88 lb 2.9 oz)     Body mass index is 16.66 kg/(m^2). Underweight     Weight History: 37 lb, 30% weight loss x 9 months and 18 lb, 17% weight loss x 6 months PTA per chart history review     Weight Metrics 6/20/2018 5/31/2018 3/9/2018 1/22/2018 12/2/2017 11/15/2017 11/5/2017   Weight 88 lb 2.9 oz 90 lb 6.2 oz 84 lb 3.2 oz 103 lb 3 oz 106 lb 14.8 oz 107 lb 110 lb   BMI 16.66 kg/m2 17.08 kg/m2 15.91 kg/m2 19.5 kg/m2 20.2 kg/m2 20.22 kg/m2 21.48 kg/m2        Admitting Diagnosis: DX  Complication of vascular dialysis catheter, initial encounter  Complication of vascular dialysis catheter, initial encounter  Pertinent PMHx: ESRD on HD, HTN, HLD     Education Needs:        [x] None identified  [] Identified - Not appropriate at this time  []  Identified and addressed - refer to education log  Learning Limitations:   [] None identified  [x] Identified: does not speak [place], speaks [facility]  Cultural, Roman Catholic & ethnic food preferences:  [x] None identified    [] Identified and addressed     ESTIMATED NUTRITION NEEDS:     Calories: 4466-7063 kcal (30-35 kcal/kg) based on  [] Actual BW      [x] SBW 56 kg   Protein: 67-84 gm (1.2-1.5 gm/kg) based on  [] Actual BW      [x] SBW   Fluid: 7326-0747 mL     MONITORING & EVALUATION:     Nutrition Goal(s):   1. Po intake of meals will meet >75% of patient estimated nutritional needs within the next 7 days.   Outcome:  [] Met/Ongoing    [x]  Not Met/Progressing    [] New/Initial Goal     Monitoring:   [x] Food and beverage intake   [x] Diet order   [x] Nutrition-focused physical findings   [x] Treatment/therapy   [] Weight   [] Enteral nutrition intake        Previous Recommendations (for follow-up assessments only):     [x]   Implemented []   Not Implemented (RD to address)      [] No Longer Appropriate     [] No Recommendation Made     Discharge Planning: renal diet as tolerated   [x] Participated in care planning, discharge planning, & interdisciplinary rounds as appropriate      Sina Kennedy, [address] [place] [address]  Pager: [phone]

## 2018-06-22 NOTE — ROUTINE PROCESS
Bedside and Verbal shift change report given to Selene Franco RN (oncoming nurse) by Donnella Ahumada, RN (offgoing nurse). Report included the following information SBAR, Kardex, MAR and Recent Results. SITUATION:  Code Status: Prior  Reason for Admission: DX  Complication of vascular dialysis catheter, initial encounter  Complication of vascular dialysis catheter, initial encounter  Hospital day: 2  Problem List:       Hospital Problems  Date Reviewed: 11/15/2017          Codes Class Noted POA    Complication of vascular dialysis catheter, initial encounter ICD-10-CM: T82. 9XXA  ICD-9-CM: 996.1  6/18/2018 Unknown              BACKGROUND:   Past Medical History:   Past Medical History:   Diagnosis Date    Chronic kidney disease     ESRD (end stage renal disease) ([facility] [address]     TUES-THURS-SAT    Hypercholesteremia     Hypertension     Kidney disease     Vitamin D deficiency       Patient taking anticoagulants no    Patient has a defibrillator: no    History of shots YES for example, flu, pneumonia, tetanus   Isolation History YES for example, MRSA, CDiff    ASSESSMENT:  Changes in Assessment Throughout Shift: None  Significant Changes in 24 hours (for example, RR/code, fall)  Patient has Central Line: yes Reasons if yes: Dialysis  Patient has Onofre Cath: no Reasons if yes: N/A   Mobility Issues  PT  IV Patency  OR Checklist  Pending Tests    Last Vitals:  Vitals w/ MEWS Score (last day)     Date/Time MEWS Score Pulse Resp Temp BP Level of Consciousness SpO2    06/22/18 0415 1 78 18 97.3 °F (36.3 °C) 137/79 Alert 95 %    06/22/18 0019 1 87 18 98.1 °F (36.7 °C) 134/81 Alert 95 %    06/21/18 2000 1 81 16 98.1 °F (36.7 °C) 154/64 Alert 96 %    06/21/18 1915 -- -- -- -- 168/76 -- --    06/21/18 1900 -- 94 18 96.1 °F (35.6 °C) 150/89 -- --    06/21/18 1830 -- 95 18 -- 139/84 -- --    06/21/18 1800 -- 88 18 -- 152/85 -- --    06/21/18 1730 -- 83 18 -- 131/84 -- --    06/21/18 1700 -- 84 18 -- 115/77 -- --    06/21/18 3036 -- 78 18 -- 125/81 -- --    06/21/18 1600 -- 76 18 97.6 °F (36.4 °C) 147/82 -- --    06/21/18 1539 1 68 18 98.1 °F (36.7 °C) 123/77 Alert 94 %    06/21/18 1127 1 75 18 97 °F (36.1 °C) 126/78 Alert 95 %    06/21/18 0827 1 77 16 98.3 °F (36.8 °C) 130/77 Alert 93 %    06/21/18 0338 1 83 18 98.4 °F (36.9 °C) 137/79 Alert 93 %    06/21/18 0027 1 84 18 97.6 °F (36.4 °C) 128/77 Alert 93 %            PAIN    Pain Assessment    Pain Intensity 1: 0 (06/22/18 0415)    Pain Location 1: Arm    Pain Intervention(s) 1: Medication (see MAR)    Patient Stated Pain Goal: 0  Intervention effective: N/A  Time of last intervention: N/A Reassessment Completed: N/A   Other actions taken for pain: N/A    Last 3 Weights:  Last 3 Recorded Weights in this Encounter    06/18/18 1724 06/20/18 1104   Weight: 40 kg (88 lb 2.9 oz) 40 kg (88 lb 2.9 oz)   Weight change:     INTAKE/OUPUT    Current Shift:      Last three shifts: 06/20 0701 - 06/21 1900  In: 430 [P.O.:430]  Out: 2550 [Urine:650]    RECOMMENDATIONS AND DISCHARGE PLANNING  Patient needs and requests: None    Pending tests/procedures: None     Discharge plan for patient: TBD    Discharge planning Needs or Barriers: TBD    Estimated Discharge Date: TBD Posted on Whiteboard in Patients Room: no       \"HEALS\" SAFETY CHECK  A safety check occurred in the patient's room between off going nurse and oncoming nurse listed above. The safety check included the below items:    H  High Alert Medications Verify all high alert medication drips (heparin, PCA, etc.)  E  Equipment Suction is set up for ALL patients (with yanker)  Red plugs utilized for all equipment (IV pumps, etc.)  WOWs wiped down at end of shift.   Room stocked with oxygen, suction, and other unit-specific supplies  A  Alarms Bed alarm is set for fall risk patients  Ensure chair alarm is in place and activated if patient is up in a chair  L  Lines Check IV for any infiltration  Onofre bag is empty if patient has a Onofre   Tubing and IV bags are labeled  S  Safety  Room is clean, patient is clean, and equipment is clean. Hallways are clear from equipment besides carts. Fall bracelet on for fall risk patients  Ensure room is clear and free of clutter  Suction is set up for ALL patients (with jenni)  Hallways are clear from equipment besides carts.    Isolation precautions followed, supplies available outside room, sign posted    Ricco Benson RN

## 2018-06-23 NOTE — PROGRESS NOTES
Problem: Falls - Risk of  Goal: *Absence of Falls  Document Asha Fall Risk and appropriate interventions in the flowsheet. Outcome: Progressing Towards Goal  Fall Risk Interventions:  Mobility Interventions: Bed/chair exit alarm, Patient to call before getting OOB, Utilize walker, cane, or other assitive device         Medication Interventions: Bed/chair exit alarm, Patient to call before getting OOB, Teach patient to arise slowly    Elimination Interventions: Bed/chair exit alarm, Call light in reach, Patient to call for help with toileting needs, Toilet paper/wipes in reach             Problem: Pressure Injury - Risk of  Goal: *Prevention of pressure injury  Document Derek Scale and appropriate interventions in the flowsheet. Outcome: Progressing Towards Goal  Pressure Injury Interventions:             Activity Interventions: Pressure redistribution bed/mattress(bed type)    Mobility Interventions: HOB 30 degrees or less, Float heels, Pressure redistribution bed/mattress (bed type)    Nutrition Interventions: Document food/fluid/supplement intake    Friction and Shear Interventions: HOB 30 degrees or less, Lift sheet, Minimize layers

## 2018-06-23 NOTE — ROUTINE PROCESS
Bedside and Verbal shift change report given to Edwin Moreno RN (oncoming nurse) by Fernando Bedoya RN (offgoing nurse). Report included the following information SBAR, Kardex, MAR and Recent Results. SITUATION:  Code Status: Full Code  Reason for Admission: DX  Complication of vascular dialysis catheter, initial encounter  Complication of vascular dialysis catheter, initial encounter  Hospital day: 3  Problem List:       Hospital Problems  Date Reviewed: 11/15/2017          Codes Class Noted POA    Complication of vascular dialysis catheter, initial encounter ICD-10-CM: T82. 9XXA  ICD-9-CM: 996.1  6/18/2018 Unknown              BACKGROUND:   Past Medical History:   Past Medical History:   Diagnosis Date    Chronic kidney disease     ESRD (end stage renal disease) ([facility] [address]     TUES-THURS-SAT    Hypercholesteremia     Hypertension     Kidney disease     Vitamin D deficiency       Patient taking anticoagulants no    Patient has a defibrillator: no    History of shots NO for example, flu, pneumonia, tetanus   Isolation History YES for example, MRSA, CDiff    ASSESSMENT:  Changes in Assessment Throughout Shift: none  Significant Changes in 24 hours (for example, RR/code, fall)  Patient has Central Line: yes Reasons if yes: Hemodialysis  Patient has Onofre Cath: no   PT  IV Patency  OR Checklist  Pending Tests    Last Vitals:  Vitals w/ MEWS Score (last day)     Date/Time MEWS Score Pulse Resp Temp BP Level of Consciousness SpO2    06/23/18 0400 1 91 16 97.9 °F (36.6 °C) 152/89 Alert 96 %    06/23/18 0020 1 76 18 97.2 °F (36.2 °C) 139/85 Alert 98 %    06/22/18 1945 1 74 18 97.5 °F (36.4 °C) (!)  155/92 Alert 98 %    06/22/18 1657 1 72 17 97 °F (36.1 °C) 137/85 Alert 98 %    06/22/18 1227 1 71 17 97.1 °F (36.2 °C) 143/86 Alert 98 %    06/22/18 0945 1 83 17 96.8 °F (36 °C) (!)  175/92 Alert 96 %    06/22/18 0827 1 75 18 97.1 °F (36.2 °C) 153/85 Alert 96 %    06/22/18 0415 1 78 18 97.3 °F (36.3 °C) 137/79 Alert 95 % 06/22/18 0019 1 87 18 98.1 °F (36.7 °C) 134/81 Alert 95 %            PAIN    Pain Assessment    Pain Intensity 1: 0 (06/23/18 0020)    Pain Location 1: Arm    Pain Intervention(s) 1: Medication (see MAR)    Patient Stated Pain Goal: 0  Intervention effective: yes  Time of last intervention: 0946 Reassessment Completed: yes   Other actions taken for pain: Distraction    Last 3 Weights:  Last 3 Recorded Weights in this Encounter    06/18/18 1724 06/20/18 1104   Weight: 40 kg (88 lb 2.9 oz) 40 kg (88 lb 2.9 oz)   Weight change:     INTAKE/OUPUT    Current Shift:      Last three shifts: 06/21 1901 - 06/23 0700  In: 120 [P.O.:120]  Out: -     RECOMMENDATIONS AND DISCHARGE PLANNING  Patient needs and requests: Pain Control    Pending tests/procedures: labs     Discharge plan for patient: Home    Discharge planning Needs or Barriers: none    Estimated Discharge Date: 6/28/2018 Posted on Whiteboard in Patients Room: yes       \"HEALS\" SAFETY CHECK  A safety check occurred in the patient's room between off going nurse and oncoming nurse listed above. The safety check included the below items:    H  High Alert Medications Verify all high alert medication drips (heparin, PCA, etc.)  E  Equipment Suction is set up for ALL patients (with jenni)  Red plugs utilized for all equipment (IV pumps, etc.)  WOWs wiped down at end of shift. Room stocked with oxygen, suction, and other unit-specific supplies  A  Alarms Bed alarm is set for fall risk patients  Ensure chair alarm is in place and activated if patient is up in a chair  L  Lines Check IV for any infiltration  Onofre bag is empty if patient has a Onofre   Tubing and IV bags are labeled  S  Safety  Room is clean, patient is clean, and equipment is clean. Hallways are clear from equipment besides carts.    Fall bracelet on for fall risk patients  Ensure room is clear and free of clutter  Suction is set up for ALL patients (with jenni)  Hallways are clear from equipment besides carts.    Isolation precautions followed, supplies available outside room, sign posted    Tamanna Zuñiga RN

## 2018-06-23 NOTE — PROGRESS NOTES
Dr. Annalisa Lei was informed that patient had no BM after giving Dulcolax suppository and Fleet's Enema. Encouraged to drink prune juice but patient states she wants to sleep. New order for Glycerin suppository, may be given around 0600 to allow patient to sleep, was received.

## 2018-06-23 NOTE — PROGRESS NOTES
Problem: Falls - Risk of  Goal: *Absence of Falls  Document Asha Fall Risk and appropriate interventions in the flowsheet.    Outcome: Progressing Towards Goal  Fall Risk Interventions:  Mobility Interventions: Bed/chair exit alarm, Patient to call before getting OOB, Utilize walker, cane, or other assitive device         Medication Interventions: Bed/chair exit alarm, Patient to call before getting OOB, Teach patient to arise slowly    Elimination Interventions: Call light in reach, Patient to call for help with toileting needs

## 2018-06-23 NOTE — PROGRESS NOTES
Bp 150/92 all meds held at this time due to dialysis starting as per dialysis nurse not to give BP meds because BP usually drops during treatment.  Son at bedside and states patient had small BM

## 2018-06-23 NOTE — PROGRESS NOTES
EPO 10,000 units q TTS  ESRD on HD TTS    Lab Results   Component Value Date/Time    Hemoglobin, POC 10.2 (L) 01/22/2018 11:27 AM    HGB 11.3 (L) 06/23/2018 04:45 AM     Dose will be held today.       Signed: Chi Lei, PHARMDELFINA  Date: 6/23/2018  Time: 9:38 AM

## 2018-06-23 NOTE — PROGRESS NOTES
[facility]  Progress Note    Patient: Tristian Peralta Age: 56 y.o. : 1960 MR#: [id number] SSN: [id number]  Date/Time: 2018     Subjective:     Review of systems    No CP   NO NVD  No SOB  NO Cough     Assessment/Plan:   1. 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   - Followed by pulmonary     2 ESRD - on HD   - Once 3 sputums are negative she will go to HD center   - so far 2 sputum samples are negative     3 Clotted left arm access   - HD with right side tunnel cath     4 Emaciated   - due to chronic illness   - moderate protein calorie malnutrition   - follow with nutrition         Case discussed with:  []Patient  [x]Family  [x]Nursing  []Case Management  DVT Prophylaxis:  []Lovenox  []Hep SQ  []SCDs  []Coumadin   []On Heparin gtt          Objective:   VS:   Visit Vitals    /77    Pulse 89    Temp 98.3 °F (36.8 °C) (Axillary)    Resp 20    Ht 5' 1\" (1.549 m)    Wt 40 kg (88 lb 2.9 oz)    SpO2 95%    Breastfeeding No    BMI 16.66 kg/m2      Tmax/24hrs: Temp (24hrs), Av.9 °F (36.6 °C), Min:97 °F (36.1 °C), Max:98.7 °F (37.1 °C)  IOBRIEF  Intake/Output Summary (Last 24 hours) at 18 1501  Last data filed at 18 1318   Gross per 24 hour   Intake              500 ml   Output             2000 ml   Net            -1500 ml       General:  Alert, cooperative, no acute distress , thin looking / emaciated   HEENT:  NC, Atraumatic. PERRLA, anicteric sclerae. Pulmonary:  Bilateral air entry present . No Wheezing/Rhonchi/Rales. Cardiovascular: Regular rate and Rhythm. GI:  Soft, Non distended, Non tender. + Bowel sounds. Extremities:  No edema, cyanosis, clubbing. No calf tenderness. Psych:  Not anxious or agitated. Neurologic: Grossly - Motor and Sensory functions are intact .       Medications:   Current Facility-Administered Medications   Medication Dose Route Frequency    [START ON 2018] epoetin pilar (EPOGEN;PROCRIT) injection 10,000 Units 10,000 Units IntraVENous Q TUE, THU & SAT    ethambutol (MYAMBUTOL) tablet 800 mg  800 mg Oral ONCE    [START ON 6/25/2018] pyrazinamide tablet 1,000 mg  1,000 mg Oral Q MON, WED & FRI    sevelamer carbonate (RENVELA) tab 1,600 mg  1,600 mg Oral TID WITH MEALS    amLODIPine (NORVASC) tablet 5 mg  5 mg Oral DAILY    0.9% sodium chloride infusion 250 mL  250 mL IntraVENous PRN    ondansetron (ZOFRAN) injection 4 mg  4 mg IntraVENous Q4H PRN    oxyCODONE-acetaminophen (PERCOCET) 5-325 mg per tablet 1 Tab  1 Tab Oral Q4H PRN    morphine injection 2-4 mg  2-4 mg IntraVENous Q3H PRN    ondansetron (ZOFRAN ODT) tablet 4 mg  4 mg Oral Q8H PRN    hydrALAZINE (APRESOLINE) 20 mg/mL injection 10 mg  10 mg IntraVENous Q6H PRN    cloNIDine HCl (CATAPRES) tablet 0.3 mg  0.3 mg Oral BID    docusate sodium (COLACE) capsule 100 mg  100 mg Oral BID    polyethylene glycol (MIRALAX) packet 17 g  17 g Oral DAILY    rifAMPin (RIFADIN) capsule 300 mg  300 mg Oral DAILY    ethambutol (MYAMBUTOL) tablet 800 mg  800 mg Oral Q MON, WED & FRI    pyridoxine (vitamin B6) (VITAMIN B-6) tablet 50 mg  50 mg Oral DAILY    isoniazid (NYDRAZID) tablet 300 mg  300 mg Oral DAILY    pyrazinamide tablet 1,000 mg  1,000 mg Oral Q TUE, THU & SAT       Labs:    Recent Labs      06/23/18   0445  06/22/18   0416  06/21/18   0330   WBC  5.0  5.1  8.4   HGB  11.3*  9.8*  9.7*   HCT  35.1  30.9*  29.8*   PLT  158  147  166     Recent Labs      06/21/18   0330   NA  132*   K  5.6*   CL  98*   CO2  25   GLU  90   BUN  53*   CREA  7.61*   CA  9.1   MG  2.5   PHOS  6.8*         Signed By: Fidel Tan MD     June 23, 2018

## 2018-06-23 NOTE — PROGRESS NOTES
Dialysis completed eating food from home. C/o pain right chest at [facility] area. Patient medicated with percocet will monitor. Family at bedside.  Spoke with Jhonny Parks in Pharmacy about adjusting TB med schedules/times due to interaction potential. Rescheduled as per pharmacist

## 2018-06-23 NOTE — PROGRESS NOTES
[facility]  Progress Note    Name: Karl Yates   : 1960   MRN: [id number]   Date: 2018     [x]I have reviewed the flowsheet and previous days notes. Events overnight reviewed and discussed with nursing staff. Vital signs and records reviewed. Subjective:  Patient is a 55 yo female [place] never-smoker presented to the [facility] for a clotted AVF left arm graft on 19. Considering pt's active TB, permacath was placed instead. Pt's hemoglobin dropped from 9.3 to 5.4 post procedure. Pt was transferred to the ICU due to the low hemoglobin. CT Chest was obtained showing large right jugular catheter, tip at cavoatrial junction level. Major vessels in the thorax are unremarkable. With the source of bleeding likely hematoma between left pectoralis musculature as above, with adjacent chest wall edema on CT Chest.     18  No issues  Getting dialysis  Anti-tb med started. ROS:Review of systems not obtained due to patient factors. Vital Signs:    Visit Vitals    BP (P) 114/81    Pulse (P) 92    Temp 98.7 °F (37.1 °C) (Axillary)    Resp 18    Ht 5' 1\" (1.549 m)    Wt 40 kg (88 lb 2.9 oz)    SpO2 95%    Breastfeeding No    BMI 16.66 kg/m2       O2 Device: Room air       Temp (24hrs), Av.8 °F (36.6 °C), Min:97 °F (36.1 °C), Max:98.7 °F (37.1 °C)       Intake/Output:   Last shift:       07 - 1900  In: 380 [P.O.:380]  Out: -   Last 3 shifts: 1901 -  0700  In: 120 [P.O.:120]  Out: -     Intake/Output Summary (Last 24 hours) at 18 1314  Last data filed at 18 0947   Gross per 24 hour   Intake              500 ml   Output                0 ml   Net              500 ml     Physical Exam:  General: alert, cachectic   HEENT:  anicteric sclerae; pink palpebral conjunctivae; mucosa moist     Resp:  chest wall tender. Right permacath in place and not accessed.  Symmetrical chest expansion, no accessory muscle use; good airway entry; no rales/wheezing/rhonchi noted.   CV:  S1, S2 present; regular rate and rhythm  GI:  Abdomen soft, non-tender; (+) active bowel sounds  Extremities:  +2 pulses on all extremities, right arm swollen with ecymosis due to multiple IV sticks, fistula left forearm without thrill   Skin:  Warm; no rashes/lesions noted  Neurologic:  non-focal  Devices:  permacath right     DATA:   Current Facility-Administered Medications   Medication Dose Route Frequency    [START ON 6/26/2018] epoetin pilar (EPOGEN;PROCRIT) injection 10,000 Units  10,000 Units IntraVENous Q TUE, THU & SAT    sevelamer carbonate (RENVELA) tab 1,600 mg  1,600 mg Oral TID WITH MEALS    amLODIPine (NORVASC) tablet 5 mg  5 mg Oral DAILY    0.9% sodium chloride infusion 250 mL  250 mL IntraVENous PRN    ondansetron (ZOFRAN) injection 4 mg  4 mg IntraVENous Q4H PRN    oxyCODONE-acetaminophen (PERCOCET) 5-325 mg per tablet 1 Tab  1 Tab Oral Q4H PRN    morphine injection 2-4 mg  2-4 mg IntraVENous Q3H PRN    ondansetron (ZOFRAN ODT) tablet 4 mg  4 mg Oral Q8H PRN    hydrALAZINE (APRESOLINE) 20 mg/mL injection 10 mg  10 mg IntraVENous Q6H PRN    cloNIDine HCl (CATAPRES) tablet 0.3 mg  0.3 mg Oral BID    docusate sodium (COLACE) capsule 100 mg  100 mg Oral BID    polyethylene glycol (MIRALAX) packet 17 g  17 g Oral DAILY    rifAMPin (RIFADIN) capsule 300 mg  300 mg Oral DAILY    ethambutol (MYAMBUTOL) tablet 800 mg  800 mg Oral Q MON, WED & FRI    pyridoxine (vitamin B6) (VITAMIN B-6) tablet 50 mg  50 mg Oral DAILY    isoniazid (NYDRAZID) tablet 300 mg  300 mg Oral DAILY    pyrazinamide tablet 1,000 mg  1,000 mg Oral Q TUE, THU & SAT       Labs: Results:       Chemistry Recent Labs      06/21/18   0330   GLU  90   NA  132*   K  5.6*   CL  98*   CO2  25   BUN  53*   CREA  7.61*   CA  9.1   AGAP  9   BUCR  7*      CBC w/Diff Recent Labs      06/23/18   0445  06/22/18   0416  06/21/18   0330   WBC  5.0  5.1  8.4   RBC  4.12*  3.58*  3.57* HGB  11.3*  9.8*  9.7*   HCT  35.1  30.9*  29.8*   PLT  158  147  166      Coagulation No results for input(s): PTP, INR, APTT in the last 72 hours. No lab exists for component: INREXT, INREXT    Liver Enzymes No results for input(s): TP, ALB, TBIL, AP, SGOT, GPT in the last 72 hours. No lab exists for component: DBIL   ABG No results found for: PH, PHI, PCO2, PCO2I, PO2, PO2I, HCO3, HCO3I, FIO2, FIO2I   Microbiology No results for input(s): CULT in the last 72 hours. Imaging:  No new    IMPRESSION:     · Active Pulmonary Tuberculosis, primary, diagnosis was on April bronchoscopy and pleural fluid cultures which resulted from [facility] ~6/6/18, treatment was initiated on 06/18/18   · Acute anemia secondary to hematoma within left pectoralis musculature post permacath placement on 06/18/18 -- Hgb stable at 10  · ESRD on dialysis  · Hyperkalemia -- improved after dialysis  · Clotted AVG s/p permacath placement on 06/18/18  · Liver mass, s/p percutaneous biopsy at [facility]  · History of NSTEMI, respiratory failure on mechanical ventilation in 2/18  · Poor PO intake / protein calorie malnutrition / BMI 16      PLAN:   · Resp -  On room air  · ID - Trend Temp and WBC Curve. AFB Treatment per public health department. Continue ethambutol, isoniazid, pyrazinamide, and rifampin. Supplemental Vit B6. [place] Precautions. John ID consulted for current treatment plan. ID Consulted. · CVS - vascular following, discuss further plans for dialysis access prior to DC   · Heme/onc - monitor H/h  · Renal - Nephrology Following, dialysis per Nephro.      Prophylaxis - DVT(SCDs), GI  Will sign off              Signed By: Lorry Kanner, MD     June 23, 2018

## 2018-06-23 NOTE — DIALYSIS
ACUTE HEMODIALYSIS FLOW SHEET    HEMODIALYSIS ORDERS: Physician:  Dr. Gale Burns: pattie        Duration: 3  hr  BFR: 350   DFR: 600   Dialysate:  Temp 37.0   K+   2.0      Ca+  2.0   Na 140   Bicarb 30   Weight:  40 kg    Bed Scale [x]     Unable to Obtain []      Dry weight/UF Goal: 2000  Access right chest permacath CVC  Needle Gauge NA    Heparin []  Bolus      Units    [] Hourly       Units    [x]None      Catheter locking solution heparin 1000 units/ml   Pre BP:   135/84    Pulse:     77     Temperature:   98.7  Respirations: 18  Tx:    NA  Other   [x] N/A   Labs: Pre        Post:        [x] N/A   Additional Orders(medications, blood products, hypotension management):    [x] N/A     [x] Hawk Consent Verified     CATHETER ACCESS: []N/A   [x]Right Permacath    []Left   []IJ     []Fem   [] First use X-ray verified     [x]Tunnel                [] Non Tunneled   [x]No S/S infection  []Redness  []Drainage []Cultured []Swelling []Pain   [x]Medical Aseptic Prep Utilized   []Dressing Changed  [x] Biopatch  Date: 06/18/18    []Clotted   [x]Patent   Flows: []Good  []Poor  [x]Reversed   If access problem,  notified: []Yes    [x]N/A      GRAFT/FISTULA ACCESS:  [x]N/A     []Right     []Left     []UE     []LE   []AVG   []AVF        []Buttonhole    []Medical Aseptic Prep Utilized   []No S/S infection  []Redness  []Drainage []Cultured []Swelling []Pain    Bruit:   [] Strong    [] Weak       Thrill :   [] Strong    [] Weak       Needle Gauge: NA Length: NA   If access problem,  notified: []Yes     [x]N/A  Date:        Please describe access if present and not used:  LEFT AVG CLOTTED/HEMATOMA       GENERAL ASSESSMENT:    LUNGS:  Rate 18   SaO2%        [x] N/A    [x] Clear  [] Coarse  [] Crackles  [] Wheezing        [] Diminished     Location : []RLL   []LLL    []RUL  []ELÍAS   Cough: []Productive  []Dry  [x]N/A   Respirations:  [x]Easy  []Labored   Therapy:  [x]RA  []NC NA  l/min    Mask: []NRB []Venti       O2%                  []Ventilator  []Intubated  [] Trach  [] BiPaP   CARDIAC: [x]Regular      [] Irregular   [] Pericardial Rub  [] JVD        [x]  Monitored  [] Bedside  [x] Remotely monitored [] N/A  Rhythm: NSR on telemetry   EDEMA: [] None  [x]Generalized  [] Pitting [] 1    [] 2    [] 3    [] 4                 [] Facial  [] Pedal  [x]  UE left arm   [] LE   SKIN:   [x] Warm  [] Hot     [] Cold   [x] Dry     [] Pale   [] Diaphoretic                  [] Flushed  [] Jaundiced  [] Cyanotic  [] Rash  [] Weeping   LOC:    [x] Alert      [x]Oriented:    [x] Person     [x] Place  [x]Time               [] Confused  [] Lethargic  [] Medicated  [] Non-responsive     GI / ABDOMEN:  [x] Flat    [] Distended    [x] Soft    [] Firm   []  Obese                             [] Diarrhea  [x] Bowel Sounds  [] Nausea  [] Vomiting       / URINE ASSESSMENT:[] Voiding   [x] Oliguria  [] Anuria   []  Onofre     [] Incontinent    []  Incontinent Brief      [x]  Bathroom Privileges     PAIN: [x] 0 []1  []2   []3   []4   []5   []6   []7   []8   []9   []10            Scale 0-10  Action/Follow Up:    MOBILITY:  [] Amb    [] Amb/Assist    [x] Bed    [] Wheelchair  [] Stretcher      All Vitals and Treatment Details on Attached [address]: [facility]          Room # 474     [] 1st Time Acute  [] Stat  [x] Routine  [] Urgent     [] Acute Room  [x]  Bedside  [] ICU/CCU  [] ER   Isolation Precautions:  [x] Dialysis   [x] Airborne   TB    [x] Contact   MRSA /nares  [] Reverse   Special Considerations:         [] Blood Consent Verified [x]N/A     ALLERGIES:    Reaction Severity Reaction Type Noted Valid Until Updated            Allergies   Banana Other (comments)   9/8/2017          Code Status: [x] Full Code  [] DNR  [] Other           HBsAg ONLY: Date Drawn 09/09/17        [x]Negative []Positive []Unknown   HBsAb: Date 09/09/17    [] Susceptible   [x] [id number] []Not Drawn  [] Drawn     Current Labs:    Date of Labs: 06/23/18 Today [x]       Results for Elana Cohn (MRN [id number]) as of 6/23/2018 11:14   Ref. Range 6/23/2018 04:45   WBC Latest Ref Range: 4.6 - 13.2 K/uL 5.0   RBC Latest Ref Range: 4.20 - 5.30 M/uL 4.12 (L)   HGB Latest Ref Range: 12.0 - 16.0 g/dL 11.3 (L)   HCT Latest Ref Range: 35.0 - 45.0 % 35.1   MCV Latest Ref Range: 74.0 - 97.0 FL 85.2   MCH Latest Ref Range: 24.0 - 34.0 PG 27.4   MCHC Latest Ref Range: 31.0 - 37.0 g/dL 32.2   RDW Latest Ref Range: 11.6 - 14.5 % 16.5 (H)   PLATELET Latest Ref Range: 135 - 420 K/uL 158   MPV Latest Ref Range: 9.2 - 11.8 FL 9.8      Results for Elana Cohn (MRN [id number]) as of 6/23/2018 11:14   Ref.  Range 6/21/2018 03:30   Sodium Latest Ref Range: 136 - 145 mmol/L 132 (L)   Potassium Latest Ref Range: 3.5 - 5.5 mmol/L 5.6 (H)   Chloride Latest Ref Range: 100 - 108 mmol/L 98 (L)   CO2 Latest Ref Range: 21 - 32 mmol/L 25   Anion gap Latest Ref Range: 3.0 - 18 mmol/L 9   Glucose Latest Ref Range: 74 - 99 mg/dL 90   BUN Latest Ref Range: 7.0 - 18 MG/DL 53 (H)   Creatinine Latest Ref Range: 0.6 - 1.3 MG/DL 7.61 (H)   BUN/Creatinine ratio Latest Ref Range: 12 - 20   7 (L)   Calcium Latest Ref Range: 8.5 - 10.1 MG/DL 9.1   Phosphorus Latest Ref Range: 2.5 - 4.9 MG/DL 6.8 (H)   Magnesium Latest Ref Range: 1.6 - 2.6 mg/dL 2.5   GFR est non-AA Latest Ref Range: >60 ml/min/1.73m2 6 (L)   GFR est AA Latest Ref Range: >60 ml/min/1.73m2 7 (L)                                                                                                                              DIET    [x] Renal    [] Other     [] NPO     []  Diabetic      PRIMARY NURSE REPORT: First initial/Last name/Title      Pre Dialysis: Peter Carolina RN   Time: 0930     EDUCATION: NA   [] Patient [] Other         Knowledge Basis: []None []Minimal [] Substantial   NA  Barriers to learning : Language barrier / speaks  Cantonese   [] Access Care     [] S&S of infection     [] Fluid Management     []K+     []Procedural    []Albumin [] Medications     [] Tx Options     [] Transplant     [] Diet     [] Other   Teaching Tools:  [] Explain  [] Demo  [] Handouts [] Video  Patient response:   [] Verbalized understanding  [] Teach back  [] Return demonstration [] Requires follow up:  Positive reimforcement needed with family    Inappropriate due to :  Isolation status, family could not be at bedside to interpret     [x] Time Out/Safety   [address] Before each treatment:     Machine Number:                   [facility]                                                                  [x] Portable Machine #1/RO serial # [id number]                                                                                                                                                                                           Alarm Test:  Pass time 0959         [x]  RO/Machine Log Complete      Temp    37.0        [x]  Extracorporeal Circuit Tested for integrity   Dialysate: pH  7.4   Conductivity: Meter   NA     HD Machine   13.8                  TCD: 13.9  Dialyzer Lot # [id number]            Blood Tubing Lot # [id number]          Saline Lot #  [id number]     CHLORINE TESTING-Before each treatment and every 4 hours    Total Chlorine: [x] less than 0.1 ppm  Time: 0945   4 Hr/2nd Check Time:  OFF MACHINE   (if greater than 0.1 ppm from Primary then every 30 minutes from Secondary)     TREATMENT INITIATION  with Dialysis Precautions:   [x] All Connections Secured                 [x] Saline Line Double Clamped   [x] Venous Parameters Set                  [x] Arterial Parameters Set    [x] Prime Given  250 ml                             [x]Air Foam Detector Engaged      Treatment Initiation Note: Patient recvd at bedside to room 474, report was obtained from nurse at that time. Pt was accessed via right chest permacath catheter.   Arterial line flow was not fluid, therefore lines were reversed upon initiation of HD tx. No problems noted both lines flowed well. Dr. Stanley Flores came in room to check patient, stated BP was running low, if pt. BP drops too low to decrease UFG, BP was 97/69 @ 1130, UF was decreased to 1500 from 2000 mls., BP remained stable for duration of HD tx. Medication Dose Volume Route Initials Dialyzer Cleared: [x] Good [] Fair  [] Poor    Blood processed:  60.0  L  UF Removed  1500 Ml    Post Wt: NA kg  POst BP:   120/77      Pulse: 89      Respirations: 20  Temperature: 98.3              NA                         Post Tx Vascular Access: AVF/AVG:    N/A                                   Catheter: Locking solution: Heparin 1:1000 Art. 1.9 ml Wei. 1.9 ml                                  Post Assessment:                                    Skin:  [x] Warm  [x] Dry [] Diaphoretic    [] Flushed  [] Pale [] Cyanotic   [facility] Signatures Title Initials  Time Lungs: [x] Clear    [] Course  [] Crackles  [] Wheezing [] Diminished       Cardiac: [x] Regular   [] Irregular   [] Monitor  [] N/A  Rhythm:monitored by telemetry  NSR       Edema:  [] None    [x] General     [] Facial   [] Pedal    [x] UE left arm    [] LE       Pain: [x]0  []1  []2   []3  []4   []5   []6   []7   []8   []9   []10         Post Treatment Note: Pt.  Tolerated HD tx without complications,  Fluid removed was 1.5 liters     POST TREATMENT PRIMARY NURSE HANDOFF REPORT:     First initial/Last name/Title         Post Dialysis:  Kamron Stanley RN  Time:  3552     Abbreviations: AVG-arterial venous graft, AVF-arterial venous fistula, IJ-Internal Jugular, Subcl-Subclavian, Fem-Femoral, Tx-treatment, AP/HR-apical heart rate, DFR-dialysate flow rate, BFR-blood flow rate, AP-arterial pressure, -venous pressure, UF-ultrafiltrate, TMP-transmembrane pressure, Wei-Venous, Art-Arterial, RO-Reverse Osmosis

## 2018-06-23 NOTE — PROGRESS NOTES
Patient c/o not having BM since Sunday (as interpreted by patient's son). Patient is getting Colace BID and MIralax daily. Prune juice does not help. Dr. Kaitlin Del Real was informed. New orders were received.

## 2018-06-23 NOTE — PROGRESS NOTES
RENAL DAILY PROGRESS NOTE            51y F with ESRD, admitted with clotted access, had large hematoma post   Subjective:       Complaint:   Overnight events noted  Two AFB report pending. IMPRESSION:   ESRD, MWF schedule outpatinet, running TTS schedule here  Access; clotted left arm access, has right side tunnel catheter  Anemia, stable   HTN  Active pulmonary TB  Hyperkalemia, post HD yesterday , no available lab today. PLAN:   ·  Plan to switch her MWF from next week. She needs three negative AFB and being on anti TB treatment to restart her outpatient HD. Continue current BP meds  Adjust all meds per ESRD satus   At 11:06 AM on 2018, I saw and examined patient during hemodialysis treatment. The patient was receiving hemodialysis for treatment of  renal failure. I have also reviewed vital signs, intake and output, lab results and recent events, and agreed with today's dialysis order. HD rounding    Blood pressure 112/81, pulse 85, temperature 98.7 °F (37.1 °C), temperature source Axillary, resp. rate 18, height 5' 1\" (1.549 m), weight 40 kg (88 lb 2.9 oz), SpO2 95 %, not currently breastfeeding.   Temp (24hrs), Av.7 °F (36.5 °C), Min:97 °F (36.1 °C), Max:98.7 °F (37.1 °C)      Blood Pressure: BP: 112/81  Pulse: Pulse (Heart Rate): 85  Temp:  Temp: 98.7 °F (37.1 °C)    Artificial Kidney Dialyzer/Set Up Inspection: Revaclear   hours Duration of Treatment (hours): 3 hours   Heparin Bolus    Blood flow rate Blood Flow Rate (ml/min): 350 ml/min   Dialysate rate     Arterial Access Pressure Arterial Access Pressure (mmHg): -120  Venous Return Pressure Venous Return Pressure (mmHg): 80  Ultrafiltration Rate Goal/Amount of Fluid to Remove (mL): 2000 mL  Fluid Removal Fluid Removed (mL): 2400  Net Fluid Removal NET Fluid Removed (mL): 1900 ml          Current Facility-Administered Medications   Medication Dose Route Frequency    [START ON 2018] epoetin pilar (EPOGEN;PROCRIT) injection 10,000 Units  10,000 Units IntraVENous Q TUE, THU & SAT    sevelamer carbonate (RENVELA) tab 1,600 mg  1,600 mg Oral TID WITH MEALS    amLODIPine (NORVASC) tablet 5 mg  5 mg Oral DAILY    0.9% sodium chloride infusion 250 mL  250 mL IntraVENous PRN    ondansetron (ZOFRAN) injection 4 mg  4 mg IntraVENous Q4H PRN    oxyCODONE-acetaminophen (PERCOCET) 5-325 mg per tablet 1 Tab  1 Tab Oral Q4H PRN    morphine injection 2-4 mg  2-4 mg IntraVENous Q3H PRN    ondansetron (ZOFRAN ODT) tablet 4 mg  4 mg Oral Q8H PRN    hydrALAZINE (APRESOLINE) 20 mg/mL injection 10 mg  10 mg IntraVENous Q6H PRN    cloNIDine HCl (CATAPRES) tablet 0.3 mg  0.3 mg Oral BID    docusate sodium (COLACE) capsule 100 mg  100 mg Oral BID    polyethylene glycol (MIRALAX) packet 17 g  17 g Oral DAILY    rifAMPin (RIFADIN) capsule 300 mg  300 mg Oral DAILY    ethambutol (MYAMBUTOL) tablet 800 mg  800 mg Oral Q MON, WED & FRI    pyridoxine (vitamin B6) (VITAMIN B-6) tablet 50 mg  50 mg Oral DAILY    isoniazid (NYDRAZID) tablet 300 mg  300 mg Oral DAILY    pyrazinamide tablet 1,000 mg  1,000 mg Oral Q TUE, THU & SAT       Review of Symptoms: comprehensive ROS negative except above.    Objective:     Patient Vitals for the past 24 hrs:   Temp Pulse Resp BP SpO2   06/23/18 1045 - 83 - 109/75 -   06/23/18 1030 - 79 - 113/75 -   06/23/18 1015 - 79 - (!) 143/91 -   06/23/18 0947 98.7 °F (37.1 °C) 77 18 135/84 -   06/23/18 0821 98.6 °F (37 °C) 73 18 (!) 150/92 95 %   06/23/18 0400 97.9 °F (36.6 °C) 91 16 152/89 96 %   06/23/18 0020 97.2 °F (36.2 °C) 76 18 139/85 98 %   06/22/18 1945 97.5 °F (36.4 °C) 74 18 (!) 155/92 98 %   06/22/18 1657 97 °F (36.1 °C) 72 17 137/85 98 %   06/22/18 1227 97.1 °F (36.2 °C) 71 17 143/86 98 %        Weight change:      06/21 1901 - 06/23 0700  In: 120 [P.O.:120]  Out: -     Intake/Output Summary (Last 24 hours) at 06/23/18 1106  Last data filed at 06/23/18 0947   Gross per 24 hour   Intake              500 ml   Output                0 ml   Net              500 ml     Physical Exam:   General: comfortable, no acute distress   HEENT sclera anicteric, supple neck, no thyromegaly  CVS: S1S2 heard,  no rub  RS: + air entry b/l,   Abd: Soft, Non tender,  Neuro: non focal, awake, alert , CN II-XII are grossly intact  Extrm: no edema, no cyanosis, clubbing   Skin: no visible  Rash  Access; left arm access; hematoma   Right side TDC        Data Review:     LABS:   Hematology:   Recent Labs      06/23/18   0445  06/22/18   0416  06/21/18   0330  06/20/18   1950   WBC  5.0  5.1  8.4   --    HGB  11.3*  9.8*  9.7*  9.8*   HCT  35.1  30.9*  29.8*  30.0*     Chemistry:   Recent Labs      06/21/18   0330   BUN  53*   CREA  7.61*   CA  9.1   K  5.6*   NA  132*   CL  98*   CO2  25   PHOS  6.8*   GLU  90            Procedures/imaging: see electronic medical records for all procedures, Xrays and details which were not copied into this note but were reviewed prior to creation of Plan          Assessment & Plan:     As above       Ai Rodriguez MD  6/23/2018  2:13 PM

## 2018-06-24 NOTE — PROGRESS NOTES
Problem: Pressure Injury - Risk of  Goal: *Prevention of pressure injury  Document Derek Scale and appropriate interventions in the flowsheet. Outcome: Progressing Towards Goal  Pressure Injury Interventions:             Activity Interventions: Pressure redistribution bed/mattress(bed type)    Mobility Interventions: HOB 30 degrees or less    Nutrition Interventions: Document food/fluid/supplement intake    Friction and Shear Interventions: HOB 30 degrees or less

## 2018-06-24 NOTE — PROGRESS NOTES
In bed on Airborne isolation and contact isolation for diagnosis of MRSA. A/OX4. Gaudencio, speaks no [place]. Using [employer] phone for interpretation. Appears to be in no resp. distress. C/O pain to left arm. Fall precautions. Call bell phone within reach.

## 2018-06-24 NOTE — PROGRESS NOTES
[facility] Hospitalist Group  Progress Note    Patient: Tomi Price Age: 56 y.o. : 1960 MR#: [id number] SSN: [id number]  Date/Time: 2018     Subjective:     Review of systems    Severe language barrier   No distress     Assessment/Plan:   1. 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   - Followed by pulmonary   - - so far 2 sputum samples are negative and 3 rd sample in process       2 ESRD - on HD   - Once 3 sputums are negative she will go to HD center       3 Clotted left arm access   - HD with right side tunnel cath     4 Emaciated   - due to chronic illness   - moderate protein calorie malnutrition   - follow with nutrition         Case discussed with:  []Patient  [x]Family  [x]Nursing  []Case Management  DVT Prophylaxis:  []Lovenox  []Hep SQ  []SCDs  []Coumadin   []On Heparin gtt          Objective:   VS:   Visit Vitals    BP (!) 168/96 (BP 1 Location: Right arm, BP Patient Position: At rest)  Comment: nurse notifited    Pulse 75    Temp 97 °F (36.1 °C)    Resp 18    Ht 5' 1\" (1.549 m)    Wt 39.9 kg (88 lb)    SpO2 98%    Breastfeeding No    BMI 16.63 kg/m2      Tmax/24hrs: Temp (24hrs), Av °F (36.7 °C), Min:97 °F (36.1 °C), Max:98.9 °F (37.2 °C)  IOBRIEF    Intake/Output Summary (Last 24 hours) at 18 1042  Last data filed at 18 0944   Gross per 24 hour   Intake              240 ml   Output             2300 ml   Net            -2060 ml       General:  Alert, cooperative, no acute distress , thin looking / emaciated   HEENT:  NC, Atraumatic. PERRLA, anicteric sclerae. Pulmonary:  Bilateral air entry present . No Wheezing/Rhonchi/Rales. Cardiovascular: Regular rate and Rhythm. GI:  Soft, Non distended, Non tender. + Bowel sounds. Extremities:  No edema, cyanosis, clubbing. No calf tenderness. Psych:  Not anxious or agitated. Neurologic: Grossly - Motor and Sensory functions are intact .       Medications:   Current Facility-Administered Medications Medication Dose Route Frequency    [START ON 6/26/2018] epoetin pilar (EPOGEN;PROCRIT) injection 10,000 Units  10,000 Units IntraVENous Q TUE, THU & SAT    [START ON 6/25/2018] pyrazinamide tablet 1,000 mg  1,000 mg Oral Q MON, WED & FRI    sevelamer carbonate (RENVELA) tab 1,600 mg  1,600 mg Oral TID WITH MEALS    amLODIPine (NORVASC) tablet 5 mg  5 mg Oral DAILY    0.9% sodium chloride infusion 250 mL  250 mL IntraVENous PRN    ondansetron (ZOFRAN) injection 4 mg  4 mg IntraVENous Q4H PRN    oxyCODONE-acetaminophen (PERCOCET) 5-325 mg per tablet 1 Tab  1 Tab Oral Q4H PRN    morphine injection 2-4 mg  2-4 mg IntraVENous Q3H PRN    ondansetron (ZOFRAN ODT) tablet 4 mg  4 mg Oral Q8H PRN    hydrALAZINE (APRESOLINE) 20 mg/mL injection 10 mg  10 mg IntraVENous Q6H PRN    cloNIDine HCl (CATAPRES) tablet 0.3 mg  0.3 mg Oral BID    docusate sodium (COLACE) capsule 100 mg  100 mg Oral BID    polyethylene glycol (MIRALAX) packet 17 g  17 g Oral DAILY    rifAMPin (RIFADIN) capsule 300 mg  300 mg Oral DAILY    ethambutol (MYAMBUTOL) tablet 800 mg  800 mg Oral Q MON, WED & FRI    pyridoxine (vitamin B6) (VITAMIN B-6) tablet 50 mg  50 mg Oral DAILY    isoniazid (NYDRAZID) tablet 300 mg  300 mg Oral DAILY       Labs:    Recent Labs      06/24/18   0400  06/23/18   0445  06/22/18   0416   WBC  5.1  5.0  5.1   HGB  10.1*  11.3*  9.8*   HCT  32.3*  35.1  30.9*   PLT  203  158  147     No results for input(s): NA, K, CL, CO2, GLU, BUN, CREA, CA, MG, PHOS, ALB, TBIL, SGOT, ALT, INR in the last 72 hours.     No lab exists for component: [facility]el[facility] Aaron      Signed By: Karen Connors MD     June 24, 2018

## 2018-06-24 NOTE — PROGRESS NOTES
Problem: Risk for Spread of Infection  Goal: Prevent transmission of infectious organism to others  Prevent the transmission of infectious organisms to other patients, staff members, and visitors. Outcome: Progressing Towards Goal  To continue isolation precautions as ordered.

## 2018-06-24 NOTE — PROGRESS NOTES
Problem: Falls - Risk of  Goal: *Absence of Falls  Document Asha Fall Risk and appropriate interventions in the flowsheet.    Outcome: Progressing Towards Goal  Fall Risk Interventions:  Mobility Interventions: Bed/chair exit alarm         Medication Interventions: Bed/chair exit alarm    Elimination Interventions: Call light in reach

## 2018-06-25 NOTE — PROGRESS NOTES
RENAL DAILY PROGRESS NOTE            51y F with ESRD, admitted with clotted access, had large hematoma post   Subjective:       Complaint:   Overnight events noted  Last AFB report pending       IMPRESSION:   ESRD, MWF schedule outpatinet,  Access; clotted left arm access, has right side tunnel catheter  Anemia, stable   HTN  Active pulmonary TB  Hyperkalemia, post HD yesterday , no available lab today. PLAN:   · Examined during HD. Plan for HD with 2K bath  UF 2L as tolerated. She needs three negative AFB and being on anti TB treatment to restart her outpatient HD. Continue current BP meds  Adjust all meds per ESRD satus     At 2:40 PM on 2018, I saw and examined patient during hemodialysis treatment. The patient was receiving hemodialysis for treatment of  renal failure. I have also reviewed vital signs, intake and output, lab results and recent events, and agreed with today's dialysis order. HD rounding    Blood pressure 131/88, pulse 75, temperature 97.7 °F (36.5 °C), temperature source Oral, resp. rate 18, height 5' 1\" (1.549 m), weight 39.9 kg (88 lb), SpO2 97 %, not currently breastfeeding.   Temp (24hrs), Av.5 °F (36.4 °C), Min:97.1 °F (36.2 °C), Max:98 °F (36.7 °C)      Blood Pressure: BP: 131/88  Pulse: Pulse (Heart Rate): 75  Temp:  Temp: 97.7 °F (36.5 °C)    Artificial Kidney Dialyzer/Set Up Inspection: Revaclear   hours Duration of Treatment (hours): 3 hours   Heparin Bolus    Blood flow rate Blood Flow Rate (ml/min): 325 ml/min   Dialysate rate     Arterial Access Pressure Arterial Access Pressure (mmHg): -170  Venous Return Pressure Venous Return Pressure (mmHg): 90  Ultrafiltration Rate Goal/Amount of Fluid to Remove (mL): 2000 mL  Fluid Removal Fluid Removed (mL): 680  Net Fluid Removal NET Fluid Removed (mL): 2000 ml              Current Facility-Administered Medications   Medication Dose Route Frequency    epoetin pilar (EPOGEN;PROCRIT) injection 10,000 Units  10,000 Units IntraVENous DIALYSIS MON, WED & FRI    heparin (porcine) 1,000 unit/mL injection        amLODIPine (NORVASC) tablet 10 mg  10 mg Oral DAILY    hydrALAZINE (APRESOLINE) tablet 50 mg  50 mg Oral Q12H    pyrazinamide tablet 1,000 mg  1,000 mg Oral Q MON, WED & FRI    sevelamer carbonate (RENVELA) tab 1,600 mg  1,600 mg Oral TID WITH MEALS    0.9% sodium chloride infusion 250 mL  250 mL IntraVENous PRN    ondansetron (ZOFRAN) injection 4 mg  4 mg IntraVENous Q4H PRN    oxyCODONE-acetaminophen (PERCOCET) 5-325 mg per tablet 1 Tab  1 Tab Oral Q4H PRN    morphine injection 2-4 mg  2-4 mg IntraVENous Q3H PRN    ondansetron (ZOFRAN ODT) tablet 4 mg  4 mg Oral Q8H PRN    hydrALAZINE (APRESOLINE) 20 mg/mL injection 10 mg  10 mg IntraVENous Q6H PRN    cloNIDine HCl (CATAPRES) tablet 0.3 mg  0.3 mg Oral BID    docusate sodium (COLACE) capsule 100 mg  100 mg Oral BID    polyethylene glycol (MIRALAX) packet 17 g  17 g Oral DAILY    rifAMPin (RIFADIN) capsule 300 mg  300 mg Oral DAILY    ethambutol (MYAMBUTOL) tablet 800 mg  800 mg Oral Q MON, WED & FRI    pyridoxine (vitamin B6) (VITAMIN B-6) tablet 50 mg  50 mg Oral DAILY    isoniazid (NYDRAZID) tablet 300 mg  300 mg Oral DAILY       Review of Symptoms: comprehensive ROS negative except above.    Objective:     Patient Vitals for the past 24 hrs:   Temp Pulse Resp BP SpO2   06/25/18 1400 - 72 - 127/89 -   06/25/18 1330 97.7 °F (36.5 °C) 73 18 164/88 -   06/25/18 1239 97.2 °F (36.2 °C) - 18 (!) 135/95 97 %   06/25/18 0705 97.8 °F (36.6 °C) 77 18 (!) 135/96 98 %   06/25/18 0648 - 77 - (!) 181/103 -   06/25/18 0401 - - - (!) 181/101 -   06/25/18 0400 98 °F (36.7 °C) 78 18 (!) 193/103 97 %   06/25/18 0000 97.2 °F (36.2 °C) 76 16 - 97 %   06/24/18 2230 - 78 - 145/89 -   06/24/18 2133 - 80 - (!) 181/103 -   06/24/18 1540 97.1 °F (36.2 °C) 78 16 139/85 95 %        Weight change:      06/23 1901 - 06/25 0700  In: 120 [P.O.:120]  Out: -     Intake/Output Summary (Last 24 hours) at 06/25/18 1427  Last data filed at 06/25/18 0930   Gross per 24 hour   Intake              120 ml   Output                0 ml   Net              120 ml     Physical Exam:   General: comfortable, no acute distress   HEENT sclera anicteric, supple neck, no thyromegaly  CVS: S1S2 heard,  no rub  RS: + air entry b/l,   Abd: Soft, Non tender,  Neuro: non focal, awake, alert , CN II-XII are grossly intact  Extrm: no edema, no cyanosis, clubbing   Skin: no visible  Rash  Access; left arm access; hematoma   Right side TDC        Data Review:     LABS:   Hematology:   Recent Labs      06/25/18   0502  06/24/18   0400  06/23/18   0445   WBC  5.2  5.1  5.0   HGB  10.3*  10.1*  11.3*   HCT  32.5*  32.3*  35.1     Chemistry:   No results for input(s): BUN, CREA, CA, ALB, K, NA, CL, CO2, PHOS, GLU in the last 72 hours.          Procedures/imaging: see electronic medical records for all procedures, Xrays and details which were not copied into this note but were reviewed prior to creation of Plan          Assessment & Plan:     As above       Tierra Gamble MD  6/25/2018  2:13 PM

## 2018-06-25 NOTE — PROGRESS NOTES
Problem: Falls - Risk of  Goal: *Absence of Falls  Document Asha Fall Risk and appropriate interventions in the flowsheet.    Outcome: Progressing Towards Goal  Fall Risk Interventions:  Mobility Interventions: OT consult for ADLs, Patient to call before getting OOB, PT Consult for mobility concerns, Communicate number of staff needed for ambulation/transfer         Medication Interventions: Patient to call before getting OOB    Elimination Interventions: Call light in reach, Patient to call for help with toileting needs

## 2018-06-25 NOTE — ROUTINE PROCESS
Assumed care of patient in airborne contact room -- Brion Hashimoto LPN reports no iv access -- the 24 in her foot gone? 2125-- patient with bp 181/103 no iv access called Dr. Kaitlin Del Real placed  oral medications orders      2230-- recheck bp 145/89 hr 79    0400-- BLOOD PRESSURE 181/101 /103 DR. CASTRO ON THE FLOOR TO SEE ANOTHER PATIENT HE ORDERED \"ANOTHER DOSE OF HER CLONIDINE\"      Bedside and Verbal shift change report given to Christal FOSTER (oncoming nurse) by Liana Perez RN (offgoing nurse). Report given with SBAR, Kardex, Intake/Output, MAR, Accordion and Recent Results.

## 2018-06-25 NOTE — DIALYSIS
ACUTE HEMODIALYSIS FLOW SHEET    HEMODIALYSIS ORDERS: Physician: Tiffanie Edmonds MD     Dialyzer: revaclear   Duration: 3 hr  BFR: 350   DFR: 600   Dialysate:  Temp 36.0 K+   2    Ca+  2 Na 140 Bicarb 30   Weight:  39.9 kg    Bed Scale [x]     Unable to Obtain []      Dry weight/UF Goal: 2000 ml Access  Right chest catheter  Needle Gauge NA    Heparin []  Bolus      Units    [] Hourly       Units    [x]None      Catheter locking solution Heparin   Pre BP:   164/88    Pulse:     73     Temperature:   97.7  Respirations: 18  Tx: NS   250    ml/Bolus  Other        [] N/A   Labs: Pre        Post:        [] N/A   Additional Orders(medications, blood products, hypotension management):   Epogen    [] N/A     [x] [facility] Consent Verified     CATHETER ACCESS: []N/A   [x]Right chest catheter   []Left   []IJ     []Fem   [] First use X-ray verified     []Tunnel                [] Non Tunneled   []No S/S infection  []Redness  []Drainage []Cultured []Swelling []Pain   []Medical Aseptic Prep Utilized   []Dressing Changed  [] Biopatch  Date: 6/19/2018      []Clotted   []Patent   Flows: []Good  []Poor  [x]Reversed   If access problem,  notified: []Yes    [x]N/A  Date:           GRAFT/FISTULA ACCESS:  []N/A     []Right     [x]Left     [x]UE     []LE   []AVG   []AVF        []Buttonhole    []Medical Aseptic Prep Utilized   []No S/S infection  []Redness  []Drainage []Cultured []Swelling []Pain    Bruit:   [] Strong    [] Weak       Thrill :   [] Strong    [] Weak       Needle Gauge: NA   Length: NA   If access problem,  notified: []Yes     [x]N/A  Date:        Please describe access if present and not used:       GENERAL ASSESSMENT:    LUNGS:  Rate  SaO2%        [] N/A    [x] Clear  [] Coarse  [] Crackles  [] Wheezing        [] Diminished     Location : []RLL   []LLL    []RUL  []ELÍAS   Cough: []Productive  []Dry  [x]N/A   Respirations:  []Easy  []Labored   Therapy:  [x]RA  []NC  l/min    Mask: []NRB []Venti       O2% []Ventilator  []Intubated  [] Trach  [] BiPaP   CARDIAC: [x]Regular      [] Irregular   [] Pericardial Rub  [] JVD        []  Monitored  [] Bedside  [] Remotely monitored [] N/A  Rhythm:    EDEMA: [x] None  []Generalized  [] Pitting [] 1    [] 2    [] 3    [] 4                 [] Facial  [] Pedal  []  UE  [] LE   SKIN:   [x] Warm  [] Hot     [] Cold   [] Dry     [] Pale   [] Diaphoretic                  [] Flushed  [] Jaundiced  [] Cyanotic  [] Rash  [] Weeping   LOC:    [x] Alert      [x]Oriented:    [] Person     [] Place  []Time               [] Confused  [] Lethargic  [] Medicated  [] Non-responsive. There is a language barrier. Patient does not speak any English     GI / ABDOMEN:  [x] Flat    [] Distended    [] Soft    [] Firm   []  Obese                             [] Diarrhea  [] Bowel Sounds  [] Nausea  [] Vomiting       / URINE ASSESSMENT:[] Voiding   [] Oliguria  [] Anuria   []  Onofre     [] Incontinent    []  Incontinent Brief      []  Bathroom Privileges     PAIN: [] 0 []1  []2   []3   []4   []5   []6   []7   []8   []9   []10            Scale 0-10  Action/Follow Up:     MOBILITY:  [] Amb    [] Amb/Assist    [x] Bed    [] Wheelchair  [] Stretcher      All Vitals and Treatment Details on Attached [address]: [facility]          Room # 474     [] 1st Time Acute  [] Stat  [] Routine  [] Urgent     [] Acute Room  []  Bedside  [] ICU/CCU  [] ER   Isolation Precautions:  [] Dialysis   [] Airborne   [] Contact    [] Reverse   Special Considerations:         [] Blood Consent Verified []N/A     ALLERGIES: Banana  [] NKA          Code Status:  [x] Full Code  [] DNR  [] Other           HBsAg ONLY: Date Drawn 9/9/2017         [x]Negative []Positive []Unknown   HBsAb: Date 9/9/2017    [] Susceptible   [x] [id number] []Not Drawn  [] Drawn     Current Labs:    Date of Labs: Results for Shelby Rubio (MRN [id number]) as of 6/25/2018 17:49   Ref.  Range 6/25/2018 05:02 6/25/2018 13:25   WBC Latest Ref Range: 4.6 - 13.2 K/uL 5.2    RBC Latest Ref Range: 4.20 - 5.30 M/uL 3.79 (L)    HGB Latest Ref Range: 12.0 - 16.0 g/dL 10.3 (L)    HCT Latest Ref Range: 35.0 - 45.0 % 32.5 (L)    MCV Latest Ref Range: 74.0 - 97.0 FL 85.8    MCH Latest Ref Range: 24.0 - 34.0 PG 27.2    MCHC Latest Ref Range: 31.0 - 37.0 g/dL 31.7    RDW Latest Ref Range: 11.6 - 14.5 % 16.4 (H)    PLATELET Latest Ref Range: 135 - 420 K/uL 215    MPV Latest Ref Range: 9.2 - 11.8 FL 10.0    Sodium Latest Ref Range: 136 - 145 mmol/L  133 (L)   Potassium Latest Ref Range: 3.5 - 5.5 mmol/L  5.6 (H)   Chloride Latest Ref Range: 100 - 108 mmol/L  96 (L)   CO2 Latest Ref Range: 21 - 32 mmol/L  28   Anion gap Latest Ref Range: 3.0 - 18 mmol/L  9   Glucose Latest Ref Range: 74 - 99 mg/dL  105 (H)   BUN Latest Ref Range: 7.0 - 18 MG/DL  46 (H)   Creatinine Latest Ref Range: 0.6 - 1.3 MG/DL  6.58 (H)   BUN/Creatinine ratio Latest Ref Range: 12 - 20    7 (L)   Calcium Latest Ref Range: 8.5 - 10.1 MG/DL  10.0   GFR est non-AA Latest Ref Range: >60 ml/min/1.73m2  7 (L)   GFR est AA Latest Ref Range: >60 ml/min/1.73m2  8 (L)             Today [x]        Cut and paste current labs here. DIET:  [x] Renal    [] Other     [] NPO     []  Diabetic      PRIMARY NURSE REPORT: First initial/Last name/Title      Pre Dialysis: Hilaria Saldana    Time: 8922      EDUCATION:    [x] Patient [] Other         Knowledge Basis: []None []Minimal [] Substantial   Barriers to learning Language barrier. Patient does not speak [place] []N/A   [] Access Care     [] S&S of infection     [] Fluid Management     []K+     [x]Procedural    []Albumin     [] Medications     [] Tx Options     [] Transplant     [] Diet     [] Other   Teaching Tools:  [] Explain  [] Demo  [] Handouts [] Video  Patient response:   [] Verbalized understanding  [] Teach back  [] Return demonstration [] Requires follow up   Inappropriate due to   Language barrier. [x] Time Out/Safety Check  [x]xtracorporeal Circuit Tested for integrity       RO/HEMODIALYSIS MACHINE SAFETY CHECKS  Before each treatment:     Machine Number:                   [address] [facility]                                   [] Unit Machine #  with centralized RO                                  [] Portable Machine #1/RO serial # [id number]                                  [] Portable Machine #2/RO serial # [id number]                                  [] Portable Machine #3/RO serial # [id number]                                                                                                       [facility]                                  [x] Portable Machine #11/RO serial # [id number]                                   [] Portable Machine #12/RO serial # [id number]                                  [] Portable Machine #13/RO serial #  [id number]      Alarm Test:  Pass time 8819         Other:         [x] RO/Machine Log Complete      Temp    36.0             Dialysate: pH  7.4 Conductivity: Meter   NA     HD Machine   14.0                  TCD: 14.0  Dialyzer Lot # [id number]            Blood Tubing Lot # [id number]          Saline Lot #  [id number]     CHLORINE TESTING-Before each treatment and every 4 hours    Total Chlorine: [x] less than 0.1 ppm  Time: 1315 4 Hr/2nd Check Time:    (if greater than 0.1 ppm from Primary then every 30 minutes from Secondary)     TREATMENT INITIATION  with Dialysis Precautions:   [x] All Connections Secured                 [x] Saline Line Double Clamped   [x] Venous Parameters Set                  [x] Arterial Parameters Set    [x] Prime Given  250ml                          [x]Air Foam Detector Engaged      Treatment Initiation Note:  RN arrived to pts room, pt resting in bed, pt is stable to start tx. Patient was finishing up her lunch. Her lines were reversed to start off treatment, as her arterial line does not aspirate too well.     During Treatment Notes: Medication Dose Volume Route Initials Dialyzer Cleared: [x] Good [] Fair  [] Poor    Blood processed:  54.6 L  UF Removed  1989 Ml    Post Wt: NA    kg  POst BP:   127/80       Pulse: 83      Respirations: 18  Temperature: 96.5     54.6                         Post Tx Vascular Access:  N/A                                   Catheter: Locking solution: Heparin 1:1000 Art. 1.9 ml  Wei. 1.9 ml                                   Post Assessment:                                    Skin:  [x] Warm  [] Dry [] Diaphoretic    [] Flushed  [] Pale [] Cyanotic   [facility] Signatures Title Initials  Time Lungs: [x] Clear    [] Course  [] Crackles  [] Wheezing [] Diminished   Nik Rang RN SH  Cardiac: [x] Regular   [] Irregular   [] Monitor  [] N/A  Rhythm:           Edema:  [x] None    [] General     [] Facial   [] Pedal    [] UE    [] LE       Pain: [x]0  []1  []2   []3  []4   []5   []6   []7   []8   []9   []10         Post Treatment Note:     Pt resting in bed, pt tolerated the tx, floor RN given treatment report.       POST TREATMENT PRIMARY NURSE HANDOFF REPORT:     First initial/Last name/Title         Post Dialysis: Olga Leventhal, RN Time:  1700     Abbreviations: AVG-arterial venous graft, AVF-arterial venous fistula, IJ-Internal Jugular, Subcl-Subclavian, Fem-Femoral, Tx-treatment, AP/HR-apical heart rate, DFR-dialysate flow rate, BFR-blood flow rate, AP-arterial pressure, -venous pressure, UF-ultrafiltrate, TMP-transmembrane pressure, Wei-Venous, Art-Arterial, RO-Reverse Osmosis

## 2018-06-25 NOTE — PROGRESS NOTES
[facility] Hospitalist Group  Progress Note    Patient: Lucas Esquivel Age: 56 y.o. : 1960 MR#: [id number] SSN: [id number]  Date/Time: 2018     Subjective:     Review of systems    Severe language barrier   No distress     Assessment/Plan:   1. 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   - Followed by pulmonary   - - so far 2 sputum samples are negative and 3 rd sample in process       2 ESRD - on HD   Awaiting 3 rd sputum       3 Clotted left arm access   - HD with right side tunnel cath     4 Emaciated   - due to chronic illness   - moderate protein calorie malnutrition   - follow with nutrition         Case discussed with:  []Patient  [x]Family  [x]Nursing  []Case Management  DVT Prophylaxis:  []Lovenox  []Hep SQ  []SCDs  []Coumadin   []On Heparin gtt          Objective:   VS:   Visit Vitals    /86    Pulse 81    Temp 98 °F (36.7 °C)    Resp 18    Ht 5' 1\" (1.549 m)    Wt 39.9 kg (88 lb)    SpO2 97%    Breastfeeding No    BMI 16.63 kg/m2      Tmax/24hrs: Temp (24hrs), Av.7 °F (36.5 °C), Min:97.2 °F (36.2 °C), Max:98 °F (36.7 °C)  IOBRIEF    Intake/Output Summary (Last 24 hours) at 18 1627  Last data filed at 18 1330   Gross per 24 hour   Intake              240 ml   Output                0 ml   Net              240 ml       General:  Alert, cooperative, no acute distress , thin looking / emaciated   HEENT:  NC, Atraumatic. PERRLA, anicteric sclerae. Pulmonary:  Bilateral air entry present . No Wheezing/Rhonchi/Rales. Cardiovascular: Regular rate and Rhythm. GI:  Soft, Non distended, Non tender. + Bowel sounds. Extremities:  No edema, cyanosis, clubbing. No calf tenderness. Psych:  Not anxious or agitated. Neurologic: Grossly - Motor and Sensory functions are intact .       Medications:   Current Facility-Administered Medications   Medication Dose Route Frequency    epoetin pilar (EPOGEN;PROCRIT) injection 10,000 Units  10,000 Units IntraVENous DIALYSIS MON, WED & FRI    heparin (porcine) 1,000 unit/mL injection        amLODIPine (NORVASC) tablet 10 mg  10 mg Oral DAILY    hydrALAZINE (APRESOLINE) tablet 50 mg  50 mg Oral Q12H    pyrazinamide tablet 1,000 mg  1,000 mg Oral Q MON, WED & FRI    sevelamer carbonate (RENVELA) tab 1,600 mg  1,600 mg Oral TID WITH MEALS    0.9% sodium chloride infusion 250 mL  250 mL IntraVENous PRN    ondansetron (ZOFRAN) injection 4 mg  4 mg IntraVENous Q4H PRN    oxyCODONE-acetaminophen (PERCOCET) 5-325 mg per tablet 1 Tab  1 Tab Oral Q4H PRN    morphine injection 2-4 mg  2-4 mg IntraVENous Q3H PRN    ondansetron (ZOFRAN ODT) tablet 4 mg  4 mg Oral Q8H PRN    hydrALAZINE (APRESOLINE) 20 mg/mL injection 10 mg  10 mg IntraVENous Q6H PRN    cloNIDine HCl (CATAPRES) tablet 0.3 mg  0.3 mg Oral BID    docusate sodium (COLACE) capsule 100 mg  100 mg Oral BID    polyethylene glycol (MIRALAX) packet 17 g  17 g Oral DAILY    rifAMPin (RIFADIN) capsule 300 mg  300 mg Oral DAILY    ethambutol (MYAMBUTOL) tablet 800 mg  800 mg Oral Q MON, WED & FRI    pyridoxine (vitamin B6) (VITAMIN B-6) tablet 50 mg  50 mg Oral DAILY    isoniazid (NYDRAZID) tablet 300 mg  300 mg Oral DAILY       Labs:    Recent Labs      06/25/18   0502  06/24/18   0400  06/23/18   0445   WBC  5.2  5.1  5.0   HGB  10.3*  10.1*  11.3*   HCT  32.5*  32.3*  35.1   PLT  215  203  158     Recent Labs      06/25/18   1325   NA  133*   K  5.6*   CL  96*   CO2  28   GLU  105*   BUN  46*   CREA  6.58*   CA  10.0         Signed By: Brennan Reich MD     June 25, 2018

## 2018-06-26 NOTE — PROGRESS NOTES
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 Hyperkalemia, post HD yesterday , no available lab today. PLAN:   · Examined during HD. NO urgent indication for HD today. · Order lactulose today  She has three negative AFB and being on anti TB treatment, health departement has been notified. She can restart her TB treatment outpatient. I have discussed with HD staff, she can resume her HD tomorrow outpatient if she discharge today. She will need appointment with [facility] on discharge. Continue current BP meds  Adjust all meds per ESRD satus   Discussed with Dr. Iza Zimmer. Spoke with son  Over phone. Infection disease clinic ph no. [phone].             Current Facility-Administered Medications   Medication Dose Route Frequency    lactulose (CHRONULAC) solution 20 g  30 mL Oral DAILY    epoetin pilar (EPOGEN;PROCRIT) injection 10,000 Units  10,000 Units IntraVENous DIALYSIS MON, WED & FRI    amLODIPine (NORVASC) tablet 10 mg  10 mg Oral DAILY    hydrALAZINE (APRESOLINE) tablet 50 mg  50 mg Oral Q12H    pyrazinamide tablet 1,000 mg  1,000 mg Oral Q MON, WED & FRI    sevelamer carbonate (RENVELA) tab 1,600 mg  1,600 mg Oral TID WITH MEALS    0.9% sodium chloride infusion 250 mL  250 mL IntraVENous PRN    ondansetron (ZOFRAN) injection 4 mg  4 mg IntraVENous Q4H PRN    oxyCODONE-acetaminophen (PERCOCET) 5-325 mg per tablet 1 Tab  1 Tab Oral Q4H PRN    morphine injection 2-4 mg  2-4 mg IntraVENous Q3H PRN    ondansetron (ZOFRAN ODT) tablet 4 mg  4 mg Oral Q8H PRN    hydrALAZINE (APRESOLINE) 20 mg/mL injection 10 mg  10 mg IntraVENous Q6H PRN    cloNIDine HCl (CATAPRES) tablet 0.3 mg  0.3 mg Oral BID    docusate sodium (COLACE) capsule 100 mg  100 mg Oral BID    polyethylene glycol (MIRALAX) packet 17 g  17 g Oral DAILY    rifAMPin (RIFADIN) capsule 300 mg  300 mg Oral DAILY    ethambutol (MYAMBUTOL) tablet 800 mg  800 mg Oral Q MON, WED & FRI    pyridoxine (vitamin B6) (VITAMIN B-6) tablet 50 mg  50 mg Oral DAILY    isoniazid (NYDRAZID) tablet 300 mg  300 mg Oral DAILY       Review of Symptoms: comprehensive ROS negative except above.    Objective:     Patient Vitals for the past 24 hrs:   Temp Pulse Resp BP SpO2   06/26/18 0417 97.4 °F (36.3 °C) 89 18 137/85 98 %   06/25/18 2206 97.3 °F (36.3 °C) 89 22 (!) 167/96 99 %   06/25/18 1900 - - - - 97 %   06/25/18 1648 96.5 °F (35.8 °C) 83 18 127/80 -   06/25/18 1630 - 86 18 128/89 -   06/25/18 1600 - 81 18 123/86 -   06/25/18 1541 98 °F (36.7 °C) - - - -   06/25/18 1530 - 78 18 131/87 -   06/25/18 1500 - 74 18 112/75 -   06/25/18 1430 - 75 18 131/88 -   06/25/18 1400 - 72 - 127/89 -   06/25/18 1330 97.7 °F (36.5 °C) 73 18 164/88 -   06/25/18 1239 97.2 °F (36.2 °C) - 18 (!) 135/95 97 %        Weight change:      06/24 1901 - 06/26 0700  In: 240 [P.O.:240]  Out: 1989     Intake/Output Summary (Last 24 hours) at 06/26/18 1125  Last data filed at 06/25/18 1640   Gross per 24 hour   Intake              120 ml   Output             1989 ml   Net            -1869 ml     Physical Exam:   General: comfortable, no acute distress   HEENT sclera anicteric, supple neck, no thyromegaly  CVS: S1S2 heard,  no rub  RS: + air entry b/l,   Abd: Soft, Non tender,  Neuro: non focal, awake, alert , CN II-XII are grossly intact  Extrm: no edema, no cyanosis, clubbing   Skin: no visible  Rash  Access; left arm access; hematoma   Right side TDC        Data Review:     LABS:   Hematology:   Recent Labs      06/25/18   0502  06/24/18   0400   WBC  5.2  5.1   HGB  10.3*  10.1*   HCT  32.5*  32.3*     Chemistry:   Recent Labs      06/25/18   1325 BUN  46*   CREA  6.58*   CA  10.0   K  5.6*   NA  133*   CL  96*   CO2  28   GLU  105*            Procedures/imaging: see electronic medical records for all procedures, Xrays and details which were not copied into this note but were reviewed prior to creation of Plan          Assessment & Plan:     As above       Alfornia Councilman, MD  6/26/2018  2:13 PM

## 2018-06-26 NOTE — DISCHARGE SUMMARY
Discharge Summary     Patient ID:  Gita Bajwa  [id number]  56 y.o.  1960  Body mass index is 16.16 kg/(m^2). PCP on record: Rubia Light MD    Admit date: 6/18/2018  Discharge date and time: 6/26/2018    Discharge Diagnoses:                                           1 Active pulmonary TB   2 ESRD on HD   3 Clotted left arm access  4 Weight loss due to TB   5 HTN      Consults: Pulmonary/Intensive care and Nephrology          Hospital Course by problems:  HPI per admitting MD   \" HISTORY OF PRESENT ILLNESS:     Gita Bajwa is a 56 y.o.  female who presents with HD access malfunction. Pt has the pmhx of ESRD and active TB diagnosed in the [facility] from ascitic fluid several months ago. Pt has had serial repeat cultures negative for such and treatment has been started in efforts to assure pt's treatment in setting of requiring HD.      Per chart review, pt had a clotted AVF on 6/18/18 and a permacath was placed (in the setting of active TB) to continue HD. The permacath insertion site began to bleed and pt was admitted for such on 6/20/18 and it was noted that pt's hgb had dropped from mid 9's to mid 5's. Pt was transferred to the ICU for her anemia and admitted to the Vascular service. Pt has been transfused and bleeding has been stabilized. Pulmonary has been consulted to manage the TB and Renal has been on board for HD.      Of note, pt does not speak any English.      We were asked to take over primary care as of today for continued treatment of the above-mentioned problems. \"      Admitted for vascular access malfunction , s/p repair and was then admitted to medical service 3 AFB sputum analysis - 3 AFB sputums were negative and hence patient can get HD as out patient .     Patient while in hospital remained stable     I called [facility] and informed them regarding patient being discharged   - they will inform health dept for continuation of this patient's medical management / meds will be provided at home   - I called patient's son who speaks [place] discussed - and he will also call Health dept   - D/W nephrology - Out patient HD to ana         Patient seen and examined by me on discharge day. Pertinent Findings:  Patient is Alert Awake and oriented   HEENT - NAD    RS - Clear , no rales no rhonchi   CVS - regular rhythm and rate acceptable    abd - benign, BS present , no Distension   EXT - no edema , no calf tenderness   Neuro - alert and awake , grossly motor and sensory intact         Pertinent Lab Data:  Recent Labs      06/25/18   0502  06/24/18   0400   WBC  5.2  5.1   HGB  10.3*  10.1*   HCT  32.5*  32.3*   PLT  215  203     Recent Labs      06/25/18   1325   NA  133*   K  5.6*   CL  96*   CO2  28   GLU  105*   BUN  46*   CREA  6.58*   CA  10.0       DISCHARGE MEDICATIONS:     Anti TB Meds are not listed here they will be provided by health clinic - delivered at home       @  Current Discharge Medication List      CONTINUE these medications which have NOT CHANGED    Details   amLODIPine (NORVASC) 10 mg tablet Take 1 Tab by mouth daily. Qty: 30 Tab, Refills: 0      aspirin 81 mg chewable tablet Take 1 Tab by mouth daily. Qty: 30 Tab, Refills: 0      atorvastatin (LIPITOR) 40 mg tablet Take 1 Tab by mouth nightly. Qty: 30 Tab, Refills: 0      carvedilol (COREG) 25 mg tablet Take 1 Tab by mouth every twelve (12) hours. Qty: 60 Tab, Refills: 0      cloNIDine HCl (CATAPRES) 0.3 mg tablet Take 1 Tab by mouth two (2) times a day. Qty: 60 Tab, Refills: 0      losartan (COZAAR) 100 mg tablet Take 1 Tab by mouth daily. Qty: 30 Tab, Refills: 0      hydrALAZINE (APRESOLINE) 100 mg tablet Take 0.5 Tabs by mouth two (2) times a day. Qty: 60 Tab, Refills: 0      melatonin 3 mg tablet Take 1 Tab by mouth nightly as needed. Qty: 30 Tab, Refills: 0      FLUoxetine (PROZAC) 10 mg capsule Take 1 Cap by mouth daily.   Qty: 30 Cap, Refills: 0      glycerin, adult, (FLEET GLYCERIN, ADULT,) suppository Insert 1 Suppository into rectum daily. Indications: BOWEL EVACUATION  Qty: 10 Suppository, Refills: 0      albuterol (PROVENTIL HFA, VENTOLIN HFA, PROAIR HFA) 90 mcg/actuation inhaler Take 2 Puffs by inhalation every four (4) hours as needed for Wheezing. Qty: 1 Inhaler, Refills: 0               My Recommended Diet, Activity, Wound Care, and follow-up labs are listed in the patient's Discharge Insturctions which I have personally completed and reviewed. Disposition:     [] Home with family     [] [facility] PT/RN   [] SNF/NH   [] Inpatient Rehab/TRACEY  Condition at Discharge:  Stable    Follow up with:   PCP : Rubia Light MD      Please follow-up tests/labs that are still pendin.  None  2.    >30 minutes spent coordinating this discharge (review instructions/follow-up, prescriptions, preparing report for sign off)    Signed:  Reinaldo Us MD  2018  1:20 PM

## 2018-06-26 NOTE — PROGRESS NOTES
NUTRITION    Nutrition Screen      RECOMMENDATIONS / PLAN:     - Continue current nutrition interventions while pt remain inpatient  - Family okay to provide food if pt desires, to encouraged meal intake   - Continue RD inpatient monitoring and evaluation. NUTRITION INTERVENTIONS & DIAGNOSIS:     [x] Meals/snacks: modified composition   [x] Medical food supplement therapy: Nepro BID. Nutrition Diagnosis: Unintended weight loss related to inadequate energy intake as evidenced by 18 lb, 17% weight loss x 6 months. ASSESSMENT:     6/26: Pt continues to have poor intake of in-house meals, but eating what family provides per nursing.     6/22: Pt has poor intake of in-house meals per nursing, but eats food provided by family. 6/19: Pt with nausea/vomiting, now dry-heaving. Plan for dialysis today and start renal diet.     Average po intake adequate to meet patients estimated nutritional needs:   [x] Yes     [] No   [] Unable to determine at this time    Diet: DIET RENAL Regular  DIET NUTRITIONAL SUPPLEMENTS Lunch, Dinner; ZENT      Food Allergies: banana   Current Appetite:   [x] Good     [] Fair     [] Poor     [] Other: unknown   Appetite/meal intake prior to admission:   [] Good     [] Fair     [] Poor     [x] Other: unknown   Feeding Limitations:  [] Swallowing difficulty    [] Chewing difficulty    [] Other:  Current Meal Intake:   Patient Vitals for the past 100 hrs:   % Diet Eaten   06/25/18 1330 50 %   06/25/18 0930 75 %   06/24/18 1230 0 %   06/24/18 0944 0 %   06/23/18 1826 0 %   06/23/18 0947 50 %     Anuric   BM: 6/23  Skin Integrity: neck catheter site   Edema: 2+ LUE  1+ LLE  Pertinent Medications: Reviewed: zofran, pyridoxine     Recent Labs      06/25/18   1325   NA  133*   K  5.6*   CL  96*   CO2  28   GLU  105*   BUN  46*   CREA  6.58*   CA  10.0       Intake/Output Summary (Last 24 hours) at 06/26/18 1427  Last data filed at 06/25/18 1640   Gross per 24 hour   Intake                0 ml Output             1989 ml   Net            -1989 ml       Anthropometrics:  Ht Readings from Last 1 Encounters:   06/18/18 5' 1\" (1.549 m)     Last 3 Recorded Weights in this Encounter    06/20/18 1104 06/23/18 1515 06/26/18 0618   Weight: 40 kg (88 lb 2.9 oz) 39.9 kg (88 lb) 38.8 kg (85 lb 8.6 oz)     Body mass index is 16.16 kg/(m^2). Underweight     Weight History: 37 lb, 30% weight loss x 9 months and 18 lb, 17% weight loss x 6 months PTA per chart history review     Weight Metrics 6/26/2018 5/31/2018 3/9/2018 1/22/2018 12/2/2017 11/15/2017 11/5/2017   Weight 85 lb 8.6 oz 90 lb 6.2 oz 84 lb 3.2 oz 103 lb 3 oz 106 lb 14.8 oz 107 lb 110 lb   BMI 16.16 kg/m2 17.08 kg/m2 15.91 kg/m2 19.5 kg/m2 20.2 kg/m2 20.22 kg/m2 21.48 kg/m2        Admitting Diagnosis: DX  Complication of vascular dialysis catheter, initial encounter  Complication of vascular dialysis catheter, initial encounter  Pertinent PMHx: ESRD on HD, HTN, HLD     Education Needs:        [x] None identified  [] Identified - Not appropriate at this time  []  Identified and addressed - refer to education log  Learning Limitations:   [] None identified  [x] Identified: does not speak [place], speaks [facility]  Cultural, Spiritism & ethnic food preferences:  [x] None identified    [] Identified and addressed     ESTIMATED NUTRITION NEEDS:     Calories: 3640-8579 kcal (30-35 kcal/kg) based on  [] Actual BW      [x] SBW 56 kg   Protein: 67-84 gm (1.2-1.5 gm/kg) based on  [] Actual BW      [x] SBW   Fluid: 9353-7597 mL     MONITORING & EVALUATION:     Nutrition Goal(s):   1. Po intake of meals will meet >75% of patient estimated nutritional needs within the next 7 days.   Outcome:  [] Met/Ongoing    [x]  Not Met/Progressing    [] New/Initial Goal     Monitoring:   [x] Food and beverage intake   [x] Diet order   [x] Nutrition-focused physical findings   [x] Treatment/therapy   [] Weight   [] Enteral nutrition intake        Previous Recommendations (for follow-up assessments only):     [x]   Implemented       []   Not Implemented (RD to address)      [] No Longer Appropriate     [] No Recommendation Made     Discharge Planning: renal diet as tolerated   [x] Participated in care planning, discharge planning, & interdisciplinary rounds as appropriate      Ruth Richardson,  [address] [place] [address]  Pager: [phone]

## 2018-06-26 NOTE — DISCHARGE INSTRUCTIONS
Hemodialysis Access: What to Expect at [facility]  Hemodialysis is a way to remove wastes from the blood when your kidneys can no longer do the job. It is not a cure, but it can help you live longer and feel better. It is a lifesaving treatment when you have kidney failure. Hemodialysis is often called dialysis. Your doctor created a place (called an access) in your arm for your blood to flow in and out of your body during your dialysis sessions. Your arm will probably be bruised and swollen. It may hurt. The cut (incision) may bleed. The pain and bleeding will get better over several days. You will probably need only over-the-counter pain medicine. You can reduce swelling by propping your arm on 1 or 2 pillows and keeping your elbow straight. You will have stitches. These may dissolve on their own, or your doctor will tell you when to come in to have them removed. You should also be able to return to work in a few days. You may feel some coolness or numbness in your hand. These feelings usually go away in a few weeks. Your doctor may suggest squeezing a soft object. This will strengthen your access and may make hemodialysis faster and easier. You should always be able to feel blood rushing through the fistula or graft. It feels like a slight vibration when you put your fingers on the skin over the fistula or graft. This feeling is called a thrill or pulse. This care sheet gives you a general idea about how long it will take for you to recover. But each person recovers at a different pace. Follow the steps below to get better as quickly as possible. How can you care for yourself at home? Activity  ? · Rest when you feel tired. Getting enough sleep will help you recover. Do not lie on or sleep on the arm with the access. ? · Avoid activities such as washing windows or gardening that put stress on the arm with the access.    ? · You may use your arm, but do not lift anything that weighs more than about 15 pounds. This may include a child, heavy grocery bags, a heavy briefcase or backpack, cat litter or dog food bags, or a vacuum . ? · You can shower, but keep the access dry for the first 2 days. Cover the area with a plastic bag to keep it dry. ? · Do not soak or scrub the incision until it has healed. ? · Wear an arm guard to protect the area if you play sports or work with your arms. ? · You may drive when your doctor says it is okay. This is usually in 1 to 2 days. ? · Most people are able to return to work about 1 or 2 days after surgery. Diet  ? · Follow an eating plan that is good for your kidneys. A registered dietitian can help you make a meal plan that is right for you. You may need to limit protein, salt, fluids, and certain foods. Medicines  ? · Your doctor will tell you if and when you can restart your medicines. He or she will also give you instructions about taking any new medicines. ? · If you take blood thinners, such as warfarin (Coumadin), clopidogrel (Plavix), or aspirin, be sure to talk to your doctor. He or she will tell you if and when to start taking those medicines again. Make sure that you understand exactly what your doctor wants you to do. ? · Take pain medicines exactly as directed. ¨ If the doctor gave you a prescription medicine for pain, take it as prescribed. ¨ If you are not taking a prescription pain medicine, ask your doctor if you can take acetaminophen (Tylenol). Do not take ibuprofen (Advil, Motrin) or naproxen (Aleve), or similar medicines, unless your doctor tells you to. They may make chronic kidney disease worse. ¨ Do not take two or more pain medicines at the same time unless the doctor told you to. Many pain medicines have acetaminophen, which is Tylenol. Too much acetaminophen (Tylenol) can be harmful.    ? · If you think your pain medicine is making you sick to your stomach:  ¨ Take your medicine after meals (unless your doctor has told you not to). ¨ Ask your doctor for a different pain medicine. ? · If your doctor prescribed antibiotics, take them as directed. Do not stop taking them just because you feel better. You need to take the full course of antibiotics. Incision care  ? · Keep the area dry for 2 days. After 2 days, wash the area with soap and water every day, and always before dialysis. ? · Do not soak or scrub the incision until it has healed. ? · If you have a bandage, change it every day or as your doctor recommends. Your doctor will tell you when you can remove it. Exercise  ? · Squeeze a soft ball or other object as your doctor tells you. This will help blood flow through the access and help prevent blood clots. ? Elevation  ? · Prop up the sore arm on a pillow anytime you sit or lie down during the next 3 days. Try to keep it above the level of your heart. This will help reduce swelling. Other instructions  ? · Every day, check your access for a pulse or thrill in the fistula or graft area. A thrill is a vibration. To feel a pulse or thrill, place the first two fingers of your hand over the access. ? · Do not bump your arm. ? · Do not wear tight clothing, jewelry, or anything else that may squeeze the access. ? · Use your other arm to have blood drawn or blood pressure taken. ? · Do not put cream or lotion on or near the access. ? · Make sure all doctors you deal with know you have a vascular access. Follow-up care is a key part of your treatment and safety. Be sure to make and go to all appointments, and call your doctor if you are having problems. It's also a good idea to know your test results and keep a list of the medicines you take. When should you call for help? Call 911 anytime you think you may need emergency care. For example, call if:  ? · You passed out (lost consciousness). ? · You have chest pain, are short of breath, or cough up blood.    ?Call your doctor now or seek immediate medical care if:  ? · Your hand or arm is cold or dark-colored. ? · You have no pulse in your access. ? · You have nausea or you vomit. ? · You have pain that does not get better after you take pain medicine. ? · You have loose stitches, or your incision comes open. ? · You are bleeding from the incision. ? · You have signs of infection, such as:  ¨ Increased pain, swelling, warmth, or redness. ¨ Red streaks leading from the area. ¨ Pus draining from the area. ¨ A fever. ? · You have signs of a blood clot in your leg (called a deep vein thrombosis), such as:  ¨ Pain in your calf, back of the knee, thigh, or groin. ¨ Redness or swelling in your leg. ? Watch closely for changes in your health, and be sure to contact your doctor if you have any problems. Where can you learn more? Go to http://cristino-anne.info/. Enter P616 in the search box to learn more about \"Hemodialysis Access: What to Expect at Home. \"  Current as of: May 12, 2017  Content Version: 11.4  © 9024-9545 [employer]. Care instructions adapted under license by [employer] (which disclaims liability or warranty for this information). If you have questions about a medical condition or this instruction, always ask your healthcare professional. [address] any warranty or liability for your use of this information.

## 2018-06-26 NOTE — ROUTINE PROCESS
1900-Bedside shift change report given to Saturnino Leventhal, RN (oncoming nurse) by Ryder Siegel (offgoing nurse). Report included the following information SBAR, Kardex and MAR. Patient alert and oriented, communication barrier, translation phone available and Family at bedside. Bed in  Lowest position and wheels locked , no distress noted. 0700-Bedside shift change report given to RYDER Siegel  (oncoming nurse) by Saturnino Leventhal, RN (offgoing nurse). Report included the following information SBAR, Kardex and MAR. Patient alert and oriented x4, bed in lowest position and wheels locked. No distress noted.

## 2018-06-27 NOTE — DIALYSIS
ACUTE HEMODIALYSIS FLOW SHEET    HEMODIALYSIS ORDERS: Physician: Hafsa Barrett     Dialyzer: Revaclear        Duration: 3.5 hr  BFR: 350   DFR: 600   Dialysate:  Temp 37 K+   2    Ca+  2.5 Na 140 Bicarb 35   Weight:  41 kg    Bed Scale []     Unable to Obtain []      Dry weight/UF Goal: 2 Access CVC  Needle Gauge ***    Heparin*** []  Bolus      Units    [] Hourly       Units    [x]***None      Catheter locking solution heparin   Pre BP:   151/93    Pulse:     79     Temperature:   98  Respirations: 18  Tx: NS   ***    ml/Bolus  Other        [x] N/A   Labs: Pre        Post:        [x] N/A   Additional Orders(medications, blood products, hypotension management):   ***    [x] N/A     [x] ***[facility] Consent Verified     CATHETER ACCESS:*** []N/A   [x]Right   []Left   []IJ     []Fem   [] First use X-ray verified     [x]Tunnel                [] Non Tunneled   [x]No S/S infection  []Redness  []Drainage []Cultured []Swelling []Pain   [x]Medical Aseptic Prep Utilized   []Dressing Changed  [] Biopatch  Date: ***      []Clotted   []Patent   Flows: []Good  []Poor  [x]Reversed   If access problem,  notified: []Yes    [x]N/A  Date:           GRAFT/FISTULA ACCESS: *** [x]N/A     []Right     []Left     []UE     []LE   []AVG   []AVF        []Buttonhole    []Medical Aseptic Prep Utilized   []No S/S infection  []Redness  []Drainage []Cultured []Swelling []Pain    Bruit:   [] Strong    [] Weak       Thrill :   [] Strong    [] Weak       Needle Gauge: ***   Length: ***   If access problem,  notified: []Yes     []N/A  Date:        Please describe access if present and not used:***       GENERAL ASSESSMENT: ***   LUNGS:  Rate 18 SaO2%  99     [] N/A    [x] Clear  [] Coarse  [] Crackles  [] Wheezing        [] Diminished     Location : []RLL   []LLL    []RUL  []ELÍAS   Cough: []Productive  []Dry  []N/A   Respirations:  [x]Easy  []Labored   Therapy:  [x]RA  []NC *** l/min    Mask: []NRB []Venti       O2%                  []Ventilator []Intubated  [] Trach  [] BiPaP   CARDIAC: [x]Regular      [] Irregular   [] Pericardial Rub  [] JVD        [x]  Monitored  [] Bedside  [] Remotely monitored [] N/A  Rhythm: ***   EDEMA: [] None  []Generalized  [] Pitting [] 1    [] 2    [] 3    [] 4                 [] Facial  [] Pedal  []  UE  [] LE   SKIN:   [x] Warm  [] Hot     [] Cold   [] Dry     [] Pale   [] Diaphoretic                  [] Flushed  [] Jaundiced  [] Cyanotic  [] Rash  [] Weeping   LOC:    [x] Alert      [x]Oriented:    [x] Person     [x] Place  [x]Time               [] Confused  [] Lethargic  [] Medicated  [] Non-responsive     GI / ABDOMEN:***  [] Flat    [x] Distended    [] Soft    [] Firm   []  Obese                             [] Diarrhea  [] Bowel Sounds  [] Nausea  [] Vomiting       / URINE ASSESSMENT:***[x] Voiding   [] Oliguria  [] Anuria   []  Onofre     [] Incontinent    []  Incontinent Brief      []  Bathroom Privileges     PAIN:*** [x] 0 []1  []2   []3   []4   []5   []6   []7   []8   []9   []10            Scale 0-10  Action/Follow Up: ***   MOBILITY: *** [] Amb    [] Amb/Assist    [x] Bed    [] Wheelchair  [] Stretcher      All Vitals and Treatment Details on Attached [address]: [facility]          Room # 13     [x] 1st Time Acute  [] Stat  [] Routine  [] Urgent     [x] Acute Room  []  Bedside  [] ICU/CCU  [] ER   Isolation Precautions:  [x] Dialysis   [] Airborne   [] Contact    [] Reverse   Special Considerations:         [] Blood Consent Verified [x]N/A     ALLERGIES: ***  [x] NKA          Code Status:***  [x] Full Code  [] DNR  [] Other           HBsAg ONLY: Date Drawn 9/9/17         [x]Negative []Positive []Unknown   HBsAb: Date 9/9/17    [] Susceptible   [x] [id number] []Not Drawn  [] Drawn     Current Labs:    Date of Labs: ***          Today [x]        Cut and paste current labs here. Results for Louie Bueno (MRN [id number]) as of 6/27/2018 16:29   Ref.  Range 6/27/2018 10:40   WBC Latest Ref Range: 4.6 - 13.2 K/uL 5.8 RBC Latest Ref Range: 4.20 - 5.30 M/uL 4.65   HGB Latest Ref Range: 12.0 - 16.0 g/dL 12.9   HCT Latest Ref Range: 35.0 - 45.0 % 40.0   MCV Latest Ref Range: 74.0 - 97.0 FL 86.0   MCH Latest Ref Range: 24.0 - 34.0 PG 27.7   MCHC Latest Ref Range: 31.0 - 37.0 g/dL 32.3   RDW Latest Ref Range: 11.6 - 14.5 % 16.9 (H)   PLATELET Latest Ref Range: 135 - 420 K/uL 210   MPV Latest Ref Range: 9.2 - 11.8 FL 9.5   NEUTROPHILS Latest Ref Range: 40 - 73 % 74 (H)   LYMPHOCYTES Latest Ref Range: 21 - 52 % 7 (L)   MONOCYTES Latest Ref Range: 3 - 10 % 15 (H)   EOSINOPHILS Latest Ref Range: 0 - 5 % 3   BASOPHILS Latest Ref Range: 0 - 2 % 1   DF Latest Units:   AUTOMATED   ABS. NEUTROPHILS Latest Ref Range: 1.8 - 8.0 K/UL 4.3   ABS. LYMPHOCYTES Latest Ref Range: 0.9 - 3.6 K/UL 0.4 (L)   ABS. MONOCYTES Latest Ref Range: 0.05 - 1.2 K/UL 0.9   ABS. EOSINOPHILS Latest Ref Range: 0.0 - 0.4 K/UL 0.2   ABS.  BASOPHILS Latest Ref Range: 0.0 - 0.1 K/UL 0.1   INR Latest Ref Range: 0.8 - 1.2   0.9   Prothrombin time Latest Ref Range: 11.5 - 15.2 sec 11.5   Sodium Latest Ref Range: 136 - 145 mmol/L 131 (L)   Potassium Latest Ref Range: 3.5 - 5.5 mmol/L 5.1   Chloride Latest Ref Range: 100 - 108 mmol/L 98 (L)   CO2 Latest Ref Range: 21 - 32 mmol/L 24   Anion gap Latest Ref Range: 3.0 - 18 mmol/L 9   Glucose Latest Ref Range: 74 - 99 mg/dL 120 (H)   BUN Latest Ref Range: 7.0 - 18 MG/DL 41 (H)   Creatinine Latest Ref Range: 0.6 - 1.3 MG/DL 5.88 (H)   BUN/Creatinine ratio Latest Ref Range: 12 - 20   7 (L)   Calcium Latest Ref Range: 8.5 - 10.1 MG/DL 10.7 (H)   GFR est non-AA Latest Ref Range: >60 ml/min/1.73m2 7 (L)   GFR est AA Latest Ref Range: >60 ml/min/1.73m2 9 (L)   Bilirubin, total Latest Ref Range: 0.2 - 1.0 MG/DL 0.5   Protein, total Latest Ref Range: 6.4 - 8.2 g/dL 6.9   Albumin Latest Ref Range: 3.4 - 5.0 g/dL 2.4 (L)   Globulin Latest Ref Range: 2.0 - 4.0 g/dL 4.5 (H)   A-G Ratio Latest Ref Range: 0.8 - 1.7   0.5 (L)   ALT (SGPT) Latest Ref Range: 13 - 56 U/L <6 (L)   AST Latest Ref Range: 15 - 37 U/L 28   Alk.  phosphatase Latest Ref Range: 45 - 117 U/L 246 (H)                                                                                                                                 DIET:***  [x] Renal    [] Other     [] NPO     []  Diabetic      PRIMARY NURSE REPORT: First initial/Last name/Title      Pre Dialysis: MUNA Ahn RN    Time: 9495      EDUCATION: ***   [x] Patient [] Other         Knowledge Basis: []None []Minimal [] Substantial   Barriers to learning patient does not speak english []N/A   [] Access Care     [] S&S of infection     [] Fluid Management     []K+     []Procedural    []Albumin     [] Medications     [] Tx Options     [] Transplant     [] Diet     [] Other   Teaching Tools:  [] Explain  [] Demo  [] Handouts [] Video  Patient response: ***  [] Verbalized understanding  [] Teach back  [] Return demonstration [] Requires follow up   Inappropriate due to    Patient doesn't speak english speaks Gaudencio        [x]1445 Time [address] Before each treatment:     Machine Number:                   [facility]                                  [] Unit Machine # *** with centralized RO                                  [] Portable Machine #1/RO serial # [id number]                                  [] Portable Machine #2/RO serial # [id number]                                  [] Portable Machine #3/RO serial # [id number]                                                                                                       [address] [place] [address]                                  [] Portable Machine #11/RO serial # [id number]                                   [] Portable Machine #12/RO serial # [id number]                                  [] Portable Machine #13/RO serial #  [id number]      Alarm Test:  Pass time 2115         Other:         [x]*** RO/Machine Log Complete      Temp    37 [x]***Extracorporeal Circuit Tested for integrity   Dialysate: pH  7.4 Conductivity: Meter   14     HD Machine   14                  TCD: 13.9  Dialyzer Lot # [id number]            Blood Tubing Lot # [id number]          Saline Lot #  [id number]     CHLORINE TESTING-Before each treatment and every 4 hours    Total Chlorine:*** [] less than 0.1 ppm  Time: 1445 4 Hr/2nd Check Time: 1645   (if greater than 0.1 ppm from Primary then every 30 minutes from Secondary)     TREATMENT INITIATION  with Dialysis Precautions:***   [x] All Connections Secured                 [x] Saline Line Double Clamped   [x] Venous Parameters Set                  [x] Arterial Parameters Set    [x] Prime Given  250                              [x]Air Foam Detector Engaged      Treatment Initiation Note: right IJ catheter accessed and treatment started without complication     Medication Dose Volume Route Initials Dialyzer Cleared:*** [] Good [] Fair  [] Poor    Blood processed:  *** L  UF Removed  *** Ml    Post Wt: ***    kg  POst BP:   ***       Pulse: ***      Respirations: ***  Temperature: ***                                   Post Tx Vascular Access: AVF/AVG: Bleeding stopped Art *** min. Wei. *** Min   N/A***                                   Catheter: Locking solution: Heparin 1:1000 Art.  ***  Wei. ***  N/A***                                 Post Assessment:                                    Skin:***  [] Warm  [] Dry [] Diaphoretic    [] Flushed  [] Pale [] Cyanotic   [facility] Signatures Title Initials  Time Lungs:*** [] Clear    [] Course  [] Crackles  [] Wheezing [] Diminished   [address] Cardiac:*** [] Regular   [] Irregular   [] Monitor  [] N/A  Rhythm:           Edema:***  [] None    [] General     [] Facial   [] Pedal    [] UE    [] LE       Pain:*** []0  []1  []2   []3  []4   []5   []6   []7   []8   []9   []10         Post Treatment Note: ***     POST TREATMENT PRIMARY NURSE HANDOFF REPORT:     First initial/Last name/Title         Post Dialysis: *** Time:  ***     Abbreviations: AVG-arterial venous graft, AVF-arterial venous fistula, IJ-Internal Jugular, Subcl-Subclavian, Fem-Femoral, Tx-treatment, AP/HR-apical heart rate, DFR-dialysate flow rate, BFR-blood flow rate, AP-arterial pressure, -venous pressure, UF-ultrafiltrate, TMP-transmembrane pressure, Wei-Venous, Art-Arterial, RO-Reverse Osmosis

## 2018-06-27 NOTE — PROGRESS NOTES
Ms. Celina Croft is seen in ER for HD management. She has h/o HTN, ESRD, recently diagnosed with pulmonary TB. She was just discharged from hospital, her HD unit still not accepted her, need . Her rifampin/ethambutol started on 18th of June, isoniazide  Was started on 19th , and pyrazinamide was started on 25th of this month. She has three negative sputum AFB. Plan to arrange HD today , working with outpatient HD unit to resume outpatient HD. Per Family she is getting abx from health department direct under observation. Will arrange TB meds today while in hospital.   Discussed with Dr. Renny Ambrose.

## 2018-06-27 NOTE — DISCHARGE INSTRUCTIONS
Kidney Dialysis: Care Instructions  Your Care Instructions    Dialysis is a process that filters wastes from the blood when your kidneys can no longer do the job. It is not a cure, but it can help you live longer and feel better. It is a lifesaving treatment when you have kidney failure. Normal kidneys work 24 hours a day to clean wastes from your blood. Your kidneys are not able to do this job, so a process called dialysis will do some of the work for your kidneys. You and your doctor will decide which type of dialysis you should have. Peritoneal dialysis uses the lining of your belly (peritoneum) to filter your blood. You can do it at home, on a daily basis. Hemodialysis uses a man-made filter called a dialyzer to clean your blood. Most people need to go to a hospital or clinic 3 days a week for several hours each time. Sometimes hemodialysis can be done at home. It is normal to have questions about your treatment, and you have a right to know what is happening to you. Learning about dialysis can help you take an active role in your treatment. Dialysis does not cure kidney disease, but it can help you live longer and feel better. You will need to follow your diet and treatment schedule carefully. Follow-up care is a key part of your treatment and safety. Be sure to make and go to all appointments, and call your doctor if you are having problems. It's also a good idea to know your test results and keep a list of the medicines you take. What do you need to know about peritoneal dialysis? Peritoneal dialysis uses the lining of your belly (or peritoneal membrane) to filter your blood. Before you can begin peritoneal dialysis, your doctor will need to place a thin tube called a catheter in your belly. This is the dialysis access. · Peritoneal dialysis can be done at home or in any clean place. You may be able to do it while you sleep. · You can do it by yourself.  You do not have to rely on help from others. · You can do it at the times you choose as long as you do the right number of treatments. · It has to be done every day of the week. · Some people find it hard to do all the required steps. · It increases your chance for a serious infection of the lining of the belly (peritoneum). What do you need to know about hemodialysis? Hemodialysis uses a man-made membrane called a dialyzer to clean your blood. You are connected to the dialyzer by tubes attached to your blood vessels. Before you start hemodialysis, your doctor will create a site where the blood can flow in and out of your body during your dialysis sessions. This site is called the vascular access. It may be a fistula, made by connecting an artery and a vein. Or it may be a graft, which is a tube implanted under your skin. · Hemodialysis is done mainly by trained health workers who can watch for any problems. · It allows you to be in contact with other people having dialysis. This can help provide emotional support. · You can schedule your treatments in the evenings so you can keep working. · You may be able to do home hemodialysis, which gives you more control over your schedule. · It usually needs to be done on a set schedule 3 times a week. · It can cause side effects. The most common side effects are low blood pressure and muscle cramps. These can often be treated easily. · It requires needle sticks during every treatment, which bothers some people. Others get used to it and even do the needle sticks themselves. How can you care for yourself at home? · Be sure to have all of your dialysis sessions. Do not try to shorten or skip your sessions. You have a better chance of a longer and healthier life by getting your full treatment. · Your doctor or health care team will show you the steps you need to go through each day before, during, and after dialysis. Be sure to follow these steps.  If you do not understand a step, talk to your team.  · Your doctor and dietitian will help you design menus that follow your diet. Be sure to follow your diet guidelines. ¨ You will need to limit fluids and certain foods that contain salt (sodium), potassium, and phosphorus. ¨ You may need to follow a heart-healthy diet to keep the fat (cholesterol) in your blood under control. ¨ You may need higher levels of protein in your diet. · Your doctor may recommend certain vitamins. But do not take any other medicine, including over-the-counter medicines, vitamins, and herbal products, without talking to your doctor first.  · Do not smoke. Smoking raises your risk of many health problems, including more kidney damage. If you need help quitting, talk to your doctor about stop-smoking programs and medicines. These can increase your chances of quitting for good. · Do not take ibuprofen (Advil, Motrin), naproxen (Aleve), or similar medicines, unless your doctor tells you to. These medicines may make kidney problems worse. When should you call for help? Call your doctor now or seek immediate medical care if:  ? · You have a fever. ? · You are dizzy or lightheaded, or you feel like you may faint. ? · You are confused or cannot think clearly. ? · You have new or worse nausea or vomiting. ? · You have new or more blood in your urine. ? · You have new swelling. ? Watch closely for changes in your health, and be sure to contact your doctor if:  ? · You do not get better as expected. Where can you learn more? Go to http://cristino-anne.info/. Enter Z811 in the search box to learn more about \"Kidney Dialysis: Care Instructions. \"  Current as of: May 12, 2017  Content Version: 11.4  © 7603-7129 [employer]. Care instructions adapted under license by [employer] (which disclaims liability or warranty for this information).  If you have questions about a medical condition or this instruction, always ask your healthcare professional. [address] any warranty or liability for your use of this information. within normal limits

## 2018-06-27 NOTE — ED PROVIDER NOTES
EMERGENCY DEPARTMENT HISTORY AND PHYSICAL EXAM    10:23 AM      Date: 6/27/2018  Patient Name: Pascale Maya    History of Presenting Illness     Chief Complaint   Patient presents with    Other         History Provided By: Patient's Son-In-Law    Chief Complaint: Dialysis  Duration:  N/A  Timing:  N/A  Location: n/a  Quality: n/a  Severity: N/A  Modifying Factors: No modifying or aggravating factors were reported. Associated Symptoms: denies any other associated signs or symptoms      Additional History (Context): 10:35 AM Pascale Maya is a 56 y.o. female with h/o HTN and end stage renal disease who presents to ED by recommendation of her doctor for dialysis admission. Family member states that patient was admitted to [facility] and was discharged 1 day ago. Dr. Danii Sorenson is the nephrologist. History is limited as patient does not speak [place]. No other concerns or symptoms at this time. PCP: Toya Johnson MD    Current Outpatient Prescriptions   Medication Sig Dispense Refill    amLODIPine (NORVASC) 10 mg tablet Take 1 Tab by mouth daily. 30 Tab 0    aspirin 81 mg chewable tablet Take 1 Tab by mouth daily. 30 Tab 0    atorvastatin (LIPITOR) 40 mg tablet Take 1 Tab by mouth nightly. 30 Tab 0    carvedilol (COREG) 25 mg tablet Take 1 Tab by mouth every twelve (12) hours. 60 Tab 0    cloNIDine HCl (CATAPRES) 0.3 mg tablet Take 1 Tab by mouth two (2) times a day. 60 Tab 0    losartan (COZAAR) 100 mg tablet Take 1 Tab by mouth daily. 30 Tab 0    hydrALAZINE (APRESOLINE) 100 mg tablet Take 0.5 Tabs by mouth two (2) times a day. 60 Tab 0    melatonin 3 mg tablet Take 1 Tab by mouth nightly as needed. (Patient taking differently: Take 1 Tab by mouth nightly as needed (insomnia). ) 30 Tab 0    FLUoxetine (PROZAC) 10 mg capsule Take 1 Cap by mouth daily. 30 Cap 0    glycerin, adult, (FLEET GLYCERIN, ADULT,) suppository Insert 1 Suppository into rectum daily.  Indications: BOWEL EVACUATION 10 Suppository 0    albuterol (PROVENTIL HFA, VENTOLIN HFA, PROAIR HFA) 90 mcg/actuation inhaler Take 2 Puffs by inhalation every four (4) hours as needed for Wheezing. 1 Inhaler 0       Past History     Past Medical History:  Past Medical History:   Diagnosis Date    Chronic kidney disease     ESRD (end stage renal disease) ([facility] [address]     TUES-THURS-SAT    Hypercholesteremia     Hypertension     Kidney disease     Vitamin D deficiency        Past Surgical History:  Past Surgical History:   Procedure Laterality Date    VASCULAR SURGERY PROCEDURE UNLIST         Family History:  No family history on file. Social History:  Social History   Substance Use Topics    Smoking status: Never Smoker    Smokeless tobacco: Never Used    Alcohol use No       Allergies: Allergies   Allergen Reactions    Banana Other (comments)         Review of Systems       Review of Systems   Unable to perform ROS: Other (Patient does not speak [place].)         Physical Exam     Visit Vitals    /84 (BP 1 Location: Left arm, BP Patient Position: At rest)    Pulse 80    Temp 97.7 °F (36.5 °C)    Resp 16    Wt 41 kg (90 lb 6.2 oz)    SpO2 99%    BMI 17.08 kg/m2         Physical Exam   Constitutional: She is oriented to person, place, and time. She appears well-developed and well-nourished. No distress. Temporary dialysis catheter on the right upper chest with a clean dressing. Fistula on the left upper extremity with a well healed surgical scar. No thrill and soft ecchymosis. HENT:   Head: Normocephalic and atraumatic. Mouth/Throat: Oropharynx is clear and moist and mucous membranes are normal. No oropharyngeal exudate. Eyes: Conjunctivae and EOM are normal. No scleral icterus. Neck: Normal range of motion. Neck supple. No JVD present. No thyromegaly present. Cardiovascular: Normal rate, regular rhythm, S1 normal, S2 normal, normal heart sounds and intact distal pulses. Exam reveals no gallop and no friction rub.     No murmur heard.  Pulmonary/Chest: Effort normal and breath sounds normal. No accessory muscle usage. No respiratory distress. She has no wheezes. She has no rhonchi. She has no rales. She exhibits no tenderness. Abdominal: Soft. Normal appearance and bowel sounds are normal. She exhibits no distension and no pulsatile midline mass. There is no tenderness. There is no rebound and no guarding. Musculoskeletal: Normal range of motion. She exhibits no edema (Lower extremity). Lymphadenopathy:        Head (right side): No submandibular adenopathy present. She has no cervical adenopathy. Neurological: She is alert and oriented to person, place, and time. Moving all extremities. No obvious deficits or facial asymmetry. Skin: Skin is warm, dry and intact. No rash noted. No erythema. Psychiatric: She has a normal mood and affect. Her speech is normal and behavior is normal.   Nursing note and vitals reviewed.         Diagnostic Study Results     Labs -  Recent Results (from the past 12 hour(s))   EKG, 12 LEAD, INITIAL    Collection Time: 06/27/18 10:28 AM   Result Value Ref Range    Ventricular Rate 75 BPM    Atrial Rate 75 BPM    P-R Interval 176 ms    QRS Duration 86 ms    Q-T Interval 370 ms    QTC Calculation (Bezet) 413 ms    Calculated P Axis 58 degrees    Calculated R Axis 103 degrees    Calculated T Axis 72 degrees    Diagnosis       Normal sinus rhythm  Rightward axis  Nonspecific ST abnormality  Abnormal ECG  When compared with ECG of 18-JUN-2018 22:53,  Nonspecific T wave abnormality now evident in Lateral leads     CBC WITH AUTOMATED DIFF    Collection Time: 06/27/18 10:40 AM   Result Value Ref Range    WBC 5.8 4.6 - 13.2 K/uL    RBC 4.65 4.20 - 5.30 M/uL    HGB 12.9 12.0 - 16.0 g/dL    HCT 40.0 35.0 - 45.0 %    MCV 86.0 74.0 - 97.0 FL    MCH 27.7 24.0 - 34.0 PG    MCHC 32.3 31.0 - 37.0 g/dL    RDW 16.9 (H) 11.6 - 14.5 %    PLATELET 108 512 - 583 K/uL    MPV 9.5 9.2 - 11.8 FL    NEUTROPHILS 74 (H) 40 - 73 %    LYMPHOCYTES 7 (L) 21 - 52 %    MONOCYTES 15 (H) 3 - 10 %    EOSINOPHILS 3 0 - 5 %    BASOPHILS 1 0 - 2 %    ABS. NEUTROPHILS 4.3 1.8 - 8.0 K/UL    ABS. LYMPHOCYTES 0.4 (L) 0.9 - 3.6 K/UL    ABS. MONOCYTES 0.9 0.05 - 1.2 K/UL    ABS. EOSINOPHILS 0.2 0.0 - 0.4 K/UL    ABS. BASOPHILS 0.1 0.0 - 0.1 K/UL    DF AUTOMATED     METABOLIC PANEL, COMPREHENSIVE    Collection Time: 06/27/18 10:40 AM   Result Value Ref Range    Sodium 131 (L) 136 - 145 mmol/L    Potassium 5.1 3.5 - 5.5 mmol/L    Chloride 98 (L) 100 - 108 mmol/L    CO2 24 21 - 32 mmol/L    Anion gap 9 3.0 - 18 mmol/L    Glucose 120 (H) 74 - 99 mg/dL    BUN 41 (H) 7.0 - 18 MG/DL    Creatinine 5.88 (H) 0.6 - 1.3 MG/DL    BUN/Creatinine ratio 7 (L) 12 - 20      GFR est AA 9 (L) >60 ml/min/1.73m2    GFR est non-AA 7 (L) >60 ml/min/1.73m2    Calcium 10.7 (H) 8.5 - 10.1 MG/DL    Bilirubin, total 0.5 0.2 - 1.0 MG/DL    ALT (SGPT) <6 (L) 13 - 56 U/L    AST (SGOT) 28 15 - 37 U/L    Alk. phosphatase 246 (H) 45 - 117 U/L    Protein, total 6.9 6.4 - 8.2 g/dL    Albumin 2.4 (L) 3.4 - 5.0 g/dL    Globulin 4.5 (H) 2.0 - 4.0 g/dL    A-G Ratio 0.5 (L) 0.8 - 1.7     PROTHROMBIN TIME + INR    Collection Time: 06/27/18 10:40 AM   Result Value Ref Range    Prothrombin time 11.5 11.5 - 15.2 sec    INR 0.9 0.8 - 1.2         Radiologic Studies -   No results found. Medical Decision Making   I am the first provider for this patient. I reviewed the vital signs, available nursing notes, past medical history, past surgical history, family history and social history. Vital Signs-Reviewed the patient's vital signs.     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 She doesnt speak [place]. Family does. They report discharged from inpt stay here yesterday after Left AV Fistula graft clotted, got HD temp cath placed in R.upper chest. DC home, no complaints, was called by unknown physician told to come here for HD. On exam, thin  woman, [id number] in bed, NAD, pleasant. Exam/vitals benign. HD cath in R.chest c/d/I, nontender. Left Upper Arm AV Fistula in place, soft ecchymosis, no thrill. -CBC, CMP, Coags  -Will call Dr. Flavia Waer (Her Nephrologist) and get better story/plan       Edith Yanez MD  EM- Physician          ED Course: Progress Notes, Reevaluation, and Consults:  Consult:  Discussed care with Dr. Adriana Ibanez (nephrologist) Standard discussion; including history of patients chief complaint, available diagnostic results, and treatment course. States that he is not sure why the patient is here and she should go to outpatient. However, he will call Dr. Flavia Ware to consult about the patient and have him call us back. 10:48 AM, 6/27/2018       UPDATE 12:09 PM  -Dr. Alfreda Davis called back.  -Pt was turned away from her HD unit today because they need a letter from the Health Department saying she doesn't have Active TB before they will let her back. She had 3 neg AFBs during recent admission here but will be on PO meds per health dept for several months.  -Dr Alfreda Davis is working on getting the letter from [facility] but wants her to get HD today here in the ED while that is getting done.    -Hopefully it will be done before next scheduled HD on Friday -if not, will likely come back here again.    -Will dc from ED to go to the HD unit here, they will dc her home after HD    Gina Cosme MD  EM- Physician         Diagnosis     Clinical Impression:   1.  ESRD (end stage renal disease) ([facility] [address]        Disposition: DC home (to HD unit, then home)    Follow-up Information     Follow up With Details Comments Contact Info    Jono Fischer MD Call in 1 day  [facility] [address]      [facility] EMERGENCY DEPT  As needed, If symptoms worsen [address] [place] [id number]  [phone]           Patient's Medications   Start Taking    No medications on file   Continue Taking    ALBUTEROL (PROVENTIL HFA, VENTOLIN HFA, PROAIR HFA) 90 MCG/ACTUATION INHALER    Take 2 Puffs by inhalation every four (4) hours as needed for Wheezing. AMLODIPINE (NORVASC) 10 MG TABLET    Take 1 Tab by mouth daily. ASPIRIN 81 MG CHEWABLE TABLET    Take 1 Tab by mouth daily. ATORVASTATIN (LIPITOR) 40 MG TABLET    Take 1 Tab by mouth nightly. CARVEDILOL (COREG) 25 MG TABLET    Take 1 Tab by mouth every twelve (12) hours. CLONIDINE HCL (CATAPRES) 0.3 MG TABLET    Take 1 Tab by mouth two (2) times a day. FLUOXETINE (PROZAC) 10 MG CAPSULE    Take 1 Cap by mouth daily. GLYCERIN, ADULT, (FLEET GLYCERIN, ADULT,) SUPPOSITORY    Insert 1 Suppository into rectum daily. Indications: BOWEL EVACUATION    HYDRALAZINE (APRESOLINE) 100 MG TABLET    Take 0.5 Tabs by mouth two (2) times a day. LOSARTAN (COZAAR) 100 MG TABLET    Take 1 Tab by mouth daily. MELATONIN 3 MG TABLET    Take 1 Tab by mouth nightly as needed. These Medications have changed    No medications on file   Stop Taking    No medications on file     _______________________________    Attestations:  [address] [place] [address] acting as a scribe for and in the presence of Zane Serrano MD      June 27, 2018 at 10:23 AM       Provider Attestation:      I personally performed the services described in the documentation, reviewed the documentation, as recorded by the scribe in my presence, and it accurately and completely records my words and actions.  June 27, 2018 at 10:23 AM - Zane Serrano MD    _______________________________

## 2018-07-05 NOTE — TELEPHONE ENCOUNTER
call from Mel William in Lab, patient is  from Sputum of 6/21, +AFB at 2 weeks M Avium. Message sent to doctor.

## 2018-07-06 NOTE — TELEPHONE ENCOUNTER
Indira Peck MD   You 17 hours ago (3:33 PM)                 She is on treatment.  Will follow (Routing comment)

## 2018-07-16 PROBLEM — R56.9 SEIZURE (HCC): Status: ACTIVE | Noted: 2018-01-01

## 2018-07-16 PROBLEM — I16.1 HYPERTENSIVE EMERGENCY: Status: ACTIVE | Noted: 2018-01-01

## 2018-07-16 NOTE — ED NOTES
pts daughter was attempting to feed pt milk with a spoon. Pt was gagging. Advised her daughter not to give the pt anything by mouth at this time.

## 2018-07-16 NOTE — IP AVS SNAPSHOT
[address] [id number] Patient: Lynsey Avery MRN: [id number] :1960 About your hospitalization You were admitted on:  2018 You last received care in the:  [facility] [id number] [facility] You were discharged on:  2018 Why you were hospitalized Your primary diagnosis was:  Hypertensive Crisis Your diagnoses also included:  Intractable Vomiting With Nausea, Esrd On Hemodialysis (Hcc), Pulmonary Tb Follow-up Information Follow up With Details Comments Contact Info Enid Gutiérrez MD   [facility] [address] [address] 
[phone] Discharge Orders None A check catherine indicates which time of day the medication should be taken. My Medications START taking these medications Instructions Each Dose to Equal  
 Morning Noon Evening Bedtime  
 azithromycin 500 mg Tab Commonly known as:  Mili Brain Your last dose was: Your next dose is: Take 1 Tab by mouth every , Saturday. At 5 PM  
 500 mg  
    
   
   
   
  
 bisacodyl 5 mg EC tablet Commonly known as:  DULCOLAX Your last dose was: Your next dose is: Take 1 Tab by mouth daily as needed for Constipation. 5 mg  
    
   
   
   
  
 cloNIDine 0.3 mg/24 hr  
Commonly known as:  CATAPRES Start taking on:  2018 Your last dose was: Your next dose is:    
   
   
 1 Patch by TransDERmal route every seven (7) days. 1 Patch  
    
   
   
   
  
 docusate sodium 100 mg capsule Commonly known as:  Ltanya Buck Your last dose was: Your next dose is: Take 1 Cap by mouth two (2) times a day. 100 mg  
    
   
   
   
  
 famotidine 20 mg tablet Commonly known as:  PEPCID Start taking on:  2018 Your last dose was: Your next dose is: Take 1 Tab by mouth daily for 10 days. 20 mg  
    
   
   
   
  
 OTHER Your last dose was: Your next dose is: This is to certify that Greg Callaway was admitted to [facility] on 07/16/18 and is still receiving medical care here. OTHER Your last dose was: Your next dose is:    
   
   
 Check CBC, CMP, MG on 07/25/18, Results to PCP immediately, Diagnosis- TB  
     
   
   
   
  
 OTHER Your last dose was: Your next dose is:    
   
   
 Incentive Spirometry- use as directed OTHER Your last dose was: Your next dose is:    
   
   
 Incentive Spirometry- Use as directed  
     
   
   
   
  
 oxyCODONE-acetaminophen 5-325 mg per tablet Commonly known as:  PERCOCET Your last dose was: Your next dose is: Take 1 Tab by mouth every eight (8) hours as needed. Max Daily Amount: 3 Tabs. 1 Tab  
    
   
   
   
  
 pyridoxine (vitamin B6) 100 mg tablet Commonly known as:  VITAMIN B-6 Start taking on:  7/22/2018 Your last dose was: Your next dose is: Take 1 Tab by mouth daily. 100 mg CHANGE how you take these medications Instructions Each Dose to Equal  
 Morning Noon Evening Bedtime  
 ethambutol 400 mg tablet Commonly known as:  MYAMBUTOL What changed:   
- when to take this 
- additional instructions Your last dose was: Your next dose is: Take 2 Tabs by mouth every Tuesday Thursday, Saturday. At 5 PM  
 800 mg  
    
   
   
   
  
 hydrALAZINE 100 mg tablet Commonly known as:  APRESOLINE What changed:   
- how much to take - when to take this Your last dose was: Your next dose is: Take 1 Tab by mouth three (3) times daily. 100 mg  
    
   
   
   
  
 isoniazid 300 mg tablet Commonly known as:  NYDRAZID  
 What changed:  additional instructions Your last dose was: Your next dose is: Take 1 Tab by mouth daily. On dialysis days, please take medication after dialysis 300 mg  
    
   
   
   
  
 melatonin 3 mg tablet What changed:  reasons to take this Your last dose was: Your next dose is: Take 1 Tab by mouth nightly as needed. 3 mg  
    
   
   
   
  
 pyrazinamide 500 mg tablet Start taking on:  7/24/2018 What changed:   
- how much to take - when to take this 
- additional instructions Your last dose was: Your next dose is: Take 2 Tabs by mouth every Tuesday Thursday, Saturday. At 5 pm  Indications: active tuberculosis 1000 mg  
    
   
   
   
  
 rifAMPin 300 mg capsule Commonly known as:  RIFADIN What changed:  additional instructions Your last dose was: Your next dose is: Take 2 Caps by mouth daily. On dialysis days please take this medication after Dialysis 600 mg CONTINUE taking these medications Instructions Each Dose to Equal  
 Morning Noon Evening Bedtime  
 albuterol 90 mcg/actuation inhaler Commonly known as:  PROVENTIL HFA, VENTOLIN HFA, PROAIR HFA Your last dose was: Your next dose is: Take 2 Puffs by inhalation every four (4) hours as needed for Wheezing. 2 Puff  
    
   
   
   
  
 amLODIPine 10 mg tablet Commonly known as:  Torrie Colon Your last dose was: Your next dose is: Take 1 Tab by mouth daily. 10 mg  
    
   
   
   
  
 aspirin 81 mg chewable tablet Your last dose was: Your next dose is: Take 1 Tab by mouth daily. 81 mg  
    
   
   
   
  
 atorvastatin 40 mg tablet Commonly known as:  LIPITOR Your last dose was: Your next dose is: Take 1 Tab by mouth nightly.   
 40 mg  
    
   
   
   
  
 carvedilol 25 mg tablet Commonly known as:  Diana Lindo Your last dose was: Your next dose is: Take 1 Tab by mouth every twelve (12) hours. 25 mg FLUoxetine 10 mg capsule Commonly known as:  PROzac Your last dose was: Your next dose is: Take 1 Cap by mouth daily. 10 mg  
    
   
   
   
  
 glycerin (adult) suppository Commonly known as:  FLEET GLYCERIN (ADULT) Your last dose was: Your next dose is: Insert 1 Suppository into rectum daily. Indications: BOWEL EVACUATION  
 1 Suppository  
    
   
   
   
  
 losartan 100 mg tablet Commonly known as:  COZAAR Your last dose was: Your next dose is: Take 1 Tab by mouth daily. 100 mg  
    
   
   
   
  
  
STOP taking these medications   
 cloNIDine HCl 0.3 mg tablet Commonly known as:  CATAPRES Where to Get Your Medications Information on where to get these meds will be given to you by the nurse or doctor. ! Ask your nurse or doctor about these medications  
  albuterol 90 mcg/actuation inhaler  
 azithromycin 500 mg Tab  
 bisacodyl 5 mg EC tablet  
 cloNIDine 0.3 mg/24 hr  
 docusate sodium 100 mg capsule  
 ethambutol 400 mg tablet  
 famotidine 20 mg tablet  
 hydrALAZINE 100 mg tablet  
 isoniazid 300 mg tablet OTHER  
 OTHER  
 oxyCODONE-acetaminophen 5-325 mg per tablet  
 pyrazinamide 500 mg tablet  
 pyridoxine (vitamin B6) 100 mg tablet  
 rifAMPin 300 mg capsule Opioid Education Prescription Opioids: What You Need to Know: 
 
Prescription opioids can be used to help relieve moderate-to-severe pain and are often prescribed following a surgery or injury, or for certain health conditions. These medications can be an important part of treatment but also come with serious risks.   Opioids are strong pain medicines. Examples include hydrocodone, oxycodone, fentanyl, and morphine. Heroin is an example of an illegal opioid. It is important to work with your health care provider to make sure you are getting the safest, most effective care. WHAT ARE THE RISKS AND SIDE EFFECTS OF OPIOID USE? Prescription opioids carry serious risks of addiction and overdose, especially with prolonged use. An opioid overdose, often marked by slow breathing, can cause sudden death. The use of prescription opioids can have a number of side effects as well, even when taken as directed. · Tolerance-meaning you might need to take more of a medication for the same pain relief · Physical dependence-meaning you have symptoms of withdrawal when the medication is stopped. Withdrawal symptoms can include nausea, sweating, chills, diarrhea, stomach cramps, and muscle aches. Withdrawal can last up to several weeks, depending on which drug you took and how long you took it. · Increased sensitivity to pain · Constipation · Nausea, vomiting, and dry mouth · Sleepiness and dizziness · Confusion · Depression · Low levels of testosterone that can result in lower sex drive, energy, and strength · Itching and sweating RISKS ARE GREATER WITH:      
· History of drug misuse, substance use disorder, or overdose · Mental health conditions (such as depression or anxiety) · Sleep apnea · Older age (72 years or older) · Pregnancy Avoid alcohol while taking prescription opioids. Also, unless specifically advised by your health care provider, medications to avoid include: · Benzodiazepines (such as Xanax or Valium) · Muscle relaxants (such as Soma or Flexeril) · Hypnotics (such as Ambien or Lunesta) · Other prescription opioids KNOW YOUR OPTIONS Talk to your health care provider about ways to manage your pain that don't involve prescription opioids.   Some of these options may actually work better and have fewer risks and side effects. Options may include: 
· Pain relievers such as acetaminophen, ibuprofen, and naproxen · Some medications that are also used for depression or seizures · Physical therapy and exercise · Counseling to help patients learn how to cope better with triggers of pain and stress. · Application of heat or cold compress · Massage therapy · Relaxation techniques Be Informed Make sure you know the name of your medication, how much and how often to take it, and its potential risks & side effects. IF YOU ARE PRESCRIBED OPIOIDS FOR PAIN: 
· Never take opioids in greater amounts or more often than prescribed. Remember the goal is not to be pain-free but to manage your pain at a tolerable level. · Follow up with your primary care provider to: · Work together to create a plan on how to manage your pain. · Talk about ways to help manage your pain that don't involve prescription opioids. · Talk about any and all concerns and side effects. · Help prevent misuse and abuse. · Never sell or share prescription opioids · Help prevent misuse and abuse. · Store prescription opioids in a secure place and out of reach of others (this may include visitors, children, friends, and family). · Safely dispose of unused/unwanted prescription opioids: Find your community drug take-back program or your pharmacy mail-back program, or flush them down the toilet, following guidance from the Food and Drug Administration (www.fda.gov/Drugs/ResourcesForYou). · Visit www.cdc.gov/drugoverdose to learn about the risks of opioid abuse and overdose. · If you believe you may be struggling with addiction, tell your health care provider and ask for guidance or call [address] [place] [address] [employer] at [phone]. Discharge Instructions Acute High Blood Pressure: Care Instructions Your Care Instructions Acute high blood pressure is very high blood pressure. It's a serious problem. Very high blood pressure can damage your brain, heart, eyes, and kidneys. You may have been given medicines to lower your blood pressure. You may have gotten them as pills or through a needle in one of your veins. This is called an IV. And maybe you were given other medicines too. These can be needed when high blood pressure causes other problems. To keep your blood pressure at a lower level, you may need to make healthy lifestyle changes. And you will probably need to take medicines. Be sure to follow up with your doctor about your blood pressure and what you can do about it. Follow-up care is a key part of your treatment and safety. Be sure to make and go to all appointments, and call your doctor if you are having problems. It's also a good idea to know your test results and keep a list of the medicines you take. How can you care for yourself at home? · See your doctor as often as he or she recommends. This is to make sure your blood pressure is under control. You will probably go at least 2 times a year. But it may be more often at first. 
· Take your blood pressure medicine exactly as prescribed. You may take one or more types. They include diuretics, beta-blockers, ACE inhibitors, calcium channel blockers, and angiotensin II receptor blockers. Call your doctor if you think you are having a problem with your medicine. · If you take blood pressure medicine, talk to your doctor before you take decongestants or anti-inflammatory medicine, such as ibuprofen. These can raise blood pressure. · Learn how to check your blood pressure at home. Check it often. · Ask your doctor if it's okay to drink alcohol. · Talk to your doctor about lifestyle changes that can help blood pressure. These include being active and not smoking. When should you call for help? Call 911 anytime you think you may need emergency care.  This may mean having symptoms that suggest that your blood pressure is causing a serious heart or blood vessel problem. Your blood pressure may be over 180/110. 
 For example, call 911 if: 
  · You have symptoms of a heart attack. These may include: ¨ Chest pain or pressure, or a strange feeling in the chest. 
¨ Sweating. ¨ Shortness of breath. ¨ Nausea or vomiting. ¨ Pain, pressure, or a strange feeling in the back, neck, jaw, or upper belly or in one or both shoulders or arms. ¨ Lightheadedness or sudden weakness. ¨ A fast or irregular heartbeat.  
  · You have symptoms of a stroke. These may include: 
¨ Sudden numbness, tingling, weakness, or loss of movement in your face, arm, or leg, especially on only one side of your body. ¨ Sudden vision changes. ¨ Sudden trouble speaking. ¨ Sudden confusion or trouble understanding simple statements. ¨ Sudden problems with walking or balance. ¨ A sudden, severe headache that is different from past headaches.  
  · You have severe back or belly pain.  
 Do not wait until your blood pressure comes down on its own. Get help right away. 
 Call your doctor now or seek immediate care if: 
  · Your blood pressure is much higher than normal (such as 180/110 or higher), but you don't have symptoms.  
  · You think high blood pressure is causing symptoms, such as: ¨ Severe headache. ¨ Blurry vision.  
 Watch closely for changes in your health, and be sure to contact your doctor if: 
  · Your blood pressure measures 140/90 or higher at least 2 times. That means the top number is 140 or higher or the bottom number is 90 or higher, or both.  
  · You think you may be having side effects from your blood pressure medicine.  
  · Your blood pressure is usually normal, but it goes above normal at least 2 times. Where can you learn more? Go to http://cristino-anne.info/. Enter U426 in the search box to learn more about \"Acute High Blood Pressure: Care Instructions. \" 
 Current as of: May 10, 2017 Content Version: 11.7 © 4796-9481 [employer]. Care instructions adapted under license by [employer] (which disclaims liability or warranty for this information). If you have questions about a medical condition or this instruction, always ask your healthcare professional. [address] any warranty or liability for your use of this information. Kidney Dialysis: Care Instructions Your Care Instructions Dialysis is a process that filters wastes from the blood when your kidneys can no longer do the job. It is not a cure, but it can help you live longer and feel better. It is a lifesaving treatment when you have kidney failure. Normal kidneys work 24 hours a day to clean wastes from your blood. Your kidneys are not able to do this job, so a process called dialysis will do some of the work for your kidneys. You and your doctor will decide which type of dialysis you should have. Peritoneal dialysis uses the lining of your belly (peritoneum) to filter your blood. You can do it at home, on a daily basis. Hemodialysis uses a man-made filter called a dialyzer to clean your blood. Most people need to go to a hospital or clinic 3 days a week for several hours each time. Sometimes hemodialysis can be done at home. It is normal to have questions about your treatment, and you have a right to know what is happening to you. Learning about dialysis can help you take an active role in your treatment. Dialysis does not cure kidney disease, but it can help you live longer and feel better. You will need to follow your diet and treatment schedule carefully. Follow-up care is a key part of your treatment and safety. Be sure to make and go to all appointments, and call your doctor if you are having problems. It's also a good idea to know your test results and keep a list of the medicines you take. What do you need to know about peritoneal dialysis? Peritoneal dialysis uses the lining of your belly (or peritoneal membrane) to filter your blood. Before you can begin peritoneal dialysis, your doctor will need to place a thin tube called a catheter in your belly. This is the dialysis access. · Peritoneal dialysis can be done at home or in any clean place. You may be able to do it while you sleep. · You can do it by yourself. You do not have to rely on help from others. · You can do it at the times you choose as long as you do the right number of treatments. · It has to be done every day of the week. · Some people find it hard to do all the required steps. · It increases your chance for a serious infection of the lining of the belly (peritoneum). What do you need to know about hemodialysis? Hemodialysis uses a man-made membrane called a dialyzer to clean your blood. You are connected to the dialyzer by tubes attached to your blood vessels. Before you start hemodialysis, your doctor will create a site where the blood can flow in and out of your body during your dialysis sessions. This site is called the vascular access. It may be a fistula, made by connecting an artery and a vein. Or it may be a graft, which is a tube implanted under your skin. · Hemodialysis is done mainly by trained health workers who can watch for any problems. · It allows you to be in contact with other people having dialysis. This can help provide emotional support. · You can schedule your treatments in the evenings so you can keep working. · You may be able to do home hemodialysis, which gives you more control over your schedule. · It usually needs to be done on a set schedule 3 times a week. · It can cause side effects. The most common side effects are low blood pressure and muscle cramps. These can often be treated easily. · It requires needle sticks during every treatment, which bothers some people. Others get used to it and even do the needle sticks themselves. How can you care for yourself at home? · Be sure to have all of your dialysis sessions. Do not try to shorten or skip your sessions. You have a better chance of a longer and healthier life by getting your full treatment. · Your doctor or health care team will show you the steps you need to go through each day before, during, and after dialysis. Be sure to follow these steps. If you do not understand a step, talk to your team. 
· Your doctor and dietitian will help you design menus that follow your diet. Be sure to follow your diet guidelines. ¨ You will need to limit fluids and certain foods that contain salt (sodium), potassium, and phosphorus. ¨ You may need to follow a heart-healthy diet to keep the fat (cholesterol) in your blood under control. ¨ You may need higher levels of protein in your diet. · Your doctor may recommend certain vitamins. But do not take any other medicine, including over-the-counter medicines, vitamins, and herbal products, without talking to your doctor first. 
· Do not smoke. Smoking raises your risk of many health problems, including more kidney damage. If you need help quitting, talk to your doctor about stop-smoking programs and medicines. These can increase your chances of quitting for good. · Do not take ibuprofen (Advil, Motrin), naproxen (Aleve), or similar medicines, unless your doctor tells you to. These medicines may make kidney problems worse. When should you call for help? Call your doctor now or seek immediate medical care if: 
  · You have a fever.  
  · You are dizzy or lightheaded, or you feel like you may faint.  
  · You are confused or cannot think clearly.  
  · You have new or worse nausea or vomiting.  
  · You have new or more blood in your urine.  
  · You have new swelling.  
 Watch closely for changes in your health, and be sure to contact your doctor if: 
  · You do not get better as expected. Where can you learn more? Go to http://cristino-anne.info/. Enter F371 in the search box to learn more about \"Kidney Dialysis: Care Instructions. \" Current as of: May 12, 2017 Content Version: 11.7 © 2006-2018 [employer]. Care instructions adapted under license by [employer] (which disclaims liability or warranty for this information). If you have questions about a medical condition or this instruction, always ask your healthcare professional. [address] any warranty or liability for your use of this information. Mycobacterial Infections: Care Instructions Your Care Instructions Mycobacteria are germs that cause a wide variety of infections, including tuberculosis (TB), bone infections, abscesses, and a type of arthritis. They can infect the lungs, lymph nodes, skin, and other parts of the body. They can also infect open wounds. Mycobacteria often infect people with AIDS. Depending on where the infection is in the body, some of the symptoms are fever, weight loss, diarrhea, abscesses (pockets of pus), and cough. Follow-up care is a key part of your treatment and safety. Be sure to make and go to all appointments, and call your doctor if you are having problems. It's also a good idea to know your test results and keep a list of the medicines you take. How can you care for yourself at home? · Take your antibiotics as directed. Do not stop taking them just because you feel better. You need to take the full course of antibiotics. · You may need to take medicine for a long time, sometimes for a couple of years and sometimes for the rest of your life. It is very important that you take the medicine exactly as directed for as long as it takes to clear up your infection or keep you healthy. · Depending on where the infection is, you may need surgery. For example, abscesses can be drained. Talk to your doctor about whether surgery is right for you. When should you call for help? Call 911 anytime you think you may need emergency care. For example, call if: 
  · You have severe trouble breathing.  
 Call your doctor now or seek immediate medical care if: 
  · You are short of breath.  
  · You have a new or worse cough.  
  · You have worse symptoms of infection, such as: 
¨ Increased pain, swelling, warmth, or redness. ¨ Red streaks leading from the area. ¨ Pus draining from the area. ¨ A fever.  
  · You are dizzy or lightheaded, or you feel like you may faint.  
  · You have new or worse diarrhea.  
 Watch closely for changes in your health, and be sure to contact your doctor if: 
  · You lose weight.  
  · You have night sweats.  
  · You do not get better as expected. Where can you learn more? Go to http://cristino-anne.info/. Enter S185 in the search box to learn more about \"Mycobacterial Infections: Care Instructions. \" Current as of: November 18, 2017 Content Version: 11.7 © 4613-3599 "[employer]". Care instructions adapted under license by [employer] (which disclaims liability or warranty for this information). If you have questions about a medical condition or this instruction, always ask your healthcare professional. [place] [address] any warranty or liability for your use of this information. Low Sodium Diet (2,000 Milligram): Care Instructions Your Care Instructions Too much sodium causes your body to hold on to extra water. This can raise your blood pressure and force your heart and kidneys to work harder. In very serious cases, this could cause you to be put in the hospital. It might even be life-threatening. By limiting sodium, you will feel better and lower your risk of serious problems. The most common source of sodium is salt. People get most of the salt in their diet from canned, prepared, and packaged foods.  Fast food and restaurant meals also are very high in sodium. Your doctor will probably limit your sodium to less than 2,000 milligrams (mg) a day. This limit counts all the sodium in prepared and packaged foods and any salt you add to your food. Follow-up care is a key part of your treatment and safety. Be sure to make and go to all appointments, and call your doctor if you are having problems. It's also a good idea to know your test results and keep a list of the medicines you take. How can you care for yourself at home? Read food labels · Read labels on cans and food packages. The labels tell you how much sodium is in each serving. Make sure that you look at the serving size. If you eat more than the serving size, you have eaten more sodium. · Food labels also tell you the Percent Daily Value for sodium. Choose products with low Percent Daily Values for sodium. · Be aware that sodium can come in forms other than salt, including monosodium glutamate (MSG), sodium citrate, and sodium bicarbonate (baking soda). MSG is often added to Asian food. When you eat out, you can sometimes ask for food without MSG or added salt. Buy low-sodium foods · Buy foods that are labeled \"unsalted\" (no salt added), \"sodium-free\" (less than 5 mg of sodium per serving), or \"low-sodium\" (less than 140 mg of sodium per serving). Foods labeled \"reduced-sodium\" and \"light sodium\" may still have too much sodium. Be sure to read the label to see how much sodium you are getting. · Buy fresh vegetables, or frozen vegetables without added sauces. Buy low-sodium versions of canned vegetables, soups, and other canned goods. Prepare low-sodium meals · Cut back on the amount of salt you use in cooking. This will help you adjust to the taste. Do not add salt after cooking. One teaspoon of salt has about 2,300 mg of sodium. · Take the salt shaker off the table.  
· Flavor your food with garlic, lemon juice, onion, vinegar, herbs, and spices. Do not use soy sauce, lite soy sauce, steak sauce, onion salt, garlic salt, celery salt, mustard, or ketchup on your food. · Use low-sodium salad dressings, sauces, and ketchup. Or make your own salad dressings and sauces without adding salt. · Use less salt (or none) when recipes call for it. You can often use half the salt a recipe calls for without losing flavor. Other foods such as rice, pasta, and grains do not need added salt. · Rinse canned vegetables, and cook them in fresh water. This removes some-but not all-of the salt. · Avoid water that is naturally high in sodium or that has been treated with water softeners, which add sodium. Call your local water company to find out the sodium content of your water supply. If you buy bottled water, read the label and choose a sodium-free brand. Avoid high-sodium foods · Avoid eating: ¨ Smoked, cured, salted, and canned meat, fish, and poultry. ¨ Ham, marinelli, hot dogs, and luncheon meats. ¨ Regular, hard, and processed cheese and regular peanut butter. ¨ Crackers with salted tops, and other salted snack foods such as pretzels, chips, and salted popcorn. ¨ Frozen prepared meals, unless labeled low-sodium. ¨ Canned and dried soups, broths, and bouillon, unless labeled sodium-free or low-sodium. ¨ Canned vegetables, unless labeled sodium-free or low-sodium. ¨ Western Kimmy fries, pizza, tacos, and other fast foods. ¨ Pickles, olives, ketchup, and other condiments, especially soy sauce, unless labeled sodium-free or low-sodium. Where can you learn more? Go to http://cristino-anne.info/. Enter W136 in the search box to learn more about \"Low Sodium Diet (2,000 Milligram): Care Instructions. \" Current as of: May 12, 2017 Content Version: 11.7 © 0073-9491 [employer].  Care instructions adapted under license by [employer] (which disclaims liability or warranty for this information). If you have questions about a medical condition or this instruction, always ask your healthcare professional. [address] any warranty or liability for your use of this information. Tuberculosis (Active TB): Care Instructions Your Care Instructions Tuberculosis (TB) is a serious infection caused by bacteria. It usually occurs in the lungs, but it can spread to other parts of the body. TB spreads to other people through the air. When someone with TB breathes out or coughs, the bacteria can be breathed in by people who are nearby. You should not go to work or school while you can infect other people. Symptoms of TB include a cough and a fever. You may feel tired and weak. And you may not feel like eating. Treatment involves taking antibiotic medicines. It's very important to take your medicines as your doctor tells you to. It takes a long time to kill the TB bacteria. Treatment can last 4 to 9 months or longer. During your treatment you'll see your doctor for tests to see how the medicines are working. Your doctor will help guide you through this long process. You may have directly observed therapy (DOT). DOT ensures that you'll take the needed medicine on schedule. That's the best way to ensure you will be cured of TB. A public health official may be involved with your care. You will start to feel better after taking your medicine for a few weeks. And you may not be able to infect others at this point. But don't go back to work or school until your doctor tells you it's okay. If you live with other people, ask them to be tested for TB. A positive tuberculin skin test means that the person needs treatment to prevent TB. Follow-up care is a key part of your treatment and safety. Be sure to make and go to all appointments, and call your doctor if you are having problems.  It's also a good idea to know your test results and keep a list of the medicines you take. How can you care for yourself at home? · Take your antibiotics as directed. Do not stop taking them just because you feel better. You need to take the full course of antibiotics. · Take your medicine with food to help avoid an upset stomach. · Cover your mouth when you sneeze or cough. After coughing, throw the tissue away in a covered container. · Avoid public areas such as buses, subways, and other closed areas until you have been told that you cannot spread TB. · Talk to your doctor about drinking alcohol. Alcohol may interact with your medicine and cause side effects. · If you don't have DOT, you can do things to help remind yourself to take the medicine: ¨ Take your medicine at the same time every day. ¨ Set a reminder alarm. ¨ Use a pillbox. ¨ Put a reminder note on your mirror or refrigerator. Taz Maiers a calendar after you take your medicine. When should you call for help? Call 911 anytime you think you may need emergency care. For example, call if: 
  · You have severe trouble breathing.  
 Call your doctor now or seek immediate medical care if: 
  · You are short of breath.  
  · You have a new or worse cough.  
  · You are dizzy or lightheaded, or you feel like you may faint.  
  · You have new or worse diarrhea.  
 Watch closely for changes in your health, and be sure to contact your doctor if: 
  · You lose weight.  
  · You have night sweats.  
  · You do not get better as expected. Where can you learn more? Go to http://cristino-anne.info/. Enter J370 in the search box to learn more about \"Tuberculosis (Active TB): Care Instructions. \" Current as of: November 18, 2017 Content Version: 11.7 © 0448-0982 [employer]. Care instructions adapted under license by [employer] (which disclaims liability or warranty for this information).  If you have questions about a medical condition or this instruction, always ask your healthcare professional. [address] any warranty or liability for your use of this information. Diet for End-Stage Renal Disease (Dialysis): Care Instructions Your Care Instructions You need to change your diet when you are on dialysis for end-stage renal disease (kidney failure). You will need more protein than you did before you started dialysis. You may need to limit salt and fluids. You also may need to limit minerals such as potassium and phosphorus. A diet for end-stage renal disease takes planning. A dietitian who specializes in kidney disease can help you plan meals that meet your needs. Your nutrition needs depend on the type of dialysis you get. Talk with your doctor or dietitian to make sure your diet is right for your condition. Do not change your diet without talking to your doctor or dietitian. Follow-up care is a key part of your treatment and safety. Be sure to make and go to all appointments, and call your doctor if you are having problems. It's also a good idea to know your test results and keep a list of the medicines you take. How can you care for yourself at home? · Work with your doctor or dietitian to create a food plan that guides your daily food choices. · Do not skip meals or go for many hours without eating. If you do not feel very hungry, try to eat 4 or 5 small meals instead of 1 or 2 big meals. · If you have a hard time eating enough, talk to your doctor or dietitian about ways you can add calories to your diet. · Do not take any vitamins or minerals, supplements, or herbal products without talking to your doctor first. 
· Check with your doctor about whether it is safe for you to drink alcohol. · Check with your doctor about how much fluid you can drink each day. Drinking a lot of fluid can cause you to gain too much water weight between dialysis sessions. To get the right amount of protein · Ask your doctor or dietitian how much protein you can have each day. You will probably need more protein while you are on dialysis than you did before you started dialysis. · Choose high-quality protein sources, such as lean meat, poultry, and fish. To limit salt · Read food labels on cans and food packages. The labels tell you how much sodium is in each serving. Make sure that you look at the serving size. If you eat more than the serving size, you will get more sodium than what is listed on the label. · Do not add salt to your food. Avoid foods that list salt, sodium, or monosodium glutamate (MSG) as an ingredient. · Buy foods that are labeled \"no salt added,\" \"sodium-free,\" or \"low-sodium. \" Foods labeled \"reduced-sodium\" and \"light sodium\" may still have too much sodium. · Limit processed foods, fast food, and restaurant foods. These types of food are very high in sodium. · Avoid salted pretzels, chips, popcorn, and other salted snacks. · Avoid smoked, cured, salted, and canned meat, fish, and poultry. This includes ham, marinelli, hot dogs, and luncheon meats. · You may use lemon, herbs, and spices to flavor your meals. To limit fluids · Know how much fluid you can drink. Every day fill a pitcher with that amount of water. If you drink another fluid (such as coffee) that day, pour an equal amount out of the pitcher. · Count foods that are liquid at room temperature, such as gelatin dessert and ice cream, as fluids. To limit potassium · Choose low-potassium fruits such as applesauce, pineapple, grapes, blueberries, and raspberries. · Choose low-potassium vegetables such as lettuce, green beans, cucumber, and radishes. · Choose low-potassium foods such as hummus, spaghetti, macaroni, rice, tortillas, and bagels. · Limit or avoid high-potassium foods such as milk, bananas, oranges, cantaloupe, potatoes, spinach, tomatoes, broccoli, and sweet potatoes. · Do not use a salt substitute or lite salt unless your doctor says it is okay. Most salt substitutes and lite salts are high in potassium. To limit phosphorus · Follow your food plan to know how much milk and milk products you can have. · Avoid nuts, peanut butter, seeds, lentils, beans, organ meats, and sardines. · Avoid cola drinks. · Avoid bran breads or bran cereals. · Take phosphate binders as directed, if prescribed by your doctor. Where can you learn more? Go to http://cristino-anne.info/. Enter F750 in the search box to learn more about \"Diet for End-Stage Renal Disease (Dialysis): Care Instructions. \" Current as of: June 5, 2017 Content Version: 11.7 © 7624-2434 [employer]. Care instructions adapted under license by [employer] (which disclaims liability or warranty for this information). If you have questions about a medical condition or this instruction, always ask your healthcare professional. [place] [address] any warranty or liability for your use of this information. Patient armband removed and shredded. [employer] Activation Thank you for requesting access to [employer]. Please follow the instructions below to securely access and download your online medical record. [employer] allows you to send messages to your doctor, view your test results, renew your prescriptions, schedule appointments, and more. How Do I Sign Up? 1. In your internet browser, go to [email or web address] 
2. Click on the First Time User? Click Here link in the Sign In box. You will be redirect to the New Member Sign Up page. 3. Enter your [employer] Access Code exactly as it appears below. You will not need to use this code after youve completed the sign-up process. If you do not sign up before the expiration date, you must request a new code. [employer] Access Code: [email or web address] Expires: 2018  4:41 PM (This is the date your NullPointer access code will ) 4. Enter the last four digits of your Social Security Number (xxxx) and Date of Birth (mm/dd/yyyy) as indicated and click Submit. You will be taken to the next sign-up page. 5. Create a NullPointer ID. This will be your NullPointer login ID and cannot be changed, so think of one that is secure and easy to remember. 6. Create a NullPointer password. You can change your password at any time. 7. Enter your Password Reset Question and Answer. This can be used at a later time if you forget your password. 8. Enter your e-mail address. You will receive e-mail notification when new information is available in [address] 9. Click Sign Up. You can now view and download portions of your medical record. 10. Click the Download Summary menu link to download a portable copy of your medical information. Additional Information If you have questions, please visit the Frequently Asked Questions section of the NullPointer website at [email or web address]/. Remember, NullPointer is NOT to be used for urgent needs. For medical emergencies, dial 911. DISCHARGE SUMMARY from Nurse PATIENT INSTRUCTIONS: 
 
 
F-face looks uneven A-arms unable to move or move unevenly S-speech slurred or non-existent T-time-call 911 as soon as signs and symptoms begin-DO NOT go Back to bed or wait to see if you get better-TIME IS BRAIN. Warning Signs of HEART ATTACK Call 911 if you have these symptoms: 
? Chest discomfort.  Most heart attacks involve discomfort in the center of the chest that lasts more than a few minutes, or that goes away and comes back. It can feel like uncomfortable pressure, squeezing, fullness, or pain. ? Discomfort in other areas of the upper body. Symptoms can include pain or discomfort in one or both arms, the back, neck, jaw, or stomach. ? Shortness of breath with or without chest discomfort. ? Other signs may include breaking out in a cold sweat, nausea, or lightheadedness. Don't wait more than five minutes to call [address]! Fast action can save your life. Calling 911 is almost always the fastest way to get lifesaving treatment. Emergency Medical Services staff can begin treatment when they arrive  up to an hour sooner than if someone gets to the hospital by car. The discharge information has been reviewed with the patient. The patient verbalized understanding. Discharge medications reviewed with the patient and appropriate educational materials and side effects teaching were provided. ___________________________________________________________________________________________________________________________________ Introducing \[facility] [place] [facility]\"" & [facility] SERVICES! Rocky Maxwell introduces Movebubble patient portal. Now you can access parts of your medical record, email your doctor's office, and request medication refills online. 1. In your internet browser, go to [email or web address] 2. Click on the First Time User? Click Here link in the Sign In box. You will see the New Member Sign Up page. 3. Enter your Movebubble Access Code exactly as it appears below. You will not need to use this code after youve completed the sign-up process. If you do not sign up before the expiration date, you must request a new code. · Movebubble Access Code: [email or web address] Expires: 8/27/2018  4:41 PM 
 
4. Enter the last four digits of your Social Security Number (xxxx) and Date of Birth (mm/dd/yyyy) as indicated and click Submit. You will be taken to the next sign-up page. 5. Create a AURSOS ID. This will be your AURSOS login ID and cannot be changed, so think of one that is secure and easy to remember. 6. Create a AURSOS password. You can change your password at any time. 7. Enter your Password Reset Question and Answer. This can be used at a later time if you forget your password. 8. Enter your e-mail address. You will receive e-mail notification when new information is available in [address] 9. Click Sign Up. You can now view and download portions of your medical record. 10. Click the Download Summary menu link to download a portable copy of your medical information. If you have questions, please visit the Frequently Asked Questions section of the AURSOS website. Remember, AURSOS is NOT to be used for urgent needs. For medical emergencies, dial 911. Now available from your iPhone and Android! Introducing Eyal Leonard As a [facility] patient, I wanted to make you aware of our electronic visit tool called Eyal Farntzalmakian. [facility] 24/7 allows you to connect within minutes with a medical provider 24 hours a day, seven days a week via a mobile device or tablet or logging into a secure website from your computer. You can access Eyal Yufin from anywhere in the [place]. A virtual visit might be right for you when you have a simple condition and feel like you just dont want to get out of bed, or cant get away from work for an appointment, when your regular [facility] provider is not available (evenings, weekends or holidays), or when youre out of town and need minor care. Electronic visits cost only $49 and if the [facility] 24/7 provider determines a prescription is needed to treat your condition, one can be electronically transmitted to a nearby pharmacy*. Please take a moment to enroll today if you have not already done so. The enrollment process is free and takes just a few minutes.   To enroll, please download the [employer] 24/7 gordy to your tablet or phone, or visit [email or web address] to enroll on your computer. And, as an [address] [place] [address] patient with a [employer] account, the results of your visits will be scanned into your electronic medical record and your primary care provider will be able to view the scanned results. We urge you to continue to see your regular [employer] provider for your ongoing medical care. And while your primary care provider may not be the one available when you seek a [facility] virtual visit, the peace of mind you get from getting a real diagnosis real time can be priceless. For more information on AMENDIA, view our Frequently Asked Questions (FAQs) at [email or web address]. Sincerely, 
 
Kely Wiggins MD 
Chief Medical Officer Farrah Fabiana Davis *:  certain medications cannot be prescribed via AMENDIA Unresulted Labs-Please follow up with your PCP about these lab tests Order Current Status CULTURE, BLOOD Preliminary result CULTURE, BLOOD Preliminary result Providers Seen During Your Hospitalization Provider Specialty Primary office phone Bea Lesch, MD Emergency Medicine [phone] Emily Shankar MD Internal Medicine [phone] Montserrat Ventura MD Internal Medicine [phone] Juan Miguel Brooks MD Internal Medicine [phone] Your Primary Care Physician (PCP) Primary Care Physician Office Phone Office Fax [phone] [place] [address] [phone] You are allergic to the following Allergen Reactions Banana Other (comments) Recent Documentation Height Weight Breastfeeding? BMI OB Status Smoking Status 1.549 m 41 kg No 17.08 kg/m2 Menopause Never Smoker Emergency Contacts Name Discharge Info Relation Home Work Mobile Sahnelle Che DISCHARGE CAREGIVER [3] Spouse [3] [phone] Patient Belongings The following personal items are in your possession at time of discharge: 
  Dental Appliances: None  Visual Aid: None      Home Medications: None   Jewelry: None  Clothing: None    Other Valuables: None Discharge Instructions Attachments/References CLONIDINE (BY MOUTH) (ENGLISH) LAXATIVE, STOOL SOFTENERS (BY MOUTH) (ENGLISH) HYDRALAZINE (BY MOUTH) (ENGLISH) OXYCODONE/ACETAMINOPHEN (BY MOUTH) (ENGLISH) RIFAMPIN (BY MOUTH) (ENGLISH) PYRAZINAMIDE (BY MOUTH) (ENGLISH) DOXYLAMINE/PYRIDOXINE (BY MOUTH) (ENGLISH) Patient Handouts Clonidine (By mouth) Clonidine (Leonardo Bland) Treats high blood pressure. Also treats ADHD. Brand Name(s): Martin Hong There may be other brand names for this medicine. When This Medicine Should Not Be Used: This medicine is not right for everyone. Do not use it if you had an allergic reaction to clonidine. How to Use This Medicine:  
Long Acting Suspension, Tablet, Long Acting Tablet · Take your medicine as directed. Your dose may need to be changed several times to find what works best for you. · Swallow the extended-release tablet whole. Do not crush, break, or chew it. · Clonidine extended-release tablets work differently than clonidine immediate-release tablets. Do not switch from the extended-release tablets to the immediate-release tablets unless your doctor tells you to. · Measure the oral liquid medicine with a marked measuring spoon, oral syringe, or medicine cup. Shake the bottle well before each use. · Read and follow the patient instructions that come with this medicine. Talk to your doctor or pharmacist if you have any questions. · Missed dose: Take a dose as soon as you remember. If it is almost time for your next dose, wait until then and take a regular dose. Do not take extra medicine to make up for a missed dose. If you miss more than 1 dose of clonidine tablets, check with your doctor right away. · Store the medicine in a closed container at room temperature, away from heat, moisture, and direct light. Drugs and Foods to Avoid: Ask your doctor or pharmacist before using any other medicine, including over-the-counter medicines, vitamins, and herbal products. · Do not use this medicine together with other products that contain clonidine. · Some medicines can affect how clonidine works. Tell your doctor if you are taking depression medicine. · Tell your doctor if you use anything else that makes you sleepy. Some examples are allergy medicine, narcotic pain medicine, and alcohol. Warnings While Using This Medicine: · Tell your doctor if you are pregnant, breastfeeding, or have kidney disease, heart or blood vessel disease, heart rhythm problems, stomach or bowel problems, or a history of heart attack or stroke. · This medicine may make you drowsy, dizzy, or lightheaded. Do not drive or do anything that could be dangerous until you know how this medicine affects you. · This medicine may cause dryness of the eyes. Talk to your doctor about how to treat the dryness. · Do not stop using this medicine suddenly. Your doctor will need to slowly decrease your dose before you stop it completely. · Tell any doctor or dentist who treats you that you are using this medicine. You may need to stop using this medicine several days before you have surgery or medical tests. · Even if you feel well, do not stop using the medicine without asking your doctor first. This medicine will not cure your high blood pressure, but it will help keep it in the normal range. You may have to take blood pressure medicine for the rest of your life. · Your doctor will check your progress and the effects of this medicine at regular visits. Keep all appointments. · Keep all medicine out of the reach of children. Never share your medicine with anyone. Possible Side Effects While Using This Medicine: Call your doctor right away if you notice any of these side effects: · Allergic reaction: Itching or hives, swelling in your face or hands, swelling or tingling in your mouth or throat, chest tightness, trouble breathing · Fast, slow, pounding, or uneven heartbeat · Lightheadedness, dizziness, fainting · Swelling in your hands, ankles, or feet · Unusual tiredness or weakness If you notice these less serious side effects, talk with your doctor: · Constipation, stomach pain · Dry eyes, mouth, or throat · Mild skin rash · New or worsening headache If you notice other side effects that you think are caused by this medicine, tell your doctor. Call your doctor for medical advice about side effects. You may report side effects to FDA at 0-385-FDA-9288 © 2017 [address] Information is for End User's use only and may not be sold, redistributed or otherwise used for commercial purposes. The above information is an  only. It is not intended as medical advice for individual conditions or treatments. Talk to your doctor, nurse or pharmacist before following any medical regimen to see if it is safe and effective for you. Laxative, Stool Softeners (By mouth) Treats constipation by helping you have a bowel movement. Brand Name(s): Col-Rite, Colace, Colace Clear, DSS, Diocto, Diocto Liquid, Doc-Q-Lace, Docuprene, Docusil, Dok, Dulcolax, Fleet Sof-Lax, [facility] Docusate Calcium, [facility] Stool Softener, [facility] Stool Softener Extra Strength There may be other brand names for this medicine. When This Medicine Should Not Be Used: You should not use this medicine if you have severe stomach pain, nausea, or vomiting. Stool softeners should not be used if you have severe stomach pain and do not know the cause. How to Use This Medicine:  
Capsule, Tablet, Liquid, Liquid Filled Capsule · Your doctor will tell you how much medicine to use. Do not use more than directed. · Follow the instructions on the medicine label if you are using this medicine without a prescription. · Drink 6 to 8 glasses of water daily while using any laxative. · To make the oral liquid taste better, you may mix it with one-half glass of milk or fruit juice. · Measure the oral liquid medicine with a marked measuring spoon, oral syringe, medicine cup, or medicine dropper. If a dose is missed: · Use the missed dose as soon as possible. · If you do not remember the missed dose until the next day, skip the missed dose and go back to your regular dosing schedule. · You should not use two doses at the same time. How to Store and Dispose of This Medicine: · Store the medicine in a tightly closed container at room temperature, away from heat and moisture. Do not store liquid-filled capsules in the refrigerator. · Keep all medicine out of the reach of children. Drugs and Foods to Avoid: Ask your doctor or pharmacist before using any other medicine, including over-the-counter medicines, vitamins, and herbal products. · You should not use mineral oil while you are using a stool softener. · You should not use a stool softener within 2 hours before or after taking any other medicines. Laxatives can keep other medicines from working correctly. Warnings While Using This Medicine: · If you are pregnant or breastfeeding, talk to your doctor before taking this medicine. · Do not give laxatives to children under 10years old unless you talk to your doctor. · You should not use this laxative for longer than 1 week unless approved by your doctor. Laxatives may be habit-forming and can harm your bowels if you use them too long. · Stool softeners usually work in 1 to 2 days, but for some people, results can take as long as 3 to 5 days. Possible Side Effects While Using This Medicine: If you notice these less serious side effects, talk with your doctor: · Nausea · Sore throat · Skin rash If you notice other side effects that you think are caused by this medicine, tell your doctor. Call your doctor for medical advice about side effects. You may report side effects to FDA at 8-214-VEV-7479 © 2017 [facility] Information is for End User's use only and may not be sold, redistributed or otherwise used for commercial purposes. The above information is an  only. It is not intended as medical advice for individual conditions or treatments. Talk to your doctor, nurse or pharmacist before following any medical regimen to see if it is safe and effective for you. Hydralazine (By mouth) Hydralazine Hydrochloride (hxo-UECH-w-zeen yahir-droe-KLOR-leny) Treats high blood pressure. Brand Name(s):  
There may be other brand names for this medicine. When This Medicine Should Not Be Used: This medicine is not right for everyone. Do not use it if you had an allergic reaction to hydralazine, or if you have coronary artery disease or rheumatic heart disease. How to Use This Medicine:  
Tablet · Take your medicine as directed. Your dose may need to be changed several times to find what works best for you. · Missed dose: Take a dose as soon as you remember. If it is almost time for your next dose, wait until then and take a regular dose. Do not take extra medicine to make up for a missed dose. · Store the medicine in a closed container at room temperature, away from heat, moisture, and direct light. Drugs and Foods to Avoid: Ask your doctor or pharmacist before using any other medicine, including over-the-counter medicines, vitamins, and herbal products. · Some foods and medicines can affect how hydralazine works. Tell your doctor if you are using diazoxide or an MAO inhibitor. Warnings While Using This Medicine: · Tell your doctor if you are pregnant or breastfeeding, or if you have kidney disease, heart or blood vessel disease, heart rhythm problems, lupus, or if you had a heart attack or stroke. · This medicine may cause the following problems: 
¨ Lupus-like syndrome ¨ Changes in heart rhythm ¨ Nerve problems · This medicine may lower your blood pressure too much and cause you to feel dizzy. Do not drive or do anything else that could be dangerous until you know how this medicine affects you. · Your doctor will do lab tests at regular visits to check on the effects of this medicine. Keep all appointments. · Keep all medicine out of the reach of children. Never share your medicine with anyone. Possible Side Effects While Using This Medicine:  
Call your doctor right away if you notice any of these side effects: · Allergic reaction: Itching or hives, swelling in your face or hands, swelling or tingling in your mouth or throat, chest tightness, trouble breathing · Change in how much or how often you urinate · Chest pain that may spread to your arms, jaw, back, or neck, trouble breathing, unusual sweating, faintness · Fast, pounding, or uneven heartbeat · Fever, chills, cough, sore throat, and body aches · Lightheadedness, dizziness, or fainting · Numbness, tingling, or burning pain in your hands, arms, legs, or feet · Unusual bleeding, bruising, or weakness If you notice these less serious side effects, talk with your doctor: · Diarrhea, nausea, vomiting, loss of appetite · Headache · Stuffy nose or watery eyes If you notice other side effects that you think are caused by this medicine, tell your doctor. Call your doctor for medical advice about side effects. You may report side effects to FDA at 4-128-FDA-5306 © 2017 [address] Information is for End User's use only and may not be sold, redistributed or otherwise used for commercial purposes. The above information is an  only. It is not intended as medical advice for individual conditions or treatments. Talk to your doctor, nurse or pharmacist before following any medical regimen to see if it is safe and effective for you. Oxycodone/Acetaminophen (By mouth) Acetaminophen (u-iexs-d-MIN-oh-fen), Oxycodone Hydrochloride (ue-u-PQL-done yahir-droe-KLOR-leny) Treats moderate to moderately severe pain. This medicine is a narcotic pain reliever. Brand Name(s): Endocet, Percocet, Primlev, Xartemis XR There may be other brand names for this medicine. When This Medicine Should Not Be Used: This medicine is not right for everyone. Do not use it if you had an allergic reaction to acetaminophen or oxycodone, or if you have serious breathing problems or paralytic ileus. How to Use This Medicine:  
Capsule, Liquid, Tablet, Long Acting Tablet · Your doctor will tell you how much medicine to use. Do not use more than directed. · An overdose can be dangerous. Follow directions carefully so you do not get too much medicine at one time. · Oral liquid: Measure the oral liquid medicine with a marked measuring spoon, oral syringe, or medicine cup. · Swallow the extended-release tablet whole. Do not crush, break, or chew it. Do not lick or wet the tablet before placing it in your mouth. Do not give this medicine through a feeding tube. · This medicine should come with a Medication Guide. Ask your pharmacist for a copy if you do not have one. · Missed dose: If you miss a dose of this medicine, skip the missed dose and go back to your regular dosing schedule. Do not double doses. · Store the medicine in a closed container at room temperature, away from heat, moisture, and direct light. Ask your pharmacist about the best way to dispose of medicine you do not use. Drugs and Foods to Avoid: Ask your doctor or pharmacist before using any other medicine, including over-the-counter medicines, vitamins, and herbal products. · Do not use Xartemis XR if you are using or have used an MAO inhibitor in the past 14 days. · Some medicines can affect how this medicine works. Tell your doctor if you are using any of the following: ¨ Carbamazepine, erythromycin, ketoconazole, lamotrigine, mirtazapine, naltrexone, phenytoin, propranolol, rifampin, ritonavir, tramadol, trazodone, or zidovudine ¨ Birth control pills ¨ Diuretic (water pill) ¨ Medicine to treat depression ¨ Phenothiazine medicine ¨ Triptan medicine to treat migraine headaches · Do not drink alcohol while you are using this medicine. Acetaminophen can damage your liver, and alcohol can increase this risk. Do not take acetaminophen without asking your doctor if you have 3 or more drinks of alcohol every day. · Tell your doctor if you use anything else that makes you sleepy. Some examples are allergy medicine, narcotic pain medicine, and alcohol. Tell your doctor if you are using buprenorphine, butorphanol, nalbuphine, pentazocine, a benzodiazepine, or a muscle relaxer. Warnings While Using This Medicine: · Tell your doctor if you are pregnant or breastfeeding, or if you have kidney disease, liver disease, heart disease, low blood pressure, breathing problems or lung disease (such as asthma, COPD), thyroid problems, Leonardo disease, pancreas or gallbladder problems, prostate problems, trouble urinating, or a stomach problems, or a history of head injury or brain damage, seizures, or alcohol or drug abuse. Tell your doctor if you are allergic to codeine. · This medicine may cause the following problems: 
¨ High risk of overdose, which can lead to death ¨ Respiratory depression (serious breathing problem that can be life-threatening) ¨ Liver problems ¨ Serious skin reactions ¨ Serotonin syndrome (when used with certain medicines) · This medicine may make you dizzy or drowsy.  Do not drive or do anything that could be dangerous until you know how this medicine affects you. Sit or lie down if you feel dizzy. Stand up carefully. · This medicine contains acetaminophen. Read the labels of all other medicines you are using to see if they also contain acetaminophen, or ask your doctor or pharmacist. Meet Rios not use more than 4 grams (4,000 milligrams) total of acetaminophen in one day. · This medicine can be habit-forming. Do not use more than your prescribed dose. Call your doctor if you think your medicine is not working. · Do not stop using this medicine suddenly. Your doctor will need to slowly decrease your dose before you stop it completely. · This medicine could cause infertility. Talk with your doctor before using this medicine if you plan to have children. · This medicine may cause constipation, especially with long-term use. Ask your doctor if you should use a laxative to prevent and treat constipation. · Keep all medicine out of the reach of children. Never share your medicine with anyone. Possible Side Effects While Using This Medicine:  
Call your doctor right away if you notice any of these side effects: · Allergic reaction: Itching or hives, swelling in your face or hands, swelling or tingling in your mouth or throat, chest tightness, trouble breathing · Anxiety, restlessness, fast heartbeat, fever, muscle spasms, twitching, diarrhea, seeing or hearing things that are not there · Blistering, peeling, red skin rash · Blue lips, fingernails, or skin · Dark urine or pale stools, loss of appetite, stomach pain, yellow skin or eyes · Extreme weakness, shallow breathing, uneven heartbeat, seizures, sweating, or cold or clammy skin · Severe confusion, lightheadedness, dizziness, or fainting · Severe constipation, nausea, or vomiting · Trouble breathing or slow breathing If you notice these less serious side effects, talk with your doctor:  
· Headache · Mild constipation, nausea, or vomiting · Mild sleepiness or drowsiness If you notice other side effects that you think are caused by this medicine, tell your doctor. Call your doctor for medical advice about side effects. You may report side effects to FDA at 1-113-JPP-8821 © 2017 [facility] Information is for End User's use only and may not be sold, redistributed or otherwise used for commercial purposes. The above information is an  only. It is not intended as medical advice for individual conditions or treatments. Talk to your doctor, nurse or pharmacist before following any medical regimen to see if it is safe and effective for you. Rifampin (By mouth) Rifampin (rif-AM-pin) Treats tuberculosis (TB) and other types of infections. It is also used by patients who have a meningitis bacteria in their nose or throat who do not show symptoms of the infection. This medicine is an antibiotic. Brand Name(s): Rifadin There may be other brand names for this medicine. When This Medicine Should Not Be Used: This medicine is not right for everyone. Do not use it if you had an allergic reaction to rifampin. How to Use This Medicine:  
Capsule, Liquid, Tablet · Your doctor will tell you how much medicine to use. Do not use more than directed. · Take this medicine on an empty stomach, 1 hour before or 2 hours after a meal, and with a full of glass of water. It is important to take rifampin on a regular schedule. · Measure the oral liquid medicine with a marked measuring spoon, oral syringe, or medicine cup. Shake the oral liquid well before using. · Take all of the medicine in your prescription to clear up your infection, even if you feel better after the first few doses. · Missed dose: Take a dose as soon as you remember. If it is almost time for your next dose, wait until then and take a regular dose. Do not take extra medicine to make up for a missed dose. · Store the medicine in a closed container at room temperature, away from heat, moisture, and direct light. You may store the mixed oral liquid at room temperature or in a refrigerator for 4 weeks. Drugs and Foods to Avoid: Ask your doctor or pharmacist before using any other medicine, including over-the-counter medicines, vitamins, and herbal products. · Do not use this medicine if you also take medicine to treat HIV/AIDS, including atazanavir, darunavir, fosamprenavir, ritonavir, saquinavir, and tipranavir. · Some medicines can affect how rifampin works. Tell your doctor if you are using any of the following: ¨ Atovaquone, clofibrate, cotrimoxazole, cyclosporine, dapsone, diazepam, digitoxin, digoxin, enalapril, haloperidol, halothane, levothyroxine, methadone, probenecid, quinine, sulfapyridine, sulfasalazine, tacrolimus, theophylline, or zidovudine ¨ Birth control pills ¨ Blood pressure medicine (including beta blockers, calcium channel blockers) ¨ Blood thinner (including warfarin) ¨ Medicine for heart rhythm problems (including disopyramide, mexiletine, quinidine, tocainide) ¨ Medicine for seizures (including phenytoin) ¨ Medicine to treat depression (including TCA) ¨ Medicine to treat diabetes ¨ Medicine to treat infections (including chloramphenicol, ciprofloxacin, clarithromycin, doxycycline, fluconazole, isoniazid, itraconazole, ketoconazole) ¨ Narcotic pain reliever Tiffany Mom Steroid medicine · If you use an antacid, take it at least 1 hour after you use rifampin. · Do not drink alcohol while you are using this medicine. Warnings While Using This Medicine: · Tell your doctor if you are pregnant or breastfeeding, or if you have kidney disease, liver disease, diabetes, or porphyria (an enzyme problem). Tell your doctor if you drink alcohol. · This medicine may cause the following problems: ¨ Drug reaction with eosinophilia and systemic symptoms (DRESS), which can damage organs, including the liver, kidney, or heart ¨ Liver damage · This medicine may turn your urine, bowel movements, saliva, sweat, and tears red. This is normal. This side effect could stain contact lenses. · This medicine may make you bleed, bruise, or get infections more easily. Take precautions to prevent illness and injury. Wash your hands often. · Tell any doctor or dentist who treats you that you are using this medicine. This medicine may affect certain medical test results. · Call your doctor if your symptoms do not improve or if they get worse. · Your doctor will do lab tests at regular visits to check on the effects of this medicine. Keep all appointments. · Keep all medicine out of the reach of children. Never share your medicine with anyone. Possible Side Effects While Using This Medicine:  
Call your doctor right away if you notice any of these side effects: · Allergic reaction: Itching or hives, swelling in your face or hands, swelling or tingling in your mouth or throat, chest tightness, trouble breathing · Blistering, peeling, red skin rash · Blurred vision · Dark urine or pale stools, nausea, vomiting, loss of appetite, stomach pain, yellow skin or eyes · Fever, chills, cough, sore throat, body aches · Numbness, pain, or tingling in your arms or legs · Unusual bleeding, bruising, or weakness If you notice these less serious side effects, talk with your doctor:  
· Headache, dizziness · Mild nausea, vomiting, diarrhea, stomach pain If you notice other side effects that you think are caused by this medicine, tell your doctor. Call your doctor for medical advice about side effects. You may report side effects to FDA at 2-290-FDA-6786 © 2017 [facility] Information is for End User's use only and may not be sold, redistributed or otherwise used for commercial purposes. The above information is an  only.  It is not intended as medical advice for individual conditions or treatments. Talk to your doctor, nurse or pharmacist before following any medical regimen to see if it is safe and effective for you. Pyrazinamide (By mouth) Pyrazinamide (pir-a-ZIN-a-mide) Treats tuberculosis (TB), usually in combination with other TB drugs. Brand Name(s):  
There may be other brand names for this medicine. When This Medicine Should Not Be Used: You should not use this medicine if you have had an allergic reaction to pyrazinamide, ethionamide, isoniazid (INH) or niacin (nicotinic acid, Nicobid®). How to Use This Medicine:  
Tablet · Your doctor will tell you how much to take and how often. · Take this medicine exactly as your doctor ordered. You must take this medicine for as long as your doctor ordered. Even if you feel better, do not stop taking this medicine. · May be taken with or without food or milk. If a dose is missed: · Take the missed dose as soon as you remember. · Skip the missed dose if it is almost time for your next regular dose. · You should not use two doses at the same time. How to Store and Dispose of This Medicine: · Store at room temperature, away from heat, moisture, and direct light. · Keep all medicine out of the reach of children. Drugs and Foods to Avoid: Ask your doctor or pharmacist before using any other medicine, including over-the-counter medicines, vitamins, and herbal products. Warnings While Using This Medicine: · Check with your doctor before taking this medicine if you have diabetes, gout or liver disease. · Talk with your doctor before taking this medicine if you are pregnant or breastfeeding. Possible Side Effects While Using This Medicine:  
Call your doctor right away if you notice any of these side effects: 
· Skin rash, severe itching or hives · Pain or swelling in the joints · Yellowing of the skin or eyes · Fever or chills · Dark urine If you notice these less serious side effects, talk with your doctor: · Nausea or vomiting · Unusual tiredness or loss of appetite If you notice other side effects that you think are caused by this medicine, tell your doctor. Call your doctor for medical advice about side effects. You may report side effects to FDA at 1-942-FDA-4618 © 2017 [address] Information is for End User's use only and may not be sold, redistributed or otherwise used for commercial purposes. The above information is an  only. It is not intended as medical advice for individual conditions or treatments. Talk to your doctor, nurse or pharmacist before following any medical regimen to see if it is safe and effective for you. Doxylamine/Pyridoxine (By mouth) Doxylamine Succinate (ltz-BY-q-meen SUX-i-jenaro), Pyridoxine Hydrochloride (cus-o-MPK-een yahir-droe-KLOR-leny) Treats nausea and vomiting during pregnancy (also called morning sickness). Brand Name(s): Diclegis There may be other brand names for this medicine. When This Medicine Should Not Be Used: This medicine is not right for everyone. Do not use it if you had an allergic reaction to doxylamine, pyridoxine, or similar medicines, or if you are breastfeeding. How to Use This Medicine:  
Delayed Release Tablet, Long Acting Tablet · Take your medicine as directed. Your dose may need to be changed several times to find what works best for you. · It is best to take this medicine on an empty stomach. · Swallow the tablet whole with a glass of water. Do not crush, break, or chew it. · Read and follow the patient instructions that come with this medicine. Talk to your doctor or pharmacist if you have any questions. · Missed dose: Take a dose as soon as you remember. If it is almost time for your next dose, wait until then and take a regular dose. Do not take extra medicine to make up for a missed dose. · Store the medicine in a closed container at room temperature, away from heat, moisture, and direct light. Drugs and Foods to Avoid: Ask your doctor or pharmacist before using any other medicine, including over-the-counter medicines, vitamins, and herbal products. · Do not use this medicine together with an MAO inhibitor (MAOI). · Tell your doctor if you use anything else that makes you sleepy. Some examples are allergy medicine, narcotic pain medicine, and alcohol. Warnings While Using This Medicine: · Tell your doctor if you have asthma, glaucoma, trouble urinating, or a stomach ulcer or other problems with your digestive system. · This medicine may make you drowsy or sleepy. Do not drive or do anything else that could be dangerous until you know how this medicine affects you. · Call your doctor if your symptoms do not improve or if they get worse. · Your doctor will check your progress and the effects of this medicine at regular visits. Keep all appointments. · Keep all medicine out of the reach of children. Never share your medicine with anyone. Possible Side Effects While Using This Medicine:  
Call your doctor right away if you notice any of these side effects: · Allergic reaction: Itching or hives, swelling in your face or hands, swelling or tingling in your mouth or throat, chest tightness, trouble breathing · Severe dizziness, lightheadedness, or sleepiness If you notice other side effects that you think are caused by this medicine, tell your doctor. Call your doctor for medical advice about side effects. You may report side effects to FDA at 8-270-FDA-2062 © 2017 [employer] Information is for End User's use only and may not be sold, redistributed or otherwise used for commercial purposes. The above information is an  only. It is not intended as medical advice for individual conditions or treatments.  Talk to your doctor, nurse or pharmacist before following any medical regimen to see if it is safe and effective for you. Please provide this summary of care documentation to your next provider. Signatures-by signing, you are acknowledging that this After Visit Summary has been reviewed with you and you have received a copy. Patient Signature:  ____________________________________________________________ Date:  ____________________________________________________________  
  
Tonya Moulds Provider Signature:  ____________________________________________________________ Date:  ____________________________________________________________

## 2018-07-16 NOTE — ED PROVIDER NOTES
EMERGENCY DEPARTMENT HISTORY AND PHYSICAL EXAM    11:44 AM      Date: 7/16/2018  Patient Name: Luis Ureña    History of Presenting Illness     Chief Complaint   Patient presents with    Chest Pain    Vomiting         History Provided By: Patient's Son    Chief Complaint: Vomiting   Duration: 1 Days  Timing:  Acute and Constant  Quality: multiple episodes   Severity: Moderate  Modifying Factors: nothing makes the Sx better or worse   Associated Symptoms: chest pain, change in appetite       Additional History (Context): Luis Ureña is a 56 y.o. female with PMHx of kidney disease, ESRD, HTN who presents via EMS with an acute onset of moderate vomiting onset x 1 day ago. The vomiting is now constant and pt has had multiple episodes. Nothing makes the Sx better or worse. Associated Sx include chest pain and change in appetite. No medication was taken today and pt vomited her medication x 1 day ago. She is a dialysis patient and was supposed to have dialysis today however she started to experience the chest pain. Her last dialysis was x 3 days ago. Per son, pt has had similar Sx in the past and was hospitalized. Pt only speaks [place] and she is nonverbal at bedside. No further information was able to be obtained due the pt being nonverbal. No other complaints or Sx noted at the moment. PCP: Ancelmo Sellers MD    Current Facility-Administered Medications   Medication Dose Route Frequency Provider Last Rate Last Dose    sodium chloride (NS) flush 5-10 mL  5-10 mL IntraVENous PRN Joshua Traore MD        nitroPRUSSide (NIPRIDE) 50 mg in dextrose 5% 250 mL infusion  0-10 mcg/kg/min IntraVENous TITRATE Joshua Traore MD 49.4 mL/hr at 07/16/18 1404 4 mcg/kg/min at 07/16/18 1404     Current Outpatient Prescriptions   Medication Sig Dispense Refill    amLODIPine (NORVASC) 10 mg tablet Take 1 Tab by mouth daily. 30 Tab 0    aspirin 81 mg chewable tablet Take 1 Tab by mouth daily.  30 Tab 0    atorvastatin (LIPITOR) 40 mg tablet Take 1 Tab by mouth nightly. 30 Tab 0    carvedilol (COREG) 25 mg tablet Take 1 Tab by mouth every twelve (12) hours. 60 Tab 0    cloNIDine HCl (CATAPRES) 0.3 mg tablet Take 1 Tab by mouth two (2) times a day. 60 Tab 0    losartan (COZAAR) 100 mg tablet Take 1 Tab by mouth daily. 30 Tab 0    hydrALAZINE (APRESOLINE) 100 mg tablet Take 0.5 Tabs by mouth two (2) times a day. 60 Tab 0    melatonin 3 mg tablet Take 1 Tab by mouth nightly as needed. (Patient taking differently: Take 1 Tab by mouth nightly as needed (insomnia). ) 30 Tab 0    FLUoxetine (PROZAC) 10 mg capsule Take 1 Cap by mouth daily. 30 Cap 0    glycerin, adult, (FLEET GLYCERIN, ADULT,) suppository Insert 1 Suppository into rectum daily. Indications: BOWEL EVACUATION 10 Suppository 0    albuterol (PROVENTIL HFA, VENTOLIN HFA, PROAIR HFA) 90 mcg/actuation inhaler Take 2 Puffs by inhalation every four (4) hours as needed for Wheezing. 1 Inhaler 0       Past History     Past Medical History:  Past Medical History:   Diagnosis Date    Chronic kidney disease     [facility]     TUES-THURS-SAT    Hypercholesteremia     Hypertension     Kidney disease     Vitamin D deficiency        Past Surgical History:  Past Surgical History:   Procedure Laterality Date    VASCULAR SURGERY PROCEDURE UNLIST         Family History:  History reviewed. No pertinent family history. Social History:  Social History   Substance Use Topics    Smoking status: Never Smoker    Smokeless tobacco: Never Used    Alcohol use No       Allergies:   Allergies   Allergen Reactions    Banana Other (comments)         Review of Systems       Review of Systems   Unable to perform ROS: Patient nonverbal         Physical Exam     Visit Vitals    /90    Pulse (!) 120    Temp 97.4 °F (36.3 °C)    Resp 23    Ht 5' 1\" (1.549 m)    Wt 40.8 kg (90 lb)    SpO2 98%    BMI 17.01 kg/m2         Physical Exam   Constitutional: She is oriented to person, place, and time. She appears well-developed and well-nourished. HENT:   Head: Normocephalic and atraumatic. Mouth/Throat: Oropharynx is clear and moist.   Eyes: Conjunctivae are normal. Pupils are equal, round, and reactive to light. No scleral icterus. Neck: Normal range of motion. Neck supple. No JVD present. No tracheal deviation present. Cardiovascular: Normal rate, regular rhythm and normal heart sounds. Pulmonary/Chest: Effort normal and breath sounds normal. No respiratory distress. She has no wheezes. Abdominal: Soft. Bowel sounds are normal.   Musculoskeletal: Normal range of motion. Neurological: She is alert and oriented to person, place, and time. She has normal strength. Gait normal. GCS eye subscore is 4. GCS verbal subscore is 5. GCS motor subscore is 6. Skin: Skin is warm and dry. Psychiatric: She has a normal mood and affect. Nursing note and vitals reviewed. Diagnostic Study Results     Labs -  Recent Results (from the past 12 hour(s))   EKG, 12 LEAD, INITIAL    Collection Time: 07/16/18 11:42 AM   Result Value Ref Range    Ventricular Rate 114 BPM    Atrial Rate 114 BPM    P-R Interval 152 ms    QRS Duration 84 ms    Q-T Interval 330 ms    QTC Calculation (Bezet) 454 ms    Calculated P Axis 79 degrees    Calculated R Axis 103 degrees    Calculated T Axis 64 degrees    Diagnosis       Sinus tachycardia  Possible Left atrial enlargement  Rightward axis  Poor R Wave Progression    Abnormal ECG  When compared with ECG of 27-JUN-2018 10:28,  Vent.  rate has increased BY  39 BPM  Confirmed by Cynthia Saxena ([id number]) on 7/16/2018 26:08:98 PM     METABOLIC PANEL, COMPREHENSIVE    Collection Time: 07/16/18 11:50 AM   Result Value Ref Range    Sodium 130 (L) 136 - 145 mmol/L    Potassium 4.9 3.5 - 5.5 mmol/L    Chloride 92 (L) 100 - 108 mmol/L    CO2 24 21 - 32 mmol/L    Anion gap 14 3.0 - 18 mmol/L    Glucose 131 (H) 74 - 99 mg/dL    BUN 37 (H) 7.0 - 18 MG/DL    Creatinine 7.32 (H) 0.6 - 1.3 MG/DL    BUN/Creatinine ratio 5 (L) 12 - 20      GFR est AA 7 (L) >60 ml/min/1.73m2    GFR est non-AA 6 (L) >60 ml/min/1.73m2    Calcium 9.9 8.5 - 10.1 MG/DL    Bilirubin, total 0.8 0.2 - 1.0 MG/DL    ALT (SGPT) 13 13 - 56 U/L    AST (SGOT) 82 (H) 15 - 37 U/L    Alk. phosphatase 208 (H) 45 - 117 U/L    Protein, total 8.1 6.4 - 8.2 g/dL    Albumin 3.1 (L) 3.4 - 5.0 g/dL    Globulin 5.0 (H) 2.0 - 4.0 g/dL    A-G Ratio 0.6 (L) 0.8 - 1.7     CBC WITH AUTOMATED DIFF    Collection Time: 07/16/18 11:50 AM   Result Value Ref Range    WBC 11.1 4.6 - 13.2 K/uL    RBC 5.41 (H) 4.20 - 5.30 M/uL    HGB 16.1 (H) 12.0 - 16.0 g/dL    HCT 49.7 (H) 35.0 - 45.0 %    MCV 91.9 74.0 - 97.0 FL    MCH 29.8 24.0 - 34.0 PG    MCHC 32.4 31.0 - 37.0 g/dL    RDW 18.9 (H) 11.6 - 14.5 %    PLATELET 723 765 - 067 K/uL    MPV 9.1 (L) 9.2 - 11.8 FL    NEUTROPHILS 85 (H) 40 - 73 %    LYMPHOCYTES 7 (L) 21 - 52 %    MONOCYTES 7 3 - 10 %    EOSINOPHILS 0 0 - 5 %    BASOPHILS 1 0 - 2 %    ABS. NEUTROPHILS 9.4 (H) 1.8 - 8.0 K/UL    ABS. LYMPHOCYTES 0.8 (L) 0.9 - 3.6 K/UL    ABS. MONOCYTES 0.8 0.05 - 1.2 K/UL    ABS. EOSINOPHILS 0.0 0.0 - 0.4 K/UL    ABS. BASOPHILS 0.1 0.0 - 0.1 K/UL    DF AUTOMATED     TROPONIN I    Collection Time: 07/16/18 11:50 AM   Result Value Ref Range    Troponin-I, Qt. 0.20 (H) 0.00 - 0.06 NG/ML   POC LACTIC ACID    Collection Time: 07/16/18 11:55 AM   Result Value Ref Range    Lactic Acid (POC) 4.0 (HH) 0.4 - 2.0 mmol/L       Radiologic Studies -   CT HEAD WO CONT   Final Result      XR CHEST PORT   Final Result       CT Head  IMPRESSION:  1. No acute intracranial process identified. If continued clinical concern for  acute ischemia, consider MR for further evaluation.      2.  Mild parenchymal volume loss and moderate chronic small vessel ischemic  changes similar to prior imaging. CXR  Impression:  1. Small bilateral pleural effusions.     2.   Improved aeration and expansion of the lungs with mild bibasilar opacities. Medical Decision Making   I am the first provider for this patient. I reviewed the vital signs, available nursing notes, past medical history, past surgical history, family history and social history. Vital Signs-Reviewed the patient's vital signs. Pulse Oximetry Analysis -  96 % on RA, normal     EKG: Interpreted by the EP. Time Interpreted: 11:42 AM    Rate: 114 bpm    Rhythm: Sinus Tachycardia    Interpretation: no acute changes     Records Reviewed: Nursing Notes (Time of Review: 11:44 AM)    ED Course: Progress Notes, Reevaluation, and Consults:    2:35 PM Consult: I discussed care with Dr. Cate Ventura (Hospitalist). It was a standard discussion including patient history, chief complaint, available diagnostic results, and predicted treatment course. Agrees to accept pt. Critical Care Time:  The services I provided to this patient were to treat and/or prevent clinically significant deterioration that could result in the failure of one or more body systems and/or organ systems due to hypertensive emergency. Services included the following:  -reviewing nursing notes and old charts  -vital sign assessments  -direct patient care  -medication orders and management  -interpreting and reviewing diagnostic studies/labs  -re-evaluations  -documentation time    Aggregate critical care time was 90 minutes, which includes only time during which I was engaged in work directly related to the patient's care as described above, whether I was at bedside or elsewhere in the Emergency Department. It did not include time spent performing other reported procedures or the services of residents, students, nurses, or advance practice providers. Kojo Pablo MD    3:03 PM      Provider Notes (Medical Decision Making):     Diagnosis     Clinical Impression:   1. Hypertensive emergency    2. Seizure [facility])        Disposition: admitted.      Follow-up Information     None Patient's Medications   Start Taking    No medications on file   Continue Taking    ALBUTEROL (PROVENTIL HFA, VENTOLIN HFA, PROAIR HFA) 90 MCG/ACTUATION INHALER    Take 2 Puffs by inhalation every four (4) hours as needed for Wheezing. AMLODIPINE (NORVASC) 10 MG TABLET    Take 1 Tab by mouth daily. ASPIRIN 81 MG CHEWABLE TABLET    Take 1 Tab by mouth daily. ATORVASTATIN (LIPITOR) 40 MG TABLET    Take 1 Tab by mouth nightly. CARVEDILOL (COREG) 25 MG TABLET    Take 1 Tab by mouth every twelve (12) hours. CLONIDINE HCL (CATAPRES) 0.3 MG TABLET    Take 1 Tab by mouth two (2) times a day. FLUOXETINE (PROZAC) 10 MG CAPSULE    Take 1 Cap by mouth daily. GLYCERIN, ADULT, (FLEET GLYCERIN, ADULT,) SUPPOSITORY    Insert 1 Suppository into rectum daily. Indications: BOWEL EVACUATION    HYDRALAZINE (APRESOLINE) 100 MG TABLET    Take 0.5 Tabs by mouth two (2) times a day. LOSARTAN (COZAAR) 100 MG TABLET    Take 1 Tab by mouth daily. MELATONIN 3 MG TABLET    Take 1 Tab by mouth nightly as needed. These Medications have changed    No medications on file   Stop Taking    No medications on file     _______________________________    Attestations:  Ray Lee (Aj) acting as a scribe for and in the presence of Kang Rosenbaum MD      July 16, 2018 at 11:44 AM       Provider Attestation:      I personally performed the services described in the documentation, reviewed the documentation, as recorded by the scribe in my presence, and it accurately and completely records my words and actions. July 16, 2018 at 11:44 AM - Kang Rosenbaum MD    _______________________________  Pt has not taken her BP meds in 48 hours, has developed hypertensive emergency with seizure. Has responded well to Ativan and Nipride drip. I've spoken with hospitalist and intensivist at [facility] and they agree with admit to ICU. Pt is currently post-ictal but awake and responds to voice.  Has been clinically stabilized and now preparing for transfer to [facility]. Family here and they understand and agree with this plan. Darcy Lee (Aj)  3:01 PM  Re-asessment: Pt's lactate has improved with ED care. Vital signs are stable and improved. I do not feel pt is septic, her sx are consistent with Hypertensive Emergency.   Cheryl Leung MD  3:26 PM

## 2018-07-16 NOTE — ED TRIAGE NOTES
Pt arrived via EMS with son at bedside. Pt only speaks [place]. Pt's son is interpreting. Pt has been vomiting since yesterday & c/o chest pain this morning prior to going to dialysis. Last dialysis was on Friday. Pt is not answering any questions. Pt has not ambulated for 3 days.

## 2018-07-16 NOTE — IP AVS SNAPSHOT
Javy Casanova 
 
 
 [address] Patient: Efrain Hyman MRN: [id number] :1960 A check catherine indicates which time of day the medication should be taken. My Medications START taking these medications Instructions Each Dose to Equal  
 Morning Noon Evening Bedtime  
 azithromycin 500 mg Tab Commonly known as:  Amie Rosenbaum Your last dose was: Your next dose is: Take 1 Tab by mouth every , Saturday. At 5 PM  
 500 mg  
    
   
   
   
  
 bisacodyl 5 mg EC tablet Commonly known as:  DULCOLAX Your last dose was: Your next dose is: Take 1 Tab by mouth daily as needed for Constipation. 5 mg  
    
   
   
   
  
 cloNIDine 0.3 mg/24 hr  
Commonly known as:  CATAPRES Start taking on:  2018 Your last dose was: Your next dose is:    
   
   
 1 Patch by TransDERmal route every seven (7) days. 1 Patch  
    
   
   
   
  
 docusate sodium 100 mg capsule Commonly known as:  Maretta Barreto Your last dose was: Your next dose is: Take 1 Cap by mouth two (2) times a day. 100 mg  
    
   
   
   
  
 famotidine 20 mg tablet Commonly known as:  PEPCID Start taking on:  2018 Your last dose was: Your next dose is: Take 1 Tab by mouth daily for 10 days. 20 mg  
    
   
   
   
  
 OTHER Your last dose was: Your next dose is: This is to certify that Bela Waller was admitted to [facility] on 18 and is still receiving medical care here. OTHER Your last dose was: Your next dose is:    
   
   
 Check CBC, CMP, MG on 18, Results to PCP immediately, Diagnosis- TB  
     
   
   
   
  
 OTHER Your last dose was: Your next dose is:    
   
   
 Incentive Spirometry- use as directed OTHER Your last dose was: Your next dose is:    
   
   
 Incentive Spirometry- Use as directed  
     
   
   
   
  
 oxyCODONE-acetaminophen 5-325 mg per tablet Commonly known as:  PERCOCET Your last dose was: Your next dose is: Take 1 Tab by mouth every eight (8) hours as needed. Max Daily Amount: 3 Tabs. 1 Tab  
    
   
   
   
  
 pyridoxine (vitamin B6) 100 mg tablet Commonly known as:  VITAMIN B-6 Start taking on:  7/22/2018 Your last dose was: Your next dose is: Take 1 Tab by mouth daily. 100 mg CHANGE how you take these medications Instructions Each Dose to Equal  
 Morning Noon Evening Bedtime  
 ethambutol 400 mg tablet Commonly known as:  MYAMBUTOL What changed:   
- when to take this 
- additional instructions Your last dose was: Your next dose is: Take 2 Tabs by mouth every Tuesday Thursday, Saturday. At 5 PM  
 800 mg  
    
   
   
   
  
 hydrALAZINE 100 mg tablet Commonly known as:  APRESOLINE What changed:   
- how much to take - when to take this Your last dose was: Your next dose is: Take 1 Tab by mouth three (3) times daily. 100 mg  
    
   
   
   
  
 isoniazid 300 mg tablet Commonly known as:  NYDRAZID What changed:  additional instructions Your last dose was: Your next dose is: Take 1 Tab by mouth daily. On dialysis days, please take medication after dialysis 300 mg  
    
   
   
   
  
 melatonin 3 mg tablet What changed:  reasons to take this Your last dose was: Your next dose is: Take 1 Tab by mouth nightly as needed. 3 mg  
    
   
   
   
  
 pyrazinamide 500 mg tablet Start taking on:  7/24/2018 What changed:   
- how much to take - when to take this 
- additional instructions Your last dose was: Your next dose is: Take 2 Tabs by mouth every Tuesday Thursday, Saturday. At 5 pm  Indications: active tuberculosis 1000 mg  
    
   
   
   
  
 rifAMPin 300 mg capsule Commonly known as:  RIFADIN What changed:  additional instructions Your last dose was: Your next dose is: Take 2 Caps by mouth daily. On dialysis days please take this medication after Dialysis 600 mg CONTINUE taking these medications Instructions Each Dose to Equal  
 Morning Noon Evening Bedtime  
 albuterol 90 mcg/actuation inhaler Commonly known as:  PROVENTIL HFA, VENTOLIN HFA, PROAIR HFA Your last dose was: Your next dose is: Take 2 Puffs by inhalation every four (4) hours as needed for Wheezing. 2 Puff  
    
   
   
   
  
 amLODIPine 10 mg tablet Commonly known as:  Marnie Eden Your last dose was: Your next dose is: Take 1 Tab by mouth daily. 10 mg  
    
   
   
   
  
 aspirin 81 mg chewable tablet Your last dose was: Your next dose is: Take 1 Tab by mouth daily. 81 mg  
    
   
   
   
  
 atorvastatin 40 mg tablet Commonly known as:  LIPITOR Your last dose was: Your next dose is: Take 1 Tab by mouth nightly. 40 mg  
    
   
   
   
  
 carvedilol 25 mg tablet Commonly known as:  Sharolyn Marie Your last dose was: Your next dose is: Take 1 Tab by mouth every twelve (12) hours. 25 mg FLUoxetine 10 mg capsule Commonly known as:  PROzac Your last dose was: Your next dose is: Take 1 Cap by mouth daily. 10 mg  
    
   
   
   
  
 glycerin (adult) suppository Commonly known as:  FLEET GLYCERIN (ADULT) Your last dose was: Your next dose is: Insert 1 Suppository into rectum daily. Indications: BOWEL EVACUATION  
 1 Suppository losartan 100 mg tablet Commonly known as:  COZAAR Your last dose was: Your next dose is: Take 1 Tab by mouth daily. 100 mg  
    
   
   
   
  
  
STOP taking these medications   
 cloNIDine HCl 0.3 mg tablet Commonly known as:  CATAPRES Where to Get Your Medications Information on where to get these meds will be given to you by the nurse or doctor. ! Ask your nurse or doctor about these medications  
  albuterol 90 mcg/actuation inhaler  
 azithromycin 500 mg Tab  
 bisacodyl 5 mg EC tablet  
 cloNIDine 0.3 mg/24 hr  
 docusate sodium 100 mg capsule  
 ethambutol 400 mg tablet  
 famotidine 20 mg tablet  
 hydrALAZINE 100 mg tablet  
 isoniazid 300 mg tablet OTHER  
 OTHER  
 oxyCODONE-acetaminophen 5-325 mg per tablet  
 pyrazinamide 500 mg tablet  
 pyridoxine (vitamin B6) 100 mg tablet  
 rifAMPin 300 mg capsule

## 2018-07-16 NOTE — ED NOTES
Called to pt's room by her daughter. Pt actively seizing. O2 by non-rebreather applied. Pt suctioned for bloody mucous.  Dr. Loy Nunez at bedside

## 2018-07-17 PROBLEM — R11.2 INTRACTABLE VOMITING WITH NAUSEA: Status: ACTIVE | Noted: 2018-01-01

## 2018-07-17 PROBLEM — N18.6 ESRD ON HEMODIALYSIS (HCC): Status: ACTIVE | Noted: 2018-01-01

## 2018-07-17 PROBLEM — I16.9 HYPERTENSIVE CRISIS: Status: ACTIVE | Noted: 2018-01-01

## 2018-07-17 PROBLEM — Z99.2 ESRD ON HEMODIALYSIS (HCC): Status: ACTIVE | Noted: 2018-01-01

## 2018-07-17 PROBLEM — A15.0 PULMONARY TB: Status: ACTIVE | Noted: 2018-01-01

## 2018-07-17 NOTE — PROGRESS NOTES
[facility] Hospitalist Group  Progress Note    Patient: Don Perdue Age: 56 y.o. : 1960 MR#: [id number] SSN: [id number]  Date: 2018     Subjective:     Patient seen with son at bedside. Denies any nausea now, taking oral medications at time of visit with c/o distaste. Denies pain with swallowing. Denies SOB/chest pain. Assessment/Plan:   1. Hypertensive emergency - cont current meds including nitro drip; goal of -150  2. Seizure episode: neuro eval noted. Agree with EEG  3. MAC in sputum Cx: plan per ID  4. ESRD on HD - on MWF schedule; HD per nephrology   5. H/o Pulmonary TB - ID following, cont meds per ID  6. Hyperlipidemia - cont statin  7. Unintended weight loss and underweight - as evidenced by 13 lb wt loss in 6 months.   Nutrition following, cont supplements       Case discussed with:  [x]Patient  [x]Family  [x]Nursing  []Case Management  DVT Prophylaxis:  []Lovenox  [x]Hep SQ  []SCDs  []Coumadin   []On Heparin gtt    Objective:   VS:   Visit Vitals    BP (!) 169/98    Pulse 80    Temp 97.6 °F (36.4 °C)    Resp 26    Ht 5' 1\" (1.549 m)    Wt 64.6 kg (142 lb 6.7 oz)    SpO2 100%    Breastfeeding No    BMI 26.91 kg/m2      Tmax/24hrs: Temp (24hrs), Av.9 °F (36.6 °C), Min:97.5 °F (36.4 °C), Max:98.3 °F (36.8 °C)    Intake/Output Summary (Last 24 hours) at 18 1741  Last data filed at 18 1700   Gross per 24 hour   Intake          1692.35 ml   Output                0 ml   Net          1692.35 ml     General:  Alert, NAD  Cardiovascular:  RRR  Pulmonary:  LSC throughout  GI:  +BS in all four quadrants, soft, non-tender  Extremities:  No edema; 2+ dorsalis pedis pulses bilaterally  Neuro: alert and oriented x 3  Skin - R CW with TDC intact    Family contact:  [phone]; son at bedside    Labs:    Recent Results (from the past 24 hour(s))   GLUCOSE, POC    Collection Time: 18 12:52 AM   Result Value Ref Range    Glucose (POC) 101 70 - 752 mg/dL   METABOLIC PANEL, BASIC    Collection Time: 07/17/18  1:10 AM   Result Value Ref Range    Sodium 131 (L) 136 - 145 mmol/L    Potassium 4.6 3.5 - 5.5 mmol/L    Chloride 94 (L) 100 - 108 mmol/L    CO2 25 21 - 32 mmol/L    Anion gap 12 3.0 - 18 mmol/L    Glucose 102 (H) 74 - 99 mg/dL    BUN 45 (H) 7.0 - 18 MG/DL    Creatinine 7.79 (H) 0.6 - 1.3 MG/DL    BUN/Creatinine ratio 6 (L) 12 - 20      GFR est AA 7 (L) >60 ml/min/1.73m2    GFR est non-AA 5 (L) >60 ml/min/1.73m2    Calcium 8.5 8.5 - 10.1 MG/DL   MAGNESIUM    Collection Time: 07/17/18  1:10 AM   Result Value Ref Range    Magnesium 2.4 1.6 - 2.6 mg/dL   PHOSPHORUS    Collection Time: 07/17/18  1:10 AM   Result Value Ref Range    Phosphorus 6.5 (H) 2.5 - 4.9 MG/DL   CBC WITH AUTOMATED DIFF    Collection Time: 07/17/18  1:10 AM   Result Value Ref Range    WBC 8.5 4.6 - 13.2 K/uL    RBC 3.49 (L) 4.20 - 5.30 M/uL    HGB 10.4 (L) 12.0 - 16.0 g/dL    HCT 31.5 (L) 35.0 - 45.0 %    MCV 90.3 74.0 - 97.0 FL    MCH 29.8 24.0 - 34.0 PG    MCHC 33.0 31.0 - 37.0 g/dL    RDW 19.0 (H) 11.6 - 14.5 %    PLATELET 89 (L) 479 - 420 K/uL    MPV 9.1 (L) 9.2 - 11.8 FL    NEUTROPHILS 76 (H) 42 - 75 %    BAND NEUTROPHILS 6 (H) 0 - 5 %    LYMPHOCYTES 8 (L) 20 - 51 %    MONOCYTES 10 (H) 2 - 9 %    EOSINOPHILS 0 0 - 5 %    BASOPHILS 0 0 - 3 %    ABS. NEUTROPHILS 6.9 1.8 - 8.0 K/UL    ABS. LYMPHOCYTES 0.7 (L) 0.8 - 3.5 K/UL    ABS. MONOCYTES 0.9 0 - 1.0 K/UL    ABS. EOSINOPHILS 0.0 0.0 - 0.4 K/UL    ABS.  BASOPHILS 0.0 0.0 - 0.06 K/UL    DF MANUAL      PLATELET COMMENTS DECREASED PLATELETS      RBC COMMENTS ANISOCYTOSIS  1+       CARDIAC PANEL,(CK, CKMB & TROPONIN)    Collection Time: 07/17/18  1:10 AM   Result Value Ref Range    CK 66 26 - 192 U/L    CK - MB 2.1 <3.6 ng/ml    CK-MB Index 3.2 0.0 - 4.0 %    Troponin-I, Qt. 0.35 (H) 0.0 - 0.045 NG/ML   EKG, 12 LEAD, SUBSEQUENT    Collection Time: 07/17/18  3:08 AM   Result Value Ref Range    Ventricular Rate 106 BPM Atrial Rate 106 BPM    P-R Interval 158 ms    QRS Duration 86 ms    Q-T Interval 376 ms    QTC Calculation (Bezet) 499 ms    Calculated P Axis 73 degrees    Calculated R Axis 103 degrees    Calculated T Axis 64 degrees    Diagnosis       Sinus tachycardia  Rightward axis  Nonspecific ST abnormality  Abnormal ECG  When compared with ECG of 16-JUL-2018 11:42,  ST now depressed in Lateral leads     METABOLIC PANEL, COMPREHENSIVE    Collection Time: 07/17/18  5:07 AM   Result Value Ref Range    Sodium 131 (L) 136 - 145 mmol/L    Potassium 4.6 3.5 - 5.5 mmol/L    Chloride 95 (L) 100 - 108 mmol/L    CO2 23 21 - 32 mmol/L    Anion gap 13 3.0 - 18 mmol/L    Glucose 95 74 - 99 mg/dL    BUN 43 (H) 7.0 - 18 MG/DL    Creatinine 7.89 (H) 0.6 - 1.3 MG/DL    BUN/Creatinine ratio 5 (L) 12 - 20      GFR est AA 6 (L) >60 ml/min/1.73m2    GFR est non-AA 5 (L) >60 ml/min/1.73m2    Calcium 8.8 8.5 - 10.1 MG/DL    Bilirubin, total 0.6 0.2 - 1.0 MG/DL    ALT (SGPT) 8 (L) 13 - 56 U/L    AST (SGOT) 46 (H) 15 - 37 U/L    Alk. phosphatase 142 (H) 45 - 117 U/L    Protein, total 6.3 (L) 6.4 - 8.2 g/dL    Albumin 2.6 (L) 3.4 - 5.0 g/dL    Globulin 3.7 2.0 - 4.0 g/dL    A-G Ratio 0.7 (L) 0.8 - 1.7     CBC WITH AUTOMATED DIFF    Collection Time: 07/17/18  5:07 AM   Result Value Ref Range    WBC 7.8 4.6 - 13.2 K/uL    RBC 3.76 (L) 4.20 - 5.30 M/uL    HGB 11.3 (L) 12.0 - 16.0 g/dL    HCT 34.3 (L) 35.0 - 45.0 %    MCV 91.2 74.0 - 97.0 FL    MCH 30.1 24.0 - 34.0 PG    MCHC 32.9 31.0 - 37.0 g/dL    RDW 19.1 (H) 11.6 - 14.5 %    PLATELET 89 (L) 975 - 420 K/uL    MPV 10.2 9.2 - 11.8 FL    NEUTROPHILS 83 (H) 40 - 73 %    LYMPHOCYTES 7 (L) 21 - 52 %    MONOCYTES 9 3 - 10 %    EOSINOPHILS 0 0 - 5 %    BASOPHILS 1 0 - 2 %    ABS. NEUTROPHILS 6.5 1.8 - 8.0 K/UL    ABS. LYMPHOCYTES 0.5 (L) 0.9 - 3.6 K/UL    ABS. MONOCYTES 0.7 0.05 - 1.2 K/UL    ABS. EOSINOPHILS 0.0 0.0 - 0.4 K/UL    ABS.  BASOPHILS 0.1 0.0 - 0.1 K/UL    DF AUTOMATED      PLATELET COMMENTS DECREASED PLATELETS      RBC COMMENTS ANISOCYTOSIS  1+        RBC COMMENTS HELMET CELLS  1+       PROTHROMBIN TIME + INR    Collection Time: 07/17/18  5:07 AM   Result Value Ref Range    Prothrombin time 12.8 11.5 - 15.2 sec    INR 1.0 0.8 - 1.2     MRSA SCREEN - PCR (NASAL)    Collection Time: 07/17/18  5:30 AM   Result Value Ref Range    Special Requests: NO SPECIAL REQUESTS      Culture result:        MRSA target DNA is not detected (presumptive not colonized with MRSA)   GLUCOSE, POC    Collection Time: 07/17/18  5:50 AM   Result Value Ref Range    Glucose (POC) 97 70 - 110 mg/dL   CARDIAC PANEL,(CK, CKMB & TROPONIN)    Collection Time: 07/17/18  9:10 AM   Result Value Ref Range    CK 63 26 - 192 U/L    CK - MB 2.3 <3.6 ng/ml    CK-MB Index 3.7 0.0 - 4.0 %    Troponin-I, Qt. 0.25 (H) 0.0 - 0.045 NG/ML   GLUCOSE, POC    Collection Time: 07/17/18 11:19 AM   Result Value Ref Range    Glucose (POC) 123 (H) 70 - 110 mg/dL   GLUCOSE, POC    Collection Time: 07/17/18  5:15 PM   Result Value Ref Range    Glucose (POC) 147 (H) 70 - 110 mg/dL     Signed By: Radha Yen NP     July 17, 2018       I have personally seen, evaluated and examined the patient. Agree with documentation of assessment and plan as documented by Hiren Dumont NP. Pt AA, feels fine  Currently getting HD  Exam: RS clear, CVS: s1s2 heard.    Cont TB med's per ID  HD per nephrology  D/w ICU team  D/w Veronika Ceja

## 2018-07-17 NOTE — PROGRESS NOTES
TRANSFER - IN REPORT:    Verbal report received from Lynda Santiago RN(name) on Josse Bolden  being received from ED(unit) for change in patient condition(HTN crisis)      Report consisted of patients Situation, Background, Assessment and   Recommendations(SBAR). Information from the following report(s) SBAR, Kardex, ED Summary, Intake/Output, MAR and Recent Results was reviewed with the receiving nurse. Opportunity for questions and clarification was provided. Assessment to be completed upon patients arrival to unit and care assumed.

## 2018-07-17 NOTE — DIALYSIS
ACUTE HEMODIALYSIS FLOW SHEET 
 
HEMODIALYSIS ORDERS: Physician: Chanlder Tavares Dialyzer: revaclear        Duration: 3.5  hr  BFR: 350   DFR: 600 Dialysate:  Temp 37.0 K+   2.0    Ca+  2.5   Na 140   Bicarb 35 Weight:  64.6  kg    Bed Scale [x]     Unable to Obtain []      Dry weight/UF Goal: 3000 ml Access right CVL Needle Gauge NA Heparin []  Bolus      Units    [] Hourly       Units    [x]None Catheter locking solution Heparin 1000 units/ml Pre BP:   182/107    Pulse:     80     Temperature:   97.6  Respirations: 25  Tx: NS       ml/Bolus  [x] N/A Labs: Pre        Post:        [x] N/A Additional Orders(medications, blood products, hypotension management):       [x] N/A [x] [place] Consent Verified CATHETER ACCESS: []N/A   [x]Right  CVC   []Left   []IJ     []Fem [] First use X-ray verified     []Tunnel                [] Non Tunneled [x]No S/S infection  []Redness  []Drainage []Cultured []Swelling []Pain  
[x]Medical Aseptic Prep Utilized   []Dressing Changed  [x] Biopatch  Date: 07/17/2018 []Clotted   [x]Patent   Flows: [x]Good  []Poor  [x]Reversed If access problem,  notified: []Yes    [x]N/A   
 
GRAFT/FISTULA ACCESS:  [x]N/A     []Right     [x]Left     []UE     []LE []AVG   [x]AVF        []Buttonhole    []Medical Aseptic Prep Utilized []No S/S infection  [x]Redness  []Drainage []Cultured [x]Swelling [x]Pain Bruit:   [] Strong    [x] Weak       Thrill :   [] Strong    [x] Weak Needle Gauge: na  Length: na If access problem,  notified: []Yes     [x]N/A  Not used Please describe access if present and not used: left AVF bruised and swollen GENERAL ASSESSMENT:   
LUNGS:  Rate 25 SaO2%    [x] N/A    [x] Clear  [] Coarse  [] Crackles  [] Wheezing 
      [] Diminished     Location : []RLL   []LLL    []RUL  []ELÍAS Cough: []Productive  []Dry  [x]N/A   Respirations:  [x]Easy  []Labored Therapy:  [x]RA  []NC  l/min    Mask: []NRB []Venti       O2% []Ventilator  []Intubated  [] Trach  [] BiPaP CARDIAC: [x]Regular      [] Irregular   [] Pericardial Rub  [] JVD [x]  Monitored  [x] Bedside  [] Remotely monitored [] N/A  Rhythm: NSR  
EDEMA: [] None  [x]Generalized  [] Pitting [] 1    [] 2    [] 3    [] 4 [] Facial  [] Pedal  [x]  UE  Right non-pitting   [] LE   
SKIN:   [x] Warm  [] Hot     [] Cold   [x] Dry     [] Pale   [] Diaphoretic    
             [] Flushed  [] Jaundiced  [] Cyanotic  [] Rash  [] Weeping LOC:    [x] Alert      [x]Oriented:    [x] Person     [x] Place  [x]Time 
             [] Confused  [] Lethargic  [] Medicated  [] Non-responsive Hermina Fails by her daughter and son GI / ABDOMEN:  [x] Flat    [] Distended    [x] Soft    [] Firm   []  Obese 
                           [] Diarrhea  [x] Bowel Sounds  [] Nausea  [] Vomiting  / URINE ASSESSMENT:[] Voiding   [x] Oliguria  [] Anuria   []  Onofre [] Incontinent    []  Incontinent Brief      [x]  Bathroom Privileges PAIN: [x] 0 []1  []2   []3   []4   []5   []6   []7   []8   []9   []10 Scale 0-10  Action/Follow Up: NA MOBILITY:  [] Amb    [] Amb/Assist    [x] Bed    [] Wheelchair  [] Stretcher All Vitals and Treatment Details on Attached Flowsheet Hospital: [facility] Room # 305 [] 1st Time Acute  [] Stat  [x] Routine  [] Urgent    
[] Acute Room  [x]  Bedside  [x] ICU/CCU  [] ER Isolation Precautions:  [x] Dialysis   [] Airborne   [] Contact    [] Reverse Special Considerations:     [] Blood Consent Verified [x]N/A ALLERGIES Allergies Banana Other (comments)   9/8/2017    
 
:     
Code Status:  [x] Full Code  [] DNR HBsAg ONLY: Date Drawn 09/09/2017       [x]Negative []Positive []Unknown HBsAb: Date 09/09/2017  [] Susceptible   [x] [id number] []Not Drawn  [] Drawn Current Labs:    Date of Labs: 07/17/2018       Today [x] Results for Loc Goff (MRN [id number]) as of 7/17/2018 17:43 Ref. Range 7/17/2018 05:07 WBC Latest Ref Range: 4.6 - 13.2 K/uL 7.8 RBC Latest Ref Range: 4.20 - 5.30 M/uL 3.76 (L) HGB Latest Ref Range: 12.0 - 16.0 g/dL 11.3 (L) HCT Latest Ref Range: 35.0 - 45.0 % 34.3 (L) MCV Latest Ref Range: 74.0 - 97.0 FL 91.2 MCH Latest Ref Range: 24.0 - 34.0 PG 30.1 MCHC Latest Ref Range: 31.0 - 37.0 g/dL 32.9 RDW Latest Ref Range: 11.6 - 14.5 % 19.1 (H) PLATELET Latest Ref Range: 135 - 420 K/uL 89 (L) MPV Latest Ref Range: 9.2 - 11.8 FL 10.2 NEUTROPHILS Latest Ref Range: 40 - 73 % 83 (H) LYMPHOCYTES Latest Ref Range: 21 - 52 % 7 (L) MONOCYTES Latest Ref Range: 3 - 10 % 9 EOSINOPHILS Latest Ref Range: 0 - 5 % 0  
BASOPHILS Latest Ref Range: 0 - 2 % 1 DF Latest Units:   AUTOMATED  
ABS. NEUTROPHILS Latest Ref Range: 1.8 - 8.0 K/UL 6.5  
ABS. LYMPHOCYTES Latest Ref Range: 0.9 - 3.6 K/UL 0.5 (L)  
ABS. MONOCYTES Latest Ref Range: 0.05 - 1.2 K/UL 0.7 ABS. EOSINOPHILS Latest Ref Range: 0.0 - 0.4 K/UL 0.0  
ABS. BASOPHILS Latest Ref Range: 0.0 - 0.1 K/UL 0.1 Results for Omega Sanders (MRN [id number]) as of 7/17/2018 17:43 Ref. Range 7/17/2018 05:07 Sodium Latest Ref Range: 136 - 145 mmol/L 131 (L) Potassium Latest Ref Range: 3.5 - 5.5 mmol/L 4.6 Chloride Latest Ref Range: 100 - 108 mmol/L 95 (L)  
CO2 Latest Ref Range: 21 - 32 mmol/L 23 Anion gap Latest Ref Range: 3.0 - 18 mmol/L 13 Glucose Latest Ref Range: 74 - 99 mg/dL 95 BUN Latest Ref Range: 7.0 - 18 MG/DL 43 (H) Creatinine Latest Ref Range: 0.6 - 1.3 MG/DL 7.89 (H) BUN/Creatinine ratio Latest Ref Range: 12 - 20   5 (L) Calcium Latest Ref Range: 8.5 - 10.1 MG/DL 8.8  
GFR est non-AA Latest Ref Range: >60 ml/min/1.73m2 5 (L)  
GFR est AA Latest Ref Range: >60 ml/min/1.73m2 6 (L) Bilirubin, total Latest Ref Range: 0.2 - 1.0 MG/DL 0.6 Protein, total Latest Ref Range: 6.4 - 8.2 g/dL 6.3 (L) Albumin Latest Ref Range: 3.4 - 5.0 g/dL 2.6 (L) Globulin Latest Ref Range: 2.0 - 4.0 g/dL 3.7 A-G Ratio Latest Ref Range: 0.8 - 1.7   0.7 (L) ALT (SGPT) Latest Ref Range: 13 - 56 U/L 8 (L) AST Latest Ref Range: 15 - 37 U/L 46 (H) Alk. phosphatase Latest Ref Range: 45 - 117 U/L 142 (H) Results for Ana Matthews (MRN [id number]) as of 7/17/2018 17:43 Ref. Range 7/17/2018 05:07 Sodium Latest Ref Range: 136 - 145 mmol/L 131 (L) Potassium Latest Ref Range: 3.5 - 5.5 mmol/L 4.6 Chloride Latest Ref Range: 100 - 108 mmol/L 95 (L)  
CO2 Latest Ref Range: 21 - 32 mmol/L 23 Anion gap Latest Ref Range: 3.0 - 18 mmol/L 13 Glucose Latest Ref Range: 74 - 99 mg/dL 95 BUN Latest Ref Range: 7.0 - 18 MG/DL 43 (H) Creatinine Latest Ref Range: 0.6 - 1.3 MG/DL 7.89 (H) BUN/Creatinine ratio Latest Ref Range: 12 - 20   5 (L) Calcium Latest Ref Range: 8.5 - 10.1 MG/DL 8.8  
GFR est non-AA Latest Ref Range: >60 ml/min/1.73m2 5 (L)  
GFR est AA Latest Ref Range: >60 ml/min/1.73m2 6 (L) Bilirubin, total Latest Ref Range: 0.2 - 1.0 MG/DL 0.6 Protein, total Latest Ref Range: 6.4 - 8.2 g/dL 6.3 (L) Albumin Latest Ref Range: 3.4 - 5.0 g/dL 2.6 (L) Globulin Latest Ref Range: 2.0 - 4.0 g/dL 3.7 A-G Ratio Latest Ref Range: 0.8 - 1.7   0.7 (L) ALT (SGPT) Latest Ref Range: 13 - 56 U/L 8 (L) AST Latest Ref Range: 15 - 37 U/L 46 (H) Alk. phosphatase Latest Ref Range: 45 - 117 U/L 142 (H) DIET:  [x] Renal    [] Other     [] NPO     []  Diabetic PRIMARY NURSE REPORT: First initial/Last name/Title Pre Dialysis: JEIMY Blum RN  Time: 4162 EDUCATION:   
[x] Patient [] Other         Knowledge Basis: []None [x]Minimal [] Substantial  
Barriers to learning  LANGUAGE, FAMILY TRANSLATES, SHE UNDERSTANDS SOME GESTURES [x] Access Care     [x] S&S of infection     [x] Fluid Management     []K+     []Procedural   
[]Albumin     [] Medications     [] Tx Options     [] Transplant [x] Diet     [] Other Teaching Tools:  [x] Explain  [] Demo  [] Handouts [] Video Patient response:   [x] Verbalized understanding  [] Teach back  [] Return demonstration [] Requires follow up NA Inappropriate due to NA [x]  Time Out/Safety Check RO/HEMODIALYSIS MACHINE SAFETY CHECKS  Before each treatment:    
Machine Number:                   [facility]  [x] Portable Machine #1/RO serial # [id number] Alarm Test:  Pass time 2406 [x] RO/Machine Log Complete Temp   37.0          [x]Extracorporeal Circuit Tested for integrity Dialysate: pH  7.4 Conductivity: Meter   NA     HD Machine   13.8                  TCD: 13.9 Dialyzer Lot # [id number]            Blood Tubing Lot # [id number]          Saline Lot #  [id number] CHLORINE TESTING-Before each treatment and every 4 hours Total Chlorine: [x] less than 0.1 ppm  Time: 1615   4 Hr/2nd Check Time: OFF MACHINE  
(if greater than 0.1 ppm from Primary then every 30 minutes from Secondary) TREATMENT INITIATION  with Dialysis Precautions:  
[x] All Connections Secured                 [x] Saline Line Double Clamped  
[x] Venous Parameters Set                  [x] Arterial Parameters Set [x] Prime Given 250 ml                            [x]Air Foam Detector Engaged Treatment Initiation Note: Pt.  recvd in bed on ICU unit, Pt. explained that she would be getting dialysis tx., daughter at bedside to translate, right CVL was accessed, arterial pressure kept alarming above -240, lines were reversed, AP went down to -582,  No other complications noted. Medication Dose Volume Route Initials Dialyzer Cleared: [x] Good [] Fair  [] Poor Blood processed:  67.7 L 
UF Removed  3000  Ml Post Wt: NA  kg 
POst BP: 156/89      Pulse: 96      Respirations:19   Temperature: 98.1 NONE                            Post Tx Vascular Access: AVF/AVG: Bleeding stopped     N/A Catheter: Locking solution: Heparin 1:1000 Art. 1.9 ml  Wei. 1.9  ml    
                              Post Assessment:  
  
                              Skin:  [x] Warm  [x] Dry [] Diaphoretic    [] Flushed  [] Pale [] Cyanotic [facility] Signatures Title Initials  Time Lungs: [x] Clear    [] Course  [] Crackles  [] Wheezing [] Diminished Cardiac: [x] Regular   [] Irregular   [x] Monitor  [] N/A  Rhythm:NSR Edema: [] None    [x] General     [] Facial   [] Pedal    [] UE    [] LE  
    Pain: [x]0  []1  []2   []3  []4   []5   []6   []7   []8   []9   []10 Post Treatment Note: Pt. Tolerated HD tx without complications,  Fluid volume removed was 3 liters, pt. Tolerated well. POST TREATMENT PRIMARY NURSE HANDOFF REPORT:  
 
First initial/Last name/Title Post Dialysis: MILDRED Tillman RN   Time:  2045 Abbreviations: AVG-arterial venous graft, AVF-arterial venous fistula, IJ-Internal Jugular, Subcl-Subclavian, Fem-Femoral, Tx-treatment, AP/HR-apical heart rate, DFR-dialysate flow rate, BFR-blood flow rate, AP-arterial pressure, -venous pressure, UF-ultrafiltrate, TMP-transmembrane pressure, Wei-Venous, Art-Arterial, RO-Reverse Osmosis

## 2018-07-17 NOTE — PROGRESS NOTES
Consult requested by: Dr. Paulette Cuevas Shandra Bailey is a 56 y.o. female Portuguese who is being seen on consult for ESRD, admitted for hypertensive emergency. Chief Complaint Patient presents with  Chest Pain  Vomiting Admission diagnosis: Hypertensive crisis HPI: 51y F with PMH HTN, ESRD, admitted to ICU now with HTN emergency. She was not able to take her meds because of GI upset. Her systolic  blood pressure was at 247 on arrival. She was started on nitro drip. Her blood pressure gradually improving. During my eval , She appears calm, no respi distress. She denies for any pain. Communication is limited because of language barrier. Patient goes to dialysis unit at [facility] on MWF schedule. Last dialysis was on friday. I have discussed with dialysis unit staff. Past Medical History:  
Diagnosis Date  Chronic kidney disease  ESRD (end stage renal disease) ([facility] [address] TUES-THURS-SAT  Hypercholesteremia  Hypertension  Kidney disease  Vitamin D deficiency Past Surgical History:  
Procedure Laterality Date  VASCULAR SURGERY PROCEDURE UNLIST Social History Social History  Marital status:  Spouse name: N/A  
 Number of children: N/A  
 Years of education: N/A Occupational History  Not on file. Social History Main Topics  Smoking status: Never Smoker  Smokeless tobacco: Never Used  Alcohol use No  
 Drug use: No  
 Sexual activity: Not on file Other Topics Concern  Not on file Social History Narrative History reviewed. No pertinent family history. Allergies Allergen Reactions  Banana Other (comments) Home Medications:  
 
Prior to Admission Medications Prescriptions Last Dose Informant Patient Reported? Taking? FLUoxetine (PROZAC) 10 mg capsule 7/15/2018 at Unknown time  No Yes Sig: Take 1 Cap by mouth daily.   
albuterol (PROVENTIL HFA, VENTOLIN HFA, PROAIR HFA) 90 mcg/actuation inhaler 7/15/2018 at Unknown time  No Yes Sig: Take 2 Puffs by inhalation every four (4) hours as needed for Wheezing. amLODIPine (NORVASC) 10 mg tablet 7/15/2018 at Unknown time  No Yes Sig: Take 1 Tab by mouth daily. aspirin 81 mg chewable tablet 7/15/2018 at Unknown time  No Yes Sig: Take 1 Tab by mouth daily. atorvastatin (LIPITOR) 40 mg tablet 7/15/2018 at Unknown time  No Yes Sig: Take 1 Tab by mouth nightly. carvedilol (COREG) 25 mg tablet 7/15/2018 at Unknown time  No Yes Sig: Take 1 Tab by mouth every twelve (12) hours. cloNIDine HCl (CATAPRES) 0.3 mg tablet 7/15/2018 at Unknown time  No Yes Sig: Take 1 Tab by mouth two (2) times a day.  
ethambutol (MYAMBUTOL) 400 mg tablet 7/15/2018  Yes Yes Sig: Take 800 mg by mouth daily. glycerin, adult, (FLEET GLYCERIN, ADULT,) suppository 7/15/2018 at Unknown time  No Yes Sig: Insert 1 Suppository into rectum daily. Indications: BOWEL EVACUATION  
hydrALAZINE (APRESOLINE) 100 mg tablet 7/15/2018 at Unknown time  No Yes Sig: Take 0.5 Tabs by mouth two (2) times a day. isoniazid (NYDRAZID) 300 mg tablet 7/15/2018  Yes Yes Sig: Take 300 mg by mouth daily. losartan (COZAAR) 100 mg tablet 7/15/2018 at Unknown time  No Yes Sig: Take 1 Tab by mouth daily. melatonin 3 mg tablet 7/15/2018 at Unknown time  No Yes Sig: Take 1 Tab by mouth nightly as needed. Patient taking differently: Take 1 Tab by mouth nightly as needed (insomnia). pyrazinamide 500 mg tablet 7/15/2018  Yes Yes Sig: Take 500 mg by mouth daily. rifAMPin (RIFADIN) 300 mg capsule 7/15/2018  Yes Yes Sig: Take 600 mg by mouth daily. Facility-Administered Medications: None Current Facility-Administered Medications Medication Dose Route Frequency  pyridoxine (vitamin B6) (B-6) injection 100 mg  100 mg IntraMUSCular DAILY  hydrALAZINE (APRESOLINE) tablet 25 mg  25 mg Oral ONCE  
 nitroPRUSSide (NIPRIDE) 50 mg in dextrose 5% 250 mL infusion  0-10 mcg/kg/min IntraVENous TITRATE  aspirin chewable tablet 81 mg  81 mg Oral DAILY  atorvastatin (LIPITOR) tablet 40 mg  40 mg Oral QHS  carvedilol (COREG) tablet 25 mg  25 mg Oral Q12H  hydrALAZINE (APRESOLINE) tablet 25 mg  25 mg Oral BID  losartan (COZAAR) tablet 100 mg  100 mg Oral DAILY  melatonin tablet 3 mg  3 mg Oral QHS PRN  
 FLUoxetine (PROzac) capsule 10 mg  10 mg Oral DAILY  acetaminophen (TYLENOL) tablet 650 mg  650 mg Oral Q6H PRN  
 oxyCODONE-acetaminophen (PERCOCET) 5-325 mg per tablet 1 Tab  1 Tab Oral Q6H PRN  
 naloxone (NARCAN) injection 0.4 mg  0.4 mg IntraVENous PRN  
 ondansetron (ZOFRAN) injection 4 mg  4 mg IntraVENous Q6H PRN  
 docusate sodium (COLACE) capsule 100 mg  100 mg Oral BID PRN  
 heparin (porcine) injection 5,000 Units  5,000 Units SubCUTAneous Q8H  
 cloNIDine (CATAPRES) 0.3 mg/24 hr patch 1 Patch  1 Patch TransDERmal Q7D  
 isoniazid (NYDRAZID) tablet 300 mg  300 mg Oral DAILY  ethambutol (MYAMBUTOL) tablet 800 mg  800 mg Oral DAILY  pyrazinamide tablet 1,000 mg  1,000 mg Oral Q MON, WED & FRI  rifAMPin (RIFADIN) capsule 600 mg  600 mg Oral ACB  LORazepam (ATIVAN) injection 1 mg  1 mg IntraVENous PRN  
 bisacodyl (DULCOLAX) suppository 10 mg  10 mg Rectal DAILY PRN  promethazine (PHENERGAN) 12.5 mg in 0.9% sodium chloride 50 mL IVPB  12.5 mg IntraVENous Q6H PRN  
 metoprolol (LOPRESSOR) injection 5 mg  5 mg IntraVENous Q6H PRN  
 insulin lispro (HUMALOG) injection   SubCUTAneous Q6H  
 glucose chewable tablet 16 g  4 Tab Oral PRN  
 glucagon (GLUCAGEN) injection 1 mg  1 mg IntraMUSCular PRN  
 dextrose (D50W) injection syrg 12.5-25 g  25-50 mL IntraVENous PRN  
 0.9% sodium chloride infusion  50 mL/hr IntraVENous CONTINUOUS  
 famotidine (PF) (PEPCID) 20 mg in sodium chloride 0.9% 10 mL injection  20 mg IntraVENous DAILY Review of Systems:  
 
 Complete 10-point review of systems were obtained and discussed in length 
with the patient. Complete review of systems was negative/unremarkable 
except as mentioned in HPI section. Data Review: 
 
Labs: Results:  
   
Chemistry Recent Labs  
   07/17/18 
 0507  07/17/18 
 0110  07/16/18 
 1150 GLU  95  102*  131* NA  131*  131*  130*  
K  4.6  4.6  4.9 CL  95*  94*  92* CO2  23  25  24 BUN  43*  45*  37* CREA  7.89*  7.79*  7.32* CA  8.8  8.5  9.9 AGAP  13  12  14 BUCR  5*  6*  5* AP  142*   --   208* TP  6.3*   --   8.1 ALB  2.6*   --   3.1*  
GLOB  3.7   --   5.0* AGRAT  0.7*   --   0.6* CBC w/Diff Recent Labs  
   07/17/18 
 0507  07/17/18 
 0110  07/16/18 
 1150 WBC  7.8  8.5  11.1 RBC  3.76*  3.49*  5.41* HGB  11.3*  10.4*  16.1*  
HCT  34.3*  31.5*  49.7* PLT  89*  89*  180 GRANS  83*  76*  85* LYMPH  7*  8*  7* EOS  0  0  0 Coagulation Recent Labs  
   07/17/18 
 0507 PTP  12.8 INR  1.0 Iron/Ferritin No results for input(s): IRON in the last 72 hours. No lab exists for component: TIBCCALC BNP No results for input(s): BNPP in the last 72 hours. Cardiac Enzymes Recent Labs  
   07/17/18 
 0910  07/17/18 
 0110 CPK  63  66 CKND1  3.7  3.2 Liver Enzymes Recent Labs  
   07/17/18 
 0507 TP  6.3* ALB  2.6* AP  142* SGOT  46* Thyroid Studies No results found for: T4, T3U, TSH, TSHEXT    
 EKG: sinus Physical Assessment:  
 
Visit Vitals  BP (!) 168/100  Pulse 79  Temp 98 °F (36.7 °C)  Resp 24  
 Ht 5' 1\" (1.549 m)  Wt 64.6 kg (142 lb 6.7 oz)  SpO2 95%  Breastfeeding No  
 BMI 26.91 kg/m2 Weight change:  
 
Intake/Output Summary (Last 24 hours) at 07/17/18 1427 Last data filed at 07/17/18 0600 Gross per 24 hour Intake           850.54 ml Output                0 ml Net           850.54 ml Physical Exam 
General: comfortable, no acute distress HEENT sclera anicteric, supple neck, no thyromegaly CVS: S1S2 heard,  no rub RS: + air entry b/l, Abd: Soft, Non tender, Neuro:  awake, alert Extrm:no  edema, no cyanosis, clubbing Skin: no visible  Rash Musculoskeletal: No gross joints or bone deformities Access; tunnel catheter on right side Procedures/imaging: see electronic medical records for all procedures, Xrays and details which were not copied into this note but were reviewed prior to creation of Plan 51y F with ESRD, admitted with HTN emergency, seen for ESRD management. Impression & Plan:  
IMPRESSION:  
ESRD, MWF schedule; Access; tunnel catheter on right side. Clotted left arm access; Anemia HTN emergency Pulmonary TB; now positive with MAC. Nausea, h/o severe constipation Hyponatremia, mild PLAN:  
Will arrange HD today , 2K bath UF  3L as tolerated. BP management per ICU team, avoid hypotension, SBP around 140-150 is acceptable. Okay with golytely Agree with ID consult If patient need IV contrast for CT imaging, need to be coordinated with renal team.  
Avoid Gadolinium due to its association with nephrogenic systemic fibrosis in a patients with severe ARF and ESRD.   
Please dose all medications for creatinine clearance <15/dialysis.   
Avoid blood pressure checks, blood draws, peripheral iv's on arm with access. AvoidPICC lines on either arm in order to preserve veins for dialysis access creation.  
   
 
 
 
Nate Ansari MD 
July 17, 2018 [facility] [phone]

## 2018-07-17 NOTE — CONSULTS
HPI: 82-year-old [facility] female who initially presented to [facility] emergency room with onset of vomiting that started on July 15. While being evaluated at the [facility] emergency room, she had a seizure which was witnessed by staff and she received Ativan. There is no prior history of known seizures. She was found to be profoundly hypertensive, and was admitted with a diagnosis of hypertensive crisis, for which she is being currently treated. There is no family members in the room. The patient does not speak [place]. I tried communicating with her through [employer], but she did not respond as she appears to be confused. She has pulmonary tuberculosis history and is on isoniazid side for treatment. He does not know what she is compliant on this medication or not or whether she is taking it excessively. Social History     Social History    Marital status:      Spouse name: N/A    Number of children: N/A    Years of education: N/A     Occupational History    Not on file. Social History Main Topics    Smoking status: Never Smoker    Smokeless tobacco: Never Used    Alcohol use No    Drug use: No    Sexual activity: Not on file     Other Topics Concern    Not on file     Social History Narrative       History reviewed. No pertinent family history.     Current Facility-Administered Medications   Medication Dose Route Frequency Provider Last Rate Last Dose    pyridoxine (vitamin B6) (B-6) injection 100 mg  100 mg IntraMUSCular DAILY Priyanka Jarvis MD   100 mg at 07/17/18 0915    nitroPRUSSide (NIPRIDE) 50 mg in dextrose 5% 250 mL infusion  0-10 mcg/kg/min IntraVENous TITRATE Anshu Serra MD 24.7 mL/hr at 07/17/18 1142 2 mcg/kg/min at 07/17/18 1142    aspirin chewable tablet 81 mg  81 mg Oral DAILY Priyanka Jarvis MD   81 mg at 07/17/18 0916    atorvastatin (LIPITOR) tablet 40 mg  40 mg Oral QHS Priyanka Jarvis MD        carvedilol (COREG) tablet 25 mg  25 mg Oral Q12H Sahil Yost MD   25 mg at 07/17/18 0916    hydrALAZINE (APRESOLINE) tablet 25 mg  25 mg Oral BID Sahil Yost MD   25 mg at 07/17/18 0933    losartan (COZAAR) tablet 100 mg  100 mg Oral DAILY Sahil Yost MD   100 mg at 07/17/18 0916    melatonin tablet 3 mg  3 mg Oral QHS PRN Sahil Yost MD   3 mg at 07/17/18 0916    FLUoxetine (PROzac) capsule 10 mg  10 mg Oral DAILY Sahil Yost MD   10 mg at 07/17/18 4709    acetaminophen (TYLENOL) tablet 650 mg  650 mg Oral Q6H PRN Sahil Yost MD        oxyCODONE-acetaminophen (PERCOCET) 5-325 mg per tablet 1 Tab  1 Tab Oral Q6H PRN Sahil Yost MD        naloxone [facility]) injection 0.4 mg  0.4 mg IntraVENous PRN Sahil Yost MD        ondansetron [facility]) injection 4 mg  4 mg IntraVENous Q6H PRN Sahil Yost MD   4 mg at 07/17/18 0915    docusate sodium (COLACE) capsule 100 mg  100 mg Oral BID PRN Sahil Yost MD        heparin (porcine) injection 5,000 Units  5,000 Units SubCUTAneous Q8H Sahil Yost MD   5,000 Units at 07/17/18 1530    cloNIDine (CATAPRES) 0.3 mg/24 hr patch 1 Patch  1 Patch TransDERmal Q7D Sahil Yost MD   1 Patch at 07/17/18 0157    isoniazid (NYDRAZID) tablet 300 mg  300 mg Oral DAILY Danielle Eduardo MD        ethambutol (MYAMBUTOL) tablet 800 mg  800 mg Oral DAILY Danielle Eduardo MD        pyrazinamide tablet 1,000 mg  1,000 mg Oral Q MON, WED & FRI Danielle Eduardo MD        rifAMPin (RIFADIN) capsule 600 mg  600 mg Oral ACB Danielle Eduardo MD   600 mg at 07/17/18 0916    LORazepam (ATIVAN) injection 1 mg  1 mg IntraVENous PRN Shantel Aguilar, RG        bisacodyl (DULCOLAX) suppository 10 mg  10 mg Rectal DAILY PRN Shantel Aguilar NP        promethazine (PHENERGAN) 12.5 mg in 0.9% sodium chloride 50 mL IVPB  12.5 mg IntraVENous Q6H PRN Shantel Aguilar NP   Stopped at 07/16/18 7842    metoprolol (LOPRESSOR) injection 5 mg  5 mg IntraVENous Q6H PRN Sahil Yost MD  insulin lispro (HUMALOG) injection   SubCUTAneous Q6H Jenniffer Duverney, NP   Stopped at 07/17/18 0000    glucose chewable tablet 16 g  4 Tab Oral PRN Jenniffer Duverney, NP        glucagon (GLUCAGEN) injection 1 mg  1 mg IntraMUSCular PRN Jenniffer Duverney, NP        dextrose (D50W) injection syrg 12.5-25 g  25-50 mL IntraVENous PRN Jenniffer Duverney, NP        0.9% sodium chloride infusion  50 mL/hr IntraVENous CONTINUOUS Jenniffer Duverney, NP 50 mL/hr at 07/17/18 0212 50 mL/hr at 07/17/18 0212    famotidine (PF) (PEPCID) 20 mg in sodium chloride 0.9% 10 mL injection  20 mg IntraVENous DAILY Jenniffer Duverney, NP   20 mg at 07/17/18 0915       Past Medical History:   Diagnosis Date    Chronic kidney disease     ESRD (end stage renal disease) ([facility] [address]     TUES-THURS-SAT    Hypercholesteremia     Hypertension     Kidney disease     Vitamin D deficiency        Past Surgical History:   Procedure Laterality Date    VASCULAR SURGERY PROCEDURE UNLIST         Allergies   Allergen Reactions    Banana Other (comments)       Patient Active Problem List   Diagnosis Code    Hyperkalemia E87.5    Renal failure N19    SCOOTER (acute kidney injury) ([facility] [address] N17.9    Hyponatremia E87.1    ESRD (end stage renal disease) ([facility] [address] N18.6    NSTEMI (non-ST elevated myocardial infarction) ([address] I21.4    Respiratory failure ([address] J96.90    Hypoxia R09.02    Acute UTI N39.0    Anemia U49.4    Complication of vascular dialysis catheter, initial encounter T82. 9XXA    Seizure ([address] R56.9    Hypertensive emergency I16.1    Intractable vomiting with nausea R11.2    ESRD on hemodialysis (HCC) N18.6, Z99.2    Pulmonary TB A15.0    Hypertensive crisis I16.9         Review of Systems:   Unobtainable    PHYSICAL EXAMINATION:      VITAL SIGNS:    Visit Vitals    BP (!) 168/100    Pulse 79    Temp 98.2 °F (36.8 °C)    Resp 24    Ht 5' 1\" (1.549 m)    Wt 64.6 kg (142 lb 6.7 oz)    SpO2 95%    Breastfeeding No    BMI 26.91 kg/m2 GENERAL: Well developed, well nourished, in no apparent distress. HEART: RR, no murmurs heard, no carotid bruits  EXTREMITIES: No clubbing, cyanosis, or edema is identified. Pulses 2+    and symmetrical.  HEAD:   Normocephalic, atraumatic. NEUROLOGIC EXAMINATION    MENTAL STATUS: Awake, alert, confused beyond language barrier, therefore more detail mental status examination was not possible. She is not following commands even to mimicry. CRANIAL NERVES: Visual fields are full to confrontation. Uncooperative for funduscopic examination. Pupils are reactive to light and accommodation. Extraocular movements are intact and there is no nystagmus. Face is symmetrical.   Hearing is present.        The rest of cranial nerves could not be evaluated properly      MOTOR:  No obvious lateralizing weakness    CEREBELLAR: No tremors    SENSORY: Unreliable     DTR's: +2 throughout, no long tract signs     GAIT:   Confused, so deferred        Recent Results (from the past 24 hour(s))   GLUCOSE, POC    Collection Time: 07/17/18 12:52 AM   Result Value Ref Range    Glucose (POC) 101 70 - 481 mg/dL   METABOLIC PANEL, BASIC    Collection Time: 07/17/18  1:10 AM   Result Value Ref Range    Sodium 131 (L) 136 - 145 mmol/L    Potassium 4.6 3.5 - 5.5 mmol/L    Chloride 94 (L) 100 - 108 mmol/L    CO2 25 21 - 32 mmol/L    Anion gap 12 3.0 - 18 mmol/L    Glucose 102 (H) 74 - 99 mg/dL    BUN 45 (H) 7.0 - 18 MG/DL    Creatinine 7.79 (H) 0.6 - 1.3 MG/DL    BUN/Creatinine ratio 6 (L) 12 - 20      GFR est AA 7 (L) >60 ml/min/1.73m2    GFR est non-AA 5 (L) >60 ml/min/1.73m2    Calcium 8.5 8.5 - 10.1 MG/DL   MAGNESIUM    Collection Time: 07/17/18  1:10 AM   Result Value Ref Range    Magnesium 2.4 1.6 - 2.6 mg/dL   PHOSPHORUS    Collection Time: 07/17/18  1:10 AM   Result Value Ref Range    Phosphorus 6.5 (H) 2.5 - 4.9 MG/DL   CBC WITH AUTOMATED DIFF    Collection Time: 07/17/18  1:10 AM   Result Value Ref Range    WBC 8.5 4.6 - 13.2 K/uL    RBC 3.49 (L) 4.20 - 5.30 M/uL    HGB 10.4 (L) 12.0 - 16.0 g/dL    HCT 31.5 (L) 35.0 - 45.0 %    MCV 90.3 74.0 - 97.0 FL    MCH 29.8 24.0 - 34.0 PG    MCHC 33.0 31.0 - 37.0 g/dL    RDW 19.0 (H) 11.6 - 14.5 %    PLATELET 89 (L) 885 - 420 K/uL    MPV 9.1 (L) 9.2 - 11.8 FL    NEUTROPHILS 76 (H) 42 - 75 %    BAND NEUTROPHILS 6 (H) 0 - 5 %    LYMPHOCYTES 8 (L) 20 - 51 %    MONOCYTES 10 (H) 2 - 9 %    EOSINOPHILS 0 0 - 5 %    BASOPHILS 0 0 - 3 %    ABS. NEUTROPHILS 6.9 1.8 - 8.0 K/UL    ABS. LYMPHOCYTES 0.7 (L) 0.8 - 3.5 K/UL    ABS. MONOCYTES 0.9 0 - 1.0 K/UL    ABS. EOSINOPHILS 0.0 0.0 - 0.4 K/UL    ABS.  BASOPHILS 0.0 0.0 - 0.06 K/UL    DF MANUAL      PLATELET COMMENTS DECREASED PLATELETS      RBC COMMENTS ANISOCYTOSIS  1+       CARDIAC PANEL,(CK, CKMB & TROPONIN)    Collection Time: 07/17/18  1:10 AM   Result Value Ref Range    CK 66 26 - 192 U/L    CK - MB 2.1 <3.6 ng/ml    CK-MB Index 3.2 0.0 - 4.0 %    Troponin-I, Qt. 0.35 (H) 0.0 - 0.045 NG/ML   EKG, 12 LEAD, SUBSEQUENT    Collection Time: 07/17/18  3:08 AM   Result Value Ref Range    Ventricular Rate 106 BPM    Atrial Rate 106 BPM    P-R Interval 158 ms    QRS Duration 86 ms    Q-T Interval 376 ms    QTC Calculation (Bezet) 499 ms    Calculated P Axis 73 degrees    Calculated R Axis 103 degrees    Calculated T Axis 64 degrees    Diagnosis       Sinus tachycardia  Rightward axis  Nonspecific ST abnormality  Abnormal ECG  When compared with ECG of 16-JUL-2018 11:42,  ST now depressed in Lateral leads     METABOLIC PANEL, COMPREHENSIVE    Collection Time: 07/17/18  5:07 AM   Result Value Ref Range    Sodium 131 (L) 136 - 145 mmol/L    Potassium 4.6 3.5 - 5.5 mmol/L    Chloride 95 (L) 100 - 108 mmol/L    CO2 23 21 - 32 mmol/L    Anion gap 13 3.0 - 18 mmol/L    Glucose 95 74 - 99 mg/dL    BUN 43 (H) 7.0 - 18 MG/DL    Creatinine 7.89 (H) 0.6 - 1.3 MG/DL    BUN/Creatinine ratio 5 (L) 12 - 20      GFR est AA 6 (L) >60 ml/min/1.73m2    GFR est non-AA 5 (L) >60 ml/min/1.73m2    Calcium 8.8 8.5 - 10.1 MG/DL    Bilirubin, total 0.6 0.2 - 1.0 MG/DL    ALT (SGPT) 8 (L) 13 - 56 U/L    AST (SGOT) 46 (H) 15 - 37 U/L    Alk. phosphatase 142 (H) 45 - 117 U/L    Protein, total 6.3 (L) 6.4 - 8.2 g/dL    Albumin 2.6 (L) 3.4 - 5.0 g/dL    Globulin 3.7 2.0 - 4.0 g/dL    A-G Ratio 0.7 (L) 0.8 - 1.7     CBC WITH AUTOMATED DIFF    Collection Time: 07/17/18  5:07 AM   Result Value Ref Range    WBC 7.8 4.6 - 13.2 K/uL    RBC 3.76 (L) 4.20 - 5.30 M/uL    HGB 11.3 (L) 12.0 - 16.0 g/dL    HCT 34.3 (L) 35.0 - 45.0 %    MCV 91.2 74.0 - 97.0 FL    MCH 30.1 24.0 - 34.0 PG    MCHC 32.9 31.0 - 37.0 g/dL    RDW 19.1 (H) 11.6 - 14.5 %    PLATELET 89 (L) 323 - 420 K/uL    MPV 10.2 9.2 - 11.8 FL    NEUTROPHILS 83 (H) 40 - 73 %    LYMPHOCYTES 7 (L) 21 - 52 %    MONOCYTES 9 3 - 10 %    EOSINOPHILS 0 0 - 5 %    BASOPHILS 1 0 - 2 %    ABS. NEUTROPHILS 6.5 1.8 - 8.0 K/UL    ABS. LYMPHOCYTES 0.5 (L) 0.9 - 3.6 K/UL    ABS. MONOCYTES 0.7 0.05 - 1.2 K/UL    ABS. EOSINOPHILS 0.0 0.0 - 0.4 K/UL    ABS.  BASOPHILS 0.1 0.0 - 0.1 K/UL    DF AUTOMATED      PLATELET COMMENTS DECREASED PLATELETS      RBC COMMENTS ANISOCYTOSIS  1+        RBC COMMENTS HELMET CELLS  1+       PROTHROMBIN TIME + INR    Collection Time: 07/17/18  5:07 AM   Result Value Ref Range    Prothrombin time 12.8 11.5 - 15.2 sec    INR 1.0 0.8 - 1.2     MRSA SCREEN - PCR (NASAL)    Collection Time: 07/17/18  5:30 AM   Result Value Ref Range    Special Requests: NO SPECIAL REQUESTS      Culture result:        MRSA target DNA is not detected (presumptive not colonized with MRSA)   GLUCOSE, POC    Collection Time: 07/17/18  5:50 AM   Result Value Ref Range    Glucose (POC) 97 70 - 110 mg/dL   CARDIAC PANEL,(CK, CKMB & TROPONIN)    Collection Time: 07/17/18  9:10 AM   Result Value Ref Range    CK 63 26 - 192 U/L    CK - MB 2.3 <3.6 ng/ml    CK-MB Index 3.7 0.0 - 4.0 %    Troponin-I, Qt. 0.25 (H) 0.0 - 0.045 NG/ML   GLUCOSE, POC    Collection Time: 07/17/18 11:19 AM   Result Value Ref Range    Glucose (POC) 123 (H) 70 - 110 mg/dL        CT of the head yesterday showed no acute intracranial process with mild parenchymal volume loss and moderate chronic small vessel disease similar to prior imaging studies    Impression: New-onset seizure, suspect there may be a toxic metabolic etiology, as she has end-stage renal disease and potentially may have had some metabolic disturbances. Could this be isoniazide toxicity, which can cause acid based disturbances which can present with nausea and seizures? Plan: 1-EEG   2-evaluate and correct for acid-base anomalies, cont piridoxine   3Will consider formal recommendations regarding the need for anticonvulsants once I review the EEG and she has a more thorough toxic metabolic workup. PLEASE NOTE:   Portions of this document may have been produced using voice recognition software. Unrecognized errors in transcription may be present.

## 2018-07-17 NOTE — PROGRESS NOTES
conducted an Initial Visit for Vicky Regalado, who is a 56 y.o.,female. Patients Primary Language is: [place]. According to the patients EMR Yazidism Affiliation is: Roman Catholic. CORRECTION: Patient's Primary Language is [place]. She is non verbal at this time. The reason the Patient came to the hospital is:   Patient Active Problem List    Diagnosis Date Noted    Intractable vomiting with nausea 07/17/2018    ESRD on hemodialysis ([facility] [address] 07/17/2018    Pulmonary TB 07/17/2018    Hypertensive crisis 07/17/2018    Seizure ([address] 07/16/2018    Hypertensive emergency [id number]/95/8908    Complication of vascular dialysis catheter, initial encounter 06/18/2018    Anemia 02/26/2018    Hyponatremia 02/25/2018    ESRD (end stage renal disease) ([facility] [address] 02/25/2018    NSTEMI (non-ST elevated myocardial infarction) ([facility] [address] 02/25/2018    Respiratory failure ([facility] [address] 02/25/2018    Hypoxia 02/25/2018    Acute UTI 02/25/2018    Hyperkalemia 09/08/2017    Renal failure 09/08/2017    SCOOTER (acute kidney injury) ([facility] [address] 09/08/2017        Through signs,  showed the host and asked if she wanted Holy Communion, but she shook her head.  interpreted it as a 'no.'    made gestures to ask if she wanted the Sacrament of Anointing of the [address] and she nodded, which was interpreted as a 'yes.'   After anointing,  gave assurance of continued prayer for patient. Chaplains are to make follow-up visits as requested or needed.  recommends bedside caregivers page  on duty if patient shows signs of acute spiritual or emotional distress. The Rev.  [address],   [address]  [facility] [phone] / [facility] [phone]

## 2018-07-17 NOTE — ED NOTES
Bedside and Verbal shift change report given to Vinny Acevedo RN (oncoming nurse) by Priyank Fine RN (offgoing nurse). Report included the following information SBAR, Kardex, MAR, Recent Results and Cardiac Rhythm Sinus Tachycardia. .       Patient on Nitorpursside drip 50mg in 250mls of D5W @ 3mcg/kg/min.

## 2018-07-17 NOTE — PROGRESS NOTES
NUTRITION Nutrition Screen RECOMMENDATIONS / PLAN:  
 
- Add supplements: Ensure Pudding TID.  
- Continue RD inpatient monitoring and evaluation. NUTRITION INTERVENTIONS & DIAGNOSIS:  
 
[x] Meals/snacks: modified composition 
[x] Medical food supplement therapy: initiate [x] Collaboration and referral of nutrition care: interdisciplinary rounds Nutrition Diagnosis: Unintended weight loss related to inadequate energy intake as evidenced by 13 lb, 13% weight loss x 6 months. ASSESSMENT:  
 
Nausea/vomiting x 2 days PTA. Started on diet, tolerated oral medications this morning. Likes pudding. HD today. Average po intake adequate to meet patients estimated nutritional needs:   [] Yes     [x] No   [] Unable to determine at this time Diet: DIET CARDIAC Regular Food Allergies: NKFA Current Appetite:   [] Good     [] Fair     [x] Poor     [] Other: 
Appetite/meal intake prior to admission:   [] Good     [] Fair     [] Poor     [x] Other: nausea/vomiting x 2 days Feeding Limitations:  [] Swallowing difficulty    [] Chewing difficulty    [] Other: 
Current Meal Intake: No data found. BM: PTA Skin Integrity: WDL Edema: none Pertinent Medications: Reviewed: NS at 50 mL/hr, colace, zofran, phenergan, vitamin B6 Recent Labs  
   07/17/18 
 0507  07/17/18 
 0110  07/16/18 
 1150 NA  131*  131*  130*  
K  4.6  4.6  4.9 CL  95*  94*  92* CO2  23  25  24 GLU  95  102*  131* BUN  43*  45*  37* CREA  7.89*  7.79*  7.32* CA  8.8  8.5  9.9 MG   --   2.4   --   
PHOS   --   6.5*   --   
ALB  2.6*   --   3.1*  
SGOT  46*   --   82* ALT  8*   --   13 Intake/Output Summary (Last 24 hours) at 07/17/18 1051 Last data filed at 07/17/18 0600 Gross per 24 hour Intake           850.54 ml Output                0 ml Net           850.54 ml Anthropometrics: 
Ht Readings from Last 1 Encounters:  
07/16/18 5' 1\" (1.549 m) Last 3 Recorded Weights in this Encounter  
 07/16/18 1138 Weight: 40.8 kg (90 lb) Body mass index is 17.01 kg/(m^2). Underweight Weight History: 13 lb, 13% weight loss x 6 months Weight Metrics 7/16/2018 6/27/2018 6/26/2018 5/31/2018 3/9/2018 1/22/2018 12/2/2017 Weight 90 lb 90 lb 6.2 oz 85 lb 8.6 oz 90 lb 6.2 oz 84 lb 3.2 oz 103 lb 3 oz 106 lb 14.8 oz BMI 17.01 kg/m2 17.08 kg/m2 16.16 kg/m2 17.08 kg/m2 15.91 kg/m2 19.5 kg/m2 20.2 kg/m2 Admitting Diagnosis: Hypertensive emergency Seizure ([facility] [address] Pertinent PMHx: ESRD on HD, HTN, vitamin D deficiency Education Needs:        [x] None identified  [] Identified - Not appropriate at this time  []  Identified and addressed - refer to education log Learning Limitations:   [] None identified  [x] Identified: language barrier Cultural, Rastafari & ethnic food preferences:  [x] None identified    [] Identified and addressed ESTIMATED NUTRITION NEEDS:  
 
Calories: 0864-5888 kcal (30-35 kcal/kg) based on  [] Actual BW      [x] SBW 56 kg Protein: 67-84 gm (1.2-1.5 gm/kg) based on  [] Actual BW      [x] SBW Fluid: 1 mL/kcal 
  
MONITORING & EVALUATION:  
 
Nutrition Goal(s): 1. Po intake of meals will meet >75% of patient estimated nutritional needs within the next 7 days. Outcome:  [] Met/Ongoing    []  Not Met    [x] New/Initial Goal  
 
Monitoring:   [x] Food and beverage intake   [x] Diet order   [x] Nutrition-focused physical findings   [x] Treatment/therapy   [] Weight   [] Enteral nutrition intake Previous Recommendations (for follow-up assessments only):     []   Implemented       []   Not Implemented (RD to address)      [] No Longer Appropriate     [] No Recommendation Made Discharge Planning: renal diet [x] Participated in care planning, discharge planning, & interdisciplinary rounds as appropriate Rene Hamilton RD, [address] [place]  Pager: [phone]

## 2018-07-17 NOTE — CONSULTS
ID Consult Note    Meliza Ornelas is a 56 y.o. female who is being seen on consult for antibiotic management for  MTB and MAC pulmonary and extrapulmonary infection. The requesting  physician is Ariel Boyd*. Current  antibiotics:   INH  Rifampin  PZA  ETA        Past antibiotics:       Impression:     Pulmonary and extrapulmonary MTB infection  Recently diagnosed and started on 4 drug regime  In June 2018 ,  Sputum AFB positive 4/23/18 and 4/24/18  : pan sensitive strain   BAL cx 4/24/10 ; negative for MTB  S/p liver and pleural biopsy on 5/11/18 at [facility] : MTB probe positive   CT abd and pelvis at [facility] ( 6/7) shows no liver lesion     Pulmonary  Mycobacterium avium infection   Sputum cx from 6/21/18 done at [facility] : MAC probe positive     Intractable nausea and vomiting ; due to HTN emergency vs medication induced  Or due to constipation   Mild elevated transaminates : monitor     Hypertensive emergency with seizure and intractable nausea and vomiting   ESRD on HD  NSTEMI in 3/74  Hx Diastolic Heart failure        Plan:   I would recommend to restart 4 drug MTB regime with INH, Rifampin, Ethanbutol and PZA with renally adjusted dose   And control nausea with antiemetics RTC regime   I would hold off on addiing more antibiotics to MTB treatment for now and plan to treat MAC infection as she can tolerate the medications   Monitor LFT   Notify Health dept about patient Merribeth Stable so that she can be continued on DOT   Patient will need follow up with [facility] ID service on discharge for out patient ID follow up    Recommend to repeat CT abd and pelvis to follow on liver leison with IV contrast when patient becomes stable. Continue supportive care  We are watching pt closely for toxicities and side effects to abx and drug-drug interactions. We will adjust antibiotics upon the results of the pending tests and the clinical improvement of the patient.      Case discussed with: [X]Patient [X]Family [X]Nursing [ ]Case Management  [ ] Hebert Smoker  [ ]MD/Care team     I discussed with Mode Reyna about the management, prognosis, treatment and complications of the above infectious disease conditions. All patient's questions were answered in detail. She voiced understanding of the discussed issues. She agrees with current plan of therapy. History of Present Illness     Mode Reyna is a 56 y.o.  female who has PMH of  pulmonary TB Dx in 06/2018 on 4 meds, ESRD on HD, recent admission for clotted HD access, HTN and prediabetes , has been admitted to [facility] on 7/16/18 for uncontrolled hypertension associated with nasusea and vomiting . She  Has been recently discharged from [facility] after DOT tx for 2 weeks for MTB treatment. She has been doing well , until 3 dys ago, her blood pressure got uncontrolled and started vomiting. Her last dialysis was on Friday 7/13. Per son, While in [facility] ED patient also had a witnessed seizure ativan given and activity stopped. No further seizure activity documented. No history of seizure disorder. CT of Head was negative for  acute. Patient was started on a nitroprusside gtt for hypertension. Patient has been diganosed with MTB infeciton from positive sputum cultures from April 2018, done at [facility] , she started treatment as in patient at [facility] in June . She has been followed by HD for DOT treatment for MTB treatment . Patient has been started on 4 drug MTB treatment during last admission at [facility] and stayed in  [place] for 2 weeks for DOT treatment   Patient underwent for US guided left pleura biopsy and liver mass biopsy at [facility] on 5/11/18 . Tissue cultures results from  5/11/18 is positive for M Tuberculosis in both sample , ( liver vs pleural ) when I called the lab it was told as liver speciemen . it was Isolated from MGIT Broth and Identified by DNA probe,Positive for M.  Tuberculosis complex   Patient has been followed by [facility] ID service during her apirl admisison , and needs to have follow up arrangment   As per pulmonary/ critical notes, imaging studies will be scheduled. AFB sputum culture from 6/21/18 done at [facility] is positive for MAC complex . Second sputum culture from 6/21 is negative     On exam in the ED patient was afebrile , tachycardic  hypertensive   I reviewed lab tests- Labs on presentation showed WBC in normal limit  ,alt 13, Ast 82 , ALK phos 208   .  cxr  From 7/17 showed Small bilateral pleural effusions. 2.  Improved aeration and expansion of the lungs with mild bibasilar opacities. She  Has been admitted  To ICU . Her nausea is more controled with mediciaton today and planning to start on oral medication today. She is on  4 drug regime with daily INH, Rifampin and ETA and PZA 3 times a week. And daily B6 supplement. Pt does not speak english from [place] and The history is obtained by  eviewing medical records and discussion with referring Louann Caballero and staff involving in patient care   We were asked to comment on differential diagnosis and antibiotic management. I obtained the history of this case by independently reviewing the previous available medical records, labs, microbiology data, imaging studies, as well as from the conversation I had with the medical and nursing staff involved in this patient's care. Cc: This note has been sent to the requesting physican, with findings and recommendations via [email or web address]. Chief complaint:   Chief Complaint   Patient presents with    Chest Pain    Vomiting             Review of Systems:   Interval notes, available laboratory, microbiology and radiographic data reviewed. Discussed with nursing and treatment  providers     Limited ROS due to language barrier  As per son at bedside, patient nausea has improved   Denies sob, cough, abd pain, urinary symptoms , headache         Physical Assessment:     VITAL SIGNS  Blood pressure (!) 170/95, pulse 99, temperature 97.7 °F (36.5 °C), resp.  rate 22, height 5' 1\" (1.549 m), weight 40.8 kg (90 lb), SpO2 97 %, not currently breastfeeding.   Temp (24hrs), Av.8 °F (36.6 °C), Min:97.4 °F (36.3 °C), Max:98.3 °F (36.8 °C)        CONSTITUTIONAL:    Awake, in moderate distressed   PSYCH:   Judgement/insight - cannot access    Awake    Affect - normal, not anxious, agitated or depressed  EYES:    Conjunctivae normal   PERRL  ENMT:    Left exterior ear normal, right external ear normal, nose normal   Inspection lips, gums - normal   Oropharynx clear and moist  NECK:    Exam neck - no masses, normal symmetry, trachea midline   Exam thyroid - no thyromegaly, no masses  PULMONARY/CHEST WALL:   Resp Effort - No use of accessory muscles, no intercostal retractions   Breath sounds normal  CARDIOVASCULAR:    Heart sounds normal, normal rate and regular rhythm   Intact pedal pulses  ABDOMINAL:    Appearance normal, soft, bowel sounds normal, no tenderness   Liver/spleen - could not palpate organomegaly  GENITOURINARY: External genitalia - not examined  LYMPHATIC SYSTEM:   No lymphadenopathy in neck   No lymphadenopathy in groin  MUSCULOSKELETAL:   Gait not examined   Digits, nails - no clubbing, no cyanosis, no infection   RUE, LUE - normal ROM   RLE, LLE - Normal ROM   Muscle strength normal    Head and neck ROM - normal  SKIN    Inspection - as above - otherwise no rash, no ulcers, no crepitus, intact   Palpation - as above - otherwise no induration, no nodules, dry, warm  NEUROLOGICAL: EOM normal     Sensation by touch was normal              MICROBIOLOGY DATA    Reviewed AFB cultures       LAB DATA    Recent Results (from the past 24 hour(s))   EKG, 12 LEAD, INITIAL    Collection Time: 18 11:42 AM   Result Value Ref Range    Ventricular Rate 114 BPM    Atrial Rate 114 BPM    P-R Interval 152 ms    QRS Duration 84 ms    Q-T Interval 330 ms    QTC Calculation (Bezet) 454 ms    Calculated P Axis 79 degrees    Calculated R Axis 103 degrees    Calculated T Axis 64 degrees    Diagnosis Sinus tachycardia  Possible Left atrial enlargement  Rightward axis  Poor R Wave Progression    Abnormal ECG  When compared with ECG of 27-JUN-2018 10:28,  Vent. rate has increased BY  39 BPM  Confirmed by Cely Zamarripa ([phone]) on 7/16/2018 12:43:40 PM     CULTURE, BLOOD    Collection Time: 07/16/18 11:50 AM   Result Value Ref Range    Special Requests: NO SPECIAL REQUESTS      Culture result: NO GROWTH AFTER 17 HOURS     METABOLIC PANEL, COMPREHENSIVE    Collection Time: 07/16/18 11:50 AM   Result Value Ref Range    Sodium 130 (L) 136 - 145 mmol/L    Potassium 4.9 3.5 - 5.5 mmol/L    Chloride 92 (L) 100 - 108 mmol/L    CO2 24 21 - 32 mmol/L    Anion gap 14 3.0 - 18 mmol/L    Glucose 131 (H) 74 - 99 mg/dL    BUN 37 (H) 7.0 - 18 MG/DL    Creatinine 7.32 (H) 0.6 - 1.3 MG/DL    BUN/Creatinine ratio 5 (L) 12 - 20      GFR est AA 7 (L) >60 ml/min/1.73m2    GFR est non-AA 6 (L) >60 ml/min/1.73m2    Calcium 9.9 8.5 - 10.1 MG/DL    Bilirubin, total 0.8 0.2 - 1.0 MG/DL    ALT (SGPT) 13 13 - 56 U/L    AST (SGOT) 82 (H) 15 - 37 U/L    Alk. phosphatase 208 (H) 45 - 117 U/L    Protein, total 8.1 6.4 - 8.2 g/dL    Albumin 3.1 (L) 3.4 - 5.0 g/dL    Globulin 5.0 (H) 2.0 - 4.0 g/dL    A-G Ratio 0.6 (L) 0.8 - 1.7     CBC WITH AUTOMATED DIFF    Collection Time: 07/16/18 11:50 AM   Result Value Ref Range    WBC 11.1 4.6 - 13.2 K/uL    RBC 5.41 (H) 4.20 - 5.30 M/uL    HGB 16.1 (H) 12.0 - 16.0 g/dL    HCT 49.7 (H) 35.0 - 45.0 %    MCV 91.9 74.0 - 97.0 FL    MCH 29.8 24.0 - 34.0 PG    MCHC 32.4 31.0 - 37.0 g/dL    RDW 18.9 (H) 11.6 - 14.5 %    PLATELET 165 951 - 188 K/uL    MPV 9.1 (L) 9.2 - 11.8 FL    NEUTROPHILS 85 (H) 40 - 73 %    LYMPHOCYTES 7 (L) 21 - 52 %    MONOCYTES 7 3 - 10 %    EOSINOPHILS 0 0 - 5 %    BASOPHILS 1 0 - 2 %    ABS. NEUTROPHILS 9.4 (H) 1.8 - 8.0 K/UL    ABS. LYMPHOCYTES 0.8 (L) 0.9 - 3.6 K/UL    ABS. MONOCYTES 0.8 0.05 - 1.2 K/UL    ABS. EOSINOPHILS 0.0 0.0 - 0.4 K/UL    ABS.  BASOPHILS 0.1 0.0 - 0.1 K/UL    DF AUTOMATED     TROPONIN I    Collection Time: 07/16/18 11:50 AM   Result Value Ref Range    Troponin-I, Qt. 0.20 (H) 0.00 - 0.06 NG/ML   POC LACTIC ACID    Collection Time: 07/16/18 11:55 AM   Result Value Ref Range    Lactic Acid (POC) 4.0 (HH) 0.4 - 2.0 mmol/L   CULTURE, BLOOD    Collection Time: 07/16/18 12:00 PM   Result Value Ref Range    Special Requests: NO SPECIAL REQUESTS      Culture result: NO GROWTH AFTER 17 HOURS     POC LACTIC ACID    Collection Time: 07/16/18  3:22 PM   Result Value Ref Range    Lactic Acid (POC) 1.2 0.4 - 2.0 mmol/L   GLUCOSE, POC    Collection Time: 07/17/18 12:52 AM   Result Value Ref Range    Glucose (POC) 101 70 - 654 mg/dL   METABOLIC PANEL, BASIC    Collection Time: 07/17/18  1:10 AM   Result Value Ref Range    Sodium 131 (L) 136 - 145 mmol/L    Potassium 4.6 3.5 - 5.5 mmol/L    Chloride 94 (L) 100 - 108 mmol/L    CO2 25 21 - 32 mmol/L    Anion gap 12 3.0 - 18 mmol/L    Glucose 102 (H) 74 - 99 mg/dL    BUN 45 (H) 7.0 - 18 MG/DL    Creatinine 7.79 (H) 0.6 - 1.3 MG/DL    BUN/Creatinine ratio 6 (L) 12 - 20      GFR est AA 7 (L) >60 ml/min/1.73m2    GFR est non-AA 5 (L) >60 ml/min/1.73m2    Calcium 8.5 8.5 - 10.1 MG/DL   MAGNESIUM    Collection Time: 07/17/18  1:10 AM   Result Value Ref Range    Magnesium 2.4 1.6 - 2.6 mg/dL   PHOSPHORUS    Collection Time: 07/17/18  1:10 AM   Result Value Ref Range    Phosphorus 6.5 (H) 2.5 - 4.9 MG/DL   CBC WITH AUTOMATED DIFF    Collection Time: 07/17/18  1:10 AM   Result Value Ref Range    WBC 8.5 4.6 - 13.2 K/uL    RBC 3.49 (L) 4.20 - 5.30 M/uL    HGB 10.4 (L) 12.0 - 16.0 g/dL    HCT 31.5 (L) 35.0 - 45.0 %    MCV 90.3 74.0 - 97.0 FL    MCH 29.8 24.0 - 34.0 PG    MCHC 33.0 31.0 - 37.0 g/dL    RDW 19.0 (H) 11.6 - 14.5 %    PLATELET 89 (L) 053 - 420 K/uL    MPV 9.1 (L) 9.2 - 11.8 FL    NEUTROPHILS 76 (H) 42 - 75 %    BAND NEUTROPHILS 6 (H) 0 - 5 %    LYMPHOCYTES 8 (L) 20 - 51 %    MONOCYTES 10 (H) 2 - 9 %    EOSINOPHILS 0 0 - 5 %    BASOPHILS 0 0 - 3 %    ABS. NEUTROPHILS 6.9 1.8 - 8.0 K/UL    ABS. LYMPHOCYTES 0.7 (L) 0.8 - 3.5 K/UL    ABS. MONOCYTES 0.9 0 - 1.0 K/UL    ABS. EOSINOPHILS 0.0 0.0 - 0.4 K/UL    ABS. BASOPHILS 0.0 0.0 - 0.06 K/UL    DF MANUAL      PLATELET COMMENTS DECREASED PLATELETS      RBC COMMENTS ANISOCYTOSIS  1+       CARDIAC PANEL,(CK, CKMB & TROPONIN)    Collection Time: 07/17/18  1:10 AM   Result Value Ref Range    CK 66 26 - 192 U/L    CK - MB 2.1 <3.6 ng/ml    CK-MB Index 3.2 0.0 - 4.0 %    Troponin-I, Qt. 0.35 (H) 0.0 - 0.045 NG/ML   EKG, 12 LEAD, SUBSEQUENT    Collection Time: 07/17/18  3:08 AM   Result Value Ref Range    Ventricular Rate 106 BPM    Atrial Rate 106 BPM    P-R Interval 158 ms    QRS Duration 86 ms    Q-T Interval 376 ms    QTC Calculation (Bezet) 499 ms    Calculated P Axis 73 degrees    Calculated R Axis 103 degrees    Calculated T Axis 64 degrees    Diagnosis       Sinus tachycardia  Rightward axis  Nonspecific ST abnormality  Abnormal ECG  When compared with ECG of 16-JUL-2018 11:42,  ST now depressed in Lateral leads     METABOLIC PANEL, COMPREHENSIVE    Collection Time: 07/17/18  5:07 AM   Result Value Ref Range    Sodium 131 (L) 136 - 145 mmol/L    Potassium 4.6 3.5 - 5.5 mmol/L    Chloride 95 (L) 100 - 108 mmol/L    CO2 23 21 - 32 mmol/L    Anion gap 13 3.0 - 18 mmol/L    Glucose 95 74 - 99 mg/dL    BUN 43 (H) 7.0 - 18 MG/DL    Creatinine 7.89 (H) 0.6 - 1.3 MG/DL    BUN/Creatinine ratio 5 (L) 12 - 20      GFR est AA 6 (L) >60 ml/min/1.73m2    GFR est non-AA 5 (L) >60 ml/min/1.73m2    Calcium 8.8 8.5 - 10.1 MG/DL    Bilirubin, total 0.6 0.2 - 1.0 MG/DL    ALT (SGPT) 8 (L) 13 - 56 U/L    AST (SGOT) 46 (H) 15 - 37 U/L    Alk.  phosphatase 142 (H) 45 - 117 U/L    Protein, total 6.3 (L) 6.4 - 8.2 g/dL    Albumin 2.6 (L) 3.4 - 5.0 g/dL    Globulin 3.7 2.0 - 4.0 g/dL    A-G Ratio 0.7 (L) 0.8 - 1.7     CBC WITH AUTOMATED DIFF    Collection Time: 07/17/18  5:07 AM   Result Value Ref Range    WBC 7.8 4.6 - 13.2 K/uL RBC 3.76 (L) 4.20 - 5.30 M/uL    HGB 11.3 (L) 12.0 - 16.0 g/dL    HCT 34.3 (L) 35.0 - 45.0 %    MCV 91.2 74.0 - 97.0 FL    MCH 30.1 24.0 - 34.0 PG    MCHC 32.9 31.0 - 37.0 g/dL    RDW 19.1 (H) 11.6 - 14.5 %    PLATELET 89 (L) 963 - 420 K/uL    MPV 10.2 9.2 - 11.8 FL    NEUTROPHILS 83 (H) 40 - 73 %    LYMPHOCYTES 7 (L) 21 - 52 %    MONOCYTES 9 3 - 10 %    EOSINOPHILS 0 0 - 5 %    BASOPHILS 1 0 - 2 %    ABS. NEUTROPHILS 6.5 1.8 - 8.0 K/UL    ABS. LYMPHOCYTES 0.5 (L) 0.9 - 3.6 K/UL    ABS. MONOCYTES 0.7 0.05 - 1.2 K/UL    ABS. EOSINOPHILS 0.0 0.0 - 0.4 K/UL    ABS. BASOPHILS 0.1 0.0 - 0.1 K/UL    DF AUTOMATED      PLATELET COMMENTS DECREASED PLATELETS      RBC COMMENTS ANISOCYTOSIS  1+        RBC COMMENTS HELMET CELLS  1+       PROTHROMBIN TIME + INR    Collection Time: 07/17/18  5:07 AM   Result Value Ref Range    Prothrombin time 12.8 11.5 - 15.2 sec    INR 1.0 0.8 - 1.2     MRSA SCREEN - PCR (NASAL)    Collection Time: 07/17/18  5:30 AM   Result Value Ref Range    Special Requests: NO SPECIAL REQUESTS      Culture result:        MRSA target DNA is not detected (presumptive not colonized with MRSA)   GLUCOSE, POC    Collection Time: 07/17/18  5:50 AM   Result Value Ref Range    Glucose (POC) 97 70 - 110 mg/dL           IMAGING     I reviewed images CXR from 7/17   - official report showed Small bilateral pleural effusions.     2. Improved aeration and expansion of the lungs with mild bibasilar opacities.     I reviewed images CT chest/abd/pelvis  Without contrast  from  6/7/18  - official report showed   Small to moderate right pleural effusion with atelectasis/infiltrates in basal  right lung.     Mild atelectasis and/or fibrosis with or without active infiltrates in left lung  base.     Small to moderate pericardial effusion.     Large amount of ascites distending abdomen and pelvis.     Large amount of retained stool in proximal colon.  Moderate gas in transverse  colon and upper sigmoid colon.     There is prominent appendix containing gas and some stool-like material but  there is no evidence of any abnormal thickening or appendicular wall.  No  definite finding to suspect acute appendicitis.               Current medications reviewed in EPIC        Current Facility-Administered Medications   Medication Dose Route Frequency    pyridoxine (vitamin B6) (B-6) injection 100 mg  100 mg IntraMUSCular DAILY    nitroPRUSSide (NIPRIDE) 50 mg in dextrose 5% 250 mL infusion  0-10 mcg/kg/min IntraVENous TITRATE    aspirin chewable tablet 81 mg  81 mg Oral DAILY    atorvastatin (LIPITOR) tablet 40 mg  40 mg Oral QHS    carvedilol (COREG) tablet 25 mg  25 mg Oral Q12H    hydrALAZINE (APRESOLINE) tablet 25 mg  25 mg Oral BID    losartan (COZAAR) tablet 100 mg  100 mg Oral DAILY    melatonin tablet 3 mg  3 mg Oral QHS PRN    FLUoxetine (PROzac) capsule 10 mg  10 mg Oral DAILY    acetaminophen (TYLENOL) tablet 650 mg  650 mg Oral Q6H PRN    oxyCODONE-acetaminophen (PERCOCET) 5-325 mg per tablet 1 Tab  1 Tab Oral Q6H PRN    naloxone (NARCAN) injection 0.4 mg  0.4 mg IntraVENous PRN    ondansetron (ZOFRAN) injection 4 mg  4 mg IntraVENous Q6H PRN    docusate sodium (COLACE) capsule 100 mg  100 mg Oral BID PRN    heparin (porcine) injection 5,000 Units  5,000 Units SubCUTAneous Q8H    cloNIDine (CATAPRES) 0.3 mg/24 hr patch 1 Patch  1 Patch TransDERmal Q7D    isoniazid (NYDRAZID) tablet 300 mg  300 mg Oral DAILY    ethambutol (MYAMBUTOL) tablet 800 mg  800 mg Oral DAILY    pyrazinamide tablet 1,000 mg  1,000 mg Oral Q MON, WED & FRI    rifAMPin (RIFADIN) capsule 600 mg  600 mg Oral ACB    LORazepam (ATIVAN) injection 1 mg  1 mg IntraVENous PRN    bisacodyl (DULCOLAX) suppository 10 mg  10 mg Rectal DAILY PRN    promethazine (PHENERGAN) 12.5 mg in 0.9% sodium chloride 50 mL IVPB  12.5 mg IntraVENous Q6H PRN    metoprolol (LOPRESSOR) injection 5 mg  5 mg IntraVENous Q6H PRN    insulin lispro (HUMALOG) injection SubCUTAneous Q6H    glucose chewable tablet 16 g  4 Tab Oral PRN    glucagon (GLUCAGEN) injection 1 mg  1 mg IntraMUSCular PRN    dextrose (D50W) injection syrg 12.5-25 g  25-50 mL IntraVENous PRN    0.9% sodium chloride infusion  50 mL/hr IntraVENous CONTINUOUS    famotidine (PF) (PEPCID) 20 mg in sodium chloride 0.9% 10 mL injection  20 mg IntraVENous DAILY         Allergies   Allergen Reactions    Banana Other (comments)       Past Medical History:   Diagnosis Date    Chronic kidney disease     ESRD (end stage renal disease) ([facility] [facility]     TUES-THURS-SAT    Hypercholesteremia     Hypertension     Kidney disease     Vitamin D deficiency      Past Surgical History:   Procedure Laterality Date    VASCULAR SURGERY PROCEDURE UNLIST       Social History     Social History    Marital status:      Spouse name: N/A    Number of children: N/A    Years of education: N/A     Occupational History    Not on file. Social History Main Topics    Smoking status: Never Smoker    Smokeless tobacco: Never Used    Alcohol use No    Drug use: No    Sexual activity: Not on file     Other Topics Concern    Not on file     Social History Narrative     The family history was reviewed and other than as discussed above in the H or PI does not substantially contribute to the issues surrounding the present illness. History reviewed. No pertinent family history. Patient Active Problem List   Diagnosis Code    Hyperkalemia E87.5    Renal failure N19    SCOOTER (acute kidney injury) ([facility] [facility] N17.9    Hyponatremia E87.1    ESRD (end stage renal disease) ([facility] [address] N18.6    NSTEMI (non-ST elevated myocardial infarction) ([facility] [address] I21.4    Respiratory failure ([address] J96.90    Hypoxia R09.02    Acute UTI N39.0    Anemia J29.3    Complication of vascular dialysis catheter, initial encounter T82. 9XXA    Seizure ([address] R56.9    Hypertensive emergency I16.1    Intractable vomiting with nausea R11.2    ESR[facility] hemod[facility] ([facility] N18.6, Z99.2    Pulmonary TB A15.0    Hypertensive crisis I16.9         Medical Decision Making:     I reviewed and summarized data from old medical records and obtained history from other sources than patient. I reviewed laboratory tests, Radiology data, and diagnostic tests in the CPT medicine section. I discussed with the physicians and the nursed involved in this patient's care about this patient's current medical problems. I have  Discussed plan of care with patient and family  and nursing staff. The total visit duration was of  85  minutes of which more than 50% was spent in coordination of care and counseling (time spent with patient/family face to face, physical exam, reviewing microbiology data, laboratory results, radiographic data, speaking with physicians and nursing staff involved in this pt's care).

## 2018-07-17 NOTE — CONSULTS
[employer] [facility] Pulmonary, Critical Care, and Sleep Medicine Name: Salvatore Gonzalez MRN: [id number] : 1960 Hospital: [address] [place] [address] Date: 2018 Pulmonary Critical Care Initial Patient Consult Reason for CC Consult: Hypertensive Urgency Subjective/History:  
 
Salvatore Gonzalez has been seen and evaluated at the request of Dr. Leeroy Heredia for Hypertensive Urgency. History taken from chart review and from son. Patient is a 56 y.o. female with PMHx of kidney disease, ESRD, HTN who presented to [facility] ED via EMS with acute onset of vomiting that started on 7/15. The vomiting is now constant and pt has had multiple episodes. Nothing makes the sympoms better or worse. Associated symptoms include chest pain and changes in appetite. No medication was taken yesterday and pt vomited her medication on 7/15. She is a dialysis patient and was supposed to have dialysis on Monday, however she started to experience the chest pain. Her last dialysis was on . Per son, pt has had similar symptoms in the past and was hospitalized. While in [facility] ED patient also had a witnessed seizure ativan given and activity stopped. No further seizure activity documented. No history of seizure disorder. CT of Head nil acute. Patient was started on a nitroprusside gtt for hypertension. Last admission bronch sample came back + for active TB currently is undergoing treatment, will continue medications once able to get nausea and vomiting under control. Pt only speaks [place] and she is non-verbal at bedside at present. Family denies fever or diarrhea. Denies inhaler use. Denies tobacco, alcohol or illicit drug use. Patient was transferred to ICU for continued care. The patient is critically ill and can not provide additional history due to [facility].   
 
[x]The patient is unable to give any meaningful history or review of systems because the patient is: 
[]Intubated []Sedated  
[]Unresponsive [x]Language Barrier [x]The patient is critically ill on     
[]Mechanical ventilation []Pressors []BiPAP [x] Antihypertensive gtts Review of Systems: 
Review of systems not obtained due to patient factors. Past Medical History: 
Past Medical History:  
Diagnosis Date  Chronic kidney disease  ESRD (end stage renal disease) ([facility] [address] TUES-THURS-SAT  Hypercholesteremia  Hypertension  Kidney disease  Vitamin D deficiency Past Surgical History: 
Past Surgical History:  
Procedure Laterality Date  VASCULAR SURGERY PROCEDURE UNLIST Medications: 
Prior to Admission medications Medication Sig Start Date End Date Taking? Authorizing Provider  
isoniazid (NYDRAZID) 300 mg tablet Take 300 mg by mouth daily. Yes Historical Provider  
rifAMPin (RIFADIN) 300 mg capsule Take 600 mg by mouth daily. Yes Historical Provider  
ethambutol (MYAMBUTOL) 400 mg tablet Take 800 mg by mouth daily. Yes Historical Provider  
pyrazinamide 500 mg tablet Take 500 mg by mouth daily. Yes Historical Provider  
amLODIPine (NORVASC) 10 mg tablet Take 1 Tab by mouth daily. 3/8/18  Yes Bibi Simental MD  
aspirin 81 mg chewable tablet Take 1 Tab by mouth daily. 3/8/18  Yes Bibi Simental MD  
atorvastatin (LIPITOR) 40 mg tablet Take 1 Tab by mouth nightly. 3/8/18  Yes Bibi Simental MD  
carvedilol (COREG) 25 mg tablet Take 1 Tab by mouth every twelve (12) hours. 3/8/18  Yes Bibi Simental MD  
cloNIDine HCl (CATAPRES) 0.3 mg tablet Take 1 Tab by mouth two (2) times a day. 3/8/18  Yes Bibi Simental MD  
losartan (COZAAR) 100 mg tablet Take 1 Tab by mouth daily. 3/8/18  Yes Bibi Simental MD  
hydrALAZINE (APRESOLINE) 100 mg tablet Take 0.5 Tabs by mouth two (2) times a day. 3/8/18  Yes Bibi Simental MD  
melatonin 3 mg tablet Take 1 Tab by mouth nightly as needed.  
Patient taking differently: Take 1 Tab by mouth nightly as needed (insomnia). 3/8/18  Yes Vidhi Estrella MD  
FLUoxetine (PROZAC) 10 mg capsule Take 1 Cap by mouth daily. 3/8/18  Yes Vidhi Estrella MD  
glycerin, adult, (FLEET GLYCERIN, ADULT,) suppository Insert 1 Suppository into rectum daily. Indications: BOWEL EVACUATION 2/5/18  Yes Karina Oshea MD  
albuterol (PROVENTIL HFA, VENTOLIN HFA, PROAIR HFA) 90 mcg/actuation inhaler Take 2 Puffs by inhalation every four (4) hours as needed for Wheezing. 11/6/17  Yes Christal Langford PA-C Current Facility-Administered Medications Medication Dose Route Frequency  nitroPRUSSide (NIPRIDE) 50 mg in dextrose 5% 250 mL infusion  0-10 mcg/kg/min IntraVENous TITRATE  [START ON 7/17/2018] aspirin chewable tablet 81 mg  81 mg Oral DAILY  atorvastatin (LIPITOR) tablet 40 mg  40 mg Oral QHS  carvedilol (COREG) tablet 25 mg  25 mg Oral Q12H  
 [START ON 7/17/2018] hydrALAZINE (APRESOLINE) tablet 25 mg  25 mg Oral BID  [START ON 7/17/2018] losartan (COZAAR) tablet 100 mg  100 mg Oral DAILY  [START ON 7/17/2018] FLUoxetine (PROzac) capsule 10 mg  10 mg Oral DAILY  heparin (porcine) injection 5,000 Units  5,000 Units SubCUTAneous Q8H  
 cloNIDine (CATAPRES) 0.3 mg/24 hr patch 1 Patch  1 Patch TransDERmal Q7D  
 [START ON 7/17/2018] isoniazid (NYDRAZID) tablet 300 mg  300 mg Oral DAILY  [START ON 7/17/2018] ethambutol (MYAMBUTOL) tablet 800 mg  800 mg Oral DAILY  [START ON 7/17/2018] pyrazinamide tablet 1,000 mg  1,000 mg Oral Q MON, WED & FRI  
 [START ON 7/17/2018] rifAMPin (RIFADIN) capsule 600 mg  600 mg Oral ACB  [START ON 7/17/2018] insulin lispro (HUMALOG) injection   SubCUTAneous Q6H  
 [START ON 7/17/2018] 0.9% sodium chloride infusion  50 mL/hr IntraVENous CONTINUOUS  
 [START ON 7/17/2018] famotidine (PF) (PEPCID) 20 mg in sodium chloride 0.9% 10 mL injection  20 mg IntraVENous DAILY Allergy: Allergies Allergen Reactions  Banana Other (comments) Social History: 
Social History Substance Use Topics  Smoking status: Never Smoker  Smokeless tobacco: Never Used  Alcohol use No  
  
 
Family History: 
History reviewed. No pertinent family history. Objective:  
Vital Signs:   
Visit Vitals  /89  Pulse (!) 121  Temp 97.7 °F (36.5 °C)  Resp 19  
 Ht 5' 1\" (1.549 m)  Wt 40.8 kg (90 lb)  SpO2 95%  Breastfeeding No  
 BMI 17.01 kg/m2 O2 Device: Room air Temp (24hrs), Av.6 °F (36.4 °C), Min:97.4 °F (36.3 °C), Max:97.7 °F (36.5 °C) Patient Vitals for the past 8 hrs: 
 Temp Pulse Resp BP SpO2  
18 2030 - (!) 121 19 143/89 95 % 18 2000 97.7 °F (36.5 °C) (!) 122 12 (!) 146/96 95 % 18 1930 - (!) 121 11 (!) 146/92 96 % 18 1900 - (!) 121 20 138/89 98 % 18 1715 - (!) 124 27 (!) 150/92 99 % 18 1701 - (!) 120 28 (!) 142/96 96 % 18 1630 - (!) 125 22 143/88 97 % 18 1600 - (!) 123 20 142/88 98 % Intake/Output:  
Last shift:      1901 -  07 In: 58.8 [I.V.:58.8] Out: - Last 3 shifts: 07/15 07 -  190 In: 260.6 [I.V.:260.6] Out: - Intake/Output Summary (Last 24 hours) at 18 2351 Last data filed at 18 Gross per 24 hour Intake           319.38 ml Output                0 ml Net           319.38 ml Physical Exam: 
General appearance - acyanotic, in no respiratory distress and dehydrated Mental status - restless, unable to assess - language barrier Eyes - pupils equal and reactive, extraocular eye movements intact, sclera anicteric Mouth - mucous membranes dry Neck - supple, no significant adenopathy Chest - clear to auscultation, no wheezes, rales or rhonchi, symmetric air entry, no tachypnea, retractions or cyanosis Heart - mild tachycardia, regular rhythm, normal S1, S2, no murmurs, rubs, clicks or gallops Abdomen - soft, nontender, nondistended, no masses or organomegaly Neurological - alert, unable to assess orientation, no focal findings or movement disorder noted at present Musculoskeletal - no joint tenderness, deformity or swelling Extremities - peripheral pulses normal, no pedal edema, no clubbing or cyanosis Skin - normal coloration and turgor, no rashes, no suspicious skin lesions noted Data:  
 
Recent Results (from the past 24 hour(s)) EKG, 12 LEAD, INITIAL Collection Time: 07/16/18 11:42 AM  
Result Value Ref Range Ventricular Rate 114 BPM  
 Atrial Rate 114 BPM  
 P-R Interval 152 ms QRS Duration 84 ms Q-T Interval 330 ms QTC Calculation (Bezet) 454 ms Calculated P Axis 79 degrees Calculated R Axis 103 degrees Calculated T Axis 64 degrees Diagnosis Sinus tachycardia Possible Left atrial enlargement Rightward axis Poor R Wave Progression Abnormal ECG When compared with ECG of 27-JUN-2018 10:28, 
Vent. rate has increased BY  39 BPM 
Confirmed by Nolia Sandhoff ([phone]) on 7/16/2018 53:14:97 PM 
  
METABOLIC PANEL, COMPREHENSIVE Collection Time: 07/16/18 11:50 AM  
Result Value Ref Range Sodium 130 (L) 136 - 145 mmol/L Potassium 4.9 3.5 - 5.5 mmol/L Chloride 92 (L) 100 - 108 mmol/L  
 CO2 24 21 - 32 mmol/L Anion gap 14 3.0 - 18 mmol/L Glucose 131 (H) 74 - 99 mg/dL BUN 37 (H) 7.0 - 18 MG/DL Creatinine 7.32 (H) 0.6 - 1.3 MG/DL  
 BUN/Creatinine ratio 5 (L) 12 - 20 GFR est AA 7 (L) >60 ml/min/1.73m2 GFR est non-AA 6 (L) >60 ml/min/1.73m2 Calcium 9.9 8.5 - 10.1 MG/DL Bilirubin, total 0.8 0.2 - 1.0 MG/DL  
 ALT (SGPT) 13 13 - 56 U/L  
 AST (SGOT) 82 (H) 15 - 37 U/L Alk. phosphatase 208 (H) 45 - 117 U/L Protein, total 8.1 6.4 - 8.2 g/dL Albumin 3.1 (L) 3.4 - 5.0 g/dL Globulin 5.0 (H) 2.0 - 4.0 g/dL A-G Ratio 0.6 (L) 0.8 - 1.7    
CBC WITH AUTOMATED DIFF Collection Time: 07/16/18 11:50 AM  
Result Value Ref Range  WBC 11.1 4.6 - 13.2 K/uL  
 RBC 5.41 (H) 4.20 - 5.30 M/uL  
 HGB 16.1 (H) 12.0 - 16.0 g/dL  
 HCT 49.7 (H) 35.0 - 45.0 % MCV 91.9 74.0 - 97.0 FL  
 MCH 29.8 24.0 - 34.0 PG  
 MCHC 32.4 31.0 - 37.0 g/dL  
 RDW 18.9 (H) 11.6 - 14.5 % PLATELET 201 353 - 979 K/uL MPV 9.1 (L) 9.2 - 11.8 FL  
 NEUTROPHILS 85 (H) 40 - 73 % LYMPHOCYTES 7 (L) 21 - 52 % MONOCYTES 7 3 - 10 % EOSINOPHILS 0 0 - 5 % BASOPHILS 1 0 - 2 %  
 ABS. NEUTROPHILS 9.4 (H) 1.8 - 8.0 K/UL  
 ABS. LYMPHOCYTES 0.8 (L) 0.9 - 3.6 K/UL  
 ABS. MONOCYTES 0.8 0.05 - 1.2 K/UL  
 ABS. EOSINOPHILS 0.0 0.0 - 0.4 K/UL  
 ABS. BASOPHILS 0.1 0.0 - 0.1 K/UL  
 DF AUTOMATED    
TROPONIN I Collection Time: 07/16/18 11:50 AM  
Result Value Ref Range Troponin-I, Qt. 0.20 (H) 0.00 - 0.06 NG/ML  
POC LACTIC ACID Collection Time: 07/16/18 11:55 AM  
Result Value Ref Range Lactic Acid (POC) 4.0 (HH) 0.4 - 2.0 mmol/L POC LACTIC ACID Collection Time: 07/16/18  3:22 PM  
Result Value Ref Range Lactic Acid (POC) 1.2 0.4 - 2.0 mmol/L No results for input(s): FIO2I, IFO2, HCO3I, IHCO3, HCOPOC, PCO2I, PCOPOC, IPHI, PHI, PHPOC, PO2I, PO2POC in the last 72 hours. No lab exists for component: IPOC2 Special Requests:  
Date Value Ref Range Status 02/26/2018 NO SPECIAL REQUESTS   Final  
 
Culture result:  
Date Value Ref Range Status 02/26/2018 NO GROWTH 6 DAYS   Final  
 
Telemetry: Sinus tachycardia Imaging: 
[x]I have personally reviewed the patients chest radiographs images and report CXR Results  (Last 48 hours) 07/16/18 1222  XR CHEST PORT Final result Impression:  Impression: 1. Small bilateral pleural effusions. 2.  Improved aeration and expansion of the lungs with mild bibasilar opacities. Narrative:  EXAM: Chest Radiograph INDICATION: Sepsis TECHNIQUE: AP view of the chest  
   
COMPARISON: 6/18/2018, 6/7/2018 and 5/3/2018 FINDINGS: No pneumothorax identified. A hemodialysis catheter is noted with the  
tip overlying the right HM.  This is unchanged. Small bilateral pleural effusions  
are present left greater than right. Bibasilar atelectasis or infiltrate are  
noted mildly improved. Cardiomegaly unchanged. The pulmonary vasculature is  
unremarkable. The osseous structures are unremarkable. Results from Hospital Encounter encounter on 07/16/18 XR CHEST PORT Narrative EXAM: Chest Radiograph INDICATION: Sepsis TECHNIQUE: AP view of the chest 
 
COMPARISON: 6/18/2018, 6/7/2018 and 5/3/2018 FINDINGS: No pneumothorax identified. A hemodialysis catheter is noted with the 
tip overlying the right HM. This is unchanged. Small bilateral pleural effusions 
are present left greater than right. Bibasilar atelectasis or infiltrate are 
noted mildly improved. Cardiomegaly unchanged. The pulmonary vasculature is 
unremarkable. The osseous structures are unremarkable. Impression Impression: 1. Small bilateral pleural effusions. 2.  Improved aeration and expansion of the lungs with mild bibasilar opacities. Results from Hospital Encounter encounter on 07/16/18 CT HEAD WO CONT Narrative EXAM: CT of the Head without contrast 
 
INDICATION: Seizure TECHNIQUE: CT of the head from the vertex to the skull base performed. No IV 
contrast administered. All CT scans at this facility are performed using dose optimization technique as 
appropriate to a performed exam, to include automated exposure control, 
adjustment of the mA and/or kV according to patient size (including appropriate 
matching for site specific examination) or use of iterative reconstruction 
technique. COMPARISON: 11/5/2017 FINDINGS: No evidence of acute intra-axial or extra-axial hemorrhage. The 
ventricles and sulci are symmetric but mildly prominent. Moderately extensive 
periventricular and subcortical white matter hypodensities are noted similar to 
prior imaging. No midline shift, mass effect or mass lesion appreciated. The 
gray-white junction is preserved. No evidence of an acute infarct identified. The mastoid air cells are well aerated. The paranasal sinuses are unremarkable. The orbits are normal. The scalp and skull are unremarkable. Impression IMPRESSION: 
1. No acute intracranial process identified. If continued clinical concern for 
acute ischemia, consider MR for further evaluation. 2.  Mild parenchymal volume loss and moderate chronic small vessel ischemic 
changes similar to prior imaging. IMPRESSION:  
· Seizure (New onset)- etiology: most likely due to hypertensive emergency · TB- receiving 4 drub therapy - DOT- missed last 2-3 days due to nausea and emesis · MAC- newly noted on recent sputum culture · Hypertensive Urgency · ESRD requiring dialysis · Nausea/Vomiting- etiology- + history of constipation, drugs, other ? · Constipation · Hyponatremic · Mild Elevated Troponin - 0.20 on admission · Mild elevated transaminases · Hx NSTEMI in 3/18 · Hx Diastolic Heart failure · Non english speaking ([place]) RECOMMENDATIONS:  
Resp: PRN Supplemental O2 via NC, titrate flow for goal SPO2> 90%, pulmonary hygiene care, Aspiration precautions, Keep HOB >30 degrees. Watch for aspiration. ID: Lactic acid normalized, aleukocytosis, patient being treated for TB as outpatient. Discussed with Dr. Karlene Segura and restarted TB medications that patient was on after leaving last admission. - ID consult placed to review therapy. Heme/Onc: Aleukocytosis, H/H, and platelets are stable, trend CBC. CVS: Monitor CVP, Actively titrate vasopressors aim MAP >65mmHg, Check cardiac panel q 6hrs x2 more. Fluids: Gentle hydration with NS@ 50ml/hr Renal: Renal consulted. Dialysis per them. Possible dialysis today. Trend Renal indices, Monitor I&Os GI: SUP, Trend LFTs, Zofran PRN for N/V, will try phenergan if nausea and vomiting persists.    
 
Metabolic: Glycemic control -SSI 
 
Neuro/Sedation/Pain: PRN pain medications, no sedation. PRN ativan if seizure activity. Seizure precautions. CT head completed. Nil acute. Neurology has been consulted to see in am. 
 
Stress ulcer prophylaxis: Pepcid IV 
 
 
DVT prophylaxis: Heparin SC  
AM labs: Daily CBC BMP Mag and Phos. Diet: Keep NPO Lines/Devices: Right upper chest wall HD catheter. PIVs 
Central Line Bundle Followed Further recommendations will be based on the patient's response to recommended treatment and results of the investigation ordered. [x]See my orders for details My assessment, plan of care, findings, medications, side effects etc were discussed with: 
[x]nursing []PT/OT   
[]respiratory therapy [x]Dr. Ricke Babinski, Dr Adriano Guerra, Dr Kely Denis [x]family - Son [x]Patient [x]Total critical care time exclusive of procedures  45  minutes with complex decision making performed and > 50% time spent in face to face evaluation. [facility] Pulmonary, Critical Care Medicine [facility] Staff Addendum I have independently evaluated the patient and reviewed the patient's chart. I have discussed the findings and care plan with ICU Care Team. Patient non-English speaking. Still on Nipride drip this morning. No recurrent seizure-like activity. Awaiting neurology evaluation. Continuing to work on BP control. Anticipate HD today. Spoke with covering ID staff to review meds for prior dx of TB and new dx of MAC. Need to explore etiology of nausea . On scheduled Zofran. No respiratory issues at this time I agree with the above evaluation, assessment and recommendations along with the following comments and observations. Continuing with supportive care. Further recommendations pending clinical course Critical Care and time spent coordinating care, minus procedure time: 30 min Kathryn Vázquez, , FCCP Pulmonary, Sleep and Critical Care Medicine 1:16 PM

## 2018-07-17 NOTE — CDMP QUERY
Please clarify if this patient is being treated/managed for:    => Mild  protein calorie malnutrition  => Underweight  =>Other Explanation of clinical findings  =>Unable to Determine (no explanation of clinical findings)    The medical record reflects the following:    Risk:57 y.o. female with PMHx of kidney disease, ESRD, HTN who presented to [facility] ED via EMS with acute onset of vomiting     Clinical Indicators- Add supplements: Ensure Pudding TID.   - Continue RD inpatient monitoring and evaluation.      NUTRITION INTERVENTIONS & DIAGNOSIS:      Meals/snacks: modified composition   Medical food supplement therapy: initiate    Collaboration and referral of nutrition care: interdisciplinary rounds      Nutrition Diagnosis: Unintended weight loss related to inadequate energy intake as evidenced by 13 lb, 13% weight loss x 6 months.      ASSESSMENT:     Nausea/vomiting x 2 days PTA. Started on diet, tolerated oral medications this morning. Likes pudding. HD today.      Average po intake adequate to meet patients estimated nutritional needs:    NO    Please clarify and document your clinical opinion in the progress notes and discharge summary including the definitive and/or presumptive diagnosis, (suspected or probable), related to the above clinical findings. Please include clinical findings supporting your diagnosis. If you DECLINE this query or would like to communicate with [facility], please utilize the \"[facility] message box\" at the TOP of the Progress Note on the right.       Thank you,ANABELA GARZA RN [id number]

## 2018-07-17 NOTE — H&P
History & Physical 
 
Patient: Radha Dennis MRN: [id number]  CSN: [id number] YOB: 1960  Age: 56 y.o. Sex: female DOA: 7/16/2018 Chief Complaint:  
Chief Complaint Patient presents with  Chest Pain  Vomiting HPI:  
 
Radha Dennis is a 56 y.o.  female who has PMH of  pulmonary TB Dx in 06/2018 on 4 meds, ESRD on HD, recent admission for clotted HD access, HTN and prediabetes. Pt does not speak english from [place] 
Pt presented to ER with recurrent and persistent episodes iof Nausea and vomiting and feeling iill. In ER, pt was in hypertensive crisis mostly 2 ry to vomiting all her BP meds. As per Pt's son, Pt always becomes nauseous when she takes her TB meds. He also states that her BP fluctuates and sometimes reaches 342 systolic at home then normalizes but never reached 250 like today. Pt had normal dinner with rest of her family and no other members develop similar Sx Pt denies diarrhea but continues to state that she is not feeling well, looks distressed and not talking much even with her son. Pt was placed on nitro drip and will be admitted to ICU Pt vomiting and Nausea are currently subsiding but nott able to tolerate meals. Pt receives her HD MWF and follows with dr. Harkins Reason Developed chest pain in ER and PTA mostly 2 ry to recurrent vomiting. Has mild elevation of troponin on admission but denies CP at time of exam yet holding to her abdomen and head at times. Past Medical History:  
Diagnosis Date  Chronic kidney disease  ESRD (end stage renal disease) ([facility] [address] TUES-THURS-SAT  Hypercholesteremia  Hypertension  Kidney disease  Vitamin D deficiency Past Surgical History:  
Procedure Laterality Date  VASCULAR SURGERY PROCEDURE UNLIST History reviewed. No pertinent family history. Social History Social History  Marital status:    Spouse name: N/A  
 Number of children: N/A  
 Years of education: N/A Social History Main Topics  Smoking status: Never Smoker  Smokeless tobacco: Never Used  Alcohol use No  
 Drug use: No  
 Sexual activity: Not Asked Other Topics Concern  None Social History Narrative Prior to Admission medications Medication Sig Start Date End Date Taking? Authorizing Provider  
isoniazid (NYDRAZID) 300 mg tablet Take 300 mg by mouth daily. Yes Historical Provider  
rifAMPin (RIFADIN) 300 mg capsule Take 600 mg by mouth daily. Yes Historical Provider  
ethambutol (MYAMBUTOL) 400 mg tablet Take 800 mg by mouth daily. Yes Historical Provider  
pyrazinamide 500 mg tablet Take 500 mg by mouth daily. Yes Historical Provider  
amLODIPine (NORVASC) 10 mg tablet Take 1 Tab by mouth daily. 3/8/18  Yes Malcom Zamudio MD  
aspirin 81 mg chewable tablet Take 1 Tab by mouth daily. 3/8/18  Yes Malcom Zamudio MD  
atorvastatin (LIPITOR) 40 mg tablet Take 1 Tab by mouth nightly. 3/8/18  Yes Malcom Zamudio MD  
carvedilol (COREG) 25 mg tablet Take 1 Tab by mouth every twelve (12) hours. 3/8/18  Yes Malcom Zamudio MD  
cloNIDine HCl (CATAPRES) 0.3 mg tablet Take 1 Tab by mouth two (2) times a day. 3/8/18  Yes Malcom Zamudio MD  
losartan (COZAAR) 100 mg tablet Take 1 Tab by mouth daily. 3/8/18  Yes Malcom Zamudio MD  
hydrALAZINE (APRESOLINE) 100 mg tablet Take 0.5 Tabs by mouth two (2) times a day. 3/8/18  Yes Malcom Zamudio MD  
melatonin 3 mg tablet Take 1 Tab by mouth nightly as needed. Patient taking differently: Take 1 Tab by mouth nightly as needed (insomnia). 3/8/18  Yes Malcom Zamudio MD  
FLUoxetine (PROZAC) 10 mg capsule Take 1 Cap by mouth daily. 3/8/18  Yes Malcom Zamudio MD  
glycerin, adult, (FLEET GLYCERIN, ADULT,) suppository Insert 1 Suppository into rectum daily.  Indications: BOWEL EVACUATION 2/5/18  Yes Leeanna Bull MD  
albuterol (PROVENTIL HFA, VENTOLIN HFA, PROAIR HFA) 90 mcg/actuation inhaler Take 2 Puffs by inhalation every four (4) hours as needed for Wheezing. 17  Yes April RACHELLE Langford PA-C Allergies Allergen Reactions  Banana Other (comments) Review of Systems Stats that she is not feeling well and has a headache . Nausea currently resolved otherwise unable to obtain more review Physical Exam:  
 
Physical Exam: 
Visit Vitals  /89  Pulse (!) 121  Temp 97.7 °F (36.5 °C)  Resp 19  
 Ht 5' 1\" (1.549 m)  Wt 40.8 kg (90 lb)  SpO2 95%  Breastfeeding No  
 BMI 17.01 kg/m2 O2 Device: Room air Temp (24hrs), Av.6 °F (36.4 °C), Min:97.4 °F (36.3 °C), Max:97.7 °F (36.5 °C) 
  1901 -  0700 In: 58.8 [I.V.:58.8] Out: -    07/15 07 -  1900 In: 260.6 [I.V.:260.6] Out: - General:  Alert, cooperative, in distress, appears stated age. Son and daughter in law at bedside translating. Speaks very little to her son Head: Normocephalic, without obvious abnormality, atraumatic. Eyes:  Conjunctivae/corneas clear. PERRL, EOMs intact. Nose: Nares normal. No drainage or sinus tenderness. Neck: Supple, symmetrical, trachea midline, no adenopathy, thyroid: no enlargement, no carotid bruit and no JVD. Lungs:   Clear to auscultation bilaterally. Heart:  Regular rate and rhythm, S1, S2 normal.  
  Abdomen: Soft, non-tender. Bowel sounds normal.   
Extremities: Extremities normal, atraumatic, no cyanosis or edema. Pulses: 2+ and symmetric all extremities. Skin:  No rashes or lesions Neurologic: AAOx3, No focal motor or sensory deficit. Very weak Labs Reviewed: All lab results for the last 24 hours reviewed. CXR and EKG Procedures/imaging: see electronic medical records for all procedures/Xrays and details which were not copied into this note but were reviewed prior to creation of Plan Assessment/Plan Principal Problem: Hypertensive crisis (2018) Active Problems: 
  Intractable vomiting with nausea (7/17/2018) ESRD on hemodialysis ([facility] [address] (7/17/2018) Pulmonary TB (7/17/2018) Pt is admitted for intractable N/V and hypertensive urgency and failure to receive PO meds Hx of Pulmonary TB actively treated with episodes of Nausea Hypertensive crisis on admission 2 ry to inability to take PO meds Elevated Alk Phos. Placed on NItro drip Will add clonidine Patch Resuming TB meds in AM If tolerated Will add Pyridoxine B6 IV daily Lopressor PRN to taper NG drip Elevated troponin with RF >> will trend cardiac enzymes. Chest pain mostly 2 ry to retching Will get RUQ US in Am for elevated Alk phos >> could be reactive DVT/GI Prophylaxis: Hep SQ Plan of care is discussed in details with Patient/Family at bedside and agreed upon Luis Green MD 
7/17/2018 10:08 PM

## 2018-07-17 NOTE — PROGRESS NOTES
attended the interdisciplinary rounds for Nahun Greenwood, who is a 56 y.o.,female. Patients Primary Language is: [place]. According to the patients EMR Muslim Affiliation is: Yarsanism. The reason the Patient came to the hospital is:   Patient Active Problem List    Diagnosis Date Noted    Intractable vomiting with nausea 07/17/2018    ESRD on hemodialysis ([facility] [address] 07/17/2018    Pulmonary TB 07/17/2018    Hypertensive crisis 07/17/2018    Seizure ([address] 07/16/2018    Hypertensive emergency 37/38/6064    Complication of vascular dialysis catheter, initial encounter 06/18/2018    Anemia 02/26/2018    Hyponatremia 02/25/2018    ESRD (end stage renal disease) ([facility] [address] 02/25/2018    NSTEMI (non-ST elevated myocardial infarction) ([facility] [address] 02/25/2018    Respiratory failure ([facility] [address] 02/25/2018    Hypoxia 02/25/2018    Acute UTI 02/25/2018    Hyperkalemia 09/08/2017    Renal failure 09/08/2017    SCOOTER (acute kidney injury) ([facility] [address] 09/08/2017        Plan:  Chaplains will continue to follow and will provide pastoral care on an as needed/requested basis.  recommends bedside caregivers page  on duty if patient shows signs of acute spiritual or emotional distress.     [address]  Board Certified [address] [facility]   [phone]

## 2018-07-17 NOTE — PROGRESS NOTES
ID Consult Note    Aysha Nesbitt is a 56 y.o. female who is being seen on consult for antibiotic management for  MTB and MAC pulmonary and extrapulmonary infection. The requesting  physician is Gardenia Vickers. Patient seen and examined in morning round       Current  antibiotics:   INH  Rifampin  PZA  ETA        Past antibiotics:       Impression:     Pulmonary and extrapulmonary MTB infection  Recently diagnosed and started on 4 drug regime  In June 2018 ,  Sputum AFB positive 4/23/18 and 4/24/18  : pan sensitive strain   BAL cx 4/24/10 ; negative for MTB  S/p liver and pleural biopsy on 5/11/18 at [facility] : MTB probe positive   [facility] abd and pelvis at [facility] ( 6/7) shows no liver lesion     Pulmonary  Mycobacterium avium infection   Sputum cx from 6/21/18 done at [facility] : MAC probe positive     Intractable nausea and vomiting ; due to HTN emergency vs medication induced  Or due to constipation   Mild elevated transaminates : monitor     Hypertensive emergency with seizure and intractable nausea and vomiting   ESRD on HD  NSTEMI in 9/67  Hx Diastolic Heart failure        Plan:   I would recommend to restart 4 drug MTB regime with INH, Rifampin, Ethanbutol and PZA with renally adjusted dose   And control nausea with antiemetics RTC regime   I would hold off on addiing more antibiotics to MTB treatment for now and plan to treat MAC infection as she can tolerate the medications   Monitor LFT   Notify Health dept about patient Srikanth Ireland so that she can be continued on DOT   Patient will need follow up with [facility] ID service on discharge for out patient ID follow up    Recommend to repeat CT abd and pelvis to follow on liver leison with IV contrast when patient becomes stable. Continue supportive care  We are watching pt closely for toxicities and side effects to abx and drug-drug interactions. We will adjust antibiotics upon the results of the pending tests and the clinical improvement of the patient.      Case discussed with: [X]Patient [X]Family [X]Nursing [ ]Case Management  [ ] Dorothy Mittal  [ ]MD/Care team     I discussed with Andrés Garcia about the management, prognosis, treatment and complications of the above infectious disease conditions. All patient's questions were answered in detail. She voiced understanding of the discussed issues. She agrees with current plan of therapy.      2025 David Dsouza MD  Infectious diseases specialist   Pager  [phone]

## 2018-07-17 NOTE — ED NOTES
TRANSFER - OUT REPORT:    Verbal report given to [address] [place] [address] (name) on Ward Celeste  being transferred to  [facility], ICU, Room 305 (unit) for routine progression of care       Report consisted of patients Situation, Background, Assessment and   Recommendations(SBAR). Information from the following report(s) SBAR, Kardex, MAR, Recent Results and Cardiac Rhythm Sinus Tachycardia. was reviewed with the receiving nurse. Lines:   Peripheral IV 07/16/18 Right Hand (Active)   Site Assessment Clean, dry, & intact 7/16/2018 12:08 PM   Phlebitis Assessment 0 7/16/2018 12:08 PM   Infiltration Assessment 0 7/16/2018 12:08 PM   Dressing Status Clean, dry, & intact 7/16/2018 12:08 PM   Dressing Type Transparent 7/16/2018 12:08 PM   Hub Color/Line Status Patent; Flushed 7/16/2018 12:08 PM       Peripheral IV 07/16/18 Right Forearm (Active)   Site Assessment Clean, dry, & intact 7/16/2018  1:40 PM   Phlebitis Assessment 0 7/16/2018  1:40 PM   Infiltration Assessment 0 7/16/2018  1:40 PM   Dressing Status Clean, dry, & intact 7/16/2018  1:40 PM   Dressing Type Transparent 7/16/2018  1:40 PM   Hub Color/Line Status Blue;Patent; Flushed 7/16/2018  1:40 PM        Opportunity for questions and clarification was provided. Patient transported with:   Monitor   Nitroprusside Drip 50mg in 250mls of D5W @ 3mcg/kg/min.

## 2018-07-18 NOTE — CONSULTS
[facility]  Pulmonary, Critical Care, and Sleep Medicine    Name: Bruno Morin MRN: [id number]   : 1960 Hospital: [facility]   Date: 2018        Pulmonary Critical Care Initial Patient Consult                                              Reason for CC Consult: Hypertensive Urgency    Subjective/History:     Bruno Morin has been seen and evaluated at the request of Dr. Serina Milton for Hypertensive Urgency. History taken from chart review and from son. Patient is a 56 y.o. female with PMHx of kidney disease, ESRD, HTN who presented to [facility] ED via EMS with acute onset of vomiting that started on 7/15. The vomiting is now constant and pt has had multiple episodes. Nothing makes the sympoms better or worse. Associated symptoms include chest pain and changes in appetite. No medication was taken yesterday and pt vomited her medication on 7/15. She is a dialysis patient and was supposed to have dialysis on Monday, however she started to experience the chest pain. Her last dialysis was on . Per son, pt has had similar symptoms in the past and was hospitalized. While in [facility] ED patient also had a witnessed seizure ativan given and activity stopped. No further seizure activity documented. No history of seizure disorder. CT of Head nil acute. Patient was started on a nitroprusside gtt for hypertension. Last admission bronch sample came back + for active TB currently is undergoing treatment, will continue medications once able to get nausea and vomiting under control. Pt only speaks [place] and she is non-verbal at bedside at present. Family denies fever or diarrhea. Denies inhaler use. Denies tobacco, alcohol or illicit drug use. Patient was transferred to ICU for continued care. 18  - patient resting comfortable  - No further emesis or nausea  - Off IV nipride.  Increased hydralazine today  - appears more comfortable    [x]The patient is unable to give any meaningful history or review of systems because the patient is:  []Intubated []Sedated   []Unresponsive [x]Language Barrier       Review of Systems:  Review of systems not obtained due to patient factors. Past Medical History:  Past Medical History:   Diagnosis Date    Chronic kidney disease     ESRD (end stage renal disease) ([facility] [address]     TUES-THURS-SAT    Hypercholesteremia     Hypertension     Kidney disease     Vitamin D deficiency         Past Surgical History:  Past Surgical History:   Procedure Laterality Date    VASCULAR SURGERY PROCEDURE UNLIST          Medications:  Prior to Admission medications    Medication Sig Start Date End Date Taking? Authorizing Provider   isoniazid (NYDRAZID) 300 mg tablet Take 300 mg by mouth daily. Yes Historical Provider   rifAMPin (RIFADIN) 300 mg capsule Take 600 mg by mouth daily. Yes Historical Provider   ethambutol (MYAMBUTOL) 400 mg tablet Take 800 mg by mouth daily. Yes Historical Provider   pyrazinamide 500 mg tablet Take 500 mg by mouth daily. Yes Historical Provider   amLODIPine (NORVASC) 10 mg tablet Take 1 Tab by mouth daily. 3/8/18  Yes Will Alvarez MD   aspirin 81 mg chewable tablet Take 1 Tab by mouth daily. 3/8/18  Yes Will Alvarez MD   atorvastatin (LIPITOR) 40 mg tablet Take 1 Tab by mouth nightly. 3/8/18  Yes Will Alvarez MD   carvedilol (COREG) 25 mg tablet Take 1 Tab by mouth every twelve (12) hours. 3/8/18  Yes Will Alvarez MD   cloNIDine HCl (CATAPRES) 0.3 mg tablet Take 1 Tab by mouth two (2) times a day. 3/8/18  Yes Will Alvarez MD   losartan (COZAAR) 100 mg tablet Take 1 Tab by mouth daily. 3/8/18  Yes Will Alvarez MD   hydrALAZINE (APRESOLINE) 100 mg tablet Take 0.5 Tabs by mouth two (2) times a day. 3/8/18  Yes Will Alvarez MD   melatonin 3 mg tablet Take 1 Tab by mouth nightly as needed. Patient taking differently: Take 1 Tab by mouth nightly as needed (insomnia).  3/8/18  Yes Will Alvarez MD FLUoxetine (PROZAC) 10 mg capsule Take 1 Cap by mouth daily. 3/8/18  Yes Korey Rincon MD   glycerin, adult, (FLEET GLYCERIN, ADULT,) suppository Insert 1 Suppository into rectum daily. Indications: BOWEL EVACUATION 2/5/18  Yes Kerry Zambrano MD   albuterol (PROVENTIL HFA, VENTOLIN HFA, PROAIR HFA) 90 mcg/actuation inhaler Take 2 Puffs by inhalation every four (4) hours as needed for Wheezing.  11/6/17  Yes Christal Langford PA-C       Current Facility-Administered Medications   Medication Dose Route Frequency    PEG 3350-Electrolytes (GO-LYTELY) SUSPENSION 500 mL  500 mL Oral ONCE    [START ON 7/19/2018] ethambutol (MYAMBUTOL) tablet 1,200 mg  1,200 mg Oral Q TUE, THU & SAT    [START ON 7/19/2018] pyridoxine (vitamin B6) (VITAMIN B-6) tablet 100 mg  100 mg Oral DAILY    hydrALAZINE (APRESOLINE) tablet 50 mg  50 mg Oral TID    [START ON 7/19/2018] famotidine (PEPCID) tablet 20 mg  20 mg Oral DAILY    amLODIPine (NORVASC) tablet 10 mg  10 mg Oral DAILY    nitroPRUSSide (NIPRIDE) 50 mg in dextrose 5% 250 mL infusion  0-10 mcg/kg/min IntraVENous TITRATE    aspirin chewable tablet 81 mg  81 mg Oral DAILY    atorvastatin (LIPITOR) tablet 40 mg  40 mg Oral QHS    carvedilol (COREG) tablet 25 mg  25 mg Oral Q12H    losartan (COZAAR) tablet 100 mg  100 mg Oral DAILY    FLUoxetine (PROzac) capsule 10 mg  10 mg Oral DAILY    heparin (porcine) injection 5,000 Units  5,000 Units SubCUTAneous Q8H    cloNIDine (CATAPRES) 0.3 mg/24 hr patch 1 Patch  1 Patch TransDERmal Q7D    isoniazid (NYDRAZID) tablet 300 mg  300 mg Oral DAILY    pyrazinamide tablet 1,000 mg  1,000 mg Oral Q MON, WED & FRI    rifAMPin (RIFADIN) capsule 600 mg  600 mg Oral ACB    insulin lispro (HUMALOG) injection   SubCUTAneous Q6H       Allergy:  Allergies   Allergen Reactions    Banana Other (comments)        Social History:  Social History   Substance Use Topics    Smoking status: Never Smoker    Smokeless tobacco: Never Used    Alcohol use No        Family History:  History reviewed. No pertinent family history.        Objective:   Vital Signs:    Visit Vitals    BP (!) 189/115    Pulse 86    Temp 99.1 °F (37.3 °C)    Resp 18    Ht 5' 1\" (1.549 m)    Wt 46 kg (101 lb 6.6 oz)    SpO2 100%    Breastfeeding No    BMI 19.16 kg/m2       O2 Device: Room air       Temp (24hrs), Av °F (36.7 °C), Min:97.6 °F (36.4 °C), Max:99.1 °F (37.3 °C)       Patient Vitals for the past 8 hrs:   Temp Pulse Resp BP SpO2   18 1700 - 86 18 (!) 189/115 -   18 1648 - 87 18 (!) 199/112 -   18 1600 99.1 °F (37.3 °C) 86 23 (!) 188/102 100 %   18 1530 - 86 17 (!) 200/106 100 %   18 1500 - 87 23 (!) 193/109 100 %   18 1430 - 84 21 (!) 182/105 100 %   18 1400 - 87 17 (!) 175/106 100 %   18 1330 - 88 18 (!) 173/103 100 %   18 1300 - 86 20 (!) 171/108 100 %   18 1230 - 85 18 (!) 192/105 99 %   18 1200 98 °F (36.7 °C) 84 20 139/83 98 %   18 1130 - 83 18 129/77 100 %   18 1100 - 82 19 123/73 99 %   18 1030 - 83 22 129/83 95 %   18 1000 - 84 21 130/84 -     Intake/Output:   Last shift:         Last 3 shifts:  1901 -  0700  In: 1431.8 [I.V.:1431.8]  Out: 3500     Intake/Output Summary (Last 24 hours) at 18 1736  Last data filed at 18 2030   Gross per 24 hour   Intake                0 ml   Output             3500 ml   Net            -3500 ml     Physical Exam:  General appearance - acyanotic, in no respiratory distress and dehydrated  Mental status - restless, unable to assess - language barrier  Eyes - pupils equal and reactive, extraocular eye movements intact, sclera anicteric  Mouth - mucous membranes dry  Neck - supple, no significant adenopathy  Chest - clear to auscultation, no wheezes, rales or rhonchi, symmetric air entry, no tachypnea, retractions or cyanosis  Heart - mild tachycardia, regular rhythm, normal S1, S2, no murmurs, rubs, clicks or gallops  Abdomen - soft, nontender, nondistended, no masses or organomegaly  Neurological - alert, unable to assess orientation, no focal findings or movement disorder noted at present  Musculoskeletal - no joint tenderness, deformity or swelling  Extremities - peripheral pulses normal, no pedal edema, no clubbing or cyanosis  Skin - normal coloration and turgor, no rashes, no suspicious skin lesions noted    Data:     Recent Results (from the past 24 hour(s))   METABOLIC PANEL, BASIC    Collection Time: 07/18/18  2:04 AM   Result Value Ref Range    Sodium 138 136 - 145 mmol/L    Potassium 3.5 3.5 - 5.5 mmol/L    Chloride 101 100 - 108 mmol/L    CO2 30 21 - 32 mmol/L    Anion gap 7 3.0 - 18 mmol/L    Glucose 121 (H) 74 - 99 mg/dL    BUN 11 7.0 - 18 MG/DL    Creatinine 3.01 (H) 0.6 - 1.3 MG/DL    BUN/Creatinine ratio 4 (L) 12 - 20      GFR est AA 20 (L) >60 ml/min/1.73m2    GFR est non-AA 16 (L) >60 ml/min/1.73m2    Calcium 8.3 (L) 8.5 - 10.1 MG/DL   CBC WITH AUTOMATED DIFF    Collection Time: 07/18/18  2:04 AM   Result Value Ref Range    WBC 4.6 4.6 - 13.2 K/uL    RBC 3.22 (L) 4.20 - 5.30 M/uL    HGB 9.6 (L) 12.0 - 16.0 g/dL    HCT 29.4 (L) 35.0 - 45.0 %    MCV 91.3 74.0 - 97.0 FL    MCH 29.8 24.0 - 34.0 PG    MCHC 32.7 31.0 - 37.0 g/dL    RDW 18.8 (H) 11.6 - 14.5 %    PLATELET 87 (L) 308 - 420 K/uL    MPV 9.4 9.2 - 11.8 FL    NEUTROPHILS 83 (H) 40 - 73 %    LYMPHOCYTES 6 (L) 21 - 52 %    MONOCYTES 11 (H) 3 - 10 %    EOSINOPHILS 0 0 - 5 %    BASOPHILS 0 0 - 2 %    ABS. NEUTROPHILS 3.8 1.8 - 8.0 K/UL    ABS. LYMPHOCYTES 0.3 (L) 0.9 - 3.6 K/UL    ABS. MONOCYTES 0.5 0.05 - 1.2 K/UL    ABS. EOSINOPHILS 0.0 0.0 - 0.4 K/UL    ABS.  BASOPHILS 0.0 0.0 - 0.1 K/UL    DF AUTOMATED     GLUCOSE, POC    Collection Time: 07/18/18  5:45 AM   Result Value Ref Range    Glucose (POC) 108 70 - 110 mg/dL   GLUCOSE, POC    Collection Time: 07/18/18 11:41 AM   Result Value Ref Range    Glucose (POC) 135 (H) 70 - 110 mg/dL           No results for input(s): FIO2I, IFO2, HCO3I, IHCO3, HCOPOC, PCO2I, PCOPOC, IPHI, PHI, PHPOC, PO2I, PO2POC in the last 72 hours. No lab exists for component: IPOC2    Special Requests:   Date Value Ref Range Status   07/17/2018 NO SPECIAL REQUESTS   Final     Culture result:   Date Value Ref Range Status   07/17/2018    Final    MRSA target DNA is not detected (presumptive not colonized with MRSA)     Telemetry: NSR    Imaging:  [x]I have personally reviewed the patients chest radiographs images and report     CXR Results  (Last 48 hours)    None             Results from Hospital Encounter encounter on 07/16/18   XR CHEST PORT   Narrative EXAM: Chest Radiograph    INDICATION: Sepsis    TECHNIQUE: AP view of the chest    COMPARISON: 6/18/2018, 6/7/2018 and 5/3/2018    FINDINGS: No pneumothorax identified. A hemodialysis catheter is noted with the  tip overlying the right HM. This is unchanged. Small bilateral pleural effusions  are present left greater than right. Bibasilar atelectasis or infiltrate are  noted mildly improved. Cardiomegaly unchanged. The pulmonary vasculature is  unremarkable. The osseous structures are unremarkable. Impression Impression:  1. Small bilateral pleural effusions. 2.  Improved aeration and expansion of the lungs with mild bibasilar opacities. Results from [place] encounter on 07/16/18   CT HEAD WO CONT   Narrative EXAM: CT of the Head without contrast    INDICATION: Seizure    TECHNIQUE: CT of the head from the vertex to the skull base performed. No IV  contrast administered. All CT scans at this facility are performed using dose optimization technique as  appropriate to a performed exam, to include automated exposure control,  adjustment of the mA and/or kV according to patient size (including appropriate  matching for site specific examination) or use of iterative reconstruction  technique.     COMPARISON: 11/5/2017    FINDINGS: No evidence of acute intra-axial or extra-axial hemorrhage. The  ventricles and sulci are symmetric but mildly prominent. Moderately extensive  periventricular and subcortical white matter hypodensities are noted similar to  prior imaging. No midline shift, mass effect or mass lesion appreciated. The  gray-white junction is preserved. No evidence of an acute infarct identified. The mastoid air cells are well aerated. The paranasal sinuses are unremarkable. The orbits are normal. The scalp and skull are unremarkable. Impression IMPRESSION:  1. No acute intracranial process identified. If continued clinical concern for  acute ischemia, consider MR for further evaluation. 2.  Mild parenchymal volume loss and moderate chronic small vessel ischemic  changes similar to prior imaging. IMPRESSION:   · Seizure (New onset)- etiology: most likely due to hypertensive emergency  · TB- receiving 4 drub therapy - DOT- missed last 2-3 days due to nausea and emesis  · MAC- newly noted on recent sputum culture  · Hypertensive Urgency   · ESRD requiring dialysis  · Nausea/Vomiting- etiology- + history of constipation, drugs, other ? · Constipation- severe: has been performing home coffee ground enemas -  · Hyponatremic  · Mild Elevated Troponin - 0.20 on admission  · Mild elevated transaminases  · Hx NSTEMI in 9/14  · Hx Diastolic Heart failure  · Non english speaking ([place])   RECOMMENDATIONS:   Resp: PRN Supplemental O2 via NC, titrate flow for goal SPO2> 90%, pulmonary hygiene care, Aspiration precautions, Keep HOB >30 degrees. Watch for aspiration. ID: Lactic acid normalized, aleukocytosis, patient being treated for TB as outpatient. Discussed with Dr. Karl Olvera and restarted TB medications that patient was on after leaving last admission. - ID consult placed to review therapy. - Renal dose all meds. Heme/Onc: Aleukocytosis, H/H, and platelets are stable, trend CBC.     CVS:Continue with current antihypertensive PO meds. Increase hydralazine 50 mg TID    Fluids: D/C , Oral hydration per renal    Renal: Renal consulted. Dialysis today    GI: SUP, Trend LFTs, Zofran PRN for N/V, - currently tolerating PO, continue with go-lytely for severe constipation    Metabolic: Glycemic control -SSI    Neuro/Sedation/Pain: PRN pain medications, no sedation. PRN ativan if seizure activity. Seizure precautions. CT head completed. MRI requested by Neurology-    Stress ulcer prophylaxis: Pepcid IV      DVT prophylaxis: Heparin SC     AM labs: Daily CBC BMP Mag and Phos. Diet: Keep NPO    Lines/Devices: Right upper chest wall HD catheter. PIVs  Central Line Bundle Followed       OK for HD off floor today- If patient does well with HD today will transfer to the floor,     [x]See my orders for details        [x]Total critical care time exclusive of procedures  35  minutes with complex decision making performed and > 50% time spent in face to face evaluation.      Augustina Bonds DO, FCCP  Pulmonary, Sleep, Critical Care Medicine

## 2018-07-18 NOTE — DIALYSIS
ACUTE HEMODIALYSIS FLOW SHEET 
 
HEMODIALYSIS ORDERS: Physician: Terry Phan Dialyzer: Revaclear        Duration: 3.5 hr  BFR: 350   DFR: 600 Dialysate:  Temp 37 K+   2    Ca+  2.5 Na 138 Bicarb 35 Weight:  46 kg   Pt chart [x]     Unable to Obtain []      Dry weight/UF Goal: 3000mL [facility] [place], operated by [facility] Gauge n/a Heparin []  Bolus      Units    [] Hourly       Units    [x]None Catheter locking solution Heparin 1000unit/mL Pre BP:   188/107    Pulse:     86     Temperature:   97.6F  Respirations: 18  Tx: NS       ml/Bolus  Other        [] N/A Labs: Pre        Post:        [x] N/A Additional Orders(medications, blood products, hypotension management):       [x] N/A [x] Hawk Consent Verified CATHETER ACCESS: []N/A   [x]Right   []Left   []IJ     []Fem [] First use X-ray verified     [x]Tunnel                [] Non Tunneled [x]No S/S infection  []Redness  []Drainage []Cultured []Swelling []Pain  
[x]Medical Aseptic Prep Utilized   []Dressing Changed  [] Biopatch  Date:      
[]Clotted   []Patent   Flows: []Good  []Poor  []Reversed If access problem,  notified: [x]Yes   Pt noted to have high arterial pressures at ordered BFR. Lines switched GRAFT/FISTULA ACCESS:  [x]N/A     []Right     []Left     []UE     []LE []AVG   []AVF        []Buttonhole    []Medical Aseptic Prep Utilized []No S/S infection  []Redness  []Drainage []Cultured []Swelling []Pain Bruit:   [] Strong    [] Weak       Thrill :   [] Strong    [] Weak If access problem,  notified: []Yes     [x]N/A  Date:       
Please describe access if present and not used:  
 
 
GENERAL ASSESSMENT:   
LUNGS:  Rate 18 SaO2%        [] N/A    [x] Clear  [] Coarse  [] Crackles  [] Wheezing 
      [] Diminished     Location : []RLL   []LLL    []RUL  []ELÍAS Cough: []Productive  []Dry  [x]N/A   Respirations:  [x]Easy  []Labored Therapy:  [x]RA  []NC  l/min    Mask: []NRB []Venti       O2% []Ventilator  []Intubated  [] Trach  [] BiPaP CARDIAC: [x]Regular      [] Irregular   [] Pericardial Rub  [] JVD []  Monitored  [] Bedside  [] Remotely monitored [] N/A  Rhythm: EDEMA: [x] None  []Generalized  [] Pitting [] 1    [] 2    [] 3    [] 4 [] Facial  [] Pedal  []  UE  [] LE  
SKIN:   [x] Warm  [] Hot     [] Cold   [x] Dry     [] Pale   [] Diaphoretic    
             [] Flushed  [] Jaundiced  [] Cyanotic  [] Rash  [] Weeping LOC:    [x] Alert      [x]Oriented:    [] Person     [] Place  []Time 
             [] Confused  [] Lethargic  [] Medicated  [] Non-responsive GI / ABDOMEN:  [x] Flat    [] Distended    [] Soft    [] Firm   []  Obese 
                           [] Diarrhea  [] Bowel Sounds  [] Nausea  [] Vomiting  / URINE ASSESSMENT:[] Voiding   [x] Oliguria  [] Anuria   []  Onofre [] Incontinent    []  Incontinent Brief      []  Bathroom Privileges PAIN: [x] 0 []1  []2   []3   []4   []5   []6   []7   []8   []9   []10 Scale 0-10  Action/Follow Up: MOBILITY:  [] Amb    [] Amb/Assist    [x] Bed    [] Wheelchair  [] Stretcher All Vitals and Treatment Details on Attached Flowsheet Hospital: [facility] Room # 305 [] 1st Time Acute  [] Stat  [x] Routine  [] Urgent [x] Acute Room  []  Bedside  [] ICU/CCU  [] ER Isolation Precautions:  [x] Dialysis   [] Airborne   [] Contact    [] Reverse Special Considerations:         [] Blood Consent Verified [x]N/A ALLERGIES: Banana  [] NKA Code Status:  [x] Full Code  [] DNR  [] Other HBsAg ONLY: Date Drawn 9/9/2017         [x]Negative []Positive []Unknown HBsAb: Date 9/9/2017    [] Susceptible   [x] Lyngzy29 []Not Drawn  [] Drawn Current Labs:    Date of Labs: Today [x] Cut and paste current labs here. Results for Doreen López (MRN [id number]) as of 7/18/2018 18:49 Ref. Range 7/18/2018 02:04 WBC Latest Ref Range: 4.6 - 13.2 K/uL 4.6 RBC Latest Ref Range: 4.20 - 5.30 M/uL 3.22 (L) HGB Latest Ref Range: 12.0 - 16.0 g/dL 9.6 (L) HCT Latest Ref Range: 35.0 - 45.0 % 29.4 (L) MCV Latest Ref Range: 74.0 - 97.0 FL 91.3 MCH Latest Ref Range: 24.0 - 34.0 PG 29.8 MCHC Latest Ref Range: 31.0 - 37.0 g/dL 32.7 RDW Latest Ref Range: 11.6 - 14.5 % 18.8 (H) PLATELET Latest Ref Range: 135 - 420 K/uL 87 (L) MPV Latest Ref Range: 9.2 - 11.8 FL 9.4 NEUTROPHILS Latest Ref Range: 40 - 73 % 83 (H) LYMPHOCYTES Latest Ref Range: 21 - 52 % 6 (L) MONOCYTES Latest Ref Range: 3 - 10 % 11 (H) EOSINOPHILS Latest Ref Range: 0 - 5 % 0  
BASOPHILS Latest Ref Range: 0 - 2 % 0  
DF Latest Units:   AUTOMATED  
ABS. NEUTROPHILS Latest Ref Range: 1.8 - 8.0 K/UL 3.8  
ABS. LYMPHOCYTES Latest Ref Range: 0.9 - 3.6 K/UL 0.3 (L)  
ABS. MONOCYTES Latest Ref Range: 0.05 - 1.2 K/UL 0.5  
ABS. EOSINOPHILS Latest Ref Range: 0.0 - 0.4 K/UL 0.0  
ABS. BASOPHILS Latest Ref Range: 0.0 - 0.1 K/UL 0.0 Sodium Latest Ref Range: 136 - 145 mmol/L 138 Potassium Latest Ref Range: 3.5 - 5.5 mmol/L 3.5 Chloride Latest Ref Range: 100 - 108 mmol/L 101 CO2 Latest Ref Range: 21 - 32 mmol/L 30 Anion gap Latest Ref Range: 3.0 - 18 mmol/L 7 Glucose Latest Ref Range: 74 - 99 mg/dL 121 (H) BUN Latest Ref Range: 7.0 - 18 MG/DL 11 Creatinine Latest Ref Range: 0.6 - 1.3 MG/DL 3.01 (H) BUN/Creatinine ratio Latest Ref Range: 12 - 20   4 (L) Calcium Latest Ref Range: 8.5 - 10.1 MG/DL 8.3 (L)  
GFR est non-AA Latest Ref Range: >60 ml/min/1.73m2 16 (L)  
GFR est AA Latest Ref Range: >60 ml/min/1.73m2 20 (L) DIET:  [] Renal    [x] Other Cardiac-regular    [] NPO     []  Diabetic PRIMARY NURSE REPORT: First initial/Last name/Title Pre Dialysis: ULISES Jarquin, RN    Time: 2033 EDUCATION:   
[x] Patient [] Other         Knowledge Basis: []None [x]Minimal [] Substantial  
Barriers to learning: Pt only speaks [place]  []N/A  
[] Access Care     [] S&S of infection     [] Fluid Management     []K+     [x]Procedural   
[]Albumin     [] Medications     [] Tx Options     [] Transplant     [] Diet     [] Other Teaching Tools:  [x] Explain: using [employer] gordy Patient response:   [x] Verbalized understanding  [] Teach back  [] Return demonstration [x] Requires follow up Inappropriate due to         
 
[x] Time Out/Safety Check RO/HEMODIALYSIS MACHINE SAFETY CHECKS  Before each treatment:    
Machine Number:                   [facility] [x] Unit Machine # 6 with centralized RO [x] Alarm Test:  Pass time [id number]         Other:        
[x] RO/Machine Log Complete Temp    37            [x]Extracorporeal Circuit Tested for integrity Dialysate: pH  7.0 Conductivity: Meter   14     HD Machine   13.8                  TCD: 13.7 Dialyzer Lot # [id number]            Blood Tubing Lot # [id number]          Saline Lot #  [id number]  
 
IICHVRXH TESTING-Before each treatment and every 4 hours Total Chlorine: [x] less than 0.1 ppm  Time:1700  4 Hr/2nd Check Time: 2100  
(if greater than 0.1 ppm from Primary then every 30 minutes from Secondary) TREATMENT INITIATION  with Dialysis Precautions:  
[x] All Connections Secured                 [x] Saline Line Double Clamped  
[x] Venous Parameters Set                  [x] Arterial Parameters Set [x] Prime Given  250mL                              [x]Air Foam Detector Engaged Treatment Initiation Note: Received pt to treatment area awake and alert and in no reported distress. Right chest TDC is intact with no s/s of infection. Medication Dose Volume Route Initials Dialyzer Cleared: [x] Good [] Fair  [] Poor Blood processed:  73.0 L 
UF Removed  3000mL POst BP:   201/120       Pulse: 94      Respirations: 18 Temperature: 97.6 Post Tx Vascular Access   N/A Catheter: Locking solution: Heparin 1:1000 Art. 1.6mL  Wei. 1.6mL Post Assessment:  
  
                              Skin:  [x] Warm  [x] Dry [] Diaphoretic    [] Flushed  [] Pale [] Cyanotic [facility] Signatures Title Initials  Time Lungs: [x] Clear    [] Course  [] Crackles  [] Wheezing [] Diminished Cardiac: [x] Regular   [] Irregular   [] Monitor  [] N/A  Rhythm:      
    Edema:  [x] None    [] General     [] Facial   [] Pedal    [] UE    [] LE  
    Pain: [x]0  []1  []2   []3  []4   []5   []6   []7   []8   []9   []10 Post Treatment Note: Pt tolerated HD with no dialysis-related complications. Pt remained hypertensive throughout her treatment but had no associated complaints. Pt returned to room via receiving nurse Brandon Preston RN. POST TREATMENT PRIMARY NURSE HANDOFF REPORT:  
 
First initial/Last name/Title Post Dialysis: Brandon Preston RN Time:  2200 Abbreviations: AVG-arterial venous graft, AVF-arterial venous fistula, IJ-Internal Jugular, Subcl-Subclavian, Fem-Femoral, Tx-treatment, AP/HR-apical heart rate, DFR-dialysate flow rate, BFR-blood flow rate, AP-arterial pressure, -venous pressure, UF-ultrafiltrate, TMP-transmembrane pressure, Wei-Venous, Art-Arterial, RO-Reverse Osmosis

## 2018-07-18 NOTE — PROGRESS NOTES
RENAL DAILY PROGRESS NOTE 51y F with ESRD, admitted with HTN emergency, seen for ESRD management. Subjective:  
 
Remain on ICU Now off nitroglycerine Looks comfortable. IMPRESSION:  
· ESRD, MWF schedule; · Access; tunnel catheter on right side. Clotted left arm access; 
· Anemia 
· HTN emergency · Pulmonary TB; now positive with MAC. · Nausea, h/o severe constipation · Hyponatremia, mild PLAN:  
Due for HD today , will arrange HD , 3 K bath , UF goal 3L . Adjust all BP meds per ESRD status.  
    
  
Addendum; At 5:51 PM on 7/18/2018, I saw and examined patient during hemodialysis treatment. The patient was receiving hemodialysis for treatment of  renal failure. I have also reviewed vital signs, intake and output, lab results and recent events, and agreed with today's dialysis order. Current Facility-Administered Medications Medication Dose Route Frequency  PEG 3350-Electrolytes (GO-LYTELY) SUSPENSION 500 mL  500 mL Oral ONCE  
 [START ON 7/19/2018] ethambutol (MYAMBUTOL) tablet 1,200 mg  1,200 mg Oral Q TUE, THU & SAT  [START ON 7/19/2018] pyridoxine (vitamin B6) (VITAMIN B-6) tablet 100 mg  100 mg Oral DAILY  hydrALAZINE (APRESOLINE) tablet 50 mg  50 mg Oral TID  amLODIPine (NORVASC) tablet 10 mg  10 mg Oral DAILY  nitroPRUSSide (NIPRIDE) 50 mg in dextrose 5% 250 mL infusion  0-10 mcg/kg/min IntraVENous TITRATE  aspirin chewable tablet 81 mg  81 mg Oral DAILY  atorvastatin (LIPITOR) tablet 40 mg  40 mg Oral QHS  carvedilol (COREG) tablet 25 mg  25 mg Oral Q12H  
 losartan (COZAAR) tablet 100 mg  100 mg Oral DAILY  melatonin tablet 3 mg  3 mg Oral QHS PRN  
 FLUoxetine (PROzac) capsule 10 mg  10 mg Oral DAILY  acetaminophen (TYLENOL) tablet 650 mg  650 mg Oral Q6H PRN  
 oxyCODONE-acetaminophen (PERCOCET) 5-325 mg per tablet 1 Tab  1 Tab Oral Q6H PRN  
 naloxone (NARCAN) injection 0.4 mg  0.4 mg IntraVENous PRN  
 ondansetron (ZOFRAN) injection 4 mg  4 mg IntraVENous Q6H PRN  
 docusate sodium (COLACE) capsule 100 mg  100 mg Oral BID PRN  
 heparin (porcine) injection 5,000 Units  5,000 Units SubCUTAneous Q8H  
 cloNIDine (CATAPRES) 0.3 mg/24 hr patch 1 Patch  1 Patch TransDERmal Q7D  
 isoniazid (NYDRAZID) tablet 300 mg  300 mg Oral DAILY  pyrazinamide tablet 1,000 mg  1,000 mg Oral Q MON, WED & FRI  rifAMPin (RIFADIN) capsule 600 mg  600 mg Oral ACB  LORazepam (ATIVAN) injection 1 mg  1 mg IntraVENous PRN  
 bisacodyl (DULCOLAX) suppository 10 mg  10 mg Rectal DAILY PRN  promethazine (PHENERGAN) 12.5 mg in 0.9% sodium chloride 50 mL IVPB  12.5 mg IntraVENous Q6H PRN  
 metoprolol (LOPRESSOR) injection 5 mg  5 mg IntraVENous Q6H PRN  
 insulin lispro (HUMALOG) injection   SubCUTAneous Q6H  
 glucose chewable tablet 16 g  4 Tab Oral PRN  
 glucagon (GLUCAGEN) injection 1 mg  1 mg IntraMUSCular PRN  
 dextrose (D50W) injection syrg 12.5-25 g  25-50 mL IntraVENous PRN  
 famotidine (PF) (PEPCID) 20 mg in sodium chloride 0.9% 10 mL injection  20 mg IntraVENous DAILY Review of Symptoms: comprehensive ROS negative except above. Objective:  
Patient Vitals for the past 24 hrs: 
 Temp Pulse Resp BP SpO2  
07/18/18 1200 98 °F (36.7 °C) - - - -  
07/18/18 1100 - 82 19 123/73 99 % 07/18/18 1030 - 83 22 129/83 95 % 07/18/18 1000 - 84 21 130/84 -  
07/18/18 0930 - 85 20 141/86 91 %  
07/18/18 0900 - 87 19 (!) 154/94 98 % 07/18/18 0830 - 86 19 (!) 170/102 100 % 07/18/18 0800 97.6 °F (36.4 °C) 89 20 - 99 % 07/18/18 0730 - 88 17 (!) 207/113 99 % 07/18/18 0700 - 87 17 (!) 193/102 99 % 07/18/18 0600 - 87 17 145/84 98 % 07/18/18 0500 - 85 17 135/77 97 % 07/18/18 0400 97.7 °F (36.5 °C) 84 21 133/75 98 % 07/18/18 0300 - 85 21 135/76 97 % 07/18/18 0200 - 85 23 114/70 97 % 07/18/18 0100 - 93 23 (!) 79/26 -  
07/18/18 0000 97.6 °F (36.4 °C) 95 24 (!) 198/129 92 % 07/17/18 2300 - 88 21 (!) 209/110 93 % 07/17/18 2200 - 96 24 (!) 57/16 96 % 07/17/18 2100 - 96 18 164/88 96 % 07/17/18 2045 98.1 °F (36.7 °C) 96 19 156/89 96 % 07/17/18 2030 - 88 24 (!) 170/101 98 % 07/17/18 2015 - 89 19 (!) 205/111 97 % 07/17/18 2000 - 80 19 (!) 167/97 -  
07/17/18 1945 - 84 23 (!) 164/93 -  
07/17/18 1930 - 87 25 (!) 153/94 -  
07/17/18 1915 - 89 25 (!) 146/94 -  
07/17/18 1900 - 85 20 (!) 153/93 -  
07/17/18 1845 - 83 26 (!) 162/95 -  
07/17/18 1830 - 82 22 (!) 179/97 -  
07/17/18 1815 - 85 23 (!) 164/93 -  
07/17/18 1800 - 90 22 (!) 163/93 -  
07/17/18 1745 - 84 21 (!) 155/94 -  
07/17/18 1730 - 83 26 (!) 152/94 -  
07/17/18 1715 - 85 24 (!) 163/100 -  
07/17/18 1700 - 80 26 (!) 169/98 -  
07/17/18 1645 97.6 °F (36.4 °C) 80 25 (!) 182/107 -  
07/17/18 1644 97.5 °F (36.4 °C) - - - -  
07/17/18 1600 98.2 °F (36.8 °C) 79 25 (!) 196/110 - Weight change: 23.8 kg (52 lb 6.7 oz) 07/16 1901 - 07/18 0700 In: 1431.8 [I.V.:1431.8] Out: 3500 Intake/Output Summary (Last 24 hours) at 07/18/18 1517 Last data filed at 07/17/18 2030 Gross per 24 hour Intake             99.4 ml Output             3500 ml Net          -3400.6 ml Physical Exam 
General: comfortable, no acute distress HEENT sclera anicteric, supple neck, no thyromegaly CVS: S1S2 heard,  no rub RS: + air entry b/l, Abd: Soft, Non tender, Neuro:  awake, alert Extrm:no  edema, no cyanosis, clubbing Skin: no visible  Rash Musculoskeletal: No gross joints or bone deformities Access; tunnel catheter on right side Procedures/imaging: see electronic medical records for all procedures, Xrays and details which were not copied into this note but were reviewed prior to creation of Plan Data Review:  
 
LABS:  
Hematology: Recent Labs  
   07/18/18 
 0204  07/17/18 
 0507  07/17/18 
 0110  07/16/18 
 1150 WBC  4.6  7.8  8.5  11.1 HGB  9.6*  11.3*  10.4*  16.1*  
HCT  29.4*  34.3*  31.5*  49.7* Chemistry: Recent Labs 07/18/18 
 0204  07/17/18 
 0507  07/17/18 
 0110  07/16/18 
 1150 BUN  11  43*  45*  37* CREA  3.01*  7.89*  7.79*  7.32* CA  8.3*  8.8  8.5  9.9 ALB   --   2.6*   --   3.1*  
K  3.5  4.6  4.6  4.9 NA  138  131*  131*  130* CL  101  95*  94*  92* CO2  30  23  25  24 PHOS   --    --   6.5*   --   
GLU  121*  95  102*  131* Procedures/imaging: see electronic medical records for all procedures, Xrays and details which were not copied into this note but were reviewed prior to creation of Plan Assessment & Plan: As above Phu Llamas MD 
7/18/2018 
3:17 PM

## 2018-07-18 NOTE — PROGRESS NOTES
7/18/2018 10:06 AM 
 
SSN: [id number] Subjective:   47-year-old female followed by neurology for new onset seizures, being treated for hypertensive crisis. She has history of pulmonary tuberculosis and is on isoniazid side. Her original chief complaint was frequent nausea and vomiting, which have improved. Her son is at her bedside and is able to translate, he tells me that she does appear to be much better than she was yesterday. No further seizures have been observed. EEG is pending. Medications:   
Current Facility-Administered Medications Medication Dose Route Frequency Provider Last Rate Last Dose  PEG 3350-Electrolytes (GO-LYTELY) SUSPENSION 500 mL  500 mL Oral ONCE Haylie Farah NP      
 hydrALAZINE (APRESOLINE) tablet 50 mg  50 mg Oral BID Haylie Farah NP   50 mg at 07/18/18 3219  pyridoxine (vitamin B6) (B-6) injection 100 mg  100 mg IntraMUSCular DAILY Sweetie Ochoa MD   100 mg at 07/18/18 0817  
 amLODIPine (NORVASC) tablet 10 mg  10 mg Oral DAILY Luanne Henry PA-C   10 mg at 07/18/18 0816  
 nitroPRUSSide (NIPRIDE) 50 mg in dextrose 5% 250 mL infusion  0-10 mcg/kg/min IntraVENous TITRATE Eric Mendez MD 24.7 mL/hr at 07/18/18 0813 2 mcg/kg/min at 07/18/18 0269  aspirin chewable tablet 81 mg  81 mg Oral DAILY Sweetie Ochoa MD   81 mg at 07/18/18 0816  
 atorvastatin (LIPITOR) tablet 40 mg  40 mg Oral QHS Sweetie Ochoa MD   40 mg at 07/17/18 2115  carvedilol (COREG) tablet 25 mg  25 mg Oral Q12H Sweetie Ochoa MD   25 mg at 07/18/18 7296  losartan (COZAAR) tablet 100 mg  100 mg Oral DAILY Sweetie Ochoa MD   100 mg at 07/18/18 0816  
 melatonin tablet 3 mg  3 mg Oral QHS PRN Sweetie Ochoa MD   3 mg at 07/17/18 0916  
 FLUoxetine (PROzac) capsule 10 mg  10 mg Oral DAILY Sweetie Ochoa MD   10 mg at 07/18/18 8958  acetaminophen (TYLENOL) tablet 650 mg  650 mg Oral Q6H PRN MD Cece Toney Arline oxyCODONE-acetaminophen (PERCOCET) 5-325 mg per tablet 1 Tab  1 Tab Oral Q6H PRN Tristian Gonzalez MD      
 naloxone [facility]) injection 0.4 mg  0.4 mg IntraVENous PRN Tristian Gonzalez MD      
 ondansetron [facility]) injection 4 mg  4 mg IntraVENous Q6H PRN Tristian Gonzalez MD   4 mg at 07/17/18 3386  docusate sodium (COLACE) capsule 100 mg  100 mg Oral BID PRN Tristian Gonzalez MD      
 heparin (porcine) injection 5,000 Units  5,000 Units SubCUTAneous Monica Arenas MD   5,000 Units at 07/18/18 2015  cloNIDine (CATAPRES) 0.3 mg/24 hr patch 1 Patch  1 Patch TransDERmal Q7D Tristian Gonzalez MD   1 Patch at 07/17/18 0157  isoniazid (NYDRAZID) tablet 300 mg  300 mg Oral DAILY Sharmaine Khan MD   300 mg at 07/18/18 0800  
 ethambutol (MYAMBUTOL) tablet 800 mg  800 mg Oral DAILY Sharmaine Khan MD   800 mg at 07/18/18 0155  pyrazinamide tablet 1,000 mg  1,000 mg Oral Q MON, WED & FRI Sharmaine Khan MD      
 rifAMPin (RIFADIN) capsule 600 mg  600 mg Oral ACB Sharmaine Khan MD   600 mg at 07/18/18 0730  LORazepam (ATIVAN) injection 1 mg  1 mg IntraVENous PRN Nancy Yi NP      
 bisacodyl (DULCOLAX) suppository 10 mg  10 mg Rectal DAILY PRN Nancy Yi NP      
 promethazine (PHENERGAN) 12.5 mg in 0.9% sodium chloride 50 mL IVPB  12.5 mg IntraVENous Q6H PRN Nancy Yi NP   Stopped at 07/16/18 2332  metoprolol (LOPRESSOR) injection 5 mg  5 mg IntraVENous Q6H PRN Tristian Gonzalez MD   5 mg at 07/18/18 0813  
 insulin lispro (HUMALOG) injection   SubCUTAneous Q6H Nancy Yi NP   Stopped at 07/17/18 0000  
 glucose chewable tablet 16 g  4 Tab Oral PRN Nancy Yi NP      
 glucagon (GLUCAGEN) injection 1 mg  1 mg IntraMUSCular PRN Nancy Yi NP      
 dextrose (D50W) injection syrg 12.5-25 g  25-50 mL IntraVENous PRN Nancy Yi NP      
 famotidine (PF) (PEPCID) 20 mg in sodium chloride 0.9% 10 mL injection  20 mg IntraVENous DAILY Chano GARCIA Bautista Vail, NP   20 mg at 07/18/18 1407 Vital signs:   
Visit Vitals  BP (!) 193/102  Pulse 87  Temp 97.6 °F (36.4 °C)  Resp 17  Ht 5' 1\" (1.549 m)  Wt 64.6 kg (142 lb 6.7 oz)  SpO2 99%  Breastfeeding No  
 BMI 26.91 kg/m2 Review of Systems:  
Confused, unable to provide Patient Active Problem List  
Diagnosis Code  Hyperkalemia E87.5  Renal failure N19  
 SCOOTER (acute kidney injury) ([address] N17.9  Hyponatremia E87.1  ESRD (end stage renal disease) ([address] N18.6  NSTEMI (non-ST elevated myocardial infarction) ([address] I21.4  Respiratory failure ([address] J96.90  
 Hypoxia R09.02  
 Acute UTI N39.0  Anemia D64.9  Complication of vascular dialysis catheter, initial encounter T82. 9XXA  Seizure ([address] R56.9  Hypertensive emergency I16.1  Intractable vomiting with nausea R11.2  
 ESRD on hemodialysis (HCC) N18.6, Z99.2  Pulmonary TB A15.0  Hypertensive crisis I16.9 EXAM: Alert, in NAD. Heart is regular. Oriented x3, EOM's are full, PERRL, no facial asymmetries. Strength and tone are normal. DTR's +2, gait symmetric Recent Results (from the past 24 hour(s)) GLUCOSE, POC Collection Time: 07/17/18 11:19 AM  
Result Value Ref Range Glucose (POC) 123 (H) 70 - 110 mg/dL GLUCOSE, POC Collection Time: 07/17/18  5:15 PM  
Result Value Ref Range Glucose (POC) 147 (H) 70 - 110 mg/dL METABOLIC PANEL, BASIC Collection Time: 07/18/18  2:04 AM  
Result Value Ref Range Sodium 138 136 - 145 mmol/L Potassium 3.5 3.5 - 5.5 mmol/L Chloride 101 100 - 108 mmol/L  
 CO2 30 21 - 32 mmol/L Anion gap 7 3.0 - 18 mmol/L Glucose 121 (H) 74 - 99 mg/dL BUN 11 7.0 - 18 MG/DL Creatinine 3.01 (H) 0.6 - 1.3 MG/DL  
 BUN/Creatinine ratio 4 (L) 12 - 20 GFR est AA 20 (L) >60 ml/min/1.73m2 GFR est non-AA 16 (L) >60 ml/min/1.73m2 Calcium 8.3 (L) 8.5 - 10.1 MG/DL  
CBC WITH AUTOMATED DIFF  Collection Time: 07/18/18  2:04 AM  
Result Value Ref Range WBC 4.6 4.6 - 13.2 K/uL  
 RBC 3.22 (L) 4.20 - 5.30 M/uL HGB 9.6 (L) 12.0 - 16.0 g/dL HCT 29.4 (L) 35.0 - 45.0 % MCV 91.3 74.0 - 97.0 FL  
 MCH 29.8 24.0 - 34.0 PG  
 MCHC 32.7 31.0 - 37.0 g/dL  
 RDW 18.8 (H) 11.6 - 14.5 % PLATELET 87 (L) 750 - 420 K/uL MPV 9.4 9.2 - 11.8 FL  
 NEUTROPHILS 83 (H) 40 - 73 % LYMPHOCYTES 6 (L) 21 - 52 % MONOCYTES 11 (H) 3 - 10 % EOSINOPHILS 0 0 - 5 % BASOPHILS 0 0 - 2 %  
 ABS. NEUTROPHILS 3.8 1.8 - 8.0 K/UL  
 ABS. LYMPHOCYTES 0.3 (L) 0.9 - 3.6 K/UL  
 ABS. MONOCYTES 0.5 0.05 - 1.2 K/UL  
 ABS. EOSINOPHILS 0.0 0.0 - 0.4 K/UL  
 ABS. BASOPHILS 0.0 0.0 - 0.1 K/UL  
 DF AUTOMATED    
GLUCOSE, POC Collection Time: 07/18/18  5:45 AM  
Result Value Ref Range Glucose (POC) 108 70 - 110 mg/dL Assessment/Plan: New onset seizures, question of provoked versus primary CNS processes, which we are trying to sort out. I still remain concerned that the original chief complaint of nausea and her presenting seizure could have been provoked by isoniazid use, an issue I do not see noted. At this point any acid-base disturbances that could have been present secondary to this may have cleared, but I would recommend looking at this and reassessing the ongoing need for isoniazid. I have ordered an MRI of the brain now that she is more alert, will see her again after reviewing this and her pending EEG. PLEASE NOTE:  
Portions of this document may have been produced using voice recognition software. Unrecognized errors in transcription may be present.

## 2018-07-18 NOTE — PROGRESS NOTES
ID progress   Note    Brian Oswald is a 56 y.o. female who is being seen on consult for antibiotic management for  MTB and MAC pulmonary and extrapulmonary infection.     The requesting  physician is Diamante Antoine MD.      Current  antibiotics:   INH  Rifampin  PZA  ETA  Pyridoxine     Past antibiotics:       Impression:     Pulmonary and extrapulmonary MTB infection  Recently diagnosed and started on 4 drug regime  In June 2018   Sputum AFB positive 4/23/18 and 4/24/18  : pan sensitive strain   BAL cx 4/24/10 ; negative for MTB  S/p liver and pleural biopsy on 5/11/18 at [facility] : MTB probe positive   CT abd and pelvis at [facility] ( 6/7) shows no liver lesion     Pulmonary  Mycobacterium avium infection   Sputum cx from 6/21/18 done at [facility] : MAC probe positive     Intractable nausea and vomiting ; due to HTN emergency vs medication induced  Or due to constipation   Suspects more due to hypertensive emergency related since onset is acute and she has been tolerating for several weeks prior     Mild elevated transaminates :  Improving and will continue to  monitor     Hypertensive emergency with seizure and intractable nausea and vomiting   Seizure activity  Not suspecting INH toxicity since she has been missing medications a few days prior to admission   ESRD on HD  NSTEMI in 8/37  Hx Diastolic Heart failure        Plan:   I would recommend to  continue 4 drug MTB regime with INH, Rifampin, Ethanbutol ( 3 times a week )   and PZA ( 3 times a week ) and pyridoxine with renally adjusted dose   Adjust ethambutol to 3 times a week dosing and discussed with pharmacy    Change pyridoxine IM to oral dosing   She is in initial 2 months with 4 drug regime stage for MTB treatment and INH is essential part of treatment regime and not recommend to discontinue it for now   Continue to control l nausea with antiemetics RTC regime   Monitor LFT and is improving    Plan to add zithromax 500mg 3 times a week starting next Monday , if she continues to tolerate current anti MTB regime   Rifampin and Ethambutol will cover for MAC infection as well   Notify Health dept about patient Jennifer Quezada so that she can be continued on DOT as out patient   Patient will need follow up with [facility] ID service on discharge for out patient ID follow up    Recommend to repeat CT abd and pelvis to follow on liver leison with IV contrast when patient becomes stable. Continue supportive care  We are watching pt closely for toxicities and side effects to abx and drug-drug interactions. We will adjust antibiotics upon the results of the pending tests and the clinical improvement of the patient. Case discussed with: [X]Patient []Family [X]Nursing [ ]Case Management  [ [facility]  [X ]MD/Care team     I discussed with Karyn Seay about the management, prognosis, treatment and complications of the above infectious disease conditions. All patient's questions were answered in detail. She voiced understanding of the discussed issues. She agrees with current plan of therapy. Review of Systems:   Interval notes, available laboratory, microbiology and radiographic data reviewed. Discussed with nursing and treatment  providers     Patient does not speak english, limiteed ROM   As per nursing, patient is tolerating medications orally and can hold down   No nausea, vomiting or no further seizure noted       Physical Assessment:     VITAL SIGNS    Blood pressure 123/73, pulse 82, temperature 98 °F (36.7 °C), resp. rate 19, height 5' 1\" (1.549 m), weight 46 kg (101 lb 6.6 oz), SpO2 99 %, not currently breastfeeding. Temp (24hrs), Av.8 °F (36.6 °C), Min:97.5 °F (36.4 °C), Max:98.2 °F (36.8 °C)          GEN: not distressed  Eyes:  EOM, No icterus.  Normal conjunctiva  ENMT: Lips without lesions  Neck:supple , No masses  LUNGS:CTA Bl lungs   CVS EXM: RRR, No S4 or S3   Abdomen: Soft, Non Tender, Bowel Sounds normal  Musculoskeletal: moving all 4 extr  SKIN:Warm, dry; as above otherwise no rashes   Neuro/Psych:  Alert. Oriented x3                            Follows commands          MICROBIOLOGY DATA    Reviewed AFB [facility]       LAB DATA    Recent Results (from the past 24 hour(s))   GLUCOSE, POC    Collection Time: 07/17/18 11:19 AM   Result Value Ref Range    Glucose (POC) 123 (H) 70 - 110 mg/dL   GLUCOSE, POC    Collection Time: 07/17/18  5:15 PM   Result Value Ref Range    Glucose (POC) 147 (H) 70 - 722 mg/dL   METABOLIC PANEL, BASIC    Collection Time: 07/18/18  2:04 AM   Result Value Ref Range    Sodium 138 136 - 145 mmol/L    Potassium 3.5 3.5 - 5.5 mmol/L    Chloride 101 100 - 108 mmol/L    CO2 30 21 - 32 mmol/L    Anion gap 7 3.0 - 18 mmol/L    Glucose 121 (H) 74 - 99 mg/dL    BUN 11 7.0 - 18 MG/DL    Creatinine 3.01 (H) 0.6 - 1.3 MG/DL    BUN/Creatinine ratio 4 (L) 12 - 20      GFR est AA 20 (L) >60 ml/min/1.73m2    GFR est non-AA 16 (L) >60 ml/min/1.73m2    Calcium 8.3 (L) 8.5 - 10.1 MG/DL   CBC WITH AUTOMATED DIFF    Collection Time: 07/18/18  2:04 AM   Result Value Ref Range    WBC 4.6 4.6 - 13.2 K/uL    RBC 3.22 (L) 4.20 - 5.30 M/uL    HGB 9.6 (L) 12.0 - 16.0 g/dL    HCT 29.4 (L) 35.0 - 45.0 %    MCV 91.3 74.0 - 97.0 FL    MCH 29.8 24.0 - 34.0 PG    MCHC 32.7 31.0 - 37.0 g/dL    RDW 18.8 (H) 11.6 - 14.5 %    PLATELET 87 (L) 448 - 420 K/uL    MPV 9.4 9.2 - 11.8 FL    NEUTROPHILS 83 (H) 40 - 73 %    LYMPHOCYTES 6 (L) 21 - 52 %    MONOCYTES 11 (H) 3 - 10 %    EOSINOPHILS 0 0 - 5 %    BASOPHILS 0 0 - 2 %    ABS. NEUTROPHILS 3.8 1.8 - 8.0 K/UL    ABS. LYMPHOCYTES 0.3 (L) 0.9 - 3.6 K/UL    ABS. MONOCYTES 0.5 0.05 - 1.2 K/UL    ABS. EOSINOPHILS 0.0 0.0 - 0.4 K/UL    ABS.  BASOPHILS 0.0 0.0 - 0.1 K/UL    DF AUTOMATED     GLUCOSE, POC    Collection Time: 07/18/18  5:45 AM   Result Value Ref Range    Glucose (POC) 108 70 - 110 mg/dL       liver function tests    IMAGING     I reviewed images CXR from 7/17   - official report showed Small bilateral pleural effusions.   2.  Improved aeration and expansion of the lungs with mild bibasilar opacities.     I reviewed images CT chest/abd/pelvis  Without contrast  from  6/7/18  - official report showed   Small to moderate right pleural effusion with atelectasis/infiltrates in basal  right lung.     Mild atelectasis and/or fibrosis with or without active infiltrates in left lung  base.     Small to moderate pericardial effusion.     Large amount of ascites distending abdomen and pelvis.     Large amount of retained stool in proximal colon. Moderate gas in transverse  colon and upper sigmoid colon.     There is prominent appendix containing gas and some stool-like material but  there is no evidence of any abnormal thickening or appendicular wall.  No  definite finding to suspect acute appendicitis.             Current medications reviewed in EPIC        Current Facility-Administered Medications   Medication Dose Route Frequency    PEG 3350-Electrolytes (GO-LYTELY) SUSPENSION 500 mL  500 mL Oral ONCE    hydrALAZINE (APRESOLINE) tablet 50 mg  50 mg Oral BID    pyridoxine (vitamin B6) (B-6) injection 100 mg  100 mg IntraMUSCular DAILY    amLODIPine (NORVASC) tablet 10 mg  10 mg Oral DAILY    nitroPRUSSide (NIPRIDE) 50 mg in dextrose 5% 250 mL infusion  0-10 mcg/kg/min IntraVENous TITRATE    aspirin chewable tablet 81 mg  81 mg Oral DAILY    atorvastatin (LIPITOR) tablet 40 mg  40 mg Oral QHS    carvedilol (COREG) tablet 25 mg  25 mg Oral Q12H    losartan (COZAAR) tablet 100 mg  100 mg Oral DAILY    melatonin tablet 3 mg  3 mg Oral QHS PRN    FLUoxetine (PROzac) capsule 10 mg  10 mg Oral DAILY    acetaminophen (TYLENOL) tablet 650 mg  650 mg Oral Q6H PRN    oxyCODONE-acetaminophen (PERCOCET) 5-325 mg per tablet 1 Tab  1 Tab Oral Q6H PRN    naloxone (NARCAN) injection 0.4 mg  0.4 mg IntraVENous PRN    ondansetron (ZOFRAN) injection 4 mg  4 mg IntraVENous Q6H PRN    docusate sodium (COLACE) capsule 100 mg  100 mg Oral BID PRN    heparin (porcine) injection 5,000 Units  5,000 Units SubCUTAneous Q8H    cloNIDine (CATAPRES) 0.3 mg/24 hr patch 1 Patch  1 Patch TransDERmal Q7D    isoniazid (NYDRAZID) tablet 300 mg  300 mg Oral DAILY    ethambutol (MYAMBUTOL) tablet 800 mg  800 mg Oral DAILY    pyrazinamide tablet 1,000 mg  1,000 mg Oral Q MON, WED & FRI    rifAMPin (RIFADIN) capsule 600 mg  600 mg Oral ACB    LORazepam (ATIVAN) injection 1 mg  1 mg IntraVENous PRN    bisacodyl (DULCOLAX) suppository 10 mg  10 mg Rectal DAILY PRN    promethazine (PHENERGAN) 12.5 mg in 0.9% sodium chloride 50 mL IVPB  12.5 mg IntraVENous Q6H PRN    metoprolol (LOPRESSOR) injection 5 mg  5 mg IntraVENous Q6H PRN    insulin lispro (HUMALOG) injection   SubCUTAneous Q6H    glucose chewable tablet 16 g  4 Tab Oral PRN    glucagon (GLUCAGEN) injection 1 mg  1 mg IntraMUSCular PRN    dextrose (D50W) injection syrg 12.5-25 g  25-50 mL IntraVENous PRN    famotidine (PF) (PEPCID) 20 mg in sodium chloride 0.9% 10 mL injection  20 mg IntraVENous DAILY           Medical Decision Making:     I reviewed and summarized data from old medical records and obtained history from other sources than patient. I reviewed laboratory tests, Radiology data, and diagnostic tests in the CPT medicine section. I discussed with the physicians and the nursed involved in this patient's care about this patient's current medical problems. I have  Discussed plan of care with  family  and nursing staff. The total visit duration was of  35   minutes of which more than 50% was spent in coordination of care and counseling (time spent with patient/family face to face, physical exam, reviewing microbiology data, laboratory results, radiographic data, speaking with physicians and nursing staff involved in this pt's care).

## 2018-07-18 NOTE — PROGRESS NOTES
Bedside and Verbal shift change report given to Leif Mitchell RN (oncoming nurse) by David Mauricio RN   (offgoing nurse). Report included the following information SBAR, Kardex, Intake/Output, MAR and Recent Results.

## 2018-07-18 NOTE — PROGRESS NOTES
attended the interdisciplinary rounds for Brian Oswald, who is a 56 y.o.,female. Patients Primary Language is: [place]. According to the patients EMR Anabaptist Affiliation is: Druze. The reason the Patient came to the hospital is:   Patient Active Problem List    Diagnosis Date Noted    Intractable vomiting with nausea 07/17/2018    ESRD on hemodialysis ([facility] 07/17/2018    Pulmonary TB 07/17/2018    Hypertensive crisis 07/17/2018    Seizure ([facility] 07/16/2018    Hypertensive emergency 53/56/6152    Complication of vascular dialysis catheter, initial encounter 06/18/2018    Anemia 02/26/2018    Hyponatremia 02/25/2018    ESRD (end stage renal disease) ([facility] 02/25/2018    NSTEMI (non-ST elevated myocardial infarction) ([facility] [facility] 02/25/2018    Respiratory failure ([facility] 02/25/2018    Hypoxia 02/25/2018    Acute UTI 02/25/2018    Hyperkalemia 09/08/2017    Renal failure 09/08/2017    SCOOTER (acute kidney injury) ([facility] 09/08/2017      Plan:  Chaplains will continue to follow and will provide pastoral care on an as needed/requested basis.  recommends bedside caregivers page  on duty if patient shows signs of acute spiritual or emotional distress.     [address]  Board Certified [address] [place] [address]   [phone]

## 2018-07-18 NOTE — PROGRESS NOTES
[facility] Hospitalist Group  Progress Note    Patient: Salvatore Gonzalez Age: 56 y.o. : 1960 MR#: [id number] SSN: [id number]  Date: 2018     Subjective:     Patient seen during HD, in NAD    Assessment/Plan:   1. Hypertensive emergency - off nipride, on amlodipine, coreg, hydralazine  2. Seizure episode: await eeg, mri, neuro following, PRN ativan  3. 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 ESRD - HD as per nephro  5. H/o Pulmonary TB - ID following  6. Hyperlipidemia - cont statin  7.   Underweight- nutrition following  D/w HD nurse, monitor BP   Add prn hydralazine      Case discussed with:  [x]Patient  []Family  [x]Nursing  []Case Management  DVT Prophylaxis:  []Lovenox  [x]Hep SQ  []SCDs  []Coumadin   []On Heparin gtt    Objective:   VS:   Visit Vitals    BP (!) 201/120    Pulse 91    Temp 99.1 °F (37.3 °C)    Resp 16    Ht 5' 1\" (1.549 m)    Wt 46 kg (101 lb 6.6 oz)    SpO2 100%    Breastfeeding No    BMI 19.16 kg/m2      Tmax/24hrs: Temp (24hrs), Av °F (36.7 °C), Min:97.6 °F (36.4 °C), Max:99.1 °F (37.3 °C)      Intake/Output Summary (Last 24 hours) at 18  Last data filed at 18 2030   Gross per 24 hour   Intake                0 ml   Output             3500 ml   Net            -3500 ml     General:  Alert, NAD  Cardiovascular:  RRR  Pulmonary:  LSC throughout  GI:  +BS in all four quadrants, soft, non-tender  Extremities:  No edema; 2+ dorsalis pedis pulses bilaterally  Neuro: alert and oriented x 3  Skin - R CW with TDC intact    Family contact:  [phone]; son at bedside    Labs:    Recent Results (from the past 24 hour(s))   METABOLIC PANEL, BASIC    Collection Time: 18  2:04 AM   Result Value Ref Range    Sodium 138 136 - 145 mmol/L    Potassium 3.5 3.5 - 5.5 mmol/L    Chloride 101 100 - 108 mmol/L    CO2 30 21 - 32 mmol/L    Anion gap 7 3.0 - 18 mmol/L    Glucose 121 (H) 74 - 99 mg/dL    BUN 11 7.0 - 18 MG/DL    Creatinine 3.01 (H) 0.6 - 1.3 MG/DL    BUN/Creatinine ratio 4 (L) 12 - 20      GFR est AA 20 (L) >60 ml/min/1.73m2    GFR est non-AA 16 (L) >60 ml/min/1.73m2    Calcium 8.3 (L) 8.5 - 10.1 MG/DL   CBC WITH AUTOMATED DIFF    Collection Time: 07/18/18  2:04 AM   Result Value Ref Range    WBC 4.6 4.6 - 13.2 K/uL    RBC 3.22 (L) 4.20 - 5.30 M/uL    HGB 9.6 (L) 12.0 - 16.0 g/dL    HCT 29.4 (L) 35.0 - 45.0 %    MCV 91.3 74.0 - 97.0 FL    MCH 29.8 24.0 - 34.0 PG    MCHC 32.7 31.0 - 37.0 g/dL    RDW 18.8 (H) 11.6 - 14.5 %    PLATELET 87 (L) 125 - 420 K/uL    MPV 9.4 9.2 - 11.8 FL    NEUTROPHILS 83 (H) 40 - 73 %    LYMPHOCYTES 6 (L) 21 - 52 %    MONOCYTES 11 (H) 3 - 10 %    EOSINOPHILS 0 0 - 5 %    BASOPHILS 0 0 - 2 %    ABS. NEUTROPHILS 3.8 1.8 - 8.0 K/UL    ABS. LYMPHOCYTES 0.3 (L) 0.9 - 3.6 K/UL    ABS. MONOCYTES 0.5 0.05 - 1.2 K/UL    ABS. EOSINOPHILS 0.0 0.0 - 0.4 K/UL    ABS.  BASOPHILS 0.0 0.0 - 0.1 K/UL    DF AUTOMATED     GLUCOSE, POC    Collection Time: 07/18/18  5:45 AM   Result Value Ref Range    Glucose (POC) 108 70 - 110 mg/dL   GLUCOSE, POC    Collection Time: 07/18/18 11:41 AM   Result Value Ref Range    Glucose (POC) 135 (H) 70 - 110 mg/dL     Signed By: Makenzie Gardner MD     July 18, 2018

## 2018-07-19 NOTE — PROGRESS NOTES
7/19/2018 4:13 PM 
 
SSN: [id number] Subjective:   60-year-old  female with end-stage renal disease and a new onset single seizure episode. Her son is in the room. He expresses that she is well oriented and back mentally to her baseline, much better than yesterday. I have reviewed her EEG, which showed mild diffuse slowing without any evidence of underlying epileptiform abnormalities. Medications:   
Current Facility-Administered Medications Medication Dose Route Frequency Provider Last Rate Last Dose  cloNIDine HCl (CATAPRES) tablet 0.1 mg  0.1 mg Oral Q6H PRN Chrissy Galvan MD      
 heparin (porcine) injection 5,000 Units  5,000 Units SubCUTAneous Mima Killian MD   5,000 Units at 07/19/18 1108  hydrALAZINE (APRESOLINE) tablet 100 mg  100 mg Oral TID Lenny Alexander MD      
 ethambutol (MYAMBUTOL) tablet 1,200 mg  1,200 mg Oral Q TUE, THU & SAT Kyle Mack DO      
 pyridoxine (vitamin B6) (VITAMIN B-6) tablet 100 mg  100 mg Oral DAILY Martell Vázquez DO      
 famotidine (PEPCID) tablet 20 mg  20 mg Oral DAILY Anabela Blanco MD   20 mg at 07/19/18 1107  hydrALAZINE (APRESOLINE) tablet 25 mg  25 mg Oral Q8H PRN Anabela Blanco MD   25 mg at 07/19/18 1142  amLODIPine (NORVASC) tablet 10 mg  10 mg Oral DAILY Merlin Michael PA-C   10 mg at 07/19/18 0380  aspirin chewable tablet 81 mg  81 mg Oral DAILY Laureen Olsen MD   81 mg at 07/19/18 7447  atorvastatin (LIPITOR) tablet 40 mg  40 mg Oral QHS Laureen Olsen MD   40 mg at 07/18/18 7527  carvedilol (COREG) tablet 25 mg  25 mg Oral Q12H Laureen Olsen MD   25 mg at 07/19/18 7669  losartan (COZAAR) tablet 100 mg  100 mg Oral DAILY Laureen Olsen MD   100 mg at 07/19/18 7867  melatonin tablet 3 mg  3 mg Oral QHS PRN Laureen Olsen MD   3 mg at 07/17/18 0916  
 FLUoxetine (PROzac) capsule 10 mg  10 mg Oral DAILY Clogisellta Perfect, MD   10 mg at 07/19/18 1202  acetaminophen (TYLENOL) tablet 650 mg  650 mg Oral Q6H PRN Adonay Green MD      
 oxyCODONE-acetaminophen (PERCOCET) 5-325 mg per tablet 1 Tab  1 Tab Oral Q6H PRN Adonay Green MD   1 Tab at 07/19/18 0031  
 naloxone [facility]) injection 0.4 mg  0.4 mg IntraVENous PRN Adonay Green MD      
 ondansetron [facility]) injection 4 mg  4 mg IntraVENous Q6H PRN Adonay Green MD   4 mg at 07/17/18 9836  docusate sodium (COLACE) capsule 100 mg  100 mg Oral BID PRN Adonay Green MD      
 cloNIDine (CATAPRES) 0.3 mg/24 hr patch 1 Patch  1 Patch TransDERmal Q7D Adonay Green MD   1 Patch at 07/17/18 0157  isoniazid (NYDRAZID) tablet 300 mg  300 mg Oral DAILY Carter Hoffmann MD   300 mg at 07/19/18 2150  pyrazinamide tablet 1,000 mg  1,000 mg Oral Q MON, WED & FRI Carter Hoffmann MD   1,000 mg at 07/19/18 1725  rifAMPin (RIFADIN) capsule 600 mg  600 mg Oral ACB Carter Hoffmann MD   600 mg at 07/19/18 7154  LORazepam (ATIVAN) injection 1 mg  1 mg IntraVENous PRN Roque Anderson NP      
 bisacodyl (DULCOLAX) suppository 10 mg  10 mg Rectal DAILY PRN Roque Anderson NP      
 promethazine (PHENERGAN) 12.5 mg in 0.9% sodium chloride 50 mL IVPB  12.5 mg IntraVENous Q6H PRN Roque Anderson NP   Stopped at 07/16/18 2332  metoprolol (LOPRESSOR) injection 5 mg  5 mg IntraVENous Q6H PRN Adonay Green MD   5 mg at 07/18/18 1515  
 insulin lispro (HUMALOG) injection   SubCUTAneous Q6H Roque Anderson NP   Stopped at 07/17/18 0000  
 glucose chewable tablet 16 g  4 Tab Oral PRN Roque Anderson NP      
 glucagon (GLUCAGEN) injection 1 mg  1 mg IntraMUSCular PRN Roque Anderson NP      
 dextrose (D50W) injection syrg 12.5-25 g  25-50 mL IntraVENous PRN Roque Anderson NP Vital signs:   
Visit Vitals  BP (!) 185/99 (BP 1 Location: Right arm, BP Patient Position: At rest) Comment: KRISTIN Simmons was notified  Pulse 92  Temp 98 °F (36.7 °C)  Resp 19  
 Ht 5' 1\" (1.549 m)  Wt 40.9 kg (90 lb 2.7 oz)  SpO2 93%  Breastfeeding No  
 BMI 17.04 kg/m2 Review of Systems:  
GENERAL: Denies fever or fatigue CARDIAC: No CP or SOB PULMONARY: No cough of SOB MUSCULOSKELETAL: No new joint pain NEURO: SEE HPI Patient Active Problem List  
Diagnosis Code  Hyperkalemia E87.5  Renal failure N19  
 SCOOTER (acute kidney injury) ([facility] [address] N17.9  Hyponatremia E87.1  ESRD (end stage renal disease) ([facility] [address] N18.6  NSTEMI (non-ST elevated myocardial infarction) ([facility] [address] I21.4  Respiratory failure ([facility] [address] J96.90  
 Hypoxia R09.02  
 Acute UTI N39.0  Anemia D64.9  Complication of vascular dialysis catheter, initial encounter T82. 9XXA  Seizure ([facility] [address] R56.9  Hypertensive emergency I16.1  Intractable vomiting with nausea R11.2  
 ESRD on hemodialysis (HCC) N18.6, Z99.2  Pulmonary TB A15.0  Hypertensive crisis I16.9 EXAM: Alert, in NAD. Heart is regular. Oriented x3 through translation, following commands appropriately, EOM's are full, PERRL, no facial asymmetries. Strength and tone are normal. DTR's +2 Recent Results (from the past 24 hour(s)) GLUCOSE, POC Collection Time: 07/19/18 12:12 AM  
Result Value Ref Range Glucose (POC) 92 70 - 110 mg/dL CBC WITH AUTOMATED DIFF Collection Time: 07/19/18  5:02 AM  
Result Value Ref Range WBC 5.6 4.6 - 13.2 K/uL  
 RBC 3.67 (L) 4.20 - 5.30 M/uL  
 HGB 11.0 (L) 12.0 - 16.0 g/dL HCT 34.5 (L) 35.0 - 45.0 % MCV 94.0 74.0 - 97.0 FL  
 MCH 30.0 24.0 - 34.0 PG  
 MCHC 31.9 31.0 - 37.0 g/dL  
 RDW 18.4 (H) 11.6 - 14.5 % PLATELET 077 (L) 966 - 420 K/uL MPV 9.7 9.2 - 11.8 FL  
 NEUTROPHILS 76 (H) 40 - 73 % LYMPHOCYTES 7 (L) 21 - 52 % MONOCYTES 15 (H) 3 - 10 % EOSINOPHILS 1 0 - 5 % BASOPHILS 1 0 - 2 %  
 ABS. NEUTROPHILS 4.3 1.8 - 8.0 K/UL  
 ABS. LYMPHOCYTES 0.4 (L) 0.9 - 3.6 K/UL  
 ABS. MONOCYTES 0.8 0.05 - 1.2 K/UL  
 ABS. EOSINOPHILS 0.1 0.0 - 0.4 K/UL  
 ABS.  BASOPHILS 0.1 0.0 - 0.1 K/UL  
 DF AUTOMATED METABOLIC PANEL, BASIC Collection Time: 07/19/18  5:02 AM  
Result Value Ref Range Sodium 137 136 - 145 mmol/L Potassium 3.9 3.5 - 5.5 mmol/L Chloride 100 100 - 108 mmol/L  
 CO2 29 21 - 32 mmol/L Anion gap 8 3.0 - 18 mmol/L Glucose 87 74 - 99 mg/dL BUN 12 7.0 - 18 MG/DL Creatinine 2.01 (H) 0.6 - 1.3 MG/DL  
 BUN/Creatinine ratio 6 (L) 12 - 20 GFR est AA 31 (L) >60 ml/min/1.73m2 GFR est non-AA 26 (L) >60 ml/min/1.73m2 Calcium 8.6 8.5 - 10.1 MG/DL MAGNESIUM Collection Time: 07/19/18  5:02 AM  
Result Value Ref Range Magnesium 2.1 1.6 - 2.6 mg/dL GLUCOSE, POC Collection Time: 07/19/18  6:13 AM  
Result Value Ref Range Glucose (POC) 78 70 - 110 mg/dL GLUCOSE, POC Collection Time: 07/19/18  1:33 PM  
Result Value Ref Range Glucose (POC) 99 70 - 110 mg/dL Assessment/Plan: Single seizure, questionably provoked by hypertensive crisis versus isoniazid use, without any recurrence and without any imaging evidence of focal lesions or epileptiform abnormalities on EEG to recommend antiepileptic drugs at this point. According to her son she is back to her baseline. Therefore I do not have any further recommendations. Neurology will sign off. Thank you. PLEASE NOTE:  
Portions of this document may have been produced using voice recognition software. Unrecognized errors in transcription may be present.

## 2018-07-19 NOTE — PROGRESS NOTES
ID Cross cover  progress   Note    Reason for follow up :  antibiotic management for  MTB and MAC pulmonary and extrapulmonary infection.     The requesting  physician is Sreekanth Willingham MD.      Current  antibiotics:   INH  Rifampin  PZA  ETA  Pyridoxine     Past antibiotics:       Impression:     Pulmonary and extrapulmonary MTB infection  Recently diagnosed and started on 4 drug regime  In June 2018   Sputum AFB positive 4/23/18 and 4/24/18  : pan sensitive strain   BAL cx 4/24/10 ; negative for MTB  S/p liver and pleural biopsy on 5/11/18 at [facility] : MTB probe positive   CT abd and pelvis at [facility] ( 6/7) shows no liver lesion     Pulmonary  Mycobacterium avium infection   Sputum cx from 6/21/18 done at [facility] : MAC probe positive     Intractable nausea and vomiting ; due to HTN emergency vs medication induced  Or due to constipation   Suspects more due to hypertensive emergency related since onset is acute and she has been tolerating for several weeks prior     Mild elevated transaminates :  Improving and will continue to  monitor     Hypertensive emergency with seizure and intractable nausea and vomiting   Seizure activity  Not suspecting due to  INH toxicity since she has been missing medications a few days prior to admission   ESRD on HD  NSTEMI in 0/19  Hx Diastolic Heart failure        Plan:   I would recommend to  continue 4 drug MTB regime with INH, Rifampin, Ethanbutol ( 3 times a week )   and PZA ( 3 times a week ) and pyridoxine with renally adjusted dose   Continue po  Pyridoxine to prevent INH neuropathy   She is in initial 2 months with 4 drug regime stage for MTB treatment and INH is essential part of treatment regime and not recommend to discontinue it for now   Continue to control l nausea with antiemetics RTC regime   Monitor LFT   HTN control as per primary team   Plan to add zithromax 500mg 3 times a week ( M,W, F)  starting next tomorrow on 7/19  , if she continues to tolerate current anti MTB regime . Will need to give prescription on discharge   Please call the micro lab to send out sensitivity for mycobacterium avium grew from 18 sputum culture  Rifampin and Ethambutol will cover for MAC infection as well   Notify Health dept about patient Zhao Hughes so that she can be continued on DOT as out patient   Patient will need follow up with [facility] ID service on discharge for out patient ID follow up    Recommend to repeat CT abd and pelvis to follow on liver leison with IV contrast when patient becomes stable. Continue supportive care  We are watching pt closely for toxicities and side effects to abx and drug-drug interactions. We will adjust antibiotics upon the results of the pending tests and the clinical improvement of the patient. Case discussed with: [X]Patient []Family [X]Nursing [ ]Case Management  [ [facility]  [X ]MD/Care team     I discussed with Luis Ureña about the management, prognosis, treatment and complications of the above infectious disease conditions. All patient's questions were answered in detail. She voiced understanding of the discussed issues. She agrees with current plan of therapy. Review of Systems:   Interval notes, available laboratory, microbiology and radiographic data reviewed. Discussed with nursing and treatment  providers   Patient in medical floor today  Still BP is high this morning  Patient does not speak english, limiteed ROM   As per nursing, patient is tolerating medications orally and can hold down medication   No new seizure and EEG done today   No nausea, vomiting   C/o burise on anterior abdomen       Physical Assessment:     VITAL SIGNS    Blood pressure (!) 209/118, pulse 93, temperature 97.5 °F (36.4 °C), resp. rate 18, height 5' 1\" (1.549 m), weight 40.9 kg (90 lb 2.7 oz), SpO2 96 %, not currently breastfeeding.   Temp (24hrs), Av.1 °F (36.7 °C), Min:97.5 °F (36.4 °C), Max:99.1 °F (37.3 °C)          GEN: not distressed  Eyes:  EOM, No icterus. Normal conjunctiva  ENMT: Lips without lesions  Neck:supple , No masses  LUNGS:CTA Bl lungs   CVS EXM: RRR, No S4 or S3   Abdomen: Soft, Non Tender, Bowel Sounds normal   small hematoma at umbilical area   Musculoskeletal: moving all 4 extr  SKIN:Warm, dry; as above otherwise no rashes   Neuro/Psych:  Alert. Oriented x3                            Follows commands          MICROBIOLOGY DATA    Reviewed AFB cultures from [facility] and [facility]       LAB DATA    Recent Results (from the past 24 hour(s))   GLUCOSE, POC    Collection Time: 07/18/18 11:41 AM   Result Value Ref Range    Glucose (POC) 135 (H) 70 - 110 mg/dL   GLUCOSE, POC    Collection Time: 07/19/18 12:12 AM   Result Value Ref Range    Glucose (POC) 92 70 - 110 mg/dL   CBC WITH AUTOMATED DIFF    Collection Time: 07/19/18  5:02 AM   Result Value Ref Range    WBC 5.6 4.6 - 13.2 K/uL    RBC 3.67 (L) 4.20 - 5.30 M/uL    HGB 11.0 (L) 12.0 - 16.0 g/dL    HCT 34.5 (L) 35.0 - 45.0 %    MCV 94.0 74.0 - 97.0 FL    MCH 30.0 24.0 - 34.0 PG    MCHC 31.9 31.0 - 37.0 g/dL    RDW 18.4 (H) 11.6 - 14.5 %    PLATELET 435 (L) 009 - 420 K/uL    MPV 9.7 9.2 - 11.8 FL    NEUTROPHILS 76 (H) 40 - 73 %    LYMPHOCYTES 7 (L) 21 - 52 %    MONOCYTES 15 (H) 3 - 10 %    EOSINOPHILS 1 0 - 5 %    BASOPHILS 1 0 - 2 %    ABS. NEUTROPHILS 4.3 1.8 - 8.0 K/UL    ABS. LYMPHOCYTES 0.4 (L) 0.9 - 3.6 K/UL    ABS. MONOCYTES 0.8 0.05 - 1.2 K/UL    ABS. EOSINOPHILS 0.1 0.0 - 0.4 K/UL    ABS.  BASOPHILS 0.1 0.0 - 0.1 K/UL    DF AUTOMATED     METABOLIC PANEL, BASIC    Collection Time: 07/19/18  5:02 AM   Result Value Ref Range    Sodium 137 136 - 145 mmol/L    Potassium 3.9 3.5 - 5.5 mmol/L    Chloride 100 100 - 108 mmol/L    CO2 29 21 - 32 mmol/L    Anion gap 8 3.0 - 18 mmol/L    Glucose 87 74 - 99 mg/dL    BUN 12 7.0 - 18 MG/DL    Creatinine 2.01 (H) 0.6 - 1.3 MG/DL    BUN/Creatinine ratio 6 (L) 12 - 20      GFR est AA 31 (L) >60 ml/min/1.73m2    GFR est non-AA 26 (L) >60 ml/min/1.73m2    Calcium 8.6 8.5 - 10.1 MG/DL   MAGNESIUM    Collection Time: 07/19/18  5:02 AM   Result Value Ref Range    Magnesium 2.1 1.6 - 2.6 mg/dL   GLUCOSE, POC    Collection Time: 07/19/18  6:13 AM   Result Value Ref Range    Glucose (POC) 78 70 - 110 mg/dL       liver function tests    IMAGING     I reviewed images CXR from 7/17   - official report showed Small bilateral pleural effusions.   2.  Improved aeration and expansion of the lungs with mild bibasilar opacities.     I reviewed images CT chest/abd/pelvis  Without contrast  from  6/7/18  - official report showed   Small to moderate right pleural effusion with atelectasis/infiltrates in basal  right lung.     Mild atelectasis and/or fibrosis with or without active infiltrates in left lung  base.     Small to moderate pericardial effusion.     Large amount of ascites distending abdomen and pelvis.     Large amount of retained stool in proximal colon. Moderate gas in transverse  colon and upper sigmoid colon.     There is prominent appendix containing gas and some stool-like material but  there is no evidence of any abnormal thickening or appendicular wall.  No  definite finding to suspect acute appendicitis.             Current medications reviewed in EPIC        Current Facility-Administered Medications   Medication Dose Route Frequency    cloNIDine HCl (CATAPRES) tablet 0.1 mg  0.1 mg Oral Q6H PRN    heparin (porcine) injection 5,000 Units  5,000 Units SubCUTAneous Q8H    ethambutol (MYAMBUTOL) tablet 1,200 mg  1,200 mg Oral Q TUE, THU & SAT    pyridoxine (vitamin B6) (VITAMIN B-6) tablet 100 mg  100 mg Oral DAILY    hydrALAZINE (APRESOLINE) tablet 50 mg  50 mg Oral TID    famotidine (PEPCID) tablet 20 mg  20 mg Oral DAILY    hydrALAZINE (APRESOLINE) tablet 25 mg  25 mg Oral Q8H PRN    amLODIPine (NORVASC) tablet 10 mg  10 mg Oral DAILY    aspirin chewable tablet 81 mg  81 mg Oral DAILY    atorvastatin (LIPITOR) tablet 40 mg  40 mg Oral QHS    carvedilol (COREG) tablet 25 mg  25 mg Oral Q12H    losartan (COZAAR) tablet 100 mg  100 mg Oral DAILY    melatonin tablet 3 mg  3 mg Oral QHS PRN    FLUoxetine (PROzac) capsule 10 mg  10 mg Oral DAILY    acetaminophen (TYLENOL) tablet 650 mg  650 mg Oral Q6H PRN    oxyCODONE-acetaminophen (PERCOCET) 5-325 mg per tablet 1 Tab  1 Tab Oral Q6H PRN    naloxone (NARCAN) injection 0.4 mg  0.4 mg IntraVENous PRN    ondansetron (ZOFRAN) injection 4 mg  4 mg IntraVENous Q6H PRN    docusate sodium (COLACE) capsule 100 mg  100 mg Oral BID PRN    cloNIDine (CATAPRES) 0.3 mg/24 hr patch 1 Patch  1 Patch TransDERmal Q7D    isoniazid (NYDRAZID) tablet 300 mg  300 mg Oral DAILY    pyrazinamide tablet 1,000 mg  1,000 mg Oral Q MON, WED & FRI    rifAMPin (RIFADIN) capsule 600 mg  600 mg Oral ACB    LORazepam (ATIVAN) injection 1 mg  1 mg IntraVENous PRN    bisacodyl (DULCOLAX) suppository 10 mg  10 mg Rectal DAILY PRN    promethazine (PHENERGAN) 12.5 mg in 0.9% sodium chloride 50 mL IVPB  12.5 mg IntraVENous Q6H PRN    metoprolol (LOPRESSOR) injection 5 mg  5 mg IntraVENous Q6H PRN    insulin lispro (HUMALOG) injection   SubCUTAneous Q6H    glucose chewable tablet 16 g  4 Tab Oral PRN    glucagon (GLUCAGEN) injection 1 mg  1 mg IntraMUSCular PRN    dextrose (D50W) injection syrg 12.5-25 g  25-50 mL IntraVENous PRN           Medical Decision Making:     I reviewed and summarized data from old medical records and obtained history from other sources than patient. I reviewed laboratory tests, Radiology data, and diagnostic tests in the CPT medicine section. I discussed with the physicians and the nursed involved in this patient's care about this patient's current medical problems. I have  Discussed plan of care with  family  and nursing staff.         The total visit duration was of  35   minutes of which more than 50% was spent in coordination of care and counseling (time spent with patient/family face to face, physical exam, reviewing microbiology data, laboratory results, radiographic data, speaking with physicians and nursing staff involved in this pt's care).

## 2018-07-19 NOTE — PROGRESS NOTES
PT orders received, chart reviewed. Pt unable to participate with PT due to:  []  Nausea/vomiting  []  Eating  []  Pain  []  Pt lethargic  [x]  Off Unit  []  Pt refused  [x]  Other, talking to nursing regarding TB diagnosis. Pt has TB but not active per nursing (2-3 negative tests last admission recently). Pt is not on precautions or in isolation at this time. Pt continues to be on her TB medications. Will f/u later as patient's schedule allows.  Thank you for this referral.  Estephania Jamison, PT, DPT

## 2018-07-19 NOTE — PROGRESS NOTES
All due blood pressure medications given-will recheck in one hour       07/19/18 0831   Vital Signs   Temp 97.5 °F (36.4 °C)   Temp Source Oral   Pulse (Heart Rate) 93   Heart Rate Source Monitor   Resp Rate 18   O2 Sat (%) 96 %   Level of Consciousness Alert   BP (!) 209/118  (RN Jenene Leyden was notified)   MAP (Calculated) 148   BP 1 Method Automatic   BP 1 Location Right arm   BP Patient Position At rest   MEWS Score 3   Patient Observation   Observations vitals

## 2018-07-19 NOTE — ROUTINE PROCESS
Bedside and Verbal shift change report given to Arabella Levy (oncoming nurse) by Oleg Miles (offgoing nurse).  Report included the following information SBAR, Kardex, Intake/Output, MAR and Recent Results.

## 2018-07-19 NOTE — PROGRESS NOTES
VS rechecked after an hour of administering PRN meds.  BP is better(see below)       07/19/18 1328   Vital Signs   Temp 98.1 °F (36.7 °C)   Temp Source Oral   Pulse (Heart Rate) 88   Heart Rate Source Monitor   Resp Rate 16   O2 Sat (%) 96 %   Level of Consciousness Alert   /86   MAP (Calculated) 109   BP 1 Method Automatic   BP 1 Location Right arm   MEWS Score 1

## 2018-07-19 NOTE — PROGRESS NOTES
[facility] Hospitalist Group  Progress Note    Patient: Konrad Sicard Age: 56 y.o. : 1960 MR#: [id number] SSN: [id number]  Date: 2018     Subjective:     Patient in NAD, awake, alert    Assessment/Plan:   1. Hypertensive emergency -  on amlodipine, coreg, hydralazine, clonidine patch  2. Seizure episode: follow MRI, neuro following  3. 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 ESRD - HD as per nephro  5. H/o Pulmonary TB - ID following  6. Hyperlipidemia - cont statin  7.   Underweight- nutrition following  Pt, OT      Case discussed with:  [x]Patient  []Family  []Nursing  []Case Management  DVT Prophylaxis:  []Lovenox  [x]Hep SQ  []SCDs  []Coumadin   []On Heparin gtt    Objective:   VS:   Visit Vitals    BP (!) 185/99 (BP 1 Location: Right arm, BP Patient Position: At rest)  Comment: KRISTIN Sears was notified    Pulse 92    Temp 98 °F (36.7 °C)    Resp 19    Ht 5' 1\" (1.549 m)    Wt 40.9 kg (90 lb 2.7 oz)    SpO2 93%    Breastfeeding No    BMI 17.04 kg/m2      Tmax/24hrs: Temp (24hrs), Av.8 °F (36.6 °C), Min:96.7 °F (35.9 °C), Max:98.4 °F (36.9 °C)      Intake/Output Summary (Last 24 hours) at 18 1640  Last data filed at 18 0943   Gross per 24 hour   Intake              180 ml   Output             3000 ml   Net            -2820 ml     General:  Awake, alert  Cardiovascular:  S1S2+, RRR  Pulmonary:  Coarse bs b/l  GI:  Soft, BS+, NT, ND  Extremities:  No edema      Labs:    Recent Results (from the past 24 hour(s))   GLUCOSE, POC    Collection Time: 18 12:12 AM   Result Value Ref Range    Glucose (POC) 92 70 - 110 mg/dL   CBC WITH AUTOMATED DIFF    Collection Time: 18  5:02 AM   Result Value Ref Range    WBC 5.6 4.6 - 13.2 K/uL    RBC 3.67 (L) 4.20 - 5.30 M/uL    HGB 11.0 (L) 12.0 - 16.0 g/dL    HCT 34.5 (L) 35.0 - 45.0 %    MCV 94.0 74.0 - 97.0 FL    MCH 30.0 24.0 - 34.0 PG    MCHC 31.9 31.0 - 37.0 g/dL    RDW 18.4 (H) 11.6 - 14.5 %    PLATELET 130 (L) 135 - 420 K/uL    MPV 9.7 9.2 - 11.8 FL    NEUTROPHILS 76 (H) 40 - 73 %    LYMPHOCYTES 7 (L) 21 - 52 %    MONOCYTES 15 (H) 3 - 10 %    EOSINOPHILS 1 0 - 5 %    BASOPHILS 1 0 - 2 %    ABS. NEUTROPHILS 4.3 1.8 - 8.0 K/UL    ABS. LYMPHOCYTES 0.4 (L) 0.9 - 3.6 K/UL    ABS. MONOCYTES 0.8 0.05 - 1.2 K/UL    ABS. EOSINOPHILS 0.1 0.0 - 0.4 K/UL    ABS.  BASOPHILS 0.1 0.0 - 0.1 K/UL    DF AUTOMATED     METABOLIC PANEL, BASIC    Collection Time: 07/19/18  5:02 AM   Result Value Ref Range    Sodium 137 136 - 145 mmol/L    Potassium 3.9 3.5 - 5.5 mmol/L    Chloride 100 100 - 108 mmol/L    CO2 29 21 - 32 mmol/L    Anion gap 8 3.0 - 18 mmol/L    Glucose 87 74 - 99 mg/dL    BUN 12 7.0 - 18 MG/DL    Creatinine 2.01 (H) 0.6 - 1.3 MG/DL    BUN/Creatinine ratio 6 (L) 12 - 20      GFR est AA 31 (L) >60 ml/min/1.73m2    GFR est non-AA 26 (L) >60 ml/min/1.73m2    Calcium 8.6 8.5 - 10.1 MG/DL   MAGNESIUM    Collection Time: 07/19/18  5:02 AM   Result Value Ref Range    Magnesium 2.1 1.6 - 2.6 mg/dL   GLUCOSE, POC    Collection Time: 07/19/18  6:13 AM   Result Value Ref Range    Glucose (POC) 78 70 - 110 mg/dL   GLUCOSE, POC    Collection Time: 07/19/18  1:33 PM   Result Value Ref Range    Glucose (POC) 99 70 - 110 mg/dL     Signed By: Raven Tyler MD     July 19, 2018

## 2018-07-19 NOTE — PROGRESS NOTES
Received  Bedside shift change report from Ortiz Petersen [facility] (offgoing nurse) Report included the following information, SBAR, kardex, MAR, and recent results. Assumed care of patient in bed , awake Family at bedside. . Fall prevention program maintained, call bell and bedside table in reach. Bed alarm on. No problems identified at this time. No complaints made by patient. Will continue care.

## 2018-07-19 NOTE — PROGRESS NOTES
Care Management Interventions  PCP Verified by CM: Yes  Mode of Transport at Discharge: Other (see comment) (family)  Physical Therapy Consult: Yes  Current Support Network: Relative's Home (lives with her son)  Confirm Follow Up Transport: Family  Plan discussed with Pt/Family/Caregiver: Yes     Reason for Admission:   Hypertensive emergency, seizure                   RRAT Score:          12           Plan for utilizing home health:     no                     Likelihood of Readmission:  yellow/moderate                         Transition of Care Plan:         Pt is AOx4 but cannot speak [place]. Pt's son states pt and pt's  lives with him and his wife and pt has had difficulty ambulating at home. The family has been assisting her to ambulate and with ADLs at home. Waiting for PT/OT recommendations.

## 2018-07-19 NOTE — PROGRESS NOTES
PT orders received, chart reviewed. Pt unable to participate with PT due to:  []  Nausea/vomiting  []  Eating  []  Pain  []  Pt lethargic  []  Off Unit  []  Pt refused  [x]  Other, pt's BP is 209/118 taken at 831 per chart. Holding PT at this time. Will f/u later as patient's schedule allows.  Thank you for this referral.  Ivy Reilly, PT, DPT

## 2018-07-19 NOTE — PROGRESS NOTES
Dr. Salinas Pinzon was informed of patient's Platelet level = 09K .  He said to continue Heparin SQ.

## 2018-07-19 NOTE — PROGRESS NOTES
RENAL DAILY PROGRESS NOTE 51y F with ESRD, admitted with HTN emergency, seen for ESRD management. Subjective: Out of ICU. Looks comfortable EEG pending IMPRESSION:  
· ESRD, MWF schedule; · Access; tunnel catheter on right side. Clotted left arm access; 
· Anemia 
· HTN emergency with siezure · Pulmonary TB; now positive with MAC, · Nausea, h/o severe constipation · Hyponatremia, mild PLAN:  
·     Plan for HD tomorrow · Increase hydralazine 100mg tid. · Discussed with ENMANUEL arriaga, plan to start zithromax for MAC.     
  
 
 
 
 
 
 
 
Current Facility-Administered Medications Medication Dose Route Frequency  cloNIDine HCl (CATAPRES) tablet 0.1 mg  0.1 mg Oral Q6H PRN  
 heparin (porcine) injection 5,000 Units  5,000 Units SubCUTAneous Q8H  
 ethambutol (MYAMBUTOL) tablet 1,200 mg  1,200 mg Oral Q TUE, THU & SAT  pyridoxine (vitamin B6) (VITAMIN B-6) tablet 100 mg  100 mg Oral DAILY  hydrALAZINE (APRESOLINE) tablet 50 mg  50 mg Oral TID  famotidine (PEPCID) tablet 20 mg  20 mg Oral DAILY  hydrALAZINE (APRESOLINE) tablet 25 mg  25 mg Oral Q8H PRN  
 amLODIPine (NORVASC) tablet 10 mg  10 mg Oral DAILY  aspirin chewable tablet 81 mg  81 mg Oral DAILY  atorvastatin (LIPITOR) tablet 40 mg  40 mg Oral QHS  carvedilol (COREG) tablet 25 mg  25 mg Oral Q12H  
 losartan (COZAAR) tablet 100 mg  100 mg Oral DAILY  melatonin tablet 3 mg  3 mg Oral QHS PRN  
 FLUoxetine (PROzac) capsule 10 mg  10 mg Oral DAILY  acetaminophen (TYLENOL) tablet 650 mg  650 mg Oral Q6H PRN  
 oxyCODONE-acetaminophen (PERCOCET) 5-325 mg per tablet 1 Tab  1 Tab Oral Q6H PRN  
 naloxone (NARCAN) injection 0.4 mg  0.4 mg IntraVENous PRN  
 ondansetron (ZOFRAN) injection 4 mg  4 mg IntraVENous Q6H PRN  
 docusate sodium (COLACE) capsule 100 mg  100 mg Oral BID PRN  
 cloNIDine (CATAPRES) 0.3 mg/24 hr patch 1 Patch  1 Patch TransDERmal Q7D  
 isoniazid (NYDRAZID) tablet 300 mg  300 mg Oral DAILY  pyrazinamide tablet 1,000 mg  1,000 mg Oral Q MON, WED & FRI  rifAMPin (RIFADIN) capsule 600 mg  600 mg Oral ACB  LORazepam (ATIVAN) injection 1 mg  1 mg IntraVENous PRN  
 bisacodyl (DULCOLAX) suppository 10 mg  10 mg Rectal DAILY PRN  promethazine (PHENERGAN) 12.5 mg in 0.9% sodium chloride 50 mL IVPB  12.5 mg IntraVENous Q6H PRN  
 metoprolol (LOPRESSOR) injection 5 mg  5 mg IntraVENous Q6H PRN  
 insulin lispro (HUMALOG) injection   SubCUTAneous Q6H  
 glucose chewable tablet 16 g  4 Tab Oral PRN  
 glucagon (GLUCAGEN) injection 1 mg  1 mg IntraMUSCular PRN  
 dextrose (D50W) injection syrg 12.5-25 g  25-50 mL IntraVENous PRN Review of Symptoms: comprehensive ROS negative except above. Objective:  
 
Patient Vitals for the past 24 hrs: 
 Temp Pulse Resp BP SpO2  
07/19/18 1328 98.1 °F (36.7 °C) 88 16 156/86 96 % 07/19/18 1142 96.7 °F (35.9 °C) 83 18 (!) 174/104 97 % 07/19/18 0831 97.5 °F (36.4 °C) 93 18 (!) 209/118 96 % 07/19/18 0448 97.9 °F (36.6 °C) 87 19 (!) 198/102 96 % 07/19/18 0234 - - - (!) 202/110 -  
07/19/18 0021 98 °F (36.7 °C) 95 18 (!) 209/120 -  
07/18/18 2212 98.4 °F (36.9 °C) 95 18 (!) 226/124 95 % 07/18/18 2100 97.6 °F (36.4 °C) 94 18 (!) 201/120 -  
07/18/18 2030 - 93 18 (!) 199/126 -  
07/18/18 2000 - 92 18 (!) 188/117 -  
07/18/18 1945 - 94 18 (!) 186/119 -  
07/18/18 1930 - 91 16 (!) 201/120 -  
07/18/18 1900 - 87 16 (!) 169/108 -  
07/18/18 1830 - 93 16 (!) 199/124 -  
07/18/18 1800 - 89 16 (!) 196/120 -  
07/18/18 1730 - 86 18 (!) 172/106 -  
07/18/18 1700 - 86 18 (!) 189/115 -  
07/18/18 1648 - 87 18 (!) 199/112 -  
07/18/18 1600 99.1 °F (37.3 °C) 86 23 (!) 188/102 100 % 07/18/18 1530 - 86 17 (!) 200/106 100 % 07/18/18 1500 - 87 23 (!) 193/109 100 % 07/18/18 1430 - 84 21 (!) 182/105 100 % Weight change: -18.6 kg (-41 lb 0.1 oz) 07/17 1901 - 07/19 0700 In: 61 [P.O.:60] Out: 6287 Intake/Output Summary (Last 24 hours) at 07/19/18 1425 Last data filed at 07/19/18 0826 Gross per 24 hour Intake              180 ml Output             3000 ml Net            -2820 ml Physical Exam 
General: comfortable, no acute distress HEENT sclera anicteric, supple neck, no thyromegaly CVS: S1S2 heard,  no rub RS: + air entry b/l, Abd: Soft, Non tender, Neuro:  awake, alert Extrm:no  edema, no cyanosis, clubbing Skin: no visible  Rash Musculoskeletal: No gross joints or bone deformities Access; tunnel catheter on right side Procedures/imaging: see electronic medical records for all procedures, Xrays and details which were not copied into this note but were reviewed prior to creation of Plan Data Review:  
 
LABS:  
Hematology:  
Recent Labs  
   07/19/18 
 0502  07/18/18 
 0204  07/17/18 
 0507  07/17/18 
 0110 WBC  5.6  4.6  7.8  8.5 HGB  11.0*  9.6*  11.3*  10.4* HCT  34.5*  29.4*  34.3*  31.5* Chemistry:  
Recent Labs  
   07/19/18 
 0502  07/18/18 
 5817  07/17/18 
 0507  07/17/18 
 0110 BUN  12  11  43*  45* CREA  2.01*  3.01*  7.89*  7.79* CA  8.6  8.3*  8.8  8.5 ALB   --    --   2.6*   --   
K  3.9  3.5  4.6  4.6 NA  137  138  131*  131* CL  100  101  95*  94* CO2  29  30  23  25 PHOS   --    --    --   6.5*  
GLU  87  121*  95  102* Procedures/imaging: see electronic medical records for all procedures, Xrays and details which were not copied into this note but were reviewed prior to creation of Plan Assessment & Plan: As above Shelton Gibbs MD 
7/19/2018 
3:17 PM

## 2018-07-19 NOTE — PROGRESS NOTES
OT order received and chart reviewed. Patient with elevated BP, 209/118. Will hold at this time and see patient for skilled OT when appropriate/as available.   Thank you for the referral.  Henry Ochoa MS OTR/L

## 2018-07-19 NOTE — PROGRESS NOTES
BP taken manually ,stil high,will give PRN BP meds.        07/19/18 1142   Vital Signs   Temp 96.7 °F (35.9 °C)   Temp Source Oral   Pulse (Heart Rate) 83   Heart Rate Source Monitor   Resp Rate 18   O2 Sat (%) 97 %   Level of Consciousness Alert   BP (!) 174/104  (manual reading)   MEWS Score 1

## 2018-07-19 NOTE — PROGRESS NOTES
Problem: Falls - Risk of  Goal: *Absence of Falls  Document Asha Fall Risk and appropriate interventions in the flowsheet. Outcome: Progressing Towards Goal  Fall Risk Interventions:  Mobility Interventions: Bed/chair exit alarm, Patient to call before getting OOB, Utilize walker, cane, or other assistive device         Medication Interventions: Bed/chair exit alarm, Patient to call before getting OOB, Teach patient to arise slowly    Elimination Interventions: Bed/chair exit alarm, Call light in reach, Patient to call for help with toileting needs, Toilet paper/wipes in reach, Toileting schedule/hourly rounds             Problem: Pressure Injury - Risk of  Goal: *Prevention of pressure injury  Document Derek Scale and appropriate interventions in the flowsheet. Outcome: Progressing Towards Goal  Pressure Injury Interventions:             Activity Interventions: Pressure redistribution bed/mattress(bed type)    Mobility Interventions: Float heels, HOB 30 degrees or less, Pressure redistribution bed/mattress (bed type)    Nutrition Interventions: Document food/fluid/supplement intake

## 2018-07-19 NOTE — PROGRESS NOTES
Dr. Caitlin Rose was informed of patient's latest BP = 202/110 mmHg. He was also informed that patient's BP has been trending high even when she was in ICU and Dialysis, that pain med was given and rechecked BP. He ordered to give Clonidine 0.1 mg PO now and every 6 hours PRN for SBP >190 mmHg.

## 2018-07-19 NOTE — ROUTINE PROCESS
TRANSFER - IN REPORT:    Verbal report received from Camille Monge(name) on [facility]  being received from Dialysis(unit) for routine progression of care      Report consisted of patients Situation, Background, Assessment and   Recommendations(SBAR). Information from the following report(s) SBAR was reviewed with the receiving nurse. Opportunity for questions and clarification was provided. Assessment completed upon patients arrival to unit and care assumed.

## 2018-07-19 NOTE — ROUTINE PROCESS
Bedside and Verbal shift change report given to Lilia Calvert (oncoming nurse) by Kaycee Bhatt RN (offgoing nurse). Report included the following information SBAR, Kardex, MAR and Recent Results.     SITUATION:  Code Status: Full Code  Reason for Admission: Hypertensive emergency  Seizure [facility])  Hospital day: 3  Problem List:       Hospital Problems  Date Reviewed: 11/15/2017          Codes Class Noted POA    Intractable vomiting with nausea ICD-10-CM: R11.2  ICD-9-CM: 536.2  7/17/2018 Unknown        ESRD on hemodialysis [facility]) ICD-10-CM: N18.6, Z99.2  ICD-9-CM: 585.6, V45.11  7/17/2018 Unknown        Pulmonary TB ICD-10-CM: A15.0  ICD-9-CM: 011.90  7/17/2018 Unknown        * (Principal)Hypertensive crisis ICD-10-CM: I16.9  ICD-9-CM: 401.9  7/17/2018 Unknown              BACKGROUND:   Past Medical History:   Past Medical History:   Diagnosis Date    Chronic kidney disease     ESRD (end stage renal disease) ([facility] [address]     TUES-THURS-SAT    Hypercholesteremia     Hypertension     Kidney disease     Vitamin D deficiency       Patient taking anticoagulants yes    Patient has a defibrillator: no    History of shots YES for example, flu, pneumonia, tetanus   Isolation History YES for example, MRSA, CDiff    ASSESSMENT:  Changes in Assessment Throughout Shift: NONE  Significant Changes in 24 hours (for example, RR/code, fall)  Patient has Central Line: yes Reasons if yes: Hemodialysis  Patient has Onofre Cath: no   Mobility Issues  PT  IV Patency  OR Checklist  Pending Tests    Last Vitals:  Vitals w/ MEWS Score (last day)     Date/Time MEWS Score Pulse Resp Temp BP Level of Consciousness SpO2    07/19/18 0448 1 87 19 97.9 °F (36.6 °C) (!)  198/102 Alert 96 %    07/19/18 0234 -- -- -- -- (!)  202/110 -- --    07/19/18 0021 3 95 18 98 °F (36.7 °C) (!)  209/120 Alert --    07/18/18 2212 3 95 18 98.4 °F (36.9 °C) (!)  226/124 Alert 95 %    07/18/18 2100 -- 94 18 97.6 °F (36.4 °C) (!)  201/120 -- --    07/18/18 2030 -- 93 18 -- Whit Mario )  199/126 -- --    07/18/18 2000 -- 92 18 -- (!)  188/117 -- --    07/18/18 1945 -- 94 18 -- (!)  186/119 -- --    07/18/18 1930 -- 91 16 -- (!)  201/120 -- --    07/18/18 1900 -- 87 16 -- (!)  169/108 -- --    07/18/18 1830 -- 93 16 -- (!)  199/124 -- --    07/18/18 1800 -- 89 16 -- (!)  196/120 -- --    07/18/18 1730 -- 86 18 -- (!)  172/106 -- --    07/18/18 1700 -- 86 18 -- (!)  189/115 -- --    07/18/18 1648 -- 87 18 -- (!)  199/112 -- --    07/18/18 1600 -- 86 23 99.1 °F (37.3 °C) (!)  188/102 -- 100 %    07/18/18 1530 -- 86 17 -- (!)  200/106 -- 100 %    07/18/18 1500 -- 87 23 -- (!)  193/109 -- 100 %    07/18/18 1430 -- 84 21 -- (!)  182/105 -- 100 %    07/18/18 1400 -- 87 17 -- (!)  175/106 -- 100 %    07/18/18 1330 -- 88 18 -- (!)  173/103 -- 100 %    07/18/18 1300 -- 86 20 -- (!)  171/108 -- 100 %    07/18/18 1230 -- 85 18 -- (!)  192/105 -- 99 %    07/18/18 1200 -- 84 20 98 °F (36.7 °C) 139/83 -- 98 %    07/18/18 1130 -- 83 18 -- 129/77 -- 100 %    07/18/18 1100 -- 82 19 -- 123/73 -- 99 %    07/18/18 1030 -- 83 22 -- 129/83 -- 95 %    07/18/18 1000 -- 84 21 -- 130/84 -- --    07/18/18 0930 -- 85 20 -- 141/86 -- 91 %    07/18/18 0900 -- 87 19 -- (!)  154/94 -- 98 %    07/18/18 0830 -- 86 19 -- (!)  170/102 -- 100 %    07/18/18 0800 -- 89 20 97.6 °F (36.4 °C) -- -- 99 %    07/18/18 0730 -- 88 17 -- (!)  207/113 -- 99 %    07/18/18 0700 -- 87 17 -- (!)  193/102 -- 99 %    07/18/18 0600 -- 87 17 -- 145/84 Alert 98 %    07/18/18 0500 -- 85 17 -- 135/77 Alert 97 %    07/18/18 0400 2 84 21 97.7 °F (36.5 °C) 133/75 Alert 98 %    07/18/18 0300 -- 85 21 -- 135/76 Alert 97 %    07/18/18 0200 -- 85 23 -- 114/70 Alert 97 %    07/18/18 0100 -- 93 23 -- (!)  79/26 Alert --    07/18/18 0000 2 95 24 97.6 °F (36.4 °C) (!)  198/129 Alert 92 %            PAIN    Pain Assessment    Pain Intensity 1: 0 (07/19/18 0448)    Pain Location 1: Abdomen (Hematoma on abdomen)    Pain Intervention(s) 1: Medication (see MAR)    Patient Stated Pain Goal: 0  Intervention effective: yes  Time of last intervention: 0031 Reassessment Completed: yes   Other actions taken for pain: Distraction    Last 3 Weights:  Last 3 Recorded Weights in this Encounter    07/17/18 1411 07/18/18 1418 07/19/18 0448   Weight: 64.6 kg (142 lb 6.7 oz) 46 kg (101 lb 6.6 oz) 40.9 kg (90 lb 2.7 oz)   Weight change: -18.6 kg (-41 lb 0.1 oz)    INTAKE/OUPUT    Current Shift:      Last three shifts: 07/17 1901 - 07/19 0700  In: 60 [P.O.:60]  Out: 6500     RECOMMENDATIONS AND DISCHARGE PLANNING  Patient needs and requests: Pain Control    Pending tests/procedures: labs     Discharge plan for patient: Home    Discharge planning Needs or Barriers: none    Estimated Discharge Date: 7/21/2018 Posted on Whiteboard in Patients Room: yes       \"HEALS\" SAFETY CHECK  A safety check occurred in the patient's room between off going nurse and oncoming nurse listed above. The safety check included the below items:    H  High Alert Medications Verify all high alert medication drips (heparin, PCA, etc.)  E  Equipment Suction is set up for ALL patients (with jenni)  Red plugs utilized for all equipment (IV pumps, etc.)  WOWs wiped down at end of shift. Room stocked with oxygen, suction, and other unit-specific supplies  A  Alarms Bed alarm is set for fall risk patients  Ensure chair alarm is in place and activated if patient is up in a chair  L  Lines Check IV for any infiltration  Onofre bag is empty if patient has a Onofre   Tubing and IV bags are labeled  S  Safety  Room is clean, patient is clean, and equipment is clean. Hallways are clear from equipment besides carts. Fall bracelet on for fall risk patients  Ensure room is clear and free of clutter  Suction is set up for ALL patients (with jenni)  Hallways are clear from equipment besides carts.    Isolation precautions followed, supplies available outside room, sign posted    Apoorva Dill RN

## 2018-07-19 NOTE — PROCEDURES
MageddaEsvin Rothman 48  MR#: [id number]  : 1960  ACCOUNT #: [de-identified]   DATE OF SERVICE: 2018    EEG NUMBER:  . HISTORY:  A 82-year-old female with encephalopathy and new onset seizures. MEDICATIONS:  Norvasc, Lipitor, Coreg, aspirin, Catapres, famotidine, fluoxetine, Apresoline, isoniazid, losartan, pyridoxine, rifampin. EEG REPORT:  This is a 16-channel routine EEG done using the International 10-20 electrode placement system. The predominant background consists of centrally predominant symmetric 6 Hz activity which is relatively regular. The patient is mostly asleep during the task, and there is presence of vertex waves, poorly formed sleep spindles, and periods of 4 Hz slowing diffusely with frontal predominance, consistent with slow wave sleep. Activation procedures could not be efficiently performed. There were no epileptiform abnormalities or periods of focal slowing observed. IMPRESSION:  This EEG shows abundant presence of stage II as well as slow wave sleep and slowing that could be consistent with an encephalopathy. No epileptiform abnormalities were observed.       MD Roger Shabazz  D: 2018 13:53     T: 2018 14:35  JOB #: [id number]

## 2018-07-20 NOTE — ROUTINE PROCESS
Bedside and Verbal shift change report given to Marilee Howard RN (oncoming nurse) by Milad De Leon RN (offgoing nurse). Report included the following information SBAR, Kardex, MAR and Recent Results.     SITUATION:  Code Status: Full Code  Reason for Admission: Hypertensive emergency  Seizure [facility])  Hospital day: 4  Problem List:       Hospital Problems  Date Reviewed: 11/15/2017          Codes Class Noted POA    Intractable vomiting with nausea ICD-10-CM: R11.2  ICD-9-CM: 536.2  7/17/2018 Unknown        ESRD on hemodialysis [facility]) ICD-10-CM: N18.6, Z99.2  ICD-9-CM: 585.6, V45.11  7/17/2018 Unknown        Pulmonary TB ICD-10-CM: A15.0  ICD-9-CM: 011.90  7/17/2018 Unknown        * (Principal)Hypertensive crisis ICD-10-CM: I16.9  ICD-9-CM: 401.9  7/17/2018 Unknown              BACKGROUND:   Past Medical History:   Past Medical History:   Diagnosis Date    Chronic kidney disease     ESRD (end stage renal disease) ([facility] [address]     TUES-THURS-SAT    Hypercholesteremia     Hypertension     Kidney disease     Vitamin D deficiency       Patient taking anticoagulants yes    Patient has a defibrillator: no    History of shots YES for example, flu, pneumonia, tetanus   Isolation History YES for example, MRSA, CDiff    ASSESSMENT:  Changes in Assessment Throughout Shift: NONE  Significant Changes in 24 hours (for example, RR/code, fall)  Patient has Central Line: yes Reasons if yes: Hemodialysis  Patient has Onofre Cath: no   Mobility Issues  PT  IV Patency  OR Checklist  Pending Tests    Last Vitals:  Vitals w/ MEWS Score (last day)     Date/Time MEWS Score Pulse Resp Temp BP Level of Consciousness SpO2    07/20/18 0823 -- 80 -- -- (!)  224/122 -- --    07/20/18 0822 -- 80 -- -- (!)  224/122 -- --    07/20/18 0715 3 80 16 98 °F (36.7 °C) (!)  220/121 Alert 98 %    07/20/18 0331 1 80 18 98.1 °F (36.7 °C) (!)  184/100 Alert 97 %    07/20/18 0041 1 86 18 97.7 °F (36.5 °C) (!)  194/103 Alert 99 %    07/19/18 1852 1 90 18 98.2 °F (36.8 °C) (!)  183/101 Alert 94 %    07/19/18 1531 1 92 19 98 °F (36.7 °C) (!)  185/99 Alert 93 %    07/19/18 1328 1 88 16 98.1 °F (36.7 °C) 156/86 Alert 96 %    07/19/18 1142 1 83 18 96.7 °F (35.9 °C) (!)  174/104 Alert 97 %    07/19/18 0831 3 93 18 97.5 °F (36.4 °C) (!)  209/118 Alert 96 %    07/19/18 0448 1 87 19 97.9 °F (36.6 °C) (!)  198/102 Alert 96 %    07/19/18 0234 -- -- -- -- (!)  202/110 -- --    07/19/18 0021 3 95 18 98 °F (36.7 °C) (!)  209/120 Alert --            PAIN    Pain Assessment    Pain Intensity 1: 5 (07/20/18 0825)    Pain Location 1: Abdomen    Pain Intervention(s) 1: Medication (see MAR)    Patient Stated Pain Goal: Unable to verbalize/indicate pain  Intervention effective: yes  Time of last intervention: 0054 Reassessment Completed: yes   Other actions taken for pain: Distraction    Last 3 Weights:  Last 3 Recorded Weights in this Encounter    07/18/18 1418 07/19/18 0448 07/20/18 0612   Weight: 46 kg (101 lb 6.6 oz) 40.9 kg (90 lb 2.7 oz) 41 kg (90 lb 6.2 oz)   Weight change: -5 kg (-11 lb 0.4 oz)    INTAKE/OUPUT    Current Shift:      Last three shifts: 07/18 1901 - 07/20 0700  In: 1 [P.O.:830]  Out: 3000     RECOMMENDATIONS AND DISCHARGE PLANNING  Patient needs and requests: Pain Control    Pending tests/procedures: labs     Discharge plan for patient: Home    Discharge planning Needs or Barriers: none    Estimated Discharge Date: 7/21/2018 Posted on Whiteboard in Patients Room: yes       \"HEALS\" SAFETY CHECK  A safety check occurred in the patient's room between off going nurse and oncoming nurse listed above. The safety check included the below items:    H  High Alert Medications Verify all high alert medication drips (heparin, PCA, etc.)  E  Equipment Suction is set up for ALL patients (with yanker)  Red plugs utilized for all equipment (IV pumps, etc.)  WOWs wiped down at end of shift.   Room stocked with oxygen, suction, and other unit-specific supplies  A  Alarms Bed alarm is set for fall risk patients  Ensure chair alarm is in place and activated if patient is up in a chair  L  Lines Check IV for any infiltration  Onofre bag is empty if patient has a Onofre   Tubing and IV bags are labeled  S  Safety  Room is clean, patient is clean, and equipment is clean. Hallways are clear from equipment besides carts. Fall bracelet on for fall risk patients  Ensure room is clear and free of clutter  Suction is set up for ALL patients (with jenni)  Hallways are clear from equipment besides carts.    Isolation precautions followed, supplies available outside room, sign posted    Delia Escalera RN

## 2018-07-20 NOTE — PROGRESS NOTES
Problem: Falls - Risk of  Goal: *Absence of Falls  Document Asha Fall Risk and appropriate interventions in the flowsheet. Outcome: Progressing Towards Goal  Fall Risk Interventions:  Mobility Interventions: Bed/chair exit alarm, Patient to call before getting OOB, Utilize walker, cane, or other assistive device         Medication Interventions: Bed/chair exit alarm, Patient to call before getting OOB, Teach patient to arise slowly    Elimination Interventions: Bed/chair exit alarm, Call light in reach, Patient to call for help with toileting needs, Toilet paper/wipes in reach, Toileting schedule/hourly rounds             Problem: Pressure Injury - Risk of  Goal: *Prevention of pressure injury  Document Derek Scale and appropriate interventions in the flowsheet. Outcome: Progressing Towards Goal  Pressure Injury Interventions:             Activity Interventions: Assess need for specialty bed, Increase time out of bed, Pressure redistribution bed/mattress(bed type)    Mobility Interventions: Assess need for specialty bed, Float heels, HOB 30 degrees or less, Pressure redistribution bed/mattress (bed type)    Nutrition Interventions: Document food/fluid/supplement intake

## 2018-07-20 NOTE — PROGRESS NOTES
Problem: Mobility Impaired (Adult and Pediatric)  Goal: *Acute Goals and Plan of Care (Insert Text)  Physical Therapy Goals  Initiated 7/20/2018 and to be accomplished within 7 day(s)  1. Patient will move from supine to sit and sit to supine  and scoot up and down in bed with supervision/set-up. 2.  Patient will transfer from bed to chair and chair to bed with supervision/set-up using the least restrictive device. 3.  Patient will perform sit to stand with supervision/set-up. 4.  Patient will ambulate with supervision/set-up for >150 feet with the least restrictive device. 5.  Patient will ascend/descend 3 stairs with 1-2 handrail(s) with minimal assistance/contact guard assist.  Outcome: Progressing Towards Goal  physical Therapy EVALUATION    Patient: Nahun Greenwood (56 y.o. female)  Date: 7/20/2018  Primary Diagnosis: Hypertensive emergency  Seizure [facility])  Precautions: Fall    OBJECTIVE/ASSESSMENT :  Based on the objective data described below, the patient presents with impaired functional mobility including bed mobility, transfers, ambulation, and general activity tolerance following admission for hypertensive urgency. Patient presented today semi-reclined in bed, alert and agreeable to PT evaluation. Patient does not speak English, no family in room at this time to assist with communication however patient follows verbal/tactile/visual cues well. Orthostatic BP taken in supine, sitting, and standing with readings as follows: 157/90, 145/96, 153/91. Patient required Tati for transfer to sitting EOB, CGA to rise to standing with walker. Patient then initiated ambulation, however demonstrated dizziness with moderate trunk sway and was assisted back to bed for patient safety. At conclusion of session, patient left resting comfortably in bed with call bell in reach, needs met, and nurse notified.   Education: bed mobility, transfers, ambulation, assistive device management -- patient demonstrated understanding, requires reinforcement    Patient will benefit from skilled intervention to address the above impairments. Patients rehabilitation potential is considered to be Good  Factors which may influence rehabilitation potential include:   []         None noted  []         Mental ability/status  []         Medical condition  []         Home/family situation and support systems  []         Safety awareness  []         Pain tolerance/management  []         Other:      PLAN :  Recommendations and Planned Interventions:  [x]           Bed Mobility Training             [x]    Neuromuscular Re-Education  [x]           Transfer Training                   []    Orthotic/Prosthetic Training  [x]           Gait Training                          []    Modalities  [x]           Therapeutic Exercises          []    Edema Management/Control  [x]           Therapeutic Activities            [x]    Patient and Family Training/Education  []           Other (comment):    Frequency/Duration: Patient will be followed by physical therapy 1-2 times per day, 4-7 days per week to address goals. Discharge Recommendations: Jesu Murillo  Further Equipment Recommendations for Discharge: rolling walker     SUBJECTIVE:   Patient stated Thank you.     OBJECTIVE DATA SUMMARY:     Past Medical History:   Diagnosis Date    Chronic kidney disease     [facility]     TUES-THURS-SAT    Hypercholesteremia     Hypertension     Kidney disease     Vitamin D deficiency      Past Surgical History:   Procedure Laterality Date    VASCULAR SURGERY PROCEDURE UNLIST       Barriers to Learning/Limitations: yes;  language  Compensate with: Visual Cues, Verbal Cues and Tactile Cues  Prior Level of Function/Home Situation: Per medical chart, patient lives with son in 2 story home. She required assistance for ambulation and ADLs. Will require clarification regarding assistive device use.   Home Situation  Home Environment: Private residence  One/Two Story Residence: Two story  Living Alone: No  Support Systems: Child(gomez), Family member(s) (Per medical chart)  Patient Expects to be Discharged to[de-identified] Private residence  Current DME Used/Available at Home: None  Critical Behavior:  Neurologic State: Alert  Psychosocial  Patient Behaviors: Calm; Cooperative  Purposeful Interaction: Yes  Caritas Process: Nurture loving kindness;Establish trust;Teaching/learning; Attend basic human needs;Create healing environment  Therapeutic Modalities: Intentional therapeutic touch  Strength:    Strength: Generally decreased, functional (BLE)  Tone & Sensation:   Tone: Normal (BLE)  Sensation: Intact (BLE)   Range Of Motion:  AROM: Within functional limits (BLE)  Functional Mobility:  Bed Mobility:  Rolling: Supervision  Supine to Sit: Minimum assistance  Sit to Supine: Minimum assistance  Transfers:  Sit to Stand: Contact guard assistance  Stand to Sit: Minimum assistance  Balance:   Sitting: Intact  Standing: Impaired; With support  Standing - Static: Fair  Standing - Dynamic : Fair  Ambulation/Gait Training:  Distance (ft): 3 Feet (ft)  Assistive Device: Walker  Ambulation - Level of Assistance: Contact guard assistance;Minimal assistance  Base of Support: Narrowed; Center of gravity altered  Speed/Mera: Pace decreased (<100 feet/min)  Pain:  Pre session: 0/10  Post session: 0/10  Activity Tolerance:   Fair, limited by dizziness  Please refer to the flowsheet for vital signs taken during this treatment.   After treatment:   [] Patient left in no apparent distress sitting up in chair  [] Patient left sitting on EOB  [x] Patient left in no apparent distress in bed  [] Patient declined to be OOB at this time due to  [x] Call bell left within reach  [x] Nursing notified(Sole)  [] Caregiver present  [] Bed alarm activated  [] SCDs in place    COMMUNICATION/EDUCATION:   [x]         Fall prevention education was provided and the patient/caregiver indicated understanding. [x]         Patient/family have participated as able in goal setting and plan of care. []         Patient/family agree to work toward stated goals and plan of care. []         Patient understands intent and goals of therapy, but is neutral about his/her participation. [x]         Patient is unable to participate in goal setting and plan of care. Thank you for this referral.  True Morton   Time Calculation: 11 mins      Mobility  Current  CJ= 20-39%   Goal  CI= 1-19%. The severity rating is based on the Level of Assistance required for Functional Mobility and ADLs.     Eval Complexity: History: MEDIUM  Complexity : 1-2 comorbidities / personal factors will impact the outcome/ POC Exam:MEDIUM Complexity : 3 Standardized tests and measures addressing body structure, function, activity limitation and / or participation in recreation  Presentation: MEDIUM Complexity : Evolving with changing characteristics  Clinical Decision Making:Medium Complexity   Overall Complexity:MEDIUM

## 2018-07-20 NOTE — DIALYSIS
ACUTE HEMODIALYSIS FLOW SHEET 
 
HEMODIALYSIS ORDERS: Physician: Dr. Baron Donnelly Dialyzer: REVACLEAR        Duration: 3.5 hr  BFR: 350   DFR: 600 Dialysate:  Temp 37.0 K+   3.0    Ca+  2.5 Na 138    Bicarb 35 Weight:  41 kg    Bed Scale [x]     Unable to Obtain []      Dry weight/UF Goal: 3000 ML  Access RIGHT CVC Needle Gauge NA Heparin []  Bolus      Units    [] Hourly       Units    [x]None Catheter locking solution HEPARIN 1000U/ML Pre BP:144/85       Pulse:     80     Temperature:   97.6  Respirations: 18  Tx: NS   ml/Bolus  Other    [x] N/A Labs: Pre    Post:    [x] N/A Additional Orders(medications, blood products, hypotension management):  N/A [x] [place] Consent Verified CATHETER ACCESS: []N/A   [x]Right CVC  []Left   []IJ     []Fem [] First use X-ray verified     [x]Tunnel                [] Non Tunneled [x]No S/S infection  []Redness  []Drainage []Cultured []Swelling []Pain  
[x]Medical Aseptic Prep Utilized   []Dressing Changed  [x] Biopatch  Date:07/20/18 []Clotted   [x]Patent   Flows: [x]Good  []Poor  []Reversed If access problem,  notified: []Yes    [x]N/A   
 
GRAFT/FISTULA ACCESS:  [x]N/A     []Right     []Left     []UE     []LE []AVG   []AVF        []Buttonhole    []Medical Aseptic Prep Utilized []No S/S infection  []Redness  []Drainage []Cultured []Swelling []Pain Bruit:   [] Strong    [] Weak       Thrill :   [] Strong    [] Weak Needle Gauge: 15 G  Length: 1 INCH If access problem,  notified: []Yes     [x]N/A Please describe access if present and not used: NA  
 
 
GENERAL ASSESSMENT:   
LUNGS:  Rate 18  SaO2%        [x] N/A    [] Clear  [x] Coarse  [] Crackles  [] Wheezing 
      [] Diminished     Location : []RLL   []LLL    []RUL  []ELÍAS Cough: []Productive  []Dry  [x]N/A   Respirations:  [x]Easy  []Labored Therapy:  [x]RA  []NC  NA   l/min    Mask: []NRB []Venti       O2%                 []Ventilator  []Intubated  [] Sapna Underwood [] BiPaP CARDIAC: [x]Regular      [] Irregular   [] Pericardial Rub  [] JVD []  Monitored  [] Bedside  [x] Remotely monitored [] N/A  Rhythm: TELEMETRY  
EDEMA: [] None  [x]Generalized  [] Pitting [] 1    [] 2    [] 3    [] 4 [] Facial  [] Pedal  []  UE  [] LE  
SKIN:   [x] Warm  [] Hot     [] Cold   [x] Dry     [] Pale   [] Diaphoretic    
             [] Flushed  [] Jaundiced  [] Cyanotic  [] Rash  [] Weeping LOC:    [x] Alert      [x]Oriented:    [x] Person     [x] Place  [x]Time 
             [] Confused  [] Lethargic  [] Medicated  [] Non-responsive GI / ABDOMEN:  [x] Flat    [] Distended    [x] Soft    [] Firm   []  Obese 
                           [] Diarrhea  [x] Bowel Sounds  [] Nausea  [] Vomiting  / URINE ASSESSMENT:[] Voiding   [x] Oliguria  [] Anuria   []  Onofre [] Incontinent    []  Incontinent Brief      [x]  Bathroom Privileges PAIN: [x] 0 []1  []2   []3   []4   []5   []6   []7   []8   []9   []10 Scale 0-10  Action/Follow Up: NA MOBILITY:  [] Amb    [] Amb/Assist    [x] Bed    [] Wheelchair  [] Stretcher All Vitals and Treatment Details on Attached Flowsheet Hospital: [facility] Room # [id number] [] 1st Time Acute  [] Stat  [x] Routine  [] Urgent [x] Acute Room  []  Bedside  [] ICU/CCU  [] ER Isolation Precautions:  [x] Dialysis   [] Airborne   [] Contact    [] Reverse Special Considerations:         [] Blood Consent Verified [x]N/A ALLERGIES: 
Allergies Banana Other (comments)   9/8/2017 Code Status:  [x] Full Code  [] DNR  [] Other HBsAg ONLY: Date Drawn   09/09/17         [x]Negative []Positive []Unknown HBsAb: Date 09/09/17    [x] Susceptible   [] [id number] []Not Drawn  [] Drawn Current Labs:    Date of Labs: 07/20/17 Results for Shanti Larios (MRN [id number]) as of 7/20/2018 12:18 Ref. Range 7/20/2018 04:32 WBC Latest Ref Range: 4.6 - 13.2 K/uL 4.4 (L) RBC Latest Ref Range: 4.20 - 5.30 M/uL 3.10 (L) HGB Latest Ref Range: 12.0 - 16.0 g/dL 9.4 (L) HCT Latest Ref Range: 35.0 - 45.0 % 29.6 (L) MCV Latest Ref Range: 74.0 - 97.0 FL 95.5 MCH Latest Ref Range: 24.0 - 34.0 PG 30.3 MCHC Latest Ref Range: 31.0 - 37.0 g/dL 31.8 RDW Latest Ref Range: 11.6 - 14.5 % 17.9 (H) PLATELET Latest Ref Range: 135 - 420 K/uL 122 (L) MPV Latest Ref Range: 9.2 - 11.8 FL 10.7 NEUTROPHILS Latest Ref Range: 40 - 73 % 63 LYMPHOCYTES Latest Ref Range: 21 - 52 % 23 MONOCYTES Latest Ref Range: 3 - 10 % 9 EOSINOPHILS Latest Ref Range: 0 - 5 % 3  
BASOPHILS Latest Ref Range: 0 - 2 % 2 DF Latest Units:   AUTOMATED  
ABS. NEUTROPHILS Latest Ref Range: 1.8 - 8.0 K/UL 2.8  
ABS. LYMPHOCYTES Latest Ref Range: 0.9 - 3.6 K/UL 1.0  
ABS. MONOCYTES Latest Ref Range: 0.05 - 1.2 K/UL 0.4  
ABS. EOSINOPHILS Latest Ref Range: 0.0 - 0.4 K/UL 0.1 ABS. BASOPHILS Latest Ref Range: 0.0 - 0.1 K/UL 0.1 Sodium Latest Ref Range: 136 - 145 mmol/L 133 (L) Potassium Latest Ref Range: 3.5 - 5.5 mmol/L 4.2 Chloride Latest Ref Range: 100 - 108 mmol/L 100 CO2 Latest Ref Range: 21 - 32 mmol/L 26 Anion gap Latest Ref Range: 3.0 - 18 mmol/L 7 Glucose Latest Ref Range: 74 - 99 mg/dL 84 BUN Latest Ref Range: 7.0 - 18 MG/DL 32 (H) Creatinine Latest Ref Range: 0.6 - 1.3 MG/DL 3.96 (H) BUN/Creatinine ratio Latest Ref Range: 12 - 20   8 (L) Calcium Latest Ref Range: 8.5 - 10.1 MG/DL 8.5  
GFR est non-AA Latest Ref Range: >60 ml/min/1.73m2 12 (L)  
GFR est AA Latest Ref Range: >60 ml/min/1.73m2 14 (L) DIET:  [x] Renal    [] Other     [] NPO     []  Diabetic PRIMARY NURSE REPORT: First initial/Last name/Title Pre Dialysis: Darlene Todd    Time: 2366 EDUCATION:   
[x] Patient [] Other         Knowledge Basis: []None [x]Minimal [] Substantial  
Barriers to learning Pt DOES NOT SPEAK ENGLISH, USES GESTURES AND FAMILY TO TRANSLATE [x] Access Care     [x] S&S of infection     [x] Fluid Management     []K+     []Procedural   
[]Albumin     [] Medications     [] Tx Options     [] Transplant     [x] Diet     [] Other Teaching Tools:  [x] Explain  [] Demo  [x] Handouts [] Video Patient response:   [] Verbalized understanding  [] Teach back  [] Return demonstration PT NODDED IN AGREEMENT Requires follow up NA Inappropriate due to NA [x] Time Out/Safety Check RO/HEMODIALYSIS MACHINE SAFETY CHECKS  Before each treatment:    
Machine Number:                   [facility]  [x] Unit Machine  6   with centralized RO Alarm Test:  Pass time 5807 [x] RO/Machine Log Complete Temp    37.0           [x]Extracorporeal Circuit Tested for integrity Dialysate: pH  7.4    Conductivity: Meter   13.8     HD Machine   13.7                  TCD: 13.7 Dialyzer Lot # [id number]            Blood Tubing Lot # [id number]          Saline Lot #  [id number] CHLORINE TESTING-Before each treatment and every 4 hours Total Chlorine: [x] less than 0.1 ppm   Time: 0900   4 Hr/2nd Check Time: 1300  
(if greater than 0.1 ppm from Primary then every 30 minutes from Secondary) TREATMENT INITIATION  with Dialysis Precautions:  
[x] All Connections Secured                 [x] Saline Line Double Clamped  
[x] Venous Parameters Set                  [x] Arterial Parameters Set [x] Prime Given  250 ML                           [x]Air Foam Detector Engaged Treatment Initiation Note: Pt. recvd to unit for dialysis tx, right CVC accessed and HD tx started at [address] [place] of 600 and BFR of 350. Medication Dose Volume Route Initials Dialyzer Cleared: [x] Good [] Fair  [] Poor Blood processed:  69.7 L 
UF Removed  3000  Ml Post Wt: NA kg 
POst BP:   127/85       Pulse: 99 Respirations: 20 Temperature: 97.8 NONE                         Post Tx Vascular Access: AVF/AVG: Bleeding stopped Art NA  Wei. NA Catheter: Locking solution: Heparin 1:1000 Art. 1.9 ML Wei. 1.9 ML Post Assessment:  
  
                              Skin:  [x] Warm  [x] Dry [] Diaphoretic    [] Flushed  [] Pale [] Cyanotic [facility] Signatures Title Initials  Time Lungs: [] Clear    [] Course  [x] Crackles  [] Wheezing [] Diminished Cardiac: [x] Regular   [] Irregular   [] Monitor  [] N/A  Rhythm TELEMETRY Edema:  [] None    [x] General     [] Facial   [] Pedal    [] UE    [] LE  
    Pain: [x]0  []1  []2   []3  []4   []5   []6   []7   []8   []9   []10 Post Treatment Note: Pt. Tolerated HD tx well, no complications noted, removed 3 liters of fluid. POST TREATMENT PRIMARY NURSE HANDOFF REPORT:  
 
First initial/Last name/Title Post Dialysis: [place]   Time:  5439 Abbreviations: AVG-arterial venous graft, AVF-arterial venous fistula, IJ-Internal Jugular, Subcl-Subclavian, Fem-Femoral, Tx-treatment, AP/HR-apical heart rate, DFR-dialysate flow rate, BFR-blood flow rate, AP-arterial pressure, -venous pressure, UF-ultrafiltrate, TMP-transmembrane pressure, Wei-Venous, Art-Arterial, RO-Reverse Osmosis

## 2018-07-20 NOTE — PROGRESS NOTES
[facility] Hospitalist Group  Progress Note    Patient: Lucy Bustillo Age: 56 y.o. : 1960 MR#: [id number] SSN: [id number]  Date: 2018     Subjective:     Patient sitting in bed in good spirits. Daughter and son in law at bedside. RN in room    Assessment/Plan:   1. Hypertensive emergency , improved,   on amlodipine, coreg, hydralazine, clonidine patch  2. Seizure episode: MRI noted, neuro input noted  3. MAC in sputum Cx: as p[er ID  4. ESRD - HD as per nephro  5. H/o Pulmonary TB - ID following  6. Hyperlipidemia - cont statin  7. Underweight- nutrition following  Dispo- d/w patient and family, they decline snf.  Home with Koby Palomares soon      Case discussed with:  [x]Patient  []Family  []Nursing  []Case Management  DVT Prophylaxis:  []Lovenox  [x]Hep SQ  []SCDs  []Coumadin   []On Heparin gtt    Objective:   VS:   Visit Vitals    /90 (BP 1 Location: Right arm, BP Patient Position: At rest)    Pulse (!) 105    Temp 96.7 °F (35.9 °C)    Resp 19    Ht 5' 1\" (1.549 m)    Wt 41 kg (90 lb 6.2 oz)    SpO2 93%    Breastfeeding No    BMI 17.08 kg/m2      Tmax/24hrs: Temp (24hrs), Av.6 °F (36.4 °C), Min:96.7 °F (35.9 °C), Max:98.2 °F (36.8 °C)      Intake/Output Summary (Last 24 hours) at 18 1610  Last data filed at 18 1342   Gross per 24 hour   Intake              400 ml   Output             3500 ml   Net            -3100 ml     General:  Awake, follows commands  Cardiovascular:  S1S2+, RRR  Pulmonary:  CTA b/l  GI:  Soft, BS+, NT, ND  Extremities:  No edema        Labs:    Recent Results (from the past 24 hour(s))   CBC WITH AUTOMATED DIFF    Collection Time: 18  4:32 AM   Result Value Ref Range    WBC 4.4 (L) 4.6 - 13.2 K/uL    RBC 3.10 (L) 4.20 - 5.30 M/uL    HGB 9.4 (L) 12.0 - 16.0 g/dL    HCT 29.6 (L) 35.0 - 45.0 %    MCV 95.5 74.0 - 97.0 FL    MCH 30.3 24.0 - 34.0 PG    MCHC 31.8 31.0 - 37.0 g/dL    RDW 17.9 (H) 11.6 - 14.5 %    PLATELET 356 (L) 883 - 420 K/uL    MPV 10.7 9.2 - 11.8 FL    NEUTROPHILS 63 40 - 73 %    LYMPHOCYTES 23 21 - 52 %    MONOCYTES 9 3 - 10 %    EOSINOPHILS 3 0 - 5 %    BASOPHILS 2 0 - 2 %    ABS. NEUTROPHILS 2.8 1.8 - 8.0 K/UL    ABS. LYMPHOCYTES 1.0 0.9 - 3.6 K/UL    ABS. MONOCYTES 0.4 0.05 - 1.2 K/UL    ABS. EOSINOPHILS 0.1 0.0 - 0.4 K/UL    ABS.  BASOPHILS 0.1 0.0 - 0.1 K/UL    DF AUTOMATED     METABOLIC PANEL, BASIC    Collection Time: 07/20/18  4:32 AM   Result Value Ref Range    Sodium 133 (L) 136 - 145 mmol/L    Potassium 4.2 3.5 - 5.5 mmol/L    Chloride 100 100 - 108 mmol/L    CO2 26 21 - 32 mmol/L    Anion gap 7 3.0 - 18 mmol/L    Glucose 84 74 - 99 mg/dL    BUN 32 (H) 7.0 - 18 MG/DL    Creatinine 3.96 (H) 0.6 - 1.3 MG/DL    BUN/Creatinine ratio 8 (L) 12 - 20      GFR est AA 14 (L) >60 ml/min/1.73m2    GFR est non-AA 12 (L) >60 ml/min/1.73m2    Calcium 8.5 8.5 - 10.1 MG/DL     Signed By: Lorna Menchaca MD     July 20, 2018

## 2018-07-20 NOTE — PROGRESS NOTES
ID Cross cover  progress   Note    Reason for follow up :  antibiotic management for  MTB and MAC pulmonary and extrapulmonary infection. The requesting  physician is Brien Romo MD.      Current  antibiotics:   INH  Rifampin  PZA  ETA  Pyridoxine     Past antibiotics:       Impression:     Pulmonary and extrapulmonary MTB infection  Recently diagnosed and started on 4 drug regime  In June 2018   Sputum AFB positive 4/23/18 and 4/24/18  : pan sensitive strain   BAL cx 4/24/10 ; negative for MTB  S/p liver and pleural biopsy on 5/11/18 at [facility] : MTB probe positive   CT abd and pelvis at [facility] ( 6/7) shows no liver lesion     Pulmonary  Mycobacterium avium infection   Sputum cx from 6/21/18 done at [facility] : MAC probe positive     Intractable nausea and vomiting ; due to HTN emergency vs medication induced  Or due to constipation   Suspects more due to hypertensive emergency related since onset is acute and she has been tolerating for several weeks prior     Mild elevated transaminates :  Improving and will continue to  monitor     Hypertensive emergency with seizure and intractable nausea and vomiting   Seizure activity  Not suspecting due to  INH toxicity since she has been missing medications a few days prior to admission    without any imaging evidence of focal lesions or epileptiform abnormalities on EEG     ESRD on HD ( M/W/F)  NSTEMI in 1/95  Hx Diastolic Heart failure        Plan:     Please call oncall   ID physician   Pager [phone]   if there  are any ID questions over the weekend. Dr Mely Cartagena   will start   regular rounding starting on  7/23/18          I am starting zithromax 500mg 3 times a week ( [facility]. [facility]. [facility] ) starting today  Recommend to give all her anti MTB medications after the HD on dialysis days   if she continues to tolerate current anti MTB regime .  Will need to give prescription on discharge     I would recommend to  continue 4 drug MTB regime with INH, Rifampin, Ethanbutol ( 3 times a week )   and PZA ( 3 times a week ) and pyridoxine with renally adjusted dose   Continue po  Pyridoxine to prevent INH neuropathy   She is in initial 2 months with 4 drug regime stage for MTB treatment and INH is essential part of treatment regime and not recommend to discontinue it for now   Rifampin and Ethambutol will cover for MAC infection as well   Continue to control l nausea with antiemetics RTC regime   Monitor LFT weekly while on anti TB mediations   HTN control as per primary team     Please call the micro lab to send out sensitivity for mycobacterium avium grew from 6/21/18 sputum culture    Notify Health dept about patient Kory Brandon so that she can be continued on DOT as out patient   Patient will need follow up with [facility] ID service on discharge for out patient ID follow up    Recommend to repeat CT abd and pelvis to follow on liver leison with IV contrast when patient becomes stable. Continue supportive care  We are watching pt closely for toxicities and side effects to abx and drug-drug interactions. We will adjust antibiotics upon the results of the pending tests and the clinical improvement of the patient. Case discussed with: [X]Patient []Family [X]Nursing [ ]Case Management  [ [facility]  [X ]MD/Care team     I discussed with Efrain Hyman about the management, prognosis, treatment and complications of the above infectious disease conditions. All patient's questions were answered in detail. She voiced understanding of the discussed issues. She agrees with current plan of therapy. Review of Systems:   Interval notes, available laboratory, microbiology and radiographic data reviewed.    Discussed with nursing and treatment  providers   Patient seen in HD  Still hypertensive   HD today   Patient does not speak english, limiteed ROM   As per nursing, patient is tolerating medications orally and can hold down medication   No nausea, vomiting or seizure activity   C/o  Pain and burise on anterior abdomen       Physical Assessment:     VITAL SIGNS    Blood pressure (!) 206/110, pulse 73, temperature 97 °F (36.1 °C), resp. rate 17, height 5' 1\" (1.549 m), weight 41 kg (90 lb 6.2 oz), SpO2 98 %, not currently breastfeeding. Temp (24hrs), Av.7 °F (36.5 °C), Min:96.7 °F (35.9 °C), Max:98.2 °F (36.8 °C)          GEN: not distressed  Eyes:  EOM, No icterus. Normal conjunctiva  ENMT: Lips without lesions  Neck:supple , No masses  LUNGS:CTA Bl lungs   CVS EXM: RRR, No S4 or S3   Abdomen: Soft, Non Tender, Bowel Sounds normal   small hematoma at umbilical area   Musculoskeletal: moving all 4 extr  SKIN:Warm, dry; as above otherwise no rashes   Neuro/Psych:  Alert. Oriented x3                            Follows commands          MICROBIOLOGY DATA    Reviewed AFB cultures from [facility] and [facility]       LAB DATA    Recent Results (from the past 24 hour(s))   GLUCOSE, POC    Collection Time: 18  1:33 PM   Result Value Ref Range    Glucose (POC) 99 70 - 110 mg/dL   CBC WITH AUTOMATED DIFF    Collection Time: 18  4:32 AM   Result Value Ref Range    WBC 4.4 (L) 4.6 - 13.2 K/uL    RBC 3.10 (L) 4.20 - 5.30 M/uL    HGB 9.4 (L) 12.0 - 16.0 g/dL    HCT 29.6 (L) 35.0 - 45.0 %    MCV 95.5 74.0 - 97.0 FL    MCH 30.3 24.0 - 34.0 PG    MCHC 31.8 31.0 - 37.0 g/dL    RDW 17.9 (H) 11.6 - 14.5 %    PLATELET 636 (L) 504 - 420 K/uL    MPV 10.7 9.2 - 11.8 FL    NEUTROPHILS 63 40 - 73 %    LYMPHOCYTES 23 21 - 52 %    MONOCYTES 9 3 - 10 %    EOSINOPHILS 3 0 - 5 %    BASOPHILS 2 0 - 2 %    ABS. NEUTROPHILS 2.8 1.8 - 8.0 K/UL    ABS. LYMPHOCYTES 1.0 0.9 - 3.6 K/UL    ABS. MONOCYTES 0.4 0.05 - 1.2 K/UL    ABS. EOSINOPHILS 0.1 0.0 - 0.4 K/UL    ABS.  BASOPHILS 0.1 0.0 - 0.1 K/UL    DF AUTOMATED     METABOLIC PANEL, BASIC    Collection Time: 18  4:32 AM   Result Value Ref Range    Sodium 133 (L) 136 - 145 mmol/L    Potassium 4.2 3.5 - 5.5 mmol/L    Chloride 100 100 - 108 mmol/L    CO2 26 21 - 32 mmol/L    Anion gap 7 3.0 - 18 mmol/L    Glucose 84 74 - 99 mg/dL    BUN 32 (H) 7.0 - 18 MG/DL    Creatinine 3.96 (H) 0.6 - 1.3 MG/DL    BUN/Creatinine ratio 8 (L) 12 - 20      GFR est AA 14 (L) >60 ml/min/1.73m2    GFR est non-AA 12 (L) >60 ml/min/1.73m2    Calcium 8.5 8.5 - 10.1 MG/DL       liver function tests    IMAGING     I reviewed images CXR from 7/17   - official report showed Small bilateral pleural effusions.   2.  Improved aeration and expansion of the lungs with mild bibasilar opacities.     I reviewed images CT chest/abd/pelvis  Without contrast  from  6/7/18  - official report showed   Small to moderate right pleural effusion with atelectasis/infiltrates in basal  right lung.     Mild atelectasis and/or fibrosis with or without active infiltrates in left lung  base.     Small to moderate pericardial effusion.     Large amount of ascites distending abdomen and pelvis.     Large amount of retained stool in proximal colon. Moderate gas in transverse  colon and upper sigmoid colon.     There is prominent appendix containing gas and some stool-like material but  there is no evidence of any abnormal thickening or appendicular wall.  No  definite finding to suspect acute appendicitis.             Current medications reviewed in EPIC        Current Facility-Administered Medications   Medication Dose Route Frequency    cloNIDine HCl (CATAPRES) tablet 0.1 mg  0.1 mg Oral Q6H PRN    hydrALAZINE (APRESOLINE) tablet 100 mg  100 mg Oral TID    heparin (porcine) injection 5,000 Units  5,000 Units SubCUTAneous Q8H    ethambutol (MYAMBUTOL) tablet 1,200 mg  1,200 mg Oral Q TUE, THU & SAT    pyridoxine (vitamin B6) (VITAMIN B-6) tablet 100 mg  100 mg Oral DAILY    famotidine (PEPCID) tablet 20 mg  20 mg Oral DAILY    hydrALAZINE (APRESOLINE) tablet 25 mg  25 mg Oral Q8H PRN    amLODIPine (NORVASC) tablet 10 mg  10 mg Oral DAILY    aspirin chewable tablet 81 mg  81 mg Oral DAILY    atorvastatin (LIPITOR) tablet 40 mg  40 mg Oral QHS    carvedilol (COREG) tablet 25 mg  25 mg Oral Q12H    losartan (COZAAR) tablet 100 mg  100 mg Oral DAILY    melatonin tablet 3 mg  3 mg Oral QHS PRN    FLUoxetine (PROzac) capsule 10 mg  10 mg Oral DAILY    acetaminophen (TYLENOL) tablet 650 mg  650 mg Oral Q6H PRN    oxyCODONE-acetaminophen (PERCOCET) 5-325 mg per tablet 1 Tab  1 Tab Oral Q6H PRN    naloxone (NARCAN) injection 0.4 mg  0.4 mg IntraVENous PRN    ondansetron (ZOFRAN) injection 4 mg  4 mg IntraVENous Q6H PRN    docusate sodium (COLACE) capsule 100 mg  100 mg Oral BID PRN    cloNIDine (CATAPRES) 0.3 mg/24 hr patch 1 Patch  1 Patch TransDERmal Q7D    isoniazid (NYDRAZID) tablet 300 mg  300 mg Oral DAILY    pyrazinamide tablet 1,000 mg  1,000 mg Oral Q MON, WED & FRI    rifAMPin (RIFADIN) capsule 600 mg  600 mg Oral ACB    LORazepam (ATIVAN) injection 1 mg  1 mg IntraVENous PRN    bisacodyl (DULCOLAX) suppository 10 mg  10 mg Rectal DAILY PRN    promethazine (PHENERGAN) 12.5 mg in 0.9% sodium chloride 50 mL IVPB  12.5 mg IntraVENous Q6H PRN    metoprolol (LOPRESSOR) injection 5 mg  5 mg IntraVENous Q6H PRN    glucose chewable tablet 16 g  4 Tab Oral PRN    glucagon (GLUCAGEN) injection 1 mg  1 mg IntraMUSCular PRN    dextrose (D50W) injection syrg 12.5-25 g  25-50 mL IntraVENous PRN           Medical Decision Making:     I reviewed and summarized data from old medical records and obtained history from other sources than patient. I reviewed laboratory tests, Radiology data, and diagnostic tests in the CPT medicine section. I discussed with the physicians and the nursed involved in this patient's care about this patient's current medical problems. I have  Discussed plan of care with  family  and nursing staff.         The total visit duration was of  30   minutes of which more than 50% was spent in coordination of care and counseling (time spent with patient/family face to face, physical exam, reviewing microbiology data, laboratory results, radiographic data, speaking with physicians and nursing staff involved in this pt's care).

## 2018-07-20 NOTE — ROUTINE PROCESS
Bedside and Verbal shift change report given to Tino Lucio (oncoming nurse) by Raysa Shukla RN (offgoing nurse). Report included the following information SBAR, Kardex, MAR and Recent Results.     SITUATION:  Code Status: Full Code  Reason for Admission: Hypertensive emergency  Seizure [facility])  Hospital day: 4  Problem List:       Hospital Problems  Date Reviewed: 11/15/2017          Codes Class Noted POA    Intractable vomiting with nausea ICD-10-CM: R11.2  ICD-9-CM: 536.2  7/17/2018 Unknown        ESRD on hemodialysis [facility]) ICD-10-CM: N18.6, Z99.2  ICD-9-CM: 585.6, V45.11  7/17/2018 Unknown        Pulmonary TB ICD-10-CM: A15.0  ICD-9-CM: 011.90  7/17/2018 Unknown        * (Principal)Hypertensive crisis ICD-10-CM: I16.9  ICD-9-CM: 401.9  7/17/2018 Unknown              BACKGROUND:   Past Medical History:   Past Medical History:   Diagnosis Date    Chronic kidney disease     ESRD (end stage renal disease) ([facility] [address]     TUES-THURS-SAT    Hypercholesteremia     Hypertension     Kidney disease     Vitamin D deficiency       Patient taking anticoagulants yes    Patient has a defibrillator: no    History of shots YES for example, flu, pneumonia, tetanus   Isolation History YES for example, MRSA, CDiff    ASSESSMENT:  Changes in Assessment Throughout Shift: NONE  Significant Changes in 24 hours (for example, RR/code, fall)  Patient has Central Line: yes Reasons if yes: Hemodialysis  Patient has Onofre Cath: no   Mobility Issues  PT  IV Patency  OR Checklist  Pending Tests    Last Vitals:  Vitals w/ MEWS Score (last day)     Date/Time MEWS Score Pulse Resp Temp BP Level of Consciousness SpO2    07/20/18 1519 2 (!)  105 19 96.7 °F (35.9 °C) 186/90 Alert 93 %    07/20/18 1348 -- 99 20 97.8 °F (36.6 °C) 127/85 -- --    07/20/18 1342 -- (!)  107 -- -- 100/68 -- --    07/20/18 1331 -- (!)  109 -- -- 91/65 -- --    07/20/18 1330 -- (!)  107 -- -- (!)  74/57 -- --    07/20/18 1300 -- (!)  104 -- -- 103/76 -- --    07/20/18 1230 -- 99 -- -- 118/88 -- --    07/20/18 1200 -- 99 -- -- (!)  140/99 -- --    07/20/18 1130 -- 88 -- -- 139/90 -- --    07/20/18 1100 -- 88 -- -- (!)  143/94 -- --    07/20/18 1030 -- 88 -- -- 126/76 -- --    07/20/18 1006 -- 78 -- -- 139/74 -- --    07/20/18 0959 -- 80 18 97.6 °F (36.4 °C) 144/85 -- --    07/20/18 0904 -- 73 17 97 °F (36.1 °C) (!)  206/110 -- 98 %    07/20/18 0823 -- 80 -- -- (!)  224/122 -- --    07/20/18 0822 -- 80 -- -- (!)  224/122 -- --    07/20/18 0715 3 80 16 98 °F (36.7 °C) (!)  220/121 Alert 98 %    07/20/18 0331 1 80 18 98.1 °F (36.7 °C) (!)  184/100 Alert 97 %    07/20/18 0041 1 86 18 97.7 °F (36.5 °C) (!)  194/103 Alert 99 %    07/19/18 1852 1 90 18 98.2 °F (36.8 °C) (!)  183/101 Alert 94 %    07/19/18 1531 1 92 19 98 °F (36.7 °C) (!)  185/99 Alert 93 %    07/19/18 1328 1 88 16 98.1 °F (36.7 °C) 156/86 Alert 96 %    07/19/18 1142 1 83 18 96.7 °F (35.9 °C) (!)  174/104 Alert 97 %    07/19/18 0831 3 93 18 97.5 °F (36.4 °C) (!)  209/118 Alert 96 %    07/19/18 0448 1 87 19 97.9 °F (36.6 °C) (!)  198/102 Alert 96 %    07/19/18 0234 -- -- -- -- (!)  202/110 -- --    07/19/18 0021 3 95 18 98 °F (36.7 °C) (!)  209/120 Alert --            PAIN    Pain Assessment    Pain Intensity 1: 0 (07/20/18 1644)    Pain Location 1: Abdomen    Pain Intervention(s) 1: Medication (see MAR)    Patient Stated Pain Goal: Unable to verbalize/indicate pain  Intervention effective: yes  Time of last intervention: 1544   Reassessment Completed: yes   Other actions taken for pain: Distraction    Last 3 Weights:  Last 3 Recorded Weights in this Encounter    07/18/18 1418 07/19/18 0448 07/20/18 0612   Weight: 46 kg (101 lb 6.6 oz) 40.9 kg (90 lb 2.7 oz) 41 kg (90 lb 6.2 oz)   Weight change: -5 kg (-11 lb 0.4 oz)    INTAKE/OUPUT    Current Shift:      Last three shifts: 07/19 0701 - 07/20 1900  In: 1030 [P.O.:1030]  Out: 3500     RECOMMENDATIONS AND DISCHARGE PLANNING  Patient needs and requests: Pain Control    Pending tests/procedures: labs     Discharge plan for patient: Home    Discharge planning Needs or Barriers: Home Health    Estimated Discharge Date: 7/21/2018 Posted on Whiteboard in Patients Room: yes       \"HEALS\" SAFETY CHECK  A safety check occurred in the patient's room between off going nurse and oncoming nurse listed above. The safety check included the below items:    H  High Alert Medications Verify all high alert medication drips (heparin, PCA, etc.)  E  Equipment Suction is set up for ALL patients (with jenni)  Red plugs utilized for all equipment (IV pumps, etc.)  WOWs wiped down at end of shift. Room stocked with oxygen, suction, and other unit-specific supplies  A  Alarms Bed alarm is set for fall risk patients  Ensure chair alarm is in place and activated if patient is up in a chair  L  Lines Check IV for any infiltration  Onofre bag is empty iare f patient has a Onofre   Tubing and IV bags labeled  S  Safety  Room is clean, patient is clean, and equipment is clean. Hallways are clear from equipment besides carts. Fall bracelet on for fall risk patients  Ensure room is clear and free of clutter  Suction is set up for ALL patients (with jenni)  Hallways are clear from equipment besides carts.    Isolation precautions followed, supplies available outside room, sign posted    Ofelia Ingram RN

## 2018-07-20 NOTE — PROGRESS NOTES
Patient is eating well. Discontinued order for Humalog Insulin q 6 as ordered once patient is no longer NPO.

## 2018-07-20 NOTE — HOME CARE
Rec HC order - called son at number listed - confirmed address - she has RW at home - [facility] will follow for SN/PT/OT/Aide - pt has RW at home - DELFINA Edwards RN

## 2018-07-20 NOTE — PROGRESS NOTES
[facility] [employer] was called for rolling walker. Pt's son signed [facility] for [facility] and [facility] referral was sent to [facility] and Mela Lewis was called.

## 2018-07-20 NOTE — PROGRESS NOTES
NUTRITION Nutrition Consult: Diet Education RECOMMENDATIONS / PLAN:  
 
- Recommend increasing bowel regimen (discussed with MD) - Diet education provided today 
- Continue RD inpatient monitoring and evaluation. NUTRITION INTERVENTIONS & DIAGNOSIS:  
 
[x] Meals/snacks: modified composition 
[x] Medical food supplement therapy: Ensure Pudding TID. [x] Collaboration and referral of nutrition care: discussed pt and bowel regimen with MD 
[x] Nutrition education/counseling: renal diet and cardiac diet on 7/20 Nutrition Diagnosis: Unintended weight loss related to inadequate energy intake as evidenced by 13 lb, 13% weight loss x 6 months. Food and nutrition related knowledge deficit related to renal and cardiac diet as evidenced by pt and family report ASSESSMENT:  
 
7/20: Pt has good appetite and meal intake. Does not like in-house meals; eats food that family provides. Consult received for diet education for ensuring of outside food staying consistent with dietary considerations. Provided educational handouts in [place] and [facility] to pt; discussed renal and cardiac diet with her and with her son on the phone. Both verbalized understanding. Pt was started on coumadin; educational handout provided to daughter. Noted that medication was discontinued; discussed update with daughter 7/17: Nausea/vomiting x 2 days PTA. Started on diet, tolerated oral medications this morning. Likes pudding. HD today. Average po intake adequate to meet patients estimated nutritional needs:   [x] Yes     [] No   [] Unable to determine at this time Diet: DIET CARDIAC Regular DIET NUTRITIONAL SUPPLEMENTS Breakfast, Lunch, Dinner; ENSURE PUDDING Food Allergies: NKFA Current Appetite:   [x] Good     [] Fair     [] Poor     [] Other: 
Appetite/meal intake prior to admission:   [] Good     [] Fair     [] Poor     [x] Other: nausea/vomiting x 2 days Feeding Limitations:  [] Swallowing difficulty    [] Chewing difficulty    [] Other: 
Current Meal Intake:  
Patient Vitals for the past 100 hrs: 
 % Diet Eaten  
07/19/18 2217 100 % 07/19/18 0943 50 % BM: 7/13 Skin Integrity: WDL Edema:  1+ UEs Pertinent Medications: Reviewed: bowel regimen, zofran, phenergan, vitamin B6 Recent Labs  
   07/20/18 
 0432  07/19/18 
 0502  07/18/18 
 3009 NA  133*  137  138  
K  4.2  3.9  3.5 CL  100  100  101 CO2  26  29  30  
GLU  84  87  121* BUN  32*  12  11 CREA  3.96*  2.01*  3.01* CA  8.5  8.6  8.3*  
MG   --   2.1   -- Intake/Output Summary (Last 24 hours) at 07/20/18 5468 Last data filed at 07/20/18 1342 Gross per 24 hour Intake              400 ml Output             3500 ml Net            -3100 ml Anthropometrics: 
Ht Readings from Last 1 Encounters:  
07/16/18 5' 1\" (1.549 m) Last 3 Recorded Weights in this Encounter  
 07/18/18 1418 07/19/18 0448 07/20/18 5493 Weight: 46 kg (101 lb 6.6 oz) 40.9 kg (90 lb 2.7 oz) 41 kg (90 lb 6.2 oz) Body mass index is 17.08 kg/(m^2). Underweight Weight History: 13 lb, 13% weight loss x 6 months Weight Metrics 7/20/2018 6/27/2018 6/26/2018 5/31/2018 3/9/2018 1/22/2018 12/2/2017 Weight 90 lb 6.2 oz 90 lb 6.2 oz 85 lb 8.6 oz 90 lb 6.2 oz 84 lb 3.2 oz 103 lb 3 oz 106 lb 14.8 oz BMI 17.08 kg/m2 17.08 kg/m2 16.16 kg/m2 17.08 kg/m2 15.91 kg/m2 19.5 kg/m2 20.2 kg/m2 Admitting Diagnosis: Hypertensive emergency Seizure ([facility] [address] Pertinent PMHx: ESRD on HD, HTN, vitamin D deficiency Education Needs:        [] None identified  [] Identified - Not appropriate at this time  [x]  Identified and addressed - refer to education log Learning Limitations:   [] None identified  [x] Identified: language barrier Cultural, Orthodox & ethnic food preferences:  [x] None identified    [] Identified and addressed ESTIMATED NUTRITION NEEDS:  
 
Calories: 3396-6935 kcal (30-35 kcal/kg) based on  [] Actual BW      [x] SBW 56 kg 
Protein: 67-84 gm (1.2-1.5 gm/kg) based on  [] Actual BW      [x] SBW Fluid: 1 mL/kcal 
  
MONITORING & EVALUATION:  
 
Nutrition Goal(s):  Goals modified 1. Po intake of meals will meet >75% of patient estimated nutritional needs within the next 7 days. Outcome:  [x] Met/Ongoing    []  Not Met    [] New/Initial Goal  
2. Patient will increase knowledge of appropriate food choices on a renal and cardiac diet within 7 days. Outcome:  [] Met    []  Not Met    [x] New/Initial Goal  
 
 
Monitoring:   [x] Food and beverage intake   [x] Diet order   [x] Nutrition-focused physical findings   [x] Treatment/therapy   [] Weight   [] Enteral nutrition intake Previous Recommendations (for follow-up assessments only):     [x]   Implemented       []   Not Implemented (RD to address)      [] No Longer Appropriate     [] No Recommendation Made Discharge Planning: renal diet [x] Participated in care planning, discharge planning, & interdisciplinary rounds as appropriate Cynthia Zaragoza RD Pager: [phone]

## 2018-07-21 NOTE — PROGRESS NOTES
Problem: Falls - Risk of  Goal: *Absence of Falls  Document Asha Fall Risk and appropriate interventions in the flowsheet. Outcome: Progressing Towards Goal  Fall Risk Interventions:  Mobility Interventions: Bed/chair exit alarm, Utilize walker, cane, or other assistive device         Medication Interventions: Bed/chair exit alarm, Patient to call before getting OOB, Teach patient to arise slowly    Elimination Interventions: Bed/chair exit alarm, Call light in reach, Patient to call for help with toileting needs, Toileting schedule/hourly rounds             Problem: Pressure Injury - Risk of  Goal: *Prevention of pressure injury  Document Derek Scale and appropriate interventions in the flowsheet. Outcome: Progressing Towards Goal  Pressure Injury Interventions:             Activity Interventions: Increase time out of bed    Mobility Interventions: Float heels, HOB 30 degrees or less    Nutrition Interventions: Document food/fluid/supplement intake

## 2018-07-21 NOTE — ROUTINE PROCESS
Bedside and Verbal shift change report given to Shoshana Contreras RN (oncoming nurse) by Afshan Alvarez RN (offgoing nurse). Report included the following information SBAR, Kardex, MAR and Recent Results.     SITUATION:  Code Status: Full Code  Reason for Admission: Hypertensive emergency  Seizure [facility])  Hospital day: 5  Problem List:       Hospital Problems  Date Reviewed: 11/15/2017          Codes Class Noted POA    Intractable vomiting with nausea ICD-10-CM: R11.2  ICD-9-CM: 536.2  7/17/2018 Unknown        ESRD on hemodialysis [facility]) ICD-10-CM: N18.6, Z99.2  ICD-9-CM: 585.6, V45.11  7/17/2018 Unknown        Pulmonary TB ICD-10-CM: A15.0  ICD-9-CM: 011.90  7/17/2018 Unknown        * (Principal)Hypertensive crisis ICD-10-CM: I16.9  ICD-9-CM: 401.9  7/17/2018 Unknown              BACKGROUND:   Past Medical History:   Past Medical History:   Diagnosis Date    Chronic kidney disease     ESRD (end stage renal disease) ([facility] [address]     TUES-THURS-SAT    Hypercholesteremia     Hypertension     Kidney disease     Vitamin D deficiency       Patient taking anticoagulants yes    Patient has a defibrillator: no    History of shots YES for example, flu, pneumonia, tetanus   Isolation History YES for example, MRSA, CDiff    ASSESSMENT:  Changes in Assessment Throughout Shift: NONE  Significant Changes in 24 hours (for example, RR/code, fall)  Patient has Central Line: yes Reasons if yes: Hemodialysis  Patient has Onofre Cath: no   Mobility Issues  PT  IV Patency  OR Checklist  Pending Tests    Last Vitals:  Vitals w/ MEWS Score (last day)     Date/Time MEWS Score Pulse Resp Temp BP Level of Consciousness SpO2    07/21/18 0424 1 73 18 97.5 °F (36.4 °C) 154/88 Alert 99 %    07/21/18 0031 1 71 18 98.3 °F (36.8 °C) 119/68 Alert 98 %    07/20/18 1947 1 75 20 97.3 °F (36.3 °C) 131/81 Alert 97 %    07/20/18 1519 2 (!)  105 19 96.7 °F (35.9 °C) 186/90 Alert 93 %    07/20/18 1348 -- 99 20 97.8 °F (36.6 °C) 127/85 -- --    07/20/18 1342 -- (!)  107 -- -- 100/68 -- --    07/20/18 1331 -- (!)  109 -- -- 91/65 -- --    07/20/18 1330 -- (!)  107 -- -- (!)  74/57 -- --    07/20/18 1300 -- (!)  104 -- -- 103/76 -- --    07/20/18 1230 -- 99 -- -- 118/88 -- --    07/20/18 1200 -- 99 -- -- (!)  140/99 -- --    07/20/18 1130 -- 88 -- -- 139/90 -- --    07/20/18 1100 -- 88 -- -- (!)  143/94 -- --    07/20/18 1030 -- 88 -- -- 126/76 -- --    07/20/18 1006 -- 78 -- -- 139/74 -- --    07/20/18 0959 -- 80 18 97.6 °F (36.4 °C) 144/85 -- --    07/20/18 0904 -- 73 17 97 °F (36.1 °C) (!)  206/110 -- 98 %    07/20/18 0823 -- 80 -- -- (!)  224/122 -- --    07/20/18 0822 -- 80 -- -- (!)  224/122 -- --    07/20/18 0715 3 80 16 98 °F (36.7 °C) (!)  220/121 Alert 98 %    07/20/18 0331 1 80 18 98.1 °F (36.7 °C) (!)  184/100 Alert 97 %    07/20/18 0041 1 86 18 97.7 °F (36.5 °C) (!)  194/103 Alert 99 %            PAIN    Pain Assessment    Pain Intensity 1: 0 (07/21/18 0615)    Pain Location 1: Abdomen    Pain Intervention(s) 1: Medication (see MAR)    Patient Stated Pain Goal: 0  Intervention effective: yes  Time of last intervention:0515 Reassessment Completed: yes   Other actions taken for pain: Distraction    Last 3 Weights:  Last 3 Recorded Weights in this Encounter    07/18/18 1418 07/19/18 0448 07/20/18 0612   Weight: 46 kg (101 lb 6.6 oz) 40.9 kg (90 lb 2.7 oz) 41 kg (90 lb 6.2 oz)   Weight change:     INTAKE/OUPUT    Current Shift:      Last three shifts: 07/19 1901 - 07/21 0700  In: 80 [P.O.:780]  Out: 3500     RECOMMENDATIONS AND DISCHARGE PLANNING  Patient needs and requests: Pain Control    Pending tests/procedures: labs     Discharge plan for patient: Home    Discharge planning Needs or Barriers: none    Estimated Discharge Date: 7/21/2018 Posted on Whiteboard in Patients Room: yes       \"HEALS\" SAFETY CHECK  A safety check occurred in the patient's room between off going nurse and oncoming nurse listed above.     The safety check included the below items:    H  High Alert Medications Verify all high alert medication drips (heparin, PCA, etc.)  E  Equipment Suction is set up for ALL patients (with jenni)  Red plugs utilized for all equipment (IV pumps, etc.)  WOWs wiped down at end of shift. Room stocked with oxygen, suction, and other unit-specific supplies  A  Alarms Bed alarm is set for fall risk patients  Ensure chair alarm is in place and activated if patient is up in a chair  L  Lines Check IV for any infiltration  Onofre bag is empty if patient has a Onofre   Tubing and IV bags are labeled  S  Safety  Room is clean, patient is clean, and equipment is clean. Hallways are clear from equipment besides carts. Fall bracelet on for fall risk patients  Ensure room is clear and free of clutter  Suction is set up for ALL patients (with jenni)  Hallways are clear from equipment besides carts.    Isolation precautions followed, supplies available outside room, sign posted    Pratik Tomas RN

## 2018-07-21 NOTE — PROGRESS NOTES
Soap Suds Enema was administered. Obtained eight small pieces (approx. 1 inch x 0.5 inch) of hardened stools. Patient tolerated procedure well. Patient denied pain during and after the procedure.

## 2018-07-21 NOTE — DISCHARGE INSTRUCTIONS
Acute High Blood Pressure: Care Instructions  Your Care Instructions    Acute high blood pressure is very high blood pressure. It's a serious problem. Very high blood pressure can damage your brain, heart, eyes, and kidneys. You may have been given medicines to lower your blood pressure. You may have gotten them as pills or through a needle in one of your veins. This is called an IV. And maybe you were given other medicines too. These can be needed when high blood pressure causes other problems. To keep your blood pressure at a lower level, you may need to make healthy lifestyle changes. And you will probably need to take medicines. Be sure to follow up with your doctor about your blood pressure and what you can do about it. Follow-up care is a key part of your treatment and safety. Be sure to make and go to all appointments, and call your doctor if you are having problems. It's also a good idea to know your test results and keep a list of the medicines you take. How can you care for yourself at home? · See your doctor as often as he or she recommends. This is to make sure your blood pressure is under control. You will probably go at least 2 times a year. But it may be more often at first.  · Take your blood pressure medicine exactly as prescribed. You may take one or more types. They include diuretics, beta-blockers, ACE inhibitors, calcium channel blockers, and angiotensin II receptor blockers. Call your doctor if you think you are having a problem with your medicine. · If you take blood pressure medicine, talk to your doctor before you take decongestants or anti-inflammatory medicine, such as ibuprofen. These can raise blood pressure. · Learn how to check your blood pressure at home. Check it often. · Ask your doctor if it's okay to drink alcohol. · Talk to your doctor about lifestyle changes that can help blood pressure. These include being active and not smoking.   When should you call for help?  Call 911 anytime you think you may need emergency care. This may mean having symptoms that suggest that your blood pressure is causing a serious heart or blood vessel problem. Your blood pressure may be over 180/110.   For example, call 911 if:    · You have symptoms of a heart attack. These may include:  ¨ Chest pain or pressure, or a strange feeling in the chest.  ¨ Sweating. ¨ Shortness of breath. ¨ Nausea or vomiting. ¨ Pain, pressure, or a strange feeling in the back, neck, jaw, or upper belly or in one or both shoulders or arms. ¨ Lightheadedness or sudden weakness. ¨ A fast or irregular heartbeat.     · You have symptoms of a stroke. These may include:  ¨ Sudden numbness, tingling, weakness, or loss of movement in your face, arm, or leg, especially on only one side of your body. ¨ Sudden vision changes. ¨ Sudden trouble speaking. ¨ Sudden confusion or trouble understanding simple statements. ¨ Sudden problems with walking or balance. ¨ A sudden, severe headache that is different from past headaches.     · You have severe back or belly pain.    Do not wait until your blood pressure comes down on its own. Get help right away.   Call your doctor now or seek immediate care if:    · Your blood pressure is much higher than normal (such as 180/110 or higher), but you don't have symptoms.     · You think high blood pressure is causing symptoms, such as:  ¨ Severe headache. ¨ Blurry vision.    Watch closely for changes in your health, and be sure to contact your doctor if:    · Your blood pressure measures 140/90 or higher at least 2 times. That means the top number is 140 or higher or the bottom number is 90 or higher, or both.     · You think you may be having side effects from your blood pressure medicine.     · Your blood pressure is usually normal, but it goes above normal at least 2 times. Where can you learn more? Go to http://cristino-anne.info/.   Enter S468 in the search box to learn more about \"Acute High Blood Pressure: Care Instructions. \"  Current as of: May 10, 2017  Content Version: 11.7  © 3066-4896 [employer]. Care instructions adapted under license by [employer] (which disclaims liability or warranty for this information). If you have questions about a medical condition or this instruction, always ask your healthcare professional. [address] any warranty or liability for your use of this information. Kidney Dialysis: Care Instructions  Your Care Instructions    Dialysis is a process that filters wastes from the blood when your kidneys can no longer do the job. It is not a cure, but it can help you live longer and feel better. It is a lifesaving treatment when you have kidney failure. Normal kidneys work 24 hours a day to clean wastes from your blood. Your kidneys are not able to do this job, so a process called dialysis will do some of the work for your kidneys. You and your doctor will decide which type of dialysis you should have. Peritoneal dialysis uses the lining of your belly (peritoneum) to filter your blood. You can do it at home, on a daily basis. Hemodialysis uses a man-made filter called a dialyzer to clean your blood. Most people need to go to a hospital or clinic 3 days a week for several hours each time. Sometimes hemodialysis can be done at home. It is normal to have questions about your treatment, and you have a right to know what is happening to you. Learning about dialysis can help you take an active role in your treatment. Dialysis does not cure kidney disease, but it can help you live longer and feel better. You will need to follow your diet and treatment schedule carefully. Follow-up care is a key part of your treatment and safety. Be sure to make and go to all appointments, and call your doctor if you are having problems.  It's also a good idea to know your test results and keep a list of the medicines you take. What do you need to know about peritoneal dialysis? Peritoneal dialysis uses the lining of your belly (or peritoneal membrane) to filter your blood. Before you can begin peritoneal dialysis, your doctor will need to place a thin tube called a catheter in your belly. This is the dialysis access. · Peritoneal dialysis can be done at home or in any clean place. You may be able to do it while you sleep. · You can do it by yourself. You do not have to rely on help from others. · You can do it at the times you choose as long as you do the right number of treatments. · It has to be done every day of the week. · Some people find it hard to do all the required steps. · It increases your chance for a serious infection of the lining of the belly (peritoneum). What do you need to know about hemodialysis? Hemodialysis uses a man-made membrane called a dialyzer to clean your blood. You are connected to the dialyzer by tubes attached to your blood vessels. Before you start hemodialysis, your doctor will create a site where the blood can flow in and out of your body during your dialysis sessions. This site is called the vascular access. It may be a fistula, made by connecting an artery and a vein. Or it may be a graft, which is a tube implanted under your skin. · Hemodialysis is done mainly by trained health workers who can watch for any problems. · It allows you to be in contact with other people having dialysis. This can help provide emotional support. · You can schedule your treatments in the evenings so you can keep working. · You may be able to do home hemodialysis, which gives you more control over your schedule. · It usually needs to be done on a set schedule 3 times a week. · It can cause side effects. The most common side effects are low blood pressure and muscle cramps. These can often be treated easily. · It requires needle sticks during every treatment, which bothers some people. Others get used to it and even do the needle sticks themselves. How can you care for yourself at home? · Be sure to have all of your dialysis sessions. Do not try to shorten or skip your sessions. You have a better chance of a longer and healthier life by getting your full treatment. · Your doctor or health care team will show you the steps you need to go through each day before, during, and after dialysis. Be sure to follow these steps. If you do not understand a step, talk to your team.  · Your doctor and dietitian will help you design menus that follow your diet. Be sure to follow your diet guidelines. ¨ You will need to limit fluids and certain foods that contain salt (sodium), potassium, and phosphorus. ¨ You may need to follow a heart-healthy diet to keep the fat (cholesterol) in your blood under control. ¨ You may need higher levels of protein in your diet. · Your doctor may recommend certain vitamins. But do not take any other medicine, including over-the-counter medicines, vitamins, and herbal products, without talking to your doctor first.  · Do not smoke. Smoking raises your risk of many health problems, including more kidney damage. If you need help quitting, talk to your doctor about stop-smoking programs and medicines. These can increase your chances of quitting for good. · Do not take ibuprofen (Advil, Motrin), naproxen (Aleve), or similar medicines, unless your doctor tells you to. These medicines may make kidney problems worse. When should you call for help?   Call your doctor now or seek immediate medical care if:    · You have a fever.     · You are dizzy or lightheaded, or you feel like you may faint.     · You are confused or cannot think clearly.     · You have new or worse nausea or vomiting.     · You have new or more blood in your urine.     · You have new swelling.    Watch closely for changes in your health, and be sure to contact your doctor if:    · You do not get better as expected. Where can you learn more? Go to http://cristino-anne.info/. Enter Z529 in the search box to learn more about \"Kidney Dialysis: Care Instructions. \"  Current as of: May 12, 2017  Content Version: 11.7  © 6566-8133 [employer]. Care instructions adapted under license by [email or web address] (which disclaims liability or warranty for this information). If you have questions about a medical condition or this instruction, always ask your healthcare professional. [address] any warranty or liability for your use of this information. Mycobacterial Infections: Care Instructions  Your Care Instructions    Mycobacteria are germs that cause a wide variety of infections, including tuberculosis (TB), bone infections, abscesses, and a type of arthritis. They can infect the lungs, lymph nodes, skin, and other parts of the body. They can also infect open wounds. Mycobacteria often infect people with AIDS. Depending on where the infection is in the body, some of the symptoms are fever, weight loss, diarrhea, abscesses (pockets of pus), and cough. Follow-up care is a key part of your treatment and safety. Be sure to make and go to all appointments, and call your doctor if you are having problems. It's also a good idea to know your test results and keep a list of the medicines you take. How can you care for yourself at home? · Take your antibiotics as directed. Do not stop taking them just because you feel better. You need to take the full course of antibiotics. · You may need to take medicine for a long time, sometimes for a couple of years and sometimes for the rest of your life. It is very important that you take the medicine exactly as directed for as long as it takes to clear up your infection or keep you healthy. · Depending on where the infection is, you may need surgery. For example, abscesses can be drained.  Talk to your doctor about whether surgery is right for you. When should you call for help? Call 911 anytime you think you may need emergency care. For example, call if:    · You have severe trouble breathing.    Call your doctor now or seek immediate medical care if:    · You are short of breath.     · You have a new or worse cough.     · You have worse symptoms of infection, such as:  ¨ Increased pain, swelling, warmth, or redness. ¨ Red streaks leading from the area. ¨ Pus draining from the area. ¨ A fever.     · You are dizzy or lightheaded, or you feel like you may faint.     · You have new or worse diarrhea.    Watch closely for changes in your health, and be sure to contact your doctor if:    · You lose weight.     · You have night sweats.     · You do not get better as expected. Where can you learn more? Go to http://cristino-anne.info/. Enter S185 in the search box to learn more about \"Mycobacterial Infections: Care Instructions. \"  Current as of: November 18, 2017  Content Version: 11.7  © 5301-5161 [email or web address]. Care instructions adapted under license by [employer] (which disclaims liability or warranty for this information). If you have questions about a medical condition or this instruction, always ask your healthcare professional. [address] any warranty or liability for your use of this information. Low Sodium Diet (2,000 Milligram): Care Instructions  Your Care Instructions    Too much sodium causes your body to hold on to extra water. This can raise your blood pressure and force your heart and kidneys to work harder. In very serious cases, this could cause you to be put in the hospital. It might even be life-threatening. By limiting sodium, you will feel better and lower your risk of serious problems. The most common source of sodium is salt. People get most of the salt in their diet from canned, prepared, and packaged foods.  Fast food and restaurant meals also are very high in sodium. Your doctor will probably limit your sodium to less than 2,000 milligrams (mg) a day. This limit counts all the sodium in prepared and packaged foods and any salt you add to your food. Follow-up care is a key part of your treatment and safety. Be sure to make and go to all appointments, and call your doctor if you are having problems. It's also a good idea to know your test results and keep a list of the medicines you take. How can you care for yourself at home? Read food labels  · Read labels on cans and food packages. The labels tell you how much sodium is in each serving. Make sure that you look at the serving size. If you eat more than the serving size, you have eaten more sodium. · Food labels also tell you the Percent Daily Value for sodium. Choose products with low Percent Daily Values for sodium. · Be aware that sodium can come in forms other than salt, including monosodium glutamate (MSG), sodium citrate, and sodium bicarbonate (baking soda). MSG is often added to Asian food. When you eat out, you can sometimes ask for food without MSG or added salt. Buy low-sodium foods  · Buy foods that are labeled \"unsalted\" (no salt added), \"sodium-free\" (less than 5 mg of sodium per serving), or \"low-sodium\" (less than 140 mg of sodium per serving). Foods labeled \"reduced-sodium\" and \"light sodium\" may still have too much sodium. Be sure to read the label to see how much sodium you are getting. · Buy fresh vegetables, or frozen vegetables without added sauces. Buy low-sodium versions of canned vegetables, soups, and other canned goods. Prepare low-sodium meals  · Cut back on the amount of salt you use in cooking. This will help you adjust to the taste. Do not add salt after cooking. One teaspoon of salt has about 2,300 mg of sodium. · Take the salt shaker off the table. · Flavor your food with garlic, lemon juice, onion, vinegar, herbs, and spices.  Do not use soy sauce, lite soy sauce, steak sauce, onion salt, garlic salt, celery salt, mustard, or ketchup on your food. · Use low-sodium salad dressings, sauces, and ketchup. Or make your own salad dressings and sauces without adding salt. · Use less salt (or none) when recipes call for it. You can often use half the salt a recipe calls for without losing flavor. Other foods such as rice, pasta, and grains do not need added salt. · Rinse canned vegetables, and cook them in fresh water. This removes some-but not all-of the salt. · Avoid water that is naturally high in sodium or that has been treated with water softeners, which add sodium. Call your local water company to find out the sodium content of your water supply. If you buy bottled water, read the label and choose a sodium-free brand. Avoid high-sodium foods  · Avoid eating:  ¨ Smoked, cured, salted, and canned meat, fish, and poultry. ¨ Ham, marinelli, hot dogs, and luncheon meats. ¨ Regular, hard, and processed cheese and regular peanut butter. ¨ Crackers with salted tops, and other salted snack foods such as pretzels, chips, and salted popcorn. ¨ Frozen prepared meals, unless labeled low-sodium. ¨ Canned and dried soups, broths, and bouillon, unless labeled sodium-free or low-sodium. ¨ Canned vegetables, unless labeled sodium-free or low-sodium. ¨ Western Kimmy fries, pizza, tacos, and other fast foods. ¨ Pickles, olives, ketchup, and other condiments, especially soy sauce, unless labeled sodium-free or low-sodium. Where can you learn more? Go to http://cristino-anne.info/. Enter J530 in the search box to learn more about \"Low Sodium Diet (2,000 Milligram): Care Instructions. \"  Current as of: May 12, 2017  Content Version: 11.7  © 1453-4152 [employer]. Care instructions adapted under license by [employer] (which disclaims liability or warranty for this information).  If you have questions about a medical condition or this instruction, always ask your healthcare professional. [address] any warranty or liability for your use of this information. Tuberculosis (Active TB): Care Instructions  Your Care Instructions    Tuberculosis (TB) is a serious infection caused by bacteria. It usually occurs in the lungs, but it can spread to other parts of the body. TB spreads to other people through the air. When someone with TB breathes out or coughs, the bacteria can be breathed in by people who are nearby. You should not go to work or school while you can infect other people. Symptoms of TB include a cough and a fever. You may feel tired and weak. And you may not feel like eating. Treatment involves taking antibiotic medicines. It's very important to take your medicines as your doctor tells you to. It takes a long time to kill the TB bacteria. Treatment can last 4 to 9 months or longer. During your treatment you'll see your doctor for tests to see how the medicines are working. Your doctor will help guide you through this long process. You may have directly observed therapy (DOT). DOT ensures that you'll take the needed medicine on schedule. That's the best way to ensure you will be cured of TB. A public health official may be involved with your care. You will start to feel better after taking your medicine for a few weeks. And you may not be able to infect others at this point. But don't go back to work or school until your doctor tells you it's okay. If you live with other people, ask them to be tested for TB. A positive tuberculin skin test means that the person needs treatment to prevent TB. Follow-up care is a key part of your treatment and safety. Be sure to make and go to all appointments, and call your doctor if you are having problems. It's also a good idea to know your test results and keep a list of the medicines you take. How can you care for yourself at home?   · Take your antibiotics as directed. Do not stop taking them just because you feel better. You need to take the full course of antibiotics. · Take your medicine with food to help avoid an upset stomach. · Cover your mouth when you sneeze or cough. After coughing, throw the tissue away in a covered container. · Avoid public areas such as buses, subways, and other closed areas until you have been told that you cannot spread TB. · Talk to your doctor about drinking alcohol. Alcohol may interact with your medicine and cause side effects. · If you don't have DOT, you can do things to help remind yourself to take the medicine:  ¨ Take your medicine at the same time every day. ¨ Set a reminder alarm. ¨ Use a pillbox. ¨ Put a reminder note on your mirror or refrigerator. Justina Dyell a calendar after you take your medicine. When should you call for help? Call 911 anytime you think you may need emergency care. For example, call if:    · You have severe trouble breathing.    Call your doctor now or seek immediate medical care if:    · You are short of breath.     · You have a new or worse cough.     · You are dizzy or lightheaded, or you feel like you may faint.     · You have new or worse diarrhea.    Watch closely for changes in your health, and be sure to contact your doctor if:    · You lose weight.     · You have night sweats.     · You do not get better as expected. Where can you learn more? Go to http://cristino-anne.info/. Enter O580 in the search box to learn more about \"Tuberculosis (Active TB): Care Instructions. \"  Current as of: November 18, 2017  Content Version: 11.7  © 6180-1982 [employer]. Care instructions adapted under license by [employer] (which disclaims liability or warranty for this information).  If you have questions about a medical condition or this instruction, always ask your healthcare professional. [place] [address] any warranty or liability for your use of this information. Diet for End-Stage Renal Disease (Dialysis): Care Instructions  Your Care Instructions  You need to change your diet when you are on dialysis for end-stage renal disease (kidney failure). You will need more protein than you did before you started dialysis. You may need to limit salt and fluids. You also may need to limit minerals such as potassium and phosphorus. A diet for end-stage renal disease takes planning. A dietitian who specializes in kidney disease can help you plan meals that meet your needs. Your nutrition needs depend on the type of dialysis you get. Talk with your doctor or dietitian to make sure your diet is right for your condition. Do not change your diet without talking to your doctor or dietitian. Follow-up care is a key part of your treatment and safety. Be sure to make and go to all appointments, and call your doctor if you are having problems. It's also a good idea to know your test results and keep a list of the medicines you take. How can you care for yourself at home? · Work with your doctor or dietitian to create a food plan that guides your daily food choices. · Do not skip meals or go for many hours without eating. If you do not feel very hungry, try to eat 4 or 5 small meals instead of 1 or 2 big meals. · If you have a hard time eating enough, talk to your doctor or dietitian about ways you can add calories to your diet. · Do not take any vitamins or minerals, supplements, or herbal products without talking to your doctor first.  · Check with your doctor about whether it is safe for you to drink alcohol. · Check with your doctor about how much fluid you can drink each day. Drinking a lot of fluid can cause you to gain too much water weight between dialysis sessions. To get the right amount of protein  · Ask your doctor or dietitian how much protein you can have each day.  You will probably need more protein while you are on dialysis than you did before you started dialysis. · Choose high-quality protein sources, such as lean meat, poultry, and fish. To limit salt  · Read food labels on cans and food packages. The labels tell you how much sodium is in each serving. Make sure that you look at the serving size. If you eat more than the serving size, you will get more sodium than what is listed on the label. · Do not add salt to your food. Avoid foods that list salt, sodium, or monosodium glutamate (MSG) as an ingredient. · Buy foods that are labeled \"no salt added,\" \"sodium-free,\" or \"low-sodium. \" Foods labeled \"reduced-sodium\" and \"light sodium\" may still have too much sodium. · Limit processed foods, fast food, and restaurant foods. These types of food are very high in sodium. · Avoid salted pretzels, chips, popcorn, and other salted snacks. · Avoid smoked, cured, salted, and canned meat, fish, and poultry. This includes ham, marinelli, hot dogs, and luncheon meats. · You may use lemon, herbs, and spices to flavor your meals. To limit fluids  · Know how much fluid you can drink. Every day fill a pitcher with that amount of water. If you drink another fluid (such as coffee) that day, pour an equal amount out of the pitcher. · Count foods that are liquid at room temperature, such as gelatin dessert and ice cream, as fluids. To limit potassium  · Choose low-potassium fruits such as applesauce, pineapple, grapes, blueberries, and raspberries. · Choose low-potassium vegetables such as lettuce, green beans, cucumber, and radishes. · Choose low-potassium foods such as hummus, spaghetti, macaroni, rice, tortillas, and bagels. · Limit or avoid high-potassium foods such as milk, bananas, oranges, cantaloupe, potatoes, spinach, tomatoes, broccoli, and sweet potatoes. · Do not use a salt substitute or lite salt unless your doctor says it is okay. Most salt substitutes and lite salts are high in potassium.   To limit phosphorus  · Follow your food plan to know how much milk and milk products you can have. · Avoid nuts, peanut butter, seeds, lentils, beans, organ meats, and sardines. · Avoid cola drinks. · Avoid bran breads or bran cereals. · Take phosphate binders as directed, if prescribed by your doctor. Where can you learn more? Go to http://cristino-anne.info/. Enter X697 in the search box to learn more about \"Diet for End-Stage Renal Disease (Dialysis): Care Instructions. \"  Current as of: 2017  Content Version: 11.7  © 4358-4735 [employer]. Care instructions adapted under license by [employer] (which disclaims liability or warranty for this information). If you have questions about a medical condition or this instruction, always ask your healthcare professional. [place] [address] any warranty or liability for your use of this information. Patient armband removed and shredded. LikeWhere Activation    Thank you for requesting access to LikeWhere. Please follow the instructions below to securely access and download your online medical record. LikeWhere allows you to send messages to your doctor, view your test results, renew your prescriptions, schedule appointments, and more. How Do I Sign Up? 1. In your internet browser, go to [email or web address]  2. Click on the First Time User? Click Here link in the Sign In box. You will be redirect to the New Member Sign Up page. 3. Enter your LikeWhere Access Code exactly as it appears below. You will not need to use this code after youve completed the sign-up process. If you do not sign up before the expiration date, you must request a new code. LikeWhere Access Code: [email or web address]  Expires: 2018  4:41 PM (This is the date your LikeWhere access code will )    4. Enter the last four digits of your Social Security Number (xxxx) and Date of Birth (mm/dd/yyyy) as indicated and click Submit.  You will be taken to the next sign-up page.  5. Create a Agolot ID. This will be your fake company [employer] login ID and cannot be changed, so think of one that is secure and easy to remember. 6. Create a fake [employer] password. You can change your password at any time. 7. Enter your Password Reset Question and Answer. This can be used at a later time if you forget your password. 8. Enter your e-mail address. You will receive e-mail notification when new information is available in [address] 9. Click Sign Up. You can now view and download portions of your medical record. 10. Click the Download Summary menu link to download a portable copy of your medical information. Additional Information    If you have questions, please visit the Frequently Asked Questions section of the fake company [employer] website at [email or web address] Remember, [employer] is NOT to be used for urgent needs. For medical emergencies, dial 911. DISCHARGE SUMMARY from Nurse    PATIENT INSTRUCTIONS:    After general anesthesia or intravenous sedation, for 24 hours or while taking prescription Narcotics:  · Limit your activities  · Do not drive and operate hazardous machinery  · Do not make important personal or business decisions  · Do  not drink alcoholic beverages  · If you have not urinated within 8 hours after discharge, please contact your surgeon on call. Report the following to your surgeon:  · Excessive pain, swelling, redness or odor of or around the surgical area  · Temperature over 100.5  · Nausea and vomiting lasting longer than 4 hours or if unable to take medications  · Any signs of decreased circulation or nerve impairment to extremity: change in color, persistent  numbness, tingling, coldness or increase pain  · Any questions    What to do at Home:  Recommended activity: Activity as tolerated,     If you experience any of the following symptoms elevated blood pressure, dizziness, weakness, fainting, please follow up with primary care doctors.     *  Please give a list of your current medications to your Primary Care Provider. *  Please update this list whenever your medications are discontinued, doses are      changed, or new medications (including over-the-counter products) are added. *  Please carry medication information at all times in case of emergency situations. These are general instructions for a healthy lifestyle:    No smoking/ No tobacco products/ Avoid exposure to second hand smoke  Surgeon General's Warning:  Quitting smoking now greatly reduces serious risk to your health. Obesity, smoking, and sedentary lifestyle greatly increases your risk for illness    A healthy diet, regular physical exercise & weight monitoring are important for maintaining a healthy lifestyle    You may be retaining fluid if you have a history of heart failure or if you experience any of the following symptoms:  Weight gain of 3 pounds or more overnight or 5 pounds in a week, increased swelling in our hands or feet or shortness of breath while lying flat in bed. Please call your doctor as soon as you notice any of these symptoms; do not wait until your next office visit. Recognize signs and symptoms of STROKE:    F-face looks uneven    A-arms unable to move or move unevenly    S-speech slurred or non-existent    T-time-call 911 as soon as signs and symptoms begin-DO NOT go       Back to bed or wait to see if you get better-TIME IS BRAIN. Warning Signs of HEART ATTACK     Call 911 if you have these symptoms:   Chest discomfort. Most heart attacks involve discomfort in the center of the chest that lasts more than a few minutes, or that goes away and comes back. It can feel like uncomfortable pressure, squeezing, fullness, or pain.  Discomfort in other areas of the upper body. Symptoms can include pain or discomfort in one or both arms, the back, neck, jaw, or stomach.  Shortness of breath with or without chest discomfort.    Other signs may include breaking out in a cold sweat, nausea, or lightheadedness. Don't wait more than five minutes to call 911 - MINUTES MATTER! Fast action can save your life. Calling 911 is almost always the fastest way to get lifesaving treatment. Emergency Medical Services staff can begin treatment when they arrive -- up to an hour sooner than if someone gets to the hospital by car. The discharge information has been reviewed with the patient. The patient verbalized understanding. Discharge medications reviewed with the patient and appropriate educational materials and side effects teaching were provided.   ___________________________________________________________________________________________________________________________________

## 2018-07-21 NOTE — PROGRESS NOTES
The [address] [place] [address] [address] will be notified of this pt's inpatient stay.   [phone]

## 2018-07-21 NOTE — PROGRESS NOTES
Attempted to see patient for PT treatment however patient is currently sleeping and family requests that patient be allowed to rest.  Will re-attempt later in the day.   Gisella Chance, PT

## 2018-07-23 NOTE — HOME CARE
Pt discharged on Sat 7/21/18 , [facility] referral updated and called over to [facility] central intake for processing, [facility] will follow for SN,PT/OT,Aide and labs , pt's son states pt takes HD on Mon-Wed-Fri at 11am-3pm , notified [facility]  [facility]) that pt will need a [facility] visit today for lab draws due today. [facility] will follow. ES BERNABE.

## 2018-08-04 NOTE — ED NOTES
Pt tolerated soap suds enema with scant fecal output during enema. Pt with briefs in place because pt c/o belly pain when she sits up & requested to lay in bed. Son at bedside.

## 2018-08-04 NOTE — ED NOTES
7:57 PM :Pt care assumed from Dr. Jessica Cole , ED provider. Pt complaint(s), current treatment plan, progression and available diagnostic results have been discussed thoroughly. Rounding occurred: yes Intended Disposition: TBD Pending diagnostic reports and/or labs (please list): Reeval 
 
CT ABD: 
IMPRESSION: 
1. Constipation with the entire large intestine packed with stool. Colon is 
largest, 6 cm, at the ascending portion 2. Appearance of multisegment small intestinal wall thickening possibly 
artifactual.. If real, this could be inflammatory or be related to anasarca. 
-Arterial or venous insufficiency can cause this appearance but clinical picture 
does not seem strongly suggestive of this 
-Suggestion of mild SMA, potentially moderate celiac axis 3. Appendix likely identified. Appendicolith suggested. The appendix may be 
slightly enlarged, 8mm. Not a compelling picture, however, for acute 
appendicitis 4. 2 cm loculated fluid collection at superficial right anterior abdominal wall 
(axial 36) which slightly bulges the skin surface. This is new and nonspecific. Indeterminate if it is infected or noninfected 
-Please inspect 5. Anasarca with chest and abdominal effusions 
-Mild ascites, diminished compared 6/18 
-Moderate pericardial effusion, unchanged compared 6/7/2018 
-Incompletely evaluated estimated small/moderate right and small left dependent 
pleural effusions (right is smaller compared 6/18) 6. New right middle lobe parenchymal disease, compared 6/18, incompletely 
evaluated. Groundglass and partially confluent. No generalized pulmonary edema 
-Pneumonia possible. 7. A portion of normal appendix is possibly identified. 8. Mildly low density liver suggests fatty liver 9. Cardiomegaly 1:34 AM The pt had 3 large BM here in the ED and now having relief of sx. I have discussed labs and imaging with the pt and the family.  The pt and family both express that they are ready to be discharged home. Recommends the pt follows up with her PCP. Scribe Attestation Zena Hawkins acting as a scribe for and in the presence of Dr. Claudetta Ebbs. Corbin Manning MD     
August 04, 2018 at 7:58 PM 
    
Provider Attestation:     
I personally performed the services described in the documentation, reviewed the documentation, as recorded by the scribe in my presence, and it accurately and completely records my words and actions. August 04, 2018 at 7:58 PM -  Dr. Claudetta Ebbs.  Corbin Manning MD

## 2018-08-04 NOTE — ED NOTES
Abdominal  Pain I performed a brief evaluation, including history and physical, of the patient here in triage and I have determined that pt will need further treatment and evaluation from the main side ER physician. I have placed initial orders to help in expediting patients care. August 04, 2018 at 4:28 PM - Estelita Wood PA-C Visit Vitals  BP (!) 187/112 (BP 1 Location: Left arm)  Pulse 92  Temp 98.4 °F (36.9 °C)  Resp 20  
 Ht 5' 1\" (1.549 m)  Wt 40.8 kg (90 lb)  SpO2 98%  BMI 17.01 kg/m2

## 2018-08-04 NOTE — ED NOTES
Pt received second soap suds enema. Tolerated well. Pt with pushing out some soap suds while infusing. Clean diaper in place.

## 2018-08-04 NOTE — ED PROVIDER NOTES
EMERGENCY DEPARTMENT HISTORY AND PHYSICAL EXAM 
 
4:57 PM 
 
 
Date: 8/4/2018 Patient Name: Samson Tracy History of Presenting Illness Chief Complaint Patient presents with  Constipation History Provided By: Patient's Son Chief Complaint: Constipation Duration: 2 Weeks Timing:  Acute Location: Gastrointestinal 
Severity: Moderate Modifying Factors: Home treatments with no symptom relief Associated Symptoms: abdominal pain, vomiting Additional History (Context): Samson Tracy is a 56 y.o. female with PMHx of hypertension and kidney disease who presents with moderate acute constipation with an onset of 2 weeks ago. Associated symptoms include abdominal pain and vomiting. Patient received dialysis treatment yesterday. Patient says that home treatments are not helping. PCP: Trish Disla MD 
 
Current Outpatient Prescriptions Medication Sig Dispense Refill  oxyCODONE-acetaminophen (PERCOCET) 5-325 mg per tablet Take 1 Tab by mouth every eight (8) hours as needed. Max Daily Amount: 3 Tabs. (Patient taking differently: Take 1 Tab by mouth every eight (8) hours as needed for Pain.) 6 Tab 0  
 albuterol (PROVENTIL HFA, VENTOLIN HFA, PROAIR HFA) 90 mcg/actuation inhaler Take 2 Puffs by inhalation every four (4) hours as needed for Wheezing. 1 Inhaler 0  
 ethambutol (MYAMBUTOL) 400 mg tablet Take 2 Tabs by mouth every Tuesday Thursday, Saturday. At 5 PM 24 Tab 0  
 hydrALAZINE (APRESOLINE) 100 mg tablet Take 1 Tab by mouth three (3) times daily. 90 Tab 0  
 isoniazid (NYDRAZID) 300 mg tablet Take 1 Tab by mouth daily. On dialysis days, please take medication after dialysis 30 Tab 0  pyrazinamide 500 mg tablet Take 2 Tabs by mouth every Tuesday Thursday, Saturday. At 5 pm  Indications: active tuberculosis 24 Tab 0  
 rifAMPin (RIFADIN) 300 mg capsule Take 2 Caps by mouth daily.  On dialysis days please take this medication after Dialysis 60 Cap 0  
 bisacodyl (DULCOLAX) 5 mg EC tablet Take 1 Tab by mouth daily as needed for Constipation. 10 Tab 0  cloNIDine (CATAPRES) 0.3 mg/24 hr 1 Patch by TransDERmal route every seven (7) days. 4 Patch 0  
 docusate sodium (COLACE) 100 mg capsule Take 1 Cap by mouth two (2) times a day. 60 Cap 0  pyridoxine, vitamin B6, (VITAMIN B-6) 100 mg tablet Take 1 Tab by mouth daily. 30 Tab 0  
 azithromycin (ZITHROMAX) 500 mg tab Take 1 Tab by mouth every Tuesday Thursday, Saturday. At 5 PM 12 Tab 0  
 OTHER Check CBC, CMP, MG on 07/25/18, Results to PCP immediately, Diagnosis- TB 1 Each 0  
 OTHER Incentive Spirometry- use as directed 1 Each 0  
 OTHER Incentive Spirometry- Use as directed 1 Each 0  
 OTHER This is to certify that Angie Mejia [facility] and is still receiving medical care here. 1 Each 0  
 amLODIPine (NORVASC) 10 mg tablet Take 1 Tab by mouth daily. 30 Tab 0  
 aspirin 81 mg chewable tablet Take 1 Tab by mouth daily. 30 Tab 0  
 atorvastatin (LIPITOR) 40 mg tablet Take 1 Tab by mouth nightly. 30 Tab 0  carvedilol (COREG) 25 mg tablet Take 1 Tab by mouth every twelve (12) hours. 60 Tab 0  
 losartan (COZAAR) 100 mg tablet Take 1 Tab by mouth daily. 30 Tab 0  
 melatonin 3 mg tablet Take 1 Tab by mouth nightly as needed. (Patient taking differently: Take 1 Tab by mouth nightly as needed (insomnia). ) 30 Tab 0  
 FLUoxetine (PROZAC) 10 mg capsule Take 1 Cap by mouth daily. 30 Cap 0  
 glycerin, adult, (FLEET GLYCERIN, ADULT,) suppository Insert 1 Suppository into rectum daily. Indications: BOWEL EVACUATION 10 Suppository 0 Past History Past Medical History: 
Past Medical History:  
Diagnosis Date  Chronic kidney disease  ESRD (end stage renal disease) ([facility] [address] TUES-THURS-SAT  Hypercholesteremia  Hypertension  Kidney disease  Vitamin D deficiency Past Surgical History: 
Past Surgical History:  
Procedure Laterality Date  VASCULAR SURGERY PROCEDURE UNLIST Family History: 
History reviewed. No pertinent family history. Social History: 
Social History Substance Use Topics  Smoking status: Never Smoker  Smokeless tobacco: Never Used  Alcohol use No  
 
 
Allergies: Allergies Allergen Reactions  Banana Other (comments) Review of Systems Review of Systems Constitutional: Negative for fever. HENT: Negative for congestion. Respiratory: Negative for cough and shortness of breath. Cardiovascular: Negative for chest pain. Gastrointestinal: Positive for abdominal pain, constipation and vomiting. Musculoskeletal: Negative for back pain. Skin: Negative for rash. Neurological: Negative for light-headedness. All other systems reviewed and are negative. Physical Exam  
 
Visit Vitals  BP (!) 187/112 (BP 1 Location: Left arm)  Pulse 92  Temp 98.4 °F (36.9 °C)  Resp 20  
 Ht 5' 1\" (1.549 m)  Wt 40.8 kg (90 lb)  SpO2 98%  BMI 17.01 kg/m2 Physical Exam  
Constitutional: She is oriented to person, place, and time. She appears well-developed. HENT:  
Head: Normocephalic. Mouth/Throat: Oropharynx is clear and moist.  
Eyes: Pupils are equal, round, and reactive to light. Neck: Normal range of motion. Cardiovascular: Normal rate and normal heart sounds. No murmur heard. Pulmonary/Chest: Effort normal. She has no wheezes. She has no rales. Abdominal: Soft. Bowel sounds are normal. There is tenderness. Positive for mildly and diffusely tenderness to palpation with no peritoneal signs Musculoskeletal: Normal range of motion. She exhibits no edema. Neurological: She is alert and oriented to person, place, and time. Skin: Skin is warm and dry. Nursing note and vitals reviewed. Medical Decision Making I am the first provider for this patient.  
 
I reviewed the vital signs, available nursing notes, past medical history, past surgical history, family history and social history. Vital Signs-Reviewed the patient's vital signs. Pulse Oximetry Analysis -  98% on room air (Normal) Records Reviewed: Nursing Notes (Time of Review: 4:57 PM) ED Course: Progress Notes, Reevaluation, and Consults: 
 
 
Provider Notes (Medical Decision Making):  
 
Procedures:  
 
Core Measures:  
 
 
 
Diagnosis Clinical Impression: No diagnosis found. Disposition:  
 
Follow-up Information None Patient's Medications Start Taking No medications on file Continue Taking ALBUTEROL (PROVENTIL HFA, VENTOLIN HFA, PROAIR HFA) 90 MCG/ACTUATION INHALER    Take 2 Puffs by inhalation every four (4) hours as needed for Wheezing. AMLODIPINE (NORVASC) 10 MG TABLET    Take 1 Tab by mouth daily. ASPIRIN 81 MG CHEWABLE TABLET    Take 1 Tab by mouth daily. ATORVASTATIN (LIPITOR) 40 MG TABLET    Take 1 Tab by mouth nightly. AZITHROMYCIN (ZITHROMAX) 500 MG TAB    Take 1 Tab by mouth every Tuesday Thursday, Saturday. At 5 PM  
 BISACODYL (DULCOLAX) 5 MG EC TABLET    Take 1 Tab by mouth daily as needed for Constipation. CARVEDILOL (COREG) 25 MG TABLET    Take 1 Tab by mouth every twelve (12) hours. CLONIDINE (CATAPRES) 0.3 MG/24 HR    1 Patch by TransDERmal route every seven (7) days. DOCUSATE SODIUM (COLACE) 100 MG CAPSULE    Take 1 Cap by mouth two (2) times a day. ETHAMBUTOL (MYAMBUTOL) 400 MG TABLET    Take 2 Tabs by mouth every Tuesday Thursday, Saturday. At 5 PM  
 FLUOXETINE (PROZAC) 10 MG CAPSULE    Take 1 Cap by mouth daily. GLYCERIN, ADULT, (FLEET GLYCERIN, ADULT,) SUPPOSITORY    Insert 1 Suppository into rectum daily. Indications: BOWEL EVACUATION  
 HYDRALAZINE (APRESOLINE) 100 MG TABLET    Take 1 Tab by mouth three (3) times daily. ISONIAZID (NYDRAZID) 300 MG TABLET    Take 1 Tab by mouth daily. On dialysis days, please take medication after dialysis LOSARTAN (COZAAR) 100 MG TABLET    Take 1 Tab by mouth daily.   
 MELATONIN 3 MG TABLET Take 1 Tab by mouth nightly as needed. OTHER    This is to certify that [facility] Wall[facility] and is still receiving medical care here. OTHER    Check CBC, CMP, MG on 07/25/18, Results to PCP immediately, Diagnosis- TB  
 OTHER    Incentive Spirometry- use as directed OTHER    Incentive Spirometry- Use as directed OXYCODONE-ACETAMINOPHEN (PERCOCET) 5-325 MG PER TABLET    Take 1 Tab by mouth every eight (8) hours as needed. Max Daily Amount: 3 Tabs. PYRAZINAMIDE 500 MG TABLET    Take 2 Tabs by mouth every Tuesday Thursday, Saturday. At 5 pm  Indications: active tuberculosis PYRIDOXINE, VITAMIN B6, (VITAMIN B-6) 100 MG TABLET    Take 1 Tab by mouth daily. RIFAMPIN (RIFADIN) 300 MG CAPSULE    Take 2 Caps by mouth daily. On dialysis days please take this medication after Dialysis These Medications have changed No medications on file Stop Taking No medications on file  
 
_______________________________ Attestations: 
Scribe Attestation Bhavana Mueller acting as a scribe for and in the presence of Mary Phillips MD     
August 04, 2018 at 4:57 PM 
    
Provider Attestation:     
I personally performed the services described in the documentation, reviewed the documentation, as recorded by the scribe in my presence, and it accurately and completely records my words and actions. August 04, 2018 at 4:57 PM - Mary Phillips MD   
_______________________________

## 2018-08-05 NOTE — ED NOTES
CT was called at this time to obtain information about result times. Patient and family updated. Pt is resting in bed in the POC in the left lateral position and has no new complaints. Pt is still refusing medicines at this time. MD notified and will continue to monitor.

## 2018-08-05 NOTE — ED NOTES
Pt. Returned from CT and is resting in bed in the POC with no new complaints. MD notified and will continue to monitor.

## 2018-08-05 NOTE — ED NOTES
phone was used at this time to communicate to the patient, the patient reports having belly pain and states she feels the urge to have a bowel movement, but is not able to go. Pt states her tummy hurts. Pt was notified that the MD ordered an IV with blood work and morphine and zofran to help with the belly pain, pt was also notified that the MD ordered an CXRY and a CT of the abdomen. Pt was asked if that was okay and she stated yes and nodded. Pt also was asked at this time if she had any allergies to medications and she stated no and nodded no. MD notified and will continue to monitor.  name is Dyana Lozada and his number is [phone].

## 2018-08-05 NOTE — ED NOTES
Pt. Reassessed at this time and was given Mag Citrate. MD advised to wait like 45mins to 1 hour and if no BM then give the Suds Enema. Patient and family advised of the situation. Will continue to monitor.

## 2018-08-05 NOTE — ED NOTES
Patient family at bedside at this time and the exams and tests were explained to the patients family and they explained also to the patient. The patient and family stated they did not want medications, but are okay with blood, xray, and CT scans and to hold off on pain medications at this time.

## 2018-08-05 NOTE — ED NOTES
Pt. Was reassessed and observed wincing and grabbing her belly. Pt. Speaks Surinamese only and is unable to communicate effectually to the nurse who is [address] speaking. MD notified of patients presentation and he stated he wanted to do a work up on her and will be placing orders. Will continue to monitor.

## 2018-08-05 NOTE — ED NOTES
Enema was performed after giving Mag Citrate with no MD, Pt tolerated the Suds Enema well and was cleaned and provided with wendy care. Pt was given fresh linen and warm blankets. Pt has no current complaints and is resting in the POC. MD notified and will continue to monitor.

## 2018-08-05 NOTE — DISCHARGE INSTRUCTIONS
Constipation: Care Instructions  Your Care Instructions    Constipation means that you have a hard time passing stools (bowel movements). People pass stools from 3 times a day to once every 3 days. What is normal for you may be different. Constipation may occur with pain in the rectum and cramping. The pain may get worse when you try to pass stools. Sometimes there are small amounts of bright red blood on toilet paper or the surface of stools. This is because of enlarged veins near the rectum (hemorrhoids). A few changes in your diet and lifestyle may help you avoid ongoing constipation. Your doctor may also prescribe medicine to help loosen your stool. Some medicines can cause constipation. These include pain medicines and antidepressants. Tell your doctor about all the medicines you take. Your doctor may want to make a medicine change to ease your symptoms. Follow-up care is a key part of your treatment and safety. Be sure to make and go to all appointments, and call your doctor if you are having problems. It's also a good idea to know your test results and keep a list of the medicines you take. How can you care for yourself at home? · Drink plenty of fluids, enough so that your urine is light yellow or clear like water. If you have kidney, heart, or liver disease and have to limit fluids, talk with your doctor before you increase the amount of fluids you drink. · Include high-fiber foods in your diet each day. These include fruits, vegetables, beans, and whole grains. · Get at least 30 minutes of exercise on most days of the week. Walking is a good choice. You also may want to do other activities, such as running, swimming, cycling, or playing tennis or team sports. · Take a fiber supplement, such as Citrucel or Metamucil, every day. Read and follow all instructions on the label. · Schedule time each day for a bowel movement. A daily routine may help.  Take your time having your bowel movement. · Support your feet with a small step stool when you sit on the toilet. This helps flex your hips and places your pelvis in a squatting position. · Your doctor may recommend an over-the-counter laxative to relieve your constipation. Examples are Milk of Magnesia and MiraLax. Read and follow all instructions on the label. Do not use laxatives on a long-term basis. When should you call for help? Call your doctor now or seek immediate medical care if:    · You have new or worse belly pain.     · You have new or worse nausea or vomiting.     · You have blood in your stools.    Watch closely for changes in your health, and be sure to contact your doctor if:    · Your constipation is getting worse.     · You do not get better as expected. Where can you learn more? Go to http://cristino-anne.info/. Enter 21 [phone] in the search box to learn more about \"Constipation: Care Instructions. \"  Current as of: November 20, 2017  Content Version: 11.7  © 0164-5305 [employer]. Care instructions adapted under license by [employer] (which disclaims liability or warranty for this information). If you have questions about a medical condition or this instruction, always ask your healthcare professional. [address] any warranty or liability for your use of this information.

## 2018-08-06 NOTE — DISCHARGE SUMMARY
Celestina Deleon  MR#: [id number]  : 1960  ACCOUNT #: [de-identified]   ADMIT DATE: 2018  DISCHARGE DATE: 2018    DIAGNOSES:  1. Hypertensive emergency at the time of admission which has improved. 2.  Seizure episode in the setting of hypertension. 3.  Mycobacterium avium complex in sputum. 4.  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 5.  End-stage renal disease. 6.  Dyslipidemia. 7.  Underweight. HOSPITAL COURSE:  This is a 51-year-old female who presented to the ED with some chest pain and vomiting. The patient was evaluated. The patient was noted to have a hypertensive crisis. The patient was admitted to ICU. The patient was started on IV blood pressure medications. Blood pressure was monitored. Nephrology was also consulted. The patient had some seizures which were felt to be likely secondary to the hypertensive emergency. Given the history of TB, Infectious Disease was also consulted. Antiemetics were given. The patient was hemodialyzed by Nephrology. Neurology was also consulted. EEG was done. The patient's nausea and vomiting showed improvement. The patient was started on p.o. medications. MRI of the brain was also done. The patient was transferred out of ICU. Hemodialysis was continued. Infectious Disease made recommendations for the Mycobacterium avium complex in the sputum. PT, OT were consulted. The patient and her family both declined skilled nursing placement for the patient. Home health care was requested. Case management was involved. I have asked case management to notify the Health Department. Nutrition Services also saw the patient. The patient was tolerating p.o. intake. The patient wished to go home. On the day of discharge, I discussed with patient and her family regarding the discharge plans at length.   I also discussed with the Infectious Disease consultant,  Htwe.  The patient was hemodynamically stable. The patient was cleared for discharge. The patient was advised to follow up with PCP in 1 week and Pulmonary in 2 weeks and Neurology in 2 weeks and with [facility] in 1 week and to continue hemodialysis as per Nephrology. DISCHARGE MEDICATIONS:  1. Albuterol 2 puffs inhaled every 4 hours as needed. 2.  Ethambutol 400 mg tablet. Take 2 tablets p.o. every Tuesday, Thursday, and Saturday at 5:00 p.m. 3.  Hydralazine 100 mg p.o. 3 times daily. 4.  Isoniazid 300 mg 1 tablet p.o. daily. On dialysis days, please take this medication after dialysis. 5.  Pyrazinamide 500 mg tablets. Take 2 tablets p.o. every Tuesday, Thursday, and Saturday at 5:00 p.m.  6.  Rifampin 300 mg capsule, take 2 capsules p.o. daily. On dialysis days, please take this medication after dialysis. 7.  Catapres 0.3 mg patch applied transdermally every 7 days. 8.  Colace 100 mg p.o. twice daily. 9.  Pepcid 20 mg p.o. daily for the next 10 days. 10.  Percocet 5/325 1 tablet p.o. every 8 hours p.r.n. 11.  Pyridoxine 100 mg tablet 1 tablet p.o. daily. 12.  Azithromycin 500 mg tablet 1 tablet every Tuesday, Thursday, and Saturday at 5:00 p.m.  13.  Check a CBC, CMP, magnesium on 07/24/2018, results to PCP immediately. 14.  Incentive spirometry, use as directed. 15.  Dulcolax 5 mg tablet p.o. daily as needed for constipation. 16.  Amlodipine 10 mg p.o. daily. 17.  Aspirin 81 mg p.o. daily. 18.  Lipitor 40 mg p.o. daily. 19.  Coreg 25 mg p.o. twice daily. 20.  Cozaar 100 mg p.o. daily. 21.  Melatonin 3 mg p.o. at bedtime p.r.n.  22.  Prozac 10 mg p.o. daily. 23.  Glycerin suppository daily as needed for constipation per rectum. Discharge plan was discussed with the patient and family. Case management was involved. Home health care was arranged. DISPOSITION:  Home with home health care. Patient is hemodynamically stable.     Total time for the discharge was more than 35 minutes.       Aida Sun MD       VT/SN  D: 08/05/2018 17:56     T: 08/06/2018 13:44  JOB #: [id number]

## 2018-08-22 NOTE — IP AVS SNAPSHOT
[address] Patient: Lynsey Avery MRN: [id number] :1960 A check catherine indicates which time of day the medication should be taken. My Medications START taking these medications Instructions Each Dose to Equal  
 Morning Noon Evening Bedtime  
 minoxidil 2.5 mg tablet Commonly known as:  Valeri Gonzalez Start taking on:  2018 Your last dose was: Your next dose is: Take 1 Tab by mouth daily. 2.5 mg  
    
   
   
   
  
 ondansetron 4 mg disintegrating tablet Commonly known as:  ZOFRAN ODT Your last dose was: Your next dose is: Take 1 Tab by mouth every eight (8) hours as needed for Nausea. 4 mg CHANGE how you take these medications Instructions Each Dose to Equal  
 Morning Noon Evening Bedtime  
 melatonin 3 mg tablet What changed:  reasons to take this Your last dose was: Your next dose is: Take 1 Tab by mouth nightly as needed. 3 mg  
    
   
   
   
  
 oxyCODONE-acetaminophen 5-325 mg per tablet Commonly known as:  PERCOCET What changed:  reasons to take this Your last dose was: Your next dose is: Take 1 Tab by mouth every eight (8) hours as needed. Max Daily Amount: 3 Tabs. 1 Tab CONTINUE taking these medications Instructions Each Dose to Equal  
 Morning Noon Evening Bedtime  
 albuterol 90 mcg/actuation inhaler Commonly known as:  PROVENTIL HFA, VENTOLIN HFA, PROAIR HFA Your last dose was: Your next dose is: Take 2 Puffs by inhalation every four (4) hours as needed for Wheezing. 2 Puff  
    
   
   
   
  
 amLODIPine 10 mg tablet Commonly known as:  Domingo Davila Your last dose was: Your next dose is: Take 1 Tab by mouth daily.   
 10 mg  
    
   
 aspirin 81 mg chewable tablet Your last dose was: Your next dose is: Take 1 Tab by mouth daily. 81 mg  
    
   
   
   
  
 atorvastatin 40 mg tablet Commonly known as:  LIPITOR Your last dose was: Your next dose is: Take 1 Tab by mouth nightly. 40 mg  
    
   
   
   
  
 azithromycin 500 mg Tab Commonly known as:  Suyapa Asim Your last dose was: Your next dose is: Take 1 Tab by mouth every Tuesday Thursday, Saturday. At 5 PM  
 500 mg  
    
   
   
   
  
 bisacodyl 5 mg EC tablet Commonly known as:  DULCOLAX Your last dose was: Your next dose is: Take 1 Tab by mouth daily as needed for Constipation. 5 mg  
    
   
   
   
  
 carvedilol 25 mg tablet Commonly known as:  Jadon Dominic Your last dose was: Your next dose is: Take 1 Tab by mouth every twelve (12) hours. 25 mg  
    
   
   
   
  
 cloNIDine 0.3 mg/24 hr  
Commonly known as:  CATAPRES Your last dose was: Your next dose is:    
   
   
 1 Patch by TransDERmal route every seven (7) days. 1 Patch  
    
   
   
   
  
 docusate sodium 100 mg capsule Commonly known as:  Chaim Van Your last dose was: Your next dose is: Take 1 Cap by mouth two (2) times a day. 100 mg  
    
   
   
   
  
 ethambutol 400 mg tablet Commonly known as:  MYAMBUTOL Your last dose was: Your next dose is: Take 2 Tabs by mouth every Tuesday Thursday, Saturday. At 5 PM  
 800 mg FLUoxetine 10 mg capsule Commonly known as:  PROzac Your last dose was: Your next dose is: Take 1 Cap by mouth daily. 10 mg  
    
   
   
   
  
 glycerin (adult) suppository Commonly known as:  FLEET GLYCERIN (ADULT) Your last dose was: Your next dose is: Insert 1 Suppository into rectum daily. Indications: BOWEL EVACUATION  
 1 Suppository  
    
   
   
   
  
 isoniazid 300 mg tablet Commonly known as:  NYDRAZID Your last dose was: Your next dose is: Take 1 Tab by mouth daily. On dialysis days, please take medication after dialysis 300 mg  
    
   
   
   
  
 losartan 100 mg tablet Commonly known as:  COZAAR Your last dose was: Your next dose is: Take 1 Tab by mouth daily. 100 mg  
    
   
   
   
  
 magnesium citrate solution Your last dose was: Your next dose is: Take 1/3 of bottle every 8 hours until finished or until you have a bowel movement. OTHER Your last dose was: Your next dose is: This is to certify that Marisa Orozco was admitted to [facility] on 07/16/18 and is still receiving medical care here. OTHER Your last dose was: Your next dose is:    
   
   
 Check CBC, CMP, MG on 07/25/18, Results to PCP immediately, Diagnosis- TB  
     
   
   
   
  
 OTHER Your last dose was: Your next dose is:    
   
   
 Incentive Spirometry- use as directed OTHER Your last dose was: Your next dose is:    
   
   
 Incentive Spirometry- Use as directed  
     
   
   
   
  
 pyrazinamide 500 mg tablet Your last dose was: Your next dose is: Take 2 Tabs by mouth every Tuesday Thursday, Saturday. At 5 pm  Indications: active tuberculosis 1000 mg  
    
   
   
   
  
 pyridoxine (vitamin B6) 100 mg tablet Commonly known as:  VITAMIN B-6 Your last dose was: Your next dose is: Take 1 Tab by mouth daily. 100 mg  
    
   
   
   
  
 rifAMPin 300 mg capsule Commonly known as:  RIFADIN Your last dose was: Your next dose is: Take 2 Caps by mouth daily. On dialysis days please take this medication after Dialysis 600 mg  
    
   
   
   
  
  
STOP taking these medications   
 hydrALAZINE 100 mg tablet Commonly known as:  APRESOLINE Where to Get Your Medications Information on where to get these meds will be given to you by the nurse or doctor. ! Ask your nurse or doctor about these medications  
  minoxidil 2.5 mg tablet  
 ondansetron 4 mg disintegrating tablet

## 2018-08-22 NOTE — PROGRESS NOTES
conducted an initial consultation and Spiritual Assessment for Lucy Bustillo, who is a 56 y.o.,female. Patients Primary Language is: [place]. According to the patients EMR Yazidism Affiliation is: Lutheran. The reason the Patient came to the hospital is:   Patient Active Problem List    Diagnosis Date Noted    Intractable vomiting with nausea 07/17/2018    ESRD on hemodialysis ([facility] 07/17/2018    Pulmonary TB 07/17/2018    Hypertensive crisis 07/17/2018    [facility] 07/16/2018    Hypertensive emergency [id number]    Complication of vascular dialysis catheter, initial encounter 06/18/2018    Anemia 02/26/2018    Hyponatremia 02/25/2018    ESRD (end stage renal disease) ([facility] 02/25/2018    NSTEMI (non-ST elevated myocardial infarction) ([facility] 02/25/2018    Respiratory failure ([facility] 02/25/2018    Hypoxia 02/25/2018    Acute UTI 02/25/2018    Hyperkalemia 09/08/2017    Renal failure 09/08/2017    SCOOTER (acute kidney injury) ([facility] 09/08/2017        The  provided the following Interventions:  Initiated a relationship of care and support. Explored issues of prashanth, belief, spirituality and Faith/ritual needs while hospitalized. Listened empathically. Provided information about Spiritual Care Services. Offered prayer and assurance of continued prayers on patient's behalf. Chart reviewed. The following outcomes where achieved:   confirmed Patient's Yazidism Affiliation. Provided support for the family. Assessment:  Patient does not have any Faith/cultural needs that will affect patients preferences in health care. There are no spiritual or Faith issues which require intervention at this time. Plan:  Chaplains will continue to follow and will provide pastoral care on an as needed/requested basis.  recommends bedside caregivers page  on duty if patient shows signs of acute spiritual or emotional distress.     Bang Cobian Ceasar/Dayna Toribio  Spiritual Care  ([phone])

## 2018-08-22 NOTE — ED NOTES
Spoke with Dialysis nurse regarding patient's condition and transferring patient to ICU. Dialysis nurse is stopping treatment and patient will be sent to ICU for resumption of hypertensive meds. Spoke with Sandra Barreto ICU nurse for report and she will be picking up patient from dialysis.

## 2018-08-22 NOTE — ED NOTES
MD aware of Dialysis dept requesting for patient for treatment. MD would like CT scan completed and patient to go to dialysis from CT. Patient's BP remaining systolic in 236'D, MD aware and new orders. MD made aware of Cardene drip not started due to patient going to CT scan.

## 2018-08-22 NOTE — CONSULTS
[facility]  Pulmonary, Critical Care, and Sleep Medicine      Name: Don Perdue MRN: [id number]   : 1960 Hospital: [facility]   Date: 2018        Critical Care Initial Patient Consult    Requesting MD: Dr. Lefty Barillas                                   Reason for CC Consult: Hypertensive Emergency   IMPRESSION:   · Hypertensive Emergency- /144 upon arrival, given IV labetalol  · Left-sided upper extremity weakness- no acute CVA on CT, symptoms started > 4.5 hrs ago   · Acute on Chronic Encephalopathy- secondary to above    · Nausea/Vomiting- may be sided effect of TB meds, LFTs-okay, lipase mildly elevated, CTA Chest/ABD/Pelvis-pending, constipation? 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 · ESRD, MWF, requiring dialysis  · Mild Elevated Troponin - 0.17, likely due to ESRD and demand   · Hx NSTEMI   · Hx of Tonic/Clonic seizure  · Non english speaking ([place])      RECOMMENDATIONS:   · Resp -  Titrate oxygen to maintain oxygen saturation > 93%. Aspiration Precautions. HOB > 30 degrees. CTA Chest/Abd/Pelvis- pending read. · ID - Trend Temp and WBC Curve. Continue home TB regimen ethambutol, isoniazid, pyrazinamide, rifampin, and Vit B6.   · CVS - Monitor Hemodynamics. Titrate cardene gtt to maintain -180. Restart statin, Hydralazine, Coreg tonight. Norvasc,asprin, and losartan in AM. Repeat Echo. Check Lipids. · Heme/Onc- Monitor H&H and platelets. Daily CBC. · Metabolic - Trend lytes and replace per provider given renal disease. · Renal - Nephrology Consulted. · Endocrine - BS q 6 hrs, Avoid Hypoglycemia. · Neuro/ Pain/ Sedation - Avoid Sedating medications. Neuro checks q 1 hr per Tele neuro.  Carotid U/S, EEG, and MRI Head recommended per Tele Neuro. · GI - Renal Diet, IVF. CTA Chest/Abd/Pelvis- pending read. · Prophylaxis - DVT(SCDs, SubQ Heparin), GI(None Indicated)  · Full Code     Subjective/History: This patient has been seen and evaluated at the request of Dr. Pa Otero for Hypertensive Emergency. Betty Mejia is a 56 y.o. female c pmhx of HTN, HLD, ESRD on HD who presented to the [facility] ER with left-sided weakness and nausea/vomiting that started three days ago. Pt speaks [place] only. Per Chart review, son stated that the pt has been lethargic, barely able to move with unilateral weakness. Pt was recently admitted to [facility] with similar symptoms in hypertensive emergency at the time. Upon arrival to the ER, pt's /144  Temp 97.7 saturating 98% on RA. Pt started complaining of chest pain and HA while in the ER. CT Head showed chronic microvascular changes, Tele neuro consulted saying symptoms likely secondary to hypertensive encephalopathy. Pt given nitro, zofran, broad spectrum ABX, and labetalol while in the ER with slight improvement of -200. CTA Chest/Abd/Pelvis performed while in ER, pending final results. Pt transferred to ICU and currently receiving urgent hemodialysis on cardene gtt. Past Medical History:   Diagnosis Date    Chronic kidney disease     ESRD (end stage renal disease) ([facility] [address]     TUES-THURS-SAT    Hypercholesteremia     Hypertension     Kidney disease     Vitamin D deficiency       Past Surgical History:   Procedure Laterality Date    VASCULAR SURGERY PROCEDURE UNLIST        Prior to Admission medications    Medication Sig Start Date End Date Taking? Authorizing Provider   bisacodyl (DULCOLAX) 5 mg EC tablet Take 1 Tab by mouth daily as needed for Constipation. 8/5/18   Etta Louise MD   magnesium citrate solution Take 1/3 of bottle every 8 hours until finished or until you have a bowel movement.  8/5/18   Etta Louise MD   albuterol (PROVENTIL HFA, VENTOLIN HFA, PROAIR HFA) 90 mcg/actuation inhaler Take 2 Puffs by inhalation every four (4) hours as needed for Wheezing. 7/21/18   Brissa Lewis MD   ethambutol (MYAMBUTOL) 400 mg tablet Take 2 Tabs by mouth every Tuesday Thursday, Saturday. At 5 PM 7/21/18   Brissa Lewis MD   hydrALAZINE (APRESOLINE) 100 mg tablet Take 1 Tab by mouth three (3) times daily. 7/21/18   Brissa Lewis MD   isoniazid (NYDRAZID) 300 mg tablet Take 1 Tab by mouth daily. On dialysis days, please take medication after dialysis 7/21/18   Brissa Lewis MD   pyrazinamide 500 mg tablet Take 2 Tabs by mouth every Tuesday Thursday, Saturday. At 5 pm  Indications: active tuberculosis 7/24/18   Brissa Lewis MD   rifAMPin (RIFADIN) 300 mg capsule Take 2 Caps by mouth daily. On dialysis days please take this medication after Dialysis 7/21/18   Brissa Lewis MD   cloNIDine (CATAPRES) 0.3 mg/24 hr 1 Patch by TransDERmal route every seven (7) days. 7/23/18   Brissa Lewis MD   docusate sodium (COLACE) 100 mg capsule Take 1 Cap by mouth two (2) times a day. 7/21/18   Brissa Lewis MD   oxyCODONE-acetaminophen (PERCOCET) 5-325 mg per tablet Take 1 Tab by mouth every eight (8) hours as needed. Max Daily Amount: 3 Tabs. Patient taking differently: Take 1 Tab by mouth every eight (8) hours as needed for Pain. 7/21/18   Brissa Lewis MD   pyridoxine, vitamin B6, (VITAMIN B-6) 100 mg tablet Take 1 Tab by mouth daily. 7/22/18   Brissa Lewis MD   azithromycin (ZITHROMAX) 500 mg tab Take 1 Tab by mouth every Tuesday Thursday, Saturday.  At 5 PM 7/21/18   Brissa Lewis MD   OTHER Check CBC, CMP, MG on 07/25/18, Results to PCP immediately, Diagnosis- TB 7/21/18   Brissa Lewis MD   OTHER Incentive Spirometry- use as directed 7/21/18   Brissa Lewsi MD   OTHER Incentive Spirometry- Use as directed 7/21/18   Brissa Lewis MD   OTHER This is to certify that Elizabeth Mendoza was admitted to [facility] on 07/16/18 and is still receiving medical care here. 7/20/18   Anabela Blanco MD   amLODIPine (NORVASC) 10 mg tablet Take 1 Tab by mouth daily. 3/8/18   Flori Martinez MD   aspirin 81 mg chewable tablet Take 1 Tab by mouth daily. 3/8/18   Flori Martinez MD   atorvastatin (LIPITOR) 40 mg tablet Take 1 Tab by mouth nightly. 3/8/18   Flori Martinez MD   carvedilol (COREG) 25 mg tablet Take 1 Tab by mouth every twelve (12) hours. 3/8/18   Flori Martinez MD   losartan (COZAAR) 100 mg tablet Take 1 Tab by mouth daily. 3/8/18   Flori Martinez MD   melatonin 3 mg tablet Take 1 Tab by mouth nightly as needed. Patient taking differently: Take 1 Tab by mouth nightly as needed (insomnia). 3/8/18   Flori Martinez MD   FLUoxetine (PROZAC) 10 mg capsule Take 1 Cap by mouth daily. 3/8/18   Flori Martinez MD   glycerin, adult, (FLEET GLYCERIN, ADULT,) suppository Insert 1 Suppository into rectum daily.  Indications: BOWEL EVACUATION 2/5/18   Fer Ramirez MD     Current Facility-Administered Medications   Medication Dose Route Frequency    vancomycin (VANCOCIN) 1,000 mg in 0.9% sodium chloride (MBP/ADV) 250 mL adv  1,000 mg IntraVENous ONCE    piperacillin-tazobactam (ZOSYN) 2.25 g in 0.9% sodium chloride (MBP/ADV) 50 mL ADV  2.25 g IntraVENous Q12H    levoFLOXacin (LEVAQUIN) 750 mg in D5W IVPB  750 mg IntraVENous ONCE    Followed by   Indu Castillo ON 8/24/2018] levoFLOXacin (LEVAQUIN) 500 mg in D5W IVPB  500 mg IntraVENous Q48H    VANCOMYCIN INFORMATION NOTE   Other Rx Dosing/Monitoring    Piperacillin-tazobactam (ZOSYN) 0.75 gm Supplemental Dosing by Pharmacy   Other Rx Dosing/Monitoring    morphine injection 2 mg  2 mg IntraVENous NOW    aspirin (ASA) suppository 300 mg  300 mg Rectal DAILY    niCARdipine (CARDENE) 25 mg in 0.9% sodium chloride 250 mL infusion  0-15 mg/hr IntraVENous TITRATE    hydrALAZINE (APRESOLINE) tablet 100 mg  100 mg Oral TID    cloNIDine (CATAPRES) 0.3 mg/24 hr patch 1 Patch  1 Patch TransDERmal Q7D    carvedilol (COREG) tablet 25 mg  25 mg Oral Q12H    [START ON 2018] amLODIPine (NORVASC) tablet 10 mg  10 mg Oral DAILY     Allergies   Allergen Reactions    Banana Other (comments)      Social History   Substance Use Topics    Smoking status: Never Smoker    Smokeless tobacco: Never Used    Alcohol use No      No family history on file. Review of Systems:  Review of systems not obtained due to patient factors. Objective:   Vital Signs:    Visit Vitals    BP (!) 197/115    Pulse 90    Temp 97.7 °F (36.5 °C)    Resp 28    Wt 40.8 kg (90 lb)    SpO2 99%    BMI 17.01 kg/m2       O2 Device: Room air       Temp (24hrs), Av.7 °F (36.5 °C), Min:97.7 °F (36.5 °C), Max:97.7 °F (36.5 °C)       Intake/Output:   Last shift:         Last 3 shifts:    No intake or output data in the 24 hours ending 18 1533    Physical Exam:  General:  Alert, not cooperative with exam, not responding to questions with  phone   Head:  Normocephalic, without obvious abnormality, atraumatic. Eyes:  Conjunctivae/corneas clear. EOMs intact. Nose: Nares normal. Septum midline. Mucosa normal.    Throat: Unable to assess, pt would not open mouth. Neck: Supple, symmetrical, trachea midline, mild JVD. Lungs:   Symmetric rise and fall of chest without increased WOB. Soft inspiratory bibasilar rales R > L. Chest wall:  No tenderness or deformity. Heart:  Regular rate and rhythm, S1, S2 normal, no murmur   Abdomen:   Soft, non-tender. Bowel sounds normal. No masses,  No organomegaly. Extremities: Extremities normal, atraumatic, no cyanosis or edema. Pulses: 2+ and symmetric all extremities.    Skin: Skin color, texture, turgor normal. No rashes or lesions   Neurologic: Grossly nonfocal     Data:     Recent Results (from the past 24 hour(s))   EKG, 12 LEAD, INITIAL    Collection Time: 18 11:30 AM   Result Value Ref Range    Ventricular Rate 110 BPM    Atrial Rate 110 BPM    P-R Interval 162 ms    QRS Duration 88 ms    Q-T Interval 424 ms    QTC Calculation (Bezet) 573 ms    Calculated P Axis 63 degrees    Calculated R Axis 116 degrees    Calculated T Axis 52 degrees    Diagnosis       Sinus tachycardia  Left posterior fascicular block  Nonspecific ST and T wave abnormality  Prolonged QT  Abnormal ECG  When compared with ECG of 17-JUL-2018 03:08,  ST no longer depressed in Lateral leads  Nonspecific T wave abnormality now evident in Lateral leads     CBC WITH AUTOMATED DIFF    Collection Time: 08/22/18 11:37 AM   Result Value Ref Range    WBC 4.7 4.6 - 13.2 K/uL    RBC 4.38 4.20 - 5.30 M/uL    HGB 14.1 12.0 - 16.0 g/dL    HCT 39.9 35.0 - 45.0 %    MCV 91.1 74.0 - 97.0 FL    MCH 32.2 24.0 - 34.0 PG    MCHC 35.3 31.0 - 37.0 g/dL    RDW 12.5 11.6 - 14.5 %    PLATELET 808 (L) 030 - 420 K/uL    MPV 9.4 9.2 - 11.8 FL    NEUTROPHILS 71 40 - 73 %    LYMPHOCYTES 12 (L) 21 - 52 %    MONOCYTES 14 (H) 3 - 10 %    EOSINOPHILS 1 0 - 5 %    BASOPHILS 2 0 - 2 %    ABS. NEUTROPHILS 3.4 1.8 - 8.0 K/UL    ABS. LYMPHOCYTES 0.6 (L) 0.9 - 3.6 K/UL    ABS. MONOCYTES 0.7 0.05 - 1.2 K/UL    ABS. EOSINOPHILS 0.0 0.0 - 0.4 K/UL    ABS. BASOPHILS 0.1 0.0 - 0.1 K/UL    DF AUTOMATED     METABOLIC PANEL, COMPREHENSIVE    Collection Time: 08/22/18 11:37 AM   Result Value Ref Range    Sodium 134 (L) 136 - 145 mmol/L    Potassium 4.5 3.5 - 5.5 mmol/L    Chloride 96 (L) 100 - 108 mmol/L    CO2 27 21 - 32 mmol/L    Anion gap 11 3.0 - 18 mmol/L    Glucose 115 (H) 74 - 99 mg/dL    BUN 46 (H) 7.0 - 18 MG/DL    Creatinine 5.56 (H) 0.6 - 1.3 MG/DL    BUN/Creatinine ratio 8 (L) 12 - 20      GFR est AA 10 (L) >60 ml/min/1.73m2    GFR est non-AA 8 (L) >60 ml/min/1.73m2    Calcium 9.5 8.5 - 10.1 MG/DL    Bilirubin, total 0.6 0.2 - 1.0 MG/DL    ALT (SGPT) 9 (L) 13 - 56 U/L    AST (SGOT) 36 15 - 37 U/L    Alk.  phosphatase 109 45 - 117 U/L    Protein, total 6.8 6.4 - 8.2 g/dL    Albumin 3.1 (L) 3.4 - 5.0 g/dL    Globulin 3.7 2.0 - 4.0 g/dL    A-G Ratio 0.8 0.8 - 1.7 MAGNESIUM    Collection Time: 08/22/18 11:37 AM   Result Value Ref Range    Magnesium 3.2 (H) 1.6 - 2.6 mg/dL   CARDIAC PANEL,(CK, CKMB & TROPONIN)    Collection Time: 08/22/18 11:37 AM   Result Value Ref Range     26 - 192 U/L    CK - MB 1.2 <3.6 ng/ml    CK-MB Index 0.7 0.0 - 4.0 %    Troponin-I, Qt. 0.17 (H) 0.0 - 0.045 NG/ML   PROTHROMBIN TIME + INR    Collection Time: 08/22/18 11:37 AM   Result Value Ref Range    Prothrombin time 11.8 11.5 - 15.2 sec    INR 0.9 0.8 - 1.2     PTT    Collection Time: 08/22/18 11:37 AM   Result Value Ref Range    aPTT 33.3 23.0 - 36.4 SEC   TSH 3RD GENERATION    Collection Time: 08/22/18 11:37 AM   Result Value Ref Range    TSH 5.03 (H) 0.36 - 3.74 uIU/mL   LIPASE    Collection Time: 08/22/18 11:37 AM   Result Value Ref Range    Lipase 544 (H) 73 - 393 U/L   GLUCOSE, POC    Collection Time: 08/22/18 11:40 AM   Result Value Ref Range    Glucose (POC) 104 70 - 110 mg/dL   POC LACTIC ACID    Collection Time: 08/22/18 11:41 AM   Result Value Ref Range    Lactic Acid (POC) 0.8 0.4 - 2.0 mmol/L           Telemetry:normal sinus rhythm    Imaging:  I have personally reviewed the patients radiographs and have reviewed the reports:  CT Head 08/22/18: Similar symmetric pattern of bilateral thalamic, cerebellar and diffuse  brainstem hypoattenuation. Consider MRI for better characterization and  follow-up of these lesions. Patient is again hypertensive and this could reflect  an atypical pattern of acute hypertensive encephalopathy. The differential could  include infectious, metabolic or toxic encephalopathies. Stable pattern of extensive cerebral white matter lucency which likely reflects  small vessel ischemic change. No acute hemorrhage or major vascular territorial infarction.       Signed By: Julia Ambrosio PA-C     August 22, 2018 5:46 PM     Total critical care time exclusive of procedures: 60 minutes

## 2018-08-22 NOTE — PROGRESS NOTES
Consult requested by: Dr. Shanika Garduno is a 56 y.o. female OTHER who is being seen on consult for ESRD management. Chief Complaint   Patient presents with    Fatigue    Abdominal Pain    Vomiting     Admission diagnosis: fatigue    HPI: 51y F with PMH ESRD, MWF presented to ER with complain of weakness and severe hypertension. She was evaluated by stroke team. She is currently admitted to ICU for further care. She is currently under treatment for tuberculosis. During my eval, she appears comfortable. NO short of breath. Due to language barrier , communication is difficult. Patient goes to dialysis unit at [facility] on MWF schedule. Past Medical History:   Diagnosis Date    Chronic kidney disease     ESRD (end stage renal disease) ([facility] [address]     TUES-THURS-SAT    Hypercholesteremia     Hypertension     Kidney disease     Vitamin D deficiency       Past Surgical History:   Procedure Laterality Date    VASCULAR SURGERY PROCEDURE UNLIST         Social History     Social History    Marital status:      Spouse name: N/A    Number of children: N/A    Years of education: N/A     Occupational History    Not on file. Social History Main Topics    Smoking status: Never Smoker    Smokeless tobacco: Never Used    Alcohol use No    Drug use: No    Sexual activity: Not on file     Other Topics Concern    Not on file     Social History Narrative       No family history on file. Allergies   Allergen Reactions    Banana Other (comments)        Home Medications:     Prior to Admission Medications   Prescriptions Last Dose Informant Patient Reported? Taking? FLUoxetine (PROZAC) 10 mg capsule   No No   Sig: Take 1 Cap by mouth daily. OTHER   No No   Sig: This is to certify that Louis Fuller was admitted to [facility] on 07/16/18 and is still receiving medical care here.    OTHER   No No   Sig: Check CBC, CMP, MG on 07/25/18, Results to PCP immediately, Diagnosis- TB   OTHER   No No   Sig: Incentive Spirometry- use as directed   OTHER   No No   Sig: Incentive Spirometry- Use as directed   albuterol (PROVENTIL HFA, VENTOLIN HFA, PROAIR HFA) 90 mcg/actuation inhaler   No No   Sig: Take 2 Puffs by inhalation every four (4) hours as needed for Wheezing. amLODIPine (NORVASC) 10 mg tablet   No No   Sig: Take 1 Tab by mouth daily. aspirin 81 mg chewable tablet   No No   Sig: Take 1 Tab by mouth daily. atorvastatin (LIPITOR) 40 mg tablet   No No   Sig: Take 1 Tab by mouth nightly. azithromycin (ZITHROMAX) 500 mg tab   No No   Sig: Take 1 Tab by mouth every , Saturday. At 5 PM   bisacodyl (DULCOLAX) 5 mg EC tablet   No No   Sig: Take 1 Tab by mouth daily as needed for Constipation. carvedilol (COREG) 25 mg tablet   No No   Sig: Take 1 Tab by mouth every twelve (12) hours. cloNIDine (CATAPRES) 0.3 mg/24 hr   No No   Si Patch by TransDERmal route every seven (7) days. docusate sodium (COLACE) 100 mg capsule   No No   Sig: Take 1 Cap by mouth two (2) times a day.   ethambutol (MYAMBUTOL) 400 mg tablet   No No   Sig: Take 2 Tabs by mouth every , Saturday. At 5 PM   glycerin, adult, (FLEET GLYCERIN, ADULT,) suppository   No No   Sig: Insert 1 Suppository into rectum daily. Indications: BOWEL EVACUATION   hydrALAZINE (APRESOLINE) 100 mg tablet   No No   Sig: Take 1 Tab by mouth three (3) times daily. isoniazid (NYDRAZID) 300 mg tablet   No No   Sig: Take 1 Tab by mouth daily. On dialysis days, please take medication after dialysis   losartan (COZAAR) 100 mg tablet   No No   Sig: Take 1 Tab by mouth daily. magnesium citrate solution   No No   Sig: Take 1/3 of bottle every 8 hours until finished or until you have a bowel movement. melatonin 3 mg tablet   No No   Sig: Take 1 Tab by mouth nightly as needed. Patient taking differently: Take 1 Tab by mouth nightly as needed (insomnia).    oxyCODONE-acetaminophen (PERCOCET) 5-325 mg per tablet   No No   Sig: Take 1 Tab by mouth every eight (8) hours as needed. Max Daily Amount: 3 Tabs. Patient taking differently: Take 1 Tab by mouth every eight (8) hours as needed for Pain.   pyrazinamide 500 mg tablet   No No   Sig: Take 2 Tabs by mouth every Tuesday Thursday, Saturday. At 5 pm  Indications: active tuberculosis   pyridoxine, vitamin B6, (VITAMIN B-6) 100 mg tablet   No No   Sig: Take 1 Tab by mouth daily. rifAMPin (RIFADIN) 300 mg capsule   No No   Sig: Take 2 Caps by mouth daily.  On dialysis days please take this medication after Dialysis      Facility-Administered Medications: None       Current Facility-Administered Medications   Medication Dose Route Frequency    sodium chloride (NS) flush 5-10 mL  5-10 mL IntraVENous PRN    morphine injection 2 mg  2 mg IntraVENous NOW    aspirin (ASA) suppository 300 mg  300 mg Rectal DAILY    niCARdipine (CARDENE) 25 mg in 0.9% sodium chloride 250 mL infusion  0-15 mg/hr IntraVENous TITRATE    atorvastatin (LIPITOR) tablet 40 mg  40 mg Oral QHS    bisacodyl (DULCOLAX) tablet 5 mg  5 mg Oral DAILY PRN    docusate sodium (COLACE) capsule 100 mg  100 mg Oral BID    [START ON 8/23/2018] ethambutol (MYAMBUTOL) tablet 800 mg  800 mg Oral Q TUE, THU & SAT    isoniazid (NYDRAZID) tablet 300 mg  300 mg Oral DAILY    [START ON 8/23/2018] pyrazinamide tablet 1,000 mg  1,000 mg Oral Q TUE, THU & SAT    [START ON 8/23/2018] pyridoxine (vitamin B6) (VITAMIN B-6) tablet 100 mg  100 mg Oral DAILY    rifAMPin (RIFADIN) capsule 600 mg  600 mg Oral DAILY    sodium chloride (NS) flush 5-10 mL  5-10 mL IntraVENous Q8H    sodium chloride (NS) flush 5-10 mL  5-10 mL IntraVENous PRN    sodium chloride (NS) flush 5-10 mL  5-10 mL IntraVENous Q8H    sodium chloride (NS) flush 5-10 mL  5-10 mL IntraVENous PRN    dextrose 5% and 0.9% NaCl infusion  75 mL/hr IntraVENous CONTINUOUS    acetaminophen (TYLENOL) tablet 650 mg  650 mg Oral Q6H PRN    heparin (porcine) injection 5,000 Units  5,000 Units SubCUTAneous Q8H    hydrALAZINE (APRESOLINE) tablet 100 mg  100 mg Oral TID    carvedilol (COREG) tablet 25 mg  25 mg Oral Q12H    [START ON 8/23/2018] amLODIPine (NORVASC) tablet 10 mg  10 mg Oral DAILY    albuterol (PROVENTIL VENTOLIN) nebulizer solution 2.5 mg  2.5 mg Nebulization Q4H PRN       Review of Systems:     As above  Data Review:    Labs: Results:       Chemistry Recent Labs      08/22/18   1137   GLU  115*   NA  134*   K  4.5   CL  96*   CO2  27   BUN  46*   CREA  5.56*   CA  9.5   AGAP  11   BUCR  8*   AP  109   TP  6.8   ALB  3.1*   GLOB  3.7   AGRAT  0.8      CBC w/Diff Recent Labs      08/22/18   1137   WBC  4.7   RBC  4.38   HGB  14.1   HCT  39.9   PLT  103*   GRANS  71   LYMPH  12*   EOS  1      Coagulation Recent Labs      08/22/18   1137   PTP  11.8   INR  0.9   APTT  33.3       Iron/Ferritin No results for input(s): IRON in the last 72 hours. No lab exists for component: TIBCCALC   BNP No results for input(s): BNPP in the last 72 hours.    Cardiac Enzymes Recent Labs      08/22/18   1137   CPK  184   CKND1  0.7      Liver Enzymes Recent Labs      08/22/18   1137   TP  6.8   ALB  3.1*   AP  109   SGOT  36      Thyroid Studies Lab Results   Component Value Date/Time    TSH 5.03 (H) 08/22/2018 11:37 AM         EKG: sinus tachycardia    Physical Assessment:     Visit Vitals    /87    Pulse (!) 113    Temp 97.7 °F (36.5 °C)    Resp 23    Wt 40.8 kg (90 lb)    SpO2 97%    BMI 17.01 kg/m2     Weight change:     Intake/Output Summary (Last 24 hours) at 08/22/18 1707  Last data filed at 08/22/18 1600   Gross per 24 hour   Intake                0 ml   Output              136 ml   Net             -136 ml     Physical Exam  General: comfortable, no acute distress   HEENT sclera anicteric,   CVS: S1S2 heard,  no rub  RS: + air entry b/l,   Abd: Soft, Non tender,  Neuro:  awake,   Extrm: no  edema, no cyanosis, clubbing   Skin: no visible Rash  Musculoskeletal: No gross joints or bone deformities   Access; TDC right side   Procedures/imaging: see electronic medical records for all procedures, Xrays and details which were not copied into this note but were reviewed prior to creation of Plan    51y F with ESRD, admitted with HTN urgency and weakness  Impression & Plan:   IMPRESSION:   ESRD, MWF  Access; failed left arm graft, now has TDC  HTN, uncontrolled   Stoke vs stroke mimicks  MTB, on treatment   PLAN:   Patient was started on dialysis , and later was started on cardene drip. Her blood pressure dropped during HD, recommend to stop dialysis to avoid recurrent rapid drop in her BP. No urgency from volume or electrolyte stand point. BP management per stroke protocol. Plan for HD tomorrow. Limit IV hydration.    Adjust all meds per ESRD status      Discussed with ICU team.       Darrell Coy MD  August 22, 2018  [facility]  Office [phone]

## 2018-08-22 NOTE — ED PROVIDER NOTES
EMERGENCY DEPARTMENT HISTORY AND PHYSICAL EXAM    12:18 PM      Date: 8/22/2018  Patient Name: Aysha Nesbitt    History of Presenting Illness     Chief Complaint   Patient presents with    Fatigue    Abdominal Pain    Vomiting         HPI: Aysha Nesbitt is a 56 y.o. female c pmhx of HTN, HLD, ESRD on HD MWF with no missed sessions who presents with weakness, son translating since native tongue is [place]. All son has noticed is lethargy, not talking, barely able to move, can't comment on any unilateral weakness, she does not walk at baseline. She has been vomiting for past 3 days along with this weakness, complaining of abdominal pain towards top of stomach. 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      PCP: Karyn Reyna MD    Current Facility-Administered Medications   Medication Dose Route Frequency Provider Last Rate Last Dose    sodium chloride (NS) flush 5-10 mL  5-10 mL IntraVENous PRN Stacie N Scissom        vancomycin (VANCOCIN) 1,000 mg in 0.9% sodium chloride (MBP/ADV) 250 mL adv  1,000 mg IntraVENous ONCE Stacie N Scissom        piperacillin-tazobactam (ZOSYN) 2.25 g in 0.9% sodium chloride (MBP/ADV) 50 mL ADV  2.25 g IntraVENous Q12H Stacie N Scissom   2.25 g at 08/22/18 1232    levoFLOXacin (LEVAQUIN) 750 mg in D5W IVPB  750 mg IntraVENous ONCE Stacie N Scissom 100 mL/hr at 08/22/18 1411 750 mg at 08/22/18 1411    Followed by   Nita Perez ON 8/24/2018] levoFLOXacin (LEVAQUIN) 500 mg in D5W IVPB  500 mg IntraVENous Q48H Stacie N Scissom        VANCOMYCIN INFORMATION NOTE   Other Rx Dosing/Monitoring Stacie N Scissom        Piperacillin-tazobactam (ZOSYN) 0.75 gm Supplemental Dosing by Pharmacy   Other Rx Dosing/Monitoring Stacie N Khrisom        morphine injection 2 mg  2 mg IntraVENous NOW Marci Krishnamurthy MD        aspirin (ASA) suppository 300 mg  300 mg Rectal DAILY Marci Krishnamurthy MD        niCARdipine (CARDENE) 25 mg in 0.9% sodium chloride 250 mL infusion  0-15 mg/hr IntraVENous TITRATE Mana Koehler MD         Current Outpatient Prescriptions   Medication Sig Dispense Refill    bisacodyl (DULCOLAX) 5 mg EC tablet Take 1 Tab by mouth daily as needed for Constipation. 10 Tab 0    magnesium citrate solution Take 1/3 of bottle every 8 hours until finished or until you have a bowel movement. 1 Bottle 0    albuterol (PROVENTIL HFA, VENTOLIN HFA, PROAIR HFA) 90 mcg/actuation inhaler Take 2 Puffs by inhalation every four (4) hours as needed for Wheezing. 1 Inhaler 0    ethambutol (MYAMBUTOL) 400 mg tablet Take 2 Tabs by mouth every Tuesday Thursday, Saturday. At 5 PM 24 Tab 0    hydrALAZINE (APRESOLINE) 100 mg tablet Take 1 Tab by mouth three (3) times daily. 90 Tab 0    isoniazid (NYDRAZID) 300 mg tablet Take 1 Tab by mouth daily. On dialysis days, please take medication after dialysis 30 Tab 0    pyrazinamide 500 mg tablet Take 2 Tabs by mouth every Tuesday Thursday, Saturday. At 5 pm  Indications: active tuberculosis 24 Tab 0    rifAMPin (RIFADIN) 300 mg capsule Take 2 Caps by mouth daily. On dialysis days please take this medication after Dialysis 60 Cap 0    cloNIDine (CATAPRES) 0.3 mg/24 hr 1 Patch by TransDERmal route every seven (7) days. 4 Patch 0    docusate sodium (COLACE) 100 mg capsule Take 1 Cap by mouth two (2) times a day. 60 Cap 0    oxyCODONE-acetaminophen (PERCOCET) 5-325 mg per tablet Take 1 Tab by mouth every eight (8) hours as needed. Max Daily Amount: 3 Tabs. (Patient taking differently: Take 1 Tab by mouth every eight (8) hours as needed for Pain.) 6 Tab 0    pyridoxine, vitamin B6, (VITAMIN B-6) 100 mg tablet Take 1 Tab by mouth daily. 30 Tab 0    azithromycin (ZITHROMAX) 500 mg tab Take 1 Tab by mouth every Tuesday Thursday, Saturday.  At 5 PM 12 Tab 0    OTHER Check CBC, CMP, MG on 07/25/18, Results to PCP immediately, Diagnosis- TB 1 Each 0    OTHER Incentive Spirometry- use as directed 1 Each 0    OTHER Incentive Spirometry- Use as directed 1 Each 0    OTHER This is to certify that Stacy Mtz was admitted to [facility] on 07/16/18 and is still receiving medical care here. 1 Each 0    amLODIPine (NORVASC) 10 mg tablet Take 1 Tab by mouth daily. 30 Tab 0    aspirin 81 mg chewable tablet Take 1 Tab by mouth daily. 30 Tab 0    atorvastatin (LIPITOR) 40 mg tablet Take 1 Tab by mouth nightly. 30 Tab 0    carvedilol (COREG) 25 mg tablet Take 1 Tab by mouth every twelve (12) hours. 60 Tab 0    losartan (COZAAR) 100 mg tablet Take 1 Tab by mouth daily. 30 Tab 0    melatonin 3 mg tablet Take 1 Tab by mouth nightly as needed. (Patient taking differently: Take 1 Tab by mouth nightly as needed (insomnia). ) 30 Tab 0    FLUoxetine (PROZAC) 10 mg capsule Take 1 Cap by mouth daily. 30 Cap 0    glycerin, adult, (FLEET GLYCERIN, ADULT,) suppository Insert 1 Suppository into rectum daily. Indications: BOWEL EVACUATION 10 Suppository 0       Past History     Past Medical History:  Past Medical History:   Diagnosis Date    Chronic kidney disease     ESRD (end stage renal disease) ([facility] [address]     TUES-THURS-SAT    Hypercholesteremia     Hypertension     Kidney disease     Vitamin D deficiency        Past Surgical History:  Past Surgical History:   Procedure Laterality Date    VASCULAR SURGERY PROCEDURE UNLIST         Family History:  No family history on file. Social History:  Social History   Substance Use Topics    Smoking status: Never Smoker    Smokeless tobacco: Never Used    Alcohol use No       Allergies:   Allergies   Allergen Reactions    Banana Other (comments)         Review of Systems       Review of Systems   Unable to perform ROS: Patient nonverbal         Physical Exam     Visit Vitals    BP (!) 197/115    Pulse 90    Temp 97.7 °F (36.5 °C)    Resp 28    Wt 40.8 kg (90 lb)    SpO2 99%    BMI 17.01 kg/m2         Physical Exam   Constitutional: She appears well-developed and well-nourished. No distress. HENT:   Head: Normocephalic and atraumatic. Mouth/Throat: Mucous membranes are normal.   Eyes: Pupils are equal, round, and reactive to light. Neck: Normal range of motion. Neck supple. Cardiovascular: Normal rate, regular rhythm, normal heart sounds and intact distal pulses. Exam reveals no gallop and no friction rub. No murmur heard. 2+ radial pulse RLE, 1+ LUE (prior AVF), 2+ bilat DPs  1+ bilat edema   Pulmonary/Chest: Effort normal and breath sounds normal. No respiratory distress. She has no wheezes. She has no rales. Choco in place R chest no TTP no erythema   Abdominal: Soft. Bowel sounds are normal. She exhibits no distension. There is no tenderness. Musculoskeletal: Normal range of motion. Lymphadenopathy:   No lymphadenopathy. Neurological: She is alert. No cranial nerve deficit. Oriented x0    Not following commands well, neg pronator drift RUE and RLE, unable to lift LUE or LLE, 0/5 strength, unable to verify sensation, unable to perform coordination. Pupils 2mm reactive EOMI   Skin: Skin is warm and dry. Nursing note and vitals reviewed.         Diagnostic Study Results     Labs -  Recent Results (from the past 12 hour(s))   EKG, 12 LEAD, INITIAL    Collection Time: 08/22/18 11:30 AM   Result Value Ref Range    Ventricular Rate 110 BPM    Atrial Rate 110 BPM    P-R Interval 162 ms    QRS Duration 88 ms    Q-T Interval 424 ms    QTC Calculation (Bezet) 573 ms    Calculated P Axis 63 degrees    Calculated R Axis 116 degrees    Calculated T Axis 52 degrees    Diagnosis       Sinus tachycardia  Left posterior fascicular block  Nonspecific ST and T wave abnormality  Prolonged QT  Abnormal ECG  When compared with ECG of 17-JUL-2018 03:08,  ST no longer depressed in Lateral leads  Nonspecific T wave abnormality now evident in Lateral leads     CBC WITH AUTOMATED DIFF    Collection Time: 08/22/18 11:37 AM   Result Value Ref Range    WBC 4.7 4.6 - 13.2 K/uL    RBC 4.38 4.20 - 5.30 M/uL    HGB 14.1 12.0 - 16.0 g/dL    HCT 39.9 35.0 - 45.0 %    MCV 91.1 74.0 - 97.0 FL    MCH 32.2 24.0 - 34.0 PG    MCHC 35.3 31.0 - 37.0 g/dL    RDW 12.5 11.6 - 14.5 %    PLATELET 950 (L) 096 - 420 K/uL    MPV 9.4 9.2 - 11.8 FL    NEUTROPHILS 71 40 - 73 %    LYMPHOCYTES 12 (L) 21 - 52 %    MONOCYTES 14 (H) 3 - 10 %    EOSINOPHILS 1 0 - 5 %    BASOPHILS 2 0 - 2 %    ABS. NEUTROPHILS 3.4 1.8 - 8.0 K/UL    ABS. LYMPHOCYTES 0.6 (L) 0.9 - 3.6 K/UL    ABS. MONOCYTES 0.7 0.05 - 1.2 K/UL    ABS. EOSINOPHILS 0.0 0.0 - 0.4 K/UL    ABS. BASOPHILS 0.1 0.0 - 0.1 K/UL    DF AUTOMATED     METABOLIC PANEL, COMPREHENSIVE    Collection Time: 08/22/18 11:37 AM   Result Value Ref Range    Sodium 134 (L) 136 - 145 mmol/L    Potassium 4.5 3.5 - 5.5 mmol/L    Chloride 96 (L) 100 - 108 mmol/L    CO2 27 21 - 32 mmol/L    Anion gap 11 3.0 - 18 mmol/L    Glucose 115 (H) 74 - 99 mg/dL    BUN 46 (H) 7.0 - 18 MG/DL    Creatinine 5.56 (H) 0.6 - 1.3 MG/DL    BUN/Creatinine ratio 8 (L) 12 - 20      GFR est AA 10 (L) >60 ml/min/1.73m2    GFR est non-AA 8 (L) >60 ml/min/1.73m2    Calcium 9.5 8.5 - 10.1 MG/DL    Bilirubin, total 0.6 0.2 - 1.0 MG/DL    ALT (SGPT) 9 (L) 13 - 56 U/L    AST (SGOT) 36 15 - 37 U/L    Alk.  phosphatase 109 45 - 117 U/L    Protein, total 6.8 6.4 - 8.2 g/dL    Albumin 3.1 (L) 3.4 - 5.0 g/dL    Globulin 3.7 2.0 - 4.0 g/dL    A-G Ratio 0.8 0.8 - 1.7     MAGNESIUM    Collection Time: 08/22/18 11:37 AM   Result Value Ref Range    Magnesium 3.2 (H) 1.6 - 2.6 mg/dL   CARDIAC PANEL,(CK, CKMB & TROPONIN)    Collection Time: 08/22/18 11:37 AM   Result Value Ref Range     26 - 192 U/L    CK - MB 1.2 <3.6 ng/ml    CK-MB Index 0.7 0.0 - 4.0 %    Troponin-I, Qt. 0.17 (H) 0.0 - 0.045 NG/ML   PROTHROMBIN TIME + INR    Collection Time: 08/22/18 11:37 AM   Result Value Ref Range    Prothrombin time 11.8 11.5 - 15.2 sec    INR 0.9 0.8 - 1.2     PTT    Collection Time: 08/22/18 11:37 AM   Result Value Ref Range    aPTT 33.3 23.0 - 36.4 SEC   TSH 3RD GENERATION    Collection Time: 08/22/18 11:37 AM   Result Value Ref Range    TSH 5.03 (H) 0.36 - 3.74 uIU/mL   LIPASE    Collection Time: 08/22/18 11:37 AM   Result Value Ref Range    Lipase 544 (H) 73 - 393 U/L   GLUCOSE, POC    Collection Time: 08/22/18 11:40 AM   Result Value Ref Range    Glucose (POC) 104 70 - 110 mg/dL   POC LACTIC ACID    Collection Time: 08/22/18 11:41 AM   Result Value Ref Range    Lactic Acid (POC) 0.8 0.4 - 2.0 mmol/L       Radiologic Studies -   XR CHEST PORT   Final Result   IMPRESSION:       No acute process. Thank you for your referral.      CT HEAD WO CONT   Final Result      CTA CHEST ABD PELV W WO CONT    (Results Pending)         Medical Decision Making   I am the first provider for this patient. I reviewed the vital signs, available nursing notes, past medical history, past surgical history, family history and social history. Vital Signs-Reviewed the patient's vital signs. EKG:      Records Reviewed: Nursing Notes and Old Medical Records (Time of Review: 12:18 PM)        Provider Notes (Medical Decision Making):  MDM  Number of Diagnoses or Management Options  Abdominal pain, generalized:   Hypertensive emergency:   Diagnosis management comments: Diff dx: htn emergency, htn urgency, cva, dissection, aaa    Pt presenting with tachycardia to 120s, HTN to 240s, down on L, has not missed HD except today, only complaint is general malaise, decreased energy. Will send cbc, cmp, lipase, lactate, tsh, troponin, cxr, code stroke for CT head non contrast, do not suspect dissection at this time based on equal LE pulses, no chest pain, however she is having abd pain so will likely obtain CTA abd/pelvis, no enlarged aortic knob.  Will bring down BP with 3 SLNTG, 20mg IV labetalol, give ASA    BP not improving drastically, cardene started, teleneurology evaluated and feels she may have had a completed stroke, recs MRI and speech/PT/OT, aspirin; no obvious head bleed on NCHCT. Based on neuro deficit w/bp 240s, will CTA chest/abd/pelvis to r/o dissection; bedside US does not show obvious AAA or dissection but she continues to have pain. Lactate wnl, do not suspect sepsis or infection, she was sepsis alerted in triage and had antibiotics ordered. Nephrology contacted--will dialyze, intensivist aware as is hospitalist, will plan admission to ICU. Initial trop 0.17, will rpt but suspect this is demand ischemia vs renally elevated 2/2 ESRD; EKG does not show obvious ischemia. Amount and/or Complexity of Data Reviewed  Clinical lab tests: ordered and reviewed  Tests in the medicine section of CPT®: reviewed and ordered  Decide to obtain previous medical records or to obtain history from someone other than the patient: yes  Review and summarize past medical records: yes  Independent visualization of images, tracings, or specimens: yes    Critical Care  Total time providing critical care: 30-74 minutes  High risk for multi-organ system decompensation          Critical Care Time: 40 min    For Hospitalized Patients:    1. Hospitalization Decision Time:  The decision to hospitalize the patient was made by Dr. Peyman Monique at 48 on 8/22/2018    2. Aspirin: Aspirin was given    Diagnosis     Clinical Impression:   1. Hypertensive emergency    2. Abdominal pain, generalized        Disposition: ICU admit    Follow-up Information     None           Patient's Medications   Start Taking    No medications on file   Continue Taking    ALBUTEROL (PROVENTIL HFA, VENTOLIN HFA, PROAIR HFA) 90 MCG/ACTUATION INHALER    Take 2 Puffs by inhalation every four (4) hours as needed for Wheezing. AMLODIPINE (NORVASC) 10 MG TABLET    Take 1 Tab by mouth daily. ASPIRIN 81 MG CHEWABLE TABLET    Take 1 Tab by mouth daily. ATORVASTATIN (LIPITOR) 40 MG TABLET    Take 1 Tab by mouth nightly.     AZITHROMYCIN (ZITHROMAX) 500 MG TAB    Take 1 Tab by mouth every Tuesday Thursday, Saturday. At 5 PM    BISACODYL (DULCOLAX) 5 MG EC TABLET    Take 1 Tab by mouth daily as needed for Constipation. CARVEDILOL (COREG) 25 MG TABLET    Take 1 Tab by mouth every twelve (12) hours. CLONIDINE (CATAPRES) 0.3 MG/24 HR    1 Patch by TransDERmal route every seven (7) days. DOCUSATE SODIUM (COLACE) 100 MG CAPSULE    Take 1 Cap by mouth two (2) times a day. ETHAMBUTOL (MYAMBUTOL) 400 MG TABLET    Take 2 Tabs by mouth every Tuesday Thursday, Saturday. At 5 PM    FLUOXETINE (PROZAC) 10 MG CAPSULE    Take 1 Cap by mouth daily. GLYCERIN, ADULT, (FLEET GLYCERIN, ADULT,) SUPPOSITORY    Insert 1 Suppository into rectum daily. Indications: BOWEL EVACUATION    HYDRALAZINE (APRESOLINE) 100 MG TABLET    Take 1 Tab by mouth three (3) times daily. ISONIAZID (NYDRAZID) 300 MG TABLET    Take 1 Tab by mouth daily. On dialysis days, please take medication after dialysis    LOSARTAN (COZAAR) 100 MG TABLET    Take 1 Tab by mouth daily. MAGNESIUM CITRATE SOLUTION    Take 1/3 of bottle every 8 hours until finished or until you have a bowel movement. MELATONIN 3 MG TABLET    Take 1 Tab by mouth nightly as needed. OTHER    This is to certify that Sergio Marquez was admitted to [facility] on 07/16/18 and is still receiving medical care here. OTHER    Check CBC, CMP, MG on 07/25/18, Results to PCP immediately, Diagnosis- TB    OTHER    Incentive Spirometry- use as directed    OTHER    Incentive Spirometry- Use as directed    OXYCODONE-ACETAMINOPHEN (PERCOCET) 5-325 MG PER TABLET    Take 1 Tab by mouth every eight (8) hours as needed. Max Daily Amount: 3 Tabs. PYRAZINAMIDE 500 MG TABLET    Take 2 Tabs by mouth every Tuesday Thursday, Saturday. At 5 pm  Indications: active tuberculosis    PYRIDOXINE, VITAMIN B6, (VITAMIN B-6) 100 MG TABLET    Take 1 Tab by mouth daily. RIFAMPIN (RIFADIN) 300 MG CAPSULE    Take 2 Caps by mouth daily.  On dialysis days please take this medication after Dialysis   These Medications have changed    No medications on file   Stop Taking    No medications on file     _______________________________

## 2018-08-22 NOTE — PROGRESS NOTES
Received report from ED then went to get patient from Dialysis. Placed on transport monitor. SBP in 200's. Placed on cardene drip upon arrival to ICU. Dialysis restarted at bedside. SBP dropped into 130's. Nicardipine drip held, dialysis stopped per renal. Also, held PO medications at this time due to patient not following commands well and per Dr. Thierry Fletcher. Can restart PO meds later. Pressure wyatt again into 200's and Cardene gtt restarted. Will monitor closely to maintain SBP at 160-180. Additionally, called family to ask questions/update/complete admission database. There was no answer and no voicemail set up. Attempted to communicate with patient via blue phone, but patient kept falling asleep. Attempted to preform NIH scales, however patient does not speak english and is drowsy. Patient would not use blue phone. Additionally, placed stroke binder as per hospitalist order in room but will review when family is present due to lack of alertness and language barrier. 1940 -   Bedside and Verbal shift change report given to Rodney Tafoya (oncoming nurse) by Jaspal Mast (offgoing nurse). Report included the following information SBAR and Kardex.

## 2018-08-22 NOTE — H&P
Hospitalist Admission Note    NAME: Mirlande Berumen   :  1960   MRN:  [id number]     Date/Time of admission:  2018 1:58 PM    Patient PCP: Shauna Pruitt MD  ________________________________________________________________________    My assessment of this patient's clinical condition and my plan of care is as follows. Assessment / Plan:  1. Hypertensive emergency  2. Subacute completed right MCA distribution ischemic cva  3. Acute on chronic encephalopathy  4. ESRD on HD MWF  5. Elevated troponin with concern for type 2 NSTEMI - demand ischemia/troponin retention from #3  6. HLD  7. Chronic combined CHF, compensated  8. H/o tonic clonic activity during last hospitalization  9. H/o Pulmonary TB  10. Mildly elevated TSH, possibly sick euthyroid    1. Admit to ICU for cardene gtt and neuro checks  2. Safely bring down bp  3. Emergent HD now; nephrology on board  4. ED consulting PCCM  5. Stroke protocol in setting of progressed ischemic decline  6. Neuro being consulted; last echo 6 months ago, will repeat  7. Will need carotid imaging and tele monitoring  8. Repeat TSH in 4-6 weeks, check lipids (last LDL 97 in 2018); a1c noted to be 4.7 earlier this year. 9. Once bp slightly controlled with cardene, restart home medications including norvasc, coreg, clonidine, hydralazine, losartan  10. Hold central acting medications (prozac)    Code Status: full  Surrogate Decision Maker: son    DVT Prophylaxis: sc hep  GI Prophylaxis: not indicated          Subjective:   CHIEF COMPLAINT: weakness over past few days. HISTORY OF PRESENT ILLNESS:     Mirlande Berumen is a 56 y.o.  female who presents with dense left sided weakness and aphasia. Pt has a chronic encephalopathy, ESRD, h/o CVA, malignant HTN and HLD. She was brought to the ED by EMS on today per request of her son d/t worsening weakness and left sided changes.  Pt was recently admitted in 2018 for htn emergency and there was evidence of neurological compromise such as today, but HTN was improved and neurological changes has somewhat improved. She presented on today with no acute changes on brain imaging, but full/complete physical changes from same area of distribution from previously noted ischemia. Pt is also more aphasic than usual (as her baseline is not very talkative). Pt's bp was noted to be in 230's over 120's upon initial presentation. She was started on a cardene gtt and Renal was called to dialyze. Medicine was called. We were asked to admit for work up and evaluation of the above problems. Once to bedside, bp was improved to 793'B systolically and pt was more alert and able to show communication with me although there was a significant language barrier. Able to move left arm and leg well. Smiling without facial droop. Past Medical History:   Diagnosis Date    Chronic kidney disease     ESRD (end stage renal disease) ([facility] [address]     TUES-THURS-SAT    Hypercholesteremia     Hypertension     Kidney disease     Vitamin D deficiency         Past Surgical History:   Procedure Laterality Date    VASCULAR SURGERY PROCEDURE UNLIST         Social History   Substance Use Topics    Smoking status: Never Smoker    Smokeless tobacco: Never Used    Alcohol use No        No family history on file. Allergies   Allergen Reactions    Banana Other (comments)        Prior to Admission medications    Medication Sig Start Date End Date Taking? Authorizing Provider   bisacodyl (DULCOLAX) 5 mg EC tablet Take 1 Tab by mouth daily as needed for Constipation. 8/5/18   Chey Shaw MD   magnesium citrate solution Take 1/3 of bottle every 8 hours until finished or until you have a bowel movement.  8/5/18   Chey Shaw MD   albuterol (PROVENTIL HFA, VENTOLIN HFA, PROAIR HFA) 90 mcg/actuation inhaler Take 2 Puffs by inhalation every four (4) hours as needed for Wheezing. 7/21/18   Dorie Rocha MD   ethambutol (MYAMBUTOL) 400 mg tablet Take 2 Tabs by mouth every Tuesday Thursday, Saturday. At 5 PM 7/21/18   Jenny Vick MD   hydrALAZINE (APRESOLINE) 100 mg tablet Take 1 Tab by mouth three (3) times daily. 7/21/18   Jenny Vick MD   isoniazid (NYDRAZID) 300 mg tablet Take 1 Tab by mouth daily. On dialysis days, please take medication after dialysis 7/21/18   Jenny Vick MD   pyrazinamide 500 mg tablet Take 2 Tabs by mouth every Tuesday Thursday, Saturday. At 5 pm  Indications: active tuberculosis 7/24/18   Jenny Vick MD   rifAMPin (RIFADIN) 300 mg capsule Take 2 Caps by mouth daily. On dialysis days please take this medication after Dialysis 7/21/18   Jenny Vick MD   cloNIDine (CATAPRES) 0.3 mg/24 hr 1 Patch by TransDERmal route every seven (7) days. 7/23/18   Jenny Vick MD   docusate sodium (COLACE) 100 mg capsule Take 1 Cap by mouth two (2) times a day. 7/21/18   Jenny Vick MD   oxyCODONE-acetaminophen (PERCOCET) 5-325 mg per tablet Take 1 Tab by mouth every eight (8) hours as needed. Max Daily Amount: 3 Tabs. Patient taking differently: Take 1 Tab by mouth every eight (8) hours as needed for Pain. 7/21/18   Jenny Vick MD   pyridoxine, vitamin B6, (VITAMIN B-6) 100 mg tablet Take 1 Tab by mouth daily. 7/22/18   Jneny Vick MD   azithromycin (ZITHROMAX) 500 mg tab Take 1 Tab by mouth every Tuesday Thursday, Saturday. At 5 PM 7/21/18   Jenny Vick MD   OTHER Check CBC, CMP, MG on 07/25/18, Results to PCP immediately, Diagnosis- TB 7/21/18   Jenny Vick MD   OTHER Incentive Spirometry- use as directed 7/21/18   Jenny Vick MD   OTHER Incentive Spirometry- Use as directed 7/21/18   Jenny Vick MD   OTHER This is to certify that Marisa Orozco was admitted to [facility] on 07/16/18 and is still receiving medical care here. 7/20/18   Jenny Vick MD   amLODIPine (NORVASC) 10 mg tablet Take 1 Tab by mouth daily. 3/8/18   Purvi Lehman MD   aspirin 81 mg chewable tablet Take 1 Tab by mouth daily.  3/8/18   Doris Jade Mary Blake MD   atorvastatin (LIPITOR) 40 mg tablet Take 1 Tab by mouth nightly. 3/8/18   Shaun June MD   carvedilol (COREG) 25 mg tablet Take 1 Tab by mouth every twelve (12) hours. 3/8/18   Shaun June MD   losartan (COZAAR) 100 mg tablet Take 1 Tab by mouth daily. 3/8/18   Shaun June MD   melatonin 3 mg tablet Take 1 Tab by mouth nightly as needed. Patient taking differently: Take 1 Tab by mouth nightly as needed (insomnia). 3/8/18   Shaun June MD   FLUoxetine (PROZAC) 10 mg capsule Take 1 Cap by mouth daily. 3/8/18   Shaun June MD   glycerin, adult, (FLEET GLYCERIN, ADULT,) suppository Insert 1 Suppository into rectum daily. Indications: BOWEL EVACUATION 2/5/18   Leila Martinez MD       REVIEW OF SYSTEMS:     I am not able to complete the review of systems because:    The patient is intubated and sedated    The patient has altered mental status due to his acute medical problems    The patient has baseline aphasia from prior stroke(s)    The patient has baseline dementia and is not reliable historian    The patient is in acute medical distress and unable to provide information   x Language barrier       Total of 12 systems reviewed as follows:       POSITIVE= bolded text  Negative = text not underlined  General:  fever, chills, sweats, generalized weakness, weight loss/gain,      loss of appetite   Eyes:    blurred vision, eye pain, loss of vision, double vision  ENT:    rhinorrhea, pharyngitis   Respiratory:   cough, sputum production, SOB, MANZANO, wheezing, pleuritic pain   Cardiology:   chest pain, palpitations, orthopnea, PND, edema, syncope   Gastrointestinal:  abdominal pain , N/V, diarrhea, dysphagia, constipation, bleeding   Genitourinary:  frequency, urgency, dysuria, hematuria, incontinence   Muskuloskeletal :  arthralgia, myalgia, back pain  Hematology:  easy bruising, nose or gum bleeding, lymphadenopathy   Dermatological: rash, ulceration, pruritis, color change / jaundice  Endocrine:   hot flashes or polydipsia   Neurological:  headache, dizziness, confusion, focal weakness, paresthesia,     Speech difficulties, memory loss, gait difficulty  Psychological: Feelings of anxiety, depression, agitation    Objective:   VITALS:    Visit Vitals    BP (!) 199/125    Pulse 85    Temp 97.7 °F (36.5 °C)    Resp 17    Wt 40.8 kg (90 lb)    SpO2 98%    BMI 17.01 kg/m2       PHYSICAL EXAM:    General:    Alert, cooperative, no distress, appears stated age. HEENT: Atraumatic, anicteric sclerae, pink conjunctivae     No oral ulcers, mucosa moist, throat clear, dentition fair  Neck:  Supple, symmetrical,  thyroid: non tender  Lungs:   Clear to auscultation bilaterally. No Wheezing or Rhonchi. No rales. Chest wall:  No tenderness  No Accessory muscle use. Heart:   Regular  rhythm,  No  murmur   No edema  Abdomen:   Soft, non-tender. Not distended. Bowel sounds normal  Extremities: No cyanosis. No clubbing,      Skin turgor normal, Capillary refill normal, Radial dial pulse 2+  Skin:     Not pale. Not Jaundiced  No rashes   Psych:  Good insight. Not depressed. Not anxious or agitated. Neurologic: EOMs intact. No facial asymmetry. No aphasia or slurred speech. Improving strength on left side, Sensation grossly intact. Alert and unable to assess orientation with language barrier.      _______________________________________________________________________  Care Plan discussed with:    Comments   Patient x    Family      RN     Care Manager                    Consultant:      _______________________________________________________________________  Expected  Disposition:   Home with Family    HH/PT/OT/RN x   SNF/LTC    TRACEY    ________________________________________________________________________  TOTAL TIME:  54 Minutes    Critical Care Provided     Minutes non procedure based      Comments    x Reviewed previous records   >50% of visit spent in counseling and coordination of care x Discussion with patient and/or family and questions answered       ________________________________________________________________________      Procedures: see electronic medical records for all procedures/Xrays and details which were not copied into this note but were reviewed prior to creation of Plan. LAB DATA REVIEWED:    Recent Results (from the past 24 hour(s))   EKG, 12 LEAD, INITIAL    Collection Time: 08/22/18 11:30 AM   Result Value Ref Range    Ventricular Rate 110 BPM    Atrial Rate 110 BPM    P-R Interval 162 ms    QRS Duration 88 ms    Q-T Interval 424 ms    QTC Calculation (Bezet) 573 ms    Calculated P Axis 63 degrees    Calculated R Axis 116 degrees    Calculated T Axis 52 degrees    Diagnosis       Sinus tachycardia  Left posterior fascicular block  Nonspecific ST and T wave abnormality  Prolonged QT  Abnormal ECG  When compared with ECG of 17-JUL-2018 03:08,  ST no longer depressed in Lateral leads  Nonspecific T wave abnormality now evident in Lateral leads     CBC WITH AUTOMATED DIFF    Collection Time: 08/22/18 11:37 AM   Result Value Ref Range    WBC 4.7 4.6 - 13.2 K/uL    RBC 4.38 4.20 - 5.30 M/uL    HGB 14.1 12.0 - 16.0 g/dL    HCT 39.9 35.0 - 45.0 %    MCV 91.1 74.0 - 97.0 FL    MCH 32.2 24.0 - 34.0 PG    MCHC 35.3 31.0 - 37.0 g/dL    RDW 12.5 11.6 - 14.5 %    PLATELET 736 (L) 217 - 420 K/uL    MPV 9.4 9.2 - 11.8 FL    NEUTROPHILS 71 40 - 73 %    LYMPHOCYTES 12 (L) 21 - 52 %    MONOCYTES 14 (H) 3 - 10 %    EOSINOPHILS 1 0 - 5 %    BASOPHILS 2 0 - 2 %    ABS. NEUTROPHILS 3.4 1.8 - 8.0 K/UL    ABS. LYMPHOCYTES 0.6 (L) 0.9 - 3.6 K/UL    ABS. MONOCYTES 0.7 0.05 - 1.2 K/UL    ABS. EOSINOPHILS 0.0 0.0 - 0.4 K/UL    ABS.  BASOPHILS 0.1 0.0 - 0.1 K/UL    DF AUTOMATED     METABOLIC PANEL, COMPREHENSIVE    Collection Time: 08/22/18 11:37 AM   Result Value Ref Range    Sodium 134 (L) 136 - 145 mmol/L    Potassium 4.5 3.5 - 5.5 mmol/L    Chloride 96 (L) 100 - 108 mmol/L    CO2 27 21 - 32 mmol/L    Anion gap 11 3.0 - 18 mmol/L    Glucose 115 (H) 74 - 99 mg/dL    BUN 46 (H) 7.0 - 18 MG/DL    Creatinine 5.56 (H) 0.6 - 1.3 MG/DL    BUN/Creatinine ratio 8 (L) 12 - 20      GFR est AA 10 (L) >60 ml/min/1.73m2    GFR est non-AA 8 (L) >60 ml/min/1.73m2    Calcium 9.5 8.5 - 10.1 MG/DL    Bilirubin, total 0.6 0.2 - 1.0 MG/DL    ALT (SGPT) 9 (L) 13 - 56 U/L    AST (SGOT) 36 15 - 37 U/L    Alk.  phosphatase 109 45 - 117 U/L    Protein, total 6.8 6.4 - 8.2 g/dL    Albumin 3.1 (L) 3.4 - 5.0 g/dL    Globulin 3.7 2.0 - 4.0 g/dL    A-G Ratio 0.8 0.8 - 1.7     MAGNESIUM    Collection Time: 08/22/18 11:37 AM   Result Value Ref Range    Magnesium 3.2 (H) 1.6 - 2.6 mg/dL   CARDIAC PANEL,(CK, CKMB & TROPONIN)    Collection Time: 08/22/18 11:37 AM   Result Value Ref Range     26 - 192 U/L    CK - MB 1.2 <3.6 ng/ml    CK-MB Index 0.7 0.0 - 4.0 %    Troponin-I, Qt. 0.17 (H) 0.0 - 0.045 NG/ML   PROTHROMBIN TIME + INR    Collection Time: 08/22/18 11:37 AM   Result Value Ref Range    Prothrombin time 11.8 11.5 - 15.2 sec    INR 0.9 0.8 - 1.2     PTT    Collection Time: 08/22/18 11:37 AM   Result Value Ref Range    aPTT 33.3 23.0 - 36.4 SEC   TSH 3RD GENERATION    Collection Time: 08/22/18 11:37 AM   Result Value Ref Range    TSH 5.03 (H) 0.36 - 3.74 uIU/mL   LIPASE    Collection Time: 08/22/18 11:37 AM   Result Value Ref Range    Lipase 544 (H) 73 - 393 U/L   GLUCOSE, POC    Collection Time: 08/22/18 11:40 AM   Result Value Ref Range    Glucose (POC) 104 70 - 110 mg/dL   POC LACTIC ACID    Collection Time: 08/22/18 11:41 AM   Result Value Ref Range    Lactic Acid (POC) 0.8 0.4 - 2.0 mmol/L       Yesenia Blanco MD  Internal Medicine  Hospitalist Division

## 2018-08-22 NOTE — IP AVS SNAPSHOT
[address] Patient: Asuncion Oppenheim MRN: [id number] :1960 About your hospitalization You were admitted on:  2018 You last received care in the:  [facility] [address] You were discharged on:  2018 Why you were hospitalized Your primary diagnosis was:  Not on File Your diagnoses also included:  Hypertensive Emergency Follow-up Information Follow up With Details Comments Contact Info Yanick Son MD Schedule an appointment as soon as possible for a visit on 2018 @3:00; have son in law call if not convenient [facility] [phone] [address] 
[phone] Discharge Orders None A check catherine indicates which time of day the medication should be taken. My Medications START taking these medications Instructions Each Dose to Equal  
 Morning Noon Evening Bedtime  
 minoxidil 2.5 mg tablet Commonly known as:  Jayde Soto Start taking on:  2018 Your last dose was: Your next dose is: Take 1 Tab by mouth daily. 2.5 mg  
    
   
   
   
  
 ondansetron 4 mg disintegrating tablet Commonly known as:  ZOFRAN ODT Your last dose was: Your next dose is: Take 1 Tab by mouth every eight (8) hours as needed for Nausea. 4 mg CHANGE how you take these medications Instructions Each Dose to Equal  
 Morning Noon Evening Bedtime  
 melatonin 3 mg tablet What changed:  reasons to take this Your last dose was: Your next dose is: Take 1 Tab by mouth nightly as needed. 3 mg  
    
   
   
   
  
 oxyCODONE-acetaminophen 5-325 mg per tablet Commonly known as:  PERCOCET What changed:  reasons to take this Your last dose was: Your next dose is: Take 1 Tab by mouth every eight (8) hours as needed. Max Daily Amount: 3 Tabs. 1 Tab CONTINUE taking these medications Instructions Each Dose to Equal  
 Morning Noon Evening Bedtime  
 albuterol 90 mcg/actuation inhaler Commonly known as:  PROVENTIL HFA, VENTOLIN HFA, PROAIR HFA Your last dose was: Your next dose is: Take 2 Puffs by inhalation every four (4) hours as needed for Wheezing. 2 Puff  
    
   
   
   
  
 amLODIPine 10 mg tablet Commonly known as:  Torrie Colon Your last dose was: Your next dose is: Take 1 Tab by mouth daily. 10 mg  
    
   
   
   
  
 aspirin 81 mg chewable tablet Your last dose was: Your next dose is: Take 1 Tab by mouth daily. 81 mg  
    
   
   
   
  
 atorvastatin 40 mg tablet Commonly known as:  LIPITOR Your last dose was: Your next dose is: Take 1 Tab by mouth nightly. 40 mg  
    
   
   
   
  
 azithromycin 500 mg Tab Commonly known as:  Charlotta Primrose Your last dose was: Your next dose is: Take 1 Tab by mouth every Tuesday Thursday, Saturday. At 5 PM  
 500 mg  
    
   
   
   
  
 bisacodyl 5 mg EC tablet Commonly known as:  DULCOLAX Your last dose was: Your next dose is: Take 1 Tab by mouth daily as needed for Constipation. 5 mg  
    
   
   
   
  
 carvedilol 25 mg tablet Commonly known as:  Cori Lenny Your last dose was: Your next dose is: Take 1 Tab by mouth every twelve (12) hours. 25 mg  
    
   
   
   
  
 cloNIDine 0.3 mg/24 hr  
Commonly known as:  CATAPRES Your last dose was: Your next dose is:    
   
   
 1 Patch by TransDERmal route every seven (7) days. 1 Patch  
    
   
   
   
  
 docusate sodium 100 mg capsule Commonly known as:  Lucita Stone Your last dose was: Your next dose is: Take 1 Cap by mouth two (2) times a day. 100 mg  
    
   
   
   
  
 ethambutol 400 mg tablet Commonly known as:  MYAMBUTOL Your last dose was: Your next dose is: Take 2 Tabs by mouth every Tuesday Thursday, Saturday. At 5 PM  
 800 mg FLUoxetine 10 mg capsule Commonly known as:  PROzac Your last dose was: Your next dose is: Take 1 Cap by mouth daily. 10 mg  
    
   
   
   
  
 glycerin (adult) suppository Commonly known as:  FLEET GLYCERIN (ADULT) Your last dose was: Your next dose is: Insert 1 Suppository into rectum daily. Indications: BOWEL EVACUATION  
 1 Suppository  
    
   
   
   
  
 isoniazid 300 mg tablet Commonly known as:  NYDRAZID Your last dose was: Your next dose is: Take 1 Tab by mouth daily. On dialysis days, please take medication after dialysis 300 mg  
    
   
   
   
  
 losartan 100 mg tablet Commonly known as:  COZAAR Your last dose was: Your next dose is: Take 1 Tab by mouth daily. 100 mg  
    
   
   
   
  
 magnesium citrate solution Your last dose was: Your next dose is: Take 1/3 of bottle every 8 hours until finished or until you have a bowel movement. OTHER Your last dose was: Your next dose is: This is to certify that Amanda Tao was admitted to [facility] on 07/[facility]18 and is still receiving medical care here. OTHER Your last dose was: Your next dose is:    
   
   
 Check CBC, CMP, MG on 07/25/18, Results to PCP immediately, Diagnosis- TB  
     
   
   
   
  
 OTHER Your last dose was: Your next dose is:    
   
   
 Incentive Spirometry- use as directed OTHER Your last dose was: Your next dose is: Incentive Spirometry- Use as directed  
     
   
   
   
  
 pyrazinamide 500 mg tablet Your last dose was: Your next dose is: Take 2 Tabs by mouth every Tuesday Thursday, Saturday. At 5 pm  Indications: active tuberculosis 1000 mg  
    
   
   
   
  
 pyridoxine (vitamin B6) 100 mg tablet Commonly known as:  VITAMIN B-6 Your last dose was: Your next dose is: Take 1 Tab by mouth daily. 100 mg  
    
   
   
   
  
 rifAMPin 300 mg capsule Commonly known as:  RIFADIN Your last dose was: Your next dose is: Take 2 Caps by mouth daily. On dialysis days please take this medication after Dialysis 600 mg  
    
   
   
   
  
  
STOP taking these medications   
 hydrALAZINE 100 mg tablet Commonly known as:  APRESOLINE Where to Get Your Medications Information on where to get these meds will be given to you by the nurse or doctor. ! Ask your nurse or doctor about these medications  
  minoxidil 2.5 mg tablet  
 ondansetron 4 mg disintegrating tablet Opioid Education Prescription Opioids: What You Need to Know: 
 
Prescription opioids can be used to help relieve moderate-to-severe pain and are often prescribed following a surgery or injury, or for certain health conditions. These medications can be an important part of treatment but also come with serious risks. Opioids are strong pain medicines. Examples include hydrocodone, oxycodone, fentanyl, and morphine. Heroin is an example of an illegal opioid. It is important to work with your health care provider to make sure you are getting the safest, most effective care. WHAT ARE THE RISKS AND SIDE EFFECTS OF OPIOID USE? Prescription opioids carry serious risks of addiction and overdose, especially with prolonged use. An opioid overdose, often marked by slow breathing, can cause sudden death.   The use of prescription opioids can have a number of side effects as well, even when taken as directed. · Tolerance-meaning you might need to take more of a medication for the same pain relief · Physical dependence-meaning you have symptoms of withdrawal when the medication is stopped. Withdrawal symptoms can include nausea, sweating, chills, diarrhea, stomach cramps, and muscle aches. Withdrawal can last up to several weeks, depending on which drug you took and how long you took it. · Increased sensitivity to pain · Constipation · Nausea, vomiting, and dry mouth · Sleepiness and dizziness · Confusion · Depression · Low levels of testosterone that can result in lower sex drive, energy, and strength · Itching and sweating RISKS ARE GREATER WITH:      
· History of drug misuse, substance use disorder, or overdose · Mental health conditions (such as depression or anxiety) · Sleep apnea · Older age (72 years or older) · Pregnancy Avoid alcohol while taking prescription opioids. Also, unless specifically advised by your health care provider, medications to avoid include: · Benzodiazepines (such as Xanax or Valium) · Muscle relaxants (such as Soma or Flexeril) · Hypnotics (such as Ambien or Lunesta) · Other prescription opioids KNOW YOUR OPTIONS Talk to your health care provider about ways to manage your pain that don't involve prescription opioids. Some of these options may actually work better and have fewer risks and side effects. Options may include: 
· Pain relievers such as acetaminophen, ibuprofen, and naproxen · Some medications that are also used for depression or seizures · Physical therapy and exercise · Counseling to help patients learn how to cope better with triggers of pain and stress. · Application of heat or cold compress · Massage therapy · Relaxation techniques Be Informed Make sure you know the name of your medication, how much and how often to take it, and its potential risks & side effects. IF YOU ARE PRESCRIBED OPIOIDS FOR PAIN: 
· Never take opioids in greater amounts or more often than prescribed. Remember the goal is not to be pain-free but to manage your pain at a tolerable level. · Follow up with your primary care provider to: · Work together to create a plan on how to manage your pain. · Talk about ways to help manage your pain that don't involve prescription opioids. · Talk about any and all concerns and side effects. · Help prevent misuse and abuse. · Never sell or share prescription opioids · Help prevent misuse and abuse. · Store prescription opioids in a secure place and out of reach of others (this may include visitors, children, friends, and family). · Safely dispose of unused/unwanted prescription opioids: Find your community drug take-back program or your pharmacy mail-back program, or flush them down the toilet, following guidance from the Food and Drug Administration (www.fda.gov/Drugs/ResourcesForYou). · Visit www.cdc.gov/drugoverdose to learn about the risks of opioid abuse and overdose. · If you believe you may be struggling with addiction, tell your health care provider and ask for guidance or call [facility] Mobile Event Guide at 9-139-893-FRYW. Discharge Instructions Acute High Blood Pressure: Care Instructions Your Care Instructions Acute high blood pressure is very high blood pressure. It's a serious problem. Very high blood pressure can damage your brain, heart, eyes, and kidneys. You may have been given medicines to lower your blood pressure. You may have gotten them as pills or through a needle in one of your veins. This is called an IV. And maybe you were given other medicines too. These can be needed when high blood pressure causes other problems.  
To keep your blood pressure at a lower level, you may need to make healthy lifestyle changes. And you will probably need to take medicines. Be sure to follow up with your doctor about your blood pressure and what you can do about it. Follow-up care is a key part of your treatment and safety. Be sure to make and go to all appointments, and call your doctor if you are having problems. It's also a good idea to know your test results and keep a list of the medicines you take. How can you care for yourself at home? · See your doctor as often as he or she recommends. This is to make sure your blood pressure is under control. You will probably go at least 2 times a year. But it may be more often at first. 
· Take your blood pressure medicine exactly as prescribed. You may take one or more types. They include diuretics, beta-blockers, ACE inhibitors, calcium channel blockers, and angiotensin II receptor blockers. Call your doctor if you think you are having a problem with your medicine. · If you take blood pressure medicine, talk to your doctor before you take decongestants or anti-inflammatory medicine, such as ibuprofen. These can raise blood pressure. · Learn how to check your blood pressure at home. Check it often. · Ask your doctor if it's okay to drink alcohol. · Talk to your doctor about lifestyle changes that can help blood pressure. These include being active and not smoking. When should you call for help? Call 911 anytime you think you may need emergency care. This may mean having symptoms that suggest that your blood pressure is causing a serious heart or blood vessel problem. Your blood pressure may be over 180/110. 
 For example, call 911 if: 
  · You have symptoms of a heart attack. These may include: ¨ Chest pain or pressure, or a strange feeling in the chest. 
¨ Sweating. ¨ Shortness of breath. ¨ Nausea or vomiting. ¨ Pain, pressure, or a strange feeling in the back, neck, jaw, or upper belly or in one or both shoulders or arms. ¨ Lightheadedness or sudden weakness. ¨ A fast or irregular heartbeat.  
  · You have symptoms of a stroke. These may include: 
¨ Sudden numbness, tingling, weakness, or loss of movement in your face, arm, or leg, especially on only one side of your body. ¨ Sudden vision changes. ¨ Sudden trouble speaking. ¨ Sudden confusion or trouble understanding simple statements. ¨ Sudden problems with walking or balance. ¨ A sudden, severe headache that is different from past headaches.  
  · You have severe back or belly pain.  
 Do not wait until your blood pressure comes down on its own. Get help right away. 
 Call your doctor now or seek immediate care if: 
  · Your blood pressure is much higher than normal (such as 180/110 or higher), but you don't have symptoms.  
  · You think high blood pressure is causing symptoms, such as: ¨ Severe headache. ¨ Blurry vision.  
 Watch closely for changes in your health, and be sure to contact your doctor if: 
  · Your blood pressure measures 140/90 or higher at least 2 times. That means the top number is 140 or higher or the bottom number is 90 or higher, or both.  
  · You think you may be having side effects from your blood pressure medicine.  
  · Your blood pressure is usually normal, but it goes above normal at least 2 times. Where can you learn more? Go to http://cristino-anne.info/. Enter V344 in the search box to learn more about \"Acute High Blood Pressure: Care Instructions. \" Current as of: May 10, 2017 Content Version: 11.7 © 3769-2910 [employer]. Care instructions adapted under license by [employer] (which disclaims liability or warranty for this information). If you have questions about a medical condition or this instruction, always ask your healthcare professional. [address] [address] any warranty or liability for your use of this information. Discharge Instructions Patient: Trista Orellana MRN: [id number]  CSN: [id number] YOB: 1960  Age: 56 y.o. Sex: female DOA: 8/22/2018 LOS:  LOS: 9 days   Discharge Date: DIET:  Renal Diet ACTIVITY: Activity as tolerated Home health care for PT/OT consult ADDITIONAL INFORMATION: If you experience any of the following symptoms but not limited to Fever, chills, nausea, vomiting, diarrhea, change in mentation, falling, bleeding, shortness of breath, chest pain, please call your primary care physician or return to the emergency room if you cannot get hold of your doctor:  
 
FOLLOW UP CARE: 
Dr. Jeferson Becker MD in 7-10 days. Please call and set up an appointment. HD on [facility] Lucero Conklin MD 
8/31/2018 2:00 PM 
 
 
 
 
 
  
  
  
[employer] Announcement We are excited to announce that we are making your provider's discharge notes available to you in [employer]. You will see these notes when they are completed and signed by the physician that discharged you from your recent hospital stay. If you have any questions or concerns about any information you see in [employer], please call the Health Information Department where you were seen or reach out to your Primary Care Provider for more information about your plan of care. Introducing [facility]! Abby Aguilar introduces [employer] patient portal. Now you can access parts of your medical record, email your doctor's office, and request medication refills online. 1. In your internet browser, go to [email or web address] 2. Click on the First Time User? Click Here link in the Sign In box. You will see the New Member Sign Up page. 3. Enter your [employer] Access Code exactly as it appears below. You will not need to use this code after youve completed the sign-up process. If you do not sign up before the expiration date, you must request a new code. · [employer] Access Code: [email or web address] Expires: 11/26/2018  5:34 AM 
 
 4. Enter the last four digits of your Social Security Number (xxxx) and Date of Birth (mm/dd/yyyy) as indicated and click Submit. You will be taken to the next sign-up page. 5. Create a Trippy ID. This will be your Trippy login ID and cannot be changed, so think of one that is secure and easy to remember. 6. Create a Trippy password. You can change your password at any time. 7. Enter your Password Reset Question and Answer. This can be used at a later time if you forget your password. 8. Enter your e-mail address. You will receive e-mail notification when new information is available in [address] 9. Click Sign Up. You can now view and download portions of your medical record. 10. Click the Download Summary menu link to download a portable copy of your medical information. If you have questions, please visit the Frequently Asked Questions section of the Trippy website. Remember, Trippy is NOT to be used for urgent needs. For medical emergencies, dial 911. Now available from your iPhone and Android! Introducing Eyal Leonard As a Kushal Ornelas patient, I wanted to make you aware of our electronic visit tool called Eyal Yufin. Kushal Ornelas 24/7 allows you to connect within minutes with a medical provider 24 hours a day, seven days a week via a mobile device or tablet or logging into a secure website from your computer. You can access Eyal Frantzalmafin from anywhere in the [place]. A virtual visit might be right for you when you have a simple condition and feel like you just dont want to get out of bed, or cant get away from work for an appointment, when your regular Orbsharath Simeoner provider is not available (evenings, weekends or holidays), or when youre out of town and need minor care.   Electronic visits cost only $49 and if the Eyal Leonard provider determines a prescription is needed to treat your condition, one can be electronically transmitted to a nearby pharmacy*. Please take a moment to enroll today if you have not already done so. The enrollment process is free and takes just a few minutes. To enroll, please download the [employer] 24/7 gordy to your tablet or phone, or visit [email or web address] to enroll on your computer. And, as an [address] [place] [address] patient with a [employer] account, the results of your visits will be scanned into your electronic medical record and your primary care provider will be able to view the scanned results. We urge you to continue to see your regular [employer] provider for your ongoing medical care. And while your primary care provider may not be the one available when you seek a [employer] virtual visit, the peace of mind you get from getting a real diagnosis real time can be priceless. For more information on [employer], view our Frequently Asked Questions (FAQs) at [email or web address]. Sincerely, 
 
Santhosh Harris MD 
Chief Medical Officer [employer] *:  certain medications cannot be prescribed via [employer] Providers Seen During Your Hospitalization Provider Specialty Primary office phone Niurka Shin MD Emergency Medicine [phone] Roderick Kam MD Emergency Medicine [phone] Radha Griffin MD Internal Medicine [phone] Rubio Bob MD Internal Medicine [phone] Yao Pedersen MD Internal Medicine [phone] Your Primary Care Physician (PCP) Primary Care Physician Office Phone Office Fax [address] [address] [phone] You are allergic to the following Allergen Reactions Banana Other (comments) Recent Documentation Height Weight Breastfeeding? BMI OB Status Smoking Status 1.549 m 45 kg No 18.74 kg/m2 Menopause Never Smoker Emergency Contacts Name Discharge Info Relation Home Work Mobile Shanelle Che DISCHARGE CAREGIVER [3] Spouse [3] [phone] Patient Belongings The following personal items are in your possession at time of discharge: 
  Dental Appliances: None  Visual Aid: None      Home Medications: None   Jewelry: None  Clothing: Bathrobe    Other Valuables: None Please provide this summary of care documentation to your next provider. Signatures-by signing, you are acknowledging that this After Visit Summary has been reviewed with you and you have received a copy. Patient Signature:  ____________________________________________________________ Date:  ____________________________________________________________  
  
[facility] Provider Signature:  ____________________________________________________________ Date:  ____________________________________________________________

## 2018-08-22 NOTE — ED NOTES
57 yo F who presents due to weakness, abdominal pain, and n/v x 2 days     I performed a brief evaluation, including history and physical, of the patient here in triage and I have determined that pt will need further treatment and evaluation from the main side ER physician. I have placed initial orders to help in expediting patients care. August 22, 2018 at 11:21 AM - [address] [place]      Sepsis initiated. Placed order for broad spectrum abx. Informed Dr. Desiree Vidal who immediately addressed patient at bedside.

## 2018-08-22 NOTE — ED TRIAGE NOTES
Pt.'s son-in-law states Saba Kerns feels very tired and sickly with belly pain and vomiting\". Reports symptoms started \"2 days ago\".

## 2018-08-22 NOTE — PROGRESS NOTES
RENAL DAILY PROGRESS NOTE            51y F with ESRD, admitted with HTN urgency and weakness  Subjective:       Complaint:   Examined during HD in ICU. 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 Her blood pressure dropped during HD, recommend to stop dialysis to avoid recurrent rapid drop in her BP. No urgency from volume or electrolyte stand point. BP management per stroke protocol. Plan for HD tomorrow. Limit IV hydration. Adjust all meds per ESRD status       At 5:00 PM on 2018, I saw and examined patient during hemodialysis treatment. The patient was receiving hemodialysis for treatment of  renal failure. I have also reviewed vital signs, intake and output, lab results and recent events, and agreed with today's dialysis order. HD rounding    Blood pressure 164/87, pulse (!) 113, temperature 97.7 °F (36.5 °C), resp. rate 23, weight 40.8 kg (90 lb), SpO2 97 %.   Temp (24hrs), Av.7 °F (36.5 °C), Min:97.7 °F (36.5 °C), Max:97.7 °F (36.5 °C)      Blood Pressure: BP: 164/87  Pulse: Pulse (Heart Rate): (!) 113  Temp:  Temp: 97.7 °F (36.5 °C)    Artificial Kidney Dialyzer/Set Up Inspection: Revaclear   hours     Heparin Bolus    Blood flow rate Blood Flow Rate (ml/min): 350 ml/min (dialysis treatment restarted in ICU)   Dialysate rate     Arterial Access Pressure Arterial Access Pressure (mmHg): -140  Venous Return Pressure Venous Return Pressure (mmHg): 120  Ultrafiltration Rate Goal/Amount of Fluid to Remove (mL): 2000 mL  Fluid Removal Fluid Removed (mL): 636  Net Fluid Removal NET Fluid Removed (mL): 136 ml                Current Facility-Administered Medications   Medication Dose Route Frequency    sodium chloride (NS) flush 5-10 mL  5-10 mL IntraVENous PRN    morphine injection 2 mg  2 mg IntraVENous NOW    aspirin (ASA) suppository 300 mg  300 mg Rectal DAILY    niCARdipine (CARDENE) 25 mg in 0.9% sodium chloride 250 mL infusion  0-15 mg/hr IntraVENous TITRATE    atorvastatin (LIPITOR) tablet 40 mg  40 mg Oral QHS    bisacodyl (DULCOLAX) tablet 5 mg  5 mg Oral DAILY PRN    docusate sodium (COLACE) capsule 100 mg  100 mg Oral BID    [START ON 8/23/2018] ethambutol (MYAMBUTOL) tablet 800 mg  800 mg Oral Q TUE, THU & SAT    isoniazid (NYDRAZID) tablet 300 mg  300 mg Oral DAILY    [START ON 8/23/2018] pyrazinamide tablet 1,000 mg  1,000 mg Oral Q TUE, THU & SAT    [START ON 8/23/2018] pyridoxine (vitamin B6) (VITAMIN B-6) tablet 100 mg  100 mg Oral DAILY    rifAMPin (RIFADIN) capsule 600 mg  600 mg Oral DAILY    sodium chloride (NS) flush 5-10 mL  5-10 mL IntraVENous Q8H    sodium chloride (NS) flush 5-10 mL  5-10 mL IntraVENous PRN    sodium chloride (NS) flush 5-10 mL  5-10 mL IntraVENous Q8H    sodium chloride (NS) flush 5-10 mL  5-10 mL IntraVENous PRN    dextrose 5% and 0.9% NaCl infusion  75 mL/hr IntraVENous CONTINUOUS    acetaminophen (TYLENOL) tablet 650 mg  650 mg Oral Q6H PRN    heparin (porcine) injection 5,000 Units  5,000 Units SubCUTAneous Q8H    hydrALAZINE (APRESOLINE) tablet 100 mg  100 mg Oral TID    carvedilol (COREG) tablet 25 mg  25 mg Oral Q12H    [START ON 8/23/2018] amLODIPine (NORVASC) tablet 10 mg  10 mg Oral DAILY    albuterol (PROVENTIL VENTOLIN) nebulizer solution 2.5 mg  2.5 mg Nebulization Q4H PRN       Review of Symptoms: comprehensive ROS negative except above.    Objective:   Patient Vitals for the past 24 hrs:   Temp Pulse Resp BP SpO2   08/22/18 1648 - (!) 113 23 164/87 97 %   08/22/18 1645 - (!) 107 29 (!) 160/99 97 %   08/22/18 1600 - (!) 101 - (!) 203/146 -   08/22/18 1300 - 90 28 (!) 197/115 99 %   08/22/18 1245 - 85 17 (!) 199/125 98 %   08/22/18 1230 - 83 (!) 31 (!) 197/127 98 %   08/22/18 1215 - (!) 108 30 (!) 230/126 99 %   08/22/18 1213 - - - - 98 % 08/22/18 1200 - (!) 104 19 (!) 226/131 98 %   08/22/18 1158 - (!) 106 30 - 98 %   08/22/18 1152 - - - (!) 232/134 -   08/22/18 1118 97.7 °F (36.5 °C) (!) 122 20 (!) 231/144 98 %        Weight change:           Intake/Output Summary (Last 24 hours) at 08/22/18 1729  Last data filed at 08/22/18 1600   Gross per 24 hour   Intake                0 ml   Output              136 ml   Net             -136 ml     Physical Exam  General: comfortable, no acute distress   HEENT sclera anicteric,   CVS: S1S2 heard,  no rub  RS: + air entry b/l,   Abd: Soft, Non tender,  Neuro:  awake,   Extrm: no  edema, no cyanosis, clubbing   Skin: no visible  Rash  Musculoskeletal: No gross joints or bone deformities   Access; TDC right side   Procedures/imaging: see electronic medical records for all procedures, Xrays and details which were not copied into this note but were reviewed prior to creation of Plan        Data Review:     LABS:   Hematology: Recent Labs      08/22/18   1137   WBC  4.7   HGB  14.1   HCT  39.9     Chemistry: Recent Labs      08/22/18   1137   BUN  46*   CREA  5.56*   CA  9.5   ALB  3.1*   K  4.5   NA  134*   CL  96*   CO2  27   GLU  115*            Procedures/imaging: see electronic medical records for all procedures, Xrays and details which were not copied into this note but were reviewed prior to creation of Plan          Assessment & Plan:     As above         Eden Ventura MD  8/22/2018  5:29 PM

## 2018-08-22 NOTE — DIALYSIS
ACUTE HEMODIALYSIS FLOW SHEET    HEMODIALYSIS ORDERS: Physician: Elizabeth Medina     Dialyzer: revaclear   Duration: 3 hr  BFR: 350   DFR: 800   Dialysate:  Temp 37 K+   2    Ca+  2.5 Na 1.38 Bicarb 35   Weight:  40.8 kg    Patient Chart [x]     Unable to Obtain []   Dry weight/UF Goal: 1500-2000ml Access CVC    Heparin []  Bolus      Units    [] Hourly       Units    [x]None      Catheter locking solution Heparin 1.9ml   Pre BP:   222/132    Pulse:     96     Temperature:   98.1  Respirations: 20  Tx: NS       ml/Bolus  Other        [x] N/A   Labs: Pre        Post:        [x] N/A   Additional Orders(medications, blood products, hypotension management):       [x] N/A     [x] Hawk Consent Verified     CATHETER ACCESS: []N/A   [x]Right   []Left   []IJ     []Fem   [] First use X-ray verified     [x]Tunnel                [] Non Tunneled   [x]No S/S infection  []Redness  []Drainage []Cultured []Swelling []Pain   [x]Medical Aseptic Prep Utilized   [x]Dressing Changed  [x] Biopatch  Date: 8/22/2018      []Clotted   [x]Patent   Flows: []Good  []Poor  []Reversed   If access problem,  notified: []Yes    [x]N/A  Date:           GRAFT/FISTULA ACCESS:  [x]N/A     []Right     []Left     []UE     []LE   []AVG   []AVF        []Buttonhole    []Medical Aseptic Prep Utilized   []No S/S infection  []Redness  []Drainage []Cultured []Swelling []Pain    Bruit:   [] Strong    [] Weak       Thrill :   [] Strong    [] Weak       Needle Gauge:    Length:     If access problem,  notified: []Yes     []N/A  Date:        Please describe access if present and not used:       GENERAL ASSESSMENT:    LUNGS:  Rate 18 SaO2%        [] N/A    [x] Clear  [] Coarse  [] Crackles  [] Wheezing        [] Diminished     Location : []RLL   []LLL    []RUL  []ELÍAS   Cough: []Productive  []Dry  [x]N/A   Respirations:  [x]Easy  []Labored   Therapy:  [x]RA  []NC  l/min    Mask: []NRB []Venti       O2%                  []Ventilator  []Intubated  [] Trach  [] BiPaP CARDIAC: []Regular      [x] Irregular   [] Pericardial Rub  [] JVD        [x]  Monitored  [x] Bedside  [] Remotely monitored [] N/A  Rhythm:    EDEMA: [] None  [x]Generalized  [] Pitting [] 1    [] 2    [] 3    [] 4                 [x] Facial  [] Pedal  []  UE  [x] LE   SKIN:   [x] Warm  [] Hot     [] Cold   [x] Dry     [] Pale   [] Diaphoretic                  [] Flushed  [] Jaundiced  [] Cyanotic  [] Rash  [] Weeping   LOC:    [x] Alert      []Oriented:    [] Person     [] Place  []Time               [] Confused  [] Lethargic  [] Medicated  [] Non-responsive     GI / ABDOMEN:  [] Flat    [] Distended    [x] Soft    [] Firm   []  Obese                             [] Diarrhea  [x] Bowel Sounds  [] Nausea  [] Vomiting       / URINE ASSESSMENT:[] Voiding   [] Oliguria  [x] Anuria   []  Onofre     [] Incontinent    []  Incontinent Brief      []  Bathroom Privileges     PAIN: [x] 0 []1  []2   []3   []4   []5   []6   []7   []8   []9   []10            Scale 0-10  Action/Follow Up:    MOBILITY:  [] Amb    [] Amb/Assist    [x] Bed    [] Wheelchair  [] Stretcher      All Vitals and Treatment Details on Attached [address]: [facility]          Room # ED---307     [] 1st Time Acute  [x] Stat  [] Routine  [] Urgent     [x] Acute Room  []  Bedside  [x] ICU/CCU  [] ER   Isolation Precautions:  [x] Dialysis   [] Airborne   [] Contact    [] Reverse   Special Considerations:         [] Blood Consent Verified [x]N/A     ALLERGIES:   [x]            Allergies   Banana Other (comments)   9/8/2017  Past Updates. .. Alvarez as Reviewed Last Reviewed by Leia iHckman on 8/22/2018 at 2:24 PM: Review Complete  (History)           Code Status:  [x] Full Code  [] DNR  [] Other           HBsAg ONLY: Date Drawn 9/9/2017         [x]Negative []Positive []Unknown   HBsAb: Date 9/9/2017    [] Susceptible   [x] [id number] []Not Drawn  [] Drawn     Current Labs:    Date of Labs:            Today [x]       Results for Doreen López (MRN [id number]) as of 8/22/2018 18:03   Ref. Range 8/22/2018 11:37   WBC Latest Ref Range: 4.6 - 13.2 K/uL 4.7   RBC Latest Ref Range: 4.20 - 5.30 M/uL 4.38   HGB Latest Ref Range: 12.0 - 16.0 g/dL 14.1   HCT Latest Ref Range: 35.0 - 45.0 % 39.9   MCV Latest Ref Range: 74.0 - 97.0 FL 91.1   MCH Latest Ref Range: 24.0 - 34.0 PG 32.2   MCHC Latest Ref Range: 31.0 - 37.0 g/dL 35.3   RDW Latest Ref Range: 11.6 - 14.5 % 12.5   PLATELET Latest Ref Range: 135 - 420 K/uL 103 (L)   MPV Latest Ref Range: 9.2 - 11.8 FL 9.4   NEUTROPHILS Latest Ref Range: 40 - 73 % 71   LYMPHOCYTES Latest Ref Range: 21 - 52 % 12 (L)   MONOCYTES Latest Ref Range: 3 - 10 % 14 (H)   EOSINOPHILS Latest Ref Range: 0 - 5 % 1   BASOPHILS Latest Ref Range: 0 - 2 % 2   DF Latest Units:   AUTOMATED   ABS. NEUTROPHILS Latest Ref Range: 1.8 - 8.0 K/UL 3.4   ABS. LYMPHOCYTES Latest Ref Range: 0.9 - 3.6 K/UL 0.6 (L)   ABS. MONOCYTES Latest Ref Range: 0.05 - 1.2 K/UL 0.7   ABS. EOSINOPHILS Latest Ref Range: 0.0 - 0.4 K/UL 0.0   ABS.  BASOPHILS Latest Ref Range: 0.0 - 0.1 K/UL 0.1   INR Latest Ref Range: 0.8 - 1.2   0.9   Prothrombin time Latest Ref Range: 11.5 - 15.2 sec 11.8   aPTT Latest Ref Range: 23.0 - 36.4 SEC 33.3   Sodium Latest Ref Range: 136 - 145 mmol/L 134 (L)   Potassium Latest Ref Range: 3.5 - 5.5 mmol/L 4.5   Chloride Latest Ref Range: 100 - 108 mmol/L 96 (L)   CO2 Latest Ref Range: 21 - 32 mmol/L 27   Anion gap Latest Ref Range: 3.0 - 18 mmol/L 11   Glucose Latest Ref Range: 74 - 99 mg/dL 115 (H)   BUN Latest Ref Range: 7.0 - 18 MG/DL 46 (H)   Creatinine Latest Ref Range: 0.6 - 1.3 MG/DL 5.56 (H)   BUN/Creatinine ratio Latest Ref Range: 12 - 20   8 (L)   Calcium Latest Ref Range: 8.5 - 10.1 MG/DL 9.5   Magnesium Latest Ref Range: 1.6 - 2.6 mg/dL 3.2 (H)   GFR est non-AA Latest Ref Range: >60 ml/min/1.73m2 8 (L)   GFR est AA Latest Ref Range: >60 ml/min/1.73m2 10 (L)   Bilirubin, total Latest Ref Range: 0.2 - 1.0 MG/DL 0.6   Protein, total Latest Ref Range: 6.4 - 8.2 g/dL 6.8   Albumin Latest Ref Range: 3.4 - 5.0 g/dL 3.1 (L)   Globulin Latest Ref Range: 2.0 - 4.0 g/dL 3.7   A-G Ratio Latest Ref Range: 0.8 - 1.7   0.8   ALT (SGPT) Latest Ref Range: 13 - 56 U/L 9 (L)   AST Latest Ref Range: 15 - 37 U/L 36   Alk. phosphatase Latest Ref Range: 45 - 117 U/L 109   Lipase Latest Ref Range: 73 - 393 U/L 544 (H)   CK Latest Ref Range: 26 - 192 U/L 184   CK-MB Index Latest Ref Range: 0.0 - 4.0 % 0.7   CK - MB Latest Ref Range: <3.6 ng/ml 1.2   Troponin-I, Qt.  Latest Ref Range: 0.0 - 0.045 NG/ML 0.17 (H)   TSH Latest Ref Range: 0.36 - 3.74 uIU/mL 5.03 (H)   CULTURE, BLOOD Unknown Rpt                                                                                DIET:  [x] Renal    [] Other     [] NPO     []  Diabetic      PRIMARY NURSE REPORT: First initial/Last name/Title      Pre Dialysis: Aye Rosenbaum Rn     Time: 1330      EDUCATION:    [x] Patient [] Other         Knowledge Basis: []None []Minimal [] Substantial   Barriers to learning  Langauge     [x] Access Care     [] S&S of infection     [x] Fluid Management     []K+     []Procedural    []Albumin     [] Medications     [] Tx Options     [] Transplant     [] Diet     [] Other   Teaching Tools:  [x] Explain  [x] Demo  [] Handouts [] Video  Patient response:   [] Verbalized understanding  [] Teach back  [] Return demonstration [x] Requires follow up   Inappropriate due to  Pt does not speak english     [x] Time Out/Safety Check  [x]Extracorporeal Circuit Tested for integrity       RO/HEMODIALYSIS MACHINE SAFETY CHECKS  Before each treatment:     Machine Number:                   [facility]                                   [x] Unit Machine # 5 with centralized RO                                            Alarm Test: Pass time: 1171   [x] RO/Machine Log Complete      Temp    37             Dialysate: pH  7.2 Conductivity: Meter   13.6     HD Machine   13.7                  TCD: 13.7  Dialyzer Lot # [id number] Blood Tubing Lot # [id number]          Saline Lot #  [id number]                                                                     [x] Portable Machine #2/RO serial # [id number]                              Alarm Test:  Pass time 1610         Other:         [x] RO/Machine Log Complete      Temp    37             Dialysate: pH  7.2 Conductivity: Meter   13.8     HD Machine   13.8                  TCD: 13.7  Dialyzer Lot # [id number]            Blood Tubing Lot # [id number]          Saline Lot #  [id number]     CHLORINE TESTING-Before each treatment and every 4 hours    Total Chlorine:[x] less than 0.1 ppm  Time: 1400  And 1600 4 Hr/2nd Check Time: NA   (if greater than 0.1 ppm from Primary then every 30 minutes from Secondary)     TREATMENT INITIATION  with Dialysis Precautions:   [x] All Connections Secured                 [x] Saline Line Double Clamped   [x] Venous Parameters Set                  [x] Arterial Parameters Set    [x] Prime Given  250ml                          [x]Air Foam Detector Engaged      Treatment Initiation Note:  pt arrive via transport in bed, pt is alert, pt is very Hypertensive, no S/S of distress, VSS. tx started with out complications. CVC no S/S of complications. Dr. Chaparrita Boles at bedside before and at start of treament  During Treatment Notes:  ICU RN Conrado Uribe called to discuss pt, was received orders to go to ICU, Treatment was stopped and new setup was hung. Pt then transferred down to ICU where she will continue her treament    RN arrived to pts room, pt resting in bed, pt is stable to start tx. Pt is monitored at bedside. Monitor settings are set for Q15min.    Dr. Chaparrita Boles stopped treatment due to pts BP          Medication Dose Volume Route Initials Dialyzer Cleared:[x] Good [] Fair  [] Poor    Blood processed:  26.4 L  UF Removed  0 Ml  POst BP:   203/103       Pulse: 107      Respirations: 19  Temperature: 98                                   Post Tx Vascular Access: AVF/AVG:   N/A Catheter: Locking solution: Heparin 1:1000 Art. 1.9  Wei. 1.9                                   Post Assessment:                                    Skin:  [x] Warm  [x] Dry [] Diaphoretic    [] Flushed  [] Pale [] Cyanotic   [facility] Signatures Title Initials  Time Lungs: [x] Clear    [] Course  [] Crackles  [] Wheezing [] Diminished    RN   Cardiac: [] Regular   [x] Irregular   [x] Monitor  [] N/A  Rhythm:           Edema:  [] None    [x] General     [x] Facial   [] Pedal    [] UE    [x] LE       Pain: [x]0  []1  []2   []3  []4   []5   []6   []7   []8   []9   []10     Post Treatment Note:  Pt resting in bed, pt tolerated the tx, floor RN given treatment report.       POST TREATMENT PRIMARY NURSE HANDOFF REPORT:     First initial/Last name/Title         Post Dialysis: Laura Milton RN  Time:  6324     Abbreviations: AVG-arterial venous graft, AVF-arterial venous fistula, IJ-Internal Jugular, Subcl-Subclavian, Fem-Femoral, Tx-treatment, AP/HR-apical heart rate, DFR-dialysate flow rate, BFR-blood flow rate, AP-arterial pressure, -venous pressure, UF-ultrafiltrate, TMP-transmembrane pressure, Wei-Venous, Art-Arterial, RO-Reverse Osmosis

## 2018-08-22 NOTE — ED NOTES
TRANSFER - OUT REPORT:    Verbal report given to KRISTIN De Luna(name) on Betty Mejia  being transferred to 3-ICU(unit) for routine progression of care       Report consisted of patients Situation, Background, Assessment and   Recommendations(SBAR). Information from the following report(s) ED Summary was reviewed with the receiving nurse. Lines:   Peripheral IV 08/22/18 Right Forearm (Active)   Site Assessment Clean, dry, & intact 8/22/2018 11:39 AM   Phlebitis Assessment 0 8/22/2018 11:39 AM   Infiltration Assessment 0 8/22/2018 11:39 AM   Dressing Status Clean, dry, & intact 8/22/2018 11:39 AM   Dressing Type Transparent 8/22/2018 11:39 AM   Hub Color/Line Status Blue;Flushed;Patent 8/22/2018 11:39 AM   Action Taken Blood drawn 8/22/2018 11:39 AM       Peripheral IV 08/22/18 Right Antecubital (Active)   Site Assessment Clean, dry, & intact 8/22/2018  1:43 PM   Dressing Type Transparent 8/22/2018  1:43 PM   Hub Color/Line Status Patent 8/22/2018  1:43 PM        Opportunity for questions and clarification was provided.       Patient transported with:

## 2018-08-22 NOTE — LETTER
NOTIFICATION RETURN TO WORK / SCHOOL 
 
8/23/2018 10:53 AM 
 
Ms. Aysha Nesbitt 
[address] [facility] [address] [address] [address] To Whom It May Concern: 
 
Aysha Nesbitt is currently under the care of [facility]. She will return to work on: undecided as of to day If there are questions or concerns please have the patient contact our office. Sincerely, Maxim Leonard MD

## 2018-08-23 NOTE — PROGRESS NOTES
[facility] Hospitalist Group  Progress Note    Patient: Josse Bolden Age: 56 y.o. : 1960 MR#: [id number] SSN: [id number]  Date/Time: 2018     Subjective:     Review of systems    Patient is sleepy     Assessment/Plan:   1.  Hypertensive urgency - improved with Cardene   2 Left sided weakness - improved with improved BP   3 History of MTB and MAC on DOT, sputum smears cleared for AFB - on Rifampin + Ethambutol +INH+Pza +pyridoxine   4 ESRD on IHD  5 H/o NSTEMI  6 History of seizures    PLAN   - Continue Cardene drip / change to po meds   - Continue HD per renal           Case discussed with:  []Patient  []Family  [x]Nursing  []Case Management  DVT Prophylaxis:  []Lovenox  []Hep SQ  []SCDs  []Coumadin   []On Heparin gtt          Objective:   VS:   Visit Vitals    /81    Pulse 96    Temp 98.1 °F (36.7 °C)    Resp 17    Ht 5' 1\" (1.549 m)    Wt 40.8 kg (90 lb)    SpO2 98%    Breastfeeding No    BMI 17.01 kg/m2      Tmax/24hrs: Temp (24hrs), Av.2 °F (36.8 °C), Min:97.7 °F (36.5 °C), Max:98.6 °F (37 °C)  IOBRIEF  Intake/Output Summary (Last 24 hours) at 18 1249  Last data filed at 18 1030   Gross per 24 hour   Intake           555.83 ml   Output              161 ml   Net           394.83 ml       General:  Sleepy   Cardiovascular: S1S2 - regular , No Murmur   Pulmonary: Equal expansion , No Use of accessory muscles , No Rales No Rhonchi    GI:  +BS in all four quadrants, soft, non-tender  Extremities:  No edema; 2+ dorsalis pedis pulses bilaterally  Neuro: sleepy       Medications:   Current Facility-Administered Medications   Medication Dose Route Frequency    niCARdipine (CARDENE) 25 mg in 0.9% sodium chloride 250 mL infusion  0-15 mg/hr IntraVENous TITRATE    promethazine (PHENERGAN) 6.25 mg in 0.9% sodium chloride 50 mL IVPB  6.25 mg IntraVENous Q6H PRN    polyethylene glycol (MIRALAX) packet 17 g  17 g Oral DAILY    aspirin (ASA) suppository 300 mg 300 mg Rectal DAILY    atorvastatin (LIPITOR) tablet 40 mg  40 mg Oral QHS    bisacodyl (DULCOLAX) tablet 5 mg  5 mg Oral DAILY PRN    docusate sodium (COLACE) capsule 100 mg  100 mg Oral BID    ethambutol (MYAMBUTOL) tablet 800 mg  800 mg Oral Q TUE, THU & SAT    isoniazid (NYDRAZID) tablet 300 mg  300 mg Oral DAILY    pyrazinamide tablet 1,000 mg  1,000 mg Oral Q TUE, THU & SAT    pyridoxine (vitamin B6) (VITAMIN B-6) tablet 100 mg  100 mg Oral DAILY    rifAMPin (RIFADIN) capsule 600 mg  600 mg Oral DAILY    sodium chloride (NS) flush 5-10 mL  5-10 mL IntraVENous Q8H    sodium chloride (NS) flush 5-10 mL  5-10 mL IntraVENous PRN    dextrose 5% and 0.9% NaCl infusion  75 mL/hr IntraVENous CONTINUOUS    acetaminophen (TYLENOL) tablet 650 mg  650 mg Oral Q6H PRN    heparin (porcine) injection 5,000 Units  5,000 Units SubCUTAneous Q8H    hydrALAZINE (APRESOLINE) tablet 100 mg  100 mg Oral TID    carvedilol (COREG) tablet 25 mg  25 mg Oral Q12H    amLODIPine (NORVASC) tablet 10 mg  10 mg Oral DAILY    albuterol (PROVENTIL VENTOLIN) nebulizer solution 2.5 mg  2.5 mg Nebulization Q4H PRN    cloNIDine (CATAPRES) 0.3 mg/24 hr patch 1 Patch  1 Patch TransDERmal Q7D       Labs:    Recent Labs      08/23/18   0235  08/22/18   1137   WBC  5.0  4.7   HGB  11.5*  14.1   HCT  34.3*  39.9   PLT  94*  103*     Recent Labs      08/23/18   0235  08/22/18   1137   NA  135*  134*   K  3.8  4.5   CL  100  96*   CO2  26  27   GLU  103*  115*   BUN  38*  46*   CREA  4.44*  5.56*   CA  8.5  9.5   MG  2.7*  3.2*   PHOS  2.9   --    ALB  2.7*  3.1*   SGOT  34  36   ALT  8*  9*   INR   --   0.9         Signed By: Fawad Higgins MD     August 23, 2018

## 2018-08-23 NOTE — PROGRESS NOTES
NUTRITION    Nutrition Screen     RECOMMENDATIONS / PLAN:     - Pt NPO per SLP, recommend NG tube placement to provide EN as medically appropriate if pt to remain NPO.  - Continue RD inpatient monitoring and evaluation. Goal EN Regimen: Nepro at 45 mL/hr + 100 mL q 4 hour water flushes to provide: 1944 kcal, 87 gm protein, 104 gm fat, 180 gm CHO, 17 gm fiber, 783 mL free water, 1383 mL total water, 100% RDIs     NUTRITION INTERVENTIONS & DIAGNOSIS:     [x] Enteral Nutrition: recommend  [x] Collaboration and referral of nutrition care: interdisciplinary rounds    Nutrition Diagnosis: Underweight related to prolonged inadequate energy intake as evidenced by pt with BMI of 17 kg/(m^2) upon admission. ASSESSMENT:     Pt with N/V upon admission and currently. NPO started on phenergan. Planning for HD today. No changes in weight PTA, but remains underweight. SLP recommending NPO with consideration of short term means of nutrition. Average po intake adequate to meet patients estimated nutritional needs:   [] Yes     [x] No   [] Unable to determine at this time    Diet: DIET NPO      Food Allergies: Banana  Current Appetite:   [] Good     [] Fair     [] Poor     [x] Other: NPO  Appetite/meal intake prior to admission:   [] Good     [] Fair     [] Poor     [x] Other: unknown, pt with N/V PTA  Feeding Limitations:  [x] Swallowing difficulty: SLP following    [] Chewing difficulty    [] Other:  Current Meal Intake: No data found.       BM: 8/22  Skin Integrity: WDL  Edema:   [x] No     [] Yes   Pertinent Medications: Reviewed: D5 NS at 75 mL/hr (stopped), colace, miralax, phenergan, Vitamin B-6     Recent Labs      08/23/18   0235  08/22/18   1137   NA  135*  134*   K  3.8  4.5   CL  100  96*   CO2  26  27   GLU  103*  115*   BUN  38*  46*   CREA  4.44*  5.56*   CA  8.5  9.5   MG  2.7*  3.2*   PHOS  2.9   --    ALB  2.7*  3.1*   SGOT  34  36   ALT  8*  9*       Intake/Output Summary (Last 24 hours) at 08/23/18 [facility] filed at 08/22/18 1850   Gross per 24 hour   Intake           122.08 ml   Output              136 ml   Net           -13.92 ml       Anthropometrics:  Ht Readings from Last 1 Encounters:   08/23/18 5' 1\" (1.549 m)     Last 3 Recorded Weights in this Encounter    08/22/18 1118 08/23/18 0832   Weight: 40.8 kg (90 lb) 40.8 kg (90 lb)     Body mass index is 17.01 kg/(m^2). Underweight    Weight History: Weight stable x 5 months PTA. Weight Metrics 8/23/2018 8/4/2018 7/20/2018 6/27/2018 6/26/2018 5/31/2018 3/9/2018   Weight 90 lb 90 lb 90 lb 6.2 oz 90 lb 6.2 oz 85 lb 8.6 oz 90 lb 6.2 oz 84 lb 3.2 oz   BMI 17.01 kg/m2 17.01 kg/m2 17.08 kg/m2 17.08 kg/m2 16.16 kg/m2 17.08 kg/m2 15.91 kg/m2        Admitting Diagnosis: Hypertensive emergency  Pertinent PMHx: ESRD on HD, HTN, vitamin D deficiency     Education Needs:        [x] None identified  [] Identified - Not appropriate at this time  []  Identified and addressed - refer to education log  Learning Limitations:   [] None identified  [x] Identified: language barrier    Cultural, Christian & ethnic food preferences:  [x] None identified    [] Identified and addressed    ESTIMATED NUTRITION NEEDS:     Calories: 4512-4896 kcal (30-35 kcal/kg) based on  [] Actual BW      [x] SBW 56 kg  Protein: 67-84 gm (1.2-1.5 gm/kg) based on  [] Actual BW      [x] SBW   Fluid: 1 mL/kcal     MONITORING & EVALUATION:     Nutrition Goal(s):   1. Po intake of meals will meet >75% of patient estimated nutritional needs within the next 7 days.   Outcome:  [] Met/Ongoing    []  Not Met    [x] New/Initial Goal     Monitoring:   [] Food and beverage intake   [x] Diet order   [x] Nutrition-focused physical findings   [x] Treatment/therapy   [] Weight   [] Enteral nutrition intake        Previous Recommendations (for follow-up assessments only):     []   Implemented       []   Not Implemented (RD to address)      [] No Longer Appropriate     [] No Recommendation Made     Discharge Planning: pending ability to tolerate po diet   [x] Participated in care planning, discharge planning, & interdisciplinary rounds as appropriate      Andrea Head RD   Pager: [phone]

## 2018-08-23 NOTE — DIALYSIS
ACUTE HEMODIALYSIS FLOW SHEET    HEMODIALYSIS ORDERS: Physician: Sandy Richter     Dialyzer: revaclear   Duration: 3 hr  BFR: 350  DFR: 800   Dialysate:  Temp 37.0 K+   2    Ca+  2.5 Na 138 Bicarb 35   Weight:  40.8 kg    Patient Chart [x]     Unable to Obtain []   Dry weight/UF Goal: 1500 Access Central Line  Needle Gauge N/A    Heparin []  Bolus      Units    [] Hourly       Units    [x]None      Catheter locking solution Heparin    Pre BP:   152/86  Pulse:    93    Temperature:   97.7  Respirations: 17  Tx: NS       ml/Bolus  Other        [x] N/A   Labs: Pre        Post:        [x] N/A   Additional Orders(medications, blood products, hypotension management):  [x] N/A     [x] Hawk Consent Verified     CATHETER ACCESS: []N/A   [x]Right   []Left   []IJ     []Fem   [] First use X-ray verified     [x]Tunnel                [] Non Tunneled   [x]No S/S infection  []Redness  []Drainage []Cultured []Swelling []Pain   [x]Medical Aseptic Prep Utilized   []Dressing Changed  [x] Biopatch  Date: 08/22/18    []Clotted   [x]Patent   Flows: [x]Good  []Poor  []Reversed   If access problem,  notified: []Yes    [x]N/A  Date:           GRAFT/FISTULA ACCESS:  [x]N/A     []Right     []Left     []UE     []LE   []AVG   []AVF        []Buttonhole    []Medical Aseptic Prep Utilized   []No S/S infection  []Redness  []Drainage []Cultured []Swelling []Pain    Bruit:   [] Strong    [] Weak       Thrill :   [] Strong    [] Weak       Needle Gauge: N/A   Length: N/A   If access problem,  notified: []Yes     []N/A  Date:        Please describe access if present and not used: N/A       GENERAL ASSESSMENT:    LUNGS:  Rate 17 SaO2%   98     [] N/A    [x] Clear  [] Coarse  [] Crackles  [] Wheezing        [] Diminished     Location : []RLL   []LLL    []RUL  []ELÍAS   Cough: []Productive  []Dry  [x]N/A   Respirations:  [x]Easy  []Labored   Therapy:  [x]RA  []NC l/min    Mask: []NRB []Venti       O2%                  []Ventilator  []Intubated  [] Trach  [] BiPaP   CARDIAC: []Regular      [] Irregular   [] Pericardial Rub  [] JVD        []  Monitored  [] Bedside  [x] Remotely monitored [] N/A  Rhythm: Sinus    EDEMA: [x] None  []Generalized  [] Pitting [] 1    [] 2    [] 3    [] 4                 [] Facial  [] Pedal  []  UE  [] LE   SKIN:   [x] Warm  [] Hot     [] Cold   [x] Dry     [] Pale   [] Diaphoretic                  [] Flushed  [] Jaundiced  [] Cyanotic  [] Rash  [] Weeping   LOC:    [] Alert      []Oriented:    [] Person     [] Place  []Time               [x] Confused  [] Lethargic  [] Medicated  [] Non-responsive- Verbally mute- makes eye contact and calls out      GI / ABDOMEN: [x] Flat    [] Distended    [] Soft    [x] Firm   []  Obese                             [] Diarrhea  [] Bowel Sounds  [x] Nausea  [] Vomiting  Upon palpation of abdomen pt pulls away refuses assessment.  / URINE ASSESSMENT:[] Voiding   [x] Oliguria  [] Anuria   []  Onofre     [] Incontinent    []  Incontinent Brief      []  Bathroom Privileges     PAIN: [] 0 []1  []2   []3   []4   []5   []6   []7   []8   []9   []10            Scale 0-10  Action/Follow Up: FACES- moderate pain-Pt winces and grimaces with calling out. Pt is tense with all extremities pulled in, almost fetal position. MOBILITY:  [] Amb    [] Amb/Assist    [x] Bed    [] Wheelchair  [] Stretcher      All Vitals and Treatment Details on Attached [address]: [facility]          Room #      [] 1st Time Acute  [] Stat  [] Routine  [] Urgent     [] Acute Room  []  Bedside  [x] ICU/CCU  [] ER   Isolation Precautions:  [x] Dialysis   [] Airborne   [] Contact    [] Reverse   Special Considerations:         [] Blood Consent Verified [x]N/A     ALLERGIES: Reviewed 8/23/18 LP [] NKA             Allergies   Banana Other (comments)   9/8/2017  Past Updates. .. Alvarez as Reviewed Last Reviewed by Albert Hickman on 8/22/2018 at 2:24 PM: Review Complete  (History)              Code Status: [x] Full Code  [] DNR  [] Other           HBsAg ONLY: Date Drawn 9/9/17       [x]Negative []Positive []Unknown   HBsAb: Date 9/9/17   [] Susceptible   [x] [id number] []Not Drawn  [] Drawn     Current Labs:    Date of Labs: 8/23/18        Today [x]        Results for Aminah Ivan (MRN [id number]) as of 8/23/2018 12:28   Ref. Range 8/23/2018 02:35   Sodium Latest Ref Range: 136 - 145 mmol/L 135 (L)   Potassium Latest Ref Range: 3.5 - 5.5 mmol/L 3.8   Chloride Latest Ref Range: 100 - 108 mmol/L 100   CO2 Latest Ref Range: 21 - 32 mmol/L 26   Anion gap Latest Ref Range: 3.0 - 18 mmol/L 9   Glucose Latest Ref Range: 74 - 99 mg/dL 103 (H)   BUN Latest Ref Range: 7.0 - 18 MG/DL 38 (H)   Creatinine Latest Ref Range: 0.6 - 1.3 MG/DL 4.44 (H)   BUN/Creatinine ratio Latest Ref Range: 12 - 20   9 (L)   Calcium Latest Ref Range: 8.5 - 10.1 MG/DL 8.5   Phosphorus Latest Ref Range: 2.5 - 4.9 MG/DL 2.9   Magnesium Latest Ref Range: 1.6 - 2.6 mg/dL 2.7 (H)   GFR est non-AA Latest Ref Range: >60 ml/min/1.73m2 10 (L)   GFR est AA Latest Ref Range: >60 ml/min/1.73m2 12 (L)   Bilirubin, total Latest Ref Range: 0.2 - 1.0 MG/DL 0.8   Protein, total Latest Ref Range: 6.4 - 8.2 g/dL 6.0 (L)   Albumin Latest Ref Range: 3.4 - 5.0 g/dL 2.7 (L)   Globulin Latest Ref Range: 2.0 - 4.0 g/dL 3.3   A-G Ratio Latest Ref Range: 0.8 - 1.7   0.8   ALT (SGPT) Latest Ref Range: 13 - 56 U/L 8 (L)   AST Latest Ref Range: 15 - 37 U/L 34   Alk. phosphatase Latest Ref Range: 45 - 117 U/L 86   Lipase Latest Ref Range: 73 - 393 U/L 335   Triglyceride Latest Ref Range: <150 MG/ (H)   Cholesterol, total Latest Ref Range: <200 MG/   HDL Cholesterol Latest Ref Range: 40 - 60 MG/DL 62 (H)   CHOL/HDL Ratio Latest Ref Range: 0 - 5.0   2.1   LDL, calculated Latest Ref Range: 0 - 100 MG/DL 35   VLDL, calculated Latest Units: MG/DL 31   CK Latest Ref Range: 26 - 192 U/L 147   CK-MB Index Latest Ref Range: 0.0 - 4.0 % 0.8   CK - MB Latest Ref Range: <3.6 ng/ml 1.2   Troponin-I, Qt.  Latest Ref Range: 0.0 - 0.045 NG/ML 0.18 (H)   Hemoglobin A1c, (calculated) Latest Ref Range: 4.2 - 5.6 % <3.5 (L)   Est. average glucose Latest Units: mg/dL Cannot be calculated     Results for Doreen López (MRN [id number]) as of 8/23/2018 12:28   Ref. Range 9/9/2017 03:04   Hepatitis B surface Ag Latest Ref Range: <1.00 Index <0.10   Hep B surface Ag Interp. Latest Ref Range: NEG   NEGATIVE   Hepatitis B surface Ab Latest Ref Range: >10.0 mIU/mL 497.64   Hep B surface Ab Interp. Latest Ref Range: POS   POSITIVE   Hep B surface Ab comment Latest Units:   Samples with a  v... Hepatitis B core, IgM Latest Ref Range: NEG   NEGATIVE   Hepatitis C virus Ab Latest Ref Range: <0.80 Index 0.06   Hep C  virus Ab Interp. Latest Ref Range: NEG   NEGATIVE   Hep C  virus Ab comment Latest Units:   Pend                                                                                DIET: [] Renal    [] Other     [x] NPO -EXCEPT MEDS    []  Diabetic      PRIMARY NURSE REPORT: First initial/Last name/Title      Pre Dialysis: MIMA Wick Daily, RN  Time: 56      EDUCATION:    [x] Patient [] Other         Knowledge Basis: []None [x]Minimal [] Substantial   Barriers to learning-Encephalopathy []N/A   [] Access Care     [] S&S of infection     [] Fluid Management     []K+     [x]Procedural    []Albumin     [] Medications     [] Tx Options     [] Transplant     [] Diet     [] Other   Teaching Tools:  [x] Explain  [] Demo  [] Handouts [] Video  Patient response:   [] Verbalized understanding  [] Teach back  [] Return demonstration [x] Requires follow up- Called language line- Chinese language- Pt was mute during interpretation services although was quiet and seemed to listen, making eye contact during interpretation. RN introduced self and explained procedure, provided emotional support. Interpretation call 5 minutes. Inappropriate due to- unable to assess pt understanding as she is mute during teaching.       [x] Time Out/Safety Check [x]Extracorporeal Circuit Tested for integrity       RO/HEMODIALYSIS MACHINE SAFETY CHECKS  Before each treatment:     Machine Number:                   [facility]                                   [] Unit Machine #  with centralized RO                                  [] Portable Machine #1/RO serial # [id number]                                  [x] Portable Machine #2/RO serial # [id number]                                  [] Portable Machine #3/RO serial # [id number]                                                                                                       [facility]                                  [] Portable Machine #11/RO serial # [id number]                                   [] Portable Machine #12/RO serial # [id number]                                  [] Portable Machine #13/RO serial #  [id number]      Alarm Test:  Pass time 1020         Other:         [x] RO/Machine Log Complete      Temp    37.0            Dialysate: pH  7.4 Conductivity: Meter   14.0     HD Machine   13.9                  TCD: 13.7  Dialyzer Lot # [id number]            Blood Tubing Lot # [id number] 10/31/22          Saline Lot #  [id number]     CHLORINE TESTING-Before each treatment and every 4 hours    Total Chlorine: [x] less than 0.1 ppm  Time: 1030 4 Hr/2nd Check Time: 1430   (if greater than 0.1 ppm from Primary then every 30 minutes from Secondary)     TREATMENT INITIATION  with Dialysis Precautions:   [x] All Connections Secured                 [x] Saline Line Double Clamped   [x] Venous Parameters Set                  [x] Arterial Parameters Set    [x] Prime Given  250ml                          [x]Air Foam Detector Engaged      Treatment Initiation Note:  RN arrived to pt room, pt lethargic and confused, calling out, almost in fetal position, pt displays s/s of pain in abdomen as she pulls away upon assessment.  Language line called for Gaudencio interpretation, RN introduced self, educated pt and provide emotional support. Pt was mute during interpretation although made eye contact. Rt CVC with no S/S of complications, dressing clean, dry and intact, last changed 8/22/18. Hep lock 1.9 ml x2 ports removed, line flushed and bloodlines connected. Bedside monitor set every 15 minutes. Tx started with out complications. During Treatment Notes: Pt restless and calls out during treatment. Provided emotional support with intermittent calming touch. Medication Dose Volume Route Initials Dialyzer Cleared: [x] Good [] Fair  [] Poor    Blood processed:  58.9 L  UF Removed  1500 Ml    Post Wt: 39.9   kg  POst BP:   192/105     Pulse:101   Respirations: 20  Temperature: 98.0                                   Post Tx Vascular Access: AVF/AVG: Bleeding stopped Art  min. Wei. Min   N/A                                   Catheter: Locking solution: Heparin 1:1000 Art. 1.9 Wei. 1.9                                   Post Assessment:                                  Skin:  [x] Warm  [x] Dry [] Diaphoretic    [] Flushed  [] Pale [] Cyanotic   [facility] Signatures Title Initials  Time Lungs: [x] Clear    [] Course  [] Crackles  [] Wheezing [] Diminished   Dm Devi RN LP  Cardiac: [] Regular   [] Irregular   [x] Monitor  [] N/A  Rhythm: Sinus       Edema:  [x] None    [] General     [] Facial   [] Pedal    [] UE    [] LE       Pain: [x]0- no grimace, no wincing, pt calm []1  []2   []3  []4   []5   []6   []7   []8   []9   []10         Post Treatment Note:  Pt lethargic and confused although calmer after treatment with no calling out, pt lying quietly with eyes closed, no s/s of distress. Treatment completed. Blood returned. Blood lines removed. Rt CVC hep locked x2 ports. Floor RN given post report. POST TREATMENT PRIMARY NURSE HANDOFF REPORT:     First initial/Last name/Title         Post Dialysis: MIMA Parekh RN Time:  2050     Abbreviations: AVG-arterial venous graft, AVF-arterial venous fistula, IJ-Internal Jugular, Subcl-Subclavian, Fem-Femoral, Tx-treatment, AP/HR-apical heart rate, DFR-dialysate flow rate, BFR-blood flow rate, AP-arterial pressure, -venous pressure, UF-ultrafiltrate, TMP-transmembrane pressure, Wei-Venous, Art-Arterial, RO-Reverse Osmosis

## 2018-08-23 NOTE — ROUTINE PROCESS
Bedside and Verbal shift change report given to [address] (oncoming nurse) by Emigdio Dominguez RN   (offgoing nurse). Report included the following information SBAR, MAR and Cardiac Rhythm . Adi Wallace

## 2018-08-23 NOTE — PROGRESS NOTES
Bedside and Verbal shift change report given to [address]  (oncoming nurse) by Marcell Dick  (offgoing nurse). Report included the following information SBAR, Kardex, ED Summary, Intake/Output, MAR and Recent Results.

## 2018-08-23 NOTE — PROGRESS NOTES
PT eval order received and chart reviewed. Unable to see patient at this time as patient has bedrest with bedside commode. Please D/C to allow patient participation in full eval. Will follow up as patient schedule allows. Thank you for this referral. Lynnette Shin, PT, DPT.

## 2018-08-23 NOTE — PROGRESS NOTES
attended the interdisciplinary rounds for Lucy Bustillo, who is a 56 y.o.,female. Patients Primary Language is: [place]. According to the patients EMR Confucianism Affiliation is: Episcopalian. The reason the Patient came to the hospital is:   Patient Active Problem List    Diagnosis Date Noted    Intractable vomiting with nausea 07/17/2018    ESRD on hemodialysis ([facility] [address] 07/17/2018    Pulmonary TB 07/17/2018    Hypertensive crisis 07/17/2018    Seizure ([address] 07/16/2018    Hypertensive emergency 37/95/4885    Complication of vascular dialysis catheter, initial encounter 06/18/2018    Anemia 02/26/2018    Hyponatremia 02/25/2018    ESRD (end stage renal disease) ([facility] [address] 02/25/2018    NSTEMI (non-ST elevated myocardial infarction) ([address] 02/25/2018    Respiratory failure ([facility] [address] 02/25/2018    Hypoxia 02/25/2018    Acute UTI 02/25/2018    Hyperkalemia 09/08/2017    Renal failure 09/08/2017    SCOOTER (acute kidney injury) ([facility] [address] 09/08/2017          Plan:  Chaplains will continue to follow and will provide pastoral care on an as needed/requested basis.  recommends bedside caregivers page  on duty if patient shows signs of acute spiritual or emotional distress.     [address]  Board Certified [address] [facility]   [phone]

## 2018-08-23 NOTE — PROGRESS NOTES
Problem: Dysphagia (Adult)  Goal: *Acute Goals and Plan of Care (Insert Text)  Patient will:  1. Tolerate PO trials with 0 s/s overt distress in 4/5 trials  2. Utilize compensatory swallow strategies/maneuvers (decrease bite/sip, size/rate, alt. liq/sol) with min cues in 4/5 trials  3. Perform oral-motor/laryngeal exercises to increase oropharyngeal swallow function with min cues  4. Complete an objective swallow study (i.e., MBSS) to assess swallow integrity, r/o aspiration, and determine of safest LRD, min A    Recommend:   NPO with meds crushed in puree  May want to consider an alternate means of nutrition/hydration short term  Strict aspiration precautions (HOB >30 degrees at all times, Oral care TID)         Speech LAnguage Pathology bedside swallow evaluation/treatment    Patient: Salvatore Gonzalez (56 y.o. female)  Date: 8/23/2018  Primary Diagnosis: Hypertensive emergency        Precautions: aspiration       ASSESSMENT :  Based on the objective data described below, the patient presents with mod oropharyngeal dysphagia. Pt with intermittent oral holding of ~50% of presentations throughout evaluation. She demo waxing/waning attention to task and acceptance of PO as well. Pt with dry cough s/p all presentations as well as at baseline. Difficult to assess whether dry cough is secondary to aspiration compromise as laryngeal elevation appeared functional and timely to palpation. Skilled treatment initiation attempting to stimulate swallow with oral holding instances via palpation; however, ineffective at eliciting swallow reflex. Rec NPO with meds crushed in puree (per MD), aspiration precautions, and oral care TID. May want to consider a short term means of nutrition/hydration. Otherwise, SLP to f/u during admission as medically appropriate. Patient will benefit from skilled intervention to address the above impairments.   Patients rehabilitation potential is considered to be Guarded  Factors which may influence rehabilitation potential include:   [x]            Mental ability/status  [x]            Medical condition     PLAN :  Recommendations and Planned Interventions: See above  Frequency/Duration: Patient will be followed by speech-language pathology 1-2 times per day/3-5 days per week to address goals. Discharge Recommendations: Jesu Murillo and To Be Determined     SUBJECTIVE:   Patient only with moaning verbalizations throughout eval.     OBJECTIVE:     Past Medical History:   Diagnosis Date    Chronic kidney disease     ESRD (end stage renal disease) ([facility] [address]     TUES-THURS-SAT    Hypercholesteremia     Hypertension     Kidney disease     Vitamin D deficiency      Past Surgical History:   Procedure Laterality Date    VASCULAR SURGERY PROCEDURE UNLIST       Prior Level of Function/Home Situation: private residence  Home Situation  Home Environment: Private residence  # Steps to Enter: 2  One/Two Story Residence: Two story  Living Alone: No  Support Systems: Child(gomez), Family member(s)  Patient Expects to be Discharged to[de-identified] Private residence  Current DME Used/Available at Home: None  Diet prior to admission: unknown  Current Diet:  NPO   Cognitive and Communication Status:  Neurologic State: Alert  Orientation Level: Unable to verbalize  Cognition: Decreased command following  Oral Assessment:  Oral Assessment  Labial: No impairment  Dentition: Natural;Intact  Oral Hygiene: adequate  Lingual: No impairment  Velum: No impairment  Mandible: No impairment  P.O. Trials:  Patient Position: 57 [facility]  Vocal quality prior to P.O.: No impairment  Consistency Presented: Thin liquid; Nectar thick liquid;Puree  How Presented: SLP-fed/presented;Cup/sip;Spoon;Straw  Bolus Acceptance: No impairment  Bolus Formation/Control: Impaired  Type of Impairment: Delayed;Poor;Posterior (oral holding)  Propulsion: Delayed (# of seconds); Discoordination;Absent  Oral Residue: Greater than 50% of bolus (intermittent oral holding)  Initiation of Swallow: Delayed (# of seconds)  Laryngeal Elevation: Functional  Aspiration Signs/Symptoms:  (dry cough)  Pharyngeal Phase Characteristics: Poor endurance;Multiple swallows  Effective Modifications: Small sips and bites  Cues for Modifications: None     Oral Phase Severity: Moderate  Pharyngeal Phase Severity : Moderate    GCODESwallowing:  Swallow Current Status CL= 60-79%   Swallow Goal Status CK= 40-59%    The severity rating is based on the following outcomes:    Clinical Judgment    Pain:  Pt c/o 0/10 pain prior to evaluation. Pt c/o 0/10 pain post evaluation. After treatment:   []            Patient left in no apparent distress sitting up in chair  [x]            Patient left in no apparent distress in bed  [x]            Call bell left within reach  [x]            Nursing notified  []            Caregiver present  []            Bed alarm activated    COMMUNICATION/EDUCATION:   [x]            SLP educated pt on diet recs, suspect poor carryover/comp. [x]            Patient is unable to participate in goal setting and plan of care.     Thank you for this referral.    Beverly Butt M.S. CCC-SLP/L  Speech-Language Pathologist

## 2018-08-23 NOTE — PROGRESS NOTES
RENAL DAILY PROGRESS NOTE            51y F with ESRD, admitted with HTN urgency and weakness  Subjective:       Complaint:   Examined during HD in ICU. 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 The patient was receiving hemodialysis for treatment of  renal failure. I have also reviewed vital signs, intake and output, lab results and recent events, and agreed with today's dialysis order. Avoid BP drop during HD. UF goal 1.5 to 2L. Plan for HD tomorrow per her routine schedule. Adjust all meds per ESRD status         HD rounding    Blood pressure 164/87, pulse 96, temperature 97.7 °F (36.5 °C), temperature source Oral, resp. rate 20, height 5' 1\" (1.549 m), weight 40.8 kg (90 lb), SpO2 99 %, not currently breastfeeding.   Temp (24hrs), Av.2 °F (36.8 °C), Min:97.7 °F (36.5 °C), Max:98.6 °F (37 °C)      Blood Pressure: BP: 164/87  Pulse: Pulse (Heart Rate): 96  Temp:  Temp: 97.7 °F (36.5 °C)    Artificial Kidney Dialyzer/Set Up Inspection: Revaclear   hours Duration of Treatment (hours): 1.5 hours   Heparin Bolus    Blood flow rate Blood Flow Rate (ml/min): 350 ml/min   Dialysate rate     Arterial Access Pressure Arterial Access Pressure (mmHg): -140  Venous Return Pressure Venous Return Pressure (mmHg): 100  Ultrafiltration Rate Goal/Amount of Fluid to Remove (mL): 1500 mL  Fluid Removal Fluid Removed (mL): 700  Net Fluid Removal NET Fluid Removed (mL): 0 ml                Current Facility-Administered Medications   Medication Dose Route Frequency    niCARdipine (CARDENE) 25 mg in 0.9% sodium chloride 250 mL infusion  0-15 mg/hr IntraVENous TITRATE    promethazine (PHENERGAN) 6.25 mg in 0.9% sodium chloride 50 mL IVPB  6.25 mg IntraVENous Q6H PRN    polyethylene glycol (MIRALAX) packet 17 g  17 g Oral DAILY    aspirin (ASA) suppository 300 mg  300 mg Rectal DAILY    atorvastatin (LIPITOR) tablet 40 mg  40 mg Oral QHS    bisacodyl (DULCOLAX) tablet 5 mg  5 mg Oral DAILY PRN    docusate sodium (COLACE) capsule 100 mg  100 mg Oral BID    ethambutol (MYAMBUTOL) tablet 800 mg  800 mg Oral Q TUE, THU & SAT    isoniazid (NYDRAZID) tablet 300 mg  300 mg Oral DAILY    pyrazinamide tablet 1,000 mg  1,000 mg Oral Q TUE, THU & SAT    pyridoxine (vitamin B6) (VITAMIN B-6) tablet 100 mg  100 mg Oral DAILY    rifAMPin (RIFADIN) capsule 600 mg  600 mg Oral DAILY    sodium chloride (NS) flush 5-10 mL  5-10 mL IntraVENous Q8H    sodium chloride (NS) flush 5-10 mL  5-10 mL IntraVENous PRN    dextrose 5% and 0.9% NaCl infusion  75 mL/hr IntraVENous CONTINUOUS    acetaminophen (TYLENOL) tablet 650 mg  650 mg Oral Q6H PRN    heparin (porcine) injection 5,000 Units  5,000 Units SubCUTAneous Q8H    hydrALAZINE (APRESOLINE) tablet 100 mg  100 mg Oral TID    carvedilol (COREG) tablet 25 mg  25 mg Oral Q12H    amLODIPine (NORVASC) tablet 10 mg  10 mg Oral DAILY    albuterol (PROVENTIL VENTOLIN) nebulizer solution 2.5 mg  2.5 mg Nebulization Q4H PRN    cloNIDine (CATAPRES) 0.3 mg/24 hr patch 1 Patch  1 Patch TransDERmal Q7D       Review of Symptoms: comprehensive ROS negative except above.    Objective:     Patient Vitals for the past 24 hrs:   Temp Pulse Resp BP SpO2   08/23/18 1200 - 96 20 164/87 99 %   08/23/18 1145 - 95 15 160/83 100 %   08/23/18 1130 - 93 19 155/85 99 %   08/23/18 1115 - 89 18 145/87 97 %   08/23/18 1100 - 93 19 150/84 98 %   08/23/18 1045 97.7 °F (36.5 °C) 93 17 152/86 96 %   08/23/18 1030 - 95 27 155/82 98 %   08/23/18 1015 - 96 20 143/83 98 %   08/23/18 1000 - 100 22 141/79 97 %   08/23/18 0945 - 67 18 (!) 159/93 99 %   08/23/18 0930 - (!) 104 27 168/89 98 %   08/23/18 0915 - (!) 103 23 159/89 97 %   08/23/18 0900 - (!) 105 19 154/90 97 %   08/23/18 0845 - (!) 104 27 169/89 98 %   08/23/18 0832 - - - (!) 138/99 -   08/23/18 0830 - (!) 108 21 154/84 99 %   08/23/18 0815 - (!) 106 25 163/87 96 %   08/23/18 0800 98.1 °F (36.7 °C) (!) 109 26 (!) 138/99 95 %   08/23/18 0730 - (!) 107 27 166/89 98 %   08/23/18 0700 - (!) 110 28 164/89 99 %   08/23/18 0630 - (!) 108 (!) 33 150/77 97 %   08/23/18 0600 - (!) 111 (!) 36 137/76 97 %   08/23/18 0530 - (!) 112 25 144/77 97 %   08/23/18 0500 - (!) 113 24 148/77 97 %   08/23/18 0430 - (!) 119 27 144/78 99 %   08/23/18 0416 98.2 °F (36.8 °C) - - - -   08/23/18 0400 98.2 °F (36.8 °C) (!) 122 25 147/89 98 %   08/23/18 0330 - (!) 123 30 161/80 98 %   08/23/18 0300 - (!) 122 (!) 32 159/80 95 %   08/23/18 0230 - (!) 127 (!) 33 (!) 181/104 98 %   08/23/18 0222 - - - 177/86 -   08/23/18 0200 - (!) 129 (!) 33 177/86 99 %   08/23/18 0130 - (!) 124 (!) 32 (!) 164/92 97 %   08/23/18 0100 - (!) 123 25 (!) 169/93 98 %   08/23/18 0030 - (!) 124 27 (!) 171/95 98 %   08/23/18 0020 98.6 °F (37 °C) - - - -   08/23/18 0000 - (!) 118 (!) 37 (!) 178/99 99 %   08/22/18 2330 - (!) 117 23 (!) 175/96 97 %   08/22/18 2300 - (!) 119 25 174/90 97 %   08/22/18 2230 - (!) 117 27 (!) 167/103 99 %   08/22/18 2215 - (!) 118 19 (!) 166/103 98 %   08/22/18 2200 - (!) 117 23 (!) 186/95 95 %   08/22/18 2145 - (!) 122 20 (!) 171/98 97 %   08/22/18 2130 - (!) 139 (!) 34 (!) 187/107 97 %   08/22/18 2115 - (!) 118 24 (!) 152/99 99 %   08/22/18 2100 - (!) 120 24 164/89 98 %   08/22/18 2045 - (!) 118 24 (!) 160/95 97 %   08/22/18 2030 - (!) 117 (!) 34 (!) 189/97 97 %   08/22/18 2015 - (!) 110 25 (!) 178/102 98 %   08/22/18 2003 - - - (!) 197/110 -   08/22/18 2000 98.4 °F (36.9 °C) (!) 108 25 (!) 197/116 98 %   08/22/18 1950 - - - (!) 186/120 -   08/22/18 1945 - (!) 107 18 (!) 186/120 98 %   08/22/18 1930 - (!) 108 21 (!) 173/108 98 %   08/22/18 1915 - (!) 108 20 (!) 166/104 98 %   08/22/18 1900 - (!) 112 30 (!) 169/101 100 %   08/22/18 1845 - (!) 112 28 149/89 94 %   08/22/18 1830 - (!) 111 20 166/90 98 %   08/22/18 1815 - (!) 110 22 (!) 166/99 97 %   08/22/18 1800 - (!) 108 21 (!) 179/98 97 %   08/22/18 1745 - (!) 107 (!) 31 (!) 178/122 97 %   08/22/18 1730 - (!) 107 19 (!) 203/103 97 %   08/22/18 1715 - (!) 113 25 (!) 164/101 98 %   08/22/18 1710 - (!) 120 (!) 35 (!) 132/91 96 %   08/22/18 1700 - (!) 114 29 131/74 96 %   08/22/18 1648 - (!) 113 23 164/87 97 %   08/22/18 1645 - (!) 107 29 (!) 160/99 97 %   08/22/18 1630 - (!) 104 29 (!) 218/132 98 %   08/22/18 1600 - (!) 101 - (!) 203/146 -   08/22/18 1545 - (!) 103 - (!) 206/149 -   08/22/18 1530 - (!) 102 - (!) 225/144 -   08/22/18 1511 - 93 - (!) 225/141 -   08/22/18 1500 98.1 °F (36.7 °C) 96 20 (!) 222/132 -   08/22/18 1300 - 90 28 (!) 197/115 99 %   08/22/18 1245 - 85 17 (!) 199/125 98 %   08/22/18 1230 - 83 (!) 31 (!) 197/127 98 %   08/22/18 1215 - (!) 108 30 (!) 230/126 99 %        Weight change:      08/21 1901 - 08/23 0700  In: 555.8 [I.V.:555.8]  Out: 136     Intake/Output Summary (Last 24 hours) at 08/23/18 1214  Last data filed at 08/23/18 1030   Gross per 24 hour   Intake           555.83 ml   Output              161 ml   Net           394.83 ml     Physical Exam  General: comfortable, no acute distress   HEENT sclera anicteric,   CVS: S1S2 heard,  no rub  RS: + air entry b/l,   Abd: Soft, Non tender,  Neuro:  awake,   Extrm: no  edema, no cyanosis, clubbing   Skin: no visible  Rash  Musculoskeletal: No gross joints or bone deformities   Access; TDC right side   Procedures/imaging: see electronic medical records for all procedures, Xrays and details which were not copied into this note but were reviewed prior to creation of Plan        Data Review:     LABS:   Hematology:   Recent Labs      08/23/18   0235  08/22/18   1137   WBC  5.0  4.7   HGB  11.5*  14.1   HCT  34.3*  39.9     Chemistry:   Recent Labs      08/23/18   0235  08/22/18   1137   BUN  38*  46*   CREA  4.44*  5.56*   CA  8.5  9.5   ALB  2.7*  3.1*   K  3.8  4.5   NA 135*  134*   CL  100  96*   CO2  26  27   PHOS  2.9   --    GLU  103*  115*            Procedures/imaging: see electronic medical records for all procedures, Xrays and details which were not copied into this note but were reviewed prior to creation of Plan          Assessment & Plan:     As above         Tonya Smith MD  8/23/2018  5:29 PM

## 2018-08-23 NOTE — PROGRESS NOTES
Speech Therapy:     Pt currently have ultrasound. Will attempt dysphagia eval at a later time/date or as medical condition permits.      Thank you for this referral.    Ruth Paulino M.S. CCC-SLP/L  Speech-Language Pathologist

## 2018-08-23 NOTE — PROGRESS NOTES
attended the interdisciplinary rounds for Meliza Ornelas, who is a 56 y.o.,female. Patients Primary Language is: [place]. According to the patients EMR Jewish Affiliation is: Caodaism. 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 09/08/2017          Plan:  Chaplains will continue to follow and will provide pastoral care on an as needed/requested basis.  recommends bedside caregivers page  on duty if patient shows signs of acute spiritual or emotional distress.     [id number] Certified [address] [place] [address]   [phone]

## 2018-08-23 NOTE — PROGRESS NOTES
Progress Note  Pulmonary, Critical Care, and Sleep Medicine    Name: Pratik Rapp MRN: [id number]   : 1960 Hospital: [facility]   Date: 2018        IMPRESSION:   · Hypertensive emergency with response to Cardene and HD  · Left sided weakness due to hypertensive encephalopathy, resolved  · Nausea, vomiting and abdominal pain of ? Etiology  · History of MTB and MAC on DOT, sputum smears cleared for AFB  · ESRD on IHD  · H/o NSTEMI  · History of seizures      PLAN:   · Wean off Cardene and start on po HTN medications  · KUB with no evidence of SBO or ileus  · Titrate FiO2  To maintain saturation greater than 90%  · Aspiration precautions  · Continue quadruple TB therapy  · Prophylaxis issues addressed  · HD per Nephrology  · No EEG evidence of seizures but possible encephalopathy  · Continue ICU care  · Discussed in ICU interdisciplinary rounds     Subjective/Interval History:   I have reviewed the flowsheet and previous days notes. Reviewed interval history. Discussed management with nursing staff. This patient has been seen and evaluated at the request of Dr. Radames Dorsey for Hypertensive Emergency. Tabatha Cooper a 56 y. o. female c pmhx of HTN, HLD, ESRD on HD who presented to the [facility] ER with left-sided weakness and nausea/vomiting that started three days ago. Pt speaks [place] only. Per Chart review, son stated that the pt has been lethargic, barely able to move with unilateral weakness. Pt was recently admitted to [facility] with similar symptoms in hypertensive emergency at the time.      Upon arrival to the ER, pt's /144  Temp 97.7 saturating 98% on RA. Pt started complaining of chest pain and HA while in the ER. CT Head showed chronic microvascular changes, Tele neuro consulted saying symptoms likely secondary to hypertensive encephalopathy. Pt given nitro, zofran, broad spectrum ABX, and labetalol while in the ER with slight improvement of -200.  CTA Chest/Abd/Pelvis performed while in ER, pending final results. Pt transferred to ICU and currently receiving urgent hemodialysis on cardene gtt. ROS:Pertinent items are noted in HPI. Orders reviewed including medications. Changes made if indicated. Telemetry monitor reviewed at the bedside. Objective:   Vital Signs:    Visit Vitals    BP (!) 158/93    Pulse 98    Temp 98.1 °F (36.7 °C)    Resp 21    Ht 5' 1\" (1.549 m)    Wt 40.8 kg (90 lb)    SpO2 100%    Breastfeeding No    BMI 17.01 kg/m2       O2 Device: Room air       Temp (24hrs), Av.2 °F (36.8 °C), Min:97.7 °F (36.5 °C), Max:98.6 °F (37 °C)       Intake/Output:   Last shift:       07 - 1900  In: -   Out: 25 [Urine:25]  Last 3 shifts:  190 -  0700  In: 555.8 [I.V.:555.8]  Out: 136     Intake/Output Summary (Last 24 hours) at 18 1313  Last data filed at 18 1030   Gross per 24 hour   Intake           555.83 ml   Output              161 ml   Net           394.83 ml        Physical Exam:    General:  Alert, does not follow commands, in no distress, appears stated age. Head:  Normocephalic, without obvious abnormality, atraumatic. Eyes:  ANicteric, PERRL,   Nose: Nares normal. Mucosa normal. No drainage or sinus tenderness. Throat: Lips, mucosa, and tongue normal.  NO Thrush   Neck: Supple, symmetrical, trachea midline, no adenopathy, thyroid: no enlargment/tenderness/nodules    Back:   Symmetric    Lungs:   Bilateral auscultation no rales or wheezes   Chest wall:  No tenderness or deformity. NO intercostal retractions   Heart:  Regular rate and rhythm, S1, S2 normal, no murmur, click, rub or gallop. Abdomen:   Soft, non-tender. Bowel sounds normal. No masses,  No organomegaly. NO paradoxical motion   Extremities: normal, atraumatic, no cyanosis or edema. Pulses: 2+ and symmetric all extremities. Skin: Skin color, texture, turgor normal. No rashes or lesions.  NO clubbing   Lymph nodes: Cervical nodes normal. Neurologic: Grossly nonfocal      :        Devices:             Drips:    DATA:  Labs:  Recent Labs      08/23/18   0235  08/22/18   1137   WBC  5.0  4.7   HGB  11.5*  14.1   HCT  34.3*  39.9   PLT  94*  103*     Recent Labs      08/23/18   0235  08/22/18   1137   NA  135*  134*   K  3.8  4.5   CL  100  96*   CO2  26  27   GLU  103*  115*   BUN  38*  46*   CREA  4.44*  5.56*   CA  8.5  9.5   MG  2.7*  3.2*   PHOS  2.9   --    ALB  2.7*  3.1*   SGOT  34  36   ALT  8*  9*   INR   --   0.9     No results for input(s): PH, PCO2, PO2, HCO3, FIO2 in the last 72 hours. Imaging:  [x]        I have personally reviewed the patients radiographs and reports            XR Results (most recent):    Results from Hospital Encounter encounter on 08/22/18   XR ABD (KUB)   Narrative EXAM:  Abdominal AP. CLINICAL INDICATION:  Abdominal pain. Discomfort. COMPARISON:  3/2/18    TECHNIQUE:  AP view of the abdomen in supine position. FINDINGS:     - No evidence of acute small bowel obstruction is detected. Bowel gas pattern  is normal.  - No pathological calcification or mass effect is appreciated. Impression IMPRESSION:    Negative study. []         []        No change from prior, tubes and lines in adequate position  []        Improved   []        Worsening  High complexity decision making was performed during this consultation and evaluation.  Critical care time exclusive of procedures spent managing the patient: 48     minutes    Laina James MD

## 2018-08-23 NOTE — PROGRESS NOTES
[facility]  ICU Progress Note      Name: Carissa Wahl   : 1960   MRN: [id number]   Date: 2018 8:32 AM     []I have reviewed the flowsheet and previous days notes. Events overnight reviewed and discussed with nursing staff. Vital signs and records reviewed. Subjective:  Pt is a 56 y. o. female with hx of HTN, HLD, constipation, ESRD on HD, currently undergoing treatment for tuberculosis, who presented to the [facility] ER with left-sided weakness and nausea/vomiting that started three days ago. Pt speaks [place] only. Per Chart review, son stated that the pt has been lethargic, barely able to move with unilateral weakness. Pt was recently admitted to [facility] with similar symptoms in hypertensive emergency at the time.      Upon arrival to the ER, pt's /144  Temp 97.7 saturating 98% on RA. Pt started complaining of chest pain and HA while in the ER. CT Head showed chronic microvascular changes, Tele neuro consulted saying symptoms likely secondary to hypertensive encephalopathy. Pt given nitro, zofran, broad spectrum ABX, and labetalol while in the ER with slight improvement of -200. Pt transferred to ICU and  urgent hemodialysis and cardene gtt initaited. 18   - No new events overnight  - Not english speaking, translation provided by son  - Yesterday urgent hemodialysis stopped d/t drop in BP. Will be receiving dialysis today. - Awake, laying in bed. C/o hunger. Denies abd pain, chest pain, SOB.  - During rounds, patient became nauseated and began vomiting  - Afebrile.  Hypertensive and tachycardic on cardene gtt  - Oxygenating well on room air  - Adequate urine output    []The patient is unable to give any meaningful history or review of systems because the patient is:  []Intubated []Sedated   []Unresponsive      []The patient is critically ill on      []Mechanical ventilation []Pressors   []BiPAP []                  ROS:{Review of Systems:79890::\"Review of systems not obtained due to patient factors. \"}    Medication Review:  · Pressors - none  · Sedation - none  · Antibiotics - rifampin, pyrazinamide, isoniazid, ethambutol  · Pain - tylenol prn  · GI (miralax, prn promethazine, bisacodyl) DVT (heparin sq)  · Others (other gtts) - nicardipine    Safety Bundles: VAP Bundle/ CAUTI/ Severe Sepsis Protocol/ Electrolyte Replacement Protocol    Vital Signs:    Visit Vitals    BP (!) 138/99    Pulse (!) 109    Temp 98.1 °F (36.7 °C)    Resp 26    Wt 40.8 kg (90 lb)    SpO2 95%    BMI 17.01 kg/m2       O2 Device: Room air       Temp (24hrs), Av.2 °F (36.8 °C), Min:97.7 °F (36.5 °C), Max:98.6 °F (37 °C)       Intake/Output:   Last shift:         Last 3 shifts:  1901 -  0700  In: 122.1 [I.V.:122.1]  Out: 136     Intake/Output Summary (Last 24 hours) at 18 0832  Last data filed at 18 1850   Gross per 24 hour   Intake           122.08 ml   Output              136 ml   Net           -13.92 ml       Physical Exam:    General: Awake, laying in bed. In no acute distress. C/o hunger. Denies N/V, abd pain, chest pain, SOB. HEENT:  Anicteric sclerae; pink palpebral conjunctivae; mucosa normal.  Resp:  Symmetrical chest expansion, no accessory muscle use; no increased work of breathing, no wheezing, crackles, rales. CV:  S1, S2 present; regular rate and rhythm  GI:  Abdomen soft, slightly tender in all 4 quadrants; no distention, rigidity; (+) active bowel sounds  Extremities: +2 pulses on all extremities; no edema/ cyanosis/ clubbing noted. Skin:  Warm; Bruising noted in b/l upper extremities  Neurologic:  Non-focal. Movement in all 4 limbs, no facial drooping. Weakness in left arm resolved.   Devices: PIV R AC, PIV R forearm      DATA:     Current Facility-Administered Medications   Medication Dose Route Frequency    niCARdipine (CARDENE) 25 mg in 0.9% sodium chloride 250 mL infusion  0-15 mg/hr IntraVENous TITRATE    0.9% NaCl bacteriostatic (NORMAL SALINE) 0.9 % injection 10 mL  10 mL IntraVENous CARD ONCE    sodium chloride (NS) flush 5-10 mL  5-10 mL IntraVENous PRN    aspirin (ASA) suppository 300 mg  300 mg Rectal DAILY    atorvastatin (LIPITOR) tablet 40 mg  40 mg Oral QHS    bisacodyl (DULCOLAX) tablet 5 mg  5 mg Oral DAILY PRN    docusate sodium (COLACE) capsule 100 mg  100 mg Oral BID    ethambutol (MYAMBUTOL) tablet 800 mg  800 mg Oral Q TUE, THU & SAT    isoniazid (NYDRAZID) tablet 300 mg  300 mg Oral DAILY    pyrazinamide tablet 1,000 mg  1,000 mg Oral Q TUE, THU & SAT    pyridoxine (vitamin B6) (VITAMIN B-6) tablet 100 mg  100 mg Oral DAILY    rifAMPin (RIFADIN) capsule 600 mg  600 mg Oral DAILY    sodium chloride (NS) flush 5-10 mL  5-10 mL IntraVENous Q8H    sodium chloride (NS) flush 5-10 mL  5-10 mL IntraVENous PRN    sodium chloride (NS) flush 5-10 mL  5-10 mL IntraVENous Q8H    sodium chloride (NS) flush 5-10 mL  5-10 mL IntraVENous PRN    dextrose 5% and 0.9% NaCl infusion  75 mL/hr IntraVENous CONTINUOUS    acetaminophen (TYLENOL) tablet 650 mg  650 mg Oral Q6H PRN    heparin (porcine) injection 5,000 Units  5,000 Units SubCUTAneous Q8H    hydrALAZINE (APRESOLINE) tablet 100 mg  100 mg Oral TID    carvedilol (COREG) tablet 25 mg  25 mg Oral Q12H    amLODIPine (NORVASC) tablet 10 mg  10 mg Oral DAILY    albuterol (PROVENTIL VENTOLIN) nebulizer solution 2.5 mg  2.5 mg Nebulization Q4H PRN    ondansetron (ZOFRAN) injection 4 mg  4 mg IntraVENous Q6H PRN    cloNIDine (CATAPRES) 0.3 mg/24 hr patch 1 Patch  1 Patch TransDERmal Q7D         Labs: Results:       Chemistry Recent Labs      08/23/18   0235  08/22/18   1137   GLU  103*  115*   NA  135*  134*   K  3.8  4.5   CL  100  96*   CO2  26  27   BUN  38*  46*   CREA  4.44*  5.56*   CA  8.5  9.5   AGAP  9  11   BUCR  9*  8*   AP  86  109   TP  6.0*  6.8   ALB  2.7*  3.1*   GLOB  3.3  3.7   AGRAT  0.8  0.8      CBC w/Diff Recent Labs      08/23/18   0235  08/22/18 1137   WBC  5.0  4.7   RBC  3.67*  4.38   HGB  11.5*  14.1   HCT  34.3*  39.9   PLT  94*  103*   GRANS  54  71   LYMPH  24  12*   EOS  2  1      Coagulation Recent Labs      08/22/18   1137   PTP  11.8   INR  0.9   APTT  33.3       Liver Enzymes Recent Labs      08/23/18   0235   TP  6.0*   ALB  2.7*   AP  86   SGOT  34      ABG No results found for: PH, PHI, PCO2, PCO2I, PO2, PO2I, HCO3, HCO3I, FIO2, FIO2I   Microbiology Recent Labs      08/22/18   1209  08/22/18   1137   CULT  NO GROWTH AFTER 17 HOURS  NO GROWTH AFTER 19 HOURS          Telemetry: []Sinus []A-flutter []Paced    []A-fib []Multiple PVCs                    Imaging:  CTA 8/22/18 - 1. Normal caliber aorta with no aneurysmal dilatation or dissection. Minimal  atherosclerotic aortic disease at distal abdominal portion.   2. Patent pulmonary arteries and great vessels at the arch as well as abdominal  vasculature as above.   3. Patchy airspace disease in bilateral lungs.   4. Persistent large right pleural effusion. Near resolved small left pleural  effusion.   5. Massive pericardial effusion is interval progressed with likely extending to  the mediastinum at described above.   6. Moderate ascites is mildly progressed. Significant anasarca.   7. The abdominal structures are poorly seen due to early arterial enhancing  phase. Scattered diverticula in the colon. The diffuse mucosal prominence of  small and large bowel could be due to poor distention. Potential bowel  inflammation or ischemia needs to be excluded clinically.   8. Left renal cyst. Atrophic bilateral kidneys. KUB 8/23/18 - negative study     IMPRESSION:   · Hypertensive Emergency - /83, on cardene gtt  · Acute on chronic encephalopathy - 2/2 above  · Left-sided upper extremity weakness - resolved. Likely 2/2 above  · N/V - may be side effect of TB meds vs. Constipation. KUB no evidence of SBO or ileus.   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 HOB 30degrees  · ID -  Trend WBC, temp. Con't TB regimen  · CVS - Con't to monitor hemodynamics. Wean off cardene gtt, transition to home PO meds   · Heme/onc -  Monitor H/H, plts. Daily CBC   · Metabolic - trend e-lytes and replace accordingly   · Renal - con't HD per nephrology  · Endocrine - monitor blood glucose. BS q 6hr  · Neuro/ Pain/ Sedation - tylenol prn for pain  · GI - d/c zofran, start phenergan prn for N/V; add enema, miralax for constipation.  Per speech/language NPO with meds crushed in puree  · Prophylaxis - DVT (heparin sq), GI  · Discussed in interdisciplinary rounds          The patient is: [] acutely ill Risk of deterioration: [] moderate    [] critically ill  [] high     []See my orders for details    My assessment/plan was discussed with:  []nursing []PT/OT    []respiratory therapy []Dr. Bhanu Gary []     []Total critical care time exclusive of procedures       minutes    Celia Bullock

## 2018-08-23 NOTE — PROGRESS NOTES
Problem: Falls - Risk of  Goal: *Absence of Falls  Document Asha Fall Risk and appropriate interventions in the flowsheet.    Outcome: Progressing Towards Goal  Fall Risk Interventions:  Mobility Interventions: Bed/chair exit alarm, Patient to call before getting OOB, PT Consult for mobility concerns         Medication Interventions: Assess postural VS orthostatic hypotension, Evaluate medications/consider consulting pharmacy, Patient to call before getting OOB

## 2018-08-24 NOTE — ROUTINE PROCESS
Bedside and Verbal shift change report given to KRISTIN Peng (oncoming nurse) by Deborah Holloway RN   (offgoing nurse). Report included the following information SBAR, MAR and Cardiac Rhythm . Brendan Rubi

## 2018-08-24 NOTE — DIALYSIS
ACUTE HEMODIALYSIS FLOW SHEET    HEMODIALYSIS ORDERS: Physician: Dr. Cande Fair: Lorin         Duration: 3 hr  BFR:350   DFR: 800   Dialysate:  Temp 36  K+   3  Ca+  2.5  Na 140 Bicarb 30   Weight: 40.8 kg    Bed Scale [x]     Unable to Obtain [x]      Dry weight/UF Goal:  1500  Access  R chest CVC      Heparin []  Bolus      Units    [] Hourly       Units    [x]None      Catheter locking solution HEPARIN   Pre BP:  182/93 Pulse:  109  Temperature:   97.8  Respirations: 23  Tx: NS      250  ml/Bolus  Other        [x] N/A   Labs: Pre        Post:        [x] N/A   Additional Orders(medications, blood products, hypotension management):       [x] N/A     [x] Chemaita Consent Verified     CATHETER ACCESS: []N/A   [x]Right   []Left   []IJ     []Fem   [] First use X-ray verified     [x]Tunnel                [] Non Tunneled   [x]No S/S infection  []Redness  []Drainage []Cultured []Swelling []Pain   [x]Medical Aseptic Prep Utilized   []Dressing Changed  [x] Biopatch  Date:   8/22/18   []Clotted   []Patent   Flows: [x]Good  []Poor  []Reversed   If access problem,  notified: []Yes    [x]N/A  Date:               GENERAL ASSESSMENT:    LUNGS:  Rate 23 SaO2%        [] N/A    [] Clear  [x] Coarse  [] Crackles  [] Wheezing        [] Diminished     Location : []RLL   []LLL    [x]RUL  [x]ELÍAS   Cough: []Productive  []Dry  [x]N/A   Respirations:  [x]Easy  []Labored   Therapy:  [x]RA  []NC  l/min    Mask: []NRB []Venti       O2%                  []Ventilator  []Intubated  [] Trach  [] BiPaP   CARDIAC: [x]Regular      [] Irregular   [] Pericardial Rub  [] JVD        []  Monitored  [] Bedside  [] Remotely monitored [] N/A  Rhythm:    EDEMA: [] None  [x]Generalized  [] Pitting [] 1    [] 2    [] 3    [] 4                 [] Facial  [] Pedal  []  UE  [] LE   SKIN:   [x] Warm  [] Hot     [] Cold   [x] Dry     [] Pale   [] Diaphoretic                  [] Flushed  [] Jaundiced  [] Cyanotic  [] Rash  [] Weeping   LOC: [x] Alert      [x]Oriented:    [x] Person     [x] Place  [x]Time                [] Confused  [] Lethargic  [] Medicated  [] Non-responsive     GI / ABDOMEN:  [] Flat    [] Distended    [x] Soft    [] Firm   []  Obese                             [] Diarrhea  [x] Bowel Sounds  [] Nausea  [] Vomiting       / URINE ASSESSMENT:[] Voiding   [x] Oliguria  [] Anuria   []  Onofre     [] Incontinent    []  Incontinent Brief      []  Bathroom Privileges     PAIN: [x] 0 []1  []2   []3   []4   []5   []6   []7   []8   []9   []10            Scale 0-10  Action/Follow Up:   MOBILITY:  [] Amb    [] Amb/Assist    [x] Bed    [] Wheelchair  [] Stretcher      All Vitals and Treatment Details on Attached [address]: [facility]          Room # 307     [] 1st Time Acute  [] Stat  [] Routine  [] Urgent     [] Acute Room  [x]  Bedside  [x] ICU/CCU  [] ER   Isolation Precautions:  [x] Dialysis   [] Airborne   [] Contact    [] Reverse   Special Considerations:         [] Blood Consent Verified [x]N/A     ALLERGIES:   [x] NKA       BANANAS   Code Status:  [x] Full Code  [] DNR  [] Other           HBsAg ONLY: Date Drawn 9/9/17         [x]Negative []Positive []Unknown   HBsAb: Date 9/9/17    [] Susceptible   [x] [id number] []Not Drawn  [] Drawn     Current Labs:    Date of Labs:8/24/18   Today [x]        Cut and paste current labs here.                                                                                                                                   DIET:  [] Renal    [] Other     [x] NPO     []  Diabetic      PRIMARY NURSE REPORT: First initial/Last name/Title      Pre Dialysis :Stewart Phillips RN     Time: 737     EDUCATION:    [x] Patient [] Other         Knowledge Basis: []None []Minimal [x] Substantial   Barriers to learning  []N/A NON ENGLISH SPEAKING PT   [] Access Care     [] S&S of infection     [] Fluid Management     []K+     [x]Procedural    []Albumin     [] Medications     [] Tx Options     [] Transplant     [] Diet [] Other   Teaching Tools:  [x] Explain  [] Demo  [] Handouts [] Video  Patient response:   [x] Verbalized understanding  [] Teach back  [] Return demonstration [] Requires follow up    Inappropriate due to            [x] Time Out/Safety   Hector Chávez Richi Before each treatment:     Machine Number:                   [facility]                                  [x] Unit Machine # 1with centralized RO                                  [] Portable Machine #1/RO serial # [id number]                                  [] Portable Machine #2/RO serial # [id number]                                  [] Portable Machine #3/RO serial # [id number]                                                                                                       [address] [facility]                                  [] Portable Machine #11/RO serial # [id number]                                   [] Portable Machine #12/RO serial # [id number]                                  [] Portable Machine #13/RO serial #  [id number]      Alarm Test:  Pass time 678       Other:         [x] RO/Machine Log Complete      Temp    36.0            [x]Extracorporeal Circuit Tested for integrity   Dialysate: pH  7.4 Conductivity: Meter  13.8    HD Machine   14.1                  TCD: 13.8  Dialyzer Lot [id number]      Blood Tubing Lot # [id number]      Saline Lot #  [id number]     CHLORINE TESTING-Before each treatment and every 4 hours    Total Chlorine: [x] less than 0.1 ppm  Time: 730 4 Hr/2nd Check Time:N/A   (if greater than 0.1 ppm from Primary then every 30 minutes from Secondary)     TREATMENT INITIATION  with Dialysis Precautions:   [x] All Connections Secured                 [x] Saline Line Double Clamped   [x] Venous Parameters Set                  [x] Arterial Parameters Set    [x] Prime Given  250                             [x]Air Foam Detector Engaged      Treatment Initiation Note:     Patient AWAKE & Alert  Tx at bedside ICU stepdown unit  Pt tolerating TX, no signs of distress  Vitals stable  Will continue to monitor         Medication Dose Volume Route Initials Dialyzer Cleared: [] Good [x] Fair  [] Poor    Blood processed:  57.4 L  UF Removed 1500Ml    Post Wt: 40.8  kg  POst BP:  165/85       Pulse: 111      Respirations: 21  Temperature:97.7                                   Post Tx Vascular Access:    N/A                                   Catheter: Locking solution: Heparin 1:1000   Art. 1.9 Wei. 1.9                                  Post Assessment:                                    Skin:  [x] Warm  [x] Dry [] Diaphoretic    [] Flushed  [] Pale [] Cyanotic   [facility] Signatures Title Initials  Time Lungs: [] Clear    [x] Course  [] Crackles  [] Wheezing [] Diminished       Cardiac [x] Regular   [] Irregular   [] Monitor  [] N/A  Rhythm:           Edema:  [] None    [x] General     [] Facial   [] Pedal    [] UE    [] LE       Pain: [x]0  []1  []2   []3  []4   []5   []6   []7   []8   []9   []10         Post Treatment Note:   Patient Alert and awake  Pt tolerated TX, no signs of distress  1.5 L removed  Vitals stable  Report Given to primary Nurse  Patient remains in bed ICU room     POST TREATMENT PRIMARY NURSE HANDOFF REPORT:       First initial/Last name/Title         Post Dialysis: [address]  RN Time: 1120     Abbreviations: AVG-arterial venous graft, AVF-arterial venous fistula, IJ-Internal Jugular, Subcl-Subclavian, Fem-Femoral, Tx-treatment, AP/HR-apical heart rate, DFR-dialysate flow rate, BFR-blood flow rate, AP-arterial pressure, -venous pressure, UF-ultrafiltrate, TMP-transmembrane pressure, Wei-Venous, Art-Arterial, RO-Reverse Osmosis

## 2018-08-24 NOTE — PROGRESS NOTES
NUTRITION    Nutrition Screen     RECOMMENDATIONS / PLAN:     - Recommend NGT placement to provide enteral nutrition if patient remains NPO, pending SLP follow up swallow evaluation. Recommend starting tube feeding of Nepro at 20 mL/hr and advance as tolerated by 10 mL q 4 hours to goal rate of 45 mL/hr with 100 mL q 4 hour water flushes.    - Continue IV fluid containing dextrose until oral diet or enteral feeding is started. - Continue RD inpatient monitoring and evaluation. Goal EN Regimen: Nepro at 45 mL/hr + 100 mL q 4 hour water flushes to provide: 1944 kcal, 87 gm protein, 104 gm fat, 180 gm CHO, 17 gm fiber, 783 mL free water, 1383 mL total water, 100% RDIs     NUTRITION INTERVENTIONS & DIAGNOSIS:     [x] Enteral nutrition: recommended  [x] IV fluid: D10 at 30 mL/hr (72 gm dextrose, 245 kcal per day)  [x] Collaboration and referral of nutrition care: interdisciplinary rounds, discussed nutrition recommendations with MD- awaiting follow up swallow evaluation     Nutrition Diagnosis: Underweight related to prolonged inadequate energy intake as evidenced by pt with BMI of 17 kg/(m^2) upon admission. ASSESSMENT:     8/24: Pt with nausea/vomiting- unable to tolerate oral medications and chronic constipation- plan for soap suds enema today. Remains NPO per SLP, altered mentation improved today- follow up swallow evaluation planned and several attempts made by SLP today but pt receiving dialysis and NPO for CT abdomen. IV fluid with dextrose started to prevent hypoglycemia. 8/23: Pt with N/V upon admission and currently. NPO started on phenergan. Planning for HD today. No changes in weight PTA, but remains underweight. SLP recommending NPO with consideration of short term means of nutrition.     Average po intake adequate to meet patients estimated nutritional needs:   [] Yes     [x] No   [] Unable to determine at this time    Diet: DIET NPO Except Meds      Food Allergies: Banana  Current Appetite:   [] Good     [] Fair     [] Poor     [x] Other: NPO  Appetite/meal intake prior to admission:   [] Good     [] Fair     [] Poor     [x] Other: unknown, pt with N/V PTA  Feeding Limitations:  [x] Swallowing difficulty: SLP following    [] Chewing difficulty    [] Other:  Current Meal Intake: No data found. Anuric   BM: 8/22   Skin Integrity: WDL  Edema:   [x] No     [] Yes   Pertinent Medications: Reviewed: colace, miralax, phenergan, Vitamin B-6     Recent Labs      08/24/18   0800  08/24/18   0231  08/23/18   0235  08/22/18   1137   NA  139  137  135*  134*   K  3.2*  3.3*  3.8  4.5   CL  102  101  100  96*   CO2  27  28  26  27   GLU  94  90  103*  115*   BUN  20*  20*  38*  46*   CREA  2.86*  2.74*  4.44*  5.56*   CA  8.3*  8.5  8.5  9.5   MG   --   2.4  2.7*  3.2*   PHOS   --   2.0*  2.9   --    ALB   --   2.8*  2.7*  3.1*   SGOT   --   42*  34  36   ALT   --   11*  8*  9*       Intake/Output Summary (Last 24 hours) at 08/24/18 1353  Last data filed at 08/24/18 1100   Gross per 24 hour   Intake          1072.92 ml   Output             3010 ml   Net         -1937.08 ml       Anthropometrics:  Ht Readings from Last 1 Encounters:   08/23/18 5' 1\" (1.549 m)     Last 3 Recorded Weights in this Encounter    08/22/18 1118 08/23/18 0832 08/23/18 1600   Weight: 40.8 kg (90 lb) 40.8 kg (90 lb) 40.8 kg (90 lb)     Body mass index is 17.01 kg/(m^2). Underweight    Weight History: Weight stable x 5 months PTA.     Weight Metrics 8/23/2018 8/4/2018 7/20/2018 6/27/2018 6/26/2018 5/31/2018 3/9/2018   Weight 90 lb 90 lb 90 lb 6.2 oz 90 lb 6.2 oz 85 lb 8.6 oz 90 lb 6.2 oz 84 lb 3.2 oz   BMI 17.01 kg/m2 17.01 kg/m2 17.08 kg/m2 17.08 kg/m2 16.16 kg/m2 17.08 kg/m2 15.91 kg/m2        Admitting Diagnosis: Hypertensive emergency  Pertinent PMHx: ESRD on HD, HTN, vitamin D deficiency     Education Needs:        [x] None identified  [] Identified - Not appropriate at this time  []  Identified and addressed - refer to education log  Learning Limitations:   [] None identified  [x] Identified: language barrier    Cultural, Temple & ethnic food preferences:  [x] None identified    [] Identified and addressed    ESTIMATED NUTRITION NEEDS:     Calories: 8467-8271 kcal (30-35 kcal/kg) based on  [] Actual BW      [x] SBW 56 kg  Protein: 67-84 gm (1.2-1.5 gm/kg) based on  [] Actual BW      [x] SBW   Fluid: 6152-9972 mL     MONITORING & EVALUATION:     Nutrition Goal(s): goal modified   1. Nutritional needs will be met through adequate oral intake or nutrition support within the next 7 days.   Outcome:  [] Met/Ongoing    []  Not Met    [x] New/Initial Goal      Monitoring:   [] Food and beverage intake   [x] Diet order   [x] Nutrition-focused physical findings   [x] Treatment/therapy   [] Weight   [] Enteral nutrition intake        Previous Recommendations (for follow-up assessments only):     []   Implemented       [x]   Not Implemented (RD to address)      [] No Longer Appropriate     [] No Recommendation Made     Discharge Planning: pending ability to tolerate po diet   [x] Participated in care planning, discharge planning, & interdisciplinary rounds as appropriate      Kip Hernández, [address] [place] [address]    Pager: [phone]

## 2018-08-24 NOTE — PROGRESS NOTES
Speech Therapy:    Second attempt: pt currently NPO for CT of the abdomen. Will f/u as medical condition permits.      Thank you for this referral.    Everett Black M.S. CCC-SLP/L  Speech-Language Pathologist

## 2018-08-24 NOTE — PROGRESS NOTES
HEMODIALYSIS ROUNDING NOTE      Patient: Adrián Cuellar               Sex: female          DOA: 8/22/2018 11:25 AM        YOB: 1960      Age:  56 y.o.        LOS:  LOS: 2 days     Subjective:     Adrián Cuellra is a 56 y.o.  who presents with Hypertensive emergency. The patient is dialyzing utilizing the following method:Intermittent Hemodialysis    Chief Complains: Patient was seen on dialysis, denies nausea / vomiting / headache / dizziness / SOB / chest pain.   - Reviewed last 24 hrs events     Current Facility-Administered Medications   Medication Dose Route Frequency    heparin (porcine) 1,000 unit/mL injection        acetaminophen (TYLENOL) suppository 650 mg  650 mg Rectal Q6H PRN    niCARdipine (CARDENE) 25 mg in 0.9% sodium chloride 250 mL infusion  0-15 mg/hr IntraVENous TITRATE    promethazine (PHENERGAN) 6.25 mg in 0.9% sodium chloride 50 mL IVPB  6.25 mg IntraVENous Q6H PRN    polyethylene glycol (MIRALAX) packet 17 g  17 g Oral DAILY    labetalol (NORMODYNE;TRANDATE) 20 mg/4 mL (5 mg/mL) injection 10 mg  10 mg IntraVENous Q6H PRN    isoniazid (NYDRAZID) tablet 300 mg  300 mg Oral DAILY    rifAMPin (RIFADIN) capsule 600 mg  600 mg Oral ACB    dextrose 10% infusion  30 mL/hr IntraVENous CONTINUOUS    aspirin (ASA) suppository 300 mg  300 mg Rectal DAILY    atorvastatin (LIPITOR) tablet 40 mg  40 mg Oral QHS    bisacodyl (DULCOLAX) tablet 5 mg  5 mg Oral DAILY PRN    docusate sodium (COLACE) capsule 100 mg  100 mg Oral BID    ethambutol (MYAMBUTOL) tablet 800 mg  800 mg Oral Q TUE, THU & SAT    pyrazinamide tablet 1,000 mg  1,000 mg Oral Q TUE, THU & SAT    pyridoxine (vitamin B6) (VITAMIN B-6) tablet 100 mg  100 mg Oral DAILY    sodium chloride (NS) flush 5-10 mL  5-10 mL IntraVENous Q8H    sodium chloride (NS) flush 5-10 mL  5-10 mL IntraVENous PRN    heparin (porcine) injection 5,000 Units  5,000 Units SubCUTAneous Q8H    hydrALAZINE (APRESOLINE) tablet 100 mg  100 mg Oral TID    carvedilol (COREG) tablet 25 mg  25 mg Oral Q12H    amLODIPine (NORVASC) tablet 10 mg  10 mg Oral DAILY    albuterol (PROVENTIL VENTOLIN) nebulizer solution 2.5 mg  2.5 mg Nebulization Q4H PRN    cloNIDine (CATAPRES) 0.3 mg/24 hr patch 1 Patch  1 Patch TransDERmal Q7D       Objective:     Visit Vitals    /87    Pulse (!) 109    Temp 97.9 °F (36.6 °C)    Resp 15    Ht 5' 1\" (1.549 m)    Wt 40.8 kg (90 lb)    SpO2 100%    Breastfeeding No    BMI 17.01 kg/m2       Intake/Output Summary (Last 24 hours) at 08/24/18 1309  Last data filed at 08/24/18 1000   Gross per 24 hour   Intake          1072.92 ml   Output             1510 ml   Net          -437.08 ml       Physical Examination:    GEN: AAO X 3, NAD  RS: Chest is bilateral equal, no wheezing / rales / crackles  CVS: S1-S2 heard, RRR  Abdomen: Soft, Non tender, Not distended, Positive bowel sounds  Extremities: No edema, no cyanosis, skin is warm on touch  CNS: Awake & follows commands, CN II-XII are grossly intact. HEENT: Head is atraumatic, PERRLA, conjunctiva pink & non icteric. No JVD or carotid bruit      Data Review:      Labs:     Hematology: Recent Labs      08/24/18   0800  08/24/18   0231  08/23/18   0235  08/22/18   1137   WBC  3.3*  4.2*  5.0  4.7   HGB  10.1*  10.9*  11.5*  14.1   HCT  29.7*  32.1*  34.3*  39.9     Chemistry: Recent Labs      08/24/18   0800  08/24/18   0231  08/23/18   0235  08/22/18   1137   BUN  20*  20*  38*  46*   CREA  2.86*  2.74*  4.44*  5.56*   CA  8.3*  8.5  8.5  9.5   ALB   --   2.8*  2.7*  3.1*   K  3.2*  3.3*  3.8  4.5   NA  139  137  135*  134*   CL  102  101  100  96*   CO2  27  28  26  27   PHOS   --   2.0*  2.9   --    GLU  94  90  103*  115*        Images:     XR (Most Recent). CXR reviewed by me and compared with previous CXR   Results from [place] encounter on 08/22/18   XR ABD (KUMINE)   Narrative EXAM:  Abdominal AP.     CLINICAL INDICATION:  Abdominal pain. Discomfort. COMPARISON:  3/2/18    TECHNIQUE:  AP view of the abdomen in supine position. FINDINGS:     - No evidence of acute small bowel obstruction is detected. Bowel gas pattern  is normal.  - No pathological calcification or mass effect is appreciated. Impression IMPRESSION:    Negative study. CT (Most Recent)   Results from Hospital Encounter encounter on 08/22/18   CTA CHEST ABD PELV W WO CONT   Narrative CT without contrast and CT angiogram of the chest, abdomen and pelvis    CPT CODE: 20723, 09860, 30125    HISTORY: Fatigue and illness with abdominal pain and vomiting. Chest pain. COMPARISON: Chest CT 6/19/18, abdomen pelvis CT A/4/18    TECHNIQUE: Multidetector helical CT is carried out from the thoracic inlet to  the lesser trochanters before and after nonionic IV contrast administration. 3D postprocessed images, including surface shaded display, will produce for this  exam, permanently archived, and interpreted. Parenchymal organ imaging will be  limited by arterial phase contrast administration. All CT scans at this facility performed using dose optimization techniques as  appreciated to a performed exam, to include automated exposure control,  adjustment of the mA and or KU according to patient size (including appropriate  matching for site specific examination), or use of iterative reconstruction  technique. CONTRAST: 100 cc Isovue 370. FINDINGS:    CT ANGIOGRAM:     THORACIC AORTA: Normal in caliber. No significant atherosclerotic calcified or  noncalcified plaques seen. No aortic aneurysmal dilatation with dissection  demonstrated. The segmental caliber measurements of the aorta was not provided by 3-D  postprocessing images.  Left caliber measurement on axial images are 3.3 cm in  mid ascending aorta, 3.0 cm in mid aortic arch after the last grade vessel  takeoff, 2.2 cm at level of superior right atrium, 2.2 cm at mid descending and  at level of aortic hiatus. THE GREAT VESSELS IN THE ARCH: Patent. THE PULMONARY ARTERIES: Patent. ABDOMINAL AORTA: Normal in caliber with minimal atherosclerotic calcified and  noncalcified plaques at the distal portion. .     The caliber of the abdominal aorta:  1.9 cm proximally at the level of celiac artery;  1.6 cm at mid portion at the level of renal artery;  1.3 cm distally prior to the bifurcation. ILIAC ARTERIES: Mild atherosclerotic calcified plaques present. Patent lumen  with no significant stenosis. CELIAC ARTERY, SMA AND RENAL ARTERIES: Patent. Solitary bilateral renal arteries  noted. CT CHEST:     LUNG PARENCHYMA: Extensive patchy airspace opacities identified in bilateral  lungs. Compressive atelectasis at the posterior bilateral lower lobes due to  pleural effusions, greater on the right. THYROID: Poorly seen in detail due to motion artifact. MEDIASTINUM:  Prominent soft tissue density in mediastinum is new since the  prior study but poorly seen in detail due to early arterial enhancement, more  likely mild edema/fluid from pericardial effusion rather than adenopathy. THE HEART AND THE PERICARDIUM: . The heart is borderline prominent. Massive  pericardial effusion identified with moderate interval progression. PLEURAL SPACES AND CHEST WALL: Large right pleural effusion is again noted. Small left pleural effusion is near interval resolved. Diffuse chest wall edema. CT ABDOMEN/PELVIS:     ABDOMINAL VISCERA: The liver parenchyma is poorly opacified on this early  arterial phase. No discrete mass seen. The spleen shows scattered calcified  granulomas. Atrophic bilateral kidneys again noted. Exophytic left renal cyst is  better visualized on recent regular contrast CT. The pancreas and bilateral  adrenal glands appear grossly unremarkable. The stomach is poorly distended. The  small and large bowel are nondilated.  There is prominent wall thickness of small  and large bowel, poorly seen in detail. Scattered diverticula in the colon. Normal appendix. PERITONEUM/RETROPERITONEUM: Moderate ascites is again noted with slight mild  interval increase compared to the recent CT. No definite retroperitoneal mass or  adenopathy identified. OTHERS: The bladder is poorly distended. The uterus is poorly seen today but  grossly stable. Significant anasarca in abdominal and pelvic wall. CT OSSEOUS STRUCTURES:  Unremarkable for age. Impression IMPRESSION:     1. Normal caliber aorta with no aneurysmal dilatation or dissection. Minimal  atherosclerotic aortic disease at distal abdominal portion. 2. Patent pulmonary arteries and great vessels at the arch as well as abdominal  vasculature as above. 3. Patchy airspace disease in bilateral lungs. 4. Persistent large right pleural effusion. Near resolved small left pleural  effusion. 5. Massive pericardial effusion is interval progressed with likely extending to  the mediastinum at described above. 6. Moderate ascites is mildly progressed. Significant anasarca. 7. The abdominal structures are poorly seen due to early arterial enhancing  phase. Scattered diverticula in the colon. The diffuse mucosal prominence of  small and large bowel could be due to poor distention. Potential bowel  inflammation or ischemia needs to be excluded clinically. 8. Left renal cyst. Atrophic bilateral kidneys. Thank you for your referral.            Plan / Recommendation:         End Stage Renal Disease:  Plan HD today    At 10 AM on 8/24/2018, I saw and examined patient during hemodialysis treatment. The patient was receiving hemodialysis for treatment of end stage renal disease. I have also reviewed vital signs, intake and output, lab results and recent events, and agreed with today's dialysis order.     Access: No issue    Anemia:  Hold epo  For ct scan with iv contrast later today for evaluation of constipation ,vomiting.will do short dialysis tomorrow  Jessie Chambers MD  Nephrology  8/24/2018

## 2018-08-24 NOTE — DIABETES MGMT
Glycemic Control: Pt discussed in interdisciplinary rounds. Glucose had been trending down. Pt is NPO. Pt is receiving D10 at 30 mL/hr,continue inpatient monitoring and intervention.      Kelly Parish, [address], [address] [facility]

## 2018-08-24 NOTE — PROGRESS NOTES
PT eval order received and chart reviewed. Unable to see patient at this time as patient has bedrest with bedside commode. Please D/C to allow patient participation in full eval. Will follow up as patient schedule allows. Thank you for this referral. Emily Sunshine, PT, DPT.

## 2018-08-24 NOTE — PROGRESS NOTES
[facility]  Progress Note    Patient: Pratik Rapp Age: 56 y.o. : 1960 MR#: [id number] SSN: [id number]  Date/Time: 2018     Subjective:     Review of systems    Patient is awake   Family in room with her   NO CP  NOSOB  C/o irritation from NGT     Assessment/Plan:   1.  Hypertensive urgency - improved with Cardene   2 Left sided weakness - improved with improved BP   3 History of MTB and MAC on DOT, sputum smears cleared for AFB - on Rifampin + Ethambutol +INH+Pza +pyridoxine   4 ESRD on IHD  5 H/o NSTEMI  6 History of seizures    PLAN   - still on  Cardene drip / change to po meds since now patient has NGT , she was vomitting after every dose of po meds , CT abd - no acute pathology , she had one BM      - Continue HD per renal     - D/W family in room with her           Case discussed with:  []Patient  []Family  [x]Nursing  []Case Management  DVT Prophylaxis:  []Lovenox  []Hep SQ  []SCDs  []Coumadin   []On Heparin gtt          Objective:   VS:   Visit Vitals    /77    Pulse 97    Temp 97.9 °F (36.6 °C)    Resp 19    Ht 5' 1\" (1.549 m)    Wt 40.8 kg (90 lb)    SpO2 96%    Breastfeeding No    BMI 17.01 kg/m2      Tmax/24hrs: Temp (24hrs), Av.2 °F (36.8 °C), Min:97.7 °F (36.5 °C), Max:98.8 °F (37.1 °C)  IOBRIEF    Intake/Output Summary (Last 24 hours) at 18 1759  Last data filed at 18 1600   Gross per 24 hour   Intake          1501.25 ml   Output             1510 ml   Net            -8.75 ml       General:  More awake    Cardiovascular: S1S2 - regular , No Murmur   Pulmonary: Equal expansion , No Use of accessory muscles , No Rales No Rhonchi    GI:  +BS in all four quadrants, soft, non-tender  Extremities:  No edema; 2+ dorsalis pedis pulses bilaterally  Neuro: no focal weakness       Medications:   Current Facility-Administered Medications   Medication Dose Route Frequency    heparin (porcine) 1,000 unit/mL injection        acetaminophen (TYLENOL) suppository 650 mg  650 mg Rectal Q6H PRN    niCARdipine (CARDENE) 25 mg in 0.9% sodium chloride 250 mL infusion  0-15 mg/hr IntraVENous TITRATE    promethazine (PHENERGAN) 6.25 mg in 0.9% sodium chloride 50 mL IVPB  6.25 mg IntraVENous Q6H PRN    polyethylene glycol (MIRALAX) packet 17 g  17 g Oral DAILY    labetalol (NORMODYNE;TRANDATE) 20 mg/4 mL (5 mg/mL) injection 10 mg  10 mg IntraVENous Q6H PRN    isoniazid (NYDRAZID) tablet 300 mg  300 mg Oral DAILY    rifAMPin (RIFADIN) capsule 600 mg  600 mg Oral ACB    aspirin (ASA) suppository 300 mg  300 mg Rectal DAILY    atorvastatin (LIPITOR) tablet 40 mg  40 mg Oral QHS    bisacodyl (DULCOLAX) tablet 5 mg  5 mg Oral DAILY PRN    docusate sodium (COLACE) capsule 100 mg  100 mg Oral BID    ethambutol (MYAMBUTOL) tablet 800 mg  800 mg Oral Q TUE, THU & SAT    pyrazinamide tablet 1,000 mg  1,000 mg Oral Q TUE, THU & SAT    pyridoxine (vitamin B6) (VITAMIN B-6) tablet 100 mg  100 mg Oral DAILY    sodium chloride (NS) flush 5-10 mL  5-10 mL IntraVENous Q8H    sodium chloride (NS) flush 5-10 mL  5-10 mL IntraVENous PRN    heparin (porcine) injection 5,000 Units  5,000 Units SubCUTAneous Q8H    hydrALAZINE (APRESOLINE) tablet 100 mg  100 mg Oral TID    carvedilol (COREG) tablet 25 mg  25 mg Oral Q12H    amLODIPine (NORVASC) tablet 10 mg  10 mg Oral DAILY    albuterol (PROVENTIL VENTOLIN) nebulizer solution 2.5 mg  2.5 mg Nebulization Q4H PRN    cloNIDine (CATAPRES) 0.3 mg/24 hr patch 1 Patch  1 Patch TransDERmal Q7D       Labs:    Recent Labs      08/24/18   0800  08/24/18   0231  08/23/18   0235   WBC  3.3*  4.2*  5.0   HGB  10.1*  10.9*  11.5*   HCT  29.7*  32.1*  34.3*   PLT  91*  105*  94*     Recent Labs      08/24/18   0800  08/24/18   0231  08/23/18   0235  08/22/18   1137   NA  139  137  135*  134*   K  3.2*  3.3*  3.8  4.5   CL  102  101  100  96*   CO2  27  28  26  27   GLU  94  90  103*  115*   BUN  20*  20*  38*  46* CREA  2.86*  2.74*  4.44*  5.56*   CA  8.3*  8.5  8.5  9.5   MG   --   2.4  2.7*  3.2*   PHOS   --   2.0*  2.9   --    ALB   --   2.8*  2.7*  3.1*   SGOT   --   42*  34  36   ALT   --   11*  8*  9*   INR   --    --    --   0.9         Signed By: Cain Wong MD     August 24, 2018

## 2018-08-24 NOTE — PROGRESS NOTES
Speech Pathology Note    1000: Attempted dysphagia tx. Pt currently undergoing HD. SLP will f/u as appropriate.     Donavon Harrell MS, CCC/SLP  Speech- Language Pathologist

## 2018-08-24 NOTE — PROGRESS NOTES
PT eval order received and chart reviewed. Unable to see patient at this time as patient preparing to go to CT. Will follow up as patient schedule allows. Thank you for this referral. Park Cancino, PT, DPT.

## 2018-08-24 NOTE — PROGRESS NOTES
Progress Note  Pulmonary, Critical Care, and Sleep Medicine    Name: Meliza Ornelas MRN: [id number]   : 1960 Hospital: [address] [place] [address] Dr   Date: 2018        IMPRESSION:   · Hypertensive emergency with response to Cardene and HD. · Left sided weakness due to hypertensive encephalopathy thought to be stroke mimicker, resolved however pt with intermittent AMS  · Nausea, vomiting and abdominal pain of ? Etiology, KUB and CT nondiagnostic  · History of MTB and MAC on DOT, sputum smears cleared for AFB  · ESRD on IHD  · H/o NSTEMI  · History of seizures      PLAN:   · Wean off Cardene and continue po HTN medications  · KUB with no evidence of SBO or ileus, CT below. · Titrate FiO2  To maintain saturation greater than 90%  · Start NGT for feeds and more consistent medication administration  · Tube feeds ordered  · Aspiration precautions  · Continue quadruple TB therapy  · Prophylaxis issues addressed  · HD per Nephrology  · No EEG evidence of seizures but possible toxic metabolic encephalopathy  · Continue ICU care  · Discussed in ICU interdisciplinary rounds     Subjective/Interval History:   I have reviewed the flowsheet and previous days notes. Reviewed interval history. Discussed management with nursing staff. This patient has been seen and evaluated at the request of Dr. Ami Bowling for Hypertensive Emergency. Walker Yi a 56 y. o. female c pmhx of HTN, HLD, ESRD on HD who presented to the [facility] ER with left-sided weakness and nausea/vomiting that started three days ago. Pt speaks [place] only. Per Chart review, son stated that the pt has been lethargic, barely able to move with unilateral weakness. Pt was recently admitted to [facility] with similar symptoms in hypertensive emergency at the time.      Upon arrival to the ER, pt's /144  Temp 97.7 saturating 98% on RA.  Pt started complaining of chest pain and HA while in the ER. CT Head showed chronic microvascular changes, Tele neuro consulted saying symptoms likely secondary to hypertensive encephalopathy. Pt given nitro, zofran, broad spectrum ABX, and labetalol while in the ER with slight improvement of -200. CTA Chest/Abd/Pelvis performed while in ER, pending final results. Pt transferred to ICU and currently receiving urgent hemodialysis on cardene gtt.      18  More alert but still with some nausea and vomiting, slightly better than     ROS:Pertinent items are noted in HPI. Orders reviewed including medications. Changes made if indicated. Telemetry monitor reviewed at the bedside. Objective:   Vital Signs:    Visit Vitals    /77    Pulse (!) 106    Temp 97.9 °F (36.6 °C)    Resp 22    Ht 5' 1\" (1.549 m)    Wt 40.8 kg (90 lb)    SpO2 98%    Breastfeeding No    BMI 17.01 kg/m2       O2 Device: Room air       Temp (24hrs), Av.2 °F (36.8 °C), Min:97.7 °F (36.5 °C), Max:98.8 °F (37.1 °C)       Intake/Output:   Last shift:       07 -  1900  In: 547.5 [I.V.:547.5]  Out: 1500   Last 3 shifts:  190 -  0700  In: 1332.9 [I.V.:1332.9]  Out: 5444 [Urine:35]    Intake/Output Summary (Last 24 hours) at 18 1628  Last data filed at 18 1500   Gross per 24 hour   Intake          1421.25 ml   Output             1510 ml   Net           -88.75 ml        Physical Exam:    General:  Alert, does not follow commands, in no distress, appears stated age. Head:  Normocephalic, without obvious abnormality, atraumatic. Eyes:  ANicteric, PERRL,   Nose: Nares normal. Mucosa normal. No drainage or sinus tenderness. Throat: Lips, mucosa, and tongue normal.  NO Thrush   Neck: Supple, symmetrical, trachea midline, no adenopathy, thyroid: no enlargment/tenderness/nodules    Back:   Symmetric    Lungs:   Bilateral auscultation no rales or wheezes   Chest wall:  No tenderness or deformity. NO intercostal retractions   Heart:  Tachycardic , S1, S2 normal, no murmur, click, rub or gallop.    Abdomen: Soft, non-tender. Bowel sounds normal. No masses,  No organomegaly. NO paradoxical motion   Extremities: normal, atraumatic, no cyanosis or edema. Pulses: 2+ and symmetric all extremities. Skin: Skin color, texture, turgor normal. No rashes or lesions. NO clubbing   Lymph nodes: Cervical nodes normal.   Neurologic: Grossly nonfocal      :        Devices:             Drips:    DATA:  Labs:  Recent Labs      08/24/18   0800  08/24/18   0231  08/23/18   0235   WBC  3.3*  4.2*  5.0   HGB  10.1*  10.9*  11.5*   HCT  29.7*  32.1*  34.3*   PLT  91*  105*  94*     Recent Labs      08/24/18   0800  08/24/18   0231  08/23/18   0235  08/22/18   1137   NA  139  137  135*  134*   K  3.2*  3.3*  3.8  4.5   CL  102  101  100  96*   CO2  27  28  26  27   GLU  94  90  103*  115*   BUN  20*  20*  38*  46*   CREA  2.86*  2.74*  4.44*  5.56*   CA  8.3*  8.5  8.5  9.5   MG   --   2.4  2.7*  3.2*   PHOS   --   2.0*  2.9   --    ALB   --   2.8*  2.7*  3.1*   SGOT   --   42*  34  36   ALT   --   11*  8*  9*   INR   --    --    --   0.9     No results for input(s): PH, PCO2, PO2, HCO3, FIO2 in the last 72 hours. Imaging:  [x]        I have personally reviewed the patients radiographs and reports            XR Results (most recent):    Results from Hospital Encounter encounter on 08/22/18   XR ABD PORT  1 V   Narrative ABDOMINAL RADIOGRAPH-SINGLE SUPINE VIEW    INDICATION: NG tube placement    COMPARISON: Abdomen x-ray 8/23/2018    FINDINGS: Enteric tube tip projects at the distal stomach. Nonobstructed bowel  gas pattern. No evidence for pneumoperitoneum. No evidence for nephrolithiasis. No acute osseous abnormality. Degenerative changes noted at the sacroiliac  joints lower lumbar spine         Impression IMPRESSION:  Enteric tube tip projects at the distal stomach. .      CT Results (most recent):    Results from Hospital Encounter encounter on 08/22/18   CT ABD PELV W CONT   Narrative CT ABDOMEN AND PELVIS WITH CONTRAST    COMPARISON: August 22, 2018. INDICATIONS: Vomiting. TECHNIQUE:  Following the uneventful administration of 80 cc of Isovue 300  intravenous contrast , volumetric data acquisition was performed of the abdomen  and pelvis on a multislice scanner and reconstructed in axial coronal and  sagittal planes    CT ABDOMEN FINDINGS:     Lung Bases: There is a large right and minimal left pleural effusion. Bibasilar  atelectasis is evident. There is multichamber cardiac enlargement. There is a  moderate size pericardial effusion. This ranges up to 2.5 cm in thickness but  probably averages less than 1 cm. .    Liver/Gallbladder/Biliary: Normal.    Spleen: Scattered small granulomas noted. Otherwise negative. Adrenal Glands: Normal.    Kidneys: Atrophic with a left-sided cysts unchanged. .    Pancreas: Normal.    Stomach, Small Bowel,  and Colon: A nasogastric tube is present extending into  the stomach which is otherwise unremarkable. The small bowel is not distended. The appendix appears to be present unremarkable. The colon is unremarkable. Lymph Nodes: Normal in size and number. The abdominal aorta is unremarkable. The IVC is unremarkable. Peritoneal Spaces: Small volume peritoneal fluid is evident    Abdominal wall: No hernia or mass is evident. Subcutaneous edema is evident. CT PELVIS FINDINGS:     Bladder: Empty. Pelvic Small Bowel And Colon: Unremarkable. Lymph Nodes: Normal in size and number. Free Fluid: Small volume pelvic fluid noted    Uterus and adnexal structures appear unremarkable    Osseous Structures Of Abdomen And Pelvis: Unremarkable for age. Impression IMPRESSION:     Anasarca with edematous soft tissues, pleural fluid, ascites, pericardial fluid,  and multichamber cardiac enlargement. Findings indicating a probable cause for recurrent vomiting are not identified.             All CT scans at this facility are performed using dose optimization technique as  appropriate to the performed exam, to include automated exposure control,  adjustment of the mA and/or kV according to patient's size (Including  appropriate matching for site-specific examinations), or use of iterative  reconstruction technique. High complexity decision making was performed during this consultation and evaluation.  Critical care time exclusive of procedures spent managing the patient:  42    minutes    Efrain Roth MD

## 2018-08-24 NOTE — ROUTINE PROCESS
Bedside and Verbal shift change report [facility]. Tim Gibbons (oncoming nurse) by Shahram Nicole RN (offgoing nurse). Report included the following information SBAR, Kardex, Intake/Output, MAR, Recent Results, Med Rec Status and Cardiac Rhythm (Sinus Tachycardia).

## 2018-08-25 NOTE — PROGRESS NOTES
NUTRITION    Nutrition Screen     RECOMMENDATIONS / PLAN:     - Once emesis resolves recommend resuming tube feeds of Nepro at trickle rate of 20 mL/hr.   - Monitor EN tolerance and recommend advancing as tolerated by 10 mL q 6 hours to goal rate of 45 mL/hr with 100 mL q 4 hour water flushes.   - Continue motility agent, bowel regimen, and banana bag.  - Continue RD inpatient monitoring and evaluation. Goal EN Regimen: Nepro at 45 mL/hr + 100 mL q 4 hour water flushes to provide: 1944 kcal, 87 gm protein, 104 gm fat, 180 gm CHO, 17 gm fiber, 783 mL free water, 1383 mL total water, 100% RDIs     NUTRITION INTERVENTIONS & DIAGNOSIS:     [x] Enteral nutrition: resume as tolerated  [x] Vitamin and mineral supplement therapy: banana bag  [x] Collaboration and referral of nutrition care: discussed nutrition recommendations with MD    Nutrition Diagnosis: Underweight related to prolonged inadequate energy intake as evidenced by pt with BMI of 17 kg/(m^2) upon admission. ASSESSMENT:     8/25: NG tube placed and feeds started, pt with emesis and feeds held at this time. Plan to hold and resume at trickle rate. Reglan and banana bag started today. HD yesterday. 8/24: Pt with nausea/vomiting- unable to tolerate oral medications and chronic constipation- plan for soap suds enema today. Remains NPO per SLP, altered mentation improved today- follow up swallow evaluation planned and several attempts made by SLP today but pt receiving dialysis and NPO for CT abdomen. IV fluid with dextrose started to prevent hypoglycemia. 8/23: Pt with N/V upon admission and currently. NPO started on phenergan. Planning for HD today. No changes in weight PTA, but remains underweight. SLP recommending NPO with consideration of short term means of nutrition.     Average EN Infusion adequate to meet patients estimated nutritional needs:   [] Yes     [x] No   [] Unable to determine at this time    Tube Feeding: Nepro at 45 mL/hr (held due to N/V)  Water Flushes: 100 q 4 hours  Residuals: not-applicable    Diet: DIET NPO Except Meds  DIET TUBE FEEDING      Food Allergies: Banana  Current Appetite:   [] Good     [] Fair     [] Poor     [x] Other: NPO  Appetite/meal intake prior to admission:   [] Good     [] Fair     [] Poor     [x] Other: unknown, pt with N/V PTA  Feeding Limitations:  [x] Swallowing difficulty: SLP following    [] Chewing difficulty    [] Other:  Current Meal Intake: No data found. Anuric   BM: 8/24- hard, hx of chronic constipation  Skin Integrity: WDL  Edema:   [x] No     [] Yes   Pertinent Medications: Reviewed: colace, reglan, miralax, Vitamin B-6     Recent Labs      08/25/18   0127  08/24/18   0800  08/24/18   0231  08/23/18   0235   NA  138  139  137  135*   K  3.3*  3.2*  3.3*  3.8   CL  101  102  101  100   CO2  29  27  28  26   GLU  112*  94  90  103*   BUN  13  20*  20*  38*   CREA  2.13*  2.86*  2.74*  4.44*   CA  8.7  8.3*  8.5  8.5   MG  2.5   --   2.4  2.7*   PHOS  2.2*   --   2.0*  2.9   ALB  2.7*   --   2.8*  2.7*   SGOT  50*   --   42*  34   ALT  17   --   11*  8*       Intake/Output Summary (Last 24 hours) at 08/25/18 1207  Last data filed at 08/25/18 0700   Gross per 24 hour   Intake          1639.75 ml   Output              405 ml   Net          1234.75 ml       Anthropometrics:  Ht Readings from Last 1 Encounters:   08/23/18 5' 1\" (1.549 m)     Last 3 Recorded Weights in this Encounter    08/23/18 0832 08/23/18 1600 08/24/18 1900   Weight: 40.8 kg (90 lb) 40.8 kg (90 lb) 40.8 kg (90 lb)     Body mass index is 17.01 kg/(m^2). Underweight    Weight History: Weight stable x 5 months PTA.     Weight Metrics 8/24/2018 8/4/2018 7/20/2018 6/27/2018 6/26/2018 5/31/2018 3/9/2018   Weight 90 lb 90 lb 90 lb 6.2 oz 90 lb 6.2 oz 85 lb 8.6 oz 90 lb 6.2 oz 84 lb 3.2 oz   BMI 17.01 kg/m2 17.01 kg/m2 17.08 kg/m2 17.08 kg/m2 16.16 kg/m2 17.08 kg/m2 15.91 kg/m2        Admitting Diagnosis: Hypertensive emergency  Pertinent PMHx: ESRD on HD, HTN, vitamin D deficiency     Education Needs:        [x] None identified  [] Identified - Not appropriate at this time  []  Identified and addressed - refer to education log  Learning Limitations:   [] None identified  [x] Identified: language barrier    Cultural, Voodoo & ethnic food preferences:  [x] None identified    [] Identified and addressed    ESTIMATED NUTRITION NEEDS:     Calories: 6757-6239 kcal (30-35 kcal/kg) based on  [] Actual BW      [x] SBW 56 kg  Protein: 67-84 gm (1.2-1.5 gm/kg) based on  [] Actual BW      [x] SBW   Fluid: 5206-9345 mL     MONITORING & EVALUATION:     Nutrition Goal(s):  1. Nutritional needs will be met through adequate oral intake or nutrition support within the next 7 days.   Outcome:  [] Met/Ongoing    [x]  Not Met/Progressing    [] New/Initial Goal      Monitoring:   [] Food and beverage intake   [] Diet order   [x] Nutrition-focused physical findings   [x] Treatment/therapy   [] Weight   [x] Enteral nutrition intake        Previous Recommendations (for follow-up assessments only):     [x]   Implemented       []   Not Implemented (RD to address)      [] No Longer Appropriate     [] No Recommendation Made     Discharge Planning: pending ability to tolerate po diet   [x] Participated in care planning, discharge planning, & interdisciplinary rounds as appropriate      Roula Davis RD   Pager: [phone]

## 2018-08-25 NOTE — DIALYSIS
ACUTE HEMODIALYSIS FLOW SHEET    HEMODIALYSIS ORDERS: Physician: Enrrique De La Cruz     Dialyzer: revaclear   Duration: 3 hr  BFR: 350   DFR: 800   Dialysate:  Temp 37 K+   3    Ca+  2.5 Na 135 Bicarb 35   Weight:  40.8 kg    Patient Chart []     Unable to Obtain []   Dry weight/UF Goal: 2000 Access TDC  Needle Gauge N/A    Heparin []  Bolus      Units    [] Hourly       Units    [x]None      Catheter locking solution Heparin 1000units/mL   Pre BP:   141/73    Pulse:     88     Temperature:   98.3F  Respirations: 18  Tx: NS       ml/Bolus  Other        [x] N/A   Labs: Pre        Post:        [x] N/A   Additional Orders(medications, blood products, hypotension management):       [x] N/A     [x] Hawk Consent Verified     CATHETER ACCESS: []N/A   [x]Right   []Left   []IJ     []Fem   [] First use X-ray verified     [x]Tunnel                [] Non Tunneled   [x]No S/S infection  []Redness  []Drainage []Cultured []Swelling []Pain   [x]Medical Aseptic Prep Utilized   []Dressing Changed  [] Biopatch  Date:       []Clotted   []Patent   Flows: [x]Good  []Poor  []Reversed   If access problem,  notified: []Yes    []N/A  Date:           GRAFT/FISTULA ACCESS:  [x]N/A     []Right     []Left     []UE     []LE   []AVG   []AVF        []Buttonhole    []Medical Aseptic Prep Utilized   []No S/S infection  []Redness  []Drainage []Cultured []Swelling []Pain    Bruit:   [] Strong    [] Weak       Thrill :   [] Strong    [] Weak       Needle Gauge:    Length:     If access problem,  notified: []Yes     []N/A  Date:        Please describe access if present and not used:       GENERAL ASSESSMENT:    LUNGS:  Rate 18 SaO2%   99     [] N/A    [x] Clear  [] Coarse  [] Crackles  [] Wheezing        [] Diminished     Location : []RLL   []LLL    []RUL  []ELÍAS   Cough: []Productive  []Dry  [x]N/A   Respirations:  []Easy  []Labored   Therapy:  [x]RA  []NC  l/min    Mask: []NRB []Venti       O2%                  []Ventilator  []Intubated  [] Trach  [] BiPaP   CARDIAC: [x]Regular      [] Irregular   [] Pericardial Rub  [] JVD        [x]  Monitored  [] Bedside  [] Remotely monitored [] N/A  Rhythm: NSR   EDEMA: [] None  [x]Generalized  [] Pitting [] 1    [] 2    [] 3    [] 4                 [] Facial  [] Pedal  []  UE  [] LE   SKIN:   [x] Warm  [] Hot     [] Cold   [x] Dry     [] Pale   [] Diaphoretic                  [] Flushed  [] Jaundiced  [] Cyanotic  [] Rash  [] Weeping   LOC:    [] Alert      []Oriented:    [] Person     [] Place  []Time               [] Confused  [x] Lethargic  [] Medicated  [] Non-responsive     GI / ABDOMEN:  [x] Flat    [] Distended    [] Soft    [] Firm   []  Obese                             [] Diarrhea  [] Bowel Sounds  [x] Nausea  [] Vomiting       / URINE ASSESSMENT:[] Voiding   [] Oliguria  [x] Anuria   []  Onofre     [] Incontinent    []  Incontinent Brief      []  Bathroom Privileges     PAIN: [x] 0 []1  []2   []3   []4   []5   [x]6   []7   []8   []9   []10            Scale 0-10  Action/Follow Up: n/a   MOBILITY:  [] Amb    [] Amb/Assist    [x] Bed    [] Wheelchair  [] Stretcher      All Vitals and Treatment Details on Attached [address]: [facility]          Room # 307     [] 1st Time Acute  [] Stat  [x] Routine  [] Urgent     [] Acute Room  []  Bedside  [x] ICU/CCU  [] ER   Isolation Precautions:  [x] Dialysis   [] Airborne   [] Contact    [] Reverse   Special Considerations:         [] Blood Consent Verified [x]N/A     ALLERGIES:   [] NKA    banana      Code Status:  [x] Full Code  [] DNR  [] Other           HBsAg ONLY: Date Drawn 9/9/17         [x]Negative []Positive []Unknown   HBsAb: Date 9/9/17    [] Susceptible   [x] Immune  []Not Drawn  [] Drawn     Current Labs:    Date of Labs: Today [x]           Results for Nikolas Momin (MRN [id number]) as of 8/25/2018 11:57   Ref.  Range 8/25/2018 01:27 8/25/2018 05:49   GLUCOSE,FAST - POC Latest Ref Range: 70 - 110 mg/dL  131 (H)   WBC Latest Ref Range: 4.6 - 13.2 K/uL 3.3 (L)    RBC Latest Ref Range: 4.20 - 5.30 M/uL 3.01 (L)    HGB Latest Ref Range: 12.0 - 16.0 g/dL 9.6 (L)    HCT Latest Ref Range: 35.0 - 45.0 % 28.5 (L)    MCV Latest Ref Range: 74.0 - 97.0 FL 94.7    MCH Latest Ref Range: 24.0 - 34.0 PG 31.9    MCHC Latest Ref Range: 31.0 - 37.0 g/dL 33.7    RDW Latest Ref Range: 11.6 - 14.5 % 12.8    PLATELET Latest Ref Range: 135 - 420 K/uL 98 (L)    MPV Latest Ref Range: 9.2 - 11.8 FL 10.8    NEUTROPHILS Latest Ref Range: 40 - 73 % 60    LYMPHOCYTES Latest Ref Range: 21 - 52 % 12 (L)    MONOCYTES Latest Ref Range: 3 - 10 % 21 (H)    EOSINOPHILS Latest Ref Range: 0 - 5 % 5    BASOPHILS Latest Ref Range: 0 - 2 % 2    DF Latest Units:   AUTOMATED    ABS. NEUTROPHILS Latest Ref Range: 1.8 - 8.0 K/UL 2.0    ABS. LYMPHOCYTES Latest Ref Range: 0.9 - 3.6 K/UL 0.4 (L)    ABS. MONOCYTES Latest Ref Range: 0.05 - 1.2 K/UL 0.7    ABS. EOSINOPHILS Latest Ref Range: 0.0 - 0.4 K/UL 0.2    ABS.  BASOPHILS Latest Ref Range: 0.0 - 0.1 K/UL 0.1    Sodium Latest Ref Range: 136 - 145 mmol/L 138    Potassium Latest Ref Range: 3.5 - 5.5 mmol/L 3.3 (L)    Chloride Latest Ref Range: 100 - 108 mmol/L 101    CO2 Latest Ref Range: 21 - 32 mmol/L 29    Anion gap Latest Ref Range: 3.0 - 18 mmol/L 8    Glucose Latest Ref Range: 74 - 99 mg/dL 112 (H)    BUN Latest Ref Range: 7.0 - 18 MG/DL 13    Creatinine Latest Ref Range: 0.6 - 1.3 MG/DL 2.13 (H)    BUN/Creatinine ratio Latest Ref Range: 12 - 20   6 (L)    Calcium Latest Ref Range: 8.5 - 10.1 MG/DL 8.7    Phosphorus Latest Ref Range: 2.5 - 4.9 MG/DL 2.2 (L)    Magnesium Latest Ref Range: 1.6 - 2.6 mg/dL 2.5    GFR est non-AA Latest Ref Range: >60 ml/min/1.73m2 24 (L)    GFR est AA Latest Ref Range: >60 ml/min/1.73m2 29 (L)    Bilirubin, total Latest Ref Range: 0.2 - 1.0 MG/DL 0.5    Protein, total Latest Ref Range: 6.4 - 8.2 g/dL 5.7 (L)    Albumin Latest Ref Range: 3.4 - 5.0 g/dL 2.7 (L)    Globulin Latest Ref Range: 2.0 - 4.0 g/dL 3.0    A-G Ratio Latest Ref Range: 0.8 - 1.7   0.9    ALT (SGPT) Latest Ref Range: 13 - 56 U/L 17    AST Latest Ref Range: 15 - 37 U/L 50 (H)    Alk. phosphatase Latest Ref Range: 45 - 117 U/L 145 (H)                                                                                 DIET:  [] Renal    [] Other     [x] NPO     []  Diabetic      PRIMARY NURSE REPORT: First initial/Last name/Title      Pre Dialysis: MIMA Ruelas, RN    Time: 1100      EDUCATION:    [x] Patient [] Other         Knowledge Basis: []None [x]Minimal [] Substantial   Barriers to learning Pt speaks bizsolu as her first language  []N/A   [x] Access Care     [x] S&S of infection     [] Fluid Management     []K+     []Procedural    []Albumin     [] Medications     [] Tx Options     [] Transplant     [] Diet     [] Other   Teaching Tools:  [x] Explain with DISKOVRe phone   [] Demo  [] Handouts [] Video  Patient response:   [] Verbalized understanding  [] Teach back  [] Return demonstration [x] Requires follow up   Inappropriate due to            [x] Time Out/Safety Check       [address] [place] [address] Before each treatment:     Machine Number:                   [address] [facility]                                                                     [x] Portable Machine #1/RO serial # [id number]                                     Alarm Test:  Pass time 0900         Other:         [x] RO/Machine Log Complete      [x]Extracorporeal Circuit Tested for integrity       Temp    37             Dialysate: pH  7.0 Conductivity: Meter   13.8     HD Machine   13.9                  TCD: 13.8  Dialyzer Lot # [id number]            Blood Tubing Lot # [id number]          Saline Lot #  [id number]     CHLORINE TESTING-Before each treatment and every 4 hours    Total Chlorine: [x] less than 0.1 ppm  Time: 1130 4 Hr/2nd Check Time: N/A   (if greater than 0.1 ppm from Primary then every 30 minutes from Secondary)     TREATMENT INITIATION  with Dialysis Precautions:   [x] All Connections Secured                 [x] Saline Line Double Clamped   [x] Venous Parameters Set                  [x] Arterial Parameters Set    [x] Prime Given 250ML                          [x]Air Foam Detector Engaged      Treatment Initiation Note:Pt noted to be asleep upon RN arrival to room 307. TDC in the right chest is clean, dry and intact with no s/s of infection noted. TDC flushes well and has good blood return. During Treatment Notes: At 1208, Dr. Freya Jeffrey gave a verbal order to increase target fluid removal to 3000mL. UF goal adjusted at this time. Medication Dose Volume Route Initials Dialyzer Cleared: [x] Good [] Fair  [] Poor    Blood processed:  53.1 L  UF Removed  3000 mL  . POst BP:   161/82       Pulse: 96      Respirations: 18  Temperature: 98.2F                                                                      Catheter: Locking solution: Heparin 1:1000 Art. 1.9ML  Wei. 1.9ML                                   Post Assessment:                                    Skin:  [x] Warm  [x] Dry [] Diaphoretic    [] Flushed  [] Pale [] Cyanotic   [facility] Signatures Title Initials  Time Lungs: [x] Clear    [] Course  [] Crackles  [] Wheezing [] Diminished   Sanders Said RN LG 1130 Cardiac: [x] Regular   [] Irregular   [x] Monitor  [] N/A  Rhythm: ST         Edema:  [] None    [x] General     [] Facial   [] Pedal    [] UE    [] LE       Pain: [x]0  []1  []2   []3  []4   []5   []6   []7   []8   []9   []10         Post Treatment Note:     Pt tolerated treatment with no dialysis-related complications. Removed 3000mL of ultrafiltrate as ordered. TDC flushed and heparin-locked. Pt is in no visible or reported distress. POST TREATMENT PRIMARY NURSE HANDOFF REPORT:     First initial/Last name/Title         Post Dialysis: MIMA Denny RN Time:  1500     Abbreviations: AVG-arterial venous graft, AVF-arterial venous fistula, IJ-Internal Jugular, Subcl-Subclavian, Fem-Femoral, Tx-treatment, AP/HR-apical heart rate, DFR-dialysate flow rate, BFR-blood flow rate, AP-arterial pressure, -venous pressure, UF-ultrafiltrate, TMP-transmembrane pressure, Wei-Venous, Art-Arterial, RO-Reverse Osmosis

## 2018-08-25 NOTE — PROGRESS NOTES
PT eval order received and chart reviewed. Unable to see patient at this time as patient is dialyzing. Will follow up as patient schedule allows. Thank you for this referral. Gage Izaguirre, PT, DPT.

## 2018-08-25 NOTE — PROGRESS NOTES
Bedside and Verbal shift change report given to [id number] (oncoming nurse) by Clint Voss (offgoing nurse). Report included the following information SBAR, Kardex, Intake/Output, MAR and Recent Results.

## 2018-08-25 NOTE — PROGRESS NOTES
[facility]  ICU Progress Note      Name: Samson Tracy   : 1960   MRN: [id number]   Date: 2018 8:50 AM     [x]I have reviewed the flowsheet and previous days notes. Events overnight reviewed and discussed with nursing staff. Vital signs and records reviewed. Janett Ansari a 56 y. o. female c pmhx of HTN, HLD, ESRD on HD admitted for hypertensive emergency. Continues to have N/V  Remains on cardene gtt  Tolerating IHD  Afebrile                 ROS:A comprehensive review of systems was negative except for that written in the HPI.       Vital Signs:    Visit Vitals    /79    Pulse 94    Temp 97.8 °F (36.6 °C)    Resp 15    Ht 5' 1\" (1.549 m)    Wt 40.8 kg (90 lb)    SpO2 99%    Breastfeeding No    BMI 17.01 kg/m2       O2 Device: Room air       Temp (24hrs), Av.9 °F (36.6 °C), Min:97.7 °F (36.5 °C), Max:98.6 °F (37 °C)       Intake/Output:   Last shift:         Last 3 shifts: 1901 -  0700  In: 2654.8 [I.V.:1779.8]  Out: 1915 [Urine:15]    Intake/Output Summary (Last 24 hours) at 18 0850  Last data filed at 18 0700   Gross per 24 hour   Intake          1868.92 ml   Output             1905 ml   Net           -36.08 ml          Physical Exam:    General: awake and answers question appropriately via    HEENT:  Anicteric sclerae; pink palpebral conjunctivae; mucosa moist  Resp:  Symmetrical chest expansion, no accessory muscle use; good airway entry; no rales/ wheezing/ rhonchi noted  CV:  S1, S2 present  GI:  Abdomen soft, non-tender; (+) active bowel sounds, NGT  Extremities:  +2 pulses on all extremities; no edema/ cyanosis/ clubbing noted; normal capillary refill  Skin:  Warm; no rashes/ lesions noted  Neurologic:  Non-focal      DATA:     Current Facility-Administered Medications   Medication Dose Route Frequency    losartan (COZAAR) tablet 100 mg  100 mg Oral DAILY    acetaminophen (TYLENOL) solution 500 mg  500 mg Oral Q4H PRN  niCARdipine (CARDENE) 25 mg in 0.9% sodium chloride 250 mL infusion  0-15 mg/hr IntraVENous TITRATE    promethazine (PHENERGAN) 6.25 mg in 0.9% sodium chloride 50 mL IVPB  6.25 mg IntraVENous Q6H PRN    polyethylene glycol (MIRALAX) packet 17 g  17 g Oral DAILY    labetalol (NORMODYNE;TRANDATE) 20 mg/4 mL (5 mg/mL) injection 10 mg  10 mg IntraVENous Q6H PRN    isoniazid (NYDRAZID) tablet 300 mg  300 mg Oral DAILY    rifAMPin (RIFADIN) capsule 600 mg  600 mg Oral ACB    aspirin (ASA) suppository 300 mg  300 mg Rectal DAILY    atorvastatin (LIPITOR) tablet 40 mg  40 mg Oral QHS    bisacodyl (DULCOLAX) tablet 5 mg  5 mg Oral DAILY PRN    docusate sodium (COLACE) capsule 100 mg  100 mg Oral BID    ethambutol (MYAMBUTOL) tablet 800 mg  800 mg Oral Q TUE, THU & SAT    pyrazinamide tablet 1,000 mg  1,000 mg Oral Q TUE, THU & SAT    pyridoxine (vitamin B6) (VITAMIN B-6) tablet 100 mg  100 mg Oral DAILY    sodium chloride (NS) flush 5-10 mL  5-10 mL IntraVENous Q8H    sodium chloride (NS) flush 5-10 mL  5-10 mL IntraVENous PRN    heparin (porcine) injection 5,000 Units  5,000 Units SubCUTAneous Q8H    hydrALAZINE (APRESOLINE) tablet 100 mg  100 mg Oral TID    carvedilol (COREG) tablet 25 mg  25 mg Oral Q12H    amLODIPine (NORVASC) tablet 10 mg  10 mg Oral DAILY    albuterol (PROVENTIL VENTOLIN) nebulizer solution 2.5 mg  2.5 mg Nebulization Q4H PRN    cloNIDine (CATAPRES) 0.3 mg/24 hr patch 1 Patch  1 Patch TransDERmal Q7D         Labs: Results:       Chemistry Recent Labs      08/25/18   0127  08/24/18   0800  08/24/18   0231  08/23/18   0235   GLU  112*  94  90  103*   NA  138  139  137  135*   K  3.3*  3.2*  3.3*  3.8   CL  101  102  101  100   CO2  29  27  28  26   BUN  13  20*  20*  38*   CREA  2.13*  2.86*  2.74*  4.44*   CA  8.7  8.3*  8.5  8.5   AGAP  8  10  8  9   BUCR  6*  7*  7*  9*   AP  145*   --   93  86   TP  5.7*   --   6.1*  6.0*   ALB  2.7*   --   2.8*  2.7*   GLOB  3.0   --   3.3 3. 3   AGRAT  0.9   --   0.8  0.8      CBC w/Diff Recent Labs      08/25/18   0127  08/24/18   0800  08/24/18   0231   WBC  3.3*  3.3*  4.2*   RBC  3.01*  3.20*  3.44*   HGB  9.6*  10.1*  10.9*   HCT  28.5*  29.7*  32.1*   PLT  98*  91*  105*   GRANS  60  64  59   LYMPH  12*  14*  14*   EOS  5  4  5      Coagulation Recent Labs      08/22/18   1137   PTP  11.8   INR  0.9   APTT  33.3       Liver Enzymes Recent Labs      08/25/18   0127   TP  5.7*   ALB  2.7*   AP  145*   SGOT  50*      ABG No results found for: PH, PHI, PCO2, PCO2I, PO2, PO2I, HCO3, HCO3I, FIO2, FIO2I   Microbiology Recent Labs      08/22/18   1209  08/22/18   1137   CULT  NO GROWTH 3 DAYS  NO GROWTH 3 DAYS          Telemetry: [x]Sinus []A-flutter []Paced    []A-fib []Multiple PVCs                    Imaging:  CXR Results  (Last 48 hours)    None          CT Results  (Last 48 hours)               08/24/18 1523  CT ABD PELV W CONT Final result    Impression:  IMPRESSION:        Anasarca with edematous soft tissues, pleural fluid, ascites, pericardial fluid,   and multichamber cardiac enlargement. Findings indicating a probable cause for recurrent vomiting are not identified. All CT scans at this facility are performed using dose optimization technique as   appropriate to the performed exam, to include automated exposure control,   adjustment of the mA and/or kV according to patient's size (Including   appropriate matching for site-specific examinations), or use of iterative   reconstruction technique. Narrative:  CT ABDOMEN AND PELVIS WITH CONTRAST       COMPARISON: August 22, 2018. INDICATIONS: Vomiting. TECHNIQUE:  Following the uneventful administration of 80 cc of Isovue 300   intravenous contrast , volumetric data acquisition was performed of the abdomen   and pelvis on a multislice scanner and reconstructed in axial coronal and   sagittal planes       CT ABDOMEN FINDINGS:        Lung Bases:  There is a large right and minimal left pleural effusion. Bibasilar   atelectasis is evident. There is multichamber cardiac enlargement. There is a   moderate size pericardial effusion. This ranges up to 2.5 cm in thickness but   probably averages less than 1 cm. .       Liver/Gallbladder/Biliary: Normal.       Spleen: Scattered small granulomas noted. Otherwise negative. Adrenal Glands: Normal.       Kidneys: Atrophic with a left-sided cysts unchanged. .       Pancreas: Normal.       Stomach, Small Bowel,  and Colon: A nasogastric tube is present extending into   the stomach which is otherwise unremarkable. The small bowel is not distended. The appendix appears to be present unremarkable. The colon is unremarkable. Lymph Nodes: Normal in size and number. The abdominal aorta is unremarkable. The IVC is unremarkable. Peritoneal Spaces: Small volume peritoneal fluid is evident       Abdominal wall: No hernia or mass is evident. Subcutaneous edema is evident. CT PELVIS FINDINGS:        Bladder: Empty. Pelvic Small Bowel And Colon: Unremarkable. Lymph Nodes: Normal in size and number. Free Fluid: Small volume pelvic fluid noted       Uterus and adnexal structures appear unremarkable       Osseous Structures Of Abdomen And Pelvis: Unremarkable for age. IMPRESSION:   · Hypertensive emergency with response to Cardene and HD. · Left sided weakness due to hypertensive encephalopathy thought to be stroke mimicker, resolved however pt with intermittent AMS  · Nausea, vomiting and abdominal pain of ? Etiology, KUB and CT nondiagnostic. 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 Strict aspiration precautions.    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 Titrate cardene gtt to maintain -180. Continue statin, Hydralazine,clonidine,  Coreg, Norvasc,and asprin. Restart losartan today. Echo with normal EF.   - CT scan reported massive pericardial effusion on 8/22/18. Echo on 8/23/18 Small circumferential pericardial effusion with more prominent area around the right artium. No indications of tamponade present  - POC echo with no wall motion abnormalities, small pericardial effusion without tamponade physiology, LVH  · Heme/onc  -Stable normocytic anemia, most likely in the setting of CKD   · Metabolic  - replace electrolytes as needed   · Renal  - tolerated IHD yesterday. WIll defer to nephrology   · Endocrine  - high TSH, normal free t4, and low t3. Sick euthroid syndrome   · Neuro/ Pain/ Sedation  - Once patient is off cardene gtt would consider DWI. · GI  - continue TF as tolerated.  Hold for N/V  -continue bowel regimen  - if N/v continues would add motility agent (renally dosed)   - Give a banana bag today   · Prophylaxis  -DVT- sq heparin  -GI- does not require GI prophylaxis           The patient is: [x] acutely ill Risk of deterioration: [] moderate    [] critically ill  [] high     [x]See my orders for details    My assessment/plan was discussed with:  [x]nursing []PT/OT    [x]respiratory therapy [x]   []family []         Oswaldo Duckworth, RG

## 2018-08-25 NOTE — PROGRESS NOTES
A.M. meds given per NG tube with 45cc H2O flush. Patient vomited 600 mls reddish brown emesis. Orders to hold tube feeding until \" late afternoon, then restart trickle feeds through the night\". Will re-evaluate how patient tolerates tube feeding 8/26/18  A. M.  Per Jaimie Lala NP

## 2018-08-25 NOTE — ROUTINE PROCESS
Bedside and Verbal shift change report given to Poonam Mayes RN (oncoming nurse) by Daryle Edison, RN (offgoing nurse). Report included the following information SBAR, Kardex, ED Summary, Procedure Summary, Intake/Output, MAR, Accordion, Recent Results, Cardiac Rhythm SR and Alarm Parameters .

## 2018-08-25 NOTE — PROGRESS NOTES
HEMODIALYSIS ROUNDING NOTE      Patient: Yamila Mahajan               Sex: female          DOA: 8/22/2018 11:25 AM        YOB: 1960      Age:  56 y.o.        LOS:  LOS: 3 days     Subjective:     Yamila Mahajan is a 56 y.o.  who presents with Hypertensive emergency. The patient is dialyzing utilizing the following method:Intermittent Hemodialysis    Chief Complains: Patient was seen on dialysis, denies nausea / vomiting / headache / dizziness / SOB / chest pain. - Reviewed last 24 hrs events     Current Facility-Administered Medications   Medication Dose Route Frequency    losartan (COZAAR) tablet 100 mg  100 mg Oral DAILY    0.9% sodium chloride 1,000 mL with mvi, adult no. 4 with vit K 10 mL, thiamine 033 mg, folic acid 1 mg infusion   IntraVENous ONCE    metoclopramide HCl (REGLAN) injection 5 mg  5 mg IntraVENous BID    acetaminophen (TYLENOL) solution 500 mg  500 mg Oral Q4H PRN    niCARdipine (CARDENE) 25 mg in 0.9% sodium chloride 250 mL infusion  0-15 mg/hr IntraVENous TITRATE    polyethylene glycol (MIRALAX) packet 17 g  17 g Oral DAILY    labetalol (NORMODYNE;TRANDATE) 20 mg/4 mL (5 mg/mL) injection 10 mg  10 mg IntraVENous Q6H PRN    isoniazid (NYDRAZID) tablet 300 mg  300 mg Oral DAILY    rifAMPin (RIFADIN) capsule 600 mg  600 mg Oral ACB    aspirin (ASA) suppository 300 mg  300 mg Rectal DAILY    atorvastatin (LIPITOR) tablet 40 mg  40 mg Oral QHS    bisacodyl (DULCOLAX) tablet 5 mg  5 mg Oral DAILY PRN    docusate sodium (COLACE) capsule 100 mg  100 mg Oral BID    ethambutol (MYAMBUTOL) tablet 800 mg  800 mg Oral Q TUE, THU & SAT    pyrazinamide tablet 1,000 mg  1,000 mg Oral Q TUE, THU & SAT    pyridoxine (vitamin B6) (VITAMIN B-6) tablet 100 mg  100 mg Oral DAILY    sodium chloride (NS) flush 5-10 mL  5-10 mL IntraVENous Q8H    sodium chloride (NS) flush 5-10 mL  5-10 mL IntraVENous PRN    heparin (porcine) injection 5,000 Units  5,000 Units SubCUTAneous Q8H    hydrALAZINE (APRESOLINE) tablet 100 mg  100 mg Oral TID    carvedilol (COREG) tablet 25 mg  25 mg Oral Q12H    amLODIPine (NORVASC) tablet 10 mg  10 mg Oral DAILY    albuterol (PROVENTIL VENTOLIN) nebulizer solution 2.5 mg  2.5 mg Nebulization Q4H PRN    cloNIDine (CATAPRES) 0.3 mg/24 hr patch 1 Patch  1 Patch TransDERmal Q7D       Objective:     Visit Vitals    /82    Pulse 98    Temp 98.3 °F (36.8 °C)    Resp 22    Ht 5' 1\" (1.549 m)    Wt 40.8 kg (90 lb)    SpO2 100%    Breastfeeding No    BMI 17.01 kg/m2       Intake/Output Summary (Last 24 hours) at 08/25/18 1306  Last data filed at 08/25/18 0800   Gross per 24 hour   Intake          1861.42 ml   Output              405 ml   Net          1456.42 ml       Physical Examination:    GEN: AAO X 3, NAD  RS: Chest is bilateral equal, no wheezing / rales / crackles  CVS: S1-S2 heard, RRR  Abdomen: Soft, Non tender, Not distended, Positive bowel sounds  Extremities: No edema, no cyanosis, skin is warm on touch  CNS: Awake & follows commands, CN II-XII are grossly intact. HEENT: Head is atraumatic, PERRLA, conjunctiva pink & non icteric. No JVD or carotid bruit      Data Review:      Labs:     Hematology:   Recent Labs      08/25/18   0127  08/24/18   0800  08/24/18   0231  08/23/18   0235   WBC  3.3*  3.3*  4.2*  5.0   HGB  9.6*  10.1*  10.9*  11.5*   HCT  28.5*  29.7*  32.1*  34.3*     Chemistry:   Recent Labs      08/25/18   0127 08/24/18   0800  08/24/18   0231  08/23/18   0235   BUN  13  20*  20*  38*   CREA  2.13*  2.86*  2.74*  4.44*   CA  8.7  8.3*  8.5  8.5   ALB  2.7*   --   2.8*  2.7*   K  3.3*  3.2*  3.3*  3.8   NA  138  139  137  135*   CL  101  102  101  100   CO2  29  27  28  26   PHOS  2.2*   --   2.0*  2.9   GLU  112*  94  90  103*        Images:     XR (Most Recent).  CXR reviewed by me and compared with previous CXR   Results from [facility] encounter on 08/22/18   XR ABD PORT  1 V Narrative ABDOMINAL RADIOGRAPH-SINGLE SUPINE VIEW    INDICATION: NG tube placement    COMPARISON: Abdomen x-ray 8/23/2018    FINDINGS: Enteric tube tip projects at the distal stomach. Nonobstructed bowel  gas pattern. No evidence for pneumoperitoneum. No evidence for nephrolithiasis. No acute osseous abnormality. Degenerative changes noted at the sacroiliac  joints lower lumbar spine         Impression IMPRESSION:  Enteric tube tip projects at the distal stomach. .         CT (Most Recent)   Results from Hospital Encounter encounter on 08/22/18   CT ABD PELV W CONT   Narrative CT ABDOMEN AND PELVIS WITH CONTRAST    COMPARISON: August 22, 2018. INDICATIONS: Vomiting. TECHNIQUE:  Following the uneventful administration of 80 cc of Isovue 300  intravenous contrast , volumetric data acquisition was performed of the abdomen  and pelvis on a multislice scanner and reconstructed in axial coronal and  sagittal planes    CT ABDOMEN FINDINGS:     Lung Bases: There is a large right and minimal left pleural effusion. Bibasilar  atelectasis is evident. There is multichamber cardiac enlargement. There is a  moderate size pericardial effusion. This ranges up to 2.5 cm in thickness but  probably averages less than 1 cm. .    Liver/Gallbladder/Biliary: Normal.    Spleen: Scattered small granulomas noted. Otherwise negative. Adrenal Glands: Normal.    Kidneys: Atrophic with a left-sided cysts unchanged. .    Pancreas: Normal.    Stomach, Small Bowel,  and Colon: A nasogastric tube is present extending into  the stomach which is otherwise unremarkable. The small bowel is not distended. The appendix appears to be present unremarkable. The colon is unremarkable. Lymph Nodes: Normal in size and number. The abdominal aorta is unremarkable. The IVC is unremarkable. Peritoneal Spaces: Small volume peritoneal fluid is evident    Abdominal wall: No hernia or mass is evident. Subcutaneous edema is evident.     CT PELVIS FINDINGS:     Bladder: Empty. Pelvic Small Bowel And Colon: Unremarkable. Lymph Nodes: Normal in size and number. Free Fluid: Small volume pelvic fluid noted    Uterus and adnexal structures appear unremarkable    Osseous Structures Of Abdomen And Pelvis: Unremarkable for age. Impression IMPRESSION:     Anasarca with edematous soft tissues, pleural fluid, ascites, pericardial fluid,  and multichamber cardiac enlargement. Findings indicating a probable cause for recurrent vomiting are not identified. All CT scans at this facility are performed using dose optimization technique as  appropriate to the performed exam, to include automated exposure control,  adjustment of the mA and/or kV according to patient's size (Including  appropriate matching for site-specific examinations), or use of iterative  reconstruction technique. Plan / Recommendation:         End Stage Renal Disease:  Plan HD today    At 1 pm on 8/25/2018, I saw and examined patient during hemodialysis treatment. The patient was receiving hemodialysis for treatment of end stage renal disease. I have also reviewed vital signs, intake and output, lab results and recent events, and agreed with today's dialysis order.     Access: No issue    Anemia:  Hold epo    Radha Diaz MD  Nephrology  8/25/2018

## 2018-08-25 NOTE — PROGRESS NOTES
[facility]  Progress Note    Patient: Guillermina Magaña Age: 56 y.o. : 1960 MR#: [id number] SSN: [id number]  Date/Time: 2018     Subjective:     Review of systems    Awake On NGT   Some bleeding through nose   No distress   No NVD  No Cough     Assessment/Plan:   1.  Hypertensive urgency - on Cardene   2 Left sided weakness - improved with improved BP   3 History of MTB and MAC on DOT, sputum smears cleared for AFB - on Rifampin + Ethambutol +INH+Pza +pyridoxine   4 ESRD on IHD  5 H/o NSTEMI  6 History of seizures    PLAN   - Continue NGT for now - placed for severe vomiting   - On Cardene  drip for BP control - once consistently taking po meds / food - PO BP meds can be started   - continue other managements         Case discussed with:  []Patient  []Family  [x]Nursing  []Case Management  DVT Prophylaxis:  []Lovenox  []Hep SQ  []SCDs  []Coumadin   []On Heparin gtt          Objective:   VS:   Visit Vitals    /82    Pulse 92    Temp 98.3 °F (36.8 °C) (Axillary)    Resp 18    Ht 5' 1\" (1.549 m)    Wt 40.8 kg (90 lb)    SpO2 100%    Breastfeeding No    BMI 17.01 kg/m2      Tmax/24hrs: Temp (24hrs), Av.1 °F (36.7 °C), Min:97.8 °F (36.6 °C), Max:98.6 °F (37 °C)  IOBRIEF    Intake/Output Summary (Last 24 hours) at 18 1226  Last data filed at 18 0700   Gross per 24 hour   Intake          1639.75 ml   Output              405 ml   Net          1234.75 ml       General:  More awake    Cardiovascular: S1S2 - regular , No Murmur   Pulmonary: Equal expansion , No Use of accessory muscles , No Rales No Rhonchi    GI:  +BS in all four quadrants, soft, non-tender  Extremities:  No edema; 2+ dorsalis pedis pulses bilaterally  Neuro: no focal weakness       Medications:   Current Facility-Administered Medications   Medication Dose Route Frequency    losartan (COZAAR) tablet 100 mg  100 mg Oral DAILY    0.9% sodium chloride 1,000 mL with mvi, adult no.4 with vit K 10 mL, thiamine 178 mg, folic acid 1 mg infusion   IntraVENous ONCE    metoclopramide (REGLAN) 5 mg/5 mL oral syrup 5 mg  5 mg Oral BID    acetaminophen (TYLENOL) solution 500 mg  500 mg Oral Q4H PRN    niCARdipine (CARDENE) 25 mg in 0.9% sodium chloride 250 mL infusion  0-15 mg/hr IntraVENous TITRATE    polyethylene glycol (MIRALAX) packet 17 g  17 g Oral DAILY    labetalol (NORMODYNE;TRANDATE) 20 mg/4 mL (5 mg/mL) injection 10 mg  10 mg IntraVENous Q6H PRN    isoniazid (NYDRAZID) tablet 300 mg  300 mg Oral DAILY    rifAMPin (RIFADIN) capsule 600 mg  600 mg Oral ACB    aspirin (ASA) suppository 300 mg  300 mg Rectal DAILY    atorvastatin (LIPITOR) tablet 40 mg  40 mg Oral QHS    bisacodyl (DULCOLAX) tablet 5 mg  5 mg Oral DAILY PRN    docusate sodium (COLACE) capsule 100 mg  100 mg Oral BID    ethambutol (MYAMBUTOL) tablet 800 mg  800 mg Oral Q TUE, THU & SAT    pyrazinamide tablet 1,000 mg  1,000 mg Oral Q TUE, THU & SAT    pyridoxine (vitamin B6) (VITAMIN B-6) tablet 100 mg  100 mg Oral DAILY    sodium chloride (NS) flush 5-10 mL  5-10 mL IntraVENous Q8H    sodium chloride (NS) flush 5-10 mL  5-10 mL IntraVENous PRN    heparin (porcine) injection 5,000 Units  5,000 Units SubCUTAneous Q8H    hydrALAZINE (APRESOLINE) tablet 100 mg  100 mg Oral TID    carvedilol (COREG) tablet 25 mg  25 mg Oral Q12H    amLODIPine (NORVASC) tablet 10 mg  10 mg Oral DAILY    albuterol (PROVENTIL VENTOLIN) nebulizer solution 2.5 mg  2.5 mg Nebulization Q4H PRN    cloNIDine (CATAPRES) 0.3 mg/24 hr patch 1 Patch  1 Patch TransDERmal Q7D       Labs:    Recent Labs      08/25/18   0127  08/24/18   0800  08/24/18   0231   WBC  3.3*  3.3*  4.2*   HGB  9.6*  10.1*  10.9*   HCT  28.5*  29.7*  32.1*   PLT  98*  91*  105*     Recent Labs      08/25/18   0127  08/24/18   0800  08/24/18   0231  08/23/18   0235   NA  138  139  137  135*   K  3.3*  3.2*  3.3*  3.8   CL  101  102  101  100   CO2  29  27  28  26   GLU 112*  94  90  103*   BUN  13  20*  20*  38*   CREA  2.13*  2.86*  2.74*  4.44*   CA  8.7  8.3*  8.5  8.5   MG  2.5   --   2.4  2.7*   PHOS  2.2*   --   2.0*  2.9   ALB  2.7*   --   2.8*  2.7*   SGOT  50*   --   42*  34   ALT  17   --   11*  8*         Signed By: Josette Daniel MD     August 25, 2018

## 2018-08-25 NOTE — PROGRESS NOTES
1153 Bedside and Verbal shift change report given to [employer]  (oncoming nurse) by Roger Hampton RN (offgoing nurse). Report included the following information SBAR, Kardex, ED Summary, Intake/Output, MAR, Recent Results and Cardiac Rhythm SR PVCs.      1220 EKG tech at bedside performing EKG

## 2018-08-26 NOTE — PROGRESS NOTES
Pt getting increasingly hypertensive as night goes on, regardless of prn labetalol dose given. Spoke with RENETTA Luther about pt's HTN in 200s/100s now, instructed to restart cardene gtt.

## 2018-08-26 NOTE — PROGRESS NOTES
[facility] Hospitalist Group  Progress Note    Patient: Krupa Cruz Age: 56 y.o. : 1960 MR#: [id number] SSN: [id number]  Date/Time: 2018     Subjective:     Pt feels fine, AAOX3. Family daughter and son in law at bedside. Son in law translated since pt declined to use  phone use     Assessment/Plan:   1. Hypertensive urgency - off Cardene   2 Left sided weakness - improved with improved BP   3 History of MTB and MAC on DOT, sputum smears cleared for AFB - on Rifampin + Ethambutol +INH+Pza +pyridoxine   4 ESRD on IHD  5 H/o NSTEMI  6 History of seizures  7. Sever N/V ? HTN urgency related, CT A/P neg     PLAN   - Continue NGT for now with feeds.   - Off Cardene drip, cont po meds via NGT as tolerated    - will consult neurology given CT changes and transient weakness. D/w Dr. Pope Morning  -  HD per nephrology   - d/w pt and family at bedside in detail         Case discussed with:  []Patient  [x]Family  [x]Nursing  []Case Management  DVT Prophylaxis:  []Lovenox  [x]Hep SQ  []SCDs  []Coumadin   []On Heparin gtt    Objective:   VS:   Visit Vitals    /85    Pulse 80    Temp 99 °F (37.2 °C)    Resp 17    Ht 5' 1\" (1.549 m)    Wt 35.8 kg (78 lb 14.4 oz)    SpO2 100%    Breastfeeding No    BMI 14.91 kg/m2      Tmax/24hrs: Temp (24hrs), Av.8 °F (37.1 °C), Min:97.8 °F (36.6 °C), Max:99.4 °F (37.4 °C)  IOBRIEF    Intake/Output Summary (Last 24 hours) at 18 1626  Last data filed at 18 0600   Gross per 24 hour   Intake           692.17 ml   Output                0 ml   Net           692.17 ml       General: AA, NAD    Cardiovascular: S1S2 - regular    Pulmonary: B/L clear, No wheezing, no Rales No Rhonchi    GI:  + BS in all four quadrants, soft, non-tender  Extremities: No edema.    Neuro: AAOx3, moves all ext       Medications:   Current Facility-Administered Medications   Medication Dose Route Frequency    aspirin chewable tablet 81 mg  81 mg Oral DAILY  losartan (COZAAR) tablet 100 mg  100 mg Oral DAILY    metoclopramide HCl (REGLAN) injection 5 mg  5 mg IntraVENous BID    melatonin tablet 3 mg  3 mg Oral QHS    acetaminophen (TYLENOL) solution 500 mg  500 mg Oral Q4H PRN    niCARdipine (CARDENE) 25 mg in 0.9% sodium chloride 250 mL infusion  0-15 mg/hr IntraVENous TITRATE    polyethylene glycol (MIRALAX) packet 17 g  17 g Oral DAILY    labetalol (NORMODYNE;TRANDATE) 20 mg/4 mL (5 mg/mL) injection 10 mg  10 mg IntraVENous Q6H PRN    isoniazid (NYDRAZID) tablet 300 mg  300 mg Oral DAILY    rifAMPin (RIFADIN) capsule 600 mg  600 mg Oral ACB    atorvastatin (LIPITOR) tablet 40 mg  40 mg Oral QHS    bisacodyl (DULCOLAX) tablet 5 mg  5 mg Oral DAILY PRN    docusate sodium (COLACE) capsule 100 mg  100 mg Oral BID    ethambutol (MYAMBUTOL) tablet 800 mg  800 mg Oral Q TUE, THU & SAT    pyrazinamide tablet 1,000 mg  1,000 mg Oral Q TUE, THU & SAT    pyridoxine (vitamin B6) (VITAMIN B-6) tablet 100 mg  100 mg Oral DAILY    sodium chloride (NS) flush 5-10 mL  5-10 mL IntraVENous Q8H    sodium chloride (NS) flush 5-10 mL  5-10 mL IntraVENous PRN    heparin (porcine) injection 5,000 Units  5,000 Units SubCUTAneous Q8H    hydrALAZINE (APRESOLINE) tablet 100 mg  100 mg Oral TID    carvedilol (COREG) tablet 25 mg  25 mg Oral Q12H    amLODIPine (NORVASC) tablet 10 mg  10 mg Oral DAILY    albuterol (PROVENTIL VENTOLIN) nebulizer solution 2.5 mg  2.5 mg Nebulization Q4H PRN    cloNIDine (CATAPRES) 0.3 mg/24 hr patch 1 Patch  1 Patch TransDERmal Q7D       Labs:    Recent Labs      08/26/18   0234  08/25/18   0127  08/24/18   0800   WBC  3.9*  3.3*  3.3*   HGB  10.1*  9.6*  10.1*   HCT  29.8*  28.5*  29.7*   PLT  146  98*  91*     Recent Labs      08/26/18   0234  08/25/18   0127  08/24/18   0800  08/24/18   0231   NA  138  138  139  137   K  3.7  3.3*  3.2*  3.3*   CL  102  101  102  101   CO2  29  29  27  28   GLU  88  112*  94  90   BUN  11  13  20*  20* CREA  1.97*  2.13*  2.86*  2.74*   CA  9.0  8.7  8.3*  8.5   MG  2.4  2.5   --   2.4   PHOS  2.5  2.2*   --   2.0*   ALB  2.8*  2.7*   --   2.8*   SGOT  52*  50*   --   42*   ALT  23  17   --   11*         Signed By: Montserrat Ventura MD     August 26, 2018

## 2018-08-26 NOTE — PROGRESS NOTES
[facility]  ICU Progress Note      Name: Shandra Bailey   : 1960   MRN: [id number]   Date: 2018 8:50 AM     [x]I have reviewed the flowsheet and previous days notes. Events overnight reviewed and discussed with nursing staff. Vital signs and records reviewed. Roscoe Joy a 56 y. o. female c pmhx of HTN, HLD, ESRD on HD admitted for hypertensive emergency. Afebrile  Off cardene this morning   Follows commands  Responds appropriately via    No acute events overnight                 ROS:A comprehensive review of systems was negative except for that written in the HPI.       Vital Signs:    Visit Vitals    /78    Pulse 76    Temp 99.4 °F (37.4 °C)    Resp 19    Ht 5' 1\" (1.549 m)    Wt 35.8 kg (78 lb 14.4 oz)    SpO2 99%    Breastfeeding No    BMI 14.91 kg/m2       O2 Device: Room air       Temp (24hrs), Av.6 °F (37 °C), Min:97.8 °F (36.6 °C), Max:99.4 °F (37.4 °C)       Intake/Output:   Last shift:         Last 3 shifts:  1901 -  0700  In: 1999.3 [I.V.:664.3]  Out: 3000     Intake/Output Summary (Last 24 hours) at 18 1232  Last data filed at 18 0600   Gross per 24 hour   Intake           692.17 ml   Output             3000 ml   Net         -2307.83 ml          Physical Exam:    General: awake and answers question appropriately via    HEENT:  Anicteric sclerae; pink palpebral conjunctivae; mucosa moist  Resp:  Symmetrical chest expansion, no accessory muscle use; good airway entry; no rales/ wheezing/ rhonchi noted  CV:  S1, S2 present  GI:  Abdomen soft, non-tender; (+) active bowel sounds, NGT  Extremities:  +2 pulses on all extremities; no edema/ cyanosis/ clubbing noted; normal capillary refill  Skin:  Warm; no rashes/ lesions noted  Neurologic:  Non-focal      DATA:     Current Facility-Administered Medications   Medication Dose Route Frequency    aspirin chewable tablet 81 mg  81 mg Oral DAILY    losartan (COZAAR) tablet 100 mg  100 mg Oral DAILY    metoclopramide HCl (REGLAN) injection 5 mg  5 mg IntraVENous BID    melatonin tablet 3 mg  3 mg Oral QHS    acetaminophen (TYLENOL) solution 500 mg  500 mg Oral Q4H PRN    niCARdipine (CARDENE) 25 mg in 0.9% sodium chloride 250 mL infusion  0-15 mg/hr IntraVENous TITRATE    polyethylene glycol (MIRALAX) packet 17 g  17 g Oral DAILY    labetalol (NORMODYNE;TRANDATE) 20 mg/4 mL (5 mg/mL) injection 10 mg  10 mg IntraVENous Q6H PRN    isoniazid (NYDRAZID) tablet 300 mg  300 mg Oral DAILY    rifAMPin (RIFADIN) capsule 600 mg  600 mg Oral ACB    atorvastatin (LIPITOR) tablet 40 mg  40 mg Oral QHS    bisacodyl (DULCOLAX) tablet 5 mg  5 mg Oral DAILY PRN    docusate sodium (COLACE) capsule 100 mg  100 mg Oral BID    ethambutol (MYAMBUTOL) tablet 800 mg  800 mg Oral Q TUE, THU & SAT    pyrazinamide tablet 1,000 mg  1,000 mg Oral Q TUE, THU & SAT    pyridoxine (vitamin B6) (VITAMIN B-6) tablet 100 mg  100 mg Oral DAILY    sodium chloride (NS) flush 5-10 mL  5-10 mL IntraVENous Q8H    sodium chloride (NS) flush 5-10 mL  5-10 mL IntraVENous PRN    heparin (porcine) injection 5,000 Units  5,000 Units SubCUTAneous Q8H    hydrALAZINE (APRESOLINE) tablet 100 mg  100 mg Oral TID    carvedilol (COREG) tablet 25 mg  25 mg Oral Q12H    amLODIPine (NORVASC) tablet 10 mg  10 mg Oral DAILY    albuterol (PROVENTIL VENTOLIN) nebulizer solution 2.5 mg  2.5 mg Nebulization Q4H PRN    cloNIDine (CATAPRES) 0.3 mg/24 hr patch 1 Patch  1 Patch TransDERmal Q7D         Labs: Results:       Chemistry Recent Labs      08/26/18   0234  08/25/18   0127  08/24/18   0800  08/24/18   0231   GLU  88  112*  94  90   NA  138  138  139  137   K  3.7  3.3*  3.2*  3.3*   CL  102  101  102  101   CO2  29  29  27  28   BUN  11  13  20*  20*   CREA  1.97*  2.13*  2.86*  2.74*   CA  9.0  8.7  8.3*  8.5   AGAP  7  8  10  8   BUCR  6*  6*  7*  7*   AP  147*  145*   --   93   TP  6.0*  5.7*   --   6.1*   ALB 2. 8*  2.7*   --   2.8*   GLOB  3.2  3.0   --   3.3   AGRAT  0.9  0.9   --   0.8      CBC w/Diff Recent Labs      08/26/18   0234  08/25/18   0127  08/24/18   0800   WBC  3.9*  3.3*  3.3*   RBC  3.20*  3.01*  3.20*   HGB  10.1*  9.6*  10.1*   HCT  29.8*  28.5*  29.7*   PLT  146  98*  91*   GRANS  63  60  64   LYMPH  15*  12*  14*   EOS  4  5  4      Coagulation No results for input(s): PTP, INR, APTT in the last 72 hours. No lab exists for component: INREXT, INREXT    Liver Enzymes Recent Labs      08/26/18   0234   TP  6.0*   ALB  2.8*   AP  147*   SGOT  52*      ABG No results found for: PH, PHI, PCO2, PCO2I, PO2, PO2I, HCO3, HCO3I, FIO2, FIO2I   Microbiology No results for input(s): CULT in the last 72 hours. Telemetry: [x]Sinus []A-flutter []Paced    []A-fib []Multiple PVCs                    Imaging:  CXR Results  (Last 48 hours)    None          CT Results  (Last 48 hours)               08/24/18 1523  CT ABD PELV W CONT Final result    Impression:  IMPRESSION:        Anasarca with edematous soft tissues, pleural fluid, ascites, pericardial fluid,   and multichamber cardiac enlargement. Findings indicating a probable cause for recurrent vomiting are not identified. All CT scans at this facility are performed using dose optimization technique as   appropriate to the performed exam, to include automated exposure control,   adjustment of the mA and/or kV according to patient's size (Including   appropriate matching for site-specific examinations), or use of iterative   reconstruction technique. Narrative:  CT ABDOMEN AND PELVIS WITH CONTRAST       COMPARISON: August 22, 2018. INDICATIONS: Vomiting.        TECHNIQUE:  Following the uneventful administration of 80 cc of Isovue 300   intravenous contrast , volumetric data acquisition was performed of the abdomen   and pelvis on a multislice scanner and reconstructed in axial coronal and   sagittal planes       CT ABDOMEN FINDINGS:        Lung Bases: There is a large right and minimal left pleural effusion. Bibasilar   atelectasis is evident. There is multichamber cardiac enlargement. There is a   moderate size pericardial effusion. This ranges up to 2.5 cm in thickness but   probably averages less than 1 cm. .       Liver/Gallbladder/Biliary: Normal.       Spleen: Scattered small granulomas noted. Otherwise negative. Adrenal Glands: Normal.       Kidneys: Atrophic with a left-sided cysts unchanged. .       Pancreas: Normal.       Stomach, Small Bowel,  and Colon: A nasogastric tube is present extending into   the stomach which is otherwise unremarkable. The small bowel is not distended. The appendix appears to be present unremarkable. The colon is unremarkable. Lymph Nodes: Normal in size and number. The abdominal aorta is unremarkable. The IVC is unremarkable. Peritoneal Spaces: Small volume peritoneal fluid is evident       Abdominal wall: No hernia or mass is evident. Subcutaneous edema is evident. CT PELVIS FINDINGS:        Bladder: Empty. Pelvic Small Bowel And Colon: Unremarkable. Lymph Nodes: Normal in size and number. Free Fluid: Small volume pelvic fluid noted       Uterus and adnexal structures appear unremarkable       Osseous Structures Of Abdomen And Pelvis: Unremarkable for age. IMPRESSION:   · Hypertensive emergency with response to Cardene and HD. · Left sided weakness due to hypertensive encephalopathy thought to be stroke mimicker, resolved however pt with intermittent AMS  · Nausea, vomiting and abdominal pain of ? Etiology, KUB and CT nondiagnostic. 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 Strict aspiration precautions. 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 Titrate cardene gtt to maintain -180. Continue statin, Hydralazine,clonidine,  Coreg, Norvasc, asprin, and losartan today. Echo with normal EF.   - CT scan reported massive pericardial effusion on 8/22/18. Echo on 8/23/18 Small circumferential pericardial effusion with more prominent area around the right artium. No indications of tamponade present  - POC echo 8/26/18 with no wall motion abnormalities, small pericardial effusion without tamponade physiology, LVH  - suspect difficulty coming off cardene drip was 2' vomiting   - Continue to use PRN labetalol   · Heme/onc  -Stable normocytic anemia, most likely in the setting of CKD   - improvement of thrombocytopenia noted   · Metabolic  - replace electrolytes as needed   · Renal  - tolerated IHD yesterday. WIll defer to nephrology   - HD yesterday 3 L  · Endocrine  - high TSH, normal free t4, and low t3.  Sick euthroid syndrome   · Neuro/ Pain/ Sedation  - If patient remains off gtt would recommend DWI   · GI  - continue TF and increase to goal as tolerated   -continue bowel regimen  - continuemotility agent (renally dosed)   · Prophylaxis  -DVT- sq heparin  -GI- does not require GI prophylaxis     Dispo: if patient remains off cardene gtt throughout the day would transfer to tele           The patient is: [x] acutely ill Risk of deterioration: [] moderate    [] critically ill  [] high     [x]See my orders for details    My assessment/plan was discussed with:  [x]nursing []PT/OT    [x]respiratory therapy [x]   []family []         Wilma Sweeney, NP

## 2018-08-26 NOTE — PROGRESS NOTES
Bedside and Verbal shift change report given to Kristin Calixto RN (oncoming nurse) by Mariah Neri RN   (offgoing nurse). Report included the following information SBAR, Kardex, Intake/Output, MAR, Recent Results, Med Rec Status, Cardiac Rhythm NSR and Alarm Parameters .

## 2018-08-26 NOTE — PROGRESS NOTES
Bedside and Verbal shift change report given to KRISTIN Grove (oncoming nurse) by Lissett Chand RN (offgoing nurse). Report included the following information SBAR, ED Summary, Procedure Summary, Intake/Output, MAR and Recent Results.

## 2018-08-26 NOTE — PROGRESS NOTES
RENAL DAILY PROGRESS NOTE    Patient: Carissa Wahl               Sex: female          DOA: 8/22/2018 11:25 AM        YOB: 1960      Age:  56 y.o.        LOS:  LOS: 4 days     Subjective:     Carissa Wahl is a 56 y.o.  who presents with Hypertensive emergency. Asked to evaluate for esrd,admitted with hypertensive emergency. hx of htn ,tb.no vomiting this morning  Chief complains: Patient denies nausea, vomiting, chest pain, dizziness, shortness of breath or headache.  - Reviewed last 24 hrs events     Current Facility-Administered Medications   Medication Dose Route Frequency    aspirin chewable tablet 81 mg  81 mg Oral DAILY    losartan (COZAAR) tablet 100 mg  100 mg Oral DAILY    metoclopramide HCl (REGLAN) injection 5 mg  5 mg IntraVENous BID    melatonin tablet 3 mg  3 mg Oral QHS    acetaminophen (TYLENOL) solution 500 mg  500 mg Oral Q4H PRN    niCARdipine (CARDENE) 25 mg in 0.9% sodium chloride 250 mL infusion  0-15 mg/hr IntraVENous TITRATE    polyethylene glycol (MIRALAX) packet 17 g  17 g Oral DAILY    labetalol (NORMODYNE;TRANDATE) 20 mg/4 mL (5 mg/mL) injection 10 mg  10 mg IntraVENous Q6H PRN    isoniazid (NYDRAZID) tablet 300 mg  300 mg Oral DAILY    rifAMPin (RIFADIN) capsule 600 mg  600 mg Oral ACB    atorvastatin (LIPITOR) tablet 40 mg  40 mg Oral QHS    bisacodyl (DULCOLAX) tablet 5 mg  5 mg Oral DAILY PRN    docusate sodium (COLACE) capsule 100 mg  100 mg Oral BID    ethambutol (MYAMBUTOL) tablet 800 mg  800 mg Oral Q TUE, THU & SAT    pyrazinamide tablet 1,000 mg  1,000 mg Oral Q TUE, THU & SAT    pyridoxine (vitamin B6) (VITAMIN B-6) tablet 100 mg  100 mg Oral DAILY    sodium chloride (NS) flush 5-10 mL  5-10 mL IntraVENous Q8H    sodium chloride (NS) flush 5-10 mL  5-10 mL IntraVENous PRN    heparin (porcine) injection 5,000 Units  5,000 Units SubCUTAneous Q8H    hydrALAZINE (APRESOLINE) tablet 100 mg  100 mg Oral TID    carvedilol (COREG) tablet 25 mg  25 mg Oral Q12H    amLODIPine (NORVASC) tablet 10 mg  10 mg Oral DAILY    albuterol (PROVENTIL VENTOLIN) nebulizer solution 2.5 mg  2.5 mg Nebulization Q4H PRN    cloNIDine (CATAPRES) 0.3 mg/24 hr patch 1 Patch  1 Patch TransDERmal Q7D       Objective:     Visit Vitals    /85    Pulse 80    Temp 99.2 °F (37.3 °C)    Resp 17    Ht 5' 1\" (1.549 m)    Wt 35.8 kg (78 lb 14.4 oz)    SpO2 100%    Breastfeeding No    BMI 14.91 kg/m2       Intake/Output Summary (Last 24 hours) at 08/26/18 1428  Last data filed at 08/26/18 0600   Gross per 24 hour   Intake           692.17 ml   Output             3000 ml   Net         -2307.83 ml       Physical Examination:     GEN: Alert  RS: Chest is bilateral equal, no wheezing / rales / crackles  CVS: S1-S2 heard, RRR, No S3 / murmur  Abdomen: Soft, Non tender, Not distended, Positive bowel sounds, no organomegaly, no CVA / supra pubic tenderness  Extremities: No edema, no cyanosis, skin is warm on touch  CNS: Awake   HEENT: Head is atraumatic, PERRLA, conjunctiva pink & non icteric. No JVD or carotid bruit     Musculoskeletal: No gross joints or bone deformities   Lymph Node: No palpable cervical, axillary or groin lymphadenopathy. Data Review:      Labs:     Hematology: Recent Labs      08/26/18 0234 08/25/18 0127 08/24/18   0800 08/24/18   0231   WBC  3.9*  3.3*  3.3*  4.2*   HGB  10.1*  9.6*  10.1*  10.9*   HCT  29.8*  28.5*  29.7*  32.1*     Chemistry: Recent Labs      08/26/18   0234  08/25/18   0127  08/24/18   0800 08/24/18   0231   BUN  11  13  20*  20*   CREA  1.97*  2.13*  2.86*  2.74*   CA  9.0  8.7  8.3*  8.5   ALB  2.8*  2.7*   --   2.8*   K  3.7  3.3*  3.2*  3.3*   NA  138  138  139  137   CL  102  101  102  101   CO2  29  29  27  28   PHOS  2.5  2.2*   --   2.0*   GLU  88  112*  94  90        Images:    XR (Most Recent).  CXR reviewed by me and compared with previous CXR   Results from [place] encounter on 08/22/18   XR ABD PORT  1 V Narrative ABDOMINAL RADIOGRAPH-SINGLE SUPINE VIEW    INDICATION: NG tube placement    COMPARISON: Abdomen x-ray 8/23/2018    FINDINGS: Enteric tube tip projects at the distal stomach. Nonobstructed bowel  gas pattern. No evidence for pneumoperitoneum. No evidence for nephrolithiasis. No acute osseous abnormality. Degenerative changes noted at the sacroiliac  joints lower lumbar spine         Impression IMPRESSION:  Enteric tube tip projects at the distal stomach. .         CT (Most Recent)   Results from Hospital Encounter encounter on 08/22/18   CT ABD PELV W CONT   Narrative CT ABDOMEN AND PELVIS WITH CONTRAST    COMPARISON: August 22, 2018. INDICATIONS: Vomiting. TECHNIQUE:  Following the uneventful administration of 80 cc of Isovue 300  intravenous contrast , volumetric data acquisition was performed of the abdomen  and pelvis on a multislice scanner and reconstructed in axial coronal and  sagittal planes    CT ABDOMEN FINDINGS:     Lung Bases: There is a large right and minimal left pleural effusion. Bibasilar  atelectasis is evident. There is multichamber cardiac enlargement. There is a  moderate size pericardial effusion. This ranges up to 2.5 cm in thickness but  probably averages less than 1 cm. .    Liver/Gallbladder/Biliary: Normal.    Spleen: Scattered small granulomas noted. Otherwise negative. Adrenal Glands: Normal.    Kidneys: Atrophic with a left-sided cysts unchanged. .    Pancreas: Normal.    Stomach, Small Bowel,  and Colon: A nasogastric tube is present extending into  the stomach which is otherwise unremarkable. The small bowel is not distended. The appendix appears to be present unremarkable. The colon is unremarkable. Lymph Nodes: Normal in size and number. The abdominal aorta is unremarkable. The IVC is unremarkable. Peritoneal Spaces: Small volume peritoneal fluid is evident    Abdominal wall: No hernia or mass is evident. Subcutaneous edema is evident.     CT PELVIS FINDINGS:     Bladder: Empty. Pelvic Small Bowel And Colon: Unremarkable. Lymph Nodes: Normal in size and number. Free Fluid: Small volume pelvic fluid noted    Uterus and adnexal structures appear unremarkable    Osseous Structures Of Abdomen And Pelvis: Unremarkable for age. Impression IMPRESSION:     Anasarca with edematous soft tissues, pleural fluid, ascites, pericardial fluid,  and multichamber cardiac enlargement. Findings indicating a probable cause for recurrent vomiting are not identified. All CT scans at this facility are performed using dose optimization technique as  appropriate to the performed exam, to include automated exposure control,  adjustment of the mA and/or kV according to patient's size (Including  appropriate matching for site-specific examinations), or use of iterative  reconstruction technique. EKG No results found for this or any previous visit. I have personally reviewed the old medical records and patient's previously known baseline  creatinine     Plan / Recommendation:      1.  Esrd,continue dialysis mon wed Friday  2.htn,under better control,tolerating po meds so far    D/w icu staff    Duke Gupta MD  Nephrology  8/26/2018

## 2018-08-27 NOTE — ROUTINE PROCESS
Bedside, Verbal and Written shift change report given to  (oncoming nurse) by Leilani Dyson (offgoing nurse). Report included the following information SBAR, Kardex, ED Summary, OR Summary, Procedure Summary, Intake/Output, MAR, Accordion, Recent Results, Med Rec Status, Alarm Parameters , Pre Procedure Checklist, Procedure Verification and Quality Measures.

## 2018-08-27 NOTE — PROGRESS NOTES
Problem: Self Care Deficits Care Plan (Adult)  Goal: *Acute Goals and Plan of Care (Insert Text)  Occupational Therapy Goals  Initiated 8/27/2018 within 7 day(s). 1.  Patient will perform grooming with supervision/set-up seated in chair. 2.  Patient will perform upper body dressing with minimal assistance/contact guard assist.  3.  Patient will perform lower body dressing with minimal assistance/contact guard assist.  4.  Patient will perform toilet transfers with minimal assistance/contact guard assist.  5.  Patient will perform all aspects of toileting with minimal assistance/contact guard assist.  6.  Patient will participate in upper extremity therapeutic exercise/activities with minimal assistance/contact guard assist for 10 minutes. 7.  Patient will utilize energy conservation techniques during functional activities with verbal and visual cues. Occupational Therapy EVALUATION    Patient: Vicky Regalado (20 y.o. female)  Date: 8/27/2018  Primary Diagnosis: Hypertensive emergency        Precautions:   Fall  PLOF: Per pt's daughter, pt required Mod to Max assistance for ADLs and transfers at home PTA. ASSESSMENT :  Based on the objective data described below, the patient presents with decreased strength, endurance and balance for safe participation with ADLs and transfers. Pt participated with bed mobility and demo impaired static and dynamic sitting balance seated EOB. Pt returned to bed for safety and fall prevention at the end of session in NAD. Patient will benefit from skilled intervention to address the above impairments.   Patients rehabilitation potential is considered to be Fair  Factors which may influence rehabilitation potential include:   []             None noted  []             Mental ability/status  [x]             Medical condition  []             Home/family situation and support systems  []             Safety awareness  []             Pain tolerance/management  []             Other: Recommendations for nursing:  Written on communication board:   Verbally communicated to:        PLAN :  Recommendations and Planned Interventions:  [x]               Self Care Training                  [x]        Therapeutic Activities  [x]               Functional Mobility Training    []        Cognitive Retraining  [x]               Therapeutic Exercises           []        Endurance Activities  [x]               Balance Training                   []        Neuromuscular Re-Education  []               Visual/Perceptual Training     [x]   Home Safety Training  [x]               Patient Education                 [x]        Family Training/Education  []               Other (comment):    Frequency/Duration: Patient will be followed by occupational therapy 1-2 times per day/4-7 days per week to address goals. Discharge Recommendations: Home Health and [address] Recommendations for Discharge: TBD     SUBJECTIVE:   Patient stated hello.     OBJECTIVE DATA SUMMARY:     Past Medical History:   Diagnosis Date    Chronic kidney disease     [facility]     TUES-THURS-SAT    Hypercholesteremia     Hypertension     Kidney disease     Vitamin D deficiency      Past Surgical History:   Procedure Laterality Date    VASCULAR SURGERY PROCEDURE UNLIST       Barriers to Learning/Limitations: yes;  language  Compensate with: visual, verbal, tactile, kinesthetic cues/model    GCODES:  Self Care  Current  CL= 60-79%  [id number] Goal  CI= 1-19%. The severity rating is based on the Other participation with ADLs and transfers. Eval Complexity: History: LOW Complexity : Brief history review ; Examination: MEDIUM Complexity : 3-5 performance deficits relating to physical, cognitive , or psychosocial skils that result in activity limitations and / or participation restrictions;  Decision Making:MEDIUM Complexity : Patient may present with comorbidities that affect occupational performnce. Miniml to moderate modification of tasks or assistance (eg, physical or verbal ) with assesment(s) is necessary to enable patient to complete evaluation     Prior Level of Function/Home Situation:   Home Situation  Home Environment: Private residence  # Steps to Enter: 2  Rails to Enter: Yes  Hand Rails : Right  One/Two Story Residence: Two story  # of Interior Steps: 12  Living Alone: No  Support Systems: Child(gomez), Family member(s)  Patient Expects to be Discharged to[de-identified] Private residence  Current DME Used/Available at Home: Commode, bedside, Walker, rolling  Tub or Shower Type: Tub/Shower combination  [x]  Right hand dominant   []  Left hand dominant  Cognitive/Behavioral Status:  Neurologic State: Alert  Orientation Level: Oriented to person;Oriented to place  Cognition: Follows commands  Safety/Judgement: Fall prevention  Skin: intact  Edema: none  Vision/Perceptual:    Tracking: Able to track stimulus in all quadrants w/o difficulty    Coordination:  Coordination: Within functional limits  Fine Motor Skills-Upper: Left Intact; Right Intact    Gross Motor Skills-Upper: Left Intact; Right Intact  Balance:  Sitting: Impaired  Sitting - Static: Poor (constant support)  Sitting - Dynamic: Poor (constant support)  Standing:  (unable to assess)  Strength:  Strength: Generally decreased, functional  Tone & Sensation:  Sensation: Intact  Range of Motion:  AROM: Generally decreased, functional  Functional Mobility and Transfers for ADLs:  Bed Mobility:  Rolling: Minimum assistance  Supine to Sit: Minimum assistance  Sit to Supine: Minimum assistance  Transfers:  Sit to Stand:  (unable to assess)  ADL Assessment:  Feeding: Setup  Oral Facial Hygiene/Grooming: Minimum assistance  Upper Body Dressing: Minimum assistance  Lower Body Dressing: Moderate assistance  Toileting:  Moderate assistance  ADL Intervention:  Cognitive Retraining  Safety/Judgement: Fall prevention  Therapeutic Exercise:    Pain:  Pre treatment pain level:    Post treatment pain level:   Pain Scale 1: Numeric (0 - 10)  Pain Intensity 1: 0     Activity Tolerance:   Poor     Please refer to the flowsheet for vital signs taken during this treatment. After treatment:   [] Patient left in no apparent distress sitting up in chair  [x] Patient left in no apparent distress in bed  [x] Call bell left within reach  [x] Nursing notified  [x] Caregiver present  [] Bed alarm activated    COMMUNICATION/EDUCATION:   [] Home safety education was provided and the patient/caregiver indicated understanding. [x] Patient/family have participated as able in goal setting and plan of care. [x] Patient/family agree to work toward stated goals and plan of care. [] Patient understands intent and goals of therapy, but is neutral about his/her participation. [] Patient is unable to participate in goal setting and plan of care.     Thank you for this referral.  Tonio De La Torre, OTR/L  Time Calculation: 11 mins

## 2018-08-27 NOTE — PROGRESS NOTES
NUTRITION    Nutrition Screen     RECOMMENDATIONS / PLAN:     - Recommend starting oral diet per SLP and Ensure pudding TID after ultrasound today. - Monitor po intake and diet tolerance. - Continue motility agent and bowel regimen  - Continue RD inpatient monitoring and evaluation. NUTRITION INTERVENTIONS & DIAGNOSIS:     [x] Meals/snacks: modified composition  [x] Medical food supplement therapy: recommend  [x] Collaboration and referral of nutrition care: interdisciplinary rounds    Nutrition Diagnosis: Underweight related to prolonged inadequate energy intake as evidenced by pt with BMI of 17 kg/(m^2) upon admission. ASSESSMENT:     8/27: Tube feedings stopped and NG tube removed this am. Passed swallow evaluation but remains NPO for ultrasound today. Plan to resume diet later this evening and family planning to provide food. Remains with nausea, but no further episodes of emesis noted. +BM    8/25: NG tube placed and feeds started, pt with emesis and feeds held at this time. Plan to hold and resume at trickle rate. Reglan and banana bag started today. HD yesterday. 8/24: Pt with nausea/vomiting- unable to tolerate oral medications and chronic constipation- plan for soap suds enema today. Remains NPO per SLP, altered mentation improved today- follow up swallow evaluation planned and several attempts made by SLP today but pt receiving dialysis and NPO for CT abdomen. IV fluid with dextrose started to prevent hypoglycemia. 8/23: Pt with N/V upon admission and currently. NPO started on phenergan. Planning for HD today. No changes in weight PTA, but remains underweight. SLP recommending NPO with consideration of short term means of nutrition.     Average po intake adequate to meet patients estimated nutritional needs:   [] Yes     [x] No   [] Unable to determine at this time    Tube Feeding: Nepro at 45 mL/hr (stopped, NG tube removed)  Water Flushes: 100 q 4 hours (stopped)  Residuals: not-applicable    Diet: DIET NPO Except Meds  DIET TUBE FEEDING      Food Allergies: Banana  Current Appetite:   [] Good     [] Fair     [] Poor     [x] Other: NPO  Appetite/meal intake prior to admission:   [] Good     [] Fair     [] Poor     [x] Other: unknown, pt with N/V PTA  Feeding Limitations:  [x] Swallowing difficulty: SLP following    [] Chewing difficulty    [] Other:  Current Meal Intake: No data found. Anuric- 20 mL urine per day per RN  BM: 8/25- hard, hx of chronic constipation  Skin Integrity: WDL  Edema:   [x] No     [] Yes   Pertinent Medications: Reviewed: colace, reglan, miralax, Vitamin B-6     Recent Labs      08/27/18   0258  08/26/18   0234  08/25/18   0127   NA  134*  138  138   K  3.5  3.7  3.3*   CL  99*  102  101   CO2  29  29  29   GLU  121*  88  112*   BUN  29*  11  13   CREA  3.66*  1.97*  2.13*   CA  8.0*  9.0  8.7   MG  2.4  2.4  2.5   PHOS  3.2  2.5  2.2*   ALB  2.3*  2.8*  2.7*   SGOT  33  52*  50*   ALT  17  23  17       Intake/Output Summary (Last 24 hours) at 08/27/18 0940  Last data filed at 08/27/18 0600   Gross per 24 hour   Intake            847.5 ml   Output                0 ml   Net            847.5 ml       Anthropometrics:  Ht Readings from Last 1 Encounters:   08/23/18 5' 1\" (1.549 m)     Last 3 Recorded Weights in this Encounter    08/23/18 1600 08/24/18 1900 08/26/18 0536   Weight: 40.8 kg (90 lb) 40.8 kg (90 lb) 35.8 kg (78 lb 14.4 oz)     Body mass index is 14.91 kg/(m^2). Underweight    Weight History: Weight stable x 5 months PTA.     Weight Metrics 8/26/2018 8/4/2018 7/20/2018 6/27/2018 6/26/2018 5/31/2018 3/9/2018   Weight 78 lb 14.4 oz 90 lb 90 lb 6.2 oz 90 lb 6.2 oz 85 lb 8.6 oz 90 lb 6.2 oz 84 lb 3.2 oz   BMI 14.91 kg/m2 17.01 kg/m2 17.08 kg/m2 17.08 kg/m2 16.16 kg/m2 17.08 kg/m2 15.91 kg/m2        Admitting Diagnosis: Hypertensive emergency  Pertinent PMHx: ESRD on HD, HTN, vitamin D deficiency     Education Needs:        [x] None identified  [] Identified - Not appropriate at this time  []  Identified and addressed - refer to education log  Learning Limitations:   [] None identified  [x] Identified: language barrier    Cultural, Holiness & ethnic food preferences:  [x] None identified    [] Identified and addressed    ESTIMATED NUTRITION NEEDS:     Calories: 1013-6609 kcal (30-35 kcal/kg) based on  [] Actual BW      [x] SBW 56 kg  Protein: 67-84 gm (1.2-1.5 gm/kg) based on  [] Actual BW      [x] SBW   Fluid: 0630-9896 mL     MONITORING & EVALUATION:     Nutrition Goal(s): Goals modified  1. Nutritional needs will be met through adequate oral intake or nutrition support within the next 7 days. Outcome:  [] Met/Ongoing    [x]  Not Met/Progressing    [] New/Initial Goal  2. Po intake of meals will meet >75% of patient estimated nutritional needs within the next 7 days.   Outcome:  [] Met/Ongoing    []  Not Met    [x] New/Initial Goal      Monitoring:   [x] Food and beverage intake   [x] Diet order   [x] Nutrition-focused physical findings   [x] Treatment/therapy   [] Weight   [] Enteral nutrition intake        Previous Recommendations (for follow-up assessments only):     []   Implemented       []   Not Implemented (RD to address)      [x] No Longer Appropriate     [] No Recommendation Made     Discharge Planning: renal diet; consistency per SLP  [x] Participated in care planning, discharge planning, & interdisciplinary rounds as appropriate      Cecy Nevarez RD   Pager: [phone]

## 2018-08-27 NOTE — PROGRESS NOTES
OT orders received, chart reviewed. Pt unable to participate with OT due to:  []  Nausea/vomiting  []  Eating  []  Pain  []  Pt lethargic  [x]  Currently receiving dialysis  Will f/u later as patient's schedule allows.  Thank you  Namita Del Angel, OTR/L

## 2018-08-27 NOTE — PROGRESS NOTES
PT orders received, chart reviewed. Pt unable to participate with PT due to:  []  Nausea/vomiting  []  Eating  []  Pain  []  Pt lethargic  [x]  Currently receiving dialysis  Will f/u later as patient's schedule allows. Thank you.   Yang Gaytan PT, DPT

## 2018-08-27 NOTE — PROGRESS NOTES
Problem: Falls - Risk of  Goal: *Absence of Falls  Document Asha Fall Risk and appropriate interventions in the flowsheet.    Outcome: Progressing Towards Goal  Fall Risk Interventions:  Mobility Interventions: Bed/chair exit alarm    Mentation Interventions: Adequate sleep, hydration, pain control, Bed/chair exit alarm    Medication Interventions: Bed/chair exit alarm, Patient to call before getting OOB    Elimination Interventions: Call light in reach, Toileting schedule/hourly rounds    History of Falls Interventions: Bed/chair exit alarm, Evaluate medications/consider consulting pharmacy, Room close to nurse's station

## 2018-08-27 NOTE — PROGRESS NOTES
Problem: Dysphagia (Adult)  Goal: *Acute Goals and Plan of Care (Insert Text)  Patient will:  1. Tolerate PO trials with 0 s/s overt distress in 4/5 trials  2. Utilize compensatory swallow strategies/maneuvers (decrease bite/sip, size/rate, alt. liq/sol) with min cues in 4/5 trials  3. Perform oral-motor/laryngeal exercises to increase oropharyngeal swallow function with min cues  4. Complete an objective swallow study (i.e., MBSS) to assess swallow integrity, r/o aspiration, and determine of safest LRD, min A    Recommend:   Mechanical soft solids/ thin liquids  meds crushed in puree or one at a time with sips thin  Strict aspiration precautions (HOB >30 degrees at all times, Oral care TID)         Outcome: Progressing Towards Goal  Speech LAnguage Pathology bedside swallow re-evaluation/ treatment    Patient: Krupa Cruz (72 y.o. female)  Date: 8/27/2018  Primary Diagnosis: Hypertensive emergency        Precautions: aspiration       ASSESSMENT :  Based on the objective data described below, the patient presents with improved oropharyngeal dysphagia, no with mild oropharyngeal dysphagia. Pt awake, alert, son present towards end of session. Pt accepted thin via straw, puree, and mechanical soft solids. Pt demo mod oral holding of liquids/ puree initially with swishing and spitting first 2 presentations of thin. Pt then accepted puree, straw sips thin, and mechanical soft solids with mildly prolonged mastication, intermittent oral holding, fairly timely swallow response with weak laryngeal elevation, no overt s/sx aspiration, but s/sx distress during each swallow with pt holding her throat intermittently, suspect from NG tube placement. Rec: initiate mechanical soft solids with thin liquids, aspiration precautions. Orders not written at current, nursing aware. SLP will f/u.     Tx completed with education of above with diet recs, compensatory swallow strategies (small bites/sips, alternate liquids/solids, decrease feeding rate, HOB > 45 with all po, and at least 30 minutes following), aspiration precautions, and ST POC. Pt shook head in understanding, son verbalized understanding. SLP will f/u. Patient will benefit from skilled intervention to address the above impairments. Patients rehabilitation potential is considered to be Good  Factors which may influence rehabilitation potential include:   []            None noted  []            Mental ability/status  []            Medical condition  []            Home/family situation and support systems  []            Safety awareness  []            Pain tolerance/management  [x]            Other: ESL     PLAN :  Recommendations and Planned Interventions:  initiate mechanical soft solids with thin liquids, aspiration precautions. Frequency/Duration: Patient will be followed by speech-language pathology 1-2 times per day/3-5 days per week to address goals. Discharge Recommendations: To Be Determined     SUBJECTIVE:   Patient stated Cold.     OBJECTIVE:     Past Medical History:   Diagnosis Date    Chronic kidney disease     [facility]     TUES-THURS-SAT    Hypercholesteremia     Hypertension     Kidney disease     Vitamin D deficiency      Past Surgical History:   Procedure Laterality Date    VASCULAR SURGERY PROCEDURE UNLIST       Prior Level of Function/Home Situation: as per H&P  Home Situation  Home Environment: Private residence  # Steps to Enter: 2  One/Two Story Residence: Two story  Living Alone: No  Support Systems: Child(gomez), Family member(s)  Patient Expects to be Discharged to[de-identified] Private residence  Current DME Used/Available at Home: None  Diet prior to admission: unknown, suspect regular/ thin  Current Diet:  NPO with NG tube feeds   Cognitive and Communication Status:  Neurologic State: Alert  Orientation Level: Oriented to person  Cognition: Follows commands  Perception: Appears intact  Perseveration: No perseveration noted  Safety/Judgement: Fall prevention  Oral Assessment:  Oral Assessment  Labial: No impairment  Dentition: Natural;Intact  Oral Hygiene: good  Lingual: No impairment  Velum: No impairment  Mandible: No impairment  P.O. Trials:  Patient Position: HOB 50*  Vocal quality prior to P.O.: No impairment  Consistency Presented: Thin liquid;Mechanical soft;Puree;Pill/Tablet  How Presented: Self-fed/presented;SLP-fed/presented;Straw;Successive swallows;Spoon     Bolus Acceptance: No impairment  Bolus Formation/Control: Impaired  Type of Impairment: Mastication (oral holding)  Propulsion: Delayed (# of seconds)  Oral Residue: None  Initiation of Swallow: No impairment  Laryngeal Elevation: Weak  Aspiration Signs/Symptoms: None  Pharyngeal Phase Characteristics: Feeling of discomfort;Poor endurance (suspect 2* NG tube)  Effective Modifications: Small sips and bites  Cues for Modifications: Minimal       Oral Phase Severity: Mild  Pharyngeal Phase Severity : Mild    GCODESwallowing:  Swallow Current Status CI= 1-19%   Swallow Goal Status CH= 0%   Swallow D/C Status CH= 0%    The severity rating is based on the following outcomes:  ALE Noms Swallow Level 6    Clinical Judgement    PAIN:  Start of Eval: 0  End of Eval: 0     After treatment:   []            Patient left in no apparent distress sitting up in chair  [x]            Patient left in no apparent distress in bed  [x]            Call bell left within reach  [x]            Nursing notified  [x]            Family present  []            Caregiver present  []            Bed alarm activated    COMMUNICATION/EDUCATION:   [x]            Aspiration precautions; swallow safety; compensatory techniques. [x]            Patient/family have participated as able in goal setting and plan of care. []            Patient/family agree to work toward stated goals and plan of care.   []            Patient understands intent and goals of therapy; neutral about participation. []            Patient unable to participate in goal setting/plan of care; educ ongoing with interdisciplinary staff  []         Posted safety precautions in patient's room.     Thank you for this referral.  Rico Srinivasan MS, CCC/SLP  Re-eval: 15 minutes  Tx: 14 minutes

## 2018-08-27 NOTE — PROGRESS NOTES
attended the interdisciplinary rounds for Efrain Hyman, who is a 56 y.o.,female. Patients Primary Language is: [place]. According to the patients EMR Scientologist Affiliation is: [employer]. 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    Hypertensive crisis 07/17/2018    Seizure ([address] 07/16/2018    Hypertensive emergency 73/72/1138    Complication of vascular dialysis catheter, initial encounter 06/18/2018    Anemia 02/26/2018    Hyponatremia 02/25/2018    ESRD (end stage renal disease) ([facility] 02/25/2018    NSTEMI (non-ST elevated myocardial infarction) ([facility] 02/25/2018    Respiratory failure ([facility] 02/25/2018    Hypoxia 02/25/2018    Acute UTI 02/25/2018    Hyperkalemia 09/08/2017    Renal failure 09/08/2017    SCOOTER (acute kidney injury) ([facility] 09/08/2017      Plan:  Chaplains will continue to follow and will provide pastoral care on an as needed/requested basis.  recommends bedside caregivers page  on duty if patient shows signs of acute spiritual or emotional distress.     [address]  Board Certified [address] [place] [address]   [phone]

## 2018-08-27 NOTE — PROGRESS NOTES
0730 - Assumed care of patient. Assessment in progress. Patient alert. Left facial droop with elevating BP, MD aware and at bedside. 0830 - SBP elevated up to 220's when taken in left wrist. Moved cuff to right upper extremity and SBP in 190's. PRN labetolol and PO BP meds given. Other medications held due to being about to receive dialysis. Left facial droop with elevating BP, MD aware and at bedside. 0930 - Tube feedings held due to being NPO for abdominal ultrasound. Discussed with MD and patient has passed SLP. Removed NG tube at this time as well. Will continue to monitor closely. No other acute needs at this time. Son-in-law at bedside and updated. Facial droop decreased as blood pressure has gone down with labetolol. [phone] - Dialysis RN at bedside starting dialysis. SBP in 170's again. Will monitor response to dialysis prior to initiating cardene gtt. 1013 - Cardene drip restarted. 1045 - Cardene drip held due to SBP now in 130's.   1245 - Cardene drip restarted. 1415 - Dialysis complete. Tolerated well besides BP.    1430 - PT at bedside working with patient. SBP up to 200 when standing. Patient placed back in bed. Will monitor to see BP.   1500 - Family at bedside. No needs at this time. 1800 - Family ate soft dinner brought in from family, tolerated well. 1940 - Bedside and Verbal shift change report given to Raad Johnson  (oncoming nurse) by Go Valdez (offgoing nurse). Report included the following information SBAR and Kardex.

## 2018-08-27 NOTE — PROGRESS NOTES
[facility]  ICU Progress Note      Name: Jing Isaacs   : 1960   MRN: [id number]   Date: 2018      [x]I have reviewed the flowsheet and previous days notes. Events overnight reviewed and discussed with nursing staff. Vital signs and records reviewed. Hong Welch a 56 y. o. female c pmhx of HTN, HLD, ESRD on HD admitted for hypertensive emergency. Prior CT scan reported massive pericardial effusion on 18. Echo on 18 Small circumferential pericardial effusion with more prominent area around the right artium. No indications of tamponade present  - POC echo 18 with no wall motion abnormalities, small pericardial effusion without tamponade physiology, LVH      18  No new events overnight. Pt had BM this weekend. Tolerated TF thru NGT. Passed her swallow eval by Speech pathologist yesterday with recommendations for mechanical soft solids/ think liquids. BP this morning somewhat elevated with systolic in high 115G-630S requiring nicardipine gtt to be restarted. Otherwise, pt able to follow simple commands; no complaints. ROS:A comprehensive review of systems was negative except for that written in the HPI.       Vital Signs:    Visit Vitals    BP (!) 171/101    Pulse 84    Temp 98.6 °F (37 °C)    Resp 25    Ht 5' 1\" (1.549 m)    Wt 35.8 kg (78 lb 14.4 oz)    SpO2 100%    Breastfeeding No    BMI 14.91 kg/m2       O2 Device: Room air       Temp (24hrs), Av.5 °F (36.9 °C), Min:98.3 °F (36.8 °C), Max:99 °F (37.2 °C)       Intake/Output:   Last shift:         Last 3 shifts: 1901 -  0700  In: 1700.2 [I.V.:475.2]  Out: 0     Intake/Output Summary (Last 24 hours) at 18 1300  Last data filed at 18 0600   Gross per 24 hour   Intake            747.5 ml   Output                0 ml   Net            747.5 ml        Physical Exam:    General: awake, alert; follows simple commands    HEENT:  Anicteric sclerae; pink palpebral conjunctivae; oral mucosa moist  Resp:  Symmetrical chest expansion, no accessory muscle use; good airway entry; no rales/ wheezing/ rhonchi noted  CV:  S1, S2 present  GI:  Abdomen soft, non-tender; (+) active bowel sounds, NGT  Extremities:  +2 pulses on all extremities; no edema/ cyanosis/ clubbing noted; normal capillary refill  Skin:  Warm; no rashes/ lesions noted  Neurologic:  + mild left facial droop      DATA:     Current Facility-Administered Medications   Medication Dose Route Frequency    heparin (porcine) 1,000 unit/mL injection        minoxidil (LONITEN) tablet 2.5 mg  2.5 mg Oral DAILY    rifAMPin (RIFADIN) capsule 600 mg  600 mg Oral ACD    aspirin chewable tablet 81 mg  81 mg Oral DAILY    ondansetron (ZOFRAN) injection 6 mg  6 mg IntraVENous Q6H PRN    losartan (COZAAR) tablet 100 mg  100 mg Oral DAILY    metoclopramide HCl (REGLAN) injection 5 mg  5 mg IntraVENous BID    melatonin tablet 3 mg  3 mg Oral QHS    acetaminophen (TYLENOL) solution 500 mg  500 mg Oral Q4H PRN    niCARdipine (CARDENE) 25 mg in 0.9% sodium chloride 250 mL infusion  0-15 mg/hr IntraVENous TITRATE    polyethylene glycol (MIRALAX) packet 17 g  17 g Oral DAILY    labetalol (NORMODYNE;TRANDATE) 20 mg/4 mL (5 mg/mL) injection 10 mg  10 mg IntraVENous Q6H PRN    isoniazid (NYDRAZID) tablet 300 mg  300 mg Oral DAILY    atorvastatin (LIPITOR) tablet 40 mg  40 mg Oral QHS    bisacodyl (DULCOLAX) tablet 5 mg  5 mg Oral DAILY PRN    docusate sodium (COLACE) capsule 100 mg  100 mg Oral BID    ethambutol (MYAMBUTOL) tablet 800 mg  800 mg Oral Q TUE, THU & SAT    pyrazinamide tablet 1,000 mg  1,000 mg Oral Q TUE, THU & SAT    pyridoxine (vitamin B6) (VITAMIN B-6) tablet 100 mg  100 mg Oral DAILY    sodium chloride (NS) flush 5-10 mL  5-10 mL IntraVENous Q8H    sodium chloride (NS) flush 5-10 mL  5-10 mL IntraVENous PRN    heparin (porcine) injection 5,000 Units  5,000 Units SubCUTAneous Q8H    hydrALAZINE (APRESOLINE) tablet 100 mg  100 mg Oral TID    carvedilol (COREG) tablet 25 mg  25 mg Oral Q12H    amLODIPine (NORVASC) tablet 10 mg  10 mg Oral DAILY    albuterol (PROVENTIL VENTOLIN) nebulizer solution 2.5 mg  2.5 mg Nebulization Q4H PRN    cloNIDine (CATAPRES) 0.3 mg/24 hr patch 1 Patch  1 Patch TransDERmal Q7D         Labs: Results:       Chemistry Recent Labs      08/27/18 0258 08/26/18 0234 08/25/18   0127   GLU  121*  88  112*   NA  134*  138  138   K  3.5  3.7  3.3*   CL  99*  102  101   CO2  29  29  29   BUN  29*  11  13   CREA  3.66*  1.97*  2.13*   CA  8.0*  9.0  8.7   AGAP  6  7  8   BUCR  8*  6*  6*   AP  173*  147*  145*   TP  5.2*  6.0*  5.7*   ALB  2.3*  2.8*  2.7*   GLOB  2.9  3.2  3.0   AGRAT  0.8  0.9  0.9      CBC w/Diff Recent Labs      08/27/18 0258 08/26/18 0234 08/25/18 0127   WBC  2.9*  3.9*  3.3*   RBC  2.81*  3.20*  3.01*   HGB  9.1*  10.1*  9.6*   HCT  26.1*  29.8*  28.5*   PLT  170  146  98*   GRANS  49  63  60   LYMPH  18*  15*  12*   EOS  9*  4  5      Coagulation No results for input(s): PTP, INR, APTT in the last 72 hours. No lab exists for component: INREXT, INREXT    Liver Enzymes Recent Labs      08/27/18 0258   TP  5.2*   ALB  2.3*   AP  173*   SGOT  33      ABG No results found for: PH, PHI, PCO2, PCO2I, PO2, PO2I, HCO3, HCO3I, FIO2, FIO2I   Microbiology No results for input(s): CULT in the last 72 hours. Telemetry: [x]Sinus []A-flutter []Paced    []A-fib []Multiple PVCs                    Imaging:  CXR Results  (Last 48 hours)    None          CT Results  (Last 48 hours)    None            IMPRESSION:   · Hypertensive emergency with response to Cardene and intermittent hemodialysis -gradually improving  · Left sided weakness with mild left facial droop due to hypertensive encephalopathy  · Chronic nausea, vomiting and abdominal pain, likely related to chronic constipation and poorly controlled BP, ? TB drugs- KUB and CT nondiagnostic.    · Chronic constipation -possible TB meds contributing to decreased bowel motility   · History of MTB and MAC on DOT, sputum smears cleared for AFB  · ESRD on IHD  · H/o NSTEMI  · Oropharyngeal dysphagia -cleared by SLP for mechanical soft solids  · History of seizures        PLAN:   · CVS - optimize BP control -already on coreg, clonidine, hydralazine, losartan. May restart nicardipine and titrate to SBP < 180. Renal service adding minoxidil for BP control. Continue aspirin  · Resp - stable on room air. May have oxygen supplementation as needed to  to maintain SpO2 > 92%. Strict aspiration precautions. · ID - continue TB regimen: Rifampin + Ethambutol +INH+ pyrazinamide  · Heme/onc -stable normocytic anemia, most likely in the setting of CKD  · Metabolic - electrolyte replacement per nephrology during dialysis   · Renal - IHD per nephrology    · Endocrine - high TSH, normal free t4, and low t3. Sick euthroid syndrome   · Neuro/ Pain/ Sedation -avoid sedating medications   · GI -remove NGT after RUQ U/S and may start on mechanical soft diet per SLP. Continue with bowel regimen (scheduled colace, reglan, miralax). Strict aspiration precautions  · Prophylaxis - -DVT- sq heparin; GI- does not require GI prophylaxis   · Discussed in interdisciplinary rounds          January-Marlin Bryson PA-C   2:11 PM      [facility] Staff Addendum     I have independently evaluated the patient and reviewed the patient's chart. I have discussed the findings and care plan with ICU Care Team. Care Plan reviewed on ICU milti-D rounds. I agree with the above evaluation, assessment and recommendations along with the following comments and observations. Patient passed swallowing study, NGT removed. Patient with intermittent IV Cardizem for BP control- mild facial droop with SBP>180 which resolves with BP control. Had BM yesterday. Appears to be feeling better today.  Spoke with nephrology about changing OP, PO BP medications. Per my discussion with nephrology, the patient has labile BPs. He will attempt to adjust again. I spoke with son in law who states since last discharge, constipation has improved but continues. The family stopped giving her coffee ground enemas. Report that the patient does to get her HD in the am when home and then she takes her DOT for TB in the early afternoon. Continuing with supportive care. Plan for [facility] this afternoon.           Critical Care and time spent coordinating care, minus procedure time: 35 min    Rena Vásquez DO, FCCP  Pulmonary, Sleep and Critical Care Medicine  3:09 PM

## 2018-08-27 NOTE — PROGRESS NOTES
[facility]  Progress Note    Patient: Bruno Morin Age: 56 y.o. : 1960 MR#: [id number] SSN: [id number]  Date/Time: 2018     Subjective:       Family: daughter and son in law at bedside. Son in law translated since pt declined to use  phone use. Pt feels fine, AAOX3. Denies any HA, no N/V. Tolerating PO better today  Back on Cardene drip      Assessment/Plan:   1. 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 Sever N/V ? HTN urgency related, CT A/P neg, better now   8. Dysphagia    PLAN   - off NGT, cont PO as tolerated. - cont Cardene drip, cont po meds and adjust to BP control     - awaiting neurology eval for CT changes and transient weakness. D/w Dr. Langley Part yesterday   - HD per nephrology   - d/w pt and family at bedside in detail   - d/w ICU team         Case discussed with:  [x]Patient  [x]Family  [x]Nursing  []Case Management  DVT Prophylaxis:  []Lovenox  [x]Hep SQ  []SCDs  []Coumadin   []On Heparin gtt    Objective:   VS:   Visit Vitals    /80    Pulse (!) 104    Temp 97.4 °F (36.3 °C)    Resp 21    Ht 5' 1\" (1.549 m)    Wt 35.8 kg (78 lb 14.4 oz)    SpO2 99%    Breastfeeding No    BMI 14.91 kg/m2      Tmax/24hrs: Temp (24hrs), Av.2 °F (36.8 °C), Min:97.4 °F (36.3 °C), Max:98.6 °F (37 °C)  IOBRIEF    Intake/Output Summary (Last 24 hours) at 18 1706  Last data filed at 18 0600   Gross per 24 hour   Intake            597.5 ml   Output                0 ml   Net            597.5 ml       General: AA, NAD    Cardiovascular: S1S2 - regular    Pulmonary: B/L clear, No wheezing, no Rales No Rhonchi    GI:  + BS in all four quadrants, soft, non-tender  Extremities: No edema.    Neuro: AAOx3, moves all ext Medications:   Current Facility-Administered Medications   Medication Dose Route Frequency    minoxidil (LONITEN) tablet 2.5 mg  2.5 mg Oral DAILY    rifAMPin (RIFADIN) capsule 600 mg  600 mg Oral ACD    niCARdipine (CARDENE) 25 mg in 0.9% sodium chloride 250 mL infusion  0-15 mg/hr IntraVENous TITRATE    aspirin chewable tablet 81 mg  81 mg Oral DAILY    ondansetron (ZOFRAN) injection 6 mg  6 mg IntraVENous Q6H PRN    losartan (COZAAR) tablet 100 mg  100 mg Oral DAILY    metoclopramide HCl (REGLAN) injection 5 mg  5 mg IntraVENous BID    melatonin tablet 3 mg  3 mg Oral QHS    acetaminophen (TYLENOL) solution 500 mg  500 mg Oral Q4H PRN    polyethylene glycol (MIRALAX) packet 17 g  17 g Oral DAILY    labetalol (NORMODYNE;TRANDATE) 20 mg/4 mL (5 mg/mL) injection 10 mg  10 mg IntraVENous Q6H PRN    isoniazid (NYDRAZID) tablet 300 mg  300 mg Oral DAILY    atorvastatin (LIPITOR) tablet 40 mg  40 mg Oral QHS    bisacodyl (DULCOLAX) tablet 5 mg  5 mg Oral DAILY PRN    docusate sodium (COLACE) capsule 100 mg  100 mg Oral BID    ethambutol (MYAMBUTOL) tablet 800 mg  800 mg Oral Q TUE, THU & SAT    pyrazinamide tablet 1,000 mg  1,000 mg Oral Q TUE, THU & SAT    pyridoxine (vitamin B6) (VITAMIN B-6) tablet 100 mg  100 mg Oral DAILY    sodium chloride (NS) flush 5-10 mL  5-10 mL IntraVENous Q8H    sodium chloride (NS) flush 5-10 mL  5-10 mL IntraVENous PRN    heparin (porcine) injection 5,000 Units  5,000 Units SubCUTAneous Q8H    hydrALAZINE (APRESOLINE) tablet 100 mg  100 mg Oral TID    carvedilol (COREG) tablet 25 mg  25 mg Oral Q12H    amLODIPine (NORVASC) tablet 10 mg  10 mg Oral DAILY    albuterol (PROVENTIL VENTOLIN) nebulizer solution 2.5 mg  2.5 mg Nebulization Q4H PRN    cloNIDine (CATAPRES) 0.3 mg/24 hr patch 1 Patch  1 Patch TransDERmal Q7D       Labs:    Recent Labs      08/27/18   0258  08/26/18   0234  08/25/18   0127   WBC  2.9*  3.9*  3.3*   HGB  9.1*  10.1*  9.6*   HCT  26.1* 29.8*  28.5*   PLT  170  146  98*     Recent Labs      08/27/18   0258  08/26/18   0234  08/25/18   0127   NA  134*  138  138   K  3.5  3.7  3.3*   CL  99*  102  101   CO2  29  29  29   GLU  121*  88  112*   BUN  29*  11  13   CREA  3.66*  1.97*  2.13*   CA  8.0*  9.0  8.7   MG  2.4  2.4  2.5   PHOS  3.2  2.5  2.2*   ALB  2.3*  2.8*  2.7*   SGOT  33  52*  50*   ALT  17  23  17         Signed By: Serina Michael MD     August 27, 2018

## 2018-08-27 NOTE — ROUTINE PROCESS
Assumed care of patient. Awake and more responsive to verbal stimuli. AfebrileRelatives at bedside and appears to be in good spirit. Pulled up and repositioned for comfort.

## 2018-08-27 NOTE — PROGRESS NOTES
Problem: Mobility Impaired (Adult and Pediatric)  Goal: *Acute Goals and Plan of Care (Insert Text)  Physical Therapy Goals  Initiated 8/27/2018 and to be accomplished within 7 day(s)  1. Patient will move from supine to sit and sit to supine , scoot up and down and roll side to side in bed with supervision/set-up. 2.  Patient will transfer from bed to chair and chair to bed with minimal assistance/contact guard assist using the least restrictive device. 3.  Patient will perform sit to stand with supervision/set-up. 4.  Patient will ambulate with minimal assistance/contact guard assist for >50 feet with the least restrictive device. 5.  Patient will ascend/descend 3 stairs with 1-2 handrail(s) with minimal assistance/contact guard assist.  Outcome: Progressing Towards Goal  physical Therapy EVALUATION    Patient: Mirlande Berumen (78 y.o. female)  Date: 8/27/2018  Primary Diagnosis: Hypertensive emergency  Precautions: Fall    OBJECTIVE/ASSESSMENT :  Based on the objective data described below, the patient presents with impaired functional mobility including bed mobility, transfers, ambulation, and general activity tolerance following admission for hypertensive emergency. Patient presented today semi-reclined in bed having just finished dialysis, cleared with nurse for participation in PT evaluation. Patient transferred to sitting EOB with Tati, required constant support to maintain midline posture. She stood with Tati PT HHA, again required constant support d/t poor static balance. Patient's BP readings taken in supine, sitting, and standing as follows: 168,85, 184/100, 200/177 respectively. Due to standing HTN, patient assisted back to bed at this time. At conclusion of session, patient left resting comfortably in bed with call bell in reach, needs met, and nurse notified.   Education: bed mobility, transfers, safety awareness -- patient demonstrated understanding    Patient will benefit from skilled intervention to address the above impairments. Patients rehabilitation potential is considered to be Good  Factors which may influence rehabilitation potential include:   []         None noted  []         Mental ability/status  [x]         Medical condition  []         Home/family situation and support systems  []         Safety awareness  []         Pain tolerance/management  []         Other:      PLAN :  Recommendations and Planned Interventions:  [x]           Bed Mobility Training             [x]    Neuromuscular Re-Education  [x]           Transfer Training                   []    Orthotic/Prosthetic Training  [x]           Gait Training                          []    Modalities  [x]           Therapeutic Exercises          []    Edema Management/Control  [x]           Therapeutic Activities            [x]    Patient and Family Training/Education  []           Other (comment):    Frequency/Duration: Patient will be followed by physical therapy 1-2 times per day, 4-7 days per week to address goals. Discharge Recommendations: Jesu Murillo  Further Equipment Recommendations for Discharge: N/A     SUBJECTIVE:   Patient stated Thank you.  Patient's primary language is [employer], family provides majority of translation. OBJECTIVE DATA SUMMARY:     Past Medical History:   Diagnosis Date    Chronic kidney disease     [facility]     TUES-THURS-SAT    Hypercholesteremia     Hypertension     Kidney disease     Vitamin D deficiency      Past Surgical History:   Procedure Laterality Date    VASCULAR SURGERY PROCEDURE UNLIST       Barriers to Learning/Limitations: yes;  language  Compensate with: N/A  Prior Level of Function/Home Situation: Patient lives with children in 2 story home. She required 1 person assistance/supervision to ambulate using rolling walker.   Home Situation  Home Environment: Private residence  # Steps to Enter: 2  Rails to Enter: Yes  Hand Rails : Right  One/Two Story Residence: Two story  # of Interior Steps: 12  Living Alone: No  Support Systems: Child(gomez), Family member(s)  Patient Expects to be Discharged to[de-identified] Private residence  Current DME Used/Available at Home: Commode, bedside, Walker, rolling  Tub or Shower Type: Tub/Shower combination  Critical Behavior:  Neurologic State: Alert  Psychosocial  Patient Behaviors: Calm; Cooperative  Family  Behaviors: Supportive  Needs Expressed: Emotional  Purposeful Interaction: Yes  Pt Identified Daily Priority: Clinical issues (comment)  [employer] Process: Attend basic human needs;Create healing environment;Supportive expression  Caring Interventions: Therapeutic modalities  Reassure: Quiet presence; Sit with patient;Caring rounds  Therapeutic Modalities: Intentional therapeutic touch  Other Caring Modalities: caring rounds  Strength:    Strength: Generally decreased, functional (BLE)  Tone & Sensation:   Tone: Normal (BLE)  Sensation: Intact (BLE)   Range Of Motion:  AROM: Within functional limits (BLE)  Functional Mobility:  Bed Mobility:  Rolling: Minimum assistance  Supine to Sit: Minimum assistance  Sit to Supine: Minimum assistance  Scooting: Moderate assistance  Transfers:  Sit to Stand: Minimum assistance  Stand to Sit: Minimum assistance  Balance:   Sitting: Impaired  Sitting - Static: Poor (constant support)  Sitting - Dynamic: Poor (constant support)  Standing: Impaired; With support  Standing - Static: Poor  Ambulation/Gait Training:   N/A deferred due to HTN  Pain:  Pre session: 0/10  Post session: 0/10  Activity Tolerance:   Fair, limited by BP  Please refer to the flowsheet for vital signs taken during this treatment.   After treatment:   [] Patient left in no apparent distress sitting up in chair  [] Patient left sitting on EOB  [x] Patient left in no apparent distress in bed  [] Patient declined to be OOB at this time due to   [x] Call bell left within reach  [x] Nursing notified Nayan Marcus)  [] Caregiver present  [] Bed alarm activated  [x] SCDs in place    COMMUNICATION/EDUCATION:   [x]         Fall prevention education was provided and the patient/caregiver indicated understanding. [x]         Patient/family have participated as able in goal setting and plan of care. [x]         Patient/family agree to work toward stated goals and plan of care. []         Patient understands intent and goals of therapy, but is neutral about his/her participation. []         Patient is unable to participate in goal setting and plan of care. Thank you for this referral.  April Santamaria   Time Calculation: 13 mins      Mobility [id number] Current  CJ= 20-39%   Goal  CJ= 20-39%. The severity rating is based on the Level of Assistance required for Functional Mobility and ADLs.     Eval Complexity: History: HIGH Complexity :3+ comorbidities / personal factors will impact the outcome/ POC Exam:MEDIUM Complexity : 3 Standardized tests and measures addressing body structure, function, activity limitation and / or participation in recreation  Presentation: MEDIUM Complexity : Evolving with changing characteristics  Clinical Decision Making:Medium Complexity   Overall Complexity:MEDIUM

## 2018-08-27 NOTE — PROGRESS NOTES
HEMODIALYSIS ROUNDING NOTE      Patient: Carlos Fernandez               Sex: female          DOA: 8/22/2018 11:25 AM        YOB: 1960      Age:  56 y.o.        LOS:  LOS: 5 days     Subjective:     Carlos Fernandez is a 56 y.o.  who presents with Hypertensive emergency. The patient is dialyzing utilizing the following method:Intermittent Hemodialysis    Chief Complains: Patient was seen on dialysis, denies nausea / vomiting / headache / dizziness / SOB / chest pain. discussed with son in law  - Reviewed last 24 hrs events     Current Facility-Administered Medications   Medication Dose Route Frequency    heparin (porcine) 1,000 unit/mL injection        minoxidil (LONITEN) tablet 2.5 mg  2.5 mg Oral DAILY    aspirin chewable tablet 81 mg  81 mg Oral DAILY    ondansetron (ZOFRAN) injection 6 mg  6 mg IntraVENous Q6H PRN    losartan (COZAAR) tablet 100 mg  100 mg Oral DAILY    metoclopramide HCl (REGLAN) injection 5 mg  5 mg IntraVENous BID    melatonin tablet 3 mg  3 mg Oral QHS    acetaminophen (TYLENOL) solution 500 mg  500 mg Oral Q4H PRN    niCARdipine (CARDENE) 25 mg in 0.9% sodium chloride 250 mL infusion  0-15 mg/hr IntraVENous TITRATE    polyethylene glycol (MIRALAX) packet 17 g  17 g Oral DAILY    labetalol (NORMODYNE;TRANDATE) 20 mg/4 mL (5 mg/mL) injection 10 mg  10 mg IntraVENous Q6H PRN    isoniazid (NYDRAZID) tablet 300 mg  300 mg Oral DAILY    rifAMPin (RIFADIN) capsule 600 mg  600 mg Oral ACB    atorvastatin (LIPITOR) tablet 40 mg  40 mg Oral QHS    bisacodyl (DULCOLAX) tablet 5 mg  5 mg Oral DAILY PRN    docusate sodium (COLACE) capsule 100 mg  100 mg Oral BID    ethambutol (MYAMBUTOL) tablet 800 mg  800 mg Oral Q TUE, THU & SAT    pyrazinamide tablet 1,000 mg  1,000 mg Oral Q TUE, THU & SAT    pyridoxine (vitamin B6) (VITAMIN B-6) tablet 100 mg  100 mg Oral DAILY    sodium chloride (NS) flush 5-10 mL  5-10 mL IntraVENous Q8H    sodium chloride (NS) flush 5-10 mL  5-10 mL IntraVENous PRN    heparin (porcine) injection 5,000 Units  5,000 Units SubCUTAneous Q8H    hydrALAZINE (APRESOLINE) tablet 100 mg  100 mg Oral TID    carvedilol (COREG) tablet 25 mg  25 mg Oral Q12H    amLODIPine (NORVASC) tablet 10 mg  10 mg Oral DAILY    albuterol (PROVENTIL VENTOLIN) nebulizer solution 2.5 mg  2.5 mg Nebulization Q4H PRN    cloNIDine (CATAPRES) 0.3 mg/24 hr patch 1 Patch  1 Patch TransDERmal Q7D       Objective:     Visit Vitals    BP (!) 175/94    Pulse 78    Temp 98.3 °F (36.8 °C)    Resp 20    Ht 5' 1\" (1.549 m)    Wt 35.8 kg (78 lb 14.4 oz)    SpO2 99%    Breastfeeding No    BMI 14.91 kg/m2       Intake/Output Summary (Last 24 hours) at 08/27/18 1026  Last data filed at 08/27/18 0600   Gross per 24 hour   Intake            827.5 ml   Output                0 ml   Net            827.5 ml       Physical Examination:    GEN: AAO X 3, NAD  RS: Chest is bilateral equal, no wheezing / rales / crackles  CVS: S1-S2 heard, RRR  Abdomen: Soft, Non tender, Not distended, Positive bowel sounds  Extremities: No edema, no cyanosis, skin is warm on touch  CNS: Awake & follows commands,  HEENT: Head is atraumatic, PERRLA, conjunctiva pink & non icteric. No JVD or carotid bruit      Data Review:      Labs:     Hematology:   Recent Labs      08/27/18   0258 08/26/18   0234  08/25/18   0127   WBC  2.9*  3.9*  3.3*   HGB  9.1*  10.1*  9.6*   HCT  26.1*  29.8*  28.5*     Chemistry:   Recent Labs      08/27/18 0258 08/26/18   0234  08/25/18   0127   BUN  29*  11  13   CREA  3.66*  1.97*  2.13*   CA  8.0*  9.0  8.7   ALB  2.3*  2.8*  2.7*   K  3.5  3.7  3.3*   NA  134*  138  138   CL  99*  102  101   CO2  29  29  29   PHOS  3.2  2.5  2.2*   GLU  121*  88  112*        Images:     XR (Most Recent).  CXR reviewed by me and compared with previous CXR   Results from [place] encounter on 08/22/18   XR ABD PORT  1 V   Narrative ABDOMINAL RADIOGRAPH-SINGLE SUPINE VIEW    INDICATION: NG tube placement    COMPARISON: Abdomen x-ray 8/23/2018    FINDINGS: Enteric tube tip projects at the distal stomach. Nonobstructed bowel  gas pattern. No evidence for pneumoperitoneum. No evidence for nephrolithiasis. No acute osseous abnormality. Degenerative changes noted at the sacroiliac  joints lower lumbar spine         Impression IMPRESSION:  Enteric tube tip projects at the distal stomach. .         CT (Most Recent)   Results from Hospital Encounter encounter on 08/22/18   CT ABD PELV W CONT   Narrative CT ABDOMEN AND PELVIS WITH CONTRAST    COMPARISON: August 22, 2018. INDICATIONS: Vomiting. TECHNIQUE:  Following the uneventful administration of 80 cc of Isovue 300  intravenous contrast , volumetric data acquisition was performed of the abdomen  and pelvis on a multislice scanner and reconstructed in axial coronal and  sagittal planes    CT ABDOMEN FINDINGS:     Lung Bases: There is a large right and minimal left pleural effusion. Bibasilar  atelectasis is evident. There is multichamber cardiac enlargement. There is a  moderate size pericardial effusion. This ranges up to 2.5 cm in thickness but  probably averages less than 1 cm. .    Liver/Gallbladder/Biliary: Normal.    Spleen: Scattered small granulomas noted. Otherwise negative. Adrenal Glands: Normal.    Kidneys: Atrophic with a left-sided cysts unchanged. .    Pancreas: Normal.    Stomach, Small Bowel,  and Colon: A nasogastric tube is present extending into  the stomach which is otherwise unremarkable. The small bowel is not distended. The appendix appears to be present unremarkable. The colon is unremarkable. Lymph Nodes: Normal in size and number. The abdominal aorta is unremarkable. The IVC is unremarkable. Peritoneal Spaces: Small volume peritoneal fluid is evident    Abdominal wall: No hernia or mass is evident. Subcutaneous edema is evident.     CT PELVIS FINDINGS:     Bladder: Empty.    Pelvic Small Bowel And Colon: Unremarkable. Lymph Nodes: Normal in size and number. Free Fluid: Small volume pelvic fluid noted    Uterus and adnexal structures appear unremarkable    Osseous Structures Of Abdomen And Pelvis: Unremarkable for age. Impression IMPRESSION:     Anasarca with edematous soft tissues, pleural fluid, ascites, pericardial fluid,  and multichamber cardiac enlargement. Findings indicating a probable cause for recurrent vomiting are not identified. All CT scans at this facility are performed using dose optimization technique as  appropriate to the performed exam, to include automated exposure control,  adjustment of the mA and/or kV according to patient's size (Including  appropriate matching for site-specific examinations), or use of iterative  reconstruction technique. Plan / Recommendation:         End Stage Renal Disease:  Plan HD today    At 1020 am on 8/27/2018, I saw and examined patient during hemodialysis treatment. The patient was receiving hemodialysis for treatment of end stage renal disease. I have also reviewed vital signs, intake and output, lab results and recent events, and agreed with today's dialysis order.     Access: No issue    Anemia:  Hold epo  Start low dose minoxidil for htn  Loletha Simmonds, MD  Nephrology  8/27/2018

## 2018-08-27 NOTE — PROGRESS NOTES
Speech Pathology Note      Swallow re-eval completed b/s with recs of mechanical soft solids with thin liquids, aspiration precautions. Full report to follow. SLP will f/u.     Thank you,    Mary Grace Gipson MS, CCC/SLP  Speech- Language Pathologist

## 2018-08-27 NOTE — DIALYSIS
ACUTE HEMODIALYSIS FLOW SHEET    HEMODIALYSIS ORDERS: Physician: Juan M Angeles     Dialyzer: revaclear   Duration: 3.5  hr  BFR: 350   DFR: 800   Dialysate:  Temp 37.0  K+   3.0      Ca+  2.5   Na 138   Bicarb 30    Weight:  35.8 kg    Patient Chart []     Unable to Obtain []   Dry weight/UF Goal: 3000 ML Access right chest CVC  Needle Gauge NA    Heparin []  Bolus      Units    [] Hourly       Units    [x]None      Catheter locking solution 1.9 ML heparin 1000 U/ml   Pre BP:   189/95    Pulse:     81     Temperature:   98.6  Respirations: 27  Tx: NS   NA    ml/Bolus   [x] N/A   Labs: Pre Hep B  Ag/Ab  Post: NA   Additional Orders(medications, blood products, hypotension management): LAB DRAW USING DIALYSIS CATH     [x] [facility] Consent Verified     CATHETER ACCESS: []N/A   [x]Right CVC   []Left   []IJ     []Fem   [] First use X-ray verified     [x]Tunnel                [] Non Tunneled   [x]No S/S infection  []Redness  []Drainage []Cultured []Swelling []Pain   [x]Medical Aseptic Prep Utilized   []Dressing Changed  [x] Biopatch  Date:08/22/18   []Clotted   [x]Patent   Flows: [x]Good  []Poor  []Reversed   If access problem,  notified: []Yes    [x]N/A      GRAFT/FISTULA ACCESS:  [x]N/A     []Right     []Left     []UE     []LE   []AVG   []AVF        []Buttonhole    []Medical Aseptic Prep Utilized   []No S/S infection  []Redness  []Drainage []Cultured []Swelling []Pain    Bruit:   [] Strong    [] Weak       Thrill :   [] Strong    [] Weak       Needle Gauge: NA  Length: NA   If access problem,  notified: []Yes     [x]N/A    Please describe access if present and not used:NA       GENERAL ASSESSMENT:    LUNGS:  Rate 27   SaO2%        [x] N/A    [] Clear  [x] Coarse  [] Crackles  [] Wheezing        [] Diminished     Location : []RLL   []LLL    []RUL  []ELÍAS   Cough: []Productive  [x]Dry  []N/A   Respirations:  [x]Easy  []Labored   Therapy:  [x]RA  []NC  l/min    Mask: []NRB []Venti       O2%                  []Ventilator []Intubated  [] Trach  [] BiPaP   CARDIAC: [x]Regular      [] Irregular   [] Pericardial Rub  [] JVD        [x]  Monitored  [x] Bedside  [] Remotely monitored [] N/A  Rhythm: NSR   EDEMA: [] None  [x]Generalized  [] Pitting [] 1    [] 2    [] 3    [] 4                 [] Facial  [] Pedal  []  UE  [] LE   SKIN:   [x] Warm  [] Hot     [] Cold   [x] Dry     [] Pale   [] Diaphoretic                  [] Flushed  [] Jaundiced  [] Cyanotic  [] Rash  [] Weeping   LOC:    [x] Alert      [x]Oriented:    [x] Person     [x] Place  [x]Time               [] Confused  [] Lethargic  [] Medicated  [] Non-responsive     GI / ABDOMEN:    [x] Flat    [] Distended    [x] Soft    [] Firm   []  Obese                             [] Diarrhea  [x] Bowel Sounds  [] Nausea  [] Vomiting       / URINE ASSESSMENT:[] Voiding   [x] Oliguria  [] Anuria   []  Onofre     [] Incontinent    []  Incontinent Brief      [x]  Bathroom Privileges     PAIN: [x] 0 []1  []2   []3   []4   []5   []6   []7   []8   []9   []10            Scale 0-10  Action/Follow Up: NA   MOBILITY:  [] Amb    [] Amb/Assist    [x] Bed    [] Wheelchair  [] Stretcher      All Vitals and Treatment Details on Attached [address]: [facility]          Room # 307     [] 1st Time Acute  [] Stat  [x] Routine  [] Urgent     [] Acute Room  [x]  Bedside  [x] ICU/CCU  [] ER   Isolation Precautions:  [x] Dialysis   [] Airborne   [] Contact    [] Reverse   Special Considerations:         [] Blood Consent Verified [x]N/A     ALLERGIES:   Reaction Severity Reaction Type Noted Valid Until Updated             Allergies   Banana Other (comments             Code Status:  [x] Full Code  [] DNR      HBsAg ONLY: Date Drawn 08/27/2018         [x]Negative []Positive []Unknown   HBsAb: Date 08/27/2018    [] Susceptible   [x] [id number] []Not Drawn  [] Drawn     Current Labs:    Date of Labs: 08/27/18        Today [x]     Results for Phu Bulls (MRN [id number]) as of 8/27/2018 12:38   Ref.  Range 8/27/2018 02: 58   Sodium Latest Ref Range: 136 - 145 mmol/L 134 (L)   Potassium Latest Ref Range: 3.5 - 5.5 mmol/L 3.5   Chloride Latest Ref Range: 100 - 108 mmol/L 99 (L)   CO2 Latest Ref Range: 21 - 32 mmol/L 29   Anion gap Latest Ref Range: 3.0 - 18 mmol/L 6   Glucose Latest Ref Range: 74 - 99 mg/dL 121 (H)   BUN Latest Ref Range: 7.0 - 18 MG/DL 29 (H)   Creatinine Latest Ref Range: 0.6 - 1.3 MG/DL 3.66 (H)   BUN/Creatinine ratio Latest Ref Range: 12 - 20   8 (L)   Calcium Latest Ref Range: 8.5 - 10.1 MG/DL 8.0 (L)   Phosphorus Latest Ref Range: 2.5 - 4.9 MG/DL 3.2   Magnesium Latest Ref Range: 1.6 - 2.6 mg/dL 2.4   GFR est non-AA Latest Ref Range: >60 ml/min/1.73m2 13 (L)   GFR est AA Latest Ref Range: >60 ml/min/1.73m2 16 (L)   Bilirubin, total Latest Ref Range: 0.2 - 1.0 MG/DL 0.3   Protein, total Latest Ref Range: 6.4 - 8.2 g/dL 5.2 (L)   Albumin Latest Ref Range: 3.4 - 5.0 g/dL 2.3 (L)   Globulin Latest Ref Range: 2.0 - 4.0 g/dL 2.9   A-G Ratio Latest Ref Range: 0.8 - 1.7   0.8   ALT (SGPT) Latest Ref Range: 13 - 56 U/L 17   AST Latest Ref Range: 15 - 37 U/L 33   Alk. phosphatase Latest Ref Range: 45 - 117 U/L 173 (H)      Results for Doreen López (MRN [id number]) as of 8/27/2018 12:38   Ref.  Range 8/27/2018 02:58   WBC Latest Ref Range: 4.6 - 13.2 K/uL 2.9 (L)   RBC Latest Ref Range: 4.20 - 5.30 M/uL 2.81 (L)   HGB Latest Ref Range: 12.0 - 16.0 g/dL 9.1 (L)   HCT Latest Ref Range: 35.0 - 45.0 % 26.1 (L)   MCV Latest Ref Range: 74.0 - 97.0 FL 92.9   MCH Latest Ref Range: 24.0 - 34.0 PG 32.4   MCHC Latest Ref Range: 31.0 - 37.0 g/dL 34.9   RDW Latest Ref Range: 11.6 - 14.5 % 12.3   PLATELET Latest Ref Range: 135 - 420 K/uL 170   MPV Latest Ref Range: 9.2 - 11.8 FL 9.8   NEUTROPHILS Latest Ref Range: 40 - 73 % 49   LYMPHOCYTES Latest Ref Range: 21 - 52 % 18 (L)   MONOCYTES Latest Ref Range: 3 - 10 % 22 (H)   EOSINOPHILS Latest Ref Range: 0 - 5 % 9 (H)   BASOPHILS Latest Ref Range: 0 - 2 % 2   DF Latest Units:   AUTOMATED ABS. NEUTROPHILS Latest Ref Range: 1.8 - 8.0 K/UL 1.5 (L)   ABS. LYMPHOCYTES Latest Ref Range: 0.9 - 3.6 K/UL 0.5 (L)   ABS. MONOCYTES Latest Ref Range: 0.05 - 1.2 K/UL 0.6   ABS. EOSINOPHILS Latest Ref Range: 0.0 - 0.4 K/UL 0.3   ABS.  BASOPHILS Latest Ref Range: 0.0 - 0.1 K/UL 0.1                                                                             DIET:  [x] Renal    [] Other     [] NPO     []  Diabetic      PRIMARY NURSE REPORT: First initial/Last name/Title      Pre Dialysis: [facility]    Time: 0516      EDUCATION:    [] Patient [x] Other         Knowledge Basis: []None []Minimal [] Substantial   Barriers to learning :  Patient only speaks Angelestu, son had to translate   [x] Access Care     [x] S&S of infection     [x] Fluid Management     []K+     []Procedural    []Albumin     [] Medications     [] Tx Options     [] Transplant     [x] Diet     [] Other   Teaching Tools:  [x] Explain  [] Demo  [] Handouts [] Video  Patient response:   [x] Verbalized understanding  [] Teach back  [] Return demonstration []  Nodded head   Requires follow up  YES   Inappropriate due to  NA      [x] Time Out/Safety Check  [x]Extracorporeal Circuit Tested for integrity       RO/HEMODIALYSIS MACHINE SAFETY CHECKS  Before each treatment:     Machine Number:                   [facility]                                                             [x] Portable Machine # 4  /RO serial # [id number]                                                                                        Alarm Test:  Pass time 1008          [x] RO/Machine Log Complete      Temp   37.0             Dialysate: pH  7.5   Conductivity: Meter   NA     HD Machine   14.0                  TCD: 14.0  Dialyzer Lot # [id number]            Blood Tubing Lot # [id number]          Saline Lot #  [id number]     CHLORINE TESTING-Before each treatment and every 4 hours    Total Chlorine: [x] less than 0.1 ppm  Time: 1000 4 Hr/2nd Check Time: OFF MACHINE (if greater than 0.1 ppm from Primary then every 30 minutes from Secondary)     TREATMENT INITIATION  with Dialysis Precautions:   [x] All Connections Secured                 [x] Saline Line Double Clamped   [x] Venous Parameters Set                  [x] Arterial Parameters Set    [x] Prime Given  250ml                          [x]Air Foam Detector Engaged      Treatment Initiation Note. RN arrived to pts room, pt resting in bed, pt is stable to start tx. Pt in vented and monitored at bedside. Monitor settings are set for Q15min. Pt right CVC accessed for HD tx. Without complications. During Treatment Notes:     Medication Dose Volume Route Initials Dialyzer Cleared: [x] Good [] Fair  [] Poor    Blood processed:  67.6 L  UF Removed  3000 Ml    Post Wt: NA    kg  POst BP:   173/86       Pulse: 85      Respirations: 22  Temperature: 98.0       NA    NA NA NA NA Post Tx Vascular Access: AVF/AVG: Bleeding stopped   N/A                                   Catheter: Locking solution: Heparin 1:1000 Art. 1.9 ML  Wei. 1.9 ML                                   Post Assessment:                                    Skin:  [x] Warm  [] Dry [] Diaphoretic    [] Flushed  [] Pale [] Cyanotic   [facility] Signatures Title Initials  Time Lungs: [] Clear    [x] Course  [] Crackles  [] Wheezing [] Diminished   NA NA NA NA Cardiac: [x] Regular   [] Irregular   [x] Monitor  [] N/A  Rhythm: NSR       Edema:  [] None    [x] General     [] Facial   [] Pedal    [] UE    [] LE       Pain: [x]0  []1  []2   []3  []4   []5   []6   []7   []8   []9   []10         Post Treatment Note:    Patient tolerated HD tx well, no complications noted, fluid removal was 3 liters. Report given to nurse. Son was called per his request and made aware that patient was finished with HD.          POST TREATMENT PRIMARY NURSE HANDOFF REPORT:     First initial/Last name/Title         Post Dialysis: KRISTIN Mar  Time:  1400     Abbreviations: AVG-arterial venous graft, AVF-arterial venous fistula, IJ-Internal Jugular, Subcl-Subclavian, Fem-Femoral, Tx-treatment, AP/HR-apical heart rate, DFR-dialysate flow rate, BFR-blood flow rate, AP-arterial pressure, -venous pressure, UF-ultrafiltrate, TMP-transmembrane pressure, Wei-Venous, Art-Arterial, RO-Reverse Osmosis

## 2018-08-28 NOTE — PROGRESS NOTES
[facility] Hospitalist Group Progress Note Patient: Vivian Griffiths Age: 56 y.o. : 1960 MR#: [id number] SSN: [id number] Date/Time: 2018 Subjective:  
 
 
Family: daughter at bedside. Daughter translated since pt declined to use  phone use. Pt feels fine, AAOX3. Denies any HA, no N/V. Tolerating PO better today Off Cardene drip Assessment/Plan: 1. Hypertensive urgency : back on Cardene drip 2 Left sided weakness - improved with improved BP  
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 
5 H/o NSTEMI 
6 History of seizures 7. Sever N/V ? HTN urgency related, CT A/P neg, better now 8. Dysphagia PLAN  
-agree with minoxidil and titrate for BP control  
- ad/w neurology and eval noted, no need for any further work. transient weakness due to uncontrolled BP 
- HD per nephrology - d/w pt and family at bedside in detail - d/w ICU team  
   
 
Case discussed with:  [x]Patient  [x]Family  [x]Nursing  []Case Management DVT Prophylaxis:  []Lovenox  [x]Hep SQ  []SCDs  []Coumadin   []On Heparin gtt Objective:  
VS:  
Visit Vitals  BP (!) 169/91  Pulse 85  Temp 98.3 °F (36.8 °C)  Resp 19  
 Ht 5' 1\" (1.549 m)  Wt 35 kg (77 lb 2.6 oz)  SpO2 100%  Breastfeeding No  
 BMI 14.58 kg/m2 Tmax/24hrs: Temp (24hrs), Av.5 °F (36.9 °C), Min:98.3 °F (36.8 °C), Max:98.8 °F (37.1 °C) IOBRIEF Intake/Output Summary (Last 24 hours) at 18 1718 Last data filed at 18 7574 Gross per 24 hour Intake           818.91 ml Output                0 ml Net           818.91 ml General: AA, NAD Cardiovascular: S1S2 - regular Pulmonary: B/L clear, No wheezing, no Rales No Rhonchi GI:  + BS in all four quadrants, soft, non-tender Extremities: No edema. Neuro: AAOx3, moves all ext Medications: Current Facility-Administered Medications Medication Dose Route Frequency  minoxidil (LONITEN) tablet 2.5 mg  2.5 mg Oral BID  rifAMPin (RIFADIN) capsule 600 mg  600 mg Oral ACD  niCARdipine (CARDENE) 25 mg in 0.9% sodium chloride 250 mL infusion  0-15 mg/hr IntraVENous TITRATE  aspirin chewable tablet 81 mg  81 mg Oral DAILY  ondansetron (ZOFRAN) injection 6 mg  6 mg IntraVENous Q6H PRN  
 losartan (COZAAR) tablet 100 mg  100 mg Oral DAILY  metoclopramide HCl (REGLAN) injection 5 mg  5 mg IntraVENous BID  melatonin tablet 3 mg  3 mg Oral QHS  acetaminophen (TYLENOL) solution 500 mg  500 mg Oral Q4H PRN  polyethylene glycol (MIRALAX) packet 17 g  17 g Oral DAILY  labetalol (NORMODYNE;TRANDATE) 20 mg/4 mL (5 mg/mL) injection 10 mg  10 mg IntraVENous Q6H PRN  
 isoniazid (NYDRAZID) tablet 300 mg  300 mg Oral DAILY  atorvastatin (LIPITOR) tablet 40 mg  40 mg Oral QHS  bisacodyl (DULCOLAX) tablet 5 mg  5 mg Oral DAILY PRN  
 docusate sodium (COLACE) capsule 100 mg  100 mg Oral BID  ethambutol (MYAMBUTOL) tablet 800 mg  800 mg Oral Q TUE, THU & SAT  pyrazinamide tablet 1,000 mg  1,000 mg Oral Q TUE, THU & SAT  pyridoxine (vitamin B6) (VITAMIN B-6) tablet 100 mg  100 mg Oral DAILY  sodium chloride (NS) flush 5-10 mL  5-10 mL IntraVENous Q8H  
 sodium chloride (NS) flush 5-10 mL  5-10 mL IntraVENous PRN  
 heparin (porcine) injection 5,000 Units  5,000 Units SubCUTAneous Q8H  
 hydrALAZINE (APRESOLINE) tablet 100 mg  100 mg Oral TID  carvedilol (COREG) tablet 25 mg  25 mg Oral Q12H  
 amLODIPine (NORVASC) tablet 10 mg  10 mg Oral DAILY  albuterol (PROVENTIL VENTOLIN) nebulizer solution 2.5 mg  2.5 mg Nebulization Q4H PRN  
 cloNIDine (CATAPRES) 0.3 mg/24 hr patch 1 Patch  1 Patch TransDERmal Q7D Labs:   
Recent Labs  
   08/28/18 
 0406  08/27/18 
 0258  08/26/18 
 0234 WBC  3.5*  2.9*  3.9* HGB  9.4*  9.1*  10.1* HCT  27.4*  26.1*  29.8*  
 PLT  234  170  146 Recent Labs  
   08/28/18 
 0406  08/27/18 
 0258  08/26/18 
 0234 NA  138  134*  138  
K  3.5  3.5  3.7 CL  104  99*  102 CO2  25  29  29 GLU  89  121*  88 BUN  13  29*  11  
CREA  2.36*  3.66*  1.97* CA  9.2  8.0*  9.0 MG  2.4  2.4  2.4 PHOS  2.6  3.2  2.5 ALB  2.7*  2.3*  2.8* SGOT  35  33  52* ALT  16  17  23 Signed By: Serina Michael MD   
 August 28, 2018

## 2018-08-28 NOTE — PROGRESS NOTES
Problem: Dysphagia (Adult) Goal: *Acute Goals and Plan of Care (Insert Text) Patient will: 1. Tolerate PO trials with 0 s/s overt distress in 4/5 trials - MET 8/28/18 2. Utilize compensatory swallow strategies/maneuvers (decrease bite/sip, size/rate, alt. liq/sol) with min cues in 4/5 trials - MET 8/28/18 3. Perform oral-motor/laryngeal exercises to increase oropharyngeal swallow function with min cues 4. Complete an objective swallow study (i.e., MBSS) to assess swallow integrity, r/o aspiration, and determine of safest LRD, min A Recommend:  
Soft solids/ thin liquids 
meds whole per pt preference Aspiration precautions (HOB >30 degrees at all times, Oral care TID) Outcome: Resolved/Met Date Met: 08/28/18 Speech language pathology dysphagia treatment/discharge Patient: Meliza Ornelas (73 y.o. female) Date: 8/28/2018 Diagnosis: Hypertensive emergency <principal problem not specified> Precautions: Aspiration, Fall ASSESSMENT: 
Pt seen for f/u dysphagia management. Per d/w RN, [facility], pt will only eat food brought in from family which is usually soups and rice, which happens without coughing/choking; tolerating meds whole with water. Pt's son reporting pt typically eats soft solid foods, such as rice, soups, and cookies at baseline. Further reporting she does not eat hard or crunchy foods. Per d/w pt's son, pt fed by daughter-in-law, which is baseline for her, unless her  is present. Pt given PO trials of thin liquid via cup sips, puree, and regular texture. No s/sx of aspiration appreciated across consistencies. Oropharyngeal swallow appears WFL. Swallow appears timely, adequate laryngeal elevation noted via palpation, no change in vocal/resp quality appreciated via cervical auscultation. Recommend upgrade diet to dental soft with thin liquids, per pt's son's preference. Pt safe to advance to regular texture per preference.  Pt and family at bedside educated on and verbalized understanding of findings, recs, and POC; d/w RN Nupur Maya. SLP heated up pt's food brought from home prior to leaving the unit. Maximum therapeutic gains met. Safest least restrictive diet achieved in current inpatient setting. Will discharge pt from [address] [place] [address] accordingly. Please reconsult should further concerns arise. Progression toward goals: 
[x]         Improving appropriately and progressing toward goals 
[]         Improving slowly and progressing toward goals 
[]         Not making progress toward goals and plan of care will be adjusted PLAN: 
Recommendations and Planned Interventions: 
See above. No further skilled intervention warranted at this time. Will sign off. Discharge Recommendations: To Be Determined SUBJECTIVE:  
Patient stated Thank you. OBJECTIVE:  
Cognitive and Communication Status: 
Neurologic State: Alert Orientation Level: Oriented X4, Appropriate for age Cognition: Appropriate for age attention/concentration, Follows commands Perception: Appears intact Perseveration: No perseveration noted Safety/Judgement: Fall prevention Dysphagia Treatment: 
Oral Assessment: 
Oral Assessment Labial: No impairment Dentition: Natural, Intact Oral Hygiene: Adequate Lingual: No impairment Velum: No impairment Mandible: No impairment P.O. Trials: 
 Patient Position: 75 at [facility] Vocal quality prior to P.O.: No impairment Consistency Presented: Thin liquid, Puree, Solid How Presented: Straw, Spoon, Other (comment) (Pt's daughter in law fed her) Bolus Acceptance: No impairment Bolus Formation/Control: No impairment Type of Impairment: Mastication (oral holding) Propulsion: No impairment Oral Residue: None Initiation of Swallow: No impairment Laryngeal Elevation: Functional 
 Aspiration Signs/Symptoms: None Pharyngeal Phase Characteristics: No impairment, issues, or problems Effective Modifications: Alternate liquids/solids, Small sips and bites Cues for Modifications: None Oral Phase Severity: No impairment Pharyngeal Phase Severity : No impairment GCODESwallowing:  Swallow Current Status CH= 0% 
 Swallow Goal Status CH= 0% 
 Swallow D/C Status CH= 0% PAIN: 
Pt reports 0/10 pain or discomfort prior to tx. Pt reports 0/10 pain or discomfort post tx. After treatment:  
[]              Patient left in no apparent distress sitting up in chair 
[x]              Patient left in no apparent distress in bed 
[]              Call bell left within reach [x]              Nursing notified 
[x]              Family present 
[]              Bed alarm activated COMMUNICATION/EDUCATION:  
[x]              SLP educated pt with regard to compensatory swallow strategies and 
      aspiration/reflux precautions including: small bites/sips, 
      alternate liquids/solids, decrease feeding rate, HOB > 45 with all po, and 
                 upright in bed at 30 degrees after po for at least 45 
      minutes. Thank you for this referral. 
 
DEVANTE CarrasquilloS., CCC-SLP Speech-Language Pathologist

## 2018-08-28 NOTE — PROGRESS NOTES
NUTRITION Nutrition Screen RECOMMENDATIONS / PLAN:  
 
- Add supplements: Ensure pudding TID. 
- Okay for family to provide food for pt, educational handout on mechanical soft diet left at bedside, no family present during visit to discuss; plan to discuss diet education at follow-up. - Monitor po intake and diet tolerance. - Continue motility agent and bowel regimen 
- Continue RD inpatient monitoring and evaluation. NUTRITION INTERVENTIONS & DIAGNOSIS:  
 
[x] Meals/snacks: modified composition 
[x] Medical food supplement therapy: initiate [x] Nutrition education: plan to provide prior to discharge; mechanical soft diet [x] Collaboration and referral of nutrition care: interdisciplinary rounds Nutrition Diagnosis: Underweight related to prolonged inadequate energy intake as evidenced by pt with BMI of 17 kg/(m^2) upon admission. Food and nutrition related knowledge deficit related to dysphagia mechanical soft diet consistency as evidenced by pt/family with limited knowledge. ASSESSMENT:  
 
8/28: Dysphagia diet started, tube feeding discontinued. Tolerating diet. Family providing food, handout on mechanical soft diet provided at bedside for family, no family present during time of visit. Plan to implement supplements. Noted protein shake/smoothie at bedside. 8/27: Tube feedings stopped and NG tube removed this am. Passed swallow evaluation but remains NPO for ultrasound today. Plan to resume diet later this evening and family planning to provide food. Remains with nausea, but no further episodes of emesis noted. +BM 
8/25: NG tube placed and feeds started, pt with emesis and feeds held at this time. Plan to hold and resume at trickle rate. Reglan and banana bag started today. HD yesterday. 8/24: Pt with nausea/vomiting- unable to tolerate oral medications and chronic constipation- plan for soap suds enema today.  Remains NPO per SLP, altered mentation improved today- follow up swallow evaluation planned and several attempts made by SLP today but pt receiving dialysis and NPO for CT abdomen. IV fluid with dextrose started to prevent hypoglycemia. 8/23: Pt with N/V upon admission and currently. NPO started on phenergan. Planning for HD today. No changes in weight PTA, but remains underweight. SLP recommending NPO with consideration of short term means of nutrition. Average po intake adequate to meet patients estimated nutritional needs:   [] Yes     [x] No   [] Unable to determine at this time Diet: DIET TUBE FEEDING 
DIET RENAL Mechanical Soft Food Allergies: Banana Current Appetite:   [] Good     [] Fair     [] Poor     [x] Other: NPO Appetite/meal intake prior to admission:   [] Good     [] Fair     [] Poor     [x] Other: unknown, pt with N/V PTA Feeding Limitations:  [x] Swallowing difficulty: SLP following    [] Chewing difficulty    [] Other: 
Current Meal Intake: No data found. Anuric- 20 mL urine per day per RN 
BM: 8/25- hard, hx of chronic constipation Skin Integrity: WDL Edema:   [x] No     [] Yes Pertinent Medications: Reviewed: colace, reglan, miralax, Vitamin B-6 Recent Labs  
   08/28/18 
 0406  08/27/18 
 0258  08/26/18 
 0234 NA  138  134*  138  
K  3.5  3.5  3.7 CL  104  99*  102 CO2  25  29  29 GLU  89  121*  88 BUN  13  29*  11  
CREA  2.36*  3.66*  1.97* CA  9.2  8.0*  9.0 MG  2.4  2.4  2.4 PHOS  2.6  3.2  2.5 ALB  2.7*  2.3*  2.8* SGOT  35  33  52* ALT  16  17  23 Intake/Output Summary (Last 24 hours) at 08/28/18 1001 Last data filed at 08/28/18 0600 Gross per 24 hour Intake           364.07 ml Output             3500 ml Net         -3135.93 ml Anthropometrics: 
Ht Readings from Last 1 Encounters:  
08/23/18 5' 1\" (1.549 m) Last 3 Recorded Weights in this Encounter 08/24/18 1900 08/26/18 0536 08/28/18 5856 Weight: 40.8 kg (90 lb) 35.8 kg (78 lb 14.4 oz) 35 kg (77 lb 2.6 oz) Body mass index is 14.58 kg/(m^2). Underweight Weight History: Weight stable x 5 months PTA. Weight Metrics 8/28/2018 8/4/2018 7/20/2018 6/27/2018 6/26/2018 5/31/2018 3/9/2018 Weight 77 lb 2.6 oz 90 lb 90 lb 6.2 oz 90 lb 6.2 oz 85 lb 8.6 oz 90 lb 6.2 oz 84 lb 3.2 oz BMI 14.58 kg/m2 17.01 kg/m2 17.08 kg/m2 17.08 kg/m2 16.16 kg/m2 17.08 kg/m2 15.91 kg/m2 Admitting Diagnosis: Hypertensive emergency Pertinent PMHx: ESRD on HD, HTN, vitamin D deficiency Education Needs:        [] None identified  [x] Identified - Not appropriate at this time  []  Identified and addressed - refer to education log Learning Limitations:   [] None identified  [x] Identified: language barrier Cultural, Taoist & ethnic food preferences:  [x] None identified    [] Identified and addressed ESTIMATED NUTRITION NEEDS:  
 
Calories: 2111-2602 kcal (30-35 kcal/kg) based on  [] Actual BW      [x] SBW 56 kg Protein: 67-84 gm (1.2-1.5 gm/kg) based on  [] Actual BW      [x] SBW Fluid: 9343-0696 mL MONITORING & EVALUATION:  
 
Nutrition Goal(s): 1. Nutritional needs will be met through adequate oral intake or nutrition support within the next 7 days. Outcome:  [] Met/Ongoing    [x]  Not Met/Progressing    [] New/Initial Goal 
2. Po intake of meals will meet >75% of patient estimated nutritional needs within the next 7 days. Outcome:  [] Met/Ongoing    [x]  Not Met    [] New/Initial Goal 
3. Patient/family will increase knowledge of appropriate food choices on a dysphagia mechanical soft diet within 7 days. Outcome:  [] Met    []  Not Met    [x] New/Initial Goal  
  
 
Monitoring:   [x] Food and beverage intake   [x] Diet order   [x] Nutrition-focused physical findings   [x] Treatment/therapy   [] Weight   [] Enteral nutrition intake Previous Recommendations (for follow-up assessments only):     [] Implemented       [x]   Not Implemented (RD to address)      [] No Longer Appropriate     [] No Recommendation Made Discharge Planning: renal diet; consistency per SLP [x] Participated in care planning, discharge planning, & interdisciplinary rounds as appropriate Mehdi Villegas RD Pager: [phone]

## 2018-08-28 NOTE — ROUTINE PROCESS
pts family here this am, pt is only drinking water and food brought in by the family, they are aware pt is to have soft foods

## 2018-08-28 NOTE — PROGRESS NOTES
conducted a Follow up consultation and Spiritual Assessment for Karyn Seay, who is a 56 y.o.,female. The  provided the following Interventions: 
Continued the relationship of care and support. Patient speaks [place] only; fortunately, daughter was at bedside. Patient was asked if she needed anything. Patient requested and received a rosary. Offered assurance of continued prayer for patient. Chart reviewed. The following outcomes were achieved: 
Patient and daughter expressed gratitude for pastoral care visit. Assessment: 
There are no further spiritual or Latter day issues which require Spiritual Care Services interventions at this time. Plan: 
Chaplains will continue to follow and provide pastoral care as needed or requested.  recommends bedside caregivers page  on duty if patient shows signs of acute spiritual or emotional distress. The . [facility] [address] Spiritual Care Services [address] [facility] [phone] / [facility] [phone]

## 2018-08-28 NOTE — ROUTINE PROCESS
Bedside, Verbal and Written shift change report given to Zoe Huerta (oncoming nurse) by Sandi Miranda RN 
 (offgoing nurse). Report included the following information SBAR, Kardex, Intake/Output, MAR and Recent Results.

## 2018-08-28 NOTE — PROGRESS NOTES
Chart reviewed by . The Rev. Citlaly Logan [address] Spiritual Care Services [address] [facility] [phone] / [facility] [phone]

## 2018-08-28 NOTE — PROGRESS NOTES
Problem: Mobility Impaired (Adult and Pediatric) Goal: *Acute Goals and Plan of Care (Insert Text) Physical Therapy Goals Initiated 8/27/2018 and to be accomplished within 7 day(s) 1. Patient will move from supine to sit and sit to supine , scoot up and down and roll side to side in bed with supervision/set-up. 2.  Patient will transfer from bed to chair and chair to bed with minimal assistance/contact guard assist using the least restrictive device. 3.  Patient will perform sit to stand with supervision/set-up. 4.  Patient will ambulate with minimal assistance/contact guard assist for >50 feet with the least restrictive device. 5.  Patient will ascend/descend 3 stairs with 1-2 handrail(s) with minimal assistance/contact guard assist.  
Outcome: Progressing Towards Goal 
physical Therapy TREATMENT Patient: Yamila Mahajan (24 y.o. female) Date: 8/28/2018 Diagnosis: Hypertensive emergency <principal problem not specified> Precautions: Fall Chart, physical therapy assessment, plan of care and goals were reviewed. OBJECTIVE/ASSESSMENT: 
Patient presented today semi-reclined in bed, sleeping but easily roused to gentle tactile stimulation. Patient cleared by nurse for sitting EOB, asked to hold standing at this time d/t Cardene drip. Patient transferred to sitting EOB with Tati, demonstrated improved static sitting balance this session. Patient's seated /89. Patient completed seated LAQ with good/fair balance, then began to return to supine. Patient assisted to scoot to [facility], left resting in bed with call bell in reach, needs met, and HOB at 30 degrees. Education: bed mobility, therex, body mechanics -- patient demonstrated understanding Progression toward goals: 
[]      Improving appropriately and progressing toward goals [x]      Improving slowly and progressing toward goals 
[]      Not making progress toward goals and plan of care will be adjusted PLAN: 
 Patient continues to benefit from skilled intervention to address the above impairments. Continue treatment per established plan of care. Discharge Recommendations:  Jesu Huuel Further Equipment Recommendations for Discharge:  N/A  
 
SUBJECTIVE:  
Patient stated Thank you.  OBJECTIVE DATA SUMMARY:  
Critical Behavior: 
Neurologic State: Alert Orientation Level: Oriented X4 Cognition: Follows commands Safety/Judgement: Fall prevention Functional Mobility Training: 
Bed Mobility: 
Supine to Sit: Minimum assistance Sit to Supine: Contact guard assistance Scooting: Minimum assistance Transfers: 
Sit to Stand:  (Held per nurse) Balance: 
Sitting: Impaired Sitting - Static: Good (unsupported) Sitting - Dynamic: Fair (occasional) Standing:  (Held per nurse) Ambulation/Gait Training: N/A Therapeutic Exercises:  
 
 
EXERCISE Sets Reps Active Active Assist  
Passive Self ROM Comments Ankle Pumps    [] [] [] [] Quad Sets/Glut Sets   [] [] [] [] Hamstring Sets   [] [] [] [] Short Arc Quads   [] [] [] [] Heel Slides   [] [] [] [] Straight Leg Raises   [] [] [] [] Hip Abd/Add   [] [] [] [] Long Arc Quads 1 10 [x] [] [] [] BLE Seated Marching   [] [] [] []   
Standing Marching   [] [] [] []   
   [] [] [] []   
 
Pain: 
Pre session: 0/10 Post session: 0/10 Activity Tolerance:  
Fair Please refer to the flowsheet for vital signs taken during this treatment. After treatment:  
[] Patient left in no apparent distress sitting up in chair 
[x] Patient left in no apparent distress in bed 
[x] Call bell left within reach [x] Nursing notified Pino Holman) 
[] Caregiver present 
[] Bed alarm activated Aayush Mon Time Calculation: 9 mins Mobility [id number] Current  CJ= 20-39%   Goal  CJ= 20-39%. The severity rating is based on the Level of Assistance required for Functional Mobility and ADLs.

## 2018-08-28 NOTE — PROGRESS NOTES
RENAL DAILY PROGRESS NOTE Patient: Josse Bolden               Sex: female          DOA: 8/22/2018 11:25 AM  
    
YOB: 1960      Age:  56 y.o.        LOS:  LOS: 6 days Subjective:  
 
Josse Bolden is a 56 y.o.  who presents with Hypertensive emergency. Asked to evaluate for esrd,admitted with hypertensive emergency. hx of htn ,tb.no vomiting this morning Chief complains: Patient denies nausea, vomiting, chest pain, dizziness, shortness of breath or headache. 
- Reviewed last 24 hrs events Current Facility-Administered Medications Medication Dose Route Frequency  minoxidil (LONITEN) tablet 2.5 mg  2.5 mg Oral DAILY  rifAMPin (RIFADIN) capsule 600 mg  600 mg Oral ACD  niCARdipine (CARDENE) 25 mg in 0.9% sodium chloride 250 mL infusion  0-15 mg/hr IntraVENous TITRATE  aspirin chewable tablet 81 mg  81 mg Oral DAILY  ondansetron (ZOFRAN) injection 6 mg  6 mg IntraVENous Q6H PRN  
 losartan (COZAAR) tablet 100 mg  100 mg Oral DAILY  metoclopramide HCl (REGLAN) injection 5 mg  5 mg IntraVENous BID  melatonin tablet 3 mg  3 mg Oral QHS  acetaminophen (TYLENOL) solution 500 mg  500 mg Oral Q4H PRN  polyethylene glycol (MIRALAX) packet 17 g  17 g Oral DAILY  labetalol (NORMODYNE;TRANDATE) 20 mg/4 mL (5 mg/mL) injection 10 mg  10 mg IntraVENous Q6H PRN  
 isoniazid (NYDRAZID) tablet 300 mg  300 mg Oral DAILY  atorvastatin (LIPITOR) tablet 40 mg  40 mg Oral QHS  bisacodyl (DULCOLAX) tablet 5 mg  5 mg Oral DAILY PRN  
 docusate sodium (COLACE) capsule 100 mg  100 mg Oral BID  ethambutol (MYAMBUTOL) tablet 800 mg  800 mg Oral Q TUE, THU & SAT  pyrazinamide tablet 1,000 mg  1,000 mg Oral Q TUE, THU & SAT  pyridoxine (vitamin B6) (VITAMIN B-6) tablet 100 mg  100 mg Oral DAILY  sodium chloride (NS) flush 5-10 mL  5-10 mL IntraVENous Q8H  
 sodium chloride (NS) flush 5-10 mL  5-10 mL IntraVENous PRN  
  heparin (porcine) injection 5,000 Units  5,000 Units SubCUTAneous Q8H  
 hydrALAZINE (APRESOLINE) tablet 100 mg  100 mg Oral TID  carvedilol (COREG) tablet 25 mg  25 mg Oral Q12H  
 amLODIPine (NORVASC) tablet 10 mg  10 mg Oral DAILY  albuterol (PROVENTIL VENTOLIN) nebulizer solution 2.5 mg  2.5 mg Nebulization Q4H PRN  
 cloNIDine (CATAPRES) 0.3 mg/24 hr patch 1 Patch  1 Patch TransDERmal Q7D Objective:  
 
Visit Vitals  /82  Pulse 94  Temp 98.8 °F (37.1 °C)  Resp 19  
 Ht 5' 1\" (1.549 m)  Wt 35 kg (77 lb 2.6 oz)  SpO2 97%  Breastfeeding No  
 BMI 14.58 kg/m2 Intake/Output Summary (Last 24 hours) at 08/28/18 1028 Last data filed at 08/28/18 0600 Gross per 24 hour Intake           364.07 ml Output             3500 ml Net         -3135.93 ml Physical Examination:  
 
GEN: Alert RS: Chest is bilateral equal, no wheezing / rales / crackles CVS: S1-S2 heard, RRR, No S3 / murmur Abdomen: Soft, Non tender, Not distended, Positive bowel sounds, no organomegaly, no CVA / supra pubic tenderness Extremities: No edema, no cyanosis, skin is warm on touch CNS: Awake HEENT: Head is atraumatic, PERRLA, conjunctiva pink & non icteric. No JVD or carotid bruit Musculoskeletal: No gross joints or bone deformities Lymph Node: No palpable cervical, axillary or groin lymphadenopathy. Data Review:   
 
Labs:  
 
Hematology:  
Recent Labs  
   08/28/18 
 0406  08/27/18 
 7137  08/26/18 
 0234 WBC  3.5*  2.9*  3.9* HGB  9.4*  9.1*  10.1* HCT  27.4*  26.1*  29.8* Chemistry:  
Recent Labs  
   08/28/18 
 0406  08/27/18 
 2366  08/26/18 
 0234 BUN  13  29*  11  
CREA  2.36*  3.66*  1.97* CA  9.2  8.0*  9.0 ALB  2.7*  2.3*  2.8*  
K  3.5  3.5  3.7 NA  138  134*  138 CL  104  99*  102 CO2  25  29  29 PHOS  2.6  3.2  2.5 GLU  89  121*  88 Images: 
 
XR (Most Recent).  CXR reviewed by me and compared with previous CXR  
 [place] [facility] [place]18 XR ABD PORT  1 V Narrative ABDOMINAL RADIOGRAPH-SINGLE SUPINE VIEW INDICATION: NG tube placement COMPARISON: Abdomen x-ray 8/23/2018 FINDINGS: Enteric tube tip projects at the distal stomach. Nonobstructed bowel 
gas pattern. No evidence for pneumoperitoneum. No evidence for nephrolithiasis. No acute osseous abnormality. Degenerative changes noted at the sacroiliac 
joints lower lumbar spine Impression IMPRESSION: 
Enteric tube tip projects at the distal stomach. Candance Huger CT (Most Recent) [place] [facility] [place]22[place]18 CT ABD PELV W CONT Narrative CT ABDOMEN AND PELVIS WITH CONTRAST 
 
COMPARISON: August 22, 2018. INDICATIONS: Vomiting. TECHNIQUE:  Following the uneventful administration of 80 cc of Isovue 300 
intravenous contrast , volumetric data acquisition was performed of the abdomen 
and pelvis on a multislice scanner and reconstructed in axial coronal and 
sagittal planes CT ABDOMEN FINDINGS:  
 
Lung Bases: There is a large right and minimal left pleural effusion. Bibasilar 
atelectasis is evident. There is multichamber cardiac enlargement. There is a 
moderate size pericardial effusion. This ranges up to 2.5 cm in thickness but 
probably averages less than 1 cm. Candance Huger Liver/Gallbladder/Biliary: Normal. 
 
Spleen: Scattered small granulomas noted. Otherwise negative. Adrenal Glands: Normal. 
 
Kidneys: Atrophic with a left-sided cysts unchanged. Candance Huger Pancreas: Normal. 
 
Stomach, Small Bowel,  and Colon: A nasogastric tube is present extending into 
the stomach which is otherwise unremarkable. The small bowel is not distended. The appendix appears to be present unremarkable. The colon is unremarkable. Lymph Nodes: Normal in size and number. The abdominal aorta is unremarkable. The IVC is unremarkable. Peritoneal Spaces: Small volume peritoneal fluid is evident Abdominal wall: No hernia or mass is evident. Subcutaneous edema is evident. CT PELVIS FINDINGS:  
 
Bladder: Empty. Pelvic Small Bowel And Colon: Unremarkable. Lymph Nodes: Normal in size and number. Free Fluid: Small volume pelvic fluid noted Uterus and adnexal structures appear unremarkable Osseous Structures Of Abdomen And Pelvis: Unremarkable for age. Impression IMPRESSION:  
 
Anasarca with edematous soft tissues, pleural fluid, ascites, pericardial fluid, 
and multichamber cardiac enlargement. Findings indicating a probable cause for recurrent vomiting are not identified. All CT scans at this facility are performed using dose optimization technique as 
appropriate to the performed exam, to include automated exposure control, 
adjustment of the mA and/or kV according to patient's size (Including 
appropriate matching for site-specific examinations), or use of iterative 
reconstruction technique. EKG No results found for this or any previous visit. I have personally reviewed the old medical records and patient's previously known baseline  creatinine Plan / Recommendation: 1. Esrd,continue dialysis mon wed Friday. hx obtained from son in law 2.htn,under better control,add minoxidil yesterday. wean off iv cardene 3.anemia,holding epo D/w icu staff Sandy Salamanca MD 
Nephrology 8/28/2018

## 2018-08-28 NOTE — ROUTINE PROCESS
Still on Cardene increased rate this am to 4mg/hr due to BP still go up to 112'B systolic. No c/o noted this shift. Patient well rested last night.

## 2018-08-28 NOTE — PROGRESS NOTES
Discussed bp with Dr Eric Rueda bp 899 systolic per Dr Eric Rueda and Dr Ruth Taveras discussion hold off on restarting cardene drip pt had monoxidil increased

## 2018-08-28 NOTE — PROGRESS NOTES
Bedside and Verbal shift change report given to Maday (oncoming nurse) by Keagan Craig (offgoing nurse). Report included the following information SBAR, Kardex and MAR.

## 2018-08-29 NOTE — DIALYSIS
ACUTE HEMODIALYSIS FLOW SHEET 
 
HEMODIALYSIS ORDERS: Physician: Ashley Valadez Dialyzer: revaclear   Duration: 3.5  hr  BFR: 350   DFR: 800 Dialysate:  Temp 37.0  K+   3.0    Ca+  2.5   Na 138   Bicarb 30 Weight:  43.9  kg    Patient Chart []     Unable to Obtain []   Dry weight/UF Goal: 3000 ml Access right chest CVC Needle Gauge NA Heparin []  Bolus      Units    [] Hourly       Units    [x]None Catheter locking solution :  1.9 ml  Heparin Pre BP:   161/85    Pulse:     84     Temperature:   97.6    Respirations: 22  Tx:   Ml/Bolus given x 2 Labs: Pre        Post:        [x] N/A Additional Orders(medications, blood products, hypotension management): NA  
 
[x] Hawk Consent Verified CATHETER ACCESS: []N/A   [x]Right  Chest  CVC   []Left   []IJ     []Fem [] First use X-ray verified     [x]Tunnel                [] Non Tunneled [x]No S/S infection  []Redness  []Drainage []Cultured []Swelling []Pain  
[x]Medical Aseptic Prep Utilized   [x]Dressing Changed   08/29/18   [x] Biopatch  Date: 08/22/18 []Clotted   [x]Patent   Flows: [x]Good  []Poor  []Reversed If access problem,  notified: []Yes    [x]N/A   
 
GRAFT/FISTULA ACCESS:  [x]N/A     []Right     []Left     []UE     []LE []AVG   []AVF        []Buttonhole    []Medical Aseptic Prep Utilized []No S/S infection  []Redness  []Drainage []Cultured []Swelling []Pain Bruit:   [] Strong    [] Weak       Thrill :   [] Strong    [] Weak Needle Gauge: NA   Length: NA If access problem,  notified: []Yes     [x]N/A Please describe access if present and not used:NA GENERAL ASSESSMENT:   
LUNGS:  Rate 22   SaO2%        [x] N/A    [x] Clear  [] Coarse  [] Crackles  [] Wheezing 
      [] Diminished     Location : []RLL   []LLL    []RUL  []ELÍAS Cough: []Productive  []Dry  [x]N/A   Respirations:  [x]Easy  []Labored Therapy:  [x]RA  []NC  l/min    Mask: []NRB []Venti       O2% []Ventilator  []Intubated  [] Trach  [] BiPaP CARDIAC: [x]Regular      [] Irregular   [] Pericardial Rub  [] JVD [x]  Monitored  [x] Bedside  [] Remotely monitored [] N/A  Rhythm: NSR  
EDEMA: [x] None  []Generalized  [] Pitting [] 1    [] 2    [] 3    [] 4 [] Facial  [] Pedal  []  UE  [] LE  
SKIN:   [x] Warm  [] Hot     [] Cold   [x] Dry     [] Pale   [] Diaphoretic    
             [] Flushed  [] Jaundiced  [] Cyanotic  [] Rash  [] Weeping LOC:    [x] Alert      [x]Oriented:    [x] Person     [x] Place  [x]Time 
             [] Confused  [] Lethargic  [] Medicated  [] Non-responsive GI / ABDOMEN:  [x] Flat    [] Distended    [x] Soft    [] Firm   []  Obese 
                           [] Diarrhea  [x] Bowel Sounds  [] Nausea  [] Vomiting  / URINE ASSESSMENT:[] Voiding   [x] Oliguria  [] Anuria   []  Onofre [] Incontinent    []  Incontinent Brief      [x]  Bathroom Privileges PAIN: [x] 0 []1  []2   []3   []4   []5   []6   []7   []8   []9   []10 Scale 0-10  Action/Follow Up: NA MOBILITY:  [] Amb    [] Amb/Assist    [x] Bed    [] Wheelchair  [] Stretcher All Vitals and Treatment Details on Attached Flowsheet Hospital:  [facility] Room # [id number] [phone] [id number] [] 1st Time Acute  [] Stat  [x] Routine  [] Urgent    
[] Acute Room  [x]  Bedside  [x] ICU/CCU  [] ER Isolation Precautions:  [x] Dialysis   [] Airborne   [] Contact    [] Reverse Special Considerations:         [] Blood Consent Verified [x]N/A ALLERGIES:   
 Reaction Severity Reaction Type Noted Valid Until Updated Allergies Banana Other (comments) Code Status:  [x] Full Code HBsAg ONLY: Date Drawn   08/27/2018         [x]Negative []Positive []Unknown HBsAb: Date 08/27/2018    [] Susceptible   [x] [id number] []Not Drawn  [] Drawn Current Labs:    Date of Labs: 08/29/18        Today [x] Results for Girtha Closs (MRN [id number]) as of 8/29/2018 12:16 Ref. Range 8/29/2018 02:00 WBC Latest Ref Range: 4.6 - 13.2 K/uL 2.2 (L) RBC Latest Ref Range: 4.20 - 5.30 M/uL 2.37 (L) HGB Latest Ref Range: 12.0 - 16.0 g/dL 7.7 (L) HCT Latest Ref Range: 35.0 - 45.0 % 21.8 (L) MCV Latest Ref Range: 74.0 - 97.0 FL 92.0 MCH Latest Ref Range: 24.0 - 34.0 PG 32.5 MCHC Latest Ref Range: 31.0 - 37.0 g/dL 35.3 RDW Latest Ref Range: 11.6 - 14.5 % 12.4 PLATELET Latest Ref Range: 135 - 420 K/uL 207 MPV Latest Ref Range: 9.2 - 11.8 FL 9.6 NEUTROPHILS Latest Ref Range: 40 - 73 % 46 LYMPHOCYTES Latest Ref Range: 21 - 52 % 21 MONOCYTES Latest Ref Range: 3 - 10 % 24 (H) EOSINOPHILS Latest Ref Range: 0 - 5 % 7 (H) BASOPHILS Latest Ref Range: 0 - 2 % 2 DF Latest Units:   AUTOMATED  
ABS. NEUTROPHILS Latest Ref Range: 1.8 - 8.0 K/UL 1.0 (L)  
ABS. LYMPHOCYTES Latest Ref Range: 0.9 - 3.6 K/UL 0.5 (L)  
ABS. MONOCYTES Latest Ref Range: 0.05 - 1.2 K/UL 0.5  
ABS. EOSINOPHILS Latest Ref Range: 0.0 - 0.4 K/UL 0.2  
ABS. BASOPHILS Latest Ref Range: 0.0 - 0.1 K/UL 0.1 Results for Girtha Closs (MRN [id number]) as of 8/29/2018 12:16 Ref. Range 8/29/2018 02:00 Sodium Latest Ref Range: 136 - 145 mmol/L 139 Potassium Latest Ref Range: 3.5 - 5.5 mmol/L 4.2 Chloride Latest Ref Range: 100 - 108 mmol/L 106 CO2 Latest Ref Range: 21 - 32 mmol/L 28 Anion gap Latest Ref Range: 3.0 - 18 mmol/L 5 Glucose Latest Ref Range: 74 - 99 mg/dL 106 (H) BUN Latest Ref Range: 7.0 - 18 MG/DL 24 (H) Creatinine Latest Ref Range: 0.6 - 1.3 MG/DL 3.96 (H) BUN/Creatinine ratio Latest Ref Range: 12 - 20   6 (L) Calcium Latest Ref Range: 8.5 - 10.1 MG/DL 8.6 Phosphorus Latest Ref Range: 2.5 - 4.9 MG/DL 3.1 Magnesium Latest Ref Range: 1.6 - 2.6 mg/dL 2.5  
GFR est non-AA Latest Ref Range: >60 ml/min/1.73m2 12 (L)  
GFR est AA Latest Ref Range: >60 ml/min/1.73m2 14 (L) Bilirubin, total Latest Ref Range: 0.2 - 1.0 MG/DL 0.3 Protein, total Latest Ref Range: 6.4 - 8.2 g/dL 5.2 (L) Albumin Latest Ref Range: 3.4 - 5.0 g/dL 2.3 (L) Globulin Latest Ref Range: 2.0 - 4.0 g/dL 2.9 A-G Ratio Latest Ref Range: 0.8 - 1.7   0.8 ALT (SGPT) Latest Ref Range: 13 - 56 U/L 10 (L) AST Latest Ref Range: 15 - 37 U/L 23 Alk. phosphatase Latest Ref Range: 45 - 117 U/L 102 DIET:  [x] Renal    [] Other     [] NPO     []  Diabetic PRIMARY NURSE REPORT: First initial/Last name/Title Pre Dialysis: KRISTIN Galvan   Time: 3242 EDUCATION:   
[x] Patient [] Other         Knowledge Basis: []None [x]Minimal [] Substantial  
Barriers to learning Patient only speaks Angelestu, family have to translate for her. [x] Access Care     [x] S&S of infection     [x] Fluid Management     []K+     []Procedural   
[]Albumin     [] Medications     [] Tx Options     [] Transplant     [x] Diet     [] Other Teaching Tools:  [x] Explain  [] Demo  [] Handouts [] Video Patient response:   [x] Verbalized understanding /  Nods head \"YES\"  [] Teach back  [] Return demonstration [] Requires follow up  NA Inappropriate due to NA [x] Time Out/Safety Check  [x]  Extracorporeal Circuit Tested for integrity RO/HEMODIALYSIS MACHINE SAFETY CHECKS  Before each treatment:    
Machine Number:                   [facility]  [x] Portable Machine #  4  RO serial # [id number] Alarm Test:  Pass time 8000 [x]   RO/Machine Log Complete Temp    37.0 Dialysate: pH  7.4   Conductivity: Meter   NA     HD Machine [x][x][x][x][x][x][x][x][x][x][x][x][x][x][x][x][x][x][x][x][x][x][x][x][x][x][x][x][x][x][x][x][x][x][x][x][x][x][x][x][x][x][x][x][x][x][x][x][x][x][x][x][x][x][x][x][x][x]

## 2018-08-29 NOTE — ROUTINE PROCESS
Bedside, Verbal and Written shift change report given to Monse Levine (oncoming nurse) by Danielle Kang RN 
 (offgoing nurse). Report included the following information SBAR, Kardex, Procedure Summary, Intake/Output, MAR and Recent Results.

## 2018-08-29 NOTE — PROGRESS NOTES
[facility] Hospitalist Group Progress Note Patient: Samson Tracy Age: 56 y.o. : 1960 MR#: [id number] SSN: [id number] Date/Time: 2018 Subjective:  
 
 
Family: daughter at bedside. Daughter translated since pt declined to use  phone use. Pt feels fine, AAOX3. Denies any HA, no N/V.  
/72 post HD Assessment/Plan: 1. Hypertensive urgency off Cardene drip 2 Left sided weakness - improved with improved BP  
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 
5 H/o NSTEMI 
6 History of seizures 7. Sever N/V ? HTN urgency related, CT A/P neg, better now 8. Dysphagia PLAN Cont minoxidil, BP well controlled now, d/w RN to hold PM minoxidil and monitor BP  
- HD per nephrology - d/w pt and family at bedside in detail  
- ok for tele transfer PT/OT recommended SNF but pt and family decline SNF Will plan d/c hoem with C in am if BP stable Case discussed with:  [x]Patient  [x]Family  [x]Nursing  []Case Management DVT Prophylaxis:  []Lovenox  [x]Hep SQ  []SCDs  []Coumadin   []On Heparin gtt Objective:  
VS:  
Visit Vitals  BP (!) 165/100  Pulse 99  Temp 97.8 °F (36.6 °C) (Oral)  Resp (!) 32  
 Ht 5' 1\" (1.549 m)  Wt 43.9 kg (96 lb 12.8 oz)  SpO2 100%  Breastfeeding No  
 BMI 18.29 kg/m2 Tmax/24hrs: Temp (24hrs), Av °F (36.7 °C), Min:97.6 °F (36.4 °C), Max:98.4 °F (36.9 °C) IOBRIEF Intake/Output Summary (Last 24 hours) at 18 1537 Last data filed at 18 1400 Gross per 24 hour Intake              400 ml Output             2500 ml Net            -2100 ml General: AA, NAD Cardiovascular: S1S2 - regular Pulmonary: B/L clear, No wheezing, no Rales No Rhonchi GI:  + BS in all four quadrants, soft, non-tender Extremities: No edema. Neuro: AAOx3, moves all ext Medications: Current Facility-Administered Medications Medication Dose Route Frequency  rifAMPin (RIFADIN) capsule 600 mg  600 mg Oral Q24H  
 minoxidil (LONITEN) tablet 2.5 mg  2.5 mg Oral BID  niCARdipine (CARDENE) 25 mg in 0.9% sodium chloride 250 mL infusion  0-15 mg/hr IntraVENous TITRATE  aspirin chewable tablet 81 mg  81 mg Oral DAILY  ondansetron (ZOFRAN) injection 6 mg  6 mg IntraVENous Q6H PRN  
 losartan (COZAAR) tablet 100 mg  100 mg Oral DAILY  metoclopramide HCl (REGLAN) injection 5 mg  5 mg IntraVENous BID  melatonin tablet 3 mg  3 mg Oral QHS  acetaminophen (TYLENOL) solution 500 mg  500 mg Oral Q4H PRN  polyethylene glycol (MIRALAX) packet 17 g  17 g Oral DAILY  labetalol (NORMODYNE;TRANDATE) 20 mg/4 mL (5 mg/mL) injection 10 mg  10 mg IntraVENous Q6H PRN  
 isoniazid (NYDRAZID) tablet 300 mg  300 mg Oral DAILY  atorvastatin (LIPITOR) tablet 40 mg  40 mg Oral QHS  bisacodyl (DULCOLAX) tablet 5 mg  5 mg Oral DAILY PRN  
 docusate sodium (COLACE) capsule 100 mg  100 mg Oral BID  ethambutol (MYAMBUTOL) tablet 800 mg  800 mg Oral Q TUE, THU & SAT  pyrazinamide tablet 1,000 mg  1,000 mg Oral Q TUE, THU & SAT  pyridoxine (vitamin B6) (VITAMIN B-6) tablet 100 mg  100 mg Oral DAILY  sodium chloride (NS) flush 5-10 mL  5-10 mL IntraVENous Q8H  
 sodium chloride (NS) flush 5-10 mL  5-10 mL IntraVENous PRN  
 heparin (porcine) injection 5,000 Units  5,000 Units SubCUTAneous Q8H  
 hydrALAZINE (APRESOLINE) tablet 100 mg  100 mg Oral TID  carvedilol (COREG) tablet 25 mg  25 mg Oral Q12H  
 amLODIPine (NORVASC) tablet 10 mg  10 mg Oral DAILY  albuterol (PROVENTIL VENTOLIN) nebulizer solution 2.5 mg  2.5 mg Nebulization Q4H PRN  
 cloNIDine (CATAPRES) 0.3 mg/24 hr patch 1 Patch  1 Patch TransDERmal Q7D Labs:   
Recent Labs  
   08/29/18 
 0200  08/28/18 
 0406  08/27/18 
 0258 WBC  2.2*  3.5*  2.9* HGB  7.7*  9.4*  9.1*  
HCT  21.8*  27.4*  26.1*  
 PLT  207  234  170 Recent Labs  
   08/29/18 
 0200  08/28/18 
 0406  08/27/18 
 0258 NA  139  138  134* K  4.2  3.5  3.5 CL  106  104  99* CO2  28  25  29 GLU  106*  89  121* BUN  24*  13  29* CREA  3.96*  2.36*  3.66* CA  8.6  9.2  8.0*  
MG  2.5  2.4  2.4 PHOS  3.1  2.6  3.2 ALB  2.3*  2.7*  2.3*  
SGOT  23  35  33 ALT  10*  16  17 Signed By: Shelbi Olivas MD   
 August 29, 2018

## 2018-08-29 NOTE — ROUTINE PROCESS
Patient's BP under controlled by current BP regimen. Can be downgraded if not d/c home. Had a quiet night,voice out no c/o.

## 2018-08-29 NOTE — PROGRESS NOTES
HEMODIALYSIS ROUNDING NOTE Patient: Mirlande Berumen               Sex: female          DOA: 8/22/2018 11:25 AM  
    
YOB: 1960      Age:  56 y.o.        LOS:  LOS: 7 days Subjective:  
 
Mirlande Berumen is a 56 y.o.  who presents with Hypertensive emergency. The patient is dialyzing utilizing the following method:Intermittent Hemodialysis Chief Complains: Patient was seen on dialysis, denies nausea / vomiting / headache / dizziness / SOB / chest pain. discussed with son in law - Reviewed last 24 hrs events Current Facility-Administered Medications Medication Dose Route Frequency  rifAMPin (RIFADIN) capsule 600 mg  600 mg Oral Q24H  
 minoxidil (LONITEN) tablet 2.5 mg  2.5 mg Oral BID  niCARdipine (CARDENE) 25 mg in 0.9% sodium chloride 250 mL infusion  0-15 mg/hr IntraVENous TITRATE  aspirin chewable tablet 81 mg  81 mg Oral DAILY  ondansetron (ZOFRAN) injection 6 mg  6 mg IntraVENous Q6H PRN  
 losartan (COZAAR) tablet 100 mg  100 mg Oral DAILY  metoclopramide HCl (REGLAN) injection 5 mg  5 mg IntraVENous BID  melatonin tablet 3 mg  3 mg Oral QHS  acetaminophen (TYLENOL) solution 500 mg  500 mg Oral Q4H PRN  polyethylene glycol (MIRALAX) packet 17 g  17 g Oral DAILY  labetalol (NORMODYNE;TRANDATE) 20 mg/4 mL (5 mg/mL) injection 10 mg  10 mg IntraVENous Q6H PRN  
 isoniazid (NYDRAZID) tablet 300 mg  300 mg Oral DAILY  atorvastatin (LIPITOR) tablet 40 mg  40 mg Oral QHS  bisacodyl (DULCOLAX) tablet 5 mg  5 mg Oral DAILY PRN  
 docusate sodium (COLACE) capsule 100 mg  100 mg Oral BID  ethambutol (MYAMBUTOL) tablet 800 mg  800 mg Oral Q TUE, THU & SAT  pyrazinamide tablet 1,000 mg  1,000 mg Oral Q TUE, THU & SAT  pyridoxine (vitamin B6) (VITAMIN B-6) tablet 100 mg  100 mg Oral DAILY  sodium chloride (NS) flush 5-10 mL  5-10 mL IntraVENous Q8H  
  sodium chloride (NS) flush 5-10 mL  5-10 mL IntraVENous PRN  
 heparin (porcine) injection 5,000 Units  5,000 Units SubCUTAneous Q8H  
 hydrALAZINE (APRESOLINE) tablet 100 mg  100 mg Oral TID  carvedilol (COREG) tablet 25 mg  25 mg Oral Q12H  
 amLODIPine (NORVASC) tablet 10 mg  10 mg Oral DAILY  albuterol (PROVENTIL VENTOLIN) nebulizer solution 2.5 mg  2.5 mg Nebulization Q4H PRN  
 cloNIDine (CATAPRES) 0.3 mg/24 hr patch 1 Patch  1 Patch TransDERmal Q7D Objective:  
 
Visit Vitals  BP (!) 158/94  Pulse 84  Temp 98.4 °F (36.9 °C)  Resp 24  
 Ht 5' 1\" (1.549 m)  Wt 43.9 kg (96 lb 12.8 oz)  SpO2 100%  Breastfeeding No  
 BMI 18.29 kg/m2 Intake/Output Summary (Last 24 hours) at 08/29/18 1125 Last data filed at 08/29/18 7920 Gross per 24 hour Intake              802 ml Output                0 ml Net              802 ml Physical Examination: 
 
GEN: AAO X 3, NAD 
RS: Chest is bilateral equal, no wheezing / rales / crackles CVS: S1-S2 heard, RRR Abdomen: Soft, Non tender, Not distended, Positive bowel sounds Extremities: No edema, no cyanosis, skin is warm on touch CNS: Awake & follows commands, HEENT: Head is atraumatic, PERRLA, conjunctiva pink & non icteric. No JVD or carotid bruit Data Review:   
 
Labs:  
 
Hematology:  
Recent Labs  
   08/29/18 
 0200  08/28/18 
 0406  08/27/18 
 7223 WBC  2.2*  3.5*  2.9* HGB  7.7*  9.4*  9.1*  
HCT  21.8*  27.4*  26.1* Chemistry:  
Recent Labs  
   08/29/18 
 0200  08/28/18 
 0406  08/27/18 
 0258 BUN  24*  13  29* CREA  3.96*  2.36*  3.66* CA  8.6  9.2  8.0* ALB  2.3*  2.7*  2.3*  
K  4.2  3.5  3.5 NA  139  138  134* CL  106  104  99* CO2  28  25  29 PHOS  3.1  2.6  3.2 GLU  106*  89  121* Images:  
 
XR (Most Recent). CXR reviewed by me and compared with previous CXR Results from [place] Encounter encounter on 08/22/18 XR ABD PORT  1 V 
 Narrative ABDOMINAL RADIOGRAPH-SINGLE SUPINE VIEW INDICATION: NG tube placement COMPARISON: Abdomen x-ray 8/23/2018 FINDINGS: Enteric tube tip projects at the distal stomach. Nonobstructed bowel 
gas pattern. No evidence for pneumoperitoneum. No evidence for nephrolithiasis. No acute osseous abnormality. Degenerative changes noted at the sacroiliac 
joints lower lumbar spine Impression IMPRESSION: 
Enteric tube tip projects at the distal stomach. Lerry Germain CT (Most Recent) Results from [facility] [place] Encounter encounter on 08/22/18 CT ABD PELV W CONT Narrative CT ABDOMEN AND PELVIS WITH CONTRAST 
 
COMPARISON: August 22, 2018. INDICATIONS: Vomiting. TECHNIQUE:  Following the uneventful administration of 80 cc of Isovue 300 
intravenous contrast , volumetric data acquisition was performed of the abdomen 
and pelvis on a multislice scanner and reconstructed in axial coronal and 
sagittal planes CT ABDOMEN FINDINGS:  
 
Lung Bases: There is a large right and minimal left pleural effusion. Bibasilar 
atelectasis is evident. There is multichamber cardiac enlargement. There is a 
moderate size pericardial effusion. This ranges up to 2.5 cm in thickness but 
probably averages less than 1 cm. [place] Liver/Gallbladder/Biliary: Normal. 
 
Spleen: Scattered small granulomas noted. Otherwise negative. Adrenal Glands: Normal. 
 
Kidneys: Atrophic with a left-sided cysts unchanged. [place] Pancreas: Normal. 
 
Stomach, Small Bowel,  and Colon: A nasogastric tube is present extending into 
the stomach which is otherwise unremarkable. The small bowel is not distended. The appendix appears to be present unremarkable. The colon is unremarkable. Lymph Nodes: Normal in size and number. The abdominal aorta is unremarkable. The IVC is unremarkable. Peritoneal Spaces: Small volume peritoneal fluid is evident Abdominal wall: No hernia or mass is evident. Subcutaneous edema is evident. CT PELVIS FINDINGS:  
 
Bladder: Empty. Pelvic Small Bowel And Colon: Unremarkable. Lymph Nodes: Normal in size and number. Free Fluid: Small volume pelvic fluid noted Uterus and adnexal structures appear unremarkable Osseous Structures Of Abdomen And Pelvis: Unremarkable for age. Impression IMPRESSION:  
 
Anasarca with edematous soft tissues, pleural fluid, ascites, pericardial fluid, 
and multichamber cardiac enlargement. Findings indicating a probable cause for recurrent vomiting are not identified. All CT scans at this facility are performed using dose optimization technique as 
appropriate to the performed exam, to include automated exposure control, 
adjustment of the mA and/or kV according to patient's size (Including 
appropriate matching for site-specific examinations), or use of iterative 
reconstruction technique. Plan / Recommendation: End Stage Renal Disease:  Plan HD today At 1120 am on 8/29/2018, I saw and examined patient during hemodialysis treatment. The patient was receiving hemodialysis for treatment of end stage renal disease. I have also reviewed vital signs, intake and output, lab results and recent events, and agreed with today's dialysis order. Access: No issue Anemia:  Hold epo due to htn Aaron Rojas MD 
Nephrology 8/29/2018

## 2018-08-29 NOTE — PROGRESS NOTES
NUTRITION Nutrition Screen RECOMMENDATIONS / PLAN:  
 
- Okay for family to provide food for pt to encourage po intake, family educated on dysphagia soft diet today. - Monitor po intake and diet tolerance. - Continue motility agent and bowel regimen 
- Continue RD inpatient monitoring and evaluation. NUTRITION INTERVENTIONS & DIAGNOSIS:  
 
[x] Meals/snacks: modified composition 
[x] Medical food supplement therapy: Ensure Pudding TID [x] Nutrition education: soft dysphagia diet provided 8/29/18 [x] Collaboration and referral of nutrition care: interdisciplinary rounds Nutrition Diagnosis: Increased nutrient needs (protein) related to increased expenditure as evidenced by pt with ESRD on HD. Food and nutrition related knowledge deficit related to dysphagia soft solid diet consistency as evidenced by pt/family with limited knowledge. ASSESSMENT:  
 
8/29: Diet advanced per SLP, family educated on soft solid diet (new handout provided) and protein sources. Consuming supplements. Ate some soup today for lunch. Family reports pt does not consume the coconut water that was noted at bedside. Educated family on potassium content. Receiving bowel regimen. HD today. 8/28: Dysphagia diet started, tube feeding discontinued. Tolerating diet. Family providing food, handout on mechanical soft diet provided at bedside for family, no family present during time of visit. Plan to implement supplements. Noted protein shake/smoothie at bedside. 8/27: Tube feedings stopped and NG tube removed this am. Passed swallow evaluation but remains NPO for ultrasound today. Plan to resume diet later this evening and family planning to provide food. Remains with nausea, but no further episodes of emesis noted. +BM 
8/25: NG tube placed and feeds started, pt with emesis and feeds held at this time. Plan to hold and resume at trickle rate. Reglan and banana bag started today. HD yesterday. 8/24: Pt with nausea/vomiting- unable to tolerate oral medications and chronic constipation- plan for soap suds enema today. Remains NPO per SLP, altered mentation improved today- follow up swallow evaluation planned and several attempts made by SLP today but pt receiving dialysis and NPO for CT abdomen. IV fluid with dextrose started to prevent hypoglycemia. 8/23: Pt with N/V upon admission and currently. NPO started on phenergan. Planning for HD today. No changes in weight PTA, but remains underweight. SLP recommending NPO with consideration of short term means of nutrition. Average po intake adequate to meet patients estimated nutritional needs:   [] Yes     [x] No   [] Unable to determine at this time Diet: DIET NUTRITIONAL SUPPLEMENTS Breakfast, Lunch, Dinner, All Meals; ENSURE PUDDING 
DIET RENAL Soft Solids Food Allergies: Banana Current Appetite:   [] Good     [x] Fair     [] Poor     [] Other Appetite/meal intake prior to admission:   [] Good     [] Fair     [] Poor     [x] Other: unknown, pt with N/V PTA Feeding Limitations:  [x] Swallowing difficulty: SLP following    [] Chewing difficulty    [] Other: 
Current Meal Intake:  
Patient Vitals for the past 100 hrs: 
 % Diet Eaten 08/29/18 0845 25 % Anuric- 20 mL urine per day per RN 
BM: 8/26- hard, hx of chronic constipation Skin Integrity: WDL Edema:   [x] No     [] Yes Pertinent Medications: Reviewed: dulcolax, colace, reglan, miralax, Vitamin B-6 Recent Labs  
   08/29/18 
 0200  08/28/18 
 0406  08/27/18 
 0258 NA  139  138  134* K  4.2  3.5  3.5 CL  106  104  99* CO2  28  25  29 GLU  106*  89  121* BUN  24*  13  29* CREA  3.96*  2.36*  3.66* CA  8.6  9.2  8.0*  
MG  2.5  2.4  2.4 PHOS  3.1  2.6  3.2 ALB  2.3*  2.7*  2.3*  
SGOT  23  35  33 ALT  10*  16  17 Intake/Output Summary (Last 24 hours) at 08/29/18 1526 Last data filed at 08/29/18 1400 Gross per 24 hour Intake              402 ml Output             2500 ml Net            -2098 ml Anthropometrics: 
Ht Readings from Last 1 Encounters:  
08/23/18 5' 1\" (1.549 m) Last 3 Recorded Weights in this Encounter 08/26/18 0536 08/28/18 0943 08/29/18 0500 Weight: 35.8 kg (78 lb 14.4 oz) 35 kg (77 lb 2.6 oz) 43.9 kg (96 lb 12.8 oz) Body mass index is 18.29 kg/(m^2). Underweight Weight History: Weight stable x 5 months PTA. Weight Metrics 8/29/2018 8/4/2018 7/20/2018 6/27/2018 6/26/2018 5/31/2018 3/9/2018 Weight 96 lb 12.8 oz 90 lb 90 lb 6.2 oz 90 lb 6.2 oz 85 lb 8.6 oz 90 lb 6.2 oz 84 lb 3.2 oz BMI 18.29 kg/m2 17.01 kg/m2 17.08 kg/m2 17.08 kg/m2 16.16 kg/m2 17.08 kg/m2 15.91 kg/m2 Admitting Diagnosis: Hypertensive emergency Pertinent PMHx: ESRD on HD, HTN, vitamin D deficiency Education Needs:        [] None identified  [] Identified - Not appropriate at this time  [x]  Identified and addressed - refer to education log Learning Limitations:   [] None identified  [x] Identified: language barrier Cultural, Rastafari & ethnic food preferences:  [x] None identified    [] Identified and addressed ESTIMATED NUTRITION NEEDS:  
 
Calories: 1394-1712 kcal (30-35 kcal/kg) based on  [] Actual BW      [x] SBW 56 kg Protein: 67-84 gm (1.2-1.5 gm/kg) based on  [] Actual BW      [x] SBW Fluid: 5565-1534 mL MONITORING & EVALUATION:  
 
Nutrition Goal(s): 1. Po intake of meals will meet >75% of patient estimated nutritional needs within the next 7 days. Outcome:  [] Met/Ongoing    [x]  Not Met    [] New/Initial Goal 
2. Patient/family will increase knowledge of appropriate food choices on a dysphagia mechanical soft diet within 7 days. Outcome:  [x] Met    []  Not Met    [] New/Initial Goal  
 
Monitoring:   [x] Food and beverage intake   [x] Diet order   [x] Nutrition-focused physical findings   [x] Treatment/therapy   [] Weight   [] Enteral nutrition intake Previous Recommendations (for follow-up assessments only):     [x]   Implemented       []   Not Implemented (RD to address)      [] No Longer Appropriate     [] No Recommendation Made Discharge Planning: renal diet; consistency per SLP [x] Participated in care planning, discharge planning, & interdisciplinary rounds as appropriate Arabella Luna RD Pager: [phone]

## 2018-08-29 NOTE — PROGRESS NOTES
[employer] [facility] ICU Progress Note Name: Brooks Simpson : 1960 MRN: [id number] Date: 2018 [x]I have reviewed the flowsheet and previous days notes. Events overnight reviewed and discussed with nursing staff. Vital signs and records reviewed. Sebas Hoover a 56 y. o. female c PMH of HTN, HLD, ESRD on HD who presented to the [facility] ER on 18 with left-sided weakness and nausea/vomiting that started three days prior. Pt speaks [place] only. Per Chart review, son stated that the pt has been lethargic, barely able to move with unilateral weakness. Pt was recently admitted to [facility] with similar symptoms; in ED, pt was found to be in Hypertensive Emergency; started on Cardene gtt; transferred to ICU for further care/management. Prior CT scan reported massive pericardial effusion on 18. Echo on 18 Small circumferential pericardial effusion with more prominent area around the right artium. No indications of tamponade present 
- POC echo 18 with no wall motion abnormalities, small pericardial effusion without tamponade physiology, LVH Subjective: 
18 
- No new events overnight. 
- Received HD today - Hypotension during HD today- received 2 /3 hours of HD- Fluid returned - BP medications held - Pt able to follow simple commands; no complaints. - Tolerating PO-no emesis or apparent nausea ROS:A comprehensive review of systems was negative except for that written in the HPI. Vital Signs:   
Visit Vitals  /64  Pulse 99  Temp 99.1 °F (37.3 °C)  Resp 19  
 Ht 5' 1\" (1.549 m)  Wt 43.9 kg (96 lb 12.8 oz)  SpO2 100%  Breastfeeding No  
 BMI 18.29 kg/m2 O2 Device: Room air Temp (24hrs), Av.1 °F (36.7 °C), Min:97.6 °F (36.4 °C), Max:99.1 °F (37.3 °C) Intake/Output:  
Last shift:      701 - 1900 In: 200 [P.O.:200] Out: 2500 Last 3 shifts: 1901 -  0700 In: 665 [P.O.:722; I.V.:229] Out: 0 Intake/Output Summary (Last 24 hours) at 08/29/18 1811 Last data filed at 08/29/18 1400 Gross per 24 hour Intake              300 ml Output             2500 ml Net            -2200 ml Physical Exam: 
 
General: awake, alert; follows simple commands HEENT:  Anicteric sclerae; pink palpebral conjunctivae; oral mucosa moist 
Resp:  Symmetrical chest expansion, no accessory muscle use; good AE bilat; CTAB; no rales/ wheezing/ rhonchi noted CV:  S1, S2 present GI:  Abdomen soft, non-tender; (+) active bowel sounds, NGT Extremities:  +2 pulses on all extremities; no edema/ cyanosis/ clubbing noted; normal capillary refill Skin:  Warm; no rashes/ lesions noted Neurologic:  Non-focal; follows commands; family to translate as pt does not speak english. DATA:  
 
Current Facility-Administered Medications Medication Dose Route Frequency  rifAMPin (RIFADIN) capsule 600 mg  600 mg Oral Q24H  
 minoxidil (LONITEN) tablet 2.5 mg  2.5 mg Oral BID  niCARdipine (CARDENE) 25 mg in 0.9% sodium chloride 250 mL infusion  0-15 mg/hr IntraVENous TITRATE  aspirin chewable tablet 81 mg  81 mg Oral DAILY  ondansetron (ZOFRAN) injection 6 mg  6 mg IntraVENous Q6H PRN  
 losartan (COZAAR) tablet 100 mg  100 mg Oral DAILY  metoclopramide HCl (REGLAN) injection 5 mg  5 mg IntraVENous BID  melatonin tablet 3 mg  3 mg Oral QHS  acetaminophen (TYLENOL) solution 500 mg  500 mg Oral Q4H PRN  polyethylene glycol (MIRALAX) packet 17 g  17 g Oral DAILY  labetalol (NORMODYNE;TRANDATE) 20 mg/4 mL (5 mg/mL) injection 10 mg  10 mg IntraVENous Q6H PRN  
 isoniazid (NYDRAZID) tablet 300 mg  300 mg Oral DAILY  atorvastatin (LIPITOR) tablet 40 mg  40 mg Oral QHS  bisacodyl (DULCOLAX) tablet 5 mg  5 mg Oral DAILY PRN  
 docusate sodium (COLACE) capsule 100 mg  100 mg Oral BID  ethambutol (MYAMBUTOL) tablet 800 mg  800 mg Oral Q TUE, THU & SAT  
  pyrazinamide tablet 1,000 mg  1,000 mg Oral Q TUE, THU & SAT  pyridoxine (vitamin B6) (VITAMIN B-6) tablet 100 mg  100 mg Oral DAILY  sodium chloride (NS) flush 5-10 mL  5-10 mL IntraVENous Q8H  
 sodium chloride (NS) flush 5-10 mL  5-10 mL IntraVENous PRN  
 heparin (porcine) injection 5,000 Units  5,000 Units SubCUTAneous Q8H  
 hydrALAZINE (APRESOLINE) tablet 100 mg  100 mg Oral TID  carvedilol (COREG) tablet 25 mg  25 mg Oral Q12H  
 amLODIPine (NORVASC) tablet 10 mg  10 mg Oral DAILY  albuterol (PROVENTIL VENTOLIN) nebulizer solution 2.5 mg  2.5 mg Nebulization Q4H PRN  
 cloNIDine (CATAPRES) 0.3 mg/24 hr patch 1 Patch  1 Patch TransDERmal Q7D Labs: Results:  
   
Chemistry Recent Labs  
   08/29/18 
 0200  08/28/18 
 0406  08/27/18 
 5059 GLU  106*  89  121* NA  139  138  134* K  4.2  3.5  3.5 CL  106  104  99* CO2  28  25  29 BUN  24*  13  29* CREA  3.96*  2.36*  3.66* CA  8.6  9.2  8.0* AGAP  5  9  6 BUCR  6*  6*  8* AP  102  126*  173* TP  5.2*  6.1*  5.2* ALB  2.3*  2.7*  2.3*  
GLOB  2.9  3.4  2.9 AGRAT  0.8  0.8  0.8 CBC w/Diff Recent Labs  
   08/29/18 
 0200  08/28/18 
 0406  08/27/18 
 0258 WBC  2.2*  3.5*  2.9*  
RBC  2.37*  2.99*  2.81* HGB  7.7*  9.4*  9.1*  
HCT  21.8*  27.4*  26.1*  
PLT  207  234  170 GRANS  46  58  49 LYMPH  21  14*  18* EOS  7*  8*  9* Coagulation No results for input(s): PTP, INR, APTT in the last 72 hours. No lab exists for component: INREXT, INREXT Liver Enzymes Recent Labs  
   08/29/18 
 0200 TP  5.2* ALB  2.3* AP  102 SGOT  23 ABG No results found for: PH, PHI, PCO2, PCO2I, PO2, PO2I, HCO3, HCO3I, FIO2, FIO2I Microbiology No results for input(s): CULT in the last 72 hours. WBC from Flowsheet Telemetry: [x]Sinus []A-flutter []Paced []A-fib []Multiple PVCs Imaging: No new imaging (08/28/18)  ABD LTD [08/27/18]: IMPRESSION:  
 1. Phasic portal vein flow is suggestive of right heart dysfunction. 2.  Small volume fluid adjacent to the gallbladder with generalized ascites on recent CT. No convincing evidence of cholecystitis. 3.  Unchanged subcentimeter liver lesion, possibly hemangioma but too small to definitively characterize with MR or CT. consider 1 year follow-up ultrasound to document stability. 4.  Right pleural effusion. IMPRESSION:  
· Hypertensive emergency with response to Cardene and intermittent hemodialysis -resolved ·  
· Hypotension today- most likely related to new BP medication changes ·  
· Anemia- acute on chronic- 
· · Leukopenia 2.2 today- monitor in the setting of 4 drug TB therapy · · Left sided weakness with mild left facial droop due to hypertensive encephalopathy ·  
· Chronic /intermittent nausea, vomiting and abdominal pain, likely related to chronic constipation and poorly controlled BP, ? TB drugs- KUB and CT nondiagnostic. ·  
· Chronic constipation -possible TB meds contributing to decreased bowel motility ·   
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  
· ESRD on IHD ·  
· H/o NSTEMI ·  
· Oropharyngeal dysphagia -cleared by SLP for mechanical soft solids ·  
· History of seizures PLAN:  
· CVS - Readjusting BP meds. Some meds held today for hypotension will discuss further with nephrology · Resp - stable on room air. May have oxygen supplementation as needed to  to maintain SpO2 > 92%. Strict aspiration precautions. · ID - Continue TB regimen: Rifampin + Ethambutol +INH+ pyrazinamide · Heme/onc - Daily CBC; monitor H/H & plts. Stable normocytic anemia, has chronic severe constipation and issues with nausea and emesis- doubt will tolerate PO iron- will discuss with Nephrology role for IV iron and Epo, Check CBC in am 
·  
· Metabolic - Daily BMP; monitor e-lytes; replace per nephrology during dialysis · Renal - Trend renal indices; IHD per nephrology · Endocrine - Glycemic control goal <180; avoid hypoglycemia. Sick euthroid syndrome · Neuro/ Pain/ Sedation - Avoid sedating medications · GI - Con't mechanical soft diet. Family is feeding pt meals from home and is aware of diet restrictions. Continue with bowel regimen (scheduled colace, reglan, miralax). Strict aspiration precautions · Prophylaxis - DVT (SQH) GI- does not require GI prophylaxis Continue ICU status due to hypotension. Pending clinical course may be able to transfer out of ICU in the next 24-48 hours. Lauryn Swanson DO  
Pulmonary Critical Care Medicine [facility] Critical care, chart review and time spent coordinating care: 35 min

## 2018-08-29 NOTE — PROGRESS NOTES
Patient seen by wound care per consult. Skin assessment performed at this time. No wound care intervention required at this time. Skin hyperpigmentation noted to coccyx area. Lower back discoloration noted. Mepilex silicone border dressings for prevention intact. Care to continue by nursing and [facility] RN notified of skin assessment. No wound care intervention or education required at this time. Mery Moon, Wound Care Department

## 2018-08-30 NOTE — PROGRESS NOTES
[facility] Progress Note Name: Jing Isaacs : 1960 MRN: [id number] Date: 2018 9:02 AM 
  
[x]I have reviewed the flowsheet and previous days notes. Events overnight reviewed and discussed with nursing staff. Vital signs and records reviewed. HPI: Hong Welch a 56 y. o. female c PMH of HTN, HLD, ESRD on HD who presented to the [facility] ER on 18 with left-sided weakness and nausea/vomiting that started three days prior. Pt speaks [place] only. Pt admitted for Hypertensive Emergency; started on Cardene gtt; transferred to ICU for further care/management.  
  
Prior CT scan reported massive pericardial effusion on 18. Echo on 18 Small circumferential pericardial effusion with more prominent area around the right artium. No indications of tamponade present 
- POC echo 18 with no wall motion abnormalities, small pericardial effusion without tamponade physiology, LVH 
  
  
Subjective: 
18 Episode of hypotension during dialysis. HD stopped early BP resolved w/ 250 mL bolus. Working w/ PT. Denies any pain, gives thumbs up- ROS limited secondary to language barrier. Diet- renal soft. ROS:A comprehensive review of systems was negative except for that written in the HPI. Medication Review: · Pressors -none · Sedation -none · Antibiotics -none · Pain -none · GI/ DVT -none/SQH 
· Others none Safety Bundles: none Vital Signs:   
Visit Vitals  /71  Pulse 71  Temp 98.8 °F (37.1 °C)  Resp 12  Ht 5' 1\" (1.549 m)  Wt 43.8 kg (96 lb 9 oz)  SpO2 99%  Breastfeeding No  
 BMI 18.25 kg/m2 O2 Device: Room air Temp (24hrs), Av.4 °F (36.9 °C), Min:97.6 °F (36.4 °C), Max:99.1 °F (37.3 °C) Intake/Output:  
Last shift:        
Last 3 shifts:  1901 -  0700 In: 750 [P.O.:500; I.V.:250] Out: 2500 Intake/Output Summary (Last 24 hours) at 18 0921 Last data filed at 08/30/18 0000 Gross per 24 hour Intake              450 ml Output             2500 ml Net            -2050 ml Physical Exam: 
 
General: awake, alert, smiling HEENT:  Anicteric sclerae; pink palpebral conjunctivae; mucosa moist 
Resp:  Symmetrical chest expansion, no accessory muscle use; good airway entry; no rales/ wheezing/ rhonchi noted, HD catheter right chest wall. CV:  S1, S2 present; regular rate and rhythm GI:  Abdomen soft, non-tender; (+) active bowel sounds Extremities:  +2 pulses on all extremities; no edema/ cyanosis/ clubbing noted Skin:  Warm; no rashes/ lesions noted, normal turgor/cap refill Neurologic:  Non-focal. 
Devices:  HD cath. DATA:  
 
Current Facility-Administered Medications Medication Dose Route Frequency  potassium, sodium phosphates (NEUTRA-PHOS) packet 2 Packet  2 Packet Oral Q2H  
 rifAMPin (RIFADIN) capsule 600 mg  600 mg Oral Q24H  
 minoxidil (LONITEN) tablet 2.5 mg  2.5 mg Oral BID  niCARdipine (CARDENE) 25 mg in 0.9% sodium chloride 250 mL infusion  0-15 mg/hr IntraVENous TITRATE  aspirin chewable tablet 81 mg  81 mg Oral DAILY  ondansetron (ZOFRAN) injection 6 mg  6 mg IntraVENous Q6H PRN  
 losartan (COZAAR) tablet 100 mg  100 mg Oral DAILY  metoclopramide HCl (REGLAN) injection 5 mg  5 mg IntraVENous BID  melatonin tablet 3 mg  3 mg Oral QHS  acetaminophen (TYLENOL) solution 500 mg  500 mg Oral Q4H PRN  polyethylene glycol (MIRALAX) packet 17 g  17 g Oral DAILY  labetalol (NORMODYNE;TRANDATE) 20 mg/4 mL (5 mg/mL) injection 10 mg  10 mg IntraVENous Q6H PRN  
 isoniazid (NYDRAZID) tablet 300 mg  300 mg Oral DAILY  atorvastatin (LIPITOR) tablet 40 mg  40 mg Oral QHS  bisacodyl (DULCOLAX) tablet 5 mg  5 mg Oral DAILY PRN  
 docusate sodium (COLACE) capsule 100 mg  100 mg Oral BID  ethambutol (MYAMBUTOL) tablet 800 mg  800 mg Oral Q TUE, THU & SAT  
  pyrazinamide tablet 1,000 mg  1,000 mg Oral Q TUE, THU & SAT  pyridoxine (vitamin B6) (VITAMIN B-6) tablet 100 mg  100 mg Oral DAILY  sodium chloride (NS) flush 5-10 mL  5-10 mL IntraVENous Q8H  
 sodium chloride (NS) flush 5-10 mL  5-10 mL IntraVENous PRN  
 heparin (porcine) injection 5,000 Units  5,000 Units SubCUTAneous Q8H  
 hydrALAZINE (APRESOLINE) tablet 100 mg  100 mg Oral TID  carvedilol (COREG) tablet 25 mg  25 mg Oral Q12H  
 amLODIPine (NORVASC) tablet 10 mg  10 mg Oral DAILY  albuterol (PROVENTIL VENTOLIN) nebulizer solution 2.5 mg  2.5 mg Nebulization Q4H PRN  
 cloNIDine (CATAPRES) 0.3 mg/24 hr patch 1 Patch  1 Patch TransDERmal Q7D Labs: Results:  
   
Chemistry Recent Labs 08/30/18 0224 08/29/18 
 0200  08/28/18 
 0406 GLU  95  106*  89 NA  140  139  138  
K  3.8  4.2  3.5 CL  103  106  104 CO2  30  28  25 BUN  13  24*  13  
CREA  2.35*  3.96*  2.36* CA  8.8  8.6  9.2 AGAP  7  5  9 BUCR  6*  6*  6* AP  107  102  126* TP  5.5*  5.2*  6.1* ALB  2.5*  2.3*  2.7*  
GLOB  3.0  2.9  3.4 AGRAT  0.8  0.8  0.8 CBC w/Diff Recent Labs 08/30/18 0224 08/29/18 
 0200  08/28/18 
 0406 WBC  2.5*  2.2*  3.5*  
RBC  2.38*  2.37*  2.99* HGB  7.6*  7.7*  9.4* HCT  22.1*  21.8*  27.4*  
PLT  240  207  234 GRANS  41  46  58 LYMPH  26  21  14* EOS  6*  7*  8* Coagulation No results for input(s): PTP, INR, APTT in the last 72 hours. No lab exists for component: INREXT Liver Enzymes Recent Labs 08/30/18 
 1099 TP  5.5* ALB  2.5* AP  107 SGOT  25 ABG No results found for: PH, PHI, PCO2, PCO2I, PO2, PO2I, HCO3, HCO3I, FIO2, FIO2I Microbiology No results for input(s): CULT in the last 72 hours. Telemetry: [x]Sinus []A-flutter []Paced []A-fib []Multiple PVCs Imaging: 
[x]I have personally reviewed the patients radiographs CXR Results  (Last 48 hours) None IMPRESSION:  
· Hypertensive emergency with response to Cardene and intermittent hemodialysis -resolved · Hypotension overnight- most likely related to new BP medication changes vs. Fluid imbalances w/ HD · Anemia- acute on chronic · Leukopenia 2.5 today- monitor in the setting of 4 drug TB therapy · Left sided weakness with mild left facial droop due to hypertensive encephalopathy · Chronic /intermittent nausea, vomiting and abdominal pain, likely related to chronic constipation and poorly controlled BP, ? TB drugs- KUB and CT nondiagnostic. · Chronic constipation -possible TB meds contributing to decreased bowel motility · History of MTB and MAC on DOT, recent sputum smears cleared for AFB · ESRD on IHD · H/o NSTEMI 
· Oropharyngeal dysphagia -cleared by SLP for mechanical soft solids · History of seizures · PLAN:  
CVS - Cont' to adjust medications as necessary. BP in 150's range during day. Will discuss BP medications w/ nephro. Resp - stable on room air. May use NC PRN for SpO2 > 92%. Strict aspiration precautions. ID - Continue TB regimen: Rifampin + Ethambutol +INH+ pyrazinamide. Heme/onc - Daily CBC; monitor H/H & plts. Stable normocytic acute on chronic anemia. Discuss Iron vs. epo w/ nephro. Patient w/ constipation- iron may not be an option. Metabolic - Daily BMP; monitor e-lytes; replace per nephrology during dialysis Renal - Trend renal indices; IHD per nephrology. Endocrine - Glycemic control goal <180; avoid hypoglycemia. Sick euthyroid syndrome Neuro/ Pain/ Sedation - Avoid sedating medications, no pain medication requirements. GI - Con't mechanical soft diet. Family is feeding pt meals from home and is aware of diet restrictions. Continue with bowel regimen-scheduled colace, reglan, miralax. Prophylaxis - DVT (SQH) GI- does not require GI prophylaxis Discussed in interdisciplinary rounds Patient stable from ICU standpoint- will transfer to   
  
 
 
 
 [x]See my orders for details My assessment/plan was discussed with: 
[x]nursing []PT/OT   
[]respiratory therapy [x]Dr. Kian Sotelo [] Haja Gregg PA-C 
08/30/18 Pulmonary, Critical Care Medicine [facility] Staff Addendum I have independently evaluated the patient and reviewed the patient's chart. I have discussed the findings and care plan with ICU Care Team. Care Plan reviewed on ICU milti-D rounds. I agree with the above evaluation, assessment and recommendations along with the following comments and observations. Patient now with relative hypotension; readjusting anti-hypertensive meds. Minoxadil decreased to Q day and hydralazine decerased. I  discussed medication changes with patient's nephologist. WBC still low but stable- will need to continue to monitor WBC for possible bone marrow suppression. Critical Care and time spent coordinating care, minus procedure time:25  min Kathryn Vázquez DO, [facility] 5:09 PM

## 2018-08-30 NOTE — PROGRESS NOTES
[facility] Hospitalist Group Progress Note Patient: Yamila Mahajan Age: 56 y.o. : 1960 MR#: [id number] SSN: [id number] Date/Time: 2018 Subjective:  
 
 
Family: daughter at bedside. Daughter translated since pt declined to use  phone use. Pt feels fine, AAOX3. Denies any HA, no N/V.  
/70 now, RN did not give hydralazine this am.   
 
Assessment/Plan: 1. Hypertensive urgency off Cardene drip 2 Left sided weakness - improved with improved BP  
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 
5 H/o NSTEMI 
6 History of seizures 7. Sever N/V ? HTN urgency related, CT A/P neg, better now 8. Dysphagia PLAN Cont minoxidil, BP well controlled now but episodes of low BP. D/w Dr. Tameka Garcia, will decrease hydralazine and monitor 
- HD per nephrology in  
- d/w pt and family at bedside in detail  
- ok for tele transfer PT/OT recommended SNF but pt and family decline SNF Will plan d/c home in am post HD if BP stable Case discussed with:  [x]Patient  [x]Family  [x]Nursing  []Case Management DVT Prophylaxis:  []Lovenox  [x]Hep SQ  []SCDs  []Coumadin   []On Heparin gtt Objective:  
VS:  
Visit Vitals  /60  Pulse 82  Temp 98.8 °F (37.1 °C)  Resp 19  
 Ht 5' 1\" (1.549 m)  Wt 43.8 kg (96 lb 9 oz)  SpO2 99%  Breastfeeding No  
 BMI 18.25 kg/m2 Tmax/24hrs: Temp (24hrs), Av.7 °F (37.1 °C), Min:97.8 °F (36.6 °C), Max:99.1 °F (37.3 °C) IOBRIEF Intake/Output Summary (Last 24 hours) at 18 1226 Last data filed at 18 0000 Gross per 24 hour Intake              450 ml Output             2500 ml Net            -2050 ml General: AA, NAD Cardiovascular: S1S2 - regular Pulmonary: B/L clear, No wheezing, no Rales No Rhonchi GI:  + BS in all four quadrants, soft, non-tender Extremities: No edema. Neuro: AAOx3, moves all ext Medications:  
Current Facility-Administered Medications Medication Dose Route Frequency  rifAMPin (RIFADIN) capsule 600 mg  600 mg Oral Q24H  
 minoxidil (LONITEN) tablet 2.5 mg  2.5 mg Oral BID  niCARdipine (CARDENE) 25 mg in 0.9% sodium chloride 250 mL infusion  0-15 mg/hr IntraVENous TITRATE  aspirin chewable tablet 81 mg  81 mg Oral DAILY  ondansetron (ZOFRAN) injection 6 mg  6 mg IntraVENous Q6H PRN  
 losartan (COZAAR) tablet 100 mg  100 mg Oral DAILY  metoclopramide HCl (REGLAN) injection 5 mg  5 mg IntraVENous BID  melatonin tablet 3 mg  3 mg Oral QHS  acetaminophen (TYLENOL) solution 500 mg  500 mg Oral Q4H PRN  polyethylene glycol (MIRALAX) packet 17 g  17 g Oral DAILY  labetalol (NORMODYNE;TRANDATE) 20 mg/4 mL (5 mg/mL) injection 10 mg  10 mg IntraVENous Q6H PRN  
 isoniazid (NYDRAZID) tablet 300 mg  300 mg Oral DAILY  atorvastatin (LIPITOR) tablet 40 mg  40 mg Oral QHS  bisacodyl (DULCOLAX) tablet 5 mg  5 mg Oral DAILY PRN  
 docusate sodium (COLACE) capsule 100 mg  100 mg Oral BID  ethambutol (MYAMBUTOL) tablet 800 mg  800 mg Oral Q TUE, THU & SAT  pyrazinamide tablet 1,000 mg  1,000 mg Oral Q TUE, THU & SAT  pyridoxine (vitamin B6) (VITAMIN B-6) tablet 100 mg  100 mg Oral DAILY  sodium chloride (NS) flush 5-10 mL  5-10 mL IntraVENous Q8H  
 sodium chloride (NS) flush 5-10 mL  5-10 mL IntraVENous PRN  
 heparin (porcine) injection 5,000 Units  5,000 Units SubCUTAneous Q8H  
 hydrALAZINE (APRESOLINE) tablet 100 mg  100 mg Oral TID  carvedilol (COREG) tablet 25 mg  25 mg Oral Q12H  
 amLODIPine (NORVASC) tablet 10 mg  10 mg Oral DAILY  albuterol (PROVENTIL VENTOLIN) nebulizer solution 2.5 mg  2.5 mg Nebulization Q4H PRN  
 cloNIDine (CATAPRES) 0.3 mg/24 hr patch 1 Patch  1 Patch TransDERmal Q7D Labs:   
Recent Labs 08/30/18 
 0224  08/29/18 
 0200  08/28/18 
 0406 WBC  2.5*  2.2*  3.5*  
 HGB  7.6*  7.7*  9.4* HCT  22.1*  21.8*  27.4*  
PLT  240  207  234 Recent Labs 08/30/18 
 0224  08/29/18 
 0200  08/28/18 
 0406 NA  140  139  138  
K  3.8  4.2  3.5 CL  103  106  104 CO2  30  28  25 GLU  95  106*  89 BUN  13  24*  13  
CREA  2.35*  3.96*  2.36* CA  8.8  8.6  9.2 MG  2.2  2.5  2.4 PHOS  2.3*  3.1  2.6 ALB  2.5*  2.3*  2.7* SGOT  25  23  35 ALT  14  10*  16 Signed By: Nisa Parmar MD   
 August 30, 2018

## 2018-08-30 NOTE — PROGRESS NOTES
conducted a Follow up consultation and Spiritual Assessment for Aysha Nesbitt, who is a 56 y.o.,female. The  provided the following Interventions: 
Continued the relationship of care and support. Listened empathically. Offered prayer and assurance of continued prayer on patients behalf. Chart reviewed. The following outcomes were achieved: 
Patient expressed gratitude for pastoral care visit. Assessment: 
There are no further spiritual or Jainism issues which require Spiritual Care Services interventions at this time. Plan: 
Chaplains will continue to follow and will provide pastoral care on an as needed/requested basis.  recommends bedside caregivers page  on duty if patient shows signs of acute spiritual or emotional distress. Hannah Thomson  Spiritual Care  
[phone]

## 2018-08-30 NOTE — ROUTINE PROCESS
Bedside shift change report given to [facility] (oncoming nurse) by Ramonita Prieto  (offgoing nurse). Report included the following information SBAR, overnight events, lab results, plan of care.

## 2018-08-30 NOTE — PROGRESS NOTES
Problem: Mobility Impaired (Adult and Pediatric) Goal: *Acute Goals and Plan of Care (Insert Text) Physical Therapy Goals Initiated 8/27/2018 and to be accomplished within 7 day(s) 1. Patient will move from supine to sit and sit to supine , scoot up and down and roll side to side in bed with supervision/set-up. 2.  Patient will transfer from bed to chair and chair to bed with minimal assistance/contact guard assist using the least restrictive device. 3.  Patient will perform sit to stand with supervision/set-up. 4.  Patient will ambulate with minimal assistance/contact guard assist for >50 feet with the least restrictive device. 5.  Patient will ascend/descend 3 stairs with 1-2 handrail(s) with minimal assistance/contact guard assist.  
Outcome: Progressing Towards Goal 
physical Therapy TREATMENT Patient: Konrad Sicard (36 y.o. female) Date: 8/30/2018 Diagnosis: Hypertensive emergency <principal problem not specified> Precautions: Fall Chart, physical therapy assessment, plan of care and goals were reviewed. OBJECTIVE/ ASSESSMENT: 
Patient found supine HOB elevated willing to work with PT. Nurse cleared to tx w/ PT. Pt performed sup to sit CGA asking for assistance, however able to perform w/o help to maintain midline. Pt performed sit to stand from EOB SBA and amb 2' quickly to chair SBA. From chair, performed X4 sit <> stand SBA displaying good alignment, however req cueing for sequencing. Pt left in room in chair w/ all needs met and daughter at bed side. Nurse notified. Pt presents w/ language barrier as she speaks little to [employer], however w/ daughter in room able to follow all commands. Pt SBP dropped from 146 to 128 upon standing, however no symptoms noted, nurse notified. Daughter and nurse also notified PTA that pt has been amb to bathroom w/ support. Education:safety, importance of mobility, ask nursing for assistance Progression toward goals: []      Improving appropriately and progressing toward goals [x]      Improving slowly and progressing toward goals 
[]      Not making progress toward goals and plan of care will be adjusted PLAN: 
Patient continues to benefit from skilled intervention to address the above impairments. Continue treatment per established plan of care. Discharge Recommendations: To Be Determined per progress Further Equipment Recommendations for Discharge:  rolling walker SUBJECTIVE:  
Does not speak english \" Ok\" OBJECTIVE DATA SUMMARY:  
Critical Behavior: 
Neurologic State: Alert, Appropriate for age, Eyes open spontaneously Orientation Level: Oriented to person, Oriented to place, Oriented to situation Cognition: Appropriate for age attention/concentration, Appropriate safety awareness, Follows commands, Recognition of people/places Safety/Judgement: Fall prevention Functional Mobility Training: 
Bed Mobility: 
Rolling: Supervision Supine to Sit: Supervision Sit to Supine: Supervision Transfers: 
Sit to Stand: Stand-by assistance Stand to Sit: Stand-by assistance Balance: 
Sitting: Intact Sitting - Static: Good (unsupported) Sitting - Dynamic: Good (unsupported) Standing: Impaired; With support Standing - Static: Good Standing - Dynamic : Fair Pain: 0/10 Activity Tolerance:  
req no rest during tx Please refer to the flowsheet for vital signs taken during this treatment. After treatment:  
[x] Patient left in no apparent distress sitting up in chair 
[] Patient left in no apparent distress in bed 
[x] Call bell left within reach [x] Nursing notified 
[] Caregiver present 
[] Bed alarm activated 
[x] SCDs applied 
[] Ice applied Jackie Kelly PTA Time Calculation: 9 mins Mobility [id number] Current  CI= 1-19%. The severity rating is based on the Level of Assistance required for Functional Mobility and ADLs. Mobility   Goal  CI= 1-19%.   The severity rating is based on the Level of Assistance required for Functional Mobility and ADLs.

## 2018-08-30 NOTE — PROGRESS NOTES
Problem: Falls - Risk of 
Goal: *Absence of Falls Document Kasi Reas Fall Risk and appropriate interventions in the flowsheet. Outcome: Progressing Towards Goal 
Fall Risk Interventions: 
Mobility Interventions: Communicate number of staff needed for ambulation/transfer, Patient to call before getting OOB, PT Consult for mobility concerns, Strengthening exercises (ROM-active/passive), Utilize gait belt for transfers/ambulation Mentation Interventions: Adequate sleep, hydration, pain control, Door open when patient unattended, Evaluate medications/consider consulting pharmacy, Familiar objects from home, Family/sitter at bedside, Gait belt with transfers/ambulation, Increase mobility, More frequent rounding, Reorient patient, Room close to nurse's station, Toileting rounds, Update white board Medication Interventions: Assess postural VS orthostatic hypotension, Evaluate medications/consider consulting pharmacy, Patient to call before getting OOB, Teach patient to arise slowly, Utilize gait belt for transfers/ambulation Elimination Interventions: Call light in reach, Patient to call for help with toileting needs, Toileting schedule/hourly rounds History of Falls Interventions: Bed/chair exit alarm, Consult care management for discharge planning, Door open when patient unattended, Evaluate medications/consider consulting pharmacy, Room close to nurse's station, Utilize gait belt for transfer/ambulation Problem: Pressure Injury - Risk of 
Goal: *Prevention of pressure injury Document Derek Scale and appropriate interventions in the flowsheet.   
Outcome: Progressing Towards Goal 
Pressure Injury Interventions: 
Sensory Interventions: Assess changes in LOC, Assess need for specialty bed, Check visual cues for pain, Discuss PT/OT consult with provider, Float heels, Keep linens dry and wrinkle-free, Minimize linen layers, Monitor skin under medical devices, Pad between skin to skin, Pressure redistribution bed/mattress (bed type), Sit a 90-degree angle/use footstool if needed, Turn and reposition approx. every two hours (pillows and wedges if needed), Use 30-degree side-lying position Moisture Interventions: Absorbent underpads, Check for incontinence Q2 hours and as needed, Maintain skin hydration (lotion/cream), Moisture barrier Activity Interventions: Assess need for specialty bed, Increase time out of bed, Pressure redistribution bed/mattress(bed type), PT/OT evaluation Mobility Interventions: HOB 30 degrees or less, Pressure redistribution bed/mattress (bed type), PT/OT evaluation Nutrition Interventions: Document food/fluid/supplement intake, Discuss nutritional consult with provider, Offer support with meals,snacks and hydration Friction and Shear Interventions: Apply protective barrier, creams and emollients, Feet elevated on foot rest, Foam dressings/transparent film/skin sealants, HOB 30 degrees or less, Lift sheet

## 2018-08-30 NOTE — ROUTINE PROCESS
Bedside and Verbal shift change report given to Milad Snell RN (oncoming nurse) by Earnestine Godinez RN (offgoing nurse).  Report included the following information SBAR, Intake/Output, MAR, Recent Results and Cardiac Rhythm SR.

## 2018-08-30 NOTE — PROGRESS NOTES
Problem: Mobility Impaired (Adult and Pediatric) Goal: *Acute Goals and Plan of Care (Insert Text) Physical Therapy Goals Initiated 8/27/2018 and to be accomplished within 7 day(s) 1. Patient will move from supine to sit and sit to supine , scoot up and down and roll side to side in bed with supervision/set-up. 2.  Patient will transfer from bed to chair and chair to bed with minimal assistance/contact guard assist using the least restrictive device. 3.  Patient will perform sit to stand with supervision/set-up. 4.  Patient will ambulate with minimal assistance/contact guard assist for >50 feet with the least restrictive device. 5.  Patient will ascend/descend 3 stairs with 1-2 handrail(s) with minimal assistance/contact guard assist.  
 
Pt tx attempted at 808. Nurse notified this PTA to attempt at later time when family is present as pt has a language barrier. Will attempt at later time if appropriate. VIRI De La Cruz

## 2018-08-31 NOTE — PROGRESS NOTES
Problem: Falls - Risk of 
Goal: *Absence of Falls Document Jose Weaver Fall Risk and appropriate interventions in the flowsheet. Outcome: Progressing Towards Goal 
Fall Risk Interventions: 
Mobility Interventions: Assess mobility with egress test 
 
Mentation Interventions: Adequate sleep, hydration, pain control Medication Interventions: Evaluate medications/consider consulting pharmacy Elimination Interventions: Call light in reach, Elevated toilet seat, Patient to call for help with toileting needs, Toileting schedule/hourly rounds History of Falls Interventions: Door open when patient unattended, Evaluate medications/consider consulting pharmacy Problem: Pressure Injury - Risk of 
Goal: *Prevention of pressure injury Document Derek Scale and appropriate interventions in the flowsheet. Outcome: Progressing Towards Goal 
Pressure Injury Interventions: 
Sensory Interventions: Assess changes in LOC Moisture Interventions: Absorbent underpads Activity Interventions: Pressure redistribution bed/mattress(bed type) Mobility Interventions: HOB 30 degrees or less, Pressure redistribution bed/mattress (bed type) Nutrition Interventions: Document food/fluid/supplement intake Friction and Shear Interventions: Apply protective barrier, creams and emollients

## 2018-08-31 NOTE — ROUTINE PROCESS
Bedside and Verbal shift change report given to BARRIE Gastelum RN (oncoming nurse) by Garry Chen RN (offgoing nurse). Report included the following information SBAR, Kardex, Intake/Output, MAR and Recent Results.

## 2018-08-31 NOTE — DISCHARGE SUMMARY
Discharge Summary Patient: Radha Dennis MRN: [id number]  CSN: [id number] YOB: 1960  Age: 56 y.o. Sex: female DOA: 8/22/2018 LOS:  LOS: 9 days   Discharge Date:   
 
Admission Diagnoses: Hypertensive emergency Discharge Diagnoses:  PLEASE SEE DICTATION. Discharge Condition: Stable PHYSICAL EXAM 
Visit Vitals  /74  Pulse 76  Temp 97.1 °F (36.2 °C) (Oral)  Resp 20  
 Ht 5' 1\" (1.549 m)  Wt 45 kg (99 lb 3.3 oz)  SpO2 100%  Breastfeeding No  
 BMI 18.74 kg/m2 General: Alert, cooperative, no acute distress   
Lungs:  CTA Bilaterally. Heart:  Regular rate and Rhythm. Abdomen: Soft, Non distended, Non tender. + Bowel sounds. Extremities: No edema/ cyanosis/ clubbing Neurologic:  AA oriented X 3. Moves all extremities. R chest HD cath site clean Muscle wasting noted Hospital Course: Please see dictation. code # [id number]. Discharge Medications:    
Current Discharge Medication List  
  
START taking these medications Details  
minoxidil (LONITEN) 2.5 mg tablet Take 1 Tab by mouth daily. Qty: 30 Tab, Refills: 0  
  
ondansetron (ZOFRAN ODT) 4 mg disintegrating tablet Take 1 Tab by mouth every eight (8) hours as needed for Nausea. Qty: 20 Tab, Refills: 0 CONTINUE these medications which have NOT CHANGED Details  
bisacodyl (DULCOLAX) 5 mg EC tablet Take 1 Tab by mouth daily as needed for Constipation. Qty: 10 Tab, Refills: 0  
  
magnesium citrate solution Take 1/3 of bottle every 8 hours until finished or until you have a bowel movement. Qty: 1 Bottle, Refills: 0  
  
albuterol (PROVENTIL HFA, VENTOLIN HFA, PROAIR HFA) 90 mcg/actuation inhaler Take 2 Puffs by inhalation every four (4) hours as needed for Wheezing. Qty: 1 Inhaler, Refills: 0  
  
ethambutol (MYAMBUTOL) 400 mg tablet Take 2 Tabs by mouth every Tuesday Thursday, Saturday. At 5 PM 
Qty: 24 Tab, Refills: 0 isoniazid (NYDRAZID) 300 mg tablet Take 1 Tab by mouth daily. On dialysis days, please take medication after dialysis Qty: 30 Tab, Refills: 0  
  
pyrazinamide 500 mg tablet Take 2 Tabs by mouth every Tuesday Thursday, Saturday. At 5 pm  Indications: active tuberculosis Qty: 24 Tab, Refills: 0  
  
rifAMPin (RIFADIN) 300 mg capsule Take 2 Caps by mouth daily. On dialysis days please take this medication after Dialysis Qty: 60 Cap, Refills: 0  
  
cloNIDine (CATAPRES) 0.3 mg/24 hr 1 Patch by TransDERmal route every seven (7) days. Qty: 4 Patch, Refills: 0  
  
docusate sodium (COLACE) 100 mg capsule Take 1 Cap by mouth two (2) times a day. Qty: 60 Cap, Refills: 0  
  
oxyCODONE-acetaminophen (PERCOCET) 5-325 mg per tablet Take 1 Tab by mouth every eight (8) hours as needed. Max Daily Amount: 3 Tabs. Qty: 6 Tab, Refills: 0 Associated Diagnoses: Pulmonary TB  
  
pyridoxine, vitamin B6, (VITAMIN B-6) 100 mg tablet Take 1 Tab by mouth daily. Qty: 30 Tab, Refills: 0  
  
azithromycin (ZITHROMAX) 500 mg tab Take 1 Tab by mouth every Tuesday Thursday, Saturday. At 5 PM 
Qty: 12 Tab, Refills: 0  
  
!! OTHER Check CBC, CMP, MG on 07/25/18, Results to PCP immediately, Diagnosis- TB Qty: 1 Each, Refills: 0  
  
!! OTHER Incentive Spirometry- use as directed Qty: 1 Each, Refills: 0  
  
!! OTHER Incentive Spirometry- Use as directed Qty: 1 Each, Refills: 0  
  
!! OTHER This is to certify that Chucho Ramos was admitted to [facility] on 07/16/18 and is still receiving medical care here. Qty: 1 Each, Refills: 0  
  
amLODIPine (NORVASC) 10 mg tablet Take 1 Tab by mouth daily. Qty: 30 Tab, Refills: 0  
  
aspirin 81 mg chewable tablet Take 1 Tab by mouth daily. Qty: 30 Tab, Refills: 0  
  
atorvastatin (LIPITOR) 40 mg tablet Take 1 Tab by mouth nightly. Qty: 30 Tab, Refills: 0  
  
carvedilol (COREG) 25 mg tablet Take 1 Tab by mouth every twelve (12) hours. Qty: 60 Tab, Refills: 0 losartan (COZAAR) 100 mg tablet Take 1 Tab by mouth daily. Qty: 30 Tab, Refills: 0  
  
melatonin 3 mg tablet Take 1 Tab by mouth nightly as needed. Qty: 30 Tab, Refills: 0 FLUoxetine (PROZAC) 10 mg capsule Take 1 Cap by mouth daily. Qty: 30 Cap, Refills: 0  
  
glycerin, adult, (FLEET GLYCERIN, ADULT,) suppository Insert 1 Suppository into rectum daily. Indications: BOWEL EVACUATION Qty: 10 Suppository, Refills: 0  
  
 !! - Potential duplicate medications found. Please discuss with provider. STOP taking these medications  
  
 hydrALAZINE (APRESOLINE) 100 mg tablet Comments:  
Reason for Stopping: · It is important that you take the medication exactly as they are prescribed. · Keep your medication in the bottles provided by the pharmacist and keep a list of the medication names, dosages, and times to be taken in your wallet. · Do not take other medications without consulting your doctor. DIET:  Renal Diet ACTIVITY: Activity as tolerated Home health care for PT/OT consult ADDITIONAL INFORMATION: If you experience any of the following symptoms but not limited to Fever, chills, nausea, vomiting, diarrhea, change in mentation, falling, bleeding, shortness of breath, chest pain, please call your primary care physician or return to the emergency room if you cannot get hold of your doctor:  
 
FOLLOW UP CARE: 
Dr. Eden Cooley MD in 5 days. Please call and set up an appointment. HD on [facility] Minutes spent on discharge: 40 minutes spent coordinating this discharge (review instructions/follow-up, prescriptions, preparing report for sign off) Nisa Parmar MD 
8/31/2018 2:08 PM

## 2018-08-31 NOTE — DIALYSIS
ACUTE HEMODIALYSIS FLOW SHEET 
 
HEMODIALYSIS ORDERS: Physician: Rosiat Zhang Dialyzer: revaclear   Duration:3.5hr  BFR: 350   DFR: 011 Dialysate:  Temp 37 K+   3    Ca+  2.5 Na 138 Bicarb 30 Weight:  45kg kg    Patient Chart []     Unable to Obtain []   Dry weight/UF Goal: 1500ml  Access: Rt IJ Needle Gauge None Heparin []  Bolus      Units    [] Hourly       Units    [x]None Catheter locking solution heparin 1000 units/ml Pre BP:   105/62   Pulse:     71     Temperature:   97.1  Respirations: 15  Tx: NS       ml/Bolus  Other        [x] N/A Labs: Pre        Post:        [x] N/A Additional Orders(medications, blood products, hypotension management):      [x] N/A [x] Hawk Consent Verified CATHETER ACCESS: []N/A   [x]Right   []Left   []IJ     []Fem [] First use X-ray verified     [x]Tunnel                [] Non Tunneled [x]No S/S infection  [x]Redness  []Drainage []Cultured []Swelling []Pain  
[x]Medical Aseptic Prep Utilized   [x]Dressing Changed  [x] Biopatch  Date: 8/31/18 []Clotted   [x]Patent   Flows: [x]Good  []Poor  []Reversed If access problem,  notified: []Yes    [x]N/A  Date:        
 
GRAFT/FISTULA ACCESS:  [x]N/A     []Right     []Left     []UE     []LE []AVG   []AVF        []Buttonhole    []Medical Aseptic Prep Utilized []No S/S infection  []Redness  []Drainage []Cultured []Swelling []Pain Bruit:   [] Strong    [] Weak       Thrill :   [] Strong    [] Weak Needle Gauge:    Length: If access problem,  notified: []Yes     [x]N/A  Date:       
Please describe access if present and not used:  
 
 
GENERAL ASSESSMENT:   
LUNGS:  Rate 15 SaO2%   100     [] N/A    [x] Clear  [] Coarse  [] Crackles  [] Wheezing 
      [] Diminished     Location : []RLL   []LLL    []RUL  []ELÍAS Cough: []Productive  []Dry  [x]N/A   Respirations:  [x]Easy  []Labored Therapy:  [x]RA  []NC  l/min    Mask: []NRB []Venti       O2% []Ventilator  []Intubated  [] Trach  [] BiPaP CARDIAC: []Regular      [x] Irregular   [] Pericardial Rub  [] JVD []  Monitored  [] Bedside  [] Remotely monitored [] N/A  Rhythm: Sinus with PVC's EDEMA: [x] None  []Generalized  [] Pitting [] 1    [] 2    [] 3    [] 4 [] Facial  [] Pedal  []  UE  [] LE  
SKIN:   [x] Warm  [] Hot     [] Cold   [x] Dry     [] Pale   [] Diaphoretic    
             [] Flushed  [] Jaundiced  [] Cyanotic  [] Rash  [] Weeping LOC:    [x] Alert      []Oriented:    [x] Person     [] Place  []Time 
             [] Confused  [] Lethargic  [] Medicated  [] Non-responsive- pt selectively mute during language line call unable to assess orientation. GI / ABDOMEN:  [x] Flat    [] Distended    [x] Soft    [] Firm   []  Obese 
                           [] Diarrhea  [x] Bowel Sounds  [] Nausea  [] Vomiting  / URINE ASSESSMENT:[] Voiding   [] Oliguria  [x] Anuria   []  Onofre [] Incontinent    []  Incontinent Brief      []  Bathroom Privileges PAIN: [x] 0 []1  []2   []3   []4   []5   []6   []7   []8   []9   []10 Scale 0-10  Action/Follow Up: N/A MOBILITY:[] Amb    [] Amb/Assist    [x] Bed    [] Wheelchair  [] Stretcher All Vitals and Treatment Details on Attached Flowsheet Hospital: [facility] Room #307 [] 1st Time Acute  [] Stat  [x] Routine  [] Urgent    
[] Acute Room  []  Bedside  [x] ICU/CCU  [] ER Isolation Precautions:  [x] Dialysis   [] Airborne   [] Contact    [] Reverse Special Considerations:         [] Blood Consent Verified [x]N/A ALLERGIES:   [] NKA Allergies Banana Other (comments)   9/8/2017 Code Status:  [x] Full Code  [] DNR  [] Other HBsAg ONLY: Date Drawn 8/27/18         [x]Negative []Positive []Unknown HBsAb: Date 8/27/18    [] Susceptible   [x] [id number] []Not Drawn  [] Drawn Current Labs:    Date of Labs: 8/31/18         Today [x] Results for Tarah Aguilar (MRN [id number]) as of 8/31/2018 14:00 Ref. Range 8/31/2018 03:45 WBC Latest Ref Range: 4.6 - 13.2 K/uL 2.5 (L) RBC Latest Ref Range: 4.20 - 5.30 M/uL 2.30 (L) HGB Latest Ref Range: 12.0 - 16.0 g/dL 7.3 (L) HCT Latest Ref Range: 35.0 - 45.0 % 21.1 (L) MCV Latest Ref Range: 74.0 - 97.0 FL 91.7 MCH Latest Ref Range: 24.0 - 34.0 PG 31.7 MCHC Latest Ref Range: 31.0 - 37.0 g/dL 34.6 RDW Latest Ref Range: 11.6 - 14.5 % 12.2 PLATELET Latest Ref Range: 135 - 420 K/uL 269 MPV Latest Ref Range: 9.2 - 11.8 FL 9.9 NEUTROPHILS Latest Ref Range: 42 - 75 % 42 LYMPHOCYTES Latest Ref Range: 20 - 51 % 30 MONOCYTES Latest Ref Range: 2 - 9 % 26 (H) EOSINOPHILS Latest Ref Range: 0 - 5 % 2  
BASOPHILS Latest Ref Range: 0 - 3 % 0  
DF Latest Units:   MANUAL  
ABS. NEUTROPHILS Latest Ref Range: 1.8 - 8.0 K/UL 0.9 (L)  
ABS. LYMPHOCYTES Latest Ref Range: 0.8 - 3.5 K/UL 0.8 ABS. MONOCYTES Latest Ref Range: 0 - 1.0 K/UL 0.7 ABS. EOSINOPHILS Latest Ref Range: 0.0 - 0.4 K/UL 0.1 ABS. BASOPHILS Latest Ref Range: 0.0 - 0.06 K/UL 0.0  
RBC COMMENTS Latest Units:   HYPOCHROMIA. .. PLATELET COMMENTS Latest Units:   ADEQUATE PLATELETS Sodium Latest Ref Range: 136 - 145 mmol/L 139 Potassium Latest Ref Range: 3.5 - 5.5 mmol/L 4.5 Chloride Latest Ref Range: 100 - 108 mmol/L 103 CO2 Latest Ref Range: 21 - 32 mmol/L 29 Anion gap Latest Ref Range: 3.0 - 18 mmol/L 7 Glucose Latest Ref Range: 74 - 99 mg/dL 96 BUN Latest Ref Range: 7.0 - 18 MG/DL 25 (H) Creatinine Latest Ref Range: 0.6 - 1.3 MG/DL 4.09 (H) BUN/Creatinine ratio Latest Ref Range: 12 - 20   6 (L) Calcium Latest Ref Range: 8.5 - 10.1 MG/DL 7.9 (L) Phosphorus Latest Ref Range: 2.5 - 4.9 MG/DL 4.8 Magnesium Latest Ref Range: 1.6 - 2.6 mg/dL 2.1 GFR est non-AA Latest Ref Range: >60 ml/min/1.73m2 11 (L)  
GFR est AA Latest Ref Range: >60 ml/min/1.73m2 14 (L) Bilirubin, total Latest Ref Range: 0.2 - 1.0 MG/DL 0.4 Protein, total Latest Ref Range: 6.4 - 8.2 g/dL 5.3 (L) Albumin Latest Ref Range: 3.4 - 5.0 g/dL 2.3 (L) Globulin Latest Ref Range: 2.0 - 4.0 g/dL 3.0 A-G Ratio Latest Ref Range: 0.8 - 1.7   0.8 ALT (SGPT) Latest Ref Range: 13 - 56 U/L 8 (L) AST Latest Ref Range: 15 - 37 U/L 18 Alk. phosphatase Latest Ref Range: 45 - 117 U/L 102 TOTAL CELLS COUNTED Latest Units:   50 DIET:  [x] Renal- soft solids   [] Other     [] NPO     []  Diabetic PRIMARY NURSE REPORT: First initial/Last name/Title Pre Dialysis:BARRIE Galeana RN  Time: 0471 EDUCATION:  
[x] Patient [] Other         Knowledge Basis: []None []Minimal [] Substantial - unknown d/t language barriers. Barriers to learning- [place] language only, pt selectively mute during language line call []N/A [x] Access Care     [] S&S of infection     [] Fluid Management     []K+     [x]Procedural   
[]Albumin     [] Medications     [] Tx Options     [] Transplant     [] Diet     [] Other Teaching Tools:  [x] Explain  [] Demo  [] Handouts [] Video Patient response:  [] Verbalized understanding  [] Teach back  [] Return demonstration [] Requires follow up- Pt not engaged in teaching. Inappropriate due to N/A [x]Time Out/Safety Check  [x]Extracorporeal Circuit Tested for integrity RO/HEMODIALYSIS MACHINE SAFETY CHECKS  Before each treatment:    
Machine Number:                   [facility] 
                                [] Unit Machine #  with centralized RO 
                                [] Portable Machine #1/RO serial # [id number] [x] Portable Machine #2/RO serial # [id number] [] Portable Machine #4/RO serial # [id number] [facility] 
                                [] Portable Machine #11/RO serial # [id number] [] Portable Machine #12/RO serial # [id number] [] Portable Machine #13/RO serial #  [id number] Alarm Test:  Pass time 1001 [x] RO/Machine Log Complete Temp    37 Dialysate: pH  7.4Conductivity: Meter   14.0     HD Machine [x][x][x][x][x][x][x][x][x][x][x][x][x][x][x][x][x][x][x][x][x][x][x][x][x][x][x][x][x][x][x][x][x][x][x][x][x][x][x][x][x][x][x][x][x][x][x][x][x][x][x][x][x][x][x][x][x][x][x][x][x][x]

## 2018-08-31 NOTE — DISCHARGE INSTRUCTIONS
Acute High Blood Pressure: Care Instructions  Your Care Instructions    Acute high blood pressure is very high blood pressure. It's a serious problem. Very high blood pressure can damage your brain, heart, eyes, and kidneys. You may have been given medicines to lower your blood pressure. You may have gotten them as pills or through a needle in one of your veins. This is called an IV. And maybe you were given other medicines too. These can be needed when high blood pressure causes other problems. To keep your blood pressure at a lower level, you may need to make healthy lifestyle changes. And you will probably need to take medicines. Be sure to follow up with your doctor about your blood pressure and what you can do about it. Follow-up care is a key part of your treatment and safety. Be sure to make and go to all appointments, and call your doctor if you are having problems. It's also a good idea to know your test results and keep a list of the medicines you take. How can you care for yourself at home? · See your doctor as often as he or she recommends. This is to make sure your blood pressure is under control. You will probably go at least 2 times a year. But it may be more often at first.  · Take your blood pressure medicine exactly as prescribed. You may take one or more types. They include diuretics, beta-blockers, ACE inhibitors, calcium channel blockers, and angiotensin II receptor blockers. Call your doctor if you think you are having a problem with your medicine. · If you take blood pressure medicine, talk to your doctor before you take decongestants or anti-inflammatory medicine, such as ibuprofen. These can raise blood pressure. · Learn how to check your blood pressure at home. Check it often. · Ask your doctor if it's okay to drink alcohol. · Talk to your doctor about lifestyle changes that can help blood pressure. These include being active and not smoking.   When should you call for help?  Call 911 anytime you think you may need emergency care. This may mean having symptoms that suggest that your blood pressure is causing a serious heart or blood vessel problem. Your blood pressure may be over 180/110.   For example, call 911 if:    · You have symptoms of a heart attack. These may include:  ¨ Chest pain or pressure, or a strange feeling in the chest.  ¨ Sweating. ¨ Shortness of breath. ¨ Nausea or vomiting. ¨ Pain, pressure, or a strange feeling in the back, neck, jaw, or upper belly or in one or both shoulders or arms. ¨ Lightheadedness or sudden weakness. ¨ A fast or irregular heartbeat.     · You have symptoms of a stroke. These may include:  ¨ Sudden numbness, tingling, weakness, or loss of movement in your face, arm, or leg, especially on only one side of your body. ¨ Sudden vision changes. ¨ Sudden trouble speaking. ¨ Sudden confusion or trouble understanding simple statements. ¨ Sudden problems with walking or balance. ¨ A sudden, severe headache that is different from past headaches.     · You have severe back or belly pain.    Do not wait until your blood pressure comes down on its own. Get help right away.   Call your doctor now or seek immediate care if:    · Your blood pressure is much higher than normal (such as 180/110 or higher), but you don't have symptoms.     · You think high blood pressure is causing symptoms, such as:  ¨ Severe headache. ¨ Blurry vision.    Watch closely for changes in your health, and be sure to contact your doctor if:    · Your blood pressure measures 140/90 or higher at least 2 times. That means the top number is 140 or higher or the bottom number is 90 or higher, or both.     · You think you may be having side effects from your blood pressure medicine.     · Your blood pressure is usually normal, but it goes above normal at least 2 times. Where can you learn more? Go to http://cristino-anne.info/.   Enter Z653 in the search box to learn more about \"Acute High Blood Pressure: Care Instructions. \"  Current as of: May 10, 2017  Content Version: 11.7  © 4039-6627 [employer]. Care instructions adapted under license by [employer] (which disclaims liability or warranty for this information). If you have questions about a medical condition or this instruction, always ask your healthcare professional. [place] [address] any warranty or liability for your use of this information. Discharge Instructions    Patient: Mirlande Berumen MRN: [id number]  CSN: [id number]    YOB: 1960  Age: 56 y.o. Sex: female    DOA: 8/22/2018 LOS:  LOS: 9 days   Discharge Date:      DIET:  Renal Diet    ACTIVITY: Activity as tolerated  Home health care for PT/OT consult    ADDITIONAL INFORMATION: If you experience any of the following symptoms but not limited to Fever, chills, nausea, vomiting, diarrhea, change in mentation, falling, bleeding, shortness of breath, chest pain, please call your primary care physician or return to the emergency room if you cannot get hold of your doctor:     FOLLOW UP CARE:  Dr. Shauna Pruitt MD in 7-10 days. Please call and set up an appointment.    on [facility]    Edelmira Lucas MD  8/31/2018 2:00 PM

## 2018-08-31 NOTE — PROGRESS NOTES
Occupational Therapy Note Patient: Adrián Cuellar (64 y.o. female) Date: 8/31/2018 Diagnosis: Hypertensive emergency <principal problem not specified> Precautions: Fall Chart, occupational therapy assessment, plan of care, and goals were reviewed. Occupational Therapy treatment attempted. Patient is unable to participate due to: 
[]  Nausea/vomiting 
[]  Eating 
[]  Pain 
[]  Pt lethargic 
[]  Off Unit 
[x] Other: 
Attempt: x1 (12:50) pt is undergoing dialysis Attempt x2 (14:27) pt remains on dialysis Will f/u later as schedule allows. Thank you.  
 
FREDA Perrin

## 2018-08-31 NOTE — PROGRESS NOTES
RENAL DAILY PROGRESS NOTE 51y F with ESRD, admitted with HTN urgency and weakness Subjective:  
 
 
Complaint:  
Examined during HD in ICU. BP gradually improving, Now on minoxidil , off hydralazine. IMPRESSION:  
· ESRD, MWF 
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  
 At 11:09 AM on 2018, I saw and examined patient during hemodialysis treatment. The patient was receiving hemodialysis for treatment of  renal failure. I have also reviewed vital signs, intake and output, lab results and recent events, and agreed with today's dialysis order. BP running low, decrease UF goal to 1.5L Okay to discharge from renal stand point. Adjust all meds per ESRD status 
    
 
HD rounding Blood pressure 122/70, pulse 69, temperature 97.9 °F (36.6 °C), resp. rate 17, height 5' 1\" (1.549 m), weight 45 kg (99 lb 3.3 oz), SpO2 100 %, not currently breastfeeding. Temp (24hrs), Av.2 °F (36.8 °C), Min:97.9 °F (36.6 °C), Max:98.4 °F (36.9 °C) Blood Pressure: BP: 122/70 Pulse: Pulse (Heart Rate): 69 Temp:  Temp: 97.9 °F (36.6 °C) Artificial Kidney Dialyzer/Set Up Inspection: Revaclear  
hours Duration of Treatment (hours): 3.5 hours Heparin Bolus Blood flow rate Blood Flow Rate (ml/min): 350 ml/min Dialysate rate Arterial Access Pressure Arterial Access Pressure (mmHg): -170 Venous Return Pressure Venous Return Pressure (mmHg): 100 Ultrafiltration Rate Goal/Amount of Fluid to Remove (mL): 3000 mL Fluid Removal Fluid Removed (mL): 2000 Current Facility-Administered Medications Medication Dose Route Frequency  minoxidil (LONITEN) tablet 2.5 mg  2.5 mg Oral DAILY  rifAMPin (RIFADIN) capsule 600 mg  600 mg Oral Q24H  
 aspirin chewable tablet 81 mg  81 mg Oral DAILY  ondansetron (ZOFRAN) injection 6 mg  6 mg IntraVENous Q6H PRN  
  losartan (COZAAR) tablet 100 mg  100 mg Oral DAILY  metoclopramide HCl (REGLAN) injection 5 mg  5 mg IntraVENous BID  melatonin tablet 3 mg  3 mg Oral QHS  acetaminophen (TYLENOL) solution 500 mg  500 mg Oral Q4H PRN  polyethylene glycol (MIRALAX) packet 17 g  17 g Oral DAILY  labetalol (NORMODYNE;TRANDATE) 20 mg/4 mL (5 mg/mL) injection 10 mg  10 mg IntraVENous Q6H PRN  
 isoniazid (NYDRAZID) tablet 300 mg  300 mg Oral DAILY  atorvastatin (LIPITOR) tablet 40 mg  40 mg Oral QHS  bisacodyl (DULCOLAX) tablet 5 mg  5 mg Oral DAILY PRN  
 docusate sodium (COLACE) capsule 100 mg  100 mg Oral BID  ethambutol (MYAMBUTOL) tablet 800 mg  800 mg Oral Q TUE, THU & SAT  pyrazinamide tablet 1,000 mg  1,000 mg Oral Q TUE, THU & SAT  pyridoxine (vitamin B6) (VITAMIN B-6) tablet 100 mg  100 mg Oral DAILY  sodium chloride (NS) flush 5-10 mL  5-10 mL IntraVENous Q8H  
 sodium chloride (NS) flush 5-10 mL  5-10 mL IntraVENous PRN  
 heparin (porcine) injection 5,000 Units  5,000 Units SubCUTAneous Q8H  
 carvedilol (COREG) tablet 25 mg  25 mg Oral Q12H  
 amLODIPine (NORVASC) tablet 10 mg  10 mg Oral DAILY  albuterol (PROVENTIL VENTOLIN) nebulizer solution 2.5 mg  2.5 mg Nebulization Q4H PRN  
 cloNIDine (CATAPRES) 0.3 mg/24 hr patch 1 Patch  1 Patch TransDERmal Q7D Review of Symptoms: comprehensive ROS negative except above. Objective:  
 
Patient Vitals for the past 24 hrs: 
 Temp Pulse Resp BP SpO2  
08/31/18 0530 - 69 17 - 100 % 08/31/18 0500 - 73 18 122/70 98 % 08/31/18 0430 - 68 19 - 99 % 08/31/18 0400 97.9 °F (36.6 °C) 75 18 134/82 99 % 08/31/18 0330 - 74 16 - 100 % 08/31/18 0300 - 68 17 105/67 100 % 08/31/18 0230 - 70 14 - 100 % 08/31/18 0200 - 71 16 113/71 99 % 08/31/18 0130 - 68 18 - 100 % 08/31/18 0100 - 70 19 114/64 99 % 08/31/18 0030 - 63 18 - 99 % 08/31/18 0000 98.3 °F (36.8 °C) 80 21 125/85 100 % 08/30/18 2330 - 80 20 - 100 % 08/30/18 2300 - 81 19 126/81 100 % 08/30/18 2230 - 79 18 - 100 % 08/30/18 2200 - 77 19 139/77 100 % 08/30/18 2130 - 77 22 - 100 % 08/30/18 2126 - 76 - 153/79 -  
08/30/18 2100 - 81 20 153/79 100 % 08/30/18 2030 - 80 21 - 100 % 08/30/18 2000 98.2 °F (36.8 °C) 77 18 131/79 100 % 08/30/18 1700 98.4 °F (36.9 °C) 81 23 139/77 100 % 08/30/18 1500 - 75 15 120/66 100 % 08/30/18 1200 98.2 °F (36.8 °C) 70 23 115/69 100 % Weight change: 1.2 kg (2 lb 10.3 oz) 08/29 1901 - 08/31 0700 In: 690 [P.O.:440; I.V.:250] Out: - Intake/Output Summary (Last 24 hours) at 08/31/18 1107 Last data filed at 08/31/18 0400 Gross per 24 hour Intake              240 ml Output                0 ml Net              240 ml Physical Exam 
General: comfortable, no acute distress HEENT sclera anicteric, CVS: S1S2 heard,  no rub RS: + air entry b/l, Abd: Soft, Non tender, Neuro:  awake, Extrm: no  edema, no cyanosis, clubbing Skin: no visible  Rash Musculoskeletal: No gross joints or bone deformities Access; TDC right side Procedures/imaging: see electronic medical records for all procedures, Xrays and details which were not copied into this note but were reviewed prior to creation of Plan Data Review:  
 
LABS:  
Hematology:  
Recent Labs 08/31/18 
 0345  08/30/18 
 0224  08/29/18 
 0200 WBC  2.5*  2.5*  2.2* HGB  7.3*  7.6*  7.7* HCT  21.1*  22.1*  21.8* Chemistry:  
Recent Labs 08/31/18 
 0345  08/30/18 
 0224  08/29/18 
 0200 BUN  25*  13  24* CREA  4.09*  2.35*  3.96* CA  7.9*  8.8  8.6 ALB  2.3*  2.5*  2.3*  
K  4.5  3.8  4.2 NA  139  140  139 CL  103  103  106 CO2  29  30  28 PHOS  4.8  2.3*  3.1 GLU  96  95  106* Procedures/imaging: see electronic medical records for all procedures, Xrays and details which were not copied into this note but were reviewed prior to creation of Plan Assessment & Plan: As above Grayson Hernandez MD 
8/31/2018 
5:29 PM

## 2018-08-31 NOTE — PROGRESS NOTES
Spoke with son Jennifer Junior via phone. Son refused HHC. States they do not need HHC. Son states they already have a walker and needed DME. Dr. Natasha amaya.

## 2018-08-31 NOTE — PROGRESS NOTES
Problem: Mobility Impaired (Adult and Pediatric) Goal: *Acute Goals and Plan of Care (Insert Text) Physical Therapy Goals Initiated 8/27/2018 and to be accomplished within 7 day(s) 1. Patient will move from supine to sit and sit to supine , scoot up and down and roll side to side in bed with supervision/set-up. 2.  Patient will transfer from bed to chair and chair to bed with minimal assistance/contact guard assist using the least restrictive device. 3.  Patient will perform sit to stand with supervision/set-up. 4.  Patient will ambulate with minimal assistance/contact guard assist for >50 feet with the least restrictive device. 5.  Patient will ascend/descend 3 stairs with 1-2 handrail(s) with minimal assistance/contact guard assist.  
 
PT tx attempt at 1250. Pt currently receiving dialysis in room. Will attempt at later time/date if appropriate per pt schedule. 2nd PT tx attempted at 1427. Pt remains on dialysis tx in room. Will follow up at later date if pt remains in hospital care. VIRI Feldman

## 2018-09-02 NOTE — DISCHARGE SUMMARY
[address] [place] [address] Dr DISCHARGE SUMMARY Richie Jimenez 
MR#: [id number] : 1960 ACCOUNT #: [de-identified] ADMIT DATE: 2018 DISCHARGE DATE: 2018 PRIMARY CARE PHYSICIAN:  Zane Friedman MD 
 
DISPOSITION:  Discharged home with home health. FINAL DISCHARGE CONDITION:  Stable. DISCHARGE DIAGNOSES: 
1. Hypertensive urgency. 2.  Left-sided weakness transient, resolved now due to uncontrolled hypertension. 3.  End-stage renal disease, on hemodialysis. 4.  History of seizure disorders. 5.  Severe nausea, vomiting, possibly due to uncontrolled hypertension, resolved now. 6.  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 7.  Acute on chronic anemia. 8.  Leukopenia. 9.  Chronic constipation. 10.  Mild dysphagia. DISCHARGE MEDICATIONS: 
1. Albuterol 2 puffs q.4 hours p.r.n.. 2.  Amlodipine 10 mg daily. 3.  Aspirin 81 mg daily. 4.  Lipitor 40 mg at bedtime. 5.  Zithromax 500 mg every Tuesday, Thursday and Saturday. 6.  Dulcolax 5 mg as needed for constipation. 7.  Coreg 25 mg b.i.d.. 8.  Clonidine 0.3 mg patch every 7 days. 9.  Colace 100 mg b.i.d. 10.  Ethambutol 2 tablets 400 mg every Tuesday, Thursday and Saturday at 5:00 p.m. 11.  Prozac 10 mg daily. 12.  Glycerin suppository as needed for constipation. 13.  Isoniazid 300 mg on dialysis days. 14.  Losartan 100 mg daily. 15.  Magnesium citrate as needed for constipation. 16.  Melatonin 3 mg at bedtime as needed. 17.  Minoxidil 2.5 mg daily. 18.  Zofran 4 mg every 8 hours p.r.n. 19.  Percocet 1 tablet every 4 hours p.r.n.  
20.  Pyrazinamide 500 mg 2 tablets on Tuesday, Thursday and Saturday. 21.  Vitamin B6 100 mg daily. 22.  Rifampin 300 mg 2 capsules on dialysis days.    
 
CONSULTATIONS IN THE HOSPITAL:  Consultation with Dr. Wilberto Segura, Nephrology, consultation and Dr. Dagoberto Mccollum, Neurology, consultation with Dr. Lew Parikh, Pulmonary Critical Care. MAJOR INVESTIGATIONS IN THE HOSPITAL:   
1. Patient had a CT head on admission which showed diffuse hypoattenuation and bilateral thalamic cerebral and brainstem noted, possibly could be from hypertensive encephalopathy. 2.  CTA chest, abdomen and pelvis was ordered, which showed no signs of dissection, no pulmonary embolism, patchy airspace lung disease as noted. 3.   Patient also had a CT abdomen and pelvis which showed anasarca with edematous soft tissue, pleural effusion and ascites, pericardial effusion. Otherwise, no abdominal pathology noted. 4.  Ultrasound abdomen was also done which showed portal vein flow volume, some mild heart dysfunction. Otherwise, no evidence of cholecystitis or cholelithiasis. HOSPITAL COURSE:  She is a 51-year-old [place] female who does not speak English comes to the emergency room with weakness over the past few days and the patient was noted to have left-sided weakness. CNS exam which is negative and she is noted to have very uncontrolled hypertension with blood pressure in the 200s  It is thought that her weakness was most likely from her uncontrolled hypertension. Recently, the patient was admitted to the hospital and started on Cardene drip. Neurology was consulted. He recommended further workup and neurological consultation. Patient's weakness resolved and did not recur at all. Patient's blood pressure was very hard to control and was slowly controlled with Cardene drip to start with and then slowly weaned off Cardene drip and was started on p.o. medications. Patient had a very fluctuating blood pressure. She dropped well with Cardene drip and then once Cardene drip was taken off her blood pressure went up so she had to go back on Cardene drip couple of times and finally with p.o. medications her blood pressure has improved. Patient's Cardene was discontinued.   Neurology saw the patient and also reviewed CT. It was recommended her symptoms are most likely secondary to uncontrolled hypertension documented no intervention, but recommend to continue aggressive blood pressure control. We will recommend no need further MRI. Nephrology was involved as the patient is on dialysis. The patient underwent dialysis in the ICU. Once her blood pressure stabilized, the patient was transferred to the ICU and was changed to step down status. Patient remained in the ICU as she required dialysis done at the bedside. There is no beds available at stepdown. Patient currently is tolerating dialysis well. Her blood pressure remained stable. Day before when she had dialysis, she did drop her blood pressure with the dialysis so her dialysis was discontinued, but she was continued on minoxidil and other medications as noted. Patient currently today, tolerating blood pressure well with dialysis. I discussed with nephrology who cleared the patient for discharge. She tolerates dialysis without any problem. Patient was seen and evaluated by physical therapy, recommended SNF placement. I had a long discussion with the patient's daughter and also son-in-law who can speak English at the bedside and also with the patient with the  and explained about the need of going to SNF. The patient completely declined going to SNF and family does not want her to go to SNF. They want patient to go home. Family says her daughter and son lives at the house and they can take care of the patient. They declined SNF. I did explain to them the risks and benefits including risk of fall and also close monitoring of blood pressure. Family says they will take care of the patient at home. They completely understand the risk and benefit and they want to take her home. Patient really, really wants to go home.   If her dialysis and blood pressure remain stable and no further complaints, the patient will be discharged home today. I have discussed with RN Sheldon Fox who is going to monitor the patient post dialysis and if her blood pressure remains stable, patient will be discharged to home with outpatient followup and also home health care. For discharge instructions, followup appointments and physical exam, please refer to the previous discharge summary. Patient alert, awake and oriented x3. She does not speak [employer]. I have discussed with her son-in-law the patient's  both the discharge plan and  followup appointments. All of them completely understood and are agreeable with the plan. Also, call if questions or concerns. I also discussed with them that she needs to follow up with dialysis and have set up home health care per physical therapy and blood pressure will be monitored and hemodialysis. I have discussed with Dr. Enrrique De La Cruz, nephrologist, who will be following the patient, but still we will be following the patient outpatient. Guille Story MD BT/JESSICA 
D: 08/31/2018 14:19 T: 09/01/2018 05:52 JOB #: [id number]

## 2018-09-11 NOTE — ED PROVIDER NOTES
EMERGENCY DEPARTMENT HISTORY AND PHYSICAL EXAM 
 
9:07 AM 
 
 
Date: 9/11/2018 Patient Name: Brooks Simpson History of Presenting Illness No chief complaint on file. History Provided By: Patient's Son Chief Complaint: shortness of breath Duration: 1 Days Timing:  Acute Location: abdomen Quality: n/a Severity: Moderate Modifying Factors: did not take HTN medications today Associated Symptoms: nausea, abdominal distention Additional History (Context): Brooks Simpson is a 56 y.o. female with hypertension, hyperlipidemia and ESRD who presents with 1 day of acute onset moderate shortness of breath, along with nausea, abdominal distention and did not take HTN medications today. Per son pt has had nausea, and shortness of breath since last night has been complaining of having fluid built up in her abdomen. Pt did not take her HTN medications this morning. Pt has dialysis M-W-F and was dialyzed yesterday. Pt has dialysis port in right upper arm. No other concerns or symptoms at this time. PCP: Jigar Phipps MD 
 
Current Facility-Administered Medications Medication Dose Route Frequency Provider Last Rate Last Dose  losartan (COZAAR) tablet 100 mg  100 mg Oral DAILY Yao French MD   100 mg at 09/11/18 3717 Current Outpatient Prescriptions Medication Sig Dispense Refill  minoxidil (LONITEN) 2.5 mg tablet Take 1 Tab by mouth daily. 30 Tab 0  
 ondansetron (ZOFRAN ODT) 4 mg disintegrating tablet Take 1 Tab by mouth every eight (8) hours as needed for Nausea. 20 Tab 0  
 bisacodyl (DULCOLAX) 5 mg EC tablet Take 1 Tab by mouth daily as needed for Constipation. 10 Tab 0  
 magnesium citrate solution Take 1/3 of bottle every 8 hours until finished or until you have a bowel movement. 1 Bottle 0  
 albuterol (PROVENTIL HFA, VENTOLIN HFA, PROAIR HFA) 90 mcg/actuation inhaler Take 2 Puffs by inhalation every four (4) hours as needed for Wheezing.  1 Inhaler 0  
  ethambutol (MYAMBUTOL) 400 mg tablet Take 2 Tabs by mouth every Tuesday Thursday, Saturday. At 5 PM 24 Tab 0  
 isoniazid (NYDRAZID) 300 mg tablet Take 1 Tab by mouth daily. On dialysis days, please take medication after dialysis 30 Tab 0  pyrazinamide 500 mg tablet Take 2 Tabs by mouth every Tuesday Thursday, Saturday. At 5 pm  Indications: active tuberculosis 24 Tab 0  
 rifAMPin (RIFADIN) 300 mg capsule Take 2 Caps by mouth daily. On dialysis days please take this medication after Dialysis 60 Cap 0  cloNIDine (CATAPRES) 0.3 mg/24 hr 1 Patch by TransDERmal route every seven (7) days. 4 Patch 0  
 docusate sodium (COLACE) 100 mg capsule Take 1 Cap by mouth two (2) times a day. 60 Cap 0  
 oxyCODONE-acetaminophen (PERCOCET) 5-325 mg per tablet Take 1 Tab by mouth every eight (8) hours as needed. Max Daily Amount: 3 Tabs. (Patient taking differently: Take 1 Tab by mouth every eight (8) hours as needed for Pain.) 6 Tab 0  pyridoxine, vitamin B6, (VITAMIN B-6) 100 mg tablet Take 1 Tab by mouth daily. 30 Tab 0  
 azithromycin (ZITHROMAX) 500 mg tab Take 1 Tab by mouth every Tuesday Thursday, Saturday. At 5 PM 12 Tab 0  
 OTHER Check CBC, CMP, MG on 07/25/18, Results to PCP immediately, Diagnosis- TB 1 Each 0  
 OTHER Incentive Spirometry- use as directed 1 Each 0  
 OTHER Incentive Spirometry- Use as directed 1 Each 0  
 OTHER This is to certify that Mandie Chaudhari was admitted to [facility] on 07/16/18 and is still receiving medical care here. 1 Each 0  
 amLODIPine (NORVASC) 10 mg tablet Take 1 Tab by mouth daily. 30 Tab 0  
 aspirin 81 mg chewable tablet Take 1 Tab by mouth daily. 30 Tab 0  
 atorvastatin (LIPITOR) 40 mg tablet Take 1 Tab by mouth nightly. 30 Tab 0  carvedilol (COREG) 25 mg tablet Take 1 Tab by mouth every twelve (12) hours. 60 Tab 0  
 losartan (COZAAR) 100 mg tablet Take 1 Tab by mouth daily.  30 Tab 0  
  melatonin 3 mg tablet Take 1 Tab by mouth nightly as needed. (Patient taking differently: Take 1 Tab by mouth nightly as needed (insomnia). ) 30 Tab 0  
 FLUoxetine (PROZAC) 10 mg capsule Take 1 Cap by mouth daily. 30 Cap 0  
 glycerin, adult, (FLEET GLYCERIN, ADULT,) suppository Insert 1 Suppository into rectum daily. Indications: BOWEL EVACUATION 10 Suppository 0 Past History Past Medical History: 
Past Medical History:  
Diagnosis Date  Chronic kidney disease  [facility] TUES-THURS-SAT  Hypercholesteremia  Hypertension  Kidney disease  Vitamin D deficiency Past Surgical History: 
Past Surgical History:  
Procedure Laterality Date  VASCULAR SURGERY PROCEDURE UNLIST Family History: No family history on file. Social History: 
Social History Substance Use Topics  Smoking status: Never Smoker  Smokeless tobacco: Never Used  Alcohol use No  
 
 
Allergies: Allergies Allergen Reactions  Banana Other (comments) Review of Systems Review of Systems Constitutional: Negative for activity change and appetite change. HENT: Negative for congestion. Eyes: Negative for visual disturbance. Respiratory: Positive for shortness of breath. Negative for cough. Cardiovascular: Positive for chest pain. Gastrointestinal: Positive for abdominal distention and nausea. Negative for abdominal pain, diarrhea and vomiting. Genitourinary: Negative for dysuria. Musculoskeletal: Negative for arthralgias and myalgias. Skin: Negative for rash. Neurological: Negative for weakness and numbness. All other systems reviewed and are negative. Physical Exam  
 
Visit Vitals  /90  Pulse 65  Temp 98.4 °F (36.9 °C)  Resp 25  
 Ht 5' 1\" (1.549 m)  Wt 47 kg (103 lb 9.9 oz)  SpO2 99%  BMI 19.58 kg/m2 Physical Exam  
 Constitutional: She is oriented to person, place, and time. She appears well-developed and well-nourished. HENT:  
Head: Normocephalic and atraumatic. Mouth/Throat: Oropharynx is clear and moist.  
Eyes: Conjunctivae are normal.  
Neck: Normal range of motion. Neck supple. No JVD present. Cardiovascular: Normal rate, regular rhythm, normal heart sounds and intact distal pulses. No murmur heard. Pulmonary/Chest: Effort normal and breath sounds normal.  
Abdominal: Soft. Bowel sounds are normal. She exhibits no distension. There is no tenderness. Musculoskeletal: Normal range of motion. She exhibits no deformity. Lymphadenopathy:  
  She has no cervical adenopathy. Neurological: She is alert and oriented to person, place, and time. Coordination normal.  
Skin: Skin is warm and dry. No rash noted. Dialysis port right upper arm Psychiatric: She has a normal mood and affect. Nursing note and vitals reviewed. Diagnostic Study Results Labs - Recent Results (from the past 12 hour(s)) EKG, 12 LEAD, INITIAL Collection Time: 09/11/18  9:10 AM  
Result Value Ref Range Ventricular Rate 81 BPM  
 Atrial Rate 234 BPM  
 QRS Duration 134 ms Q-T Interval 510 ms QTC Calculation (Bezet) 592 ms Calculated R Axis 77 degrees Calculated T Axis 87 degrees Diagnosis Wide QRS rhythm Nonspecific intraventricular block Nonspecific T wave abnormality Abnormal ECG When compared with ECG of 25-AUG-2018 12:18, 
Wide QRS rhythm has replaced Sinus rhythm CBC WITH AUTOMATED DIFF Collection Time: 09/11/18  9:24 AM  
Result Value Ref Range WBC 3.4 (L) 4.6 - 13.2 K/uL  
 RBC 2.55 (L) 4.20 - 5.30 M/uL HGB 8.1 (L) 12.0 - 16.0 g/dL HCT 23.3 (L) 35.0 - 45.0 % MCV 91.4 74.0 - 97.0 FL  
 MCH 31.8 24.0 - 34.0 PG  
 MCHC 34.8 31.0 - 37.0 g/dL  
 RDW 11.9 11.6 - 14.5 % PLATELET 189 981 - 133 K/uL MPV 9.8 9.2 - 11.8 FL  
 NEUTROPHILS 64 40 - 73 % LYMPHOCYTES 18 (L) 21 - 52 % MONOCYTES 14 (H) 3 - 10 % EOSINOPHILS 2 0 - 5 % BASOPHILS 2 0 - 2 %  
 ABS. NEUTROPHILS 2.2 1.8 - 8.0 K/UL  
 ABS. LYMPHOCYTES 0.6 (L) 0.9 - 3.6 K/UL  
 ABS. MONOCYTES 0.5 0.05 - 1.2 K/UL  
 ABS. EOSINOPHILS 0.1 0.0 - 0.4 K/UL  
 ABS. BASOPHILS 0.1 0.0 - 0.1 K/UL  
 DF AUTOMATED METABOLIC PANEL, COMPREHENSIVE Collection Time: 09/11/18  9:24 AM  
Result Value Ref Range Sodium 139 136 - 145 mmol/L Potassium 5.1 3.5 - 5.5 mmol/L Chloride 103 100 - 108 mmol/L  
 CO2 26 21 - 32 mmol/L Anion gap 10 3.0 - 18 mmol/L Glucose 88 74 - 99 mg/dL BUN 36 (H) 7.0 - 18 MG/DL Creatinine 4.99 (H) 0.6 - 1.3 MG/DL  
 BUN/Creatinine ratio 7 (L) 12 - 20 GFR est AA 11 (L) >60 ml/min/1.73m2 GFR est non-AA 9 (L) >60 ml/min/1.73m2 Calcium 8.7 8.5 - 10.1 MG/DL Bilirubin, total 0.3 0.2 - 1.0 MG/DL  
 ALT (SGPT) <6 (L) 13 - 56 U/L  
 AST (SGOT) 22 15 - 37 U/L Alk. phosphatase 98 45 - 117 U/L Protein, total 6.3 (L) 6.4 - 8.2 g/dL Albumin 3.0 (L) 3.4 - 5.0 g/dL Globulin 3.3 2.0 - 4.0 g/dL A-G Ratio 0.9 0.8 - 1.7 NT-PRO BNP Collection Time: 09/11/18  9:24 AM  
Result Value Ref Range NT pro-BNP 40852 (H) 0 - 900 PG/ML  
TROPONIN I Collection Time: 09/11/18  9:24 AM  
Result Value Ref Range Troponin-I, Qt. 0.05 (H) 0.0 - 0.045 NG/ML Radiologic Studies -  
XR CHEST PORT Final Result Medical Decision Making I am the first provider for this patient. I reviewed the vital signs, available nursing notes, past medical history, past surgical history, family history and social history. Vital Signs-Reviewed the patient's vital signs. Pulse Oximetry Analysis -  100% on room air (Interpretation) Cardiac Monitor: 
Rate: 78 Rhythm:  Normal Sinus Rhythm EKG: Interpreted by the EP. Time Interpreted: 9:10 Normal sinus rhythm, rate 81, , QTc 592. No acute ST or T wave changes, no STEMI. Records Reviewed: Nursing Notes (Time of Review: 9:07 AM) ED Course: Progress Notes, Reevaluation, and Consults: 
11:57 AM blood pressure is now down into the 150's and pt feels much better and has no shortness of breath. Seen briefly by nephrology at bedside. Does not meet admission criteria and patient wants to go home. The patient will be discharged home. Findings were discussed at length and questions were answered. Information on all newly prescribed medications was given. the patient was instructed to follow-up with her PCP/nephrology, or return to the Emergency Department with any worsened symptoms or concerns. Return precautions were given. Provider Notes (Medical Decision Making):  
Ward Celeste is a 56 y.o. female with hypertension, hyperlipidemia and ESRD who presents with 1 day of acute onset moderate shortness of breath, along with nausea, abdominal distention and did not take HTN medications today. Noted to be hypertensive, but in no acute distress. Differential Diagnosis: suspect accelerated hypertension, will evalute for pulmonary edema, low uspicion for PE, ACS, infection Testing: cxr, cbc, cmp, bnp, troponin Treatments: will give initial SL nitro, followed by her home antihypertensives Diagnosis Clinical Impression: 1. Accelerated hypertension 2. SOB (shortness of breath) Disposition: home Follow-up Information Follow up With Details Comments Contact Info Sylvester Ferrara MD Schedule an appointment as soon as possible for a visit  [facility] [address] [place] [address] 
[phone] [facility] EMERGENCY DEPT  If symptoms worsen [address] [place] [address] [facility] [address] Patient's Medications Start Taking No medications on file Continue Taking ALBUTEROL (PROVENTIL HFA, VENTOLIN HFA, PROAIR HFA) 90 MCG/ACTUATION INHALER    Take 2 Puffs by inhalation every four (4) hours as needed for Wheezing. AMLODIPINE (NORVASC) 10 MG TABLET    Take 1 Tab by mouth daily. ASPIRIN 81 MG CHEWABLE TABLET    Take 1 Tab by mouth daily. ATORVASTATIN (LIPITOR) 40 MG TABLET    Take 1 Tab by mouth nightly. AZITHROMYCIN (ZITHROMAX) 500 MG TAB    Take 1 Tab by mouth every Tuesday Thursday, Saturday. At 5 PM  
 BISACODYL (DULCOLAX) 5 MG EC TABLET    Take 1 Tab by mouth daily as needed for Constipation. CARVEDILOL (COREG) 25 MG TABLET    Take 1 Tab by mouth every twelve (12) hours. CLONIDINE (CATAPRES) 0.3 MG/24 HR    1 Patch by TransDERmal route every seven (7) days. DOCUSATE SODIUM (COLACE) 100 MG CAPSULE    Take 1 Cap by mouth two (2) times a day. ETHAMBUTOL (MYAMBUTOL) 400 MG TABLET    Take 2 Tabs by mouth every Tuesday Thursday, Saturday. At 5 PM  
 FLUOXETINE (PROZAC) 10 MG CAPSULE    Take 1 Cap by mouth daily. GLYCERIN, ADULT, (FLEET GLYCERIN, ADULT,) SUPPOSITORY    Insert 1 Suppository into rectum daily. Indications: BOWEL EVACUATION  
 ISONIAZID (NYDRAZID) 300 MG TABLET    Take 1 Tab by mouth daily. On dialysis days, please take medication after dialysis LOSARTAN (COZAAR) 100 MG TABLET    Take 1 Tab by mouth daily. MAGNESIUM CITRATE SOLUTION    Take 1/3 of bottle every 8 hours until finished or until you have a bowel movement. MELATONIN 3 MG TABLET    Take 1 Tab by mouth nightly as needed. MINOXIDIL (LONITEN) 2.5 MG TABLET    Take 1 Tab by mouth daily. ONDANSETRON (ZOFRAN ODT) 4 MG DISINTEGRATING TABLET    Take 1 Tab by mouth every eight (8) hours as needed for Nausea. OTHER    This is to certify that Andra Lopez was admitted to [facility] on 07/16/18 and is still receiving medical care here. OTHER    Check CBC, CMP, MG on 07/25/18, Results to PCP immediately, Diagnosis- TB  
 OTHER    Incentive Spirometry- use as directed OTHER    Incentive Spirometry- Use as directed  OXYCODONE-ACETAMINOPHEN (PERCOCET) 5-325 MG PER TABLET    Take 1 Tab by mouth every eight (8) hours as needed. Max Daily Amount: 3 Tabs. PYRAZINAMIDE 500 MG TABLET    Take 2 Tabs by mouth every Tuesday Thursday, Saturday. At 5 pm  Indications: active tuberculosis PYRIDOXINE, VITAMIN B6, (VITAMIN B-6) 100 MG TABLET    Take 1 Tab by mouth daily. RIFAMPIN (RIFADIN) 300 MG CAPSULE    Take 2 Caps by mouth daily. On dialysis days please take this medication after Dialysis These Medications have changed No medications on file Stop Taking No medications on file  
 
_______________________________ Attestations: 
Scribe Attestation Purnima Brooke acting as a scribe for and in the presence of Patricia Barnett MD     
September 11, 2018 at 9:07 AM 
    
Provider Attestation:     
I personally performed the services described in the documentation, reviewed the documentation, as recorded by the scribe in my presence, and it accurately and completely records my words and actions. September 11, 2018 at 9:07 AM - Patricia Barnett MD   
_______________________________

## 2018-09-11 NOTE — ED TRIAGE NOTES
Patient arrived from home c/o shortness of breathe and nausea that started last night. Patient has right upper dialysis port Monday Wednesday Friday.

## 2018-10-02 PROBLEM — J18.9 HCAP (HEALTHCARE-ASSOCIATED PNEUMONIA): Status: ACTIVE | Noted: 2018-01-01

## 2018-10-02 PROBLEM — Z92.89: Status: ACTIVE | Noted: 2018-01-01

## 2018-10-02 NOTE — ED NOTES
Pt resting comfortably in bed with HOB elevated. Respirations regular/non labored with NRB at 10L in place. Eyes are closed; rouses easily to voice with family translating. Pt generally does not speak with did tell family she was \"tired\"; does shake head \"yes\" in response to questions and does follow commands. No distress noted at this time.

## 2018-10-02 NOTE — ROUTINE PROCESS
TRANSFER - IN REPORT: 
 
Verbal report received from [address] [place] [address] RN(name) on Val Brooks  being received from (unit) for change in patient condition(B/P 253/133, Pt started on Nicardipine drip) Report consisted of patients Situation, Background, Assessment and  
Recommendations(SBAR). Information from the following report(s) SBAR, Kardex, ED Summary, Intake/Output, Recent Results and Cardiac Rhythm SR  
 was reviewed with the receiving nurse. Opportunity for questions and clarification was provided. Assessment completed upon patients arrival to unit and care assumed.

## 2018-10-02 NOTE — PROGRESS NOTES
attended the interdisciplinary rounds for Brenton Louie, who is a 56 y.o.,female. Patients Primary Language is: [place]. According to the patients EMR Mandaen Affiliation is: Congregation. The reason the Patient came to the hospital is:  
Patient Active Problem List  
 Diagnosis Date Noted  Hx of TB skin testing 10/02/2018  HCAP (healthcare-associated pneumonia) 10/02/2018  Intractable vomiting with nausea 07/17/2018  ESRD on hemodialysis ([facility] [address] 07/17/2018  Pulmonary TB 07/17/2018  Hypertensive crisis 07/17/2018  Seizure ([facility] [address] 07/16/2018  Hypertensive emergency 07/16/2018  Complication of vascular dialysis catheter, initial encounter 06/18/2018  Anemia 02/26/2018  Hyponatremia 02/25/2018  ESRD (end stage renal disease) ([facility] [address] 02/25/2018  
 NSTEMI (non-ST elevated myocardial infarction) ([facility] [address] 02/25/2018  Respiratory failure ([facility] [address] 02/25/2018  Hypoxia 02/25/2018  Acute UTI 02/25/2018  Hyperkalemia 09/08/2017  Renal failure 09/08/2017  SCOOTER (acute kidney injury) ([facility] [address] 09/08/2017 Plan: 
Chaplains will continue to follow and will provide pastoral care on an as needed/requested basis.  recommends bedside caregivers page  on duty if patient shows signs of acute spiritual or emotional distress. [address] [facility] Board Certified [employer] [facility]  
[phone] ----- Message from Lauren García sent at 10/3/2017 11:42 AM EDT -----  Contact: YANDY  PATIENT SAID HE WAS SUPPOSED TO HAVE COME IN FOR LABWORK A WHILE BACK AND HE WOULD LIKE TO COME IN AND HAVE THAT DONE ON Friday THE 6TH. AND HAVE THAT DONE.     LABWORK ON WORK NEEDS ORDER

## 2018-10-02 NOTE — IP AVS SNAPSHOT
[address] [address] Omid Solis Patient: Roberto Dooley MRN: [id number] :1960 About your hospitalization You were admitted on:  2018 You last received care in the:  [address] [place] [address] You were discharged on:  2018 Why you were hospitalized Your primary diagnosis was:  Hypertensive Crisis Your diagnoses also included:  Hypoxia, Pulmonary Tb, Esrd (End Stage Renal Disease) (Hcc), Hcap (Healthcare-Associated Pneumonia) Follow-up Information Follow up With Details Comments Contact Sandra Ochoa MD  office closed,  will call monday AM with follow-up appt. [facility] [address] [facility] [address] 
[phone] Your Scheduled Appointments  10:00 AM EDT  
[facility] with [facility]  2022  [address] [place] [address] (General acute hospital) [address] [address]  
[phone]  ARTERIO VENOUS FISTULA/GRAFT ARM with Grant Munoz MD  
[facility] (General acute hospital) [address] [address]  
[phone]   2:45 PM EDT  
[facility] MIN with [facility]  2022  [address] [place] [address] (General acute hospital) [address] [address]  
[phone] Discharge Orders None A check catherine indicates which time of day the medication should be taken. My Medications CHANGE how you take these medications Instructions Each Dose to Equal  
 Morning Noon Evening Bedtime  
 glycerin (adult) suppository Commonly known as:  FLEET GLYCERIN (ADULT) What changed:   
- when to take this 
- reasons to take this Your last dose was: Your next dose is: Insert 1 Suppository into rectum daily as needed. Indications: BOWEL EVACUATION  
 1 Suppository  
    
   
   
   
  
 melatonin 3 mg tablet What changed:  reasons to take this Your last dose was: Your next dose is: Take 1 Tab by mouth nightly as needed. 3 mg  
    
   
   
   
  
 minoxidil 2.5 mg tablet Commonly known as:  Tiago Sender What changed:  when to take this Your last dose was: Your next dose is: Take 1 Tab by mouth two (2) times a day. 2.5 mg  
    
   
   
   
  
 OTHER What changed:  additional instructions Your last dose was: Your next dose is:    
   
   
 Check CBC, CMP, MG on 10/08/18, Results to PCP immediately, Diagnosis- TB  
     
   
   
   
  
  
CONTINUE taking these medications Instructions Each Dose to Equal  
 Morning Noon Evening Bedtime  
 albuterol 90 mcg/actuation inhaler Commonly known as:  PROVENTIL HFA, VENTOLIN HFA, PROAIR HFA Your last dose was: Your next dose is: Take 2 Puffs by inhalation every four (4) hours as needed for Wheezing. 2 Puff  
    
   
   
   
  
 amLODIPine 10 mg tablet Commonly known as:  Johnna Dotson Your last dose was: Your next dose is: Take 1 Tab by mouth daily. 10 mg  
    
   
   
   
  
 aspirin 81 mg chewable tablet Your last dose was: Your next dose is: Take 1 Tab by mouth daily. 81 mg  
    
   
   
   
  
 atorvastatin 40 mg tablet Commonly known as:  LIPITOR Your last dose was: Your next dose is: Take 1 Tab by mouth nightly. 40 mg  
    
   
   
   
  
 bisacodyl 5 mg EC tablet Commonly known as:  DULCOLAX Your last dose was: Your next dose is: Take 1 Tab by mouth daily as needed for Constipation. 5 mg  
    
   
   
   
  
 carvedilol 25 mg tablet Commonly known as:  Ruslan Centeno Your last dose was: Your next dose is: Take 1 Tab by mouth every twelve (12) hours. 25 mg  
    
   
   
   
  
 cloNIDine 0.3 mg/24 hr  
Commonly known as:  CATAPRES Your last dose was: Your next dose is:    
   
   
 1 Patch by TransDERmal route every seven (7) days. 1 Patch  
    
   
   
   
  
 docusate sodium 100 mg capsule Commonly known as:  Sharlette Perish Your last dose was: Your next dose is: Take 1 Cap by mouth two (2) times a day. 100 mg  
    
   
   
   
  
 ethambutol 400 mg tablet Commonly known as:  MYAMBUTOL Your last dose was: Your next dose is: Take 2 Tabs by mouth every Tuesday Thursday, Saturday. At 5 PM  
 800 mg FLUoxetine 10 mg capsule Commonly known as:  PROzac Your last dose was: Your next dose is: Take 1 Cap by mouth daily. 10 mg  
    
   
   
   
  
 isoniazid 300 mg tablet Commonly known as:  NYDRAZID Your last dose was: Your next dose is: Take 1 Tab by mouth daily. On dialysis days, please take medication after dialysis 300 mg  
    
   
   
   
  
 losartan 100 mg tablet Commonly known as:  COZAAR Your last dose was: Your next dose is: Take 1 Tab by mouth daily. 100 mg  
    
   
   
   
  
 OTHER Your last dose was: Your next dose is:    
   
   
 Incentive Spirometry- use as directed  
     
   
   
   
  
 pyrazinamide 500 mg tablet Your last dose was: Your next dose is: Take 2 Tabs by mouth every Tuesday Thursday, Saturday. At 5 pm  Indications: active tuberculosis 1000 mg  
    
   
   
   
  
 pyridoxine (vitamin B6) 100 mg tablet Commonly known as:  VITAMIN B-6 Your last dose was: Your next dose is: Take 1 Tab by mouth daily. 100 mg  
    
   
   
   
  
 rifAMPin 300 mg capsule Commonly known as:  RIFADIN  
   
 Your last dose was: Your next dose is: Take 2 Caps by mouth daily. On dialysis days please take this medication after Dialysis 600 mg  
    
   
   
   
  
  
STOP taking these medications   
 magnesium citrate solution  
   
  
 ondansetron 4 mg disintegrating tablet Commonly known as:  ZOFRAN ODT  
   
  
 OTHER  
   
  
 OTHER  
   
  
 oxyCODONE-acetaminophen 5-325 mg per tablet Commonly known as:  PERCOCET ASK your doctor about these medications Instructions Each Dose to Equal  
 Morning Noon Evening Bedtime  
 azithromycin 500 mg Tab Commonly known as:  Eric Otero Your last dose was: Your next dose is: Take 1 Tab by mouth every Tuesday Thursday, Saturday. At 5 PM  
 500 mg Where to Get Your Medications Information on where to get these meds will be given to you by the nurse or doctor. ! Ask your nurse or doctor about these medications  
  amLODIPine 10 mg tablet  
 carvedilol 25 mg tablet  
 cloNIDine 0.3 mg/24 hr  
 glycerin (adult) suppository  
 losartan 100 mg tablet  
 minoxidil 2.5 mg tablet OTHER Discharge Instructions Bronchiectasis: Care Instructions Your Care Instructions Bronchiectasis (say \"ldgrs-lvj-CSU-tuh-mady\") is a lung problem in which the breathing tubes are stretched and become larger. The buildup of mucus causes the stretching and can lead to swelling and infections. You may find it harder to breathe and cough up mucus out of your lungs. Some people are born with it. Other people get it because of another health problem, usually cystic fibrosis or a lung infection such as pneumonia or tuberculosis. Symptoms are often different for everyone. But a cough that brings up mucus, or sputum, is common.  The condition is usually treated with antibiotics, medicines to relax the airways (bronchodilators), and medicines to make it easier to cough up mucus (expectorants). Follow-up care is a key part of your treatment and safety. Be sure to make and go to all appointments, and call your doctor if you are having problems. It's also a good idea to know your test results and keep a list of the medicines you take. How can you care for yourself at home? · Take your antibiotics as directed. Do not stop taking them just because you feel better. You need to take the full course of antibiotics. · Take your medicines exactly as prescribed. Call your doctor if you think you are having a problem with your medicine. · If you have cystic fibrosis, follow your treatment plan. · If you or your child has bronchiectasis, follow directions from your doctor or respiratory therapist for moving your or your child's body into different positions to help drain fluid. This is called postural drainage, and it helps to ease breathing and prevent infections. · You also may do chest percussion on your child. This is strong clapping of the chest with a cupped hand to vibrate the airways in the lungs. The vibration helps your child cough up mucus. You may see a respiratory therapist to learn how to do chest percussion. · Use an airway clearance device, such as a flutter valve, as directed to help remove mucus from the lungs. · Avoid lung infections. ¨ Get shots to protect against the flu and pneumococcal disease. ¨ Wash your hands frequently. ¨ Avoid close contact with people who have colds or the flu. ¨ Do not smoke or allow others to smoke around you. If you need help quitting, talk to your doctor about stop-smoking programs and medicines. These can increase your chances of quitting for good. ¨ Stay inside, if you can, on days when the pollution level is high. When should you call for help? Call 911 anytime you think you may need emergency care. For example, call if: 
  · You have severe trouble breathing.   · You cough up more than 1 cup of blood.  
 Call your doctor now or seek immediate medical care if: 
  · You have chest pain.  
  · You have shortness of breath.  
  · You have a fever.  
  · Your mucus (sputum) is bloody or changes color.  
 Watch closely for changes in your health, and be sure to contact your doctor if: 
  · You are coughing up more sputum than before.  
  · Your symptoms get worse or do not get better with treatment. Where can you learn more? Go to http://cristino-anne.info/. Enter C667 in the search box to learn more about \"Bronchiectasis: Care Instructions. \" Current as of: 2017 Content Version: 11.8 © 2432-8572 [employer]. Care instructions adapted under license by Handseeing Information (which disclaims liability or warranty for this information). If you have questions about a medical condition or this instruction, always ask your healthcare professional. [address] [address] any warranty or liability for your use of this information. Patient armband removed and shredded MyChart Activation Thank you for requesting access to [employer]. Please follow the instructions below to securely access and download your online medical record. [employer] allows you to send messages to your doctor, view your test results, renew your prescriptions, schedule appointments, and more. How Do I Sign Up? 1. In your internet browser, go to [email or web address] 
2. Click on the First Time User? Click Here link in the Sign In box. You will be redirect to the New Member Sign Up page. 3. Enter your [employer] Access Code exactly as it appears below. You will not need to use this code after youve completed the sign-up process. If you do not sign up before the expiration date, you must request a new code. [employer] Access Code: [email or web address] Expires: 2018  5:34 AM (This is the date your [employer] access code will ) 4. Enter the last four digits of your Social Security Number (xxxx) and Date of Birth (mm/dd/yyyy) as indicated and click Submit. You will be taken to the next sign-up page. 5. Create a D-Sight ID. This will be your D-Sight login ID and cannot be changed, so think of one that is secure and easy to remember. 6. Create a D-Sight password. You can change your password at any time. 7. Enter your Password Reset Question and Answer. This can be used at a later time if you forget your password. 8. Enter your e-mail address. You will receive e-mail notification when new information is available in [address] 9. Click Sign Up. You can now view and download portions of your medical record. 10. Click the Download Summary menu link to download a portable copy of your medical information. Additional Information If you have questions, please visit the Frequently Asked Questions section of the D-Sight website at [email or web address] Remember, D-Sight is NOT to be used for urgent needs. For medical emergencies, dial 911. DISCHARGE SUMMARY from Nurse PATIENT INSTRUCTIONS: 
 
After general anesthesia or intravenous sedation, for 24 hours or while taking prescription Narcotics: · Limit your activities · Do not drive and operate hazardous machinery · Do not make important personal or business decisions · Do  not drink alcoholic beverages · If you have not urinated within 8 hours after discharge, please contact your surgeon on call. Report the following to your surgeon: 
· Excessive pain, swelling, redness or odor of or around the surgical area · Temperature over 100.5 · Nausea and vomiting lasting longer than 4 hours or if unable to take medications · Any signs of decreased circulation or nerve impairment to extremity: change in color, persistent  numbness, tingling, coldness or increase pain · Any questions What to do at Home: 
Recommended activity: Activity as tolerated,  
 
 If you experience any of the following symptoms Chest Pains, Shortness of Breath, Dizziness, Increased weakness, Increased temperature greater 100.5, Nausea, Vomiting, Diarrhea, Severe pain, please follow up with PCP or call 911. *  Please give a list of your current medications to your Primary Care Provider. *  Please update this list whenever your medications are discontinued, doses are 
    changed, or new medications (including over-the-counter products) are added. *  Please carry medication information at all times in case of emergency situations. These are general instructions for a healthy lifestyle: No smoking/ No tobacco products/ Avoid exposure to second hand smoke Surgeon General's Warning:  Quitting smoking now greatly reduces serious risk to your health. Obesity, smoking, and sedentary lifestyle greatly increases your risk for illness A healthy diet, regular physical exercise & weight monitoring are important for maintaining a healthy lifestyle You may be retaining fluid if you have a history of heart failure or if you experience any of the following symptoms:  Weight gain of 3 pounds or more overnight or 5 pounds in a week, increased swelling in our hands or feet or shortness of breath while lying flat in bed. Please call your doctor as soon as you notice any of these symptoms; do not wait until your next office visit. Recognize signs and symptoms of STROKE: 
 
F-face looks uneven A-arms unable to move or move unevenly S-speech slurred or non-existent T-time-call 911 as soon as signs and symptoms begin-DO NOT go Back to bed or wait to see if you get better-TIME IS BRAIN. Warning Signs of HEART ATTACK Call 911 if you have these symptoms: 
? Chest discomfort.  Most heart attacks involve discomfort in the center of the chest that lasts more than a few minutes, or that goes away and comes back. It can feel like uncomfortable pressure, squeezing, fullness, or pain. ? Discomfort in other areas of the upper body. Symptoms can include pain or discomfort in one or both arms, the back, neck, jaw, or stomach. ? Shortness of breath with or without chest discomfort. ? Other signs may include breaking out in a cold sweat, nausea, or lightheadedness. Don't wait more than five minutes to call [address]! Fast action can save your life. Calling 911 is almost always the fastest way to get lifesaving treatment. Emergency Medical Services staff can begin treatment when they arrive  up to an hour sooner than if someone gets to the hospital by car. The discharge information has been reviewed with the patient. The patient verbalized understanding. Discharge medications reviewed with the patient and appropriate educational materials and side effects teaching were provided. ___________________________________________________________________________________________________________________________________ Introducing \[facility] [place] [facility]\"" & HEALTH SERVICES! [facility] introduces "[employer] patient portal. Now you can access parts of your medical record, email your doctor's office, and request medication refills online. 1. In your internet browser, go to [email or web address] 2. Click on the First Time User? Click Here link in the Sign In box. You will see the New Member Sign Up page. 3. Enter your "[employer] Access Code exactly as it appears below. You will not need to use this code after youve completed the sign-up process. If you do not sign up before the expiration date, you must request a new code. · "[employer] Access Code: [email or web address] Expires: 11/26/2018  5:34 AM 
 
4. Enter the last four digits of your Social Security Number (xxxx) and Date of Birth (mm/dd/yyyy) as indicated and click Submit. You will be taken to the next sign-up page. 5. Create a Evolvat ID. This will be your 5 CUPS and some sugar login ID and cannot be changed, so think of one that is secure and easy to remember. 6. Create a 5 CUPS and some sugar password. You can change your password at any time. 7. Enter your Password Reset Question and Answer. This can be used at a later time if you forget your password. 8. Enter your e-mail address. You will receive e-mail notification when new information is available in [facility] [address] 9. Click Sign Up. You can now view and download portions of your medical record. 10. Click the Download Summary menu link to download a portable copy of your medical information. If you have questions, please visit the Frequently Asked Questions section of the 5 CUPS and some sugar website. Remember, 5 CUPS and some sugar is NOT to be used for urgent needs. For medical emergencies, dial 911. Now available from your iPhone and Android! Introducing Eyal Leonard As a evolso patient, I wanted to make you aware of our electronic visit tool called Eyal Leonard. [facility] 24/7 allows you to connect within minutes with a medical provider 24 hours a day, seven days a week via a mobile device or tablet or logging into a secure website from your computer. You can access Eyal Leonard from anywhere in the [place]. A virtual visit might be right for you when you have a simple condition and feel like you just dont want to get out of bed, or cant get away from work for an appointment, when your regular evolso provider is not available (evenings, weekends or holidays), or when youre out of town and need minor care. Electronic visits cost only $49 and if the [facility]/7 provider determines a prescription is needed to treat your condition, one can be electronically transmitted to a nearby pharmacy*. Please take a moment to enroll today if you have not already done so. The enrollment process is free and takes just a few minutes.   To enroll, please download the [employer] 24/7 gordy to your tablet or phone, or visit [email or web address] to enroll on your computer. And, as an [address] [place] [address] patient with a MyRealTrip account, the results of your visits will be scanned into your electronic medical record and your primary care provider will be able to view the scanned results. We urge you to continue to see your regular [employer] provider for your ongoing medical care. And while your primary care provider may not be the one available when you seek a [employer] virtual visit, the peace of mind you get from getting a real diagnosis real time can be priceless. For more information on [employer], view our Frequently Asked Questions (FAQs) at [email or web address]. Sincerely, 
 
Itzel Angel MD 
Chief Medical Officer Farrah Fabiana Davis *:  certain medications cannot be prescribed via [employer] Unresulted Labs-Please follow up with your PCP about these lab tests Order Current Status CULTURE, BLOOD Preliminary result CULTURE, BLOOD Preliminary result Providers Seen During Your Hospitalization Provider Specialty Primary office phone Rober Khanna DO Emergency Medicine [phone] Elina Churchill MD Hospitalist [phone] Marita Pleitez MD Internal Medicine [phone] Immunizations Administered for This Admission Name Date Influenza Vaccine (Quad) PF  Deferred () Your Primary Care Physician (PCP) Primary Care Physician Office Phone Office Fax [address] [address] [phone] You are allergic to the following Allergen Reactions Banana Other (comments) Recent Documentation Height Weight Breastfeeding? BMI OB Status Smoking Status 1.549 m 50.9 kg No 21.2 kg/m2 Menopause Never Smoker Emergency Contacts Name Discharge Info Relation Home Work Mobile Shanelle Che DISCHARGE CAREGIVER [3] Spouse [3] [phone] Patient Belongings The following personal items are in your possession at time of discharge: 
  Dental Appliances: None  Visual Aid: None      Home Medications: None   Jewelry: None  Clothing: Other (comment) (none came to ICU with pt, family took cloths home)    Other Valuables: None  Personal Items Sent to Safe: none Please provide this summary of care documentation to your next provider. Signatures-by signing, you are acknowledging that this After Visit Summary has been reviewed with you and you have received a copy. Patient Signature:  ____________________________________________________________ Date:  ____________________________________________________________  
  
[facility] Provider Signature:  ____________________________________________________________ Date:  ____________________________________________________________

## 2018-10-02 NOTE — ED NOTES
Noted bp ; Dr Marii Oquendo notified and nicardipine decreased to 2 mg/hr. Pt remains awake/alert/quiet; denies discomfort at this time.

## 2018-10-02 NOTE — ED PROVIDER NOTES
HPI Comments: Patient is a 57 y/o [place] female with pmhx of HTN, ESRD and on medications for TB presents with SOB that began acutely 2 hours ago. Pt has language barrier and is with her daughter and son-in-law who is going the translation. Pt denies chest pain, nausea, vomiting or diarrhea. Her only complaint is shortness of breath. Pt states woke up coughing 2 hours aog. Denies smoking, etoh, drug use. Patient is a 56 y.o. female presenting with shortness of breath. Shortness of Breath Pertinent negatives include no fever, no headaches, no rhinorrhea, no sore throat, no neck pain, no cough, no wheezing, no chest pain, no vomiting, no abdominal pain and no rash. Past Medical History:  
Diagnosis Date  Chronic kidney disease  ESRD (end stage renal disease) ([facility] [address] TUES-THURS-SAT  Hypercholesteremia  Hypertension  Kidney disease  Vitamin D deficiency Past Surgical History:  
Procedure Laterality Date  VASCULAR SURGERY PROCEDURE UNLIST No family history on file. Social History Social History  Marital status:  Spouse name: N/A  
 Number of children: N/A  
 Years of education: N/A Occupational History  Not on file. Social History Main Topics  Smoking status: Never Smoker  Smokeless tobacco: Never Used  Alcohol use No  
 Drug use: No  
 Sexual activity: Not on file Other Topics Concern  Not on file Social History Narrative ALLERGIES: Banana Review of Systems Constitutional: Negative for chills, diaphoresis and fever. HENT: Negative. Negative for congestion, rhinorrhea and sore throat. Eyes: Negative. Negative for pain, discharge and redness. Respiratory: Positive for shortness of breath. Negative for cough, chest tightness and wheezing. Cardiovascular: Negative. Negative for chest pain. Gastrointestinal: Negative.   Negative for abdominal pain, constipation, diarrhea, nausea and vomiting. Genitourinary: Negative. Negative for dysuria, flank pain, frequency, hematuria and urgency. Musculoskeletal: Negative. Negative for back pain and neck pain. Skin: Negative. Negative for rash. Neurological: Positive for weakness. Negative for syncope, numbness and headaches. Psychiatric/Behavioral: Negative. All other systems reviewed and are negative. Vitals:  
 10/02/18 0234 Pulse: (!) 110 SpO2: (!) 61% Weight: 45.4 kg (100 lb) Height: 5' 1\" (1.549 m) Physical Exam  
Constitutional: She appears well-developed and well-nourished. Non-toxic appearance. She has a sickly appearance. She appears ill. She appears distressed. HENT:  
Head: Normocephalic and atraumatic. Mouth/Throat: Oropharynx is clear and moist. No oropharyngeal exudate. Eyes: Conjunctivae and EOM are normal. Pupils are equal, round, and reactive to light. No scleral icterus. Neck: Trachea normal and normal range of motion. Neck supple. No hepatojugular reflux and no JVD present. No tracheal deviation present. No thyromegaly present. Cardiovascular: Regular rhythm, S1 normal, S2 normal, normal heart sounds, intact distal pulses and normal pulses. Tachycardia present. Exam reveals no gallop, no S3 and no S4. No murmur heard. Pulses: 
     Radial pulses are 2+ on the right side, and 2+ on the left side. Dorsalis pedis pulses are 2+ on the right side, and 2+ on the left side. Pulmonary/Chest: Accessory muscle usage present. Tachypnea noted. No respiratory distress. She has no decreased breath sounds. She has no wheezes. She has rhonchi in the right middle field, the right lower field, the left middle field and the left lower field. She has no rales. Abdominal: Soft. Normal appearance and bowel sounds are normal. She exhibits no distension and no mass. There is no hepatosplenomegaly. There is no tenderness.  There is no rigidity, no rebound, no guarding, no CVA tenderness, no tenderness at McBurney's point and negative Vaughn's sign. Musculoskeletal: Normal range of motion. Strength 2/5 throughout Lymphadenopathy:  
     Head (right side): No submental, no submandibular, no preauricular and no occipital adenopathy present. Head (left side): No submental, no submandibular, no preauricular and no occipital adenopathy present. She has no cervical adenopathy. Right: No supraclavicular adenopathy present. Left: No supraclavicular adenopathy present. Neurological: She is alert. She has normal strength and normal reflexes. She is not disoriented. No cranial nerve deficit or sensory deficit. Coordination and gait normal. GCS eye subscore is 4. GCS verbal subscore is 5. GCS motor subscore is 6. Grossly intact Skin: Skin is warm, dry and intact. No rash noted. She is not diaphoretic. Psychiatric: She has a normal mood and affect. Her speech is normal and behavior is normal. Judgment and thought content normal. Cognition and memory are normal.  
Nursing note and vitals reviewed. MDM Number of Diagnoses or Management Options ESRD (end stage renal disease) ([facility] [address]): Hypoxia:  
TB (tuberculosis):  
Diagnosis management comments: Shortness of breath etiologies include chronic obstructive pulmonary disease (COPD), acute asthma exacerbation, congestive heart failure, pneumonia, acute bronchitis, pulmonary embolism, upper respiratory infection, cardiac event to include acute coronary syndrome, acute myocardial infarction or a combination of the above (ex URI on top of COPD thus causing respiratory distress). 55 y/o female with active TB presents in acute distress, coughing which began 2 hours prior to arrival.  Will order sepsis work up and place in negative pressure room. ED Course Labs essentially normal with the exception of Lactic acid 3.5, creatinine 3.86.  Chest X-Ray showed bilateral lower lobe infiltrates vs vascular congestion. EKG showed NSR with rate of 94 bpm. With no ST elevations or depression and non specific T wave changes. CTa chest  Pending 3:38 AM 10/2/2018 Consult:  Discussed care with Dr Minesh Walsh. Standard discussion; including history of patients chief complaint, available diagnostic results, and treatment course. Agrees with admission. Would like CTA chest and abg.   
4:10 AM 
 
Consult:  Discussed care with Dr Buck Sheffield discussion; including history of patients chief complaint, available diagnostic results, and treatment course. Agrees with admission to ICU 
4:22 AM 
 
Procedures Patient transferred in stable condition.

## 2018-10-02 NOTE — ROUTINE PROCESS
Bedside and Verbal shift change report given to Lucila Bustos RN (oncoming nurse) by Castro RN (offgoing nurse). Report included the following information SBAR, Kardex, Intake/Output, Recent Results and Cardiac Rhythm . Anatoliy Garner

## 2018-10-02 NOTE — ROUTINE PROCESS
TRANSFER - OUT REPORT: 
 
Verbal report given to Sienna Morejon RN(name) on Bessy Posada  being transferred to ICU (unit) for routine progression of care for hypertensive emergency/shortness of breath. Report consisted of patients Situation, Background, Assessment and  
Recommendations(SBAR). Includes pt complaint/findings/treatments initiated including nicardipine gtt and blood pressure decreased. Informed that ER MD has ordered iv fluid boluses to be held until cleared by admitting intensivits. Information from the following report(s) ED Summary was reviewed with the receiving nurse. Lines:    
 
Opportunity for questions and clarification was provided. Patient transported with: 
 Monitor O2 @ 4 liters Continuous iv infusions

## 2018-10-02 NOTE — H&P
History & Physical 
Patient: Marleny Wyatt MRN: [id number]  CSN: [id number] YOB: 1960  Age: 56 y.o. Sex: female DOA: 10/2/2018 Chief Complaint:  
Chief Complaint Patient presents with  Shortness of Breath HPI:  
 
Marleny Wyatt is a 56 y.o.  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 Pt also developed sudden onset ofSOB 2 hours PTA and came to ER with her family . In ER, CXR showed multiple LL PNA and Pt had highly elevated BP that got worse by episodes of N/V and heaving. Pt received IV BP meds but her BP continued to be elevated and was started on Nicardipine drip. Pt is seen and examined in Er, Phone Interpretor was used as no family memebers at bedside. Pt has Poor responses in general. Denied Taking BP medication for a while so as her TB meds but then changed her response and stated that she takes her meds regularly. Continues to say that she feels tired/ fatigued. Continues to heave but has no productive cough. Hx is very limited as Pt is poor Historian Past Medical History:  
Diagnosis Date  Chronic kidney disease  ESRD (end stage renal disease) ([facility] [address] TUES-THURS-SAT  Hypercholesteremia  Hypertension  Kidney disease  Vitamin D deficiency Past Surgical History:  
Procedure Laterality Date  VASCULAR SURGERY PROCEDURE UNLIST No family history on file. Social History Social History  Marital status:  Spouse name: N/A  
 Number of children: N/A  
 Years of education: N/A Social History Main Topics  Smoking status: Never Smoker  Smokeless tobacco: Never Used  Alcohol use No  
 Drug use: No  
 Sexual activity: Not on file Other Topics Concern  Not on file Social History Narrative Prior to Admission medications Medication Sig Start Date End Date Taking? Authorizing Provider  
amLODIPine (NORVASC) 10 mg tablet Take 1 Tab by mouth daily. 3/8/18  Yes Jacey Fisher MD  
aspirin 81 mg chewable tablet Take 1 Tab by mouth daily. 3/8/18  Yes Jacey Fisher MD  
atorvastatin (LIPITOR) 40 mg tablet Take 1 Tab by mouth nightly. 3/8/18  Yes Jacey Fisher MD  
carvedilol (COREG) 25 mg tablet Take 1 Tab by mouth every twelve (12) hours. 3/8/18  Yes Jacey Fisher MD  
losartan (COZAAR) 100 mg tablet Take 1 Tab by mouth daily. 3/8/18  Yes Jacey Fisher MD  
minoxidil (LONITEN) 2.5 mg tablet Take 1 Tab by mouth daily. 9/1/18   Sacha Chandler MD  
ondansetron (ZOFRAN ODT) 4 mg disintegrating tablet Take 1 Tab by mouth every eight (8) hours as needed for Nausea. 8/31/18   Sacha Chandler MD  
bisacodyl (DULCOLAX) 5 mg EC tablet Take 1 Tab by mouth daily as needed for Constipation. 8/5/18   Dalton Fitzpatrick MD  
magnesium citrate solution Take 1/3 of bottle every 8 hours until finished or until you have a bowel movement. 8/5/18   Dalton Fitzpatrick MD  
albuterol (PROVENTIL HFA, VENTOLIN HFA, PROAIR HFA) 90 mcg/actuation inhaler Take 2 Puffs by inhalation every four (4) hours as needed for Wheezing. 7/21/18   Jenny Parmar MD  
ethambutol (MYAMBUTOL) 400 mg tablet Take 2 Tabs by mouth every Tuesday Thursday, Saturday. At 5 PM 7/21/18   Jenny Parmar MD  
isoniazid (NYDRAZID) 300 mg tablet Take 1 Tab by mouth daily. On dialysis days, please take medication after dialysis 7/21/18   Jenny Parmar MD  
pyrazinamide 500 mg tablet Take 2 Tabs by mouth every Tuesday Thursday, Saturday. At 5 pm  Indications: active tuberculosis 7/24/18   Jenny Parmar MD  
rifAMPin (RIFADIN) 300 mg capsule Take 2 Caps by mouth daily.  On dialysis days please take this medication after Dialysis 7/21/18   Jenny Parmar MD  
cloNIDine (CATAPRES) 0.3 mg/24 hr 1 Patch by TransDERmal route every seven (7) days. 7/23/18   Juan Gomes MD  
docusate sodium (COLACE) 100 mg capsule Take 1 Cap by mouth two (2) times a day. 7/21/18   Juan Gomes MD  
oxyCODONE-acetaminophen (PERCOCET) 5-325 mg per tablet Take 1 Tab by mouth every eight (8) hours as needed. Max Daily Amount: 3 Tabs. Patient taking differently: Take 1 Tab by mouth every eight (8) hours as needed for Pain. 7/21/18   Juan Gomes MD  
pyridoxine, vitamin B6, (VITAMIN B-6) 100 mg tablet Take 1 Tab by mouth daily. 7/22/18   Juan Gomes MD  
azithromycin (ZITHROMAX) 500 mg tab Take 1 Tab by mouth every Tuesday Thursday, Saturday. At 5 PM 7/21/18   Juan Gomes MD  
OTHER Check CBC, CMP, MG on 07/25/18, Results to PCP immediately, Diagnosis- TB 7/21/18   Juan Gomes MD  
OTHER Incentive Spirometry- use as directed 7/21/18   Juan Gomes MD  
OTHER Incentive Spirometry- Use as directed 7/21/18   Juan Gomes MD  
OTHER This is to certify that Andre Ceron was admitted to [facility] on 07/16/18 and is still receiving medical care here. 7/20/18   Juan Gomes MD  
melatonin 3 mg tablet Take 1 Tab by mouth nightly as needed. Patient taking differently: Take 1 Tab by mouth nightly as needed (insomnia). 3/8/18   Philip Gudino MD  
FLUoxetine (PROZAC) 10 mg capsule Take 1 Cap by mouth daily. 3/8/18   Philip Gudino MD  
glycerin, adult, (FLEET GLYCERIN, ADULT,) suppository Insert 1 Suppository into rectum daily. Indications: BOWEL EVACUATION 2/5/18   Kushal Jacobo MD  
 
 
Allergies Allergen Reactions  Banana Other (comments) Review of Systems Unable to obtain full ROS 2 ry to language barrier and poor responses +ve for N/V and mild SOB Denies CP and abdominal pain but tender to palpation Physical Exam:  
 
Physical Exam: 
Visit Vitals  BP (!) 169/96  Pulse 67  Temp 98.2 °F (36.8 °C)  Resp 18  Ht 5' 1\" (1.549 m)  Wt 45.4 kg (100 lb)  SpO2 98%  BMI 18.89 kg/m2 O2 Flow Rate (L/min): 4 l/min O2 Device: Nasal cannula Temp (24hrs), Av.3 °F (36.3 °C), Min:96.4 °F (35.8 °C), Max:98.2 °F (36.8 °C) 
      1901 - 10/02 0700 In: 350 [I.V.:350] Out: - General:  Alert, but very tired with poor responses despite using interpretor via phone. Looks severely malnourished  . Head: Normocephalic, without obvious abnormality, atraumatic. Eyes:  Conjunctivae/corneas clear. PERRL, EOMs intact. Nose: Nares normal. No drainage or sinus tenderness. Neck: Supple, symmetrical, trachea midline, no adenopathy, thyroid: no enlargement, no carotid bruit and no JVD. Lungs:   Decrease BS B/l Heart:  Regular rate and rhythm, S1, S2 normal.  
  Abdomen: Soft, tender. Bowel sounds normal.   
Extremities: Extremities normal, atraumatic, no cyanosis or edema. Pulses: 2+ and symmetric all extremities. Skin:  No rashes or lesions Neurologic: Alert but not oriented. fatigued and weak Labs Reviewed: 
 
Lab results reviewed. For significant abnormal values and values requiring intervention, see assessment and plan. CXR and EKG Procedures/imaging: see electronic medical records for all procedures/Xrays and details which were not copied into this note but were reviewed prior to creation of Plan Assessment/Plan Principal Problem: Hypertensive crisis (2018) Active Problems: 
  ESRD (end stage renal disease) ([facility] [address] (2018) Hypoxia (2018) Pulmonary TB (2018) HCAP (healthcare-associated pneumonia) (10/2/2018) Pt is admitted for severe sepsis 2 ry to HCAP /Multilobar lobe PNA on CXR awaiting CTA Severe A-a gradient on ABG on High flow 10 L HX of Pulmonary TB on 4 meds and Pyridoxine >> AFB CULTURE + SMEAR -ve on 2018 Hypertensive Crisis with SBP > 250 Awaiting CT chest results Continue Broad spectrum Abx Blood Cx Started on Nicardipine drip Restarting her BP meds in AM and holding minoxidil ID and Nephro consults are called No need for airborne Isolation as per ID Might Need HD as Pt received IV Contrast >> as per Dr. Delacruz Due DVT/GI Prophylaxis: Hep SQ Plan of care is discussed in details with Patient/Family at bedside and agreed upon Claudell Gums, MD 
10/2/2018 7:54 AM

## 2018-10-02 NOTE — CONSULTS
[facility] Pulmonary, Critical Care, and Sleep Medicine Name: Tania Ochoa MRN: [id number] : 1960 Hospital: [facility] Date: 10/2/2018 Critical Care Initial Patient Consult Requesting MD: Maria G Britton                                            Reason for CC Consult: Hypertensive Emergency IMPRESSION:  
· Hypertensive emergency with response to cardene, initial /133- restart home meds per nephrology · Respiratory failure, acute- hypoxic. CXR with b/l infiltrates, fluid overload vs PNA with Multilobar infiltrates with probable tiny effusions-  Zosyn/vanc/levaquin- CTA chest pending -responsive to supplemental oxygen therapy- initial O2 sat 61% · Suspicion for sepsis with tachycardia, hypoxia and inital lactate 3.5 · Chronic renal failure (ESRD) on HD-  BUN 25, crea 3.86- nephrology consulted · TB w/ DOT-recent sputum smears cleared for AFB- ID consulted Other HX 
· NSTEMI 
· Seizures · Oropharyngeal dysphagia RECOMMENDATIONS:  
· Resp -  Nasal cannula. HOB > 30, aspiration precautions, pulmonary hygiene. · ID - Severe sepsis bundle. Hx TB w/ DOT- restart meds. ID consulted. Continue with empiric vanc, zosyn, levaquin pending cultures · CVS - Hypertensive responsive to cardene gtt -SBP goal 160-180 -wean to discontinue. Restart home BP meds. · Heme/Onc- Daily CBC; monitor H/H & platelets · Metabolic - Monitor electrolytes and replace as needed. · Renal - SCOOTER- trend renal indices. Hemodialysis per nephrology service · Endocrine - Glycemic control goal < 180; avoid hypoglycemia · Neuro/ Pain/ Sedation - Monitor neuro status. prn tylenol and precocet. Avoid sedating medications · GI - Cardiac diet as tolerated. Bowel regimen · Prophylaxis - DVT (SQH), GI Subjective/History: This patient has been seen and evaluated at the request of Dr. Maria G Britton  for Hypertensive Emergency.   Patient is a 56 y.o. female with pmhx of HTN, ESRD, currently on DOT medications for TB, presents to ED with SOB that started 2 hours ago. Initial /133 and O2 sat 61% on room air. Pt worked up for sepsis and started on cardene gtt. Pt transferred from [facility] to [facility] for management of Hypertensive Emergency. Pt speaks [place] only. Son is at the bedside to provide history. Son states that the pt started having SOB around 11 pm 10/1. States that her SOB has been resolved today with oxygen therapy. States that the health department visits the residence daily for DOT and that she has not missed any treatments. States that she takes blood pressure medications and takes them as scheduled. Pt is intermittently dry heaving but not able to bring up anything. Per son, pt denies pain, fevers, chills, dizziness, headaches, CP, or change in appetite. Pt admitted to ICU for management of hypertensive emergency and possible PNA. Given history of TB while on DOT- pt has been cleared to be off of isolation per ID. Past Medical History:  
Diagnosis Date  Chronic kidney disease  ESRD (end stage renal disease) ([facility] [address] TUES-THURS-SAT  Hypercholesteremia  Hypertension  Kidney disease  Vitamin D deficiency Past Surgical History:  
Procedure Laterality Date  VASCULAR SURGERY PROCEDURE UNLIST Prior to Admission medications Medication Sig Start Date End Date Taking? Authorizing Provider  
amLODIPine (NORVASC) 10 mg tablet Take 1 Tab by mouth daily. 3/8/18  Yes Clint De La Cruz MD  
aspirin 81 mg chewable tablet Take 1 Tab by mouth daily. 3/8/18  Yes Clint De La Cruz MD  
atorvastatin (LIPITOR) 40 mg tablet Take 1 Tab by mouth nightly. 3/8/18  Yes Clint De La Cruz MD  
carvedilol (COREG) 25 mg tablet Take 1 Tab by mouth every twelve (12) hours. 3/8/18  Yes Green Press, MD  
losartan (COZAAR) 100 mg tablet Take 1 Tab by mouth daily.  3/8/18  Yes Clint De La Cruz MD  
 minoxidil (LONITEN) 2.5 mg tablet Take 1 Tab by mouth daily. 9/1/18   Maria Del Carmen Antoine MD  
ondansetron (ZOFRAN ODT) 4 mg disintegrating tablet Take 1 Tab by mouth every eight (8) hours as needed for Nausea. 8/31/18   Maria Del Carmen Antoine MD  
bisacodyl (DULCOLAX) 5 mg EC tablet Take 1 Tab by mouth daily as needed for Constipation. 8/5/18   Poonam Mcclellan MD  
magnesium citrate solution Take 1/3 of bottle every 8 hours until finished or until you have a bowel movement. 8/5/18   Poonam Mcclellan MD  
albuterol (PROVENTIL HFA, VENTOLIN HFA, PROAIR HFA) 90 mcg/actuation inhaler Take 2 Puffs by inhalation every four (4) hours as needed for Wheezing. 7/21/18   Buck Talbert MD  
ethambutol (MYAMBUTOL) 400 mg tablet Take 2 Tabs by mouth every Tuesday Thursday, Saturday. At 5 PM 7/21/18   Buck Talebrt MD  
isoniazid (NYDRAZID) 300 mg tablet Take 1 Tab by mouth daily. On dialysis days, please take medication after dialysis 7/21/18   Buck Talbert MD  
pyrazinamide 500 mg tablet Take 2 Tabs by mouth every Tuesday Thursday, Saturday. At 5 pm  Indications: active tuberculosis 7/24/18   Buck Talbert MD  
rifAMPin (RIFADIN) 300 mg capsule Take 2 Caps by mouth daily. On dialysis days please take this medication after Dialysis 7/21/18   Buck Talbert MD  
cloNIDine (CATAPRES) 0.3 mg/24 hr 1 Patch by TransDERmal route every seven (7) days. 7/23/18   Buck Talbert MD  
docusate sodium (COLACE) 100 mg capsule Take 1 Cap by mouth two (2) times a day. 7/21/18   Buck Talbert MD  
oxyCODONE-acetaminophen (PERCOCET) 5-325 mg per tablet Take 1 Tab by mouth every eight (8) hours as needed. Max Daily Amount: 3 Tabs. Patient taking differently: Take 1 Tab by mouth every eight (8) hours as needed for Pain. 7/21/18   Buck Talbert MD  
pyridoxine, vitamin B6, (VITAMIN B-6) 100 mg tablet Take 1 Tab by mouth daily.  7/22/18   Buck Talbert MD  
azithromycin (ZITHROMAX) 500 mg tab Take 1 Tab by mouth every Tuesday Thursday, Saturday. At 5 PM 7/21/18   Anabella Romero MD  
OTHER Check CBC, CMP, MG on 07/25/18, Results to PCP immediately, Diagnosis- TB 7/21/18   Anabella Romero MD  
OTHER Incentive Spirometry- use as directed 7/21/18   Anabella Romero MD  
OTHER Incentive Spirometry- Use as directed 7/21/18   Anabella Romero MD  
OTHER This is to certify that Marylene Boeck was [facility] 07[facility]16[facility]18 and is still receiving medical care here. 7/20/18   Anabella Romero MD  
melatonin 3 mg tablet Take 1 Tab by mouth nightly as needed. Patient taking differently: Take 1 Tab by mouth nightly as needed (insomnia). 3/8/18   Vicente Don MD  
FLUoxetine (PROZAC) 10 mg capsule Take 1 Cap by mouth daily. 3/8/18   Vicente Don MD  
glycerin, adult, (FLEET GLYCERIN, ADULT,) suppository Insert 1 Suppository into rectum daily. Indications: BOWEL EVACUATION 2/5/18   Rachelle Sutherland MD  
 
Current Facility-Administered Medications Medication Dose Route Frequency  sodium chloride 0.9 % bolus infusion 1,000 mL  1,000 mL IntraVENous ONCE Followed by  
[facility] sodium chloride 0.9 % bolus infusion 362 mL  362 mL IntraVENous ONCE  
 VANCOMYCIN INFORMATION NOTE   Other Rx Dosing/Monitoring  niCARdipine (CARDENE) 25 mg in 0.9% sodium chloride 250 mL infusion  0-15 mg/hr IntraVENous TITRATE  [START ON 10/4/2018] levoFLOXacin (LEVAQUIN) 500 mg in D5W IVPB  500 mg IntraVENous Q48H  piperacillin-tazobactam (ZOSYN) 2.25 g in 0.9% sodium chloride (MBP/ADV) 50 mL ADV  2.25 g IntraVENous Q6H Allergies Allergen Reactions  Banana Other (comments) Social History Substance Use Topics  Smoking status: Never Smoker  Smokeless tobacco: Never Used  Alcohol use No  
  
No family history on file. Review of Systems: A comprehensive review of systems was negative except for that written in the HPI. Objective:  
Vital Signs:   
Visit Vitals  BP (!) 169/96  Pulse 67  Temp 98.2 °F (36.8 °C)  Resp 18  Ht 5' 1\" (1.549 m)  Wt 45.4 kg (100 lb)  SpO2 98%  BMI 18.89 kg/m2 O2 Device: Nasal cannula O2 Flow Rate (L/min): 4 l/min Temp (24hrs), Av.3 °F (36.3 °C), Min:96.4 °F (35.8 °C), Max:98.2 °F (36.8 °C) Intake/Output:  
Last shift:        
Last 3 shifts: 1901 - 10/02 07 In: 350 [I.V.:350] Out: - Intake/Output Summary (Last 24 hours) at 10/02/18 0805 Last data filed at 10/02/18 0630 Gross per 24 hour Intake              350 ml Output                0 ml Net              350 ml Physical Exam: 
 
General:  Awake, Alert, appears tired, does not appear ill, Cooperative, appears stated age. Head:  Normocephalic, without obvious abnormality, atraumatic. Eyes:  Conjunctivae/corneas clear. PERRL, EOMs intact. Pupils 3 round, reactive Nose: Nares normal. Septum midline. Mucosa normal. No drainage or sinus tenderness. Throat: Lips, mucosa, and tongue normal. Teeth and gums normal.  
Neck: Supple, symmetrical, trachea midline, no adenopathy, thyroid: no enlargment/tenderness/nodules, no carotid bruit and no JVD. Back:   Symmetric, no curvature. ROM normal.  
Lungs:   Symmetrical chest expansion, fine rhonchi noted to bilateral lung bases L>R Chest wall:  No tenderness, HD catheter to right upper chest  
Heart:  Regular rate and rhythm, NSR, S1, S2 normal, no murmur, click, rub or gallop. Abdomen:   Soft, non-tender. Bowel sounds normal. No masses,  No organomegaly. Extremities: Extremities normal, atraumatic, no cyanosis or edema. Pulses: 2+ and symmetric all extremities. Skin: Skin color, texture, turgor normal. No rashes or lesions. Normal capillary refill. Lymph nodes: Cervical, supraclavicular, and axillary nodes normal.  
Neurologic: Grossly nonfocal, alert and oriented x 4, follows commands,   
 
 
Data:  
 
Recent Results (from the past 24 hour(s)) METABOLIC PANEL, COMPREHENSIVE  Collection Time: 10/02/18  2:45 AM  
 Result Value Ref Range Sodium 135 (L) 136 - 145 mmol/L Potassium 5.1 3.5 - 5.5 mmol/L Chloride 101 100 - 108 mmol/L  
 CO2 26 21 - 32 mmol/L Anion gap 8 3.0 - 18 mmol/L Glucose 150 (H) 74 - 99 mg/dL BUN 25 (H) 7.0 - 18 MG/DL Creatinine 3.86 (H) 0.6 - 1.3 MG/DL  
 BUN/Creatinine ratio 6 (L) 12 - 20 GFR est AA 15 (L) >60 ml/min/1.73m2 GFR est non-AA 12 (L) >60 ml/min/1.73m2 Calcium 8.9 8.5 - 10.1 MG/DL Bilirubin, total 0.5 0.2 - 1.0 MG/DL  
 ALT (SGPT) 12 (L) 13 - 56 U/L  
 AST (SGOT) 65 (H) 15 - 37 U/L Alk. phosphatase 167 (H) 45 - 117 U/L Protein, total 7.1 6.4 - 8.2 g/dL Albumin 3.3 (L) 3.4 - 5.0 g/dL Globulin 3.8 2.0 - 4.0 g/dL A-G Ratio 0.9 0.8 - 1.7    
CBC WITH AUTOMATED DIFF Collection Time: 10/02/18  2:45 AM  
Result Value Ref Range WBC 5.7 4.6 - 13.2 K/uL  
 RBC 3.84 (L) 4.20 - 5.30 M/uL  
 HGB 12.6 12.0 - 16.0 g/dL HCT 38.7 35.0 - 45.0 % .8 (H) 74.0 - 97.0 FL  
 MCH 32.8 24.0 - 34.0 PG  
 MCHC 32.6 31.0 - 37.0 g/dL  
 RDW 15.1 (H) 11.6 - 14.5 % PLATELET 686 609 - 405 K/uL MPV 10.0 9.2 - 11.8 FL  
 NEUTROPHILS 54 40 - 73 % LYMPHOCYTES 34 21 - 52 % MONOCYTES 8 3 - 10 % EOSINOPHILS 3 0 - 5 % BASOPHILS 1 0 - 2 %  
 ABS. NEUTROPHILS 3.1 1.8 - 8.0 K/UL  
 ABS. LYMPHOCYTES 1.9 0.9 - 3.6 K/UL  
 ABS. MONOCYTES 0.5 0.05 - 1.2 K/UL  
 ABS. EOSINOPHILS 0.2 0.0 - 0.4 K/UL  
 ABS. BASOPHILS 0.1 0.0 - 0.1 K/UL  
 DF AUTOMATED    
POC LACTIC ACID Collection Time: 10/02/18  2:50 AM  
Result Value Ref Range Lactic Acid (POC) 3.5 (HH) 0.4 - 2.0 mmol/L  
EKG, 12 LEAD, INITIAL Collection Time: 10/02/18  2:51 AM  
Result Value Ref Range Ventricular Rate 94 BPM  
 Atrial Rate 94 BPM  
 P-R Interval 168 ms QRS Duration 94 ms Q-T Interval 354 ms QTC Calculation (Bezet) 442 ms Calculated P Axis 56 degrees Calculated R Axis 79 degrees Calculated T Axis 31 degrees Diagnosis Normal sinus rhythm Possible Left atrial enlargement Borderline ECG When compared with ECG of 11-SEP-2018 09:10, 
QRS duration has decreased T wave inversion no longer evident in Anterolateral leads QT has shortened POC G3 Collection Time: 10/02/18  3:44 AM  
Result Value Ref Range Device: Non rebreather Flow rate (POC) 10 L/M  
 pH (POC) 7.394 7.35 - 7.45    
 pCO2 (POC) 46.1 (H) 35.0 - 45.0 MMHG  
 pO2 (POC) 89 80 - 100 MMHG  
 HCO3 (POC) 28.2 (H) 22 - 26 MMOL/L  
 sO2 (POC) 97 92 - 97 % Base excess (POC) 3 mmol/L Allens test (POC) YES Total resp. rate 23 Site RIGHT RADIAL Specimen type (POC) ARTERIAL Performed by Yola Trevino POC LACTIC ACID Collection Time: 10/02/18  5:36 AM  
Result Value Ref Range Lactic Acid (POC) 0.7 0.4 - 2.0 mmol/L Telemetry:normal sinus rhythm Imaging: CXR Results  (Last 48 hours) 10/02/18 0302  XR CHEST PORT Final result Impression:  IMPRESSION:  
   
Multilobar infiltrates with probable tiny effusions. Stable cardiomegaly Narrative:  EXAM: PORTABLE CHEST 0243 hours CLINICAL HISTORY/INDICATION: Sepsis , shortness of breath, difficulty breathing  
prior to arrival    
     
COMPARISON: Chest x-ray September 11, 2018. TECHNIQUE: Single AP view FINDINGS:   
   
Multifocal patchy opacities throughout the lungs with greater consolidation at  
the bases. Mild blunting of both costophrenic angles. Pulmonary vascularity  
obscured. Heart remains mildly enlarged. No change in the double-lumen right  
jugular approach central catheter terminating at the right atrium. [facility] CT Results  (Last 48 hours) None [place] Stephen, RG [facility] Staff Addendum I have independently evaluated the patient and reviewed the patient's chart. I have discussed the findings and care plan with ICU Care Team. Care Plan reviewed on ICU milti-D rounds. I agree with the above evaluation, assessment and recommendations along with the following comments and observations. Patient resting comfortably in ICU bed. Reports through her son-in-law that she is breathing much better. Reports compliance in DOT therapy. Discussed case with nephrologist who will manage BP meds. Continuing with supportive care. Clinical Care and time spent coordinating care, minus procedure time: 30  min Kathryn Vázquez DO, [facility] 6:54 PM

## 2018-10-02 NOTE — ED NOTES
Per Dr Gustavo Couch continue to hold normal saline bolus but keep on [facility]; per ER MD do not initiate until continued by intensivist. Last ; per MD maintain current iv infusion rate.

## 2018-10-02 NOTE — PROGRESS NOTES
conducted a Follow up consultation and Spiritual Assessment for Wilmer Olsen, who is a 56 y.o.,female. The  provided the following Interventions: 
Continued the relationship of care and support. Offered prayer and assurance of continued prayer on patients behalf. Chart reviewed. Assessment: 
Patient is [place] and uses her family to interpret. She is Buddhism. There are no further spiritual or Yarsani issues which require Spiritual Care Services interventions at this time. Plan: 
Chaplains will continue to follow and will provide pastoral care on an as needed/requested basis.  recommends bedside caregivers page  on duty if patient shows signs of acute spiritual or emotional distress. Marcio Nichols Board Certified [employer] Spiritual Care  
[phone]

## 2018-10-02 NOTE — ED NOTES
Report given to [facility]. Update regarding pt VS/cessation of Nicardipine given to accepting RN. Pt remains awake/alert; able to state she is \"cold\". Extra blanket provided.   Pt en route to [facility]

## 2018-10-02 NOTE — ROUTINE PROCESS
Received pt from  at 0700, A&O. Vital signs elevated. A complete body assessment was done. Pt nauseated and  vomited small amount of clear liquid. Pt received CHG wrap bath. Report given to [facility]. Pt left in bed with HOB up 30 degrees.

## 2018-10-02 NOTE — PROGRESS NOTES
NUTRITION Nutrition Screen RECOMMENDATIONS / PLAN:  
 
- Change to soft solid diet. - Add supplements: Ensure Pudding TID. - Monitor po intake and diet tolerance. - Continue RD inpatient monitoring and evaluation. NUTRITION INTERVENTIONS & DIAGNOSIS:  
 
[x] Meals/snacks: modify composition 
[x] Medical food supplement therapy: initiate [x] Collaboration and referral of nutrition care: interdisciplinary rounds Nutrition Diagnosis: Increased nutrient (protein) needs related to ESRD on HD as evidenced by pt with increased losses during dialysis. ASSESSMENT:  
 
Pt c/o nausea and unable to tolerate po medications or breakfast this morning. No acute abdominal issues and tolerating adequate meal intake PTA with recent several pound weight gain per son's report. Tolerates soft solid foods and would prefer pudding supplements per son. Average po intake adequate to meet patients estimated nutritional needs:   [] Yes     [x] No   [] Unable to determine at this time Diet: DIET CARDIAC Regular Food Allergies: banana Current Appetite:   [] Good     [] Fair     [] Poor     [x] Other: nauseous, lethargic Appetite/meal intake prior to admission:   [x] Good     [x] Fair     [] Poor     [x] Other: tolerating diet with good appetite and no recent issues with nausea/abdominal pain or constipation per son report Feeding Limitations:  [x] Swallowing difficulty: SLP recommended soft solid diet with thin liquids on 8/28/18    [] Chewing difficulty    [] Other: 
Current Meal Intake: No data found. BM: PTA Skin Integrity: WDL Edema:   [x] No     [] Yes Pertinent Medications: Reviewed: dulcolax, colace, zofran, vitamin B6 Recent Labs 10/02/18 
 0245 NA  135*  
K  5.1 CL  101 CO2  26 GLU  150* BUN  25* CREA  3.86* CA  8.9 ALB  3.3* SGOT  65* ALT  12* Intake/Output Summary (Last 24 hours) at 10/02/18 1001 Last data filed at 10/02/18 0630 Gross per 24 hour Intake              350 ml Output                0 ml Net              350 ml Anthropometrics: 
Ht Readings from Last 1 Encounters:  
10/02/18 5' 1\" (1.549 m) Last 3 Recorded Weights in this Encounter 10/02/18 0234 10/02/18 0800 Weight: 45.4 kg (100 lb) 53.4 kg (117 lb 11.6 oz) Body mass index is 22.24 kg/(m^2). Weight History: recent weight gain of 2-3 lb PTA per son's report Weight Metrics 10/2/2018 9/11/2018 8/31/2018 8/4/2018 7/20/2018 6/27/2018 6/26/2018 Weight 117 lb 11.6 oz 103 lb 9.9 oz 99 lb 3.3 oz 90 lb 90 lb 6.2 oz 90 lb 6.2 oz 85 lb 8.6 oz  
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  
 
Calories: 5940-4345 kcal (30-35 kcal/kg) based on  [] Actual BW      [x] SBW 56 kg Protein:  gm (1.2-2 gm/kg) based on  [] Actual BW      [x] SBW Fluid: 1 mL/kcal 
  
MONITORING & EVALUATION:  
 
Nutrition Goal(s): 1. Po intake of meals will meet >75% of patient estimated nutritional needs within the next 7 days. Outcome:  [] Met/Ongoing    []  Not Met    [x] New/Initial Goal  
 
Monitoring:   [x] Food and beverage intake   [x] Diet order   [x] Nutrition-focused physical findings   [x] Treatment/therapy   [] Weight   [] Enteral nutrition intake Previous Recommendations (for follow-up assessments only):     []   Implemented       []   Not Implemented (RD to address)      [] No Longer Appropriate     [] No Recommendation Made Discharge Planning: renal, soft solid diet [x] Participated in care planning, discharge planning, & interdisciplinary rounds as appropriate [address], [address]  Pager: [phone]

## 2018-10-02 NOTE — IP AVS SNAPSHOT
[address] [address] Omid Solis Patient: Albino Henson MRN: [id number] :1960 A check catherine indicates which time of day the medication should be taken. My Medications CHANGE how you take these medications Instructions Each Dose to Equal  
 Morning Noon Evening Bedtime  
 glycerin (adult) suppository Commonly known as:  FLEET GLYCERIN (ADULT) What changed:   
- when to take this 
- reasons to take this Your last dose was: Your next dose is: Insert 1 Suppository into rectum daily as needed. Indications: BOWEL EVACUATION  
 1 Suppository  
    
   
   
   
  
 melatonin 3 mg tablet What changed:  reasons to take this Your last dose was: Your next dose is: Take 1 Tab by mouth nightly as needed. 3 mg  
    
   
   
   
  
 minoxidil 2.5 mg tablet Commonly known as:  Kristin Hood What changed:  when to take this Your last dose was: Your next dose is: Take 1 Tab by mouth two (2) times a day. 2.5 mg  
    
   
   
   
  
 OTHER What changed:  additional instructions Your last dose was: Your next dose is:    
   
   
 Check CBC, CMP, MG on 10/08/18, Results to PCP immediately, Diagnosis- TB  
     
   
   
   
  
  
CONTINUE taking these medications Instructions Each Dose to Equal  
 Morning Noon Evening Bedtime  
 albuterol 90 mcg/actuation inhaler Commonly known as:  PROVENTIL HFA, VENTOLIN HFA, PROAIR HFA Your last dose was: Your next dose is: Take 2 Puffs by inhalation every four (4) hours as needed for Wheezing. 2 Puff  
    
   
   
   
  
 amLODIPine 10 mg tablet Commonly known as:  Irma Eastman Your last dose was: Your next dose is: Take 1 Tab by mouth daily. 10 mg  
    
   
   
   
  
 aspirin 81 mg chewable tablet Your last dose was: Your next dose is: Take 1 Tab by mouth daily. 81 mg  
    
   
   
   
  
 atorvastatin 40 mg tablet Commonly known as:  LIPITOR Your last dose was: Your next dose is: Take 1 Tab by mouth nightly. 40 mg  
    
   
   
   
  
 bisacodyl 5 mg EC tablet Commonly known as:  DULCOLAX Your last dose was: Your next dose is: Take 1 Tab by mouth daily as needed for Constipation. 5 mg  
    
   
   
   
  
 carvedilol 25 mg tablet Commonly known as:  Brian Sage Your last dose was: Your next dose is: Take 1 Tab by mouth every twelve (12) hours. 25 mg  
    
   
   
   
  
 cloNIDine 0.3 mg/24 hr  
Commonly known as:  CATAPRES Your last dose was: Your next dose is:    
   
   
 1 Patch by TransDERmal route every seven (7) days. 1 Patch  
    
   
   
   
  
 docusate sodium 100 mg capsule Commonly known as:  Evelene Solar Your last dose was: Your next dose is: Take 1 Cap by mouth two (2) times a day. 100 mg  
    
   
   
   
  
 ethambutol 400 mg tablet Commonly known as:  MYAMBUTOL Your last dose was: Your next dose is: Take 2 Tabs by mouth every Tuesday Thursday, Saturday. At 5 PM  
 800 mg FLUoxetine 10 mg capsule Commonly known as:  PROzac Your last dose was: Your next dose is: Take 1 Cap by mouth daily. 10 mg  
    
   
   
   
  
 isoniazid 300 mg tablet Commonly known as:  NYDRAZID Your last dose was: Your next dose is: Take 1 Tab by mouth daily. On dialysis days, please take medication after dialysis 300 mg  
    
   
   
   
  
 losartan 100 mg tablet Commonly known as:  COZAAR Your last dose was: Your next dose is: Take 1 Tab by mouth daily. 100 mg  
    
   
   
   
  
 OTHER Your last dose was: Your next dose is:    
   
   
 Incentive Spirometry- use as directed  
     
   
   
   
  
 pyrazinamide 500 mg tablet Your last dose was: Your next dose is: Take 2 Tabs by mouth every Tuesday Thursday, Saturday. At 5 pm  Indications: active tuberculosis 1000 mg  
    
   
   
   
  
 pyridoxine (vitamin B6) 100 mg tablet Commonly known as:  VITAMIN B-6 Your last dose was: Your next dose is: Take 1 Tab by mouth daily. 100 mg  
    
   
   
   
  
 rifAMPin 300 mg capsule Commonly known as:  RIFADIN Your last dose was: Your next dose is: Take 2 Caps by mouth daily. On dialysis days please take this medication after Dialysis 600 mg  
    
   
   
   
  
  
STOP taking these medications   
 magnesium citrate solution  
   
  
 ondansetron 4 mg disintegrating tablet Commonly known as:  ZOFRAN ODT  
   
  
 OTHER  
   
  
 OTHER  
   
  
 oxyCODONE-acetaminophen 5-325 mg per tablet Commonly known as:  PERCOCET ASK your doctor about these medications Instructions Each Dose to Equal  
 Morning Noon Evening Bedtime  
 azithromycin 500 mg Tab Commonly known as:  Carlos A Culp Your last dose was: Your next dose is: Take 1 Tab by mouth every Tuesday Thursday, Saturday. At 5 PM  
 500 mg Where to Get Your Medications Information on where to get these meds will be given to you by the nurse or doctor. ! Ask your nurse or doctor about these medications  
  amLODIPine 10 mg tablet  
 carvedilol 25 mg tablet  
 cloNIDine 0.3 mg/24 hr  
 glycerin (adult) suppository  
 losartan 100 mg tablet  
 minoxidil 2.5 mg tablet  OTHER

## 2018-10-02 NOTE — ED NOTES
Dr. Noe Dill informed patient en route to DR[facility] [place], by Charge Nurse Juan Iqbal).

## 2018-10-02 NOTE — ED NOTES
Pt remains awake/quiet; eyes remain mostly closed but pt responds easily to voice. 100% on 10 L NRB; pt placed on 4 L NC at this time. Nicardipine gtt initiated; mixing and initiation of gtt verified with Guanaco Odonnell RN. Dr Werner Dominguez at bedside attempting to place PIV via ultrasound without success x 2 attempts. Pt tolerated well.

## 2018-10-02 NOTE — DIALYSIS
ACUTE HEMODIALYSIS FLOW SHEET 
 
HEMODIALYSIS ORDERS: Physician: Pao Bowers Dialyzer: revaclear   Duration: 3.5 hr  BFR: 400   DFR: 800 Dialysate:  Temp 36.5 K+   2    Ca+  2.5 Na 138 Bicarb 35 Weight:  53.4 kg    Patient Chart []     Unable to Obtain []   Dry weight/UF Goal: 2000ml  Access RT CHEST CVL Heparin []  Bolus      Units    [] Hourly       Units    [x]None Catheter locking solution Heparin Pre BP:   194/109    Pulse:     90     Temperature:   98.4  Respirations: 22  Tx: NS       ml/Bolus  Other        [] N/A Labs: Pre CMP, Lactic acid Additional Orders(medications, blood products, hypotension management):       [x] N/A [x] [facility] Consent Verified CATHETER ACCESS: []N/A   [x]Right   []Left   [x]chest     []Fem [] First use X-ray verified     [x]Tunnel                [] Non Tunneled []No S/S infection  []Redness  []Drainage []Cultured []Swelling []Pain []Medical Aseptic Prep Utilized   [x]Dressing Changed  [x] Biopatch  Date: 10/2/2018 []Clotted   [x]Patent   Flows: [x]Good  []Poor  []Reversed If access problem,  notified: []Yes    [x]N/A  Date:        
 
GRAFT/FISTULA ACCESS:  [x]N/A     []Right     []Left     []UE     []LE []AVG   []AVF        []Buttonhole    []Medical Aseptic Prep Utilized []No S/S infection  []Redness  []Drainage []Cultured []Swelling []Pain Bruit:   [] Strong    [] Weak       Thrill :   [] Strong    [] Weak Needle Gauge:    Length: If access problem,  notified: []Yes     []N/A  Date:       
Please describe access if present and not used:  
 
GENERAL ASSESSMENT:   
LUNGS:  Rate 22 SaO2%   98     [] N/A    [] Clear  [] Coarse  [x] Crackles  [] Wheezing 
      [] Diminished     Location : []RLL   []LLL    []RUL  []ELÍAS Cough: []Productive  []Dry  []N/A   Respirations:  []Easy  []Labored Therapy:  []RA  [x]NC 4 l/min    Mask: []NRB []Venti       O2% []Ventilator  []Intubated  [] Trach  [] BiPaP CARDIAC: [x]Regular      [] Irregular   [] Pericardial Rub  [] JVD [x]  Monitored  [x] Bedside  [] Remotely monitored [] N/A  Rhythm: EDEMA: [x] None  []Generalized  [] Pitting [] 1    [] 2    [] 3    [] 4 [] Facial  [] Pedal  []  UE  [] LE  
SKIN:   [x] Warm  [] Hot     [] Cold   [x] Dry     [] Pale   [] Diaphoretic    
             [] Flushed  [] Jaundiced  [] Cyanotic  [] Rash  [] Weeping LOC:    [x] Alert      []Oriented:    [x] Person     [x] Place  [x]Time 
             [] Confused  [] Lethargic  [] Medicated  [] Non-responsive GI / ABDOMEN:  [x] Flat    [] Distended    [x] Soft    [] Firm   []  Obese 
                           [] Diarrhea  [x] Bowel Sounds  [] Nausea  [] Vomiting  / URINE ASSESSMENT:[] Voiding   [x] Oliguria  [] Anuria   []  Onofre [] Incontinent    []  Incontinent Brief      [x]  Bathroom Privileges PAIN: [x] 0 []1  []2   []3   []4   []5   []6   []7   []8   []9   []10 Scale 0-10  Action/Follow Up: MOBILITY:  [] Amb    [] Amb/Assist    [x] Bed    [] Wheelchair  [] Stretcher All Vitals and Treatment Details on Attached Flowsheet Hospital:  [facility] Room # 302 [] 1st Time Acute  [] Stat  [x] Routine  [] Urgent    
[] Acute Room  [x]  Bedside  [x] ICU/CCU  [] ER Isolation Precautions:  [x] Dialysis   [] Airborne   [] Contact    [] Reverse Special Considerations:         [] Blood Consent Verified []N/A ALLERGIES:   [x] Allergies Banana Other (comments Code Status:  [x] Full Code  [] DNR  [] Other HBsAg ONLY: Date Drawn 8/27/2018         [x]Negative []Positive []Unknown HBsAb: Date 8/27/2018    [] Susceptible   [x] [id number] []Not Drawn  [] Drawn Current Labs:    Date of Labs: Today [x] Results for Lucrecia Price (MRN [id number]) as of 10/2/2018 12:03 Ref. Range 10/2/2018 02:45 WBC Latest Ref Range: 4.6 - 13.2 K/uL 5.7 RBC Latest Ref Range: 4.20 - 5.30 M/uL 3.84 (L) HGB Latest Ref Range: 12.0 - 16.0 g/dL 12.6 HCT Latest Ref Range: 35.0 - 45.0 % 38.7 MCV Latest Ref Range: 74.0 - 97.0 .8 (H) MCH Latest Ref Range: 24.0 - 34.0 PG 32.8 MCHC Latest Ref Range: 31.0 - 37.0 g/dL 32.6 RDW Latest Ref Range: 11.6 - 14.5 % 15.1 (H) PLATELET Latest Ref Range: 135 - 420 K/uL 229 MPV Latest Ref Range: 9.2 - 11.8 FL 10.0 NEUTROPHILS Latest Ref Range: 40 - 73 % 54 LYMPHOCYTES Latest Ref Range: 21 - 52 % 34 MONOCYTES Latest Ref Range: 3 - 10 % 8 EOSINOPHILS Latest Ref Range: 0 - 5 % 3  
BASOPHILS Latest Ref Range: 0 - 2 % 1 DF Latest Units:   AUTOMATED  
ABS. NEUTROPHILS Latest Ref Range: 1.8 - 8.0 K/UL 3.1  
ABS. LYMPHOCYTES Latest Ref Range: 0.9 - 3.6 K/UL 1.9  
ABS. MONOCYTES Latest Ref Range: 0.05 - 1.2 K/UL 0.5  
ABS. EOSINOPHILS Latest Ref Range: 0.0 - 0.4 K/UL 0.2  
ABS. BASOPHILS Latest Ref Range: 0.0 - 0.1 K/UL 0.1 Sodium Latest Ref Range: 136 - 145 mmol/L 135 (L) Potassium Latest Ref Range: 3.5 - 5.5 mmol/L 5.1 Chloride Latest Ref Range: 100 - 108 mmol/L 101 CO2 Latest Ref Range: 21 - 32 mmol/L 26 Anion gap Latest Ref Range: 3.0 - 18 mmol/L 8 Glucose Latest Ref Range: 74 - 99 mg/dL 150 (H) BUN Latest Ref Range: 7.0 - 18 MG/DL 25 (H) Creatinine Latest Ref Range: 0.6 - 1.3 MG/DL 3.86 (H) BUN/Creatinine ratio Latest Ref Range: 12 - 20   6 (L) Calcium Latest Ref Range: 8.5 - 10.1 MG/DL 8.9 GFR est non-AA Latest Ref Range: >60 ml/min/1.73m2 12 (L)  
GFR est AA Latest Ref Range: >60 ml/min/1.73m2 15 (L) Bilirubin, total Latest Ref Range: 0.2 - 1.0 MG/DL 0.5 Protein, total Latest Ref Range: 6.4 - 8.2 g/dL 7.1 Albumin Latest Ref Range: 3.4 - 5.0 g/dL 3.3 (L) Globulin Latest Ref Range: 2.0 - 4.0 g/dL 3.8 A-G Ratio Latest Ref Range: 0.8 - 1.7   0.9 ALT (SGPT) Latest Ref Range: 13 - 56 U/L 12 (L) AST Latest Ref Range: 15 - 37 U/L 65 (H) Alk. phosphatase Latest Ref Range: 45 - 117 U/L 167 (H) CULTURE, BLOOD Unknown Rpt  
  
 DIET:  [x] Renal    [] Other     [] NPO     []  Diabetic PRIMARY NURSE REPORT: First initial/Last name/Title Pre Dialysis: Bárbara Reddy RN    Time: 2619 EDUCATION:   
[x] Patient [x] OtherSON  Knowledge Basis: []None [x]Minimal [] Substantial  
Barriers to learning  [x] language [x] Access Care     [] S&S of infection     [] Fluid Management     []K+     [x]Procedural   
[]Albumin     [] Medications     [] Tx Options     [] Transplant     [] Diet     [] Other Teaching Tools:  [x] Explain  [] Demo  [] Handouts [] Video Patient response:   [x] Verbalized understanding  [] Teach back  [] Return demonstration [x] Requires follow up Inappropriate due to         
 
[x] Time Out/Safety Check  [x]Extracorporeal Circuit Tested for integrity RO/HEMODIALYSIS MACHINE SAFETY CHECKS  Before each treatment:    
Machine Number:                   [facility]  [x] Unit Machine # 4 with centralized RO Alarm Test:  Pass time 1140         Other:        
[x] RO/Machine Log Complete Temp    36.5 Dialysate: pH  7.2 Conductivity: Meter   13.7     HD Machine [x][x][x][x][x][x][x][x][x][x][x][x][x][x][x][x][x][x][x][x][x][x][x][x][x][x][x][x][x][x][x][x][x][x][x][x][x][x][x][x][x][x][x][x][x][x][x][x][x][x][x][x][x][x][x][x][x][x][x][x][x][x][x]

## 2018-10-02 NOTE — CONSULTS
Consult requested by: Dr. Terrance Greene is a 56 y.o. female  who is being seen on consult for ESRD management. Chief Complaint Patient presents with  Shortness of Breath Admission diagnosis: Hypertensive crisis 57y F HTN, ESRD, admitted for HTN urgency, respi disthress, seen for ESRD management. Impression & Plan:  
IMPRESSION:  
ESRD, MWF, s/p HD yesterday Access; TDC, has failed left arm AVG 
HTN, now improving Short of breath, fluid overload vs. Infiltrate MTB, on treatment PLAN:  
Plan to give extra treatment today for better fluid management. 2K bath, UF goal 2L. Continue current BP meds, plan to add minoxidil 2.5mg bid, once BP start to trending up. Adjust all meds per current renal function status.  
   
Discussed with ICU team.  
 
 
HPI:   
51y F with PMH HTN, ESRD, admitted for HTN urgency. In ER she was hypertensive, hypoxic. Her CXR shows patchy infiltrate, congestion vs infiltrate. She was started on IV cardene drip, received abx and admitted to ICU for further monitoring. Due to language barrier she is not able to provide history but son in law at bed side. NO history of fever, during my eval she denies for any chest pain. She has been coughing during my eval.  
NO respiratory distress. Past Medical History:  
Diagnosis Date  Chronic kidney disease  ESRD (end stage renal disease) ([facility] [address] TUES-THURS-SAT  Hypercholesteremia  Hypertension  Kidney disease  Vitamin D deficiency Past Surgical History:  
Procedure Laterality Date  VASCULAR SURGERY PROCEDURE UNLIST Social History Social History  Marital status:  Spouse name: N/A  
 Number of children: N/A  
 Years of education: N/A Occupational History  Not on file. Social History Main Topics  Smoking status: Never Smoker  Smokeless tobacco: Never Used  Alcohol use No  
 Drug use: No  
 Sexual activity: Not on file Other Topics Concern  Not on file Social History Narrative No family history on file. Allergies Allergen Reactions  Banana Other (comments) Home Medications:  
 
Prior to Admission Medications Prescriptions Last Dose Informant Patient Reported? Taking? FLUoxetine (PROZAC) 10 mg capsule   No No  
Sig: Take 1 Cap by mouth daily. OTHER   No No  
Sig: This is to certify that Matt Johnson was admitted to [facility]. POSADA[facility] on 18 and is still receiving medical care here. OTHER   No No  
Sig: Check CBC, CMP, MG on 18, Results to PCP immediately, Diagnosis- TB  
OTHER   No No  
Sig: Incentive Spirometry- use as directed OTHER   No No  
Sig: Incentive Spirometry- Use as directed  
albuterol (PROVENTIL HFA, VENTOLIN HFA, PROAIR HFA) 90 mcg/actuation inhaler   No No  
Sig: Take 2 Puffs by inhalation every four (4) hours as needed for Wheezing. amLODIPine (NORVASC) 10 mg tablet 10/1/2018 at Unknown time  No Yes Sig: Take 1 Tab by mouth daily. aspirin 81 mg chewable tablet 10/1/2018 at Unknown time  No Yes Sig: Take 1 Tab by mouth daily. atorvastatin (LIPITOR) 40 mg tablet 10/1/2018 at Unknown time  No Yes Sig: Take 1 Tab by mouth nightly. azithromycin (ZITHROMAX) 500 mg tab Not Taking at Unknown time  No No  
Sig: Take 1 Tab by mouth every , Saturday. At 5 PM  
bisacodyl (DULCOLAX) 5 mg EC tablet   No No  
Sig: Take 1 Tab by mouth daily as needed for Constipation. carvedilol (COREG) 25 mg tablet 10/1/2018 at Unknown time  No Yes Sig: Take 1 Tab by mouth every twelve (12) hours. cloNIDine (CATAPRES) 0.3 mg/24 hr Not Taking at Unknown time  No No  
Si Patch by TransDERmal route every seven (7) days. docusate sodium (COLACE) 100 mg capsule   No No  
Sig: Take 1 Cap by mouth two (2) times a day.  
ethambutol (MYAMBUTOL) 400 mg tablet   No No  
Sig: Take 2 Tabs by mouth every , Saturday.  At 5 PM  
 glycerin, adult, (FLEET GLYCERIN, ADULT,) suppository   No No  
Sig: Insert 1 Suppository into rectum daily. Indications: BOWEL EVACUATION  
isoniazid (NYDRAZID) 300 mg tablet   No No  
Sig: Take 1 Tab by mouth daily. On dialysis days, please take medication after dialysis  
losartan (COZAAR) 100 mg tablet 10/1/2018 at Unknown time  No Yes Sig: Take 1 Tab by mouth daily. magnesium citrate solution   No No  
Sig: Take 1/3 of bottle every 8 hours until finished or until you have a bowel movement. melatonin 3 mg tablet   No No  
Sig: Take 1 Tab by mouth nightly as needed. Patient taking differently: Take 1 Tab by mouth nightly as needed (insomnia). minoxidil (LONITEN) 2.5 mg tablet   No No  
Sig: Take 1 Tab by mouth daily. ondansetron (ZOFRAN ODT) 4 mg disintegrating tablet   No No  
Sig: Take 1 Tab by mouth every eight (8) hours as needed for Nausea. oxyCODONE-acetaminophen (PERCOCET) 5-325 mg per tablet   No No  
Sig: Take 1 Tab by mouth every eight (8) hours as needed. Max Daily Amount: 3 Tabs. Patient taking differently: Take 1 Tab by mouth every eight (8) hours as needed for Pain.  
pyrazinamide 500 mg tablet   No No  
Sig: Take 2 Tabs by mouth every Tuesday Thursday, Saturday. At 5 pm  Indications: active tuberculosis  
pyridoxine, vitamin B6, (VITAMIN B-6) 100 mg tablet   No No  
Sig: Take 1 Tab by mouth daily. rifAMPin (RIFADIN) 300 mg capsule   No No  
Sig: Take 2 Caps by mouth daily. On dialysis days please take this medication after Dialysis Facility-Administered Medications: None Current Facility-Administered Medications Medication Dose Route Frequency  sodium chloride (NS) flush 5-10 mL  5-10 mL IntraVENous PRN  
 sodium chloride 0.9 % bolus infusion 1,000 mL  1,000 mL IntraVENous ONCE Followed by  
[place] sodium chloride 0.9 % bolus infusion 362 mL  362 mL IntraVENous ONCE  
 VANCOMYCIN INFORMATION NOTE   Other Rx Dosing/Monitoring  niCARdipine (CARDENE) 25 mg in 0.9% sodium chloride 250 mL infusion  0-15 mg/hr IntraVENous TITRATE  [START ON 10/4/2018] levoFLOXacin (LEVAQUIN) 500 mg in D5W IVPB  500 mg IntraVENous Q48H  piperacillin-tazobactam (ZOSYN) 2.25 g in 0.9% sodium chloride (MBP/ADV) 50 mL ADV  2.25 g IntraVENous Q6H  
 amLODIPine (NORVASC) tablet 10 mg  10 mg Oral DAILY  aspirin chewable tablet 81 mg  81 mg Oral DAILY  atorvastatin (LIPITOR) tablet 40 mg  40 mg Oral QHS  carvedilol (COREG) tablet 25 mg  25 mg Oral Q12H  
 ethambutol (MYAMBUTOL) tablet 800 mg  800 mg Oral Q TUE, THU & SAT  FLUoxetine (PROzac) capsule 10 mg  10 mg Oral DAILY  isoniazid (NYDRAZID) tablet 300 mg  300 mg Oral DAILY  losartan (COZAAR) tablet 100 mg  100 mg Oral DAILY  melatonin tablet 3 mg  3 mg Oral QHS PRN  pyrazinamide tablet 1,000 mg  1,000 mg Oral Q TUE, THU & SAT  pyridoxine (vitamin B6) (VITAMIN B-6) tablet 100 mg  100 mg Oral DAILY  rifAMPin (RIFADIN) capsule 600 mg  600 mg Oral DAILY  bisacodyl (DULCOLAX) tablet 5 mg  5 mg Oral DAILY PRN  
 acetaminophen (TYLENOL) tablet 650 mg  650 mg Oral Q6H PRN  
 oxyCODONE-acetaminophen (PERCOCET) 5-325 mg per tablet 1 Tab  1 Tab Oral Q6H PRN  
 naloxone (NARCAN) injection 0.4 mg  0.4 mg IntraVENous PRN  
 ondansetron (ZOFRAN) injection 4 mg  4 mg IntraVENous Q6H PRN  
 docusate sodium (COLACE) capsule 100 mg  100 mg Oral BID PRN  
 heparin (porcine) injection 5,000 Units  5,000 Units SubCUTAneous Q8H  
 heparin (porcine) 1,000 unit/mL injection 4,000 Units  4,000 Units Hemodialysis DIALYSIS MON, WED & FRI Review of Systems:  
 
 Complete 10-point review of systems were obtained and discussed in length 
with the patient. Complete review of systems was negative/unremarkable 
except as mentioned in HPI section. Data Review: 
 
Labs: Results:  
   
Chemistry Recent Labs 10/02/18 
 0245 GLU  150* NA  135*  
K  5.1 CL  101 CO2  26 BUN  25* CREA  3.86* CA  8.9 AGAP  8  
BUCR  6* AP  167* TP  7.1 ALB  3.3*  
GLOB  3.8 AGRAT  0.9  
  
CBC w/Diff Recent Labs 10/02/18 
 0245 WBC  5.7  
RBC  3.84* HGB  12.6 HCT  38.7 PLT  229 GRANS  54 LYMPH  34 EOS  3 Coagulation No results for input(s): PTP, INR, APTT in the last 72 hours. No lab exists for component: INREXT Iron/Ferritin No results for input(s): IRON in the last 72 hours. No lab exists for component: TIBCCALC BNP No results for input(s): BNPP in the last 72 hours. Cardiac Enzymes No results for input(s): CPK, CKND1, WOLFGANG in the last 72 hours. No lab exists for component: Beni Dubon Liver Enzymes Recent Labs 10/02/18 
 0245 TP  7.1 ALB  3.3* AP  167* SGOT  65* Thyroid Studies Lab Results Component Value Date/Time TSH 6.21 (H) 08/24/2018 08:00 AM  
    
 EKG:sinus Physical Assessment:  
 
Visit Vitals  /83  Pulse 75  Temp 97.7 °F (36.5 °C)  Resp (!) 40  
 Ht 5' 1\" (1.549 m)  Wt 53.4 kg (117 lb 11.6 oz)  SpO2 97%  BMI 22.24 kg/m2 Weight change:  
 
Intake/Output Summary (Last 24 hours) at 10/02/18 1128 Last data filed at 10/02/18 0630 Gross per 24 hour Intake              350 ml Output                0 ml Net              350 ml Physical Exam 
General: comfortable, no acute distress HEENT  no thyromegaly CVS: S1S2 heard,  no rub RS: + air entry b/l, + crackles Abd: Soft, Non tender, Neuro: non focal, awake, alert , Extrm:No  edema, no cyanosis, clubbing Skin: no visible  Rash Musculoskeletal: No gross joints or bone deformities Access; Tunnel catheter Procedures/imaging: see electronic medical records for all procedures, Xrays and details which were not copied into this note but were reviewed prior to creation of Dex Lizarraga MD 
October 2, 2018 [facility] Nephrology Office [phone]

## 2018-10-03 NOTE — PROGRESS NOTES
[facility] ICU Progress Note Name: Marleny Deal : 1960 MRN: [id number] Date: 10/3/2018 8:01 AM 
  
[x]I have reviewed the flowsheet and previous days notes. Events overnight reviewed and discussed with nursing staff. Vital signs and records reviewed. Subjective: 
  
57 y/o female speaking [place] only, with pmhx of HTN, TB w/ DOT, ESRD on dialysis, recurrent admissions, admitted to ICU for management for hypertensive emergency and possible pneumonia. 10/3/2018 Off and on cardene drip overnight requiring prn labetalol Nephrology managing BP meds Denies SOB Ate dinner last night with intermittent dry-heaving- required phenergan Spoke with pt with  phone- not very forthcoming with information S/p dialysis 10/2/18 ROS:A comprehensive review of systems was negative except for that written in the HPI. Medication Review: · Pressors -none · Sedation -none · Antibiotics -levaquin/zosyn · Pain -prn tylenol · GI/ DVT - SCDs · Others (other gtts) Vital Signs:   
Visit Vitals  BP (!) 173/99  Pulse 74  Temp 97.9 °F (36.6 °C)  Resp 20  
 Ht 5' 1\" (1.549 m)  Wt 53.5 kg (117 lb 15.1 oz)  SpO2 99%  Breastfeeding No  
 BMI 22.29 kg/m2 O2 Device: Nasal cannula O2 Flow Rate (L/min): 2 l/min Temp (24hrs), Av.2 °F (36.8 °C), Min:97.8 °F (36.6 °C), Max:98.7 °F (37.1 °C) Intake/Output:  
Last shift:        
Last 3 shifts: 10/01 1901 - 10/03 0700 In: 26 [P.O.:60; I.V.:400] Out: 2000 Intake/Output Summary (Last 24 hours) at 10/03/18 08 Last data filed at 10/02/18 1800 Gross per 24 hour Intake              110 ml Output             2000 ml Net            -1890 ml Physical Exam: 
 
General: Asleep, not very interactive, appears tired, does not appear ill HEENT:  Anicteric sclerae; pink palpebral conjunctivae; mucosa moist 
 Resp:  Symmetrical chest expansion, no accessory muscle use; good airway entry; rhonchi noted to RLL 
CV:  S1, S2 present; NSR, regular rate and rhythm GI:  Abdomen soft, non-tender; (+) active bowel sounds Extremities:  +2 pulses on all extremities; no edema/ cyanosis/ clubbing noted; normal capillary refill Skin:  Warm; no rashes/ lesions noted Neurologic:  Non-focal, oriented to self and place, moves all extremities, follows commands with persistent prompting Devices:  HD catheter right upper chest 
 
 
DATA:  
 
Current Facility-Administered Medications Medication Dose Route Frequency  amLODIPine (NORVASC) tablet 10 mg  10 mg Oral DAILY  carvedilol (COREG) tablet 25 mg  25 mg Oral Q12H  hydrALAZINE (APRESOLINE) 20 mg/mL injection 10 mg  10 mg IntraVENous Q6H PRN  promethazine (PHENERGAN) 12.5 mg in 0.9% sodium chloride 50 mL IVPB  12.5 mg IntraVENous Q8H PRN  
 vancomycin (VANCOCIN) 500 mg in 0.9% sodium chloride (MBP/ADV) 100 mL MBP  500 mg IntraVENous DIALYSIS ONCE  
 sodium chloride (NS) flush 5-10 mL  5-10 mL IntraVENous PRN  
 VANCOMYCIN INFORMATION NOTE   Other Rx Dosing/Monitoring  niCARdipine (CARDENE) 25 mg in 0.9% sodium chloride 250 mL infusion  0-15 mg/hr IntraVENous TITRATE  [START ON 10/4/2018] levoFLOXacin (LEVAQUIN) 500 mg in D5W IVPB  500 mg IntraVENous Q48H  
 aspirin chewable tablet 81 mg  81 mg Oral DAILY  atorvastatin (LIPITOR) tablet 40 mg  40 mg Oral QHS  ethambutol (MYAMBUTOL) tablet 800 mg  800 mg Oral Q TUE, THU & SAT  FLUoxetine (PROzac) capsule 10 mg  10 mg Oral DAILY  isoniazid (NYDRAZID) tablet 300 mg  300 mg Oral DAILY  losartan (COZAAR) tablet 100 mg  100 mg Oral DAILY  melatonin tablet 3 mg  3 mg Oral QHS PRN  pyrazinamide tablet 1,000 mg  1,000 mg Oral Q TUE, THU & SAT  pyridoxine (vitamin B6) (VITAMIN B-6) tablet 100 mg  100 mg Oral DAILY  rifAMPin (RIFADIN) capsule 600 mg  600 mg Oral DAILY  bisacodyl (DULCOLAX) tablet 5 mg  5 mg Oral DAILY PRN  
 acetaminophen (TYLENOL) tablet 650 mg  650 mg Oral Q6H PRN  
 oxyCODONE-acetaminophen (PERCOCET) 5-325 mg per tablet 1 Tab  1 Tab Oral Q6H PRN  
 naloxone (NARCAN) injection 0.4 mg  0.4 mg IntraVENous PRN  
 ondansetron (ZOFRAN) injection 4 mg  4 mg IntraVENous Q6H PRN  
 docusate sodium (COLACE) capsule 100 mg  100 mg Oral BID PRN  
 heparin (porcine) injection 5,000 Units  5,000 Units SubCUTAneous Q8H  
 heparin (porcine) 1,000 unit/mL injection 4,000 Units  4,000 Units Hemodialysis DIALYSIS MON, WED & FRI  piperacillin-tazobactam (ZOSYN) 2.25 g in 0.9% sodium chloride (MBP/ADV) 50 mL ADV  2.25 g IntraVENous Q8H  
 Piperacillin-tazobactam (ZOSYN) 0.75 gm Supplemental Dosing by Pharmacy   Other Rx Dosing/Monitoring  influenza vaccine 2018-19 (6 mos+)(PF) (FLUARIX QUAD/FLULAVAL QUAD) injection 0.5 mL  0.5 mL IntraMUSCular PRIOR TO DISCHARGE Labs: Results:  
   
Chemistry Recent Labs 10/02/18 
 1158  10/02/18 
 0245 GLU  92  150* NA  139  135* K  4.2  5.1 CL  102  101 CO2  29  26 BUN  24*  25* CREA  3.17*  3.86* CA  8.2*  8.9 AGAP  8  8 BUCR  8*  6* AP  91  167* TP  5.7*  7.1 ALB  2.5*  3.3*  
GLOB  3.2  3.8 AGRAT  0.8  0.9  
  
CBC w/Diff Recent Labs 10/03/18 
 6155  10/02/18 
 0245 WBC  4.1*  5.7  
RBC  3.42*  3.84* HGB  11.0*  12.6 HCT  32.3*  38.7 PLT  76*  229 GRANS  PENDING  54 LYMPH  PENDING  34 EOS  PENDING  3 Coagulation No results for input(s): PTP, INR, APTT in the last 72 hours. No lab exists for component: INREXT Liver Enzymes Recent Labs 10/02/18 
 1158 TP  5.7* ALB  2.5* AP  91 SGOT  95* ABG No results found for: PH, PHI, PCO2, PCO2I, PO2, PO2I, HCO3, HCO3I, FIO2, FIO2I Microbiology Recent Labs 10/02/18 
 0315  10/02/18 
 0245 CULT  NO GROWTH AFTER 23 HOURS  NO GROWTH AFTER 23 HOURS Telemetry: [x]Sinus []A-flutter []Paced []A-fib []Multiple PVCs Imaging: CXR Results  (Last 48 hours) 10/02/18 0302  XR CHEST PORT Final result Impression:  IMPRESSION:  
   
Multilobar infiltrates with probable tiny effusions. Stable cardiomegaly Narrative:  EXAM: PORTABLE CHEST 0243 hours CLINICAL HISTORY/INDICATION: Sepsis , shortness of breath, difficulty breathing  
prior to arrival    
     
COMPARISON: Chest x-ray September 11, 2018. TECHNIQUE: Single AP view FINDINGS:   
   
Multifocal patchy opacities throughout the lungs with greater consolidation at  
the bases. Mild blunting of both costophrenic angles. Pulmonary vascularity  
obscured. Heart remains mildly enlarged. No change in the double-lumen right  
jugular approach central catheter terminating at the right atrium. Stewart Moran CT Results  (Last 48 hours) None IMPRESSION:  
· Hypertensive emergency- Overall, BP improving. On and off of cardene. Nephrology managing BP meds. Currently diayzing · Respiratory failure, acute- hypoxic- clinically improving- stable on room air · Suspicion for sepsis- unlikely- Afebrile, tachycardia resolved · Chronic renal failure (ESRD) on HD- improved. S/p dialysis 10/2/18. Currently dialyzing · Thrombocytosis- plt- 79- d/c heparin & vanc- sent HIT panel · TB w/ DOT- recent sputum smears cleared for AFB- ID consulted Other HX 
· NSTEMI 
· Seizures · Oropharyngeal dysphagia PLAN:  
· Resp -  Stable on room air. HOB > 30. Aspiration precautions, pulmonary hygiene. CXR this AM with worsening vascular congestion. Repeat CXR tomorrow AM. · ID - Afebrile, aleukocytosis. Trend WBC and temp. ID consulted for TB med management. D/c vanc and levaquin. Continue zosyn for now. · CVS -   Labile hypertension on and off of cardene. Cardene off for dialysis at bedside. Re-evaluate after dialysis. Nephrology to adjust bp meds · Heme/onc - Daily CBC; Thrombocytosis- d/c heparin & vanc. Continue to monitor H/H & platelets. HIT panel (-) on 09/15/18, but will send again today. · Metabolic - Daily BMP; Monitor electrolytes and replace as needed · Renal - Crea & BUN improving. Continue to trend renal indices. Nephrology following for HD - scheduled M/W/F.  
· Endocrine - Glycemic Control goal < 180; avoid hypoglycemia · Neuro/ Pain/ Sedation - Monitor neuro status. Avoid sedating medications · GI - Cardiac diet as tolerated. Bowel regimen · Prophylaxis - DVT, GI- SCDs · Discussed in interdisciplinary rounds - If able to keep off Cardene gtt & control BP with PO meds, will likely be able to transfer today or tomorrow. [x]See my orders for details My assessment/plan was discussed with: 
[x]nursing []PT/OT   
[]respiratory therapy [x]  
[]family [] Luca Bartholomew, [facility] Staff Addendum I have independently evaluated the patient and reviewed the patient's chart. I have discussed the findings and care plan with ICU Care Team. Care Plan reviewed on ICU milti-D rounds. I agree with the above evaluation, assessment and recommendations. Will discuss BP management further with Nephrology. Patient with long history of labile BP. Critical Care and time spent coordinating care, minus procedure time: 25 min Kathryn Vázquez DO, [facility] 3:13 PM

## 2018-10-03 NOTE — PROGRESS NOTES
RENAL DAILY PROGRESS NOTE 57y F HTN, ESRD, admitted for HTN urgency, respi disthress, seen for ESRD management. Subjective:  
 
 
Complaint: 
Overnight events noted Remain in ICU, now off cardine drip Looks comfortable Tolerated HD well yesterday. IMPRESSION:  
· ESRD, MWF, s/p HD yesterday · Access; TDC, has failed left arm AVG 
· HTN, · Short of breath, fluid overload vs. Infiltrate · MTB, on treatment PLAN:  
Her BP is higher today , hold BP meds as plan for HD this morning, UF  Goal 2.5L as tolerated. Plan to resume her BP meds Start her on minoxidil 2.5mg this evening. Adjust all meds per current renal function status.     
 
 
 
 
 
 
Current Facility-Administered Medications Medication Dose Route Frequency  amLODIPine (NORVASC) tablet 10 mg  10 mg Oral DAILY  carvedilol (COREG) tablet 25 mg  25 mg Oral Q12H  hydrALAZINE (APRESOLINE) 20 mg/mL injection 10 mg  10 mg IntraVENous Q6H PRN  promethazine (PHENERGAN) 12.5 mg in 0.9% sodium chloride 50 mL IVPB  12.5 mg IntraVENous Q8H PRN  
 sodium chloride (NS) flush 5-10 mL  5-10 mL IntraVENous PRN  niCARdipine (CARDENE) 25 mg in 0.9% sodium chloride 250 mL infusion  0-15 mg/hr IntraVENous TITRATE  aspirin chewable tablet 81 mg  81 mg Oral DAILY  atorvastatin (LIPITOR) tablet 40 mg  40 mg Oral QHS  ethambutol (MYAMBUTOL) tablet 800 mg  800 mg Oral Q TUE, THU & SAT  FLUoxetine (PROzac) capsule 10 mg  10 mg Oral DAILY  isoniazid (NYDRAZID) tablet 300 mg  300 mg Oral DAILY  losartan (COZAAR) tablet 100 mg  100 mg Oral DAILY  melatonin tablet 3 mg  3 mg Oral QHS PRN  pyrazinamide tablet 1,000 mg  1,000 mg Oral Q TUE, THU & SAT  pyridoxine (vitamin B6) (VITAMIN B-6) tablet 100 mg  100 mg Oral DAILY  rifAMPin (RIFADIN) capsule 600 mg  600 mg Oral DAILY  bisacodyl (DULCOLAX) tablet 5 mg  5 mg Oral DAILY PRN  
 acetaminophen (TYLENOL) tablet 650 mg  650 mg Oral Q6H PRN  
  oxyCODONE-acetaminophen (PERCOCET) 5-325 mg per tablet 1 Tab  1 Tab Oral Q6H PRN  
 naloxone (NARCAN) injection 0.4 mg  0.4 mg IntraVENous PRN  
 ondansetron (ZOFRAN) injection 4 mg  4 mg IntraVENous Q6H PRN  
 docusate sodium (COLACE) capsule 100 mg  100 mg Oral BID PRN  
 heparin (porcine) 1,000 unit/mL injection 4,000 Units  4,000 Units Hemodialysis DIALYSIS MON, WED & FRI  piperacillin-tazobactam (ZOSYN) 2.25 g in 0.9% sodium chloride (MBP/ADV) 50 mL ADV  2.25 g IntraVENous Q8H  
 Piperacillin-tazobactam (ZOSYN) 0.75 gm Supplemental Dosing by Pharmacy   Other Rx Dosing/Monitoring  influenza vaccine 2018-19 (6 mos+)(PF) (FLUARIX QUAD/FLULAVAL QUAD) injection 0.5 mL  0.5 mL IntraMUSCular PRIOR TO DISCHARGE Review of Symptoms: comprehensive ROS negative except above. Objective:  
Patient Vitals for the past 24 hrs: 
 Temp Pulse Resp BP SpO2  
10/03/18 0820 - - - (!) 183/102 -  
10/03/18 0815 - 74 18 (!) 189/91 96 % 10/03/18 0800 97.8 °F (36.6 °C) 68 15 149/85 100 % 10/03/18 0745 - 66 15 158/86 99 % 10/03/18 0730 - 71 17 162/90 99 % 10/03/18 0715 - 74 20 (!) 173/99 99 % 10/03/18 0700 - 68 15 132/83 98 % 10/03/18 0645 - 69 16 151/87 98 % 10/03/18 0630 - 70 18 (!) 168/91 99 % 10/03/18 0615 - 70 16 (!) 193/96 99 % 10/03/18 0600 - 72 21 - 99 % 10/03/18 0545 - 72 27 (!) 194/101 98 % 10/03/18 0530 - 67 26 (!) 186/93 90 % 10/03/18 0515 - 87 24 (!) 193/105 99 % 10/03/18 0500 - 77 30 (!) 193/124 100 % 10/03/18 0445 - 79 28 (!) 191/108 98 % 10/03/18 0430 - 86 23 (!) 205/103 99 % 10/03/18 0415 - 82 24 (!) 191/113 98 % 10/03/18 0400 97.9 °F (36.6 °C) 81 19 - 96 % 10/03/18 0345 - 72 17 167/69 (!) 89 % 10/03/18 0330 - 74 17 161/73 (!) 83 % 10/03/18 0315 - 74 18 173/90 (!) 81 % 10/03/18 0311 - - - 160/83 -  
10/03/18 0300 - 75 20 160/83 94 % 10/03/18 0230 - 90 20 (!) 197/106 98 % 10/03/18 0200 - 83 23 (!) 186/111 98 % 10/03/18 0130 - 82 20 (!) 214/105 (!) 89 % 10/03/18 0100 - 75 30 186/83 96 % 10/03/18 0030 - 87 21 (!) 52/18 97 % 10/03/18 0000 98.7 °F (37.1 °C) 81 21 (!) 185/103 (!) 86 % 10/02/18 2330 - 79 22 (!) 178/95 (!) 74 % 10/02/18 2300 - 79 26 (!) 193/117 97 % 10/02/18 2230 - 82 26 (!) 175/143 97 % 10/02/18 2200 - 72 12 165/88 97 % 10/02/18 2130 - 73 (!) 35 (!) 172/101 99 % 10/02/18 2115 - 78 27 (!) 179/97 99 % 10/02/18 2100 - 83 (!) 34 (!) 180/107 99 % 10/02/18 2045 - 85 27 (!) 195/111 99 % 10/02/18 2030 - 84 (!) 36 (!) 175/109 99 % 10/02/18 2015 98.4 °F (36.9 °C) 95 26 (!) 207/121 100 % 10/02/18 2000 98.4 °F (36.9 °C) 88 (!) 35 - 99 % 10/02/18 1930 - 83 26 (!) 182/101 98 % 10/02/18 1900 - 84 23 (!) 170/103 98 % 10/02/18 1830 - 85 30 (!) 173/99 98 % 10/02/18 1800 - 84 (!) 37 162/87 97 % 10/02/18 1730 - 82 (!) 31 159/82 98 % 10/02/18 1700 - 75 (!) 32 (!) 181/92 99 % 10/02/18 1630 - 77 29 127/87 99 % 10/02/18 1600 98.1 °F (36.7 °C) 76 (!) 34 130/82 100 % 10/02/18 1530 98.5 °F (36.9 °C) 77 (!) 35 (!) 162/107 99 % 10/02/18 1515 - 71 27 (!) 206/100 99 % 10/02/18 1500 - 79 (!) 38 (!) 189/106 97 % 10/02/18 1445 - 77 (!) 33 (!) 182/121 98 % 10/02/18 1430 - 80 (!) 40 (!) 184/119 100 % 10/02/18 1420 - (!) 111 (!) 36 (!) 230/159 99 % 10/02/18 1415 - (!) 108 23 (!) 257/152 100 % 10/02/18 1400 - 72 (!) 42 (!) 198/101 100 % 10/02/18 1345 - 85 30 (!) 215/111 97 % 10/02/18 1330 - 81 (!) 31 (!) 145/109 98 % 10/02/18 1322 - 80 (!) 35 (!) 217/127 97 % 10/02/18 1315 - 79 (!) 32 - 99 % 10/02/18 1310 - 78 29 (!) 192/112 100 % 10/02/18 1300 - 75 29 - 100 % 10/02/18 1245 - 69 (!) 31 (!) 207/104 100 % 10/02/18 1230 - 88 28 (!) 194/134 97 % 10/02/18 1215 - 65 16 (!) 195/105 99 % 10/02/18 1200 97.9 °F (36.6 °C) 85 (!) 34 (!) 205/110 98 % 10/02/18 1145 - 70 25 188/88 100 % 10/02/18 1130 98.4 °F (36.9 °C) 90 22 (!) 194/109 98 % 10/02/18 1100 - 83 (!) 33 (!) 187/97 100 % 10/02/18 1030 - 88 23 (!) 190/98 98 % 10/02/18 1000 - (!) 105 25 (!) 193/103 97 % Weight change: 8.04 kg (17 lb 11.6 oz) 
 
 10/01 1901 - 10/03 0700 In: 26 [P.O.:60; I.V.:400] Out: 2000 Intake/Output Summary (Last 24 hours) at 10/03/18 8886 Last data filed at 10/02/18 1800 Gross per 24 hour Intake              110 ml Output             2000 ml Net            -1890 ml Physical Exam:  
General: comfortable, no acute distress HEENT sclera anicteric, supple neck, no thyromegaly CVS: S1S2 heard,  no rub RS: + air entry b/l, Abd: Soft, Non tender, Not distended, Positive bowel sounds, no organomegaly, no CVA / supra pubic tenderness Neuro: non focal, awake, alert , CN II-XII are grossly intact Extrm: edema, no cyanosis, clubbing Skin: no visible  Rash Musculoskeletal: No gross joints or bone deformities Data Review:  
 
LABS:  
Hematology: Recent Labs 10/03/18 
 3459  10/02/18 
 0245 WBC  4.1*  5.7 HGB  11.0*  12.6 HCT  32.3*  38.7 Chemistry: Recent Labs 10/03/18 
 1710  10/02/18 
 1158  10/02/18 
 0245 BUN  18  24*  25* CREA  2.89*  3.17*  3.86* CA  8.6  8.2*  8.9 ALB  2.8*  2.5*  3.3*  
K  3.6  4.2  5.1 NA  140  139  135* CL  104  102  101 CO2  23  29  26 PHOS  2.9   --    --   
GLU  96  92  150* Procedures/imaging: see electronic medical records for all procedures, Xrays and details which were not copied into this note but were reviewed prior to creation of Plan Assessment & Plan: As above Odilia Franklin MD 
10/3/2018 
9:59 AM

## 2018-10-03 NOTE — PROGRESS NOTES
attended the interdisciplinary rounds for Alex Pulliam, who is a 56 y.o.,female. Patients Primary Language is: [place]. According to the patients EMR Pent[employer]al Affiliation is: Religion. The reason the Patient came to the hospital is:  
Patient Active Problem List  
 Diagnosis Date Noted  Hx of TB skin testing 10/02/2018  HCAP (healthcare-associated pneumonia) 10/02/2018  Intractable vomiting with nausea 07/17/2018  ESRD on hemodialysis ([facility] [address] 07/17/2018  Pulmonary TB 07/17/2018  Hypertensive crisis 07/17/2018  Seizure ([address] 07/16/2018  Hypertensive emergency 07/16/2018  Complication of vascular dialysis catheter, initial encounter 06/18/2018  Anemia 02/26/2018  Hyponatremia 02/25/2018  ESRD (end stage renal disease) ([facility] [address] 02/25/2018  
 NSTEMI (non-ST elevated myocardial infarction) ([facility] [address] 02/25/2018  Respiratory failure ([facility] [address] 02/25/2018  Hypoxia 02/25/2018  Acute UTI 02/25/2018  Hyperkalemia 09/08/2017  Renal failure 09/08/2017  SCOOTER (acute kidney injury) ([facility] [address] 09/08/2017 Plan: 
Chaplains will continue to follow and will provide pastoral care on an as needed/requested basis.  recommends bedside caregivers page  on duty if patient shows signs of acute spiritual or emotional distress. [address] [facility] Board Certified [employer] [facility]  
[phone]

## 2018-10-03 NOTE — PROGRESS NOTES
Reason for Admission: HYPOXIA RRAT Score:  17 Plan for utilizing home health:    FAMILY REFUSED THIS SERVICE Likelihood of Readmission:  MEDIUM Transition of Care Plan:    PT WILL RETURN TO HER FAMILY HOME, PT'S SPOUSE SERVES AS THE MAJOR CARETAKER FOR THIS PT WHEN PT IS AT HOME. Care Management Interventions PCP Verified by CM: Yes (DR. ANNY ARAUJO) Palliative Care Criteria Met (RRAT>21 & CHF Dx)?: No 
Mode of Transport at Discharge: Other (see comment) (FAMILY WILL TRANSPORT THIS PT HOME) Transition of Care Consult (CM Consult): Discharge Planning Current Support Network: [facility], Lives with Caregiver, Family Lives Nearby (PT LIVES WITH MULTIPLE FAMILY MEMBERS(SON, DAUGHTER, GRANDCHILDREN AND DAUGHTER IN LAW) THAT INCLUDES PT'S SPOUSE) Confirm Follow Up Transport: Family Plan discussed with Pt/Family/Caregiver: Yes (PT PLAN IS TO RETURN HOME WITH FAMILY SUPPORT, FAMILY REFUSED [address]) [employer] Information Provided?: No 
Discharge Location Discharge Placement: Home with family assistance Pt is active with dialysis treatment with [facility] 11:00 a.m. Is the chair time, pt's family transports this pt to her appointments. Pt's son who is willing to serve as an  is Eva Israel [phone]. Pt's family refuse home health services.

## 2018-10-03 NOTE — PROGRESS NOTES
[facility] Hospitalist Group Progress Note Patient: Tania Ochoa Age: 56 y.o. : 1960 MR#: [id number] SSN: [id number] Date/Time: 10/3/2018 Subjective: I personally saw and evaluated this patient on 10/03/18 Patient lying in bed in NAD Assessment/Plan: 1- Hypertensive emergency at admission 2- ESRD 3- Acute hypoxic resp failure at admission likely 2/2 hypertensive crisis- improved 4- Pulmonary TB 5- Doubt Sepsis 6 thrombocytopenia- monitor PLAN On nicardipine drip as needed On amlodipine, coreg, cozaar, minoxidil Wean O2 On Rx for TB Case discussed with:  []Patient  []Family  [x]Nursing  []Case Management DVT Prophylaxis:  []Lovenox  []Hep SQ  []SCDs  []Coumadin   []On Heparin gtt Objective:  
VS:  
Visit Vitals  /89  Pulse 94  Temp 97.6 °F (36.4 °C)  Resp 22  
 Ht 5' 1\" (1.549 m)  Wt 53.5 kg (117 lb 15.1 oz)  SpO2 92%  Breastfeeding No  
 BMI 22.29 kg/m2 Tmax/24hrs: Temp (24hrs), Av.1 °F (36.7 °C), Min:97.6 °F (36.4 °C), Max:98.9 °F (37.2 °C) Input/Output:  
Intake/Output Summary (Last 24 hours) at 10/03/18 1758 Last data filed at 10/03/18 1530 Gross per 24 hour Intake           219.75 ml Output             2000 ml Net         -1780.25 ml General:  Awake, alert Cardiovascular:  S1S2+, RRR Pulmonary:  CTA b/l GI:  Soft, BS+, NT, ND Extremities:  No edema Labs:   
Recent Results (from the past 24 hour(s)) [place] Collection Time: 10/02/18  9:30 PM  
Result Value Ref Range Vancomycin, random 14.4 5.0 - 40.0 UG/ML  
MAGNESIUM Collection Time: 10/03/18  6:20 AM  
Result Value Ref Range Magnesium 2.9 (H) 1.6 - 2.6 mg/dL PHOSPHORUS Collection Time: 10/03/18  6:20 AM  
Result Value Ref Range Phosphorus 2.9 2.5 - 4.9 MG/DL  
CBC WITH AUTOMATED DIFF Collection Time: 10/03/18  6:20 AM  
Result Value Ref Range WBC 4.1 (L) 4.6 - 13.2 K/uL RBC 3.42 (L) 4.20 - 5.30 M/uL  
 HGB 11.0 (L) 12.0 - 16.0 g/dL HCT 32.3 (L) 35.0 - 45.0 % MCV 94.4 74.0 - 97.0 FL  
 MCH 32.2 24.0 - 34.0 PG  
 MCHC 34.1 31.0 - 37.0 g/dL  
 RDW 15.2 (H) 11.6 - 14.5 % PLATELET 76 (L) 009 - 420 K/uL MPV 11.2 9.2 - 11.8 FL  
 NEUTROPHILS 71 40 - 73 % LYMPHOCYTES 16 (L) 21 - 52 % MONOCYTES 10 3 - 10 % EOSINOPHILS 1 0 - 5 % BASOPHILS 2 0 - 2 %  
 ABS. NEUTROPHILS 2.9 1.8 - 8.0 K/UL  
 ABS. LYMPHOCYTES 0.7 (L) 0.9 - 3.6 K/UL  
 ABS. MONOCYTES 0.4 0.05 - 1.2 K/UL  
 ABS. EOSINOPHILS 0.0 0.0 - 0.4 K/UL  
 ABS. BASOPHILS 0.1 0.0 - 0.1 K/UL  
 DF AUTOMATED PLATELET COMMENTS DECREASED PLATELETS    
 RBC COMMENTS ANISOCYTOSIS 1+ 
    
 RBC COMMENTS TEARDROP CELLS 
OCCASIONAL 
    
METABOLIC PANEL, COMPREHENSIVE Collection Time: 10/03/18  6:20 AM  
Result Value Ref Range Sodium 140 136 - 145 mmol/L Potassium 3.6 3.5 - 5.5 mmol/L Chloride 104 100 - 108 mmol/L  
 CO2 23 21 - 32 mmol/L Anion gap 13 3.0 - 18 mmol/L Glucose 96 74 - 99 mg/dL BUN 18 7.0 - 18 MG/DL Creatinine 2.89 (H) 0.6 - 1.3 MG/DL  
 BUN/Creatinine ratio 6 (L) 12 - 20 GFR est AA 21 (L) >60 ml/min/1.73m2 GFR est non-AA 17 (L) >60 ml/min/1.73m2 Calcium 8.6 8.5 - 10.1 MG/DL Bilirubin, total 0.8 0.2 - 1.0 MG/DL  
 ALT (SGPT) 17 13 - 56 U/L  
 AST (SGOT) 68 (H) 15 - 37 U/L Alk. phosphatase 88 45 - 117 U/L Protein, total 5.9 (L) 6.4 - 8.2 g/dL Albumin 2.8 (L) 3.4 - 5.0 g/dL Globulin 3.1 2.0 - 4.0 g/dL A-G Ratio 0.9 0.8 - 1.7 VITAMIN B12 & FOLATE Collection Time: 10/03/18  6:20 AM  
Result Value Ref Range Vitamin B12 1653 (H) 211 - 911 pg/mL Folate 6.6 3.10 - 17.50 ng/mL Keeley Ayon Collection Time: 10/03/18  6:20 AM  
Result Value Ref Range Vancomycin, random 18.3 5.0 - 40.0 UG/ML Additional Data Reviewed:   
 
Signed By: Buck Talbert MD   
 October 3, 2018

## 2018-10-03 NOTE — PROGRESS NOTES
NUTRITION Nursing Referral: [facility] Nutrition Consult: General Nutrition Management & Supplements RECOMMENDATIONS / PLAN:  
 
- Continue current nutrition interventions. - Monitor po intake and diet tolerance. - Continue RD inpatient monitoring and evaluation. NUTRITION INTERVENTIONS & DIAGNOSIS:  
 
[x] Meals/snacks: modified composition 
[x] Medical food supplement therapy: Ensure Pudding TID [x] Collaboration and referral of nutrition care: interdisciplinary rounds Nutrition Diagnosis: Increased nutrient (protein) needs related to ESRD on HD as evidenced by pt with increased losses during dialysis. ASSESSMENT:  
 
10/3: Tolerating diet, consuming food brought in by family. No nausea/vomiting today per nursing. HD again today. 10/2: Pt c/o nausea and unable to tolerate po medications or breakfast this morning. No acute abdominal issues and tolerating adequate meal intake PTA with recent several pound weight gain per son's report. Tolerates soft solid foods and would prefer pudding supplements per son. Average po intake adequate to meet patients estimated nutritional needs:   [] Yes     [x] No   [] Unable to determine at this time Diet: DIET NUTRITIONAL SUPPLEMENTS Breakfast, Lunch, Dinner; ENSURE PUDDING 
DIET CARDIAC Soft Solids Food Allergies: banana Current Appetite:   [] Good     [x] Fair     [] Poor     [] Other:  
Appetite/meal intake prior to admission:   [x] Good     [x] Fair     [] Poor     [x] Other: tolerating diet with good appetite and no recent issues with nausea/abdominal pain or constipation per son report Feeding Limitations:  [x] Swallowing difficulty: SLP recommended soft solid diet with thin liquids on 8/28/18    [] Chewing difficulty    [] Other: 
Current Meal Intake:  
Patient Vitals for the past 100 hrs: 
 % Diet Eaten 10/02/18 1700 20 % BM: 10/3 Skin Integrity: WDL Edema:   [x] No     [] Yes Pertinent Medications: Reviewed: dulcolax, colace, zofran, vitamin B6, phenergan Recent Labs 10/03/18 
 0492  10/02/18 
 1158  10/02/18 
 0245 NA  140  139  135* K  3.6  4.2  5.1 CL  104  102  101 CO2  23  29  26 GLU  96  92  150* BUN  18  24*  25* CREA  2.89*  3.17*  3.86* CA  8.6  8.2*  8.9 MG  2.9*   --    --   
PHOS  2.9   --    --   
ALB  2.8*  2.5*  3.3* SGOT  68*  95*  65* ALT  17  17  12* Intake/Output Summary (Last 24 hours) at 10/03/18 1063 Last data filed at 10/02/18 1800 Gross per 24 hour Intake              110 ml Output             2000 ml Net            -1890 ml Anthropometrics: 
Ht Readings from Last 1 Encounters:  
10/02/18 5' 1\" (1.549 m) Last 3 Recorded Weights in this Encounter 10/02/18 0234 10/02/18 0800 10/03/18 0300 Weight: 45.4 kg (100 lb) 53.4 kg (117 lb 11.6 oz) 53.5 kg (117 lb 15.1 oz) Body mass index is 22.29 kg/(m^2). Weight History: recent weight gain of 2-3 lb PTA per son's report Weight Metrics 10/3/2018 9/11/2018 8/31/2018 8/4/2018 7/20/2018 6/27/2018 6/26/2018 Weight 117 lb 15.1 oz 103 lb 9.9 oz 99 lb 3.3 oz 90 lb 90 lb 6.2 oz 90 lb 6.2 oz 85 lb 8.6 oz  
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  
 
Calories: 6052-9875 kcal (30-35 kcal/kg) based on  [] Actual BW      [x] SBW 56 kg 
 Protein:  gm (1.2-2 gm/kg) based on  [] Actual BW      [x] SBW Fluid: 1 mL/kcal 
  
MONITORING & EVALUATION:  
 
Nutrition Goal(s): 1. Po intake of meals will meet >75% of patient estimated nutritional needs within the next 7 days. Outcome:  [] Met/Ongoing    [x]  Not Met/Progressing    [] New/Initial Goal  
 
Monitoring:   [x] Food and beverage intake   [x] Diet order   [x] Nutrition-focused physical findings   [x] Treatment/therapy   [] Weight   [] Enteral nutrition intake Previous Recommendations (for follow-up assessments only):     [x]   Implemented       []   Not Implemented (RD to address)      [] No Longer Appropriate     [] No Recommendation Made Discharge Planning: renal, soft solid diet [x] Participated in care planning, discharge planning, & interdisciplinary rounds as appropriate Roberto Gann RD, [address]  Pager: [phone]

## 2018-10-03 NOTE — DIALYSIS
ACUTE HEMODIALYSIS FLOW SHEET 
 
HEMODIALYSIS ORDERS: Physician: Elina Styles Dialyzer: revaclear   Duration: 3.5 hr  BFR: 350 max 400   DFR: 800 Dialysate:  Temp 36-37 K+   2    Ca+  2.5 Na 138 Bicarb 35 Weight:  53.5 kg    Patient Chart [x]     Unable to Obtain []   Dry weight/UF Goal: 2000 Access Rt IJ Heparin  Bolus      Units    [] Hourly       Units    [x]None Catheter locking solution Heparin 1,000 units/mL Pre BP:   167/90    Pulse:     73     Temperature:   97.9  Respirations: 15  Tx: NS       ml/Bolus  Other        [x] N/A Labs: Pre        Post:        [x] N/A Additional Orders(medications, blood products, hypotension management):       [x] N/A [x] Hawk Consent Verified CATHETER ACCESS: []N/A   [x]Right   []Left   []IJ     []Fem [] First use X-ray verified     [x]Tunnel                [] Non Tunneled []No S/S infection  [x]Redness- rash noted outer borders of dressing  []Drainage []Cultured []Swelling []Pain  
[x]Medical Aseptic Prep Utilized   []Dressing Changed  [x] Biopatch  Date: 10/2/18 []Clotted   []Patent   Flows: []Good  []Poor  []Reversed If access problem,  notified: []Yes    [x]N/A  Date:        
 
GRAFT/FISTULA ACCESS:  [x]N/A     []Right     []Left     []UE     []LE []AVG   []AVF        []Buttonhole    []Medical Aseptic Prep Utilized []No S/S infection  []Redness  []Drainage []Cultured []Swelling []Pain Bruit:   [] Strong    [] Weak       Thrill :   [] Strong    [] Weak Needle Gauge:    Length: If access problem,  notified: []Yes     [x]N/A  Date:       
Please describe access if present and not used:  
 
 
GENERAL ASSESSMENT:   
LUNGS:  Rate 15 SaO2%  93   [] N/A    [] Clear  [] Coarse  [] Crackles  [] Wheezing 
      [] Diminished     Location : []RLL   []LLL    []RUL  []ELÍAS Cough: []Productive  []Dry  [x]N/A   Respirations:  [x]Easy  []Labored Therapy:  [x]RA  []NC  l/min    Mask: []NRB []Venti       O2% []Ventilator  []Intubated  [] Trach  [] BiPaP CARDIAC: [x]Regular      [] Irregular   [] Pericardial Rub  [] JVD [x]  Monitored  [x] Bedside  [] Remotely monitored [] N/A  Rhythm:Sinus EDEMA: [x] None  []Generalized  [] Pitting [] 1    [] 2    [] 3    [] 4 [] Facial  [] Pedal  []  UE  [] LE  
SKIN:   [x] Warm  [] Hot     [] Cold   [x] Dry     [] Pale   [] Diaphoretic    
             [] Flushed  [] Jaundiced  [] Cyanotic  [x] Rash-under Rt IJ dressing edges  [] Weeping LOC:    [x] Alert      [x]Oriented:    [x] Person     [x] Place  []Time 
             [] Confused  [x] Lethargic  [] Medicated  [] Non-responsive GI / ABDOMEN:  [x] Flat    [] Distended    [x] Soft    [] Firm   []  Obese 
                           [] Diarrhea  [x] Bowel Sounds  [] Nausea  [] Vomiting  / URINE ASSESSMENT:[] Voiding   [x] Oliguria  [] Anuria   []  Onofre [] Incontinent    []  Incontinent Brief      [x]  Bathroom Privileges PAIN: [x] 0 []1  []2   []3   []4   []5   []6   []7   []8   []9   []10 Scale 0-10  Action/Follow Up: MOBILITY:  [] Amb    [x] Amb/Assist    [] Bed    [] Wheelchair  [] Stretcher All Vitals and Treatment Details on Attached Flowsheet Hospital: [facility] Room # 302 [] 1st Time Acute  [] Stat  [] Routine  [] Urgent    
[] Acute Room  []  Bedside  [x] ICU/CCU  [] ER Isolation Precautions:  [x] Dialysis   [] Airborne   [] Contact    [] Reverse Special Considerations:         [] Blood Consent Verified [x]N/A ALLERGIES: 
        
Allergies Banana Other (comments)   9/8/2017  Past Updates. .. Alvarez as Reviewed Last Reviewed by Jeyson Covington RN on 10/3/2018 at 11:09 AM: Review Complete (History) Code Status:  [x] Full Code  [] DNR  [] Other HBsAg ONLY: Date Drawn 8/27/18        [x]Negative []Positive []Unknown HBsAb: Date 8/27/18    [] Susceptible   [x] [id number] []Not Drawn  [] Drawn Current Labs:    Date of Labs: Today [x] Results for Deloris Tinajero (MRN [id number]) as of 10/3/2018 11:10 Ref. Range 10/3/2018 06:20 WBC Latest Ref Range: 4.6 - 13.2 K/uL 4.1 (L) RBC Latest Ref Range: 4.20 - 5.30 M/uL 3.42 (L) HGB Latest Ref Range: 12.0 - 16.0 g/dL 11.0 (L) HCT Latest Ref Range: 35.0 - 45.0 % 32.3 (L) MCV Latest Ref Range: 74.0 - 97.0 FL 94.4 MCH Latest Ref Range: 24.0 - 34.0 PG 32.2 MCHC Latest Ref Range: 31.0 - 37.0 g/dL 34.1 RDW Latest Ref Range: 11.6 - 14.5 % 15.2 (H) PLATELET Latest Ref Range: 135 - 420 K/uL 76 (L) MPV Latest Ref Range: 9.2 - 11.8 FL 11.2 NEUTROPHILS Latest Ref Range: 40 - 73 % 71 LYMPHOCYTES Latest Ref Range: 21 - 52 % 16 (L) MONOCYTES Latest Ref Range: 3 - 10 % 10 EOSINOPHILS Latest Ref Range: 0 - 5 % 1  
BASOPHILS Latest Ref Range: 0 - 2 % 2 DF Latest Units:   AUTOMATED  
ABS. NEUTROPHILS Latest Ref Range: 1.8 - 8.0 K/UL 2.9  
ABS. LYMPHOCYTES Latest Ref Range: 0.9 - 3.6 K/UL 0.7 (L)  
ABS. MONOCYTES Latest Ref Range: 0.05 - 1.2 K/UL 0.4  
ABS. EOSINOPHILS Latest Ref Range: 0.0 - 0.4 K/UL 0.0  
ABS. BASOPHILS Latest Ref Range: 0.0 - 0.1 K/UL 0.1  
RBC COMMENTS Latest Units:   TEARDROP CELLS. .. PLATELET COMMENTS Latest Units:   DECREASED PLATELETS Sodium Latest Ref Range: 136 - 145 mmol/L 140 Potassium Latest Ref Range: 3.5 - 5.5 mmol/L 3.6 Chloride Latest Ref Range: 100 - 108 mmol/L 104 CO2 Latest Ref Range: 21 - 32 mmol/L 23 Anion gap Latest Ref Range: 3.0 - 18 mmol/L 13 Glucose Latest Ref Range: 74 - 99 mg/dL 96 BUN Latest Ref Range: 7.0 - 18 MG/DL 18 Creatinine Latest Ref Range: 0.6 - 1.3 MG/DL 2.89 (H) BUN/Creatinine ratio Latest Ref Range: 12 - 20   6 (L) Calcium Latest Ref Range: 8.5 - 10.1 MG/DL 8.6 Phosphorus Latest Ref Range: 2.5 - 4.9 MG/DL 2.9 Magnesium Latest Ref Range: 1.6 - 2.6 mg/dL 2.9 (H) GFR est non-AA Latest Ref Range: >60 ml/min/1.73m2 17 (L) GFR est AA Latest Ref Range: >60 ml/min/1.73m2 21 (L) Bilirubin, total Latest Ref Range: 0.2 - 1.0 MG/DL 0.8 Protein, total Latest Ref Range: 6.4 - 8.2 g/dL 5.9 (L) Albumin Latest Ref Range: 3.4 - 5.0 g/dL 2.8 (L) Globulin Latest Ref Range: 2.0 - 4.0 g/dL 3.1 A-G Ratio Latest Ref Range: 0.8 - 1.7   0.9 ALT (SGPT) Latest Ref Range: 13 - 56 U/L 17 AST Latest Ref Range: 15 - 37 U/L 68 (H) Alk. phosphatase Latest Ref Range: 45 - 117 U/L 88 Vitamin B12 Latest Ref Range: 211 - 911 pg/mL 1653 (H) Folate Latest Ref Range: 3.10 - 17.50 ng/mL 6.6 Vancomycin, random Latest Ref Range: 5.0 - 40.0 UG/ML 18.3 DIET:  [] Renal    [x] Other-Cardiac with supplements      [] NPO     []  Diabetic PRIMARY NURSE REPORT: First initial/Last name/Title Pre Dialysis: Baljit Lucio RN [id number] EDUCATION:   
[x] Patient [] Other         Knowledge Basis: [x]None []Minimal [] Substantial  
Barriers to learning selectively mute- pt nods  
[] Access Care     [x] S&S of infection     [] Fluid Management     []K+     []Procedural   
[]Albumin     [x] Medications     [] Tx Options     [] Transplant     [] Diet     [] Other Teaching Tools:  [x] Explain  [] Demo  [] Handouts [] Video Patient response:   [] Verbalized understanding  [] Teach back  [] Return demonstration [] Requires follow up- pt selectively mute Inappropriate due to         
 
[x] Time Out/Safety Check  [x]Extracorporeal Circuit Tested for integrity RO/HEMODIALYSIS MACHINE SAFETY CHECKS  Before each treatment:    
Machine Number:                   [facility] 
                                [] Unit Machine #  with centralized RO 
                                [] Portable Machine #1/RO serial # [id number] [x] Portable Machine #2/RO serial # [id number] [] Portable Machine #4/RO serial # [id number] [address] [place] [address] 
                                [] Portable Machine #11/RO serial # [id number] [] Portable Machine #12/RO serial # [id number] [] Portable Machine #13/RO serial #  [id number] Alarm Test:  Pass time 4802         Other:        
[x] RO/Machine Log Complete Temp    37 Dialysate: pH  7.2 Conductivity: Meter   13.4     HD Machine [x][x][x][x][x][x][x][x][x][x][x][x][x][x][x][x][x][x][x][x][x][x][x][x][x][x][x][x][x][x][x][x][x][x][x][x][x][x][x][x][x][x][x][x][x][x][x][x][x][x][x][x][x][x][x][x][x][x][x][x][x][x][x]

## 2018-10-04 NOTE — PROGRESS NOTES
[facility] Progress Note Name: Kishor Logan : 1960 MRN: [id number] Date: 10/4/2018 11:12 AM 
  
[x]I have reviewed the flowsheet and previous days notes. Events overnight reviewed and discussed with nursing staff. Vital signs and records reviewed. Subjective: 
 
10/4/2018 
 
55 y/o female speaking [place] only, with pmhx of HTN, TB w/ DOT, ESRD on dialysis, recurrent admissions, admitted to ICU for management for hypertensive emergency and possible pneumonia. No new events overnight. More awake and alert today BM last night  
SBP 170s-180s, per Nephrology- pt lives between SBP 160s-180s Per nephrology- increase minoxodil dose then ok to d/c cardene HD stable OK to go to floor ROS:A comprehensive review of systems was negative except for that written in the HPI. Medication Review: · Pressors - none · Sedation - none · Antibiotics -  
· Pain - none · GI/ DVT - scds · Others (other gtts) Vital Signs:   
Visit Vitals  /88  Pulse 69  Temp 98.4 °F (36.9 °C)  Resp 16  
 Ht 5' 1\" (1.549 m)  Wt 50.9 kg (112 lb 3.4 oz)  SpO2 93%  Breastfeeding No  
 BMI 21.2 kg/m2 O2 Device: Room air O2 Flow Rate (L/min): 2 l/min Temp (24hrs), Av.3 °F (36.8 °C), Min:97.6 °F (36.4 °C), Max:98.9 °F (37.2 °C) Intake/Output:  
Last shift:        
Last 3 shifts: 10/02 1901 - 10/04 0700 In: 521.8 [I.V.:521.8] Out: 2000 Intake/Output Summary (Last 24 hours) at 10/04/18 1112 Last data filed at 10/04/18 0000 Gross per 24 hour Intake           521.83 ml Output             2000 ml Net         -1478.17 ml Physical Exam: 
 
General: Awake, alert, NAD HEENT:  Anicteric sclerae; pink palpebral conjunctivae; mucosa moist 
Resp:  Clear to auscultation bilaterally, Symmetrical chest expansion, no accessory muscle use; good airway entry; no rales/ wheezing/ rhonchi noted CV:  S1, S2 present; NSR, regular rate and rhythm GI:  Abdomen soft, non-tender; (+) active bowel sounds Extremities:  +2 pulses on all extremities; no edema/ cyanosis/ clubbing noted; normal capillary refill Skin:  Warm; no rashes/ lesions noted Neurologic:  Non-focal, aox4, flat affect, follows commands, moves all extremities Devices:  HD catheter to right chest 
 
 
DATA:  
 
Current Facility-Administered Medications Medication Dose Route Frequency  minoxidil (LONITEN) tablet 2.5 mg  2.5 mg Oral BID  amLODIPine (NORVASC) tablet 10 mg  10 mg Oral DAILY  carvedilol (COREG) tablet 25 mg  25 mg Oral Q12H  hydrALAZINE (APRESOLINE) 20 mg/mL injection 10 mg  10 mg IntraVENous Q6H PRN  promethazine (PHENERGAN) 12.5 mg in 0.9% sodium chloride 50 mL IVPB  12.5 mg IntraVENous Q8H PRN  
 sodium chloride (NS) flush 5-10 mL  5-10 mL IntraVENous PRN  niCARdipine (CARDENE) 25 mg in 0.9% sodium chloride 250 mL infusion  0-15 mg/hr IntraVENous TITRATE  aspirin chewable tablet 81 mg  81 mg Oral DAILY  atorvastatin (LIPITOR) tablet 40 mg  40 mg Oral QHS  ethambutol (MYAMBUTOL) tablet 800 mg  800 mg Oral Q TUE, THU & SAT  FLUoxetine (PROzac) capsule 10 mg  10 mg Oral DAILY  isoniazid (NYDRAZID) tablet 300 mg  300 mg Oral DAILY  losartan (COZAAR) tablet 100 mg  100 mg Oral DAILY  melatonin tablet 3 mg  3 mg Oral QHS PRN  pyrazinamide tablet 1,000 mg  1,000 mg Oral Q TUE, THU & SAT  pyridoxine (vitamin B6) (VITAMIN B-6) tablet 100 mg  100 mg Oral DAILY  rifAMPin (RIFADIN) capsule 600 mg  600 mg Oral DAILY  bisacodyl (DULCOLAX) tablet 5 mg  5 mg Oral DAILY PRN  
 acetaminophen (TYLENOL) tablet 650 mg  650 mg Oral Q6H PRN  
 oxyCODONE-acetaminophen (PERCOCET) 5-325 mg per tablet 1 Tab  1 Tab Oral Q6H PRN  
 naloxone (NARCAN) injection 0.4 mg  0.4 mg IntraVENous PRN  
 ondansetron (ZOFRAN) injection 4 mg  4 mg IntraVENous Q6H PRN  
  docusate sodium (COLACE) capsule 100 mg  100 mg Oral BID PRN  
 heparin (porcine) 1,000 unit/mL injection 4,000 Units  4,000 Units Hemodialysis DIALYSIS MON, WED & FRI  influenza vaccine 2018-19 (6 mos+)(PF) (FLUARIX QUAD/FLULAVAL QUAD) injection 0.5 mL  0.5 mL IntraMUSCular PRIOR TO DISCHARGE Labs: Results:  
   
Chemistry Recent Labs 10/04/18 
 0228  10/03/18 
 3070  10/02/18 
 1158 GLU  98  96  92 NA  140  140  139  
K  3.1*  3.6  4.2 CL  102  104  102 CO2  30  23  29 BUN  12  18  24* CREA  2.45*  2.89*  3.17* CA  8.6  8.6  8.2* AGAP  8  13  8 BUCR  5*  6*  8* AP  114  88  91  
TP  6.1*  5.9*  5.7* ALB  2.7*  2.8*  2.5*  
GLOB  3.4  3.1  3.2 AGRAT  0.8  0.9  0.8 CBC w/Diff Recent Labs 10/04/18 
 1618  10/03/18 
 9728  10/02/18 
 0245 WBC  3.6*  4.1*  5.7  
RBC  3.17*  3.42*  3.84* HGB  10.1*  11.0*  12.6 HCT  30.4*  32.3*  38.7 PLT  77*  76*  229 GRANS  57  71  54 LYMPH  20*  16*  34 EOS  4  1  3 Coagulation No results for input(s): PTP, INR, APTT in the last 72 hours. No lab exists for component: INREXT Liver Enzymes Recent Labs 10/04/18 
 3154 TP  6.1* ALB  2.7* AP  114 SGOT  42* ABG No results found for: PH, PHI, PCO2, PCO2I, PO2, PO2I, HCO3, HCO3I, FIO2, FIO2I Microbiology Recent Labs 10/02/18 
 0315  10/02/18 
 0245 CULT  NO GROWTH 2 DAYS  NO GROWTH 2 DAYS Telemetry: [x]Sinus []A-flutter []Paced []A-fib []Multiple PVCs Imaging: CXR Results  (Last 48 hours) 10/03/18 0844  XR CHEST PORT Final result Impression:  IMPRESSION:  
   
There is diffuse prominence of the pulmonary vessels and interstitium which is  
slightly increased from prior study. There are likely small pleural effusions as  
well. Finding suggestive of developing vascular congestion. Underlying infection  
not excluded. Narrative:  Portable Chest  
   
CPT CODE: 66144 HISTORY: Shortness of breath. FINDINGS:   
   
Multiple wires overlie the patient. Right dialysis catheter tip at the  
cavoatrial junction. Heart size and mediastinal contour stable. Prominence of  
the pulmonary vessels and interstitium. Mild haziness at the bases. Slight  
pleural thickening at the right apex. No pneumothorax. Mickeal Rink CT Results  (Last 48 hours) None IMPRESSION:  
· Hypertensive emergency- improving, intermittently on cardene. Nephrology managing BP meds-  Amlodipine, carvedilol, Losartan, minoxidil · Respiratory failure- resolved · Chronic renal failure (ESRD) on HD- continues to improve. Scheduled dialysis M/W/F. S/p dialysis x2 · Thrombocytosis- platelets improving. HIT panel (-) 9/15/18.  10/3/18 HIT panel pending · TB w/ DOT- recent sputum smears cleared for AFB- ID following Other Hx 
· HTN 
· NSTEMI 
· Seizures · Oropharyngeal dysphagia PLAN:  
· Resp -  Stable on room air. HOB > 30. Aspiration precautions, pulmonary hygiene. · ID - Afebrile, aleukocytosis. Trend WBC and temp. ID following for TBmed management. BCx NGTD d/c zosyn. · CVS - BP improving. Nephrology managing bp meds. D/c cardene after pt receives increased dose of monoxidil. · Heme/onc - Daily CBC; continue to monitor H/H & platelets. · Metabolic - Daily BMP; Monitor electrolytes and replace as needed · Renal - Crea and BUN improving. Continue to trend renal indices. Nephrology following for HD. · Endocrine - Glycemic control<180. Avoid hypoglycemia · Neuro/ Pain/ Sedation - Monitor neuro status. Avoid sedating medications · GI - Cardiac diet as tolerated. Bowel regimen · Prophylaxis - DVT, GI- SCDs · Discussed in interdisciplinary rounds · Ok to go to floor once cardene is off  
  
 
 
 
[x]See my orders for details My assessment/plan was discussed with: 
[]nursing []PT/OT [x]respiratory therapy [x]Dr. Swetha Delarosa  
[]family [] [facility] Staff Addendum I have independently evaluated the patient and reviewed the patient's chart. I have discussed the findings and care plan with ICU Care Team. Care Plan reviewed on ICU milti-D rounds. I agree with the above evaluation, assessment and recommendations along with the following comments and observations. Minoxidil dose increased. Patient resting comfortably. Continue to wean Cardene drip. Once off drip can possibly discharge home. Continuing with supportive care Critcal Care and time spent coordinating care, minus procedure time: 25 min Kathryn Vázquez DO, [facility] 4:22 PM

## 2018-10-04 NOTE — PROGRESS NOTES
[facility] Hospitalist Group Progress Note Patient: Venice Carlson Age: 56 y.o. : 1960 MR#: [id number] SSN: [id number] Date/Time: 10/4/2018 Subjective: I personally saw and evaluated this patient on 10/04/18 Patioent lying in bed in NAD Assessment/Plan: 1- Hypertensive emergency at admission 2- ESRD 3- Acute hypoxic resp failure at admission likely 2/2 hypertensive crisis- improved 4- Pulmonary TB 5- Doubt Sepsis 6- thrombocytopenia- monitor PLAN Off infusion On amlodipine, coreg, cozaar, minoxidil HD as per nephro Wean O2 On Rx for TB Mobilize Monitor BP Case discussed with:  []Patient  []Family  [x]Nursing  []Case Management DVT Prophylaxis:  []Lovenox  []Hep SQ  []SCDs  []Coumadin   []On Heparin gtt Objective:  
VS:  
Visit Vitals  /88  Pulse 69  Temp 98.4 °F (36.9 °C)  Resp 16  
 Ht 5' 1\" (1.549 m)  Wt 50.9 kg (112 lb 3.4 oz)  SpO2 93%  Breastfeeding No  
 BMI 21.2 kg/m2 Tmax/24hrs: Temp (24hrs), Av.4 °F (36.9 °C), Min:98.2 °F (36.8 °C), Max:98.8 °F (37.1 °C) Input/Output:  
 
Intake/Output Summary (Last 24 hours) at 10/04/18 1713 Last data filed at 10/04/18 0000 Gross per 24 hour Intake           332.08 ml Output                0 ml Net           332.08 ml General:  Awake, alert Cardiovascular:  S1S2+, RRR Pulmonary:  CTA b/l GI:  Soft, BS+, NT, ND Extremities:  trace edema Labs:   
Recent Results (from the past 24 hour(s)) CBC WITH AUTOMATED DIFF Collection Time: 10/04/18  2:28 AM  
Result Value Ref Range WBC 3.6 (L) 4.6 - 13.2 K/uL  
 RBC 3.17 (L) 4.20 - 5.30 M/uL  
 HGB 10.1 (L) 12.0 - 16.0 g/dL HCT 30.4 (L) 35.0 - 45.0 % MCV 95.9 74.0 - 97.0 FL  
 MCH 31.9 24.0 - 34.0 PG  
 MCHC 33.2 31.0 - 37.0 g/dL  
 RDW 15.1 (H) 11.6 - 14.5 % PLATELET 77 (L) 386 - 420 K/uL MPV 11.3 9.2 - 11.8 FL  
 NEUTROPHILS 57 40 - 73 % LYMPHOCYTES 20 (L) 21 - 52 % MONOCYTES 18 (H) 3 - 10 % EOSINOPHILS 4 0 - 5 % BASOPHILS 1 0 - 2 %  
 ABS. NEUTROPHILS 2.1 1.8 - 8.0 K/UL  
 ABS. LYMPHOCYTES 0.7 (L) 0.9 - 3.6 K/UL  
 ABS. MONOCYTES 0.7 0.05 - 1.2 K/UL  
 ABS. EOSINOPHILS 0.1 0.0 - 0.4 K/UL  
 ABS. BASOPHILS 0.1 0.0 - 0.1 K/UL  
 DF AUTOMATED METABOLIC PANEL, COMPREHENSIVE Collection Time: 10/04/18  2:28 AM  
Result Value Ref Range Sodium 140 136 - 145 mmol/L Potassium 3.1 (L) 3.5 - 5.5 mmol/L Chloride 102 100 - 108 mmol/L  
 CO2 30 21 - 32 mmol/L Anion gap 8 3.0 - 18 mmol/L Glucose 98 74 - 99 mg/dL BUN 12 7.0 - 18 MG/DL Creatinine 2.45 (H) 0.6 - 1.3 MG/DL  
 BUN/Creatinine ratio 5 (L) 12 - 20 GFR est AA 25 (L) >60 ml/min/1.73m2 GFR est non-AA 20 (L) >60 ml/min/1.73m2 Calcium 8.6 8.5 - 10.1 MG/DL Bilirubin, total 0.8 0.2 - 1.0 MG/DL  
 ALT (SGPT) 17 13 - 56 U/L  
 AST (SGOT) 42 (H) 15 - 37 U/L Alk. phosphatase 114 45 - 117 U/L Protein, total 6.1 (L) 6.4 - 8.2 g/dL Albumin 2.7 (L) 3.4 - 5.0 g/dL Globulin 3.4 2.0 - 4.0 g/dL A-G Ratio 0.8 0.8 - 1.7 MAGNESIUM Collection Time: 10/04/18  2:28 AM  
Result Value Ref Range Magnesium 2.5 1.6 - 2.6 mg/dL PHOSPHORUS Collection Time: 10/04/18  2:28 AM  
Result Value Ref Range Phosphorus 2.4 (L) 2.5 - 4.9 MG/DL Additional Data Reviewed:   
 
Signed By: Morgan Couch MD   
 October 4, 2018

## 2018-10-04 NOTE — ROUTINE PROCESS
Slept well during the night. No acute distress noted. B/P still elevated. Nicardipine drip infusing at 2 mg/hr.

## 2018-10-04 NOTE — ROUTINE PROCESS
Bedside and Verbal shift change report given to [id number] Marshall Zaragoza (oncoming nurse) by Noreen Peters RN 
 (offgoing nurse). Report given with SBAR, Kardex, Intake/Output, MAR, Accordion and Recent Results.

## 2018-10-04 NOTE — PROGRESS NOTES
RENAL DAILY PROGRESS NOTE 57y F HTN, ESRD, admitted for HTN urgency, respi disthress, seen for ESRD management. Subjective:  
 
 
Complaint: 
Overnight events noted Tolerated HD well yesterday On and off receiving cardene drip IMPRESSION:  
· ESRD, MWF, s/p HD yesterday · Access; TDC, has failed left arm AVG 
· HTN, · Short of breath, fluid overload vs. Infiltrate · MTB, on treatment PLAN:  
DC nicardipine drip, start on minoxidil 2.5mg bid No indication for HD today, will arrange HD tomorrow. Adjust all meds per current renal function status.  
    
Discussed with ICU team.  
 
 
 
 
 
Current Facility-Administered Medications Medication Dose Route Frequency  minoxidil (LONITEN) tablet 2.5 mg  2.5 mg Oral BID  amLODIPine (NORVASC) tablet 10 mg  10 mg Oral DAILY  carvedilol (COREG) tablet 25 mg  25 mg Oral Q12H  hydrALAZINE (APRESOLINE) 20 mg/mL injection 10 mg  10 mg IntraVENous Q6H PRN  promethazine (PHENERGAN) 12.5 mg in 0.9% sodium chloride 50 mL IVPB  12.5 mg IntraVENous Q8H PRN  
 sodium chloride (NS) flush 5-10 mL  5-10 mL IntraVENous PRN  
 aspirin chewable tablet 81 mg  81 mg Oral DAILY  atorvastatin (LIPITOR) tablet 40 mg  40 mg Oral QHS  ethambutol (MYAMBUTOL) tablet 800 mg  800 mg Oral Q TUE, THU & SAT  FLUoxetine (PROzac) capsule 10 mg  10 mg Oral DAILY  isoniazid (NYDRAZID) tablet 300 mg  300 mg Oral DAILY  losartan (COZAAR) tablet 100 mg  100 mg Oral DAILY  melatonin tablet 3 mg  3 mg Oral QHS PRN  pyrazinamide tablet 1,000 mg  1,000 mg Oral Q TUE, THU & SAT  pyridoxine (vitamin B6) (VITAMIN B-6) tablet 100 mg  100 mg Oral DAILY  rifAMPin (RIFADIN) capsule 600 mg  600 mg Oral DAILY  bisacodyl (DULCOLAX) tablet 5 mg  5 mg Oral DAILY PRN  
 acetaminophen (TYLENOL) tablet 650 mg  650 mg Oral Q6H PRN  
 oxyCODONE-acetaminophen (PERCOCET) 5-325 mg per tablet 1 Tab  1 Tab Oral Q6H PRN  
  naloxone (NARCAN) injection 0.4 mg  0.4 mg IntraVENous PRN  
 ondansetron (ZOFRAN) injection 4 mg  4 mg IntraVENous Q6H PRN  
 docusate sodium (COLACE) capsule 100 mg  100 mg Oral BID PRN  
 heparin (porcine) 1,000 unit/mL injection 4,000 Units  4,000 Units Hemodialysis DIALYSIS MON, WED & FRI  influenza vaccine 2018-19 (6 mos+)(PF) (FLUARIX QUAD/FLULAVAL QUAD) injection 0.5 mL  0.5 mL IntraMUSCular PRIOR TO DISCHARGE Review of Symptoms: comprehensive ROS negative except above. Objective:  
 
Patient Vitals for the past 24 hrs: 
 Temp Pulse Resp BP SpO2  
10/04/18 0800 98.4 °F (36.9 °C) - - - -  
10/04/18 0700 - 69 16 166/88 93 % 10/04/18 0630 - 80 22 (!) 177/102 97 % 10/04/18 0600 - 72 16 159/84 95 % 10/04/18 0530 - 78 18 (!) 174/93 94 % 10/04/18 0500 - 82 17 (!) 188/100 98 % 10/04/18 0430 - 91 19 178/90 95 % 10/04/18 0400 98.2 °F (36.8 °C) 83 18 161/84 92 % 10/04/18 0330 - 82 17 162/87 93 % 10/04/18 0300 - 84 18 165/79 94 % 10/04/18 0230 - 82 13 (!) 175/95 96 % 10/04/18 0200 - 78 19 (!) 195/108 96 % 10/04/18 0130 - 81 19 (!) 174/97 94 % 10/04/18 0100 - 76 17 157/80 93 % 10/04/18 0030 - 76 19 147/79 91 % 10/04/18 0000 98.8 °F (37.1 °C) 79 20 141/76 94 % 10/03/18 2330 - 80 19 134/73 91 % 10/03/18 2300 - 81 20 113/77 95 % 10/03/18 2230 - 87 20 148/81 96 % 10/03/18 2200 - 80 19 141/75 97 % 10/03/18 2130 - 85 19 148/84 97 % 10/03/18 2100 - 85 20 141/79 95 % 10/03/18 2030 - 88 18 (!) 146/111 96 % 10/03/18 2008 98.2 °F (36.8 °C) 85 18 141/83 95 % 10/03/18 2000 98.2 °F (36.8 °C) 83 21 - 93 % 10/03/18 1845 - 97 25 (!) 164/106 98 % 10/03/18 1830 - 92 21 (!) 173/95 93 % 10/03/18 1815 - 88 24 (!) 175/93 95 % 10/03/18 1800 - 95 23 (!) 147/95 97 % 10/03/18 1745 - 94 22 168/89 92 % 10/03/18 1730 - 90 30 152/89 97 % 10/03/18 1700 - 93 22 152/88 99 % 10/03/18 1645 - 98 22 (!) 178/106 96 % 10/03/18 1630 - 94 23 (!) 164/103 97 % 10/03/18 1615 - 96 21 (!) 190/107 97 % 10/03/18 1600 97.6 °F (36.4 °C) 85 25 (!) 161/95 98 % 10/03/18 1545 98.9 °F (37.2 °C) 89 18 (!) 166/93 98 % 10/03/18 1530 - 82 29 (!) 161/91 97 % 10/03/18 1515 - 88 23 (!) 159/93 94 % 10/03/18 1500 - 86 26 (!) 164/97 99 % 10/03/18 1445 - 83 25 (!) 161/92 98 % 10/03/18 1430 - 84 24 (!) 183/95 99 % 10/03/18 1415 - 83 19 (!) 172/105 98 % 10/03/18 1400 - 84 15 (!) 177/110 97 % Weight change: -2.5 kg (-5 lb 8.2 oz) 
 
 10/02 1901 - 10/04 0700 In: 521.8 [I.V.:521.8] Out: 2000 Intake/Output Summary (Last 24 hours) at 10/04/18 1351 Last data filed at 10/04/18 0000 Gross per 24 hour Intake           332.08 ml Output             1790 ml Net         -1457.92 ml Physical Exam:  
General: comfortable, no acute distress HEENT sclera anicteric, supple neck, no thyromegaly CVS: S1S2 heard,  no rub RS: + air entry b/l, Abd: Soft, Non tender, Not distended, Positive bowel sounds, no organomegaly, no CVA / supra pubic tenderness Neuro: non focal, awake, alert , CN II-XII are grossly intact Extrm: edema, no cyanosis, clubbing Skin: no visible  Rash Musculoskeletal: No gross joints or bone deformities Data Review:  
 
LABS:  
Hematology:  
Recent Labs 10/04/18 
 5133  10/03/18 
 3534  10/02/18 
 0245 WBC  3.6*  4.1*  5.7 HGB  10.1*  11.0*  12.6 HCT  30.4*  32.3*  38.7 Chemistry:  
Recent Labs 10/04/18 
 2391  10/03/18 
 3099  10/02/18 
 1158  10/02/18 
 0245 BUN  12  18  24*  25* CREA  2.45*  2.89*  3.17*  3.86* CA  8.6  8.6  8.2*  8.9 ALB  2.7*  2.8*  2.5*  3.3*  
K  3.1*  3.6  4.2  5.1 NA  140  140  139  135* CL  102  104  102  101 CO2  30  23  29  26 PHOS  2.4*  2.9   --    --   
GLU  98  96  92  150* Procedures/imaging: see electronic medical records for all procedures, Xrays and details which were not copied into this note but were reviewed prior to creation of Plan Assessment & Plan: As above Mani Lugo MD 
10/4/2018 
9:59 AM

## 2018-10-05 NOTE — DIALYSIS
```````````````          ACUTE HEMODIALYSIS FLOW SHEET 
 
HEMODIALYSIS ORDERS: Physician: maria del rosario Dialyzer: revaclear         Duration: 3.5 hr  BFR: 4000   DFR: 800 Dialysate:  Temp *37 K+   2    Ca+  2.5 Na 138 Bicarb 35 Weight:  50.9 kg    Bed Scale [x]     Unable to Obtain []      Dry weight/UF Goal: 2000 Access CVL Needle Gauge Heparin []  Bolus      Units    [] Hourly       Units    [x]None Catheter locking solution heparin Pre BP:   111/68    Pulse:     98     Temperature:   98.1  Respirations: 16  Tx: NS       ml/Bolus  Other        [x] N/A Labs: Pre        Post:        [x] N/A Additional Orders(medications, blood products, hypotension management):       [x] N/A [x] Time Out/Safety Check  [x] [facility] Consent Verified CATHETER ACCESS: []N/A   [x]Right   []Left   [x]IJ     []Fem [] First use X-ray verified     [x]Tunnel                [] Non Tunneled [x]No S/S infection  []Redness  []Drainage []Cultured []Swelling []Pain  
[x]Medical Aseptic Prep Utilized   []Dressing Changed  [x] Biopatch  Date: 10/02/18 []Clotted   [x]Patent   Flows: [x]Good  []Poor  []Reversed If access problem,  notified: []Yes    []N/A  Date:        
 
GRAFT/FISTULA ACCESS:  [x]N/A     []Right     []Left     []UE     []LE []AVG   []AVF        []Buttonhole    []Medical Aseptic Prep Utilized []No S/S infection  []Redness  []Drainage []Cultured []Swelling []Pain Bruit:   [] Strong    [] Weak       Thrill :   [] Strong    [] Weak Needle Gauge:   Length: If access problem,  notified: []Yes     []N/A  Date:       
Please describe access if present and not used:  
 
 
GENERAL ASSESSMENT:   
LUNGS:  Rate  SaO2%        [x] N/A    [x] Clear  [] Coarse  [] Crackles  [] Wheezing 
      [] Diminished     Location : []RLL   []LLL    []RUL  []ELÍAS Cough: []Productive  []Dry  [x]N/A   Respirations:  [x]Easy  []Labored Therapy:  [x]RA  []NC  l/min    Mask: []NRB []Venti       O2% []Ventilator  []Intubated  [] Trach  [] BiPaP CARDIAC: [x]Regular      [] Irregular   [] Pericardial Rub  [] JVD []  Monitored  [] Bedside  [] Remotely monitored [] N/A  Rhythm: EDEMA: [x] None  []Generalized  [] Pitting [] 1    [] 2    [] 3    [] 4 [] Facial  [] Pedal  []  UE  [] LE  
SKIN:   [x] Warm  [] Hot     [] Cold   [x] Dry     [] Pale   [] Diaphoretic    
             [] Flushed  [] Jaundiced  [] Cyanotic  [] Rash  [] Weeping LOC:    [x] Alert      [x]Oriented:    [x] Person     [x] Place  [x]Time 
             [] Confused  [] Lethargic  [] Medicated  [] Non-responsive GI / ABDOMEN:  [] Flat    [] Distended    [x] Soft    [] Firm   []  Obese 
                           [] Diarrhea  [x] Bowel Sounds  [] Nausea  [] Vomiting  / URINE ASSESSMENT:[] Voiding   [x] Oliguria  [] Anuria   []  Onofre [] Incontinent    []  Incontinent Brief      []  Bathroom Privileges PAIN: [x] 0 []1  []2   []3   []4   []5   []6   []7   []8   []9   []10 Scale 0-10  Action/Follow Up: MOBILITY:  [] Amb    [] Amb/Assist    [x] Bed    [] Wheelchair  [] Stretcher All Vitals and Treatment Details on Attached Flowsheet Hospital: [facility] Room # 302 [] 1st Time Acute  [] Stat  [x] Routine  [] Urgent [x] Acute Room  []  Bedside  [] ICU/CCU  [] ER Isolation Precautions:  [x] Dialysis   [] Airborne   [] Contact    [] Reverse Special Considerations:         [] Blood Consent Verified [x]N/A ALLERGIES: banana  [] NKA Code Status:  [x] Full Code  [] DNR  [] Other HBsAg ONLY: Date Drawn 08/27/18         [x]Negative []Positive []Unknown HBsAb: Date 08/27/18    [] Susceptible   [x] [id number] []Not Drawn  [] Drawn Current Labs:    Date of Labs: Today [x] Cut and paste current labs here. DIET:  [x] Renal    [] Other     [] NPO     []  Diabetic PRIMARY NURSE REPORT: First initial/Last name/Title Pre Dialysis: C Ahn    Time: 1330 EDUCATION:   
[x] Patient [] Other         Knowledge Basis: []None [x]Minimal [] Substantial  
Barriers to learning  [x]N/A  
[] Access Care     [] S&S of infection     [] Fluid Management     []K+     [x]Procedural   
[]Albumin     [] Medications     [] Tx Options     [] Transplant     [] Diet     [] Other Teaching Tools:  [x] Explain  [] Demo  [] Handouts [] Video Patient response:   [x] Verbalized understanding  [] Teach back  [] Return demonstration [] Requires follow up Inappropriate due to         
 
[address] Before each treatment:    
Machine Number:                   1000 [facility]  [x] Unit Machine # 9 with centralized RO 
                                [] Portable Machine #1/RO serial # [id number] [] Portable Machine #2/RO serial # [id number] [] Portable Machine #3/RO serial # [id number] [facility] 
                                [] Portable Machine #11/RO serial # [id number] [] Portable Machine #12/RO serial # [id number] [] Portable Machine #13/RO serial #  [id number] Alarm Test:  Pass time 1330         Other:        
[x] RO/Machine Log Complete Temp    37            [x]Extracorporeal Circuit Tested for integrity Dialysate: pH  7.4 Conductivity: Meter   14     HD Machine   14                  TCD: 14 
Dialyzer Lot # [id number]        Blood Tubing Lot # [id number]     Saline Lot #  [id number] CHLORINE TESTING-Before each treatment and every 4 hours Total Chlorine: [x] less than 0.1 ppm  Time: 1400 4 Hr/2nd Check Time: 1800  
(if greater than 0.1 ppm from Primary then every 30 minutes from Secondary) TREATMENT INITIATION  with Dialysis Precautions:  
[x] All Connections Secured                 [x] Saline Line Double Clamped  
[x] Venous Parameters Set                  [x] Arterial Parameters Set [x] Prime Given  250 ml               [x]Air Foam Detector Engaged Treatment Initiation Note: pt arrived in stable condition via bed CVL accessed and treatment initiated without difficulty. Dr Edwin Jo at bedside; VO to place on 3k bath Medication Dose Volume Route Initials Dialyzer Cleared: [] Good [x] Fair  [] Poor Blood processed:  77.7 L 
UF Removed  2000 Ml Post Wt: 48.9    kg POst BP:   109/61       Pulse: 87      Respirations: 16  Temperature: 98.5 Post Tx Vascular Access: AVF/AVG: Bleeding stopped Art  min. Wei. Min   N/A Catheter: Locking solution: Heparin 1:1000 Art. 1.9  Wei. 1.9 Post Assessment:  
  
                              Skin:  [x] Warm  [x] Dry [] Diaphoretic    [] Flushed  [] Pale [] Cyanotic [facility] Signatures Title Initials  Time Lungs: [x] Clear    [] Course  [] Crackles  [] Wheezing [] Diminished Jan Huynh RN    Cardiac: [x] Regular   [] Irregular   [] Monitor  [] N/A  Rhythm:      
    Edema:  [] None    [] General     [] Facial   [] Pedal    [] UE    [] LE  
    Pain: [x]0  []1  []2   []3  []4   []5   []6   []7   []8   []9   []10 Post Treatment Note: HD well tolerated. 2L UF removed. No acute distress noted during or post HD treatment. POST TREATMENT PRIMARY NURSE HANDOFF REPORT:  
 
First initial/Last name/Title Post Dialysis: Regina Martínez RN Time:  4084 Abbreviations: AVG-arterial venous graft, AVF-arterial venous fistula, IJ-Internal Jugular, Subcl-Subclavian, Fem-Femoral, Tx-treatment, AP/HR-apical heart rate, DFR-dialysate flow rate, BFR-blood flow rate, AP-arterial pressure, -venous pressure, UF-ultrafiltrate, TMP-transmembrane pressure, Wei-Venous, Art-Arterial, RO-Reverse Osmosis

## 2018-10-05 NOTE — ROUTINE PROCESS
TRANSFER - OUT REPORT: 
 
Verbal report given to Juan FOSTER on Kishor Logan  being transferred to [address] [place] [address](unit) for routine progression of care Report consisted of patients Situation, Background, Assessment and  
Recommendations(SBAR). Information from the following report(s) SBAR, Kardex, Procedure Summary, MAR, Recent Results and Cardiac Rhythm NSR was reviewed with the receiving nurse. Lines:  
Peripheral IV 10/03/18 Left Hand (Active) Site Assessment Clean, dry, & intact 10/5/2018  8:00 AM  
Phlebitis Assessment 0 10/5/2018  8:00 AM  
Infiltration Assessment 0 10/5/2018  8:00 AM  
Dressing Status Clean, dry, & intact 10/5/2018  8:00 AM  
Dressing Type Tape;Transparent 10/5/2018  8:00 AM  
Hub Color/Line Status Blue;Capped 10/5/2018  8:00 AM  
Action Taken Open ports on tubing capped 10/5/2018  8:00 AM  
Alcohol Cap Used Yes 10/5/2018  8:00 AM  
  
 
Opportunity for questions and clarification was provided.

## 2018-10-05 NOTE — ROUTINE PROCESS
Bedside and Verbal shift change report given to Kinjal Nowak RN (oncoming nurse) by BARRIE Leal RN (offgoing nurse). Report included the following information SBAR, Kardex, Intake/Output, MAR and Recent Results.

## 2018-10-05 NOTE — PROGRESS NOTES
attended the interdisciplinary rounds for Alex Pulliam, who is a 56 y.o.,female. Patients Primary Language is: [place]. According to the patients EMR Confucianism Affiliation is: Christian. The reason the Patient came to the hospital is:  
Patient Active Problem List  
 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  
 NSTEMI (non-ST elevated myocardial infarction) ([facility] 02/25/2018  Respiratory failure ([facility] 02/25/2018  Hypoxia 02/25/2018  Acute UTI 02/25/2018  Hyperkalemia 09/08/2017  Renal failure 09/08/2017  SCOOTER (acute kidney injury) ([facility] 09/08/2017 Plan: 
Chaplains will continue to follow and will provide pastoral care on an as needed/requested basis.  recommends bedside caregivers page  on duty if patient shows signs of acute spiritual or emotional distress. [address] [facility] Board Certified [employer] [facility]  
[phone]

## 2018-10-05 NOTE — PROGRESS NOTES
2017 - Gave BP meds that were held at 1800. Per Dr. Shepherd Friend ok to hold earlier to keep -180, ok to give once BP increases. Currently /89. 192/97 and 193/100. Family at bedside

## 2018-10-05 NOTE — PROGRESS NOTES
[facility] Hospitalist Group Progress Note Patient: Anant Krishnamurthy Age: 56 y.o. : 1960 MR#: [id number] SSN: [id number] Date/Time: 10/5/2018 Subjective:  
 
Patient in NAD, lying in bed Assessment/Plan: 1- Hypertensive emergency at admission 2- ESRD 3- Acute hypoxic resp failure at admission likely 2/2 hypertensive crisis- improved 4- Pulmonary TB 5- Doubt Sepsis 6- thrombocytopenia- monitor PLAN On amlodipine, coreg, cozaar, minoxidil Hd as per nephro Wean O2 On Rx for TB 
PT, OT Dispo- ? Home soon Case discussed with:  [x]Patient  []Family  [x]Nursing  []Case Management DVT Prophylaxis:  []Lovenox  []Hep SQ  []SCDs  []Coumadin   []On Heparin gtt Objective:  
VS:  
Visit Vitals  /68  Pulse (!) 109  Temp 98.5 °F (36.9 °C) (Axillary)  Resp 16  
 Ht 5' 1\" (1.549 m)  Wt 50.9 kg (112 lb 3.4 oz)  SpO2 96%  Breastfeeding No  
 BMI 21.2 kg/m2 Tmax/24hrs: Temp (24hrs), Av.4 °F (36.9 °C), Min:98.1 °F (36.7 °C), Max:98.5 °F (36.9 °C) Input/Output:  
 
Intake/Output Summary (Last 24 hours) at 10/05/18 1912 Last data filed at 10/05/18 1745 Gross per 24 hour Intake                0 ml Output             1000 ml Net            -1000 ml General:  Awake, alert Cardiovascular:  S1S2+, RRR Pulmonary:  CTA b/l GI:  Soft, BS+, NT, ND Extremities:  trace edema Labs:   
Recent Results (from the past 24 hour(s)) CBC WITH AUTOMATED DIFF Collection Time: 10/05/18  3:00 AM  
Result Value Ref Range WBC 3.6 (L) 4.6 - 13.2 K/uL  
 RBC 3.25 (L) 4.20 - 5.30 M/uL  
 HGB 10.4 (L) 12.0 - 16.0 g/dL HCT 31.4 (L) 35.0 - 45.0 % MCV 96.6 74.0 - 97.0 FL  
 MCH 32.0 24.0 - 34.0 PG  
 MCHC 33.1 31.0 - 37.0 g/dL  
 RDW 15.3 (H) 11.6 - 14.5 % PLATELET 88 (L) 919 - 420 K/uL MPV 11.8 9.2 - 11.8 FL  
 NEUTROPHILS 58 40 - 73 % LYMPHOCYTES 18 (L) 21 - 52 % MONOCYTES 19 (H) 3 - 10 % EOSINOPHILS 4 0 - 5 % BASOPHILS 1 0 - 2 %  
 ABS. NEUTROPHILS 2.1 1.8 - 8.0 K/UL  
 ABS. LYMPHOCYTES 0.6 (L) 0.9 - 3.6 K/UL  
 ABS. MONOCYTES 0.7 0.05 - 1.2 K/UL  
 ABS. EOSINOPHILS 0.1 0.0 - 0.4 K/UL  
 ABS. BASOPHILS 0.1 0.0 - 0.1 K/UL  
 DF AUTOMATED METABOLIC PANEL, COMPREHENSIVE Collection Time: 10/05/18  3:00 AM  
Result Value Ref Range Sodium 143 136 - 145 mmol/L Potassium 3.1 (L) 3.5 - 5.5 mmol/L Chloride 102 100 - 108 mmol/L  
 CO2 31 21 - 32 mmol/L Anion gap 10 3.0 - 18 mmol/L Glucose 107 (H) 74 - 99 mg/dL BUN 23 (H) 7.0 - 18 MG/DL Creatinine 4.02 (H) 0.6 - 1.3 MG/DL  
 BUN/Creatinine ratio 6 (L) 12 - 20 GFR est AA 14 (L) >60 ml/min/1.73m2 GFR est non-AA 12 (L) >60 ml/min/1.73m2 Calcium 10.2 (H) 8.5 - 10.1 MG/DL Bilirubin, total 0.7 0.2 - 1.0 MG/DL  
 ALT (SGPT) 11 (L) 13 - 56 U/L  
 AST (SGOT) 26 15 - 37 U/L Alk. phosphatase 87 45 - 117 U/L Protein, total 6.0 (L) 6.4 - 8.2 g/dL Albumin 2.7 (L) 3.4 - 5.0 g/dL Globulin 3.3 2.0 - 4.0 g/dL A-G Ratio 0.8 0.8 - 1.7 MAGNESIUM Collection Time: 10/05/18  3:00 AM  
Result Value Ref Range Magnesium 3.2 (H) 1.6 - 2.6 mg/dL PHOSPHORUS Collection Time: 10/05/18  3:00 AM  
Result Value Ref Range Phosphorus 4.5 2.5 - 4.9 MG/DL Additional Data Reviewed:   
 
Signed By: Wallace Dwyer MD   
 October 5, 2018

## 2018-10-05 NOTE — PROGRESS NOTES
[facility] ICU Progress Note Name: Karl Eisenberg : 1960 MRN: [id number] Date: 10/5/2018 9:15 AM 
  
[x]I have reviewed the flowsheet and previous days notes. Events overnight reviewed and discussed with nursing staff. Vital signs and records reviewed. HPI: 
 
57 y/o female speaking [place] only, with pmhx of HTN, TB w/ DOT, ESRD on dialysis, recurrent admissions, admitted to ICU for management for hypertensive emergency and possible pneumonia. Subjective: 
 
10/5/2018 No complaints overnight 
cardene off since 1430 10/4/18 BP maintained 160-180 with prns Required phenergan and zofran for an episode of vomitting HD off the floor then maybe ok to d/c home ROS:A comprehensive review of systems was negative except for that written in the HPI. Medication Review: · Pressors - none · Sedation - none · Antibiotics - none · Pain - prn tylenol · GI/ DVT - scds · Others - none Safety Bundles: VAP Bundle/ CAUTI/ Severe Sepsis Protocol/ Electrolyte Replacement Protocol Vital Signs:   
Visit Vitals  BP (!) 181/103  Pulse 85  Temp 98.4 °F (36.9 °C)  Resp 23  
 Ht 5' 1\" (1.549 m)  Wt 50.9 kg (112 lb 3.4 oz)  SpO2 98%  Breastfeeding No  
 BMI 21.2 kg/m2 O2 Device: Room air O2 Flow Rate (L/min): 2 l/min Temp (24hrs), Av.3 °F (36.8 °C), Min:98.2 °F (36.8 °C), Max:98.4 °F (36.9 °C) Intake/Output:  
Last shift:        
Last 3 shifts: 10/03 1901 - 10/05 0700 In: 685.3 [P.O.:300; I.V.:385.3] Out: - Intake/Output Summary (Last 24 hours) at 10/05/18 0915 Last data filed at 10/04/18 1800 Gross per 24 hour Intake           475.33 ml Output                0 ml Net           475.33 ml Physical Exam: 
 
General: Awake, alert, appears annoyed HEENT:  Anicteric sclerae; pink palpebral conjunctivae; mucosa moist 
Resp:  Clear bilateral to auscultation, Symmetrical chest expansion, no accessory muscle use; good airway entry; no rales/ wheezing/ rhonchi noted CV:  S1, S2 present; regular rate and rhythm GI:  Abdomen soft, non-tender; (+) active bowel sounds Extremities:  +2 pulses on all extremities; no edema/ cyanosis/ clubbing noted; normal capillary refill Skin:  Warm; no rashes/ lesions noted Neurologic:  Non-focal, alert to self and location, pupils 3 round and reactive Devices:  HD catheter to right chest 
 
 
DATA:  
 
Current Facility-Administered Medications Medication Dose Route Frequency  minoxidil (LONITEN) tablet 2.5 mg  2.5 mg Oral BID  amLODIPine (NORVASC) tablet 10 mg  10 mg Oral DAILY  carvedilol (COREG) tablet 25 mg  25 mg Oral Q12H  hydrALAZINE (APRESOLINE) 20 mg/mL injection 10 mg  10 mg IntraVENous Q6H PRN  promethazine (PHENERGAN) 12.5 mg in 0.9% sodium chloride 50 mL IVPB  12.5 mg IntraVENous Q8H PRN  
 sodium chloride (NS) flush 5-10 mL  5-10 mL IntraVENous PRN  
 aspirin chewable tablet 81 mg  81 mg Oral DAILY  atorvastatin (LIPITOR) tablet 40 mg  40 mg Oral QHS  ethambutol (MYAMBUTOL) tablet 800 mg  800 mg Oral Q TUE, THU & SAT  FLUoxetine (PROzac) capsule 10 mg  10 mg Oral DAILY  isoniazid (NYDRAZID) tablet 300 mg  300 mg Oral DAILY  losartan (COZAAR) tablet 100 mg  100 mg Oral DAILY  melatonin tablet 3 mg  3 mg Oral QHS PRN  pyrazinamide tablet 1,000 mg  1,000 mg Oral Q TUE, THU & SAT  pyridoxine (vitamin B6) (VITAMIN B-6) tablet 100 mg  100 mg Oral DAILY  rifAMPin (RIFADIN) capsule 600 mg  600 mg Oral DAILY  bisacodyl (DULCOLAX) tablet 5 mg  5 mg Oral DAILY PRN  
 acetaminophen (TYLENOL) tablet 650 mg  650 mg Oral Q6H PRN  
 oxyCODONE-acetaminophen (PERCOCET) 5-325 mg per tablet 1 Tab  1 Tab Oral Q6H PRN  
 naloxone (NARCAN) injection 0.4 mg  0.4 mg IntraVENous PRN  
 ondansetron (ZOFRAN) injection 4 mg  4 mg IntraVENous Q6H PRN  
 docusate sodium (COLACE) capsule 100 mg  100 mg Oral BID PRN  
  heparin (porcine) 1,000 unit/mL injection 4,000 Units  4,000 Units Hemodialysis DIALYSIS MON, WED & FRI  influenza vaccine 2018-19 (6 mos+)(PF) (FLUARIX QUAD/FLULAVAL QUAD) injection 0.5 mL  0.5 mL IntraMUSCular PRIOR TO DISCHARGE Labs: Results:  
   
Chemistry Recent Labs 10/05/18 
 0300  10/04/18 
 0228  10/03/18 
 0620 GLU  107*  98  96 NA  143  140  140  
K  3.1*  3.1*  3.6 CL  102  102  104 CO2  31  30  23 BUN  23*  12  18 CREA  4.02*  2.45*  2.89* CA  10.2*  8.6  8.6 AGAP  10  8  13 BUCR  6*  5*  6* AP  87  114  88  
TP  6.0*  6.1*  5.9* ALB  2.7*  2.7*  2.8*  
GLOB  3.3  3.4  3.1 AGRAT  0.8  0.8  0.9  
  
CBC w/Diff Recent Labs 10/05/18 
 0300  10/04/18 
 0228  10/03/18 
 3095 WBC  3.6*  3.6*  4.1*  
RBC  3.25*  3.17*  3.42* HGB  10.4*  10.1*  11.0*  
HCT  31.4*  30.4*  32.3*  
PLT  88*  77*  76* GRANS  58  57  71 LYMPH  18*  20*  16* EOS  4  4  1 Coagulation No results for input(s): PTP, INR, APTT in the last 72 hours. No lab exists for component: INREXT Liver Enzymes Recent Labs 10/05/18 
 0300 TP  6.0* ALB  2.7* AP  87 SGOT  26 ABG No results found for: PH, PHI, PCO2, PCO2I, PO2, PO2I, HCO3, HCO3I, FIO2, FIO2I Microbiology No results for input(s): CULT in the last 72 hours. Telemetry: [x]Sinus []A-flutter []Paced []A-fib []Multiple PVCs Imaging: CXR Results  (Last 48 hours) None CT Results  (Last 48 hours) None IMPRESSION:  
· Hypertensive emergency- BP stable on oral meds with BP goal 160-180. · Chronic renal failure on HD-  Trending up today- scheduled for dialysis today · Thrombocytosis- continues to improve- HIT panel (-) 9/15/18; awaiting; HIT panel results 10/3/15 · TB w/ DOT- recent sputum smears cleared for AFB- ID consulted Other HX 
· NSTEMI 
· Seizures · Oropharyngeal dysphagia PLAN:  
 · Resp -  Stable on room air. HOB > 30. Aspiration precautions, pulmonary hygiene. CXR this AM with worsening vascular congestion. · ID - Afebrile, aleukocytosis. Trend WBC and temp. ID following for TB med management. · CVS -BP stable off cardene. BP goal 160-180. Nephrology following for HD and adjust BP meds. · Heme/onc - Daily CBC; Continue to monitor H/H & platelets. · Metabolic - Daily BMP; Monitor electrolytes and replace as needed · Renal - Crea & BUN improving. Continue to trend renal indices. Nephrology following for HD - scheduled M/W/F.    
· Endocrine - Glycemic Control goal < 180; avoid hypoglycemia · Neuro/ Pain/ Sedation - Monitor neuro status. Avoid sedating medications · GI - Cardiac diet as tolerated. Bowel regimen · Prophylaxis - DVT, GI- SCDs · Discussed in interdisciplinary rounds -Dialysis scheduled today. Pt has returned to her baseline BP. Possible discharge to home after dialysis. [x]See my orders for details My assessment/plan was discussed with: 
[x]nursing []PT/OT   
[]respiratory therapy [x]Dr. Ashlee Valero  
[]family [] Jerrel Krabbe, NP [facility] Staff Addendum I have independently evaluated the patient and reviewed the patient's chart. I have discussed the findings and care plan with ICU Care Team. Care Plan reviewed on ICU milti-D rounds. I agree with the above evaluation, assessment and recommendations along with the following comments and observations. Patient did well with HD today. Remains off Cardene drip- OK to transfer out of ICU to telemetry. Critical Care and time spent coordinating care, minus procedure time: 25 min Kathryn Vázquez DO, [facility] 8:15 PM

## 2018-10-05 NOTE — PROGRESS NOTES
HEMODIALYSIS ROUNDING NOTE Patient: Mulu Mueller               Sex: female          DOA: 10/2/2018  2:31 AM  
    
YOB: 1960      Age:  56 y.o.        LOS:  LOS: 3 days Subjective:  
 
Mulu Mueller is a 56 y.o.  who presents with Hypoxia Hx of TB skin testing. The patient is dialyzing utilizing the following method:Intermittent Hemodialysis Chief Complains: Patient was seen on dialysis, denies nausea / vomiting / headache / dizziness / SOB / chest pain. - Reviewed last 24 hrs events Current Facility-Administered Medications Medication Dose Route Frequency  minoxidil (LONITEN) tablet 2.5 mg  2.5 mg Oral BID  amLODIPine (NORVASC) tablet 10 mg  10 mg Oral DAILY  carvedilol (COREG) tablet 25 mg  25 mg Oral Q12H  hydrALAZINE (APRESOLINE) 20 mg/mL injection 10 mg  10 mg IntraVENous Q6H PRN  promethazine (PHENERGAN) 12.5 mg in 0.9% sodium chloride 50 mL IVPB  12.5 mg IntraVENous Q8H PRN  
 sodium chloride (NS) flush 5-10 mL  5-10 mL IntraVENous PRN  
 aspirin chewable tablet 81 mg  81 mg Oral DAILY  atorvastatin (LIPITOR) tablet 40 mg  40 mg Oral QHS  ethambutol (MYAMBUTOL) tablet 800 mg  800 mg Oral Q TUE, THU & SAT  FLUoxetine (PROzac) capsule 10 mg  10 mg Oral DAILY  isoniazid (NYDRAZID) tablet 300 mg  300 mg Oral DAILY  losartan (COZAAR) tablet 100 mg  100 mg Oral DAILY  melatonin tablet 3 mg  3 mg Oral QHS PRN  pyrazinamide tablet 1,000 mg  1,000 mg Oral Q TUE, THU & SAT  pyridoxine (vitamin B6) (VITAMIN B-6) tablet 100 mg  100 mg Oral DAILY  rifAMPin (RIFADIN) capsule 600 mg  600 mg Oral DAILY  bisacodyl (DULCOLAX) tablet 5 mg  5 mg Oral DAILY PRN  
 acetaminophen (TYLENOL) tablet 650 mg  650 mg Oral Q6H PRN  
 oxyCODONE-acetaminophen (PERCOCET) 5-325 mg per tablet 1 Tab  1 Tab Oral Q6H PRN  
 naloxone (NARCAN) injection 0.4 mg  0.4 mg IntraVENous PRN  
  ondansetron (ZOFRAN) injection 4 mg  4 mg IntraVENous Q6H PRN  
 docusate sodium (COLACE) capsule 100 mg  100 mg Oral BID PRN  
 heparin (porcine) 1,000 unit/mL injection 4,000 Units  4,000 Units Hemodialysis DIALYSIS MON, WED & FRI  influenza vaccine 2018-19 (6 mos+)(PF) (FLUARIX QUAD/FLULAVAL QUAD) injection 0.5 mL  0.5 mL IntraMUSCular PRIOR TO DISCHARGE Objective:  
 
Visit Vitals  /71  Pulse 80  Temp 98.1 °F (36.7 °C) (Oral)  Resp 16  
 Ht 5' 1\" (1.549 m)  Wt 50.9 kg (112 lb 3.4 oz)  SpO2 96%  Breastfeeding No  
 BMI 21.2 kg/m2 Intake/Output Summary (Last 24 hours) at 10/05/18 1712 Last data filed at 10/04/18 1800 Gross per 24 hour Intake              120 ml Output                0 ml Net              120 ml Physical Examination: 
 
GEN: AAO X 3, NAD 
RS: Chest is bilateral equal, no wheezing / rales / crackles CVS: S1-S2 heard, RRR Abdomen: Soft, Non tender, Not distended, Positive bowel sounds Extremities: No edema, no cyanosis, skin is warm on touch CNS: Awake & follows commands, CN II-XII are grossly intact. HEENT: Head is atraumatic, PERRLA, conjunctiva pink & non icteric. No JVD or carotid bruit Data Review:   
 
Labs:  
 
Hematology: Recent Labs 10/05/18 
 0300  10/04/18 
 0228  10/03/18 
 3972 WBC  3.6*  3.6*  4.1* HGB  10.4*  10.1*  11.0*  
HCT  31.4*  30.4*  32.3* Chemistry: Recent Labs 10/05/18 
 0300  10/04/18 
 0228  10/03/18 
 1768 BUN  23*  12  18 CREA  4.02*  2.45*  2.89* CA  10.2*  8.6  8.6 ALB  2.7*  2.7*  2.8*  
K  3.1*  3.1*  3.6 NA  143  140  140 CL  102  102  104 CO2  31  30  23 PHOS  4.5  2.4*  2.9 GLU  107*  98  96 Images:  
 
XR (Most Recent). CXR reviewed by me and compared with previous CXR Results from Hospital Encounter encounter on 10/02/18 XR CHEST PORT Narrative Portable Chest 
 
CPT CODE: 00430 HISTORY: Shortness of breath.  
 
FINDINGS:  
 
 Multiple wires overlie the patient. Right dialysis catheter tip at the 
cavoatrial junction. Heart size and mediastinal contour stable. Prominence of 
the pulmonary vessels and interstitium. Mild haziness at the bases. Slight 
pleural thickening at the right apex. No pneumothorax. Pierce Garcia Impression IMPRESSION: 
 
There is diffuse prominence of the pulmonary vessels and interstitium which is 
slightly increased from prior study. There are likely small pleural effusions as 
well. Finding suggestive of developing vascular congestion. Underlying infection 
not excluded. CT (Most Recent) [place] [facility] [place]18 [place] ABD PELV W CONT Narrative CT ABDOMEN AND PELVIS WITH CONTRAST 
 
COMPARISON: August 22, 2018. INDICATIONS: Vomiting. TECHNIQUE:  Following the uneventful administration of 80 cc of Isovue 300 
intravenous contrast , volumetric data acquisition was performed of the abdomen 
and pelvis on a multislice scanner and reconstructed in axial coronal and 
sagittal planes CT ABDOMEN FINDINGS:  
 
Lung Bases: There is a large right and minimal left pleural effusion. Bibasilar 
atelectasis is evident. There is multichamber cardiac enlargement. There is a 
moderate size pericardial effusion. This ranges up to 2.5 cm in thickness but 
probably averages less than 1 cm. Pierce Garcia Liver/Gallbladder/Biliary: Normal. 
 
Spleen: Scattered small granulomas noted. Otherwise negative. Adrenal Glands: Normal. 
 
Kidneys: Atrophic with a left-sided cysts unchanged. Pierce Garcia Pancreas: Normal. 
 
Stomach, Small Bowel,  and Colon: A nasogastric tube is present extending into 
the stomach which is otherwise unremarkable. The small bowel is not distended. The appendix appears to be present unremarkable. The colon is unremarkable. Lymph Nodes: Normal in size and number. The abdominal aorta is unremarkable. The IVC is unremarkable. Peritoneal Spaces: Small volume peritoneal fluid is evident Abdominal wall: No hernia or mass is evident. Subcutaneous edema is evident. CT PELVIS FINDINGS:  
 
Bladder: Empty. Pelvic Small Bowel And Colon: Unremarkable. Lymph Nodes: Normal in size and number. Free Fluid: Small volume pelvic fluid noted Uterus and adnexal structures appear unremarkable Osseous Structures Of Abdomen And Pelvis: Unremarkable for age. Impression IMPRESSION:  
 
Anasarca with edematous soft tissues, pleural fluid, ascites, pericardial fluid, 
and multichamber cardiac enlargement. Findings indicating a probable cause for recurrent vomiting are not identified. All CT scans at this facility are performed using dose optimization technique as 
appropriate to the performed exam, to include automated exposure control, 
adjustment of the mA and/or kV according to patient's size (Including 
appropriate matching for site-specific examinations), or use of iterative 
reconstruction technique. Plan / Recommendation: End Stage Renal Disease:  Plan HD today At 5:12 PM on 10/5/2018, I saw and examined patient during hemodialysis treatment. The patient was receiving hemodialysis for treatment of end stage renal disease. I have also reviewed vital signs, intake and output, lab results and recent events, and agreed with today's dialysis order. Access: No issue Anemia:  Hold epo Florian Martínez MD 
Nephrology 10/5/2018

## 2018-10-06 NOTE — PROGRESS NOTES
Patient discharge home per MD orders. Patient and Patient's son giving discharge instruction, follow-up appointments, and prescriptions. Patient and patient's son stated understanding of discharge instructions, follow-up appointments and medication administration. Patient transported to the exit via wheelchair. No acute distress note at this time.

## 2018-10-06 NOTE — PROGRESS NOTES
RENAL DAILY PROGRESS NOTE Patient: Татьяна Jaimes               Sex: female          DOA: 10/2/2018  2:31 AM  
    
YOB: 1960      Age:  56 y.o.        LOS:  LOS: 4 days Subjective:  
 
Татьяна Jaimes is a 56 y.o.  who presents with Hypoxia Hx of TB skin testing. Asked to evaluate for esrd,admitted with hypoxia,hx of severe htn Chief complains: Patient denies nausea, vomiting, chest pain, dizziness, shortness of breath or headache. 
- Reviewed last 24 hrs events Current Facility-Administered Medications Medication Dose Route Frequency  cloNIDine (CATAPRES) 0.3 mg/24 hr patch 1 Patch  1 Patch TransDERmal Q7D  
 minoxidil (LONITEN) tablet 2.5 mg  2.5 mg Oral BID  amLODIPine (NORVASC) tablet 10 mg  10 mg Oral DAILY  carvedilol (COREG) tablet 25 mg  25 mg Oral Q12H  hydrALAZINE (APRESOLINE) 20 mg/mL injection 10 mg  10 mg IntraVENous Q6H PRN  promethazine (PHENERGAN) 12.5 mg in 0.9% sodium chloride 50 mL IVPB  12.5 mg IntraVENous Q8H PRN  
 sodium chloride (NS) flush 5-10 mL  5-10 mL IntraVENous PRN  
 aspirin chewable tablet 81 mg  81 mg Oral DAILY  atorvastatin (LIPITOR) tablet 40 mg  40 mg Oral QHS  ethambutol (MYAMBUTOL) tablet 800 mg  800 mg Oral Q TUE, THU & SAT  FLUoxetine (PROzac) capsule 10 mg  10 mg Oral DAILY  isoniazid (NYDRAZID) tablet 300 mg  300 mg Oral DAILY  losartan (COZAAR) tablet 100 mg  100 mg Oral DAILY  melatonin tablet 3 mg  3 mg Oral QHS PRN  pyrazinamide tablet 1,000 mg  1,000 mg Oral Q TUE, THU & SAT  pyridoxine (vitamin B6) (VITAMIN B-6) tablet 100 mg  100 mg Oral DAILY  rifAMPin (RIFADIN) capsule 600 mg  600 mg Oral DAILY  bisacodyl (DULCOLAX) tablet 5 mg  5 mg Oral DAILY PRN  
 acetaminophen (TYLENOL) tablet 650 mg  650 mg Oral Q6H PRN  
 oxyCODONE-acetaminophen (PERCOCET) 5-325 mg per tablet 1 Tab  1 Tab Oral Q6H PRN  
 naloxone (NARCAN) injection 0.4 mg  0.4 mg IntraVENous PRN  
  ondansetron (ZOFRAN) injection 4 mg  4 mg IntraVENous Q6H PRN  
 docusate sodium (COLACE) capsule 100 mg  100 mg Oral BID PRN  
 heparin (porcine) 1,000 unit/mL injection 4,000 Units  4,000 Units Hemodialysis DIALYSIS MON, WED & FRI  influenza vaccine 2018-19 (6 mos+)(PF) (FLUARIX QUAD/FLULAVAL QUAD) injection 0.5 mL  0.5 mL IntraMUSCular PRIOR TO DISCHARGE Objective:  
 
Visit Vitals  BP (!) 182/102 (BP 1 Location: Right arm, BP Patient Position: At rest)  Pulse 91  Temp 99 °F (37.2 °C)  Resp 12  Ht 5' 1\" (1.549 m)  Wt 50.9 kg (112 lb 3.4 oz)  SpO2 98%  Breastfeeding No  
 BMI 21.2 kg/m2 Intake/Output Summary (Last 24 hours) at 10/06/18 1444 Last data filed at 10/05/18 1745 Gross per 24 hour Intake                0 ml Output             1000 ml Net            -1000 ml Physical Examination:  
 
GEN: AAO X 3, NAD 
RS: Chest is bilateral equal, no wheezing / rales / crackles CVS: S1-S2 heard, RRR, No S3 / murmur Abdomen: Soft, Non tender, Not distended, Positive bowel sounds, no organomegaly, no CVA / supra pubic tenderness Extremities: No edema, no cyanosis, skin is warm on touch CNS: Awake & follows commands, CN II-XII are grossly intact. HEENT: Head is atraumatic, PERRLA, conjunctiva pink & non icteric. No JVD or carotid bruit Psychiatric: Normal mood, affect, judgement & memory Musculoskeletal: No gross joints or bone deformities Lymph Node: No palpable cervical, axillary or groin lymphadenopathy. Data Review:   
 
Labs:  
 
Hematology: Recent Labs 10/05/18 
 0300  10/04/18 
 0228 WBC  3.6*  3.6* HGB  10.4*  10.1* HCT  31.4*  30.4* Chemistry: Recent Labs 10/05/18 
 0300  10/04/18 
 0228 BUN  23*  12  
CREA  4.02*  2.45* CA  10.2*  8.6 ALB  2.7*  2.7*  
K  3.1*  3.1* NA  143  140 CL  102  102 CO2  31  30 PHOS  4.5  2.4*  
GLU  107*  98 Images: XR (Most Recent). CXR reviewed by me and compared with previous CXR Results from Hospital Encounter encounter on 10/02/18 XR CHEST PORT Narrative Portable Chest 
 
CPT CODE: 19507 HISTORY: Shortness of breath. FINDINGS:  
 
Multiple wires overlie the patient. Right dialysis catheter tip at the 
cavoatrial junction. Heart size and mediastinal contour stable. Prominence of 
the pulmonary vessels and interstitium. Mild haziness at the bases. Slight 
pleural thickening at the right apex. No pneumothorax. Jamir Counter Impression IMPRESSION: 
 
There is diffuse prominence of the pulmonary vessels and interstitium which is 
slightly increased from prior study. There are likely small pleural effusions as 
well. Finding suggestive of developing vascular congestion. Underlying infection 
not excluded. CT (Most Recent) Results from [place] Encounter encounter on 08/22/18 CT ABD PELV W CONT Narrative CT ABDOMEN AND PELVIS WITH CONTRAST 
 
COMPARISON: August 22, 2018. INDICATIONS: Vomiting. TECHNIQUE:  Following the uneventful administration of 80 cc of Isovue 300 
intravenous contrast , volumetric data acquisition was performed of the abdomen 
and pelvis on a multislice scanner and reconstructed in axial coronal and 
sagittal planes CT ABDOMEN FINDINGS:  
 
Lung Bases: There is a large right and minimal left pleural effusion. Bibasilar 
atelectasis is evident. There is multichamber cardiac enlargement. There is a 
moderate size pericardial effusion. This ranges up to 2.5 cm in thickness but 
probably averages less than 1 cm. Jamir Counter Liver/Gallbladder/Biliary: Normal. 
 
Spleen: Scattered small granulomas noted. Otherwise negative. Adrenal Glands: Normal. 
 
Kidneys: Atrophic with a left-sided cysts unchanged. Jamir Counter Pancreas: Normal. 
 
Stomach, Small Bowel,  and Colon: A nasogastric tube is present extending into 
the stomach which is otherwise unremarkable. The small bowel is not distended. The appendix appears to be present unremarkable. The colon is unremarkable. Lymph Nodes: Normal in size and number. The abdominal aorta is unremarkable. The IVC is unremarkable. Peritoneal Spaces: Small volume peritoneal fluid is evident Abdominal wall: No hernia or mass is evident. Subcutaneous edema is evident. CT PELVIS FINDINGS:  
 
Bladder: Empty. Pelvic Small Bowel And Colon: Unremarkable. Lymph Nodes: Normal in size and number. Free Fluid: Small volume pelvic fluid noted Uterus and adnexal structures appear unremarkable Osseous Structures Of Abdomen And Pelvis: Unremarkable for age. Impression IMPRESSION:  
 
Anasarca with edematous soft tissues, pleural fluid, ascites, pericardial fluid, 
and multichamber cardiac enlargement. Findings indicating a probable cause for recurrent vomiting are not identified. All CT scans at this facility are performed using dose optimization technique as 
appropriate to the performed exam, to include automated exposure control, 
adjustment of the mA and/or kV according to patient's size (Including 
appropriate matching for site-specific examinations), or use of iterative 
reconstruction technique. EKG No results found for this or any previous visit. I have personally reviewed the old medical records and patient's previously known baseline  creatinine Plan / Recommendation: 1. Esrd,continue dialysis mon wed Friday 2.severe htn,resume clonidine patch 3. Hx of tb,on meds by health dept D/w Dr. Delroy Manriquez MD 
Nephrology 10/6/2018

## 2018-10-06 NOTE — PROGRESS NOTES
Patient in NAD, wants to go home, denies sob, on RA. D/w son in law over the phone. Patient and family decline SNF/rehab. Home with [facility] today. D/w

## 2018-10-06 NOTE — PROGRESS NOTES
Pt's son Jody Meyers signed Freedom of [employer] via telephone for [address] and referral sent. Lillie was called and informed pt is discharging today.

## 2018-10-06 NOTE — PROGRESS NOTES
Bedside report received on pt, she is alert and oriented, no complaint voiced. 2120: Pt's spouse and daughter Ifrah English, [phone]) at bedside, medications administration explained to pt and family. Pt's daughter brought home made food; reclining chair provided to pt's spouse who will be staying overnight Bedside shift change report given to Marita Tracy RN (oncoming nurse) by Rena Jerez RN (offgoing nurse). Report included the following information SBAR, Procedure Summary, Intake/Output, Recent Results and Cardiac Rhythm NSR.

## 2018-10-06 NOTE — PROGRESS NOTES
Received patient from dialysis. Patient alert and responsive with no distress noted. Family at bedside.

## 2018-10-06 NOTE — PROGRESS NOTES
Problem: Falls - Risk of 
Goal: *Absence of Falls Document Hubert Valdez Fall Risk and appropriate interventions in the flowsheet. Outcome: Progressing Towards Goal 
Fall Risk Interventions: 
Mobility Interventions: Patient to call before getting OOB Medication Interventions: Patient to call before getting OOB, Teach patient to arise slowly Elimination Interventions: Call light in reach, Patient to call for help with toileting needs, Toilet paper/wipes in reach Problem: Pressure Injury - Risk of 
Goal: *Prevention of pressure injury Document Derek Scale and appropriate interventions in the flowsheet. Outcome: Progressing Towards Goal 
Pressure Injury Interventions: Activity Interventions: Pressure redistribution bed/mattress(bed type) Mobility Interventions: Pressure redistribution bed/mattress (bed type) Nutrition Interventions: Document food/fluid/supplement intake, Offer support with meals,snacks and hydration Friction and Shear Interventions: HOB 30 degrees or less

## 2018-10-06 NOTE — PROGRESS NOTES
Bedside shift change report given to this RN (oncoming nurse) by Shivani Gibbons RN (offgoing nurse). Report included the following information SBAR, Kardex and Cardiac Rhythm NSR.

## 2018-10-06 NOTE — DISCHARGE INSTRUCTIONS
Bronchiectasis: Care Instructions  Your Care Instructions  Bronchiectasis (say \"qtozp-jpt-ASP-tuh-mady\") is a lung problem in which the breathing tubes are stretched and become larger. The buildup of mucus causes the stretching and can lead to swelling and infections. You may find it harder to breathe and cough up mucus out of your lungs. Some people are born with it. Other people get it because of another health problem, usually cystic fibrosis or a lung infection such as pneumonia or tuberculosis. Symptoms are often different for everyone. But a cough that brings up mucus, or sputum, is common. The condition is usually treated with antibiotics, medicines to relax the airways (bronchodilators), and medicines to make it easier to cough up mucus (expectorants). Follow-up care is a key part of your treatment and safety. Be sure to make and go to all appointments, and call your doctor if you are having problems. It's also a good idea to know your test results and keep a list of the medicines you take. How can you care for yourself at home? · Take your antibiotics as directed. Do not stop taking them just because you feel better. You need to take the full course of antibiotics. · Take your medicines exactly as prescribed. Call your doctor if you think you are having a problem with your medicine. · If you have cystic fibrosis, follow your treatment plan. · If you or your child has bronchiectasis, follow directions from your doctor or respiratory therapist for moving your or your child's body into different positions to help drain fluid. This is called postural drainage, and it helps to ease breathing and prevent infections. · You also may do chest percussion on your child. This is strong clapping of the chest with a cupped hand to vibrate the airways in the lungs. The vibration helps your child cough up mucus. You may see a respiratory therapist to learn how to do chest percussion.   · Use an airway clearance device, such as a flutter valve, as directed to help remove mucus from the lungs. · Avoid lung infections. ¨ Get shots to protect against the flu and pneumococcal disease. ¨ Wash your hands frequently. ¨ Avoid close contact with people who have colds or the flu. ¨ Do not smoke or allow others to smoke around you. If you need help quitting, talk to your doctor about stop-smoking programs and medicines. These can increase your chances of quitting for good. ¨ Stay inside, if you can, on days when the pollution level is high. When should you call for help? Call 911 anytime you think you may need emergency care. For example, call if:    · You have severe trouble breathing.     · You cough up more than 1 cup of blood.    Call your doctor now or seek immediate medical care if:    · You have chest pain.     · You have shortness of breath.     · You have a fever.     · Your mucus (sputum) is bloody or changes color.    Watch closely for changes in your health, and be sure to contact your doctor if:    · You are coughing up more sputum than before.     · Your symptoms get worse or do not get better with treatment. Where can you learn more? Go to http://cristino-anne.info/. Enter C667 in the search box to learn more about \"Bronchiectasis: Care Instructions. \"  Current as of: December 6, 2017  Content Version: 11.8  © 8030-4896 [employer]. Care instructions adapted under license by [employer] (which disclaims liability or warranty for this information). If you have questions about a medical condition or this instruction, always ask your healthcare professional. [place] [address] any warranty or liability for your use of this information. Patient armband removed and shredded  MyChart Activation    Thank you for requesting access to [employer]. Please follow the instructions below to securely access and download your online medical record.  [employer] allows you to send messages to your doctor, view your test results, renew your prescriptions, schedule appointments, and more. How Do I Sign Up? 1. In your internet browser, go to [email or web address]  2. Click on the First Time User? Click Here link in the Sign In box. You will be redirect to the New Member Sign Up page. 3. Enter your [employer] Access Code exactly as it appears below. You will not need to use this code after youve completed the sign-up process. If you do not sign up before the expiration date, you must request a new code. [employer] Access Code: [email or web address]  Expires: 2018  5:34 AM (This is the date your [employer] access code will )    4. Enter the last four digits of your Social Security Number (xxxx) and Date of Birth (mm/dd/yyyy) as indicated and click Submit. You will be taken to the next sign-up page. 5. Create a [employer] ID. This will be your [employer] login ID and cannot be changed, so think of one that is secure and easy to remember. 6. Create a [employer] password. You can change your password at any time. 7. Enter your Password Reset Question and Answer. This can be used at a later time if you forget your password. 8. Enter your e-mail address. You will receive e-mail notification when new information is available in [address] 9. Click Sign Up. You can now view and download portions of your medical record. 10. Click the Download Summary menu link to download a portable copy of your medical information. Additional Information    If you have questions, please visit the Frequently Asked Questions section of the [employer] website at [email or web address] Remember, [employer] is NOT to be used for urgent needs. For medical emergencies, dial 911.       DISCHARGE SUMMARY from Nurse    PATIENT INSTRUCTIONS:    After general anesthesia or intravenous sedation, for 24 hours or while taking prescription Narcotics:  · Limit your activities  · Do not drive and operate hazardous machinery  · Do not make important personal or business decisions  · Do  not drink alcoholic beverages  · If you have not urinated within 8 hours after discharge, please contact your surgeon on call. Report the following to your surgeon:  · Excessive pain, swelling, redness or odor of or around the surgical area  · Temperature over 100.5  · Nausea and vomiting lasting longer than 4 hours or if unable to take medications  · Any signs of decreased circulation or nerve impairment to extremity: change in color, persistent  numbness, tingling, coldness or increase pain  · Any questions    What to do at Home:  Recommended activity: Activity as tolerated,     If you experience any of the following symptoms Chest Pains, Shortness of Breath, Dizziness, Increased weakness, Increased temperature greater 100.5, Nausea, Vomiting, Diarrhea, Severe pain, please follow up with PCP or call 911. *  Please give a list of your current medications to your Primary Care Provider. *  Please update this list whenever your medications are discontinued, doses are      changed, or new medications (including over-the-counter products) are added. *  Please carry medication information at all times in case of emergency situations. These are general instructions for a healthy lifestyle:    No smoking/ No tobacco products/ Avoid exposure to second hand smoke  Surgeon General's Warning:  Quitting smoking now greatly reduces serious risk to your health. Obesity, smoking, and sedentary lifestyle greatly increases your risk for illness    A healthy diet, regular physical exercise & weight monitoring are important for maintaining a healthy lifestyle    You may be retaining fluid if you have a history of heart failure or if you experience any of the following symptoms:  Weight gain of 3 pounds or more overnight or 5 pounds in a week, increased swelling in our hands or feet or shortness of breath while lying flat in bed. Please call your doctor as soon as you notice any of these symptoms; do not wait until your next office visit. Recognize signs and symptoms of STROKE:    F-face looks uneven    A-arms unable to move or move unevenly    S-speech slurred or non-existent    T-time-call 911 as soon as signs and symptoms begin-DO NOT go       Back to bed or wait to see if you get better-TIME IS BRAIN. Warning Signs of HEART ATTACK     Call 911 if you have these symptoms:   Chest discomfort. Most heart attacks involve discomfort in the center of the chest that lasts more than a few minutes, or that goes away and comes back. It can feel like uncomfortable pressure, squeezing, fullness, or pain.  Discomfort in other areas of the upper body. Symptoms can include pain or discomfort in one or both arms, the back, neck, jaw, or stomach.  Shortness of breath with or without chest discomfort.  Other signs may include breaking out in a cold sweat, nausea, or lightheadedness. Don't wait more than five minutes to call 911 - MINUTES MATTER! Fast action can save your life. Calling 911 is almost always the fastest way to get lifesaving treatment. Emergency Medical Services staff can begin treatment when they arrive -- up to an hour sooner than if someone gets to the hospital by car. The discharge information has been reviewed with the patient. The patient verbalized understanding. Discharge medications reviewed with the patient and appropriate educational materials and side effects teaching were provided.   ___________________________________________________________________________________________________________________________________

## 2018-10-15 PROBLEM — I10 MALIGNANT HYPERTENSION: Status: ACTIVE | Noted: 2018-01-01

## 2018-10-16 NOTE — PROGRESS NOTES
Patient is not available to be assessed at this time. Daughter Kaylie Quan indicated that patient do not speak English and would not cooperate answering questions today. Patient was seen with closed eyes the whole time I was talking to her daughter. Chaplain Resident [facility]  
[phone]

## 2018-10-16 NOTE — PROGRESS NOTES
Pt in [facility] er for severe htn and hypoxia. she was dialyzed this pm.suspect non compliance with bp meds. On 2liters /nc now. will evaluate in am for possible dialysis Discussed with dr Emely Meraz

## 2018-10-16 NOTE — CONSULTS
[facility] - Consult Note Date of  Admission: 10/15/2018  9:17 PM 
  
Primary Care Physician:  Chiara Hogan MD 
 
 Plan: 1. Acute respiratory failure-  possible related to Acute decompensated CHF in the setting of uncontrolled Hypertension and possible sepsis on O2 nasal canula 2. Acute Diastolic Heart failure- decompensated. Needs HD. Nephrology was consulted 3. Minimally  elevated troponin- possible demand inchemia  But MI needs to r/o. Monitor for ischemia symptoms follow ekg today. On asa, statin and bb.  
4. Hx of NSTEMI- in 2/18 NUC stress that showed small reversible apical inferior defect noted from these nuclear study suggestive of ischemia in the area. Possible distal LAD or RCA involmenent patient was managed medically. 5. Uncontrolled  hypertension- possible to poor compliance with medication regimen  Patient on multiple medications for bp control. BP  elevated on labetalol drip. Will increase minoxidi and monitor closely. Added hydralazine prn 
6. ESRD- on HD renal following. She is MWF HD schedule 7. Pulmonany Tuberculosis- on 4 medications per medical team 
8. Possible sepsis? ? On antibiotics managed by medical team.  
9. Nausea and vomiting- w/u per medical team 
 
   
Echo 8/18 · Estimated left ventricular ejection fraction is 56 - 60%. Left ventricular moderate concentric hypertrophy. Normal left ventricular wall motion, no regional wall motion abnormality noted. · Saline contrast was given to evaluate for intracardiac shunt. No shunt seen. · Small circumferential pericardial effusion with more prominent area around the right artium. No indications of tamponade NUC 2/18 1. Post-stress imaging in short axis, horizontal and vertical long axis view reveals a small apical inferior defect in perfusion with a medium intensity. 2.  Resting images show homogenous isotope uptake in all areas.  
3.  Gated images show normal left ventricular size, wall motion, and systolic function appears normal.  Ejection fraction is 58%.   
  
DIAGNOSES:   
1. Abnormal scan. 2.  Evidence of a small reversible apical inferior defect noted from these nuclear study suggestive of ischemia in the area. This is involving either the mid or distal left anterior descending or right coronary artery. 3.  Medium risk scan. Assessment:  
 
Hospital Problems  Date Reviewed: 10/16/2018 Codes Class Noted POA Malignant hypertension ICD-10-CM: I10 
ICD-9-CM: 401.0  10/15/2018 Unknown HCAP (healthcare-associated pneumonia) ICD-10-CM: J18.9 ICD-9-CM: 427  10/2/2018 Yes Pulmonary TB ICD-10-CM: A15.0 ICD-9-CM: 011.90  7/17/2018 Yes Hypertensive crisis ICD-10-CM: I16.9 ICD-9-CM: 401.9  7/17/2018 Yes ESRD (end stage renal disease) ([facility] ICD-10-CM: N18.6 ICD-9-CM: 585.6  2/25/2018 Yes Respiratory failure ([facility] ICD-10-CM: J96.90 ICD-9-CM: 518.81  2/25/2018 Yes Hypoxia ICD-10-CM: R09.02 
ICD-9-CM: 799.02  2/25/2018 Yes History of Present Illness: This is a 66 y.o. female admitted for Malignant hypertension. Patient with PMHx of malignant hypertension, CHF, ESRD on HD, anemia, pulmonary tuberculosis. Patient presented to  ER c/o increasing in SOB, nausea and vomiting. information obtained from patient's daughter. Patient on medications for TB she  is non-ambulatory. In the ER found to have uncontrolled /142 mmH. Patient was placed on labetalol l gtt. She had dialysis yesterday  and did not feel that improved her SOB, in the ER patient was hypoxic with SPO2 at 85%. Unable to provide detail history from patient she doesn't speak english. Patient is resting in bed comfortable no distress noted. On O2 nasal canula. Past Medical History:  
 
Past Medical History:  
Diagnosis Date  Arthritis GOUT  Asthma  Bilateral leg pain  Chest pain at rest   
 Chronic kidney disease  ESRD (end stage renal disease) ([facility] TUES-THURS-SAT  Hypercholesteremia  Hypertension  Joint pain  Kidney disease  Pleural effusion  Pulmonary tuberculosis  Vitamin D deficiency Social History:  
 
Social History Social History  Marital status:  Spouse name: N/A  
 Number of children: N/A  
 Years of education: N/A Social History Main Topics  Smoking status: Never Smoker  Smokeless tobacco: Never Used  Alcohol use No  
 Drug use: No  
 Sexual activity: Not Asked Other Topics Concern  None Social History Narrative Family History:  
History reviewed. No pertinent family history. Medications: Allergies Allergen Reactions  Banana Other (comments) Current Facility-Administered Medications Medication Dose Route Frequency  labetalol (NORMODYNE;TRANDATE) 5 mg/mL injection  labetalol (NORMODYNE;TRANDATE) 20 mg/4 mL (5 mg/mL) injection  amLODIPine (NORVASC) tablet 10 mg  10 mg Oral DAILY  aspirin chewable tablet 81 mg  81 mg Oral DAILY  atorvastatin (LIPITOR) tablet 40 mg  40 mg Oral QHS  bisacodyl (DULCOLAX) tablet 5 mg  5 mg Oral DAILY PRN  
 carvedilol (COREG) tablet 25 mg  25 mg Oral Q12H  
 dicyclomine (BENTYL) capsule 20 mg  20 mg Oral TID PRN  
 docusate sodium (COLACE) capsule 100 mg  100 mg Oral BID  ethambutol (MYAMBUTOL) tablet 800 mg  800 mg Oral Q TUE, THU & SAT  FLUoxetine (PROzac) capsule 10 mg  10 mg Oral DAILY  isoniazid (NYDRAZID) tablet 300 mg  300 mg Oral DAILY  losartan (COZAAR) tablet 100 mg  100 mg Oral DAILY  melatonin tablet 3 mg  3 mg Oral QHS PRN  minoxidil (LONITEN) tablet 2.5 mg  2.5 mg Oral BID  pyrazinamide tablet 1,000 mg  1,000 mg Oral Q TUE, THU & SAT  pyridoxine (vitamin B6) (VITAMIN B-6) tablet 100 mg  100 mg Oral DAILY  rifAMPin (RIFADIN) capsule 600 mg  600 mg Oral 6am  
  acetaminophen (TYLENOL) tablet 650 mg  650 mg Oral Q4H PRN  
 heparin (porcine) injection 5,000 Units  5,000 Units SubCUTAneous Q8H  
 influenza vaccine 2018-19 (6 mos+)(PF) (FLUARIX QUAD/FLULAVAL QUAD) injection 0.5 mL  0.5 mL IntraMUSCular PRIOR TO DISCHARGE  cloNIDine HCl (CATAPRES) tablet 0.3 mg  0.3 mg Oral TID  labetalol (NORMODYNE,TRANDATE) 5 mg/ml infusion (max conc)  2 mg/min IntraVENous CONTINUOUS  
 ondansetron (ZOFRAN) injection 4 mg  4 mg IntraVENous Q4H PRN  promethazine (PHENERGAN) 12.5 mg in 0.9% sodium chloride 50 mL IVPB  12.5 mg IntraVENous Q4H PRN  
 [START ON 10/17/2018] levoFLOXacin (LEVAQUIN) 500 mg in D5W IVPB  500 mg IntraVENous Q48H  piperacillin-tazobactam (ZOSYN) 2.25 g in 0.9% sodium chloride (MBP/ADV) 50 mL ADV  2.25 g IntraVENous Q8H  
 Piperacillin-tazobactam (ZOSYN) 0.75 gm Supplemental Dosing by Pharmacy   Other Rx Dosing/Monitoring  VANCOMYCIN INFORMATION NOTE   Other Rx Dosing/Monitoring Review Of Systems:  
 
Unable to obtain detail  ROS. Per patient's daughter no chest pain or chest pressure.  + SOB and nausea and vomiting Physical Exam:  
 
Visit Vitals  BP (!) 188/97  Pulse 67  Temp 98.2 °F (36.8 °C)  Resp 22  
 Ht 5' 1\" (1.549 m)  Wt 43.3 kg (95 lb 8 oz)  SpO2 100%  Breastfeeding No  
 BMI 18.04 kg/m2 BP Readings from Last 3 Encounters:  
10/16/18 (!) 188/97  
10/09/18 168/88  
10/06/18 (!) 198/96 Pulse Readings from Last 3 Encounters:  
10/16/18 67  
10/09/18 (!) 58  
10/06/18 98 Wt Readings from Last 3 Encounters:  
10/16/18 43.3 kg (95 lb 8 oz) 10/04/18 50.9 kg (112 lb 3.4 oz) 10/15/18 41 kg (90 lb 6.2 oz) General:  alert, uncooperative, distracted, no distress, appears stated age Skin: Warm and dry, acyanotic, normal color. Head: Normocephalic, atraumatic. Eyes: Sclerae anicteric, conjunctivae without injection.  
Neck:  nontender, no nuchal rigidity, no masses, no stridor, no carotid bruit, + JVD Lungs:  Ray crackles at the bases of  both the left and right lungs Heart:  regular rate and rhythm, S1, S2 normal, no click, no rub Abdomen:  abdomen is soft without significant tenderness, masses, organomegaly or guarding Extremities:  extremities normal, atraumatic, no cyanosis or edema Neurological: grossly intact. No focal abnormalities, moves all extremities well. Psychiatric Affect: The patient is awake ans  Alert. Data Review:  
 
Recent Results (from the past 48 hour(s)) EKG, 12 LEAD, INITIAL Collection Time: 10/15/18  9:23 PM  
Result Value Ref Range Ventricular Rate 102 BPM  
 Atrial Rate 102 BPM  
 P-R Interval 156 ms QRS Duration 84 ms Q-T Interval 314 ms QTC Calculation (Bezet) 409 ms Calculated P Axis 66 degrees Calculated R Axis 92 degrees Calculated T Axis 60 degrees Diagnosis Sinus tachycardia Rightward axis Borderline ECG When compared with ECG of 02-OCT-2018 02:51, No significant change was found CBC WITH AUTOMATED DIFF Collection Time: 10/15/18  9:24 PM  
Result Value Ref Range WBC 3.1 (L) 4.6 - 13.2 K/uL  
 RBC 4.26 4.20 - 5.30 M/uL  
 HGB 13.7 12.0 - 16.0 g/dL HCT 42.0 35.0 - 45.0 % MCV 98.6 (H) 74.0 - 97.0 FL  
 MCH 32.2 24.0 - 34.0 PG  
 MCHC 32.6 31.0 - 37.0 g/dL  
 RDW 13.8 11.6 - 14.5 % PLATELET 358 640 - 326 K/uL MPV 9.8 9.2 - 11.8 FL  
 NEUTROPHILS 48 40 - 73 % LYMPHOCYTES 30 21 - 52 % MONOCYTES 19 (H) 3 - 10 % EOSINOPHILS 2 0 - 5 % BASOPHILS 1 0 - 2 %  
 ABS. NEUTROPHILS 1.5 (L) 1.8 - 8.0 K/UL  
 ABS. LYMPHOCYTES 0.9 0.9 - 3.6 K/UL  
 ABS. MONOCYTES 0.6 0.05 - 1.2 K/UL  
 ABS. EOSINOPHILS 0.1 0.0 - 0.4 K/UL  
 ABS. BASOPHILS 0.0 0.0 - 0.1 K/UL  
 DF AUTOMATED METABOLIC PANEL, BASIC Collection Time: 10/15/18  9:24 PM  
Result Value Ref Range Sodium 135 (L) 136 - 145 mmol/L Potassium 4.2 3.5 - 5.5 mmol/L  Chloride 97 (L) 100 - 108 mmol/L  
 CO2 29 21 - 32 mmol/L  
 Anion gap 9 3.0 - 18 mmol/L Glucose 122 (H) 74 - 99 mg/dL BUN 21 (H) 7.0 - 18 MG/DL Creatinine 3.61 (H) 0.6 - 1.3 MG/DL  
 BUN/Creatinine ratio 6 (L) 12 - 20 GFR est AA 16 (L) >60 ml/min/1.73m2 GFR est non-AA 13 (L) >60 ml/min/1.73m2 Calcium 9.2 8.5 - 10.1 MG/DL  
CARDIAC PANEL,(CK, CKMB & TROPONIN) Collection Time: 10/15/18  9:24 PM  
Result Value Ref Range CK 60 26 - 192 U/L  
 CK - MB 1.4 <3.6 ng/ml CK-MB Index 2.3 0.0 - 4.0 % Troponin-I, Qt. 0.07 (H) 0.00 - 0.06 NG/ML  
LIPASE Collection Time: 10/15/18  9:24 PM  
Result Value Ref Range Lipase 400 (H) 73 - 393 U/L  
POC LACTIC ACID Collection Time: 10/15/18  9:38 PM  
Result Value Ref Range Lactic Acid (POC) 3.2 (HH) 0.4 - 2.0 mmol/L  
CULTURE, BLOOD Collection Time: 10/15/18  9:55 PM  
Result Value Ref Range Special Requests: NO SPECIAL REQUESTS Culture result: NO GROWTH AFTER 1 HOUR    
POC G3 Collection Time: 10/15/18 10:01 PM  
Result Value Ref Range Device: Non rebreather    
 pH (POC) 7.437 7.35 - 7.45    
 pCO2 (POC) 45.9 (H) 35.0 - 45.0 MMHG  
 pO2 (POC) 227 (H) 80 - 100 MMHG  
 HCO3 (POC) 31.0 (H) 22 - 26 MMOL/L  
 sO2 (POC) 100 (H) 92 - 97 % Base excess (POC) 6 mmol/L Allens test (POC) N/A Site RIGHT BRACHIAL Specimen type (POC) ARTERIAL Performed by Risa Partida CULTURE, BLOOD Collection Time: 10/15/18 10:05 PM  
Result Value Ref Range Special Requests: NO SPECIAL REQUESTS Culture result: NO GROWTH AFTER 1 HOUR    
URINALYSIS W/ RFLX MICROSCOPIC Collection Time: 10/15/18 10:53 PM  
Result Value Ref Range Color YELLOW Appearance CLEAR Specific gravity 1.015 1.005 - 1.030    
 pH (UA) 8.5 (H) 5.0 - 8.0 Protein 300 (A) NEG mg/dL Glucose NEGATIVE  NEG mg/dL Ketone NEGATIVE  NEG mg/dL Bilirubin NEGATIVE  NEG Blood SMALL (A) NEG Urobilinogen 0.2 0.2 - 1.0 EU/dL  Nitrites NEGATIVE  NEG    
 Leukocyte Esterase NEGATIVE  NEG    
POC LACTIC ACID Collection Time: 10/16/18 12:17 AM  
Result Value Ref Range Lactic Acid (POC) 0.8 0.4 - 2.0 mmol/L METABOLIC PANEL, BASIC Collection Time: 10/16/18  5:30 AM  
Result Value Ref Range Sodium 138 136 - 145 mmol/L Potassium 4.8 3.5 - 5.5 mmol/L Chloride 98 (L) 100 - 108 mmol/L  
 CO2 28 21 - 32 mmol/L Anion gap 12 3.0 - 18 mmol/L Glucose 112 (H) 74 - 99 mg/dL BUN 30 (H) 7.0 - 18 MG/DL Creatinine 4.07 (H) 0.6 - 1.3 MG/DL  
 BUN/Creatinine ratio 7 (L) 12 - 20 GFR est AA 14 (L) >60 ml/min/1.73m2 GFR est non-AA 11 (L) >60 ml/min/1.73m2 Calcium 8.8 8.5 - 10.1 MG/DL  
TROPONIN I Collection Time: 10/16/18  5:30 AM  
Result Value Ref Range Troponin-I, Qt. 0.07 (H) 0.0 - 0.045 NG/ML No intake or output data in the 24 hours ending 10/16/18 0855 Cardiographics:  
 
ECG: Sinus tachycardia no acute ST-T changes. Signed By: Get Osorio NP October 16, 2018

## 2018-10-16 NOTE — CONSULTS
Consult Note Consult requested by: Dr. Vaughn Watts Iona Blackburn is a 66 y.o. female PATIENT REFUSED who is being seen on consult for ESRD/HD Chief Complaint Patient presents with  Chest Pain  Shortness of Breath Admission diagnosis: <principal problem not specified> HPI: 65 yo [place] female admitted post HD last night because of c/o of SOB and HTN urgency. Patient's daughter at bedside and we have a nurse that speaks [place] as  at bedside. Pt currently feeling better, denies any CP or SOB. Reviewed interventions from admission. Daughter says she is taking all her meds at home ( administered by pt's ) but every now and then she does have vomiting and throws up some of her meds. She has chronic constipation and she gets very upset when she has to take all her TB meds, (she is on her 4th month of treatment). She had HD yesterday and BP was high. She has been repeatedly admitted for HTN urgency and responds well to resumption of meds. Per daughter she eats very little, no c/o of fever or chills. Past Medical History:  
Diagnosis Date  Arthritis GOUT  Asthma  Bilateral leg pain  Chest pain at rest   
 Chronic kidney disease  ESRD (end stage renal disease) ([facility] [address] TUES-THURS-SAT  Hypercholesteremia  Hypertension  Joint pain  Kidney disease  Pleural effusion  Pulmonary tuberculosis  Vitamin D deficiency Past Surgical History:  
Procedure Laterality Date  CHEST SURGERY PROCEDURE UNLISTED    
 THORACENTHESIS  
 HX OTHER SURGICAL    
 BRONCHOSCOPY  
 HX VASCULAR ACCESS  01/2018; 11/2017 LEFT ARM VENOUS ACCESS  
 HX VASCULAR ACCESS  02/2017 [facility]  VASCULAR SURGERY PROCEDURE UNLIST Social History Social History  Marital status:  Spouse name: N/A  
 Number of children: N/A  
 Years of education: N/A Occupational History  Not on file. Social History Main Topics  Smoking status: Never Smoker  Smokeless tobacco: Never Used  Alcohol use No  
 Drug use: No  
 Sexual activity: Not on file Other Topics Concern  Not on file Social History Narrative History reviewed. No pertinent family history. Allergies Allergen Reactions  Banana Other (comments) Home Medications:  
 
Prior to Admission Medications Prescriptions Last Dose Informant Patient Reported? Taking? FLUoxetine (PROZAC) 10 mg capsule Unknown at Unknown time  No No  
Sig: Take 1 Cap by mouth daily. OTHER Unknown at Unknown time  No No  
Sig: Incentive Spirometry- use as directed OTHER Unknown at Unknown time  No No  
Sig: Check CBC, CMP, MG on 10/08/18, Results to PCP immediately, Diagnosis- TB  
albuterol (PROVENTIL HFA, VENTOLIN HFA, PROAIR HFA) 90 mcg/actuation inhaler 10/15/2018 at Unknown time  No Yes Sig: Take 2 Puffs by inhalation every four (4) hours as needed for Wheezing. amLODIPine (NORVASC) 10 mg tablet 10/15/2018 at Unknown time  No Yes Sig: Take 1 Tab by mouth daily. aspirin 81 mg chewable tablet 10/15/2018 at Unknown time  No Yes Sig: Take 1 Tab by mouth daily. atorvastatin (LIPITOR) 40 mg tablet 10/15/2018 at Unknown time  No Yes Sig: Take 1 Tab by mouth nightly. azithromycin (ZITHROMAX) 500 mg tab 10/15/2018 at Unknown time  No Yes Sig: Take 1 Tab by mouth every , Saturday. At 5 PM  
bisacodyl (DULCOLAX) 5 mg EC tablet 10/15/2018 at Unknown time  No Yes Sig: Take 1 Tab by mouth daily as needed for Constipation. carvedilol (COREG) 25 mg tablet 10/15/2018 at Unknown time  No Yes Sig: Take 1 Tab by mouth every twelve (12) hours. cloNIDine (CATAPRES) 0.3 mg/24 hr 10/15/2018 at Unknown time  No Yes Si Patch by TransDERmal route every seven (7) days. cloNIDine HCl (CATAPRES) 0.3 mg tablet Unknown at Unknown time  Yes No  
Sig: Take 0.3 mg by mouth three (3) times daily. dicyclomine (BENTYL) 20 mg tablet Unknown at Unknown time  Yes No  
Sig: Take 20 mg by mouth three (3) times daily as needed (abdominial pain). docusate sodium (COLACE) 100 mg capsule Unknown at Unknown time  No No  
Sig: Take 1 Cap by mouth two (2) times a day.  
ethambutol (MYAMBUTOL) 400 mg tablet Unknown at Unknown time  No No  
Sig: Take 2 Tabs by mouth every Tuesday Thursday, Saturday. At 5 PM  
glycerin, adult, (FLEET GLYCERIN, ADULT,) suppository Unknown at Unknown time  No No  
Sig: Insert 1 Suppository into rectum daily as needed. Indications: BOWEL EVACUATION  
isoniazid (NYDRAZID) 300 mg tablet Unknown at Unknown time  No No  
Sig: Take 1 Tab by mouth daily. On dialysis days, please take medication after dialysis  
losartan (COZAAR) 100 mg tablet 10/15/2018 at Unknown time  No Yes Sig: Take 1 Tab by mouth daily. melatonin 3 mg tablet Unknown at Unknown time  No No  
Sig: Take 1 Tab by mouth nightly as needed. Patient taking differently: Take 1 Tab by mouth nightly as needed (insomnia). minoxidil (LONITEN) 2.5 mg tablet Unknown at Unknown time  No No  
Sig: Take 1 Tab by mouth two (2) times a day. pyrazinamide 500 mg tablet Unknown at Unknown time  No No  
Sig: Take 2 Tabs by mouth every Tuesday Thursday, Saturday. At 5 pm  Indications: active tuberculosis  
pyridoxine, vitamin B6, (VITAMIN B-6) 100 mg tablet Unknown at Unknown time  No No  
Sig: Take 1 Tab by mouth daily. rifAMPin (RIFADIN) 300 mg capsule Unknown at Unknown time  No No  
Sig: Take 2 Caps by mouth daily. On dialysis days please take this medication after Dialysis Facility-Administered Medications: None Current Facility-Administered Medications Medication Dose Route Frequency  amLODIPine (NORVASC) tablet 10 mg  10 mg Oral DAILY  aspirin chewable tablet 81 mg  81 mg Oral DAILY  atorvastatin (LIPITOR) tablet 40 mg  40 mg Oral QHS  bisacodyl (DULCOLAX) tablet 5 mg  5 mg Oral DAILY PRN  
  carvedilol (COREG) tablet 25 mg  25 mg Oral Q12H  
 dicyclomine (BENTYL) capsule 20 mg  20 mg Oral TID PRN  
 docusate sodium (COLACE) capsule 100 mg  100 mg Oral BID  ethambutol (MYAMBUTOL) tablet 800 mg  800 mg Oral Q TUE, THU & SAT  FLUoxetine (PROzac) capsule 10 mg  10 mg Oral DAILY  isoniazid (NYDRAZID) tablet 300 mg  300 mg Oral DAILY  losartan (COZAAR) tablet 100 mg  100 mg Oral DAILY  melatonin tablet 3 mg  3 mg Oral QHS PRN  pyrazinamide tablet 1,000 mg  1,000 mg Oral Q TUE, THU & SAT  pyridoxine (vitamin B6) (VITAMIN B-6) tablet 100 mg  100 mg Oral DAILY  rifAMPin (RIFADIN) capsule 600 mg  600 mg Oral 6am  
 acetaminophen (TYLENOL) tablet 650 mg  650 mg Oral Q4H PRN  
 heparin (porcine) injection 5,000 Units  5,000 Units SubCUTAneous Q8H  
 influenza vaccine 2018-19 (6 mos+)(PF) (FLUARIX QUAD/FLULAVAL QUAD) injection 0.5 mL  0.5 mL IntraMUSCular PRIOR TO DISCHARGE  cloNIDine HCl (CATAPRES) tablet 0.3 mg  0.3 mg Oral TID  ondansetron (ZOFRAN) injection 4 mg  4 mg IntraVENous Q4H PRN  promethazine (PHENERGAN) 12.5 mg in 0.9% sodium chloride 50 mL IVPB  12.5 mg IntraVENous Q4H PRN  minoxidil (LONITEN) tablet 5 mg  5 mg Oral BID  hydrALAZINE (APRESOLINE) 20 mg/mL injection 10 mg  10 mg IntraVENous Q6H  
 esmolol 10mg/mL (BREVIBLOC) infusion  0-200 mcg/kg/min IntraVENous TITRATE  [START ON 10/17/2018] levoFLOXacin (LEVAQUIN) 500 mg in D5W IVPB  500 mg IntraVENous Q48H  piperacillin-tazobactam (ZOSYN) 2.25 g in 0.9% sodium chloride (MBP/ADV) 50 mL ADV  2.25 g IntraVENous Q8H  
 Piperacillin-tazobactam (ZOSYN) 0.75 gm Supplemental Dosing by Pharmacy   Other Rx Dosing/Monitoring  VANCOMYCIN INFORMATION NOTE   Other Rx Dosing/Monitoring Review of Systems:  
Pertinent items are noted in HPI. Data Review: 
 
Labs: Results:  
   
Chemistry Recent Labs 10/16/18 
 0530  10/15/18 
 2124 GLU  112*  122* NA  138  135* K  4.8  4.2 CL  98*  97*  
 CO2  28  29 BUN  30*  21* CREA  4.07*  3.61* CA  8.8  9.2 AGAP  12  9 BUCR  7*  6* CBC w/Diff Recent Labs 10/15/18 
 2124 WBC  3.1*  
RBC  4.26  
HGB  13.7 HCT  42.0 PLT  180 GRANS  48 LYMPH  30 EOS  2 Coagulation No results for input(s): PTP, INR, APTT in the last 72 hours. No lab exists for component: INREXT Iron/Ferritin No results for input(s): IRON in the last 72 hours. No lab exists for component: TIBCCALC BNP No results for input(s): BNPP in the last 72 hours. Cardiac Enzymes Recent Labs 10/15/18 
 2124 CPK  60 CKND1  2.3 Liver Enzymes No results for input(s): TP, ALB, TBIL, AP, SGOT, GPT in the last 72 hours. No lab exists for component: DBIL Thyroid Studies Lab Results Component Value Date/Time TSH 6.21 (H) 08/24/2018 08:00 AM  
    
  
IMAGES: CXR results noted EKG ST 
 
 
 
Physical Assessment:  
 
Visit Vitals  BP (!) 166/91  Pulse 66  Temp 98.2 °F (36.8 °C)  Resp 18  Ht 5' 1\" (1.549 m)  Wt 43.3 kg (95 lb 8 oz)  SpO2 99%  Breastfeeding No  
 BMI 18.04 kg/m2 Last 3 Recorded Weights in this Encounter 10/15/18 2123 10/16/18 0400 Weight: 41 kg (90 lb 6.2 oz) 43.3 kg (95 lb 8 oz) No intake or output data in the 24 hours ending 10/16/18 1431 Physial Exam: 
General appearance: alert, frail, no resp distress, thin built appears stated age Skin:  no rashes. HEENT: Non icteric Neck: No JVD, Right IJ TDC Lungs: basal rhonchi, no wheezing Heart: regular rate and rhythm, S1, S2 normal, no murmur, click, rub or gallop Abdomen: soft, non-tender. Bowel sounds normal. No masses,  no organomegaly Extremities: no LE edema IMPRESSION AND PLAN:  
ESRD sched HD tomorrow, will attempt more UF 
HTN urgency. ?? Non compliance? Possible she is throwing up meds on occasion. Defer management to Dr Gardenia Barraza, agree with simplifying regimen, also spoke with son in law stopped use of MSG in food and soup. Pulmo TB cont meds, monitored by health Dept. Discussed with Dr. Maryellen Aguillon MD 
October 16, 2018

## 2018-10-16 NOTE — ED PROVIDER NOTES
EMERGENCY DEPARTMENT HISTORY AND PHYSICAL EXAM 
 
9:24 PM 
 
 
Date: 10/15/2018 Patient Name: Soto Son History of Presenting Illness Chief Complaint Patient presents with  Chest Pain  Shortness of Breath History Provided By: Patient's Son Chief Complaint: Shortness of Breath Duration:  1 Day Timing:  Acute Location: N/A Quality: N/A Severity: N/A Modifying Factors: None Associated Symptoms: Cough; Vomiting Additional History (Context): Soto Son is a 66 y.o. female with PMHx of HTN, pulmonary tuberculosis, and ESRD presenting to the ED c/o acute exacerbation of shortness of breath that started last night. Per son, pt has had intermittent shortness of breath for the past several months. Pt's son also reports associated symptoms of cough and 1 episode of vomiting pta. No modifying factors for pt's symptoms. Per son, pt was recently admitted and discharged for similar symptoms. He states pt was told she needed rehab but pt wanted to go home. Son notes pt had dialysis today. Son denies chest pain and any other symptoms or complaints. PCP: Jaja Gillette MD 
 
Current Facility-Administered Medications Medication Dose Route Frequency Provider Last Rate Last Dose  labetalol (NORMODYNE;TRANDATE) 5 mg/mL injection  labetalol (NORMODYNE;TRANDATE) 20 mg/4 mL (5 mg/mL) injection  amLODIPine (NORVASC) tablet 10 mg  10 mg Oral DAILY Maryanne Verdin MD      
 aspirin chewable tablet 81 mg  81 mg Oral DAILY Maryanne Verdin MD      
 atorvastatin (LIPITOR) tablet 40 mg  40 mg Oral QHS Maryanne Verdin MD   Stopped at 10/16/18 7897  bisacodyl (DULCOLAX) tablet 5 mg  5 mg Oral DAILY PRN Maryanne Verdin MD      
 carvedilol (COREG) tablet 25 mg  25 mg Oral Q12H Maryanne Verdin MD   Stopped at 10/16/18 1610  dicyclomine (BENTYL) capsule 20 mg  20 mg Oral TID PRN Maryanne Verdin MD      
 docusate sodium (COLACE) capsule 100 mg  100 mg Oral BID Maryanne Verdin MD      
  ethambutol (MYAMBUTOL) tablet 800 mg  800 mg Oral Q TUE, THU & SAT Kan Galaviz MD      
 FLUoxetine (PROzac) capsule 10 mg  10 mg Oral DAILY Georgina Resendez MD      
 isoniazid (NYDRAZID) tablet 300 mg  300 mg Oral DAILY Kan Galaviz MD      
 losartan (COZAAR) tablet 100 mg  100 mg Oral DAILY Kan Galaviz MD      
 melatonin tablet 3 mg  3 mg Oral QHS PRN Kan Galaviz MD   Stopped at 10/16/18 5557  minoxidil (LONITEN) tablet 2.5 mg  2.5 mg Oral BID Kan Galaviz MD      
 pyrazinamide tablet 1,000 mg  1,000 mg Oral Q TUE, THU & SAT Kan Galaviz MD      
 pyridoxine (vitamin B6) (VITAMIN B-6) tablet 100 mg  100 mg Oral DAILY Kan Galaviz MD      
 rifAMPin (RIFADIN) capsule 600 mg  600 mg Oral 6am Kan Galaviz MD      
 acetaminophen (TYLENOL) tablet 650 mg  650 mg Oral Q4H PRN Kan Galaviz MD      
 ondansetron [facility]) injection 4 mg  4 mg IntraVENous Q4H PRN Kan Galaviz MD   4 mg at 10/16/18 0302  heparin (porcine) injection 5,000 Units  5,000 Units SubCUTAneous Severiano Factor, MD   5,000 Units at 10/16/18 4995  influenza vaccine 2018-19 (6 mos+)(PF) (FLUARIX QUAD/FLULAVAL QUAD) injection 0.5 mL  0.5 mL IntraMUSCular PRIOR TO DISCHARGE Kan Galaviz MD      
 cloNIDine HCl (CATAPRES) tablet 0.3 mg  0.3 mg Oral TID Kan Galaviz MD   Stopped at 10/16/18 1157  labetalol (NORMODYNE,TRANDATE) 5 mg/ml infusion (max conc)  2 mg/min IntraVENous CONTINUOUS Kan Galaviz MD 24 mL/hr at 10/16/18 0442 2 mg/min at 10/16/18 0442  
 vancomycin (VANCOCIN) 1,000 mg in 0.9% sodium chloride (MBP/ADV) 250 mL adv  1,000 mg IntraVENous ONCE Meche Rivas  mL/hr at 10/16/18 0442 1,000 mg at 10/16/18 2806  [START ON 10/17/2018] levoFLOXacin (LEVAQUIN) 500 mg in D5W IVPB  500 mg IntraVENous Q48H Meche Rivas MD      
 piperacillin-tazobactam (ZOSYN) 2.25 g in 0.9% sodium chloride (MBP/ADV) 50 mL ADV  2.25 g IntraVENous Q8H Meche Rivas MD   2.25 g at 10/15/18 2339  Piperacillin-tazobactam (ZOSYN) 0.75 gm Supplemental Dosing by Pharmacy   Other Rx Dosing/Monitoring Cassie Alcala MD      
 VANCOMYCIN INFORMATION NOTE   Other Rx Dosing/Monitoring Cassie Alcala MD      
 
 
Past History Past Medical History: 
Past Medical History:  
Diagnosis Date  Arthritis GOUT  Asthma  Bilateral leg pain  Chest pain at rest   
 Chronic kidney disease  ESRD (end stage renal disease) ([facility] [address] TUES-THURS-SAT  Hypercholesteremia  Hypertension  Joint pain  Kidney disease  Pleural effusion  Pulmonary tuberculosis  Vitamin D deficiency Past Surgical History: 
Past Surgical History:  
Procedure Laterality Date  CHEST SURGERY PROCEDURE UNLISTED    
 THORACENTHESIS  
 HX OTHER SURGICAL    
 BRONCHOSCOPY  
 HX VASCULAR ACCESS  01/2018; 11/2017 LEFT ARM VENOUS ACCESS  
 HX VASCULAR ACCESS  02/2017 [address]  VASCULAR SURGERY PROCEDURE UNLIST Family History: 
History reviewed. No pertinent family history. Social History: 
Social History Substance Use Topics  Smoking status: Never Smoker  Smokeless tobacco: Never Used  Alcohol use No  
 
 
Allergies: Allergies Allergen Reactions  Banana Other (comments) Review of Systems Review of Systems Constitutional: Negative for fever. HENT: Negative for congestion. Respiratory: Positive for cough and shortness of breath. Cardiovascular: Negative for chest pain. Gastrointestinal: Positive for vomiting. Negative for abdominal pain. Musculoskeletal: Negative for back pain. Skin: Negative for rash. Neurological: Negative for light-headedness. All other systems reviewed and are negative. Physical Exam  
 
Visit Vitals  BP (!) 206/99  Pulse 66  Temp 97.6 °F (36.4 °C)  Resp 17  Ht 5' 1\" (1.549 m)  Wt 41 kg (90 lb 6.2 oz)  SpO2 98%  Breastfeeding No  
 BMI 17.08 kg/m2 Physical Exam  
Constitutional: She is oriented to person, place, and time. She appears well-developed. Lethargic. HENT:  
Head: Normocephalic. Mouth/Throat: Oropharynx is clear and moist.  
Eyes: Pupils are equal, round, and reactive to light. Neck: Normal range of motion. Cardiovascular: Normal rate and normal heart sounds. No murmur heard. Pulmonary/Chest: Effort normal. Tachypnea noted. She has no wheezes. She has no rales. Abdominal: Soft. There is no tenderness. Musculoskeletal: Normal range of motion. She exhibits no edema. Neurological: She is alert and oriented to person, place, and time. Skin: Skin is warm and dry. Nursing note and vitals reviewed. Diagnostic Study Results Vitals: 
Patient Vitals for the past 12 hrs: 
 Temp Pulse Resp BP SpO2  
10/16/18 0336 97.6 °F (36.4 °C) - - - -  
10/16/18 0040 - 66 17 (!) 206/99 98 % 10/15/18 2345 - 70 25 (!) 211/116 100 % 10/15/18 2245 - 63 17 (!) 206/94 100 % 10/15/18 2230 - 65 18 (!) 207/96 100 % 10/15/18 2215 - 65 19 (!) 217/100 100 % 10/15/18 2200 - 72 21 (!) 220/106 99 % 10/15/18 2145 - 74 25 (!) 229/123 100 % 10/15/18 2140 - - - (!) 231/123 -  
10/15/18 2136 97.7 °F (36.5 °C) - - - -  
10/15/18 2123 - (!) 105 20 (!) 239/142 (!) 88 % Medications ordered:  
Medications  
vancomycin (VANCOCIN) 1,000 mg in 0.9% sodium chloride (MBP/ADV) 250 mL adv (1,000 mg IntraVENous New Bag 10/16/18 7852) levoFLOXacin (LEVAQUIN) 500 mg in D5W IVPB (not administered)  
piperacillin-tazobactam (ZOSYN) 2.25 g in 0.9% sodium chloride (MBP/ADV) 50 mL ADV (2.25 g IntraVENous Given 10/15/18 2309) Piperacillin-tazobactam (ZOSYN) 0.75 gm Supplemental Dosing by Pharmacy (not administered) VANCOMYCIN INFORMATION NOTE (not administered)  
labetalol (NORMODYNE;TRANDATE) 5 mg/mL injection (not administered)  
labetalol (NORMODYNE;TRANDATE) 20 mg/4 mL (5 mg/mL) injection (not administered) amLODIPine (NORVASC) tablet 10 mg (not administered) aspirin chewable tablet 81 mg (not administered)  
atorvastatin (LIPITOR) tablet 40 mg (0 mg Oral Held 10/16/18 0303)  
bisacodyl (DULCOLAX) tablet 5 mg (not administered)  
carvedilol (COREG) tablet 25 mg (0 mg Oral Held 10/16/18 0303) dicyclomine (BENTYL) capsule 20 mg (not administered) docusate sodium (COLACE) capsule 100 mg (not administered) ethambutol (MYAMBUTOL) tablet 800 mg (not administered) FLUoxetine (PROzac) capsule 10 mg (not administered)  
isoniazid (NYDRAZID) tablet 300 mg (not administered)  
losartan (COZAAR) tablet 100 mg (not administered)  
melatonin tablet 3 mg (0 mg Oral Held 10/16/18 0303) minoxidil (LONITEN) tablet 2.5 mg (not administered) pyrazinamide tablet 1,000 mg (not administered) pyridoxine (vitamin B6) (VITAMIN B-6) tablet 100 mg (not administered)  
rifAMPin (RIFADIN) capsule 600 mg (not administered)  
acetaminophen (TYLENOL) tablet 650 mg (not administered)  
ondansetron (ZOFRAN) injection 4 mg (4 mg IntraVENous Given 10/16/18 0302)  
heparin (porcine) injection 5,000 Units (5,000 Units SubCUTAneous Given 10/16/18 0303) influenza vaccine 2018-19 (6 mos+)(PF) (FLUARIX QUAD/FLULAVAL QUAD) injection 0.5 mL (not administered)  
cloNIDine HCl (CATAPRES) tablet 0.3 mg (0 mg Oral Held 10/16/18 0303)  
labetalol (NORMODYNE,TRANDATE) 5 mg/ml infusion (max conc) (2 mg/min IntraVENous New Bag 10/16/18 0442) ondansetron [facility]) injection 4 mg (4 mg IntraVENous Given 10/15/18 2153)  
labetalol (NORMODYNE;TRANDATE) 20 mg/4 mL (5 mg/mL) injection 20 mg (20 mg IntraVENous Given 10/15/18 2153) levoFLOXacin (LEVAQUIN) 750 mg in D5W IVPB (750 mg IntraVENous New Bag 10/16/18 0036)  
labetalol (NORMODYNE;TRANDATE) 20 mg/4 mL (5 mg/mL) injection 20 mg (20 mg IntraVENous Given 10/16/18 6286) Lab findings: 
Recent Results (from the past 12 hour(s)) EKG, 12 LEAD, INITIAL  Collection Time: 10/15/18  9:23 PM  
 Result Value Ref Range Ventricular Rate 102 BPM  
 Atrial Rate 102 BPM  
 P-R Interval 156 ms QRS Duration 84 ms Q-T Interval 314 ms QTC Calculation (Bezet) 409 ms Calculated P Axis 66 degrees Calculated R Axis 92 degrees Calculated T Axis 60 degrees Diagnosis Sinus tachycardia Rightward axis Borderline ECG When compared with ECG of 02-OCT-2018 02:51, No significant change was found CBC WITH AUTOMATED DIFF Collection Time: 10/15/18  9:24 PM  
Result Value Ref Range WBC 3.1 (L) 4.6 - 13.2 K/uL  
 RBC 4.26 4.20 - 5.30 M/uL  
 HGB 13.7 12.0 - 16.0 g/dL HCT 42.0 35.0 - 45.0 % MCV 98.6 (H) 74.0 - 97.0 FL  
 MCH 32.2 24.0 - 34.0 PG  
 MCHC 32.6 31.0 - 37.0 g/dL  
 RDW 13.8 11.6 - 14.5 % PLATELET 300 143 - 317 K/uL MPV 9.8 9.2 - 11.8 FL  
 NEUTROPHILS 48 40 - 73 % LYMPHOCYTES 30 21 - 52 % MONOCYTES 19 (H) 3 - 10 % EOSINOPHILS 2 0 - 5 % BASOPHILS 1 0 - 2 %  
 ABS. NEUTROPHILS 1.5 (L) 1.8 - 8.0 K/UL  
 ABS. LYMPHOCYTES 0.9 0.9 - 3.6 K/UL  
 ABS. MONOCYTES 0.6 0.05 - 1.2 K/UL  
 ABS. EOSINOPHILS 0.1 0.0 - 0.4 K/UL  
 ABS. BASOPHILS 0.0 0.0 - 0.1 K/UL  
 DF AUTOMATED METABOLIC PANEL, BASIC Collection Time: 10/15/18  9:24 PM  
Result Value Ref Range Sodium 135 (L) 136 - 145 mmol/L Potassium 4.2 3.5 - 5.5 mmol/L Chloride 97 (L) 100 - 108 mmol/L  
 CO2 29 21 - 32 mmol/L Anion gap 9 3.0 - 18 mmol/L Glucose 122 (H) 74 - 99 mg/dL BUN 21 (H) 7.0 - 18 MG/DL Creatinine 3.61 (H) 0.6 - 1.3 MG/DL  
 BUN/Creatinine ratio 6 (L) 12 - 20 GFR est AA 16 (L) >60 ml/min/1.73m2 GFR est non-AA 13 (L) >60 ml/min/1.73m2 Calcium 9.2 8.5 - 10.1 MG/DL  
CARDIAC PANEL,(CK, CKMB & TROPONIN) Collection Time: 10/15/18  9:24 PM  
Result Value Ref Range CK 60 26 - 192 U/L  
 CK - MB 1.4 <3.6 ng/ml CK-MB Index 2.3 0.0 - 4.0 % Troponin-I, Qt. 0.07 (H) 0.00 - 0.06 NG/ML  
LIPASE Collection Time: 10/15/18  9:24 PM  
Result Value Ref Range Lipase 400 (H) 73 - 393 U/L  
POC LACTIC ACID Collection Time: 10/15/18  9:38 PM  
Result Value Ref Range Lactic Acid (POC) 3.2 (HH) 0.4 - 2.0 mmol/L  
POC G3 Collection Time: 10/15/18 10:01 PM  
Result Value Ref Range Device: Non rebreather    
 pH (POC) 7.437 7.35 - 7.45    
 pCO2 (POC) 45.9 (H) 35.0 - 45.0 MMHG  
 pO2 (POC) 227 (H) 80 - 100 MMHG  
 HCO3 (POC) 31.0 (H) 22 - 26 MMOL/L  
 sO2 (POC) 100 (H) 92 - 97 % Base excess (POC) 6 mmol/L Allens test (POC) N/A Site RIGHT BRACHIAL Specimen type (POC) ARTERIAL Performed by Tomasz Fuchs URINALYSIS W/ RFLX MICROSCOPIC Collection Time: 10/15/18 10:53 PM  
Result Value Ref Range Color YELLOW Appearance CLEAR Specific gravity 1.015 1.005 - 1.030    
 pH (UA) 8.5 (H) 5.0 - 8.0 Protein 300 (A) NEG mg/dL Glucose NEGATIVE  NEG mg/dL Ketone NEGATIVE  NEG mg/dL Bilirubin NEGATIVE  NEG Blood SMALL (A) NEG Urobilinogen 0.2 0.2 - 1.0 EU/dL Nitrites NEGATIVE  NEG Leukocyte Esterase NEGATIVE  NEG    
POC LACTIC ACID Collection Time: 10/16/18 12:17 AM  
Result Value Ref Range Lactic Acid (POC) 0.8 0.4 - 2.0 mmol/L  
 
 
 
X-Ray, CT or other radiology findings or impressions: 
XR CHEST PORT Final Result IMPRESSION: 
1. Tubes and lines in good position. 
  
2. Prominent left midlung field opacity. This is concerning for an acute 
infiltrate. Follow-up to resolution is recommended. 
  
3. Interstitial pulmonary edema. 
  
4.  Small left pleural effusion. Progress notes, Consult notes or additional Procedure notes:  
11:16 PM Consult: I discussed care with Dr. Oren Rhodes (Hospitalist). It was a standard discussion including patient history, chief complaint, available diagnostic results, and predicted treatment course. Accepts patient for admission. 11:27 PM Consult: I discussed care with Dr. Dionisio Lim (Nephrology). It was a standard discussion including patient history, chief complaint, available diagnostic results, and predicted treatment course. Accepts consult. Disposition: 
Diagnosis: 1. Malignant hypertension 2. Pneumonia of lower lobe due to infectious organism, unspecified laterality ([facility] Disposition: Admitted Follow-up Information None Current Discharge Medication List  
  
CONTINUE these medications which have NOT CHANGED Details  
amLODIPine (NORVASC) 10 mg tablet Take 1 Tab by mouth daily. Qty: 30 Tab, Refills: 0  
  
carvedilol (COREG) 25 mg tablet Take 1 Tab by mouth every twelve (12) hours. Qty: 60 Tab, Refills: 0  
  
cloNIDine (CATAPRES) 0.3 mg/24 hr 1 Patch by TransDERmal route every seven (7) days. Qty: 4 Patch, Refills: 0  
  
losartan (COZAAR) 100 mg tablet Take 1 Tab by mouth daily. Qty: 30 Tab, Refills: 0  
  
bisacodyl (DULCOLAX) 5 mg EC tablet Take 1 Tab by mouth daily as needed for Constipation. Qty: 10 Tab, Refills: 0  
  
albuterol (PROVENTIL HFA, VENTOLIN HFA, PROAIR HFA) 90 mcg/actuation inhaler Take 2 Puffs by inhalation every four (4) hours as needed for Wheezing. Qty: 1 Inhaler, Refills: 0  
  
azithromycin (ZITHROMAX) 500 mg tab Take 1 Tab by mouth every Tuesday Thursday, Saturday. At 5 PM 
Qty: 12 Tab, Refills: 0  
  
aspirin 81 mg chewable tablet Take 1 Tab by mouth daily. Qty: 30 Tab, Refills: 0  
  
atorvastatin (LIPITOR) 40 mg tablet Take 1 Tab by mouth nightly. Qty: 30 Tab, Refills: 0  
  
dicyclomine (BENTYL) 20 mg tablet Take 20 mg by mouth three (3) times daily as needed (abdominial pain). cloNIDine HCl (CATAPRES) 0.3 mg tablet Take 0.3 mg by mouth three (3) times daily. glycerin, adult, (FLEET GLYCERIN, ADULT,) suppository Insert 1 Suppository into rectum daily as needed. Indications: BOWEL EVACUATION Qty: 5 Suppository, Refills: 0  
  
minoxidil (LONITEN) 2.5 mg tablet Take 1 Tab by mouth two (2) times a day. Qty: 60 Tab, Refills: 0  
  
!! OTHER Check CBC, CMP, MG on 10/08/18, Results to PCP immediately, Diagnosis- TB Qty: 1 Each, Refills: 0  
  
ethambutol (MYAMBUTOL) 400 mg tablet Take 2 Tabs by mouth every Tuesday Thursday, Saturday. At 5 PM 
Qty: 24 Tab, Refills: 0  
  
isoniazid (NYDRAZID) 300 mg tablet Take 1 Tab by mouth daily. On dialysis days, please take medication after dialysis Qty: 30 Tab, Refills: 0  
  
pyrazinamide 500 mg tablet Take 2 Tabs by mouth every Tuesday Thursday, Saturday. At 5 pm  Indications: active tuberculosis Qty: 24 Tab, Refills: 0  
  
rifAMPin (RIFADIN) 300 mg capsule Take 2 Caps by mouth daily. On dialysis days please take this medication after Dialysis Qty: 60 Cap, Refills: 0  
  
docusate sodium (COLACE) 100 mg capsule Take 1 Cap by mouth two (2) times a day. Qty: 60 Cap, Refills: 0  
  
pyridoxine, vitamin B6, (VITAMIN B-6) 100 mg tablet Take 1 Tab by mouth daily. Qty: 30 Tab, Refills: 0  
  
!! OTHER Incentive Spirometry- use as directed Qty: 1 Each, Refills: 0  
  
melatonin 3 mg tablet Take 1 Tab by mouth nightly as needed. Qty: 30 Tab, Refills: 0 FLUoxetine (PROZAC) 10 mg capsule Take 1 Cap by mouth daily. Qty: 30 Cap, Refills: 0  
  
 !! - Potential duplicate medications found. Please discuss with provider. Scribe Attestation Renée Jiménez acting as a scribe for and in the presence of [facility], MD     
October 15, 2018 at 9:24 PM 
    
Provider Attestation:     
I personally performed the services described in the documentation, reviewed the documentation, as recorded by the scribe in my presence, and it accurately and completely records my words and actions.  October 15, 2018 at 9:24 PM - [facility] Libman, MD

## 2018-10-16 NOTE — PROGRESS NOTES
Patient only speaks [place]. Patient refuses to use translation phones and prefers to use her son to interpret. Son at bedside interpreting admission questions. Sons contact information is listed under demographics [employer]. Physician aware.

## 2018-10-16 NOTE — PROGRESS NOTES
Unable to administer PO medications at this time due to patients nausea/vomitting. Dr. Rae Call notified. Nausea medication administered see MAR. Will continue to monitor.

## 2018-10-16 NOTE — ED NOTES
Pt is awake/alert; sitting up in bed calmly. Respirations regular/non labored, skin warm/dry. Pt changed over to 2L NC; tolerating well. Plan of care explained to family/understood. No distress noted at this time.

## 2018-10-16 NOTE — PROGRESS NOTES
Dr. Pau Card informed of patients MEWS score. Patient is on Labetalol drip. Dose increased to 2mg/min. Will continue to monitor.

## 2018-10-16 NOTE — PROGRESS NOTES
[facility] Hospitalist Group Progress Note Patient: Venice Carlson Age: 66 y.o. : 1960 MR#: [id number] SSN: [id number] Date/Time: 10/16/2018 Subjective:  
 
Review of systems Not in distress Not giving much history  Even with translation ( from Daughter at bedside ) Assessment/Plan:  
Patient with PMH of ESRD on HD, HTN, pulmonary TB on active tx 4 meds , admitted with h/o 1 day of severe nausea vomiting and hypertensive urgency . Per HPI patient had h/o SOB 2 days and hence she went to ED . In ED - high lactate + ? Fever at home + hypoxia +Left sided infiltrate 1. HTN - Urgency - Very high blood pressure  
- on Labetalol drip , - Change to po meds when NV improves - Cardiology consulted 2 ESRD  
- On HD  
- Follow with Dr Rae Hunt 3 Acute NV - improved - Improving  
- PRN Zofran  
- KUB ordered - If symptoms reoccurs - GI consult 4 Continue Anti TB med per home dose 5 SEPSIS Vs SIRS  
 
- on Antibiotics for presumed pneumonia - X ray chest - left sided infiltrates - X ray chest suggestive of interstitial  edema - Follow cultures Case discussed with:  []Patient  [x] Family ( In room with patient )    [x]Nursing  []Case Management DVT Prophylaxis:  []Lovenox  []Hep SQ  []SCDs  []Coumadin   []On Heparin gtt Objective:  
VS:  
Visit Vitals  BP (!) 188/97  Pulse 67  Temp 98.2 °F (36.8 °C)  Resp 22  
 Ht 5' 1\" (1.549 m)  Wt 43.3 kg (95 lb 8 oz)  SpO2 100%  Breastfeeding No  
 BMI 18.04 kg/m2 Tmax/24hrs: Temp (24hrs), Av.8 °F (36.6 °C), Min:97.6 °F (36.4 °C), Max:98.2 °F (36.8 °C) IOBRIEFNo intake or output data in the 24 hours ending 10/16/18 0959 General:  Alert, cooperative, MILD  acute distress Cardiovascular: S1S2 - regular , No Murmur Pulmonary: Poor inspiratory effort ,Equal expansion , No Use of accessory muscles , No Rales No Rhonchi GI:  +BS in all four quadrants, soft, non-tender Extremities:  No edema; 2+ dorsalis pedis pulses bilaterally Neuro: Alert and oriented X 2. Medications:  
Current Facility-Administered Medications Medication Dose Route Frequency  labetalol (NORMODYNE;TRANDATE) 5 mg/mL injection  labetalol (NORMODYNE;TRANDATE) 20 mg/4 mL (5 mg/mL) injection  amLODIPine (NORVASC) tablet 10 mg  10 mg Oral DAILY  aspirin chewable tablet 81 mg  81 mg Oral DAILY  atorvastatin (LIPITOR) tablet 40 mg  40 mg Oral QHS  bisacodyl (DULCOLAX) tablet 5 mg  5 mg Oral DAILY PRN  
 carvedilol (COREG) tablet 25 mg  25 mg Oral Q12H  
 dicyclomine (BENTYL) capsule 20 mg  20 mg Oral TID PRN  
 docusate sodium (COLACE) capsule 100 mg  100 mg Oral BID  ethambutol (MYAMBUTOL) tablet 800 mg  800 mg Oral Q TUE, THU & SAT  FLUoxetine (PROzac) capsule 10 mg  10 mg Oral DAILY  isoniazid (NYDRAZID) tablet 300 mg  300 mg Oral DAILY  losartan (COZAAR) tablet 100 mg  100 mg Oral DAILY  melatonin tablet 3 mg  3 mg Oral QHS PRN  pyrazinamide tablet 1,000 mg  1,000 mg Oral Q TUE, THU & SAT  pyridoxine (vitamin B6) (VITAMIN B-6) tablet 100 mg  100 mg Oral DAILY  rifAMPin (RIFADIN) capsule 600 mg  600 mg Oral 6am  
 acetaminophen (TYLENOL) tablet 650 mg  650 mg Oral Q4H PRN  
 heparin (porcine) injection 5,000 Units  5,000 Units SubCUTAneous Q8H  
 influenza vaccine 2018-19 (6 mos+)(PF) (FLUARIX QUAD/FLULAVAL QUAD) injection 0.5 mL  0.5 mL IntraMUSCular PRIOR TO DISCHARGE  cloNIDine HCl (CATAPRES) tablet 0.3 mg  0.3 mg Oral TID  labetalol (NORMODYNE,TRANDATE) 5 mg/ml infusion (max conc)  2 mg/min IntraVENous CONTINUOUS  
 ondansetron (ZOFRAN) injection 4 mg  4 mg IntraVENous Q4H PRN  promethazine (PHENERGAN) 12.5 mg in 0.9% sodium chloride 50 mL IVPB  12.5 mg IntraVENous Q4H PRN  minoxidil (LONITEN) tablet 5 mg  5 mg Oral BID  
  [START ON 10/17/2018] levoFLOXacin (LEVAQUIN) 500 mg in D5W IVPB  500 mg IntraVENous Q48H  piperacillin-tazobactam (ZOSYN) 2.25 g in 0.9% sodium chloride (MBP/ADV) 50 mL ADV  2.25 g IntraVENous Q8H  
 Piperacillin-tazobactam (ZOSYN) 0.75 gm Supplemental Dosing by Pharmacy   Other Rx Dosing/Monitoring  VANCOMYCIN INFORMATION NOTE   Other Rx Dosing/Monitoring Labs:   
Recent Labs 10/15/18 
 2124 WBC  3.1* HGB  13.7 HCT  42.0 PLT  180 Recent Labs 10/16/18 
 0530  10/15/18 
 2124 NA  138  135* K  4.8  4.2 CL  98*  97* CO2  28  29 GLU  112*  122* BUN  30*  21* CREA  4.07*  3.61* CA  8.8  9.2 Signed By: Nikos Cabrera MD   
 October 16, 2018

## 2018-10-16 NOTE — ED TRIAGE NOTES
Per patient's son, patient began experiencing shortness of breath yesterday. He states patient had dialysis today. Breathing labored on arrival.  Patient on TB precautions.

## 2018-10-16 NOTE — H&P
History & Physical 
Patient: Francis Da Silva MRN: [id number]  CSN: [id number] YOB: 1960  Age: 66 y.o. Sex: female DOA: 10/15/2018 Chief Complaint:  
Chief Complaint Patient presents with  Chest Pain  Shortness of Breath HPI:  
 
Francis Da Silva is a 66 y.o. female [place]e speaking with son in law translating w/ PMH of ESRD on HD, HTN, pulmonary TB on active tx 4 meds and Pyridoxine ( AFB culture positive and AF smear negative per micro on 07/13/2018 and then both negative at 6 weeks on 8/7/18 ) was  brought to [facility] ED for sob x 2 days, progressively worsening, patient is non-ambulatory. Associated with cough, sweating, naseua and vomiting starting today. Unclear if fevers were present at home. In the ER found to have uncontrolled /142mmHg, initially treated with labetalol, then placed on labetalol gtt. She had dialysis today and did not feel that improved her SOB, in the ER hypoxic at 85%, lactate 3.2, cxr- Left sided infiltrate, urine negative,  
now on labetalol gtt Last admission for similar symptoms (SOB, hypoxia) 2 weeks back treated with nicardapine gtt and tx for HCAP. Nephro consulted by ER Past Medical History:  
Diagnosis Date  Arthritis GOUT  Asthma  Bilateral leg pain  Chest pain at rest   
 Chronic kidney disease  ESRD (end stage renal disease) ([facility] [address] TUES-THURS-SAT  Hypercholesteremia  Hypertension  Joint pain  Kidney disease  Pleural effusion  Pulmonary tuberculosis  Vitamin D deficiency Past Surgical History:  
Procedure Laterality Date  CHEST SURGERY PROCEDURE UNLISTED    
 THORACENTHESIS  
 HX OTHER SURGICAL    
 BRONCHOSCOPY  
 HX VASCULAR ACCESS  01/2018; 11/2017 LEFT ARM VENOUS ACCESS  
 HX VASCULAR ACCESS  02/2017 [facility] [address]  VASCULAR SURGERY PROCEDURE UNLIST History reviewed. No pertinent family history. Social History Social History  Marital status:  Spouse name: N/A  
 Number of children: N/A  
 Years of education: N/A Social History Main Topics  Smoking status: Never Smoker  Smokeless tobacco: Never Used  Alcohol use No  
 Drug use: No  
 Sexual activity: Not Asked Other Topics Concern  None Social History Narrative Prior to Admission medications Medication Sig Start Date End Date Taking? Authorizing Provider  
dicyclomine (BENTYL) 20 mg tablet Take 20 mg by mouth three (3) times daily as needed (abdominial pain). Historical Provider  
cloNIDine HCl (CATAPRES) 0.3 mg tablet Take 0.3 mg by mouth three (3) times daily. Historical Provider  
amLODIPine (NORVASC) 10 mg tablet Take 1 Tab by mouth daily. 10/6/18   Lelo Cali MD  
carvedilol (COREG) 25 mg tablet Take 1 Tab by mouth every twelve (12) hours. 10/6/18   Lelo Cali MD  
cloNIDine (CATAPRES) 0.3 mg/24 hr 1 Patch by TransDERmal route every seven (7) days. Patient not taking: Reported on 10/9/2018 10/6/18   Lelo Cali MD  
glycerin, adult, (FLEET GLYCERIN, ADULT,) suppository Insert 1 Suppository into rectum daily as needed. Indications: BOWEL EVACUATION 10/6/18   Lelo Cali MD  
losartan (COZAAR) 100 mg tablet Take 1 Tab by mouth daily. 10/6/18   Lelo Cali MD  
minoxidil (LONITEN) 2.5 mg tablet Take 1 Tab by mouth two (2) times a day. 10/6/18   Lelo Cali MD  
OTHER Check CBC, CMP, MG on 10/08/18, Results to PCP immediately, Diagnosis- TB 10/6/18   Lelo Cali MD  
bisacodyl (DULCOLAX) 5 mg EC tablet Take 1 Tab by mouth daily as needed for Constipation. 8/5/18   Ruby Sin MD  
albuterol (PROVENTIL HFA, VENTOLIN HFA, PROAIR HFA) 90 mcg/actuation inhaler Take 2 Puffs by inhalation every four (4) hours as needed for Wheezing. 7/21/18   Lelo Cali MD  
ethambutol (MYAMBUTOL) 400 mg tablet Take 2 Tabs by mouth every Tuesday Thursday, Saturday.  At 5 PM 7/21/18   Lelo Cali MD  
 isoniazid (NYDRAZID) 300 mg tablet Take 1 Tab by mouth daily. On dialysis days, please take medication after dialysis 18   Anabella Romero MD  
pyrazinamide 500 mg tablet Take 2 Tabs by mouth every , Saturday. At 5 pm  Indications: active tuberculosis 18   Anabella Romero MD  
rifAMPin (RIFADIN) 300 mg capsule Take 2 Caps by mouth daily. On dialysis days please take this medication after Dialysis 18   Anabella Romero MD  
docusate sodium (COLACE) 100 mg capsule Take 1 Cap by mouth two (2) times a day. 18   Anabella Romero MD  
pyridoxine, vitamin B6, (VITAMIN B-6) 100 mg tablet Take 1 Tab by mouth daily. 18   Anabella Romero MD  
azithromycin (ZITHROMAX) 500 mg tab Take 1 Tab by mouth every , Saturday. At 5 PM 
Patient not taking: Reported on 10/9/2018 7/21/18   Anabella Romero MD  
OTHER Incentive Spirometry- use as directed 18   Anabella Romero MD  
aspirin 81 mg chewable tablet Take 1 Tab by mouth daily. 3/8/18   Vicente Don MD  
atorvastatin (LIPITOR) 40 mg tablet Take 1 Tab by mouth nightly. 3/8/18   Vicente Don MD  
melatonin 3 mg tablet Take 1 Tab by mouth nightly as needed. Patient taking differently: Take 1 Tab by mouth nightly as needed (insomnia). 3/8/18   Vicente Don MD  
FLUoxetine (PROZAC) 10 mg capsule Take 1 Cap by mouth daily. 3/8/18   Vicente Don MD  
 
 
Allergies Allergen Reactions  Banana Other (comments) Review of Systems 12 point ROS done, negative except as stated above. Physical Exam:  
 
Physical Exam: 
Visit Vitals  BP (!) 206/99  Pulse 66  Temp 97.7 °F (36.5 °C)  Resp 17  Ht 5' 1\" (1.549 m)  Wt 41 kg (90 lb 6.2 oz)  SpO2 98%  BMI 17.08 kg/m2 O2 Device: Nasal cannula Temp (24hrs), Av.7 °F (36.5 °C), Min:97.7 °F (36.5 °C), Max:97.7 °F (36.5 °C) General:  Lethargic, able to answer questions, sways side to side Head: Normocephalic,atraumatic. Eyes:  Mild periorbital swelling. Conjunctivae/corneas clear. PERRL, EOMs intact. Nose: Nares normal. No drainage or sinus tenderness. Neck: Supple, symmetrical, trachea midline, no adenopathy, thyroid: no enlargement, no carotid bruit and no JVD. Lungs:   Crackles at bases Heart:  Regular rate and rhythm, S1, S2 normal.  
  Abdomen: Soft, non-tender. Bowel sounds normal.   
Extremities: Extremities thin/wasted, atraumatic, no cyanosis or edema. Pulses: 2+ and symmetric all extremities. Skin:  No rashes or lesions Neurologic: AAOx3, moving upper extremities, minimal movement of lower extremities. Labs Reviewed: 
 
BMP:  
Lab Results Component Value Date/Time  (L) 10/15/2018 09:24 PM  
 K 4.2 10/15/2018 09:24 PM  
 CL 97 (L) 10/15/2018 09:24 PM  
 CO2 29 10/15/2018 09:24 PM  
 AGAP 9 10/15/2018 09:24 PM  
  (H) 10/15/2018 09:24 PM  
 BUN 21 (H) 10/15/2018 09:24 PM  
 CREA 3.61 (H) 10/15/2018 09:24 PM  
 GFRAA 16 (L) 10/15/2018 09:24 PM  
 GFRNA 13 (L) 10/15/2018 09:24 PM  
 
CMP:  
Lab Results Component Value Date/Time  (L) 10/15/2018 09:24 PM  
 K 4.2 10/15/2018 09:24 PM  
 CL 97 (L) 10/15/2018 09:24 PM  
 CO2 29 10/15/2018 09:24 PM  
 AGAP 9 10/15/2018 09:24 PM  
  (H) 10/15/2018 09:24 PM  
 BUN 21 (H) 10/15/2018 09:24 PM  
 CREA 3.61 (H) 10/15/2018 09:24 PM  
 GFRAA 16 (L) 10/15/2018 09:24 PM  
 GFRNA 13 (L) 10/15/2018 09:24 PM  
 CA 9.2 10/15/2018 09:24 PM  
 
CBC:  
Lab Results Component Value Date/Time WBC 3.1 (L) 10/15/2018 09:24 PM  
 HGB 13.7 10/15/2018 09:24 PM  
 HCT 42.0 10/15/2018 09:24 PM  
  10/15/2018 09:24 PM  
 
All Cardiac Markers in the last 24 hours:  
Lab Results Component Value Date/Time CPK 60 10/15/2018 09:24 PM  
 CKMB 1.4 10/15/2018 09:24 PM  
 CKND1 2.3 10/15/2018 09:24 PM  
 TROIQ 0.07 (H) 10/15/2018 09:24 PM  
 
Recent Glucose Results:  
Lab Results Component Value Date/Time  (H) 10/15/2018 09:24 PM  
 
ABG:  
Lab Results Component Value Date/Time PHI 7.437 10/15/2018 10:01 PM  
 PCO2I 45.9 (H) 10/15/2018 10:01 PM  
 PO2I 227 (H) 10/15/2018 10:01 PM  
 HCO3I 31.0 (H) 10/15/2018 10:01 PM  
 
COAGS: No results found for: APTT, PTP, INR Liver Panel: No results found for: ALB, CBIL, TBIL, TP, GLOB, AGRAT, SGOT, ASTPOC, ALTPOC, ALT, GPT, AP Pancreatic Markers:  
Lab Results Component Value Date/Time LPSE 400 (H) 10/15/2018 09:24 PM  
 
CXR IMPRESSION: 
1. Tubes and lines in good position. 2.  Prominent left midlung field opacity. This is concerning for an acute 
infiltrate. Follow-up to resolution is recommended. 3.  Interstitial pulmonary edema. 4.  Small left pleural effusion. EKG-sinus tach 102, no ST changes, LVH Procedures/imaging: see electronic medical records for all procedures/Xrays and details which were not copied into this note but were reviewed prior to creation of Plan Assessment/Plan #acute hypoxic respiratory failure- NC 2L, continue BP control, as patients bp is likley chronically elevated, goal at this time will be sbp 180-200mmhg (currently sbp 239mmHg). #sepsis 2/2 HCAP-recent hospital admission, left midlung field opacity- lactate 3.5 on admission, after controlled BP 0.8. no fluids given, triple abx given- vanco, levofloxacin, zosyn continue, pharmacy to dose #HTN emergency with cardiac and respiratory symptoms Given 2 doses of 20mg labetalol and labetalol gtt at 1mg/min. Here on arrival to [facility]- Bp uncontrolled on gtt, given one dose 20mg labetalol, increased labetalol gtt to 2mg/min and restarted her clonidine 0.3mg TID first dose now. Goal at this time sbp 180-200mmhg. Home meds restarted. May have to move patient to ICU for nicardipine gtt , alternative nitroglycerine gtt. Nitroprusside not an option due to toxicity. #ESRD-nephro consulted in ER. Patient on HD. No major lab abnormalities requiring urgent dialysis. #interstital pulmonary edema 2/2 uncontrolled htn #pleural effusion- 2/2 uncontrolled htn 
 
#pulm tb on DOT treatment, on 4 meds (ethambutol, rifampin, pyrazinamide, isoniazid ) and Pyridoxine: AFB culture positive for MAC at 2 weeks and then at 6 weeks negative on 8/7/18. 
 as per last admission note  no isolation as per ID #elevated troponin- 2/2 htn crisis + ESRD-trend. No treatment at this time #mildly elevated lipase- 400 , patient denies abd pain. #placemenet- snf offered multiple times in the past, family declines and takes her home FULL CODE d/w son in law DVT/GI Prophylaxis: Hep SQ Discussed with patient and her son in law at bedside about hospital admission and my plan care, both understood and agree with my plan care.  
 
Porfirio Barbosa MD 
10/16/2018 1:58 AM

## 2018-10-16 NOTE — PROGRESS NOTES
Problem: Falls - Risk of 
Goal: *Absence of Falls Document Josefa Gandara Fall Risk and appropriate interventions in the flowsheet. Outcome: Progressing Towards Goal 
Fall Risk Interventions: 
  
 
Mentation Interventions: Door open when patient unattended Medication Interventions: Patient to call before getting OOB Elimination Interventions: Call light in reach Problem: Pressure Injury - Risk of 
Goal: *Prevention of pressure injury Document Derek Scale and appropriate interventions in the flowsheet. Outcome: Progressing Towards Goal 
Pressure Injury Interventions: 
  
 
Moisture Interventions: Absorbent underpads Activity Interventions: Pressure redistribution bed/mattress(bed type) Mobility Interventions: Pressure redistribution bed/mattress (bed type), HOB 30 degrees or less Nutrition Interventions: Document food/fluid/supplement intake

## 2018-10-16 NOTE — PROGRESS NOTES
NUTRITION Nutrition Screen RECOMMENDATIONS / PLAN:  
 
- Add supplements: Ensure Pudding TID. 
- Change to soft solid diet. - Monitor and encourage po intake. - Continue RD inpatient monitoring and evaluation. NUTRITION INTERVENTIONS & DIAGNOSIS:  
 
[x] Meals/snacks: modify composition 
[x] Medical food supplement therapy: initiate Nutrition Diagnosis:  
Inadequate oral intake related to decreased appetite and nausea/vomiting as evidenced by pt consuming 50% or less of recent meals. Underweight related to inadequate energy intake as evidenced by pt with BMI of 18 kg/m^2. ASSESSMENT:  
 
Pt with decreased appetite and poor meal intake x several days PTA due to nausea/vomiting. Consuming protein supplement family has brought from home. Dislikes Nepro, consuming Ensure Pudding supplements during previous admissions. Average po intake adequate to meet patients estimated nutritional needs:   [] Yes     [x] No   [] Unable to determine at this time Diet: DIET RENAL Regular Food Allergies: banana Current Appetite:   [] Good     [] Fair     [x] Poor     [] Other: 
Appetite/meal intake prior to admission:   [] Good     [] Fair     [x] Poor x several days PTA per family    [] Other: 
Feeding Limitations:  [] Swallowing difficulty    [] Chewing difficulty    [x] Other: SLP recommended soft solid diet, thin liquids on 8/28/18 Current Meal Intake: No data found. BM: PTA Skin Integrity: WDL Edema:   [x] No     [] Yes Pertinent Medications: Reviewed: vitamin B6, phenergan, dulcolax, colace, zofran Recent Labs 10/16/18 
 0530  10/15/18 
 2124 NA  138  135* K  4.8  4.2 CL  98*  97* CO2  28  29 GLU  112*  122* BUN  30*  21* CREA  4.07*  3.61* CA  8.8  9.2 No intake or output data in the 24 hours ending 10/16/18 1441 Anthropometrics: 
Ht Readings from Last 1 Encounters:  
10/16/18 5' 1\" (1.549 m) Last 3 Recorded Weights in this Encounter 10/15/18 2123 10/16/18 0400 Weight: 41 kg (90 lb 6.2 oz) 43.3 kg (95 lb 8 oz) Body mass index is 18.04 kg/(m^2). Underweight Weight History: patient with usual weight of 100 lb per family report with recent weight loss Weight Metrics 10/16/2018 10/15/2018 10/15/2018 10/4/2018 9/11/2018 8/31/2018 8/4/2018 Weight 95 lb 8 oz - 90 lb 6.2 oz 112 lb 3.4 oz 103 lb 9.9 oz 99 lb 3.3 oz 90 lb BMI - 18.04 kg/m2 - 21.2 kg/m2 19.58 kg/m2 18.74 kg/m2 17.01 kg/m2 Admitting Diagnosis: Malignant hypertension Pertinent PMHx: ESRD on HD, HLD, HTN Education Needs:        [x] None identified  [] Identified - Not appropriate at this time  []  Identified and addressed - refer to education log Learning Limitations:   [] None identified  [x] Identified: Does not speak [place] Cultural, Gnosticism & ethnic food preferences:  [x] None identified    [] Identified and addressed ESTIMATED NUTRITION NEEDS:  
 
Calories: 8699-3719 kcal (30-35 kcal/kg) based on  [] Actual BW      [x] SBW 56 kg Protein: 67-84 gm (1.2-1.5 gm/kg) based on  [] Actual BW      [x] SBW Fluid: 1 mL/kcal 
  
MONITORING & EVALUATION:  
 
Nutrition Goal(s): 1. Po intake of meals will meet >75% of patient estimated nutritional needs within the next 7 days. Outcome:  [] Met/Ongoing    []  Not Met    [x] New/Initial Goal  
 
Monitoring:   [x] Food and beverage intake   [x] Diet order   [x] Nutrition-focused physical findings   [x] Treatment/therapy   [] Weight   [] Enteral nutrition intake Previous Recommendations (for follow-up assessments only):     []   Implemented       []   Not Implemented (RD to address)      [] No Longer Appropriate     [] No Recommendation Made Discharge Planning: renal, cardiac diet [x] Participated in care planning, discharge planning, & interdisciplinary rounds as appropriate Vera Pierce RD, [address] [place]  Pager: [phone]

## 2018-10-16 NOTE — PROGRESS NOTES
Reason for Readmission:     Malignant Hypertension RRAT Score and Risk Level:    21 Level of Readmission:    1 Care Conference scheduled:    
    
Resources/supports as identified by patient/family:   Family support Top Challenges facing patient (as identified by patient/family and CM): Family will take of complete care of pt Finances/Medication cost?      
Transportation Support system or lack thereof? Living arrangements? Self-care/ADLs/Cognition? Current Advanced Directive/Advance Care Plan:  Not on file Plan for utilizing home health: To be determined Likelihood of additional readmission:  high Transition of Care Plan:    Based on readmission, the patient's previous Plan of Care 
 has been evaluated and/or modified. The current Transition of Care Plan is:   Discharge plan of care is to return home with family. Met with pt and daughter. Pt appears to be sleeping. Per nurse and daughter, pt refuses to speak. Pt does not speak [place]. Pt lives with , daughter and son-in law.  performs ADL's and transport pt to dialysis and MD appointments. Per daughter, pt refused [facility] services and rehab in past. Per daughter, pt wants  to take care of her. Family will transport pt home. CM will continue to monitor for transitional needs. Care Management Interventions PCP Verified by CM: Yes (appt is 10/18/19) Mode of Transport at Discharge: Self Transition of Care Consult (CM Consult): Discharge Planning MyChart Signup: No 
Discharge Durable Medical Equipment: No 
Physical Therapy Consult: No 
Occupational Therapy Consult: No 
Speech Therapy Consult: No 
Current Support Network: Lives with Spouse Confirm Follow Up Transport: Family Plan discussed with Pt/Family/Caregiver: Yes [employer] Information Provided?: No 
Discharge Location Discharge Placement: Home

## 2018-10-17 NOTE — PROGRESS NOTES
NUTRITION Nutrition Consult: General Nutrition Management & Supplements RECOMMENDATIONS / PLAN:  
 
- Continue current nutrition interventions. - Monitor and encourage po intake. - Continue RD inpatient monitoring and evaluation. NUTRITION INTERVENTIONS & DIAGNOSIS:  
 
[x] Meals/snacks: modified composition 
[x] Medical food supplement therapy: Ensure Pudding TID Nutrition Diagnosis:  
Inadequate oral intake related to decreased appetite and nausea/vomiting as evidenced by pt consuming 50% or less of recent meals. Underweight related to inadequate energy intake as evidenced by pt with BMI of 18 kg/m^2. ASSESSMENT:  
 
10/17: Meal intake variable. Dialysis today. 10/16: Pt with decreased appetite and poor meal intake x several days PTA due to nausea/vomiting. Consuming protein supplement family has brought from home. Dislikes Nepro, consuming Ensure Pudding supplements during previous admissions. Average po intake adequate to meet patients estimated nutritional needs:   [] Yes     [x] No   [] Unable to determine at this time Diet: DIET NUTRITIONAL SUPPLEMENTS Breakfast, Lunch, Dinner; ENSURE PUDDING 
DIET RENAL Soft Solids Food Allergies: banana Current Appetite:   [] Good     [x] Fair     [x] Poor     [] Other: 
Appetite/meal intake prior to admission:   [] Good     [] Fair     [x] Poor x several days PTA per family    [] Other: 
Feeding Limitations:  [] Swallowing difficulty    [] Chewing difficulty    [x] Other: SLP recommended soft solid diet, thin liquids on 8/28/18 Current Meal Intake:  
Patient Vitals for the past 100 hrs: 
 % Diet Eaten 10/17/18 0929 30 % 10/16/18 1739 15 % BM: 10/11 (PTA) Skin Integrity: WDL Edema:   [x] No     [] Yes Pertinent Medications: Reviewed: vitamin B6, phenergan, dulcolax, colace, zofran Recent Labs 10/17/18 
0534 10/16/18 
0530 10/15/18 
2124  138 135*  
K 5.1 4.8 4.2 CL 99* 98* 97* CO2 29 28 29 GLU 94 112* 122* BUN 40* 30* 21* CREA 5.69* 4.07* 3.61* CA 8.4* 8.8 9.2 Intake/Output Summary (Last 24 hours) at 10/17/2018 1529 Last data filed at 10/17/2018 1313 Gross per 24 hour Intake 1280.7 ml Output 1600 ml Net -319.3 ml Anthropometrics: 
Ht Readings from Last 1 Encounters:  
10/16/18 5' 1\" (1.549 m) Last 3 Recorded Weights in this Encounter 10/15/18 2123 10/16/18 0400 Weight: 41 kg (90 lb 6.2 oz) 43.3 kg (95 lb 8 oz) Body mass index is 18.04 kg/m². Underweight Weight History: patient with usual weight of 100 lb per family report with recent weight loss Weight Metrics 10/16/2018 10/15/2018 10/15/2018 10/4/2018 9/11/2018 8/31/2018 8/4/2018 Weight 95 lb 8 oz - 90 lb 6.2 oz 112 lb 3.4 oz 103 lb 9.9 oz 99 lb 3.3 oz 90 lb BMI - 18.04 kg/m2 - 21.2 kg/m2 19.58 kg/m2 18.74 kg/m2 17.01 kg/m2 Admitting Diagnosis: Malignant hypertension Pertinent PMHx: ESRD on HD, HLD, HTN Education Needs:        [x] None identified  [] Identified - Not appropriate at this time  []  Identified and addressed - refer to education log Learning Limitations:   [] None identified  [x] Identified: Does not speak [place] Cultural, Episcopal & ethnic food preferences:  [x] None identified    [] Identified and addressed ESTIMATED NUTRITION NEEDS:  
 
Calories: 5728-6202 kcal (30-35 kcal/kg) based on  [] Actual BW      [x] SBW 56 kg Protein: 67-84 gm (1.2-1.5 gm/kg) based on  [] Actual BW      [x] SBW Fluid: 1 mL/kcal 
  
MONITORING & EVALUATION:  
 
Nutrition Goal(s): 1. Po intake of meals will meet >75% of patient estimated nutritional needs within the next 7 days. Outcome:  [] Met/Ongoing    [x]  Not Met    [] New/Initial Goal  
 
Monitoring:   [x] Food and beverage intake   [x] Diet order   [x] Nutrition-focused physical findings   [x] Treatment/therapy   [] Weight   [] Enteral nutrition intake Previous Recommendations (for follow-up assessments only):     [x]   Implemented       []   Not Implemented (RD to address)      [] No Longer Appropriate     [] No Recommendation Made Discharge Planning: renal, cardiac diet [x] Participated in care planning, discharge planning, & interdisciplinary rounds as appropriate Brian Chris RD, [address] [place]  Pager: [phone]

## 2018-10-17 NOTE — DIALYSIS
ACUTE HEMODIALYSIS FLOW SHEET 
 
HEMODIALYSIS ORDERS: Physician: Tabatha Zelaya Dialyzer: revaclear   Duration: 3 hr  BFR: 300   DFR: 600 Dialysate:  Temp 36.5 K+ 2    Ca+  2.5 Na 138 Bicarb 35 Weight:  43.3 kg    Patient Chart [x]     Unable to Obtain []   Dry weight/UF Goal: 2500/1000/1500 Access CVC Heparin[]  Bolus      Units    [] Hourly       Units    [x]None Catheter locking solution Heparin Pre BP:   126/74    Pulse:     65     Temperature:  98  Respirations: 18  Tx: NS       ml/Bolus  Other        [] N/A Labs: Pre        Post:        [x] N/A Additional Orders(medications, blood products, hypotension management):  Vanco  [x] [x] [facility] Consent Verified CATHETER ACCESS: []N/A   [x]Right   []Left   [x]IJ     []Fem [] First use X-ray verified     [x]Tunnel                [] Non Tunneled [x]No S/S infection  []Redness  []Drainage []Cultured []Swelling []Pain  
[x]Medical Aseptic Prep Utilized   [x]Dressing Changed  [x] Biopatch  Date: 10/17/2018 []Clotted   [x]Patent   Flows: []Good  []Poor  [x]Reversed If access problem,  notified: []Yes    [x]N/A  Date:        
 
GRAFT/FISTULA ACCESS:  [x]N/A     []Right     []Left     []UE     []LE []AVG   []AVF        []Buttonhole    []Medical Aseptic Prep Utilized []No S/S infection  []Redness  []Drainage []Cultured []Swelling []Pain Bruit:   [] Strong    [] Weak       Thrill :   [] Strong    [] Weak Needle Gauge:    Length: If access problem,  notified: []Yes     []N/A  Date:       
Please describe access if present and not used:  
 
GENERAL ASSESSMENT:   
LUNGS:  Rate 18 SaO2%        [] N/A    [x] Clear  [] Coarse  [] Crackles  [] Wheezing 
      [x] Diminished     Location : []RLL   []LLL    []RUL  []ELÍAS Cough: []Productive  []Dry  [x]N/A   Respirations:  [x]Easy  []Labored Therapy:  [x]RA  []NC  l/min    Mask: []NRB []Venti       O2% []Ventilator  []Intubated  [] Trach  [] BiPaP CARDIAC: [x]Regular      [] Irregular   [] Pericardial Rub  [] JVD []  Monitored  [] Bedside  [] Remotely monitored [] N/A  Rhythm: EDEMA: [x] None  []Generalized  [] Pitting [] 1    [] 2    [] 3    [] 4 [] Facial  [] Pedal  []  UE  [] LE  
SKIN:   [x] Warm  [] Hot     [] Cold   [x] Dry     [] Pale   [] Diaphoretic    
             [] Flushed  [] Jaundiced  [] Cyanotic  [] Rash  [] Weeping LOC:    [x] Alert      [x]Oriented:    [x] Person     [x] Place  [x]Time 
             [] Confused  [] Lethargic  [] Medicated  [] Non-responsive GI / ABDOMEN:  [x] Flat    [] Distended    [x] Soft    [] Firm   []  Obese 
                           [] Diarrhea  [x] Bowel Sounds  [] Nausea  [] Vomiting  / URINE ASSESSMENT:[] Voiding   [] Oliguria  [x] Anuria   []  Onofre [] Incontinent    []  Incontinent Brief      []  Bathroom Privileges PAIN: [x] 0 []1  []2   []3   []4   []5   []6   []7   []8   []9   []10 Scale 0-10  Action/Follow Up: MOBILITY:  [] Amb    [] Amb/Assist    [x] Bed    [] Wheelchair  [] Stretcher All Vitals and Treatment Details on Attached Flowsheet Hospital: [facility] Room #  [] 1st Time Acute  [] Stat  [x] Routine  [] Urgent [x] Acute Room  []  Bedside  [] ICU/CCU  [] ER Isolation Precautions:  [x] Dialysis   [] Airborne   [] Contact    [] Reverse Special Considerations:         [] Blood Consent Verified [x]N/A ALLERGIES:   [x] Banana Code Status:  [x] Full Code  [] DNR  [] Other HBsAg ONLY: Date Drawn 8/27/2018         [x]Negative []Positive []Unknown HBsAb: Date 8/27/2018    [] Susceptible   [x] [id number] []Not Drawn  [] Drawn Current Labs:    Date of Labs: Today [x] Results for Arnold Omalley (MRN [id number]) as of 10/17/2018 13:43 Ref. Range 10/17/2018 05:34 WBC Latest Ref Range: 4.6 - 13.2 K/uL 3.5 (L) RBC Latest Ref Range: 4.20 - 5.30 M/uL 3.07 (L) HGB Latest Ref Range: 12.0 - 16.0 g/dL 9.7 (L) HCT Latest Ref Range: 35.0 - 45.0 % 29.6 (L) MCV Latest Ref Range: 74.0 - 97.0 FL 96.4 MCH Latest Ref Range: 24.0 - 34.0 PG 31.6 MCHC Latest Ref Range: 31.0 - 37.0 g/dL 32.8 RDW Latest Ref Range: 11.6 - 14.5 % 13.8 PLATELET Latest Ref Range: 135 - 420 K/uL 170 MPV Latest Ref Range: 9.2 - 11.8 FL 10.5 Sodium Latest Ref Range: 136 - 145 mmol/L 138 Potassium Latest Ref Range: 3.5 - 5.5 mmol/L 5.1 Chloride Latest Ref Range: 100 - 108 mmol/L 99 (L)  
CO2 Latest Ref Range: 21 - 32 mmol/L 29 Anion gap Latest Ref Range: 3.0 - 18 mmol/L 10 Glucose Latest Ref Range: 74 - 99 mg/dL 94 BUN Latest Ref Range: 7.0 - 18 MG/DL 40 (H) Creatinine Latest Ref Range: 0.6 - 1.3 MG/DL 5.69 (H) BUN/Creatinine ratio Latest Ref Range: 12 - 20   7 (L) Calcium Latest Ref Range: 8.5 - 10.1 MG/DL 8.4 (L)  
GFR est non-AA Latest Ref Range: >60 ml/min/1.73m2 8 (L) GFR est AA Latest Ref Range: >60 ml/min/1.73m2 9 (L) Vancomycin, random Latest Ref Range: 5.0 - 40.0 UG/ML 19.5 DIET:  [x] Renal    [] Other     [] NPO     []  Diabetic PRIMARY NURSE REPORT: First initial/Last name/Title Pre Dialysis: Guanaco Arvizu Rn     Time: 3031 EDUCATION:   
[x] Patient [] Other         Knowledge Basis: []None []Minimal [] Substantial  
Barriers to learning  [x]anguage [x] Access Care     [] S&S of infection     [] Fluid Management     []K+     []Procedural   
[]Albumin     [x] Medications     [] Tx Options     [] Transplant     [] Diet     [] Other Teaching Tools:  [x] Explain  [] Demo  [] Handouts [] Video Patient response:   [x] Verbalized understanding  [] Teach back  [] Return demonstration [x] Requires follow up Inappropriate due to         
 
[x] Time Out/Safety Check  [x]Extracorporeal Circuit Tested for integrity RO/HEMODIALYSIS MACHINE SAFETY CHECKS  Before each treatment:    
Machine Number:                   [facility] [x] Unit Machine # 6 with centralized RO Alarm Test:  Pass time 0926         Other:        
[x] RO/Machine Log Complete Temp    37 Dialysate: pH  7.2 Conductivity: Meter   13.6     HD Machine   13.9                  TCD: 13.8 Dialyzer Lot # [id number]            Blood Tubing Lot # [id number]          Saline Lot #  [id number]  
 
CHLORINE TESTING-Before each treatment and every 4 hours Total Chlorine: [x] less than 0.1 ppm  Time: 1000 4 Hr/2nd Check Time: NA  
(if greater than 0.1 ppm from Primary then every 30 minutes from Secondary) TREATMENT INITIATION  with Dialysis Precautions:  
[x] All Connections Secured                 [x] Saline Line Double Clamped  
[x] Venous Parameters Set                  [x] Arterial Parameters Set [x] Prime Given 250ml                          [x]Air Foam Detector Engaged Treatment Initiation Note:pt arrive via transport in bed, pt is A&O, no S/S of distress, VSS. tx started with out complications. CVC no S/S of complications. Preston Yeh Medication Dose Volume Route Initials Dialyzer Cleared: [x] Good [] Fair  [] Poor Blood processed:  53 L 
UF Removed  1500 Ml POst BP:   150/78       Pulse: 76      Respirations: 16  Temperature: 98 Vanco 250mg  100ml  IV LH Post Tx Vascular Access: AVF/AVG:  N/A Catheter: Locking solution: Heparin 1:1000 Art. 1.9  Wei. 1.9 Post Assessment:  
  
                              Skin:  [x] Warm  [x] Dry [] Diaphoretic    [] Flushed  [] Pale [] Cyanotic [facility] Signatures Title Initials  Time Lungs: [x] Clear    [] Course  [] Crackles  [] Wheezing [] Diminished Donna Hanley RN [place] [id number] Cardiac: [x] Regular   [] Irregular   [] Monitor  [] N/A  Rhythm: Edema:  [x] None    [] General     [] Facial   [] Pedal    [] UE    [] LE  
    Pain: [x]0  []1  []2   []3  []4   []5   []6   []7   []8   []9   []10 Post Treatment Note: 
  Pt leaving via transport, pt tolerated the tx, pt states they are good, no S/S of distress. POST TREATMENT PRIMARY NURSE HANDOFF REPORT:  
 
First initial/Last name/Title Post Dialysis: Trena Boxer, RN  Time:  8007 Abbreviations: AVG-arterial venous graft, AVF-arterial venous fistula, IJ-Internal Jugular, Subcl-Subclavian, Fem-Femoral, Tx-treatment, AP/HR-apical heart rate, DFR-dialysate flow rate, BFR-blood flow rate, AP-arterial pressure, -venous pressure, UF-ultrafiltrate, TMP-transmembrane pressure, Wei-Venous, Art-Arterial, RO-Reverse Osmosis

## 2018-10-17 NOTE — PROGRESS NOTES
[facility] Hospitalist Group Progress Note Patient: Alex Pulliam Age: 66 y.o. : 1960 MR#: [id number] SSN: [id number] Date/Time: 10/17/2018 Subjective:  
 
Patient in NAD, seen during HD Assessment/Plan: 1. HTN - Urgency  
- tolerating po meds 2 ESRD  
- HD as per nephro 3 Acute NV - improved - 
 
4 Continue Anti TB med per home dose 5 SEPSIS , ?PNA On abx, follow cx 
 
PT, OT Case discussed with:  []Patient  [] Family ( In room with patient )    [x]Nursing  []Case Management DVT Prophylaxis:  []Lovenox  []Hep SQ  []SCDs  []Coumadin   []On Heparin gtt Objective:  
VS:  
Visit Vitals /89 Pulse 75 Temp 98 °F (36.7 °C) (Axillary) Resp 28 Ht 5' 1\" (1.549 m) Wt 43.3 kg (95 lb 8 oz) SpO2 94% Breastfeeding? No  
BMI 18.04 kg/m² Tmax/24hrs: Temp (24hrs), Av.2 °F (36.8 °C), Min:98 °F (36.7 °C), Max:98.4 °F (36.9 °C) IOBRIEF Intake/Output Summary (Last 24 hours) at 10/17/2018 1454 Last data filed at 10/17/2018 1313 Gross per 24 hour Intake 1280.7 ml Output 1600 ml Net -319.3 ml  
 
 
General:  Awake, alert Cardiovascular:  S1S2+, RRR Pulmonary:  Coarse bs b/l GI:  Soft, BS+, NT, ND Extremities:  No edema Medications:  
Current Facility-Administered Medications Medication Dose Route Frequency  vancomycin (VANCOCIN) 250 mg in 0.9% sodium chloride 100 mL IVPB  250 mg IntraVENous DIALYSIS ONE TIME DOSE  
 [START ON 10/18/2018] amLODIPine (NORVASC) tablet 10 mg  10 mg Oral DAILY  amLODIPine (NORVASC) tablet 5 mg  5 mg Oral ONCE  
 aspirin chewable tablet 81 mg  81 mg Oral DAILY  atorvastatin (LIPITOR) tablet 40 mg  40 mg Oral QHS  bisacodyl (DULCOLAX) tablet 5 mg  5 mg Oral DAILY PRN  
 carvedilol (COREG) tablet 25 mg  25 mg Oral Q12H  
 dicyclomine (BENTYL) capsule 20 mg  20 mg Oral TID PRN  
 docusate sodium (COLACE) capsule 100 mg  100 mg Oral BID  
  ethambutol (MYAMBUTOL) tablet 800 mg  800 mg Oral Q TUE, THU & SAT  FLUoxetine (PROzac) capsule 10 mg  10 mg Oral DAILY  isoniazid (NYDRAZID) tablet 300 mg  300 mg Oral DAILY  melatonin tablet 3 mg  3 mg Oral QHS PRN  pyrazinamide tablet 1,000 mg  1,000 mg Oral Q TUE, THU & SAT  pyridoxine (vitamin B6) (VITAMIN B-6) tablet 100 mg  100 mg Oral DAILY  rifAMPin (RIFADIN) capsule 600 mg  600 mg Oral 6am  
 acetaminophen (TYLENOL) tablet 650 mg  650 mg Oral Q4H PRN  
 heparin (porcine) injection 5,000 Units  5,000 Units SubCUTAneous Q8H  
 influenza vaccine 2018-19 (6 mos+)(PF) (FLUARIX QUAD/FLULAVAL QUAD) injection 0.5 mL  0.5 mL IntraMUSCular PRIOR TO DISCHARGE  cloNIDine HCl (CATAPRES) tablet 0.3 mg  0.3 mg Oral TID  ondansetron (ZOFRAN) injection 4 mg  4 mg IntraVENous Q4H PRN  promethazine (PHENERGAN) 12.5 mg in 0.9% sodium chloride 50 mL IVPB  12.5 mg IntraVENous Q4H PRN  
 hydrALAZINE (APRESOLINE) 20 mg/mL injection 10 mg  10 mg IntraVENous Q6H  
 minoxidil (LONITEN) tablet 5 mg  5 mg Oral BID  losartan (COZAAR) tablet 50 mg  50 mg Oral DAILY  0.9% sodium chloride infusion  100 mL/hr IntraVENous DIALYSIS PRN  
 heparin (porcine) 1,000 unit/mL injection 1,000 Units  1,000 Units InterCATHeter DIALYSIS PRN  
 levoFLOXacin (LEVAQUIN) 500 mg in D5W IVPB  500 mg IntraVENous Q48H  piperacillin-tazobactam (ZOSYN) 2.25 g in 0.9% sodium chloride (MBP/ADV) 50 mL ADV  2.25 g IntraVENous Q8H  
 Piperacillin-tazobactam (ZOSYN) 0.75 gm Supplemental Dosing by Pharmacy   Other Rx Dosing/Monitoring  VANCOMYCIN INFORMATION NOTE   Other Rx Dosing/Monitoring Labs:   
Recent Labs 10/17/18 
0534 10/15/18 
2124 WBC 3.5* 3.1* HGB 9.7* 13.7 HCT 29.6* 42.0  180 Recent Labs 10/17/18 
0534 10/16/18 
0530 10/15/18 
2124  138 135*  
K 5.1 4.8 4.2 CL 99* 98* 97* CO2 29 28 29 GLU 94 112* 122* BUN 40* 30* 21* CREA 5.69* 4.07* 3.61* CA 8.4* 8.8 9.2 Signed By: Morgan Couch MD   
 October 17, 2018

## 2018-10-17 NOTE — PROGRESS NOTES
[facility] 
 
 
CARDIOLOGY PROGRESS NOTE 
RECS: 
 
 
1. Acute respiratory failure-  possible related to Acute decompensated CHF in the setting of uncontrolled Hypertension and possible sepsis on O2 nasal canula 2. Acute Diastolic Heart failure-better clinically plans for HD today  Needs HD. Nephrology was consulted 3. Minimally  elevated troponin- possible demand inchemia  But MI needs to r/o. Monitor for ischemia symptoms follow ekg today. On asa, statin and bb.  
4. Hx of NSTEMI- in 2/18 NUC stress that showed small reversible apical inferior defect noted from these nuclear study suggestive of ischemia in the area. Possible distal LAD or RCA involmenent patient was managed medically. 5. Malignant  hypertension- labile BP will adjust medications. Off labetalol drip. increased Norvasc patient with  poor compliance with medication regimen  Patient on multiple medications for bp control. 6. ESRD- on HD renal following. She is MWF HD schedule 7. Pulmonany Tuberculosis- on 4 medications per medical team 
8. Possible sepsis? ? On antibiotics managed by medical team.  
9. Nausea and vomiting- w/u per medical team 
  
Watch on present minoxidil dose, may increase if needed. Appears stable now and slowly improving. 
   
Echo 8/18 · Estimated left ventricular ejection fraction is 56 - 60%. Left ventricular moderate concentric hypertrophy. Normal left ventricular wall motion, no regional wall motion abnormality noted. · Saline contrast was given to evaluate for intracardiac shunt. No shunt seen. · Small circumferential pericardial effusion with more prominent area around the right artium. No indications of tamponade 
  
  
NUC 2/18 1.  Post-stress imaging in short axis, horizontal and vertical long axis view reveals a small apical inferior defect in perfusion with a medium intensity. 2.  Resting images show homogenous isotope uptake in all areas. 3.  Gated images show normal left ventricular size, wall motion, and systolic function appears normal.  Ejection fraction is 58%.   
  
DIAGNOSES:   
1.  Abnormal scan. 2.  Evidence of a small reversible apical inferior defect noted from these nuclear study suggestive of ischemia in the area.  This is involving either the mid or distal left anterior descending or right coronary artery. 3.  Medium risk scan. ASSESSMENT: 
Hospital Problems  Date Reviewed: 10/16/2018 Codes Class Noted POA Malignant hypertension ICD-10-CM: I10 
ICD-9-CM: 401.0  10/15/2018 Unknown HCAP (healthcare-associated pneumonia) ICD-10-CM: J18.9 ICD-9-CM: 002  10/2/2018 Yes Pulmonary TB ICD-10-CM: A15.0 ICD-9-CM: 011.90  7/17/2018 Yes Hypertensive crisis ICD-10-CM: I16.9 ICD-9-CM: 401.9  7/17/2018 Yes ESRD (end stage renal disease) ([facility] 75.) ICD-10-CM: N18.6 ICD-9-CM: 585.6  2/25/2018 Yes Respiratory failure ([facility] ICD-10-CM: J96.90 ICD-9-CM: 518.81  2/25/2018 Yes Hypoxia ICD-10-CM: R09.02 
ICD-9-CM: 799.02  2/25/2018 Yes SUBJECTIVE: 
Unable to communicate but patient smiled when asked how she was doing No family when I visited OBJECTIVE: 
 
VS:  
Visit Vitals BP (!) 168/100 Pulse 68 Temp 98.4 °F (36.9 °C) Resp 15 Ht 5' 1\" (1.549 m) Wt 43.3 kg (95 lb 8 oz) SpO2 96% Breastfeeding? No  
BMI 18.04 kg/m² Intake/Output Summary (Last 24 hours) at 10/17/2018 1095 Last data filed at 10/16/2018 2330 Gross per 24 hour Intake 1160.7 ml Output 0 ml Net 1160.7 ml  
 
TELE: normal sinus rhythm General: alert, well developed, pleasant and in no apparent distress HENT: Normocephalic, atraumatic. Normal external eye. Neck :  no JVD Cardiac:  regular rate and rhythm, S1, S2 normal, no S3 or S4, no click Lungs: clear to auscultation bilaterally Abdomen: Soft, nontender, no masses Extremities:  No c/c/e, peripheral pulses present Labs: Results:  
   
Chemistry Recent Labs 10/17/18 
0534 10/16/18 
0530 10/15/18 
2124 GLU 94 112* 122*  138 135*  
K 5.1 4.8 4.2 CL 99* 98* 97* CO2 29 28 29 BUN 40* 30* 21* CREA 5.69* 4.07* 3.61* CA 8.4* 8.8 9.2 AGAP 10 12 9 BUCR 7* 7* 6* CBC w/Diff Recent Labs 10/17/18 
0534 10/15/18 
2124 WBC 3.5* 3.1*  
RBC 3.07* 4.26  
HGB 9.7* 13.7 HCT 29.6* 42.0  180 GRANS  --  48  
LYMPH  --  30  
EOS  --  2 Cardiac Enzymes Recent Labs 10/15/18 
2124 CPK 60 CKND1 2.3 Coagulation No results for input(s): PTP, INR, APTT in the last 72 hours. No lab exists for component: INREXT Lipid Panel Lab Results Component Value Date/Time Cholesterol, total 128 08/23/2018 02:35 AM  
 HDL Cholesterol 62 (H) 08/23/2018 02:35 AM  
 LDL, calculated 35 08/23/2018 02:35 AM  
 VLDL, calculated 31 08/23/2018 02:35 AM  
 Triglyceride 155 (H) 08/23/2018 02:35 AM  
 CHOL/HDL Ratio 2.1 08/23/2018 02:35 AM  
  
BNP No results for input(s): BNPP in the last 72 hours. Liver Enzymes No results for input(s): TP, ALB, TBIL, AP, SGOT, GPT in the last 72 hours. No lab exists for component: DBIL Thyroid Studies Lab Results Component Value Date/Time TSH 6.21 (H) 08/24/2018 08:00 AM  
    
 
 
 
[place] 57 Patient seen independently Discussed the details with NP and patient. Please see orders & recommendations Ofelia Modi MD

## 2018-10-17 NOTE — PROGRESS NOTES
Hemodialysis Rounding Note Patient: Tania Ochoa               Sex: female          DOA: 10/15/2018  9:17 PM  
    
YOB: 1960      Age:  66 y.o.        LOS:  LOS: 1 day Subjective:  
 
Tania Ochoa is a 66 y.o.  who presents with Malignant hypertension. The patient is dialyzing utilizing the following method:Intermittent Hemodialysis Complaints none. Current Facility-Administered Medications Medication Dose Route Frequency  vancomycin (VANCOCIN) 250 mg in 0.9% sodium chloride 100 mL IVPB  250 mg IntraVENous DIALYSIS ONE TIME DOSE  
 [START ON 10/18/2018] amLODIPine (NORVASC) tablet 10 mg  10 mg Oral DAILY  amLODIPine (NORVASC) tablet 5 mg  5 mg Oral ONCE  
 aspirin chewable tablet 81 mg  81 mg Oral DAILY  atorvastatin (LIPITOR) tablet 40 mg  40 mg Oral QHS  bisacodyl (DULCOLAX) tablet 5 mg  5 mg Oral DAILY PRN  
 carvedilol (COREG) tablet 25 mg  25 mg Oral Q12H  
 dicyclomine (BENTYL) capsule 20 mg  20 mg Oral TID PRN  
 docusate sodium (COLACE) capsule 100 mg  100 mg Oral BID  ethambutol (MYAMBUTOL) tablet 800 mg  800 mg Oral Q TUE, THU & SAT  FLUoxetine (PROzac) capsule 10 mg  10 mg Oral DAILY  isoniazid (NYDRAZID) tablet 300 mg  300 mg Oral DAILY  melatonin tablet 3 mg  3 mg Oral QHS PRN  pyrazinamide tablet 1,000 mg  1,000 mg Oral Q TUE, THU & SAT  pyridoxine (vitamin B6) (VITAMIN B-6) tablet 100 mg  100 mg Oral DAILY  rifAMPin (RIFADIN) capsule 600 mg  600 mg Oral 6am  
 acetaminophen (TYLENOL) tablet 650 mg  650 mg Oral Q4H PRN  
 heparin (porcine) injection 5,000 Units  5,000 Units SubCUTAneous Q8H  
 influenza vaccine 2018-19 (6 mos+)(PF) (FLUARIX QUAD/FLULAVAL QUAD) injection 0.5 mL  0.5 mL IntraMUSCular PRIOR TO DISCHARGE  cloNIDine HCl (CATAPRES) tablet 0.3 mg  0.3 mg Oral TID  ondansetron (ZOFRAN) injection 4 mg  4 mg IntraVENous Q4H PRN  promethazine (PHENERGAN) 12.5 mg in 0.9% sodium chloride 50 mL IVPB 12.5 mg IntraVENous Q4H PRN  
 hydrALAZINE (APRESOLINE) 20 mg/mL injection 10 mg  10 mg IntraVENous Q6H  
 minoxidil (LONITEN) tablet 5 mg  5 mg Oral BID  losartan (COZAAR) tablet 50 mg  50 mg Oral DAILY  0.9% sodium chloride infusion  100 mL/hr IntraVENous DIALYSIS PRN  
 heparin (porcine) 1,000 unit/mL injection 1,000 Units  1,000 Units InterCATHeter DIALYSIS PRN  
 levoFLOXacin (LEVAQUIN) 500 mg in D5W IVPB  500 mg IntraVENous Q48H  piperacillin-tazobactam (ZOSYN) 2.25 g in 0.9% sodium chloride (MBP/ADV) 50 mL ADV  2.25 g IntraVENous Q8H  
 Piperacillin-tazobactam (ZOSYN) 0.75 gm Supplemental Dosing by Pharmacy   Other Rx Dosing/Monitoring  VANCOMYCIN INFORMATION NOTE   Other Rx Dosing/Monitoring 10/17 07 - 10/17 1900 In: 120 [P.O.:120] Out: 100 [Urine:100] 10/15 1901 - 10/17 0700 In: 1160.7 [P.O.:100; I.V.:1060.7] Out: 0 Objective:  
 
Blood pressure 125/78, pulse 67, temperature 98.4 °F (36.9 °C), resp. rate 16, height 5' 1\" (1.549 m), weight 43.3 kg (95 lb 8 oz), SpO2 96 %, not currently breastfeeding. Temp (24hrs), Av.3 °F (36.8 °C), Min:98.1 °F (36.7 °C), Max:98.4 °F (36.9 °C) Blood Pressure: BP: 125/78 Pulse: Pulse (Heart Rate): 67 Temp:  Temp: 98.4 °F (36.9 °C) Artificial Kidney    
hours Heparin Bolus Blood flow rate Dialysate rate Arterial Access Pressure Venous Return Pressure Ultrafiltration Rate Fluid Removal   
Net Fluid Removal   
 
 
PHYSICAL EXAM:  
HEENT:  Non icteric NECK:  No JVD, Right IJ TDC 
CHEST AND LUNGS:  clear CVS:  regular ABDOMEN:  Soft + BS 
EXT:  No edema DATA REVIEW: 
 
Labs: Results:  
   
Chemistry Recent Labs 10/17/18 
0534 10/16/18 
0530 10/15/18 
2124 GLU 94 112* 122*  138 135*  
K 5.1 4.8 4.2 CL 99* 98* 97* CO2 29 28 29 BUN 40* 30* 21* CREA 5.69* 4.07* 3.61* CA 8.4* 8.8 9.2 AGAP 10 12 9 BUCR 7* 7* 6* CBC w/Diff Recent Labs 10/17/18 
0534 10/15/18 
212 WBC 3.5* 3.1*  
RBC 3.07* 4.26  
HGB 9.7* 13.7 HCT 29.6* 42.0  180 GRANS  --  48  
LYMPH  --  30  
EOS  --  2 Coagulation No results for input(s): PTP, INR, APTT in the last 72 hours. No lab exists for component: INREXT Iron/Ferritin No results for input(s): IRON in the last 72 hours. No lab exists for component: TIBCCALC BNP No results for input(s): BNPP in the last 72 hours. Cardiac Enzymes Recent Labs 10/15/18 
2124 CPK 60 CKND1 2.3 Liver Enzymes No results for input(s): TP, ALB, TBIL, AP, SGOT, GPT in the last 72 hours. No lab exists for component: DBIL Thyroid Studies Lab Results Component Value Date/Time TSH 6.21 (H) 08/24/2018 08:00 AM  
    
 
Images: XR Results (maximum last 3): Results from Hospital Encounter encounter on 10/15/18 XR ABD (KUB) Narrative EXAM:  XR ABD (KUB) INDICATION:  abdominal pain COMPARISON: August 24, 2018 FINDINGS: A supine radiograph of the abdomen was obtained. There is moderate amount of stool throughout the colon. Small bowel appeared 
unremarkable. Bony structures are intact. Impression IMPRESSION: Moderate amount of stool throughout the colon. XR CHEST PORT Narrative EXAM: Chest X-Ray INDICATION: Shortness of breath. Recent dialysis. TECHNIQUE: AP view of the chest 
 
COMPARISON: 10/3/2018, 10/2/2018, 9/11/2018 and 8/22/2018 FINDINGS:  
Tubes and Lines: A hemodialysis catheter is present on the right. Is unchanged. Pleura: No pneumothorax appreciated. Suggestion of small left pleural effusion. Lungs:  A prominent left midlung field opacity is noted. Groundglass opacities 
are noted bilaterally. Cardiac silhouette: Cardiomegaly is present. Pulmonary Vascularity: Prominent pulmonary vasculature. Osseous Structures: Unremarkable Impression IMPRESSION: 
1. Tubes and lines in good position. 2. Prominent left midlung field opacity. This is concerning for an acute 
infiltrate. Follow-up to resolution is recommended. 3.  Interstitial pulmonary edema. 4.  Small left pleural effusion. Results from Hospital Encounter encounter on 10/02/18 XR CHEST PORT Narrative Portable Chest 
 
CPT CODE: 16169 HISTORY: Shortness of breath. FINDINGS:  
 
Multiple wires overlie the patient. Right dialysis catheter tip at the 
cavoatrial junction. Heart size and mediastinal contour stable. Prominence of 
the pulmonary vessels and interstitium. Mild haziness at the bases. Slight 
pleural thickening at the right apex. No pneumothorax. Edna Huynh Impression IMPRESSION: 
 
There is diffuse prominence of the pulmonary vessels and interstitium which is 
slightly increased from prior study. There are likely small pleural effusions as 
well. Finding suggestive of developing vascular congestion. Underlying infection 
not excluded. CT Results (maximum last 3): Results from [facility] Encounter encounter on 08/22/18 CT ABD PELV W CONT Narrative CT ABDOMEN AND PELVIS WITH CONTRAST 
 
COMPARISON: August 22, 2018. INDICATIONS: Vomiting. TECHNIQUE:  Following the uneventful administration of 80 cc of Isovue 300 
intravenous contrast , volumetric data acquisition was performed of the abdomen 
and pelvis on a multislice scanner and reconstructed in axial coronal and 
sagittal planes CT ABDOMEN FINDINGS:  
 
Lung Bases: There is a large right and minimal left pleural effusion. Bibasilar 
atelectasis is evident. There is multichamber cardiac enlargement. There is a 
moderate size pericardial effusion. This ranges up to 2.5 cm in thickness but 
probably averages less than 1 cm. Edna Huynh Liver/Gallbladder/Biliary: Normal. 
 
Spleen: Scattered small granulomas noted. Otherwise negative. Adrenal Glands: Normal. 
 
Kidneys: Atrophic with a left-sided cysts unchanged. Edna Huynh Pancreas: Normal. 
 
 Stomach, Small Bowel,  and Colon: A nasogastric tube is present extending into 
the stomach which is otherwise unremarkable. The small bowel is not distended. The appendix appears to be present unremarkable. The colon is unremarkable. Lymph Nodes: Normal in size and number. The abdominal aorta is unremarkable. The IVC is unremarkable. Peritoneal Spaces: Small volume peritoneal fluid is evident Abdominal wall: No hernia or mass is evident. Subcutaneous edema is evident. CT PELVIS FINDINGS:  
 
Bladder: Empty. Pelvic Small Bowel And Colon: Unremarkable. Lymph Nodes: Normal in size and number. Free Fluid: Small volume pelvic fluid noted Uterus and adnexal structures appear unremarkable Osseous Structures Of Abdomen And Pelvis: Unremarkable for age. Impression IMPRESSION:  
 
Anasarca with edematous soft tissues, pleural fluid, ascites, pericardial fluid, 
and multichamber cardiac enlargement. Findings indicating a probable cause for recurrent vomiting are not identified. All CT scans at this facility are performed using dose optimization technique as 
appropriate to the performed exam, to include automated exposure control, 
adjustment of the mA and/or kV according to patient's size (Including 
appropriate matching for site-specific examinations), or use of iterative 
reconstruction technique. CTA CHEST ABD PELV W WO CONT Narrative CT without contrast and CT angiogram of the chest, abdomen and pelvis CPT CODE: 57350, [id number], [id number] HISTORY: Fatigue and illness with abdominal pain and vomiting. Chest pain. COMPARISON: Chest CT 6/19/18, abdomen pelvis CT A/4/18 TECHNIQUE: Multidetector helical CT is carried out from the thoracic inlet to 
the lesser trochanters before and after nonionic IV contrast administration. 3D postprocessed images, including surface shaded display, will produce for this exam, permanently archived, and interpreted. Parenchymal organ imaging will be 
limited by arterial phase contrast administration. All CT scans at this facility performed using dose optimization techniques as 
appreciated to a performed exam, to include automated exposure control, 
adjustment of the mA and or KU according to patient size (including appropriate 
matching for site specific examination), or use of iterative reconstruction 
technique. CONTRAST: 100 cc Isovue 370. FINDINGS: 
 
CT ANGIOGRAM:  
 
THORACIC AORTA: Normal in caliber. No significant atherosclerotic calcified or 
noncalcified plaques seen. No aortic aneurysmal dilatation with dissection 
demonstrated. The segmental caliber measurements of the aorta was not provided by 3-D 
postprocessing images. Left caliber measurement on axial images are 3.3 cm in 
mid ascending aorta, 3.0 cm in mid aortic arch after the last grade vessel 
takeoff, 2.2 cm at level of superior right atrium, 2.2 cm at mid descending and 
at level of aortic hiatus. THE GREAT VESSELS IN THE ARCH: Patent. THE PULMONARY ARTERIES: Patent. ABDOMINAL AORTA: Normal in caliber with minimal atherosclerotic calcified and 
noncalcified plaques at the distal portion. Sherlean Narrow The caliber of the abdominal aorta: 
1.9 cm proximally at the level of celiac artery; 
1.6 cm at mid portion at the level of renal artery; 
1.3 cm distally prior to the bifurcation. ILIAC ARTERIES: Mild atherosclerotic calcified plaques present. Patent lumen 
with no significant stenosis. CELIAC ARTERY, SMA AND RENAL ARTERIES: Patent. Solitary bilateral renal arteries 
noted. CT CHEST:  
 
LUNG PARENCHYMA: Extensive patchy airspace opacities identified in bilateral 
lungs. Compressive atelectasis at the posterior bilateral lower lobes due to 
pleural effusions, greater on the right. THYROID: Poorly seen in detail due to motion artifact. MEDIASTINUM:  Prominent soft tissue density in mediastinum is new since the 
prior study but poorly seen in detail due to early arterial enhancement, more 
likely mild edema/fluid from pericardial effusion rather than adenopathy. THE HEART AND THE PERICARDIUM: . The heart is borderline prominent. Massive 
pericardial effusion identified with moderate interval progression. PLEURAL SPACES AND CHEST WALL: Large right pleural effusion is again noted. Small left pleural effusion is near interval resolved. Diffuse chest wall edema. CT ABDOMEN/PELVIS:  
 
ABDOMINAL VISCERA: The liver parenchyma is poorly opacified on this early 
arterial phase. No discrete mass seen. The spleen shows scattered calcified 
granulomas. Atrophic bilateral kidneys again noted. Exophytic left renal cyst is 
better visualized on recent regular contrast CT. The pancreas and bilateral 
adrenal glands appear grossly unremarkable. The stomach is poorly distended. The 
small and large bowel are nondilated. There is prominent wall thickness of small 
and large bowel, poorly seen in detail. Scattered diverticula in the colon. Normal appendix. PERITONEUM/RETROPERITONEUM: Moderate ascites is again noted with slight mild 
interval increase compared to the recent CT. No definite retroperitoneal mass or 
adenopathy identified. OTHERS: The bladder is poorly distended. The uterus is poorly seen today but 
grossly stable. Significant anasarca in abdominal and pelvic wall. CT OSSEOUS STRUCTURES:  Unremarkable for age. Impression IMPRESSION:  
 
1. Normal caliber aorta with no aneurysmal dilatation or dissection. Minimal 
atherosclerotic aortic disease at distal abdominal portion. 2. Patent pulmonary arteries and great vessels at the arch as well as abdominal 
vasculature as above. 3. Patchy airspace disease in bilateral lungs. 4. Persistent large right pleural effusion. Near resolved small left pleural 
effusion. 5. Massive pericardial effusion is interval progressed with likely extending to 
the mediastinum at described above. 6. Moderate ascites is mildly progressed. Significant anasarca. 7. The abdominal structures are poorly seen due to early arterial enhancing 
phase. Scattered diverticula in the colon. The diffuse mucosal prominence of 
small and large bowel could be due to poor distention. Potential bowel 
inflammation or ischemia needs to be excluded clinically. 8. Left renal cyst. Atrophic bilateral kidneys. Thank you for your referral.   
CT HEAD WO CONT Narrative CT of the head without contrast 
 
HISTORY: Stroke alert, weakness and abdominal pain. COMPARISON: CT 7/16/2018 and MRI 7/19/2018 All CT scans at this facility are performed using dose optimization technique as 
appropriate to a performed exam, to include automated exposure control, 
adjustment of the MA and/or kV according to patient size (including appropriate 
matching for site-specific examinations) or use of  iterative reconstruction 
technique. FINDINGS: Extensive amount of cerebral white matter lucency. Cortical gray-white 
matter differentiation is intact and there is no evidence of an acutely evolving 
major vascular territorial infarction. Symmetric bilateral thalamic 
hypoattenuation, stable. Diffuse hypoattenuation in the brainstem. Symmetric 
bandlike foci of hypoattenuation across the cerebellar hemispheres, stable. No 
hydrocephalus or midline shift. No acute hemorrhage. No mass effect. Visualized 
paranasal sinuses and mastoid air cells are clear. No acute osseous abnormality. Impression Impression: 
 
Similar symmetric pattern of bilateral thalamic, cerebellar and diffuse 
brainstem hypoattenuation. Consider MRI for better characterization and 
follow-up of these lesions. Patient is again hypertensive and this could reflect 
an atypical pattern of acute hypertensive encephalopathy. The differential could include infectious, metabolic or toxic encephalopathies. Stable pattern of extensive cerebral white matter lucency which likely reflects 
small vessel ischemic change. No acute hemorrhage or major vascular territorial infarction. Findings called to Dr. Rui Walker at 12:13 PM 8/22/2018 as per the stroke alert 
protocol. CULTURE:  
) Recent Labs 10/15/18 
2205 10/15/18 
2155 CULT NO GROWTH AFTER 23 HOURS NO GROWTH AFTER 23 HOURS Recent Labs 10/15/18 
2205 10/15/18 
2155 CULT NO GROWTH AFTER 23 HOURS NO GROWTH AFTER 23 HOURS Assessment/Plan: End Stage Renal Disease:  Patient is tolerating dialysis treatment well. .  Additionally the patient has experienced normal dialysis treatment during dialysis. Dry weight   decreased. At 10:55 AM on 10/17/2018, I saw and examined patient during hemodialysis treatment. The patient was receiving hemodialysis for treatment of end stage renal disease. I have also reviewed vital signs, intake and output, lab results and recent events, and agreed with today's dialysis order. Anemia:  Hold MILTON until HTN is controlled Renal Metabolic Bone Disease:  monitor phos off binders Hypertension: controlled much better, Discussed with Dr. Randy Jordan meds adjusted Access: Tunnel cuff catheter adequate monitoring/no changes Elisa Frausto MD 
10/17/2018

## 2018-10-17 NOTE — PROGRESS NOTES
1930 bedside turnover given to me by KRISTIN Partida. Pt is on the cardiac telemetry monitor in bed resting quietly. Call bell is within reach. 2050 pt quietly resting in bed. 2130 in bed daughter in law and son at bedside. 1 per son pt is feeling upset stomach 
meds given in apple sauce, phenergan given for nausea. 2315 in bed family at bedside. Per family pt has no pain and no shortness of breath. Call bell is within reach 
0715 bedside turnover given to KRISTIN Steele. Pt is on the cardiac telemetry monitor. Hydralazine held for BP systolic less than 116. Pt is resting in bed. She did not sleep much last night.

## 2018-10-18 NOTE — PROGRESS NOTES
[facility] Hospitalist Group Progress Note Patient: Francis Da Silva Age: 66 y.o. : 1960 MR#: [id number] SSN: [id number] Date/Time: 10/18/2018 Subjective:  
 
Patient lying in bed in NAD. Denies sob, cough. Daughter and son in law at bedside Assessment/Plan: 1. HTN - Urgency  
- tolerating po meds 2 ESRD  
- HD as per nephro 3 Acute NV - improved, tolerating diet - 
 
4 Continue Anti TB med per home dose 5 SEPSIS , ?PNA On abx, follow cx 
Pulm consulted- d/w Dr Cullen Bartlett PT, OT 
D/w patient, d/w Cardio NP Case discussed with:  [x]Patient  [x] Family ( In room with patient )    [x]Nursing  []Case Management DVT Prophylaxis:  []Lovenox  []Hep SQ  []SCDs  []Coumadin   []On Heparin gtt Objective:  
VS:  
Visit Vitals BP (!) 153/91 (BP 1 Location: Right arm, BP Patient Position: At rest) Pulse 66 Temp 98.9 °F (37.2 °C) Resp 19 Ht 5' 1\" (1.549 m) Wt 43.3 kg (95 lb 8 oz) SpO2 96% Breastfeeding? No  
BMI 18.04 kg/m² Tmax/24hrs: Temp (24hrs), Av °F (36.7 °C), Min:97.1 °F (36.2 °C), Max:98.9 °F (37.2 °C) IOBRIEF Intake/Output Summary (Last 24 hours) at 10/18/2018 1243 Last data filed at 10/18/2018 1241 Gross per 24 hour Intake 150 ml Output 1520 ml Net -1370 ml General:  Awake, alert Cardiovascular:  S1S2+, RRR Pulmonary:  CTA b/l GI:  Soft, BS+, NT, ND Extremities:  No edema Medications:  
Current Facility-Administered Medications Medication Dose Route Frequency  amLODIPine (NORVASC) tablet 10 mg  10 mg Oral DAILY  aspirin chewable tablet 81 mg  81 mg Oral DAILY  atorvastatin (LIPITOR) tablet 40 mg  40 mg Oral QHS  bisacodyl (DULCOLAX) tablet 5 mg  5 mg Oral DAILY PRN  
 carvedilol (COREG) tablet 25 mg  25 mg Oral Q12H  
 dicyclomine (BENTYL) capsule 20 mg  20 mg Oral TID PRN  
 docusate sodium (COLACE) capsule 100 mg  100 mg Oral BID  
  ethambutol (MYAMBUTOL) tablet 800 mg  800 mg Oral Q TUE, THU & SAT  FLUoxetine (PROzac) capsule 10 mg  10 mg Oral DAILY  isoniazid (NYDRAZID) tablet 300 mg  300 mg Oral DAILY  melatonin tablet 3 mg  3 mg Oral QHS PRN  pyrazinamide tablet 1,000 mg  1,000 mg Oral Q TUE, THU & SAT  pyridoxine (vitamin B6) (VITAMIN B-6) tablet 100 mg  100 mg Oral DAILY  rifAMPin (RIFADIN) capsule 600 mg  600 mg Oral 6am  
 acetaminophen (TYLENOL) tablet 650 mg  650 mg Oral Q4H PRN  
 heparin (porcine) injection 5,000 Units  5,000 Units SubCUTAneous Q8H  
 influenza vaccine 2018-19 (6 mos+)(PF) (FLUARIX QUAD/FLULAVAL QUAD) injection 0.5 mL  0.5 mL IntraMUSCular PRIOR TO DISCHARGE  cloNIDine HCl (CATAPRES) tablet 0.3 mg  0.3 mg Oral TID  ondansetron (ZOFRAN) injection 4 mg  4 mg IntraVENous Q4H PRN  promethazine (PHENERGAN) 12.5 mg in 0.9% sodium chloride 50 mL IVPB  12.5 mg IntraVENous Q4H PRN  
 hydrALAZINE (APRESOLINE) 20 mg/mL injection 10 mg  10 mg IntraVENous Q6H  
 minoxidil (LONITEN) tablet 5 mg  5 mg Oral BID  losartan (COZAAR) tablet 50 mg  50 mg Oral DAILY  0.9% sodium chloride infusion  100 mL/hr IntraVENous DIALYSIS PRN  
 heparin (porcine) 1,000 unit/mL injection 1,000 Units  1,000 Units InterCATHeter DIALYSIS PRN  
 levoFLOXacin (LEVAQUIN) 500 mg in D5W IVPB  500 mg IntraVENous Q48H  piperacillin-tazobactam (ZOSYN) 2.25 g in 0.9% sodium chloride (MBP/ADV) 50 mL ADV  2.25 g IntraVENous Q8H  
 Piperacillin-tazobactam (ZOSYN) 0.75 gm Supplemental Dosing by Pharmacy   Other Rx Dosing/Monitoring  VANCOMYCIN INFORMATION NOTE   Other Rx Dosing/Monitoring Labs:   
Recent Labs 10/18/18 
0491 10/17/18 
0534 10/15/18 
2124 WBC 2.5* 3.5* 3.1* HGB 9.8* 9.7* 13.7 HCT 29.3* 29.6* 42.0  170 180 Recent Labs 10/18/18 
0528 10/17/18 
0534 10/16/18 
0530  138 138  
K 4.4 5.1 4.8  
 99* 98* CO2 31 29 28 GLU 89 94 112* BUN 19* 40* 30* CREA 3.69* 5.69* 4.07* CA 8.3* 8.4* 8.8 Signed By: Maxim White MD   
 October 18, 2018

## 2018-10-18 NOTE — PROGRESS NOTES
Reviewed chart. Met with pt and daughter at bedside. Pt is alert and smiling. Per daughter, pt still wants to be discharged home because they will take care of pt. CM will continue to monitor for transitional needs. AGRENIS BishopN, [phone]

## 2018-10-18 NOTE — DISCHARGE SUMMARY
[address] [place] [address] Dr DISCHARGE SUMMARY Harinder Brady 
MR#: [id number] : 1960 ACCOUNT #: [de-identified] ADMIT DATE: 10/02/2018 DISCHARGE DATE: 10/06/2018 DISCHARGE DIAGNOSES: 
1. Hypertensive emergency at the time of admission. 2.  End-stage renal disease. 3.  Acute hypoxic respiratory failure at admission, likely secondary to hypertensive crisis which is improved. 4.  Pulmonary tuberculosis, currently under treatment. 5.  Thrombocytopenia. HOSPITAL COURSE:  This is a 28-year-old female who presented to the ER with shortness of breath. The patient was evaluated. The patient was noted to have hypertensive crisis. Patient was admitted. Initially, the patient was started on antibiotics, but subsequently it was felt that the patient did not have any sepsis and antibiotics were discontinued. Patient was admitted to ICU, was started on nicardipine infusion. The patient was seen by pulmonary critical care. The patient's blood pressure showed improvement. Patient was started on the p.o. regimen. Blood pressure showed improvement. The patient was weaned off the nicardipine drip. Nephrology continued to follow the patient for dialysis management. Patient's blood pressure showed improvement. The patient was moved out of ICU. Patient wished to go home. Patient was cleared for discharge by nephrology. The patient and family declined skilled nursing facility or rehab placement. Arrangements were made and the patient was discharged to home. DISPOSITION:  Home. Patient was hemodynamically stable at the time of discharge. DISCHARGE MEDICATIONS:  Include: 1. Amlodipine 10 mg p.o. daily. 2.  Coreg 25 mg p.o. twice daily. 3.  Catapres 0.3 mg every 24 hour patch applied transdermally every 7 days. 4.  Glycerin suppository p.r.n. constipation. 5.  Cozaar 100 mg p.o. daily. 6.  Minoxidil 2.5 mg p.o. twice a day. 7. Check a CBC, CMP, magnesium on 10/08/2018, results to PCP immediately. 8.  Aspirin 81 mg p.o. daily. 9.  Lipitor 40 mg p.o. daily at night. 10.  Zofran as needed. 11.  Dulcolax 5 mg p.o. daily as needed for constipation. 12.  Albuterol 2 puffs inhaled every 4 hours p.r.n. 13.  Ethambutol 800 mg p.o. every Tuesday, Thursday and Saturday at 5:00 p.m. 14.  Isoniazid 300 mg p.o. daily on dialysis days. Please take this medication after dialysis. 15.  Pyrazinamide 1000 mg p.o. every Tuesday, Thursday and Saturday at 5:00 p.m. 16.  Rifampin 600 mg p.o. daily on dialysis days. Please take this medication after dialysis. 17.  Colace 1 capsule p.o. twice daily. 18.  Pyridoxine 100 mg p.o. daily. 19.  Incentive spirometry, use as directed. 20.  Melatonin 3 mg p.o. at bedtime. 21.  Prozac 10 mg p.o. daily. DISPOSITION:  Home. Patient was hemodynamically stable. Discharge plans were discussed. Total time for discharge was more than 35 minutes. MD JB Knutson/JESSICA 
D: 10/17/2018 10:01    
T: 10/18/2018 08:25 
JOB #: [id number] CC: Be Hameed MD

## 2018-10-18 NOTE — PROGRESS NOTES
At 2315 Bedside and Verbal shift change report given to Desi [address] (oncoming nurse) by Jaden Queen (offgoing nurse).  Report included the following information SBAR, Kardex, Intake/Output, MAR, Recent Results and Cardiac Rhythm SR.

## 2018-10-18 NOTE — PROGRESS NOTES
Problem: Falls - Risk of 
Goal: *Absence of Falls Document Gil Pacheco Fall Risk and appropriate interventions in the flowsheet. Outcome: Progressing Towards Goal 
Fall Risk Interventions: 
Mobility Interventions: Patient to call before getting OOB Mentation Interventions: Door open when patient unattended Medication Interventions: Patient to call before getting OOB Elimination Interventions: Call light in reach, Patient to call for help with toileting needs Problem: Pressure Injury - Risk of 
Goal: *Prevention of pressure injury Document Derek Scale and appropriate interventions in the flowsheet. Outcome: Progressing Towards Goal 
Pressure Injury Interventions: 
  
 
Moisture Interventions: Absorbent underpads Activity Interventions: Increase time out of bed, PT/OT evaluation Mobility Interventions: HOB 30 degrees or less Nutrition Interventions: Document food/fluid/supplement intake

## 2018-10-18 NOTE — PROGRESS NOTES
Problem: Mobility Impaired (Adult and Pediatric) Goal: *Acute Goals and Plan of Care (Insert Text) Physical Therapy Goals Initiated 10/18/2018 and to be accomplished within 7 day(s) 1. Patient will move from supine to sit and sit to supine  in bed with modified independence. 2.  Patient will transfer from bed to chair and chair to bed with supervision/set-up using the least restrictive device. 3.  Patient will perform sit to stand with supervision/set-up. 4.  Patient will ambulate with contact guard assist for 100 feet with the least restrictive device. 5.  Patient will ascend/descend 2 stairs with 1 handrail(s) with minimal assistance/contact guard assist. 
physical Therapy EVALUATION Patient: Edward Mcghee (44 y.o. female) Date: 10/18/2018 Primary Diagnosis: Malignant hypertension Precautions:   Fall ASSESSMENT : 
Based on the objective data described below, the patient presents with decreased strength, balance and activity tolerance resulting in decreased independence with functional mobility. Pt transferred supine to sit with CGA due to patient being unwilling to attempt transfer without holding onto therapists hand. Pt stood from the bed with CGA and ambulated 10 feet with RW and min A, at this point she became dizzy and requested to turn around. Pt ambulated an additional 10 feet with RW and min A to the bed. Pt returned to supine in bed with supervision. Pt was left in bed with needs in reach and son present. Patient will benefit from skilled intervention to address the above impairments. Patients rehabilitation potential is considered to be Fair Factors which may influence rehabilitation potential include:  
[]         None noted 
[]         Mental ability/status [x]         Medical condition 
[]         Home/family situation and support systems 
[]         Safety awareness 
[]         Pain tolerance/management 
[x]         Other: language- pt speaks Sinhala PLAN : 
 Recommendations and Planned Interventions: 
[x]           Bed Mobility Training             [x]    Neuromuscular Re-Education 
[x]           Transfer Training                   []    Orthotic/Prosthetic Training 
[x]           Gait Training                          []    Modalities [x]           Therapeutic Exercises          []    Edema Management/Control 
[x]           Therapeutic Activities            [x]    Patient and Family Training/Education 
[]           Other (comment): Frequency/Duration: Patient will be followed by physical therapy 1-2 times per day/4-7 days per week to address goals. Discharge Recommendations: Jesu Murillo Further Equipment Recommendations for Discharge: N/A  
 
G-CODES Mobility [id number] Current  CJ= 20-39%   Goal  CI= 1-19%. The severity rating is based on the Level of Assistance required for Functional Mobility and ADLs. Eval Complexity: History: MEDIUM  Complexity : 1-2 comorbidities / personal factors will impact the outcome/ POC Exam:MEDIUM Complexity : 3 Standardized tests and measures addressing body structure, function, activity limitation and / or participation in recreation  Presentation: MEDIUM Complexity : Evolving with changing characteristics  Clinical Decision Making:Low Complexity , Overall Complexity:LOW SUBJECTIVE:  
Patient stated ok.  OBJECTIVE DATA SUMMARY:  
 
Past Medical History:  
Diagnosis Date  Arthritis GOUT  Asthma  Bilateral leg pain  Chest pain at rest   
 Chronic kidney disease  [facility] T[facility]-TH[facility]-SAT  Hypercholesteremia  Hypertension  Joint pain  Kidney disease  Pleural effusion  Pulmonary tuberculosis  Vitamin D deficiency Past Surgical History:  
Procedure Laterality Date  CHEST SURGERY PROCEDURE UNLISTED    
 THORACENTHESIS  
 HX OTHER SURGICAL    
 BRONCHOSCOPY  
 HX VASCULAR ACCESS  01/2018; 11/2017 LEFT ARM VENOUS ACCESS  
 HX VASCULAR ACCESS  02/2017 [facility]  VASCULAR SURGERY PROCEDURE UNLIST Prior Level of Function/Home Situation: pt lives with family, ambulates short distances with RW and assistance from family; patient's son reports limited mobility recently Home Situation Home Environment: Private residence One/Two Story Residence: Two story Living Alone: No 
Support Systems: Child(gomez) Patient Expects to be Discharged to[de-identified] Private residence Current DME Used/Available at Home: NoneCritical Behavior: 
Neurologic State: Alert 
 following commands Strength:   
Strength: Generally decreased, functional(B LEs) Tone & Sensation:  
Tone: Normal(B LEs) Range Of Motion: 
AROM: Within functional limits(B LEs) Functional Mobility: 
Bed Mobility: 
Rolling: Supervision Supine to Sit: Contact guard assistance Sit to Supine: Supervision Transfers: 
Sit to Stand: Contact guard assistance Stand to Sit: Contact guard assistance Balance:  
Sitting: With support; Impaired Sitting - Static: Good (unsupported) Sitting - Dynamic: Fair (occasional) Standing: With support Standing - Static: Fair Standing - Dynamic : (fair-)Ambulation/Gait Training: 
Distance (ft): 20 Feet (ft) Assistive Device: Walker, rolling Ambulation - Level of Assistance: Minimal assistance Gait Abnormalities: Decreased step clearance;Trunk sway increased Base of Support: Narrowed Pain: 
Pt reports 0/10 pain or discomfort prior to treatment.   
Pt reports 0/10 pain or discomfort post treatment. Activity Tolerance:  
Poor+ Please refer to the flowsheet for vital signs taken during this treatment. After treatment:  
[]         Patient left in no apparent distress sitting up in chair 
[x]         Patient left in no apparent distress in bed 
[x]         Call bell left within reach 
[]         Nursing notified 
[x]         Caregiver present 
[]         Bed alarm activated COMMUNICATION/EDUCATION:  
 [x]         Fall prevention education was provided and the patient/caregiver indicated understanding. [x]         Patient/family have participated as able in goal setting and plan of care. []         Patient/family agree to work toward stated goals and plan of care. [x]         Patient understands intent and goals of therapy, but is neutral about his/her participation. []         Patient is unable to participate in goal setting and plan of care. Educated patient on increasing activity throughout the day with assistance Thank you for this referral. 
Juan Delaney, PT Time Calculation: 10 mins

## 2018-10-18 NOTE — PROGRESS NOTES
RENAL DAILY PROGRESS NOTE Subjective:  
 
Admitted for Acute SOB and HTN emergency seen for ESRD/HD Complaint: Daughter at bedside, pt reports feeling very good. Current Facility-Administered Medications Medication Dose Route Frequency  [START ON 10/19/2018] amLODIPine (NORVASC) tablet 10 mg  10 mg Oral DAILY  rifAMPin (RIFADIN) capsule 600 mg  600 mg Oral DAILY  aspirin chewable tablet 81 mg  81 mg Oral DAILY  atorvastatin (LIPITOR) tablet 40 mg  40 mg Oral QHS  bisacodyl (DULCOLAX) tablet 5 mg  5 mg Oral DAILY PRN  
 carvedilol (COREG) tablet 25 mg  25 mg Oral Q12H  
 dicyclomine (BENTYL) capsule 20 mg  20 mg Oral TID PRN  
 docusate sodium (COLACE) capsule 100 mg  100 mg Oral BID  ethambutol (MYAMBUTOL) tablet 800 mg  800 mg Oral Q TUE, THU & SAT  FLUoxetine (PROzac) capsule 10 mg  10 mg Oral DAILY  isoniazid (NYDRAZID) tablet 300 mg  300 mg Oral DAILY  melatonin tablet 3 mg  3 mg Oral QHS PRN  pyrazinamide tablet 1,000 mg  1,000 mg Oral Q TUE, THU & SAT  pyridoxine (vitamin B6) (VITAMIN B-6) tablet 100 mg  100 mg Oral DAILY  acetaminophen (TYLENOL) tablet 650 mg  650 mg Oral Q4H PRN  
 heparin (porcine) injection 5,000 Units  5,000 Units SubCUTAneous Q8H  
 influenza vaccine 2018-19 (6 mos+)(PF) (FLUARIX QUAD/FLULAVAL QUAD) injection 0.5 mL  0.5 mL IntraMUSCular PRIOR TO DISCHARGE  cloNIDine HCl (CATAPRES) tablet 0.3 mg  0.3 mg Oral TID  ondansetron (ZOFRAN) injection 4 mg  4 mg IntraVENous Q4H PRN  promethazine (PHENERGAN) 12.5 mg in 0.9% sodium chloride 50 mL IVPB  12.5 mg IntraVENous Q4H PRN  
 hydrALAZINE (APRESOLINE) 20 mg/mL injection 10 mg  10 mg IntraVENous Q6H  
 minoxidil (LONITEN) tablet 5 mg  5 mg Oral BID  losartan (COZAAR) tablet 50 mg  50 mg Oral DAILY  0.9% sodium chloride infusion  100 mL/hr IntraVENous DIALYSIS PRN  
 heparin (porcine) 1,000 unit/mL injection 1,000 Units  1,000 Units InterCATHeter DIALYSIS PRN  
  levoFLOXacin (LEVAQUIN) 500 mg in D5W IVPB  500 mg IntraVENous Q48H  piperacillin-tazobactam (ZOSYN) 2.25 g in 0.9% sodium chloride (MBP/ADV) 50 mL ADV  2.25 g IntraVENous Q8H  
 Piperacillin-tazobactam (ZOSYN) 0.75 gm Supplemental Dosing by Pharmacy   Other Rx Dosing/Monitoring  VANCOMYCIN INFORMATION NOTE   Other Rx Dosing/Monitoring Objective:  
 
Patient Vitals for the past 24 hrs: 
 Temp Pulse Resp BP SpO2  
10/18/18 1515 98.2 °F (36.8 °C) 65 23 133/78 97 % 10/18/18 1430  69 25 124/72 98 % 10/18/18 1400  67 20 122/73 98 % 10/18/18 1330  70 18 117/67 96 % 10/18/18 1300  72 18 123/67 96 % 10/18/18 1230  65 23 154/84 96 % 10/18/18 1200  64 18 153/89 97 % 10/18/18 1150  65 22 162/89 98 % 10/18/18 1130  67 18 166/90 98 % 10/18/18 1113  64 17 (!) 153/91 95 % 10/18/18 1112 98.9 °F (37.2 °C) 66 19 (!) 153/91 96 % 10/18/18 1100  66 21 179/89 95 % 10/18/18 1030  73 17 (!) 152/93 98 % 10/18/18 1023  72 14 156/83 94 % 10/18/18 0930  75 20 151/89 96 % 10/18/18 0900  73 24 (!) 188/98 95 % 10/18/18 0834 97.1 °F (36.2 °C) 74 19 130/76   
10/18/18 0830  71 17 125/70 95 % 10/18/18 0800  70 19 137/76 93 % 10/18/18 0730  62 19 158/87 96 % 10/18/18 0400 98 °F (36.7 °C) 65 22 132/74 95 % 10/17/18 2330 97.3 °F (36.3 °C) 70 17 102/58 95 % 10/17/18 2000 98.5 °F (36.9 °C) 73 18 113/66 95 % 10/17/18 1800  75 22 119/68 96 % 10/17/18 1700  79 19 111/65 95 % 10/17/18 1600 98.2 °F (36.8 °C) 78 30 126/72 96 % Weight change:  
 
 10/16 1901 - 10/18 0700 In: 1220.7 [P.O.:160; I.V.:1060.7] Out: 1600 [Urine:100] Intake/Output Summary (Last 24 hours) at 10/18/2018 1531 Last data filed at 10/18/2018 1515 Gross per 24 hour Intake 270 ml Output 20 ml Net 250 ml Physical Exam: alert, oriented x 3, afebrile, not in any resp distress HEENT: non icteric Neck: Right IJ TDC Cardiovascular: regular no rub 
C/L: clear, ant/lat Abdomen: soft + BS Ext: No LE edema Data Review:  
 
LABS:  
Hematology:  
Recent Labs 10/18/18 
9351 10/17/18 
0534 10/15/18 
2124 WBC 2.5* 3.5* 3.1* HGB 9.8* 9.7* 13.7 HCT 29.3* 29.6* 42.0  
pl 175K Chemistry:  
Recent Labs 10/18/18 
5002 10/17/18 
0534 10/16/18 
0530 10/15/18 
2124 BUN 19* 40* 30* 21* CREA 3.69* 5.69* 4.07* 3.61* CA 8.3* 8.4* 8.8 9.2 K 4.4 5.1 4.8 4.2  138 138 135*  99* 98* 97* CO2 31 29 28 29 GLU 89 94 112* 122* IMPRESSION AND PLAN:  
ESRD HD tomorrow (MWF) HTN much better, given repeated admissions for [facility] urgency and very labile BP recommend to monitor stability of BP for a few days on stable BP regimen before d/c. Anemia resume EPO with HD 
PTB spoke with Ms Wei Bartonlem ([phone]) from the health dept, [facility] pharmacist, and Dr Rufus Galindo re med doses and no changes or Rx for anti TB meds w/o going thru Ms Wei Harris. Reinier Stewart MD 
10/18/2018

## 2018-10-18 NOTE — CONSULTS
[facility] Pulmonary, Critical Care, and Sleep Medicine Name: Tania Ochoa MRN: [id number] : 1960 Hospital: [facility] Date: 10/18/2018 Pulmonary Medicine-  Initial Patient Consult IMPRESSION:  
· Acute respiratory failure- suspect flash pulmonary edema due to hypertensive emergency, vs infection or both · Pulmonary Infiltrates- pulmonary edema and/or infection- cultures negative to date. Favor pulmonary edema given distribution, rapid respiratory improvment and interval improvement in infiltrates at this time. · Hypertensive Urgency- acute on chronic process- BP chronically labile with recurrent admissions for hypertensive urgency/emergencies - improved · Tuberculosis- Primary: + TB from pleural fluid and Bronch from [facility]- On DOT with [address] · MAC also isolated of prior BAL from bronch- unclear clinical significance · CHF- Diastolic- acute decompensation - improved · H/O NSTEMI- Cardiology following · End Stage Renal Disease- on HD: MWF- Nephrology following · Nausea with emesis- acute on chronic problem-improved RECOMMENDATIONS:  
· CXR: PA/LAT · Per health department instructions- TB meds are to be dosed based on their recommendations- no dose adjustments or changes in TB meds without conferring with Health Department (INH, PZA, Rifampin, B6) · Keep HOB elevated; aspiration precautions · SpO2 goal>92% · Discontinue vancomycin · Bronchial hygiene encouraged/ IS at bedside · HD per nephrology · BP management per Primary team and cardiology · Prophylaxis per primary team 
· Patient will need serial pulmonary imaging · Will continue to follow Subjective/History: This patient has been seen and evaluated at the request of Dr. Kristin Holguin for pulmonary infiltrates.   Patient is a 66 y.o. female - [place] speaking only which a history of multiple admissions to [facility] due to malignant hypertension with concurrent decompensated diastolic heart failure. The patient also has a history of primary pulmonary tuberculosis and is on DOT therapy by local health department. MAC was also isolated from a BAL in the past.   
 
The patient was recently readmitted to the SDU for recurrent hypertension and acute respiratory failure. BP medications were again modified. Home dietary intact was again reviewed with family. The patient tends mostly to eat food from home and not from our food services. She found to be taking MSG- unclear if that has been contributing to labile blood pressures. The patient also has recurrent nausea and emesis- unclear etiology: gastroparesis and/or irritability from medications In regards to her pulmonary status the patient has been undergoing DOT by local health department. She gets her HD in the morning and she receives her TB meds in the afternoon/evening. She denies hemoptysis or sputum production. Her respiratory status today is much improved from prior days. Patient Vitals for the past 24 hrs: 
 Temp Pulse Resp BP SpO2  
10/18/18 1515 98.2 °F (36.8 °C) 65 23 133/78 97 % 10/18/18 1430  69 25 124/72 98 % 10/18/18 1400  67 20 122/73 98 % 10/18/18 1330  70 18 117/67 96 % 10/18/18 1300  72 18 123/67 96 % 10/18/18 1230  65 23 154/84 96 % 10/18/18 1200  64 18 153/89 97 % 10/18/18 1150  65 22 162/89 98 % 10/18/18 1130  67 18 166/90 98 % 10/18/18 1113  64 17 (!) 153/91 95 % 10/18/18 1112 98.9 °F (37.2 °C) 66 19 (!) 153/91 96 % 10/18/18 1100  66 21 179/89 95 % 10/18/18 1030  73 17 (!) 152/93 98 % 10/18/18 1023  72 14 156/83 94 % 10/18/18 0930  75 20 151/89 96 % 10/18/18 0900  73 24 (!) 188/98 95 % 10/18/18 0834 97.1 °F (36.2 °C) 74 19 130/76   
10/18/18 0830  71 17 125/70 95 % 10/18/18 0800  70 19 137/76 93 % 10/18/18 0730  62 19 158/87 96 % 10/18/18 0400 98 °F (36.7 °C) 65 22 132/74 95 % 10/17/18 2330 97.3 °F (36.3 °C) 70 17 102/58 95 % 10/17/18 2000 98.5 °F (36.9 °C) 73 18 113/66 95 % 10/17/18 1800  75 22 119/68 96 % 10/17/18 1700  79 19 111/65 95 % Past Medical History:  
Diagnosis Date  Arthritis GOUT  Asthma  Bilateral leg pain  Chest pain at rest   
 Chronic kidney disease  ESRD (end stage renal disease) ([facility] [address] TUES-THURS-SAT  Hypercholesteremia  Hypertension  Joint pain  Kidney disease  Pleural effusion  Pulmonary tuberculosis  Vitamin D deficiency Past Surgical History:  
Procedure Laterality Date  CHEST SURGERY PROCEDURE UNLISTED    
 THORACENTHESIS  
 HX OTHER SURGICAL    
 BRONCHOSCOPY  
 HX VASCULAR ACCESS  01/2018; 11/2017 LEFT ARM VENOUS ACCESS  
 HX VASCULAR ACCESS  02/2017 [address]  VASCULAR SURGERY PROCEDURE UNLIST Prior to Admission medications Medication Sig Start Date End Date Taking? Authorizing Provider  
amLODIPine (NORVASC) 10 mg tablet Take 1 Tab by mouth daily. 10/6/18  Yes Leeann Moses MD  
carvedilol (COREG) 25 mg tablet Take 1 Tab by mouth every twelve (12) hours. 10/6/18  Yes Leeann Moses MD  
cloNIDine (CATAPRES) 0.3 mg/24 hr 1 Patch by TransDERmal route every seven (7) days. 10/6/18  Yes Leeann Moses MD  
losartan (COZAAR) 100 mg tablet Take 1 Tab by mouth daily. 10/6/18  Yes Leeann Moses MD  
bisacodyl (DULCOLAX) 5 mg EC tablet Take 1 Tab by mouth daily as needed for Constipation. 8/5/18  Yes Marcy Reed MD  
albuterol (PROVENTIL HFA, VENTOLIN HFA, PROAIR HFA) 90 mcg/actuation inhaler Take 2 Puffs by inhalation every four (4) hours as needed for Wheezing. 7/21/18  Yes Leeann Moses MD  
azithromycin (ZITHROMAX) 500 mg tab Take 1 Tab by mouth every Tuesday Thursday, Saturday. At 5 PM 7/21/18  Yes Leeann Moses MD  
aspirin 81 mg chewable tablet Take 1 Tab by mouth daily.  3/8/18  Yes Rohit Reyna MD  
 atorvastatin (LIPITOR) 40 mg tablet Take 1 Tab by mouth nightly. 3/8/18  Yes Medhat Manzo MD  
dicyclomine (BENTYL) 20 mg tablet Take 20 mg by mouth three (3) times daily as needed (abdominial pain). Provider, Historical  
cloNIDine HCl (CATAPRES) 0.3 mg tablet Take 0.3 mg by mouth three (3) times daily. Provider, Historical  
glycerin, adult, (FLEET GLYCERIN, ADULT,) suppository Insert 1 Suppository into rectum daily as needed. Indications: BOWEL EVACUATION 10/6/18   Michel Dhillon MD  
minoxidil (LONITEN) 2.5 mg tablet Take 1 Tab by mouth two (2) times a day. 10/6/18   Michel Dhillon MD  
OTHER Check CBC, CMP, MG on 10/08/18, Results to PCP immediately, Diagnosis- TB 10/6/18   Michel Dhillon MD  
ethambutol (MYAMBUTOL) 400 mg tablet Take 2 Tabs by mouth every Tuesday Thursday, Saturday. At 5 PM 7/21/18   Michel Dhillon MD  
isoniazid (NYDRAZID) 300 mg tablet Take 1 Tab by mouth daily. On dialysis days, please take medication after dialysis 7/21/18   Michel Dhillon MD  
pyrazinamide 500 mg tablet Take 2 Tabs by mouth every Tuesday Thursday, Saturday. At 5 pm  Indications: active tuberculosis 7/24/18   Michel Dhillon MD  
rifAMPin (RIFADIN) 300 mg capsule Take 2 Caps by mouth daily. On dialysis days please take this medication after Dialysis 7/21/18   Michel Dhillon MD  
docusate sodium (COLACE) 100 mg capsule Take 1 Cap by mouth two (2) times a day. 7/21/18   Michel Dhillon MD  
pyridoxine, vitamin B6, (VITAMIN B-6) 100 mg tablet Take 1 Tab by mouth daily. 7/22/18   Michel Dhillon MD  
OTHER Incentive Spirometry- use as directed 7/21/18   Michel Dhillon MD  
melatonin 3 mg tablet Take 1 Tab by mouth nightly as needed. Patient taking differently: Take 1 Tab by mouth nightly as needed (insomnia). 3/8/18   Medhat Manzo MD  
FLUoxetine (PROZAC) 10 mg capsule Take 1 Cap by mouth daily. 3/8/18   Medhat Manzo MD  
 
Current Facility-Administered Medications Medication Dose Route Frequency  [START ON 10/19/2018] amLODIPine (NORVASC) tablet 10 mg  10 mg Oral DAILY  rifAMPin (RIFADIN) capsule 600 mg  600 mg Oral DAILY  aspirin chewable tablet 81 mg  81 mg Oral DAILY  atorvastatin (LIPITOR) tablet 40 mg  40 mg Oral QHS  carvedilol (COREG) tablet 25 mg  25 mg Oral Q12H  
 docusate sodium (COLACE) capsule 100 mg  100 mg Oral BID  ethambutol (MYAMBUTOL) tablet 800 mg  800 mg Oral Q TUE, THU & SAT  FLUoxetine (PROzac) capsule 10 mg  10 mg Oral DAILY  isoniazid (NYDRAZID) tablet 300 mg  300 mg Oral DAILY  pyrazinamide tablet 1,000 mg  1,000 mg Oral Q TUE, THU & SAT  pyridoxine (vitamin B6) (VITAMIN B-6) tablet 100 mg  100 mg Oral DAILY  heparin (porcine) injection 5,000 Units  5,000 Units SubCUTAneous Q8H  
 influenza vaccine 2018-19 (6 mos+)(PF) (FLUARIX QUAD/FLULAVAL QUAD) injection 0.5 mL  0.5 mL IntraMUSCular PRIOR TO DISCHARGE  cloNIDine HCl (CATAPRES) tablet 0.3 mg  0.3 mg Oral TID  hydrALAZINE (APRESOLINE) 20 mg/mL injection 10 mg  10 mg IntraVENous Q6H  
 minoxidil (LONITEN) tablet 5 mg  5 mg Oral BID  losartan (COZAAR) tablet 50 mg  50 mg Oral DAILY  levoFLOXacin (LEVAQUIN) 500 mg in D5W IVPB  500 mg IntraVENous Q48H  piperacillin-tazobactam (ZOSYN) 2.25 g in 0.9% sodium chloride (MBP/ADV) 50 mL ADV  2.25 g IntraVENous Q8H  
 Piperacillin-tazobactam (ZOSYN) 0.75 gm Supplemental Dosing by Pharmacy   Other Rx Dosing/Monitoring  VANCOMYCIN INFORMATION NOTE   Other Rx Dosing/Monitoring Allergies Allergen Reactions  Banana Other (comments) Social History Tobacco Use  Smoking status: Never Smoker  Smokeless tobacco: Never Used Substance Use Topics  Alcohol use: No  
  
History reviewed. No pertinent family history. Review of Systems: 
Pertinent items are noted in HPI. Objective: 
Vital Signs:   
Visit Vitals /78 (BP 1 Location: Right arm) Pulse 65 Temp 98.2 °F (36.8 °C) Resp 23 Ht 5' 1\" (1.549 m) Wt 43.3 kg (95 lb 8 oz) SpO2 97% Breastfeeding? No  
BMI 18.04 kg/m² O2 Device: Room air O2 Flow Rate (L/min): 2 l/min Temp (24hrs), Av °F (36.7 °C), Min:97.1 °F (36.2 °C), Max:98.9 °F (37.2 °C) Intake/Output:  
Last shift:      10/18 07 - 10/18 1900 In: 270 [P.O.:270] Out: 20 [Urine:20] Last 3 shifts: 10/16 1901 - 10/18 0700 In: 1220.7 [P.O.:160; I.V.:1060.7] Out: 1600 [Urine:100] Intake/Output Summary (Last 24 hours) at 10/18/2018 1552 Last data filed at 10/18/2018 1515 Gross per 24 hour Intake 270 ml Output 20 ml Net 250 ml Physical Exam: 
 
General:  Alert, cooperative, no distress, appears stated age. Head:  Normocephalic, without obvious abnormality, atraumatic. Eyes:  Conjunctivae/corneas clear. PERRL, EOMs intact. Nose: Nares normal. Septum midline. Mucosa normal. No drainage or sinus tenderness. Throat: Lips, mucosa, and tongue normal. Teeth and gums normal.  
Neck: Supple, symmetrical, trachea midline, no adenopathy, thyroid: no enlargment/tenderness/nodules, no carotid bruit and no JVD. Back:   Symmetric, no curvature. ROM normal.  
Lungs:   Clear to auscultation bilaterally. Chest wall:  No tenderness or deformity. Heart:  Regular rate and rhythm, S1, S2 normal, no murmur, click, rub or gallop. Abdomen:   Soft, non-tender. Bowel sounds normal. No masses,  No organomegaly. Extremities: Extremities normal, atraumatic, no cyanosis or edema. Pulses: 2+ and symmetric all extremities. Skin: Skin color, texture, turgor normal. No rashes or lesions Lymph nodes: 
 
  Cervical, supraclavicular, and axillary nodes normal.  
Neurologic: Grossly nonfocal  
 
 
Data:  
 
Recent Results (from the past 24 hour(s)) METABOLIC PANEL, BASIC Collection Time: 10/18/18  5:28 AM  
Result Value Ref Range Sodium 139 136 - 145 mmol/L Potassium 4.4 3.5 - 5.5 mmol/L  Chloride 101 100 - 108 mmol/L  
 CO2 31 21 - 32 mmol/L Anion gap 7 3.0 - 18 mmol/L Glucose 89 74 - 99 mg/dL BUN 19 (H) 7.0 - 18 MG/DL Creatinine 3.69 (H) 0.6 - 1.3 MG/DL  
 BUN/Creatinine ratio 5 (L) 12 - 20 GFR est AA 15 (L) >60 ml/min/1.73m2 GFR est non-AA 13 (L) >60 ml/min/1.73m2 Calcium 8.3 (L) 8.5 - 10.1 MG/DL  
CBC W/O DIFF Collection Time: 10/18/18  5:28 AM  
Result Value Ref Range WBC 2.5 (L) 4.6 - 13.2 K/uL  
 RBC 3.12 (L) 4.20 - 5.30 M/uL HGB 9.8 (L) 12.0 - 16.0 g/dL HCT 29.3 (L) 35.0 - 45.0 % MCV 93.9 74.0 - 97.0 FL  
 MCH 31.4 24.0 - 34.0 PG  
 MCHC 33.4 31.0 - 37.0 g/dL  
 RDW 13.6 11.6 - 14.5 % PLATELET 400 365 - 858 K/uL MPV 9.8 9.2 - 11.8 FL  
VANCOMYCIN, RANDOM Collection Time: 10/18/18  5:28 AM  
Result Value Ref Range Vancomycin, random 14.3 5.0 - 40.0 UG/ML Recent Labs 10/15/18 
2201 HCO3I 31.0*  
PCO2I 45.9* PHI 7.437 PO2I 227* Imaging: 
I have personally reviewed the patients radiographs and have reviewed the reports: CXR Results  (Last 48 hours) None 10/15/18                                                                 10/2/18 Total clinical care time exclusive of procedures: 60 minutes Kathryn Vázquez DO, [facility], Sleep, Critical Care Medicine

## 2018-10-18 NOTE — PROGRESS NOTES
[facility] 
 
 
CARDIOLOGY PROGRESS NOTE 
RECS: 
 
1. Acute respiratory failure-  possible related to Acute decompensated CHF in the setting of uncontrolled Hypertension and possible sepsis on O2 nasal canula 2. Acute Diastolic Heart failure-better clinically. volume status per renal.  
3. Minimally  elevated troponin- possible demand inchemia  But MI needs to r/o. Monitor for ischemia symptoms follow ekg today. On asa, statin and bb.  
4. Hx of NSTEMI- in 2/18 NUC stress that showed small reversible apical inferior defect noted from these nuclear study suggestive of ischemia in the area. Possible distal LAD or RCA involmenent patient was managed medically. 5. Malignant hypertension- appears stable and slowly improving. Still require multiple medications for BP control. 6. ESRD- on HD renal following. She is MWF HD schedule 7. Pulmonany Tuberculosis- on 4 medications per medical team 
8. Possible sepsis? ? On antibiotics managed by medical team.  
9. Nausea and vomiting- w/u per medical team 
  
 
   
Echo 8/18 · Estimated left ventricular ejection fraction is 56 - 60%. Left ventricular moderate concentric hypertrophy. Normal left ventricular wall motion, no regional wall motion abnormality noted. · Saline contrast was given to evaluate for intracardiac shunt. No shunt seen. · Small circumferential pericardial effusion with more prominent area around the right artium. No indications of tamponade 
  
  
NUC 2/18 1.  Post-stress imaging in short axis, horizontal and vertical long axis view reveals a small apical inferior defect in perfusion with a medium intensity. 2.  Resting images show homogenous isotope uptake in all areas. 3.  Gated images show normal left ventricular size, wall motion, and systolic function appears normal.  Ejection fraction is 58%.   
  
DIAGNOSES:   
1.  Abnormal scan.  
2.  Evidence of a small reversible apical inferior defect noted from these nuclear study suggestive of ischemia in the area.  This is involving either the mid or distal left anterior descending or right coronary artery. 3.  Medium risk scan. ASSESSMENT: 
Hospital Problems  Date Reviewed: 10/16/2018 Codes Class Noted POA Malignant hypertension ICD-10-CM: I10 
ICD-9-CM: 401.0  10/15/2018 Unknown HCAP (healthcare-associated pneumonia) ICD-10-CM: J18.9 ICD-9-CM: 397  10/2/2018 Yes Pulmonary TB ICD-10-CM: A15.0 ICD-9-CM: 011.90  7/17/2018 Yes Hypertensive crisis ICD-10-CM: I16.9 ICD-9-CM: 401.9  7/17/2018 Yes ESRD (end stage renal disease) ([address] ICD-10-CM: N18.6 ICD-9-CM: 585.6  2/25/2018 Yes Respiratory failure ([facility] [address] ICD-10-CM: J96.90 ICD-9-CM: 518.81  2/25/2018 Yes Hypoxia ICD-10-CM: R09.02 
ICD-9-CM: 799.02  2/25/2018 Yes SUBJECTIVE: 
 
Unable to communicate but patient smiled when asked how she was doing No family when I visited OBJECTIVE: 
 
VS:  
Visit Vitals BP (!) 153/91 (BP 1 Location: Right arm, BP Patient Position: At rest) Pulse 66 Temp 98.9 °F (37.2 °C) Resp 19 Ht 5' 1\" (1.549 m) Wt 43.3 kg (95 lb 8 oz) SpO2 96% Breastfeeding? No  
BMI 18.04 kg/m² Intake/Output Summary (Last 24 hours) at 10/18/2018 1256 Last data filed at 10/18/2018 1241 Gross per 24 hour Intake 150 ml Output 1520 ml Net -1370 ml TELE: normal sinus rhythm General: alert, well developed, pleasant and in no apparent distress HENT: Normocephalic, atraumatic. Normal external eye. Neck :  no JVD Cardiac:  regular rate and rhythm, S1, S2 normal, no S3 or S4, no click Lungs: clear to auscultation bilaterally Abdomen: Soft, nontender, no masses Extremities:  No c/c/e, peripheral pulses present Labs: Results:  
   
Chemistry Recent Labs 10/18/18 
0528 10/17/18 
0534 10/16/18 
0530 GLU 89 94 112*  138 138  
K 4.4 5.1 4.8  
 99* 98* CO2 31 29 28  
 BUN 19* 40* 30* CREA 3.69* 5.69* 4.07* CA 8.3* 8.4* 8.8 AGAP 7 10 12 BUCR 5* 7* 7* CBC w/Diff Recent Labs 10/18/18 
1672 10/17/18 
0534 10/15/18 
2124 WBC 2.5* 3.5* 3.1*  
RBC 3.12* 3.07* 4.26  
HGB 9.8* 9.7* 13.7 HCT 29.3* 29.6* 42.0  170 180 GRANS  --   --  48  
LYMPH  --   --  30  
EOS  --   --  2 Cardiac Enzymes Recent Labs 10/15/18 
2124 CPK 60 CKND1 2.3 Coagulation No results for input(s): PTP, INR, APTT in the last 72 hours. No lab exists for component: INREXT, INREXT Lipid Panel Lab Results Component Value Date/Time Cholesterol, total 128 08/23/2018 02:35 AM  
 HDL Cholesterol 62 (H) 08/23/2018 02:35 AM  
 LDL, calculated 35 08/23/2018 02:35 AM  
 VLDL, calculated 31 08/23/2018 02:35 AM  
 Triglyceride 155 (H) 08/23/2018 02:35 AM  
 CHOL/HDL Ratio 2.1 08/23/2018 02:35 AM  
  
BNP No results for input(s): BNPP in the last 72 hours. Liver Enzymes No results for input(s): TP, ALB, TBIL, AP, SGOT, GPT in the last 72 hours. No lab exists for component: DBIL Thyroid Studies Lab Results Component Value Date/Time TSH 6.21 (H) 08/24/2018 08:00 AM  
    
 
 
 
Jen 57 I have independently evaluated taken history and examined the patient. All relevant labs and testing data's are reviewed. Care plan discussed and updated after review.  
Rochester Frankel, MD

## 2018-10-18 NOTE — PROGRESS NOTES
Problem: Self Care Deficits Care Plan (Adult) Goal: *Acute Goals and Plan of Care (Insert Text) Occupational Therapy Goals Initiated 10/18/2018 within 7 day(s). 1.  Patient will perform toilet transfers with supervision/set-up. 2.  Patient will perform all aspects of toileting with supervision/set-up. 3.  Patient will participate in upper extremity therapeutic exercise/activities with supervision/set-up and visual cues for 8 minutes. 4. Patient will perform functional task in stance x 3 min without LOB and F balance to increase I in self care. Occupational Therapy EVALUATION Patient: Faiza Steele (49 y.o. female) Date: 10/18/2018 Primary Diagnosis: Malignant hypertension Precautions:   Fall ASSESSMENT : 
Based on the objective data described below, the patient presents with generalized weakness and decreased UB strength as well as decreased OOB tolerance. Pt speaks [place] and declines to use blue phone. Pt able to interpret OT gestures with some understanding. Pt performed bed mobility SBA and able to sit edge of bed with F balance. Pt unable to reach feet and declined to doff/adarsh socks. Pt does have WFL AROM however demonstrates decreased muscle mass and BUE weakness. Pt attempted to stand x 2 times and lost balance requiring mod A to regain balance and assist to sit on edge of bed. Pt would benefit from there ex training with visual print out for best understanding. Nursing reports that pt able to get to [facility] with [facility]. Patient will benefit from skilled intervention to address the above impairments. Patients rehabilitation potential is considered to be Fair Factors which may influence rehabilitation potential include:  
[]             None noted []             Mental ability/status []             Medical condition []             Home/family situation and support systems [x]             Safety awareness []             Pain tolerance/management 
[]             Other: PLAN : 
 Recommendations and Planned Interventions: 
[x]               Self Care Training                  [x]        Therapeutic Activities [x]               Functional Mobility Training    []        Cognitive Retraining 
[x]               Therapeutic Exercises           [x]        Endurance Activities []               Balance Training                   [x]        Neuromuscular Re-Education []               Visual/Perceptual Training     [x]   Home Safety Training 
[x]               Patient Education                 [x]        Family Training/Education []               Other (comment): Frequency/Duration: Patient will be followed by occupational therapy 1-2 times per day/4-7 days per week to address goals. Discharge Recommendations: Home Health Further Equipment Recommendations for Discharge: N/A Barriers to Learning/Limitations: yes;  language Compensate with: visual, verbal, tactile, kinesthetic cues/model PATIENT COMPLEXITY Eval Complexity: History: MEDIUM Complexity : Expanded review of history including physical, cognitive and psychosocial  history ; Examination: MEDIUM Complexity : 3-5 performance deficits relating to physical, cognitive , or psychosocial skils that result in activity limitations and / or participation restrictions; Decision Making:MEDIUM Complexity : Patient may present with comorbidities that affect occupational performnce. Miniml to moderate modification of tasks or assistance (eg, physical or verbal ) with assesment(s) is necessary to enable patient to complete evaluation  Assessment: mod Complexity G-CODES:  
 
Self Care  Current  CK= 40-59%  Goal  CJ= 20-39%. The severity rating is based on the Level of Assistance required for Functional Mobility and ADLs. SUBJECTIVE:  
Patient stated Thank you.  OBJECTIVE DATA SUMMARY:  
 
Past Medical History:  
Diagnosis Date  Arthritis GOUT  Asthma  Bilateral leg pain  Chest pain at rest   
  Chronic kidney disease  ESRD (end stage renal disease) ([facility] [address] TUES-THURS-SAT  Hypercholesteremia  Hypertension  Joint pain  Kidney disease  Pleural effusion  Pulmonary tuberculosis  Vitamin D deficiency Past Surgical History:  
Procedure Laterality Date  CHEST SURGERY PROCEDURE UNLISTED    
 THORACENTHESIS  
 HX OTHER SURGICAL    
 BRONCHOSCOPY  
 HX VASCULAR ACCESS  01/2018; 11/2017 LEFT ARM VENOUS ACCESS  
 HX VASCULAR ACCESS  02/2017 [address]  VASCULAR SURGERY PROCEDURE UNLIST Prior Level of Function/Home Situation: PTA pt lived with son and according to nursing states that son and daughter-in-law were providing some level of assistance to patient. Home Situation Home Environment: Private residence One/Two Story Residence: Two story Living Alone: No 
Support Systems: Child(gomez) Patient Expects to be Discharged to[de-identified] Private residence Current DME Used/Available at Home: None 
[]  Right hand dominant   []  Left hand dominantCognitive/Behavioral Status: 
Neurologic State: Alert Cognition: Appropriate for age attention/concentration; Follows commands Safety/Judgement: Fall prevention;Home safety Skin: intact BUEs Edema: none noted BUEs Coordination: 
Fine Motor Skills-Upper: Left Intact; Right Intact Gross Motor Skills-Upper: Left Intact; Right Intact Balance: 
Sitting: With support; Impaired Sitting - Static: Fair (occasional) Sitting - Dynamic: Fair (occasional) Standing: With support; Impaired Strength: 
Strength: Generally decreased, functional(BUEs) Tone & Sensation: WNL Range of Motion: 
 
AROM: Within functional limits(BUEs) Functional Mobility and Transfers for ADLs: 
Bed Mobility: 
Supine to Sit: Stand-by assistance Sit to Supine: Stand-by assistance Transfers: 
Sit to Stand: Contact guard assistance ADL Assessment: 
Feeding: Modified independent Oral Facial Hygiene/Grooming: Modified Independent Bathing: Moderate assistance Upper Body Dressing: Supervision Lower Body Dressing: Moderate assistance Toileting: Contact guard assistance ADL Intervention: 
 Cognitive Retraining Safety/Judgement: Fall prevention;Home safety Pain: 
Pt indicates chest when asked if she has pain. Activity Tolerance:  
poor Please refer to the flowsheet for vital signs taken during this treatment. After treatment:  
[] Patient left in no apparent distress sitting up in chair 
[x] Patient left in no apparent distress in bed 
[x] Call bell left within reach [x] Nursing notified 
[] Caregiver present 
[] Bed alarm activated COMMUNICATION/EDUCATION:  
[x] Home safety education was provided and the patient/caregiver indicated understanding. [] Patient/family have participated as able in goal setting and plan of care. [] Patient/family agree to work toward stated goals and plan of care. [] Patient understands intent and goals of therapy, but is neutral about his/her participation. [x] Patient is unable to participate in goal setting and plan of care. Thank you for this referral. 
Ailcia Marmolejo OT Time Calculation: 8 mins

## 2018-10-19 NOTE — PROGRESS NOTES
Problem: Mobility Impaired (Adult and Pediatric) Goal: *Acute Goals and Plan of Care (Insert Text) Physical Therapy Goals Initiated 10/18/2018 and to be accomplished within 7 day(s) 1. Patient will move from supine to sit and sit to supine  in bed with modified independence. 2.  Patient will transfer from bed to chair and chair to bed with supervision/set-up using the least restrictive device. 3.  Patient will perform sit to stand with supervision/set-up. 4.  Patient will ambulate with contact guard assist for 100 feet with the least restrictive device. 5.  Patient will ascend/descend 2 stairs with 1 handrail(s) with minimal assistance/contact guard assist.  
Outcome: Progressing Towards Goal 
physical Therapy TREATMENT Patient: Brenton Louie (9 y.o. female) Date: 10/19/2018 Diagnosis: Malignant hypertension <principal problem not specified> Precautions: Fall Chart, physical therapy assessment, plan of care and goals were reviewed. OBJECTIVE/ ASSESSMENT: 
Patient found supine HOB elevated willing to work with PT. Pt unable to communicate initially w/ PTA 2/2 to language barrier, however son-in-law arrived approx 5 min into tx allowing better communication. Pt notified no pain at rest. At rest, BP taken at 162/88, notified nurse whom approved further tx. Pt performs all bed mobility mod I and SBA for sit to stand. When pt achieves upright posture w/ RW, however pt becomes slightly unstable static and dynamically. Pt amb w/ RW for a total of 70 feet today req intermittent standing rest breaks 2/2 to increased dizziness. W/ increase in dizziness, pt experienced LOB req mod A for correction, however able to cont to return to room. Once pt positioned in chair, BP retaken at 207/105. Pt voiced improvement of dizziness w/ rest and seem NAD when eating. Pt voiced main complaint post amb being increase in fatigue.  This PTA also noticed pt eating provided food from son-in-law that seems to be some [place] type food. Unsure if pt is on recommended diet restrictions 2/2 to diagnosis. Nurse notified. Education:safety, importance of OOB, importance of cont mobility, education limited 2/2 to language barrier Progression toward goals: 
[]      Improving appropriately and progressing toward goals [x]      Improving slowly and progressing toward goals 
[]      Not making progress toward goals and plan of care will be adjusted PLAN: 
Patient continues to benefit from skilled intervention to address the above impairments. Continue treatment per established plan of care. Discharge Recommendations:  Jesu Murillo Further Equipment Recommendations for Discharge:  rolling walker SUBJECTIVE:  
Patient stated she was in no pain per translation of family OBJECTIVE DATA SUMMARY:  
Critical Behavior: 
Neurologic State: Alert Cognition: Follows commands, Appropriate safety awareness Safety/Judgement: Fall prevention, Home safety Functional Mobility Training: 
Bed Mobility: 
Rolling: Modified independent Supine to Sit: Modified independent Sit to Supine: Modified independent Transfers: 
Sit to Stand: Stand-by assistance Stand to Sit: Stand-by assistance Balance: 
Sitting: Intact Sitting - Static: Good (unsupported) Sitting - Dynamic: Fair (occasional) Standing: Impaired; With support Standing - Static: Fair Standing - Dynamic : FairAmbulation/Gait Training: 
Distance (ft): 70 Feet (ft) Assistive Device: Walker, rolling Ambulation - Level of Assistance: Contact guard assistance; Moderate assistance;Maximum assistance Gait Abnormalities: Decreased step clearance Base of Support: Center of gravity altered;Narrowed Speed/Mera: Pace decreased (<100 feet/min) Step Length: Right shortened;Left shortened Therapeutic Exercises:  
 
 
EXERCISE Sets Reps Active Active Assist  
Passive Self- assited ROM Comments Ankle Pumps 1 10  [x] [] [] [] Quad Sets   [] [] [] [] Glut Sets   [] [] [] [] Short Arc Quads   [] [] [] [] Heel Slides   [] [] [] [] Straight Leg Raises   [] [] [] [] Hip Abd   [] [] [] [] Long Arc Quads   [] [] [] [] Seated Marching   [] [] [] [] Seated Knee Flexion   [] [] [] []   
Standing Marching   [] [] [] []   
 
Pain: 0/10 Activity Tolerance:  
Poor tolerance to amb Please refer to the flowsheet for vital signs taken during this treatment. After treatment:  
[x] Patient left in no apparent distress sitting up in chair 
[] Patient left in no apparent distress in bed 
[x] Call bell left within reach [x] Nursing notified 
[] Caregiver present 
[] Bed alarm activated 
[] SCDs applied 
[] Ice applied Ronal Bruner PTA Time Calculation: 25 mins Mobility [id number] Current  CL= 60-79%. The severity rating is based on the Level of Assistance required for Functional Mobility and ADLs. Mobility   Goal  CI= 1-19%. The severity rating is based on the Level of Assistance required for Functional Mobility and ADLs.

## 2018-10-19 NOTE — PROGRESS NOTES
[facility] Hospitalist Group Progress Note Patient: Catherine Grant Age: 66 y.o. : 1960 MR#: [id number] SSN: [id number] Date/Time: 10/19/2018 Subjective:  
 
Patient lying in bed in NAD, awake Assessment/Plan: 1. HTN - Urgency  
- cardiology adjusting regimen 
 
2 ESRD  
- HD as per nephro 3 Acute NV - improved, tolerating diet - 
 
4 Continue Anti TB med per Health department dosing 5 SEPSIS , ?PNA On abx, follow cx 
Pulmonary following 7- pericardial effusion, - cardio following PT, OT 
D/w Dr Berto Lamar Case discussed with:  [x]Patient  [x] Family ( In room with patient )    [x]Nursing  []Case Management DVT Prophylaxis:  []Lovenox  []Hep SQ  []SCDs  []Coumadin   []On Heparin gtt Objective:  
VS:  
Visit Vitals /82 Pulse 62 Temp 98 °F (36.7 °C) (Oral) Resp 16 Ht 5' 1\" (1.549 m) Wt 41.9 kg (92 lb 6.4 oz) SpO2 98% Breastfeeding? No  
BMI 17.46 kg/m² Tmax/24hrs: Temp (24hrs), Av.2 °F (36.8 °C), Min:97.8 °F (36.6 °C), Max:98.7 °F (37.1 °C) IOBRIEF Intake/Output Summary (Last 24 hours) at 10/19/2018 1205 Last data filed at 10/19/2018 0400 Gross per 24 hour Intake 270 ml Output 50 ml Net 220 ml General:  Awake, alert Cardiovascular:  S1S2+, RRR Pulmonary:  CTA b/l GI:  Soft, BS+, NT, ND Extremities:  No edema Medications:  
Current Facility-Administered Medications Medication Dose Route Frequency  polyethylene glycol (MIRALAX) packet 17 g  17 g Oral DAILY  pyrazinamide tablet 1,000 mg  1,000 mg Oral Q MON, WED & FRI  ethambutol (MYAMBUTOL) tablet 800 mg  800 mg Oral Q MON, WED & FRI  vancomycin (VANCOCIN) 500 mg in 0.9% sodium chloride (MBP/ADV) 100 mL MBP  500 mg IntraVENous DIALYSIS ONE TIME DOSE  amLODIPine (NORVASC) tablet 10 mg  10 mg Oral DAILY  rifAMPin (RIFADIN) capsule 600 mg  600 mg Oral DAILY  epoetin pilar (EPOGEN;PROCRIT) 5,000 Units  5,000 Units IntraVENous DIALYSIS MON, WED & FRI  aspirin chewable tablet 81 mg  81 mg Oral DAILY  atorvastatin (LIPITOR) tablet 40 mg  40 mg Oral QHS  bisacodyl (DULCOLAX) tablet 5 mg  5 mg Oral DAILY PRN  
 carvedilol (COREG) tablet 25 mg  25 mg Oral Q12H  
 dicyclomine (BENTYL) capsule 20 mg  20 mg Oral TID PRN  
 docusate sodium (COLACE) capsule 100 mg  100 mg Oral BID  FLUoxetine (PROzac) capsule 10 mg  10 mg Oral DAILY  isoniazid (NYDRAZID) tablet 300 mg  300 mg Oral DAILY  melatonin tablet 3 mg  3 mg Oral QHS PRN  pyridoxine (vitamin B6) (VITAMIN B-6) tablet 100 mg  100 mg Oral DAILY  acetaminophen (TYLENOL) tablet 650 mg  650 mg Oral Q4H PRN  
 heparin (porcine) injection 5,000 Units  5,000 Units SubCUTAneous Q8H  
 influenza vaccine 2018-19 (6 mos+)(PF) (FLUARIX QUAD/FLULAVAL QUAD) injection 0.5 mL  0.5 mL IntraMUSCular PRIOR TO DISCHARGE  cloNIDine HCl (CATAPRES) tablet 0.3 mg  0.3 mg Oral TID  ondansetron (ZOFRAN) injection 4 mg  4 mg IntraVENous Q4H PRN  promethazine (PHENERGAN) 12.5 mg in 0.9% sodium chloride 50 mL IVPB  12.5 mg IntraVENous Q4H PRN  
 hydrALAZINE (APRESOLINE) 20 mg/mL injection 10 mg  10 mg IntraVENous Q6H  
 minoxidil (LONITEN) tablet 5 mg  5 mg Oral BID  losartan (COZAAR) tablet 50 mg  50 mg Oral DAILY  0.9% sodium chloride infusion  100 mL/hr IntraVENous DIALYSIS PRN  
 heparin (porcine) 1,000 unit/mL injection 1,000 Units  1,000 Units InterCATHeter DIALYSIS PRN  
 levoFLOXacin (LEVAQUIN) 500 mg in D5W IVPB  500 mg IntraVENous Q48H  piperacillin-tazobactam (ZOSYN) 2.25 g in 0.9% sodium chloride (MBP/ADV) 50 mL ADV  2.25 g IntraVENous Q8H  
 Piperacillin-tazobactam (ZOSYN) 0.75 gm Supplemental Dosing by Pharmacy   Other Rx Dosing/Monitoring  VANCOMYCIN INFORMATION NOTE   Other Rx Dosing/Monitoring Labs:   
Recent Labs 10/19/18 
0540 10/18/18 
5167 10/17/18 
8121 WBC 3.0* 2.5* 3.5* HGB 9.9* 9.8* 9.7* HCT 29.8* 29.3* 29.6*  
 175 170 Recent Labs 10/19/18 
0540 10/18/18 
4218 10/17/18 
4436  139 138  
K 5.1 4.4 5.1  101 99* CO2 28 31 29 GLU 89 89 94 BUN 33* 19* 40* CREA 5.39* 3.69* 5.69* CA 8.1* 8.3* 8.4* Signed By: Mariam High MD   
 October 19, 2018

## 2018-10-19 NOTE — DIALYSIS
ACUTE HEMODIALYSIS FLOW SHEET 
 
HEMODIALYSIS ORDERS: Physician: Sherrie Machuca Dialyzer: revaclear   Duration: 3 hr  BFR: 300  DFR: 600 Dialysate:  Temp 37 K+   2    Ca+  2.5 Na 138 Bicarb 35 Weight:  41.9 kg    Patient Chart [x]     Unable to Obtain []   Dry weight/UF Goal: 1000 Access CVC Heparin []  Bolus      Units    [] Hourly       Units    [x]None Catheter locking solution Heparin Pre BP:   152/84    Pulse:     66     Temperature:   98  Respirations: 16 Labs: Pre        Post:        [x] N/A Additional Orders(medications, blood products, hypotension management):  Epo, vanco  [x] [x] [facility] Consent Verified CATHETER ACCESS: []N/A   [x]Right   []Left   []subclav    []Fem [] First use X-ray verified     [x]Tunnel                [] Non Tunneled [x]No S/S infection  []Redness  []Drainage []Cultured []Swelling []Pain  
[x]Medical Aseptic Prep Utilized   []Dressing Changed  [x] Biopatch  Date: 10/17/2018 []Clotted   [x]Patent   Flows: [x]Good  []Poor  []Reversed If access problem,  notified: []Yes    [x]N/A  Date:        
 
GRAFT/FISTULA ACCESS:  [x]N/A     []Right     []Left     []UE     []LE []AVG   []AVF        []Buttonhole    []Medical Aseptic Prep Utilized []No S/S infection  []Redness  []Drainage []Cultured []Swelling []Pain Bruit:   [] Strong    [] Weak       Thrill :   [] Strong    [] Weak Needle Gauge:    Length: If access problem,  notified: []Yes     []N/A  Date:       
Please describe access if present and not used:  
 
GENERAL ASSESSMENT:   
LUNGS:  Rate 16 SaO2%        [] N/A    [x] Clear  [] Coarse  [] Crackles  [] Wheezing 
      [] Diminished     Location : []RLL   []LLL    []RUL  []ELÍAS Cough: []Productive  []Dry  [x]N/A   Respirations:  [x]Easy  []Labored Therapy:  [x]RA  []NC  l/min    Mask: []NRB []Venti       O2% []Ventilator  []Intubated  [] Trach  [] BiPaP CARDIAC: [x]Regular      [] Irregular   [] Pericardial Rub  [] JVD []  Monitored  [] Bedside  [] Remotely monitored [] N/A  Rhythm: EDEMA: [x] None  []Generalized  [] Pitting [] 1    [] 2    [] 3    [] 4 [] Facial  [] Pedal  []  UE  [] LE  
SKIN:   [x] Warm  [] Hot     [] Cold   [x] Dry     [] Pale   [] Diaphoretic    
             [] Flushed  [] Jaundiced  [] Cyanotic  [] Rash  [] Weeping LOC:    [x] Alert      [x]Oriented:    [x] Person     [x] Place  [x]Time 
             [] Confused  [] Lethargic  [] Medicated  [] Non-responsive GI / ABDOMEN:  [x] Flat    [] Distended    [x] Soft    [] Firm   []  Obese 
                           [] Diarrhea  [x] Bowel Sounds  [] Nausea  [] Vomiting  / URINE ASSESSMENT:[] Voiding   [] Oliguria  [x] Anuria   []  Onofre [] Incontinent    []  Incontinent Brief      []  Bathroom Privileges PAIN: [x] 0 []1  []2   []3   []4   []5   []6   []7   []8   []9   []10 Scale 0-10  Action/Follow Up: MOBILITY:  [] Amb    [] Amb/Assist    [x] Bed    [] Wheelchair  [] Stretcher All Vitals and Treatment Details on Attached Flowsheet Hospital: [facility] Room #  [] 1st Time Acute  [] Stat  [x] Routine  [] Urgent [x] Acute Room  []  Bedside  [] ICU/CCU  [] ER Isolation Precautions:  [x] Dialysis   [] Airborne   [] Contact    [] Reverse Special Considerations:         [] Blood Consent Verified [x]N/A ALLERGIES:   [x] BANANA Code Status:  [x] Full Code  [] DNR  [] Other HBsAg ONLY: Date Drawn 8/27/2018         [x]Negative []Positive []Unknown HBsAb: Date 8/27/2018    [] Susceptible   [x] [id number] []Not Drawn  [] Drawn Current Labs:    Date of Labs: Today [x] Results for Vance Lord (MRN [id number]) as of 10/19/2018 13:03 Ref. Range 10/19/2018 05:40 WBC Latest Ref Range: 4.6 - 13.2 K/uL 3.0 (L) RBC Latest Ref Range: 4.20 - 5.30 M/uL 3.13 (L) HGB Latest Ref Range: 12.0 - 16.0 g/dL 9.9 (L) HCT Latest Ref Range: 35.0 - 45.0 % 29.8 (L) MCV Latest Ref Range: 74.0 - 97.0 FL 95.2 MCH Latest Ref Range: 24.0 - 34.0 PG 31.6 MCHC Latest Ref Range: 31.0 - 37.0 g/dL 33.2 RDW Latest Ref Range: 11.6 - 14.5 % 13.6 PLATELET Latest Ref Range: 135 - 420 K/uL 181 MPV Latest Ref Range: 9.2 - 11.8 FL 10.8 Sodium Latest Ref Range: 136 - 145 mmol/L 138 Potassium Latest Ref Range: 3.5 - 5.5 mmol/L 5.1 Chloride Latest Ref Range: 100 - 108 mmol/L 102 CO2 Latest Ref Range: 21 - 32 mmol/L 28 Anion gap Latest Ref Range: 3.0 - 18 mmol/L 8 Glucose Latest Ref Range: 74 - 99 mg/dL 89 BUN Latest Ref Range: 7.0 - 18 MG/DL 33 (H) Creatinine Latest Ref Range: 0.6 - 1.3 MG/DL 5.39 (H) BUN/Creatinine ratio Latest Ref Range: 12 - 20   6 (L) Calcium Latest Ref Range: 8.5 - 10.1 MG/DL 8.1 (L)  
GFR est non-AA Latest Ref Range: >60 ml/min/1.73m2 8 (L) GFR est AA Latest Ref Range: >60 ml/min/1.73m2 10 (L) Vancomycin, random Latest Ref Range: 5.0 - 40.0 UG/ML 13.5 DIET:  [x] Renal    [] Other     [] NPO     []  Diabetic PRIMARY NURSE REPORT: First initial/Last name/Title Pre Dialysis: Rodney Talbert RN     Time: 0529 EDUCATION:   
[x] Patient [] Other         Knowledge Basis: []None []Minimal [x] Substantial  
Barriers to learning  [x] language [x] Access Care     [] S&S of infection     [] Fluid Management     []K+     []Procedural   
[]Albumin     [x] Medications     [] Tx Options     [] Transplant     [] Diet     [] Other Teaching Tools:  [x] Explain  [] Demo  [] Handouts [] Video Patient response:   [x] Verbalized understanding  [] Teach back  [] Return demonstration [x] Requires follow up Inappropriate due to         
 
[x] Time Out/Safety Check  [x]Extracorporeal Circuit Tested for integrity RO/HEMODIALYSIS MACHINE SAFETY CHECKS  Before each treatment:    
Machine Number:                   [facility] [x] Unit Machine # 6 with centralized RO Alarm Test:  Pass time 0913         Other:        
[x] RO/Machine Log Complete Temp    36 Dialysate: pH  7.2 Conductivity: Meter   13.6     HD Machine   13.7                  TCD: 13.7 Dialyzer Lot # [id number]            Blood Tubing Lot # [id number]          Saline Lot #  [id number]  
 
CHLORINE TESTING-Before each treatment and every 4 hours Total Chlorine: [x] less than 0.1 ppm  Time: 1000 4 Hr/2nd Check Time: NA  
(if greater than 0.1 ppm from Primary then every 30 minutes from Secondary) TREATMENT INITIATION  with Dialysis Precautions:  
[x] All Connections Secured                 [x] Saline Line Double Clamped  
[x] Venous Parameters Set                  [x] Arterial Parameters Set [x] Prime Given 250ml                          [x]Air Foam Detector Engaged Treatment Initiation Note:  pt arrive via transport in bed, pt is A&O, no S/S of distress, VSS. tx started with out complications. CVC no S/S of complications. During Treatment Notes: 
1100: PT resting, no S/S of distress, face and access in view. 1130: PT resting, no S/S of distress, face and access in view. 1200: 
1230: PT resting, no S/S of distress, face and access in view. 1300: PT resting, no S/S of distress, face and access in view. Medication Dose Volume Route Initials Dialyzer Cleared: [x] Good [] Fair  [] Poor Blood processed:  52.7 L 
UF Removed  1000 Ml POst BP:   153/75       Pulse: 70      Respirations: 16  Temperature: 98.1 Epo 5000 units 2ml IV LH Post Tx Vascular Access: AVF/AVG:   N/A Vanco 500mg 100ml IV lh Catheter: Locking solution: Heparin 1:1000 Art. 1.9  Wei. 1.9 Post Assessment: Skin:  [x] Warm  [x] Dry [] Diaphoretic    [] Flushed  [] Pale [] Cyanotic [facility] Signatures Title Initials  Time Lungs: [x] Clear    [] Course  [] Crackles  [] Wheezing [] Diminished Ingrid rod RN LH 1300 Cardiac: [x] Regular   [] Irregular   [] Monitor  [] N/A  Rhythm:      
    Edema:  [x] None    [] General     [] Facial   [] Pedal    [] UE    [] LE  
    Pain: [x]0  []1  []2   []3  []4   []5   []6   []7   []8   []9   []10 Post Treatment Note: 
  Pt leaving via transport, pt tolerated the tx, pt states they are good, no S/S of distress. POST TREATMENT PRIMARY NURSE HANDOFF REPORT:  
 
First initial/Last name/Title Post Dialysis: KAYLI Arguelles RN  Time:  2869 Abbreviations: AVG-arterial venous graft, AVF-arterial venous fistula, IJ-Internal Jugular, Subcl-Subclavian, Fem-Femoral, Tx-treatment, AP/HR-apical heart rate, DFR-dialysate flow rate, BFR-blood flow rate, AP-arterial pressure, -venous pressure, UF-ultrafiltrate, TMP-transmembrane pressure, Wei-Venous, Art-Arterial, RO-Reverse Osmosis

## 2018-10-19 NOTE — PROGRESS NOTES
RENAL DAILY PROGRESS NOTE 59y F with ESRD, admitted for chest pain, short of breath, seen for ESRD management Subjective:  
Complaint: 
Overnight events noted Examined during HD. IMPRESSION:  
ESRD, MWF Access; TDC right side HTN Anemia , on epogen Short of breath, volume related + HTN urgency Lung TB, on medication PLAN:  
 At 1:14 PM on 10/19/2018, I saw and examined patient during hemodialysis treatment. The patient was receiving hemodialysis for treatment of  renal failure. I have also reviewed vital signs, intake and output, lab results and recent events, and agreed with today's dialysis order. Plan for CT chest noted, okay to give IV contrast, will re eval for any need for HD. BP gradually improving. HD rounding Blood pressure 126/76, pulse 60, temperature 98 °F (36.7 °C), temperature source Oral, resp. rate 16, height 5' 1\" (1.549 m), weight 41.9 kg (92 lb 6.4 oz), SpO2 98 %, not currently breastfeeding. Temp (24hrs), Av.2 °F (36.8 °C), Min:97.8 °F (36.6 °C), Max:98.7 °F (37.1 °C) Blood Pressure: BP: 126/76 Pulse: Pulse (Heart Rate): 60 Temp:  Temp: 98 °F (36.7 °C) Artificial Kidney Dialyzer/Set Up Inspection: [employer]  
hours Duration of Treatment (hours): 3 hours Heparin Bolus Heparin Bolus (units): 0 units Blood flow rate Blood Flow Rate (ml/min): 300 ml/min Dialysate rate Arterial Access Pressure Arterial Access Pressure (mmHg): -120 Venous Return Pressure Venous Return Pressure (mmHg): 120 Ultrafiltration Rate Goal/Amount of Fluid to Remove (mL): 1000 mL Fluid Removal Fluid Removed (mL): 2100 Net Fluid Removal NET Fluid Removed (mL): 1500 ml Current Facility-Administered Medications Medication Dose Route Frequency  polyethylene glycol (MIRALAX) packet 17 g  17 g Oral DAILY  pyrazinamide tablet 1,000 mg  1,000 mg Oral Q MON, WED & FRI  ethambutol (MYAMBUTOL) tablet 800 mg  800 mg Oral Q MON, WED & FRI  
  vancomycin (VANCOCIN) 500 mg in 0.9% sodium chloride (MBP/ADV) 100 mL MBP  500 mg IntraVENous DIALYSIS ONE TIME DOSE  amLODIPine (NORVASC) tablet 10 mg  10 mg Oral DAILY  rifAMPin (RIFADIN) capsule 600 mg  600 mg Oral DAILY  epoetin pilar (EPOGEN;PROCRIT) 5,000 Units  5,000 Units IntraVENous DIALYSIS MON, WED & FRI  aspirin chewable tablet 81 mg  81 mg Oral DAILY  atorvastatin (LIPITOR) tablet 40 mg  40 mg Oral QHS  bisacodyl (DULCOLAX) tablet 5 mg  5 mg Oral DAILY PRN  
 carvedilol (COREG) tablet 25 mg  25 mg Oral Q12H  
 dicyclomine (BENTYL) capsule 20 mg  20 mg Oral TID PRN  
 docusate sodium (COLACE) capsule 100 mg  100 mg Oral BID  FLUoxetine (PROzac) capsule 10 mg  10 mg Oral DAILY  isoniazid (NYDRAZID) tablet 300 mg  300 mg Oral DAILY  melatonin tablet 3 mg  3 mg Oral QHS PRN  pyridoxine (vitamin B6) (VITAMIN B-6) tablet 100 mg  100 mg Oral DAILY  acetaminophen (TYLENOL) tablet 650 mg  650 mg Oral Q4H PRN  
 heparin (porcine) injection 5,000 Units  5,000 Units SubCUTAneous Q8H  
 influenza vaccine 2018-19 (6 mos+)(PF) (FLUARIX QUAD/FLULAVAL QUAD) injection 0.5 mL  0.5 mL IntraMUSCular PRIOR TO DISCHARGE  cloNIDine HCl (CATAPRES) tablet 0.3 mg  0.3 mg Oral TID  ondansetron (ZOFRAN) injection 4 mg  4 mg IntraVENous Q4H PRN  promethazine (PHENERGAN) 12.5 mg in 0.9% sodium chloride 50 mL IVPB  12.5 mg IntraVENous Q4H PRN  
 hydrALAZINE (APRESOLINE) 20 mg/mL injection 10 mg  10 mg IntraVENous Q6H  
 minoxidil (LONITEN) tablet 5 mg  5 mg Oral BID  losartan (COZAAR) tablet 50 mg  50 mg Oral DAILY  0.9% sodium chloride infusion  100 mL/hr IntraVENous DIALYSIS PRN  
 heparin (porcine) 1,000 unit/mL injection 1,000 Units  1,000 Units InterCATHeter DIALYSIS PRN  
 levoFLOXacin (LEVAQUIN) 500 mg in D5W IVPB  500 mg IntraVENous Q48H  piperacillin-tazobactam (ZOSYN) 2.25 g in 0.9% sodium chloride (MBP/ADV) 50 mL ADV  2.25 g IntraVENous Q8H  
  Piperacillin-tazobactam (ZOSYN) 0.75 gm Supplemental Dosing by Pharmacy   Other Rx Dosing/Monitoring  VANCOMYCIN INFORMATION NOTE   Other Rx Dosing/Monitoring Review of Symptoms: comprehensive ROS negative except above. Objective:  
 
Patient Vitals for the past 24 hrs: 
 Temp Pulse Resp BP SpO2  
10/19/18 1245  60  126/76   
10/19/18 1230  62  (!) 144/92   
10/19/18 1215  65  158/89   
10/19/18 1200  61  115/74   
10/19/18 1145  62  111/73   
10/19/18 1130  64  125/78   
10/19/18 1115  63  129/79   
10/19/18 1100  62  129/82   
10/19/18 1045  63  136/81   
10/19/18 1030  63  (!) 155/93   
10/19/18 1023 98 °F (36.7 °C) 66 16 152/84   
10/19/18 0725 97.8 °F (36.6 °C) 62 19 144/84 98 % 10/19/18 0548  66  133/80   
10/19/18 0400 98.7 °F (37.1 °C) 63 18 129/81 97 % 10/18/18 2336 98.6 °F (37 °C) 62 20 125/77 97 % 10/18/18 1930 98 °F (36.7 °C) 67 20 (!) 162/98 97 % 10/18/18 1808  68 25 (!) 159/98 98 % 10/18/18 1800  71 26 (!) 178/92 97 % 10/18/18 1515 98.2 °F (36.8 °C) 65 23 133/78 97 % 10/18/18 1430  69 25 124/72 98 % 10/18/18 1400  67 20 122/73 98 % 10/18/18 1330  70 18 117/67 96 % Weight change:  
 
 10/17 1901 - 10/19 0700 In: 5 [P.O.:420] Out: 70 [Urine:70] Intake/Output Summary (Last 24 hours) at 10/19/2018 1306 Last data filed at 10/19/2018 0400 Gross per 24 hour Intake 270 ml Output 50 ml Net 220 ml Physical Exam:  
General: comfortable, no acute distress HEENT sclera anicteric, CVS: S1S2 heard,  no rub RS: + air entry b/l, Abd: Soft, Non tender, Neuro: non focal, Extrm: no edema, no cyanosis, clubbing Skin: no visible  Rash Musculoskeletal: No gross joints or bone deformities Access; right arm TDC, left arm AVG non functional  
 
 
 
 
Data Review:  
 
LABS:  
Hematology:  
Recent Labs 10/19/18 
0540 10/18/18 
1389 10/17/18 
5748 WBC 3.0* 2.5* 3.5* HGB 9.9* 9.8* 9.7* HCT 29.8* 29.3* 29.6*  
 
 Chemistry:  
Recent Labs 10/19/18 
0540 10/18/18 
2162 10/17/18 
6132 BUN 33* 19* 40* CREA 5.39* 3.69* 5.69* CA 8.1* 8.3* 8.4*  
K 5.1 4.4 5.1  139 138  101 99* CO2 28 31 29 GLU 89 89 94 Procedures/imaging: see electronic medical records for all procedures, Xrays and details which were not copied into this note but were reviewed prior to creation of Plan Assessment & Plan: As above Pooja Bowling MD 
10/19/2018 
1:06 PM

## 2018-10-19 NOTE — PROGRESS NOTES
Anti-TB med dosing confirmed with health department (Ms. Renetta Byrd.) HD schedule outpatient MWF Pyrazinamide 1000 mg 3 times per week after hemodialysis (MWF) Ethambutol 800 mg 3 times per week after hemodialysis (MWF) Rifampin 600 mg once daily (after dialysis on dialysis days) Isoniazid 300 mg daily (after dialysis on dialysis days) All doses for intermittent hemodialysis verified on Up to Date Inpatient medication frequencies updated to reflect HD schedule and outpatient regimen.

## 2018-10-19 NOTE — CONSULTS
[facility] Pulmonary, Critical Care, and Sleep Medicine Name: Val Brooks MRN: [id number] : 1960 Hospital: [address] [place] [address]  Date: 10/19/2018 Pulmonary Medicine-  Follow Up IMPRESSION:  
· Acute respiratory failure- suspect flash pulmonary edema due to hypertensive emergency, vs infection or both · Pericardial effusion- increased from last echo/CT: etiology? Uremia? Minoxidil Discussed with Dr. Dakota Villegas · Pleural effusion- Right; interval increase- etiology? CHF? Has been tapped before, Prior effusion + TB from [facility]- on [employer] · Pulmonary Infiltrates- pulmonary edema improved; but now area of consolidation at RML- appears to be more from pericardial effusion and small degree of atelectasis · Hypertensive Urgency- acute on chronic process- BP chronically labile with recurrent admissions for hypertensive urgency/emergencies - improved · Tuberculosis- Primary: + TB from pleural fluid and Bronch from [facility]- On DOT with [address] · MAC also isolated of prior BAL from bronch- unclear clinical significance · CHF- Diastolic- acute decompensation - improved · H/O NSTEMI- Cardiology following · End Stage Renal Disease- on HD: MWF- Nephrology following · Nausea with emesis- acute on chronic problem-improved · Leukopenia- stable RECOMMENDATIONS:  
· Obtain echo · Per health department instructions- TB meds are to be dosed based on their recommendations- no dose adjustments or changes in TB meds without conferring with Health Department (INH, PZA, Rifampin, B6) · Keep HOB elevated; aspiration precautions · SpO2 goal>92% · Discontinue vancomycin- streamline antibiotics · Bronchial hygiene encouraged/ IS at bedside · HD per nephrology · BP management per Primary team and cardiology- may need to stop Minoxidil if contributing to pericardial effusion · Prophylaxis per primary team 
· Hold on bronchoscopy for now · May need to consider thoracentesis to reacess pleural effuion · Will continue to follow Subjective/History:  
10/19/18 · Patient on Room Air - appears comfortable · CT chest notable for increasing pericardial and pleural effusions · Spoke with Son-in-law: Prior to this admission she had been eating rice with soy sauce prior to taking TB meds due to feeling nauseated by medications. All salt, MSG and soy products being removed from her diet · BP improved · Afebrile · Leukopenia stable Patient Vitals for the past 24 hrs: 
 Temp Pulse Resp BP SpO2  
10/19/18 1433    153/75   
10/19/18 1335 98.1 °F (36.7 °C) 70 16 153/75   
10/19/18 1330  62  149/85   
10/19/18 1315  61  151/88   
10/19/18 1300  64  152/84   
10/19/18 1245  60  126/76   
10/19/18 1230  62  (!) 144/92   
10/19/18 1215  65  158/89   
10/19/18 1200  61  115/74   
10/19/18 1145  62  111/73   
10/19/18 1130  64  125/78   
10/19/18 1115  63  129/79   
10/19/18 1100  62  129/82   
10/19/18 1045  63  136/81   
10/19/18 1030  63  (!) 155/93   
10/19/18 1023 98 °F (36.7 °C) 66 16 152/84   
10/19/18 0725 97.8 °F (36.6 °C) 62 19 144/84 98 % 10/19/18 0548  66  133/80   
10/19/18 0400 98.7 °F (37.1 °C) 63 18 129/81 97 % 10/18/18 2336 98.6 °F (37 °C) 62 20 125/77 97 % 10/18/18 1930 98 °F (36.7 °C) 67 20 (!) 162/98 97 % 10/18/18 1808  68 25 (!) 159/98 98 % 10/18/18 1800  71 26 (!) 178/92 97 % HPI This patient has been seen and evaluated at the request of Dr. Kathy Howard for pulmonary infiltrates. Patient is a 66 y.o. female - [place] speaking only which a history of multiple admissions to [facility] due to malignant hypertension with concurrent decompensated diastolic heart failure. The patient also has a history of primary pulmonary tuberculosis and is on DOT therapy by local health department.  MAC was also isolated from a BAL in the past.   
 
 The patient was recently readmitted to the SDU for recurrent hypertension and acute respiratory failure. BP medications were again modified. Home dietary intact was again reviewed with family. The patient tends mostly to eat food from home and not from our food services. She found to be taking MSG- unclear if that has been contributing to labile blood pressures. The patient also has recurrent nausea and emesis- unclear etiology: gastroparesis and/or irritability from medications In regards to her pulmonary status the patient has been undergoing DOT by local health department. She gets her HD in the morning and she receives her TB meds in the afternoon/evening. She denies hemoptysis or sputum production. Her respiratory status today is much improved from prior days. Patient Vitals for the past 24 hrs: 
 Temp Pulse Resp BP SpO2  
10/19/18 1433    153/75   
10/19/18 1335 98.1 °F (36.7 °C) 70 16 153/75   
10/19/18 1330  62  149/85   
10/19/18 1315  61  151/88   
10/19/18 1300  64  152/84   
10/19/18 1245  60  126/76   
10/19/18 1230  62  (!) 144/92   
10/19/18 1215  65  158/89   
10/19/18 1200  61  115/74   
10/19/18 1145  62  111/73   
10/19/18 1130  64  125/78   
10/19/18 1115  63  129/79   
10/19/18 1100  62  129/82   
10/19/18 1045  63  136/81   
10/19/18 1030  63  (!) 155/93   
10/19/18 1023 98 °F (36.7 °C) 66 16 152/84   
10/19/18 0725 97.8 °F (36.6 °C) 62 19 144/84 98 % 10/19/18 0548  66  133/80   
10/19/18 0400 98.7 °F (37.1 °C) 63 18 129/81 97 % 10/18/18 2336 98.6 °F (37 °C) 62 20 125/77 97 % 10/18/18 1930 98 °F (36.7 °C) 67 20 (!) 162/98 97 % 10/18/18 1808  68 25 (!) 159/98 98 % 10/18/18 1800  71 26 (!) 178/92 97 % Past Medical History:  
Diagnosis Date  Arthritis GOUT  Asthma  Bilateral leg pain  Chest pain at rest   
 Chronic kidney disease  ESRD (end stage renal disease) ([facility] TUES-THURS-SAT  Hypercholesteremia  Hypertension  Joint pain  Kidney disease  Pleural effusion  Pulmonary tuberculosis  Vitamin D deficiency Past Surgical History:  
Procedure Laterality Date  CHEST SURGERY PROCEDURE UNLISTED    
 THORACENTHESIS  
 HX OTHER SURGICAL    
 BRONCHOSCOPY  
 HX VASCULAR ACCESS  01/2018; 11/2017 LEFT ARM VENOUS ACCESS  
 HX VASCULAR ACCESS  02/2017 [facility]  VASCULAR SURGERY PROCEDURE UNLIST Prior to Admission medications Medication Sig Start Date End Date Taking? Authorizing Provider  
amLODIPine (NORVASC) 10 mg tablet Take 1 Tab by mouth daily. 10/6/18  Yes Hussein Cardona MD  
carvedilol (COREG) 25 mg tablet Take 1 Tab by mouth every twelve (12) hours. 10/6/18  Yes Hussein Cardona MD  
cloNIDine (CATAPRES) 0.3 mg/24 hr 1 Patch by TransDERmal route every seven (7) days. 10/6/18  Yes Hussein Cardona MD  
losartan (COZAAR) 100 mg tablet Take 1 Tab by mouth daily. 10/6/18  Yes Hussein Cardona MD  
bisacodyl (DULCOLAX) 5 mg EC tablet Take 1 Tab by mouth daily as needed for Constipation. 8/5/18  Yes Vu Ahn MD  
albuterol (PROVENTIL HFA, VENTOLIN HFA, PROAIR HFA) 90 mcg/actuation inhaler Take 2 Puffs by inhalation every four (4) hours as needed for Wheezing. 7/21/18  Yes Hussein Cardona MD  
azithromycin (ZITHROMAX) 500 mg tab Take 1 Tab by mouth every Tuesday Thursday, Saturday. At 5 PM 7/21/18  Yes Hussein Cardona MD  
aspirin 81 mg chewable tablet Take 1 Tab by mouth daily. 3/8/18  Yes Mary Jane Mott MD  
atorvastatin (LIPITOR) 40 mg tablet Take 1 Tab by mouth nightly. 3/8/18  Yes Mary Jane Mott MD  
dicyclomine (BENTYL) 20 mg tablet Take 20 mg by mouth three (3) times daily as needed (abdominial pain). Provider, Historical  
cloNIDine HCl (CATAPRES) 0.3 mg tablet Take 0.3 mg by mouth three (3) times daily.     Provider, Historical  
glycerin, adult, (FLEET GLYCERIN, ADULT,) suppository Insert 1 Suppository into rectum daily as needed. Indications: BOWEL EVACUATION 10/6/18   Dennis Blank MD  
minoxidil (LONITEN) 2.5 mg tablet Take 1 Tab by mouth two (2) times a day. 10/6/18   Dennis Blank MD  
OTHER Check CBC, CMP, MG on 10/08/18, Results to PCP immediately, Diagnosis- TB 10/6/18   Dennis Blank MD  
ethambutol (MYAMBUTOL) 400 mg tablet Take 2 Tabs by mouth every Tuesday Thursday, Saturday. At 5 PM 7/21/18   Dennis Blank MD  
isoniazid (NYDRAZID) 300 mg tablet Take 1 Tab by mouth daily. On dialysis days, please take medication after dialysis 7/21/18   Dennis Blank MD  
pyrazinamide 500 mg tablet Take 2 Tabs by mouth every Tuesday Thursday, Saturday. At 5 pm  Indications: active tuberculosis 7/24/18   Dennis Blank MD  
rifAMPin (RIFADIN) 300 mg capsule Take 2 Caps by mouth daily. On dialysis days please take this medication after Dialysis 7/21/18   Dennis Blank MD  
docusate sodium (COLACE) 100 mg capsule Take 1 Cap by mouth two (2) times a day. 7/21/18   Dennis Blank MD  
pyridoxine, vitamin B6, (VITAMIN B-6) 100 mg tablet Take 1 Tab by mouth daily. 7/22/18   Dennis Blank MD  
OTHER Incentive Spirometry- use as directed 7/21/18   Dennis Blank MD  
melatonin 3 mg tablet Take 1 Tab by mouth nightly as needed. Patient taking differently: Take 1 Tab by mouth nightly as needed (insomnia). 3/8/18   Vamsi Eduardo MD  
FLUoxetine (PROZAC) 10 mg capsule Take 1 Cap by mouth daily. 3/8/18   Vamsi Eduardo MD  
 
Current Facility-Administered Medications Medication Dose Route Frequency  polyethylene glycol (MIRALAX) packet 17 g  17 g Oral DAILY  pyrazinamide tablet 1,000 mg  1,000 mg Oral Q MON, WED & FRI  ethambutol (MYAMBUTOL) tablet 800 mg  800 mg Oral Q MON, WED & FRI  minoxidil (LONITEN) tablet 10 mg  10 mg Oral BID  rifAMPin (RIFADIN) capsule 600 mg  600 mg Oral DAILY  epoetin pilar (EPOGEN;PROCRIT) 5,000 Units  5,000 Units IntraVENous DIALYSIS MON, WED & FRI  
  aspirin chewable tablet 81 mg  81 mg Oral DAILY  atorvastatin (LIPITOR) tablet 40 mg  40 mg Oral QHS  carvedilol (COREG) tablet 25 mg  25 mg Oral Q12H  
 docusate sodium (COLACE) capsule 100 mg  100 mg Oral BID  FLUoxetine (PROzac) capsule 10 mg  10 mg Oral DAILY  isoniazid (NYDRAZID) tablet 300 mg  300 mg Oral DAILY  pyridoxine (vitamin B6) (VITAMIN B-6) tablet 100 mg  100 mg Oral DAILY  heparin (porcine) injection 5,000 Units  5,000 Units SubCUTAneous Q8H  
 influenza vaccine - (6 mos+)(PF) (FLUARIX QUAD/FLULAVAL QUAD) injection 0.5 mL  0.5 mL IntraMUSCular PRIOR TO DISCHARGE  cloNIDine HCl (CATAPRES) tablet 0.3 mg  0.3 mg Oral TID  losartan (COZAAR) tablet 50 mg  50 mg Oral DAILY  levoFLOXacin (LEVAQUIN) 500 mg in D5W IVPB  500 mg IntraVENous Q48H  piperacillin-tazobactam (ZOSYN) 2.25 g in 0.9% sodium chloride (MBP/ADV) 50 mL ADV  2.25 g IntraVENous Q8H  
 Piperacillin-tazobactam (ZOSYN) 0.75 gm Supplemental Dosing by Pharmacy   Other Rx Dosing/Monitoring  VANCOMYCIN INFORMATION NOTE   Other Rx Dosing/Monitoring Allergies Allergen Reactions  Banana Other (comments) Social History Tobacco Use  Smoking status: Never Smoker  Smokeless tobacco: Never Used Substance Use Topics  Alcohol use: No  
  
History reviewed. No pertinent family history. Review of Systems: 
Pertinent items are noted in HPI. Objective: 
Vital Signs:   
Visit Vitals /75 Pulse 70 Temp 98.1 °F (36.7 °C) (Oral) Resp 16 Ht 5' 1\" (1.549 m) Wt 41.7 kg (92 lb) SpO2 98% Breastfeeding? No  
BMI 17.38 kg/m² O2 Device: Room air O2 Flow Rate (L/min): 2 l/min Temp (24hrs), Av.2 °F (36.8 °C), Min:97.8 °F (36.6 °C), Max:98.7 °F (37.1 °C) Intake/Output:  
Last shift:      10/19 0701 - 10/19 1900 In: -  
Out: 1000 Last 3 shifts: 10/17 1901 - 10/19 0700 In: 5 [P.O.:420] Out: 70 [Urine:70] Intake/Output Summary (Last 24 hours) at 10/19/2018 1637 Last data filed at 10/19/2018 1330 Gross per 24 hour Intake 150 ml Output 1000 ml Net -850 ml Physical Exam: 
 
General:  Alert, cooperative, no distress, appears stated age. Thin  
Head:  Normocephalic, without obvious abnormality, atraumatic. Eyes:  Conjunctivae/corneas clear. PERRL, EOMs intact. Nose: Nares normal. Septum midline. Mucosa normal. No drainage or sinus tenderness. Throat: Lips, mucosa, and tongue normal. No exudates Neck: Supple, symmetrical, trachea midline, no adenopathy, thyroid: no enlargment/tenderness/nodules, no carotid bruit and no JVD. Back:   Symmetric Lungs:   Clear to auscultation bilaterally, no wheezing, no stridor Chest wall:  No tenderness or deformity. Heart:  Regular rate and rhythm, S1, S2 normal, no murmur, click, rub or gallop. Abdomen:   Soft, non-tender. Bowel sounds normal. No masses,  No organomegaly palpated Extremities: Extremities normal, atraumatic, no cyanosis or edema. Pulses: 2+ and symmetric all extremities. Skin: Skin color, texture, turgor normal. No rashes or lesions Lymph nodes: 
 
  Cervical, supraclavicular nodes unremarkable. Neurologic: Grossly nonfocal  
 
 
Data:  
 
Recent Results (from the past 24 hour(s)) METABOLIC PANEL, BASIC Collection Time: 10/19/18  5:40 AM  
Result Value Ref Range Sodium 138 136 - 145 mmol/L Potassium 5.1 3.5 - 5.5 mmol/L Chloride 102 100 - 108 mmol/L  
 CO2 28 21 - 32 mmol/L Anion gap 8 3.0 - 18 mmol/L Glucose 89 74 - 99 mg/dL BUN 33 (H) 7.0 - 18 MG/DL Creatinine 5.39 (H) 0.6 - 1.3 MG/DL  
 BUN/Creatinine ratio 6 (L) 12 - 20 GFR est AA 10 (L) >60 ml/min/1.73m2 GFR est non-AA 8 (L) >60 ml/min/1.73m2 Calcium 8.1 (L) 8.5 - 10.1 MG/DL  
CBC W/O DIFF Collection Time: 10/19/18  5:40 AM  
Result Value Ref Range WBC 3.0 (L) 4.6 - 13.2 K/uL  
 RBC 3.13 (L) 4.20 - 5.30 M/uL HGB 9.9 (L) 12.0 - 16.0 g/dL HCT 29.8 (L) 35.0 - 45.0 % MCV 95.2 74.0 - 97.0 FL  
 MCH 31.6 24.0 - 34.0 PG  
 MCHC 33.2 31.0 - 37.0 g/dL  
 RDW 13.6 11.6 - 14.5 % PLATELET 827 510 - 807 K/uL MPV 10.8 9.2 - 11.8 FL  
VANCOMYCIN, RANDOM Collection Time: 10/19/18  5:40 AM  
Result Value Ref Range Vancomycin, random 13.5 5.0 - 40.0 UG/ML  
ECHO ADULT FOLLOW-UP OR LIMITED Collection Time: 10/19/18  2:34 PM  
Result Value Ref Range LVIDd 4.25 3.9 - 5.3 cm  
 LVPWd 1.51 (A) 0.6 - 0.9 cm LVIDs 2.22 cm IVSd 1.51 (A) 0.6 - 0.9 cm Inferior Vena Cava Diameter Sniffing 1.11 cm  
 LV Mass .3 (A) 67 - 162 g  
 LV Mass AL Index 191.4 (A) 43 - 95 g/m2 Imaging: 
I have personally reviewed the patients radiographs and have reviewed the reports: CXR Results  (Last 48 hours) None CXR: 10/18/18 IMPRESSION:  
 
Persistent consolidation at the medial right lower lung but patchy bilateral  
pleural effusions and vascular congestion are interval improved. Small right pleural effusion. Thank you for your referral.  
 
Echo: 8/23/18 · Estimated left ventricular ejection fraction is 56 - 60%. Left ventricular moderate concentric hypertrophy. Normal left ventricular wall motion, no regional wall motion abnormality noted. · Saline contrast was given to evaluate for intracardiac shunt. No shunt seen. · Small circumferential pericardial effusion with more prominent area around the right artium. No indications of tamponade present. 10/15/18                                                                 10/2/18 
 
  
10/18/18                                                                    10/15/18 CT Abdo: 8/22/18 CT Chest 10/19/18 Total clinical care time exclusive of procedures: 60 minutes Kathryn Vázquez DO, [facility], Sleep, Critical Care Medicine

## 2018-10-19 NOTE — PROGRESS NOTES
conducted a Follow up consultation and Spiritual Assessment for Edward Mcghee, who is a 66 y.o.,female. The  provided the following Interventions: 
Continued the relationship of care and support. Listened empathically. Offered prayer and assurance of continued prayer on patients behalf. Chart reviewed. The following outcomes were achieved: 
Patient expressed gratitude for 's visit. Assessment: 
There are no further spiritual or [employer] issues which require Spiritual Care Services interventions at this time. Plan: 
Chaplains will continue to follow and will provide pastoral care on an as needed/requested basis.  recommends bedside caregivers page  on duty if patient shows signs of acute spiritual or emotional distress.  Juany Renato Spiritual Care  
[phone]

## 2018-10-19 NOTE — PROGRESS NOTES
[facility] 
 
 
CARDIOLOGY PROGRESS NOTE 
RECS: 
 
1. Acute respiratory failure-  possible related to Acute decompensated CHF in the setting of uncontrolled Hypertension and possible sepsis on O2 nasal canula 2. Acute Diastolic Heart failure-better clinically. volume status per renal.  
3. Minimally  elevated troponin- possible demand inchemia  But MI needs to r/o. Monitor for ischemia symptoms follow ekg today. On asa, statin and bb.  
4. Hx of NSTEMI- in 2/18 NUC stress that showed small reversible apical inferior defect noted from these nuclear study suggestive of ischemia in the area. Possible distal LAD or RCA involmenent patient was managed medically. 5. Malignant hypertension-d/c Norvasc and Hydralazine increased Loniten to 10 mg bid. Continue with current medications by parameters  Still require multiple medications for BP control. Discussed with son-in law about low sodium diet. Patient has very high sodium intake diet at home. She uses MSG in food and soup. 6. ESRD- on HD renal following. She is MWF HD schedule 7. Pulmonany Tuberculosis- on 4 medications per medical team 
8. Possible sepsis? ? On antibiotics managed by medical team.  
9. Nausea and vomiting- better  w/u per medical team 
  
Will increase minoxidil and discontinue hydralazine and amlodipine 
   
Echo 8/18 · Estimated left ventricular ejection fraction is 56 - 60%. Left ventricular moderate concentric hypertrophy. Normal left ventricular wall motion, no regional wall motion abnormality noted. · Saline contrast was given to evaluate for intracardiac shunt. No shunt seen. · Small circumferential pericardial effusion with more prominent area around the right artium. No indications of tamponade 
  
  
NUC 2/18 1.  Post-stress imaging in short axis, horizontal and vertical long axis view reveals a small apical inferior defect in perfusion with a medium intensity. 2.  Resting images show homogenous isotope uptake in all areas. 3.  Gated images show normal left ventricular size, wall motion, and systolic function appears normal.  Ejection fraction is 58%.   
  
DIAGNOSES:   
1.  Abnormal scan. 2.  Evidence of a small reversible apical inferior defect noted from these nuclear study suggestive of ischemia in the area.  This is involving either the mid or distal left anterior descending or right coronary artery. 3.  Medium risk scan. ASSESSMENT: 
Hospital Problems  Date Reviewed: 10/16/2018 Codes Class Noted POA Malignant hypertension ICD-10-CM: I10 
ICD-9-CM: 401.0  10/15/2018 Unknown HCAP (healthcare-associated pneumonia) ICD-10-CM: J18.9 ICD-9-CM: 368  10/2/2018 Yes Pulmonary TB ICD-10-CM: A15.0 ICD-9-CM: 011.90  7/17/2018 Yes Hypertensive crisis ICD-10-CM: I16.9 ICD-9-CM: 401.9  7/17/2018 Yes [facility]) [facility] ICD-10-CM: N18.6 ICD-9-CM: 585.6  2/25/2018 Yes Respiratory failure ([facility] ICD-10-CM: J96.90 ICD-9-CM: 518.81  2/25/2018 Yes Hypoxia ICD-10-CM: R09.02 
ICD-9-CM: 799.02  2/25/2018 Yes SUBJECTIVE: 
 
Doing better more alert today. son in law with patient. OBJECTIVE: 
 
VS:  
Visit Vitals /84 Pulse 62 Temp 97.8 °F (36.6 °C) Resp 19 Ht 5' 1\" (1.549 m) Wt 41.9 kg (92 lb 6.4 oz) SpO2 98% Breastfeeding? No  
BMI 17.46 kg/m² Intake/Output Summary (Last 24 hours) at 10/19/2018 0920 Last data filed at 10/19/2018 0400 Gross per 24 hour Intake 320 ml Output 70 ml Net 250 ml  
 
TELE: normal sinus rhythm General: alert, well developed, pleasant and in no apparent distress HENT: Normocephalic, atraumatic. Normal external eye. Neck :  no JVD Cardiac:  regular rate and rhythm, S1, S2 normal, no S3 or S4, no click Lungs: clear to auscultation bilaterally Abdomen: Soft, nontender, no masses Extremities:  No c/c/e, peripheral pulses present Labs: Results:  
   
Chemistry Recent Labs 10/19/18 
0540 10/18/18 
6800 10/17/18 
2959 GLU 89 89 94  139 138  
K 5.1 4.4 5.1  101 99* CO2 28 31 29 BUN 33* 19* 40* CREA 5.39* 3.69* 5.69* CA 8.1* 8.3* 8.4* AGAP 8 7 10 BUCR 6* 5* 7* CBC w/Diff Recent Labs 10/19/18 
0540 10/18/18 
1230 10/17/18 
5825 WBC 3.0* 2.5* 3.5*  
RBC 3.13* 3.12* 3.07* HGB 9.9* 9.8* 9.7* HCT 29.8* 29.3* 29.6*  
 175 170 Cardiac Enzymes No results for input(s): CPK, CKND1, WOLFGANG in the last 72 hours. No lab exists for component: Rebeca Jj Coagulation No results for input(s): PTP, INR, APTT in the last 72 hours. No lab exists for component: INREXT, INREXT Lipid Panel Lab Results Component Value Date/Time Cholesterol, total 128 08/23/2018 02:35 AM  
 HDL Cholesterol 62 (H) 08/23/2018 02:35 AM  
 LDL, calculated 35 08/23/2018 02:35 AM  
 VLDL, calculated 31 08/23/2018 02:35 AM  
 Triglyceride 155 (H) 08/23/2018 02:35 AM  
 CHOL/HDL Ratio 2.1 08/23/2018 02:35 AM  
  
BNP No results for input(s): BNPP in the last 72 hours. Liver Enzymes No results for input(s): TP, ALB, TBIL, AP, SGOT, GPT in the last 72 hours. No lab exists for component: DBIL Thyroid Studies Lab Results Component Value Date/Time TSH 6.21 (H) 08/24/2018 08:00 AM  
    
 
 
 
Jen Oconnor I have independently evaluated taken history and examined the patient. All relevant labs and testing data's are reviewed. Care plan discussed and updated after review.  
 
Beatrice Green MD

## 2018-10-19 NOTE — CONSULTS
[facility] Pulmonary, Critical Care, and Sleep Medicine Name: Nico Cotton MRN: [id number] : 1960 Hospital: [facility] Date: 10/19/2018 Pulmonary Medicine-  Follow Up IMPRESSION:  
· Acute respiratory failure- suspect flash pulmonary edema due to hypertensive emergency, vs infection or both · Pulmonary Infiltrates- pulmonary edema improved; but now area of consolidation at RML- also prior history of pericardial effusion from CT and echo in Aug 2018- etiology?: Atelectasis, pericardial effusion, aspiration?, other? · Hypertensive Urgency- acute on chronic process- BP chronically labile with recurrent admissions for hypertensive urgency/emergencies - improved · Tuberculosis- Primary: + TB from pleural fluid and Bronch from [facility]- On DOT with [address] · MAC also isolated of prior BAL from bronch- unclear clinical significance · CHF- Diastolic- acute decompensation - improved · H/O NSTEMI- Cardiology following · End Stage Renal Disease- on HD: MWF- Nephrology following · Nausea with emesis- acute on chronic problem-improved · Leukopenia- stable RECOMMENDATIONS:  
· Obtain echo and CT Chest 
· Per health department instructions- TB meds are to be dosed based on their recommendations- no dose adjustments or changes in TB meds without conferring with Health Department (INH, PZA, Rifampin, B6) · Keep HOB elevated; aspiration precautions · SpO2 goal>92% · Discontinue vancomycin · Bronchial hygiene encouraged/ IS at bedside · HD per nephrology · BP management per Primary team and cardiology · Prophylaxis per primary team 
· Pending echo and CT chest may need to pursue bronchoscopy. · Will continue to follow Subjective/History:  
10/19/18 · Follow up CXR with area of consolidation RML- etiology? Needs CT Chest and echo · BP improved · Afebrile · Leukopenia stable HPI 
 This patient has been seen and evaluated at the request of Dr. Chancy Bosworth for pulmonary infiltrates. Patient is a 66 y.o. female - [place] speaking only which a history of multiple admissions to [facility] due to malignant hypertension with concurrent decompensated diastolic heart failure. The patient also has a history of primary pulmonary tuberculosis and is on DOT therapy by local health department. MAC was also isolated from a BAL in the past.   
 
The patient was recently readmitted to the SDU for recurrent hypertension and acute respiratory failure. BP medications were again modified. Home dietary intact was again reviewed with family. The patient tends mostly to eat food from home and not from our food services. She found to be taking MSG- unclear if that has been contributing to labile blood pressures. The patient also has recurrent nausea and emesis- unclear etiology: gastroparesis and/or irritability from medications In regards to her pulmonary status the patient has been undergoing DOT by local health department. She gets her HD in the morning and she receives her TB meds in the afternoon/evening. She denies hemoptysis or sputum production. Her respiratory status today is much improved from prior days. Patient Vitals for the past 24 hrs: 
 Temp Pulse Resp BP SpO2  
10/19/18 1100  62  129/82   
10/19/18 1045  63  136/81   
10/19/18 1030  63  (!) 155/93   
10/19/18 1023 98 °F (36.7 °C) 66 16 152/84   
10/19/18 0725 97.8 °F (36.6 °C) 62 19 144/84 98 % 10/19/18 0548  66  133/80   
10/19/18 0400 98.7 °F (37.1 °C) 63 18 129/81 97 % 10/18/18 2336 98.6 °F (37 °C) 62 20 125/77 97 % 10/18/18 1930 98 °F (36.7 °C) 67 20 (!) 162/98 97 % 10/18/18 1808  68 25 (!) 159/98 98 % 10/18/18 1800  71 26 (!) 178/92 97 % 10/18/18 1515 98.2 °F (36.8 °C) 65 23 133/78 97 % 10/18/18 1430  69 25 124/72 98 % 10/18/18 1400  67 20 122/73 98 % 10/18/18 1330  70 18 117/67 96 % 10/18/18 1300  72 18 123/67 96 % 10/18/18 1230  65 23 154/84 96 % 10/18/18 1200  64 18 153/89 97 % 10/18/18 1150  65 22 162/89 98 % Past Medical History:  
Diagnosis Date  Arthritis GOUT  Asthma  Bilateral leg pain  Chest pain at rest   
 Chronic kidney disease  ESRD (end stage renal disease) ([facility] TUES-THURS-SAT  Hypercholesteremia  Hypertension  Joint pain  Kidney disease  Pleural effusion  Pulmonary tuberculosis  Vitamin D deficiency Past Surgical History:  
Procedure Laterality Date  CHEST SURGERY PROCEDURE UNLISTED    
 THORACENTHESIS  
 HX OTHER SURGICAL    
 BRONCHOSCOPY  
 HX VASCULAR ACCESS  01/2018; 11/2017 LEFT ARM VENOUS ACCESS  
 HX VASCULAR ACCESS  02/2017 [address]  VASCULAR SURGERY PROCEDURE UNLIST Prior to Admission medications Medication Sig Start Date End Date Taking? Authorizing Provider  
amLODIPine (NORVASC) 10 mg tablet Take 1 Tab by mouth daily. 10/6/18  Yes Nick Hawkins MD  
carvedilol (COREG) 25 mg tablet Take 1 Tab by mouth every twelve (12) hours. 10/6/18  Yes Nick Hawkins MD  
cloNIDine (CATAPRES) 0.3 mg/24 hr 1 Patch by TransDERmal route every seven (7) days. 10/6/18  Yes Nick Hawkins MD  
losartan (COZAAR) 100 mg tablet Take 1 Tab by mouth daily. 10/6/18  Yes Nick Hawkins MD  
bisacodyl (DULCOLAX) 5 mg EC tablet Take 1 Tab by mouth daily as needed for Constipation. 8/5/18  Yes Irma Wood MD  
albuterol (PROVENTIL HFA, VENTOLIN HFA, PROAIR HFA) 90 mcg/actuation inhaler Take 2 Puffs by inhalation every four (4) hours as needed for Wheezing. 7/21/18  Yes Nick Hawkins MD  
azithromycin (ZITHROMAX) 500 mg tab Take 1 Tab by mouth every Tuesday Thursday, Saturday. At 5 PM 7/21/18  Yes Nick Hawkins MD  
aspirin 81 mg chewable tablet Take 1 Tab by mouth daily. 3/8/18  Yes Marko Baxter MD  
atorvastatin (LIPITOR) 40 mg tablet Take 1 Tab by mouth nightly.  3/8/18 Yes Pavan Al MD  
dicyclomine (BENTYL) 20 mg tablet Take 20 mg by mouth three (3) times daily as needed (abdominial pain). Provider, Historical  
cloNIDine HCl (CATAPRES) 0.3 mg tablet Take 0.3 mg by mouth three (3) times daily. Provider, Historical  
glycerin, adult, (FLEET GLYCERIN, ADULT,) suppository Insert 1 Suppository into rectum daily as needed. Indications: BOWEL EVACUATION 10/6/18   Winsome Peralta MD  
minoxidil (LONITEN) 2.5 mg tablet Take 1 Tab by mouth two (2) times a day. 10/6/18   Winsome Peralta MD  
OTHER Check CBC, CMP, MG on 10/08/18, Results to PCP immediately, Diagnosis- TB 10/6/18   Winsome Peralta MD  
ethambutol (MYAMBUTOL) 400 mg tablet Take 2 Tabs by mouth every Tuesday Thursday, Saturday. At 5 PM 7/21/18   Winsome Peralta MD  
isoniazid (NYDRAZID) 300 mg tablet Take 1 Tab by mouth daily. On dialysis days, please take medication after dialysis 7/21/18   Winsome Peralta MD  
pyrazinamide 500 mg tablet Take 2 Tabs by mouth every Tuesday Thursday, Saturday. At 5 pm  Indications: active tuberculosis 7/24/18   Winsome Peralta MD  
rifAMPin (RIFADIN) 300 mg capsule Take 2 Caps by mouth daily. On dialysis days please take this medication after Dialysis 7/21/18   Winsome Peralta MD  
docusate sodium (COLACE) 100 mg capsule Take 1 Cap by mouth two (2) times a day. 7/21/18   Winsome Peralta MD  
pyridoxine, vitamin B6, (VITAMIN B-6) 100 mg tablet Take 1 Tab by mouth daily. 7/22/18   Winsome Peralta MD  
OTHER Incentive Spirometry- use as directed 7/21/18   Winsome Peralta MD  
melatonin 3 mg tablet Take 1 Tab by mouth nightly as needed. Patient taking differently: Take 1 Tab by mouth nightly as needed (insomnia). 3/8/18   Pavan Al MD  
FLUoxetine (PROZAC) 10 mg capsule Take 1 Cap by mouth daily. 3/8/18   Pavan Al MD  
 
Current Facility-Administered Medications Medication Dose Route Frequency  polyethylene glycol (MIRALAX) packet 17 g  17 g Oral DAILY  pyrazinamide tablet 1,000 mg  1,000 mg Oral Q MON, WED & FRI  ethambutol (MYAMBUTOL) tablet 800 mg  800 mg Oral Q MON, WED & FRI  vancomycin (VANCOCIN) 500 mg in 0.9% sodium chloride (MBP/ADV) 100 mL MBP  500 mg IntraVENous DIALYSIS ONE TIME DOSE  amLODIPine (NORVASC) tablet 10 mg  10 mg Oral DAILY  rifAMPin (RIFADIN) capsule 600 mg  600 mg Oral DAILY  epoetin pilar (EPOGEN;PROCRIT) 5,000 Units  5,000 Units IntraVENous DIALYSIS MON, WED & FRI  aspirin chewable tablet 81 mg  81 mg Oral DAILY  atorvastatin (LIPITOR) tablet 40 mg  40 mg Oral QHS  carvedilol (COREG) tablet 25 mg  25 mg Oral Q12H  
 docusate sodium (COLACE) capsule 100 mg  100 mg Oral BID  FLUoxetine (PROzac) capsule 10 mg  10 mg Oral DAILY  isoniazid (NYDRAZID) tablet 300 mg  300 mg Oral DAILY  pyridoxine (vitamin B6) (VITAMIN B-6) tablet 100 mg  100 mg Oral DAILY  heparin (porcine) injection 5,000 Units  5,000 Units SubCUTAneous Q8H  
 influenza vaccine 2018-19 (6 mos+)(PF) (FLUARIX QUAD/FLULAVAL QUAD) injection 0.5 mL  0.5 mL IntraMUSCular PRIOR TO DISCHARGE  cloNIDine HCl (CATAPRES) tablet 0.3 mg  0.3 mg Oral TID  hydrALAZINE (APRESOLINE) 20 mg/mL injection 10 mg  10 mg IntraVENous Q6H  
 minoxidil (LONITEN) tablet 5 mg  5 mg Oral BID  losartan (COZAAR) tablet 50 mg  50 mg Oral DAILY  levoFLOXacin (LEVAQUIN) 500 mg in D5W IVPB  500 mg IntraVENous Q48H  piperacillin-tazobactam (ZOSYN) 2.25 g in 0.9% sodium chloride (MBP/ADV) 50 mL ADV  2.25 g IntraVENous Q8H  
 Piperacillin-tazobactam (ZOSYN) 0.75 gm Supplemental Dosing by Pharmacy   Other Rx Dosing/Monitoring  VANCOMYCIN INFORMATION NOTE   Other Rx Dosing/Monitoring Allergies Allergen Reactions  Banana Other (comments) Social History Tobacco Use  Smoking status: Never Smoker  Smokeless tobacco: Never Used Substance Use Topics  Alcohol use: No  
  
History reviewed. No pertinent family history. Review of Systems: 
Pertinent items are noted in HPI. Objective: 
Vital Signs:   
Visit Vitals /82 Pulse 62 Temp 98 °F (36.7 °C) (Oral) Resp 16 Ht 5' 1\" (1.549 m) Wt 41.9 kg (92 lb 6.4 oz) SpO2 98% Breastfeeding? No  
BMI 17.46 kg/m² O2 Device: Room air O2 Flow Rate (L/min): 2 l/min Temp (24hrs), Av.2 °F (36.8 °C), Min:97.8 °F (36.6 °C), Max:98.7 °F (37.1 °C) Intake/Output:  
Last shift:      No intake/output data recorded. Last 3 shifts: 10/17 1901 - 10/19 0700 In: 5 [P.O.:420] Out: 70 [Urine:70] Intake/Output Summary (Last 24 hours) at 10/19/2018 1142 Last data filed at 10/19/2018 0400 Gross per 24 hour Intake 270 ml Output 50 ml Net 220 ml Physical Exam: 
 
General:  Alert, cooperative, no distress, appears stated age. Head:  Normocephalic, without obvious abnormality, atraumatic. Eyes:  Conjunctivae/corneas clear. PERRL, EOMs intact. Nose: Nares normal. Septum midline. Mucosa normal. No drainage or sinus tenderness. Throat: Lips, mucosa, and tongue normal. Teeth and gums normal.  
Neck: Supple, symmetrical, trachea midline, no adenopathy, thyroid: no enlargment/tenderness/nodules, no carotid bruit and no JVD. Back:   Symmetric, no curvature. ROM normal.  
Lungs:   Clear to auscultation bilaterally. Chest wall:  No tenderness or deformity. Heart:  Regular rate and rhythm, S1, S2 normal, no murmur, click, rub or gallop. Abdomen:   Soft, non-tender. Bowel sounds normal. No masses,  No organomegaly. Extremities: Extremities normal, atraumatic, no cyanosis or edema. Pulses: 2+ and symmetric all extremities. Skin: Skin color, texture, turgor normal. No rashes or lesions Lymph nodes: 
 
  Cervical, supraclavicular, and axillary nodes normal.  
Neurologic: Grossly nonfocal  
 
 
Data:  
 
Recent Results (from the past 24 hour(s)) METABOLIC PANEL, BASIC Collection Time: 10/19/18  5:40 AM  
Result Value Ref Range Sodium 138 136 - 145 mmol/L Potassium 5.1 3.5 - 5.5 mmol/L Chloride 102 100 - 108 mmol/L  
 CO2 28 21 - 32 mmol/L Anion gap 8 3.0 - 18 mmol/L Glucose 89 74 - 99 mg/dL BUN 33 (H) 7.0 - 18 MG/DL Creatinine 5.39 (H) 0.6 - 1.3 MG/DL  
 BUN/Creatinine ratio 6 (L) 12 - 20 GFR est AA 10 (L) >60 ml/min/1.73m2 GFR est non-AA 8 (L) >60 ml/min/1.73m2 Calcium 8.1 (L) 8.5 - 10.1 MG/DL  
CBC W/O DIFF Collection Time: 10/19/18  5:40 AM  
Result Value Ref Range WBC 3.0 (L) 4.6 - 13.2 K/uL  
 RBC 3.13 (L) 4.20 - 5.30 M/uL HGB 9.9 (L) 12.0 - 16.0 g/dL HCT 29.8 (L) 35.0 - 45.0 % MCV 95.2 74.0 - 97.0 FL  
 MCH 31.6 24.0 - 34.0 PG  
 MCHC 33.2 31.0 - 37.0 g/dL  
 RDW 13.6 11.6 - 14.5 % PLATELET 826 641 - 011 K/uL MPV 10.8 9.2 - 11.8 FL  
VANCOMYCIN, RANDOM Collection Time: 10/19/18  5:40 AM  
Result Value Ref Range Vancomycin, random 13.5 5.0 - 40.0 UG/ML Imaging: 
I have personally reviewed the patients radiographs and have reviewed the reports: CXR Results  (Last 48 hours) None CXR: 10/18/18 IMPRESSION:  
 
Persistent consolidation at the medial right lower lung but patchy bilateral  
pleural effusions and vascular congestion are interval improved. Small right pleural effusion. Thank you for your referral.  
 
Echo: 8/23/18 · Estimated left ventricular ejection fraction is 56 - 60%. Left ventricular moderate concentric hypertrophy. Normal left ventricular wall motion, no regional wall motion abnormality noted. · Saline contrast was given to evaluate for intracardiac shunt. No shunt seen. · Small circumferential pericardial effusion with more prominent area around the right artium. No indications of tamponade present. 10/15/18                                                                 10/2/18 
 
  
10/18/18                                                                    10/15/18 CT Abdo: 8/22/18 Total clinical care time exclusive of procedures:  minutes Kathryn Vázquez DO, [facility], [facility], Critical Care Medicine

## 2018-10-20 NOTE — PROGRESS NOTES
[facility] Hospitalist Group Progress Note Patient: Janette Gtz Age: 66 y.o. : 1960 MR#: [id number] SSN: [id number] Date/Time: 10/20/2018 Subjective:  
 
Patient sitting in bed in NAD, awake, follows commands. Son in law at bedside Assessment/Plan: 1. HTN - Urgency - improved 
-on coreg, clonidine, minoxidil, cozaar Counseled compliance with diet and medications 2 ESRD  
- HD as per nephro 3 Acute NV - improved, tolerating diet - 
 
4 Continue Anti TB med per Health department dosing 5 SEPSIS , ?PNA On abx, follow cx 
Pulmonary following 7- pericardial effusion, - cardio following, small on echo PT, OT Long d/w patient hand son in law at bedside Home soon if ok with pulmonary Case discussed with:  [x]Patient  [x] Family ( In room with patient )    [x]Nursing  []Case Management DVT Prophylaxis:  []Lovenox  []Hep SQ  []SCDs  []Coumadin   []On Heparin gtt Objective:  
VS:  
Visit Vitals /87 Pulse 74 Temp 98.1 °F (36.7 °C) Resp 22 Ht 5' 1\" (1.549 m) Wt 41.8 kg (92 lb 1.6 oz) SpO2 99% Breastfeeding? No  
BMI 17.40 kg/m² Tmax/24hrs: Temp (24hrs), Av.2 °F (36.8 °C), Min:97.8 °F (36.6 °C), Max:98.7 °F (37.1 °C) IOBRIEF Intake/Output Summary (Last 24 hours) at 10/20/2018 1735 Last data filed at 10/20/2018 1055 Gross per 24 hour Intake 250 ml Output 0 ml Net 250 ml General:  Awake, alert, follows commands Cardiovascular:  S1S2+, RRR Pulmonary:  CTA b/l GI:  Soft, BS+, NT, ND Extremities:  No edema Medications:  
Current Facility-Administered Medications Medication Dose Route Frequency  polyethylene glycol (MIRALAX) packet 17 g  17 g Oral DAILY  pyrazinamide tablet 1,000 mg  1,000 mg Oral Q MON, WED & FRI  ethambutol (MYAMBUTOL) tablet 800 mg  800 mg Oral Q MON, WED & FRI  minoxidil (LONITEN) tablet 10 mg  10 mg Oral BID  
  rifAMPin (RIFADIN) capsule 600 mg  600 mg Oral DAILY  epoetin pilar (EPOGEN;PROCRIT) 5,000 Units  5,000 Units IntraVENous DIALYSIS MON, WED & FRI  aspirin chewable tablet 81 mg  81 mg Oral DAILY  atorvastatin (LIPITOR) tablet 40 mg  40 mg Oral QHS  bisacodyl (DULCOLAX) tablet 5 mg  5 mg Oral DAILY PRN  
 carvedilol (COREG) tablet 25 mg  25 mg Oral Q12H  
 dicyclomine (BENTYL) capsule 20 mg  20 mg Oral TID PRN  
 docusate sodium (COLACE) capsule 100 mg  100 mg Oral BID  FLUoxetine (PROzac) capsule 10 mg  10 mg Oral DAILY  isoniazid (NYDRAZID) tablet 300 mg  300 mg Oral DAILY  melatonin tablet 3 mg  3 mg Oral QHS PRN  pyridoxine (vitamin B6) (VITAMIN B-6) tablet 100 mg  100 mg Oral DAILY  acetaminophen (TYLENOL) tablet 650 mg  650 mg Oral Q4H PRN  
 heparin (porcine) injection 5,000 Units  5,000 Units SubCUTAneous Q8H  
 influenza vaccine 2018-19 (6 mos+)(PF) (FLUARIX QUAD/FLULAVAL QUAD) injection 0.5 mL  0.5 mL IntraMUSCular PRIOR TO DISCHARGE  cloNIDine HCl (CATAPRES) tablet 0.3 mg  0.3 mg Oral TID  ondansetron (ZOFRAN) injection 4 mg  4 mg IntraVENous Q4H PRN  promethazine (PHENERGAN) 12.5 mg in 0.9% sodium chloride 50 mL IVPB  12.5 mg IntraVENous Q4H PRN  
 losartan (COZAAR) tablet 50 mg  50 mg Oral DAILY  0.9% sodium chloride infusion  100 mL/hr IntraVENous DIALYSIS PRN  
 heparin (porcine) 1,000 unit/mL injection 1,000 Units  1,000 Units InterCATHeter DIALYSIS PRN  
 levoFLOXacin (LEVAQUIN) 500 mg in D5W IVPB  500 mg IntraVENous Q48H  piperacillin-tazobactam (ZOSYN) 2.25 g in 0.9% sodium chloride (MBP/ADV) 50 mL ADV  2.25 g IntraVENous Q8H  
 Piperacillin-tazobactam (ZOSYN) 0.75 gm Supplemental Dosing by Pharmacy   Other Rx Dosing/Monitoring  VANCOMYCIN INFORMATION NOTE   Other Rx Dosing/Monitoring Labs:   
Recent Labs 10/19/18 
0540 10/18/18 
1410 WBC 3.0* 2.5* HGB 9.9* 9.8* HCT 29.8* 29.3*  
 175 Recent Labs 10/19/18 3618 10/18/18 
7978  139  
K 5.1 4.4  101 CO2 28 31 GLU 89 89 BUN 33* 19* CREA 5.39* 3.69* CA 8.1* 8.3* Signed By: Eloy Martínez MD   
 October 20, 2018

## 2018-10-20 NOTE — PROGRESS NOTES
Problem: Self Care Deficits Care Plan (Adult) Goal: *Acute Goals and Plan of Care (Insert Text) Occupational Therapy Goals Initiated 10/18/2018 within 7 day(s). 1.  Patient will perform toilet transfers with supervision/set-up. 2.  Patient will perform all aspects of toileting with supervision/set-up. 3.  Patient will participate in upper extremity therapeutic exercise/activities with supervision/set-up and visual cues for 8 minutes. 4. Patient will perform functional task in stance x 3 min without LOB and F balance to increase I in self care. Outcome: Progressing Towards Goal 
Occupational Therapy TREATMENT Patient: Peña Reich (89 y.o. female) Date: 10/20/2018 Diagnosis: Malignant hypertension <principal problem not specified> Precautions: Fall Chart, occupational therapy assessment, plan of care, and goals were reviewed. ASSESSMENT: 
Pt presents in bed w/family member present, pt required some encouragement to participate in therapy. Pt's BP is 156/87, is seen with PT to increase safety of the pt and staff during functional mobility and ADLs. Pt maneuvered to EOB w/Supervision, reports dizziness (pt's family member is interpreting for the first part of the session, leaves before the end of the session), BP checked and is 126/79 sitting up. Pt is closing eyes and demonstrates LOB to L side occasionally while sitting EOB in preparation for ADLs and functional txfrs. Pt cont to report dizziness, however indicated that she wants to std. Pt required SBA/CGA to std w/FWW, and presents w/LOB in mult planes in std. Pt, however, cont to attempt to walk. Pt cont to report dizziness and appears drowsy. Pt required Min/Mod A for safety during simulated toilet txfr w/FWW, pt is impulsive and 2/2 language barrier is difficult to redirect to safety. Pt returned to EOB, BP reading was taken: 135/95. Pt returned to sup.  Pt was provided w/HEP for BUE TherEx handout w/pictures for easier understanding. Pt indicates that she needs glasses to see the pictures (no glasses available in room). Pt performed BUE TherEx w/Red Thera-band in mult planes (issued thera-band for the pt), requiring Max visual cues and tactile assist for proper technique. Pt would benefit from review of the TherEx when family present to ensure carryover. Progression toward goals: 
[x]          Improving appropriately and progressing toward goals 
[]          Improving slowly and progressing toward goals 
[]          Not making progress toward goals and plan of care will be adjusted PLAN: 
Patient continues to benefit from skilled intervention to address the above impairments. Continue treatment per established plan of care. Discharge Recommendations:  Home Health w/24/7 Supervision and Assistance for safety Further Equipment Recommendations for Discharge:  N/A  
  
G-CODES:  
 
Self Care  Current  CK= 40-59%  Goal  CJ= 20-39%. The severity rating is based on the Level of Assistance required for Functional Mobility and ADLs. SUBJECTIVE:  
Patient minimally verbal. \"close\" asking to close curtain OBJECTIVE DATA SUMMARY:  
Cognitive/Behavioral Status: 
Neurologic State: Alert Cognition: Follows commands, Decreased attention/concentration Safety/Judgement: Fall prevention, Home safety Functional Mobility and Transfers for ADLs: 
 Bed Mobility: 
  
Supine to Sit: Modified independent Sit to Supine: Modified independent Scooting: Stand-by assistance Transfers: 
Sit to Stand: Stand-by assistance Toilet Transfer : Minimum assistance(simulated w/FWW) Balance: 
Sitting: Impaired Sitting - Static: Fair (occasional) Sitting - Dynamic: Poor (constant support) Standing: Impaired; With support Standing - Static: Fair(minus) Standing - Dynamic : Poor Therapeutic Exercises:  
See above Pain: 
Pt reports 0/10 pain or discomfort prior to treatment.   
 Pt didn't rate pain or discomfort post treatment. Activity Tolerance:   
Fair, limited by drowsiness Please refer to the flowsheet for vital signs taken during this treatment. After treatment:  
[]  Patient left in no apparent distress sitting up in chair 
[x]  Patient left in no apparent distress in bed 
[x]  Call bell left within reach [x]  Nursing notified 
[]  Caregiver present 
[]  Bed alarm activated PRESLEY Morin/L Time Calculation: 23 mins

## 2018-10-20 NOTE — PROGRESS NOTES
[facility] 
 
 
CARDIOLOGY PROGRESS NOTE 
RECS: 
 
1. Acute respiratory failure-  possible related to Acute decompensated CHF in the setting of uncontrolled Hypertension and possible sepsis on O2 nasal canula 2. Acute Diastolic Heart failure-better clinically. volume status per renal.  
3. Minimally  elevated troponin- possible demand inchemia  But MI needs to r/o. Monitor for ischemia symptoms follow ekg today. On asa, statin and bb.  
4. Hx of NSTEMI- in 2/18 NUC stress that showed small reversible apical inferior defect noted from these nuclear study suggestive of ischemia in the area. Possible distal LAD or RCA involmenent patient was managed medically. 5. Malignant hypertension-d/c Norvasc and Hydralazine increased Loniten to 10 mg bid. Continue with current medications by parameters  Still require multiple medications for BP control. Discussed with son-in law about low sodium diet. Patient has very high sodium intake diet at home. She uses MSG in food and soup. 6. ESRD- on HD renal following. She is MWF HD schedule 7. Pulmonany Tuberculosis- on 4 medications per medical team 
8. Possible sepsis? ? On antibiotics managed by medical team.  
9. Nausea and vomiting- better  w/u per medical team 
  
bp better on current meds Echo report reviewed- small pericardial effusion with no tamponade Will f/u peripherally Call us as needed 
 
   
Echo 8/18 · Estimated left ventricular ejection fraction is 56 - 60%. Left ventricular moderate concentric hypertrophy. Normal left ventricular wall motion, no regional wall motion abnormality noted. · Saline contrast was given to evaluate for intracardiac shunt. No shunt seen. · Small circumferential pericardial effusion with more prominent area around the right artium. No indications of tamponade 
  
  
NUC 2/18 1.  Post-stress imaging in short axis, horizontal and vertical long axis view reveals a small apical inferior defect in perfusion with a medium intensity. 2.  Resting images show homogenous isotope uptake in all areas. 3.  Gated images show normal left ventricular size, wall motion, and systolic function appears normal.  Ejection fraction is 58%.   
  
DIAGNOSES:   
1.  Abnormal scan. 2.  Evidence of a small reversible apical inferior defect noted from these nuclear study suggestive of ischemia in the area.  This is involving either the mid or distal left anterior descending or right coronary artery. 3.  Medium risk scan. ASSESSMENT: 
Hospital Problems  Date Reviewed: 10/16/2018 Codes Class Noted POA Malignant hypertension ICD-10-CM: I10 
ICD-9-CM: 401.0  10/15/2018 Unknown HCAP (healthcare-associated pneumonia) ICD-10-CM: J18.9 ICD-9-CM: 655  10/2/2018 Yes Pulmonary TB ICD-10-CM: A15.0 ICD-9-CM: 011.90  7/17/2018 Yes Hypertensive crisis ICD-10-CM: I16.9 ICD-9-CM: 401.9  7/17/2018 Yes ESRD (end stage renal disease) ([facility] [address] ICD-10-CM: N18.6 ICD-9-CM: 585.6  2/25/2018 Yes Respiratory failure ([facility] [address] ICD-10-CM: J96.90 ICD-9-CM: 518.81  2/25/2018 Yes Hypoxia ICD-10-CM: R09.02 
ICD-9-CM: 799.02  2/25/2018 Yes SUBJECTIVE: 
 
Doing better more alert today. son in law with patient. OBJECTIVE: 
 
VS:  
Visit Vitals /87 Pulse 74 Temp 98.1 °F (36.7 °C) Resp 22 Ht 5' 1\" (1.549 m) Wt 41.8 kg (92 lb 1.6 oz) SpO2 99% Breastfeeding? No  
BMI 17.40 kg/m² Intake/Output Summary (Last 24 hours) at 10/20/2018 1648 Last data filed at 10/20/2018 1055 Gross per 24 hour Intake 490 ml Output 1 ml Net 489 ml  
 
TELE: normal sinus rhythm General: alert, well developed, pleasant and in no apparent distress HENT: Normocephalic, atraumatic. Normal external eye. Neck :  no JVD Cardiac:  regular rate and rhythm, S1, S2 normal, no S3 or S4, no click Lungs: clear to auscultation bilaterally Abdomen: Soft, nontender, no masses Extremities:  No c/c/e, peripheral pulses present Labs: Results:  
   
Chemistry Recent Labs 10/19/18 
0540 10/18/18 
0039 GLU 89 89  139  
K 5.1 4.4  101 CO2 28 31 BUN 33* 19* CREA 5.39* 3.69* CA 8.1* 8.3* AGAP 8 7 BUCR 6* 5* CBC w/Diff Recent Labs 10/19/18 
0540 10/18/18 
5194 WBC 3.0* 2.5*  
RBC 3.13* 3.12* HGB 9.9* 9.8* HCT 29.8* 29.3*  
 175 Cardiac Enzymes No results for input(s): CPK, CKND1, WOLFGANG in the last 72 hours. No lab exists for component: Teddy Osuna Coagulation No results for input(s): PTP, INR, APTT in the last 72 hours. No lab exists for component: INREXT, INREXT Lipid Panel Lab Results Component Value Date/Time Cholesterol, total 128 08/23/2018 02:35 AM  
 HDL Cholesterol 62 (H) 08/23/2018 02:35 AM  
 LDL, calculated 35 08/23/2018 02:35 AM  
 VLDL, calculated 31 08/23/2018 02:35 AM  
 Triglyceride 155 (H) 08/23/2018 02:35 AM  
 CHOL/HDL Ratio 2.1 08/23/2018 02:35 AM  
  
BNP No results for input(s): BNPP in the last 72 hours. Liver Enzymes No results for input(s): TP, ALB, TBIL, AP, SGOT, GPT in the last 72 hours. No lab exists for component: DBIL Thyroid Studies Lab Results Component Value Date/Time TSH 6.21 (H) 08/24/2018 08:00 AM  
    
 
 
 
Jen Oconnor I have independently evaluated taken history and examined the patient. All relevant labs and testing data's are reviewed. Care plan discussed and updated after review.  
 
Carmen Arreola MD

## 2018-10-20 NOTE — PROGRESS NOTES
Problem: Mobility Impaired (Adult and Pediatric) Goal: *Acute Goals and Plan of Care (Insert Text) Physical Therapy Goals Initiated 10/18/2018 and to be accomplished within 7 day(s) 1. Patient will move from supine to sit and sit to supine  in bed with modified independence. 2.  Patient will transfer from bed to chair and chair to bed with supervision/set-up using the least restrictive device. 3.  Patient will perform sit to stand with supervision/set-up. 4.  Patient will ambulate with contact guard assist for 100 feet with the least restrictive device. 5.  Patient will ascend/descend 2 stairs with 1 handrail(s) with minimal assistance/contact guard assist.  
Outcome: Progressing Towards Goal 
physical Therapy TREATMENT Patient: Tania Ochoa (12 y.o. female) Date: 10/20/2018 Diagnosis: Malignant hypertension <principal problem not specified> Precautions: Fall Chart, physical therapy assessment, plan of care and goals were reviewed. OBJECTIVE/ ASSESSMENT: 
Patient found supine in bed willing to work with PT. Patient seen with OT to maximize safety of patient and staff. Pt's BP at rest is 156/87. Pt then sat at EOB and presents with unsteady sitting balance. Pt's BP in sitting is 126/79. Pt then asked to stand and perform standing marches. However, pt began to ambulate in room toward door. Pt asked to turn around or wash hands, but pt wants to continue ambulation. Pt ambulated into hallway with mod a then returned to EOB. Pt's BP is sitting post ambulation is 135/95. PT returned to supine where she performed therex with great difficulty due to language barrier. Pt returned to supine and left with needs in reach. Pt is progressing slowly and present with multiple safety issues. Education: therex, gait. Progression toward goals: 
[]      Improving appropriately and progressing toward goals [x]      Improving slowly and progressing toward goals []      Not making progress toward goals and plan of care will be adjusted PLAN: 
Patient continues to benefit from skilled intervention to address the above impairments. Continue treatment per established plan of care. Discharge Recommendations:  Jesu Murillo Further Equipment Recommendations for Discharge:  rolling walker SUBJECTIVE:  
Patient had family in room translating for beginning of tx, but had to leave. Pt speaks little to no [place]. Pt is recepctive to visual cues. OBJECTIVE DATA SUMMARY:  
Critical Behavior: 
Neurologic State: Alert Cognition: Follows commands, Decreased attention/concentration Safety/Judgement: Fall prevention, Home safety Functional Mobility Training: 
Bed Mobility: 
Supine to Sit: Modified independent Sit to Supine: Modified independent Scooting: Stand-by assistance Transfers: 
Sit to Stand: Stand-by assistance Stand to Sit: Minimum assistance Balance: 
Sitting: Impaired Sitting - Static: Fair (occasional) Sitting - Dynamic: Poor (constant support) Standing: Impaired; With support Standing - Static: Fair(minus) Standing - Dynamic : PoorAmbulation/Gait Training: 
Distance (ft): 95 Feet (ft) Assistive Device: Walker, rolling Ambulation - Level of Assistance: Moderate assistance Gait Abnormalities: Decreased step clearance Base of Support: Center of gravity altered Speed/Mera: Pace decreased (<100 feet/min) Step Length: Left shortened;Right shortened Therapeutic Exercises:  
 
 
EXERCISE Sets Reps Active Active Assist  
Passive Self- assited ROM Comments Ankle Pumps    [] [] [] [] Quad Sets   [] [] [] [] Glut Sets   [] [] [] [] Short Arc Quads   [] [] [] [] Heel Slides   [] [] [] [] Straight Leg Raises   [] [] [] [] Hip Abd   [] [] [] [] Long Arc Quads 1 10 [x] [] [] [] Bilaterally Marching 1 10 [x] [] [] [] Bilaterally Seated Knee Flexion   [] [] [] []   
Standing Marching   [] [] [] []   
 
Pain: Pre tx pain: 0Post tx pain: 0Pain Scale 1: Numeric (0 - 10) Pain Intensity 1: 0 Activity Tolerance:  
Fair Please refer to the flowsheet for vital signs taken during this treatment. After treatment:  
[] Patient left in no apparent distress sitting up in chair 
[x] Patient left in no apparent distress in bed 
[x] Call bell left within reach [x] Nursing notified 
[] Caregiver present 
[] Bed alarm activated 
[] SCDs applied 
[] Ice applied Samson Boles PTA Time Calculation: 23 mins Mobility [id number] Current  CK= 40-59%. The severity rating is based on the Level of Assistance required for Functional Mobility and ADLs. Mobility   Goal  CJ= 20-39%. The severity rating is based on the Level of Assistance required for Functional Mobility and ADLs.

## 2018-10-20 NOTE — CONSULTS
[employer] [facility] Pulmonary, Critical Care, and Sleep Medicine Name: Catherine Grant MRN: [id number] : 1960 Hospital: [address] [place] [address]  Date: 10/20/2018 Pulmonary Medicine-  Follow Up IMPRESSION:  
· Acute respiratory failure- suspect flash pulmonary edema due to hypertensive emergency, vs infection or both · Pericardial effusion- increased from last echo/CT: etiology? Uremia? Minoxidil Discussed with Dr. Fina Borjas · Pleural effusion- Right; interval increase- etiology? CHF? Has been tapped before, Prior effusion + TB from [facility]- on [employer] · Pulmonary Infiltrates- pulmonary edema improved; but now area of consolidation at RML- appears to be more from pericardial effusion and small degree of atelectasis · Hypertensive Urgency- acute on chronic process- BP chronically labile with recurrent admissions for hypertensive urgency/emergencies - improved · Tuberculosis- Primary: + TB from pleural fluid and Bronch from [facility]- On DOT with [address] · MAC also isolated of prior BAL from bronch- unclear clinical significance · CHF- Diastolic- acute decompensation - improved · H/O NSTEMI- Cardiology following · End Stage Renal Disease- on HD: MWF- Nephrology following · Nausea with emesis- acute on chronic problem-improved · Leukopenia- stable RECOMMENDATIONS:  
 
· Per health department instructions- TB meds are to be dosed based on their recommendations- no dose adjustments or changes in TB meds without conferring with Health Department (INH, PZA, Rifampin, B6) · Keep HOB elevated; aspiration precautions · SpO2 goal>92% · Discontinue vancomycin- streamline antibiotics · Bronchial hygiene encouraged/ IS at bedside · HD per nephrology · BP management per Primary team and cardiology- may need to stop Minoxidil if contributing to pericardial effusion · Prophylaxis per primary team 
· Hold on bronchoscopy for now · May need to consider thoracentesis to reacess pleural effuion · Will continue to follow Subjective/History:  
10/20/18 · Patient on Room Air - appears comfortable · CT chest notable for increasing pericardial and pleural effusions · Spoke with Son-in-law: Prior to this admission she had been eating rice with soy sauce prior to taking TB meds due to feeling nauseated by medications. All salt, MSG and soy products being removed from her diet · BP improved · Afebrile · Leukopenia stable · TTE shows small circumferencial pericardial effusion. Patient Vitals for the past 24 hrs: 
 Temp Pulse Resp BP SpO2  
10/20/18 0725 98.1 °F (36.7 °C) 67 21 160/84 99 % 10/20/18 0400 97.8 °F (36.6 °C) 62 20 148/87 96 % 10/19/18 2350 98.6 °F (37 °C) 71 22 (!) 149/92 99 % 10/19/18 2315  68 29 (!) 146/129 95 % 10/19/18 2303  68 20 126/77 98 % 10/19/18 2200  69  118/67   
10/19/18 2000 98.7 °F (37.1 °C) 64 15 133/80 96 % 10/19/18 1900  79 19 149/84 96 % 10/19/18 1830  80 26 167/84 99 % 10/19/18 1648 98.3 °F (36.8 °C) 76 18 157/89 97 % 10/19/18 1433    153/75   
10/19/18 1335 98.1 °F (36.7 °C) 70 16 153/75   
10/19/18 1330  62  149/85   
10/19/18 1315  61  151/88   
10/19/18 1300  64  152/84   
10/19/18 1245  60  126/76   
10/19/18 1230  62  (!) 144/92   
10/19/18 1215  65  158/89   
10/19/18 1200  61  115/74   
10/19/18 1145  62  111/73   
10/19/18 1130  64  125/78   
10/19/18 1115  63  129/79   
10/19/18 1100  62  129/82   
10/19/18 1045  63  136/81   
10/19/18 1030  63  (!) 155/93   
10/19/18 1023 98 °F (36.7 °C) 66 16 152/84   
 
 
 
 
 
HPI This patient has been seen and evaluated at the request of Dr. Krystal Bob for pulmonary infiltrates. Patient is a 66 y.o. female - [place] speaking only which a history of multiple admissions to [facility] due to malignant hypertension with concurrent decompensated diastolic heart failure. The patient also has a history of primary pulmonary tuberculosis and is on DOT therapy by local health department. MAC was also isolated from a BAL in the past.   
 
The patient was recently readmitted to the SDU for recurrent hypertension and acute respiratory failure. BP medications were again modified. Home dietary intact was again reviewed with family. The patient tends mostly to eat food from home and not from our food services. She found to be taking MSG- unclear if that has been contributing to labile blood pressures. The patient also has recurrent nausea and emesis- unclear etiology: gastroparesis and/or irritability from medications In regards to her pulmonary status the patient has been undergoing DOT by local health department. She gets her HD in the morning and she receives her TB meds in the afternoon/evening. She denies hemoptysis or sputum production. Her respiratory status today is much improved from prior days. Patient Vitals for the past 24 hrs: 
 Temp Pulse Resp BP SpO2  
10/20/18 0725 98.1 °F (36.7 °C) 67 21 160/84 99 % 10/20/18 0400 97.8 °F (36.6 °C) 62 20 148/87 96 % 10/19/18 2350 98.6 °F (37 °C) 71 22 (!) 149/92 99 % 10/19/18 2315  68 29 (!) 146/129 95 % 10/19/18 2303  68 20 126/77 98 % 10/19/18 2200  69  118/67   
10/19/18 2000 98.7 °F (37.1 °C) 64 15 133/80 96 % 10/19/18 1900  79 19 149/84 96 % 10/19/18 1830  80 26 167/84 99 % 10/19/18 1648 98.3 °F (36.8 °C) 76 18 157/89 97 % 10/19/18 1433    153/75   
10/19/18 1335 98.1 °F (36.7 °C) 70 16 153/75   
10/19/18 1330  62  149/85   
10/19/18 1315  61  151/88   
10/19/18 1300  64  152/84   
10/19/18 1245  60  126/76   
10/19/18 1230  62  (!) 144/92   
10/19/18 1215  65  158/89   
10/19/18 1200  61  115/74   
10/19/18 1145  62  111/73   
10/19/18 1130  64  125/78   
10/19/18 1115  63  129/79   
10/19/18 1100  62  129/82   
10/19/18 1045  63  136/81  10/19/18 1030  63  (!) 155/93   
10/19/18 1023 98 °F (36.7 °C) 66 16 152/84  Past Medical History:  
Diagnosis Date  Arthritis GOUT  Asthma  Bilateral leg pain  Chest pain at rest   
 Chronic kidney disease  [facility] stage renal [facility]) [facility] TUES-THURS-SAT  Hypercholesteremia  Hypertension  Joint pain  Kidney disease  Pleural effusion  Pulmonary tuberculosis  Vitamin D deficiency Past Surgical History:  
Procedure Laterality Date  CHEST SURGERY PROCEDURE UNLISTED    
 THORACENTHESIS  
 HX OTHER SURGICAL    
 BRONCHOSCOPY  
 HX VASCULAR ACCESS  01/2018; 11/2017 LEFT ARM VENOUS ACCESS  
 HX VASCULAR ACCESS  02/2017 [address]  VASCULAR SURGERY PROCEDURE UNLIST Prior to Admission medications Medication Sig Start Date End Date Taking? Authorizing Provider  
amLODIPine (NORVASC) 10 mg tablet Take 1 Tab by mouth daily. 10/6/18  Yes Jose Cid MD  
carvedilol (COREG) 25 mg tablet Take 1 Tab by mouth every twelve (12) hours. 10/6/18  Yes Jose Cid MD  
cloNIDine (CATAPRES) 0.3 mg/24 hr 1 Patch by TransDERmal route every seven (7) days. 10/6/18  Yes Jose Cid MD  
losartan (COZAAR) 100 mg tablet Take 1 Tab by mouth daily. 10/6/18  Yes Jose Cid MD  
bisacodyl (DULCOLAX) 5 mg EC tablet Take 1 Tab by mouth daily as needed for Constipation. 8/5/18  Yes Andrés Cano MD  
albuterol (PROVENTIL HFA, VENTOLIN HFA, PROAIR HFA) 90 mcg/actuation inhaler Take 2 Puffs by inhalation every four (4) hours as needed for Wheezing. 7/21/18  Yes Jose Cid MD  
azithromycin (ZITHROMAX) 500 mg tab Take 1 Tab by mouth every Tuesday Thursday, Saturday. At 5 PM 7/21/18  Yes Jose Cid MD  
aspirin 81 mg chewable tablet Take 1 Tab by mouth daily. 3/8/18  Yes Ijeoma Brenner MD  
atorvastatin (LIPITOR) 40 mg tablet Take 1 Tab by mouth nightly.  3/8/18  Yes Ijeoma Brenner MD  
 dicyclomine (BENTYL) 20 mg tablet Take 20 mg by mouth three (3) times daily as needed (abdominial pain). Provider, Historical  
cloNIDine HCl (CATAPRES) 0.3 mg tablet Take 0.3 mg by mouth three (3) times daily. Provider, Historical  
glycerin, adult, (FLEET GLYCERIN, ADULT,) suppository Insert 1 Suppository into rectum daily as needed. Indications: BOWEL EVACUATION 10/6/18   Milo Goodman MD  
minoxidil (LONITEN) 2.5 mg tablet Take 1 Tab by mouth two (2) times a day. 10/6/18   Milo Goodman MD  
OTHER Check CBC, CMP, MG on 10/08/18, Results to PCP immediately, Diagnosis- TB 10/6/18   Milo Goodman MD  
ethambutol (MYAMBUTOL) 400 mg tablet Take 2 Tabs by mouth every Tuesday Thursday, Saturday. At 5 PM 7/21/18   Milo Goodman MD  
isoniazid (NYDRAZID) 300 mg tablet Take 1 Tab by mouth daily. On dialysis days, please take medication after dialysis 7/21/18   Milo Goodman MD  
pyrazinamide 500 mg tablet Take 2 Tabs by mouth every Tuesday Thursday, Saturday. At 5 pm  Indications: active tuberculosis 7/24/18   Milo Goodman MD  
rifAMPin (RIFADIN) 300 mg capsule Take 2 Caps by mouth daily. On dialysis days please take this medication after Dialysis 7/21/18   Milo Goodman MD  
docusate sodium (COLACE) 100 mg capsule Take 1 Cap by mouth two (2) times a day. 7/21/18   Milo Goodman MD  
pyridoxine, vitamin B6, (VITAMIN B-6) 100 mg tablet Take 1 Tab by mouth daily. 7/22/18   Milo Goodman MD  
OTHER Incentive Spirometry- use as directed 7/21/18   Milo Goodman MD  
melatonin 3 mg tablet Take 1 Tab by mouth nightly as needed. Patient taking differently: Take 1 Tab by mouth nightly as needed (insomnia). 3/8/18   Tata Pena MD  
FLUoxetine (PROZAC) 10 mg capsule Take 1 Cap by mouth daily. 3/8/18   Tata Pena MD  
 
Current Facility-Administered Medications Medication Dose Route Frequency  polyethylene glycol (MIRALAX) packet 17 g  17 g Oral DAILY  pyrazinamide tablet 1,000 mg  1,000 mg Oral Q MON, WED & FRI  ethambutol (MYAMBUTOL) tablet 800 mg  800 mg Oral Q MON, WED & FRI  minoxidil (LONITEN) tablet 10 mg  10 mg Oral BID  rifAMPin (RIFADIN) capsule 600 mg  600 mg Oral DAILY  epoetin pilar (EPOGEN;PROCRIT) 5,000 Units  5,000 Units IntraVENous DIALYSIS MON, WED & FRI  aspirin chewable tablet 81 mg  81 mg Oral DAILY  atorvastatin (LIPITOR) tablet 40 mg  40 mg Oral QHS  carvedilol (COREG) tablet 25 mg  25 mg Oral Q12H  
 docusate sodium (COLACE) capsule 100 mg  100 mg Oral BID  FLUoxetine (PROzac) capsule 10 mg  10 mg Oral DAILY  isoniazid (NYDRAZID) tablet 300 mg  300 mg Oral DAILY  pyridoxine (vitamin B6) (VITAMIN B-6) tablet 100 mg  100 mg Oral DAILY  heparin (porcine) injection 5,000 Units  5,000 Units SubCUTAneous Q8H  
 influenza vaccine 2018-19 (6 mos+)(PF) (FLUARIX QUAD/FLULAVAL QUAD) injection 0.5 mL  0.5 mL IntraMUSCular PRIOR TO DISCHARGE  cloNIDine HCl (CATAPRES) tablet 0.3 mg  0.3 mg Oral TID  losartan (COZAAR) tablet 50 mg  50 mg Oral DAILY  levoFLOXacin (LEVAQUIN) 500 mg in D5W IVPB  500 mg IntraVENous Q48H  piperacillin-tazobactam (ZOSYN) 2.25 g in 0.9% sodium chloride (MBP/ADV) 50 mL ADV  2.25 g IntraVENous Q8H  
 Piperacillin-tazobactam (ZOSYN) 0.75 gm Supplemental Dosing by Pharmacy   Other Rx Dosing/Monitoring  VANCOMYCIN INFORMATION NOTE   Other Rx Dosing/Monitoring Allergies Allergen Reactions  Banana Other (comments) Social History Tobacco Use  Smoking status: Never Smoker  Smokeless tobacco: Never Used Substance Use Topics  Alcohol use: No  
  
History reviewed. No pertinent family history. Review of Systems: 
Pertinent items are noted in HPI. Objective: 
Vital Signs:   
Visit Vitals /84 Pulse 67 Temp 98.1 °F (36.7 °C) Resp 21 Ht 5' 1\" (1.549 m) Wt 41.8 kg (92 lb 1.6 oz) SpO2 99% Breastfeeding? No  
BMI 17.40 kg/m² O2 Device: Room air O2 Flow Rate (L/min): 2 l/min Temp (24hrs), Av.2 °F (36.8 °C), Min:97.8 °F (36.6 °C), Max:98.7 °F (37.1 °C) Intake/Output:  
Last shift:      No intake/output data recorded. Last 3 shifts: 10/18 1901 - 10/20 0700 In: 0 [P.O.:390; I.V.:200] Out: 1001 Intake/Output Summary (Last 24 hours) at 10/20/2018 4630 Last data filed at 10/20/2018 0400 Gross per 24 hour Intake 490 ml Output 1001 ml Net -511 ml Physical Exam: 
 
General:  Alert, cooperative, no distress, appears stated age. Thin  
Head:  Normocephalic, without obvious abnormality, atraumatic. Eyes:  Conjunctivae/corneas clear. PERRL, EOMs intact. Nose: Nares normal. Septum midline. Mucosa normal. No drainage or sinus tenderness. Throat: Lips, mucosa, and tongue normal. No exudates Neck: Supple, symmetrical, trachea midline, no adenopathy, thyroid: no enlargment/tenderness/nodules, no carotid bruit and no JVD. Back:   Symmetric Lungs:   Clear to auscultation bilaterally, no wheezing, no stridor Chest wall:  No tenderness or deformity. Heart:  Regular rate and rhythm, S1, S2 normal, no murmur, click, rub or gallop. Abdomen:   Soft, non-tender. Bowel sounds normal. No masses,  No organomegaly palpated Extremities: Extremities normal, atraumatic, no cyanosis or edema. Pulses: 2+ and symmetric all extremities. Skin: Skin color, texture, turgor normal. No rashes or lesions Lymph nodes: 
 
  Cervical, supraclavicular nodes unremarkable. Neurologic: Grossly nonfocal  
 
 
Data:  
 
Recent Results (from the past 24 hour(s)) ECHO ADULT FOLLOW-UP OR LIMITED Collection Time: 10/19/18  2:34 PM  
Result Value Ref Range LVIDd 4.25 3.9 - 5.3 cm  
 LVPWd 1.51 (A) 0.6 - 0.9 cm LVIDs 2.22 cm IVSd 1.51 (A) 0.6 - 0.9 cm Inferior Vena Cava Diameter Sniffing 1.11 cm  
 LV Mass .3 (A) 67 - 162 g  
 LV Mass AL Index 225.5 (A) 43 - 95 g/m2 Imaging: I have personally reviewed the patients radiographs and have reviewed the reports: CXR Results  (Last 48 hours) None CXR: 10/18/18 IMPRESSION:  
 
Persistent consolidation at the medial right lower lung but patchy bilateral  
pleural effusions and vascular congestion are interval improved. Small right pleural effusion. Thank you for your referral.  
 
Echo: 8/23/18 · Estimated left ventricular ejection fraction is 56 - 60%. Left ventricular moderate concentric hypertrophy. Normal left ventricular wall motion, no regional wall motion abnormality noted. · Saline contrast was given to evaluate for intracardiac shunt. No shunt seen. · Small circumferential pericardial effusion with more prominent area around the right artium. No indications of tamponade present. 10/15/18                                                                 10/2/18 
 
  
10/18/18                                                                    10/15/18 CT Abdo: 8/22/18 CT Chest 10/19/18 · TTE 10/19: Estimated left ventricular ejection fraction is 56 - 60%. Visually measured ejection fraction. Left ventricular moderate concentric hypertrophy. Normal left ventricular wall motion, no regional wall motion abnormality noted. Small circumferential pericardial effusion with more prominent area around the right atrium. No indications of tamponade present. Total clinical care time exclusive of procedures: 25 minutes Eduardo Sue. Edna Lepe MD, [facility] Pulmonary, Sleep, Critical Care Medicine

## 2018-10-20 NOTE — PROGRESS NOTES
RENAL DAILY PROGRESS NOTE 59y F with ESRD, admitted for chest pain, short of breath, seen for ESRD management Subjective:  
Complaint: 
Overnight events noted Tolerated HD well yesterday Her CT chest shows right pleral effusion and large pericardial effusion but echo did not show any significant pericardial effusion. IMPRESSION:  
ESRD, MWF Access; TDC right side HTN Anemia , on epogen Short of breath, volume related + HTN urgency Lung TB, on medication PLAN:  
No indication for HD today. Her SBP gradually improving and acceptable range for discharge. She is on minoxidil with good BP control, discussed with cardiology colleague plan to continue it for now. Okay to discharge from renal stand point. Current Facility-Administered Medications Medication Dose Route Frequency  polyethylene glycol (MIRALAX) packet 17 g  17 g Oral DAILY  pyrazinamide tablet 1,000 mg  1,000 mg Oral Q MON, WED & FRI  ethambutol (MYAMBUTOL) tablet 800 mg  800 mg Oral Q MON, WED & FRI  minoxidil (LONITEN) tablet 10 mg  10 mg Oral BID  rifAMPin (RIFADIN) capsule 600 mg  600 mg Oral DAILY  epoetin pilar (EPOGEN;PROCRIT) 5,000 Units  5,000 Units IntraVENous DIALYSIS MON, WED & FRI  aspirin chewable tablet 81 mg  81 mg Oral DAILY  atorvastatin (LIPITOR) tablet 40 mg  40 mg Oral QHS  bisacodyl (DULCOLAX) tablet 5 mg  5 mg Oral DAILY PRN  
 carvedilol (COREG) tablet 25 mg  25 mg Oral Q12H  
 dicyclomine (BENTYL) capsule 20 mg  20 mg Oral TID PRN  
 docusate sodium (COLACE) capsule 100 mg  100 mg Oral BID  FLUoxetine (PROzac) capsule 10 mg  10 mg Oral DAILY  isoniazid (NYDRAZID) tablet 300 mg  300 mg Oral DAILY  melatonin tablet 3 mg  3 mg Oral QHS PRN  pyridoxine (vitamin B6) (VITAMIN B-6) tablet 100 mg  100 mg Oral DAILY  acetaminophen (TYLENOL) tablet 650 mg  650 mg Oral Q4H PRN  
  heparin (porcine) injection 5,000 Units  5,000 Units SubCUTAneous Q8H  
 influenza vaccine 2018-19 (6 mos+)(PF) (FLUARIX QUAD/FLULAVAL QUAD) injection 0.5 mL  0.5 mL IntraMUSCular PRIOR TO DISCHARGE  cloNIDine HCl (CATAPRES) tablet 0.3 mg  0.3 mg Oral TID  ondansetron (ZOFRAN) injection 4 mg  4 mg IntraVENous Q4H PRN  promethazine (PHENERGAN) 12.5 mg in 0.9% sodium chloride 50 mL IVPB  12.5 mg IntraVENous Q4H PRN  
 losartan (COZAAR) tablet 50 mg  50 mg Oral DAILY  0.9% sodium chloride infusion  100 mL/hr IntraVENous DIALYSIS PRN  
 heparin (porcine) 1,000 unit/mL injection 1,000 Units  1,000 Units InterCATHeter DIALYSIS PRN  
 levoFLOXacin (LEVAQUIN) 500 mg in D5W IVPB  500 mg IntraVENous Q48H  piperacillin-tazobactam (ZOSYN) 2.25 g in 0.9% sodium chloride (MBP/ADV) 50 mL ADV  2.25 g IntraVENous Q8H  
 Piperacillin-tazobactam (ZOSYN) 0.75 gm Supplemental Dosing by Pharmacy   Other Rx Dosing/Monitoring  VANCOMYCIN INFORMATION NOTE   Other Rx Dosing/Monitoring Review of Symptoms: comprehensive ROS negative except above. Objective:  
 
Patient Vitals for the past 24 hrs: 
 Temp Pulse Resp BP SpO2  
10/20/18 1055 98 °F (36.7 °C) 66 19  96 % 10/20/18 1034  70  156/87   
10/20/18 0725 98.1 °F (36.7 °C) 67 21 160/84 99 % 10/20/18 0400 97.8 °F (36.6 °C) 62 20 148/87 96 % 10/19/18 2350 98.6 °F (37 °C) 71 22 (!) 149/92 99 % 10/19/18 2315  68 29 (!) 146/129 95 % 10/19/18 2303  68 20 126/77 98 % 10/19/18 2200  69  118/67   
10/19/18 2000 98.7 °F (37.1 °C) 64 15 133/80 96 % 10/19/18 1900  79 19 149/84 96 % 10/19/18 1830  80 26 167/84 99 % 10/19/18 1648 98.3 °F (36.8 °C) 76 18 157/89 97 % 10/19/18 1433    153/75   
10/19/18 1335 98.1 °F (36.7 °C) 70 16 153/75   
10/19/18 1330  62  149/85   
10/19/18 1315  61  151/88  Weight change: -0.181 kg (-6.4 oz) 10/18 1901 - 10/20 0700 In: 0 [P.O.:390; I.V.:200] Out: 1001 Intake/Output Summary (Last 24 hours) at 10/20/2018 1300 Last data filed at 10/20/2018 0400 Gross per 24 hour Intake 440 ml Output 1001 ml Net -561 ml Physical Exam:  
General: comfortable, no acute distress HEENT sclera anicteric, CVS: S1S2 heard,  no rub RS: + air entry b/l, Abd: Soft, Non tender, Neuro: non focal, Extrm: no edema, no cyanosis, clubbing Skin: no visible  Rash Musculoskeletal: No gross joints or bone deformities Access; right arm TDC, left arm AVG non functional  
 
 
 
 
Data Review:  
 
LABS:  
Hematology:  
Recent Labs 10/19/18 
0540 10/18/18 
8432 WBC 3.0* 2.5* HGB 9.9* 9.8* HCT 29.8* 29.3* Chemistry:  
Recent Labs 10/19/18 
0540 10/18/18 
5666 BUN 33* 19* CREA 5.39* 3.69* CA 8.1* 8.3*  
K 5.1 4.4  139  101 CO2 28 31 GLU 89 89 Procedures/imaging: see electronic medical records for all procedures, Xrays and details which were not copied into this note but were reviewed prior to creation of Plan Assessment & Plan: As above Omid Pinedo MD 
10/20/2018 
1:06 PM

## 2018-10-21 NOTE — CONSULTS
[employer] [facility] Pulmonary, Critical Care, and Sleep Medicine Name: Marleny Wyatt MRN: [id number] : 1960 Hospital: [facility] Date: 10/21/2018 Pulmonary Medicine-  Follow Up IMPRESSION:  
· Acute respiratory failure- suspect flash pulmonary edema due to hypertensive emergency, vs infection or both · Pericardial effusion- increased from last echo/CT: etiology? Uremia? Minoxidil Discussed with Dr. Daljit Baca · Pleural effusion- Right; interval increase- etiology? CHF? Has been tapped before, Prior effusion + TB from [place]- on [employer] · Pulmonary Infiltrates- pulmonary edema improved; but now area of consolidation at RML- appears to be more from pericardial effusion and small degree of atelectasis · Hypertensive Urgency- acute on chronic process- BP chronically labile with recurrent admissions for hypertensive urgency/emergencies - improved · Tuberculosis- Primary: + TB from pleural fluid and Bronch from [address]- On DOT with [address] · MAC also isolated of prior BAL from bronch- unclear clinical significance · CHF- Diastolic- acute decompensation - improved · H/O NSTEMI- Cardiology following · End Stage Renal Disease- on HD: MWF- Nephrology following · Nausea with emesis- acute on chronic problem-improved · Leukopenia- stable RECOMMENDATIONS:  
 
· Keep HOB elevated; aspiration precautions · SpO2 goal>92% · Bronchial hygiene encouraged/ IS at bedside · HD per nephrology · BP management per Primary team and cardiology- may need to stop Minoxidil if contributing to pericardial effusion · Prophylaxis per primary team 
· OK to discharge from a pulmonary perspective. Subjective/History:  
10/21/18 · Patient on Room Air - appears comfortable · Likely discharge home today Patient Vitals for the past 24 hrs: 
 Temp Pulse Resp BP SpO2  
10/21/18 0733 98.2 °F (36.8 °C)      
10/21/18 0600  70 22 166/88 100 % 10/21/18 0400 97.8 °F (36.6 °C) 69 18 148/75 94 % 10/21/18 0200  72 14 121/67 96 % 10/21/18 0000 97.9 °F (36.6 °C) 69 18 140/75 98 % 10/20/18 2200  77 28 120/70 96 % 10/20/18 2021 98.1 °F (36.7 °C) 65     
10/20/18 2000 98.1 °F (36.7 °C) 68 23 126/75 98 % 10/20/18 1800  74  116/65 96 % 10/20/18 1605 98.1 °F (36.7 °C) 74 22 150/87 99 % 10/20/18 1600  67 14 150/87 97 % 10/20/18 1400  62 28 (!) 148/93 98 % 10/20/18 1200  67 23 131/75 100 % 10/20/18 1055 98 °F (36.7 °C) 66 19  96 % 10/20/18 1034  70  156/87  HPI This patient has been seen and evaluated at the request of Dr. Alysha Collins for pulmonary infiltrates. Patient is a 66 y.o. female - [place] speaking only which a history of multiple admissions to [facility] due to malignant hypertension with concurrent decompensated diastolic heart failure. The patient also has a history of primary pulmonary tuberculosis and is on DOT therapy by local health department. MAC was also isolated from a BAL in the past.   
 
The patient was recently readmitted to the SDU for recurrent hypertension and acute respiratory failure. BP medications were again modified. Home dietary intact was again reviewed with family. The patient tends mostly to eat food from home and not from our food services. She found to be taking MSG- unclear if that has been contributing to labile blood pressures. The patient also has recurrent nausea and emesis- unclear etiology: gastroparesis and/or irritability from medications In regards to her pulmonary status the patient has been undergoing DOT by local health department. She gets her HD in the morning and she receives her TB meds in the afternoon/evening. She denies hemoptysis or sputum production. Her respiratory status today is much improved from prior days. Patient Vitals for the past 24 hrs: 
 Temp Pulse Resp BP SpO2  
10/21/18 0733 98.2 °F (36.8 °C)     10/21/18 0600  70 22 166/88 100 % 10/21/18 0400 97.8 °F (36.6 °C) 69 18 148/75 94 % 10/21/18 0200  72 14 121/67 96 % 10/21/18 0000 97.9 °F (36.6 °C) 69 18 140/75 98 % 10/20/18 2200  77 28 120/70 96 % 10/20/18 2021 98.1 °F (36.7 °C) 65     
10/20/18 2000 98.1 °F (36.7 °C) 68 23 126/75 98 % 10/20/18 1800  74  116/65 96 % 10/20/18 1605 98.1 °F (36.7 °C) 74 22 150/87 99 % 10/20/18 1600  67 14 150/87 97 % 10/20/18 1400  62 28 (!) 148/93 98 % 10/20/18 1200  67 23 131/75 100 % 10/20/18 1055 98 °F (36.7 °C) 66 19  96 % 10/20/18 1034  70  156/87  Past Medical History:  
Diagnosis Date  Arthritis GOUT  Asthma  Bilateral leg pain  Chest pain at rest   
 Chronic kidney disease  [facility] stage [facility]) ([facility] TUES-THURS-SAT  Hypercholesteremia  Hypertension  Joint pain  Kidney disease  Pleural effusion  Pulmonary tuberculosis  Vitamin D deficiency Past Surgical History:  
Procedure Laterality Date  CHEST SURGERY PROCEDURE UNLISTED    
 THORACENTHESIS  
 HX OTHER SURGICAL    
 BRONCHOSCOPY  
 HX VASCULAR ACCESS  01/2018; 11/2017 LEFT ARM VENOUS ACCESS  
 HX VASCULAR ACCESS  02/2017 [address] [facility] [address]  VASCULAR SURGERY PROCEDURE UNLIST Prior to Admission medications Medication Sig Start Date End Date Taking? Authorizing Provider  
amLODIPine (NORVASC) 10 mg tablet Take 1 Tab by mouth daily. 10/6/18  Yes Chris Vera MD  
carvedilol (COREG) 25 mg tablet Take 1 Tab by mouth every twelve (12) hours. 10/6/18  Yes Chris Vera MD  
cloNIDine (CATAPRES) 0.3 mg/24 hr 1 Patch by TransDERmal route every seven (7) days. 10/6/18  Yes Chris Vera MD  
losartan (COZAAR) 100 mg tablet Take 1 Tab by mouth daily. 10/6/18  Yes Chris Vera MD  
bisacodyl (DULCOLAX) 5 mg EC tablet Take 1 Tab by mouth daily as needed for Constipation.  8/5/18  Yes Lefty Maravilla MD  
 albuterol (PROVENTIL HFA, VENTOLIN HFA, PROAIR HFA) 90 mcg/actuation inhaler Take 2 Puffs by inhalation every four (4) hours as needed for Wheezing. 7/21/18  Yes Sandhya Urban MD  
azithromycin (ZITHROMAX) 500 mg tab Take 1 Tab by mouth every Tuesday Thursday, Saturday. At 5 PM 7/21/18  Yes Sandhya Urban MD  
aspirin 81 mg chewable tablet Take 1 Tab by mouth daily. 3/8/18  Yes Jaylen Bird MD  
atorvastatin (LIPITOR) 40 mg tablet Take 1 Tab by mouth nightly. 3/8/18  Yes Jaylen Bird MD  
dicyclomine (BENTYL) 20 mg tablet Take 20 mg by mouth three (3) times daily as needed (abdominial pain). Provider, Historical  
cloNIDine HCl (CATAPRES) 0.3 mg tablet Take 0.3 mg by mouth three (3) times daily. Provider, Historical  
glycerin, adult, (FLEET GLYCERIN, ADULT,) suppository Insert 1 Suppository into rectum daily as needed. Indications: BOWEL EVACUATION 10/6/18   Sandhya Urban MD  
minoxidil (LONITEN) 2.5 mg tablet Take 1 Tab by mouth two (2) times a day. 10/6/18   Sandhya Urban MD  
OTHER Check CBC, CMP, MG on 10/08/18, Results to PCP immediately, Diagnosis- TB 10/6/18   Sandhya Urban MD  
ethambutol (MYAMBUTOL) 400 mg tablet Take 2 Tabs by mouth every Tuesday Thursday, Saturday. At 5 PM 7/21/18   Sandhya Urban MD  
isoniazid (NYDRAZID) 300 mg tablet Take 1 Tab by mouth daily. On dialysis days, please take medication after dialysis 7/21/18   Sandhya Urban MD  
pyrazinamide 500 mg tablet Take 2 Tabs by mouth every Tuesday Thursday, Saturday. At 5 pm  Indications: active tuberculosis 7/24/18   Sandhya Urban MD  
rifAMPin (RIFADIN) 300 mg capsule Take 2 Caps by mouth daily. On dialysis days please take this medication after Dialysis 7/21/18   Sandhya Urban MD  
docusate sodium (COLACE) 100 mg capsule Take 1 Cap by mouth two (2) times a day. 7/21/18   Sandhya Urban MD  
pyridoxine, vitamin B6, (VITAMIN B-6) 100 mg tablet Take 1 Tab by mouth daily.  7/22/18   Sandhya Urban MD  
 OTHER Incentive Spirometry- use as directed 7/21/18   Jenifer Brenner MD  
melatonin 3 mg tablet Take 1 Tab by mouth nightly as needed. Patient taking differently: Take 1 Tab by mouth nightly as needed (insomnia). 3/8/18   Raymundo Davalos MD  
FLUoxetine (PROZAC) 10 mg capsule Take 1 Cap by mouth daily. 3/8/18   Raymundo Davalos MD  
 
Current Facility-Administered Medications Medication Dose Route Frequency  polyethylene glycol (MIRALAX) packet 17 g  17 g Oral DAILY  pyrazinamide tablet 1,000 mg  1,000 mg Oral Q MON, WED & FRI  ethambutol (MYAMBUTOL) tablet 800 mg  800 mg Oral Q MON, WED & FRI  minoxidil (LONITEN) tablet 10 mg  10 mg Oral BID  rifAMPin (RIFADIN) capsule 600 mg  600 mg Oral DAILY  epoetin pilar (EPOGEN;PROCRIT) 5,000 Units  5,000 Units IntraVENous DIALYSIS MON, WED & FRI  aspirin chewable tablet 81 mg  81 mg Oral DAILY  atorvastatin (LIPITOR) tablet 40 mg  40 mg Oral QHS  carvedilol (COREG) tablet 25 mg  25 mg Oral Q12H  
 docusate sodium (COLACE) capsule 100 mg  100 mg Oral BID  FLUoxetine (PROzac) capsule 10 mg  10 mg Oral DAILY  isoniazid (NYDRAZID) tablet 300 mg  300 mg Oral DAILY  pyridoxine (vitamin B6) (VITAMIN B-6) tablet 100 mg  100 mg Oral DAILY  heparin (porcine) injection 5,000 Units  5,000 Units SubCUTAneous Q8H  
 influenza vaccine 2018-19 (6 mos+)(PF) (FLUARIX QUAD/FLULAVAL QUAD) injection 0.5 mL  0.5 mL IntraMUSCular PRIOR TO DISCHARGE  cloNIDine HCl (CATAPRES) tablet 0.3 mg  0.3 mg Oral TID  losartan (COZAAR) tablet 50 mg  50 mg Oral DAILY  levoFLOXacin (LEVAQUIN) 500 mg in D5W IVPB  500 mg IntraVENous Q48H  piperacillin-tazobactam (ZOSYN) 2.25 g in 0.9% sodium chloride (MBP/ADV) 50 mL ADV  2.25 g IntraVENous Q8H  
 Piperacillin-tazobactam (ZOSYN) 0.75 gm Supplemental Dosing by Pharmacy   Other Rx Dosing/Monitoring  VANCOMYCIN INFORMATION NOTE   Other Rx Dosing/Monitoring Allergies Allergen Reactions  Banana Other (comments) Social History Tobacco Use  Smoking status: Never Smoker  Smokeless tobacco: Never Used Substance Use Topics  Alcohol use: No  
  
History reviewed. No pertinent family history. Review of Systems: 
Pertinent items are noted in HPI. Objective: 
Vital Signs:   
Visit Vitals /88 Pulse 70 Temp 98.2 °F (36.8 °C) Resp 22 Ht 5' 1\" (1.549 m) Wt 41.8 kg (92 lb 1.6 oz) SpO2 100% Breastfeeding? No  
BMI 17.40 kg/m² O2 Device: Room air O2 Flow Rate (L/min): 2 l/min Temp (24hrs), Av °F (36.7 °C), Min:97.8 °F (36.6 °C), Max:98.2 °F (36.8 °C) Intake/Output:  
Last shift:      No intake/output data recorded. Last 3 shifts: 10/19 1901 - 10/21 0700 In: 250 [P.O.:50; I.V.:200] Out: 0 Intake/Output Summary (Last 24 hours) at 10/21/2018 4517 Last data filed at 10/20/2018 1728 Gross per 24 hour Intake 100 ml Output  Net 100 ml Physical Exam: 
 
General:  Alert, cooperative, no distress, appears stated age. Thin  
Head:  Normocephalic, without obvious abnormality, atraumatic. Eyes:  Conjunctivae/corneas clear. PERRL, EOMs intact. Nose: Nares normal. Septum midline. Mucosa normal. No drainage or sinus tenderness. Throat: Lips, mucosa, and tongue normal. No exudates Neck: Supple, symmetrical, trachea midline, no adenopathy, thyroid: no enlargment/tenderness/nodules, no carotid bruit and no JVD. Back:   Symmetric Lungs:   Clear to auscultation bilaterally, no wheezing, no stridor Chest wall:  No tenderness or deformity. Heart:  Regular rate and rhythm, S1, S2 normal, no murmur, click, rub or gallop. Abdomen:   Soft, non-tender. Bowel sounds normal. No masses,  No organomegaly palpated Extremities: Extremities normal, atraumatic, no cyanosis or edema. Pulses: 2+ and symmetric all extremities. Skin: Skin color, texture, turgor normal. No rashes or lesions Lymph nodes: Cervical, supraclavicular nodes unremarkable. Neurologic: Grossly nonfocal  
 
 
Data:  
 
Recent Results (from the past 24 hour(s)) METABOLIC PANEL, BASIC Collection Time: 10/21/18  6:20 AM  
Result Value Ref Range Sodium 136 136 - 145 mmol/L Potassium 4.6 3.5 - 5.5 mmol/L Chloride 98 (L) 100 - 108 mmol/L  
 CO2 27 21 - 32 mmol/L Anion gap 11 3.0 - 18 mmol/L Glucose 88 74 - 99 mg/dL BUN 24 (H) 7.0 - 18 MG/DL Creatinine 5.27 (H) 0.6 - 1.3 MG/DL  
 BUN/Creatinine ratio 5 (L) 12 - 20 GFR est AA 10 (L) >60 ml/min/1.73m2 GFR est non-AA 8 (L) >60 ml/min/1.73m2 Calcium 8.6 8.5 - 10.1 MG/DL Imaging: 
I have personally reviewed the patients radiographs and have reviewed the reports: CXR Results  (Last 48 hours) None CXR: 10/18/18 IMPRESSION:  
 
Persistent consolidation at the medial right lower lung but patchy bilateral  
pleural effusions and vascular congestion are interval improved. Small right pleural effusion. Thank you for your referral.  
 
Echo: 8/23/18 · Estimated left ventricular ejection fraction is 56 - 60%. Left ventricular moderate concentric hypertrophy. Normal left ventricular wall motion, no regional wall motion abnormality noted. · Saline contrast was given to evaluate for intracardiac shunt. No shunt seen. · Small circumferential pericardial effusion with more prominent area around the right artium. No indications of tamponade present. 10/15/18                                                                 10/2/18 
 
  
10/18/18                                                                    10/15/18 CT Abdo: 8/22/18 CT Chest 10/19/18 · TTE 10/19: Estimated left ventricular ejection fraction is 56 - 60%. Visually measured ejection fraction. Left ventricular moderate concentric hypertrophy. Normal left ventricular wall motion, no regional wall motion abnormality noted. Small circumferential pericardial effusion with more prominent area around the right atrium. No indications of tamponade present. Total clinical care time exclusive of procedures: 25 minutes Awanda Meckel. Mane Jimenes MD, [facility] Pulmonary, Sleep, Critical Care Medicine

## 2018-10-21 NOTE — DISCHARGE INSTRUCTIONS
High Blood Pressure: Care Instructions  Your Care Instructions    If your blood pressure is usually above 130/80, you have high blood pressure, or hypertension. That means the top number is 130 or higher or the bottom number is 80 or higher, or both. Despite what a lot of people think, high blood pressure usually doesn't cause headaches or make you feel dizzy or lightheaded. It usually has no symptoms. But it does increase your risk for heart attack, stroke, and kidney or eye damage. The higher your blood pressure, the more your risk increases. Your doctor will give you a goal for your blood pressure. Your goal will be based on your health and your age. Lifestyle changes, such as eating healthy and being active, are always important to help lower blood pressure. You might also take medicine to reach your blood pressure goal.  Follow-up care is a key part of your treatment and safety. Be sure to make and go to all appointments, and call your doctor if you are having problems. It's also a good idea to know your test results and keep a list of the medicines you take. How can you care for yourself at home? Medical treatment  · If you stop taking your medicine, your blood pressure will go back up. You may take one or more types of medicine to lower your blood pressure. Be safe with medicines. Take your medicine exactly as prescribed. Call your doctor if you think you are having a problem with your medicine. · Talk to your doctor before you start taking aspirin every day. Aspirin can help certain people lower their risk of a heart attack or stroke. But taking aspirin isn't right for everyone, because it can cause serious bleeding. · See your doctor regularly. You may need to see the doctor more often at first or until your blood pressure comes down. · If you are taking blood pressure medicine, talk to your doctor before you take decongestants or anti-inflammatory medicine, such as ibuprofen.  Some of these medicines can raise blood pressure. · Learn how to check your blood pressure at home. Lifestyle changes  · Stay at a healthy weight. This is especially important if you put on weight around the waist. Losing even 10 pounds can help you lower your blood pressure. · If your doctor recommends it, get more exercise. Walking is a good choice. Bit by bit, increase the amount you walk every day. Try for at least 30 minutes on most days of the week. You also may want to swim, bike, or do other activities. · Avoid or limit alcohol. Talk to your doctor about whether you can drink any alcohol. · Try to limit how much sodium you eat to less than 2,300 milligrams (mg) a day. Your doctor may ask you to try to eat less than 1,500 mg a day. · Eat plenty of fruits (such as bananas and oranges), vegetables, legumes, whole grains, and low-fat dairy products. · Lower the amount of saturated fat in your diet. Saturated fat is found in animal products such as milk, cheese, and meat. Limiting these foods may help you lose weight and also lower your risk for heart disease. · Do not smoke. Smoking increases your risk for heart attack and stroke. If you need help quitting, talk to your doctor about stop-smoking programs and medicines. These can increase your chances of quitting for good. When should you call for help? Call 911 anytime you think you may need emergency care. This may mean having symptoms that suggest that your blood pressure is causing a serious heart or blood vessel problem. Your blood pressure may be over 180/120.   For example, call 911 if:    · You have symptoms of a heart attack. These may include:  ? Chest pain or pressure, or a strange feeling in the chest.  ? Sweating. ? Shortness of breath. ? Nausea or vomiting. ? Pain, pressure, or a strange feeling in the back, neck, jaw, or upper belly or in one or both shoulders or arms. ? Lightheadedness or sudden weakness.   ? A fast or irregular heartbeat.     · You have symptoms of a stroke. These may include:  ? Sudden numbness, tingling, weakness, or loss of movement in your face, arm, or leg, especially on only one side of your body. ? Sudden vision changes. ? Sudden trouble speaking. ? Sudden confusion or trouble understanding simple statements. ? Sudden problems with walking or balance. ? A sudden, severe headache that is different from past headaches.     · You have severe back or belly pain.    Do not wait until your blood pressure comes down on its own. Get help right away.   Call your doctor now or seek immediate care if:    · Your blood pressure is much higher than normal (such as 180/120 or higher), but you don't have symptoms.     · You think high blood pressure is causing symptoms, such as:  ? Severe headache.  ? Blurry vision.    Watch closely for changes in your health, and be sure to contact your doctor if:    · Your blood pressure measures higher than your doctor recommends at least 2 times. That means the top number is higher or the bottom number is higher, or both.     · You think you may be having side effects from your blood pressure medicine. Where can you learn more? Go to http://cristino-anne.info/. Enter N930 in the search box to learn more about \"High Blood Pressure: Care Instructions. \"  Current as of: December 6, 2017  Content Version: 11.8  © 7383-5235 "[employer]". Care instructions adapted under license by [employer] (which disclaims liability or warranty for this information). If you have questions about a medical condition or this instruction, always ask your healthcare professional. [place] [address] any warranty or liability for your use of this information. DASH Diet: Care Instructions  Your Care Instructions    The DASH diet is an eating plan that can help lower your blood pressure. DASH stands for Dietary Approaches to Stop Hypertension.  Hypertension is high blood pressure. The DASH diet focuses on eating foods that are high in calcium, potassium, and magnesium. These nutrients can lower blood pressure. The foods that are highest in these nutrients are fruits, vegetables, low-fat dairy products, nuts, seeds, and legumes. But taking calcium, potassium, and magnesium supplements instead of eating foods that are high in those nutrients does not have the same effect. The DASH diet also includes whole grains, fish, and poultry. The DASH diet is one of several lifestyle changes your doctor may recommend to lower your high blood pressure. Your doctor may also want you to decrease the amount of sodium in your diet. Lowering sodium while following the DASH diet can lower blood pressure even further than just the DASH diet alone. Follow-up care is a key part of your treatment and safety. Be sure to make and go to all appointments, and call your doctor if you are having problems. It's also a good idea to know your test results and keep a list of the medicines you take. How can you care for yourself at home? Following the DASH diet  · Eat 4 to 5 servings of fruit each day. A serving is 1 medium-sized piece of fruit, ½ cup chopped or canned fruit, 1/4 cup dried fruit, or 4 ounces (½ cup) of fruit juice. Choose fruit more often than fruit juice. · Eat 4 to 5 servings of vegetables each day. A serving is 1 cup of lettuce or raw leafy vegetables, ½ cup of chopped or cooked vegetables, or 4 ounces (½ cup) of vegetable juice. Choose vegetables more often than vegetable juice. · Get 2 to 3 servings of low-fat and fat-free dairy each day. A serving is 8 ounces of milk, 1 cup of yogurt, or 1 ½ ounces of cheese. · Eat 6 to 8 servings of grains each day. A serving is 1 slice of bread, 1 ounce of dry cereal, or ½ cup of cooked rice, pasta, or cooked cereal. Try to choose whole-grain products as much as possible. · Limit lean meat, poultry, and fish to 2 servings each day.  A serving is 3 ounces, about the size of a deck of cards. · Eat 4 to 5 servings of nuts, seeds, and legumes (cooked dried beans, lentils, and split peas) each week. A serving is 1/3 cup of nuts, 2 tablespoons of seeds, or ½ cup of cooked beans or peas. · Limit fats and oils to 2 to 3 servings each day. A serving is 1 teaspoon of vegetable oil or 2 tablespoons of salad dressing. · Limit sweets and added sugars to 5 servings or less a week. A serving is 1 tablespoon jelly or jam, ½ cup sorbet, or 1 cup of lemonade. · Eat less than 2,300 milligrams (mg) of sodium a day. If you limit your sodium to 1,500 mg a day, you can lower your blood pressure even more. Tips for success  · Start small. Do not try to make dramatic changes to your diet all at once. You might feel that you are missing out on your favorite foods and then be more likely to not follow the plan. Make small changes, and stick with them. Once those changes become habit, add a few more changes. · Try some of the following:  ? Make it a goal to eat a fruit or vegetable at every meal and at snacks. This will make it easy to get the recommended amount of fruits and vegetables each day. ? Try yogurt topped with fruit and nuts for a snack or healthy dessert. ? Add lettuce, tomato, cucumber, and onion to sandwiches. ? Combine a ready-made pizza crust with low-fat mozzarella cheese and lots of vegetable toppings. Try using tomatoes, squash, spinach, broccoli, carrots, cauliflower, and onions. ? Have a variety of cut-up vegetables with a low-fat dip as an appetizer instead of chips and dip. ? Sprinkle sunflower seeds or chopped almonds over salads. Or try adding chopped walnuts or almonds to cooked vegetables. ? Try some vegetarian meals using beans and peas. Add garbanzo or kidney beans to salads. Make burritos and tacos with mashed chávez beans or black beans. Where can you learn more? Go to http://trudi.info/.   Enter Y677 in the search box to learn more about \"DASH Diet: Care Instructions. \"  Current as of: December 6, 2017  Content Version: 11.8  © 1112-5226 [email or web address]. Care instructions adapted under license by [employer] (which disclaims liability or warranty for this information). If you have questions about a medical condition or this instruction, always ask your healthcare professional. [address] any warranty or liability for your use of this information. Low Sodium Diet (2,000 Milligram): Care Instructions  Your Care Instructions    Too much sodium causes your body to hold on to extra water. This can raise your blood pressure and force your heart and kidneys to work harder. In very serious cases, this could cause you to be put in the hospital. It might even be life-threatening. By limiting sodium, you will feel better and lower your risk of serious problems. The most common source of sodium is salt & MSG. People get most of the salt in their diet from canned, prepared, and packaged foods. Fast food and restaurant meals also are very high in sodium. Your doctor will probably limit your sodium to less than 2,000 milligrams (mg) a day. This limit counts all the sodium in prepared and packaged foods and any salt you add to your food. Follow-up care is a key part of your treatment and safety. Be sure to make and go to all appointments, and call your doctor if you are having problems. It's also a good idea to know your test results and keep a list of the medicines you take. How can you care for yourself at home? Read food labels  · Read labels on cans and food packages. The labels tell you how much sodium is in each serving. Make sure that you look at the serving size. If you eat more than the serving size, you have eaten more sodium. · Food labels also tell you the Percent Daily Value for sodium. Choose products with low Percent Daily Values for sodium.   · Be aware that sodium can come in forms other than salt, including monosodium glutamate (MSG), sodium citrate, and sodium bicarbonate (baking soda). MSG is often added to Asian food. When you eat out, you can sometimes ask for food without MSG or added salt. Buy low-sodium foods  · Buy foods that are labeled \"unsalted\" (no salt added), \"sodium-free\" (less than 5 mg of sodium per serving), or \"low-sodium\" (less than 140 mg of sodium per serving). Foods labeled \"reduced-sodium\" and \"light sodium\" may still have too much sodium. Be sure to read the label to see how much sodium you are getting. · Buy fresh vegetables, or frozen vegetables without added sauces. Buy low-sodium versions of canned vegetables, soups, and other canned goods. Prepare low-sodium meals  · Cut back on the amount of salt you use in cooking. This will help you adjust to the taste. Do not add salt after cooking. One teaspoon of salt has about 2,300 mg of sodium. · Take the salt shaker off the table. · Flavor your food with garlic, lemon juice, onion, vinegar, herbs, and spices. Do not use soy sauce, lite soy sauce, steak sauce, onion salt, garlic salt, celery salt, mustard, or ketchup on your food. · Use low-sodium salad dressings, sauces, and ketchup. Or make your own salad dressings and sauces without adding salt. · Use less salt (or none) when recipes call for it. You can often use half the salt a recipe calls for without losing flavor. Other foods such as rice, pasta, and grains do not need added salt. · Rinse canned vegetables, and cook them in fresh water. This removes some--but not all--of the salt. · Avoid water that is naturally high in sodium or that has been treated with water softeners, which add sodium. Call your local water company to find out the sodium content of your water supply. If you buy bottled water, read the label and choose a sodium-free brand.   Avoid high-sodium foods  · Avoid eating:  ? Smoked, cured, salted, and canned meat, fish, and poultry. ? Ham, marinelli, hot dogs, and luncheon meats. ? Regular, hard, and processed cheese and regular peanut butter. ? Crackers with salted tops, and other salted snack foods such as pretzels, chips, and salted popcorn. ? Frozen prepared meals, unless labeled low-sodium. ? Canned and dried soups, broths, and bouillon, unless labeled sodium-free or low-sodium. ? Canned vegetables, unless labeled sodium-free or low-sodium. ? Western Kimmy fries, pizza, tacos, and other fast foods. ? Pickles, olives, ketchup, and other condiments, especially soy sauce, unless labeled sodium-free or low-sodium. Kidney Disease and High Blood Pressure: Care Instructions  Your Care Instructions    Long-term (chronic) kidney disease happens when the kidneys cannot remove waste and keep your body's fluids and chemicals in balance. Usually, the kidneys remove waste from the blood through the urine. When the kidneys are not working well, waste can build up so much that it poisons the body. Kidney disease can make you very tired. It also can cause swelling, or edema, in your legs or other areas of your body. High blood pressure is one of the major causes of chronic kidney disease. And kidney disease can also cause high blood pressure. No matter which came first, having high blood pressure damages the tiny blood vessels in the kidneys. If you have high blood pressure, it is important to lower it. There are many things you can do to lower your blood pressure, which may help slow or stop the damage to your kidneys. Follow-up care is a key part of your treatment and safety. Be sure to make and go to all appointments, and call your doctor if you are having problems. It's also a good idea to know your test results and keep a list of the medicines you take. How can you care for yourself at home? · Be safe with medicines. Take your medicines exactly as prescribed. Call your doctor if you have any problems with your medicine. You will probably need more than one medicine to lower your blood pressure. You will get more details on the specific medicines your doctor prescribes. · Work with your doctor and a dietitian to plan meals that have the right amount of nutrients for you. You will probably have to limit salt, fluids, and protein. · Stay at a healthy weight. This is very important if you put on weight around the waist. Losing even 10 pounds can help you lower your blood pressure. · Manage other health problems such as diabetes and high cholesterol. You can help lower your risk for heart disease and blood vessel problems with a healthy lifestyle along with medicines. · Do not take ibuprofen (Advil, Motrin) or naproxen (Aleve), or similar medicines, unless your doctor tells you to. They may make chronic kidney disease worse. It is okay to take acetaminophen (Tylenol). · If your doctor recommends it, get more exercise. Walking is a good choice. Bit by bit, increase the amount you walk every day. Try for at least 30 minutes on most days of the week. You also may want to swim, bike, or do other activities. · Limit or avoid alcohol. Talk to your doctor about whether you can drink any alcohol. · Do not smoke or allow others to smoke around you. If you need help quitting, talk to your doctor about stop-smoking programs and medicines. These can increase your chances of quitting for good. When should you call for help? Call 911 anytime you think you may need emergency care.  For example, call if:    · You passed out (lost consciousness).    Call your doctor now or seek immediate medical care if:    · You have new or worse nausea and vomiting.     · You have much less urine than normal, or you have no urine.     · You are feeling confused or cannot think clearly.     · You have new or more blood in your urine.     · You have new swelling.     · You are dizzy or lightheaded, or you feel like you may faint.    Watch closely for changes in your health, and be sure to contact your doctor if:    · You do not get better as expected. Where can you learn more? Go to http://cristino-anne.info/. Enter A990 in the search box to learn more about \"Kidney Disease and High Blood Pressure: Care Instructions. \"  Current as of: March 15, 2018  Content Version: 11.8  © 9139-5521 [employer]. Care instructions adapted under license by [employer] (which disclaims liability or warranty for this information). If you have questions about a medical condition or this instruction, always ask your healthcare professional. [address] any warranty or liability for your use of this information. DISCHARGE SUMMARY from Nurse    PATIENT INSTRUCTIONS:    What to do at Home:  Recommended activity: Activity as tolerated      *  Please give a list of your current medications to your Primary Care Provider. *  Please update this list whenever your medications are discontinued, doses are      changed, or new medications (including over-the-counter products) are added. *  Please carry medication information at all times in case of emergency situations. These are general instructions for a healthy lifestyle:    No smoking/ No tobacco products/ Avoid exposure to second hand smoke  Surgeon General's Warning:  Quitting smoking now greatly reduces serious risk to your health. Obesity, smoking, and sedentary lifestyle greatly increases your risk for illness    A healthy diet, regular physical exercise & weight monitoring are important for maintaining a healthy lifestyle    You may be retaining fluid if you have a history of heart failure or if you experience any of the following symptoms:  Weight gain of 3 pounds or more overnight or 5 pounds in a week, increased swelling in our hands or feet or shortness of breath while lying flat in bed.   Please call your doctor as soon as you notice any of these symptoms; do not wait until your next office visit. Recognize signs and symptoms of STROKE:    F-face looks uneven  A-arms unable to move or move unevenly  S-speech slurred or non-existent  T-time-call 911 as soon as signs and symptoms begin-DO NOT go       Back to bed or wait to see if you get better-TIME IS BRAIN. Warning Signs of HEART ATTACK     Call 911 if you have these symptoms:   Chest discomfort. Most heart attacks involve discomfort in the center of the chest that lasts more than a few minutes, or that goes away and comes back. It can feel like uncomfortable pressure, squeezing, fullness, or pain.  Discomfort in other areas of the upper body. Symptoms can include pain or discomfort in one or both arms, the back, neck, jaw, or stomach.  Shortness of breath with or without chest discomfort.  Other signs may include breaking out in a cold sweat, nausea, or lightheadedness. Don't wait more than five minutes to call 911 - MINUTES MATTER! Fast action can save your life. Calling 911 is almost always the fastest way to get lifesaving treatment. Emergency Medical Services staff can begin treatment when they arrive -- up to an hour sooner than if someone gets to the hospital by car. The discharge information has been reviewed with the patient's daughter. The Patient's daughter verbalized understanding. Discharge medications reviewed with the patient's daughter and appropriate educational materials and side effects teaching were provided.   ___________________________________________________________________________________________________________________________________

## 2018-10-21 NOTE — PROGRESS NOTES
Reviewed discharge instructions and prescriptions with patient's daughter and son-in-law (patient speaks only [place], and refuses to use language interpretation phone). Stressed the importance of maintaining a low sodium, no MSG diet. Explained the consequences of the patient's current high sodium diet contributing to hypertension. Patient's daughter and son-in-law verbalized understanding. Patient signed discharge instructions with daughter present. Patient armband removed and shredded. Pt transported via wheelchair to private vehicle.

## 2018-10-21 NOTE — PROGRESS NOTES
RENAL DAILY PROGRESS NOTE 59y F with ESRD, admitted for chest pain, short of breath, seen for ESRD management Subjective:  
Complaint: 
Overnight events noted Gradually improving BP. IMPRESSION:  
ESRD, MWF Access; TDC right side HTN Anemia , on epogen Short of breath, volume related + HTN urgency Lung TB, on medication PLAN:  
No indication for HD today. Her SBP gradually improving and acceptable range for discharge. She is on minoxidil with good BP control, discussed with cardiology colleague plan to continue it for now. Okay to discharge from renal stand point. Will arrange HD tomorrow if remain hospitalized. Current Facility-Administered Medications Medication Dose Route Frequency  polyethylene glycol (MIRALAX) packet 17 g  17 g Oral DAILY  pyrazinamide tablet 1,000 mg  1,000 mg Oral Q MON, WED & FRI  ethambutol (MYAMBUTOL) tablet 800 mg  800 mg Oral Q MON, WED & FRI  minoxidil (LONITEN) tablet 10 mg  10 mg Oral BID  rifAMPin (RIFADIN) capsule 600 mg  600 mg Oral DAILY  epoetin pilar (EPOGEN;PROCRIT) 5,000 Units  5,000 Units IntraVENous DIALYSIS MON, WED & FRI  aspirin chewable tablet 81 mg  81 mg Oral DAILY  atorvastatin (LIPITOR) tablet 40 mg  40 mg Oral QHS  bisacodyl (DULCOLAX) tablet 5 mg  5 mg Oral DAILY PRN  
 carvedilol (COREG) tablet 25 mg  25 mg Oral Q12H  
 dicyclomine (BENTYL) capsule 20 mg  20 mg Oral TID PRN  
 docusate sodium (COLACE) capsule 100 mg  100 mg Oral BID  FLUoxetine (PROzac) capsule 10 mg  10 mg Oral DAILY  isoniazid (NYDRAZID) tablet 300 mg  300 mg Oral DAILY  melatonin tablet 3 mg  3 mg Oral QHS PRN  pyridoxine (vitamin B6) (VITAMIN B-6) tablet 100 mg  100 mg Oral DAILY  acetaminophen (TYLENOL) tablet 650 mg  650 mg Oral Q4H PRN  
 heparin (porcine) injection 5,000 Units  5,000 Units SubCUTAneous Q8H  
 influenza vaccine 2018-19 (6 mos+)(PF) (FLUARIX QUAD/FLULAVAL QUAD) injection 0.5 mL  0.5 mL IntraMUSCular PRIOR TO DISCHARGE  cloNIDine HCl (CATAPRES) tablet 0.3 mg  0.3 mg Oral TID  ondansetron (ZOFRAN) injection 4 mg  4 mg IntraVENous Q4H PRN  promethazine (PHENERGAN) 12.5 mg in 0.9% sodium chloride 50 mL IVPB  12.5 mg IntraVENous Q4H PRN  
 losartan (COZAAR) tablet 50 mg  50 mg Oral DAILY  0.9% sodium chloride infusion  100 mL/hr IntraVENous DIALYSIS PRN  
 heparin (porcine) 1,000 unit/mL injection 1,000 Units  1,000 Units InterCATHeter DIALYSIS PRN  
 levoFLOXacin (LEVAQUIN) 500 mg in D5W IVPB  500 mg IntraVENous Q48H  piperacillin-tazobactam (ZOSYN) 2.25 g in 0.9% sodium chloride (MBP/ADV) 50 mL ADV  2.25 g IntraVENous Q8H  
 Piperacillin-tazobactam (ZOSYN) 0.75 gm Supplemental Dosing by Pharmacy   Other Rx Dosing/Monitoring  VANCOMYCIN INFORMATION NOTE   Other Rx Dosing/Monitoring Review of Symptoms: comprehensive ROS negative except above. Objective:  
 
Patient Vitals for the past 24 hrs: 
 Temp Pulse Resp BP SpO2  
10/21/18 0733 98.2 °F (36.8 °C) 80 23 170/89 99 % 10/21/18 0600  70 22 166/88 100 % 10/21/18 0400 97.8 °F (36.6 °C) 69 18 148/75 94 % 10/21/18 0200  72 14 121/67 96 % 10/21/18 0000 97.9 °F (36.6 °C) 69 18 140/75 98 % 10/20/18 2200  77 28 120/70 96 % 10/20/18 2021 98.1 °F (36.7 °C) 65     
10/20/18 2000 98.1 °F (36.7 °C) 68 23 126/75 98 % 10/20/18 1800  74  116/65 96 % 10/20/18 1605 98.1 °F (36.7 °C) 74 22 150/87 99 % 10/20/18 1600  67 14 150/87 97 % 10/20/18 1400  62 28 (!) 148/93 98 % 10/20/18 1200  67 23 131/75 100 % 10/20/18 1055 98 °F (36.7 °C) 66 19  96 % 10/20/18 1034  70  156/87  Weight change:  
 
 10/19 1901 - 10/21 0700 In: 250 [P.O.:50; I.V.:200] Out: 0 Intake/Output Summary (Last 24 hours) at 10/21/2018 1011 Last data filed at 10/20/2018 1728 Gross per 24 hour Intake 100 ml Output  Net 100 ml Physical Exam:  
General: comfortable, no acute distress HEENT sclera anicteric, CVS: S1S2 heard,  no rub RS: + air entry b/l, Abd: Soft, Non tender, Neuro: non focal, Extrm: no edema, no cyanosis, clubbing Skin: no visible  Rash Musculoskeletal: No gross joints or bone deformities Access; right arm TDC, left arm AVG non functional  
 
 
 
 
Data Review:  
 
LABS:  
Hematology:  
Recent Labs 10/19/18 
0540 WBC 3.0* HGB 9.9*  
HCT 29.8* Chemistry:  
Recent Labs 10/21/18 
8081 10/19/18 
0540 BUN 24* 33* CREA 5.27* 5.39* CA 8.6 8.1*  
K 4.6 5.1  138 CL 98* 102 CO2 27 28 GLU 88 89 Procedures/imaging: see electronic medical records for all procedures, Xrays and details which were not copied into this note but were reviewed prior to creation of Plan Assessment & Plan: As above Desirae Parker MD 
10/21/2018 
1:06 PM

## 2018-10-31 NOTE — DISCHARGE SUMMARY
[address] [place] [address] Dr DISCHARGE SUMMARY Harinder Brady 
MR#: [id number] : 1960 ACCOUNT #: [de-identified] ADMIT DATE: 10/15/2018 DISCHARGE DATE: 10/21/2018 DISCHARGE DIAGNOSES:  Includes: 1. Hypertensive urgency at the time of admission, which has shown improvement. 2.  End-stage renal disease. 3.  Acute nausea, vomiting, which is improved. 4.  History of tuberculosis on treatment as per the Health Department dosing. 5.  Concern for sepsis at the time of admission secondary to pneumonia. 6.  Possible pneumonia. 7.  Small pericardial effusion. 8.  Acute on chronic diastolic congestive heart failure, which is improved. 9.  Minimally elevated troponin maybe secondary to demand ischemia. HOSPITAL COURSE:  This is a 32-year-old female who presented to the ED with chest pain and shortness of breath. The patient was evaluated. Patient was noted to be hypoxic at the time of admission. I doubt respiratory failure. There was also concern for sepsis and pneumonia. Patient had lactic acidosis. Patient was started on broad spectrum antibiotics. Patient was noted to have hypertensive emergency at the time of admission and received IV medications to control the blood pressure. Cardiology was consulted. Nephrology was consulted. Blood pressure showed some improvement. There was some history of noncompliance and the patient had been eating some soy sauce. Patient was counseled regarding compliance. Family was also counseled regarding the patient's diet and need for compliance. Pulmonary was also consulted. Patient started to feel better. Blood pressure showed improvement. The patient was cleared for discharge by cardiology as well as pulmonary as well as nephrology. Preadmission TB medications were continued. Discharge plans were discussed with patient and family at bedside. DISPOSITION:  Home.   Please note that the patient and family declined any skilled nursing facility or rehab placement for this patient. Patient was hemodynamically stable. PROCEDURES:  None. CONSULTATIONS:  Cardiology, Dr. Sharyle Pancake; nephrology, Dr. Felisa Mcnulty; and pulmonary, Dr. Terence Abebe. DISCHARGE MEDICATIONS:  Include: 1. Cozaar 50 mg p.o. daily. 2.  Minoxidil 10 mg p.o. twice daily. 2.  MiraLax 17 grams p.o. daily. 3.  Levofloxacin 500 mg 1 tablet p.o. every 48 hours. 4.  Graded compression stockings bilateral lower extremities. 5.  Coreg 25 mg p.o. twice daily. 6.  Dulcolax tablet p.r.n. constipation. 7.  Albuterol inhaler 2 puffs inhaled every 4 hours p.r.n. 
8.  Aspirin 81 mg p.o. daily. 9.  Lipitor 40 mg p.o. daily. 10.  Bentyl 20 mg p.o. 3 times daily p.r.n. 11.  Catapres 0.3 mg p.o. 3 times daily. 12.  Glycerin suppository as needed. 13.  Ethambutol 800 mg p.o. every Tuesday, Thursday and Saturday at 5:00 p.m. 14.  Isoniazid 300 mg p.o. daily on dialysis days; please take after dialysis. 15.  Pyrazinamide 1000 mg every Tuesday, Thursday and Saturday at 5:00 p.m. 16.  Colace 100 mg p.o. twice daily. 17.  Vitamin B6 100 mg p.o. daily. 18.  Incentive spirometry, use as directed. 19.  Melatonin p.r.n. insomnia. 20.  Prozac 10 mg p.o. daily. Total time for discharge more than 35 minutes. MD JB Gomez/[place] 
D: 10/30/2018 11:53    
T: 10/31/2018 09:23 
JOB #: [id number] CC: Aren Rolon MD

## 2018-12-31 PROBLEM — I46.9 CARDIOPULMONARY ARREST WITH SUCCESSFUL RESUSCITATION (HCC): Status: ACTIVE | Noted: 2018-01-01

## 2018-12-31 PROBLEM — I46.9 CARDIAC ARREST (HCC): Status: ACTIVE | Noted: 2018-01-01

## 2018-12-31 NOTE — ROUTINE PROCESS
TRANSFER - OUT REPORT: 
 
Verbal report given to Lore Shaw RN(name) on Karl Sires  being transferred to [facility], ICU(unit) for routine progression of care Report consisted of patients Situation, Background, Assessment and  
Recommendations(SBAR). Information from the following report(s) ED Summary was reviewed with the receiving nurse. Lines:  
Peripheral IV 12/31/18 Left Antecubital (Active) Site Assessment Clean, dry, & intact 12/31/2018  9:39 AM  
Infiltration Assessment 0 12/31/2018  9:39 AM  
Dressing Status Clean, dry, & intact 12/31/2018  9:39 AM  
Dressing Type Transparent 12/31/2018  9:35 AM  
Hub Color/Line Status Other (comment) 12/31/2018  9:39 AM  
   
Peripheral IV 12/31/18 Right Leg (Active) Opportunity for questions and clarification was provided. Patient transported with: Monitor, Ventilator, IV Diprovan Drip

## 2018-12-31 NOTE — ROUTINE PROCESS
1300: Pt arrived via transport from [facility] ED. Pt unresponsive, decerebrates to stimuli, doesn't withdraws to pain. Pt bleeding from ETT; RT and MD at bedside. ETT adjusted. CT(head, abd, pelvis) ordered. HD unstable; 's; HR 70's; pulses- palpable; Temp 97.6. Labs reviewed; K 7.2 - insulin, D50- given, calcium gluconate ordered. EKG completed. MD notified regarding pt labs and condition. EEG completed. Cardene drip started. Will titrate based on the BP. Propofol @ 10 mcg/kg/min. Plan to dialyses. Report given to dialysis RN. Pt's son was updated by Dr. Miguel Gonzales. 
 
1600: Bedside and Verbal shift change report given to Rio Dunn.  (oncoming nurse) by Megan Wisdom (offgoing nurse). Report included the following information SBAR, MAR and Recent Results.

## 2018-12-31 NOTE — DIALYSIS
ACUTE HEMODIALYSIS FLOW SHEET 
 
HEMODIALYSIS ORDERS: Physician: Diane Willis Dialyzer: revaclear   Duration: 3 hr  BFR: 400   DFR: 800 Dialysate:  Temp 36  K+   2    Ca+  2.5  Na 138  Bicarb 35 Weight:  42 kg    Patient Chart [x]     Unable to Obtain []   Dry weight/UF Goal: 500  Access right IJ cath Needle Gauge Heparin []  Bolus      Units    [] Hourly       Units    [x]None Catheter locking solution:  Heparin Pre BP:   137/83    Pulse:     75     Temperature:   96.7  Respirations: 17  Tx: NS       ml/Bolus  Other        [x] N/A Labs: Pre        Post:        [x] N/A Additional Orders(medications, blood products, hypotension management):       [x] N/A [x] Hawk Consent Verified CATHETER ACCESS: []N/A   [x]Right   []Left   [x]IJ     []Fem [] First use X-ray verified     [x]Tunnel                [] Non Tunneled [x]No S/S infection  []Redness  []Drainage []Cultured []Swelling []Pain  
[x]Medical Aseptic Prep Utilized   []Dressing Changed  [x] Biopatch  Date: 12/31/2018 []Clotted   [x]Patent   Flows: []Good  []Poor  [x]Reversed If access problem,  notified: []Yes    [x]N/A  Date:        
 
GRAFT/FISTULA ACCESS:  [x]N/A     []Right     []Left     []UE     []LE []AVG   []AVF        []Buttonhole    []Medical Aseptic Prep Utilized []No S/S infection  []Redness  []Drainage []Cultured []Swelling []Pain Bruit:   [] Strong    [] Weak       Thrill :   [] Strong    [] Weak Needle Gauge:    Length: If access problem,  notified: []Yes     [x]N/A  Date:       
Please describe access if present and not used:  
 
 
GENERAL ASSESSMENT:   
LUNGS:  Rate 18 SaO2% 100    [] N/A    [] Clear  [x] Coarse  [] Crackles  [] Wheezing 
      [] Diminished     Location : []RLL   []LLL    []RUL  []ELÍAS Cough: []Productive  []Dry  [x]N/A   Respirations:  [x]Easy  []Labored Therapy:  []RA  []NC  l/min    Mask: []NRB []Venti       O2% [x]Ventilator  []Intubated  [] Trach  [] BiPaP CARDIAC: [x]Regular      [] Irregular   [] Pericardial Rub  [] JVD []  Monitored  [] Bedside  [x] Remotely monitored [] N/A  Rhythm: EDEMA: [x] None  []Generalized  [] Pitting [] 1    [] 2    [] 3    [] 4 [] Facial  [] Pedal  []  UE  [] LE  
SKIN:   [x] Warm  [] Hot     [] Cold   [x] Dry     [] Pale   [] Diaphoretic    
             [] Flushed  [] Jaundiced  [] Cyanotic  [] Rash  [] Weeping LOC:    [] Alert      []Oriented:    [] Person     [] Place  []Time 
             [] Confused  [] Lethargic  [x] Medicated  [x] Non-responsive GI / ABDOMEN:  [] Flat    [] Distended    [x] Soft    [] Firm   []  Obese 
                           [] Diarrhea  [x] Bowel Sounds  [] Nausea  [] Vomiting  / URINE ASSESSMENT:[] Voiding   [x] Oliguria  [] Anuria   []  Onofre [] Incontinent    []  Incontinent Brief      []  Bathroom Privileges PAIN: [x] 0 []1  []2   []3   []4   []5   []6   []7   []8   []9   []10 Scale 0-10  Action/Follow Up: MOBILITY:  [] Amb    [] Amb/Assist    [x] Bed    [] Wheelchair  [] Stretcher All Vitals and Treatment Details on Attached Flowsheet Hospital: [facility] Room # 316 [x] 1st Time Acute  [] Stat  [] Routine  [] Urgent    
[] Acute Room  [x]  Bedside  [x] ICU/CCU  [] ER Isolation Precautions:  [x] Dialysis   [] Airborne   [] Contact    [] Reverse Special Considerations:         [] Blood Consent Verified [x]N/A ALLERGIES:  
Banana Other (comments) Not Specified  9/8/2017 Code Status:  [x] Full Code  [] DNR  [] Other HBsAg ONLY: Date Drawn 8/27/2018        [x]Negative []Positive []Unknown HBsAb: Date 8/27/2018  [] Susceptible   [x] [id number] []Not Drawn  [] Drawn Current Labs:    Date of Labs: Today [x] Results for Avelino Johnson (MRN [id number]) as of 12/31/2018 22:04 Ref.  Range 12/31/2018 13:15  
 Sodium Latest Ref Range: 136 - 145 mmol/L 137 Potassium Latest Ref Range: 3.5 - 5.5 mmol/L 7.2 (HH) Chloride Latest Ref Range: 100 - 108 mmol/L 102 CO2 Latest Ref Range: 21 - 32 mmol/L 24 Anion gap Latest Ref Range: 3.0 - 18 mmol/L 11 Glucose Latest Ref Range: 74 - 99 mg/dL 115 (H) BUN Latest Ref Range: 7.0 - 18 MG/DL 98 (H) Creatinine Latest Ref Range: 0.6 - 1.3 MG/DL 7.67 (H) BUN/Creatinine ratio Latest Ref Range: 12 - 20   13 Calcium Latest Ref Range: 8.5 - 10.1 MG/DL 9.1 Ionized Calcium Latest Ref Range: 1.12 - 1.32 MMOL/L 1.08 (L) GFR est non-AA Latest Ref Range: >60 ml/min/1.73m2 5 (L)  
GFR est AA Latest Ref Range: >60 ml/min/1.73m2 7 (L) Bilirubin, total Latest Ref Range: 0.2 - 1.0 MG/DL 0.4 Protein, total Latest Ref Range: 6.4 - 8.2 g/dL 6.8 Albumin Latest Ref Range: 3.4 - 5.0 g/dL 2.9 (L) Globulin Latest Ref Range: 2.0 - 4.0 g/dL 3.9 A-G Ratio Latest Ref Range: 0.8 - 1.7   0.7 (L) ALT (SGPT) Latest Ref Range: 13 - 56 U/L 31 AST Latest Ref Range: 15 - 37 U/L 317 (H) Alk. phosphatase Latest Ref Range: 45 - 117 U/L 194 (H) Lactic acid Latest Ref Range: 0.4 - 2.0 MMOL/L 2.0 CK Latest Ref Range: 26 - 192 U/L 276 (H) CK-MB Index Latest Ref Range: 0.0 - 4.0 % 2.9 CK - MB Latest Ref Range: <3.6 ng/ml 8.0 (H) Troponin-I, Qt. Latest Ref Range: 0.0 - 0.045 NG/ML 0.55 (H) DIET:  [] Renal    [] Other     [x] NPO     []  Diabetic PRIMARY NURSE REPORT: First initial/Last name/Title Pre Dialysis: Alvino Brennan RN Time: 1700 EDUCATION:   
[x] Patient [] Other         Knowledge Basis: []None [x]Minimal [] Substantial  
Barriers to learning  [x]N/A  
[] Access Care     [] S&S of infection     [] Fluid Management     []K+     [x]Procedural   
[]Albumin     [] Medications     [] Tx Options     [] Transplant     [] Diet     [] Other Teaching Tools:  [x] Explain  [] Demo  [] Handouts [] Video Patient response:   [] Verbalized understanding  [] Teach back  [] Return demonstration [x] Requires follow up Inappropriate due to         
 
[x] Time Out/Safety Check  [x]Extracorporeal Circuit Tested for integrity RO/HEMODIALYSIS MACHINE SAFETY CHECKS  Before each treatment:    
Machine Number:                   [facility] [x] Portable Machine #4/RO serial # [id number] Alarm Test:  Pass time [id number]        Other:        
[x] RO/Machine Log Complete Temp    36.0 Dialysate: pH  7.4 Conductivity: Meter   13.6     HD Machine   13.8                  TCD: 13.7 Dialyzer Lot # [id number]            Blood Tubing Lot # [id number]          Saline Lot #  [id number]  
 
CHLORINE TESTING-Before each treatment and every 4 hours Total Chlorine: [x] less than 0.1 ppm  Time: 1600 4 Hr/2nd Check Time: 2000  
(if greater than 0.1 ppm from Primary then every 30 minutes from Secondary) TREATMENT INITIATION  with Dialysis Precautions:  
[x] All Connections Secured                 [x] Saline Line Double Clamped  
[x] Venous Parameters Set                  [x] Arterial Parameters Set [x] Prime Given 250ml                          [x]Air Foam Detector Engaged Treatment Initiation Note: arrived to room pt has vent in place, resting with eyes closed. intermittent switching present. During Treatment Notes: Dr. Suzie Moon seen pt at bedside. Verbal orders received from Dr. Suzie Moon to change UF goal from 2000ml to 500ml due to low blood pressure. Medication Dose Volume Route Initials Dialyzer Cleared: [x] Good [] Fair  [] Poor Blood processed:  66 L 
UF Removed  533 Ml Post Wt: 42 kg POst BP:   188/85       Pulse: 92      Respirations: 18  Temperature: 96.7                               Post Tx Vascular Access:  N/A 
  
 Catheter: Locking solution: Heparin 1:1000 Art. 1.9  Wei. 1.9 Post Assessment:  
  
                              Skin:  [x] Warm  [x] Dry [] Diaphoretic    [] Flushed  [] Pale [] Cyanotic [facility] Signatures Title Initials  Time Lungs: [] Clear    [x] Course  [] Crackles  [] Wheezing [] Diminished Adrian Vanessa RN SB 1800 Cardiac: [x] Regular   [] Irregular   [] Monitor  [x] N/A  Rhythm:      
    Edema:  [x] None    [] General     [] Facial   [] Pedal    [] UE    [] LE  
    Pain: [x]0  []1  []2   []3  []4   []5   []6   []7   []8   []9   []10 Post Treatment Note: Treatment completed without further complication POST TREATMENT PRIMARY NURSE HANDOFF REPORT:  
 
First initial/Last name/Title Post Dialysis: Cristian Fregoso RN Time:  2040 Abbreviations: AVG-arterial venous graft, AVF-arterial venous fistula, IJ-Internal Jugular, Subcl-Subclavian, Fem-Femoral, Tx-treatment, AP/HR-apical heart rate, DFR-dialysate flow rate, BFR-blood flow rate, AP-arterial pressure, -venous pressure, UF-ultrafiltrate, TMP-transmembrane pressure, Wei-Venous, Art-Arterial, RO-Reverse Osmosis

## 2018-12-31 NOTE — ED PROVIDER NOTES
EMERGENCY DEPARTMENT HISTORY AND PHYSICAL EXAM 
 
9:33 AM 
 
 
Date: 12/31/2018 Patient Name: Mark Ahumada History of Presenting Illness Chief Complaint Patient presents with  Cardiac arrest  
 
 
 
History Provided By: EMS Chief Complaint: cardiac arrest  
Duration: 10 minutes Timing: sudden Location: cardiac Quality: n/a Severity: n/a Modifying Factors: CPR and ACLS medications, pulse returned Associated Symptoms: initially short of breath Additional History (Context): Mark Ahumada is a 66 y.o. female presenting to the ED via EMS in sudden cardiac arrest. EMS states that the patient was in dialysis this AM and developed some shortness of breath. When EMS arrived on scene, the patient was in cardiac arrest and CPR protocol was initiated. Patient en route to the ED with 10 minutes of CPR. They administered ACLS medications and patient's pulse had returned just prior to entering the ED. HPI limited secondary to patient's condition. PCP: Mani Garcia MD 
 
Current Facility-Administered Medications Medication Dose Route Frequency Provider Last Rate Last Dose  propofol (DIPRIVAN) infusion  5 mcg/kg/min IntraVENous TITRATE Viktor Abraham MD 2.5 mL/hr at 12/31/18 1100 10 mcg/kg/min at 12/31/18 1100 Current Outpatient Medications Medication Sig Dispense Refill  losartan (COZAAR) 50 mg tablet Take 1 Tab by mouth daily. 30 Tab 0  
 minoxidil (LONITEN) 10 mg tablet Take 1 Tab by mouth two (2) times a day. 60 Tab 0  
 OTHER Check CBC, CMP, MG on 10/24/18, Results to PCP immediately, Diagnosis- TB 1 Each 0  
 polyethylene glycol (MIRALAX) 17 gram packet Take 1 Packet by mouth daily. 30 Packet 0  
 levoFLOXacin (LEVAQUIN) 500 mg tablet Take 1 Tab by mouth every fourty-eight (48) hours. 2 Tab 0  
 OTHER Graded Compression Stockings b/l LE- Use as directed 1 Each 0  
 dicyclomine (BENTYL) 20 mg tablet Take 20 mg by mouth three (3) times daily as needed (abdominial pain).  cloNIDine HCl (CATAPRES) 0.3 mg tablet Take 0.3 mg by mouth three (3) times daily.  carvedilol (COREG) 25 mg tablet Take 1 Tab by mouth every twelve (12) hours. 60 Tab 0  
 glycerin, adult, (FLEET GLYCERIN, ADULT,) suppository Insert 1 Suppository into rectum daily as needed. Indications: BOWEL EVACUATION 5 Suppository 0  
 bisacodyl (DULCOLAX) 5 mg EC tablet Take 1 Tab by mouth daily as needed for Constipation. 10 Tab 0  
 albuterol (PROVENTIL HFA, VENTOLIN HFA, PROAIR HFA) 90 mcg/actuation inhaler Take 2 Puffs by inhalation every four (4) hours as needed for Wheezing. 1 Inhaler 0  
 ethambutol (MYAMBUTOL) 400 mg tablet Take 2 Tabs by mouth every Tuesday Thursday, Saturday. At 5 PM 24 Tab 0  
 isoniazid (NYDRAZID) 300 mg tablet Take 1 Tab by mouth daily. On dialysis days, please take medication after dialysis 30 Tab 0  pyrazinamide 500 mg tablet Take 2 Tabs by mouth every Tuesday Thursday, Saturday. At 5 pm  Indications: active tuberculosis 24 Tab 0  
 rifAMPin (RIFADIN) 300 mg capsule Take 2 Caps by mouth daily. On dialysis days please take this medication after Dialysis 60 Cap 0  
 docusate sodium (COLACE) 100 mg capsule Take 1 Cap by mouth two (2) times a day. 60 Cap 0  pyridoxine, vitamin B6, (VITAMIN B-6) 100 mg tablet Take 1 Tab by mouth daily. 30 Tab 0  
 OTHER Incentive Spirometry- use as directed 1 Each 0  
 aspirin 81 mg chewable tablet Take 1 Tab by mouth daily. 30 Tab 0  
 atorvastatin (LIPITOR) 40 mg tablet Take 1 Tab by mouth nightly. 30 Tab 0  
 melatonin 3 mg tablet Take 1 Tab by mouth nightly as needed. (Patient taking differently: Take 1 Tab by mouth nightly as needed (insomnia). ) 30 Tab 0  
 FLUoxetine (PROZAC) 10 mg capsule Take 1 Cap by mouth daily. 30 Cap 0 Past History Past Medical History: 
Past Medical History:  
Diagnosis Date  Arthritis GOUT  Asthma  Bilateral leg pain  Chest pain at rest   
 Chronic kidney disease  [facility] TUES-THURS-SAT  Hypercholesteremia  Hypertension  Joint pain  Kidney disease  Pleural effusion  Pulmonary tuberculosis  Vitamin D deficiency Past Surgical History: 
Past Surgical History:  
Procedure Laterality Date  CHEST SURGERY PROCEDURE UNLISTED    
 THORACENTHESIS  
 HX OTHER SURGICAL    
 BRONCHOSCOPY  
 HX VASCULAR ACCESS  01/2018; 11/2017 LEFT ARM VENOUS ACCESS  
 HX VASCULAR ACCESS  02/2017 [address]  VASCULAR SURGERY PROCEDURE UNLIST Family History: No family history on file. Social History: 
Social History Tobacco Use  Smoking status: Never Smoker  Smokeless tobacco: Never Used Substance Use Topics  Alcohol use: No  
 Drug use: No  
 
 
Allergies: Allergies Allergen Reactions  Banana Other (comments) Review of Systems Review of Systems Unable to perform ROS: Acuity of condition Physical Exam  
 
Visit Vitals BP (!) 193/111 (BP 1 Location: Right arm) Pulse 62 Resp 22 Wt 42 kg (92 lb 9.5 oz) SpO2 100% BMI 17.50 kg/m² Physical Exam 
 
 
Diagnostic Study Results Labs - Recent Results (from the past 12 hour(s)) CBC WITH AUTOMATED DIFF Collection Time: 12/31/18  9:30 AM  
Result Value Ref Range WBC 5.6 4.6 - 13.2 K/uL  
 RBC 4.35 4.20 - 5.30 M/uL  
 HGB 12.9 12.0 - 16.0 g/dL HCT 39.8 35.0 - 45.0 % MCV 91.5 74.0 - 97.0 FL  
 MCH 29.7 24.0 - 34.0 PG  
 MCHC 32.4 31.0 - 37.0 g/dL  
 RDW 12.4 11.6 - 14.5 % PLATELET 612 (L) 462 - 420 K/uL MPV 11.6 9.2 - 11.8 FL  
 NEUTROPHILS 31 (L) 40 - 73 % LYMPHOCYTES 61 (H) 21 - 52 % MONOCYTES 7 3 - 10 % EOSINOPHILS 1 0 - 5 % BASOPHILS 0 0 - 2 %  
 ABS. NEUTROPHILS 1.7 (L) 1.8 - 8.0 K/UL  
 ABS. LYMPHOCYTES 3.4 0.9 - 3.6 K/UL  
 ABS. MONOCYTES 0.4 0.05 - 1.2 K/UL  
 ABS. EOSINOPHILS 0.1 0.0 - 0.4 K/UL  
 ABS. BASOPHILS 0.0 0.0 - 0.1 K/UL  
 DF AUTOMATED METABOLIC PANEL, COMPREHENSIVE Collection Time: 12/31/18  9:30 AM  
Result Value Ref Range Sodium 137 136 - 145 mmol/L Potassium 6.6 (HH) 3.5 - 5.5 mmol/L Chloride 101 100 - 108 mmol/L  
 CO2 26 21 - 32 mmol/L Anion gap 10 3.0 - 18 mmol/L Glucose 169 (H) 74 - 99 mg/dL BUN 94 (H) 7.0 - 18 MG/DL Creatinine 7.51 (H) 0.6 - 1.3 MG/DL  
 BUN/Creatinine ratio 13 12 - 20 GFR est AA 7 (L) >60 ml/min/1.73m2 GFR est non-AA 6 (L) >60 ml/min/1.73m2 Calcium 9.9 8.5 - 10.1 MG/DL Bilirubin, total 0.3 0.2 - 1.0 MG/DL  
 ALT (SGPT) 13 13 - 56 U/L  
 AST (SGOT) 108 (H) 15 - 37 U/L Alk. phosphatase 145 (H) 45 - 117 U/L Protein, total 6.0 (L) 6.4 - 8.2 g/dL Albumin 2.6 (L) 3.4 - 5.0 g/dL Globulin 3.4 2.0 - 4.0 g/dL A-G Ratio 0.8 0.8 - 1.7 MAGNESIUM Collection Time: 12/31/18  9:30 AM  
Result Value Ref Range Magnesium 2.6 1.6 - 2.6 mg/dL TROPONIN I Collection Time: 12/31/18  9:30 AM  
Result Value Ref Range Troponin-I, QT 0.14 (H) 0.00 - 0.06 NG/ML  
EKG, 12 LEAD, INITIAL Collection Time: 12/31/18  9:35 AM  
Result Value Ref Range Ventricular Rate 87 BPM  
 Atrial Rate 87 BPM  
 P-R Interval 162 ms QRS Duration 118 ms Q-T Interval 362 ms QTC Calculation (Bezet) 435 ms Calculated P Axis 72 degrees Calculated R Axis 92 degrees Calculated T Axis 57 degrees Diagnosis Normal sinus rhythm Possible Left atrial enlargement Rightward axis Incomplete right bundle branch block ST & T wave abnormality, consider lateral ischemia Abnormal ECG When compared with ECG of 16-OCT-2018 14:13, 
QRS duration has increased Non-specific change in ST segment in Anterior leads T wave inversion no longer evident in Anterior leads QT has shortened Radiologic Studies -  
XR CHEST PORT Final Result IMPRESSION:  
  
Lines/tubes as above with ETT tip still at right mainstem bronchus level, for  
 which roughly 4 to 5 cm retraction recommended. Pulmonary infiltrates/edema and  
small pleural effusions similar to prior. XR CHEST PORT Final Result IMPRESSION:  
  
Lines/tubes as above including ETT tip at right mainstem bronchus for which  
roughly 5 cm retraction recommended. Mildly enlarged cardiac silhouette with  
pulmonary interstitial infiltrates/edema and probable small pleural effusions. See details above. Medical Decision Making I am the first provider for this patient. I reviewed the vital signs, available nursing notes, past medical history, past surgical history, family history and social history. Vital Signs - Reviewed the patient's vital signs. Pulse Oximetry Analysis -  100% with assisted respirations via BVM Cardiac Monitor: 104 bpm and regular EKG: Interpreted by the EP. Time Interpreted: 9:37 AM 
 Rate: 87 bpm  
 Rhythm: NSR Interpretation: no acute changes Records Reviewed: Nursing Notes (Time of Review: 9:33 AM) Provider Notes (Medical Decision Making): Pt arrived via EMS s/p cardiac arrest with ROSC. ETT tube was re-positioned after R mainstem intubation noted on CXR. Pt has not required any further ACLS drugs since arrival to ED. Placed on Diprovan drip, no further meds given. Will admit to [facility] ICU . Nephrology will arrange dialysis at [facility]. Alana Hernandez MD 
11:35 AM 
 
 
 
ED Course: Progress Notes, Reevaluation, and Consults: 
10:32 AM - Consult:  Discussed care with Dr. Demian Albert, Hospitalist. Standard discussion; including history of patients chief complaint, available diagnostic results, and treatment course. Will accept the patient for admission. 10:59 AM - Consult:  Discussed care with Dr. Akin Cevallos, Nephrology. Standard discussion; including history of patients chief complaint, available diagnostic results, and treatment course. Will follow the patient once she arrives to [place] and they will arrange dialysis. 11:11 AM - Consult:  Discussed care with MELANY Hart. Standard discussion; including history of patients chief complaint, available diagnostic results, and treatment course. Agrees with admission Critical Care Time: The services I provided to this patient were to treat and/or prevent clinically significant deterioration that could result in the failure of one or more body systems and/or organ systems due to cardiac arrest. 
 
Services included the following: 
-reviewing nursing notes and old charts 
-vital sign assessments 
-direct patient care 
-medication orders and management 
-interpreting and reviewing diagnostic studies/labs 
-re-evaluations 
-documentation time Aggregate critical care time was 90 minutes, which includes only time during which I was engaged in work directly related to the patient's care as described above, whether I was at bedside or elsewhere in the Emergency Department. It did not include time spent performing other reported procedures or the services of residents, students, nurses, or advance practice providers. Zeb Peoples MD 
 
11:11 AM 
 
 
For Hospitalized Patients: 
 
1. Hospitalization Decision Time: The decision to hospitalize the patient was made by Dr. Dora Saavedra at 10:30 AM on 12/31/2018 2. Aspirin: Aspirin was not given because the patient did not present with a stroke at the time of their Emergency Department evaluation Diagnosis Clinical Impression: 1. Cardiopulmonary arrest with successful resuscitation ([address] 2. Cardiac arrest ([address] 3. Acute hyperkalemia Disposition: Admit Follow-up Information None Medication List  
  
ASK your doctor about these medications   
albuterol 90 mcg/actuation inhaler Commonly known as:  PROVENTIL HFA, VENTOLIN HFA, PROAIR HFA Take 2 Puffs by inhalation every four (4) hours as needed for Wheezing. aspirin 81 mg chewable tablet Take 1 Tab by mouth daily. atorvastatin 40 mg tablet Commonly known as:  LIPITOR Take 1 Tab by mouth nightly. bisacodyl 5 mg EC tablet Commonly known as:  DULCOLAX Take 1 Tab by mouth daily as needed for Constipation. carvedilol 25 mg tablet Commonly known as:  Roselyn Awe Take 1 Tab by mouth every twelve (12) hours. cloNIDine HCl 0.3 mg tablet Commonly known as:  CATAPRES 
  
dicyclomine 20 mg tablet Commonly known as:  BENTYL 
  
docusate sodium 100 mg capsule Commonly known as:  [place] Dolin Take 1 Cap by mouth two (2) times a day. 
  
ethambutol 400 mg tablet Commonly known as:  MYAMBUTOL Take 2 Tabs by mouth every Tuesday Thursday, Saturday. At 5 PM 
  
FLUoxetine 10 mg capsule Commonly known as:  PROzac Take 1 Cap by mouth daily. glycerin (adult) suppository Commonly known as:  FLEET GLYCERIN (ADULT) Insert 1 Suppository into rectum daily as needed. Indications: BOWEL EVACUATION 
  
isoniazid 300 mg tablet Commonly known as:  NYDRAZID Take 1 Tab by mouth daily. On dialysis days, please take medication after dialysis 
  
levoFLOXacin 500 mg tablet Commonly known as:  Angela Ground Take 1 Tab by mouth every fourty-eight (48) hours. losartan 50 mg tablet Commonly known as:  COZAAR Take 1 Tab by mouth daily. melatonin 3 mg tablet Take 1 Tab by mouth nightly as needed. minoxidil 10 mg tablet Commonly known as:  Sanders Back Take 1 Tab by mouth two (2) times a day. OTHER Incentive Spirometry- use as directed OTHER Check CBC, CMP, MG on 10/24/18, Results to PCP immediately, Diagnosis- TB 
  
OTHER 
Graded Compression Stockings b/l LE- Use as directed 
  
polyethylene glycol 17 gram packet Commonly known as:  Almeta Lauth Take 1 Packet by mouth daily. pyrazinamide 500 mg tablet Take 2 Tabs by mouth every Tuesday Thursday, Saturday. At 5 pm  Indications: active tuberculosis 
  
pyridoxine (vitamin B6) 100 mg tablet Commonly known as:  VITAMIN B-6 Take 1 Tab by mouth daily. rifAMPin 300 mg capsule Commonly known as:  RIFADIN Take 2 Caps by mouth daily. On dialysis days please take this medication after Dialysis 
  
  
 
_______________________________ Scribe Attestation Flynn Holbrook acting as a scribe for and in the presence of Angely Loving. Sebas Bhatt MD     
December 31, 2018 at 9:33 AM 
    
Provider Attestation:     
I personally performed the services described in the documentation, reviewed the documentation, as recorded by the scribe in my presence, and it accurately and completely records my words and actions. December 31, 2018 at 9:33 AM - Angely Loving. Sebas Bhatt MD 
 
 
_______________________________

## 2018-12-31 NOTE — PROGRESS NOTES
Hemodialysis Rounding Note Patient: Janice Feldman               Sex: female          DOA: 2018  9:29 AM  
    
YOB: 1960      Age:  66 y.o.        LOS:  LOS: 0 days Subjective:  
 
Janice Feldman is a 66 y.o.  who presents with Cardiopulmonary arrest with successful resuscitation ([facility] [address] The patient is dialyzing utilizing the following method:Intermittent Hemodialysis Complaints intubated off Cardene drip, myoclonic jerks noted in response to stimuli. Current Facility-Administered Medications Medication Dose Route Frequency  propofol (DIPRIVAN) infusion  5 mcg/kg/min IntraVENous TITRATE  niCARdipine (CARDENE) 25 mg in 0.9% sodium chloride 250 mL infusion  0-15 mg/hr IntraVENous TITRATE  chlorhexidine (PERIDEX) 0.12 % mouthwash 10 mL  10 mL Oral Q12H  
 0.9% sodium chloride infusion 250 mL  250 mL IntraVENous PRN  
 LORazepam (ATIVAN) 2 mg/mL injection  sodium chloride (NS) flush 5-10 mL  5-10 mL IntraVENous Q8H  
 sodium chloride (NS) flush 5-10 mL  5-10 mL IntraVENous PRN  
 calcium gluconate 1 g in 0.9% sodium chloride 100 mL IVPB  1 g IntraVENous ONCE  
 0.9% sodium chloride infusion  100 mL/hr IntraVENous DIALYSIS PRN  
 alteplase (CATHFLO) 2 mg in sterile water (preservative free) 2 mL injection  2 mg InterCATHeter ONCE PRN  
 heparin (porcine) 1,000 unit/mL injection 1,000 Units  1,000 Units InterCATHeter DIALYSIS PRN No intake/output data recorded. No intake/output data recorded. Objective:  
 
Blood pressure 110/69, pulse 80, temperature 97.6 °F (36.4 °C), resp. rate 17, weight 42 kg (92 lb 9.5 oz), SpO2 99 %. Temp (24hrs), Av.4 °F (36.3 °C), Min:97.1 °F (36.2 °C), Max:97.6 °F (36.4 °C) Blood Pressure: BP: 110/69 Pulse: Pulse (Heart Rate): 80 
Temp:  Temp: 97.6 °F (36.4 °C) Artificial Kidney    
hours Heparin Bolus Blood flow rate Dialysate rate Arterial Access Pressure Venous Return Pressure Ultrafiltration Rate Fluid Removal   
Net Fluid Removal   
 
 
PHYSICAL EXAM: sedated, intubated, afebrile HEENT:  Non icteric, ET/NGT 
NECK:  No JVD CHEST AND LUNGS: equal vent breath sounds CVS:  Regular no rub ABDOMEN: soft - bs EXT:  No LE edema DATA REVIEW: 
 
Labs: Results:  
   
Chemistry Recent Labs 12/31/18 
1315 12/31/18 
0930 * 169*  137  
K 7.2* 6.6*  
 101 CO2 24 26 BUN 98* 94* CREA 7.67* 7.51* CA 9.1 9.9 AGAP 11 10 BUCR 13 13 * 145* TP 6.8 6.0* ALB 2.9* 2.6*  
GLOB 3.9 3.4 AGRAT 0.7* 0.8 CBC w/Diff Recent Labs 12/31/18 
0930 WBC 5.6  
RBC 4.35  
HGB 12.9 HCT 39.8 * GRANS 31* LYMPH 61* EOS 1 Coagulation Recent Labs 12/31/18 
1355 PTP 13.7 INR 1.1 Iron/Ferritin No results for input(s): IRON in the last 72 hours. No lab exists for component: TIBCCALC BNP No results for input(s): BNPP in the last 72 hours. Cardiac Enzymes Recent Labs 12/31/18 
1315 * CKND1 2.9 Liver Enzymes Recent Labs 12/31/18 
1315 TP 6.8 ALB 2.9*  
* SGOT 317* Thyroid Studies Lab Results Component Value Date/Time TSH 6.21 (H) 08/24/2018 08:00 AM  
    
 
Images: XR Results (maximum last 3): [place] [facility] [place] 12[place]31/18 XR CHEST PORT Narrative EXAMINATION: Chest single view INDICATION: Intubation COMPARISON: Same date, 0941 hours FINDINGS: Single frontal view of the chest obtained. ETT tip 4.3 cm above 
miquel. Dual-lumen right neck catheter tip at cavoatrial junction. Patchy 
consolidation throughout the right lung, and mild left lung interstitial 
thickening. Also left midlung streaky densities and nonspecific retrocardiac 
opacity. Mildly enlarged cardiac silhouette. Probable small pleural effusions. No pneumothorax identified. No acute osseous findings.  
  
 Impression IMPRESSION: 
 
 Lines/tubes as above with interval adjustment of ETT, now in appropriate 
position. Right greater than left pulmonary opacities, and probable small 
pleural effusions, largely similar compared to prior. Nonspecific retrocardiac 
opacity appears increased compared to prior. XR CHEST PORT Narrative EXAMINATION: Chest single view INDICATION: Cardiac arrest, intubation COMPARISON: Same date, 0924 hours FINDINGS: Single frontal view of the chest obtained. ETT tip roughly 0.8 cm into 
right mainstem bronchus. Right neck catheter tip at cavoatrial junction level. Possible left subclavian catheter tip at innominate vein level. Mildly enlarged 
cardiac silhouette. Bilateral pulmonary interstitial opacities. Probable small 
pleural effusions. Impression IMPRESSION: 
 
Lines/tubes as above with ETT tip still at right mainstem bronchus level, for 
which roughly 4 to 5 cm retraction recommended. Pulmonary infiltrates/edema and 
small pleural effusions similar to prior. XR CHEST PORT Narrative EXAMINATION: Chest single view INDICATION: Cardiac arrest 
 
COMPARISON: 10/18/2018 FINDINGS: Single frontal view of the chest obtained. ETT tip is in right 
mainstem bronchus, roughly 1.3 cm below miquel. Large bore right neck catheter 
tip at cavoatrial junction level. Prominent pulmonary interstitium throughout, 
and probable small pleural effusions. Mildly enlarged cardiac silhouette. Aortic 
arch calcifications. Left midlung streaky densities. No evidence of 
pneumothorax. No acute osseous findings. Impression IMPRESSION: 
 
Lines/tubes as above including ETT tip at right mainstem bronchus for which 
roughly 5 cm retraction recommended. Mildly enlarged cardiac silhouette with 
pulmonary interstitial infiltrates/edema and probable small pleural effusions. See details above. CT Results (maximum last 3): [place] [facility] [place]18 CT HEAD WO CONT Narrative EXAM: CT HEAD WITHOUT CONTRAST. CLINICAL HISTORY/INDICATION:  poss CVA, post cardiac arrest at outpatient 
dialysis center prior to dialysis treatment prior to arrival 
 
COMPARISON: CT head August 22, 2018. TECHNIQUE: 
No IV contrast administered. 5 mm thick slices were obtained from skull base to 
vertex. Coronal and sagittal reformations obtained. All CT scans at this facility are performed using dose optimization technique as 
appropriate to a performed exam, to include automated exposure control, 
adjustment of the mA and/or kV according to patient's size (including 
appropriate matching for site-specific examinations), or use of iterative 
reconstruction technique. FINDINGS: 
 
There are no intra or extra axial fluid collections. There is no hemorrhage. There is minimal age-appropriate enlargement of the ventricular system, cortical 
sulci, and basilar cisterns. Mild to moderate stable Periventricular low density changes are seen 
bilaterally. The paranasal sinuses are clear. Impression IMPRESSION: 
 
No acute infarct, mass, nor hemorrhage. Minimal age-appropriate small vessel disease. CT CHEST ABD PELV WO CONT Narrative EXAM:  CT CHEST ABD PELV WO CONT INDICATION: post cardiac arrest    post cardiac arrest at outpatient dialysis 
center prior to dialysis treatment prior to arrival, onset of shortness of 
breath, hyperkalemia, hypertensive emergency, intubated, traumatic intubation 
prior to ICU arrival 
  
 
COMPARISON: CT chest October 19, 2018 CONTRAST: None TECHNIQUE:  
Thin axial images were obtained through the chest, abdomen and pelvis. Coronal 
and sagittal reconstructions were generated. Oral contrast was not administered. CT dose reduction was achieved through use of a standardized protocol tailored 
for this examination and automatic exposure control for dose modulation. FINDINGS:  
 
THYROID: Heterogeneous multinodular thyroid. MEDIASTINUM: Endotracheal tube terminates proximal to the miquel. JUNG: No mass or lymphadenopathy. THORACIC AORTA: No dissection or aneurysm. MAIN PULMONARY ARTERY: Normal in caliber. TRACHEA/BRONCHI: Patent. ESOPHAGUS: No wall thickening or dilatation. HEART: Cardiomegaly. Small pericardial effusion measuring 1.4 cm adjacent to the 
right ventricle. No significant fluid adjacent to the left ventricle. PLEURA: Moderate to large right pleural effusion. Small left pleural effusion. LUNGS: Right greater than left multifocal geographic regions of consolidated 
lung and groundglass opacities. LIVER: No mass or biliary dilatation. GALLBLADDER: Dependent material layering within a normal size gallbladder. No 
gallbladder wall thickening demonstrated. SPLEEN: Multiple calcified granulomas. PANCREAS: No mass or ductal dilatation. ADRENALS: Unremarkable. KIDNEYS: Tiny kidneys consistent with history of chronic renal failure. Exophytic 2.2 x 1.8 cm water density mass arising off the mid to lower pole. STOMACH: Unremarkable. SMALL BOWEL: No dilatation or wall thickening. COLON: Ascending and transverse colon distended with fluid. Maximum diameter at 
the cecum of 6 cm. No evidence of obstruction. Large stool ball in the rectum measuring 11 x 6 cm. APPENDIX: Not demonstrated PERITONEUM: Moderate volume ascites RETROPERITONEUM: No lymphadenopathy or aortic aneurysm. REPRODUCTIVE ORGANS: Not visualized URINARY BLADDER: No mass or calculus. BONES: No destructive bone lesion. ADDITIONAL COMMENTS: N/A Impression IMPRESSION: 
Large right pleural effusion. Small left pleural effusion. Multifocal right multilobar regions of scattered consolidation and groundglass 
opacities. Pneumonia vs. aspiration of blood secondary to traumatic intubation. Mild dependent atelectasis at both lower lobes. Small pericardial effusion without change. Ascites. Sludge in the gallbladder. As clinically indicated follow-up ultrasound could be 
obtained. Large stool ball in the rectum. Increased fluid throughout the colon consistent with enteritis or diarrhea. [place] Amy Results from Hospital Encounter encounter on 10/15/18 CT CHEST W CONT Narrative EXAM: CT scan of the chest with contrast. 
 
CLINICAL HISTORY/INDICATION: Pneumonia. COMPARISON: CTA of the chest dated August 22, 2018. [place] TECHNIQUE: All CT scans at this facility are performed using dose optimization 
technique as appropriate to a performed exam, to include automated exposure 
control, adjustment of the mA and/or kV according to patient's size (including 
appropriate matching for site-specific examinations), or use of iterative 
reconstruction technique. [place] Amy FINDINGS: 
 
A CT scan of the patient's chest is performed. Intravenous contrast material was 
utilized. When compared with the previous study there is been an increase in the 
amount of right pleural effusion since the previous study. Alveolar 
consolidation is noted adjacent to the effusion with patchy areas of alveolar 
density in the right upper lobe and left lower lobe. These areas do not show 
significant change when compared with the previous study. There is a large 
pericardial effusion noted. Partially imaged upper abdominal organs shows some 
ascites. No mediastinal adenopathy is observed. The caliber of the ace ascending 
thoracic aorta is 3.5 cm. Bone window images shows no evidence for a destructive 
bony lesion. [place] Amy Impression IMPRESSION: 
 
Persistent patchy areas of alveolar density in the right upper and left lower 
lung fields. Increased volume of the right pleural effusion. Large pericardial effusion. No evidence for mediastinal or hilar adenopathy. The osseous structures are intact. Small amount of abdominal ascites. [place] Amy CULTURE:  
)No results for input(s): SDES, CULT in the last 72 hours. No results for input(s): CULT in the last 72 hours. Assessment/Plan: End Stage Renal Disease:  Patient is tolerating dialysis treatment well. .  Additionally the patient has experienced normal dialysis treatment during dialysis. Dry weight   no UF for tonight. At 6:00 PM on 12/31/2018, I saw and examined patient during hemodialysis treatment. The patient was receiving hemodialysis for treatment of end stage renal disease. I have also reviewed vital signs, intake and output, lab results and recent events, and agreed with today's dialysis order. Anemia:  BP much lower off all meds, will hold off on UF Renal Metabolic Bone Disease: Will start phos binder when feeding is started Hypertension: very labile, trend off meds Access: Tunnel cuff catheter adequate monitoring/no changes Cardiac arrest ? Cardio follow up, get ECHO 
SZ neuro follow up cont on Igor Bowens MD 
12/31/2018

## 2018-12-31 NOTE — ED TRIAGE NOTES
Pt was at dialysis when she c/o SOB. When EMS arrived, pt went into cardiac arrest. Unresponsive and intubated on arrival, bagged with 100% O2.

## 2018-12-31 NOTE — CONSULTS
Consult Note Consult requested by: Dr. Stefan Ayala Jahaira Patterson is a 66 y.o. female PATIENT REFUSED who is being seen on consult for ESRD/hyperkalemia Chief Complaint Patient presents with  Cardiac arrest  
 
Admission diagnosis: <principal problem not specified> HPI: 67 yo [place] female admitted to the ICU after having a cardiac arrest at the outpt HD unit. Spoke with HD nurse. Pt was dropped of by family at the unit this morning she looked ill, SOB, vomiting, no fever, had high /86. Diaphoretic. They attempted to get her hooked up to the dialysis machine but she the charge nurse stopped it, called 911. She was resuscitated in the unit reportedly about 10 min before pulses were established. EMT attempted intubation but was unsuccessful. She was brought to [facility] ER was noted to have high K and BP On arrival in the ICU she had a SZ. BP remains elevated but better on IV Cardene, currently waiting for CT to be done Past Medical History:  
Diagnosis Date  Arthritis GOUT  Asthma  Bilateral leg pain  Chest pain at rest   
 Chronic kidney disease  ESRD (end stage renal disease) ([facility] [address] TUES-THURS-SAT  Hypercholesteremia  Hypertension  Joint pain  Kidney disease  Pleural effusion  Pulmonary tuberculosis  Vitamin D deficiency Past Surgical History:  
Procedure Laterality Date  CHEST SURGERY PROCEDURE UNLISTED    
 THORACENTHESIS  
 HX OTHER SURGICAL    
 BRONCHOSCOPY  
 HX VASCULAR ACCESS  01/2018; 11/2017 LEFT ARM VENOUS ACCESS  
 HX VASCULAR ACCESS  02/2017 [address]  VASCULAR SURGERY PROCEDURE UNLIST Social History Socioeconomic History  Marital status:  Spouse name: Not on file  Number of children: Not on file  Years of education: Not on file  Highest education level: Not on file Social Needs  Financial resource strain: Not on file  Food insecurity - worry: Not on file  Food insecurity - inability: Not on file  Transportation needs - medical: Not on file  Transportation needs - non-medical: Not on file Occupational History  Not on file Tobacco Use  Smoking status: Never Smoker  Smokeless tobacco: Never Used Substance and Sexual Activity  Alcohol use: No  
 Drug use: No  
 Sexual activity: Not on file Other Topics Concern  Not on file Social History Narrative  Not on file No family history on file. Allergies Allergen Reactions  Banana Other (comments) Home Medications:  
 
Prior to Admission Medications Prescriptions Last Dose Informant Patient Reported? Taking? FLUoxetine (PROZAC) 10 mg capsule   No No  
Sig: Take 1 Cap by mouth daily. OTHER   No No  
Sig: Incentive Spirometry- use as directed OTHER   No No  
Sig: Check CBC, CMP, MG on 10/24/18, Results to PCP immediately, Diagnosis- TB  
OTHER   No No  
Sig: Graded Compression Stockings b/l LE- Use as directed  
albuterol (PROVENTIL HFA, VENTOLIN HFA, PROAIR HFA) 90 mcg/actuation inhaler   No No  
Sig: Take 2 Puffs by inhalation every four (4) hours as needed for Wheezing. aspirin 81 mg chewable tablet   No No  
Sig: Take 1 Tab by mouth daily. atorvastatin (LIPITOR) 40 mg tablet   No No  
Sig: Take 1 Tab by mouth nightly. bisacodyl (DULCOLAX) 5 mg EC tablet   No No  
Sig: Take 1 Tab by mouth daily as needed for Constipation. carvedilol (COREG) 25 mg tablet   No No  
Sig: Take 1 Tab by mouth every twelve (12) hours. cloNIDine HCl (CATAPRES) 0.3 mg tablet   Yes No  
Sig: Take 0.3 mg by mouth three (3) times daily. dicyclomine (BENTYL) 20 mg tablet   Yes No  
Sig: Take 20 mg by mouth three (3) times daily as needed (abdominial pain). docusate sodium (COLACE) 100 mg capsule   No No  
Sig: Take 1 Cap by mouth two (2) times a day.  
ethambutol (MYAMBUTOL) 400 mg tablet   No No  
Sig: Take 2 Tabs by mouth every Tuesday Thursday, Saturday.  At 5 PM  
 glycerin, adult, (FLEET GLYCERIN, ADULT,) suppository   No No  
Sig: Insert 1 Suppository into rectum daily as needed. Indications: BOWEL EVACUATION  
isoniazid (NYDRAZID) 300 mg tablet   No No  
Sig: Take 1 Tab by mouth daily. On dialysis days, please take medication after dialysis  
levoFLOXacin (LEVAQUIN) 500 mg tablet   No No  
Sig: Take 1 Tab by mouth every fourty-eight (48) hours. losartan (COZAAR) 50 mg tablet   No No  
Sig: Take 1 Tab by mouth daily. melatonin 3 mg tablet   No No  
Sig: Take 1 Tab by mouth nightly as needed. Patient taking differently: Take 1 Tab by mouth nightly as needed (insomnia). minoxidil (LONITEN) 10 mg tablet   No No  
Sig: Take 1 Tab by mouth two (2) times a day. polyethylene glycol (MIRALAX) 17 gram packet   No No  
Sig: Take 1 Packet by mouth daily. pyrazinamide 500 mg tablet   No No  
Sig: Take 2 Tabs by mouth every Tuesday Thursday, Saturday. At 5 pm  Indications: active tuberculosis  
pyridoxine, vitamin B6, (VITAMIN B-6) 100 mg tablet   No No  
Sig: Take 1 Tab by mouth daily. rifAMPin (RIFADIN) 300 mg capsule   No No  
Sig: Take 2 Caps by mouth daily. On dialysis days please take this medication after Dialysis Facility-Administered Medications: None Current Facility-Administered Medications Medication Dose Route Frequency  propofol (DIPRIVAN) infusion  5 mcg/kg/min IntraVENous TITRATE  niCARdipine (CARDENE) 25 mg in 0.9% sodium chloride 250 mL infusion  0-15 mg/hr IntraVENous TITRATE  chlorhexidine (PERIDEX) 0.12 % mouthwash 10 mL  10 mL Oral Q12H  
 0.9% sodium chloride infusion 250 mL  250 mL IntraVENous PRN  
 LORazepam (ATIVAN) 2 mg/mL injection  sodium chloride (NS) flush 5-10 mL  5-10 mL IntraVENous Q8H  
 sodium chloride (NS) flush 5-10 mL  5-10 mL IntraVENous PRN  
 calcium gluconate 1 g in 0.9% sodium chloride 100 mL IVPB  1 g IntraVENous ONCE Review of Systems: Review of systems not obtained due to patient factors. Data Review: 
 
Labs: Results:  
   
Chemistry Recent Labs 12/31/18 
1315 12/31/18 
0930 * 169*  137  
K 7.2* 6.6*  
 101 CO2 24 26 BUN 98* 94* CREA 7.67* 7.51* CA 9.1 9.9 AGAP 11 10 BUCR 13 13 * 145* TP 6.8 6.0* ALB 2.9* 2.6*  
GLOB 3.9 3.4 AGRAT 0.7* 0.8 CBC w/Diff Recent Labs 12/31/18 
0930 WBC 5.6  
RBC 4.35  
HGB 12.9 HCT 39.8 * GRANS 31* LYMPH 61* EOS 1 Coagulation Recent Labs 12/31/18 
1355 PTP 13.7 INR 1.1 Iron/Ferritin No results for input(s): IRON in the last 72 hours. No lab exists for component: TIBCCALC BNP No results for input(s): BNPP in the last 72 hours. Cardiac Enzymes Recent Labs 12/31/18 
1315 * CKND1 2.9 Liver Enzymes Recent Labs 12/31/18 
1315 TP 6.8 ALB 2.9*  
* SGOT 317* Thyroid Studies Lab Results Component Value Date/Time TSH 6.21 (H) 08/24/2018 08:00 AM  
    
  
IMAGES: CXR report noted Lactic acid = 2 CT head pend Physical Assessment:  
 
Visit Vitals /85 Pulse 74 Temp 97.6 °F (36.4 °C) Resp 14 Wt 42 kg (92 lb 9.5 oz) SpO2 100% BMI 17.50 kg/m² Last 3 Recorded Weights in this Encounter 12/31/18 1013 Weight: 42 kg (92 lb 9.5 oz) No intake or output data in the 24 hours ending 12/31/18 1525 Physial Exam: 
General appearance: intubated, unresponsive, afebrile Skin: no rash HEENT:  ET/NGT in place Neck: right IJ TDC Lungs: equal vent breath sounds bilaterally Heart: regular rate and rhythm, no rub Abdomen: soft, no bowel sounds ,  no organomegaly Extremities: No LE edema IMPRESSION AND PLAN:  
ESRD with hyperkalemia, post cardiac arrest HD, stat ordered, pt waiting to go to CT, IV Calcium gluconate/D50W/insulin given Post cardiac arrest etiology? Cardio eval pend SZ, neuro eval, CT pend HTN emergency better with IV Cardene Hx of PTB on meds per [facility] of Health Discussed with Dr. Rodney Garcia MD 
December 31, 2018

## 2018-12-31 NOTE — ED NOTES
[facility] for ambulance transport to  [facility]

## 2018-12-31 NOTE — PROCEDURES
[facility] 
EEG Edward Ayala 
MR#: [id number] : 1960 ACCOUNT #: [de-identified] DATE OF SERVICE: 2018 REFERRING PHYSICIAN:  Dr. José Luis Carlisle EEG NUMBER:  [place]  CLINICAL:  This is an apparently unresponsive EEG on this 30-year-old patient with sudden cardiac arrest.  CPR was performed at the scene. MEDICATIONS:  Ativan, Diprivan, Keppra and Cardene. EEG REPORT:  The predominant EEG consists of essentially electrocerebral silence activity interspersed with rarely occurring spike and polyspike and slow wave activity. The polyspike and slow wave activity occurs in paroxysms lasting upwards of 3 seconds and is correlated with clinical jerking activity. Also noted during the low voltage activity is significant muscle artifact. IMPRESSION:  Abnormal EEG due to the presence of what appears to be a burst suppression pattern, which in an acute cardiac arrest suggests a poor prognosis. No seizure activity was observed. MD WILLIAM Lozada /  
D: 2018 15:28    
T: 2018 17:06 JOB #: [id number] CC: Rich Dotson MD

## 2018-12-31 NOTE — CONSULTS
[facility] Pulmonary, Critical Care, and Sleep Medicine Name: Pino Kelly MRN: [id number] : 1960 Hospital: [address] [place] [address] Dr Date: 2018 Critical Care Initial Patient Consult Requesting MD:   Dr. Zohra June                  Reason for CC Consult: S/P cardiac arrest 
 
IMPRESSION:  
66year old female PMH ESRD on dialysis T-Th-Sat, HTN, HLD, pulmonary tuberculosis who presents to the [facility] ICU as a transfer from  [facility] ED S/P cardiac arrest. Per EMS report, patient was at an outpatient dialysis session this AM, developed SOB, and was found to be in cardiac arrest upon EMS arrival, CPR initiated. Patient intubated by EMS en route to [facility] ED with ROSC achieved on arrival to ED. Patient transferred to [facility] ICU for further evaluation and management. Problems: 
1. S/P cardiac arrest 
2. Hyperkalemia 3. ESRD on dialysis T-Th-Sat 
4. Hypertensive emergency RECOMMENDATIONS:  
1. Neuro/ Pain/ Sedation - 
-Intubated and sedated. -concern for seizure type activity on arrival to ICU 
-follow up on CT head and EEG 
-sedation: propofol 2. Resp -  
-Intubated and sedated, vent settings per RT.  
-Patient underwent bronch upon arrival to ICU due to bloody oral secretions. ETT was noted to be right mainstem and was pulled back to appropriate position -confirmed with CXR 
-Also on bronch, bloody respiratory secretions were noted, with few small clots in airway that were suctioned out -likely secondary to traumatic intubation prior to ICU arrival.  
-Continue to monitor for blood clots/active bleeding 
-follow up on CT chest 
- aspiration precautions -Ventilator-associated Pneumonia bundle 3. ID -  
-Afebrile, no signs/symptoms of infection. Continue to monitor 4. CVS - 
-Troponin 0.55 
-Hypertensive emergency on arrival SBP 250s 
-Patient started on Cardene drip with SBP goal 180-200 
-continue to monitor hemodynamic status 5.  Heme/Onc-  
 -H/H 12.9/39.8. Due to bloody respiratory secretions likely 2/2 to intubation, patient was type & screen for 2 units PRBCs 
-continue to monitor H/H and for active signs of bleeding 6. Metabolic -  
-Hyperkalemic K 7.2: ordered insulin/dextrose, calcium gluconate, follow up on EKG 
-replete electrolytes as necessary 7. Renal -  
-BUN/Crt 98/7.67 
-nephrology consulted, patient planned to undergo dialysis today 8. Endocrine - monitor blood glucose 9. GI - NPO Prophylaxis - DVT, GI: SCDs Plan to be further discussed with ICU attending Subjective/History: This patient has been seen and evaluated at the request of Dr. Gabriela Ram S/P cardiac arrest.   
 
Patient is a 66 y.o. female PMH ESRD on dialysis T-Th-Sat, HTN, HLD, pulmonary tuberculosis who presents to the [facility] ICU as a transfer from  [facility] ED S/P cardiac arrest. Per EMS report, patient was at an outpatient dialysis session this AM, developed SOB, and was found to be in cardiac arrest upon EMS arrival, CPR initiated. Patient intubated by EMS en route to [facility] ED with ROSC achieved on arrival to ED. Patient transferred to [facility] ICU for further evaluation and management. The patient is critically ill and can not provide additional history due to being intubated and sedated. Past Medical History:  
Diagnosis Date  Arthritis GOUT  Asthma  Bilateral leg pain  Chest pain at rest   
 Chronic kidney disease  ESRD (end stage renal disease) ([facility] [address] TUES-THURS-SAT  Hypercholesteremia  Hypertension  Joint pain  Kidney disease  Pleural effusion  Pulmonary tuberculosis  Vitamin D deficiency Past Surgical History:  
Procedure Laterality Date  CHEST SURGERY PROCEDURE UNLISTED    
 THORACENTHESIS  
 HX OTHER SURGICAL    
 BRONCHOSCOPY  
 HX VASCULAR ACCESS  01/2018; 11/2017 LEFT ARM VENOUS ACCESS  
 HX VASCULAR ACCESS  02/2017 [facility]  VASCULAR SURGERY PROCEDURE UNLIST Prior to Admission medications Medication Sig Start Date End Date Taking? Authorizing Provider  
losartan (COZAAR) 50 mg tablet Take 1 Tab by mouth daily. 10/22/18   Keyur Funes MD  
minoxidil (LONITEN) 10 mg tablet Take 1 Tab by mouth two (2) times a day. 10/21/18   Keyur Funes MD  
OTHER Check CBC, CMP, MG on 10/24/18, Results to PCP immediately, Diagnosis- TB 10/21/18   Keyur Funes MD  
polyethylene glycol (MIRALAX) 17 gram packet Take 1 Packet by mouth daily. 10/22/18   Keyur Funes MD  
levoFLOXacin (LEVAQUIN) 500 mg tablet Take 1 Tab by mouth every fourty-eight (48) hours. 10/21/18   Keyur Funes MD  
OTHER Graded Compression Stockings b/l LE- Use as directed 10/21/18   Keyur Funes MD  
dicyclomine (BENTYL) 20 mg tablet Take 20 mg by mouth three (3) times daily as needed (abdominial pain). Provider, Historical  
cloNIDine HCl (CATAPRES) 0.3 mg tablet Take 0.3 mg by mouth three (3) times daily. Provider, Historical  
carvedilol (COREG) 25 mg tablet Take 1 Tab by mouth every twelve (12) hours. 10/6/18   Keyur Funes MD  
glycerin, adult, (FLEET GLYCERIN, ADULT,) suppository Insert 1 Suppository into rectum daily as needed. Indications: BOWEL EVACUATION 10/6/18   Keyur Funes MD  
bisacodyl (DULCOLAX) 5 mg EC tablet Take 1 Tab by mouth daily as needed for Constipation. 8/5/18   Orvis Osgood, MD  
albuterol (PROVENTIL HFA, VENTOLIN HFA, PROAIR HFA) 90 mcg/actuation inhaler Take 2 Puffs by inhalation every four (4) hours as needed for Wheezing. 7/21/18   Keyur Funes MD  
ethambutol (MYAMBUTOL) 400 mg tablet Take 2 Tabs by mouth every Tuesday Thursday, Saturday. At 5 PM 7/21/18   Keyur Funes MD  
isoniazid (NYDRAZID) 300 mg tablet Take 1 Tab by mouth daily.  On dialysis days, please take medication after dialysis 7/21/18   Keyur Funes MD  
pyrazinamide 500 mg tablet Take 2 Tabs by mouth every Tuesday Thursday, Saturday. At 5 pm  Indications: active tuberculosis 7/24/18   Adina Gomez MD  
rifAMPin (RIFADIN) 300 mg capsule Take 2 Caps by mouth daily. On dialysis days please take this medication after Dialysis 7/21/18   Adina Gomez MD  
docusate sodium (COLACE) 100 mg capsule Take 1 Cap by mouth two (2) times a day. 7/21/18   Adina Gomez MD  
pyridoxine, vitamin B6, (VITAMIN B-6) 100 mg tablet Take 1 Tab by mouth daily. 7/22/18   Adina Gomez MD  
OTHER Incentive Spirometry- use as directed 7/21/18   Adina Gomez MD  
aspirin 81 mg chewable tablet Take 1 Tab by mouth daily. 3/8/18   Jim White MD  
atorvastatin (LIPITOR) 40 mg tablet Take 1 Tab by mouth nightly. 3/8/18   Jim White MD  
melatonin 3 mg tablet Take 1 Tab by mouth nightly as needed. Patient taking differently: Take 1 Tab by mouth nightly as needed (insomnia). 3/8/18   Jim White MD  
FLUoxetine (PROZAC) 10 mg capsule Take 1 Cap by mouth daily. 3/8/18   Jim White MD  
 
Current Facility-Administered Medications Medication Dose Route Frequency  propofol (DIPRIVAN) infusion  5 mcg/kg/min IntraVENous TITRATE  niCARdipine (CARDENE) 25 mg in 0.9% sodium chloride 250 mL infusion  0-15 mg/hr IntraVENous TITRATE  chlorhexidine (PERIDEX) 0.12 % mouthwash 10 mL  10 mL Oral Q12H  
 LORazepam (ATIVAN) 2 mg/mL injection  sodium chloride (NS) flush 5-10 mL  5-10 mL IntraVENous Q8H  
 calcium gluconate 1 g in 0.9% sodium chloride 100 mL IVPB  1 g IntraVENous ONCE  
 levETIRAcetam (KEPPRA) 500 mg in saline (iso-osm) 100 ml IVPB  500 mg IntraVENous Q24H Allergies Allergen Reactions  Banana Other (comments) Social History Tobacco Use  Smoking status: Never Smoker  Smokeless tobacco: Never Used Substance Use Topics  Alcohol use: No  
  
No family history on file. Review of Systems: 
Unable to perform due to intubation and sedation Objective: 
Vital Signs:   
Visit Vitals /90 (BP 1 Location: Right arm, BP Patient Position: At rest) Pulse 78 Temp 97.6 °F (36.4 °C) Resp 14 Wt 42 kg (92 lb 9.5 oz) SpO2 100% BMI 17.50 kg/m² O2 Device: Ventilator Temp (24hrs), Av.4 °F (36.3 °C), Min:97.1 °F (36.2 °C), Max:97.6 °F (36.4 °C) Intake/Output:  
Last shift:      No intake/output data recorded. Last 3 shifts: No intake/output data recorded. No intake or output data in the 24 hours ending 18 1604 Ventilator Settings: 
Mode Rate Tidal Volume Pressure FiO2 PEEP Assist control, VC+   450 ml    70 % 5 cm H20 Peak airway pressure: 24 cm H2O Minute ventilation: 5.74 l/min Physical Exam: 
 
General:  Intubated and sedated HENT:  Normocephalic, atraumatic. PERRL. Mucosa moist  
Neck: Supple, symmetrical, trachea midline Lungs:   Course breath sounds bilaterally, symmetric chest wall rise Chest wall:   Hemodialysis catheter in place Heart:  Regular rate and rhythm, S1, S2 normal.  
Abdomen:   Soft, + bowel sounds Extremities: No BLE edema; IO RLE Skin: Warm, dry Neurologic: Intubated and sedated, with drawls to painful stimuli Data:  
 
Recent Results (from the past 24 hour(s)) CBC WITH AUTOMATED DIFF Collection Time: 18  9:30 AM  
Result Value Ref Range WBC 5.6 4.6 - 13.2 K/uL  
 RBC 4.35 4.20 - 5.30 M/uL  
 HGB 12.9 12.0 - 16.0 g/dL HCT 39.8 35.0 - 45.0 % MCV 91.5 74.0 - 97.0 FL  
 MCH 29.7 24.0 - 34.0 PG  
 MCHC 32.4 31.0 - 37.0 g/dL  
 RDW 12.4 11.6 - 14.5 % PLATELET 581 (L) 077 - 420 K/uL MPV 11.6 9.2 - 11.8 FL  
 NEUTROPHILS 31 (L) 40 - 73 % LYMPHOCYTES 61 (H) 21 - 52 % MONOCYTES 7 3 - 10 % EOSINOPHILS 1 0 - 5 % BASOPHILS 0 0 - 2 %  
 ABS. NEUTROPHILS 1.7 (L) 1.8 - 8.0 K/UL  
 ABS. LYMPHOCYTES 3.4 0.9 - 3.6 K/UL  
 ABS. MONOCYTES 0.4 0.05 - 1.2 K/UL  
 ABS. EOSINOPHILS 0.1 0.0 - 0.4 K/UL  
 ABS. BASOPHILS 0.0 0.0 - 0.1 K/UL  
 DF AUTOMATED METABOLIC PANEL, COMPREHENSIVE Collection Time: 12/31/18  9:30 AM  
Result Value Ref Range Sodium 137 136 - 145 mmol/L Potassium 6.6 (HH) 3.5 - 5.5 mmol/L Chloride 101 100 - 108 mmol/L  
 CO2 26 21 - 32 mmol/L Anion gap 10 3.0 - 18 mmol/L Glucose 169 (H) 74 - 99 mg/dL BUN 94 (H) 7.0 - 18 MG/DL Creatinine 7.51 (H) 0.6 - 1.3 MG/DL  
 BUN/Creatinine ratio 13 12 - 20 GFR est AA 7 (L) >60 ml/min/1.73m2 GFR est non-AA 6 (L) >60 ml/min/1.73m2 Calcium 9.9 8.5 - 10.1 MG/DL Bilirubin, total 0.3 0.2 - 1.0 MG/DL  
 ALT (SGPT) 13 13 - 56 U/L  
 AST (SGOT) 108 (H) 15 - 37 U/L Alk. phosphatase 145 (H) 45 - 117 U/L Protein, total 6.0 (L) 6.4 - 8.2 g/dL Albumin 2.6 (L) 3.4 - 5.0 g/dL Globulin 3.4 2.0 - 4.0 g/dL A-G Ratio 0.8 0.8 - 1.7 MAGNESIUM Collection Time: 12/31/18  9:30 AM  
Result Value Ref Range Magnesium 2.6 1.6 - 2.6 mg/dL TROPONIN I Collection Time: 12/31/18  9:30 AM  
Result Value Ref Range Troponin-I, QT 0.14 (H) 0.00 - 0.06 NG/ML  
EKG, 12 LEAD, INITIAL Collection Time: 12/31/18  9:35 AM  
Result Value Ref Range Ventricular Rate 87 BPM  
 Atrial Rate 87 BPM  
 P-R Interval 162 ms QRS Duration 118 ms Q-T Interval 362 ms QTC Calculation (Bezet) 435 ms Calculated P Axis 72 degrees Calculated R Axis 92 degrees Calculated T Axis 57 degrees Diagnosis Normal sinus rhythm Possible Left atrial enlargement Rightward axis Incomplete right bundle branch block ST & T wave abnormality, consider lateral ischemia Abnormal ECG When compared with ECG of 16-OCT-2018 14:13, 
QRS duration has increased T wave inversion no longer evident in Anterior leads QT has shortened Confirmed by Karrin Hashimoto ([phone]) on 12/31/2018 12:09:13 PM 
  
GLUCOSE, POC Collection Time: 12/31/18  1:09 PM  
Result Value Ref Range Glucose (POC) 105 70 - 110 mg/dL METABOLIC PANEL, COMPREHENSIVE  Collection Time: 12/31/18  1:15 PM  
 Result Value Ref Range Sodium 137 136 - 145 mmol/L Potassium 7.2 (HH) 3.5 - 5.5 mmol/L Chloride 102 100 - 108 mmol/L  
 CO2 24 21 - 32 mmol/L Anion gap 11 3.0 - 18 mmol/L Glucose 115 (H) 74 - 99 mg/dL BUN 98 (H) 7.0 - 18 MG/DL Creatinine 7.67 (H) 0.6 - 1.3 MG/DL  
 BUN/Creatinine ratio 13 12 - 20 GFR est AA 7 (L) >60 ml/min/1.73m2 GFR est non-AA 5 (L) >60 ml/min/1.73m2 Calcium 9.1 8.5 - 10.1 MG/DL Bilirubin, total 0.4 0.2 - 1.0 MG/DL  
 ALT (SGPT) 31 13 - 56 U/L  
 AST (SGOT) 317 (H) 15 - 37 U/L Alk. phosphatase 194 (H) 45 - 117 U/L Protein, total 6.8 6.4 - 8.2 g/dL Albumin 2.9 (L) 3.4 - 5.0 g/dL Globulin 3.9 2.0 - 4.0 g/dL A-G Ratio 0.7 (L) 0.8 - 1.7 CALCIUM, IONIZED Collection Time: 12/31/18  1:15 PM  
Result Value Ref Range Ionized Calcium 1.08 (L) 1.12 - 1.32 MMOL/L  
CARDIAC PANEL,(CK, CKMB & TROPONIN) Collection Time: 12/31/18  1:15 PM  
Result Value Ref Range  (H) 26 - 192 U/L  
 CK - MB 8.0 (H) <3.6 ng/ml CK-MB Index 2.9 0.0 - 4.0 % Troponin-I, QT 0.55 (H) 0.0 - 0.045 NG/ML  
TYPE + CROSSMATCH Collection Time: 12/31/18  1:15 PM  
Result Value Ref Range Crossmatch Expiration 01/03/2019 ABO/Rh(D) O POSITIVE Antibody screen NEG   
 CALLED TO: Angela Avilez, ICU, [id number] AT [address] [place] [address] Unit number [id number] Blood component type  LR Unit division 00 Status of unit ALLOCATED Crossmatch result Compatible Unit number [id number] Blood component type RC LR Unit division 00 Status of unit ALLOCATED Crossmatch result Compatible LACTIC ACID Collection Time: 12/31/18  1:15 PM  
Result Value Ref Range Lactic acid 2.0 0.4 - 2.0 MMOL/L  
PROTHROMBIN TIME + INR Collection Time: 12/31/18  1:55 PM  
Result Value Ref Range Prothrombin time 13.7 11.5 - 15.2 sec INR 1.1 0.8 - 1.2 FIBRINOGEN Collection Time: 12/31/18  1:55 PM  
Result Value Ref Range Fibrinogen 287 210 - 451 mg/dL MAGNESIUM Collection Time: 12/31/18  1:55 PM  
Result Value Ref Range Magnesium 2.8 (H) 1.6 - 2.6 mg/dL PHOSPHORUS Collection Time: 12/31/18  1:55 PM  
Result Value Ref Range Phosphorus 7.8 (H) 2.5 - 4.9 MG/DL  
EKG, 12 LEAD, SUBSEQUENT Collection Time: 12/31/18  2:58 PM  
Result Value Ref Range Ventricular Rate 79 BPM  
 Atrial Rate 79 BPM  
 P-R Interval 144 ms QRS Duration 86 ms  
 Q-T Interval 422 ms QTC Calculation (Bezet) 483 ms Calculated P Axis 66 degrees Calculated R Axis 88 degrees Calculated T Axis 63 degrees Diagnosis Normal sinus rhythm Possible Left atrial enlargement T wave abnormality, consider anterior ischemia Prolonged QT Abnormal ECG When compared with ECG of 31-DEC-2018 09:35, Incomplete right bundle branch block is no longer present T wave inversion now evident in Anterior leads T wave inversion no longer evident in Lateral leads QT has lengthened Confirmed by Tio Mace ([id number]) on 12/31/2018 3:33:58 PM 
  
       
 
Telemetry:normal sinus rhythm Imaging: CXR Results  (Last 48 hours) 12/31/18 1354  XR CHEST PORT Final result Impression:  IMPRESSION:  
   
Lines/tubes as above with interval adjustment of ETT, now in appropriate  
position. Right greater than left pulmonary opacities, and probable small  
pleural effusions, largely similar compared to prior. Nonspecific retrocardiac  
opacity appears increased compared to prior. Narrative:  EXAMINATION: Chest single view INDICATION: Intubation COMPARISON: Same date, 0941 hours FINDINGS: Single frontal view of the chest obtained. ETT tip 4.3 cm above  
miquel. Dual-lumen right neck catheter tip at cavoatrial junction. Patchy  
consolidation throughout the right lung, and mild left lung interstitial  
thickening. Also left midlung streaky densities and nonspecific retrocardiac opacity. Mildly enlarged cardiac silhouette. Probable small pleural effusions. No pneumothorax identified. No acute osseous findings. 12/31/18 1002  XR CHEST PORT Final result Impression:  IMPRESSION:  
   
Lines/tubes as above including ETT tip at right mainstem bronchus for which  
roughly 5 cm retraction recommended. Mildly enlarged cardiac silhouette with  
pulmonary interstitial infiltrates/edema and probable small pleural effusions. See details above. Narrative:  EXAMINATION: Chest single view INDICATION: Cardiac arrest  
   
COMPARISON: 10/18/2018 FINDINGS: Single frontal view of the chest obtained. ETT tip is in right  
mainstem bronchus, roughly 1.3 cm below miquel. Large bore right neck catheter  
tip at cavoatrial junction level. Prominent pulmonary interstitium throughout,  
and probable small pleural effusions. Mildly enlarged cardiac silhouette. Aortic  
arch calcifications. Left midlung streaky densities. No evidence of  
pneumothorax. No acute osseous findings. 12/31/18 1002  XR CHEST PORT Final result Impression:  IMPRESSION:  
   
Lines/tubes as above with ETT tip still at right mainstem bronchus level, for  
which roughly 4 to 5 cm retraction recommended. Pulmonary infiltrates/edema and  
small pleural effusions similar to prior. Narrative:  EXAMINATION: Chest single view INDICATION: Cardiac arrest, intubation COMPARISON: Same date, 0924 hours FINDINGS: Single frontal view of the chest obtained. ETT tip roughly 0.8 cm into  
right mainstem bronchus. Right neck catheter tip at cavoatrial junction level. Possible left subclavian catheter tip at innominate vein level. Mildly enlarged  
cardiac silhouette. Bilateral pulmonary interstitial opacities. Probable small  
pleural effusions. CT Results  (Last 48 hours) None Lubna Cotto MD

## 2019-01-01 ENCOUNTER — APPOINTMENT (OUTPATIENT)
Dept: GENERAL RADIOLOGY | Age: 59
DRG: 296 | End: 2019-01-01
Attending: NURSE PRACTITIONER
Payer: COMMERCIAL

## 2019-01-01 ENCOUNTER — APPOINTMENT (OUTPATIENT)
Dept: GENERAL RADIOLOGY | Age: 59
DRG: 296 | End: 2019-01-01
Attending: INTERNAL MEDICINE
Payer: COMMERCIAL

## 2019-01-01 ENCOUNTER — APPOINTMENT (OUTPATIENT)
Dept: CT IMAGING | Age: 59
DRG: 296 | End: 2019-01-01
Attending: PSYCHIATRY & NEUROLOGY
Payer: COMMERCIAL

## 2019-01-01 ENCOUNTER — APPOINTMENT (OUTPATIENT)
Dept: CT IMAGING | Age: 59
DRG: 296 | End: 2019-01-01
Attending: NURSE PRACTITIONER
Payer: COMMERCIAL

## 2019-01-01 VITALS
BODY MASS INDEX: 18.99 KG/M2 | TEMPERATURE: 99.6 F | DIASTOLIC BLOOD PRESSURE: 79 MMHG | WEIGHT: 100.53 LBS | HEART RATE: 100 BPM | SYSTOLIC BLOOD PRESSURE: 166 MMHG | RESPIRATION RATE: 25 BRPM | OXYGEN SATURATION: 97 %

## 2019-01-01 LAB
ALBUMIN SERPL-MCNC: 2.3 G/DL (ref 3.4–5)
ALBUMIN SERPL-MCNC: 2.4 G/DL (ref 3.4–5)
ALBUMIN SERPL-MCNC: 2.5 G/DL (ref 3.4–5)
ALBUMIN SERPL-MCNC: 2.7 G/DL (ref 3.4–5)
ALBUMIN/GLOB SERPL: 0.6 {RATIO} (ref 0.8–1.7)
ALBUMIN/GLOB SERPL: 0.7 {RATIO} (ref 0.8–1.7)
ALP SERPL-CCNC: 127 U/L (ref 45–117)
ALP SERPL-CCNC: 150 U/L (ref 45–117)
ALP SERPL-CCNC: 85 U/L (ref 45–117)
ALP SERPL-CCNC: 96 U/L (ref 45–117)
ALT SERPL-CCNC: 15 U/L (ref 13–56)
ALT SERPL-CCNC: 18 U/L (ref 13–56)
ALT SERPL-CCNC: 34 U/L (ref 13–56)
ALT SERPL-CCNC: 41 U/L (ref 13–56)
ANION GAP SERPL CALC-SCNC: 11 MMOL/L (ref 3–18)
ANION GAP SERPL CALC-SCNC: 12 MMOL/L (ref 3–18)
ANION GAP SERPL CALC-SCNC: 13 MMOL/L (ref 3–18)
ANION GAP SERPL CALC-SCNC: 8 MMOL/L (ref 3–18)
ANION GAP SERPL CALC-SCNC: 8 MMOL/L (ref 3–18)
ARTERIAL PATENCY WRIST A: ABNORMAL
ARTERIAL PATENCY WRIST A: ABNORMAL
AST SERPL-CCNC: 145 U/L (ref 15–37)
AST SERPL-CCNC: 163 U/L (ref 15–37)
AST SERPL-CCNC: 169 U/L (ref 15–37)
AST SERPL-CCNC: 231 U/L (ref 15–37)
BASE EXCESS BLD CALC-SCNC: 1 MMOL/L
BASE EXCESS BLD CALC-SCNC: 3 MMOL/L
BASOPHILS # BLD: 0 K/UL (ref 0–0.06)
BASOPHILS # BLD: 0 K/UL (ref 0–0.1)
BASOPHILS # BLD: 0 K/UL (ref 0–0.1)
BASOPHILS NFR BLD: 0 % (ref 0–2)
BASOPHILS NFR BLD: 0 % (ref 0–2)
BASOPHILS NFR BLD: 0 % (ref 0–3)
BDY SITE: ABNORMAL
BDY SITE: ABNORMAL
BILIRUB SERPL-MCNC: 0.5 MG/DL (ref 0.2–1)
BILIRUB SERPL-MCNC: 0.5 MG/DL (ref 0.2–1)
BILIRUB SERPL-MCNC: 0.6 MG/DL (ref 0.2–1)
BILIRUB SERPL-MCNC: 0.8 MG/DL (ref 0.2–1)
BUN SERPL-MCNC: 46 MG/DL (ref 7–18)
BUN SERPL-MCNC: 46 MG/DL (ref 7–18)
BUN SERPL-MCNC: 47 MG/DL (ref 7–18)
BUN SERPL-MCNC: 61 MG/DL (ref 7–18)
BUN SERPL-MCNC: 66 MG/DL (ref 7–18)
BUN/CREAT SERPL: 11 (ref 12–20)
BUN/CREAT SERPL: 12 (ref 12–20)
CA-I SERPL-SCNC: 1.01 MMOL/L (ref 1.12–1.32)
CALCIUM SERPL-MCNC: 7.9 MG/DL (ref 8.5–10.1)
CALCIUM SERPL-MCNC: 8 MG/DL (ref 8.5–10.1)
CALCIUM SERPL-MCNC: 8.3 MG/DL (ref 8.5–10.1)
CALCIUM SERPL-MCNC: 8.7 MG/DL (ref 8.5–10.1)
CALCIUM SERPL-MCNC: 8.9 MG/DL (ref 8.5–10.1)
CHLORIDE SERPL-SCNC: 100 MMOL/L (ref 100–108)
CHLORIDE SERPL-SCNC: 93 MMOL/L (ref 100–108)
CHLORIDE SERPL-SCNC: 94 MMOL/L (ref 100–108)
CHLORIDE SERPL-SCNC: 99 MMOL/L (ref 100–108)
CHLORIDE SERPL-SCNC: 99 MMOL/L (ref 100–108)
CK MB CFR SERPL CALC: 1.7 % (ref 0–4)
CK MB SERPL-MCNC: 7.6 NG/ML (ref 5–25)
CK SERPL-CCNC: 444 U/L (ref 26–192)
CO2 SERPL-SCNC: 23 MMOL/L (ref 21–32)
CO2 SERPL-SCNC: 25 MMOL/L (ref 21–32)
CO2 SERPL-SCNC: 25 MMOL/L (ref 21–32)
CO2 SERPL-SCNC: 26 MMOL/L (ref 21–32)
CO2 SERPL-SCNC: 26 MMOL/L (ref 21–32)
CREAT SERPL-MCNC: 4.12 MG/DL (ref 0.6–1.3)
CREAT SERPL-MCNC: 4.19 MG/DL (ref 0.6–1.3)
CREAT SERPL-MCNC: 4.3 MG/DL (ref 0.6–1.3)
CREAT SERPL-MCNC: 5.6 MG/DL (ref 0.6–1.3)
CREAT SERPL-MCNC: 5.71 MG/DL (ref 0.6–1.3)
DIFFERENTIAL METHOD BLD: ABNORMAL
EOSINOPHIL # BLD: 0 K/UL (ref 0–0.4)
EOSINOPHIL NFR BLD: 0 % (ref 0–5)
ERYTHROCYTE [DISTWIDTH] IN BLOOD BY AUTOMATED COUNT: 12.6 % (ref 11.6–14.5)
ERYTHROCYTE [DISTWIDTH] IN BLOOD BY AUTOMATED COUNT: 12.6 % (ref 11.6–14.5)
ERYTHROCYTE [DISTWIDTH] IN BLOOD BY AUTOMATED COUNT: 13.1 % (ref 11.6–14.5)
GAS FLOW.O2 O2 DELIVERY SYS: ABNORMAL L/MIN
GAS FLOW.O2 O2 DELIVERY SYS: ABNORMAL L/MIN
GAS FLOW.O2 SETTING OXYMISER: 14 BPM
GAS FLOW.O2 SETTING OXYMISER: 16 BPM
GLOBULIN SER CALC-MCNC: 3.1 G/DL (ref 2–4)
GLOBULIN SER CALC-MCNC: 3.9 G/DL (ref 2–4)
GLOBULIN SER CALC-MCNC: 4 G/DL (ref 2–4)
GLOBULIN SER CALC-MCNC: 4.3 G/DL (ref 2–4)
GLUCOSE BLD STRIP.AUTO-MCNC: 102 MG/DL (ref 70–110)
GLUCOSE BLD STRIP.AUTO-MCNC: 103 MG/DL (ref 70–110)
GLUCOSE BLD STRIP.AUTO-MCNC: 116 MG/DL (ref 70–110)
GLUCOSE BLD STRIP.AUTO-MCNC: 119 MG/DL (ref 70–110)
GLUCOSE BLD STRIP.AUTO-MCNC: 121 MG/DL (ref 70–110)
GLUCOSE BLD STRIP.AUTO-MCNC: 123 MG/DL (ref 70–110)
GLUCOSE BLD STRIP.AUTO-MCNC: 160 MG/DL (ref 70–110)
GLUCOSE BLD STRIP.AUTO-MCNC: 293 MG/DL (ref 70–110)
GLUCOSE BLD STRIP.AUTO-MCNC: 297 MG/DL (ref 70–110)
GLUCOSE BLD STRIP.AUTO-MCNC: 89 MG/DL (ref 70–110)
GLUCOSE BLD STRIP.AUTO-MCNC: 94 MG/DL (ref 70–110)
GLUCOSE BLD STRIP.AUTO-MCNC: 97 MG/DL (ref 70–110)
GLUCOSE BLD STRIP.AUTO-MCNC: 98 MG/DL (ref 70–110)
GLUCOSE BLD STRIP.AUTO-MCNC: 99 MG/DL (ref 70–110)
GLUCOSE BLD STRIP.AUTO-MCNC: <30 MG/DL (ref 70–110)
GLUCOSE SERPL-MCNC: 101 MG/DL (ref 74–99)
GLUCOSE SERPL-MCNC: 115 MG/DL (ref 74–99)
GLUCOSE SERPL-MCNC: 184 MG/DL (ref 74–99)
GLUCOSE SERPL-MCNC: 223 MG/DL (ref 74–99)
GLUCOSE SERPL-MCNC: 6 MG/DL (ref 74–99)
HAPTOGLOB SERPL-MCNC: <10 MG/DL (ref 34–200)
HCO3 BLD-SCNC: 24.5 MMOL/L (ref 22–26)
HCO3 BLD-SCNC: 24.8 MMOL/L (ref 22–26)
HCT VFR BLD AUTO: 33.8 % (ref 35–45)
HCT VFR BLD AUTO: 37.6 % (ref 35–45)
HCT VFR BLD AUTO: 39.1 % (ref 35–45)
HCT VFR BLD AUTO: 40.2 % (ref 35–45)
HCT VFR BLD AUTO: 43.8 % (ref 35–45)
HCT VFR BLD AUTO: 44.9 % (ref 35–45)
HGB BLD-MCNC: 11.7 G/DL (ref 12–16)
HGB BLD-MCNC: 12.9 G/DL (ref 12–16)
HGB BLD-MCNC: 13.2 G/DL (ref 12–16)
HGB BLD-MCNC: 13.8 G/DL (ref 12–16)
HGB BLD-MCNC: 15.3 G/DL (ref 12–16)
HGB BLD-MCNC: 15.7 G/DL (ref 12–16)
LACTATE SERPL-SCNC: 2 MMOL/L (ref 0.4–2)
LYMPHOCYTES # BLD: 0.7 K/UL (ref 0.8–3.5)
LYMPHOCYTES # BLD: 0.9 K/UL (ref 0.9–3.6)
LYMPHOCYTES # BLD: 1.3 K/UL (ref 0.9–3.6)
LYMPHOCYTES NFR BLD: 16 % (ref 21–52)
LYMPHOCYTES NFR BLD: 5 % (ref 20–51)
LYMPHOCYTES NFR BLD: 9 % (ref 21–52)
MAGNESIUM SERPL-MCNC: 2 MG/DL (ref 1.6–2.6)
MAGNESIUM SERPL-MCNC: 2 MG/DL (ref 1.6–2.6)
MAGNESIUM SERPL-MCNC: 2.1 MG/DL (ref 1.6–2.6)
MCH RBC QN AUTO: 29.5 PG (ref 24–34)
MCH RBC QN AUTO: 29.7 PG (ref 24–34)
MCH RBC QN AUTO: 30 PG (ref 24–34)
MCHC RBC AUTO-ENTMCNC: 34.3 G/DL (ref 31–37)
MCHC RBC AUTO-ENTMCNC: 34.9 G/DL (ref 31–37)
MCHC RBC AUTO-ENTMCNC: 35 G/DL (ref 31–37)
MCV RBC AUTO: 85 FL (ref 74–97)
MCV RBC AUTO: 85.7 FL (ref 74–97)
MCV RBC AUTO: 86 FL (ref 74–97)
MONOCYTES # BLD: 0.1 K/UL (ref 0.05–1.2)
MONOCYTES # BLD: 0.5 K/UL (ref 0–1)
MONOCYTES # BLD: 0.8 K/UL (ref 0.05–1.2)
MONOCYTES NFR BLD: 1 % (ref 3–10)
MONOCYTES NFR BLD: 4 % (ref 2–9)
MONOCYTES NFR BLD: 9 % (ref 3–10)
NEUTS BAND NFR BLD MANUAL: 26 % (ref 0–5)
NEUTS SEG # BLD: 12.1 K/UL (ref 1.8–8)
NEUTS SEG # BLD: 6.9 K/UL (ref 1.8–8)
NEUTS SEG # BLD: 8 K/UL (ref 1.8–8)
NEUTS SEG NFR BLD: 65 % (ref 42–75)
NEUTS SEG NFR BLD: 82 % (ref 40–73)
NEUTS SEG NFR BLD: 83 % (ref 40–73)
O2/TOTAL GAS SETTING VFR VENT: 30 %
O2/TOTAL GAS SETTING VFR VENT: 40 %
PCO2 BLD: 26.1 MMHG (ref 35–45)
PCO2 BLD: 34.2 MMHG (ref 35–45)
PEEP RESPIRATORY: 5 CMH2O
PEEP RESPIRATORY: 5 CMH2O
PH BLD: 7.46 [PH] (ref 7.35–7.45)
PH BLD: 7.58 [PH] (ref 7.35–7.45)
PHOSPHATE SERPL-MCNC: 3.1 MG/DL (ref 2.5–4.9)
PHOSPHATE SERPL-MCNC: 3.3 MG/DL (ref 2.5–4.9)
PHOSPHATE SERPL-MCNC: 5.9 MG/DL (ref 2.5–4.9)
PLATELET # BLD AUTO: 101 K/UL (ref 135–420)
PLATELET # BLD AUTO: 90 K/UL (ref 135–420)
PLATELET # BLD AUTO: 97 K/UL (ref 135–420)
PLATELET COMMENTS,PCOM: ABNORMAL
PMV BLD AUTO: 10 FL (ref 9.2–11.8)
PMV BLD AUTO: 10.7 FL (ref 9.2–11.8)
PMV BLD AUTO: 11.6 FL (ref 9.2–11.8)
PO2 BLD: 117 MMHG (ref 80–100)
PO2 BLD: 82 MMHG (ref 80–100)
POTASSIUM SERPL-SCNC: 4.4 MMOL/L (ref 3.5–5.5)
POTASSIUM SERPL-SCNC: 4.4 MMOL/L (ref 3.5–5.5)
POTASSIUM SERPL-SCNC: 4.7 MMOL/L (ref 3.5–5.5)
POTASSIUM SERPL-SCNC: 5.9 MMOL/L (ref 3.5–5.5)
POTASSIUM SERPL-SCNC: 6.3 MMOL/L (ref 3.5–5.5)
PROT SERPL-MCNC: 5.4 G/DL (ref 6.4–8.2)
PROT SERPL-MCNC: 6.4 G/DL (ref 6.4–8.2)
PROT SERPL-MCNC: 6.4 G/DL (ref 6.4–8.2)
PROT SERPL-MCNC: 7 G/DL (ref 6.4–8.2)
RBC # BLD AUTO: 4.37 M/UL (ref 4.2–5.3)
RBC # BLD AUTO: 5.15 M/UL (ref 4.2–5.3)
RBC # BLD AUTO: 5.24 M/UL (ref 4.2–5.3)
RBC MORPH BLD: ABNORMAL
SAO2 % BLD: 97 % (ref 92–97)
SAO2 % BLD: 99 % (ref 92–97)
SERVICE CMNT-IMP: ABNORMAL
SERVICE CMNT-IMP: ABNORMAL
SODIUM SERPL-SCNC: 128 MMOL/L (ref 136–145)
SODIUM SERPL-SCNC: 130 MMOL/L (ref 136–145)
SODIUM SERPL-SCNC: 133 MMOL/L (ref 136–145)
SODIUM SERPL-SCNC: 134 MMOL/L (ref 136–145)
SODIUM SERPL-SCNC: 137 MMOL/L (ref 136–145)
SPECIMEN TYPE: ABNORMAL
SPECIMEN TYPE: ABNORMAL
TOTAL RESP. RATE, ITRR: 14
TOTAL RESP. RATE, ITRR: 16
TROPONIN I SERPL-MCNC: 0.74 NG/ML (ref 0–0.04)
VENTILATION MODE VENT: ABNORMAL
VENTILATION MODE VENT: ABNORMAL
VOLUME CONTROL PLUS IVLCP: YES
VOLUME CONTROL PLUS IVLCP: YES
VT SETTING VENT: 400 ML
VT SETTING VENT: 450 ML
WBC # BLD AUTO: 13.3 K/UL (ref 4.6–13.2)
WBC # BLD AUTO: 8.3 K/UL (ref 4.6–13.2)
WBC # BLD AUTO: 9.7 K/UL (ref 4.6–13.2)

## 2019-01-01 PROCEDURE — 80053 COMPREHEN METABOLIC PANEL: CPT

## 2019-01-01 PROCEDURE — 74011250636 HC RX REV CODE- 250/636: Performed by: STUDENT IN AN ORGANIZED HEALTH CARE EDUCATION/TRAINING PROGRAM

## 2019-01-01 PROCEDURE — 85018 HEMOGLOBIN: CPT

## 2019-01-01 PROCEDURE — 84100 ASSAY OF PHOSPHORUS: CPT

## 2019-01-01 PROCEDURE — 74011000258 HC RX REV CODE- 258: Performed by: PHYSICIAN ASSISTANT

## 2019-01-01 PROCEDURE — 65610000006 HC RM INTENSIVE CARE

## 2019-01-01 PROCEDURE — 83605 ASSAY OF LACTIC ACID: CPT

## 2019-01-01 PROCEDURE — 74011250636 HC RX REV CODE- 250/636: Performed by: NURSE PRACTITIONER

## 2019-01-01 PROCEDURE — 82550 ASSAY OF CK (CPK): CPT

## 2019-01-01 PROCEDURE — 71045 X-RAY EXAM CHEST 1 VIEW: CPT

## 2019-01-01 PROCEDURE — 74011250637 HC RX REV CODE- 250/637: Performed by: NURSE PRACTITIONER

## 2019-01-01 PROCEDURE — C9113 INJ PANTOPRAZOLE SODIUM, VIA: HCPCS | Performed by: STUDENT IN AN ORGANIZED HEALTH CARE EDUCATION/TRAINING PROGRAM

## 2019-01-01 PROCEDURE — 74011000258 HC RX REV CODE- 258: Performed by: PSYCHIATRY & NEUROLOGY

## 2019-01-01 PROCEDURE — 74011250636 HC RX REV CODE- 250/636: Performed by: PSYCHIATRY & NEUROLOGY

## 2019-01-01 PROCEDURE — 36415 COLL VENOUS BLD VENIPUNCTURE: CPT

## 2019-01-01 PROCEDURE — 74011636637 HC RX REV CODE- 636/637: Performed by: STUDENT IN AN ORGANIZED HEALTH CARE EDUCATION/TRAINING PROGRAM

## 2019-01-01 PROCEDURE — 74011000258 HC RX REV CODE- 258: Performed by: NURSE PRACTITIONER

## 2019-01-01 PROCEDURE — 36600 WITHDRAWAL OF ARTERIAL BLOOD: CPT

## 2019-01-01 PROCEDURE — 94003 VENT MGMT INPAT SUBQ DAY: CPT

## 2019-01-01 PROCEDURE — 83735 ASSAY OF MAGNESIUM: CPT

## 2019-01-01 PROCEDURE — 74011000250 HC RX REV CODE- 250

## 2019-01-01 PROCEDURE — 74011250636 HC RX REV CODE- 250/636: Performed by: PHYSICIAN ASSISTANT

## 2019-01-01 PROCEDURE — 74011000258 HC RX REV CODE- 258: Performed by: STUDENT IN AN ORGANIZED HEALTH CARE EDUCATION/TRAINING PROGRAM

## 2019-01-01 PROCEDURE — 85025 COMPLETE CBC W/AUTO DIFF WBC: CPT

## 2019-01-01 PROCEDURE — 82803 BLOOD GASES ANY COMBINATION: CPT

## 2019-01-01 PROCEDURE — 74011000258 HC RX REV CODE- 258: Performed by: INTERNAL MEDICINE

## 2019-01-01 PROCEDURE — 74011000250 HC RX REV CODE- 250: Performed by: PHYSICIAN ASSISTANT

## 2019-01-01 PROCEDURE — 74011000250 HC RX REV CODE- 250: Performed by: STUDENT IN AN ORGANIZED HEALTH CARE EDUCATION/TRAINING PROGRAM

## 2019-01-01 PROCEDURE — 77030011256 HC DRSG MEPILEX <16IN NO BORD MOLN -A

## 2019-01-01 PROCEDURE — 82330 ASSAY OF CALCIUM: CPT

## 2019-01-01 PROCEDURE — 74011250637 HC RX REV CODE- 250/637: Performed by: INTERNAL MEDICINE

## 2019-01-01 PROCEDURE — 94762 N-INVAS EAR/PLS OXIMTRY CONT: CPT

## 2019-01-01 PROCEDURE — 77030008771 HC TU NG SALEM SUMP -A

## 2019-01-01 PROCEDURE — 82962 GLUCOSE BLOOD TEST: CPT

## 2019-01-01 PROCEDURE — 70450 CT HEAD/BRAIN W/O DYE: CPT

## 2019-01-01 PROCEDURE — 74011250637 HC RX REV CODE- 250/637: Performed by: PHYSICIAN ASSISTANT

## 2019-01-01 PROCEDURE — 80185 ASSAY OF PHENYTOIN TOTAL: CPT

## 2019-01-01 RX ORDER — DEXTROSE MONOHYDRATE 100 MG/ML
100 INJECTION, SOLUTION INTRAVENOUS CONTINUOUS
Status: DISCONTINUED | OUTPATIENT
Start: 2019-01-01 | End: 2019-01-01

## 2019-01-01 RX ORDER — DEXTROSE 50 % IN WATER (D50W) INTRAVENOUS SYRINGE
25 ONCE
Status: COMPLETED | OUTPATIENT
Start: 2019-01-01 | End: 2019-01-01

## 2019-01-01 RX ORDER — SODIUM CHLORIDE 0.9 % (FLUSH) 0.9 %
5-10 SYRINGE (ML) INJECTION EVERY 8 HOURS
Status: DISCONTINUED | OUTPATIENT
Start: 2019-01-01 | End: 2019-01-01

## 2019-01-01 RX ORDER — LORAZEPAM 2 MG/ML
1 CONCENTRATE ORAL
Status: DISCONTINUED | OUTPATIENT
Start: 2019-01-01 | End: 2019-01-03 | Stop reason: HOSPADM

## 2019-01-01 RX ORDER — LORAZEPAM 2 MG/ML
1 INJECTION INTRAMUSCULAR
Status: DISCONTINUED | OUTPATIENT
Start: 2019-01-01 | End: 2019-01-03 | Stop reason: HOSPADM

## 2019-01-01 RX ORDER — LANOLIN ALCOHOL/MO/W.PET/CERES
50 CREAM (GRAM) TOPICAL DAILY
Status: DISCONTINUED | OUTPATIENT
Start: 2019-01-01 | End: 2019-01-01

## 2019-01-01 RX ORDER — CALCIUM GLUCONATE 94 MG/ML
2 INJECTION, SOLUTION INTRAVENOUS ONCE
Status: DISCONTINUED | OUTPATIENT
Start: 2019-01-01 | End: 2019-01-01

## 2019-01-01 RX ORDER — DEXTROSE 50 % IN WATER (D50W) INTRAVENOUS SYRINGE
Status: COMPLETED
Start: 2019-01-01 | End: 2019-01-01

## 2019-01-01 RX ORDER — RIFAMPIN 300 MG/1
600 CAPSULE ORAL DAILY
Status: DISCONTINUED | OUTPATIENT
Start: 2019-01-01 | End: 2019-01-01

## 2019-01-01 RX ORDER — LABETALOL HCL 20 MG/4 ML
10 SYRINGE (ML) INTRAVENOUS ONCE
Status: DISPENSED | OUTPATIENT
Start: 2019-01-01 | End: 2019-01-01

## 2019-01-01 RX ORDER — GLYCOPYRROLATE 0.2 MG/ML
0.2 INJECTION INTRAMUSCULAR; INTRAVENOUS
Status: DISCONTINUED | OUTPATIENT
Start: 2019-01-01 | End: 2019-01-03 | Stop reason: HOSPADM

## 2019-01-01 RX ORDER — MORPHINE SULFATE 4 MG/ML
2 INJECTION INTRAVENOUS
Status: DISCONTINUED | OUTPATIENT
Start: 2019-01-01 | End: 2019-01-03 | Stop reason: HOSPADM

## 2019-01-01 RX ORDER — SCOLOPAMINE TRANSDERMAL SYSTEM 1 MG/1
1 PATCH, EXTENDED RELEASE TRANSDERMAL
Status: DISCONTINUED | OUTPATIENT
Start: 2019-01-01 | End: 2019-01-03 | Stop reason: HOSPADM

## 2019-01-01 RX ORDER — DEXTROSE 50 % IN WATER (D50W) INTRAVENOUS SYRINGE
25-50 AS NEEDED
Status: DISCONTINUED | OUTPATIENT
Start: 2019-01-01 | End: 2019-01-03 | Stop reason: HOSPADM

## 2019-01-01 RX ORDER — ISONIAZID 300 MG/1
300 TABLET ORAL DAILY
Status: DISCONTINUED | OUTPATIENT
Start: 2019-01-01 | End: 2019-01-01

## 2019-01-01 RX ORDER — RIFAMPIN 300 MG/1
300 CAPSULE ORAL DAILY
Status: DISCONTINUED | OUTPATIENT
Start: 2019-01-01 | End: 2019-01-01

## 2019-01-01 RX ORDER — SODIUM CHLORIDE 0.9 % (FLUSH) 0.9 %
5-10 SYRINGE (ML) INJECTION AS NEEDED
Status: DISCONTINUED | OUTPATIENT
Start: 2019-01-01 | End: 2019-01-01

## 2019-01-01 RX ORDER — DEXTROSE MONOHYDRATE 50 MG/ML
100 INJECTION, SOLUTION INTRAVENOUS CONTINUOUS
Status: DISCONTINUED | OUTPATIENT
Start: 2019-01-01 | End: 2019-01-01

## 2019-01-01 RX ORDER — DEXTROSE 50 % IN WATER (D50W) INTRAVENOUS SYRINGE
75
Status: COMPLETED | OUTPATIENT
Start: 2019-01-01 | End: 2019-01-01

## 2019-01-01 RX ORDER — PYRIDOXINE HYDROCHLORIDE 100 MG/ML
50 INJECTION, SOLUTION INTRAMUSCULAR; INTRAVENOUS DAILY
Status: DISCONTINUED | OUTPATIENT
Start: 2019-01-01 | End: 2019-01-01

## 2019-01-01 RX ORDER — ACETAMINOPHEN 650 MG/1
650 SUPPOSITORY RECTAL
Status: DISCONTINUED | OUTPATIENT
Start: 2019-01-01 | End: 2019-01-03 | Stop reason: HOSPADM

## 2019-01-01 RX ORDER — ONDANSETRON 2 MG/ML
4 INJECTION INTRAMUSCULAR; INTRAVENOUS
Status: DISCONTINUED | OUTPATIENT
Start: 2019-01-01 | End: 2019-01-03 | Stop reason: HOSPADM

## 2019-01-01 RX ORDER — HEPARIN SODIUM 5000 [USP'U]/ML
5000 INJECTION, SOLUTION INTRAVENOUS; SUBCUTANEOUS EVERY 8 HOURS
Status: DISCONTINUED | OUTPATIENT
Start: 2019-01-01 | End: 2019-01-01

## 2019-01-01 RX ORDER — MAGNESIUM SULFATE 100 %
4 CRYSTALS MISCELLANEOUS AS NEEDED
Status: DISCONTINUED | OUTPATIENT
Start: 2019-01-01 | End: 2019-01-03 | Stop reason: HOSPADM

## 2019-01-01 RX ORDER — DEXTROSE MONOHYDRATE 100 MG/ML
1000 INJECTION, SOLUTION INTRAVENOUS CONTINUOUS
Status: DISCONTINUED | OUTPATIENT
Start: 2019-01-01 | End: 2019-01-01

## 2019-01-01 RX ORDER — MORPHINE SULFATE 100 MG/5ML
10 SOLUTION ORAL
Status: DISCONTINUED | OUTPATIENT
Start: 2019-01-01 | End: 2019-01-03 | Stop reason: HOSPADM

## 2019-01-01 RX ADMIN — DEXTROSE MONOHYDRATE 75 ML/HR: 5 INJECTION, SOLUTION INTRAVENOUS at 04:04

## 2019-01-01 RX ADMIN — LEVETIRACETAM 750 MG: 100 INJECTION, SOLUTION INTRAVENOUS at 12:41

## 2019-01-01 RX ADMIN — HEPARIN SODIUM 5000 UNITS: 5000 INJECTION, SOLUTION INTRAVENOUS; SUBCUTANEOUS at 18:00

## 2019-01-01 RX ADMIN — Medication 10 ML: at 06:06

## 2019-01-01 RX ADMIN — Medication 10 ML: at 13:00

## 2019-01-01 RX ADMIN — CHLORHEXIDINE GLUCONATE 0.12% ORAL RINSE 10 ML: 1.2 LIQUID ORAL at 08:00

## 2019-01-01 RX ADMIN — DEXTROSE MONOHYDRATE 75 G: 25 INJECTION, SOLUTION INTRAVENOUS at 02:00

## 2019-01-01 RX ADMIN — CALCIUM GLUCONATE 1 G: 98 INJECTION, SOLUTION INTRAVENOUS at 13:33

## 2019-01-01 RX ADMIN — ACETAMINOPHEN 650 MG: 650 SUPPOSITORY RECTAL at 20:30

## 2019-01-01 RX ADMIN — Medication 10 ML: at 06:01

## 2019-01-01 RX ADMIN — HEPARIN SODIUM 5000 UNITS: 5000 INJECTION, SOLUTION INTRAVENOUS; SUBCUTANEOUS at 09:02

## 2019-01-01 RX ADMIN — DEXTROSE MONOHYDRATE 1000 ML: 10 INJECTION, SOLUTION INTRAVENOUS at 08:00

## 2019-01-01 RX ADMIN — GLYCOPYRROLATE 0.2 MG: 0.2 INJECTION INTRAMUSCULAR; INTRAVENOUS at 20:09

## 2019-01-01 RX ADMIN — SODIUM CHLORIDE 850 MG PE: 9 INJECTION, SOLUTION INTRAVENOUS at 00:12

## 2019-01-01 RX ADMIN — Medication 10 ML: at 13:44

## 2019-01-01 RX ADMIN — HEPARIN SODIUM 5000 UNITS: 5000 INJECTION, SOLUTION INTRAVENOUS; SUBCUTANEOUS at 02:06

## 2019-01-01 RX ADMIN — HEPARIN SODIUM 5000 UNITS: 5000 INJECTION, SOLUTION INTRAVENOUS; SUBCUTANEOUS at 10:00

## 2019-01-01 RX ADMIN — SODIUM CHLORIDE 40 MG: 9 INJECTION, SOLUTION INTRAMUSCULAR; INTRAVENOUS; SUBCUTANEOUS at 08:16

## 2019-01-01 RX ADMIN — INSULIN HUMAN 8 UNITS: 100 INJECTION, SOLUTION PARENTERAL at 13:33

## 2019-01-01 RX ADMIN — PYRIDOXINE HCL TAB 50 MG 50 MG: 50 TAB at 16:00

## 2019-01-01 RX ADMIN — LEVETIRACETAM 750 MG: 100 INJECTION, SOLUTION INTRAVENOUS at 13:00

## 2019-01-01 RX ADMIN — RIFAMPIN 600 MG: 300 CAPSULE ORAL at 16:00

## 2019-01-01 RX ADMIN — MORPHINE SULFATE 2 MG: 4 INJECTION INTRAVENOUS at 20:09

## 2019-01-01 RX ADMIN — CHLORHEXIDINE GLUCONATE 0.12% ORAL RINSE 10 ML: 1.2 LIQUID ORAL at 20:31

## 2019-01-01 RX ADMIN — DEXTROSE MONOHYDRATE 75 G: 25 INJECTION, SOLUTION INTRAVENOUS at 01:19

## 2019-01-01 RX ADMIN — Medication 10 ML: at 21:23

## 2019-01-01 RX ADMIN — CALCIUM GLUCONATE 2 G: 98 INJECTION, SOLUTION INTRAVENOUS at 03:59

## 2019-01-01 RX ADMIN — LORAZEPAM 1 MG: 2 SOLUTION, CONCENTRATE ORAL at 20:23

## 2019-01-01 RX ADMIN — DEXTROSE MONOHYDRATE 25 G: 25 INJECTION, SOLUTION INTRAVENOUS at 13:33

## 2019-01-01 RX ADMIN — DEXTROSE MONOHYDRATE: 25 INJECTION, SOLUTION INTRAVENOUS at 02:00

## 2019-01-01 RX ADMIN — ISONIAZID 300 MG: 300 TABLET ORAL at 16:00

## 2019-01-01 RX ADMIN — SODIUM CHLORIDE 40 MG: 9 INJECTION, SOLUTION INTRAMUSCULAR; INTRAVENOUS; SUBCUTANEOUS at 10:00

## 2019-01-01 RX ADMIN — CHLORHEXIDINE GLUCONATE 0.12% ORAL RINSE 10 ML: 1.2 LIQUID ORAL at 08:16

## 2019-01-01 RX ADMIN — Medication 10 ML: at 13:43

## 2019-01-01 NOTE — PROGRESS NOTES
Problem: Pressure Injury - Risk of 
Goal: *Prevention of pressure injury Document Derek Scale and appropriate interventions in the flowsheet. Outcome: Progressing Towards Goal 
Pressure Injury Interventions: 
Sensory Interventions: Assess need for specialty bed, Float heels, Keep linens dry and wrinkle-free, Pad between skin to skin, Pressure redistribution bed/mattress (bed type) Moisture Interventions: Absorbent underpads, Check for incontinence Q2 hours and as needed Activity Interventions: Pressure redistribution bed/mattress(bed type) Mobility Interventions: Assess need for specialty bed, Pressure redistribution bed/mattress (bed type) Nutrition Interventions: Document food/fluid/supplement intake, Discuss nutritional consult with provider Friction and Shear Interventions: Foam dressings/transparent film/skin sealants

## 2019-01-01 NOTE — PROGRESS NOTES
[facility] UPDATE: 
 
2115: Just now notified by Bedside RN Leann Harmon) that pt's BP had dropped during HD and Propofol was stopped, along with Cardene gtt. Now, s/p HD, pt's BP is elevated again; RN stated she just turned back on the Cardene gtt as well as the Propofol. Subsequently, RN notes that pt may be seizing again. Upon evaluation, pt did begin to seize during my assessment. ~ 15sec Tonic Clonic Seizure was witnessed. BP is again elevated, as noted below. RESP: Symmetrical chest rise; good AE bilat; course rhocnhi throughout; clears slightly with suction; suction with ingrid blood out of ETT; no wheezes/ rales noted. CVS: RRR, no m/r/g Neuro: Sedated; no jerking/twitching motions noted currently. Visit Vitals BP (!) 176/98 Pulse 96 Temp 96.7 °F (35.9 °C) (Oral) Resp 16 Wt 42 kg (92 lb 9.5 oz) SpO2 100% BMI 17.50 kg/m² PLAN: 
- As discussed with Dr. Shahid Costa during handoff if pt were to begin seizing, Will start pt on Versed gtt - Titrate Cardene gtt for SBP goal <160; DBP goal <110. 
- Will monitor closely for status ellipticus - Updated Dr. Shahid Costa on pt's status 2130: Upon reassessment of pt, pt was noted to still be acting these motions of jerking/twitching of her entire face as well as both upper extremities at the same time. Pt is in room by herself, untouched/unprovked at this time.  
 
- STAT Ativan 4mg IVP 
- Discussed case with Dr. Shante Judd to order extended EEG for r/o Status Epilepticus and call Neurology - Discussed case with Dr. Juvencio Kinney. She believes these are likely monoclonic jerks but cannot r/o seizure. Recommends against any further EEG and to just treat clinically. Will see in AM.  
 
- Will give Phospheyotin at a loading dose of 20mg/kg, followed by 2mg/kg BID dosing.  
- Continue Propofol for sedation as BP will allow - Continue with Keppra as previously dosed. - Okay to continue with plan for Versed gtt as BP will allow - Okay with Ativan for immediate tx for episode. - D/C EEG 
 
- Discussed update with Dr. Madelyn Tucker who is in agreement with plan. Total critical care time - 60 mins, excluding procedures, with complex decision making performed and > 50% time spent in face to face consultation. Eyal Love PA-C 
12/31/18 
10:30 PM 
 
0109: Bedside RN (Stephanie Fry) notified me that pt's POC glucose wasn't reading so she sent a STAT BMP which just resulted, showing a serum glucose of 6.  
 
- Repeat STAT BMP, Mg & phos NOW 
- Give 3 amps D50 NOW 
- Start on D5 gtt at 150cc/hr - Check POC glucose every hour until glucose above 150 - Hypoglycemia order set placed Upon chart review, pt's POC Glucose was checked at 2351 and read <30.0. RN did NOT inform me of this reading or the STAT BMP being obtained until the serum glucose resulted. Discussed case with Dr. Madelyn Tucker. Nguyen Yu PA-C  
1:22 AM 
 
0130: Repeat POC Glucose s/p 3 amps D50 - 297. BMP at 0020 is markedly different than previous BMP. Sending for STAT repeat on all electrolytes and ionized Ca++ as well. Will hold off starting the D5 gtt until the repeat BMP results. Instructed RN to recheck POC glucose in 30mins. Eyal Love PA-C 
1:31 AM 
 
ADDENDUM: 
 
8818: RN notified me that POC Glucose beginning to drop, now at 121. Will start D5 @ 75cc/Hr. Continue to check POC Glucose q1 hour. 1778: RN notified me that POC Glucose dropping further, now at 89. Increase rate of D5 to 100cc/Hr. Continue q1 POC glucose checks.    
 
Eyal Love PA-C 
6:00 AM

## 2019-01-01 NOTE — PROGRESS NOTES
SUBJECTIVE: 
 
Patient remains on ventilator. Remains unresponsive. decerebrate posturing on tactile commands. OBJECTIVE: 
 
/69 (BP 1 Location: Right arm, BP Patient Position: At rest)   Pulse (!) 106   Temp 98 °F (36.7 °C)   Resp 14   Wt 45 kg (99 lb 3.3 oz)   SpO2 99%   Breastfeeding? No   BMI 18.74 kg/m² CVS: RRR 
RS: CTA Bilaterally, ETT + 
GI: NT, BS + Extremities: no pedal edema CNS: decerebrate posturing ON TACTILE commands General: NAD, unresponsive ASSESSMENT: 
 
1. Brief ? PEA cardiac arrest- witnessed. 2. Hyperkalemia 3. Hypoglycemia 4. ESRD on HD 5. Acute metabolic and presumed anoxic encephalopathy. 6.  acute respiratory failure. 7. small known pericardial effusion PLAN: 
 
Cont current management D/w dr. Rosalba Wilson - will transfer patient to their service CMP:  
Lab Results Component Value Date/Time  (L) 01/01/2019 05:13 AM  
 K 4.7 01/01/2019 05:13 AM  
 CL 99 (L) 01/01/2019 05:13 AM  
 CO2 26 01/01/2019 05:13 AM  
 AGAP 8 01/01/2019 05:13 AM  
  (H) 01/01/2019 05:13 AM  
 BUN 47 (H) 01/01/2019 05:13 AM  
 CREA 4.30 (H) 01/01/2019 05:13 AM  
 GFRAA 13 (L) 01/01/2019 05:13 AM  
 GFRNA 11 (L) 01/01/2019 05:13 AM  
 CA 8.9 01/01/2019 05:13 AM  
 MG 2.0 01/01/2019 01:40 AM  
 PHOS 3.1 01/01/2019 01:40 AM  
 ALB 2.5 (L) 01/01/2019 05:13 AM  
 TP 6.4 01/01/2019 05:13 AM  
 GLOB 3.9 01/01/2019 05:13 AM  
 AGRAT 0.6 (L) 01/01/2019 05:13 AM  
 SGOT 169 (H) 01/01/2019 05:13 AM  
 ALT 34 01/01/2019 05:13 AM  
 
CBC:  
Lab Results Component Value Date/Time  WBC 9.7 01/01/2019 05:13 AM  
 HGB 15.3 01/01/2019 05:13 AM  
 HCT 43.8 01/01/2019 05:13 AM  
 PLT 97 (L) 01/01/2019 05:13 AM

## 2019-01-01 NOTE — PROGRESS NOTES
RENAL DAILY PROGRESS NOTE Subjective:  
Admitted post cardiac arrest seen for ESRD Complaint: remains intubated, sedated, on D10 W for low BS, BP remains stable off meds Current Facility-Administered Medications Medication Dose Route Frequency  glucose chewable tablet 16 g  4 Tab Oral PRN  
 glucagon (GLUCAGEN) injection 1 mg  1 mg IntraMUSCular PRN  
 dextrose (D50W) injection syrg 12.5-25 g  25-50 mL IntraVENous PRN  
 dextrose 10% infusion 1,000 mL  1,000 mL IntraVENous CONTINUOUS  
 pantoprazole (PROTONIX) 40 mg in sodium chloride 0.9% 10 mL injection  40 mg IntraVENous DAILY  heparin (porcine) injection 5,000 Units  5,000 Units IntraVENous Q8H  propofol (DIPRIVAN) infusion  5 mcg/kg/min IntraVENous TITRATE  niCARdipine (CARDENE) 25 mg in 0.9% sodium chloride 250 mL infusion  0-15 mg/hr IntraVENous TITRATE  chlorhexidine (PERIDEX) 0.12 % mouthwash 10 mL  10 mL Oral Q12H  
 0.9% sodium chloride infusion 250 mL  250 mL IntraVENous PRN  
 sodium chloride (NS) flush 5-10 mL  5-10 mL IntraVENous Q8H  
 sodium chloride (NS) flush 5-10 mL  5-10 mL IntraVENous PRN  
 0.9% sodium chloride infusion  100 mL/hr IntraVENous DIALYSIS PRN  
 alteplase (CATHFLO) 2 mg in sterile water (preservative free) 2 mL injection  2 mg InterCATHeter ONCE PRN  
 heparin (porcine) 1,000 unit/mL injection 1,000 Units  1,000 Units InterCATHeter DIALYSIS PRN  
 levETIRAcetam (KEPPRA) 750 mg in 0.9% sodium chloride 100 mL IVPB  750 mg IntraVENous Q24H  
 midazolam in normal saline (VERSED) 1 mg/mL infusion  1-5 mg/hr IntraVENous TITRATE Objective:  
 
Patient Vitals for the past 24 hrs: 
 Temp Pulse Resp BP SpO2  
01/01/19 0842  (!) 104 12  94 % 01/01/19 0730  (!) 104 12 117/74 97 % 01/01/19 0700 98.8 °F (37.1 °C) (!) 104 12 118/70 99 % 01/01/19 0645  (!) 104 12 118/73 99 % 01/01/19 0630  (!) 105 12 118/70 99 % 01/01/19 0615  (!) 105 12 125/72 99 % 01/01/19 0600  (!) 106 12 124/73 99 % 01/01/19 0500  (!) 106 12 125/69 99 % 01/01/19 0405  100 16  100 % 01/01/19 0400 99.4 °F (37.4 °C) (!) 101 16 109/77 100 % 01/01/19 0300  98 16 104/62 100 % 01/01/19 0200  93 16 93/49 100 % 01/01/19 0130  89 16 94/52 100 % 01/01/19 0115  93 16 90/47 100 % 01/01/19 0100  83 16 105/64 100 % 01/01/19 0045  83 16 105/62 100 % 01/01/19 0030  (!) 105 16 101/61 100 % 01/01/19 0000 97 °F (36.1 °C) 83 16 158/86 100 % 12/31/18 2338  100 16  100 % 12/31/18 2218  88  94/57   
12/31/18 2215  88 16 91/52 100 % 12/31/18 2200  90 16 121/69 100 % 12/31/18 2145  97 22 132/75 100 % 12/31/18 2130  94 19 146/80 100 % 12/31/18 2115  95 22 (!) 183/100 100 % 12/31/18 2114  96  (!) 176/98   
12/31/18 2110  93 20 (!) 171/98 100 % 12/31/18 2100  92 16 (!) 207/94 100 % 12/31/18 2045  89 16 188/85 100 % 12/31/18 2035  90 16 (!) 173/101 100 % 12/31/18 2030  91 16 (!) 166/99 100 % 12/31/18 2015  87 16 (!) 192/97 100 % 12/31/18 2000 96.2 °F (35.7 °C) 87 21 (!) 163/105 100 % 12/31/18 1950  88 24  100 % 12/31/18 1945  86 19 (!) 179/109 99 % 12/31/18 1930  86 16 (!) 188/104 100 % 12/31/18 1915  84 16 (!) 176/95 100 % 12/31/18 1900  85 16 (!) 186/109 100 % 12/31/18 1845  82 19 159/89 100 % 12/31/18 1830  83 19 (!) 159/91 100 % 12/31/18 1815  82 16 130/81 100 % 12/31/18 1800  82 16 102/72 99 % 12/31/18 1745  80 17 110/69 99 % 12/31/18 1730 96.7 °F (35.9 °C) 74 15 126/75 100 % 12/31/18 1715 96.7 °F (35.9 °C) 75 15 137/83 100 % 12/31/18 1700  75 14 130/78 100 % 12/31/18 1645  75 14 123/72 100 % 12/31/18 1630  74 14 155/68 100 % 12/31/18 1615  72 14 177/75 100 % 12/31/18 1600 97 °F (36.1 °C) 79 14 (!) 137/114 100 % 12/31/18 1548  78 14  100 % 12/31/18 1545  79 14 158/90 100 % 12/31/18 1530  77 14 (!) 164/143 99 % 12/31/18 1515  79 15 (!) 176/96 100 % 12/31/18 1500  81 14 173/87 100 % 12/31/18 1445  74 14 151/85 100 % 12/31/18 1430  70 14 149/76 100 % 12/31/18 1415  71 14 (!) 172/93 100 % 12/31/18 1400  70 14 174/83 100 % 12/31/18 1358 97.6 °F (36.4 °C)      
12/31/18 1350  72 14 165/86 100 % 12/31/18 1345  73 14 163/85 100 % 12/31/18 1343  73 14  100 % 12/31/18 1342  73 14  100 % 12/31/18 1341  74 14  100 % 12/31/18 1340  74 14 164/85 100 % 12/31/18 1339  75 14  100 % 12/31/18 1338  77 13  100 % 12/31/18 1337  76 14  100 % 12/31/18 1336  77 14  100 % 12/31/18 1335  78 15 196/85 100 % 12/31/18 1334  76 14  100 % 12/31/18 1333  77 14  100 % 12/31/18 1332  77 14  100 % 12/31/18 1331  76 14  100 % 12/31/18 1330  76 14 (!) 234/104 100 % 12/31/18 1329  79 14  100 % 12/31/18 1328  87 12  100 % 12/31/18 1327  79 14  100 % 12/31/18 1326  80 17  100 % 12/31/18 1325  78 15  100 % 12/31/18 1324  77 16  100 % 12/31/18 1323  78 14  100 % 12/31/18 1322  73 16  100 % 12/31/18 1321  74 15  100 % 12/31/18 1320  71 14 (!) 262/122 100 % 12/31/18 1319  74 14  100 % 12/31/18 1318  70 14  100 % 12/31/18 1317  70 14  100 % 12/31/18 1316  70 13  100 % 12/31/18 1315  69 13 (!) 247/114 100 % 12/31/18 1314  69 14  100 % 12/31/18 1313  69 14  100 % 12/31/18 1312  69 14  100 % 12/31/18 1311  68 14  100 % 12/31/18 1310  69 14 (!) 242/118 100 % 12/31/18 1309  69 14  100 % 12/31/18 1308  70 18  100 % 12/31/18 1307  72 18  100 % 12/31/18 1306  73 12  100 % 12/31/18 1305  73 15 (!) 259/122 100 % 12/31/18 1304  73 16  100 % 12/31/18 1303  70 12  100 % 12/31/18 1302  68 18  100 % 12/31/18 1301  67 15  100 % 12/31/18 1300  66 14 (!) 257/119 100 % 12/31/18 1259  68 14  100 % 12/31/18 1258  64 14 (!) 257/105 100 % 12/31/18 1207 97.1 °F (36.2 °C)      
12/31/18 1145  (!) 59  (!) 226/115 100 % 12/31/18 1130  (!) 59  (!) 188/103 100 % 12/31/18 1115  (!) 59  112/79 98 % 12/31/18 1031  62 22 (!) 193/111 100 % 12/31/18 1030  63  (!) 193/111 100 % 12/31/18 1015  71  (!) 249/128 100 % 12/31/18 1008  75 26 (!) 265/123 100 % Weight change:  
 
 12/30 1901 - 01/01 0700 In: 660.7 [I.V.:660.7] Out: 500 Intake/Output Summary (Last 24 hours) at 1/1/2019 0884 Last data filed at 1/1/2019 0700 Gross per 24 hour Intake 660.73 ml Output 500 ml Net 160.73 ml Physical Exam: intubated, afebrile HEENT: non icteric Neck: no JVD, TDC right IJ Cardiovascular: regular, no rub 
C/L: equal vent breath sounds Abdomen: soft, + BS Ext: no LE edema Data Review:  
 
LABS:  
Hematology:  
Recent Labs 01/01/19 
0513 01/01/19 
0020 12/31/18 
2107 12/31/18 
0930 WBC 9.7 8.3  --  5.6 HGB 15.3 15.7 15.8 12.9 HCT 43.8 44.9 44.4 39.8 Chemistry:  
Recent Labs 01/01/19 
0513 01/01/19 
0140 01/01/19 
0020 12/31/18 
1355 12/31/18 
1315 12/31/18 
0930 BUN 47* 46* 46*  --  98* 94* CREA 4.30* 4.19* 4.12*  --  7.67* 7.51* CA 8.9 7.9* 8.7  --  9.1 9.9 ALB 2.5*  --  2.7*  --  2.9* 2.6*  
K 4.7 4.4 4.4  --  7.2* 6.6* * 137 134*  --  137 137 CL 99* 99* 100  --  102 101 CO2 26 25 26  --  24 26 PHOS  --  3.1 3.3 7.8*  --   --   
* 223* 6*  --  115* 169* CXR noted ABG 7.58/26.1/117/24.8/40 IMPRESSION AND PLAN:  
ESRD no acute need for HD today. Will evaluate in am. Waiting for cardio to evaluate pericardial effusion and r/o tamponade. Will cont to monitor for signs of fluid overload HTN controlled Hypoglycemia, resolved cont with D10 W and consider NGT feeding Post cardiac arrest Neuro/Cardio follow up, cont with critical care management Discussed with Dr. Madelyn Tucker and Dr. Alexsander Arthur MD 
1/1/2019

## 2019-01-01 NOTE — H&P
History & Physical 
Patient: Tristian Peralta MRN: [id number]  CSN: [id number] YOB: 1960  Age: 66 y.o. Sex: female DOA: 12/31/2018 Chief Complaint Patient presents with  Cardiac arrest  
 
    
HPI:  
 
Tristian Peralta is a 66 y.o. female who has a h/o DM, ESRD on HD, Mon, Tues and Fri, also has a h/o HTN and pulmonary edema was having dialysis done when she became suddenly SOB and then collapsed. She was later found to be in cardiac arrest. Patient went through ACLS protocol, she was given the CPR and meds like epinephtrne. EMS got the pulse and then she was intubated. No h/o could be obtained from the patient. Past Medical History:  
Diagnosis Date  Arthritis GOUT  Asthma  Bilateral leg pain  Chest pain at rest   
 Chronic kidney disease  ESRD (end stage renal disease) ([facility] [address] TUES-THURS-SAT  Hypercholesteremia  Hypertension  Joint pain  Kidney disease  Pleural effusion  Pulmonary tuberculosis  Vitamin D deficiency Past Surgical History:  
Procedure Laterality Date  CHEST SURGERY PROCEDURE UNLISTED    
 THORACENTHESIS  
 HX OTHER SURGICAL    
 BRONCHOSCOPY  
 HX VASCULAR ACCESS  01/2018; 11/2017 LEFT ARM VENOUS ACCESS  
 HX VASCULAR ACCESS  02/2017 [address]  VASCULAR SURGERY PROCEDURE UNLIST No family history on file. Social History Socioeconomic History  Marital status:  Spouse name: Not on file  Number of children: Not on file  Years of education: Not on file  Highest education level: Not on file Tobacco Use  Smoking status: Never Smoker  Smokeless tobacco: Never Used Substance and Sexual Activity  Alcohol use: No  
 Drug use: No  
 
 
Prior to Admission medications Medication Sig Start Date End Date Taking? Authorizing Provider  
losartan (COZAAR) 50 mg tablet Take 1 Tab by mouth daily.  10/22/18   Tari Vazquez MD  
 minoxidil (LONITEN) 10 mg tablet Take 1 Tab by mouth two (2) times a day. 10/21/18   Marifer Sarabia MD  
OTHER Check CBC, CMP, MG on 10/24/18, Results to PCP immediately, Diagnosis- TB 10/21/18   Marifer Sarabia MD  
polyethylene glycol (MIRALAX) 17 gram packet Take 1 Packet by mouth daily. 10/22/18   Marifer Sarabia MD  
levoFLOXacin (LEVAQUIN) 500 mg tablet Take 1 Tab by mouth every fourty-eight (48) hours. 10/21/18   Marifer Sarabia MD  
OTHER Graded Compression Stockings b/l LE- Use as directed 10/21/18   Marifer Sarabia MD  
dicyclomine (BENTYL) 20 mg tablet Take 20 mg by mouth three (3) times daily as needed (abdominial pain). Provider, Historical  
cloNIDine HCl (CATAPRES) 0.3 mg tablet Take 0.3 mg by mouth three (3) times daily. Provider, Historical  
carvedilol (COREG) 25 mg tablet Take 1 Tab by mouth every twelve (12) hours. 10/6/18   Marifer Sarabia MD  
glycerin, adult, (FLEET GLYCERIN, ADULT,) suppository Insert 1 Suppository into rectum daily as needed. Indications: BOWEL EVACUATION 10/6/18   Marifer Sarbaia MD  
bisacodyl (DULCOLAX) 5 mg EC tablet Take 1 Tab by mouth daily as needed for Constipation. 8/5/18   Della Jimenes MD  
albuterol (PROVENTIL HFA, VENTOLIN HFA, PROAIR HFA) 90 mcg/actuation inhaler Take 2 Puffs by inhalation every four (4) hours as needed for Wheezing. 7/21/18   Marifer Sarabia MD  
ethambutol (MYAMBUTOL) 400 mg tablet Take 2 Tabs by mouth every Tuesday Thursday, Saturday. At 5 PM 7/21/18   Marifer Sarabia MD  
isoniazid (NYDRAZID) 300 mg tablet Take 1 Tab by mouth daily. On dialysis days, please take medication after dialysis 7/21/18   Marifer Sarabia MD  
pyrazinamide 500 mg tablet Take 2 Tabs by mouth every Tuesday Thursday, Saturday. At 5 pm  Indications: active tuberculosis 7/24/18   Marifer Sarabia MD  
rifAMPin (RIFADIN) 300 mg capsule Take 2 Caps by mouth daily.  On dialysis days please take this medication after Dialysis 7/21/18   Marifer Sarabia MD  
 docusate sodium (COLACE) 100 mg capsule Take 1 Cap by mouth two (2) times a day. 18   Neena Dupont MD  
pyridoxine, vitamin B6, (VITAMIN B-6) 100 mg tablet Take 1 Tab by mouth daily. 18   Neena Dupont MD  
OTHER Incentive Spirometry- use as directed 18   Neena Dupont MD  
aspirin 81 mg chewable tablet Take 1 Tab by mouth daily. 3/8/18   Misbah Vallejo MD  
atorvastatin (LIPITOR) 40 mg tablet Take 1 Tab by mouth nightly. 3/8/18   Misbah Vallejo MD  
melatonin 3 mg tablet Take 1 Tab by mouth nightly as needed. Patient taking differently: Take 1 Tab by mouth nightly as needed (insomnia). 3/8/18   Misbah Vallejo MD  
FLUoxetine (PROZAC) 10 mg capsule Take 1 Cap by mouth daily. 3/8/18   Misbah Vallejo MD  
 
 
Allergies Allergen Reactions  Banana Other (comments) Review of Systems Could not be obtained due to the severity of illness. Physical Exam:  
 
Physical Exam: 
Visit Vitals BP (!) 166/99 Pulse 91 Temp 96.7 °F (35.9 °C) (Oral) Resp 16 Wt 42 kg (92 lb 9.5 oz) SpO2 100% BMI 17.50 kg/m² O2 Device: Endotracheal tube, Ventilator Temp (24hrs), Av °F (36.1 °C), Min:96.7 °F (35.9 °C), Max:97.6 °F (36.4 °C) No intake/output data recorded.  0701 -  1900 In: 136.7 [I.V.:136.7] Out: - General:  Sedated and intubated. Head:  Normocephalic, without obvious abnormality, atraumatic. Eyes:  Conjunctivae/corneas clear. PERRL, EOMs intact. Nose: Nares normal. No drainage or sinus tenderness. Throat: Lips, mucosa, and tongue normal. Teeth and gums normal.  
Neck: Supple, symmetrical, trachea midline, no adenopathy, thyroid: no enlargement/tenderness/nodules, no carotid bruit and no JVD. Back:   ROM normal. No CVA tenderness. Lungs:   Clear to auscultation bilaterally. Chest wall:  No tenderness or deformity. Heart:  Regular rate and rhythm, S1, S2 normal, no murmur, click, rub or gallop. Abdomen: Soft, non-tender. Bowel sounds normal. No masses,  No organomegaly. Extremities: Extremities normal, atraumatic, no cyanosis or edema. Pulses: 2+ and symmetric all extremities. Skin: Skin color, texture, turgor normal. No rashes or lesions Neurologic: C+ muscle twitching. Labs Reviewed: 
 
  
GLUCOSE, POC Collection Time: 12/31/18  1:09 PM  
Result Value Ref Range Glucose (POC) 105 70 - 110 mg/dL METABOLIC PANEL, COMPREHENSIVE Collection Time: 12/31/18  1:15 PM  
Result Value Ref Range Sodium 137 136 - 145 mmol/L Potassium 7.2 (HH) 3.5 - 5.5 mmol/L Chloride 102 100 - 108 mmol/L  
 CO2 24 21 - 32 mmol/L Anion gap 11 3.0 - 18 mmol/L Glucose 115 (H) 74 - 99 mg/dL BUN 98 (H) 7.0 - 18 MG/DL Creatinine 7.67 (H) 0.6 - 1.3 MG/DL  
 BUN/Creatinine ratio 13 12 - 20 GFR est AA 7 (L) >60 ml/min/1.73m2 GFR est non-AA 5 (L) >60 ml/min/1.73m2 Calcium 9.1 8.5 - 10.1 MG/DL Bilirubin, total 0.4 0.2 - 1.0 MG/DL  
 ALT (SGPT) 31 13 - 56 U/L  
 AST (SGOT) 317 (H) 15 - 37 U/L Alk. phosphatase 194 (H) 45 - 117 U/L Protein, total 6.8 6.4 - 8.2 g/dL Albumin 2.9 (L) 3.4 - 5.0 g/dL Globulin 3.9 2.0 - 4.0 g/dL A-G Ratio 0.7 (L) 0.8 - 1.7 CALCIUM, IONIZED Collection Time: 12/31/18  1:15 PM  
Result Value Ref Range  Ionized Calcium 1.08 (L) 1.12 - 1.32 MMOL/L  
 CARDIAC PANEL,(CK, CKMB & TROPONIN) Collection Time: 12/31/18  1:15 PM  
Result Value Ref Range  (H) 26 - 192 U/L  
 CK - MB 8.0 (H) <3.6 ng/ml CK-MB Index 2.9 0.0 - 4.0 % Troponin-I, QT 0.55 (H) 0.0 - 0.045 NG/ML  
TYPE + CROSSMATCH Collection Time: 12/31/18  1:15 PM  
Result Value Ref Range Crossmatch Expiration 01/03/2019 ABO/Rh(D) O POSITIVE Antibody screen NEG   
 CALLED TO: Dex Anna, ICU, [id number]  [facility] Unit number [id number] Blood component type [facility] Unit division 00 Status of unit ALLOCATED Crossmatch result Compatible Unit number [id number] Blood component type  LR Unit division 00 Status of unit ALLOCATED Crossmatch result Compatible LACTIC ACID Collection Time: 12/31/18  1:15 PM  
Result Value Ref Range Lactic acid 2.0 0.4 - 2.0 MMOL/L  
PROTHROMBIN TIME + INR Collection Time: 12/31/18  1:55 PM  
Result Value Ref Range Prothrombin time 13.7 11.5 - 15.2 sec INR 1.1 0.8 - 1.2 FIBRINOGEN Collection Time: 12/31/18  1:55 PM  
Result Value Ref Range Fibrinogen 287 210 - 451 mg/dL MAGNESIUM Collection Time: 12/31/18  1:55 PM  
Result Value Ref Range Magnesium 2.8 (H) 1.6 - 2.6 mg/dL PHOSPHORUS Collection Time: 12/31/18  1:55 PM  
Result Value Ref Range Phosphorus 7.8 (H) 2.5 - 4.9 MG/DL  
EKG, 12 LEAD, SUBSEQUENT Collection Time: 12/31/18  2:58 PM  
Result Value Ref Range Ventricular Rate 79 BPM  
 Atrial Rate 79 BPM  
 P-R Interval 144 ms QRS Duration 86 ms  
 Q-T Interval 422 ms QTC Calculation (Bezet) 483 ms Calculated P Axis 66 degrees Calculated R Axis 88 degrees Calculated T Axis 63 degrees Diagnosis Normal sinus rhythm Possible Left atrial enlargement T wave abnormality, consider anterior ischemia Prolonged QT Abnormal ECG When compared with ECG of 31-DEC-2018 09:35, Incomplete right bundle branch block is no longer present T wave inversion now evident in Anterior leads T wave inversion no longer evident in Lateral leads QT has lengthened Confirmed by Tio Mace ([phone]) on 12/31/2018 3:33:58 PM 
  
POC G3 Collection Time: 12/31/18  5:33 PM  
Result Value Ref Range Device: VENT    
 FIO2 (POC) 70 % pH (POC) 7.348 (L) 7.35 - 7.45    
 pCO2 (POC) 43.7 35.0 - 45.0 MMHG  
 pO2 (POC) 172 (H) 80 - 100 MMHG  
 HCO3 (POC) 24.0 22 - 26 MMOL/L  
 sO2 (POC) 99 (H) 92 - 97 % Base deficit (POC) 2 mmol/L Mode ASSIST CONTROL Tidal volume 450 ml Set Rate 14 bpm  
 PEEP/CPAP (POC) 5 cmH2O Allens test (POC) YES Inspiratory Time 0.9 sec Total resp. rate 15 Site LEFT RADIAL Specimen type (POC) ARTERIAL Performed by Katie Farr Volume control plus YES Procedures/imaging: see electronic medical records for all procedures/Xrays and details which were not copied into this note but were reviewed prior to creation of Plan Assessment/Plan Active Problems: 1. Cardiopulmonary arrest with successful resuscitation ([facility] [address] (12/31/2018) - supportive care for now. 2. ESRD 
- as per nephrology. 3. Hypertensive emergency 
- on cardene infusion. Oral home meds are on hold. 4.  Hyperkalemia - Due to cardiac arrest in a HD patient. - treated by insulin and glucose. 5. Code status - Full code 6. DVT prophylaxis 
- SQ heparin. Connor Cushing, MD 
December 31, 2018

## 2019-01-01 NOTE — CONSULTS
NEUROLOGY CONSULT NOTE Patient ID: 
Donavan Waggoner 
[id number] 
6 y.o. 
1960 Date of Consultation:  January 1, 2019 Referring Physician: Stalin Taylor MD 
 
Reason for Consultation:  Anoxic brain injury s/p cardiac arrest  
 
 
Subjective:  
 
 
History of Present Illness:  
 
Donavan Waggoner is a 66 y.o. female who has a h/o DM, ESRD on HD, Mon, Tues and Fri, also has a h/o HTN and pulmonary edema was having dialysis done when she became suddenly SOB and then collapsed. She was later found to be in cardiac arrest. Patient went through ACLS protocol, she was given the CPR and meds like epinephtrne. EMS got the pulse and then she was intubated. No h/o could be obtained from the patient. Upon arrival to ICU was noticed that she had myoclonic jerks. She was loaded with keppra 1500 mg and continued with 750 mg BID. Later on in the evening again was noticed to have semy rhythmic jerks and I rec loaded with Fosphenytoin 20 mg/kg, now the level is pending. The jerks stopped eventually. Yesterday afternoon the EEG showed burst suppression, no cortical activity. Head CT - global atrophy, the gray-white matter differentiation was still present. Over night she had a very low BG - 6 which was corrected. Had some electrolytes disturbance low ionized calcium - and pH alkalosis, Patient Active Problem List  
 Diagnosis Date Noted  Cardiopulmonary arrest with successful resuscitation ([facility] 12/31/2018  Cardiac arrest ([facility] 12/31/2018  Malignant hypertension 10/15/2018  Hx of TB skin testing 10/02/2018  HCAP (healthcare-associated pneumonia) 10/02/2018  Intractable vomiting with nausea 07/17/2018  ESRD on hemodialysis ([facility] 07/17/2018  Pulmonary TB 07/17/2018  Hypertensive crisis 07/17/2018  Seizure ([facility] 07/16/2018  Hypertensive emergency 07/16/2018  Complication of vascular dialysis catheter, initial encounter 06/18/2018  Anemia 02/26/2018  Hyponatremia 02/25/2018  ESRD (end stage renal disease) ([facility] 02/25/2018  
 NSTEMI (non-ST elevated myocardial infarction) ([facility] 02/25/2018  Respiratory failure ([email or web address] 02/25/2018  Hypoxia 02/25/2018  Acute UTI 02/25/2018  Hyperkalemia 09/08/2017  Renal failure 09/08/2017  SCOOTER (acute kidney injury) ([email or web address] 09/08/2017 Past Medical History:  
Diagnosis Date  Arthritis GOUT  Asthma  Bilateral leg pain  Chest pain at rest   
 Chronic kidney disease  ESRD (end stage renal disease) ([facility] TUES-THURS-SAT  Hypercholesteremia  Hypertension  Joint pain  Kidney disease  Pleural effusion  Pulmonary tuberculosis  Vitamin D deficiency Past Surgical History:  
Procedure Laterality Date  CHEST SURGERY PROCEDURE UNLISTED    
 THORACENTHESIS  
 HX OTHER SURGICAL    
 BRONCHOSCOPY  
 HX VASCULAR ACCESS  01/2018; 11/2017 LEFT ARM VENOUS ACCESS  
 HX VASCULAR ACCESS  02/2017 [address]  VASCULAR SURGERY PROCEDURE UNLIST Prior to Admission medications Medication Sig Start Date End Date Taking? Authorizing Provider  
losartan (COZAAR) 50 mg tablet Take 1 Tab by mouth daily. 10/22/18   Ena Gleason MD  
minoxidil (LONITEN) 10 mg tablet Take 1 Tab by mouth two (2) times a day. 10/21/18   Ena Gleason MD  
OTHER Check CBC, CMP, MG on 10/24/18, Results to PCP immediately, Diagnosis- TB 10/21/18   Ena Gleason MD  
polyethylene glycol (MIRALAX) 17 gram packet Take 1 Packet by mouth daily. 10/22/18   Ena Gleason MD  
levoFLOXacin (LEVAQUIN) 500 mg tablet Take 1 Tab by mouth every fourty-eight (48) hours. 10/21/18   Ena Gleason MD  
OTHER Graded Compression Stockings b/l LE- Use as directed 10/21/18   Ena Gleason MD  
dicyclomine (BENTYL) 20 mg tablet Take 20 mg by mouth three (3) times daily as needed (abdominial pain). Provider, Historical  
cloNIDine HCl (CATAPRES) 0.3 mg tablet Take 0.3 mg by mouth three (3) times daily.     Provider, Historical  
 carvedilol (COREG) 25 mg tablet Take 1 Tab by mouth every twelve (12) hours. 10/6/18   Debbie Hernandez MD  
glycerin, adult, (FLEET GLYCERIN, ADULT,) suppository Insert 1 Suppository into rectum daily as needed. Indications: BOWEL EVACUATION 10/6/18   Debbie Hernandez MD  
bisacodyl (DULCOLAX) 5 mg EC tablet Take 1 Tab by mouth daily as needed for Constipation. 8/5/18   Juliana Heck MD  
albuterol (PROVENTIL HFA, VENTOLIN HFA, PROAIR HFA) 90 mcg/actuation inhaler Take 2 Puffs by inhalation every four (4) hours as needed for Wheezing. 7/21/18   Debbie Hernandez MD  
ethambutol (MYAMBUTOL) 400 mg tablet Take 2 Tabs by mouth every Tuesday Thursday, Saturday. At 5 PM 7/21/18   Debbie Hernandez MD  
isoniazid (NYDRAZID) 300 mg tablet Take 1 Tab by mouth daily. On dialysis days, please take medication after dialysis 7/21/18   Debbie Hernandez MD  
pyrazinamide 500 mg tablet Take 2 Tabs by mouth every Tuesday Thursday, Saturday. At 5 pm  Indications: active tuberculosis 7/24/18   Debbie Hernandez MD  
rifAMPin (RIFADIN) 300 mg capsule Take 2 Caps by mouth daily. On dialysis days please take this medication after Dialysis 7/21/18   Debbie Hernandez MD  
docusate sodium (COLACE) 100 mg capsule Take 1 Cap by mouth two (2) times a day. 7/21/18   Debbie Hernandez MD  
pyridoxine, vitamin B6, (VITAMIN B-6) 100 mg tablet Take 1 Tab by mouth daily. 7/22/18   Debbie Hernandez MD  
OTHER Incentive Spirometry- use as directed 7/21/18   Debbie Hernandez MD  
aspirin 81 mg chewable tablet Take 1 Tab by mouth daily. 3/8/18   Uche Velásquez MD  
atorvastatin (LIPITOR) 40 mg tablet Take 1 Tab by mouth nightly. 3/8/18   Uche Velásquez MD  
melatonin 3 mg tablet Take 1 Tab by mouth nightly as needed. Patient taking differently: Take 1 Tab by mouth nightly as needed (insomnia). 3/8/18   Uche Velásquez MD  
FLUoxetine (PROZAC) 10 mg capsule Take 1 Cap by mouth daily. 3/8/18   Uche Velásquez MD  
 
Allergies Allergen Reactions  Banana Other (comments) Social History Tobacco Use  Smoking status: Never Smoker  Smokeless tobacco: Never Used Substance Use Topics  Alcohol use: No  
  
No family history on file. Review of Systems Review of systems not obtained due to patient factors. Objective:  
 
Patient Vitals for the past 8 hrs: 
 BP Temp Pulse Resp SpO2 Weight 01/01/19 0842   (!) 104 12 94 %   
01/01/19 0730 117/74  (!) 104 12 97 %   
01/01/19 0700 118/70 98.8 °F (37.1 °C) (!) 104 12 99 %   
01/01/19 0645 118/73  (!) 104 12 99 %   
01/01/19 0630 118/70  (!) 105 12 99 %   
01/01/19 0615 125/72  (!) 105 12 99 %   
01/01/19 0600 124/73  (!) 106 12 99 %   
01/01/19 0500 125/69  (!) 106 12 99 %   
01/01/19 0405   100 16 100 %   
01/01/19 0400 109/77 99.4 °F (37.4 °C) (!) 101 16 100 % 45 kg (99 lb 3.3 oz) 01/01/19 0300 104/62  98 16 100 %   
01/01/19 0200 93/49  93 16 100 %  General Exam 
Intubated , on propofol and versed HEENT: Normocephalic, atraumatic, Sclera anicteric, normal conjunctiva Mucous membranes: normal color and hydration CV: No carotid bruits, Heart: regular to rate and rhythm. No murmurs Neurologic Exam: 
 
Mental status:  Alert, oriented to person, place, time and circumstance No visual spatial neglect or overt apraxia Language: normal fluency and comprehension Cranial nerves: pupils 9 mm, midline,  symmetric, reactive to light. Corneal reflex absent bilat. VOR present. Cough reflex - absent but is on sedation. Opens eyes reflex with pain stimuli. No nystagmus. Motor: no purposeful movement . Decerebrate posturing with painful stimuli (extends arms). Tone is flaccid. Coordination: Normal finger-nose-finger, Normal rapid alternating movements DTRs (R/L)  Biceps: (2/2)  Brachorad (1/1)   Patellar (0/0)  Ankles (1/1) . Plantar reflexe mute bilat. Data Review:   
Recent Results (from the past 24 hour(s)) GLUCOSE, POC  
 Collection Time: 12/31/18  1:09 PM  
Result Value Ref Range Glucose (POC) 105 70 - 110 mg/dL METABOLIC PANEL, COMPREHENSIVE Collection Time: 12/31/18  1:15 PM  
Result Value Ref Range Sodium 137 136 - 145 mmol/L Potassium 7.2 (HH) 3.5 - 5.5 mmol/L Chloride 102 100 - 108 mmol/L  
 CO2 24 21 - 32 mmol/L Anion gap 11 3.0 - 18 mmol/L Glucose 115 (H) 74 - 99 mg/dL BUN 98 (H) 7.0 - 18 MG/DL Creatinine 7.67 (H) 0.6 - 1.3 MG/DL  
 BUN/Creatinine ratio 13 12 - 20 GFR est AA 7 (L) >60 ml/min/1.73m2 GFR est non-AA 5 (L) >60 ml/min/1.73m2 Calcium 9.1 8.5 - 10.1 MG/DL Bilirubin, total 0.4 0.2 - 1.0 MG/DL  
 ALT (SGPT) 31 13 - 56 U/L  
 AST (SGOT) 317 (H) 15 - 37 U/L Alk. phosphatase 194 (H) 45 - 117 U/L Protein, total 6.8 6.4 - 8.2 g/dL Albumin 2.9 (L) 3.4 - 5.0 g/dL Globulin 3.9 2.0 - 4.0 g/dL A-G Ratio 0.7 (L) 0.8 - 1.7 CALCIUM, IONIZED Collection Time: 12/31/18  1:15 PM  
Result Value Ref Range Ionized Calcium 1.08 (L) 1.12 - 1.32 MMOL/L  
CARDIAC PANEL,(CK, CKMB & TROPONIN) Collection Time: 12/31/18  1:15 PM  
Result Value Ref Range  (H) 26 - 192 U/L  
 CK - MB 8.0 (H) <3.6 ng/ml CK-MB Index 2.9 0.0 - 4.0 % Troponin-I, QT 0.55 (H) 0.0 - 0.045 NG/ML  
TYPE + CROSSMATCH Collection Time: 12/31/18  1:15 PM  
Result Value Ref Range Crossmatch Expiration 01/03/2019 ABO/Rh(D) O POSITIVE Antibody screen NEG   
 CALLED TO: Alayna Aaron, ICU, [id number] AT [address] [place] [address] Unit number [id number] Blood component type RC LR Unit division 00 Status of unit ISS'D ANOTH PT   
 Crossmatch result Compatible Unit number [id number] Blood component type RC LR Unit division 00 Status of unit ALLOCATED Crossmatch result Compatible LACTIC ACID Collection Time: 12/31/18  1:15 PM  
Result Value Ref Range Lactic acid 2.0 0.4 - 2.0 MMOL/L  
HAPTOGLOBIN  Collection Time: 12/31/18  1:15 PM  
 Result Value Ref Range Haptoglobin <10 (L) 34 - 200 mg/dL PROTHROMBIN TIME + INR Collection Time: 12/31/18  1:55 PM  
Result Value Ref Range Prothrombin time 13.7 11.5 - 15.2 sec INR 1.1 0.8 - 1.2 FIBRINOGEN Collection Time: 12/31/18  1:55 PM  
Result Value Ref Range Fibrinogen 287 210 - 451 mg/dL MAGNESIUM Collection Time: 12/31/18  1:55 PM  
Result Value Ref Range Magnesium 2.8 (H) 1.6 - 2.6 mg/dL PHOSPHORUS Collection Time: 12/31/18  1:55 PM  
Result Value Ref Range Phosphorus 7.8 (H) 2.5 - 4.9 MG/DL  
EKG, 12 LEAD, SUBSEQUENT Collection Time: 12/31/18  2:58 PM  
Result Value Ref Range Ventricular Rate 79 BPM  
 Atrial Rate 79 BPM  
 P-R Interval 144 ms QRS Duration 86 ms  
 Q-T Interval 422 ms QTC Calculation (Bezet) 483 ms Calculated P Axis 66 degrees Calculated R Axis 88 degrees Calculated T Axis 63 degrees Diagnosis Normal sinus rhythm Possible Left atrial enlargement T wave abnormality, consider anterior ischemia Prolonged QT Abnormal ECG When compared with ECG of 31-DEC-2018 09:35, Incomplete right bundle branch block is no longer present T wave inversion now evident in Anterior leads T wave inversion no longer evident in Lateral leads QT has lengthened Confirmed by Noa Barraza ([phone]) on 12/31/2018 3:33:58 PM 
  
POC G3 Collection Time: 12/31/18  5:33 PM  
Result Value Ref Range Device: VENT    
 FIO2 (POC) 70 % pH (POC) 7.348 (L) 7.35 - 7.45    
 pCO2 (POC) 43.7 35.0 - 45.0 MMHG  
 pO2 (POC) 172 (H) 80 - 100 MMHG  
 HCO3 (POC) 24.0 22 - 26 MMOL/L  
 sO2 (POC) 99 (H) 92 - 97 % Base deficit (POC) 2 mmol/L Mode ASSIST CONTROL Tidal volume 450 ml Set Rate 14 bpm  
 PEEP/CPAP (POC) 5 cmH2O Allens test (POC) YES Inspiratory Time 0.9 sec Total resp. rate 15 Site LEFT RADIAL Specimen type (POC) ARTERIAL Performed by Ritesh Conley  Volume control plus YES    
 CARDIAC PANEL,(CK, CKMB & TROPONIN) Collection Time: 12/31/18  9:07 PM  
Result Value Ref Range  (H) 26 - 192 U/L  
 CK - MB 11.1 (H) <3.6 ng/ml CK-MB Index 2.6 0.0 - 4.0 % Troponin-I, QT 0.73 (H) 0.0 - 0.045 NG/ML  
HGB & HCT Collection Time: 12/31/18  9:07 PM  
Result Value Ref Range HGB 15.8 12.0 - 16.0 g/dL HCT 44.4 35.0 - 45.0 % AMMONIA Collection Time: 12/31/18  9:51 PM  
Result Value Ref Range Ammonia 29 11 - 32 UMOL/L  
GLUCOSE, POC Collection Time: 12/31/18 11:51 PM  
Result Value Ref Range Glucose (POC) <30.0 (LL) 70 - 110 mg/dL MAGNESIUM Collection Time: 01/01/19 12:20 AM  
Result Value Ref Range Magnesium 2.1 1.6 - 2.6 mg/dL PHOSPHORUS Collection Time: 01/01/19 12:20 AM  
Result Value Ref Range Phosphorus 3.3 2.5 - 4.9 MG/DL  
METABOLIC PANEL, COMPREHENSIVE Collection Time: 01/01/19 12:20 AM  
Result Value Ref Range Sodium 134 (L) 136 - 145 mmol/L Potassium 4.4 3.5 - 5.5 mmol/L Chloride 100 100 - 108 mmol/L  
 CO2 26 21 - 32 mmol/L Anion gap 8 3.0 - 18 mmol/L Glucose 6 (LL) 74 - 99 mg/dL BUN 46 (H) 7.0 - 18 MG/DL Creatinine 4.12 (H) 0.6 - 1.3 MG/DL  
 BUN/Creatinine ratio 11 (L) 12 - 20 GFR est AA 14 (L) >60 ml/min/1.73m2 GFR est non-AA 11 (L) >60 ml/min/1.73m2 Calcium 8.7 8.5 - 10.1 MG/DL Bilirubin, total 0.5 0.2 - 1.0 MG/DL  
 ALT (SGPT) 41 13 - 56 U/L  
 AST (SGOT) 231 (H) 15 - 37 U/L Alk. phosphatase 150 (H) 45 - 117 U/L Protein, total 7.0 6.4 - 8.2 g/dL Albumin 2.7 (L) 3.4 - 5.0 g/dL Globulin 4.3 (H) 2.0 - 4.0 g/dL A-G Ratio 0.6 (L) 0.8 - 1.7    
CBC WITH AUTOMATED DIFF Collection Time: 01/01/19 12:20 AM  
Result Value Ref Range WBC 8.3 4.6 - 13.2 K/uL  
 RBC 5.24 4.20 - 5.30 M/uL  
 HGB 15.7 12.0 - 16.0 g/dL HCT 44.9 35.0 - 45.0 % MCV 85.7 74.0 - 97.0 FL  
 MCH 30.0 24.0 - 34.0 PG  
 MCHC 35.0 31.0 - 37.0 g/dL  
 RDW 12.6 11.6 - 14.5 % PLATELET 093 (L) 800 - 420 K/uL MPV 10.0 9.2 - 11.8 FL  
 NEUTROPHILS 83 (H) 40 - 73 % LYMPHOCYTES 16 (L) 21 - 52 % MONOCYTES 1 (L) 3 - 10 % EOSINOPHILS 0 0 - 5 % BASOPHILS 0 0 - 2 %  
 ABS. NEUTROPHILS 6.9 1.8 - 8.0 K/UL  
 ABS. LYMPHOCYTES 1.3 0.9 - 3.6 K/UL  
 ABS. MONOCYTES 0.1 0.05 - 1.2 K/UL  
 ABS. EOSINOPHILS 0.0 0.0 - 0.4 K/UL  
 ABS. BASOPHILS 0.0 0.0 - 0.1 K/UL  
 DF AUTOMATED CARDIAC PANEL,(CK, CKMB & TROPONIN) Collection Time: 01/01/19 12:20 AM  
Result Value Ref Range  (H) 26 - 192 U/L  
 CK - MB 7.6 (H) <3.6 ng/ml CK-MB Index 1.7 0.0 - 4.0 % Troponin-I, QT 0.74 (H) 0.0 - 0.045 NG/ML  
GLUCOSE, POC Collection Time: 01/01/19  1:28 AM  
Result Value Ref Range Glucose (POC) 297 (H) 70 - 110 mg/dL METABOLIC PANEL, BASIC Collection Time: 01/01/19  1:40 AM  
Result Value Ref Range Sodium 137 136 - 145 mmol/L Potassium 4.4 3.5 - 5.5 mmol/L Chloride 99 (L) 100 - 108 mmol/L  
 CO2 25 21 - 32 mmol/L Anion gap 13 3.0 - 18 mmol/L Glucose 223 (H) 74 - 99 mg/dL BUN 46 (H) 7.0 - 18 MG/DL Creatinine 4.19 (H) 0.6 - 1.3 MG/DL  
 BUN/Creatinine ratio 11 (L) 12 - 20 GFR est AA 13 (L) >60 ml/min/1.73m2 GFR est non-AA 11 (L) >60 ml/min/1.73m2 Calcium 7.9 (L) 8.5 - 10.1 MG/DL  
PHOSPHORUS Collection Time: 01/01/19  1:40 AM  
Result Value Ref Range Phosphorus 3.1 2.5 - 4.9 MG/DL MAGNESIUM Collection Time: 01/01/19  1:40 AM  
Result Value Ref Range Magnesium 2.0 1.6 - 2.6 mg/dL CALCIUM, IONIZED Collection Time: 01/01/19  1:40 AM  
Result Value Ref Range Ionized Calcium 1.01 (L) 1.12 - 1.32 MMOL/L  
GLUCOSE, POC Collection Time: 01/01/19  1:44 AM  
Result Value Ref Range Glucose (POC) 293 (H) 70 - 110 mg/dL GLUCOSE, POC Collection Time: 01/01/19  2:32 AM  
Result Value Ref Range Glucose (POC) 160 (H) 70 - 110 mg/dL GLUCOSE, POC Collection Time: 01/01/19  3:23 AM  
Result Value Ref Range Glucose (POC) 121 (H) 70 - 110 mg/dL POC G3  
 Collection Time: 01/01/19  4:06 AM  
Result Value Ref Range Device: VENT    
 FIO2 (POC) 40 % pH (POC) 7.585 (HH) 7.35 - 7.45    
 pCO2 (POC) 26.1 (L) 35.0 - 45.0 MMHG  
 pO2 (POC) 117 (H) 80 - 100 MMHG  
 HCO3 (POC) 24.8 22 - 26 MMOL/L  
 sO2 (POC) 99 (H) 92 - 97 % Base excess (POC) 3 mmol/L Mode ASSIST CONTROL Tidal volume 450 ml Set Rate 16 bpm  
 PEEP/CPAP (POC) 5 cmH2O Allens test (POC) N/A Total resp. rate 16 Site RIGHT BRACHIAL Specimen type (POC) ARTERIAL Performed by Sallie Gilman Volume control plus YES    
GLUCOSE, POC Collection Time: 01/01/19  4:51 AM  
Result Value Ref Range Glucose (POC) 97 70 - 110 mg/dL CBC WITH AUTOMATED DIFF Collection Time: 01/01/19  5:13 AM  
Result Value Ref Range WBC 9.7 4.6 - 13.2 K/uL  
 RBC 5.15 4.20 - 5.30 M/uL  
 HGB 15.3 12.0 - 16.0 g/dL HCT 43.8 35.0 - 45.0 % MCV 85.0 74.0 - 97.0 FL  
 MCH 29.7 24.0 - 34.0 PG  
 MCHC 34.9 31.0 - 37.0 g/dL  
 RDW 12.6 11.6 - 14.5 % PLATELET 97 (L) 240 - 420 K/uL MPV 10.7 9.2 - 11.8 FL  
 NEUTROPHILS 82 (H) 40 - 73 % LYMPHOCYTES 9 (L) 21 - 52 % MONOCYTES 9 3 - 10 % EOSINOPHILS 0 0 - 5 % BASOPHILS 0 0 - 2 %  
 ABS. NEUTROPHILS 8.0 1.8 - 8.0 K/UL  
 ABS. LYMPHOCYTES 0.9 0.9 - 3.6 K/UL  
 ABS. MONOCYTES 0.8 0.05 - 1.2 K/UL  
 ABS. EOSINOPHILS 0.0 0.0 - 0.4 K/UL  
 ABS. BASOPHILS 0.0 0.0 - 0.1 K/UL  
 DF AUTOMATED METABOLIC PANEL, COMPREHENSIVE Collection Time: 01/01/19  5:13 AM  
Result Value Ref Range Sodium 133 (L) 136 - 145 mmol/L Potassium 4.7 3.5 - 5.5 mmol/L Chloride 99 (L) 100 - 108 mmol/L  
 CO2 26 21 - 32 mmol/L Anion gap 8 3.0 - 18 mmol/L Glucose 184 (H) 74 - 99 mg/dL BUN 47 (H) 7.0 - 18 MG/DL Creatinine 4.30 (H) 0.6 - 1.3 MG/DL  
 BUN/Creatinine ratio 11 (L) 12 - 20 GFR est AA 13 (L) >60 ml/min/1.73m2 GFR est non-AA 11 (L) >60 ml/min/1.73m2  Calcium 8.9 8.5 - 10.1 MG/DL  
 Bilirubin, total 0.5 0.2 - 1.0 MG/DL  
 ALT (SGPT) 34 13 - 56 U/L  
 AST (SGOT) 169 (H) 15 - 37 U/L Alk. phosphatase 127 (H) 45 - 117 U/L Protein, total 6.4 6.4 - 8.2 g/dL Albumin 2.5 (L) 3.4 - 5.0 g/dL Globulin 3.9 2.0 - 4.0 g/dL A-G Ratio 0.6 (L) 0.8 - 1.7 LACTIC ACID Collection Time: 01/01/19  5:13 AM  
Result Value Ref Range Lactic acid 2.0 0.4 - 2.0 MMOL/L  
GLUCOSE, POC Collection Time: 01/01/19  5:54 AM  
Result Value Ref Range Glucose (POC) 89 70 - 110 mg/dL GLUCOSE, POC Collection Time: 01/01/19  7:28 AM  
Result Value Ref Range Glucose (POC) 98 70 - 110 mg/dL GLUCOSE, POC Collection Time: 01/01/19  9:22 AM  
Result Value Ref Range Glucose (POC) 94 70 - 110 mg/dL Radiology studies:  
Head CT reviewed: 31 Jan 2018: no acute findings, no cerebral edema. EEG: burst suppression pattern Assessment: This is a 65 yo woman with anoxic brain injury s/p cardiac arrest.  Her exam is very poor - decerebrate posturing which means severe brain damage involving rubrospinal tract, this is worse than decorticate posturing, therefore, as today I do not believe she will make a meaningful recovery. The patient is still on sedation at this time, had electrolytes disturbance, is alkalotic, her BG was 6 - therefore for better prognosis will continue today with support of care and tomorrow will repeat the EEG and head CT. Active Problems: 
  Cardiopulmonary arrest with successful resuscitation ([facility] (12/31/2018) Cardiac arrest ([facility] (12/31/2018) Plan:  
- Continue support of care - EEG tomorrow - I ordered  
- head CT tomorrow - I ordered  
- continue Keppra 750 mg BID 
- wait for Phenytoin level before ordering the maintenance - she may actually not need this Huong Castillo MD 
Adult Neurologist 
1/1/2019

## 2019-01-01 NOTE — CONSULTS
Phone consult: 
 
I was called this evening to be informed that the patient , Ms Treasure Flores was found to have synchronous jerks concerning for possible seizures. She is a 65 yo woman who had a cardiac arrest today during dialysis. She had myoclonic jerks after the  event and was loaded with Keppra 1500 mg. STAT EEG was abnormal, burst suppression pattern, with no cortical activity which indicated a very poor prognosis. This evening the Propofol was stopped as the BP dropped. It was noticed that she  developed generalized jerks which were concerning for seizures. Plan: 
 
- treat clinically any type of seizure like activity - Use Versed ggt or  Propofol or both  
- load with Fosphenytoin 20 mg/kg and after that continue with 2 mg/kg bid for maintenance 
- continue Keppra 750 mg bid 
- maintain normoglycemia, normothermia , address electrolytes abnormalities (noted iCa was low) - if still concerning for possible seizures despite sedation, Keppra and fosphenytoin load with Vimpat 300 mg and continue with 100 mg bid Deirdre Bailey MD 
Board Certified Neurologist

## 2019-01-01 NOTE — ROUTINE PROCESS
Bedside and Verbal shift change report given to Apollo Akers (oncoming nurse) by Hayley Guo RN 
 (offgoing nurse). Report included the following information Kardex, Intake/Output, MAR and Recent Results.

## 2019-01-01 NOTE — PROGRESS NOTES
NUTRITION Nutrition Consult: Management of Tube Feeding RECOMMENDATIONS / PLAN:  
 
- Plan to start trickle tube feeds of Nepro at 10 mL/hr with water flushes of 50 mL q 4 hours. - If pt tolerates tube feeds in next 24-48 hours, recommend advancing tube feeds by 10 mL q 8 hours to goal rate of 35 mL/hr with water flushes of 150 mL q 4 hours - Recommend adding daily renal MVI 
- Continue RD inpatient monitoring and evaluation. Nepro at 35 mL/hr + 150 mL q 4 hour water flushes to provide: 1512 kcal, 68 gm protein, 81 gm fat, 140 gm CHO, 13 gm fiber, 609 mL free water, 1509 mL total water, 89% RDIs NUTRITION INTERVENTIONS & DIAGNOSIS:  
 
[x] Enteral nutrition: plan to start trickle tube feeding 
[x] Vitamin /mineral supplement therapy: recommend adding daily renal MVI Nutrition Diagnosis:  Inadequate oral intake related to inability to tolerate po as evidenced by pt NPO, intubated. ASSESSMENT:  
 
Pt admitted for cardiac arrest. Currently intubated. NPO; has NGT in placed. Plan to start trickle feeds. Hx of ESRD on HD. EN infusion adequate to meet patients estimated nutritional needs:   [] Yes     [] No   [x] Unable to determine at this time Tube Feeding:  Plan to start Water Flushes:  Plan to start Residuals:  Not applicable Diet: DIET NPO 
DIET TUBE FEEDING Food Allergies:  banana Current Appetite:   [] Good     [] Fair     [] Poor     [x] Other: NPO Appetite/meal intake prior to admission:   [] Good     [] Fair     [] Poor     [x] Other: unknown Feeding Limitations:  [] Swallowing difficulty    [] Chewing difficulty    [x] Other: intubated Current Meal Intake: No data found. BM:  12/31  -loose Skin Integrity:  No pressure injury or wound noted Edema:   [x] No     [] Yes Pertinent Medications: Reviewed: D10 at 50 mL/hr (120 gm dextrose, 408 kcal per day), versed, protonix, propofol at 5 mcg/kg/min (34 kcal per day) Recent Labs 01/01/19 
0513 01/01/19 0140 01/01/19 
0020 12/31/18 
1355 12/31/18 
1315 * 137 134*  --  137  
K 4.7 4.4 4.4  --  7.2*  
CL 99* 99* 100  --  102 CO2 26 25 26  --  24 * 223* 6*  --  115* BUN 47* 46* 46*  --  98* CREA 4.30* 4.19* 4.12*  --  7.67* CA 8.9 7.9* 8.7  --  9.1 MG  --  2.0 2.1 2.8*  --   
PHOS  --  3.1 3.3 7.8*  --   
ALB 2.5*  --  2.7*  --  2.9*  
SGOT 169*  --  231*  --  317* ALT 34  --  41  --  31 Intake/Output Summary (Last 24 hours) at 1/1/2019 1148 Last data filed at 1/1/2019 1000 Gross per 24 hour Intake 760.73 ml Output 500 ml Net 260.73 ml Anthropometrics: 
Ht Readings from Last 1 Encounters:  
10/19/18 5' 1\" (1.549 m) Last 3 Recorded Weights in this Encounter 12/31/18 1013 01/01/19 0400 Weight: 42 kg (92 lb 9.5 oz) 45 kg (99 lb 3.3 oz) Body mass index is 18.74 kg/m². Weight History:   Noted weight fluctuations PTA per chart hx; net wt loss of13 lb (11.6%) Weight Metrics 1/1/2019 10/20/2018 10/18/2018 10/15/2018 10/15/2018 10/4/2018 9/11/2018 Weight 99 lb 3.3 oz 92 lb 1.6 oz - - 90 lb 6.2 oz 112 lb 3.4 oz 103 lb 9.9 oz  
BMI 18.74 kg/m2 - 17.08 kg/m2 17.4 kg/m2 - 21.2 kg/m2 19.58 kg/m2 Admitting Diagnosis: Cardiopulmonary arrest with successful resuscitation ([facility] Cardiac arrest ([facility] Pertinent PMHx: gout, ESRD on HD, HTN, hypercholesterolemia, kidney disease, vitmain D deficiency Education Needs:        [x] None identified  [] Identified - Not appropriate at this time  []  Identified and addressed - refer to education log Learning Limitations:   [] None identified  [x] Identified; intubated Cultural, [employer] & ethnic food preferences:  [x] None identified    [] Identified and addressed ESTIMATED NUTRITION NEEDS:  
 
Calories: 4305-5287 kcal ([id number]) based on  [x] Actual BW: 45 kg      [] IBW Protein: 54-90 gm (1.2-2 gm/kg) based on  [x] Actual BW      [] IBW Fluid: 1 mL/kcal 
  
MONITORING & EVALUATION:  
 
 Nutrition Goal(s): 1. Patient will tolerate enteral nutrition formula and regimen without difficulty within the next 7 days. Outcome:  [] Met/Ongoing    []  Not Met    [x] New/Initial Goal  
2. Patient will meet their estimated nutritional needs through adequate enteral nutrition within the next 7 days. Outcome:  [] Met/Ongoing    []  Not Met    [x] New/Initial Goal  
 
Monitoring:   [] Food and beverage intake   [] Diet order   [x] Nutrition-focused physical findings   [x] Treatment/therapy   [] Weight   [x] Enteral nutrition intake Previous Recommendations (for follow-up assessments only):     []   Implemented       []   Not Implemented (RD to address)      [] No Longer Appropriate     [] No Recommendation Made Discharge Planning: pending ability to tolerate po and plan of care [x] Participated in care planning, discharge planning, & interdisciplinary rounds as appropriate Seferino Nath, RD Pager: [phone]

## 2019-01-01 NOTE — CONSULTS
[facility] - Consult Note Date of  Admission: 12/31/2018  9:29 AM 
  
Primary Care Physician:  Nate Jimenes MD 
 
 Plan:  
 
1) Brief cardiac arrest- witnessed. Remains unresponsive. Etiology unclear- had hyperkalemia-?? Severe bradycardia with asystole. No ekg tracing prior to cardiac arrest. 
2) ESRD on HD 3) encephalopathy. 4) acute respiratory failure- intubated on mechanical ventilator. 5) small known pericardial effusion- will repeat echo to assess any progression. Prognosis guarded Assessment:  
 
Hospital Problems  Date Reviewed: 10/16/2018 Codes Class Noted POA Cardiopulmonary arrest with successful resuscitation [facility]) ICD-10-CM: I46.9 ICD-9-CM: 427.5  12/31/2018 Unknown Cardiac arrest [facility]) ICD-10-CM: I46.9 ICD-9-CM: 427.5  12/31/2018 Unknown History of Present Illness: This is a 66 y.o. female admitted for Cardiopulmonary arrest with successful resuscitation ([facility] [address] Cardiac arrest ([facility] [address] Patient complains of:   
 
66 yr old female with esrd on hd admitted with cardiac arrest. 
67 yo [place] female admitted to the ICU after having a cardiac arrest at the outpt HD unit. Spoke with HD nurse. Pt was dropped of by family at the unit this morning she looked ill, SOB, vomiting, no fever, had high /86. Diaphoretic. They attempted to get her hooked up to the dialysis machine but she the charge nurse stopped it, called 911. She was resuscitated in the unit reportedly about 10 min before pulses were established. EMT attempted intubation but was unsuccessful. She was brought to [facility] ER was noted to have high K and BP Past Medical History:  
 
Past Medical History:  
Diagnosis Date  Arthritis GOUT  Asthma  Bilateral leg pain  Chest pain at rest   
 Chronic kidney disease  ESRD (end stage renal disease) ([facility] [address] TUES-THURS-SAT  Hypercholesteremia  Hypertension  Joint pain  Kidney disease  Pleural effusion  Pulmonary tuberculosis  Vitamin D deficiency Social History:  
 
Social History Socioeconomic History  Marital status:  Spouse name: Not on file  Number of children: Not on file  Years of education: Not on file  Highest education level: Not on file Tobacco Use  Smoking status: Never Smoker  Smokeless tobacco: Never Used Substance and Sexual Activity  Alcohol use: No  
 Drug use: No  
 
 
 Family History: No family history on file. Medications: Allergies Allergen Reactions  Banana Other (comments) Current Facility-Administered Medications Medication Dose Route Frequency  glucose chewable tablet 16 g  4 Tab Oral PRN  
 glucagon (GLUCAGEN) injection 1 mg  1 mg IntraMUSCular PRN  
 dextrose (D50W) injection syrg 12.5-25 g  25-50 mL IntraVENous PRN  
 dextrose 10% infusion 1,000 mL  1,000 mL IntraVENous CONTINUOUS  
 pantoprazole (PROTONIX) 40 mg in sodium chloride 0.9% 10 mL injection  40 mg IntraVENous DAILY  heparin (porcine) injection 5,000 Units  5,000 Units IntraVENous Q8H  propofol (DIPRIVAN) infusion  5 mcg/kg/min IntraVENous TITRATE  niCARdipine (CARDENE) 25 mg in 0.9% sodium chloride 250 mL infusion  0-15 mg/hr IntraVENous TITRATE  chlorhexidine (PERIDEX) 0.12 % mouthwash 10 mL  10 mL Oral Q12H  
 0.9% sodium chloride infusion 250 mL  250 mL IntraVENous PRN  
 sodium chloride (NS) flush 5-10 mL  5-10 mL IntraVENous Q8H  
 sodium chloride (NS) flush 5-10 mL  5-10 mL IntraVENous PRN  
 0.9% sodium chloride infusion  100 mL/hr IntraVENous DIALYSIS PRN  
 alteplase (CATHFLO) 2 mg in sterile water (preservative free) 2 mL injection  2 mg InterCATHeter ONCE PRN  
 heparin (porcine) 1,000 unit/mL injection 1,000 Units  1,000 Units InterCATHeter DIALYSIS PRN  
 levETIRAcetam (KEPPRA) 750 mg in 0.9% sodium chloride 100 mL IVPB  750 mg IntraVENous Q24H  midazolam in normal saline (VERSED) 1 mg/mL infusion  1-5 mg/hr IntraVENous TITRATE Review Of Systems: A comprehensive review of systems was negative except for that written in the HPI. Constitutional: No fever, no chills, no weight loss, no night sweats HEENT: No epistaxis, no nasal drainage, no difficulty in swallowing, no redness in eyes Respiratory: negative for cough, sputum, hemoptysis, pleurisy/chest pain, wheezing, dyspnea on exertion or emphysema Cardiovascular: no chest pain, no chest pressure, no dyspnea, no pnd, no claudication no palpitations, no chronic leg edema, no syncope Gastrointestinal: no abd pain, no vomiting, no diarrhea, no bleeding symptoms Genitourinary: No urinary symptoms or hematuria Integument/breast: No ulcers or rashes Musculoskeletal: no muscle pain, no weakness Neurological: No focal weakness, no seizures, no headaches Behvioral/Psych: No anxiety, no depression Physical Exam:  
 
Visit Vitals BP (P) 107/69 (BP 1 Location: Right arm, BP Patient Position: At rest) Pulse (!) 108 Temp 98.8 °F (37.1 °C) Resp 14 Wt 45 kg (99 lb 3.3 oz) SpO2 96% Breastfeeding? No  
BMI 18.74 kg/m² BP Readings from Last 3 Encounters:  
01/01/19 107/69  
10/21/18 130/80  
10/09/18 168/88 Pulse Readings from Last 3 Encounters:  
01/01/19 (!) 108  
10/21/18 75  
10/09/18 (!) 58 Wt Readings from Last 3 Encounters:  
01/01/19 45 kg (99 lb 3.3 oz) 10/20/18 41.8 kg (92 lb 1.6 oz) 10/04/18 50.9 kg (112 lb 3.4 oz) General:  intubated Skin: Warm and dry, acyanotic, normal color. Head: Normocephalic, atraumatic. Eyes: Sclerae anicteric, conjunctivae without injection. Neck:  nontender, no nuchal rigidity, no masses, no stridor, no carotid bruit, no JVD Lungs:  clear to auscultation bilaterally, no rhonchi ,  
Heart:  regular rate and rhythm, systolic murmur: holosystolic 2/6, blowing at apex Abdomen:  abdomen is soft without significant tenderness, masses, organomegaly or guarding Extremities:  extremities normal, atraumatic, no cyanosis or edema. Peripheral pulses Neurological: intubated and unresponsive Psychiatric Affect: unresponsive Data Review:  
 
Recent Results (from the past 48 hour(s)) CBC WITH AUTOMATED DIFF Collection Time: 12/31/18  9:30 AM  
Result Value Ref Range WBC 5.6 4.6 - 13.2 K/uL  
 RBC 4.35 4.20 - 5.30 M/uL  
 HGB 12.9 12.0 - 16.0 g/dL HCT 39.8 35.0 - 45.0 % MCV 91.5 74.0 - 97.0 FL  
 MCH 29.7 24.0 - 34.0 PG  
 MCHC 32.4 31.0 - 37.0 g/dL  
 RDW 12.4 11.6 - 14.5 % PLATELET 540 (L) 991 - 420 K/uL MPV 11.6 9.2 - 11.8 FL  
 NEUTROPHILS 31 (L) 40 - 73 % LYMPHOCYTES 61 (H) 21 - 52 % MONOCYTES 7 3 - 10 % EOSINOPHILS 1 0 - 5 % BASOPHILS 0 0 - 2 %  
 ABS. NEUTROPHILS 1.7 (L) 1.8 - 8.0 K/UL  
 ABS. LYMPHOCYTES 3.4 0.9 - 3.6 K/UL  
 ABS. MONOCYTES 0.4 0.05 - 1.2 K/UL  
 ABS. EOSINOPHILS 0.1 0.0 - 0.4 K/UL  
 ABS. BASOPHILS 0.0 0.0 - 0.1 K/UL  
 DF AUTOMATED METABOLIC PANEL, COMPREHENSIVE Collection Time: 12/31/18  9:30 AM  
Result Value Ref Range Sodium 137 136 - 145 mmol/L Potassium 6.6 (HH) 3.5 - 5.5 mmol/L Chloride 101 100 - 108 mmol/L  
 CO2 26 21 - 32 mmol/L Anion gap 10 3.0 - 18 mmol/L Glucose 169 (H) 74 - 99 mg/dL BUN 94 (H) 7.0 - 18 MG/DL Creatinine 7.51 (H) 0.6 - 1.3 MG/DL  
 BUN/Creatinine ratio 13 12 - 20 GFR est AA 7 (L) >60 ml/min/1.73m2 GFR est non-AA 6 (L) >60 ml/min/1.73m2 Calcium 9.9 8.5 - 10.1 MG/DL Bilirubin, total 0.3 0.2 - 1.0 MG/DL  
 ALT (SGPT) 13 13 - 56 U/L  
 AST (SGOT) 108 (H) 15 - 37 U/L Alk. phosphatase 145 (H) 45 - 117 U/L Protein, total 6.0 (L) 6.4 - 8.2 g/dL Albumin 2.6 (L) 3.4 - 5.0 g/dL Globulin 3.4 2.0 - 4.0 g/dL A-G Ratio 0.8 0.8 - 1.7 MAGNESIUM Collection Time: 12/31/18  9:30 AM  
Result Value Ref Range Magnesium 2.6 1.6 - 2.6 mg/dL TROPONIN I  
 Collection Time: 12/31/18  9:30 AM  
Result Value Ref Range Troponin-I, QT 0.14 (H) 0.00 - 0.06 NG/ML  
EKG, 12 LEAD, INITIAL Collection Time: 12/31/18  9:35 AM  
Result Value Ref Range Ventricular Rate 87 BPM  
 Atrial Rate 87 BPM  
 P-R Interval 162 ms QRS Duration 118 ms Q-T Interval 362 ms QTC Calculation (Bezet) 435 ms Calculated P Axis 72 degrees Calculated R Axis 92 degrees Calculated T Axis 57 degrees Diagnosis Normal sinus rhythm Possible Left atrial enlargement Rightward axis Incomplete right bundle branch block ST & T wave abnormality, consider lateral ischemia Abnormal ECG When compared with ECG of 16-OCT-2018 14:13, 
QRS duration has increased T wave inversion no longer evident in Anterior leads QT has shortened Confirmed by Miladis Cevallos ([phone]) on 12/31/2018 12:09:13 PM 
  
GLUCOSE, POC Collection Time: 12/31/18  1:09 PM  
Result Value Ref Range Glucose (POC) 105 70 - 110 mg/dL METABOLIC PANEL, COMPREHENSIVE Collection Time: 12/31/18  1:15 PM  
Result Value Ref Range Sodium 137 136 - 145 mmol/L Potassium 7.2 (HH) 3.5 - 5.5 mmol/L Chloride 102 100 - 108 mmol/L  
 CO2 24 21 - 32 mmol/L Anion gap 11 3.0 - 18 mmol/L Glucose 115 (H) 74 - 99 mg/dL BUN 98 (H) 7.0 - 18 MG/DL Creatinine 7.67 (H) 0.6 - 1.3 MG/DL  
 BUN/Creatinine ratio 13 12 - 20 GFR est AA 7 (L) >60 ml/min/1.73m2 GFR est non-AA 5 (L) >60 ml/min/1.73m2 Calcium 9.1 8.5 - 10.1 MG/DL Bilirubin, total 0.4 0.2 - 1.0 MG/DL  
 ALT (SGPT) 31 13 - 56 U/L  
 AST (SGOT) 317 (H) 15 - 37 U/L Alk. phosphatase 194 (H) 45 - 117 U/L Protein, total 6.8 6.4 - 8.2 g/dL Albumin 2.9 (L) 3.4 - 5.0 g/dL Globulin 3.9 2.0 - 4.0 g/dL A-G Ratio 0.7 (L) 0.8 - 1.7 CALCIUM, IONIZED Collection Time: 12/31/18  1:15 PM  
Result Value Ref Range Ionized Calcium 1.08 (L) 1.12 - 1.32 MMOL/L  
CARDIAC PANEL,(CK, CKMB & TROPONIN)  Collection Time: 12/31/18  1:15 PM  
 Result Value Ref Range  (H) 26 - 192 U/L  
 CK - MB 8.0 (H) <3.6 ng/ml CK-MB Index 2.9 0.0 - 4.0 % Troponin-I, QT 0.55 (H) 0.0 - 0.045 NG/ML  
TYPE + CROSSMATCH Collection Time: 12/31/18  1:15 PM  
Result Value Ref Range Crossmatch Expiration 01/03/2019 ABO/Rh(D) O POSITIVE Antibody screen NEG   
 CALLED TO: Steph Medeiros, ICU, [id number] AT [address] [place] [address] Unit number [id number] Blood component type  LR Unit division 00 Status of unit ISS'D ANOTH PT   
 Crossmatch result Compatible Unit number [id number] Blood component type  LR Unit division 00 Status of unit ALLOCATED Crossmatch result Compatible LACTIC ACID Collection Time: 12/31/18  1:15 PM  
Result Value Ref Range Lactic acid 2.0 0.4 - 2.0 MMOL/L  
HAPTOGLOBIN Collection Time: 12/31/18  1:15 PM  
Result Value Ref Range Haptoglobin <10 (L) 34 - 200 mg/dL PROTHROMBIN TIME + INR Collection Time: 12/31/18  1:55 PM  
Result Value Ref Range Prothrombin time 13.7 11.5 - 15.2 sec INR 1.1 0.8 - 1.2 FIBRINOGEN Collection Time: 12/31/18  1:55 PM  
Result Value Ref Range Fibrinogen 287 210 - 451 mg/dL MAGNESIUM Collection Time: 12/31/18  1:55 PM  
Result Value Ref Range Magnesium 2.8 (H) 1.6 - 2.6 mg/dL PHOSPHORUS Collection Time: 12/31/18  1:55 PM  
Result Value Ref Range Phosphorus 7.8 (H) 2.5 - 4.9 MG/DL  
EKG, 12 LEAD, SUBSEQUENT Collection Time: 12/31/18  2:58 PM  
Result Value Ref Range Ventricular Rate 79 BPM  
 Atrial Rate 79 BPM  
 P-R Interval 144 ms QRS Duration 86 ms  
 Q-T Interval 422 ms QTC Calculation (Bezet) 483 ms Calculated P Axis 66 degrees Calculated R Axis 88 degrees Calculated T Axis 63 degrees Diagnosis Normal sinus rhythm Possible Left atrial enlargement T wave abnormality, consider anterior ischemia Prolonged QT Abnormal ECG When compared with ECG of 31-DEC-2018 09:35, 
 Incomplete right bundle branch block is no longer present T wave inversion now evident in Anterior leads T wave inversion no longer evident in Lateral leads QT has lengthened Confirmed by Karen Castellon ([phone]) on 12/31/2018 3:33:58 PM 
  
POC G3 Collection Time: 12/31/18  5:33 PM  
Result Value Ref Range Device: VENT    
 FIO2 (POC) 70 % pH (POC) 7.348 (L) 7.35 - 7.45    
 pCO2 (POC) 43.7 35.0 - 45.0 MMHG  
 pO2 (POC) 172 (H) 80 - 100 MMHG  
 HCO3 (POC) 24.0 22 - 26 MMOL/L  
 sO2 (POC) 99 (H) 92 - 97 % Base deficit (POC) 2 mmol/L Mode ASSIST CONTROL Tidal volume 450 ml Set Rate 14 bpm  
 PEEP/CPAP (POC) 5 cmH2O Allens test (POC) YES Inspiratory Time 0.9 sec Total resp. rate 15 Site LEFT RADIAL Specimen type (POC) ARTERIAL Performed by Verlena Brunner Volume control plus YES    
CARDIAC PANEL,(CK, CKMB & TROPONIN) Collection Time: 12/31/18  9:07 PM  
Result Value Ref Range  (H) 26 - 192 U/L  
 CK - MB 11.1 (H) <3.6 ng/ml CK-MB Index 2.6 0.0 - 4.0 % Troponin-I, QT 0.73 (H) 0.0 - 0.045 NG/ML  
HGB & HCT Collection Time: 12/31/18  9:07 PM  
Result Value Ref Range HGB 15.8 12.0 - 16.0 g/dL HCT 44.4 35.0 - 45.0 % AMMONIA Collection Time: 12/31/18  9:51 PM  
Result Value Ref Range Ammonia 29 11 - 32 UMOL/L  
GLUCOSE, POC Collection Time: 12/31/18 11:51 PM  
Result Value Ref Range Glucose (POC) <30.0 (LL) 70 - 110 mg/dL MAGNESIUM Collection Time: 01/01/19 12:20 AM  
Result Value Ref Range Magnesium 2.1 1.6 - 2.6 mg/dL PHOSPHORUS Collection Time: 01/01/19 12:20 AM  
Result Value Ref Range Phosphorus 3.3 2.5 - 4.9 MG/DL  
METABOLIC PANEL, COMPREHENSIVE Collection Time: 01/01/19 12:20 AM  
Result Value Ref Range Sodium 134 (L) 136 - 145 mmol/L Potassium 4.4 3.5 - 5.5 mmol/L Chloride 100 100 - 108 mmol/L  
 CO2 26 21 - 32 mmol/L Anion gap 8 3.0 - 18 mmol/L Glucose 6 (LL) 74 - 99 mg/dL BUN 46 (H) 7.0 - 18 MG/DL Creatinine 4.12 (H) 0.6 - 1.3 MG/DL  
 BUN/Creatinine ratio 11 (L) 12 - 20 GFR est AA 14 (L) >60 ml/min/1.73m2 GFR est non-AA 11 (L) >60 ml/min/1.73m2 Calcium 8.7 8.5 - 10.1 MG/DL Bilirubin, total 0.5 0.2 - 1.0 MG/DL  
 ALT (SGPT) 41 13 - 56 U/L  
 AST (SGOT) 231 (H) 15 - 37 U/L Alk. phosphatase 150 (H) 45 - 117 U/L Protein, total 7.0 6.4 - 8.2 g/dL Albumin 2.7 (L) 3.4 - 5.0 g/dL Globulin 4.3 (H) 2.0 - 4.0 g/dL A-G Ratio 0.6 (L) 0.8 - 1.7    
CBC WITH AUTOMATED DIFF Collection Time: 01/01/19 12:20 AM  
Result Value Ref Range WBC 8.3 4.6 - 13.2 K/uL  
 RBC 5.24 4.20 - 5.30 M/uL  
 HGB 15.7 12.0 - 16.0 g/dL HCT 44.9 35.0 - 45.0 % MCV 85.7 74.0 - 97.0 FL  
 MCH 30.0 24.0 - 34.0 PG  
 MCHC 35.0 31.0 - 37.0 g/dL  
 RDW 12.6 11.6 - 14.5 % PLATELET 379 (L) 234 - 420 K/uL MPV 10.0 9.2 - 11.8 FL  
 NEUTROPHILS 83 (H) 40 - 73 % LYMPHOCYTES 16 (L) 21 - 52 % MONOCYTES 1 (L) 3 - 10 % EOSINOPHILS 0 0 - 5 % BASOPHILS 0 0 - 2 %  
 ABS. NEUTROPHILS 6.9 1.8 - 8.0 K/UL  
 ABS. LYMPHOCYTES 1.3 0.9 - 3.6 K/UL  
 ABS. MONOCYTES 0.1 0.05 - 1.2 K/UL  
 ABS. EOSINOPHILS 0.0 0.0 - 0.4 K/UL  
 ABS. BASOPHILS 0.0 0.0 - 0.1 K/UL  
 DF AUTOMATED CARDIAC PANEL,(CK, CKMB & TROPONIN) Collection Time: 01/01/19 12:20 AM  
Result Value Ref Range  (H) 26 - 192 U/L  
 CK - MB 7.6 (H) <3.6 ng/ml CK-MB Index 1.7 0.0 - 4.0 % Troponin-I, QT 0.74 (H) 0.0 - 0.045 NG/ML  
GLUCOSE, POC Collection Time: 01/01/19  1:28 AM  
Result Value Ref Range Glucose (POC) 297 (H) 70 - 110 mg/dL METABOLIC PANEL, BASIC Collection Time: 01/01/19  1:40 AM  
Result Value Ref Range Sodium 137 136 - 145 mmol/L Potassium 4.4 3.5 - 5.5 mmol/L Chloride 99 (L) 100 - 108 mmol/L  
 CO2 25 21 - 32 mmol/L Anion gap 13 3.0 - 18 mmol/L Glucose 223 (H) 74 - 99 mg/dL BUN 46 (H) 7.0 - 18 MG/DL  Creatinine 4.19 (H) 0.6 - 1.3 MG/DL  
 BUN/Creatinine ratio 11 (L) 12 - 20 GFR est AA 13 (L) >60 ml/min/1.73m2 GFR est non-AA 11 (L) >60 ml/min/1.73m2 Calcium 7.9 (L) 8.5 - 10.1 MG/DL  
PHOSPHORUS Collection Time: 01/01/19  1:40 AM  
Result Value Ref Range Phosphorus 3.1 2.5 - 4.9 MG/DL MAGNESIUM Collection Time: 01/01/19  1:40 AM  
Result Value Ref Range Magnesium 2.0 1.6 - 2.6 mg/dL CALCIUM, IONIZED Collection Time: 01/01/19  1:40 AM  
Result Value Ref Range Ionized Calcium 1.01 (L) 1.12 - 1.32 MMOL/L  
GLUCOSE, POC Collection Time: 01/01/19  1:44 AM  
Result Value Ref Range Glucose (POC) 293 (H) 70 - 110 mg/dL GLUCOSE, POC Collection Time: 01/01/19  2:32 AM  
Result Value Ref Range Glucose (POC) 160 (H) 70 - 110 mg/dL GLUCOSE, POC Collection Time: 01/01/19  3:23 AM  
Result Value Ref Range Glucose (POC) 121 (H) 70 - 110 mg/dL POC G3 Collection Time: 01/01/19  4:06 AM  
Result Value Ref Range Device: VENT    
 FIO2 (POC) 40 % pH (POC) 7.585 (HH) 7.35 - 7.45    
 pCO2 (POC) 26.1 (L) 35.0 - 45.0 MMHG  
 pO2 (POC) 117 (H) 80 - 100 MMHG  
 HCO3 (POC) 24.8 22 - 26 MMOL/L  
 sO2 (POC) 99 (H) 92 - 97 % Base excess (POC) 3 mmol/L Mode ASSIST CONTROL Tidal volume 450 ml Set Rate 16 bpm  
 PEEP/CPAP (POC) 5 cmH2O Allens test (POC) N/A Total resp. rate 16 Site RIGHT BRACHIAL Specimen type (POC) ARTERIAL Performed by Panda Owens Volume control plus YES    
GLUCOSE, POC Collection Time: 01/01/19  4:51 AM  
Result Value Ref Range Glucose (POC) 97 70 - 110 mg/dL CBC WITH AUTOMATED DIFF Collection Time: 01/01/19  5:13 AM  
Result Value Ref Range WBC 9.7 4.6 - 13.2 K/uL  
 RBC 5.15 4.20 - 5.30 M/uL  
 HGB 15.3 12.0 - 16.0 g/dL HCT 43.8 35.0 - 45.0 % MCV 85.0 74.0 - 97.0 FL  
 MCH 29.7 24.0 - 34.0 PG  
 MCHC 34.9 31.0 - 37.0 g/dL  
 RDW 12.6 11.6 - 14.5 % PLATELET 97 (L) 953 - 420 K/uL  MPV 10.7 9.2 - 11.8 FL  
 NEUTROPHILS 82 (H) 40 - 73 % LYMPHOCYTES 9 (L) 21 - 52 % MONOCYTES 9 3 - 10 % EOSINOPHILS 0 0 - 5 % BASOPHILS 0 0 - 2 %  
 ABS. NEUTROPHILS 8.0 1.8 - 8.0 K/UL  
 ABS. LYMPHOCYTES 0.9 0.9 - 3.6 K/UL  
 ABS. MONOCYTES 0.8 0.05 - 1.2 K/UL  
 ABS. EOSINOPHILS 0.0 0.0 - 0.4 K/UL  
 ABS. BASOPHILS 0.0 0.0 - 0.1 K/UL  
 DF AUTOMATED METABOLIC PANEL, COMPREHENSIVE Collection Time: 01/01/19  5:13 AM  
Result Value Ref Range Sodium 133 (L) 136 - 145 mmol/L Potassium 4.7 3.5 - 5.5 mmol/L Chloride 99 (L) 100 - 108 mmol/L  
 CO2 26 21 - 32 mmol/L Anion gap 8 3.0 - 18 mmol/L Glucose 184 (H) 74 - 99 mg/dL BUN 47 (H) 7.0 - 18 MG/DL Creatinine 4.30 (H) 0.6 - 1.3 MG/DL  
 BUN/Creatinine ratio 11 (L) 12 - 20 GFR est AA 13 (L) >60 ml/min/1.73m2 GFR est non-AA 11 (L) >60 ml/min/1.73m2 Calcium 8.9 8.5 - 10.1 MG/DL Bilirubin, total 0.5 0.2 - 1.0 MG/DL  
 ALT (SGPT) 34 13 - 56 U/L  
 AST (SGOT) 169 (H) 15 - 37 U/L Alk. phosphatase 127 (H) 45 - 117 U/L Protein, total 6.4 6.4 - 8.2 g/dL Albumin 2.5 (L) 3.4 - 5.0 g/dL Globulin 3.9 2.0 - 4.0 g/dL A-G Ratio 0.6 (L) 0.8 - 1.7 LACTIC ACID Collection Time: 01/01/19  5:13 AM  
Result Value Ref Range Lactic acid 2.0 0.4 - 2.0 MMOL/L  
GLUCOSE, POC Collection Time: 01/01/19  5:54 AM  
Result Value Ref Range Glucose (POC) 89 70 - 110 mg/dL GLUCOSE, POC Collection Time: 01/01/19  7:28 AM  
Result Value Ref Range Glucose (POC) 98 70 - 110 mg/dL GLUCOSE, POC Collection Time: 01/01/19  9:22 AM  
Result Value Ref Range Glucose (POC) 94 70 - 110 mg/dL GLUCOSE, POC Collection Time: 01/01/19 11:36 AM  
Result Value Ref Range Glucose (POC) 116 (H) 70 - 110 mg/dL Intake/Output Summary (Last 24 hours) at 1/1/2019 1208 Last data filed at 1/1/2019 1000 Gross per 24 hour Intake 760.73 ml Output 500 ml Net 260.73 ml Cardiographics: ECG: normal sinus rhythm, LVH with secondary st-t changes Echocardiogram: 10/2018: 
10/15/18 ECHO ADULT FOLLOW-UP OR LIMITED 10/19/2018 10/19/2018 Narrative · Estimated left ventricular ejection fraction is 56 - 60%. Visually  
measured ejection fraction. Left ventricular moderate concentric  
hypertrophy. Normal left ventricular wall motion, no regional wall motion  
abnormality noted. · Small circumferential pericardial effusion with more prominent area  
around the right atrium. No indications of tamponade present. Signed by: Kar Talbert MD  
 
 
 
Signed By: Bita Mora MD  
 January 1, 2019

## 2019-01-01 NOTE — PROGRESS NOTES
Patient is not available to be assessed at this time. Provided silent prayer on patient's behalf at door entry. [id number] [facility]  
[phone]

## 2019-01-01 NOTE — PROCEDURES
Procedure:  Fiberoptic Bronchoscopy (Diagnostic) Indication:  Hemoptysis Summary: 
 
Emergent procedure, patient had R main stem intubation from OSH, noted on arrival, hemoptysis noted upon withdrawing the ETT, bronchoscopy done at bedside. The patient was sedated on propofol post arrest.  
The bronchoscope was advanced via the ETT, initially visualized R main stem bronchus, ETT pulled back an additional 4 cm. Blood can be seen from R side bronchus, subsided after injection of 20cc ice saline. L main had normal mucosa without bleeding. Airway inspected, patient tolerated procedure, maintain saturation at 100% during the procedure.  
 
Harshal Chavez MD

## 2019-01-01 NOTE — PROGRESS NOTES
[facility] ICU Progress Note Name: Jessica Heard : 1960 MRN: [id number] Date: 2019 [x]I have reviewed the flowsheet and previous days notes. Events overnight reviewed and discussed with nursing staff. Vital signs and records reviewed. Patient is a 66 y.o. female PMH ESRD on dialysis T-Th-Sat, HTN, HLD, pulmonary tuberculosis who presents to the [facility] ICU as a transfer from  [facility] ED S/P cardiac arrest. Per EMS report, patient was at an outpatient dialysis session this AM, developed SOB, and was found to be in cardiac arrest upon EMS arrival, CPR initiated. Patient intubated by EMS en route to [facility] ED with ROSC achieved on arrival to ED. Patient transferred to [facility] ICU for further evaluation and management. [x]The patient is unable to give any meaningful history or review of systems because the patient is: 
[x]Intubated [x]Sedated  
[]Unresponsive [x]The patient is critically ill on     
[x]Mechanical ventilation []Pressors []BiPAP []  
 
           
ROS: Unable to perform due to intubation and sedation Medication Review: · Pressors - none · Sedation - propofol · Antibiotics - none · Pain - PRN 
· GI/ DVT -Protonix, heparin · Others (other gtts) 
   -D10 @ 50 mL/hr Safety Bundles: VAP Bundle/ CAUTI/ Severe Sepsis Protocol/ Electrolyte Replacement Protocol Vital Signs:   
Visit Vitals /74 Pulse (!) 104 Temp 98.8 °F (37.1 °C) Resp 12 Wt 45 kg (99 lb 3.3 oz) SpO2 94% Breastfeeding? No  
BMI 18.74 kg/m² O2 Device: Endotracheal tube, Ventilator Temp (24hrs), Av.4 °F (36.3 °C), Min:96.2 °F (35.7 °C), Max:99.4 °F (37.4 °C) Intake/Output:  
Last shift:      No intake/output data recorded. Last 3 shifts:  1901 -  0700 In: 660.7 [I.V.:660.7] Out: 500 Intake/Output Summary (Last 24 hours) at 2019 0900 Last data filed at 2019 0700 Gross per 24 hour Intake 660.73 ml Output 500 ml Net 160.73 ml Ventilator Settings: 
Mode Rate Tidal Volume Pressure FiO2 PEEP Assist control   400 ml    30 % 5 cm H20 Peak airway pressure: 24 cm H2O Minute ventilation: 5 l/min Physical Exam: 
 
General: Intubated/sedated; HEENT:  Pupils 4mm, equal size, minimally reactive to light bilaterally; Anicteric sclerae; mucosa moist 
Resp:  Rhonchi bilaterally, symmetric chest wall rise, ETT in place CV:  S1, S2 present; regular rate and rhythm GI:  Abdomen soft; (+) active bowel sounds Extremities:  No BLE edema Skin:  Warm; no rashes/ lesions noted Neurologic:  Intubated and sedated Devices:  Hemodialysis catheter, PIV, ETT, NGT 
 
 
DATA:  
 
Labs: Results:  
   
Chemistry Recent Labs 01/01/19 
0513 01/01/19 
0140 01/01/19 
0020 12/31/18 
1315 * 223* 6* 115* * 137 134* 137  
K 4.7 4.4 4.4 7.2*  
CL 99* 99* 100 102 CO2 26 25 26 24 BUN 47* 46* 46* 98* CREA 4.30* 4.19* 4.12* 7.67* CA 8.9 7.9* 8.7 9.1 AGAP 8 13 8 11 BUCR 11* 11* 11* 13  
*  --  150* 194* TP 6.4  --  7.0 6.8 ALB 2.5*  --  2.7* 2.9*  
GLOB 3.9  --  4.3* 3.9 AGRAT 0.6*  --  0.6* 0.7* CBC w/Diff Recent Labs 01/01/19 
0513 01/01/19 
0020 12/31/18 
2107 12/31/18 
0930 WBC 9.7 8.3  --  5.6  
RBC 5.15 5.24  --  4.35  
HGB 15.3 15.7 15.8 12.9 HCT 43.8 44.9 44.4 39.8 PLT 97* 101*  --  112* GRANS 82* 83*  --  31* LYMPH 9* 16*  --  61* EOS 0 0  --  1 Coagulation Recent Labs 12/31/18 
1355 PTP 13.7 INR 1.1 Liver Enzymes Recent Labs 01/01/19 
2658 TP 6.4 ALB 2.5* * SGOT 169* ABG Lab Results Component Value Date/Time PHI 7.585 (HH) 01/01/2019 04:06 AM  
 PCO2I 26.1 (L) 01/01/2019 04:06 AM  
 PO2I 117 (H) 01/01/2019 04:06 AM  
 HCO3I 24.8 01/01/2019 04:06 AM  
 FIO2I 40 01/01/2019 04:06 AM  
  
Microbiology No results for input(s): CULT in the last 72 hours. Telemetry: [x]Sinus []A-flutter []Paced []A-fib []Multiple PVCs Imaging: CXR 1/1/19: IMPRESSION: 
  
Stable ET tube position. Significant improvement in lung aeration but residual right greater than left 
lung opacities and small effusions. IMPRESSION:  
66year old female PMH ESRD on dialysis T-Th-Sat, HTN, HLD, pulmonary tuberculosis who presents to the [facility] ICU as a transfer from  [facility] ED S/P cardiac arrest. Per EMS report, patient was at an outpatient dialysis session this AM, developed SOB, and was found to be in cardiac arrest upon EMS arrival, CPR initiated. Patient intubated by EMS en route to [facility] ED with ROSC achieved on arrival to ED. Patient transferred to [facility] ICU for further evaluation and management.  
  
Problems: 
1. S/P cardiac arrest 
2. Hyperkalemia 3. ESRD on dialysis T-Th-Sat 
4. Hypertensive emergency 5. Seizure type activity PLAN:  
1. Neuro 
-intubated and sedated 
-Neurology consulted due to witnessed seizure type activity on arrival to ICU S/P cardiac arrest and was initially started on Keppra. -EEG from 12/31 reported as burst suppression pattern 
-CT Head 12/31 -no acute intracranial process 
-Overnight, patient noted to have another episode of seizure type activity and was given loading dose of Fosphenytoin 20 mg/kg. 
-Per neurology recommendation: Continue Keppra 750 mg BID and Fosphenytoin 2 mg/kg BID 
-Continue to monitor neuro status as well as for seizure type activity 
-Follow up on neuro recommendations 2. Resp -   
-Intubated and sedated, vent settings per RT.  
-aspiration precautions -Ventilator-associated Pneumonia bundle 3. ID -  
-Afebrile, no signs/symptoms of infection. Continue to monitor 4. CVS - 
-Troponin 0.74 this AM 
-Patient hemodynamically labile overnight requiring intermittent use of cardene drip due to hypertension 
-Patient currently hemodynamically stable, not on cardene drip 
-Continue to monitor hemodynamic status 5.  Heme/onc -    
 -H/H stable, continue to monitor 6. Metabolic -  
-monitor electrolytes 7. Renal - 
-BUN/Crt  47/4.3 
-Nephrology following patient, follow up on recommendations 
-Patient underwent dialysis yesterday 8. Endocrine -  
-Patient had episode of profound hypoglycemia overnight 6 that improved to 223 with dextrose  
-Patient currently on D10 @ 50 mL/hr with Q1H glucose checks 
-Continue to closely monitor blood glucose level 9. GI -  
-NPO 10. Prophylaxis - DVT, GI: Protonix, Heparin Further management will be discussed with ICU attending The patient is: [] acutely ill Risk of deterioration: [x] moderate [x] critically ill  [] high My assessment/plan was discussed with: 
[]nursing []PT/OT   
[]respiratory therapy [x]Dr. Stevens [facility]  
[]family [] Georgina García MD

## 2019-01-01 NOTE — ROUTINE PROCESS
Received Pt. In bed, HD in progress, responds to tactile stimuli by jerky movements of upper extremities and twitching of face and eyes. 2113 HD completed, Pt's BP continue to be high, propofol and nicardipine gtt. Restarted. 2151 Pt. With cont. Seizure, ativan 4 mg IV given as per Kvng Robison PA-C.[id number]<30, stat BMP ordered. 0200 Glucose result 6, D50 3 ampules IV given as per Mike Alvarado PA-C. Accucheck (121) D5W started to infuse at 75ml/hr.0600 Acucheck(89), IVF rate increased to 50ml/hr. As per Mike Alvarado.

## 2019-01-02 NOTE — PROGRESS NOTES
[facility] 
 
 
CARDIOLOGY PROGRESS NOTE 
RECS: 
 
1. Brief cardiac arrest- witnessed. Remains unresponsive. Etiology unclear- had hyperkalemia-?? Severe bradycardia with asystole. No ekg tracing prior to cardiac arrest. 
2. ESRD on HD 3. encephalopathy. 4. acute respiratory failure- intubated on mechanical ventilator 5. small known pericardial effusion 6. Post-anoxic brain injury with severe cerebral edema on CT head today - plans for comfort measures and compassion extubation tomorrow 7. Hypertension: Need for medications but family is considering withdrawal of care as she has significantly increased brain edema now. Will leave the medications to ICU team. 
 
Discussed with patient's family in detail. I recommended that they follow the advice of the ICU team as it has been discussed in detail already regarding the level and extent of care. ASSESSMENT: 
Hospital Problems  Date Reviewed: 10/16/2018 Codes Class Noted POA Cardiopulmonary arrest with successful resuscitation [facility]) ICD-10-CM: I46.9 ICD-9-CM: 427.5  12/31/2018 Unknown Cardiac arrest [facility]) ICD-10-CM: I46.9 ICD-9-CM: 427.5  12/31/2018 Unknown SUBJECTIVE: Intubated OBJECTIVE: 
 
VS:  
Visit Vitals /84 Pulse (!) 104 Temp 99.6 °F (37.6 °C) Resp 22 Wt 45.6 kg (100 lb 8.5 oz) SpO2 99% Breastfeeding? No  
BMI 18.99 kg/m² Intake/Output Summary (Last 24 hours) at 1/2/2019 1552 Last data filed at 1/2/2019 1400 Gross per 24 hour Intake 1350 ml Output 200 ml Net 1150 ml  
 
TELE: normal sinus rhythm General: intubated HENT: Normocephalic, atraumatic. Normal external eye. Neck :  increased JVP Cardiac:  regular rate and rhythm, S1, S2 normal, no click, no rub Lungs: diminished breath sounds b/l. Abdomen: Soft, nontender, no masses Extremities:  No c/c/e, peripheral pulses present Labs: Results:  
   
Chemistry Recent Labs 01/02/19 3632 01/02/19 
0330 01/01/19 
0420 * 101* 184* * 128* 133* K 6.3* 5.9* 4.7 CL 93* 94* 99* CO2 25 23 26 BUN 66* 61* 47* CREA 5.71* 5.60* 4.30* CA 8.0* 8.3* 8.9 AGAP 12 11 8 BUCR 12 11* 11* AP 85 96 127* TP 5.4* 6.4 6.4 ALB 2.3* 2.4* 2.5*  
GLOB 3.1 4.0 3.9 AGRAT 0.7* 0.6* 0.6* CBC w/Diff Recent Labs 01/02/19 
0820 01/02/19 0330 01/01/19 2112 01/01/19 
9407 01/01/19 
0020 WBC  --  13.3*  --   --  9.7 8.3  
RBC  --  4.37  --   --  5.15 5.24  
HGB 11.7* 12.9 13.8   < > 15.3 15.7 HCT 33.8* 37.6 40.2   < > 43.8 44.9 PLT  --  90*  --   --  97* 101* GRANS  --  65  --   --  82* 83* LYMPH  --  5*  --   --  9* 16* EOS  --  0  --   --  0 0  
 < > = values in this interval not displayed. Cardiac Enzymes Recent Labs 01/01/19 
0020 12/31/18 
2107 * 432* CKND1 1.7 2.6 Coagulation Recent Labs 12/31/18 
1355 PTP 13.7 INR 1.1 Lipid Panel Lab Results Component Value Date/Time Cholesterol, total 128 08/23/2018 02:35 AM  
 HDL Cholesterol 62 (H) 08/23/2018 02:35 AM  
 LDL, calculated 35 08/23/2018 02:35 AM  
 VLDL, calculated 31 08/23/2018 02:35 AM  
 Triglyceride 155 (H) 08/23/2018 02:35 AM  
 CHOL/HDL Ratio 2.1 08/23/2018 02:35 AM  
  
BNP No results for input(s): BNPP in the last 72 hours. Liver Enzymes Recent Labs 01/02/19 
0703 TP 5.4* ALB 2.3* AP 85 SGOT 145* Thyroid Studies Lab Results Component Value Date/Time TSH 6.21 (H) 08/24/2018 08:00 AM  
    
 
 
 
Jen 57 I have independently evaluated and examined the patient. All relevant labs and testing data's are reviewed. Care plan discussed and updated after review.  
Melissa Taylor MD

## 2019-01-02 NOTE — PROGRESS NOTES
Problem: Pressure Injury - Risk of 
Goal: *Prevention of pressure injury Document Derek Scale and appropriate interventions in the flowsheet. Outcome: Progressing Towards Goal 
Pressure Injury Interventions: 
Sensory Interventions: Check visual cues for pain, Keep linens dry and wrinkle-free, Monitor skin under medical devices, Pressure redistribution bed/mattress (bed type), Turn and reposition approx. every two hours (pillows and wedges if needed) Moisture Interventions: Absorbent underpads, Check for incontinence Q2 hours and as needed Activity Interventions: Assess need for specialty bed, Pressure redistribution bed/mattress(bed type) Mobility Interventions: Assess need for specialty bed, Pressure redistribution bed/mattress (bed type), Turn and reposition approx. every two hours(pillow and wedges) Nutrition Interventions: Document food/fluid/supplement intake, Discuss nutritional consult with provider Friction and Shear Interventions: Foam dressings/transparent film/skin sealants, Transferring/repositioning devices

## 2019-01-02 NOTE — PROGRESS NOTES
PCCM UPDATE: 
 
8:12 PM: Bedside RN informed me that pt was developing HTN again with SBP >200 and developed a Fever - 103F. Also, pt had  Vomited with starting TF.  
- Give labateol 10mg; tyelnol - rectal  
- Vomit with TF - hold TF Edu Sanders PA-C 
8:12 PM 
 
 
ICU Staff Note reviewed. Please see ICU daily progress note Kathryn Vázquez DO, [facility] Pulmonary, Critical Care, and Sleep Medicine

## 2019-01-02 NOTE — ROUTINE PROCESS
Pt. Vomited large amount of orange liquid material, TF christo, Marleny Thomas PA-C made aware. Pt. Highly febrile, tylenol supp.given.

## 2019-01-02 NOTE — PROGRESS NOTES
[facility] ICU Progress Note Name: Carmelita Oliva : 1960 MRN: [id number] Date: 2019 [x]I have reviewed the flowsheet and previous days notes. Events overnight reviewed and discussed with nursing staff. Vital signs and records reviewed. Patient is a 66 y.o. female PMH ESRD on dialysis T-Th-Sat, HTN, HLD, pulmonary tuberculosis who presents to the [facility] ICU as a transfer from [facility] ED S/P cardiac arrest. Per EMS report, patient was at an outpatient dialysis session this AM, developed SOB, and was found to be in cardiac arrest upon EMS arrival, CPR initiated. Patient intubated by EMS en route to [facility] ED with ROSC achieved on arrival to ED. Patient transferred to [facility] ICU for further evaluation and management. [x]The patient is unable to give any meaningful history or review of systems because the patient is: 
[x]Intubated [x]Sedated  
[]Unresponsive [x]The patient is critically ill on     
[x]Mechanical ventilation []Pressors []BiPAP []  
 
           
ROS: Unable to perform due to intubation and sedation Medication Review: · Pressors - none · Sedation - none · Antibiotics - none · Pain - PRN 
· GI/ DVT -Protonix, heparin · Others (other gtts) 
   -D10 @ 50 mL/hr Safety Bundles: VAP Bundle/ CAUTI/ Severe Sepsis Protocol/ Electrolyte Replacement Protocol Vital Signs:   
Visit Vitals /88 Pulse 95 Temp (!) 100.8 °F (38.2 °C) Resp 24 Wt 45.6 kg (100 lb 8.5 oz) SpO2 99% Breastfeeding? No  
BMI 18.99 kg/m² O2 Device: Endotracheal tube Temp (24hrs), Av.5 °F (38.1 °C), Min:98 °F (36.7 °C), Max:103.1 °F (39.5 °C) Intake/Output:  
Last shift:      No intake/output data recorded. Last 3 shifts:  1901 -  0700 In: 2064 [I.V.:1774] Out: 700 Intake/Output Summary (Last 24 hours) at 2019 7936 Last data filed at 2019 0700 Gross per 24 hour Intake 1490 ml Output 200 ml Net 1290 ml Ventilator Settings: 
Mode Rate Tidal Volume Pressure FiO2 PEEP Assist control, VC+   400 ml    30 % 5 cm H20 Peak airway pressure: 24 cm H2O Minute ventilation: 4.75 l/min Physical Exam: 
 
General: Intubated; NAD HEENT:  Pupils 4mm, equal size, minimally reactive to light bilaterally; Left eye appears to be drifted down and out compared to right eye. Anicteric sclerae; mucosa moist 
Resp:  Rhonchi bilaterally, symmetric chest wall rise, ETT in place CV:  S1, S2 present; regular rate and rhythm GI:  Abdomen soft; (+) active bowel sounds Extremities:  No BLE edema Skin:  Warm; no rashes/ lesions noted Neurologic:  Intubated; no spontaneous eye opening or movement of extremities; does not withdrawal to painful stimuli Devices:  Hemodialysis catheter, PIV, ETT, NGT 
 
 
 
DATA:  
 
Labs: Results:  
   
Chemistry Recent Labs 01/02/19 
0703 01/02/19 
0330 01/01/19 
2468 * 101* 184* * 128* 133* K 6.3* 5.9* 4.7 CL 93* 94* 99* CO2 25 23 26 BUN 66* 61* 47* CREA 5.71* 5.60* 4.30* CA 8.0* 8.3* 8.9 AGAP 12 11 8 BUCR 12 11* 11* AP 85 96 127* TP 5.4* 6.4 6.4 ALB 2.3* 2.4* 2.5*  
GLOB 3.1 4.0 3.9 AGRAT 0.7* 0.6* 0.6* CBC w/Diff Recent Labs 01/02/19 
0820 01/02/19 
0330 01/01/19 
2112  01/01/19 
6213 01/01/19 
0020 WBC  --  13.3*  --   --  9.7 8.3  
RBC  --  4.37  --   --  5.15 5.24  
HGB 11.7* 12.9 13.8   < > 15.3 15.7 HCT 33.8* 37.6 40.2   < > 43.8 44.9 PLT  --  90*  --   --  97* 101* GRANS  --  65  --   --  82* 83* LYMPH  --  5*  --   --  9* 16* EOS  --  0  --   --  0 0  
 < > = values in this interval not displayed. Coagulation Recent Labs 12/31/18 
1355 PTP 13.7 INR 1.1 Liver Enzymes Recent Labs 01/02/19 
0703 TP 5.4* ALB 2.3* AP 85 SGOT 145* ABG Lab Results Component Value Date/Time  PHI 7.464 (H) 01/02/2019 03:59 AM  
 PCO2I 34.2 (L) 01/02/2019 03:59 AM  
 PO2I 82 01/02/2019 03:59 AM  
 HCO3I 24.5 01/02/2019 03:59 AM  
 FIO2I 30 01/02/2019 03:59 AM  
  
Microbiology No results for input(s): CULT in the last 72 hours. Telemetry: [x]Sinus []A-flutter []Paced []A-fib []Multiple PVCs Imaging: CXR 1/2/19: 
IMPRESSION:   
1. Tubes and catheters are unchanged. 2. Right lung infiltrate may be worse than prior exam. 
3. Small right pleural effusion is similar to the prior exam. 
4. Suspect small left pleural effusion. CT Head 1/2/19: 
IMPRESSION: 
  
Diffuse significant bilateral symmetrical edema throughout the cerebellum and 
the cerebral hemispheres with effacement of the CSF spaces. Multifocal acute/subacute infarcts throughout the cerebral hemispheres. No evidence of herniation. Cannot exclude a tiny amount of subarachnoid hemorrhage as described above. IMPRESSION:  
66year old female PMH ESRD on dialysis T-Th-Sat, HTN, HLD, pulmonary tuberculosis who presents to the [facility] ICU as a transfer from [facility] ED S/P cardiac arrest. Per EMS report, patient was at an outpatient dialysis session this AM, developed SOB, and was found to be in cardiac arrest upon EMS arrival, CPR initiated. Patient intubated by EMS en route to [facility] ED with ROSC achieved on arrival to ED. Patient transferred to [facility] ICU for further evaluation and management.  
  
Problems: 1. Anoxic brain injury S/P cardiac arrest 
2. Hyperkalemia 3. ESRD on dialysis T-Th-Sat 
4. Hypertensive emergency 5. Seizure type activity PLAN:  
1. Neuro 
-intubated, was off sedation overnight 
-Patient followed by neurology. Per neurology, EEG was canceled this AM due to severe cerebral edema noted on CT head this AM. 
-Per neurology note, CT changes not compatible with life. Neurology recommends withdrawal of care per note.   
-Due to poor overall prognosis in setting of severe cerebral edema and overall clinical exam, plan to have palliative care discuss with family goals of care today. 2. Resp -   
-Intubated, vent settings per RT.  
-aspiration precautions -Ventilator-associated Pneumonia bundle 3. ID -  
-Afebrile, no signs/symptoms of infection. Continue to monitor 4. CVS - 
-Patient currently hemodynamically stable, not on cardene drip 
-Continue to monitor hemodynamic status 5. Heme/onc -    
-H/H stable, continue to monitor 6. Metabolic -  
-monitor electrolytes 7. Renal - 
-Hyperkalemia -K 6.3 this AM 
-Patient to receive calcium gluconate 1g and insulin/dextrose for hyperkalemia 
-Spoke to nephrology regarding patient. Plan to discuss goals of care with family. If patient is to remain Full code, plan for dialysis today. 8. Endocrine -  
-Continue to closely monitor blood glucose level 9. GI -  
-NPO 10. Prophylaxis - DVT, GI: Protonix, Heparin 
  
Further management will be discussed with ICU attending The patient is: [] acutely ill Risk of deterioration: [x] moderate [x] critically ill  [] high My assessment/plan was discussed with: 
[]nursing []PT/OT   
[]respiratory therapy [x]Dr. Cedric Nair  
[]family [] Stephen Hansen MD

## 2019-01-02 NOTE — ROUTINE PROCESS
Bedside and Verbal shift change report given to [address] [place] [address] (oncoming nurse) by Reji Robertson RN 
 (offgoing nurse). Report included the following information Kardex, ED Summary, Procedure Summary, Intake/Output, MAR and Recent Results.

## 2019-01-02 NOTE — PROGRESS NOTES
visited with family while patient is in the ICU. Patient did not participate in the discussion and did not appear to be alert. One visitor spoke [place].  asked the family if a  had visited and  was told that a [place]  visited today. They had no further spiritual needs at this time and were supporting each other. Chaplains will continue to provide support as needed as requested. Hunter Quinones MDiv. Board Certified [employer] Office [phone]

## 2019-01-02 NOTE — PROGRESS NOTES
Reason for Admission:  Cardiopulmonary arrest with successful resuscitation ([facility] [address] Cardiac arrest ([facility] [address] anoxic brain injury s/p cardiac arrest as per neuro team 
              
RRAT Score:    19 Plan for utilizing home health:    Unable to determine. Patient on vent in ICU setting. Likelihood of Readmission:   Moderate Do you (patient/family) have any concerns for transition/discharge? Unable to discuss with patient's son- phone call was disconnected, and this NN was unable to reach him afterwards. Transition of Care Plan:    
Patient on vent in ICU setting. Initial assessment completed with her son: Lianet Aguila via phone call. Face sheet information confirmed:  yes. This patient lives in a 2 story home with her spouse. Her bedroom is on the second floor. The patient's son states that her vision is \"blurred\" and that she needs help with her ADL/IADLs- his father (patient's ) helps her. She ambulated with a walker or used a wheelchair. She has a walker, wheelchair, cane at home. This NN was unable to fully complete assessment with the patient's son since the call was disconnected, and this NN could not reach him afterwards. Patient is not currently active with home health (refused last admission). Patient has not stayed in a skilled nursing facility or rehab recently. This patient is on dialysis :yes If yes, hemodialysis patient and receives treatment on Tuesday/Thursday/Saturday at [facility] [phone]. However, there's a note from her last admission in Oct. 2018 that her dialysis were M/W/F. Currently, the discharge plan is unable to be determined at this time. Care Management Interventions PCP Verified by CM: Yes Last Visit to PCP: 12/21/18 Transition of Care Consult (CM Consult): Discharge Planning Discharge Durable Medical Equipment: (She has a RW, cane, & WC at home.) Current Support Network: Lives with Spouse, Family Lives [place] Discharge Location Discharge Placement: Unable to determine at this time Carol Kwan MSN, RN, [facility] Care Management [phone]

## 2019-01-02 NOTE — PROGRESS NOTES
RENAL DAILY PROGRESS NOTE Subjective:  
Admitted post cardiac arrest seen for ESRD Complaint: remains intubated, sedated, on D10 W for low BS, NGT to suction, no feeding yet Current Facility-Administered Medications Medication Dose Route Frequency  glucose chewable tablet 16 g  4 Tab Oral PRN  
 glucagon (GLUCAGEN) injection 1 mg  1 mg IntraMUSCular PRN  
 dextrose (D50W) injection syrg 12.5-25 g  25-50 mL IntraVENous PRN  
 dextrose 10% infusion 1,000 mL  1,000 mL IntraVENous CONTINUOUS  
 pantoprazole (PROTONIX) 40 mg in sodium chloride 0.9% 10 mL injection  40 mg IntraVENous DAILY  heparin (porcine) injection 5,000 Units  5,000 Units IntraVENous Q8H  
 isoniazid (NYDRAZID) tablet 300 mg  300 mg Per NG tube DAILY  pyridoxine (vitamin B6) (VITAMIN B-6) tablet 50 mg  50 mg Per NG tube DAILY  rifAMPin (RIFADIN) capsule 600 mg  600 mg Oral DAILY  acetaminophen (TYLENOL) suppository 650 mg  650 mg Rectal Q6H PRN  chlorhexidine (PERIDEX) 0.12 % mouthwash 10 mL  10 mL Oral Q12H  
 0.9% sodium chloride infusion 250 mL  250 mL IntraVENous PRN  
 sodium chloride (NS) flush 5-10 mL  5-10 mL IntraVENous Q8H  
 sodium chloride (NS) flush 5-10 mL  5-10 mL IntraVENous PRN  
 0.9% sodium chloride infusion  100 mL/hr IntraVENous DIALYSIS PRN  
 heparin (porcine) 1,000 unit/mL injection 1,000 Units  1,000 Units InterCATHeter DIALYSIS PRN  
 levETIRAcetam (KEPPRA) 750 mg in 0.9% sodium chloride 100 mL IVPB  750 mg IntraVENous Q24H Objective:  
 
Patient Vitals for the past 24 hrs: 
 Temp Pulse Resp BP SpO2  
01/02/19 0900  97 14 132/71 99 % 01/02/19 0800 (!) 100.8 °F (38.2 °C) 93 11 183/88 98 % 01/02/19 0700  95 24 175/88 99 % 01/02/19 0600  99 23 147/72 98 % 01/02/19 0500  99 13 162/77 98 % 01/02/19 0400 99.1 °F (37.3 °C) (!) 103 14 154/78 99 % 01/02/19 0356  (!) 103 12  98 % 01/02/19 0300  100 14 140/70 99 % 01/02/19 0200  100 14 162/85 99 % 01/02/19 0100  (!) 101 17 135/80 99 % 01/02/19 0002  (!) 103 14  99 % 01/02/19 0000 99.4 °F (37.4 °C) (!) 102 14  98 % 01/01/19 2346  (!) 102 14  99 % 01/01/19 2330  (!) 101 14 132/69 98 % 01/01/19 2300  (!) 102 17 147/75 98 % 01/01/19 2200  (!) 101 14 136/79 99 % 01/01/19 2120  (!) 107  123/74   
01/01/19 2115  (!) 109 14 123/74 99 % 01/01/19 2100  (!) 115 15 173/82 98 % 01/01/19 2051  (!) 114 17  96 % 01/01/19 2015  (!) 119 21 169/84   
01/01/19 2000 (!) 103.1 °F (39.5 °C)    99 % 01/01/19 1900  (!) 102 14 123/76 99 % 01/01/19 1800  (!) 103 14 136/69 99 % 01/01/19 1700  (!) 105 14 121/70 99 % 01/01/19 1646  (!) 105 14  100 % 01/01/19 1600 (!) 102.4 °F (39.1 °C) (!) 106 14 129/69 99 % 01/01/19 1500  (!) 106 14 121/70 100 % 01/01/19 1400  (!) 106 14 128/69 99 % 01/01/19 1300  (!) 104 14 136/72 98 % 01/01/19 1238  (!) 108 12  96 % 01/01/19 1200 98 °F (36.7 °C) (!) 108 12 113/68 98 % Weight change: 3.6 kg (7 lb 15 oz) 12/31 1901 - 01/02 0700 In: 2064 [I.V.:1774] Out: 700 Intake/Output Summary (Last 24 hours) at 1/2/2019 1127 Last data filed at 1/2/2019 0700 Gross per 24 hour Intake 1390 ml Output 200 ml Net 1190 ml Physical Exam: intubated, febrile, no response to stimuli HEENT: non icteric, ET/NGT Neck: no JVD, TDC right IJ Cardiovascular: regular, no rub 
C/L: equal vent breath sounds Abdomen: soft, + BS Ext: no LE edema Data Review:  
 
LABS:  
Hematology:  
Recent Labs 01/02/19 
0820 01/02/19 
0330 01/01/19 
2112 01/01/19 
1555 01/01/19 
0513 01/01/19 
0020 12/31/18 
2107 12/31/18 
0930 WBC  --  13.3*  --   --  9.7 8.3  --  5.6 HGB 11.7* 12.9 13.8 13.2 15.3 15.7 15.8 12.9 HCT 33.8* 37.6 40.2 39.1 43.8 44.9 44.4 39.8 Chemistry:  
Recent Labs 01/02/19 
0703 01/02/19 
0330 01/01/19 
0513 01/01/19 
0140 01/01/19 
0020 12/31/18 
1355 12/31/18 
1315 12/31/18 
0930 BUN 66* 61* 47* 46* 46*  --  98* 94* CREA 5.71* 5.60* 4.30* 4.19* 4.12*  --  7.67* 7.51* CA 8.0* 8.3* 8.9 7.9* 8.7  --  9.1 9.9 ALB 2.3* 2.4* 2.5*  --  2.7*  --  2.9* 2.6*  
K 6.3* 5.9* 4.7 4.4 4.4  --  7.2* 6.6* * 128* 133* 137 134*  --  137 137 CL 93* 94* 99* 99* 100  --  102 101 CO2 25 23 26 25 26  --  24 26 PHOS  --  5.9*  --  3.1 3.3 7.8*  --   --   
* 101* 184* 223* 6*  --  115* 169* CT head noted CXR noted ABG 7.58/26.1/117/24.8/40 IMPRESSION AND PLAN:  
ESRD with hyperkalemia, plan for HD today if family wants to continue. Will discuss with ICU team. 
HTN controlled Hypoglycemia, resolved cont with D10 W and consider NGT feeding Post cardiac arrest Neuro/Cardio follow up, cont with critical care management Laury Pink MD 
1/2/2019

## 2019-01-02 NOTE — PROGRESS NOTES
ICU Palliative Care/ Goals of Care Discussion Patient Name: Mark Ahumada YOB: 1960 Primary Care Physician: Mani Garcia MD 
  
 SUMMARY:  
Mark Ahumada is a 66y.o. year old with a past relevant history of HTN, who was admitted on 12/31/2018 from home with a diagnosis of cardiac arrest. Current medical issues leading to Palliative care discussion include: worsening CT head showing diffuse symmetrical bilateral edema. Hospital day#: 2 ICU day#: 2 Subjective: Met with family at bedside to discuss current condition, prognosis, treatment plans and goals of care. Patient not able to participate due to mentation/critical condition. Participants: provider -myself Family members - son-in-law RN - Kiara Naranjo Discussion encompassing acute change in condition and need for intervention. Shared the results of the CT head with son-in-law and recommendations from the Neurologist to withdraw care given that brain function has zero recovery. Explained to the son that transferring the patient to [facility] will likely be an impossible undertaking given the patient's severely poor prognosis. He expressed understanding and will get his family and  in today and likely withdraw care at the latest tomorrow. Therapeutic options, response discussed. Answered all questions and concerns to the best of my ability. Discussed code status, comfort measure options. Physical Exam: 
General: unresponsive, no sedation Lungs: on ventilatory support Currently this patient has the following devices: 
[x] Peripheral IV   [] PICC    [] PORT [] ICD [] Onofre Catheter [] NG Tube   [] PEG Tube [] Chest tube  
[] Rectal Tube            [] Drain       [] CVL [] HD cath 
[] Other: PALLIATIVE DIAGNOSES:  
1. ACP/ Goals of care conversation - DNR. No escalation of care. Likely withdrawal of ETT and comfort measures tomorrow. 2. Debility 3. Functional change 4. Cardiac arrest with post-anoxic brain injury with severe cerebral edema on CT head PLAN:  
1. Goals of care/ benefits and burden of procedure(s) - DNR. No escalation of care including dialysis, pressors. Comfort measures including compassionate extubation at the latest tomorrow. 2. Continue best supportive care TREATMENT PREFERENCES:  
Code Status: DNR Advance Care Planning: 
Advance Care Planning 10/16/2018 Patient's Healthcare Decision Maker is: Verbal statement (Legal Next of Kin remains as decision maker) Primary Decision Maker Name [facility] Primary Decision Maker Phone Number [phone] Primary Decision Maker Relationship to Patient Adult child Secondary Decision Maker Name - Secondary Decision Maker Phone Number -  
Confirm Advance Directive None Patient Would Like to Complete Advance Directive No  
Does the patient have other document types - Family updated on care plans. Orders placed. Total Time: 8281-4916 Time spent in counseling / coordination: >50% of time in counseling / coordination? Yes 
 
 
Javier Walsh 
3:33 PM 
 
Addendum [id number] Had a conversation with son-in-law and spouse in conference room and explained to them that controlling BP will not make a significant difference with the existing severe cerebral edema. Will address BP control with nicardipine gtt. Family is still amenable to comfort measures and withdrawal of ETT tonight or tomorrow morning pending family discussion. Discussion done with bedside RN Earl Luong present. Time spent: 4859-7903 Raeann Vreas PA-C Addendum [id number] Family has decided to proceed with comfort measures only with compassionate extubation. Comfort measures only - order set placed.  
 
Raeann Veras PA-C 
5:28 PM

## 2019-01-02 NOTE — PROGRESS NOTES
attended the interdisciplinary rounds for Jahaira Patterson, who is a 66 y.o.,female. Patients Primary Language is: [place]. According to the patients EMR Denominational Affiliation is: Buddhism. The reason the Patient came to the hospital is:  
Patient Active Problem List  
 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  
 NSTEMI (non-ST elevated myocardial infarction) ([facility] [address] 02/25/2018  Respiratory failure ([facility] [address] 02/25/2018  Hypoxia 02/25/2018  Acute UTI 02/25/2018  Hyperkalemia 09/08/2017  Renal failure 09/08/2017  SCOOTER (acute kidney injury) ([facility] [address] 09/08/2017 Plan: 
Chaplains will continue to follow and will provide pastoral care on an as needed/requested basis.  recommends bedside caregivers page  on duty if patient shows signs of acute spiritual or emotional distress. [address] [facility] [address] Board Certified [employer] [facility]  
[phone]

## 2019-01-02 NOTE — PROGRESS NOTES
Nutrition Pt was receiving trickle tube feeding of Nepro at 10 mL/hr. Had large amount of orange liquid vomit last night and tube feeding held; NGT changed to intermittent suction. Having brown output, about 100-200 mL at a time. Pt to remain NPO with NGT to suction per MD. BM 1/1 x 2, loose. Anuric. Receiving IV fluids: D10 at 50 mL/hr (120 gm dextrose, 408 kcal per day). Nutrition goals are not being met. Recommendation/Plan: - Monitor GI symptoms and readiness for resuming tube feeding.  
- Continue IV fluids as medically appropriate. - As medically appropriate, pending patient ability to tolerate tube feeding, recommend resuming trickle tube feeds of Nepro at 10 mL/hr with water flushes of 50 mL q 4 hours. - When tube feeding resumed, if pt able to tolerate trickle feeds for 24-48 hours, recommend advancing tube feeds by 10 mL q 8 hours to goal rate of 35 mL/hr with water flushes of 150 mL q 4 hours - Recommend adding daily renal MVI 
- Continue RD inpatient monitoring and evaluation. 
  
Nepro at 35 mL/hr + 150 mL q 4 hour water flushes to provide: 1512 kcal, 68 gm protein, 81 gm fat, 140 gm CHO, 13 gm fiber, 609 mL free water, 1509 mL total water, 89% RDIs Hilda Corley, RD Pager [phone]

## 2019-01-02 NOTE — PROGRESS NOTES
Neurology Progress Note Patient ID: 
Donavan Waggoner 
[id number] 
19 y.o. 
1960 Subjective: Ms Radha Ron is seen today as follow up post cardiac arrest. Initial EEG - burst suppression. On exam yesterday - decerebrate posturing. Head CT this am with significant cerebral edema. Current Facility-Administered Medications Medication Dose Route Frequency  sodium chloride (NS) flush 5-10 mL  5-10 mL IntraVENous Q8H  
 sodium chloride (NS) flush 5-10 mL  5-10 mL IntraVENous PRN  
 calcium gluconate 1 g in 0.9% sodium chloride 100 mL IVPB  1 g IntraVENous ONCE  
 insulin regular (NOVOLIN R, HUMULIN R) injection 8 Units  8 Units IntraVENous ONCE  
 dextrose (D50W) injection syrg 25 g  25 g IntraVENous ONCE  
 glucose chewable tablet 16 g  4 Tab Oral PRN  
 glucagon (GLUCAGEN) injection 1 mg  1 mg IntraMUSCular PRN  
 dextrose (D50W) injection syrg 12.5-25 g  25-50 mL IntraVENous PRN  
 dextrose 10% infusion 1,000 mL  1,000 mL IntraVENous CONTINUOUS  
 pantoprazole (PROTONIX) 40 mg in sodium chloride 0.9% 10 mL injection  40 mg IntraVENous DAILY  heparin (porcine) injection 5,000 Units  5,000 Units IntraVENous Q8H  
 isoniazid (NYDRAZID) tablet 300 mg  300 mg Per NG tube DAILY  pyridoxine (vitamin B6) (VITAMIN B-6) tablet 50 mg  50 mg Per NG tube DAILY  rifAMPin (RIFADIN) capsule 600 mg  600 mg Oral DAILY  acetaminophen (TYLENOL) suppository 650 mg  650 mg Rectal Q6H PRN  chlorhexidine (PERIDEX) 0.12 % mouthwash 10 mL  10 mL Oral Q12H  
 0.9% sodium chloride infusion 250 mL  250 mL IntraVENous PRN  
 sodium chloride (NS) flush 5-10 mL  5-10 mL IntraVENous Q8H  
 sodium chloride (NS) flush 5-10 mL  5-10 mL IntraVENous PRN  
 0.9% sodium chloride infusion  100 mL/hr IntraVENous DIALYSIS PRN  
 heparin (porcine) 1,000 unit/mL injection 1,000 Units  1,000 Units InterCATHeter DIALYSIS PRN  
 levETIRAcetam (KEPPRA) 750 mg in 0.9% sodium chloride 100 mL IVPB  750 mg IntraVENous Q24H Objective: Active hospital medications were reviewed Lab results and neuroradiology studies from the last 24 hours were reviewed. Prior to Admission medications Medication Sig Start Date End Date Taking? Authorizing Provider  
losartan (COZAAR) 50 mg tablet Take 1 Tab by mouth daily. 10/22/18   Nereida Weeks MD  
minoxidil (LONITEN) 10 mg tablet Take 1 Tab by mouth two (2) times a day. 10/21/18   Nereida Weeks MD  
OTHER Check CBC, CMP, MG on 10/24/18, Results to PCP immediately, Diagnosis- TB 10/21/18   Nereida Weeks MD  
polyethylene glycol (MIRALAX) 17 gram packet Take 1 Packet by mouth daily. 10/22/18   Nereida Weeks MD  
levoFLOXacin (LEVAQUIN) 500 mg tablet Take 1 Tab by mouth every fourty-eight (48) hours. 10/21/18   Nereida Weeks MD  
OTHER Graded Compression Stockings b/l LE- Use as directed 10/21/18   Nereida Weeks MD  
dicyclomine (BENTYL) 20 mg tablet Take 20 mg by mouth three (3) times daily as needed (abdominial pain). Provider, Historical  
cloNIDine HCl (CATAPRES) 0.3 mg tablet Take 0.3 mg by mouth three (3) times daily. Provider, Historical  
carvedilol (COREG) 25 mg tablet Take 1 Tab by mouth every twelve (12) hours. 10/6/18   Nereida Weeks MD  
glycerin, adult, (FLEET GLYCERIN, ADULT,) suppository Insert 1 Suppository into rectum daily as needed. Indications: BOWEL EVACUATION 10/6/18   Nereida Weeks MD  
bisacodyl (DULCOLAX) 5 mg EC tablet Take 1 Tab by mouth daily as needed for Constipation. 8/5/18   Trisha Haynes MD  
albuterol (PROVENTIL HFA, VENTOLIN HFA, PROAIR HFA) 90 mcg/actuation inhaler Take 2 Puffs by inhalation every four (4) hours as needed for Wheezing. 7/21/18   Nereida Weeks MD  
ethambutol (MYAMBUTOL) 400 mg tablet Take 2 Tabs by mouth every Tuesday Thursday, Saturday. At 5 PM 7/21/18   Nereida Weeks MD  
isoniazid (NYDRAZID) 300 mg tablet Take 1 Tab by mouth daily.  On dialysis days, please take medication after dialysis 7/21/18   Tari Vazquez MD  
pyrazinamide 500 mg tablet Take 2 Tabs by mouth every Tuesday Thursday, Saturday. At 5 pm  Indications: active tuberculosis 7/24/18   Tari Vazquez MD  
rifAMPin (RIFADIN) 300 mg capsule Take 2 Caps by mouth daily. On dialysis days please take this medication after Dialysis 7/21/18   Tari Vazquez MD  
docusate sodium (COLACE) 100 mg capsule Take 1 Cap by mouth two (2) times a day. 7/21/18   Tari Vazquez MD  
pyridoxine, vitamin B6, (VITAMIN B-6) 100 mg tablet Take 1 Tab by mouth daily. 7/22/18   Tari Vazquez MD  
OTHER Incentive Spirometry- use as directed 7/21/18   Tari Vazquez MD  
aspirin 81 mg chewable tablet Take 1 Tab by mouth daily. 3/8/18   Martha Hein MD  
atorvastatin (LIPITOR) 40 mg tablet Take 1 Tab by mouth nightly. 3/8/18   Martha Hein MD  
melatonin 3 mg tablet Take 1 Tab by mouth nightly as needed. Patient taking differently: Take 1 Tab by mouth nightly as needed (insomnia). 3/8/18   Martha Hein MD  
FLUoxetine (PROZAC) 10 mg capsule Take 1 Cap by mouth daily. 3/8/18   Martha Hein MD  
 
Patient Vitals for the past 8 hrs: 
 BP Temp Pulse Resp SpO2  
01/02/19 1200 172/80  98 12 100 % 01/02/19 1100 188/75  95 12 99 % 01/02/19 1000 174/81  97 12 99 % 01/02/19 0900 132/71  97 14 99 % 01/02/19 0800 183/88 (!) 100.8 °F (38.2 °C) 93 11 98 % 01/02/19 0700 175/88  95 24 99 % 01/02/19 0600 147/72  99 23 98 % 01/02/19 0500 162/77  99 13 98 % [id number] 1901 - 01/02 0700 In: 2064 [I.V.:1774] Out: 700  
12/31 1901 - 01/02 0700 In: 2064 [I.V.:1774] Out: 700 RESULTRCNT(24h)Active Problems: 
  Cardiopulmonary arrest with successful resuscitation ([facility] [address] (12/31/2018) Cardiac arrest ([facility] [address] (12/31/2018) Additional comments:I reviewed the patient's new clinical lab test results and I reviewed the patients new imaging test results.   
 
General Exam 
 Intubated , not on sedation Neurologic Exam 
 
MS: unresponsive, not on sedation. No spontaneous movements Pupils midline, 8 mm, not reacitve to light. Corneal reflexes absent. VOR absent. Gag absent. Flaccid tone. Hyporeflexia . Head CT: severe cerebral edema. Assessment:  
 
Jose Carlos Castro is a 66 y.o. who is seen for post-anoxic brain injury. Repeat head CT - severe cerebral edema. The EEG was canceled today because the result of the head CT was enough. Unfortunately the cerebral edema is severe and those changes are not compatible with life. Plan: - I recommend withdraw of care , unfortunately she will not recover.   
 
 
Signed: 
Eugenio Rae MD 
Adult Neurologist 
1/2/2019 
12:37 PM

## 2019-01-03 LAB
PHENYTOIN FREE SERPL-MCNC: 2.1 UG/ML (ref 1–2)
PHENYTOIN SERPL-MCNC: 8.8 UG/ML (ref 10–20)

## 2019-01-03 NOTE — PROGRESS NOTES
Death Pronouncement [address] Patient lying in bed, mouth open, eyes closed. Pupils fixed and equal bilaterally. No response to verbal or painful stimuli. No heart or lung sounds auscultated. No carotid or peripheral pulses. Death officially pronounced at 8:56 PM, 1/2/2019 Lizz Schmidt MD  
[address] Medicine PGY-1 
01/02/19 9:00 PM

## 2019-01-03 NOTE — PROGRESS NOTES
responded to the death of  Alberto Mayer, who was a 66 y.o.,female, The  provided the following Interventions: 
Provided crisis spiritual support and grief interventions. Family was in the hospital room having a cultural ritual in their own language.  spoke briefly to a nephew who then returned shortly to the family in with the . Offered silent prayers on behalf of the patient. Nurse said that [facility] will be taking the body directly from the ICU. Then the body will be transported via [employer] to [address] family member is bringing clothes to be place on . Other family visitors were in the ICU waiting room and  provided pastoral support briefly. They soon left the hospital.  
Chart reviewed. Plan: 
Chaplains will continue to follow and will provide spiritual care and grief support for the family. Hunter Quinones MDiv. Board Certified [employer] [facility] [phone]

## 2019-01-03 NOTE — PROGRESS NOTES
Bedside and Verbal shift change report [facility] (oncoming nurse) by Ashish Monreal (offgoing nurse). Report included the following information SBAR, Kardex, ED Summary, Intake/Output, MAR and Recent Results.

## 2019-01-03 NOTE — PROGRESS NOTES
: Received report from [place]. Family at bedside saying final goodbyes and declining patient assessment at this time. Family wishes to extubate patient to comfort care @ 8pm tonight. They will let me know when they are ready. : Family ready for extubation. Patient prepared with RT and given robinul and morphine at . Patient extubated  with gasping efforts noted. Suctioned orally and placed on 3LNC for comfort. : Patient given sublingual ativan per order for gasping episodes for comfort. Family brought to bedside. : Patient has passed. Pastoral care and House MD paged.  home called by son and updated. Will call her back with time of death and pronouncing MD. Tristen Brooks with Supervisor regarding the  home ( Donnie Lopez) picking up patient on unit after cultural rituals completed by family - Estephania said it was okay for them to come to the unit with security. : Patient pronounced and [address] [place] [address] called. Ruled out for tissue donation, awaiting eye bank to call back. : Attempts made to reach [address] [place] [address] to find out status of eye bank, unable to reach . : Able to speak to coordinator at [address] [place] [address] and she says patient has been released for donation. Burton Tran called at [facility] to inform of MD pronouncing and [employer] release. She will call [facility] to  patient from unit. Son and family updated. Nursing Supervisor updated. 0000: Family went home for the night. Very appreciative of care. 0100:  service here to pickup patient - security accompanies service - patient moved to [facility] and signed out by me and  service in [address] [place] [address].

## 2019-01-04 LAB
ABO + RH BLD: NORMAL
BLD PROD TYP BPU: NORMAL
BLD PROD TYP BPU: NORMAL
BLOOD GROUP ANTIBODIES SERPL: NORMAL
BPU ID: NORMAL
BPU ID: NORMAL
CALLED TO:,BCALL1: NORMAL
CROSSMATCH RESULT,%XM: NORMAL
CROSSMATCH RESULT,%XM: NORMAL
SPECIMEN EXP DATE BLD: NORMAL
STATUS OF UNIT,%ST: NORMAL
STATUS OF UNIT,%ST: NORMAL
UNIT DIVISION, %UDIV: 0
UNIT DIVISION, %UDIV: 0

## 2019-02-19 NOTE — ROUTINE PROCESS
1930-Bedside and verbal report from Lorna Lenz RN. Pt resting in bed, SR on the monitor in the 60's, no apparent pain, pt refuses to use blue phone, call bell within reach. 2030-Pt resting, NAD, no apparent pain, call bell within reach, family at bedside helping her eat, no SOB or nausea at this time. 2130-Pt called for assistance to bedside commode, no apparent pain, call bell within reach, bed alarm on, pt appears comfortable. 2230-Pt resting, NAD, no apparent pain, call bell within reach, bed alarm on.  
 
2330-Pt sleeping, NSR, no apparent pain, call bell within reach, bed alarm on.  
 
0130-Pt sleeping, NSR, no apparent pain, call bell within reach, bed alarm on.  
 
0220-Pt called for assistance to bedside commode, no apparent pain, call bell within reach, linen changed and pt briefly washed up, gown changed, pt declined oral care at this time. 0430-Pt sleeping, NAD, no apparent pain, call bell within reach, bed alarm on, NSR on the monitor.   
 
0700-Bedside and verbal report given to Lorna Lenz RN 

1930-Bedside and verbal report from Min Siddiqui RN. Pt resting in bed, NAD, no apparent pain, pt doesn't want to use blue phone to communicate, call bell within reach, bed alarm on. 2030-Pt resting, NAD, no apparent pain, call bell within reach, no SOB, pt knows to call for assistance to RR, no nausea/vomting. BP stable. 2130-Pt resting, NAD, no apparent pain or SOB, call bell within reach, family at bedside, no nausea or vomiting. BP stable. 2230-Pt resting, NAD, no apparent pain or SOB, call bell within reach, family at bedside, no nausea or vomiting, BP stable at 118/67. Evening clonidine held per parameters. 2247-IV levaquin started, pt became restless, nauseated, vomited twice and BP jumped from 127/87 to 146/129. Levaquin stopped. Zofran given. 0000-Pt intermittently vomiting/dry heaving. No apparent pain, call bell within reach, bed alarm on.  
 
0200-IV Phenergan given diluted in 50cc running over 15 minutes per protocol for unresolved nausea. Pt resting, BP stable 153/97. No apparent pain or SOB at this time, bed alarm on, door open to monitor pt.  
 
 
0400-Pt resting, no nausea at this time, call bell within reach, bed low for safety, bed alarm on.  
 
0600-Pt sleeping, NAD, no apparent pain, call bell within reach, no SOB, vitals stable. 0700-Bedside and verbal report given to George Monsivais RN.
Bedside and Verbal shift change report given to Herbert Mckinney (oncoming nurse) by Beatrice Kelley RN (offgoing nurse). Report included the following information SBAR, Kardex, Intake/Output, MAR, Recent Results and Cardiac Rhythm NSR.
Bedside and Verbal shift change report given to Yamil Hayward RN and [facility] (oncoming nurse) by Hanh Ivan RN (offgoing nurse). Report included the following information SBAR, Kardex, Intake/Output, MAR, Recent Results and Cardiac Rhythm NSR.
Attending Attestation (For Attendings USE Only)...

## 2019-04-24 NOTE — DISCHARGE SUMMARY
Discharge Summary Patient: Hermilo Goldman               Sex: female          DOA: 12/31/2018 YOB: 1960      Age:  66 y.o.        LOS:  LOS: 2 days Admit Date: 12/31/2018 Discharge Date: 4/24/2019 Admission Diagnoses: Cardiopulmonary arrest with successful resuscitation ([facility] Cardiac arrest ([facility] Discharge Diagnoses:   
Problem List as of 1/2/2019 Date Reviewed: 10/16/2018 Codes Class Noted - Resolved Cardiopulmonary arrest with successful resuscitation [facility]) ICD-10-CM: I46.9 ICD-9-CM: 427.5  12/31/2018 - Present Cardiac arrest [facility]) ICD-10-CM: I46.9 ICD-9-CM: 427.5  12/31/2018 - Present Malignant hypertension ICD-10-CM: I10 
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 9XXA ICD-9-CM: 996.1  6/18/2018 - Present Anemia ICD-10-CM: D64.9 ICD-9-CM: 285.9  2/26/2018 - Present Hyponatremia ICD-10-CM: E87.1 ICD-9-CM: 276.1  2/25/2018 - Present ESRD (end stage renal disease) ([facility] ICD-10-CM: N18.6 ICD-9-CM: 585.6  2/25/2018 - Present NSTEMI (non-ST elevated myocardial infarction) ([facility] ICD-10-CM: I21.4 ICD-9-CM: 410.70  2/25/2018 - Present Respiratory failure ([facility] ICD-10-CM: J96.90 ICD-9-CM: 518.81  2018 - Present Hypoxia ICD-10-CM: R09.02 
ICD-9-CM: 799.02  2018 - Present Acute UTI ICD-10-CM: N39.0 ICD-9-CM: 599.0  2018 - Present Hyperkalemia ICD-10-CM: E87.5 ICD-9-CM: 276.7  2017 - Present Renal failure ICD-10-CM: N19 
ICD-9-CM: 186  2017 - Present SCOOTER (acute kidney injury) ([facility] ICD-10-CM: N17.9 ICD-9-CM: 584.9  2017 - Present Discharge Condition:  Hospital Course: 58F admitted to ICU from home for cardiac arrest s/p ROSC, head imaging showed bilateral edema with poor prognosis, initially managed in the ICU with supportive care, family decided to proceed with comfort measure with compassionate extubation on 19 Consults:  
 Treatment Team: Scribe: Tray Lopez;  Consulting Provider: Giuseppe Fink MD; Consulting Provider: Kelli Paez MD; Consulting Provider: Sagrario Askew MD; Consulting Provider: Keara Hein MD; Utilization Review: Fox Desir RN

## 2019-06-17 NOTE — PROGRESS NOTES
[address] [facility]  Family Medicine Attending    S: 70 y.o. female with HTN, CHF, CAD presented with weakness and dizziness and found to be in atrial fib with RVR. She has been nonadherent with meds because of cost according to her. Very anxious regarding her home situation (lives with relatives but is looking for apartment). States that she normally cares for herself, but has walker since her TKA's bilaterally. She does not use her walker though. Today, she feels better, but still dizzy on standing. States that most of her difficulties are because of her knees. Also says that she cannot return to her home situation. O: VS- Blood pressure 135/89, pulse 92, temperature 99 °F (37.2 °C), temperature source Temporal, resp. rate 18, height 5' 9\" (1.753 m), weight 206 lb (93.4 kg), SpO2 95 %, not currently breastfeeding. Exam is as noted by resident with the following changes, additions or corrections:  Alert, oriented  Heart - RRR now  Lungs- clear  Abd - soft  Slightly unsteady with standing - scars noted on both lower legs from TKA    Impressions:   Principal Problem:    Atrial fibrillation with RVR ([facility]  Active Problems:    Essential hypertension    Asthma    NYHA class 3 heart failure with reduced ejection fraction (HCC)    Hyperlipidemia    Frequent falls    History of non-ST elevation myocardial infarction (NSTEMI)    JAMES (acute kidney injury) ([facility]  Resolved Problems:    * No resolved hospital problems. *      Plan:   Increased carvedilol and held all diuretics   Consult to cardiology   PT/OT consult    to assist with disposition and medication compliance   Possible D/C soon if arrangements are made     Attending Physician Statement  I have reviewed the chart and seen the patient with the resident(s). I personally reviewed images, EKG's and similar tests, if present.   I personally reviewed and performed key elements of the history and exam.  I have reviewed and confirmed student and/or [facility] Hospitalist Group  Progress Note    Patient: Marcell Soares Age: 49 y.o. : 1960 MR#: [id number] SSN: [id number]  Date: 2017     Subjective:     Seen with family @ bedside. Pt after removal of HD access in right groin went to bathroom and had episode of bleeding from site of removal, per nursing, one pad dressing soaked through but then after that stopped. Assessment/Plan:   1. ESRD c HD - s/p perm cath placement today. 2. HyperK+ resolved. 3. HTN - Procardia, Clonidine. 4. Anemia of chronic dz - noted. Venofer. 5. Dyslipidemia hx - on no meds as outpt. Additional Notes:      Case discussed with:  [x]Patient  [x]Family  [x]Nursing  []Case Management  DVT Prophylaxis:  []Lovenox  [x]Hep SQ  []SCDs  []Coumadin   []On Heparin gtt    Objective:   VS:   Visit Vitals    /83    Pulse 100    Temp 99.1 °F (37.3 °C) (Axillary)    Resp 16    Ht 5' 2\" (1.575 m)    Wt 57 kg (125 lb 9.6 oz)    SpO2 96%    Breastfeeding No    BMI 22.97 kg/m2      Tmax/24hrs: Temp (24hrs), Av.5 °F (36.9 °C), Min:98.1 °F (36.7 °C), Max:99.1 °F (37.3 °C)      Intake/Output Summary (Last 24 hours) at 17  Last data filed at 17 1630   Gross per 24 hour   Intake                0 ml   Output                0 ml   Net                0 ml       General:  Awake, alert, NAD. Cardiovascular:  RRR. Pulmonary:  CTA B.  GI:  Soft, NT/ND, NABS. Extremities:  No CT or edema. Additional:  Dressing left groin c/d/i    Labs:    No results found for this or any previous visit (from the past 24 hour(s)).     Signed By: Tesfaye Bañuelos MD     2017 6:38 PM resident history and exam with changes as indicated above. I agree with the assessment, plan and orders as documented by the resident. Please refer to the resident and/or student note for additional information.       Feng Donis

## 2019-07-03 NOTE — H&P
Date of Surgery Update:  Tamela Maldonado was seen and examined. History and physical has been reviewed. The patient has been examined.  There have been no significant clinical changes since the completion of the originally dated History and Physical.    Signed By: Yoel Cunningham MD     January 22, 2018 11:03 AM -1

## 2019-09-01 NOTE — DIALYSIS
ACUTE HEMODIALYSIS FLOW SHEET    HEMODIALYSIS ORDERS: Physician: Martell Potts MD     Dialyzer: revaclear   Duration:4 hr  BFR:350   DFR: 600   Dialysate:  Temp 36.9 K+   3    Ca+ 2.5 Na 140 Bicarb 35   Weight:  38.2 kg    Bed Scale [x]     Unable to Obtain []      Dry weight/UF Goal:2500 Access Right subclavian catheter      Heparin []  Bolus      Units    [] Hourly       Units    [x]None      Catheter locking solution    Pre BP:   144/78    Pulse:     77   Temperature:   98.1 Respirations: 18  Tx: NS       ml/Bolus  Other        [] N/A   Labs: Pre        Post:        [] N/A   Additional Orders(medications, blood products, hypotension management):    [] N/A     [x] [facility] Consent Verified     CATHETER ACCESS []N/A   [x]Right   [x]Left   [x]IJ     []Fem   [] First use X-ray verified     []Tunnel                [] Non Tunneled   [x]No S/S infection  []Redness  []Drainage []Cultured []Swelling []Pain   [x]Medical Aseptic Prep Utilized   []Dressing Changed  [] Biopatch  Date:  3/7/2018     []Clotted   []Patent   Flows: []Good  []Poor  [x]Reversed at 1035 am   If access problem,  notified: []Yes    []N/A  Date:           GRAFT/FISTULA ACCESS: []N/A     []Right     [x]Left     []UE     []LE   []AVG   [x]AVF        []Buttonhole    []Medical Aseptic Prep Utilized   []No S/S infection  []Redness  []Drainage []Cultured []Swelling []Pain    Bruit:   [x] Strong    [] Weak       Thrill :   [] Strong    [] Weak       Needle Gauge:   Length:     If access problem,  notified: []Yes     []N/A  Date:        Please describe access if present and not used:       GENERAL ASSESSMENT:   LUNGS:  Rate 18 SaO2%       [] N/A    [x] Clear  [] Coarse  [] Crackles  [] Wheezing        [] Diminished     Location : []RLL   []LLL    []RUL  []ELÍAS   Cough: []Productive  []Dry  []N/A   Respirations:  []Easy  []Labored   Therapy:  [x]RA  []NC l/min    Mask: []NRB []Venti       O2%                  []Ventilator  []Intubated  [] Trach  [] BiPaP CARDIAC: [x]Regular      [] Irregular   [] Pericardial Rub  [] JVD        []  Monitored  [] Bedside  [] Remotely monitored [] N/A  Rhythm:    EDEMA: [x] None  []Generalized  [] Pitting [] 1    [] 2    [] 3    [] 4                 [] Facial  [] Pedal  []  UE  [] LE   SKIN:   [x] Warm  [] Hot     [] Cold   [] Dry     [] Pale   [] Diaphoretic                  [] Flushed  [] Jaundiced  [] Cyanotic  [] Rash  [] Weeping   LOC:    [x] Alert      [x]Oriented:    [x] Person     [x] Place  [x]Time               [] Confused  [] Lethargic  [] Medicated  [] Non-responsive     GI / ABDOMEN:  [x] Flat    [] Distended    [x] Soft    [] Firm   []  Obese                             [] Diarrhea  [x] Bowel Sounds  [] Nausea  [] Vomiting       / URINE ASSESSMENT:[] Voiding   [] Oliguria  [] Anuria   []  Onofre     [] Incontinent    []  Incontinent Brief      []  Bathroom Privileges     PAIN: [x] 0 []1  []2   []3   []4   []5   []6   []7   []8   []9   []10            Scale 0-10  Action/Follow Up:    MOBILITY:  [] Amb    [] Amb/Assist    [x] Bed    [] Wheelchair  [] Stretcher      All Vitals and Treatment Details on Attached [facility] [place]          Room #    [] 1st Time Acute  [] Stat  [x] Routine  [] Urgent     [x] Acute Room  []  Bedside  [] ICU/CCU  [] ER   Isolation Precautions:  [] Dialysis   [] Airborne   [] Contact    [] Reverse   Special Considerations:         [] Blood Consent Verified []N/A     ALLERGIES: Bananas [] NKA          Code Status:  [x] Full Code  [] DNR  [] Other           HBsAg ONLY: Date Drawn 2/25/2018         [x]Negative []Positive []Unknown   HBsAb: Date 2/25/2018    [x] Susceptible   [] [id number] []Not Drawn  [] Drawn     Current Labs:    Date of Labs: Today [x]        Cut and paste current Results for Jana Tino (MRN [id number]) as of 3/9/2018 11:09   Ref.  Range 3/9/2018 02:33   WBC Latest Ref Range: 4.6 - 13.2 K/uL 8.7   RBC Latest Ref Range: 4.20 - 5.30 M/uL 2.98 (L)   HGB Latest Ref Range: 12.0 - 16.0 g/dL 7.5 (L)   HCT Latest Ref Range: 35.0 - 45.0 % 24.4 (L)   MCV Latest Ref Range: 74.0 - 97.0 FL 81.9   MCH Latest Ref Range: 24.0 - 34.0 PG 25.2   MCHC Latest Ref Range: 31.0 - 37.0 g/dL 30.7 (L)   RDW Latest Ref Range: 11.6 - 14.5 % 16.4 (H)   PLATELET Latest Ref Range: 135 - 420 K/uL 287   MPV Latest Ref Range: 9.2 - 11.8 FL 11.3   NEUTROPHILS Latest Ref Range: 42 - 75 % 69   BAND NEUTROPHILS Latest Ref Range: 0 - 5 % 6 (H)   LYMPHOCYTES Latest Ref Range: 20 - 51 % 9 (L)   MONOCYTES Latest Ref Range: 2 - 9 % 14 (H)   EOSINOPHILS Latest Ref Range: 0 - 5 % 2   BASOPHILS Latest Ref Range: 0 - 3 % 0   DF Latest Units:   MANUAL   ABS. NEUTROPHILS Latest Ref Range: 1.8 - 8.0 K/UL 6.5   ABS. LYMPHOCYTES Latest Ref Range: 0.8 - 3.5 K/UL 0.8   ABS. MONOCYTES Latest Ref Range: 0 - 1.0 K/UL 1.2 (H)   ABS. EOSINOPHILS Latest Ref Range: 0.0 - 0.4 K/UL 0.2   ABS. BASOPHILS Latest Ref Range: 0.0 - 0.06 K/UL 0.0   RBC COMMENTS Latest Units:   HYPOCHROMIA. ..    PLATELET COMMENTS Latest Units:   ADEQUATE PLATELETS   Sodium Latest Ref Range: 136 - 145 mmol/L 136   Potassium Latest Ref Range: 3.5 - 5.5 mmol/L 3.2 (L)   Chloride Latest Ref Range: 100 - 108 mmol/L 101   CO2 Latest Ref Range: 21 - 32 mmol/L 25   Anion gap Latest Ref Range: 3.0 - 18 mmol/L 10   Glucose Latest Ref Range: 74 - 99 mg/dL 105 (H)   BUN Latest Ref Range: 7.0 - 18 MG/DL 23 (H)   Creatinine Latest Ref Range: 0.6 - 1.3 MG/DL 4.83 (H)   BUN/Creatinine ratio Latest Ref Range: 12 - 20   5 (L)   Calcium Latest Ref Range: 8.5 - 10.1 MG/DL 8.4 (L)   GFR est non-AA Latest Ref Range: >60 ml/min/1.73m2 9 (L)   GFR est AA Latest Ref Range: >60 ml/min/1.73m2 11 (L)   Bilirubin, total Latest Ref Range: 0.2 - 1.0 MG/DL 0.3   Protein, total Latest Ref Range: 6.4 - 8.2 g/dL 6.1 (L)   Albumin Latest Ref Range: 3.4 - 5.0 g/dL 2.9 (L)   Globulin Latest Ref Range: 2.0 - 4.0 g/dL 3.2   A-G Ratio Latest Ref Range: 0.8 - 1.7   0.9   ALT (SGPT) Latest Ref Range: 13 - 56 U/L 17   AST Latest Ref Range: 15 - 37 U/L 21   Alk. phosphatase Latest Ref Range: 45 - 117 U/L 101   here. DIET:  [x] Renal    [] Other     [] NPO     []  Diabetic      PRIMARY NURSE REPORT: First initial/Last name/Title      Pre Dialysis:MIMA Lydia Sykes RN   Time: 7224     EDUCATION:    [x] Patient [x] Other   Language barrier.       Knowledge Basis: []None []Minimal [] Substantial   Barriers to learning Language barrier []N/A   [] Access Care     [] S&S of infection     [] Fluid Management     []K+     []Procedural    []Albumin     [] Medications     [] Tx Options     [] Transplant     [] Diet     [] Other   Teaching Tools:  [] Explain  [] Demo  [] Handouts [] Video  Patient response:   [] Verbalized understanding  [] Teach back  [] Return demonstration [] Requires follow up   Inappropriate due to            [x] Time Out/Safety Check  [x]Extracorporeal Circuit Tested for integrity       RO/HEMODIALYSIS MACHINE SAFETY CHECKS  Before each treatment:     Machine Number:                   [facility]                                   [] Unit Machine # 7 with centralized RO           Other:         [x] RO/Machine Log Complete      Temp    36.7             Dialysate: pH 7.4 Conductivity: Meter       HD Machine   14.1                  TCD:   Dialyzer Lot # [id number]            Blood Tubing Lot # [id number]          Saline Lot #  [id number]     CHLORINE TESTING-Before each treatment and every 4 hours    Total Chlorine: [x] less than 0.1 ppm  Time: 1030 4 Hr/2nd Check Time: 1330   (if greater than 0.1 ppm from Primary then every 30 minutes from Secondary)     TREATMENT INITIATION  with Dialysis Precautions:   [x] All Connections Secured                 [x] Saline Line Double Clamped   [x] Venous Parameters Set                  [x] Arterial Parameters Set    [x] Prime Given  250ml                          [x]Air Foam Detector Engaged Treatment Initiation Note:  pt arrive via transport in bed, pt is A&O, no S/S of distress, VSS. tx started with out complications. .... CVC no S/S of complications. ..... [place] During Treatment Notes:     Medication Dose Volume Route Initials Dialyzer Cleared:[] Good [] Fair  [] Poor    Blood processed:  60.2 L  UF Removed  2301 Ml    Post Wt: 1801    kg  POst BP:   153/92       Pulse: 99      Respirations: 22  Temperature: 98.0                                   Post Tx Vascular Access: AVF/AVG: Bleeding stopped Art min. Wei. Min   N/A                                   Catheter: Locking solution: Heparin 1:1000 Art. Post Assessment:                                    Skin:  [x] Warm  [] Dry [] Diaphoretic    [] Flushed  [] Pale [] Cyanotic   [facility] Signatures Title Initials  Time Lungs: [x] Clear    [] Course  [] Crackles  [] Wheezing [] Diminished    RN   Cardiac: [x] Regular   [] Irregular   [] Monitor  [] N/A  Rhythm:           Edema:  [x] None    [] General     [] Facial   [] Pedal    [] UE    [] LE       Pain: []0  []1  []2   []3  []4   []5   []6   []7   []8   []9   [x]10         Post Treatment Note:  Patient's treatment was cut short by one hour. She appeared to be in pain, grabbing at her legs. The nephrologist was notified. The patient did finally calm down, prior to leaving. tpt leaving via transport. Pt resting in bed, pt tolerated the tx, floor RN given treatment report. POST TREATMENT PRIMARY NURSE HANDOFF REPORT:     First initial/Last name/Title         Post Dialysis: MIMA Mayorga.  RN Time:  0     Abbreviations: AVG-arterial venous graft, AVF-arterial venous fistula, IJ-Internal Jugular, Subcl-Subclavian, Fem-Femoral, Tx-treatment, AP/HR-apical heart rate, DFR-dialysate flow rate, BFR-blood flow rate, AP-arterial pressure, -venous pressure, UF-ultrafiltrate, TMP-transmembrane pressure, Wei-Venous, Art-Arterial, RO-Reverse Osmosis Home

## 2020-01-06 NOTE — PROGRESS NOTES
PT orders received, chart reviewed. Pt unable to participate with PT due to:  []  Nausea/vomiting  []  Eating  []  Pain  []  Pt lethargic  [x]  Off Unit at dialysis  Will f/u later as patient's schedule allows. Thank you.   Darryle Blander PT, DPT n/a

## 2021-10-25 NOTE — CONSULTS
HPI: 61-year-old female known to me from a prior admission, I was asked to see her because of chief complaint of transient left-sided weakness. I had seen her back in July when she came with a single seizure which was believed to have been provoked by a combination of hypertensive crisis and isoniazid side use. An EEG did not show any epileptiform abnormalities and I recommended not starting the patient on antiepileptic medications at the time. She had a CT and an MRI of the brain which showed a moderate amount of diffuse microangiopathy involving both thalamus, the brainstem, the cerebellar hemispheres that were believed to be secondary to chronic microangiopathy. This time she was admitted on August 22 with left-sided weakness and inability to speak. She had systolic blood pressures in the 115J and diastolics over 421. This was treated and after improvement the patient became more alert and she was able to communicate better, was able to move her left arm and leg well and returned to her baseline. A CT of the head on August 22, which I have personally reviewed, showed no acute changes and again shows an extensive amount of cerebral white matter microangiopathic disease. Neurology was asked if another MRI was necessary. Social History     Social History    Marital status:      Spouse name: N/A    Number of children: N/A    Years of education: N/A     Occupational History    Not on file. Social History Main Topics    Smoking status: Never Smoker    Smokeless tobacco: Never Used    Alcohol use No    Drug use: No    Sexual activity: Not on file     Other Topics Concern    Not on file     Social History Narrative       No family history on file.     Current Facility-Administered Medications   Medication Dose Route Frequency Provider Last Rate Last Dose    minoxidil (LONITEN) tablet 2.5 mg  2.5 mg Oral DAILY Janeth Peterson MD   2.5 mg at 08/27/18 1442    rifAMPin (RIFADIN) capsule 600 Pt refused morning vitals and medication. Will try again later. mg  600 mg Oral ACD Sarah Romero MD   600 mg at 08/27/18 1444    niCARdipine (CARDENE) 25 mg in 0.9% sodium chloride 250 mL infusion  0-15 mg/hr IntraVENous TITRATE JanuaryMohinder AG PA-C        aspirin chewable tablet 81 mg  81 mg Oral DAILY Myrtice Tomas, NP   81 mg at 08/27/18 1444    ondansetron (ZOFRAN) injection 6 mg  6 mg IntraVENous Q6H PRN Nino Mclain MD        losartan (COZAAR) tablet 100 mg  100 mg Oral DAILY Myrtice Tomas, NP   100 mg at 08/27/18 4704    metoclopramide HCl (REGLAN) injection 5 mg  5 mg IntraVENous BID Alex Peters MD   5 mg at 08/27/18 0901    melatonin tablet 3 mg  3 mg Oral QHS Homa Parnell PA-C   3 mg at 08/26/18 2141    acetaminophen (TYLENOL) solution 500 mg  500 mg Oral Q4H PRN Homer Linder PA-C        polyethylene glycol (MIRALAX) packet 17 g  17 g Oral DAILY January-Jijohn AG PA-C   17 g at 08/27/18 0848    labetalol (NORMODYNE;TRANDATE) 20 mg/4 mL (5 mg/mL) injection 10 mg  10 mg IntraVENous Q6H PRN JanuaryDiannMarlinjohn AG PA-C   10 mg at 08/27/18 0847    isoniazid (NYDRAZID) tablet 300 mg  300 mg Oral DAILY Rhodia Sicard, PA-C   300 mg at 08/26/18 2105    atorvastatin (LIPITOR) tablet 40 mg  40 mg Oral QHS Yohana العلي MD   40 mg at 08/26/18 2141    bisacodyl (DULCOLAX) tablet 5 mg  5 mg Oral DAILY PRN Yohana العلي MD        docusate sodium (COLACE) capsule 100 mg  100 mg Oral BID Yohana العلي MD   100 mg at 08/27/18 0848    ethambutol (MYAMBUTOL) tablet 800 mg  800 mg Oral Q TUE, THU & SAT Yohana العلي MD   800 mg at 08/25/18 1711    pyrazinamide tablet 1,000 mg  1,000 mg Oral Q TUE, THU & SAT Yohana العلي MD   1,000 mg at 08/25/18 1711    pyridoxine (vitamin B6) (VITAMIN B-6) tablet 100 mg  100 mg Oral DAILY Yohana العلي MD   100 mg at 08/27/18 1445    sodium chloride (NS) flush 5-10 mL  5-10 mL IntraVENous Madhavi Manning MD   10 mL at 08/26/18 7578    sodium chloride (NS) flush 5-10 mL  5-10 mL IntraVENous PRN Martin Logan MD        heparin (porcine) injection 5,000 Units  5,000 Units SubCUTAneous Q8H Martin Logan MD   5,000 Units at 08/27/18 0848    hydrALAZINE (APRESOLINE) tablet 100 mg  100 mg Oral TID Luanne Henry PA-C   100 mg at 08/27/18 9355    carvedilol (COREG) tablet 25 mg  25 mg Oral Q12H Antonia Alcazar PA-C   25 mg at 08/27/18 0836    amLODIPine (NORVASC) tablet 10 mg  10 mg Oral DAILY Luanne Henry PA-C   10 mg at 08/27/18 3029    albuterol (PROVENTIL VENTOLIN) nebulizer solution 2.5 mg  2.5 mg Nebulization Q4H PRN Martin Logan MD        cloNIDine (CATAPRES) 0.3 mg/24 hr patch 1 Patch  1 Patch TransDERmal Q7D Haylie Farah NP   1 Patch at 08/22/18 2222       Past Medical History:   Diagnosis Date    Chronic kidney disease     ESRD (end stage renal disease) ([facility] [address]     TUES-THURS-SAT    Hypercholesteremia     Hypertension     Kidney disease     Vitamin D deficiency        Past Surgical History:   Procedure Laterality Date    VASCULAR SURGERY PROCEDURE UNLIST         Allergies   Allergen Reactions    Banana Other (comments)       Patient Active Problem List   Diagnosis Code    Hyperkalemia E87.5    Renal failure N19    SCOOTER (acute kidney injury) ([address] N17.9    Hyponatremia E87.1    ESRD (end stage renal disease) ([facility] [address] N18.6    NSTEMI (non-ST elevated myocardial infarction) ([facility] [address] I21.4    Respiratory failure (HCC) J96.90    Hypoxia R09.02    Acute UTI N39.0    Anemia Y76.1    Complication of vascular dialysis catheter, initial encounter T82. 9XXA    Seizure ([facility] [address] R56.9    Hypertensive emergency I16.1    Intractable vomiting with nausea R11.2    ESRD on hemodialysis (HCC) N18.6, Z99.2    Pulmonary TB A15.0    Hypertensive crisis I16.9         Review of Systems:   Unable to provide    PHYSICAL EXAMINATION:      VITAL SIGNS:    Visit Vitals    /80    Pulse (!) 104    Temp 97.4 °F (36.3 °C)    Resp 21    Ht 5' 1\" (1.549 m)    Wt 35.8 kg (78 lb 14.4 oz)    SpO2 99%    Breastfeeding No    BMI 14.91 kg/m2       GENERAL: Well developed, well nourished, in no apparent distress. HEART: RR, no murmurs heard, no carotid bruits  EXTREMITIES: No clubbing, cyanosis, or edema is identified. Pulses 2+    and symmetrical.  HEAD:   Normocephalic, atraumatic. NEUROLOGIC EXAMINATION     MENTAL STATUS:               Awake, alert, disoriented to date similar to July baseline, more detail mental status examination was not possible. She does  follow commands to mimicry. CRANIAL NERVES:             Visual fields are full to confrontation. Uncooperative for funduscopic examination. Pupils are reactive to light and accommodation. Extraocular movements are intact and there is no nystagmus. Face is symmetrical.   Hearing is present. The rest of cranial nerves could not be evaluated properly                                                                                 MOTOR:                    No obvious lateralizing weakness     CEREBELLAR:                     No tremors     SENSORY:               Unreliable                       DTR's:           +2 throughout, no long tract signs                           GAIT:        deferred      I have personally reviewed imaging studies. Impression: She has chronic intractable hypertension with chronic severe cerebrovascular disease. She had transient left-sided deficits associated with documented marked elevation on her blood pressures which improved with his treatment. Therefore, I do not think that repeating another MRI of the brain after she has had 2 CTs of the head and an MRI of the brain in the last month and a half to look at this recurrent issue with uncontrolled hypertension will change her current management which consists of targeting her uncontrolled hypertension.     Plan: 1 continue adjustment of medical management of her hypertension and formulate a good outpatient plan to try to prevent this from reoccurring. 2no further neurological studies are needed at this point as an inpatient. Will sign off. Please reconsult if needed. PLEASE NOTE:   Portions of this document may have been produced using voice recognition software. Unrecognized errors in transcription may be present. This note will not be viewable in [address]

## 2022-01-03 NOTE — OP NOTES
[facility] REPORT    Nikki Washington  MR#: [id number]  : 1960  ACCOUNT #: [de-identified]   DATE OF SERVICE: 2018    PREOPERATIVE DIAGNOSIS:  End-stage renal disease. POSTOPERATIVE DIAGNOSIS:  End-stage renal disease. PROCEDURE PERFORMED:  Ultrasound of bilateral internal jugular veins with interpretation, placement of right IJ tunnel dialysis catheter with fluoroscopy. SURGEON:   Steven Ball DO    ASSISTANT:  Marilee Cohen     ANESTHESIA:  Moderate sedation. COMPLICATIONS:  Zero. ESTIMATED BLOOD LOSS:  Zero. CONDITION:  The patient is stable. SPECIMEN REMOVED:  None. IMPLANT:  A 23 cm Palindrome catheter. ANESTHESIA:  Moderate sedation. DETAILS OF PROCEDURE:  The patient is a 44-year-old who has had a hematoma and now an occluded basilic vein transposition. She showed up in the Emergency Department with this today. There is no bleeding. The arm is stable. There is no compromise. There was an issue with her being newly diagnosed with tuberculosis and has been in therapy. We discussed with the patient and her family we will plan for placement of a tunneled dialysis catheter. I discussed the risk control from our hospital.  They advised we do her procedures in our negative pressure room even understanding that she has been in treatment program. Because we have to do this in our negative pressure room we wanted to do just a PermCath only. It is not a suitable space for surgical procedure regarding infection risk, but is suitable for PermCath placement. Everybody is agreeable. We came into the room. She had moderate sedation. The neck and chest were prepped and draped in usual standard fashion. 1% lidocaine was used for local.  She has multiple marks on her right side from prior catheters. I did an ultrasound on the left. I could not identify a jugular vein on the right. There was a small, but patent compressible vein with no DVT.   The carotid artery was pulsatile. I did an anterior puncture into the vein, placed a wire into the central system. I double checked this with fluoroscopy. I made a small incision here as well the point below the clavicle and tunneled a 23 cm Palindrome from the lower incision to the guidewire site then using a split sheath and serial dilators placed into the central system. Catheter was in good position. I flushed it and aspirates nicely, secured it with Prolene sutures. Appropriate sterile dressing was applied. Skin was closed with 4-0 Monocryl and Dermabond glue. I locked the catheter with a concentrated heparin, placed the cap. She was transferred back to the PACU space and she will be discharged and go to her regular dialysis unit tomorrow.       DO RAI Chavez / ARLENE  D: 06/18/2018 21:07     T: 06/18/2018 23:52  JOB #: [id number]

## 2022-02-28 NOTE — PROGRESS NOTES
C/o back pain rate as 6/10. Requesting tylenol. Tylenol 650 po given. See mar. Call light in reach. EEG completed 7/19/2018.

## 2022-09-29 NOTE — ROUTINE PROCESS
Received report on pt.from off going RN. Resting quietly in bed on rounds. Son and  at bedside. pt speaks Malay. Son interpreted for pt. sts she Denies c/o pain or SOB at this time and that she is aware she is having a cardiac cath . No acute distress noted. NPO for cardiac cath. . Will cont to monitor for any changes in status. 1015 to cath lab per bed acc by  and son. 1430 received back from cath lab per bed acc by son ans . Dressing to rt groin dry and intact. Son interpreting for pt. Son sts pt says she has no pain. Instructed per son that she has to stay in bed with rt leg straight until 3 pm. Pt assisted with food from home by . 1455 to dialysis per bed without noted distress. Abdominal pain

## 2023-12-13 NOTE — PROGRESS NOTES
Brief Postoperative Note      Patient: Lucy Carrillo  YOB: 1972  MRN: [id number]    Date of Procedure: 12/13/2023    Pre-Op Diagnosis Codes:     * NSTEMI (non-ST elevated myocardial infarction) ([address]) [I21.4]    Post-Op Diagnosis: Same       Procedure(s):  Left heart cath / coronary angiography    Surgeon(s):  Agustín Castillo MD    Assistant:  * No surgical staff found *    Anesthesia: IV Sedation    Estimated Blood Loss (mL): Minimal    Complications: None    Specimens:   * No specimens in log *    Implants:  * No implants in log *      Drains:   [REMOVED] NG/OG/NJ/NE Tube Orogastric 16 fr Center mouth (Removed)       Findings: Mild to moderate  coronary artery disease. Medical treatment is recommended and lifestyle changes.       Electronically signed by Agustín Castillo MD on 12/13/2023 at 3:39 PM Page out to [employer]. Pt in back in room and son has questions. He states he will come see.

## 2025-03-13 NOTE — PROGRESS NOTES
[facility]  Progress Note    Name: Tomi Ram   : 1960   MRN: [id number]   Date: 2018     [x]I have reviewed the flowsheet and previous days notes. Events overnight reviewed and discussed with nursing staff. Vital signs and records reviewed. Subjective:  Patient is a 57 yo female [place] never-smoker presented to the [facility] for a clotted AVF left arm graft on 19. Considering pt's active TB, permacath was placed instead. Pt's hemoglobin dropped from 9.3 to 5.4 post procedure. Pt was transferred to the ICU due to the low hemoglobin. CT Chest was obtained showing large right jugular catheter, tip at cavoatrial junction level. Major vessels in the thorax are unremarkable. With the source of bleeding likely hematoma between left pectoralis musculature as above, with adjacent chest wall edema on CT Chest.     18  No acute issues overnight. No complaints from patient except some pain at catheter site. Daughter at bedside              ROS:Review of systems not obtained due to patient factors. Vital Signs:    Visit Vitals    /78 (BP 1 Location: Right arm, BP Patient Position: At rest)    Pulse 75    Temp 97 °F (36.1 °C)    Resp 18    Ht 5' 1\" (1.549 m)    Wt 40 kg (88 lb 2.9 oz)    SpO2 95%    Breastfeeding No    BMI 16.66 kg/m2       O2 Device: Room air       Temp (24hrs), Av.9 °F (36.6 °C), Min:97 °F (36.1 °C), Max:98.4 °F (36.9 °C)       Intake/Output:   Last shift:      701 - 1900  In: 340 [P.O.:340]  Out: 650 [Urine:650]  Last 3 shifts: 1901 -  07  In: 90 [P.O.:90]  Out: -     Intake/Output Summary (Last 24 hours) at 18 1530  Last data filed at 18 1336   Gross per 24 hour   Intake              430 ml   Output              650 ml   Net             -220 ml     Physical Exam:  General: alert, cachectic   HEENT:  anicteric sclerae; pink palpebral conjunctivae; mucosa moist     Resp:  chest wall tender. Right permacath in place and not accessed. Symmetrical chest expansion, no accessory muscle use; good airway entry; no rales/wheezing/rhonchi noted.   CV:  S1, S2 present; regular rate and rhythm  GI:  Abdomen soft, non-tender; (+) active bowel sounds  Extremities:  +2 pulses on all extremities, right arm swollen with ecymosis due to multiple IV sticks, fistula left forearm without thrill   Skin:  Warm; no rashes/lesions noted  Neurologic:  non-focal  Devices:  permacath right     DATA:   Current Facility-Administered Medications   Medication Dose Route Frequency    epoetin pilar (EPOGEN;PROCRIT) injection 10,000 Units  10,000 Units IntraVENous DIALYSIS TUE, THU & SAT    sevelamer carbonate (RENVELA) tab 1,600 mg  1,600 mg Oral TID WITH MEALS    amLODIPine (NORVASC) tablet 5 mg  5 mg Oral DAILY    0.9% sodium chloride infusion 250 mL  250 mL IntraVENous PRN    ondansetron (ZOFRAN) injection 4 mg  4 mg IntraVENous Q4H PRN    oxyCODONE-acetaminophen (PERCOCET) 5-325 mg per tablet 1 Tab  1 Tab Oral Q4H PRN    morphine injection 2-4 mg  2-4 mg IntraVENous Q3H PRN    ondansetron (ZOFRAN ODT) tablet 4 mg  4 mg Oral Q8H PRN    hydrALAZINE (APRESOLINE) 20 mg/mL injection 10 mg  10 mg IntraVENous Q6H PRN    cloNIDine HCl (CATAPRES) tablet 0.3 mg  0.3 mg Oral BID    docusate sodium (COLACE) capsule 100 mg  100 mg Oral BID    polyethylene glycol (MIRALAX) packet 17 g  17 g Oral DAILY    rifAMPin (RIFADIN) capsule 300 mg  300 mg Oral DAILY    ethambutol (MYAMBUTOL) tablet 800 mg  800 mg Oral Q MON, WED & FRI    pyridoxine (vitamin B6) (VITAMIN B-6) tablet 50 mg  50 mg Oral DAILY    isoniazid (NYDRAZID) tablet 300 mg  300 mg Oral DAILY    pyrazinamide tablet 1,000 mg  1,000 mg Oral Q TUE, THU & SAT       Labs: Results:       Chemistry Recent Labs      06/21/18   0330  06/20/18   0530  06/19/18   1010   06/18/18   1930   GLU  90  91  85   < >  78   NA  132*  133*  132*   < >  134*   K  5.6*  5.2  6.6*   < >  6.0* CL  98*  100  93*   < >  96*   CO2  25  26  28   < >  26   BUN  53*  39*  78*   < >  71*   CREA  7.61*  5.98*  9.70*   < >  9.20*   CA  9.1  8.9  10.5*   < >  10.7*   AGAP  9  7  11   < >  12   BUCR  7*  7*  8*   < >  8*   AP   --    --    --    --   107   TP   --    --    --    --   5.3*   ALB   --    --    --    --   1.9*   GLOB   --    --    --    --   3.4   AGRAT   --    --    --    --   0.6*    < > = values in this interval not displayed. CBC w/Diff Recent Labs      06/21/18   0330  06/20/18   1950  06/20/18   1027   06/18/18   2320  06/18/18 1930   WBC  8.4   --    --    --   5.1  5.3   RBC  3.57*   --    --    --   2.12*  2.47*   HGB  9.7*  9.8*  10.5*   < >  5.4*  6.3*   HCT  29.8*  30.0*  32.3*   < >  17.1*  20.2*   PLT  166   --    --    --   236  229   GRANS   --    --    --    --   80*  81*   LYMPH   --    --    --    --   8*  10*   EOS   --    --    --    --   0  0    < > = values in this interval not displayed. Coagulation No results for input(s): PTP, INR, APTT in the last 72 hours. No lab exists for component: INREXT, INREXT    Liver Enzymes Recent Labs      06/18/18 1930   TP  5.3*   ALB  1.9*   AP  107   SGOT  22      ABG No results found for: PH, PHI, PCO2, PCO2I, PO2, PO2I, HCO3, HCO3I, FIO2, FIO2I   Microbiology No results for input(s): CULT in the last 72 hours.      Imaging:  No new    IMPRESSION:     · Active Pulmonary Tuberculosis, primary, diagnosis was on April bronchoscopy and pleural fluid cultures which resulted from [facility] ~6/6/18, treatment was initiated on 06/18/18   · Acute anemia secondary to hematoma within left pectoralis musculature post permacath placement on 06/18/18 -- Hgb stable at 10  · ESRD on dialysis  · Hyperkalemia -- improved after dialysis  · Clotted AVG s/p permacath placement on 06/18/18  · Liver mass, s/p percutaneous biopsy at [facility]  · History of NSTEMI, respiratory failure on mechanical ventilation in 2/18  · Poor PO intake / protein calorie malnutrition / BMI 16      PLAN:   · Resp -  On room air  · ID - Trend Temp and WBC Curve. AFB Treatment per public health department. Continue ethambutol, isoniazid, pyrazinamide, and rifampin. Supplemental Vit B6. [place] Precautions. John ID consulted for current treatment plan. ID Consulted. · CVS - vascular following, discuss further plans for dialysis access prior to DC   · Heme/onc - monitor H/h  · Renal - Nephrology Following, dialysis per Nephro.      · Prophylaxis - DVT(SCDs), GI          The patient is: [x] acutely ill Risk of deterioration: [x] moderate    [] critically ill  [] high     [x]See my orders for details    My assessment/plan was discussed with:  [x]nursing []PT/OT    []respiratory therapy    [x]family []       Signed By: Juana Molina MD     June 21, 2018 Attending Attestation (For Attendings USE Only)...

## (undated) DEVICE — DEPAUL AV FISTULA PACK: Brand: MEDLINE INDUSTRIES, INC.

## (undated) DEVICE — SYR 10ML LUER LOK 1/5ML GRAD --

## (undated) DEVICE — GAUZE,SPONGE,8"X4",12PLY,XRAY,STRL,LF: Brand: MEDLINE

## (undated) DEVICE — DERMABOND SKIN ADH 0.7ML -- DERMABOND ADVANCED 12/BX

## (undated) DEVICE — REM POLYHESIVE ADULT PATIENT RETURN ELECTRODE: Brand: VALLEYLAB

## (undated) DEVICE — SUTURE NONABSORBABLE MONOFILAMENT 5-0 C-1 1X24 IN PROLENE 8725H

## (undated) DEVICE — GOWN,SIRUS,FABRNF,XL,20/CS: Brand: MEDLINE

## (undated) DEVICE — SOLUTION IV 1000ML 0.9% SOD CHL

## (undated) DEVICE — GOWN,SIRUS,NONRNF,SETINSLV,2XL,18/CS: Brand: MEDLINE

## (undated) DEVICE — SOLUTION IV 500ML 0.9% SOD CHL FLX CONT

## (undated) DEVICE — SUTURE PERMAHAND SZ 3-0 L18IN NONABSORBABLE BLK SILK BRAID A184H

## (undated) DEVICE — 3M™ STERI-STRIP™ REINFORCED ADHESIVE SKIN CLOSURES, R1548, 1 IN X 5 IN (25 MM X 125 MM), 4 STRIPS/ENVELOPE: Brand: 3M™ STERI-STRIP™

## (undated) DEVICE — SOLUTION IRRIG 1000ML H2O STRL BLT

## (undated) DEVICE — STERILE POLYISOPRENE POWDER-FREE SURGICAL GLOVES: Brand: PROTEXIS

## (undated) DEVICE — SUTURE PERMA-HAND SZ 3-0 L24IN NONABSORBABLE BLK W/O NDL SA74H

## (undated) DEVICE — STERILE POLYISOPRENE POWDER-FREE SURGICAL GLOVES WITH EMOLLIENT COATING: Brand: PROTEXIS

## (undated) DEVICE — KENDALL SCD EXPRESS SLEEVES, KNEE LENGTH, MEDIUM: Brand: KENDALL SCD

## (undated) DEVICE — COVER US PRB W15XL120CM W/ GEL RUBBERBAND TAPE STRP FLD GEN

## (undated) DEVICE — LIGHT HANDLE: Brand: DEVON

## (undated) DEVICE — (D)GLOVE SURG TRIFLX 7.5 PWD L -- DISC BY MFR USE ITEM 302993

## (undated) DEVICE — COVER LT HNDL BLU PLAS

## (undated) DEVICE — SUT SLK 4-0 18IN TIE MP BLK --

## (undated) DEVICE — SUT SLK 3-0 30IN SH BLK --

## (undated) DEVICE — (D)PREP SKN CHLRAPRP APPL 26ML -- CONVERT TO ITEM 371833

## (undated) DEVICE — GOWN,SIRUS,NONRNF,SETINSLV,XL,20/CS: Brand: MEDLINE

## (undated) DEVICE — (D)SYR 10ML 1/5ML GRAD NSAF -- PKGING CHANGE USE ITEM 338027

## (undated) DEVICE — SUTURE MCRYL 3-0 L27IN ABSRB VLT SH L26MM 1/2 CIR Y316H

## (undated) DEVICE — 6 ML SYRINGE WITH HYPODERMIC SAFETY NEEDLE: Brand: MAGELLAN

## (undated) DEVICE — CATHETER DLYS PALINDROME

## (undated) DEVICE — HEX-LOCKING BLADE ELECTRODE: Brand: EDGE

## (undated) DEVICE — PREP SKN CHLRAPRP 26ML TNT -- CONVERT TO ITEM 373320

## (undated) DEVICE — SPONGE LAP 18X18IN STRL -- 5/PK

## (undated) DEVICE — SYRINGE MED 3ML NDL 22GA L1.5IN PLAS N CTRL LUERLOCK TIP

## (undated) DEVICE — APPLICATOR BNDG 1MM ADH PREMIERPRO EXOFIN

## (undated) DEVICE — Device

## (undated) DEVICE — SUTURE PERMAHAND SZ 2-0 L12X18IN NONABSORBABLE BLK SILK A185H

## (undated) DEVICE — MASTISOL ADHESIVE LIQ 2/3ML

## (undated) DEVICE — KIT CLN UP BON SECOURS MARYV

## (undated) DEVICE — PREP CHLORAPREP 10.5 ML ORG --

## (undated) DEVICE — SUTURE VCRL SZ 3-0 L27IN ABSRB UD L26MM SH 1/2 CIR J416H

## (undated) DEVICE — GLOVE SURG SZ 7 L11.33IN FNGR THK9.8MIL STRW LTX POLYMER

## (undated) DEVICE — INTENDED TO BE USED TO OCCLUDE, RETRACT AND IDENTIFY ARTERIES, VEINS, TENDONS AND NERVES IN SURGICAL PROCEDURES: Brand: STERION®  VESSEL LOOP

## (undated) DEVICE — NDL PRT INJ NSAF BLNT 18GX1.5 --

## (undated) DEVICE — VESSEL LOOPS,MAXI, RED: Brand: DEVON

## (undated) DEVICE — SUTURE MCRYL SZ 4-0 L18IN ABSRB UD L19MM PS-2 3/8 CIR PRIM Y496G

## (undated) DEVICE — SUTURE PERMAHAND SZ 2-0 L18IN NONABSORBABLE BLK L26MM PS 1588H

## (undated) DEVICE — APPLIER CLP L9.375IN APER 2.1MM CLS L3.8MM 20 SM TI CLP

## (undated) DEVICE — SUT PROL 6-0 24IN BV1 DA BLU --

## (undated) DEVICE — GLOVE SURG SZ 7.5 L11.73IN FNGR THK9.8MIL STRW LTX POLYMER

## (undated) DEVICE — SUTURE PERMA-HAND SZ 2-0 L24IN NONABSORBABLE BLK W/O NDL SA75H